# Patient Record
Sex: MALE | Race: BLACK OR AFRICAN AMERICAN | HISPANIC OR LATINO | URBAN - METROPOLITAN AREA
[De-identification: names, ages, dates, MRNs, and addresses within clinical notes are randomized per-mention and may not be internally consistent; named-entity substitution may affect disease eponyms.]

---

## 2017-06-30 ENCOUNTER — EMERGENCY (EMERGENCY)
Facility: HOSPITAL | Age: 37
LOS: 0 days | Discharge: HOME | End: 2017-07-01
Attending: EMERGENCY MEDICINE
Payer: SELF-PAY

## 2017-06-30 VITALS
DIASTOLIC BLOOD PRESSURE: 106 MMHG | WEIGHT: 160.06 LBS | HEART RATE: 66 BPM | HEIGHT: 69 IN | SYSTOLIC BLOOD PRESSURE: 156 MMHG | OXYGEN SATURATION: 96 % | TEMPERATURE: 99 F | RESPIRATION RATE: 20 BRPM

## 2017-06-30 DIAGNOSIS — Z90.49 ACQUIRED ABSENCE OF OTHER SPECIFIED PARTS OF DIGESTIVE TRACT: Chronic | ICD-10-CM

## 2017-06-30 LAB
ALBUMIN SERPL ELPH-MCNC: 4.2 G/DL — SIGNIFICANT CHANGE UP (ref 3.3–5)
ALP SERPL-CCNC: 96 U/L — SIGNIFICANT CHANGE UP (ref 40–120)
ALT FLD-CCNC: 54 U/L — SIGNIFICANT CHANGE UP (ref 12–78)
AMYLASE P1 CFR SERPL: 106 U/L — SIGNIFICANT CHANGE UP (ref 25–115)
ANION GAP SERPL CALC-SCNC: 11 MMOL/L — SIGNIFICANT CHANGE UP (ref 5–17)
APPEARANCE UR: CLEAR — SIGNIFICANT CHANGE UP
APTT BLD: 25.1 SEC — LOW (ref 27.5–37.4)
AST SERPL-CCNC: 45 U/L — HIGH (ref 15–37)
BASE EXCESS BLDA CALC-SCNC: 1.2 MMOL/L — SIGNIFICANT CHANGE UP (ref -2–2)
BASOPHILS # BLD AUTO: 0.1 K/UL — SIGNIFICANT CHANGE UP (ref 0–0.2)
BASOPHILS NFR BLD AUTO: 1 % — SIGNIFICANT CHANGE UP (ref 0–2)
BILIRUB SERPL-MCNC: 0.6 MG/DL — SIGNIFICANT CHANGE UP (ref 0.2–1.2)
BILIRUB UR-MCNC: NEGATIVE — SIGNIFICANT CHANGE UP
BLD GP AB SCN SERPL QL: SIGNIFICANT CHANGE UP
BLOOD GAS COMMENTS: SIGNIFICANT CHANGE UP
BLOOD GAS COMMENTS: SIGNIFICANT CHANGE UP
BLOOD GAS SOURCE: SIGNIFICANT CHANGE UP
BUN SERPL-MCNC: 6 MG/DL — LOW (ref 7–23)
CALCIUM SERPL-MCNC: 8.5 MG/DL — SIGNIFICANT CHANGE UP (ref 8.5–10.1)
CHLORIDE SERPL-SCNC: 99 MMOL/L — SIGNIFICANT CHANGE UP (ref 96–108)
CK MB BLD-MCNC: 0.5 % — SIGNIFICANT CHANGE UP (ref 0–3.5)
CK MB CFR SERPL CALC: 1.3 NG/ML — SIGNIFICANT CHANGE UP (ref 0.5–3.6)
CK SERPL-CCNC: 251 U/L — SIGNIFICANT CHANGE UP (ref 26–308)
CO2 SERPL-SCNC: 28 MMOL/L — SIGNIFICANT CHANGE UP (ref 22–31)
COLOR SPEC: YELLOW — SIGNIFICANT CHANGE UP
CREAT SERPL-MCNC: 1.02 MG/DL — SIGNIFICANT CHANGE UP (ref 0.5–1.3)
DIFF PNL FLD: NEGATIVE — SIGNIFICANT CHANGE UP
EOSINOPHIL # BLD AUTO: 0.1 K/UL — SIGNIFICANT CHANGE UP (ref 0–0.5)
EOSINOPHIL NFR BLD AUTO: 0.8 % — SIGNIFICANT CHANGE UP (ref 0–6)
GLUCOSE SERPL-MCNC: 476 MG/DL — CRITICAL HIGH (ref 70–99)
GLUCOSE UR QL: 1000 MG/DL
HCO3 BLDA-SCNC: 25 MMOL/L — SIGNIFICANT CHANGE UP (ref 21–29)
HCT VFR BLD CALC: 41.8 % — SIGNIFICANT CHANGE UP (ref 39–50)
HGB BLD-MCNC: 14.9 G/DL — SIGNIFICANT CHANGE UP (ref 13–17)
HOROWITZ INDEX BLDA+IHG-RTO: 21 — SIGNIFICANT CHANGE UP
INR BLD: 0.92 RATIO — SIGNIFICANT CHANGE UP (ref 0.88–1.16)
KETONES UR-MCNC: NEGATIVE — SIGNIFICANT CHANGE UP
LACTATE SERPL-SCNC: 2.5 MMOL/L — HIGH (ref 0.7–2)
LEUKOCYTE ESTERASE UR-ACNC: NEGATIVE — SIGNIFICANT CHANGE UP
LIDOCAIN IGE QN: 56 U/L — LOW (ref 73–393)
LYMPHOCYTES # BLD AUTO: 1.2 K/UL — SIGNIFICANT CHANGE UP (ref 1–3.3)
LYMPHOCYTES # BLD AUTO: 11.5 % — LOW (ref 13–44)
MCHC RBC-ENTMCNC: 29.6 PG — SIGNIFICANT CHANGE UP (ref 27–34)
MCHC RBC-ENTMCNC: 35.5 GM/DL — SIGNIFICANT CHANGE UP (ref 32–36)
MCV RBC AUTO: 83.2 FL — SIGNIFICANT CHANGE UP (ref 80–100)
MONOCYTES # BLD AUTO: 0.2 K/UL — SIGNIFICANT CHANGE UP (ref 0–0.9)
MONOCYTES NFR BLD AUTO: 2.2 % — SIGNIFICANT CHANGE UP (ref 2–14)
NEUTROPHILS # BLD AUTO: 8.9 K/UL — HIGH (ref 1.8–7.4)
NEUTROPHILS NFR BLD AUTO: 84.5 % — HIGH (ref 43–77)
NITRITE UR-MCNC: NEGATIVE — SIGNIFICANT CHANGE UP
OB PNL STL: NEGATIVE — SIGNIFICANT CHANGE UP
PCO2 BLDA: 40 MMHG — SIGNIFICANT CHANGE UP (ref 32–46)
PH BLD: 7.42 — SIGNIFICANT CHANGE UP (ref 7.35–7.45)
PH UR: 8 — SIGNIFICANT CHANGE UP (ref 5–8)
PLATELET # BLD AUTO: 320 K/UL — SIGNIFICANT CHANGE UP (ref 150–400)
PO2 BLDA: 104 MMHG — SIGNIFICANT CHANGE UP (ref 74–108)
POTASSIUM SERPL-MCNC: 4.8 MMOL/L — SIGNIFICANT CHANGE UP (ref 3.5–5.3)
POTASSIUM SERPL-SCNC: 4.8 MMOL/L — SIGNIFICANT CHANGE UP (ref 3.5–5.3)
PROT SERPL-MCNC: 8 GM/DL — SIGNIFICANT CHANGE UP (ref 6–8.3)
PROT UR-MCNC: NEGATIVE MG/DL — SIGNIFICANT CHANGE UP
PROTHROM AB SERPL-ACNC: 10 SEC — SIGNIFICANT CHANGE UP (ref 9.8–12.7)
RBC # BLD: 5.03 M/UL — SIGNIFICANT CHANGE UP (ref 4.2–5.8)
RBC # FLD: 12.5 % — SIGNIFICANT CHANGE UP (ref 11–15)
SAO2 % BLDA: 98 % — HIGH (ref 92–96)
SODIUM SERPL-SCNC: 138 MMOL/L — SIGNIFICANT CHANGE UP (ref 135–145)
SP GR SPEC: 1.01 — SIGNIFICANT CHANGE UP (ref 1.01–1.02)
TROPONIN I SERPL-MCNC: <.015 NG/ML — SIGNIFICANT CHANGE UP (ref 0.01–0.04)
UROBILINOGEN FLD QL: NEGATIVE MG/DL — SIGNIFICANT CHANGE UP
WBC # BLD: 10.5 K/UL — SIGNIFICANT CHANGE UP (ref 3.8–10.5)
WBC # FLD AUTO: 10.5 K/UL — SIGNIFICANT CHANGE UP (ref 3.8–10.5)

## 2017-06-30 PROCEDURE — 99285 EMERGENCY DEPT VISIT HI MDM: CPT

## 2017-06-30 PROCEDURE — 74020: CPT | Mod: 26

## 2017-06-30 PROCEDURE — 71010: CPT | Mod: 26

## 2017-06-30 RX ORDER — PIPERACILLIN AND TAZOBACTAM 4; .5 G/20ML; G/20ML
3.38 INJECTION, POWDER, LYOPHILIZED, FOR SOLUTION INTRAVENOUS ONCE
Qty: 0 | Refills: 0 | Status: COMPLETED | OUTPATIENT
Start: 2017-06-30 | End: 2017-06-30

## 2017-06-30 RX ORDER — SODIUM CHLORIDE 9 MG/ML
3 INJECTION INTRAMUSCULAR; INTRAVENOUS; SUBCUTANEOUS ONCE
Qty: 0 | Refills: 0 | Status: COMPLETED | OUTPATIENT
Start: 2017-06-30 | End: 2017-06-30

## 2017-06-30 RX ORDER — SODIUM CHLORIDE 9 MG/ML
1000 INJECTION INTRAMUSCULAR; INTRAVENOUS; SUBCUTANEOUS
Qty: 0 | Refills: 0 | Status: COMPLETED | OUTPATIENT
Start: 2017-06-30 | End: 2017-06-30

## 2017-06-30 RX ORDER — MORPHINE SULFATE 50 MG/1
4 CAPSULE, EXTENDED RELEASE ORAL ONCE
Qty: 0 | Refills: 0 | Status: DISCONTINUED | OUTPATIENT
Start: 2017-06-30 | End: 2017-06-30

## 2017-06-30 RX ORDER — INSULIN HUMAN 100 [IU]/ML
10 INJECTION, SOLUTION SUBCUTANEOUS ONCE
Qty: 0 | Refills: 0 | Status: COMPLETED | OUTPATIENT
Start: 2017-06-30 | End: 2017-06-30

## 2017-06-30 RX ORDER — METOCLOPRAMIDE HCL 10 MG
10 TABLET ORAL ONCE
Qty: 0 | Refills: 0 | Status: COMPLETED | OUTPATIENT
Start: 2017-06-30 | End: 2017-06-30

## 2017-06-30 RX ORDER — ONDANSETRON 8 MG/1
8 TABLET, FILM COATED ORAL ONCE
Qty: 0 | Refills: 0 | Status: COMPLETED | OUTPATIENT
Start: 2017-06-30 | End: 2017-06-30

## 2017-06-30 RX ORDER — DIPHENHYDRAMINE HCL 50 MG
25 CAPSULE ORAL ONCE
Qty: 0 | Refills: 0 | Status: COMPLETED | OUTPATIENT
Start: 2017-06-30 | End: 2017-06-30

## 2017-06-30 RX ORDER — PANTOPRAZOLE SODIUM 20 MG/1
80 TABLET, DELAYED RELEASE ORAL ONCE
Qty: 0 | Refills: 0 | Status: COMPLETED | OUTPATIENT
Start: 2017-06-30 | End: 2017-06-30

## 2017-06-30 RX ORDER — IOHEXOL 300 MG/ML
30 INJECTION, SOLUTION INTRAVENOUS ONCE
Qty: 0 | Refills: 0 | Status: COMPLETED | OUTPATIENT
Start: 2017-06-30 | End: 2017-06-30

## 2017-06-30 RX ADMIN — PIPERACILLIN AND TAZOBACTAM 200 GRAM(S): 4; .5 INJECTION, POWDER, LYOPHILIZED, FOR SOLUTION INTRAVENOUS at 21:49

## 2017-06-30 RX ADMIN — Medication 10 MILLIGRAM(S): at 21:21

## 2017-06-30 RX ADMIN — SODIUM CHLORIDE 2000 MILLILITER(S): 9 INJECTION INTRAMUSCULAR; INTRAVENOUS; SUBCUTANEOUS at 21:34

## 2017-06-30 RX ADMIN — MORPHINE SULFATE 4 MILLIGRAM(S): 50 CAPSULE, EXTENDED RELEASE ORAL at 20:21

## 2017-06-30 RX ADMIN — IOHEXOL 30 MILLILITER(S): 300 INJECTION, SOLUTION INTRAVENOUS at 21:57

## 2017-06-30 RX ADMIN — ONDANSETRON 8 MILLIGRAM(S): 8 TABLET, FILM COATED ORAL at 20:21

## 2017-06-30 RX ADMIN — MORPHINE SULFATE 4 MILLIGRAM(S): 50 CAPSULE, EXTENDED RELEASE ORAL at 21:57

## 2017-06-30 RX ADMIN — SODIUM CHLORIDE 2000 MILLILITER(S): 9 INJECTION INTRAMUSCULAR; INTRAVENOUS; SUBCUTANEOUS at 20:21

## 2017-06-30 RX ADMIN — Medication 25 MILLIGRAM(S): at 21:21

## 2017-06-30 RX ADMIN — SODIUM CHLORIDE 2000 MILLILITER(S): 9 INJECTION INTRAMUSCULAR; INTRAVENOUS; SUBCUTANEOUS at 23:49

## 2017-06-30 RX ADMIN — SODIUM CHLORIDE 2000 MILLILITER(S): 9 INJECTION INTRAMUSCULAR; INTRAVENOUS; SUBCUTANEOUS at 22:59

## 2017-06-30 RX ADMIN — INSULIN HUMAN 10 UNIT(S): 100 INJECTION, SOLUTION SUBCUTANEOUS at 22:59

## 2017-06-30 RX ADMIN — PANTOPRAZOLE SODIUM 80 MILLIGRAM(S): 20 TABLET, DELAYED RELEASE ORAL at 20:21

## 2017-06-30 RX ADMIN — SODIUM CHLORIDE 3 MILLILITER(S): 9 INJECTION INTRAMUSCULAR; INTRAVENOUS; SUBCUTANEOUS at 20:30

## 2017-06-30 NOTE — ED PROVIDER NOTE - OBJECTIVE STATEMENT
36yoM; with pmh signif for DM type I, Gastritis, Anxeity; now p/w abd pain--epigastric, cramping, intermittent, x1 day, associated with n/v (x15-20 episodes)--food and coffee grounds. denies f/c/s. states he ate fast food yesterday. denies sick contacts. denies travel. denies dysuria, hematuria, frequency, urgency.

## 2017-06-30 NOTE — ED PROVIDER NOTE - CARE PLAN
Principal Discharge DX:	Hyperglycemia without ketosis Principal Discharge DX:	Hyperglycemia without ketosis  Secondary Diagnosis:	Gastritis

## 2017-06-30 NOTE — ED PROVIDER NOTE - PHYSICAL EXAMINATION
General:     NAD, well-nourished, well-appearing  Head:     NC/AT, EOMI, oral mucosa moist  Neck:     trachea midline  Lungs:     CTA b/l, no w/r/r  CVS:     S1S2, RRR, no m/g/r  Abd:     +BS, ttp @ epigastrium > RUQ=LUQ, no rebound or guarding, s/nd, no organomegaly  Ext:    2+ radial and pedal pulses, no c/c/e  Neuro: AAOx3, no sensory/motor deficits

## 2017-06-30 NOTE — ED ADULT TRIAGE NOTE - CHIEF COMPLAINT QUOTE
patient c/o of abdominal pain and coffee ground vomits started 2 H ago , patient diaphoretic denied chest pain no difficulty breathing

## 2017-07-01 VITALS
RESPIRATION RATE: 18 BRPM | HEART RATE: 71 BPM | DIASTOLIC BLOOD PRESSURE: 52 MMHG | TEMPERATURE: 98 F | OXYGEN SATURATION: 99 % | SYSTOLIC BLOOD PRESSURE: 108 MMHG

## 2017-07-01 DIAGNOSIS — Z79.4 LONG TERM (CURRENT) USE OF INSULIN: ICD-10-CM

## 2017-07-01 DIAGNOSIS — K92.0 HEMATEMESIS: ICD-10-CM

## 2017-07-01 DIAGNOSIS — E10.65 TYPE 1 DIABETES MELLITUS WITH HYPERGLYCEMIA: ICD-10-CM

## 2017-07-01 DIAGNOSIS — J45.909 UNSPECIFIED ASTHMA, UNCOMPLICATED: ICD-10-CM

## 2017-07-01 DIAGNOSIS — Z79.51 LONG TERM (CURRENT) USE OF INHALED STEROIDS: ICD-10-CM

## 2017-07-01 LAB
ANION GAP SERPL CALC-SCNC: 8 MMOL/L — SIGNIFICANT CHANGE UP (ref 5–17)
BUN SERPL-MCNC: 7 MG/DL — SIGNIFICANT CHANGE UP (ref 7–23)
CALCIUM SERPL-MCNC: 8.5 MG/DL — SIGNIFICANT CHANGE UP (ref 8.5–10.1)
CHLORIDE SERPL-SCNC: 104 MMOL/L — SIGNIFICANT CHANGE UP (ref 96–108)
CO2 SERPL-SCNC: 27 MMOL/L — SIGNIFICANT CHANGE UP (ref 22–31)
CREAT SERPL-MCNC: 0.8 MG/DL — SIGNIFICANT CHANGE UP (ref 0.5–1.3)
GLUCOSE SERPL-MCNC: 269 MG/DL — HIGH (ref 70–99)
LACTATE SERPL-SCNC: 1.7 MMOL/L — SIGNIFICANT CHANGE UP (ref 0.7–2)
POTASSIUM SERPL-MCNC: 3.9 MMOL/L — SIGNIFICANT CHANGE UP (ref 3.5–5.3)
POTASSIUM SERPL-SCNC: 3.9 MMOL/L — SIGNIFICANT CHANGE UP (ref 3.5–5.3)
SODIUM SERPL-SCNC: 139 MMOL/L — SIGNIFICANT CHANGE UP (ref 135–145)

## 2017-07-01 PROCEDURE — 74177 CT ABD & PELVIS W/CONTRAST: CPT | Mod: 26

## 2017-07-01 RX ORDER — ONDANSETRON 8 MG/1
1 TABLET, FILM COATED ORAL
Qty: 10 | Refills: 0 | OUTPATIENT
Start: 2017-07-01

## 2017-07-01 RX ORDER — INSULIN HUMAN 100 [IU]/ML
4 INJECTION, SOLUTION SUBCUTANEOUS ONCE
Qty: 0 | Refills: 0 | Status: COMPLETED | OUTPATIENT
Start: 2017-07-01 | End: 2017-07-01

## 2017-07-01 RX ORDER — PANTOPRAZOLE SODIUM 20 MG/1
1 TABLET, DELAYED RELEASE ORAL
Qty: 14 | Refills: 0 | OUTPATIENT
Start: 2017-07-01 | End: 2017-07-15

## 2017-07-01 RX ORDER — MORPHINE SULFATE 50 MG/1
4 CAPSULE, EXTENDED RELEASE ORAL ONCE
Qty: 0 | Refills: 0 | Status: DISCONTINUED | OUTPATIENT
Start: 2017-07-01 | End: 2017-07-01

## 2017-07-01 RX ADMIN — MORPHINE SULFATE 4 MILLIGRAM(S): 50 CAPSULE, EXTENDED RELEASE ORAL at 01:28

## 2017-07-01 RX ADMIN — INSULIN HUMAN 4 UNIT(S): 100 INJECTION, SOLUTION SUBCUTANEOUS at 04:00

## 2017-07-01 NOTE — ED ADULT NURSE REASSESSMENT NOTE - NS ED NURSE REASSESS COMMENT FT1
Pt A&Ox4. No distress noted. Received resting in stretcher. Will continue to monitor
Pt A&Ox4. No distress noted. No c/o made. Discharged to home as ordered. Discharge instruction given & discussed with pt. Pt verbalized understanding. Left ER ambulatory. Steady gait noted.

## 2017-09-26 ENCOUNTER — INPATIENT (INPATIENT)
Facility: HOSPITAL | Age: 37
LOS: 3 days | Discharge: HOME | End: 2017-09-30
Attending: INTERNAL MEDICINE | Admitting: INTERNAL MEDICINE
Payer: MEDICAID

## 2017-09-26 VITALS
TEMPERATURE: 98 F | HEIGHT: 69 IN | OXYGEN SATURATION: 96 % | RESPIRATION RATE: 16 BRPM | HEART RATE: 94 BPM | WEIGHT: 160.06 LBS | SYSTOLIC BLOOD PRESSURE: 140 MMHG | DIASTOLIC BLOOD PRESSURE: 77 MMHG

## 2017-09-26 DIAGNOSIS — Z90.49 ACQUIRED ABSENCE OF OTHER SPECIFIED PARTS OF DIGESTIVE TRACT: Chronic | ICD-10-CM

## 2017-09-26 LAB
ACETONE SERPL-MCNC: ABNORMAL
ALBUMIN SERPL ELPH-MCNC: 4 G/DL — SIGNIFICANT CHANGE UP (ref 3.3–5)
ALP SERPL-CCNC: 106 U/L — SIGNIFICANT CHANGE UP (ref 40–120)
ALT FLD-CCNC: 40 U/L — SIGNIFICANT CHANGE UP (ref 12–78)
ANION GAP SERPL CALC-SCNC: 16 MMOL/L — SIGNIFICANT CHANGE UP (ref 5–17)
ANION GAP SERPL CALC-SCNC: 21 MMOL/L — HIGH (ref 5–17)
APPEARANCE UR: CLEAR — SIGNIFICANT CHANGE UP
APTT BLD: 25 SEC — LOW (ref 27.5–37.4)
AST SERPL-CCNC: 32 U/L — SIGNIFICANT CHANGE UP (ref 15–37)
BACTERIA # UR AUTO: ABNORMAL
BASE EXCESS BLDA CALC-SCNC: -7.2 MMOL/L — LOW (ref -2–2)
BASE EXCESS BLDV CALC-SCNC: -7.9 MMOL/L — LOW (ref -2–2)
BASOPHILS # BLD AUTO: 0 K/UL — SIGNIFICANT CHANGE UP (ref 0–0.2)
BASOPHILS NFR BLD AUTO: 0.3 % — SIGNIFICANT CHANGE UP (ref 0–2)
BILIRUB SERPL-MCNC: 0.7 MG/DL — SIGNIFICANT CHANGE UP (ref 0.2–1.2)
BILIRUB UR-MCNC: NEGATIVE — SIGNIFICANT CHANGE UP
BLOOD GAS COMMENTS, VENOUS: SIGNIFICANT CHANGE UP
BLOOD GAS COMMENTS: SIGNIFICANT CHANGE UP
BLOOD GAS COMMENTS: SIGNIFICANT CHANGE UP
BLOOD GAS SOURCE: SIGNIFICANT CHANGE UP
BUN SERPL-MCNC: 22 MG/DL — SIGNIFICANT CHANGE UP (ref 7–23)
BUN SERPL-MCNC: 23 MG/DL — SIGNIFICANT CHANGE UP (ref 7–23)
CALCIUM SERPL-MCNC: 8 MG/DL — LOW (ref 8.5–10.1)
CALCIUM SERPL-MCNC: 9.3 MG/DL — SIGNIFICANT CHANGE UP (ref 8.5–10.1)
CHLORIDE SERPL-SCNC: 104 MMOL/L — SIGNIFICANT CHANGE UP (ref 96–108)
CHLORIDE SERPL-SCNC: 97 MMOL/L — SIGNIFICANT CHANGE UP (ref 96–108)
CO2 SERPL-SCNC: 18 MMOL/L — LOW (ref 22–31)
CO2 SERPL-SCNC: 19 MMOL/L — LOW (ref 22–31)
COLOR SPEC: SIGNIFICANT CHANGE UP
CREAT SERPL-MCNC: 1.3 MG/DL — SIGNIFICANT CHANGE UP (ref 0.5–1.3)
CREAT SERPL-MCNC: 1.32 MG/DL — HIGH (ref 0.5–1.3)
DIFF PNL FLD: NEGATIVE — SIGNIFICANT CHANGE UP
EOSINOPHIL # BLD AUTO: 0 K/UL — SIGNIFICANT CHANGE UP (ref 0–0.5)
EOSINOPHIL NFR BLD AUTO: 0.1 % — SIGNIFICANT CHANGE UP (ref 0–6)
GAS PNL BLDV: SIGNIFICANT CHANGE UP
GLUCOSE SERPL-MCNC: 319 MG/DL — HIGH (ref 70–99)
GLUCOSE SERPL-MCNC: 524 MG/DL — CRITICAL HIGH (ref 70–99)
GLUCOSE UR QL: 1000 MG/DL
HCO3 BLDA-SCNC: 18 MMOL/L — LOW (ref 21–29)
HCO3 BLDV-SCNC: 18 MMOL/L — LOW (ref 21–29)
HCT VFR BLD CALC: 46.7 % — SIGNIFICANT CHANGE UP (ref 39–50)
HGB BLD-MCNC: 15.1 G/DL — SIGNIFICANT CHANGE UP (ref 13–17)
HOROWITZ INDEX BLDA+IHG-RTO: 21 — SIGNIFICANT CHANGE UP
HOROWITZ INDEX BLDV+IHG-RTO: 21 — SIGNIFICANT CHANGE UP
INR BLD: 0.97 RATIO — SIGNIFICANT CHANGE UP (ref 0.88–1.16)
KETONES UR-MCNC: ABNORMAL
LACTATE SERPL-SCNC: 2.6 MMOL/L — HIGH (ref 0.7–2)
LEUKOCYTE ESTERASE UR-ACNC: NEGATIVE — SIGNIFICANT CHANGE UP
LYMPHOCYTES # BLD AUTO: 1.4 K/UL — SIGNIFICANT CHANGE UP (ref 1–3.3)
LYMPHOCYTES # BLD AUTO: 9.2 % — LOW (ref 13–44)
MCHC RBC-ENTMCNC: 29.1 PG — SIGNIFICANT CHANGE UP (ref 27–34)
MCHC RBC-ENTMCNC: 32.4 GM/DL — SIGNIFICANT CHANGE UP (ref 32–36)
MCV RBC AUTO: 89.8 FL — SIGNIFICANT CHANGE UP (ref 80–100)
MONOCYTES # BLD AUTO: 0.1 K/UL — SIGNIFICANT CHANGE UP (ref 0–0.9)
MONOCYTES NFR BLD AUTO: 0.7 % — LOW (ref 2–14)
NEUTROPHILS # BLD AUTO: 13.6 K/UL — HIGH (ref 1.8–7.4)
NEUTROPHILS NFR BLD AUTO: 89.6 % — HIGH (ref 43–77)
NITRITE UR-MCNC: NEGATIVE — SIGNIFICANT CHANGE UP
PCO2 BLDA: 35 MMHG — SIGNIFICANT CHANGE UP (ref 32–46)
PCO2 BLDV: 40 MMHG — SIGNIFICANT CHANGE UP (ref 35–50)
PH BLD: 7.32 — LOW (ref 7.35–7.45)
PH BLDV: 7.28 — LOW (ref 7.35–7.45)
PH UR: 5 — SIGNIFICANT CHANGE UP (ref 5–8)
PLATELET # BLD AUTO: 308 K/UL — SIGNIFICANT CHANGE UP (ref 150–400)
PO2 BLDA: 74 MMHG — SIGNIFICANT CHANGE UP (ref 74–108)
PO2 BLDV: 69 MMHG — HIGH (ref 25–45)
POTASSIUM SERPL-MCNC: 4.5 MMOL/L — SIGNIFICANT CHANGE UP (ref 3.5–5.3)
POTASSIUM SERPL-MCNC: 5.7 MMOL/L — HIGH (ref 3.5–5.3)
POTASSIUM SERPL-SCNC: 4.5 MMOL/L — SIGNIFICANT CHANGE UP (ref 3.5–5.3)
POTASSIUM SERPL-SCNC: 5.7 MMOL/L — HIGH (ref 3.5–5.3)
PROT SERPL-MCNC: 8 GM/DL — SIGNIFICANT CHANGE UP (ref 6–8.3)
PROT UR-MCNC: NEGATIVE MG/DL — SIGNIFICANT CHANGE UP
PROTHROM AB SERPL-ACNC: 10.6 SEC — SIGNIFICANT CHANGE UP (ref 9.8–12.7)
RBC # BLD: 5.2 M/UL — SIGNIFICANT CHANGE UP (ref 4.2–5.8)
RBC # FLD: 12.4 % — SIGNIFICANT CHANGE UP (ref 11–15)
SAO2 % BLDA: 94 % — SIGNIFICANT CHANGE UP (ref 92–96)
SAO2 % BLDV: 53 % — LOW (ref 67–88)
SODIUM SERPL-SCNC: 137 MMOL/L — SIGNIFICANT CHANGE UP (ref 135–145)
SODIUM SERPL-SCNC: 138 MMOL/L — SIGNIFICANT CHANGE UP (ref 135–145)
SP GR SPEC: 1.01 — SIGNIFICANT CHANGE UP (ref 1.01–1.02)
UROBILINOGEN FLD QL: NEGATIVE MG/DL — SIGNIFICANT CHANGE UP
WBC # BLD: 15.2 K/UL — HIGH (ref 3.8–10.5)
WBC # FLD AUTO: 15.2 K/UL — HIGH (ref 3.8–10.5)

## 2017-09-26 PROCEDURE — 71010: CPT | Mod: 26

## 2017-09-26 PROCEDURE — 74177 CT ABD & PELVIS W/CONTRAST: CPT | Mod: 26

## 2017-09-26 PROCEDURE — 99285 EMERGENCY DEPT VISIT HI MDM: CPT

## 2017-09-26 PROCEDURE — 74020: CPT | Mod: 26

## 2017-09-26 RX ORDER — HYDROMORPHONE HYDROCHLORIDE 2 MG/ML
1 INJECTION INTRAMUSCULAR; INTRAVENOUS; SUBCUTANEOUS ONCE
Qty: 0 | Refills: 0 | Status: DISCONTINUED | OUTPATIENT
Start: 2017-09-26 | End: 2017-09-26

## 2017-09-26 RX ORDER — PANTOPRAZOLE SODIUM 20 MG/1
40 TABLET, DELAYED RELEASE ORAL ONCE
Qty: 0 | Refills: 0 | Status: COMPLETED | OUTPATIENT
Start: 2017-09-26 | End: 2017-09-26

## 2017-09-26 RX ORDER — SODIUM CHLORIDE 9 MG/ML
2000 INJECTION INTRAMUSCULAR; INTRAVENOUS; SUBCUTANEOUS ONCE
Qty: 0 | Refills: 0 | Status: COMPLETED | OUTPATIENT
Start: 2017-09-26 | End: 2017-09-26

## 2017-09-26 RX ORDER — METRONIDAZOLE 500 MG
500 TABLET ORAL ONCE
Qty: 0 | Refills: 0 | Status: COMPLETED | OUTPATIENT
Start: 2017-09-26 | End: 2017-09-26

## 2017-09-26 RX ORDER — IOHEXOL 300 MG/ML
30 INJECTION, SOLUTION INTRAVENOUS ONCE
Qty: 0 | Refills: 0 | Status: COMPLETED | OUTPATIENT
Start: 2017-09-26 | End: 2017-09-26

## 2017-09-26 RX ORDER — CIPROFLOXACIN LACTATE 400MG/40ML
400 VIAL (ML) INTRAVENOUS ONCE
Qty: 0 | Refills: 0 | Status: COMPLETED | OUTPATIENT
Start: 2017-09-26 | End: 2017-09-26

## 2017-09-26 RX ORDER — METOCLOPRAMIDE HCL 10 MG
10 TABLET ORAL ONCE
Qty: 0 | Refills: 0 | Status: COMPLETED | OUTPATIENT
Start: 2017-09-26 | End: 2017-09-26

## 2017-09-26 RX ORDER — INSULIN HUMAN 100 [IU]/ML
10 INJECTION, SOLUTION SUBCUTANEOUS ONCE
Qty: 0 | Refills: 0 | Status: COMPLETED | OUTPATIENT
Start: 2017-09-26 | End: 2017-09-26

## 2017-09-26 RX ADMIN — INSULIN HUMAN 10 UNIT(S): 100 INJECTION, SOLUTION SUBCUTANEOUS at 19:16

## 2017-09-26 RX ADMIN — HYDROMORPHONE HYDROCHLORIDE 1 MILLIGRAM(S): 2 INJECTION INTRAMUSCULAR; INTRAVENOUS; SUBCUTANEOUS at 18:24

## 2017-09-26 RX ADMIN — PANTOPRAZOLE SODIUM 40 MILLIGRAM(S): 20 TABLET, DELAYED RELEASE ORAL at 18:23

## 2017-09-26 RX ADMIN — SODIUM CHLORIDE 2000 MILLILITER(S): 9 INJECTION INTRAMUSCULAR; INTRAVENOUS; SUBCUTANEOUS at 18:23

## 2017-09-26 RX ADMIN — HYDROMORPHONE HYDROCHLORIDE 1 MILLIGRAM(S): 2 INJECTION INTRAMUSCULAR; INTRAVENOUS; SUBCUTANEOUS at 20:50

## 2017-09-26 RX ADMIN — IOHEXOL 30 MILLILITER(S): 300 INJECTION, SOLUTION INTRAVENOUS at 19:35

## 2017-09-26 RX ADMIN — SODIUM CHLORIDE 2000 MILLILITER(S): 9 INJECTION INTRAMUSCULAR; INTRAVENOUS; SUBCUTANEOUS at 19:40

## 2017-09-26 RX ADMIN — Medication 10 MILLIGRAM(S): at 18:24

## 2017-09-26 NOTE — ED PROVIDER NOTE - OBJECTIVE STATEMENT
36 yeas old male here c/o nausea vomiting intermittent diffused abd pain since this morning. Pt denies headache, dizziness, sob, chest pain, fever, chills, recent hx of trauma, dysuria or irregular bowel movements.

## 2017-09-26 NOTE — ED ADULT NURSE NOTE - OBJECTIVE STATEMENT
pt received alert and oriented x3, pt c/o abdominal pain, vomiting that started this morning, pt states he ran out of his insulin

## 2017-09-26 NOTE — ED PROVIDER NOTE - CONSTITUTIONAL, MLM
normal... Well appearing, well nourished, awake, alert, oriented to person, place, time/situation and in no apparent distress. + yellow bile

## 2017-09-27 DIAGNOSIS — E13.10 OTHER SPECIFIED DIABETES MELLITUS WITH KETOACIDOSIS WITHOUT COMA: ICD-10-CM

## 2017-09-27 LAB
ANION GAP SERPL CALC-SCNC: 13 MMOL/L — SIGNIFICANT CHANGE UP (ref 5–17)
ANION GAP SERPL CALC-SCNC: 21 MMOL/L — HIGH (ref 5–17)
ANION GAP SERPL CALC-SCNC: 21 MMOL/L — HIGH (ref 5–17)
ANION GAP SERPL CALC-SCNC: 8 MMOL/L — SIGNIFICANT CHANGE UP (ref 5–17)
BASOPHILS # BLD AUTO: 0 K/UL — SIGNIFICANT CHANGE UP (ref 0–0.2)
BASOPHILS NFR BLD AUTO: 0.2 % — SIGNIFICANT CHANGE UP (ref 0–2)
BUN SERPL-MCNC: 17 MG/DL — SIGNIFICANT CHANGE UP (ref 7–23)
BUN SERPL-MCNC: 21 MG/DL — SIGNIFICANT CHANGE UP (ref 7–23)
BUN SERPL-MCNC: 22 MG/DL — SIGNIFICANT CHANGE UP (ref 7–23)
BUN SERPL-MCNC: 25 MG/DL — HIGH (ref 7–23)
CALCIUM SERPL-MCNC: 8.1 MG/DL — LOW (ref 8.5–10.1)
CALCIUM SERPL-MCNC: 8.4 MG/DL — LOW (ref 8.5–10.1)
CALCIUM SERPL-MCNC: 8.5 MG/DL — SIGNIFICANT CHANGE UP (ref 8.5–10.1)
CALCIUM SERPL-MCNC: 8.5 MG/DL — SIGNIFICANT CHANGE UP (ref 8.5–10.1)
CHLORIDE SERPL-SCNC: 106 MMOL/L — SIGNIFICANT CHANGE UP (ref 96–108)
CHLORIDE SERPL-SCNC: 106 MMOL/L — SIGNIFICANT CHANGE UP (ref 96–108)
CHLORIDE SERPL-SCNC: 107 MMOL/L — SIGNIFICANT CHANGE UP (ref 96–108)
CHLORIDE SERPL-SCNC: 111 MMOL/L — HIGH (ref 96–108)
CO2 SERPL-SCNC: 12 MMOL/L — LOW (ref 22–31)
CO2 SERPL-SCNC: 13 MMOL/L — LOW (ref 22–31)
CO2 SERPL-SCNC: 18 MMOL/L — LOW (ref 22–31)
CO2 SERPL-SCNC: 24 MMOL/L — SIGNIFICANT CHANGE UP (ref 22–31)
CREAT SERPL-MCNC: 1.13 MG/DL — SIGNIFICANT CHANGE UP (ref 0.5–1.3)
CREAT SERPL-MCNC: 1.16 MG/DL — SIGNIFICANT CHANGE UP (ref 0.5–1.3)
CREAT SERPL-MCNC: 1.21 MG/DL — SIGNIFICANT CHANGE UP (ref 0.5–1.3)
CREAT SERPL-MCNC: 1.34 MG/DL — HIGH (ref 0.5–1.3)
EOSINOPHIL # BLD AUTO: 0 K/UL — SIGNIFICANT CHANGE UP (ref 0–0.5)
EOSINOPHIL NFR BLD AUTO: 0 % — SIGNIFICANT CHANGE UP (ref 0–6)
GLUCOSE SERPL-MCNC: 117 MG/DL — HIGH (ref 70–99)
GLUCOSE SERPL-MCNC: 173 MG/DL — HIGH (ref 70–99)
GLUCOSE SERPL-MCNC: 395 MG/DL — HIGH (ref 70–99)
GLUCOSE SERPL-MCNC: 405 MG/DL — HIGH (ref 70–99)
HBA1C BLD-MCNC: 9.5 % — HIGH (ref 4–5.6)
HCT VFR BLD CALC: 39.4 % — SIGNIFICANT CHANGE UP (ref 39–50)
HGB BLD-MCNC: 13.2 G/DL — SIGNIFICANT CHANGE UP (ref 13–17)
LACTATE SERPL-SCNC: 1.6 MMOL/L — SIGNIFICANT CHANGE UP (ref 0.7–2)
LYMPHOCYTES # BLD AUTO: 1.2 K/UL — SIGNIFICANT CHANGE UP (ref 1–3.3)
LYMPHOCYTES # BLD AUTO: 6.2 % — LOW (ref 13–44)
MAGNESIUM SERPL-MCNC: 2.3 MG/DL — SIGNIFICANT CHANGE UP (ref 1.6–2.6)
MAGNESIUM SERPL-MCNC: 2.3 MG/DL — SIGNIFICANT CHANGE UP (ref 1.6–2.6)
MAGNESIUM SERPL-MCNC: 2.4 MG/DL — SIGNIFICANT CHANGE UP (ref 1.6–2.6)
MCHC RBC-ENTMCNC: 29.7 PG — SIGNIFICANT CHANGE UP (ref 27–34)
MCHC RBC-ENTMCNC: 33.4 GM/DL — SIGNIFICANT CHANGE UP (ref 32–36)
MCV RBC AUTO: 88.8 FL — SIGNIFICANT CHANGE UP (ref 80–100)
MONOCYTES # BLD AUTO: 0.6 K/UL — SIGNIFICANT CHANGE UP (ref 0–0.9)
MONOCYTES NFR BLD AUTO: 2.9 % — SIGNIFICANT CHANGE UP (ref 2–14)
NEUTROPHILS # BLD AUTO: 17.1 K/UL — HIGH (ref 1.8–7.4)
NEUTROPHILS NFR BLD AUTO: 90.6 % — HIGH (ref 43–77)
OSMOLALITY SERPL: 309 MOS/KG — HIGH (ref 275–300)
PHOSPHATE SERPL-MCNC: 1.9 MG/DL — LOW (ref 2.5–4.5)
PHOSPHATE SERPL-MCNC: 3 MG/DL — SIGNIFICANT CHANGE UP (ref 2.5–4.5)
PHOSPHATE SERPL-MCNC: 3.7 MG/DL — SIGNIFICANT CHANGE UP (ref 2.5–4.5)
PLATELET # BLD AUTO: 302 K/UL — SIGNIFICANT CHANGE UP (ref 150–400)
POTASSIUM SERPL-MCNC: 3.5 MMOL/L — SIGNIFICANT CHANGE UP (ref 3.5–5.3)
POTASSIUM SERPL-MCNC: 4.3 MMOL/L — SIGNIFICANT CHANGE UP (ref 3.5–5.3)
POTASSIUM SERPL-MCNC: 4.6 MMOL/L — SIGNIFICANT CHANGE UP (ref 3.5–5.3)
POTASSIUM SERPL-MCNC: 5.6 MMOL/L — HIGH (ref 3.5–5.3)
POTASSIUM SERPL-SCNC: 3.5 MMOL/L — SIGNIFICANT CHANGE UP (ref 3.5–5.3)
POTASSIUM SERPL-SCNC: 4.3 MMOL/L — SIGNIFICANT CHANGE UP (ref 3.5–5.3)
POTASSIUM SERPL-SCNC: 4.6 MMOL/L — SIGNIFICANT CHANGE UP (ref 3.5–5.3)
POTASSIUM SERPL-SCNC: 5.6 MMOL/L — HIGH (ref 3.5–5.3)
RBC # BLD: 4.44 M/UL — SIGNIFICANT CHANGE UP (ref 4.2–5.8)
RBC # FLD: 12.9 % — SIGNIFICANT CHANGE UP (ref 11–15)
SODIUM SERPL-SCNC: 139 MMOL/L — SIGNIFICANT CHANGE UP (ref 135–145)
SODIUM SERPL-SCNC: 139 MMOL/L — SIGNIFICANT CHANGE UP (ref 135–145)
SODIUM SERPL-SCNC: 140 MMOL/L — SIGNIFICANT CHANGE UP (ref 135–145)
SODIUM SERPL-SCNC: 142 MMOL/L — SIGNIFICANT CHANGE UP (ref 135–145)
WBC # BLD: 18.9 K/UL — HIGH (ref 3.8–10.5)
WBC # FLD AUTO: 18.9 K/UL — HIGH (ref 3.8–10.5)

## 2017-09-27 PROCEDURE — 93010 ELECTROCARDIOGRAM REPORT: CPT

## 2017-09-27 RX ORDER — DEXTROSE 50 % IN WATER 50 %
1 SYRINGE (ML) INTRAVENOUS ONCE
Qty: 0 | Refills: 0 | Status: DISCONTINUED | OUTPATIENT
Start: 2017-09-27 | End: 2017-09-30

## 2017-09-27 RX ORDER — MORPHINE SULFATE 50 MG/1
2 CAPSULE, EXTENDED RELEASE ORAL ONCE
Qty: 0 | Refills: 0 | Status: DISCONTINUED | OUTPATIENT
Start: 2017-09-27 | End: 2017-09-27

## 2017-09-27 RX ORDER — SODIUM CHLORIDE 9 MG/ML
1000 INJECTION, SOLUTION INTRAVENOUS
Qty: 0 | Refills: 0 | Status: DISCONTINUED | OUTPATIENT
Start: 2017-09-27 | End: 2017-09-30

## 2017-09-27 RX ORDER — METRONIDAZOLE 500 MG
500 TABLET ORAL EVERY 8 HOURS
Qty: 0 | Refills: 0 | Status: DISCONTINUED | OUTPATIENT
Start: 2017-09-27 | End: 2017-09-30

## 2017-09-27 RX ORDER — SODIUM CHLORIDE 9 MG/ML
1000 INJECTION INTRAMUSCULAR; INTRAVENOUS; SUBCUTANEOUS
Qty: 0 | Refills: 0 | Status: DISCONTINUED | OUTPATIENT
Start: 2017-09-27 | End: 2017-09-27

## 2017-09-27 RX ORDER — INSULIN GLARGINE 100 [IU]/ML
20 INJECTION, SOLUTION SUBCUTANEOUS AT BEDTIME
Qty: 0 | Refills: 0 | Status: DISCONTINUED | OUTPATIENT
Start: 2017-09-28 | End: 2017-09-30

## 2017-09-27 RX ORDER — DEXTROSE 50 % IN WATER 50 %
25 SYRINGE (ML) INTRAVENOUS ONCE
Qty: 0 | Refills: 0 | Status: DISCONTINUED | OUTPATIENT
Start: 2017-09-27 | End: 2017-09-30

## 2017-09-27 RX ORDER — LACTOBACILLUS ACIDOPHILUS 100MM CELL
2 CAPSULE ORAL DAILY
Qty: 0 | Refills: 0 | Status: DISCONTINUED | OUTPATIENT
Start: 2017-09-27 | End: 2017-09-30

## 2017-09-27 RX ORDER — CIPROFLOXACIN LACTATE 400MG/40ML
VIAL (ML) INTRAVENOUS
Qty: 0 | Refills: 0 | Status: DISCONTINUED | OUTPATIENT
Start: 2017-09-27 | End: 2017-09-30

## 2017-09-27 RX ORDER — SODIUM CHLORIDE 9 MG/ML
1000 INJECTION, SOLUTION INTRAVENOUS
Qty: 0 | Refills: 0 | Status: DISCONTINUED | OUTPATIENT
Start: 2017-09-27 | End: 2017-09-28

## 2017-09-27 RX ORDER — CIPROFLOXACIN LACTATE 400MG/40ML
400 VIAL (ML) INTRAVENOUS EVERY 12 HOURS
Qty: 0 | Refills: 0 | Status: DISCONTINUED | OUTPATIENT
Start: 2017-09-27 | End: 2017-09-30

## 2017-09-27 RX ORDER — ONDANSETRON 8 MG/1
8 TABLET, FILM COATED ORAL ONCE
Qty: 0 | Refills: 0 | Status: COMPLETED | OUTPATIENT
Start: 2017-09-27 | End: 2017-09-27

## 2017-09-27 RX ORDER — INSULIN HUMAN 100 [IU]/ML
7 INJECTION, SOLUTION SUBCUTANEOUS
Qty: 100 | Refills: 0 | Status: DISCONTINUED | OUTPATIENT
Start: 2017-09-27 | End: 2017-09-27

## 2017-09-27 RX ORDER — HYDROMORPHONE HYDROCHLORIDE 2 MG/ML
1 INJECTION INTRAMUSCULAR; INTRAVENOUS; SUBCUTANEOUS ONCE
Qty: 0 | Refills: 0 | Status: DISCONTINUED | OUTPATIENT
Start: 2017-09-27 | End: 2017-09-27

## 2017-09-27 RX ORDER — HEPARIN SODIUM 5000 [USP'U]/ML
5000 INJECTION INTRAVENOUS; SUBCUTANEOUS EVERY 12 HOURS
Qty: 0 | Refills: 0 | Status: DISCONTINUED | OUTPATIENT
Start: 2017-09-27 | End: 2017-09-30

## 2017-09-27 RX ORDER — INSULIN GLARGINE 100 [IU]/ML
20 INJECTION, SOLUTION SUBCUTANEOUS ONCE
Qty: 0 | Refills: 0 | Status: COMPLETED | OUTPATIENT
Start: 2017-09-27 | End: 2017-09-27

## 2017-09-27 RX ORDER — INSULIN LISPRO 100/ML
VIAL (ML) SUBCUTANEOUS
Qty: 0 | Refills: 0 | Status: DISCONTINUED | OUTPATIENT
Start: 2017-09-27 | End: 2017-09-30

## 2017-09-27 RX ORDER — MORPHINE SULFATE 50 MG/1
4 CAPSULE, EXTENDED RELEASE ORAL ONCE
Qty: 0 | Refills: 0 | Status: DISCONTINUED | OUTPATIENT
Start: 2017-09-27 | End: 2017-09-27

## 2017-09-27 RX ORDER — GLUCAGON INJECTION, SOLUTION 0.5 MG/.1ML
1 INJECTION, SOLUTION SUBCUTANEOUS ONCE
Qty: 0 | Refills: 0 | Status: DISCONTINUED | OUTPATIENT
Start: 2017-09-27 | End: 2017-09-30

## 2017-09-27 RX ORDER — INSULIN HUMAN 100 [IU]/ML
5 INJECTION, SOLUTION SUBCUTANEOUS ONCE
Qty: 0 | Refills: 0 | Status: DISCONTINUED | OUTPATIENT
Start: 2017-09-27 | End: 2017-09-27

## 2017-09-27 RX ORDER — SODIUM,POTASSIUM PHOSPHATES 278-250MG
1 POWDER IN PACKET (EA) ORAL
Qty: 0 | Refills: 0 | Status: COMPLETED | OUTPATIENT
Start: 2017-09-27 | End: 2017-09-28

## 2017-09-27 RX ORDER — CIPROFLOXACIN LACTATE 400MG/40ML
400 VIAL (ML) INTRAVENOUS ONCE
Qty: 0 | Refills: 0 | Status: COMPLETED | OUTPATIENT
Start: 2017-09-27 | End: 2017-09-27

## 2017-09-27 RX ORDER — METRONIDAZOLE 500 MG
500 TABLET ORAL ONCE
Qty: 0 | Refills: 0 | Status: COMPLETED | OUTPATIENT
Start: 2017-09-27 | End: 2017-09-27

## 2017-09-27 RX ORDER — HYDROMORPHONE HYDROCHLORIDE 2 MG/ML
0.5 INJECTION INTRAMUSCULAR; INTRAVENOUS; SUBCUTANEOUS ONCE
Qty: 0 | Refills: 0 | Status: DISCONTINUED | OUTPATIENT
Start: 2017-09-27 | End: 2017-09-27

## 2017-09-27 RX ORDER — HYDROMORPHONE HYDROCHLORIDE 2 MG/ML
2 INJECTION INTRAMUSCULAR; INTRAVENOUS; SUBCUTANEOUS ONCE
Qty: 0 | Refills: 0 | Status: DISCONTINUED | OUTPATIENT
Start: 2017-09-27 | End: 2017-09-27

## 2017-09-27 RX ORDER — DEXTROSE 50 % IN WATER 50 %
12.5 SYRINGE (ML) INTRAVENOUS ONCE
Qty: 0 | Refills: 0 | Status: DISCONTINUED | OUTPATIENT
Start: 2017-09-27 | End: 2017-09-30

## 2017-09-27 RX ORDER — METRONIDAZOLE 500 MG
TABLET ORAL
Qty: 0 | Refills: 0 | Status: DISCONTINUED | OUTPATIENT
Start: 2017-09-27 | End: 2017-09-30

## 2017-09-27 RX ORDER — INFLUENZA VIRUS VACCINE 15; 15; 15; 15 UG/.5ML; UG/.5ML; UG/.5ML; UG/.5ML
0.5 SUSPENSION INTRAMUSCULAR ONCE
Qty: 0 | Refills: 0 | Status: COMPLETED | OUTPATIENT
Start: 2017-09-27 | End: 2017-09-27

## 2017-09-27 RX ADMIN — Medication 100 MILLIGRAM(S): at 10:29

## 2017-09-27 RX ADMIN — HYDROMORPHONE HYDROCHLORIDE 1 MILLIGRAM(S): 2 INJECTION INTRAMUSCULAR; INTRAVENOUS; SUBCUTANEOUS at 02:10

## 2017-09-27 RX ADMIN — INSULIN GLARGINE 20 UNIT(S): 100 INJECTION, SOLUTION SUBCUTANEOUS at 18:15

## 2017-09-27 RX ADMIN — HYDROMORPHONE HYDROCHLORIDE 0.5 MILLIGRAM(S): 2 INJECTION INTRAMUSCULAR; INTRAVENOUS; SUBCUTANEOUS at 11:42

## 2017-09-27 RX ADMIN — SODIUM CHLORIDE 150 MILLILITER(S): 9 INJECTION INTRAMUSCULAR; INTRAVENOUS; SUBCUTANEOUS at 06:46

## 2017-09-27 RX ADMIN — Medication 30 MILLILITER(S): at 21:15

## 2017-09-27 RX ADMIN — Medication 30 MILLILITER(S): at 15:52

## 2017-09-27 RX ADMIN — SODIUM CHLORIDE 125 MILLILITER(S): 9 INJECTION, SOLUTION INTRAVENOUS at 10:36

## 2017-09-27 RX ADMIN — Medication 100 MILLIGRAM(S): at 21:14

## 2017-09-27 RX ADMIN — Medication 200 MILLIGRAM(S): at 10:30

## 2017-09-27 RX ADMIN — Medication 100 MILLIGRAM(S): at 00:09

## 2017-09-27 RX ADMIN — Medication 100 MILLIGRAM(S): at 15:55

## 2017-09-27 RX ADMIN — MORPHINE SULFATE 2 MILLIGRAM(S): 50 CAPSULE, EXTENDED RELEASE ORAL at 06:41

## 2017-09-27 RX ADMIN — MORPHINE SULFATE 4 MILLIGRAM(S): 50 CAPSULE, EXTENDED RELEASE ORAL at 01:23

## 2017-09-27 RX ADMIN — HYDROMORPHONE HYDROCHLORIDE 0.5 MILLIGRAM(S): 2 INJECTION INTRAMUSCULAR; INTRAVENOUS; SUBCUTANEOUS at 17:30

## 2017-09-27 RX ADMIN — Medication 200 MILLIGRAM(S): at 01:20

## 2017-09-27 RX ADMIN — Medication 2 TABLET(S): at 15:53

## 2017-09-27 RX ADMIN — MORPHINE SULFATE 2 MILLIGRAM(S): 50 CAPSULE, EXTENDED RELEASE ORAL at 07:00

## 2017-09-27 RX ADMIN — INSULIN HUMAN 7 UNIT(S)/HR: 100 INJECTION, SOLUTION SUBCUTANEOUS at 03:19

## 2017-09-27 RX ADMIN — HYDROMORPHONE HYDROCHLORIDE 0.5 MILLIGRAM(S): 2 INJECTION INTRAMUSCULAR; INTRAVENOUS; SUBCUTANEOUS at 17:15

## 2017-09-27 RX ADMIN — SODIUM CHLORIDE 150 MILLILITER(S): 9 INJECTION INTRAMUSCULAR; INTRAVENOUS; SUBCUTANEOUS at 05:28

## 2017-09-27 RX ADMIN — HYDROMORPHONE HYDROCHLORIDE 0.5 MILLIGRAM(S): 2 INJECTION INTRAMUSCULAR; INTRAVENOUS; SUBCUTANEOUS at 11:27

## 2017-09-27 RX ADMIN — HYDROMORPHONE HYDROCHLORIDE 1 MILLIGRAM(S): 2 INJECTION INTRAMUSCULAR; INTRAVENOUS; SUBCUTANEOUS at 03:24

## 2017-09-27 RX ADMIN — Medication 8: at 22:50

## 2017-09-27 RX ADMIN — Medication 1 TABLET(S): at 21:15

## 2017-09-27 RX ADMIN — ONDANSETRON 8 MILLIGRAM(S): 8 TABLET, FILM COATED ORAL at 00:36

## 2017-09-27 RX ADMIN — MORPHINE SULFATE 4 MILLIGRAM(S): 50 CAPSULE, EXTENDED RELEASE ORAL at 00:36

## 2017-09-27 RX ADMIN — SODIUM CHLORIDE 125 MILLILITER(S): 9 INJECTION, SOLUTION INTRAVENOUS at 17:14

## 2017-09-27 RX ADMIN — Medication 200 MILLIGRAM(S): at 18:16

## 2017-09-27 RX ADMIN — HYDROMORPHONE HYDROCHLORIDE 2 MILLIGRAM(S): 2 INJECTION INTRAMUSCULAR; INTRAVENOUS; SUBCUTANEOUS at 22:52

## 2017-09-27 RX ADMIN — HYDROMORPHONE HYDROCHLORIDE 2 MILLIGRAM(S): 2 INJECTION INTRAMUSCULAR; INTRAVENOUS; SUBCUTANEOUS at 21:14

## 2017-09-27 NOTE — H&P ADULT - PROBLEM SELECTOR PLAN 1
1. admit to CCU to Dr. Heriberto canseco/ hospitalist service for medicine  2. insulin drip  3. continue labs q 4 hours, replace electrolytes as needed   4. q1 hour finger stick  5. NPO  6. IV fluids

## 2017-09-27 NOTE — CHART NOTE - NSCHARTNOTEFT_GEN_A_CORE
36 year old male here c/o nausea vomiting intermittent diffused abd pain since this morning. Pt states that he didn't take his insulin x 1 day. Pt found to be in DKA in ED. Pt denies fever, sick contacts, change in diet.    Pt DKA resolved. Pt c/o abdominal pain, says its chronic and specifically requesting dilaudid, says he doesn't want morphine. will give dilaudid prn pain. send stool studies for c-diff and other workup. cont cipro and flagyl for colitis on CT abdomen.      Pt placed for transfer to medicine. Signed out to Dr. Garcia.

## 2017-09-27 NOTE — H&P ADULT - FAMILY HISTORY
Father  Still living? Unknown  Family history of diabetes mellitus, Age at diagnosis: Age Unknown     Mother  Still living? Yes, Estimated age: Age Unknown  Family history of diabetes mellitus, Age at diagnosis: Age Unknown

## 2017-09-27 NOTE — H&P ADULT - ASSESSMENT
35 Y/O male w/ PMHx of DM admitted w/ abdominal pain, found to have a serum glucose of 524 and anion gap of 21. Pt admitted to CCU for DKA.

## 2017-09-27 NOTE — H&P ADULT - HISTORY OF PRESENT ILLNESS
36 yeas old male here c/o nausea vomiting intermittent diffused abd pain since this morning. Pt states that he didn't take his insulin x 1 day. Pt found to be in DKA in ED. Pt denies fever, sick contacts, change in diet.

## 2017-09-27 NOTE — H&P ADULT - NSHPPHYSICALEXAM_GEN_ALL_CORE
PHYSICAL EXAM:    GENERAL: NAD, well-groomed, well-developed  HEAD:  Atraumatic, Normocephalic  EYES: EOMI, PERRLA, conjunctiva and sclera clear  ENMT: No tonsillar erythema, exudates, or enlargement; Moist mucous membranes, Good dentition, No lesions  NECK: Supple, No JVD, Normal thyroid  NERVOUS SYSTEM:  Alert & Oriented X3, Good concentration  CHEST/LUNG: Clear to percussion bilaterally; No rales, rhonchi, wheezing, or rubs  HEART: Regular rate and rhythm; No murmurs, rubs, or gallops  ABDOMEN: Soft, + tenderness, Nondistended; Bowel sounds present  EXTREMITIES:  2+ Peripheral Pulses, No clubbing, cyanosis, or edema

## 2017-09-27 NOTE — PATIENT PROFILE ADULT. - NS PRO CONTRA FLU CONTRAIND
none/refused, verbalized never get it. Patient/surrogate refused vaccine/refused, verbalized never get it.

## 2017-09-27 NOTE — H&P ADULT - ATTENDING COMMENTS
pt seen and examined during the morning rounds. pt admitted with DKA, now resolving. cont on insulin drip, switch IVF to D5 1/2 NSS as BG is 117. fup BMP, Mag and phos. monitor and replete electrolytes. pt c/o abdominal pain, says its chronic and specifically requesting dilaudid, says he doesn't want morphine. will give dilaudid prn pain. send stool studies for c-diff and other workup. cont cipro and flagyl for colitis on CT abdomen.

## 2017-09-27 NOTE — PROGRESS NOTE ADULT - SUBJECTIVE AND OBJECTIVE BOX
36 year old male with Type 1 DM  here c/o nausea vomiting intermittent diffused abd pain. Diagnosed with DKA and pancolitis.  Labs reviewed.  CT report reviewed    Admitted to the ICU for IVF and BMP checks q4.   Case discussed with PA, agree with specified plan. If spikes a fever or wbc does not clear, reassess for abx need.

## 2017-09-27 NOTE — H&P ADULT - NSHPREVIEWOFSYSTEMS_GEN_ALL_CORE
REVIEW OF SYSTEMS:    CONSTITUTIONAL: No fever, weight loss, or fatigue  EYES: No eye pain, visual disturbances, or discharge  ENMT:  No difficulty hearing, tinnitus, vertigo; No sinus or throat pain  NECK: No pain or stiffness  BREASTS: No pain, masses, or nipple discharge  RESPIRATORY: No cough, wheezing, chills or hemoptysis; No shortness of breath  CARDIOVASCULAR: No chest pain, palpitations, dizziness, or leg swelling  GASTROINTESTINAL: + abdominal pain. + nausea, vomiting, no hematemesis; No diarrhea or constipation. No melena or hematochezia.  GENITOURINARY: No dysuria, frequency, hematuria, or incontinence  NEUROLOGICAL: No headaches, memory loss, loss of strength, numbness, or tremors  SKIN: No itching, burning, rashes, or lesions   LYMPH NODES: No enlarged glands  ENDOCRINE: No heat or cold intolerance; No hair loss  MUSCULOSKELETAL: No joint pain or swelling; No muscle, back, or extremity pain

## 2017-09-28 DIAGNOSIS — K51.00 ULCERATIVE (CHRONIC) PANCOLITIS WITHOUT COMPLICATIONS: ICD-10-CM

## 2017-09-28 LAB
ANION GAP SERPL CALC-SCNC: 9 MMOL/L — SIGNIFICANT CHANGE UP (ref 5–17)
BUN SERPL-MCNC: 9 MG/DL — SIGNIFICANT CHANGE UP (ref 7–23)
CALCIUM SERPL-MCNC: 7.9 MG/DL — LOW (ref 8.5–10.1)
CHLORIDE SERPL-SCNC: 108 MMOL/L — SIGNIFICANT CHANGE UP (ref 96–108)
CO2 SERPL-SCNC: 24 MMOL/L — SIGNIFICANT CHANGE UP (ref 22–31)
CREAT SERPL-MCNC: 0.93 MG/DL — SIGNIFICANT CHANGE UP (ref 0.5–1.3)
CULTURE RESULTS: NO GROWTH — SIGNIFICANT CHANGE UP
GLUCOSE SERPL-MCNC: 145 MG/DL — HIGH (ref 70–99)
HCT VFR BLD CALC: 35.1 % — LOW (ref 39–50)
HGB BLD-MCNC: 11.9 G/DL — LOW (ref 13–17)
MAGNESIUM SERPL-MCNC: 2.4 MG/DL — SIGNIFICANT CHANGE UP (ref 1.6–2.6)
MCHC RBC-ENTMCNC: 30.1 PG — SIGNIFICANT CHANGE UP (ref 27–34)
MCHC RBC-ENTMCNC: 34 GM/DL — SIGNIFICANT CHANGE UP (ref 32–36)
MCV RBC AUTO: 88.4 FL — SIGNIFICANT CHANGE UP (ref 80–100)
PHOSPHATE SERPL-MCNC: 1.5 MG/DL — LOW (ref 2.5–4.5)
PLATELET # BLD AUTO: 244 K/UL — SIGNIFICANT CHANGE UP (ref 150–400)
POTASSIUM SERPL-MCNC: 3.1 MMOL/L — LOW (ref 3.5–5.3)
POTASSIUM SERPL-SCNC: 3.1 MMOL/L — LOW (ref 3.5–5.3)
RBC # BLD: 3.97 M/UL — LOW (ref 4.2–5.8)
RBC # FLD: 12.3 % — SIGNIFICANT CHANGE UP (ref 11–15)
SODIUM SERPL-SCNC: 141 MMOL/L — SIGNIFICANT CHANGE UP (ref 135–145)
SPECIMEN SOURCE: SIGNIFICANT CHANGE UP
WBC # BLD: 10.1 K/UL — SIGNIFICANT CHANGE UP (ref 3.8–10.5)
WBC # FLD AUTO: 10.1 K/UL — SIGNIFICANT CHANGE UP (ref 3.8–10.5)

## 2017-09-28 RX ORDER — ACETAMINOPHEN 500 MG
650 TABLET ORAL EVERY 6 HOURS
Qty: 0 | Refills: 0 | Status: DISCONTINUED | OUTPATIENT
Start: 2017-09-28 | End: 2017-09-30

## 2017-09-28 RX ORDER — POTASSIUM PHOSPHATE, MONOBASIC POTASSIUM PHOSPHATE, DIBASIC 236; 224 MG/ML; MG/ML
30 INJECTION, SOLUTION INTRAVENOUS ONCE
Qty: 0 | Refills: 0 | Status: COMPLETED | OUTPATIENT
Start: 2017-09-28 | End: 2017-09-28

## 2017-09-28 RX ORDER — HYDROMORPHONE HYDROCHLORIDE 2 MG/ML
1 INJECTION INTRAMUSCULAR; INTRAVENOUS; SUBCUTANEOUS EVERY 6 HOURS
Qty: 0 | Refills: 0 | Status: DISCONTINUED | OUTPATIENT
Start: 2017-09-28 | End: 2017-09-30

## 2017-09-28 RX ORDER — POTASSIUM CHLORIDE 20 MEQ
40 PACKET (EA) ORAL ONCE
Qty: 0 | Refills: 0 | Status: COMPLETED | OUTPATIENT
Start: 2017-09-28 | End: 2017-09-28

## 2017-09-28 RX ORDER — OXYCODONE AND ACETAMINOPHEN 5; 325 MG/1; MG/1
1 TABLET ORAL ONCE
Qty: 0 | Refills: 0 | Status: DISCONTINUED | OUTPATIENT
Start: 2017-09-28 | End: 2017-09-28

## 2017-09-28 RX ADMIN — Medication 200 MILLIGRAM(S): at 17:56

## 2017-09-28 RX ADMIN — HYDROMORPHONE HYDROCHLORIDE 1 MILLIGRAM(S): 2 INJECTION INTRAMUSCULAR; INTRAVENOUS; SUBCUTANEOUS at 12:30

## 2017-09-28 RX ADMIN — HYDROMORPHONE HYDROCHLORIDE 1 MILLIGRAM(S): 2 INJECTION INTRAMUSCULAR; INTRAVENOUS; SUBCUTANEOUS at 19:44

## 2017-09-28 RX ADMIN — Medication 200 MILLIGRAM(S): at 05:00

## 2017-09-28 RX ADMIN — Medication 100 MILLIGRAM(S): at 05:00

## 2017-09-28 RX ADMIN — Medication 4: at 16:25

## 2017-09-28 RX ADMIN — Medication 650 MILLIGRAM(S): at 06:09

## 2017-09-28 RX ADMIN — Medication 4: at 12:21

## 2017-09-28 RX ADMIN — INSULIN GLARGINE 20 UNIT(S): 100 INJECTION, SOLUTION SUBCUTANEOUS at 22:39

## 2017-09-28 RX ADMIN — HYDROMORPHONE HYDROCHLORIDE 1 MILLIGRAM(S): 2 INJECTION INTRAMUSCULAR; INTRAVENOUS; SUBCUTANEOUS at 18:52

## 2017-09-28 RX ADMIN — HYDROMORPHONE HYDROCHLORIDE 1 MILLIGRAM(S): 2 INJECTION INTRAMUSCULAR; INTRAVENOUS; SUBCUTANEOUS at 12:14

## 2017-09-28 RX ADMIN — Medication 40 MILLIEQUIVALENT(S): at 06:05

## 2017-09-28 RX ADMIN — Medication 100 MILLIGRAM(S): at 16:23

## 2017-09-28 RX ADMIN — SODIUM CHLORIDE 125 MILLILITER(S): 9 INJECTION, SOLUTION INTRAVENOUS at 08:15

## 2017-09-28 RX ADMIN — Medication 650 MILLIGRAM(S): at 04:59

## 2017-09-28 RX ADMIN — SODIUM CHLORIDE 125 MILLILITER(S): 9 INJECTION, SOLUTION INTRAVENOUS at 01:00

## 2017-09-28 RX ADMIN — Medication 1 TABLET(S): at 06:06

## 2017-09-28 RX ADMIN — Medication 2 TABLET(S): at 12:24

## 2017-09-28 RX ADMIN — OXYCODONE AND ACETAMINOPHEN 1 TABLET(S): 5; 325 TABLET ORAL at 09:22

## 2017-09-28 RX ADMIN — POTASSIUM PHOSPHATE, MONOBASIC POTASSIUM PHOSPHATE, DIBASIC 65 MILLIMOLE(S): 236; 224 INJECTION, SOLUTION INTRAVENOUS at 06:53

## 2017-09-28 RX ADMIN — Medication 100 MILLIGRAM(S): at 22:38

## 2017-09-28 RX ADMIN — OXYCODONE AND ACETAMINOPHEN 1 TABLET(S): 5; 325 TABLET ORAL at 09:37

## 2017-09-28 NOTE — DIETITIAN INITIAL EVALUATION ADULT. - PERTINENT LABORATORY DATA
09-28 Na 141 mmol/L Glu 145 mg/dL<H> K+ 3.1 mmol/L<L> Cr  0.93 mg/dL BUN 9 mg/dL Phos 1.5 mg/dL<L> Alb n/a   PAB n/a   Hgb 11.9 g/dL<L> Hct 35.1 %<L>, WreA9H=3.5%(9/27), Alb=4.0(9/20), [id number]=[id number]

## 2017-09-28 NOTE — PROGRESS NOTE ADULT - SUBJECTIVE AND OBJECTIVE BOX
CHIEF COMPLAINT/INTERVAL HISTORY:    Patient is a 36y old  Male who presents with a chief complaint of abdominal pain (27 Sep 2017 06:03)      HPI:  36 yeas old male here c/o nausea vomiting intermittent diffused abd pain since this morning. Pt states that he didn't take his insulin x 1 day. Pt found to be in DKA in ED. Pt denies fever, sick contacts, change in diet. (27 Sep 2017 06:03)    Overnight issues  mild abdominal pain   SUBJECTIVE & OBJECTIVE: Pt seen and examined at bedside.   ROS:  CONSTITUTIONAL: No fever, weight loss, or fatigue  NECK: No pain or stiffness  RESPIRATORY: No cough, wheezing, chills or hemoptysis; No shortness of breath  CARDIOVASCULAR: No chest pain, palpitations, dizziness, or leg swelling  GASTROINTESTINAL: mild epigastric pain. No nausea, vomiting, or hematemesis; No diarrhea or constipation. No melena or hematochezia.  GENITOURINARY: No dysuria, frequency, hematuria, or incontinence  NEUROLOGICAL: No headaches, memory loss, loss of strength, numbness, or tremors  SKIN: No itching, burning, rashes, or lesions   ICU Vital Signs Last 24 Hrs  T(C): 36.8 (28 Sep 2017 11:27), Max: 37.1 (28 Sep 2017 04:17)  T(F): 98.2 (28 Sep 2017 11:27), Max: 98.8 (28 Sep 2017 04:17)  HR: 63 (28 Sep 2017 11:27) (63 - 88)  BP: 130/77 (28 Sep 2017 11:27) (110/69 - 138/73)  BP(mean): 81 (28 Sep 2017 08:00) (66 - 95)  ABP: --  ABP(mean): --  RR: 16 (28 Sep 2017 11:27) (7 - 23)  SpO2: 98% (28 Sep 2017 11:27) (92% - 100%)        MEDICATIONS  (STANDING):  heparin  Injectable 5000 Unit(s) SubCutaneous every 12 hours  influenza   Vaccine 0.5 milliLiter(s) IntraMuscular once  ciprofloxacin   IVPB      metroNIDAZOLE  IVPB      metroNIDAZOLE  IVPB 500 milliGRAM(s) IV Intermittent every 8 hours  ciprofloxacin   IVPB 400 milliGRAM(s) IV Intermittent every 12 hours  lactobacillus acidophilus 2 Tablet(s) Oral daily  insulin glargine Injectable (LANTUS) 20 Unit(s) SubCutaneous at bedtime  insulin lispro (HumaLOG) corrective regimen sliding scale   SubCutaneous three times a day before meals  dextrose 5%. 1000 milliLiter(s) (50 mL/Hr) IV Continuous <Continuous>  dextrose 50% Injectable 12.5 Gram(s) IV Push once  dextrose 50% Injectable 25 Gram(s) IV Push once  dextrose 50% Injectable 25 Gram(s) IV Push once    MEDICATIONS  (PRN):  aluminum hydroxide/magnesium hydroxide/simethicone Suspension 30 milliLiter(s) Oral every 6 hours PRN Dyspepsia  dextrose Gel 1 Dose(s) Oral once PRN Blood Glucose LESS THAN 70 milliGRAM(s)/deciliter  glucagon  Injectable 1 milliGRAM(s) IntraMuscular once PRN Glucose LESS THAN 70 milligrams/deciliter  acetaminophen   Tablet. 650 milliGRAM(s) Oral every 6 hours PRN Severe Pain (7 - 10)  HYDROmorphone  Injectable 1 milliGRAM(s) IV Push every 6 hours PRN Severe Pain (7 - 10)        PHYSICAL EXAM:    GENERAL: NAD, well-groomed, well-developed  HEAD:  Atraumatic, Normocephalic  EYES: EOMI, PERRLA, conjunctiva and sclera clear  ENMT: Moist mucous membranes  NECK: Supple, No JVD  NERVOUS SYSTEM:  Alert & Oriented X3, Motor Strength 5/5 B/L upper and lower extremities; DTRs 2+ intact and symmetric  CHEST/LUNG: Clear to auscultation bilaterally; No rales, rhonchi, wheezing, or rubs  HEART: Regular rate and rhythm; No murmurs, rubs, or gallops  ABDOMEN: mild tendernes, Nondistended; Bowel sounds present  EXTREMITIES:  2+ Peripheral Pulses, No clubbing, cyanosis, or edema    LABS:                        11.9   10.1  )-----------( 244      ( 28 Sep 2017 04:20 )             35.1     -    141  |  108  |  9   ----------------------------<  145<H>  3.1<L>   |  24  |  0.93    Ca    7.9<L>      28 Sep 2017 04:20  Phos  1.5       Mg     2.4         TPro  8.0  /  Alb  4.0  /  TBili  0.7  /  DBili  x   /  AST  32  /  ALT  40  /  AlkPhos  106  09-26    PT/INR - ( 26 Sep 2017 18:26 )   PT: 10.6 sec;   INR: 0.97 ratio         PTT - ( 26 Sep 2017 18:26 )  PTT:25.0 sec  Urinalysis Basic - ( 26 Sep 2017 21:38 )    Color: x / Appearance: Clear / S.015 / pH: x  Gluc: x / Ketone: Large  / Bili: Negative / Urobili: Negative mg/dL   Blood: x / Protein: Negative mg/dL / Nitrite: Negative   Leuk Esterase: Negative / RBC: x / WBC x   Sq Epi: x / Non Sq Epi: x / Bacteria: Few        CAPILLARY BLOOD GLUCOSE  209 (28 Sep 2017 11:49)  146 (28 Sep 2017 08:31)  315 (27 Sep 2017 22:25)  266 (27 Sep 2017 20:20)  178 (27 Sep 2017 18:13)  163 (27 Sep 2017 16:48)          RECENT CULTURES:      RADIOLOGY & ADDITIONAL TESTS:  Imaging Personally Reviewed:  [ ] YES      Consultant(s) Notes Reviewed:  [ ] YES     Care Discussed with [ ] Consultants [X ] Patient [ ] Family  [x ]    [x ]  Other; RN  HEALTH ISSUES - PROBLEM Dx:  DKA (diabetic ketoacidosis): DKA (diabetic ketoacidosis)        DVT/GI ppx  Discussed with pt @ bedside

## 2017-09-28 NOTE — DIETITIAN INITIAL EVALUATION ADULT. - OTHER INFO
Pt seen for DKA & Hgba1C>7%. Pt lives alone & does cooking & food shopping. Pt reports good appetite PTA with 3 meals daily; Breakfast; cornflakes & HB egg, Lunch; s/w & Dinner; Chicken or steak or fish & vegetable & rice or potato. Pt reports omitted Lantus x 1 day due to ran out of insulin. Pt does not have PCP & goes to urgent care to renew Insulin.  Pt SMGB 4 x day with FZ=[id number]/dl & manages DM type 1 with Lantus 20untis HS & Novolog 10units ac meals.  Last BM x 1(9/27).  Pt admitted with DKA & reports N/V & abdominal pain. No further GI distress. Pt seen for RN consult 9/28 for DKA & Hgba1C>7%. Pt lives alone & does cooking & food shopping. Pt reports good appetite PTA with 3 meals daily; Breakfast; cornflakes & HB egg, Lunch; s/w & Dinner; Chicken or steak or fish & vegetable & rice or potato. Pt reports omitted Lantus x 1 day due to ran out of insulin. Pt does not have PCP & goes to urgent care to renew Insulin.  Pt SMGB 4 x day with PV=349-852dy/dl & manages DM type 1 with Lantus 20untis HS & Novolog 10units ac meals.  Last BM x 1(9/27).  Pt admitted with DKA & reports N/V & abdominal pain. No further GI distress.

## 2017-09-29 LAB
ANION GAP SERPL CALC-SCNC: 9 MMOL/L — SIGNIFICANT CHANGE UP (ref 5–17)
BUN SERPL-MCNC: 7 MG/DL — SIGNIFICANT CHANGE UP (ref 7–23)
CALCIUM SERPL-MCNC: 8.2 MG/DL — LOW (ref 8.5–10.1)
CHLORIDE SERPL-SCNC: 103 MMOL/L — SIGNIFICANT CHANGE UP (ref 96–108)
CO2 SERPL-SCNC: 27 MMOL/L — SIGNIFICANT CHANGE UP (ref 22–31)
CREAT SERPL-MCNC: 0.91 MG/DL — SIGNIFICANT CHANGE UP (ref 0.5–1.3)
GLUCOSE SERPL-MCNC: 207 MG/DL — HIGH (ref 70–99)
HIV 1+2 AB+HIV1 P24 AG SERPL QL IA: SIGNIFICANT CHANGE UP
MAGNESIUM SERPL-MCNC: 2.3 MG/DL — SIGNIFICANT CHANGE UP (ref 1.6–2.6)
PHOSPHATE SERPL-MCNC: 2.3 MG/DL — LOW (ref 2.5–4.5)
POTASSIUM SERPL-MCNC: 4 MMOL/L — SIGNIFICANT CHANGE UP (ref 3.5–5.3)
POTASSIUM SERPL-SCNC: 4 MMOL/L — SIGNIFICANT CHANGE UP (ref 3.5–5.3)
SODIUM SERPL-SCNC: 139 MMOL/L — SIGNIFICANT CHANGE UP (ref 135–145)

## 2017-09-29 RX ORDER — SODIUM,POTASSIUM PHOSPHATES 278-250MG
1 POWDER IN PACKET (EA) ORAL
Qty: 0 | Refills: 0 | Status: COMPLETED | OUTPATIENT
Start: 2017-09-29 | End: 2017-09-30

## 2017-09-29 RX ORDER — POTASSIUM PHOSPHATE, MONOBASIC POTASSIUM PHOSPHATE, DIBASIC 236; 224 MG/ML; MG/ML
15 INJECTION, SOLUTION INTRAVENOUS ONCE
Qty: 0 | Refills: 0 | Status: DISCONTINUED | OUTPATIENT
Start: 2017-09-29 | End: 2017-09-29

## 2017-09-29 RX ORDER — POTASSIUM CHLORIDE 20 MEQ
10 PACKET (EA) ORAL
Qty: 0 | Refills: 0 | Status: DISCONTINUED | OUTPATIENT
Start: 2017-09-29 | End: 2017-09-29

## 2017-09-29 RX ADMIN — HYDROMORPHONE HYDROCHLORIDE 1 MILLIGRAM(S): 2 INJECTION INTRAMUSCULAR; INTRAVENOUS; SUBCUTANEOUS at 08:03

## 2017-09-29 RX ADMIN — Medication 8: at 16:02

## 2017-09-29 RX ADMIN — Medication 200 MILLIGRAM(S): at 06:48

## 2017-09-29 RX ADMIN — HYDROMORPHONE HYDROCHLORIDE 1 MILLIGRAM(S): 2 INJECTION INTRAMUSCULAR; INTRAVENOUS; SUBCUTANEOUS at 02:21

## 2017-09-29 RX ADMIN — Medication 1 TABLET(S): at 11:55

## 2017-09-29 RX ADMIN — Medication 4: at 11:55

## 2017-09-29 RX ADMIN — Medication 100 MILLIGRAM(S): at 14:15

## 2017-09-29 RX ADMIN — HYDROMORPHONE HYDROCHLORIDE 1 MILLIGRAM(S): 2 INJECTION INTRAMUSCULAR; INTRAVENOUS; SUBCUTANEOUS at 14:21

## 2017-09-29 RX ADMIN — HYDROMORPHONE HYDROCHLORIDE 1 MILLIGRAM(S): 2 INJECTION INTRAMUSCULAR; INTRAVENOUS; SUBCUTANEOUS at 08:33

## 2017-09-29 RX ADMIN — Medication 100 MILLIGRAM(S): at 06:48

## 2017-09-29 RX ADMIN — HYDROMORPHONE HYDROCHLORIDE 1 MILLIGRAM(S): 2 INJECTION INTRAMUSCULAR; INTRAVENOUS; SUBCUTANEOUS at 21:10

## 2017-09-29 RX ADMIN — HYDROMORPHONE HYDROCHLORIDE 1 MILLIGRAM(S): 2 INJECTION INTRAMUSCULAR; INTRAVENOUS; SUBCUTANEOUS at 15:00

## 2017-09-29 RX ADMIN — HYDROMORPHONE HYDROCHLORIDE 1 MILLIGRAM(S): 2 INJECTION INTRAMUSCULAR; INTRAVENOUS; SUBCUTANEOUS at 03:21

## 2017-09-29 RX ADMIN — Medication 200 MILLIGRAM(S): at 18:21

## 2017-09-29 RX ADMIN — INSULIN GLARGINE 20 UNIT(S): 100 INJECTION, SOLUTION SUBCUTANEOUS at 22:15

## 2017-09-29 RX ADMIN — Medication 100 MILLIGRAM(S): at 22:15

## 2017-09-29 RX ADMIN — Medication 2: at 08:07

## 2017-09-29 RX ADMIN — HYDROMORPHONE HYDROCHLORIDE 1 MILLIGRAM(S): 2 INJECTION INTRAMUSCULAR; INTRAVENOUS; SUBCUTANEOUS at 20:48

## 2017-09-29 RX ADMIN — Medication 1 TABLET(S): at 18:21

## 2017-09-29 RX ADMIN — Medication 2 TABLET(S): at 11:55

## 2017-09-29 RX ADMIN — Medication 1 TABLET(S): at 22:16

## 2017-09-29 NOTE — CONSULT NOTE ADULT - SUBJECTIVE AND OBJECTIVE BOX
Chief Complaint:  Patient is a 36y old  Male who presents with a chief complaint of abdominal pain (27 Sep 2017 06:03)      HPI:36 yeas old male here c/o nausea vomiting intermittent diffused abd pain since this morning. Pt states that he didn't take his insulin x 1 day. Pt found to be in DKA in ED. Pt denies fever, sick contacts, change in diet.     Allergies:  No Known Allergies      Medications:  heparin  Injectable 5000 Unit(s) SubCutaneous every 12 hours  influenza   Vaccine 0.5 milliLiter(s) IntraMuscular once  ciprofloxacin   IVPB      metroNIDAZOLE  IVPB      metroNIDAZOLE  IVPB 500 milliGRAM(s) IV Intermittent every 8 hours  ciprofloxacin   IVPB 400 milliGRAM(s) IV Intermittent every 12 hours  lactobacillus acidophilus 2 Tablet(s) Oral daily  aluminum hydroxide/magnesium hydroxide/simethicone Suspension 30 milliLiter(s) Oral every 6 hours PRN  insulin glargine Injectable (LANTUS) 20 Unit(s) SubCutaneous at bedtime  insulin lispro (HumaLOG) corrective regimen sliding scale   SubCutaneous three times a day before meals  dextrose 5%. 1000 milliLiter(s) IV Continuous <Continuous>  dextrose Gel 1 Dose(s) Oral once PRN  dextrose 50% Injectable 12.5 Gram(s) IV Push once  dextrose 50% Injectable 25 Gram(s) IV Push once  dextrose 50% Injectable 25 Gram(s) IV Push once  glucagon  Injectable 1 milliGRAM(s) IntraMuscular once PRN  acetaminophen   Tablet. 650 milliGRAM(s) Oral every 6 hours PRN  HYDROmorphone  Injectable 1 milliGRAM(s) IV Push every 6 hours PRN  potassium acid phosphate/sodium acid phosphate tablet (K-PHOS No. 2) 1 Tablet(s) Oral four times a day with meals      PMHX/PSHX:  Anxiety  Gastritis  Asthma  DM type 1 (diabetes mellitus, type 1)  S/P cholecystectomy      Family history:  Family history of diabetes mellitus (Father, Mother)  No significant family history      Social History: (-) ETOH (-) TOB     ROS:     General:  No wt loss, fevers, chills, night sweats, fatigue,   Eyes:  Good vision, no reported pain  ENT:  No sore throat, pain, runny nose, dysphagia  CV:  No pain, palpitations, hypo/hypertension  Resp:  No dyspnea, cough, tachypnea, wheezing  GI:  No pain, No nausea, No vomiting, No diarrhea, No constipation, No weight loss, No fever, No pruritis, No rectal bleeding, No tarry stools, No dysphagia,  :  No pain, bleeding, incontinence, nocturia  Muscle:  No pain, weakness  Breast:  No pain, abscess, mass, discharge  Neuro:  No weakness, tingling, memory problems  Psych:  No fatigue, insomnia, mood problems, depression  Endocrine:  No polyuria, polydipsia, cold/heat intolerance  Heme:  No petechiae, ecchymosis, easy bruisability  Skin:  No rash, tattoos, scars, edema      PHYSICAL EXAM:   Vital Signs:  Vital Signs Last 24 Hrs  T(C): 36.7 (29 Sep 2017 12:16), Max: 36.7 (29 Sep 2017 12:16)  T(F): 98 (29 Sep 2017 12:16), Max: 98 (29 Sep 2017 12:16)  HR: 60 (29 Sep 2017 12:16) (52 - 73)  BP: 144/77 (29 Sep 2017 12:16) (128/75 - 159/88)  BP(mean): --  RR: 17 (29 Sep 2017 12:16) (16 - 17)  SpO2: 98% (29 Sep 2017 12:16) (97% - 98%)  Daily     Daily     GENERAL:  Appears stated age, well-groomed, well-nourished, no distress  HEENT:  NC/AT,  conjunctivae clear and pink, no thyromegaly, nodules, adenopathy, no JVD, sclera -anicteric  CHEST:  Full & symmetric excursion, no increased effort, breath sounds clear  HEART:  Regular rhythm, S1, S2, no murmur/rub/S3/S4, no abdominal bruit, no edema  ABDOMEN:  Soft, non-tender, non-distended, normoactive bowel sounds,  no masses , no hepatosplenomegaly, no signs of chronic liver disease  EXTREMITIES:  no cyanosis,clubbing or edema  SKIN:  No rash/erythema/ecchymoses/petechiae/wounds/abscess/warm/dry  NEURO:  Alert, oriented, no asterixis, no tremor, no encephalopathy    LABS:                        11.9   10.1  )-----------( 244      ( 28 Sep 2017 04:20 )             35.1     09-29    139  |  103  |  7   ----------------------------<  207<H>  4.0   |  27  |  0.91    Ca    8.2<L>      29 Sep 2017 09:44  Phos  2.3     09-29  Mg     2.3     09-29                Imaging:

## 2017-09-29 NOTE — PROGRESS NOTE ADULT - SUBJECTIVE AND OBJECTIVE BOX
CHIEF COMPLAINT/INTERVAL HISTORY:    Patient is a 36y old  Male who presents with a chief complaint of abdominal pain (27 Sep 2017 06:03)      HPI:  36 yeas old male here c/o nausea vomiting intermittent diffused abd pain since this morning. Pt states that he didn't take his insulin x 1 day. Pt found to be in DKA in ED. Pt denies fever, sick contacts, change in diet. (27 Sep 2017 06:03)    Overnight issues  still complain of of significant pain   SUBJECTIVE & OBJECTIVE: Pt seen and examined at bedside.   ROS:  CONSTITUTIONAL: No fever, weight loss, or fatigue  NECK: No pain or stiffness  RESPIRATORY: No cough, wheezing, chills or hemoptysis; No shortness of breath  CARDIOVASCULAR: No chest pain, palpitations, dizziness, or leg swelling  GASTROINTESTINAL moderate epigastric pain. No nausea, vomiting, or hematemesis; No diarrhea or constipation. No melena or hematochezia.  GENITOURINARY: No dysuria, frequency, hematuria, or incontinence  NEUROLOGICAL: No headaches, memory loss, loss of strength, numbness, or tremors  SKIN: No itching, burning, rashes, or lesions   ICU Vital Signs Last 24 Hrs  T(C): 36.7 (29 Sep 2017 12:16), Max: 36.7 (29 Sep 2017 12:16)  T(F): 98 (29 Sep 2017 12:16), Max: 98 (29 Sep 2017 12:16)  HR: 60 (29 Sep 2017 12:16) (52 - 73)  BP: 144/77 (29 Sep 2017 12:16) (128/75 - 159/88)  BP(mean): --  ABP: --  ABP(mean): --  RR: 17 (29 Sep 2017 12:16) (16 - 17)  SpO2: 98% (29 Sep 2017 12:16) (97% - 98%)        MEDICATIONS  (STANDING):  heparin  Injectable 5000 Unit(s) SubCutaneous every 12 hours  influenza   Vaccine 0.5 milliLiter(s) IntraMuscular once  ciprofloxacin   IVPB      metroNIDAZOLE  IVPB      metroNIDAZOLE  IVPB 500 milliGRAM(s) IV Intermittent every 8 hours  ciprofloxacin   IVPB 400 milliGRAM(s) IV Intermittent every 12 hours  lactobacillus acidophilus 2 Tablet(s) Oral daily  insulin glargine Injectable (LANTUS) 20 Unit(s) SubCutaneous at bedtime  insulin lispro (HumaLOG) corrective regimen sliding scale   SubCutaneous three times a day before meals  dextrose 5%. 1000 milliLiter(s) (50 mL/Hr) IV Continuous <Continuous>  dextrose 50% Injectable 12.5 Gram(s) IV Push once  dextrose 50% Injectable 25 Gram(s) IV Push once  dextrose 50% Injectable 25 Gram(s) IV Push once  potassium acid phosphate/sodium acid phosphate tablet (K-PHOS No. 2) 1 Tablet(s) Oral four times a day with meals    MEDICATIONS  (PRN):  aluminum hydroxide/magnesium hydroxide/simethicone Suspension 30 milliLiter(s) Oral every 6 hours PRN Dyspepsia  dextrose Gel 1 Dose(s) Oral once PRN Blood Glucose LESS THAN 70 milliGRAM(s)/deciliter  glucagon  Injectable 1 milliGRAM(s) IntraMuscular once PRN Glucose LESS THAN 70 milligrams/deciliter  acetaminophen   Tablet. 650 milliGRAM(s) Oral every 6 hours PRN Severe Pain (7 - 10)  HYDROmorphone  Injectable 1 milliGRAM(s) IV Push every 6 hours PRN Severe Pain (7 - 10)        PHYSICAL EXAM:    GENERAL: NAD, well-groomed, well-developed  HEAD:  Atraumatic, Normocephalic  EYES: EOMI, PERRLA, conjunctiva and sclera clear  ENMT: Moist mucous membranes  NECK: Supple, No JVD  NERVOUS SYSTEM:  Alert & Oriented X3, Motor Strength 5/5 B/L upper and lower extremities; DTRs 2+ intact and symmetric  CHEST/LUNG: Clear to auscultation bilaterally; No rales, rhonchi, wheezing, or rubs  HEART: Regular rate and rhythm; No murmurs, rubs, or gallops  ABDOMEN: moderate tenderness to midepigastric area , Nondistended; Bowel sounds present  EXTREMITIES:  2+ Peripheral Pulses, No clubbing, cyanosis, or edema    LABS:                        11.9   10.1  )-----------( 244      ( 28 Sep 2017 04:20 )             35.1     09-29    139  |  103  |  7   ----------------------------<  207<H>  4.0   |  27  |  0.91    Ca    8.2<L>      29 Sep 2017 09:44  Phos  2.3     09-29  Mg     2.3     09-29            CAPILLARY BLOOD GLUCOSE  230 (29 Sep 2017 11:54)  180 (29 Sep 2017 07:58)  286 (28 Sep 2017 21:47)  241 (28 Sep 2017 16:25)          RECENT CULTURES:      RADIOLOGY & ADDITIONAL TESTS:  Imaging Personally Reviewed:  [ ] YES      Consultant(s) Notes Reviewed:  [ ] YES     Care Discussed with [ ] Consultants [X ] Patient [ ] Family  [x ]    [x ]  Other; RN  HEALTH ISSUES - PROBLEM Dx:  Pancolitis: Pancolitis  DKA (diabetic ketoacidosis): DKA (diabetic ketoacidosis)        DVT/GI ppx  Discussed with pt @ bedside

## 2017-09-30 ENCOUNTER — TRANSCRIPTION ENCOUNTER (OUTPATIENT)
Age: 37
End: 2017-09-30

## 2017-09-30 VITALS
DIASTOLIC BLOOD PRESSURE: 88 MMHG | TEMPERATURE: 98 F | HEART RATE: 64 BPM | SYSTOLIC BLOOD PRESSURE: 122 MMHG | OXYGEN SATURATION: 98 % | RESPIRATION RATE: 16 BRPM

## 2017-09-30 LAB
ANION GAP SERPL CALC-SCNC: 11 MMOL/L — SIGNIFICANT CHANGE UP (ref 5–17)
BUN SERPL-MCNC: 7 MG/DL — SIGNIFICANT CHANGE UP (ref 7–23)
CALCIUM SERPL-MCNC: 8.8 MG/DL — SIGNIFICANT CHANGE UP (ref 8.5–10.1)
CHLORIDE SERPL-SCNC: 101 MMOL/L — SIGNIFICANT CHANGE UP (ref 96–108)
CO2 SERPL-SCNC: 26 MMOL/L — SIGNIFICANT CHANGE UP (ref 22–31)
CREAT SERPL-MCNC: 0.8 MG/DL — SIGNIFICANT CHANGE UP (ref 0.5–1.3)
CRP SERPL-MCNC: <0.2 MG/DL — SIGNIFICANT CHANGE UP (ref 0–0.4)
GLUCOSE SERPL-MCNC: 134 MG/DL — HIGH (ref 70–99)
HCT VFR BLD CALC: 42.5 % — SIGNIFICANT CHANGE UP (ref 39–50)
HGB BLD-MCNC: 14 G/DL — SIGNIFICANT CHANGE UP (ref 13–17)
LIDOCAIN IGE QN: 50 U/L — LOW (ref 73–393)
MCHC RBC-ENTMCNC: 28.7 PG — SIGNIFICANT CHANGE UP (ref 27–34)
MCHC RBC-ENTMCNC: 32.8 GM/DL — SIGNIFICANT CHANGE UP (ref 32–36)
MCV RBC AUTO: 87.4 FL — SIGNIFICANT CHANGE UP (ref 80–100)
PLATELET # BLD AUTO: 262 K/UL — SIGNIFICANT CHANGE UP (ref 150–400)
POTASSIUM SERPL-MCNC: 3.4 MMOL/L — LOW (ref 3.5–5.3)
POTASSIUM SERPL-SCNC: 3.4 MMOL/L — LOW (ref 3.5–5.3)
RBC # BLD: 4.86 M/UL — SIGNIFICANT CHANGE UP (ref 4.2–5.8)
RBC # FLD: 11.5 % — SIGNIFICANT CHANGE UP (ref 11–15)
SODIUM SERPL-SCNC: 138 MMOL/L — SIGNIFICANT CHANGE UP (ref 135–145)
WBC # BLD: 6.7 K/UL — SIGNIFICANT CHANGE UP (ref 3.8–10.5)
WBC # FLD AUTO: 6.7 K/UL — SIGNIFICANT CHANGE UP (ref 3.8–10.5)

## 2017-09-30 PROCEDURE — 99239 HOSP IP/OBS DSCHRG MGMT >30: CPT

## 2017-09-30 RX ORDER — HYDROMORPHONE HYDROCHLORIDE 2 MG/ML
2 INJECTION INTRAMUSCULAR; INTRAVENOUS; SUBCUTANEOUS ONCE
Qty: 0 | Refills: 0 | Status: DISCONTINUED | OUTPATIENT
Start: 2017-09-30 | End: 2017-09-30

## 2017-09-30 RX ORDER — ENOXAPARIN SODIUM 100 MG/ML
20 INJECTION SUBCUTANEOUS
Qty: 1 | Refills: 0 | OUTPATIENT
Start: 2017-09-30 | End: 2017-10-30

## 2017-09-30 RX ORDER — METRONIDAZOLE 500 MG
1 TABLET ORAL
Qty: 21 | Refills: 0 | OUTPATIENT
Start: 2017-09-30 | End: 2017-10-07

## 2017-09-30 RX ORDER — HYDROMORPHONE HYDROCHLORIDE 2 MG/ML
1 INJECTION INTRAMUSCULAR; INTRAVENOUS; SUBCUTANEOUS
Qty: 20 | Refills: 0 | OUTPATIENT
Start: 2017-09-30 | End: 2017-10-05

## 2017-09-30 RX ORDER — INSULIN LISPRO 100/ML
8 VIAL (ML) SUBCUTANEOUS
Qty: 1 | Refills: 0 | OUTPATIENT
Start: 2017-09-30 | End: 2017-10-30

## 2017-09-30 RX ORDER — MESALAMINE 400 MG
3 TABLET, DELAYED RELEASE (ENTERIC COATED) ORAL
Qty: 270 | Refills: 0 | OUTPATIENT
Start: 2017-09-30 | End: 2017-10-30

## 2017-09-30 RX ORDER — INSULIN GLARGINE 100 [IU]/ML
20 INJECTION, SOLUTION SUBCUTANEOUS
Qty: 0 | Refills: 0 | COMMUNITY

## 2017-09-30 RX ORDER — MESALAMINE 400 MG
1200 TABLET, DELAYED RELEASE (ENTERIC COATED) ORAL THREE TIMES A DAY
Qty: 0 | Refills: 0 | Status: DISCONTINUED | OUTPATIENT
Start: 2017-09-30 | End: 2017-09-30

## 2017-09-30 RX ORDER — CIPROFLOXACIN LACTATE 400MG/40ML
1 VIAL (ML) INTRAVENOUS
Qty: 14 | Refills: 0 | OUTPATIENT
Start: 2017-09-30 | End: 2017-10-07

## 2017-09-30 RX ORDER — POTASSIUM CHLORIDE 20 MEQ
20 PACKET (EA) ORAL ONCE
Qty: 0 | Refills: 0 | Status: COMPLETED | OUTPATIENT
Start: 2017-09-30 | End: 2017-09-30

## 2017-09-30 RX ORDER — INSULIN ASPART 100 [IU]/ML
10 INJECTION, SOLUTION SUBCUTANEOUS
Qty: 0 | Refills: 0 | COMMUNITY

## 2017-09-30 RX ADMIN — Medication 1200 MILLIGRAM(S): at 15:07

## 2017-09-30 RX ADMIN — Medication 20 MILLIEQUIVALENT(S): at 11:57

## 2017-09-30 RX ADMIN — Medication 100 MILLIGRAM(S): at 15:06

## 2017-09-30 RX ADMIN — HYDROMORPHONE HYDROCHLORIDE 1 MILLIGRAM(S): 2 INJECTION INTRAMUSCULAR; INTRAVENOUS; SUBCUTANEOUS at 09:16

## 2017-09-30 RX ADMIN — HYDROMORPHONE HYDROCHLORIDE 2 MILLIGRAM(S): 2 INJECTION INTRAMUSCULAR; INTRAVENOUS; SUBCUTANEOUS at 16:03

## 2017-09-30 RX ADMIN — HYDROMORPHONE HYDROCHLORIDE 1 MILLIGRAM(S): 2 INJECTION INTRAMUSCULAR; INTRAVENOUS; SUBCUTANEOUS at 03:15

## 2017-09-30 RX ADMIN — Medication 100 MILLIGRAM(S): at 05:33

## 2017-09-30 RX ADMIN — Medication 2: at 16:01

## 2017-09-30 RX ADMIN — Medication 200 MILLIGRAM(S): at 05:33

## 2017-09-30 RX ADMIN — Medication 2: at 11:53

## 2017-09-30 RX ADMIN — HYDROMORPHONE HYDROCHLORIDE 1 MILLIGRAM(S): 2 INJECTION INTRAMUSCULAR; INTRAVENOUS; SUBCUTANEOUS at 04:15

## 2017-09-30 RX ADMIN — Medication 2 TABLET(S): at 11:54

## 2017-09-30 RX ADMIN — Medication 1 TABLET(S): at 07:50

## 2017-09-30 RX ADMIN — HYDROMORPHONE HYDROCHLORIDE 2 MILLIGRAM(S): 2 INJECTION INTRAMUSCULAR; INTRAVENOUS; SUBCUTANEOUS at 17:03

## 2017-09-30 RX ADMIN — HYDROMORPHONE HYDROCHLORIDE 1 MILLIGRAM(S): 2 INJECTION INTRAMUSCULAR; INTRAVENOUS; SUBCUTANEOUS at 09:31

## 2017-09-30 NOTE — PROGRESS NOTE ADULT - PROBLEM SELECTOR PLAN 2
continue cipro/flagyl   analgesics  gastroenterologist consult
continue cipro/flagyl   analgesics
continue cipro/flagyl   analgesics  gastroenterologist consult

## 2017-09-30 NOTE — DISCHARGE NOTE ADULT - PLAN OF CARE
no further episode/ optimal control of DM please continue to take insulin as directed follow up with GI doctor   Dr Kumar/ DR Kunz

## 2017-09-30 NOTE — DISCHARGE NOTE ADULT - CARE PROVIDER_API CALL
Octavio Kumar), Medicine  [address] [facility]  [address] [place] [address]  Phone: [phone]  Fax: [phone]

## 2017-09-30 NOTE — PROGRESS NOTE ADULT - SUBJECTIVE AND OBJECTIVE BOX
CC/F/U for: pancolitis    INTERVAL HPI/OVERNIGHT EVENTS:  Pt seen and examined at bedside.     Allergies/Intolerance: No Known Allergies      MEDICATIONS  (STANDING):  heparin  Injectable 5000 Unit(s) SubCutaneous every 12 hours  influenza   Vaccine 0.5 milliLiter(s) IntraMuscular once  ciprofloxacin   IVPB      metroNIDAZOLE  IVPB      metroNIDAZOLE  IVPB 500 milliGRAM(s) IV Intermittent every 8 hours  ciprofloxacin   IVPB 400 milliGRAM(s) IV Intermittent every 12 hours  lactobacillus acidophilus 2 Tablet(s) Oral daily  insulin glargine Injectable (LANTUS) 20 Unit(s) SubCutaneous at bedtime  insulin lispro (HumaLOG) corrective regimen sliding scale   SubCutaneous three times a day before meals  dextrose 5%. 1000 milliLiter(s) (50 mL/Hr) IV Continuous <Continuous>  dextrose 50% Injectable 12.5 Gram(s) IV Push once  dextrose 50% Injectable 25 Gram(s) IV Push once  dextrose 50% Injectable 25 Gram(s) IV Push once  mesalamine DR Capsule 1200 milliGRAM(s) Oral three times a day    MEDICATIONS  (PRN):  aluminum hydroxide/magnesium hydroxide/simethicone Suspension 30 milliLiter(s) Oral every 6 hours PRN Dyspepsia  dextrose Gel 1 Dose(s) Oral once PRN Blood Glucose LESS THAN 70 milliGRAM(s)/deciliter  glucagon  Injectable 1 milliGRAM(s) IntraMuscular once PRN Glucose LESS THAN 70 milligrams/deciliter  acetaminophen   Tablet. 650 milliGRAM(s) Oral every 6 hours PRN Severe Pain (7 - 10)  HYDROmorphone  Injectable 1 milliGRAM(s) IV Push every 6 hours PRN Severe Pain (7 - 10)        ROS: all systems reviewed and wnl      PHYSICAL EXAMINATION:  Vital Signs Last 24 Hrs  T(C): 37.1 (30 Sep 2017 12:12), Max: 37.1 (30 Sep 2017 12:12)  T(F): 98.8 (30 Sep 2017 12:12), Max: 98.8 (30 Sep 2017 12:12)  HR: 57 (30 Sep 2017 12:12) (57 - 65)  BP: 138/91 (30 Sep 2017 12:12) (138/91 - 150/99)  BP(mean): --  RR: 17 (30 Sep 2017 12:12) (16 - 17)  SpO2: 98% (30 Sep 2017 12:12) (96% - 98%)  CAPILLARY BLOOD GLUCOSE  175 (30 Sep 2017 11:52)  133 (30 Sep 2017 07:48)  210 (29 Sep 2017 22:17)  301 (29 Sep 2017 16:01)          09-29 @ 07:01  -  09-30 @ 07:00  --------------------------------------------------------  IN: 1350 mL / OUT: 0 mL / NET: 1350 mL        GENERAL:   NECK: supple, No JVD  CHEST/LUNG: clear to auscultation bilaterally; no rales, rhonchi, or wheezing b/l  HEART: normal S1, S2  ABDOMEN: BS+, soft, ND, NT   EXTREMITIES:  pulses palpable; no clubbing, cyanosis, or edema b/l LEs  NEURO: awake, alert, interactive; moves all extremities  SKIN: no rashes or lesions      LABS:                        14.0   6.7   )-----------( 262      ( 30 Sep 2017 06:24 )             42.5     09-30    138  |  101  |  7   ----------------------------<  134<H>  3.4<L>   |  26  |  0.80    Ca    8.8      30 Sep 2017 06:24  Phos  2.3     09-29  Mg     2.3     09-29 CC/F/U for: pancolitis    INTERVAL HPI/OVERNIGHT EVENTS:  Pt seen and examined at bedside.     Allergies/Intolerance: No Known Allergies      MEDICATIONS  (STANDING):  heparin  Injectable 5000 Unit(s) SubCutaneous every 12 hours  influenza   Vaccine 0.5 milliLiter(s) IntraMuscular once  ciprofloxacin   IVPB      metroNIDAZOLE  IVPB      metroNIDAZOLE  IVPB 500 milliGRAM(s) IV Intermittent every 8 hours  ciprofloxacin   IVPB 400 milliGRAM(s) IV Intermittent every 12 hours  lactobacillus acidophilus 2 Tablet(s) Oral daily  insulin glargine Injectable (LANTUS) 20 Unit(s) SubCutaneous at bedtime  insulin lispro (HumaLOG) corrective regimen sliding scale   SubCutaneous three times a day before meals  dextrose 5%. 1000 milliLiter(s) (50 mL/Hr) IV Continuous <Continuous>  dextrose 50% Injectable 12.5 Gram(s) IV Push once  dextrose 50% Injectable 25 Gram(s) IV Push once  dextrose 50% Injectable 25 Gram(s) IV Push once  mesalamine DR Capsule 1200 milliGRAM(s) Oral three times a day    MEDICATIONS  (PRN):  aluminum hydroxide/magnesium hydroxide/simethicone Suspension 30 milliLiter(s) Oral every 6 hours PRN Dyspepsia  dextrose Gel 1 Dose(s) Oral once PRN Blood Glucose LESS THAN 70 milliGRAM(s)/deciliter  glucagon  Injectable 1 milliGRAM(s) IntraMuscular once PRN Glucose LESS THAN 70 milligrams/deciliter  acetaminophen   Tablet. 650 milliGRAM(s) Oral every 6 hours PRN Severe Pain (7 - 10)  HYDROmorphone  Injectable 1 milliGRAM(s) IV Push every 6 hours PRN Severe Pain (7 - 10)        ROS: all systems reviewed and wnl      PHYSICAL EXAMINATION:  Vital Signs Last 24 Hrs  T(C): 37.1 (30 Sep 2017 12:12), Max: 37.1 (30 Sep 2017 12:12)  T(F): 98.8 (30 Sep 2017 12:12), Max: 98.8 (30 Sep 2017 12:12)  HR: 57 (30 Sep 2017 12:12) (57 - 65)  BP: 138/91 (30 Sep 2017 12:12) (138/91 - 150/99)  BP(mean): --  RR: 17 (30 Sep 2017 12:12) (16 - 17)  SpO2: 98% (30 Sep 2017 12:12) (96% - 98%)  CAPILLARY BLOOD GLUCOSE  175 (30 Sep 2017 11:52)  133 (30 Sep 2017 07:48)  210 (29 Sep 2017 22:17)  301 (29 Sep 2017 16:01)          09-29 @ 07:01  -  09-30 @ 07:00  --------------------------------------------------------  IN: 1350 mL / OUT: 0 mL / NET: 1350 mL        GENERAL: stable, NAD, tolerating regular food for 24 hours. Normal BM.  NECK: supple, No JVD  CHEST/LUNG: clear to auscultation bilaterally; no rales, rhonchi, or wheezing b/l  HEART: normal S1, S2  ABDOMEN: BS+, soft, ND, NT   EXTREMITIES:  pulses palpable; no clubbing, cyanosis, or edema b/l LEs  NEURO: awake, alert, interactive; moves all extremities  SKIN: no rashes or lesions      LABS:                        14.0   6.7   )-----------( 262      ( 30 Sep 2017 06:24 )             42.5     09-30    138  |  101  |  7   ----------------------------<  134<H>  3.4<L>   |  26  |  0.80    Ca    8.8      30 Sep 2017 06:24  Phos  2.3     09-29  Mg     2.3     09-29

## 2017-09-30 NOTE — DISCHARGE NOTE ADULT - MEDICATION SUMMARY - MEDICATIONS TO TAKE
I will START or STAY ON the medications listed below when I get home from the hospital:    mesalamine 400 mg oral delayed release capsule  -- 3 cap(s) by mouth 3 times a day  Please call Dr Kunz/ Dr Kumar for questions     -- Indication: For colitis    metroNIDAZOLE 500 mg oral tablet  -- 1 tab(s) by mouth 3 times a day  -- Indication: For colitis    Dilaudid 2 mg oral tablet  -- 1 tab(s) by mouth every 6 hours, As Needed -for severe pain MDD:4 tabs  -- Caution federal law prohibits the transfer of this drug to any person other  than the person for whom it was prescribed.  May cause drowsiness.  Alcohol may intensify this effect.  Use care when operating dangerous machinery.  This prescription cannot be refilled.  Using more of this medication than prescribed may cause serious breathing problems.    -- Indication: For Pain    Lantus Solostar Pen 100 units/mL subcutaneous solution  -- 20 unit(s) subcutaneous once a day (at bedtime)   -- Do not drink alcoholic beverages when taking this medication.  It is very important that you take or use this exactly as directed.  Do not skip doses or discontinue unless directed by your doctor.  Keep in refrigerator.  Do not freeze.    -- Indication: For DIABETes    HumaLOG 100 units/mL subcutaneous solution  -- 8 unit(s) subcutaneous 3 times a day (before meals)   -- Do not drink alcoholic beverages when taking this medication.  It is very important that you take or use this exactly as directed.  Do not skip doses or discontinue unless directed by your doctor.  Keep in refrigerator.  Do not freeze.    -- Indication: For DIABETes    ciprofloxacin 500 mg oral tablet  -- 1 tab(s) by mouth every 12 hours  -- Indication: For colitis

## 2017-09-30 NOTE — PROGRESS NOTE ADULT - SUBJECTIVE AND OBJECTIVE BOX
Gastroenterolgy  Patient seen and examined bedside resting comfortably.  Family at bedside   abdominal pain improved  Denies nausea and vomiting. Tolerating diet.  Normal flatus/BM.     T(F): 98 (09-30-17 @ 05:18), Max: 98 (09-29-17 @ 12:16)  HR: 65 (09-30-17 @ 08:51) (57 - 65)  BP: 150/99 (09-30-17 @ 08:51) (139/91 - 150/99)  RR: 16 (09-30-17 @ 05:18) (16 - 17)  SpO2: 97% (09-30-17 @ 05:18) (96% - 98%)  Wt(kg): --  CAPILLARY BLOOD GLUCOSE  175 (30 Sep 2017 11:52)  133 (30 Sep 2017 07:48)  210 (29 Sep 2017 22:17)  301 (29 Sep 2017 16:01)        PHYSICAL EXAM:  General: NAD, WDWN.   Neuro:  Alert & oriented x 3  HEENT: NCAT, EOMI, conjunctiva clear  CV: +S1+S2 regular rate and rhythm  Lung: clear to ausculation bilaterally, respirations nonlabored, good inspiratory effort  Abdomen: soft, Non Tender, No distention Normal active BS  Extremities: no cyanosis, clubbing or edema    LABS:                        14.0   6.7   )-----------( 262      ( 30 Sep 2017 06:24 )             42.5     09-30    138  |  101  |  7   ----------------------------<  134<H>  3.4<L>   |  26  |  0.80    Ca    8.8      30 Sep 2017 06:24  Phos  2.3     09-29  Mg     2.3     09-29          I&O's Detail    29 Sep 2017 07:01  -  30 Sep 2017 07:00  --------------------------------------------------------  IN:    Oral Fluid: 450 mL    Solution: 600 mL    Solution: 300 mL  Total IN: 1350 mL    OUT:  Total OUT: 0 mL    Total NET: 1350 mL        09-30 @ 06:24    138 | 101 | 7  /8.8 | -- | --  _______________________/  3.4 | 26 | 0.80                           \par   Lipase, Serum: 50 U/L (09-30 @ 06:24)

## 2017-09-30 NOTE — DISCHARGE NOTE ADULT - PATIENT PORTAL LINK FT
“You can access the [employer] Patient Portal, offered by [facility], by registering with the following website: [email or web address]”

## 2017-09-30 NOTE — DISCHARGE NOTE ADULT - CARE PROVIDERS DIRECT ADDRESSES
[email or web address]

## 2017-09-30 NOTE — DISCHARGE NOTE ADULT - HOSPITAL COURSE
36 yeas old male here c/o nausea vomiting intermittent diffused abd pain since this morning. Pt states that he didn't take his insulin x 1 day. Pt found to be in DKA.  Admitted to ICU, DKA resolved, Patient evaluated by gastroenterology for c/o abdominal pain , found to have pancolitis.  Started of  Delzicol 1200 tid by GI.

## 2017-09-30 NOTE — PROGRESS NOTE ADULT - PROBLEM SELECTOR PLAN 1
dka resolved, on lantus 20 units every PM and riss dka resolved, on lantus 20 units every PM and riss. Resume Humolog tid at home.

## 2017-09-30 NOTE — PROGRESS NOTE ADULT - ATTENDING COMMENTS
Discharge to  home. Oral Cipro and Flagyl for 7 more days. Follow-up with GI in 1 week. Discharge to  home today.  Oral Cipro and Flagyl for 7 more days. Follow-up with GI in 1 week. Oral Dilaudid for 5 more days.

## 2017-09-30 NOTE — PROGRESS NOTE ADULT - ASSESSMENT
37 Y/O male w/ PMHx of DM admitted w/ abdominal pain, found to have a serum glucose of 524 and anion gap of 21. Pt admitted to CCU for DKA.
35 Y/O male w/ PMHx of DM admitted w/ abdominal pain, found to have a serum glucose of 524 and anion gap of 21. Pt admitted to CCU for DKA.
37 Y/O male w/ PMHx of DM admitted w/ abdominal pain, found to have a serum glucose of 524 and anion gap of 21. Pt admitted to CCU for DKA.
PANCOLITIS

## 2017-09-30 NOTE — DISCHARGE NOTE ADULT - CARE PLAN
Principal Discharge DX:	DKA (diabetic ketoacidosis)  Goal:	no further episode/ optimal control of DM  Instructions for follow-up, activity and diet:	please continue to take insulin as directed  Secondary Diagnosis:	Pancolitis  Instructions for follow-up, activity and diet:	follow up with GI doctor   Dr Kumar/ DR Kunz

## 2017-10-01 LAB
ACETONE, GCMS SCREEN URN: 47 MG/DL — SIGNIFICANT CHANGE UP
ISOPROPANOL GCMS: SIGNIFICANT CHANGE UP MG/DL

## 2017-10-03 ENCOUNTER — OUTPATIENT (OUTPATIENT)
Dept: OUTPATIENT SERVICES | Facility: HOSPITAL | Age: 37
LOS: 1 days | Discharge: STILL ADMITTED OR ADMINISTRATIVE | End: 2017-10-03

## 2017-10-03 DIAGNOSIS — Z90.49 ACQUIRED ABSENCE OF OTHER SPECIFIED PARTS OF DIGESTIVE TRACT: Chronic | ICD-10-CM

## 2017-10-04 DIAGNOSIS — J45.909 UNSPECIFIED ASTHMA, UNCOMPLICATED: ICD-10-CM

## 2017-10-04 DIAGNOSIS — F33.9 MAJOR DEPRESSIVE DISORDER, RECURRENT, UNSPECIFIED: ICD-10-CM

## 2017-10-04 DIAGNOSIS — Z79.4 LONG TERM (CURRENT) USE OF INSULIN: ICD-10-CM

## 2017-10-04 DIAGNOSIS — F41.9 ANXIETY DISORDER, UNSPECIFIED: ICD-10-CM

## 2017-10-04 DIAGNOSIS — K51.00 ULCERATIVE (CHRONIC) PANCOLITIS WITHOUT COMPLICATIONS: ICD-10-CM

## 2017-10-04 DIAGNOSIS — E10.10 TYPE 1 DIABETES MELLITUS WITH KETOACIDOSIS WITHOUT COMA: ICD-10-CM

## 2017-10-04 DIAGNOSIS — Z83.3 FAMILY HISTORY OF DIABETES MELLITUS: ICD-10-CM

## 2017-10-31 ENCOUNTER — INPATIENT (INPATIENT)
Facility: HOSPITAL | Age: 37
LOS: 1 days | Discharge: HOME | End: 2017-11-02
Attending: HOSPITALIST | Admitting: HOSPITALIST
Payer: MEDICAID

## 2017-10-31 VITALS
OXYGEN SATURATION: 99 % | WEIGHT: 184.97 LBS | SYSTOLIC BLOOD PRESSURE: 123 MMHG | DIASTOLIC BLOOD PRESSURE: 89 MMHG | RESPIRATION RATE: 16 BRPM | HEART RATE: 73 BPM | TEMPERATURE: 98 F | HEIGHT: 69 IN

## 2017-10-31 DIAGNOSIS — K52.9 NONINFECTIVE GASTROENTERITIS AND COLITIS, UNSPECIFIED: ICD-10-CM

## 2017-10-31 DIAGNOSIS — Z90.49 ACQUIRED ABSENCE OF OTHER SPECIFIED PARTS OF DIGESTIVE TRACT: Chronic | ICD-10-CM

## 2017-10-31 DIAGNOSIS — K29.70 GASTRITIS, UNSPECIFIED, WITHOUT BLEEDING: ICD-10-CM

## 2017-10-31 DIAGNOSIS — E10.9 TYPE 1 DIABETES MELLITUS WITHOUT COMPLICATIONS: ICD-10-CM

## 2017-10-31 DIAGNOSIS — J45.909 UNSPECIFIED ASTHMA, UNCOMPLICATED: ICD-10-CM

## 2017-10-31 DIAGNOSIS — K92.0 HEMATEMESIS: ICD-10-CM

## 2017-10-31 LAB
ALBUMIN SERPL ELPH-MCNC: 4.1 G/DL — SIGNIFICANT CHANGE UP (ref 3.3–5)
ALP SERPL-CCNC: 122 U/L — HIGH (ref 40–120)
ALT FLD-CCNC: 47 U/L — SIGNIFICANT CHANGE UP (ref 12–78)
ANION GAP SERPL CALC-SCNC: 10 MMOL/L — SIGNIFICANT CHANGE UP (ref 5–17)
ANION GAP SERPL CALC-SCNC: 12 MMOL/L — SIGNIFICANT CHANGE UP (ref 5–17)
APTT BLD: 27.7 SEC — SIGNIFICANT CHANGE UP (ref 27.5–37.4)
AST SERPL-CCNC: 26 U/L — SIGNIFICANT CHANGE UP (ref 15–37)
BASOPHILS # BLD AUTO: 0.1 K/UL — SIGNIFICANT CHANGE UP (ref 0–0.2)
BASOPHILS # BLD AUTO: 0.1 K/UL — SIGNIFICANT CHANGE UP (ref 0–0.2)
BASOPHILS NFR BLD AUTO: 1 % — SIGNIFICANT CHANGE UP (ref 0–2)
BASOPHILS NFR BLD AUTO: 1 % — SIGNIFICANT CHANGE UP (ref 0–2)
BILIRUB SERPL-MCNC: 0.7 MG/DL — SIGNIFICANT CHANGE UP (ref 0.2–1.2)
BLD GP AB SCN SERPL QL: SIGNIFICANT CHANGE UP
BUN SERPL-MCNC: 18 MG/DL — SIGNIFICANT CHANGE UP (ref 7–23)
BUN SERPL-MCNC: 19 MG/DL — SIGNIFICANT CHANGE UP (ref 7–23)
CALCIUM SERPL-MCNC: 8.1 MG/DL — LOW (ref 8.5–10.1)
CALCIUM SERPL-MCNC: 9.8 MG/DL — SIGNIFICANT CHANGE UP (ref 8.5–10.1)
CHLORIDE SERPL-SCNC: 103 MMOL/L — SIGNIFICANT CHANGE UP (ref 96–108)
CHLORIDE SERPL-SCNC: 104 MMOL/L — SIGNIFICANT CHANGE UP (ref 96–108)
CO2 SERPL-SCNC: 26 MMOL/L — SIGNIFICANT CHANGE UP (ref 22–31)
CO2 SERPL-SCNC: 27 MMOL/L — SIGNIFICANT CHANGE UP (ref 22–31)
CREAT SERPL-MCNC: 0.87 MG/DL — SIGNIFICANT CHANGE UP (ref 0.5–1.3)
CREAT SERPL-MCNC: 1.08 MG/DL — SIGNIFICANT CHANGE UP (ref 0.5–1.3)
CRP SERPL-MCNC: 0.3 MG/DL — SIGNIFICANT CHANGE UP (ref 0–0.4)
EOSINOPHIL # BLD AUTO: 0 K/UL — SIGNIFICANT CHANGE UP (ref 0–0.5)
EOSINOPHIL # BLD AUTO: 0.1 K/UL — SIGNIFICANT CHANGE UP (ref 0–0.5)
EOSINOPHIL NFR BLD AUTO: 0 % — SIGNIFICANT CHANGE UP (ref 0–6)
EOSINOPHIL NFR BLD AUTO: 0.7 % — SIGNIFICANT CHANGE UP (ref 0–6)
GLUCOSE BLDC GLUCOMTR-MCNC: 163 MG/DL — HIGH (ref 70–99)
GLUCOSE BLDC GLUCOMTR-MCNC: 178 MG/DL — HIGH (ref 70–99)
GLUCOSE BLDC GLUCOMTR-MCNC: 203 MG/DL — HIGH (ref 70–99)
GLUCOSE BLDC GLUCOMTR-MCNC: 218 MG/DL — HIGH (ref 70–99)
GLUCOSE BLDC GLUCOMTR-MCNC: 322 MG/DL — HIGH (ref 70–99)
GLUCOSE BLDC GLUCOMTR-MCNC: 54 MG/DL — LOW (ref 70–99)
GLUCOSE BLDC GLUCOMTR-MCNC: 56 MG/DL — LOW (ref 70–99)
GLUCOSE SERPL-MCNC: 206 MG/DL — HIGH (ref 70–99)
GLUCOSE SERPL-MCNC: 240 MG/DL — HIGH (ref 70–99)
HCT VFR BLD CALC: 39.8 % — SIGNIFICANT CHANGE UP (ref 39–50)
HCT VFR BLD CALC: 50.4 % — HIGH (ref 39–50)
HGB BLD-MCNC: 13.2 G/DL — SIGNIFICANT CHANGE UP (ref 13–17)
HGB BLD-MCNC: 17 G/DL — SIGNIFICANT CHANGE UP (ref 13–17)
INR BLD: 0.94 RATIO — SIGNIFICANT CHANGE UP (ref 0.88–1.16)
LACTATE SERPL-SCNC: 1.1 MMOL/L — SIGNIFICANT CHANGE UP (ref 0.7–2)
LACTATE SERPL-SCNC: 2 MMOL/L — SIGNIFICANT CHANGE UP (ref 0.7–2)
LIDOCAIN IGE QN: 46 U/L — LOW (ref 73–393)
LYMPHOCYTES # BLD AUTO: 1.5 K/UL — SIGNIFICANT CHANGE UP (ref 1–3.3)
LYMPHOCYTES # BLD AUTO: 15 % — SIGNIFICANT CHANGE UP (ref 13–44)
LYMPHOCYTES # BLD AUTO: 2.2 K/UL — SIGNIFICANT CHANGE UP (ref 1–3.3)
LYMPHOCYTES # BLD AUTO: 21 % — SIGNIFICANT CHANGE UP (ref 13–44)
MCHC RBC-ENTMCNC: 28.8 PG — SIGNIFICANT CHANGE UP (ref 27–34)
MCHC RBC-ENTMCNC: 29.2 PG — SIGNIFICANT CHANGE UP (ref 27–34)
MCHC RBC-ENTMCNC: 33.2 GM/DL — SIGNIFICANT CHANGE UP (ref 32–36)
MCHC RBC-ENTMCNC: 33.8 GM/DL — SIGNIFICANT CHANGE UP (ref 32–36)
MCV RBC AUTO: 86.5 FL — SIGNIFICANT CHANGE UP (ref 80–100)
MCV RBC AUTO: 86.7 FL — SIGNIFICANT CHANGE UP (ref 80–100)
MONOCYTES # BLD AUTO: 0.2 K/UL — SIGNIFICANT CHANGE UP (ref 0–0.9)
MONOCYTES # BLD AUTO: 0.6 K/UL — SIGNIFICANT CHANGE UP (ref 0–0.9)
MONOCYTES NFR BLD AUTO: 2.4 % — SIGNIFICANT CHANGE UP (ref 2–14)
MONOCYTES NFR BLD AUTO: 6 % — SIGNIFICANT CHANGE UP (ref 2–14)
NEUTROPHILS # BLD AUTO: 7.7 K/UL — HIGH (ref 1.8–7.4)
NEUTROPHILS # BLD AUTO: 8.1 K/UL — HIGH (ref 1.8–7.4)
NEUTROPHILS NFR BLD AUTO: 72 % — SIGNIFICANT CHANGE UP (ref 43–77)
NEUTROPHILS NFR BLD AUTO: 80.9 % — HIGH (ref 43–77)
PLATELET # BLD AUTO: 288 K/UL — SIGNIFICANT CHANGE UP (ref 150–400)
PLATELET # BLD AUTO: 382 K/UL — SIGNIFICANT CHANGE UP (ref 150–400)
POTASSIUM SERPL-MCNC: 3.6 MMOL/L — SIGNIFICANT CHANGE UP (ref 3.5–5.3)
POTASSIUM SERPL-MCNC: 3.7 MMOL/L — SIGNIFICANT CHANGE UP (ref 3.5–5.3)
POTASSIUM SERPL-SCNC: 3.6 MMOL/L — SIGNIFICANT CHANGE UP (ref 3.5–5.3)
POTASSIUM SERPL-SCNC: 3.7 MMOL/L — SIGNIFICANT CHANGE UP (ref 3.5–5.3)
PROT SERPL-MCNC: 8.4 GM/DL — HIGH (ref 6–8.3)
PROTHROM AB SERPL-ACNC: 10.2 SEC — SIGNIFICANT CHANGE UP (ref 9.8–12.7)
RBC # BLD: 4.6 M/UL — SIGNIFICANT CHANGE UP (ref 4.2–5.8)
RBC # BLD: 5.83 M/UL — HIGH (ref 4.2–5.8)
RBC # FLD: 12.2 % — SIGNIFICANT CHANGE UP (ref 11–15)
RBC # FLD: 12.4 % — SIGNIFICANT CHANGE UP (ref 11–15)
SODIUM SERPL-SCNC: 141 MMOL/L — SIGNIFICANT CHANGE UP (ref 135–145)
SODIUM SERPL-SCNC: 141 MMOL/L — SIGNIFICANT CHANGE UP (ref 135–145)
WBC # BLD: 10 K/UL — SIGNIFICANT CHANGE UP (ref 3.8–10.5)
WBC # BLD: 10.6 K/UL — HIGH (ref 3.8–10.5)
WBC # FLD AUTO: 10 K/UL — SIGNIFICANT CHANGE UP (ref 3.8–10.5)
WBC # FLD AUTO: 10.6 K/UL — HIGH (ref 3.8–10.5)

## 2017-10-31 PROCEDURE — 12345: CPT | Mod: NC

## 2017-10-31 PROCEDURE — 74177 CT ABD & PELVIS W/CONTRAST: CPT | Mod: 26

## 2017-10-31 PROCEDURE — 71010: CPT | Mod: 26

## 2017-10-31 PROCEDURE — 99285 EMERGENCY DEPT VISIT HI MDM: CPT | Mod: 25

## 2017-10-31 PROCEDURE — 99223 1ST HOSP IP/OBS HIGH 75: CPT

## 2017-10-31 RX ORDER — DEXTROSE 50 % IN WATER 50 %
1 SYRINGE (ML) INTRAVENOUS ONCE
Qty: 0 | Refills: 0 | Status: DISCONTINUED | OUTPATIENT
Start: 2017-10-31 | End: 2017-11-02

## 2017-10-31 RX ORDER — DEXTROSE 50 % IN WATER 50 %
25 SYRINGE (ML) INTRAVENOUS ONCE
Qty: 0 | Refills: 0 | Status: DISCONTINUED | OUTPATIENT
Start: 2017-10-31 | End: 2017-11-02

## 2017-10-31 RX ORDER — SODIUM CHLORIDE 9 MG/ML
2000 INJECTION INTRAMUSCULAR; INTRAVENOUS; SUBCUTANEOUS ONCE
Qty: 0 | Refills: 0 | Status: COMPLETED | OUTPATIENT
Start: 2017-10-31 | End: 2017-10-31

## 2017-10-31 RX ORDER — SODIUM CHLORIDE 9 MG/ML
1000 INJECTION INTRAMUSCULAR; INTRAVENOUS; SUBCUTANEOUS
Qty: 0 | Refills: 0 | Status: DISCONTINUED | OUTPATIENT
Start: 2017-10-31 | End: 2017-11-01

## 2017-10-31 RX ORDER — DEXTROSE 50 % IN WATER 50 %
12.5 SYRINGE (ML) INTRAVENOUS ONCE
Qty: 0 | Refills: 0 | Status: DISCONTINUED | OUTPATIENT
Start: 2017-10-31 | End: 2017-11-02

## 2017-10-31 RX ORDER — PANTOPRAZOLE SODIUM 20 MG/1
40 TABLET, DELAYED RELEASE ORAL ONCE
Qty: 0 | Refills: 0 | Status: COMPLETED | OUTPATIENT
Start: 2017-10-31 | End: 2017-10-31

## 2017-10-31 RX ORDER — GLUCAGON INJECTION, SOLUTION 0.5 MG/.1ML
1 INJECTION, SOLUTION SUBCUTANEOUS ONCE
Qty: 0 | Refills: 0 | Status: DISCONTINUED | OUTPATIENT
Start: 2017-10-31 | End: 2017-11-02

## 2017-10-31 RX ORDER — PIPERACILLIN AND TAZOBACTAM 4; .5 G/20ML; G/20ML
3.38 INJECTION, POWDER, LYOPHILIZED, FOR SOLUTION INTRAVENOUS ONCE
Qty: 0 | Refills: 0 | Status: COMPLETED | OUTPATIENT
Start: 2017-10-31 | End: 2017-10-31

## 2017-10-31 RX ORDER — HYDROXYZINE HCL 10 MG
50 TABLET ORAL EVERY 12 HOURS
Qty: 0 | Refills: 0 | Status: DISCONTINUED | OUTPATIENT
Start: 2017-10-31 | End: 2017-11-01

## 2017-10-31 RX ORDER — MORPHINE SULFATE 50 MG/1
4 CAPSULE, EXTENDED RELEASE ORAL ONCE
Qty: 0 | Refills: 0 | Status: DISCONTINUED | OUTPATIENT
Start: 2017-10-31 | End: 2017-10-31

## 2017-10-31 RX ORDER — CIPROFLOXACIN LACTATE 400MG/40ML
400 VIAL (ML) INTRAVENOUS ONCE
Qty: 0 | Refills: 0 | Status: COMPLETED | OUTPATIENT
Start: 2017-10-31 | End: 2017-10-31

## 2017-10-31 RX ORDER — ONDANSETRON 8 MG/1
4 TABLET, FILM COATED ORAL ONCE
Qty: 0 | Refills: 0 | Status: COMPLETED | OUTPATIENT
Start: 2017-10-31 | End: 2017-10-31

## 2017-10-31 RX ORDER — ACETAMINOPHEN 500 MG
650 TABLET ORAL EVERY 6 HOURS
Qty: 0 | Refills: 0 | Status: DISCONTINUED | OUTPATIENT
Start: 2017-10-31 | End: 2017-11-02

## 2017-10-31 RX ORDER — ALBUTEROL 90 UG/1
2 AEROSOL, METERED ORAL EVERY 6 HOURS
Qty: 0 | Refills: 0 | Status: DISCONTINUED | OUTPATIENT
Start: 2017-10-31 | End: 2017-11-02

## 2017-10-31 RX ORDER — METRONIDAZOLE 500 MG
TABLET ORAL
Qty: 0 | Refills: 0 | Status: DISCONTINUED | OUTPATIENT
Start: 2017-10-31 | End: 2017-11-02

## 2017-10-31 RX ORDER — INSULIN GLARGINE 100 [IU]/ML
10 INJECTION, SOLUTION SUBCUTANEOUS EVERY MORNING
Qty: 0 | Refills: 0 | Status: DISCONTINUED | OUTPATIENT
Start: 2017-10-31 | End: 2017-11-02

## 2017-10-31 RX ORDER — INSULIN LISPRO 100/ML
VIAL (ML) SUBCUTANEOUS
Qty: 0 | Refills: 0 | Status: DISCONTINUED | OUTPATIENT
Start: 2017-10-31 | End: 2017-11-02

## 2017-10-31 RX ORDER — CIPROFLOXACIN LACTATE 400MG/40ML
400 VIAL (ML) INTRAVENOUS EVERY 12 HOURS
Qty: 0 | Refills: 0 | Status: DISCONTINUED | OUTPATIENT
Start: 2017-10-31 | End: 2017-11-02

## 2017-10-31 RX ORDER — ESCITALOPRAM OXALATE 10 MG/1
5 TABLET, FILM COATED ORAL DAILY
Qty: 0 | Refills: 0 | Status: DISCONTINUED | OUTPATIENT
Start: 2017-10-31 | End: 2017-11-01

## 2017-10-31 RX ORDER — SODIUM CHLORIDE 9 MG/ML
1000 INJECTION, SOLUTION INTRAVENOUS
Qty: 0 | Refills: 0 | Status: DISCONTINUED | OUTPATIENT
Start: 2017-10-31 | End: 2017-11-02

## 2017-10-31 RX ORDER — ONDANSETRON 8 MG/1
4 TABLET, FILM COATED ORAL EVERY 6 HOURS
Qty: 0 | Refills: 0 | Status: DISCONTINUED | OUTPATIENT
Start: 2017-10-31 | End: 2017-11-02

## 2017-10-31 RX ORDER — MORPHINE SULFATE 50 MG/1
2 CAPSULE, EXTENDED RELEASE ORAL EVERY 6 HOURS
Qty: 0 | Refills: 0 | Status: DISCONTINUED | OUTPATIENT
Start: 2017-10-31 | End: 2017-11-01

## 2017-10-31 RX ORDER — INSULIN GLARGINE 100 [IU]/ML
10 INJECTION, SOLUTION SUBCUTANEOUS ONCE
Qty: 0 | Refills: 0 | Status: COMPLETED | OUTPATIENT
Start: 2017-10-31 | End: 2017-10-31

## 2017-10-31 RX ORDER — CIPROFLOXACIN LACTATE 400MG/40ML
VIAL (ML) INTRAVENOUS
Qty: 0 | Refills: 0 | Status: DISCONTINUED | OUTPATIENT
Start: 2017-10-31 | End: 2017-11-02

## 2017-10-31 RX ORDER — PANTOPRAZOLE SODIUM 20 MG/1
8 TABLET, DELAYED RELEASE ORAL
Qty: 80 | Refills: 0 | Status: DISCONTINUED | OUTPATIENT
Start: 2017-10-31 | End: 2017-11-01

## 2017-10-31 RX ORDER — HYDROMORPHONE HYDROCHLORIDE 2 MG/ML
1 INJECTION INTRAMUSCULAR; INTRAVENOUS; SUBCUTANEOUS ONCE
Qty: 0 | Refills: 0 | Status: DISCONTINUED | OUTPATIENT
Start: 2017-10-31 | End: 2017-10-31

## 2017-10-31 RX ORDER — METRONIDAZOLE 500 MG
500 TABLET ORAL ONCE
Qty: 0 | Refills: 0 | Status: COMPLETED | OUTPATIENT
Start: 2017-10-31 | End: 2017-10-31

## 2017-10-31 RX ORDER — METRONIDAZOLE 500 MG
500 TABLET ORAL EVERY 8 HOURS
Qty: 0 | Refills: 0 | Status: DISCONTINUED | OUTPATIENT
Start: 2017-10-31 | End: 2017-11-02

## 2017-10-31 RX ADMIN — Medication 2: at 09:55

## 2017-10-31 RX ADMIN — PANTOPRAZOLE SODIUM 40 MILLIGRAM(S): 20 TABLET, DELAYED RELEASE ORAL at 03:12

## 2017-10-31 RX ADMIN — PANTOPRAZOLE SODIUM 10 MG/HR: 20 TABLET, DELAYED RELEASE ORAL at 03:12

## 2017-10-31 RX ADMIN — MORPHINE SULFATE 2 MILLIGRAM(S): 50 CAPSULE, EXTENDED RELEASE ORAL at 07:31

## 2017-10-31 RX ADMIN — Medication 1: at 13:40

## 2017-10-31 RX ADMIN — MORPHINE SULFATE 2 MILLIGRAM(S): 50 CAPSULE, EXTENDED RELEASE ORAL at 08:01

## 2017-10-31 RX ADMIN — INSULIN GLARGINE 10 UNIT(S): 100 INJECTION, SOLUTION SUBCUTANEOUS at 06:29

## 2017-10-31 RX ADMIN — MORPHINE SULFATE 4 MILLIGRAM(S): 50 CAPSULE, EXTENDED RELEASE ORAL at 01:25

## 2017-10-31 RX ADMIN — MORPHINE SULFATE 2 MILLIGRAM(S): 50 CAPSULE, EXTENDED RELEASE ORAL at 19:55

## 2017-10-31 RX ADMIN — HYDROMORPHONE HYDROCHLORIDE 1 MILLIGRAM(S): 2 INJECTION INTRAMUSCULAR; INTRAVENOUS; SUBCUTANEOUS at 04:38

## 2017-10-31 RX ADMIN — MORPHINE SULFATE 2 MILLIGRAM(S): 50 CAPSULE, EXTENDED RELEASE ORAL at 20:10

## 2017-10-31 RX ADMIN — PANTOPRAZOLE SODIUM 40 MILLIGRAM(S): 20 TABLET, DELAYED RELEASE ORAL at 01:25

## 2017-10-31 RX ADMIN — Medication 200 MILLIGRAM(S): at 10:49

## 2017-10-31 RX ADMIN — Medication 100 MILLIGRAM(S): at 18:13

## 2017-10-31 RX ADMIN — SODIUM CHLORIDE 2000 MILLILITER(S): 9 INJECTION INTRAMUSCULAR; INTRAVENOUS; SUBCUTANEOUS at 01:25

## 2017-10-31 RX ADMIN — MORPHINE SULFATE 2 MILLIGRAM(S): 50 CAPSULE, EXTENDED RELEASE ORAL at 14:07

## 2017-10-31 RX ADMIN — SODIUM CHLORIDE 125 MILLILITER(S): 9 INJECTION INTRAMUSCULAR; INTRAVENOUS; SUBCUTANEOUS at 06:33

## 2017-10-31 RX ADMIN — MORPHINE SULFATE 2 MILLIGRAM(S): 50 CAPSULE, EXTENDED RELEASE ORAL at 13:37

## 2017-10-31 RX ADMIN — Medication 650 MILLIGRAM(S): at 23:18

## 2017-10-31 RX ADMIN — Medication 200 MILLIGRAM(S): at 19:10

## 2017-10-31 RX ADMIN — Medication 650 MILLIGRAM(S): at 22:48

## 2017-10-31 RX ADMIN — HYDROMORPHONE HYDROCHLORIDE 100.2 MILLIGRAM(S): 2 INJECTION INTRAMUSCULAR; INTRAVENOUS; SUBCUTANEOUS at 04:25

## 2017-10-31 RX ADMIN — ONDANSETRON 4 MILLIGRAM(S): 8 TABLET, FILM COATED ORAL at 01:25

## 2017-10-31 RX ADMIN — Medication 100 MILLIGRAM(S): at 12:42

## 2017-10-31 RX ADMIN — PIPERACILLIN AND TAZOBACTAM 200 GRAM(S): 4; .5 INJECTION, POWDER, LYOPHILIZED, FOR SOLUTION INTRAVENOUS at 06:22

## 2017-10-31 NOTE — ED ADULT TRIAGE NOTE - CHIEF COMPLAINT QUOTE
Pt has been vomiting all day and the color has become reddened as the day has gone on. Pt states he isn't sure if it was blood.

## 2017-10-31 NOTE — H&P ADULT - ASSESSMENT
37 y/o man with DM1, gastritis, asthma, recent episode of colitis, presents with abdominal pain and coffee ground emesis. Pt started on PPI drip in ED.  Pt started on IV fluids, type and screen ordered, GI notified by ED physician. Pt states most recent EGD was about 1 year ago and was "clean"

## 2017-10-31 NOTE — ED PROVIDER NOTE - PHYSICAL EXAMINATION
Gen: Alert, c/o pain  Head: NC, AT, EOMI, normal lids/conjunctiva  ENT: normal hearing, patent oropharynx, MMM  Neck: supple, no tenderness/meningismus, FROM  Pulm: Bilateral clear BS, normal resp effort, no wheeze/stridor/retractions  CV: RRR, no M/R/G, +dist pulses  Abd: soft, +diffusely TTP, ND, +BS, +voluntary guarding  Mskel: no edema/erythema/cyanosis  Skin: no rash  Neuro: no sensory/motor deficits

## 2017-10-31 NOTE — H&P ADULT - NSHPREVIEWOFSYSTEMS_GEN_ALL_CORE
REVIEW OF SYSTEMS:  CONSTITUTIONAL: No fever, weight loss, or fatigue  EYES: No eye pain, visual disturbances, or discharge  ENMT:  No difficulty hearing, tinnitus, vertigo; No sinus or throat pain  NECK: No pain or stiffness  RESPIRATORY: No cough, wheezing, chills or hemoptysis; No shortness of breath  CARDIOVASCULAR: No chest pain, palpitations, dizziness, or leg swelling  GASTROINTESTINAL: + abdominal  pain. + nausea, vomiting, +hematemesis; + diarrhea no constipation. No melena or hematochezia.  GENITOURINARY: No dysuria, frequency, hematuria, or incontinence  NEUROLOGICAL: No headaches, memory loss, loss of strength, numbness, or tremors  SKIN: No itching, burning, rashes, or lesions   LYMPH NODES: No enlarged glands  ENDOCRINE: No heat or cold intolerance; No hair loss  MUSCULOSKELETAL: No joint pain or swelling; No muscle, back, or extremity pain  PSYCHIATRIC: No depression, anxiety, mood swings, or difficulty sleeping  HEME/LYMPH: No easy bruising, or bleeding gums  ALLERGY AND IMMUNOLOGIC: No hives or eczema

## 2017-10-31 NOTE — CONSULT NOTE ADULT - SUBJECTIVE AND OBJECTIVE BOX
Chief Complaint:  Patient is a 36y old  Male who presents with a chief complaint of Abdominal pain and coffee ground vomitus today. (31 Oct 2017 06:20)      HPI:  ******PREVIOUS MRN [id number]******  	37 y/o Male PMH of DM1, gastritis, asthma, anxiety, s/p cholecystectomy presents to ED c/o abd pain & vomiting.  Pt states pain started earlier in the afternoon in LLQ, then became diffuse abd pain.  Pt states initially vomit NB/NB, then appeared reddish in color.  Denies fever, chills, ingestion of contaminated foods, he also has loose stools, did not see melena or blood.  Pt states symptoms similar to previous admission here, pt had pancolitis and DKA. No fever/chills, No photophobia/eye pain/changes in vision, No ear pain/sore throat/dysphagia, No chest pain/palpitations, no SOB/cough/wheeze/stridor, +abdominal pain, No neck/back pain, no rash, no changes in neurological status/function. (31 Oct 2017 06:20)      PMH/PSH:PAST MEDICAL & SURGICAL HISTORY:  Uncomplicated asthma, unspecified asthma severity, unspecified whether persistent  Gastritis, presence of bleeding unspecified, unspecified chronicity, unspecified gastritis type  Controlled diabetes mellitus type 1 without complications  History of cholecystectomy      Allergies:  No Known Allergies      Medications:  ALBUTerol    90 MICROgram(s) HFA Inhaler 2 Puff(s) Inhalation every 6 hours PRN  ciprofloxacin   IVPB      ciprofloxacin   IVPB 400 milliGRAM(s) IV Intermittent every 12 hours  dextrose 5%. 1000 milliLiter(s) IV Continuous <Continuous>  dextrose 50% Injectable 12.5 Gram(s) IV Push once  dextrose 50% Injectable 25 Gram(s) IV Push once  dextrose 50% Injectable 25 Gram(s) IV Push once  dextrose Gel 1 Dose(s) Oral once PRN  glucagon  Injectable 1 milliGRAM(s) IntraMuscular once PRN  insulin glargine Injectable (LANTUS) 10 Unit(s) SubCutaneous every morning  insulin lispro (HumaLOG) corrective regimen sliding scale   SubCutaneous three times a day before meals  metroNIDAZOLE  IVPB      metroNIDAZOLE  IVPB 500 milliGRAM(s) IV Intermittent every 8 hours  morphine  - Injectable 2 milliGRAM(s) IV Push every 6 hours PRN  ondansetron Injectable 4 milliGRAM(s) IV Push every 6 hours PRN  pantoprazole Infusion 8 mG/Hr IV Continuous <Continuous>  sodium chloride 0.9%. 1000 milliLiter(s) IV Continuous <Continuous>      Review of Systems:    General:  No weight loss, fevers, chills, night sweats, fatigue,   Eyes:  Good vision, no reported pain  ENT:  No sore throat, pain, runny nose, dysphagia  CV:  No pain, palpitations, hypo/hypertension  Resp:  No dyspnea, cough, tachypnea, wheezing  GI:  No pain, No nausea, + coffee ground emesis, +vomiting, No diarrhea, No constipation, No pruritis, No rectal bleeding, No tarry stools, No dysphagia,  :  No pain, bleeding, incontinence, nocturia  Muscle:  No pain, weakness  Breast:  No pain, abscess, mass, discharge  Neuro:  No weakness, tingling, memory problems  Psych:  No fatigue, insomnia, mood problems, depression  Endocrine:  No polyuria, polydypsia, cold/heat intolerance  Heme:  No petechiae, ecchymosis, easy bruisability  Skin:  No rash, tattoos, scars, edema    Relevant Family History:   FAMILY HISTORY:  Family history of diabetes mellitus (DM) (Father, Mother, Grandparent)      Relevant Social History: Alcohol ( ) , Tobacco ( ) , Illicit drugs ( )     Physical Exam:    Vital Signs:  Vital Signs Last 24 Hrs  T(C): 36.9 (31 Oct 2017 17:45), Max: 37.1 (31 Oct 2017 11:45)  T(F): 98.4 (31 Oct 2017 17:45), Max: 98.8 (31 Oct 2017 11:45)  HR: 63 (31 Oct 2017 17:45) (63 - 96)  BP: 116/69 (31 Oct 2017 17:45) (116/69 - 124/73)  BP(mean): --  RR: 18 (31 Oct 2017 17:45) (16 - 18)  SpO2: 97% (31 Oct 2017 17:45) (96% - 99%)  Daily Height in cm: 175.26 (31 Oct 2017 06:51)    Daily     General:  Appears stated age, well-groomed, well-nourished, no distress  HEENT:  NC/AT,  conjunctivae clear and pink, no thyromegaly, nodules, adenopathy, no JVD  Chest:  Full & symmetric excursion, no increased effort, breath sounds clear  Cardiovascular:  Regular rhythm, S1, S2, no murmur/rub/S3/S4, no abdominal bruit, no edema  Abdomen:  Soft, non-tender, non-distended, normoactive bowel sounds,  no masses , no hepatosplenomegaly, no signs of chronic liver disease  Extremities:  no cyanosis, clubbing or edema  Skin:  No rash/erythema/ecchymoses/petechiae/wounds/abscess/warm/dry  Neuro/Psych:  Alert, oriented, no asterixis, no tremor, no encephalopathy    Laboratory:                          13.2   10.6  )-----------( 288      ( 31 Oct 2017 11:07 )             39.8     10-31    141  |  104  |  19  ----------------------------<  206<H>  3.6   |  27  |  0.87    Ca    8.1<L>      31 Oct 2017 11:07    TPro  8.4<H>  /  Alb  4.1  /  TBili  0.7  /  DBili  x   /  AST  26  /  ALT  47  /  AlkPhos  122<H>  10-31    LIVER FUNCTIONS - ( 31 Oct 2017 01:20 )  Alb: 4.1 g/dL / Pro: 8.4 gm/dL / ALK PHOS: 122 U/L / ALT: 47 U/L / AST: 26 U/L / GGT: x           PT/INR - ( 31 Oct 2017 01:20 )   PT: 10.2 sec;   INR: 0.94 ratio         PTT - ( 31 Oct 2017 01:20 )  PTT:27.7 sec        Lipase serum46 U/L<L>         Intake and Output    10-31-17 @ 07:01  -  10-31-17 @ 17:51  --------------------------------------------------------  IN: 0 mL / OUT: 2 mL / NET: -2 mL        Imaging:  EXAM:  CT ABDOMEN AND PELVIS IC                            PROCEDURE DATE:  10/31/2017      INTERPRETATION:  CLINICAL HISTORY: Abdominal pain and vomiting.    TECHNIQUE:  CT scan of the abdomen and pelvis with IV contrast.  Transaxial images were acquired from the domes of the diaphragm to the   symphysis pubis with intravenous contrast. Oral contrast was withheld as   per the referring clinician's request. Coronal and sagittal images were   also provided from the transaxial source data. 85mLs of Omnipaque 350   was administered intravenously without complication and 15 mLs was   discarded.    COMPARISON: There is no prior study for comparison.    FINDINGS:   The heart is not enlarged. The lung bases are clear.     The large and small bowel are normal in caliber without obstruction.   There is no free intraperitoneal air or abdominal abscess.  The appendix   is normal. The colon is underdistended which limits evaluation of the   wall thickness. There is suggestion of mild pericolonic inflammatory   change however.    The liver, spleen, pancreas and adrenal glands are unremarkable. The   gallbladder surgically removed. The kidneys enhance symmetrically without   hydronephrosis.     The abdominal aorta is normal in caliber. Thereis no retroperitoneal,   pelvic or inguinal adenopathy. There is no ascites.    The urinary bladder is unremarkable. The prostate gland is not enlarged.    The visualized osseous structures demonstrate no acute abnormality.    IMPRESSION:   No obstruction. Normal appendix. Under distended colon which limits   evaluation of the wall thickness. Questionable pericolonic inflammatory   change. Please correlate clinically as colitis cannot be excluded.  GLENNA NAPOLES M.D., RADIOLOGIST  This document has been electronically signed. Oct 31 2017  3:02AM

## 2017-10-31 NOTE — H&P ADULT - NSHPPHYSICALEXAM_GEN_ALL_CORE
T(C): 36.6 (31 Oct 2017 06:03), Max: 36.9 (31 Oct 2017 00:36)  T(F): 97.8 (31 Oct 2017 06:03), Max: 98.5 (31 Oct 2017 00:36)  HR: 96 (31 Oct 2017 06:03) (73 - 96)  BP: 123/77 (31 Oct 2017 06:03) (123/77 - 123/89)  BP(mean): --  RR: 17 (31 Oct 2017 06:03) (16 - 17)  SpO2: 96% (31 Oct 2017 06:03) (96% - 99%)      PHYSICAL EXAM:  GENERAL: NAD, well-groomed, well-developed  HEAD:  Atraumatic, Normocephalic  EYES: EOMI, PERRLA, conjunctiva and sclera clear  ENMT: No tonsillar erythema, exudates, or enlargement; Moist mucous membranes, No lesions  NECK: Supple, No JVD, Normal thyroid  NERVOUS SYSTEM:  Alert & Oriented X3, Good concentration; Motor Strength 5/5 B/L upper and lower extremities; DTRs 2+ intact and symmetric  CHEST/LUNG: Clear to percussion bilaterally; No rales, rhonchi, wheezing, or rubs  HEART: Regular rate and rhythm; No murmurs, rubs, or gallops  ABDOMEN: Soft, tender LLQ, no guarding or rebound, Nondistended; Bowel sounds present  EXTREMITIES:  + Peripheral Pulses, No clubbing, cyanosis, or edema  LYMPH: No lymphadenopathy noted  SKIN: No rashes or lesions

## 2017-10-31 NOTE — H&P ADULT - NSHPLABSRESULTS_GEN_ALL_CORE
LABS:                        17.0   10.0  )-----------( 382      ( 31 Oct 2017 01:20 )             50.4     10-31    141  |  103  |  18  ----------------------------<  240<H>  3.7   |  26  |  1.08    Ca    9.8      31 Oct 2017 01:20    TPro  8.4<H>  /  Alb  4.1  /  TBili  0.7  /  DBili  x   /  AST  26  /  ALT  47  /  AlkPhos  122<H>  10-31    PT/INR - ( 31 Oct 2017 01:20 )   PT: 10.2 sec;   INR: 0.94 ratio         PTT - ( 31 Oct 2017 01:20 )  PTT:27.7 sec    CAPILLARY BLOOD GLUCOSE      POCT Blood Glucose.: 178 mg/dL (31 Oct 2017 05:39)  POCT Blood Glucose.: 218 mg/dL (31 Oct 2017 00:59)      RADIOLOGY & ADDITIONAL TESTS:  < from: CT Abdomen and Pelvis w/ IV Cont (10.31.17 @ 02:47) >    No obstruction. Normal appendix. Under distended colon which limits   evaluation of the wall thickness. Questionable pericolonic inflammatory   change. Please correlate clinically as colitis cannot be excluded.          Imaging Personally Reviewed:  [ x] YES  [ ] NO

## 2017-10-31 NOTE — H&P ADULT - PMH
Controlled diabetes mellitus type 1 without complications    Gastritis, presence of bleeding unspecified, unspecified chronicity, unspecified gastritis type    Uncomplicated asthma, unspecified asthma severity, unspecified whether persistent

## 2017-10-31 NOTE — H&P ADULT - FAMILY HISTORY
Father  Still living? Unknown  Family history of diabetes mellitus (DM), Age at diagnosis: Age Unknown     Mother  Still living? Unknown  Family history of diabetes mellitus (DM), Age at diagnosis: Age Unknown     Grandparent  Still living? Unknown  Family history of diabetes mellitus (DM), Age at diagnosis: Age Unknown

## 2017-10-31 NOTE — H&P ADULT - HISTORY OF PRESENT ILLNESS
******PREVIOUS MRN [id number]******      	37 y/o Male PMH of DM1, gastritis, asthma, anxiety, s/p cholecystectomy presents to ED c/o abd pain & vomiting.  Pt states pain started earlier in the afternoon in LLQ, then became diffuse abd pain.  Pt states initially vomit NB/NB, then appeared reddish in color.  Denies fever, chills, ingestion of contaminated foods, he also has loose stools, did not see melena or blood.  Pt states symptoms similar to previous admission here, pt had pancolitis and DKA. No fever/chills, No photophobia/eye pain/changes in vision, No ear pain/sore throat/dysphagia, No chest pain/palpitations, no SOB/cough/wheeze/stridor, +abdominal pain, No neck/back pain, no rash, no changes in neurological status/function.

## 2017-10-31 NOTE — ED PROVIDER NOTE - OBJECTIVE STATEMENT
******PREVIOUS MRN [id number]******    Pertinent PMH/PSH/FHx/SHx and Review of Systems contained within: ******PREVIOUS MRN [id number]******    Pertinent PMH/PSH/FHx/SHx and Review of Systems contained within:    35yo M w PMH of IDDM, gastritis, asthma, anxiety, s/p cholecystectomy presents to ED c/o abd pain & vomiting.  Pt states pain started earlier in the afternoon in LLQ, then became diffuse abd pain.  Pt states initially vomit NB/NB, then appeared reddish in color.  Denies fever, chills, ingestion of contaminated foods.  +loose stools.  Pt states sx similar to previous admission, pt had pancolitis & anion gap.    No fever/chills, No photophobia/eye pain/changes in vision, No ear pain/sore throat/dysphagia, No chest pain/palpitations, no SOB/cough/wheeze/stridor, +abdominal pain, No neck/back pain, no rash, no changes in neurological status/function.

## 2017-10-31 NOTE — H&P ADULT - PROBLEM SELECTOR PLAN 2
NPO, PPI drip, type and screen, follow VS and cbc, serial abdominal examinations  GI consult, consider EGD

## 2017-11-01 ENCOUNTER — OUTPATIENT (OUTPATIENT)
Dept: OUTPATIENT SERVICES | Facility: HOSPITAL | Age: 37
LOS: 1 days | End: 2017-11-01
Payer: MEDICAID

## 2017-11-01 DIAGNOSIS — F41.9 ANXIETY DISORDER, UNSPECIFIED: ICD-10-CM

## 2017-11-01 DIAGNOSIS — F33.1 MAJOR DEPRESSIVE DISORDER, RECURRENT, MODERATE: ICD-10-CM

## 2017-11-01 DIAGNOSIS — R69 ILLNESS, UNSPECIFIED: ICD-10-CM

## 2017-11-01 DIAGNOSIS — Z90.49 ACQUIRED ABSENCE OF OTHER SPECIFIED PARTS OF DIGESTIVE TRACT: Chronic | ICD-10-CM

## 2017-11-01 LAB
ANION GAP SERPL CALC-SCNC: 8 MMOL/L — SIGNIFICANT CHANGE UP (ref 5–17)
BUN SERPL-MCNC: 14 MG/DL — SIGNIFICANT CHANGE UP (ref 7–23)
CALCIUM SERPL-MCNC: 8.3 MG/DL — LOW (ref 8.5–10.1)
CHLORIDE SERPL-SCNC: 102 MMOL/L — SIGNIFICANT CHANGE UP (ref 96–108)
CO2 SERPL-SCNC: 28 MMOL/L — SIGNIFICANT CHANGE UP (ref 22–31)
CREAT SERPL-MCNC: 0.94 MG/DL — SIGNIFICANT CHANGE UP (ref 0.5–1.3)
GLUCOSE BLDC GLUCOMTR-MCNC: 219 MG/DL — HIGH (ref 70–99)
GLUCOSE BLDC GLUCOMTR-MCNC: 234 MG/DL — HIGH (ref 70–99)
GLUCOSE BLDC GLUCOMTR-MCNC: 310 MG/DL — HIGH (ref 70–99)
GLUCOSE BLDC GLUCOMTR-MCNC: 99 MG/DL — SIGNIFICANT CHANGE UP (ref 70–99)
GLUCOSE SERPL-MCNC: 254 MG/DL — HIGH (ref 70–99)
HBA1C BLD-MCNC: 10 % — HIGH (ref 4–5.6)
HCT VFR BLD CALC: 38.4 % — LOW (ref 39–50)
HGB BLD-MCNC: 12.9 G/DL — LOW (ref 13–17)
MAGNESIUM SERPL-MCNC: 2 MG/DL — SIGNIFICANT CHANGE UP (ref 1.6–2.6)
MCHC RBC-ENTMCNC: 29 PG — SIGNIFICANT CHANGE UP (ref 27–34)
MCHC RBC-ENTMCNC: 33.6 GM/DL — SIGNIFICANT CHANGE UP (ref 32–36)
MCV RBC AUTO: 86.2 FL — SIGNIFICANT CHANGE UP (ref 80–100)
PHOSPHATE SERPL-MCNC: 2.6 MG/DL — SIGNIFICANT CHANGE UP (ref 2.5–4.5)
PLATELET # BLD AUTO: 284 K/UL — SIGNIFICANT CHANGE UP (ref 150–400)
POTASSIUM SERPL-MCNC: 3.7 MMOL/L — SIGNIFICANT CHANGE UP (ref 3.5–5.3)
POTASSIUM SERPL-SCNC: 3.7 MMOL/L — SIGNIFICANT CHANGE UP (ref 3.5–5.3)
RBC # BLD: 4.45 M/UL — SIGNIFICANT CHANGE UP (ref 4.2–5.8)
RBC # FLD: 11.9 % — SIGNIFICANT CHANGE UP (ref 11–15)
SODIUM SERPL-SCNC: 138 MMOL/L — SIGNIFICANT CHANGE UP (ref 135–145)
WBC # BLD: 6.8 K/UL — SIGNIFICANT CHANGE UP (ref 3.8–10.5)
WBC # FLD AUTO: 6.8 K/UL — SIGNIFICANT CHANGE UP (ref 3.8–10.5)

## 2017-11-01 PROCEDURE — 99233 SBSQ HOSP IP/OBS HIGH 50: CPT

## 2017-11-01 PROCEDURE — 90792 PSYCH DIAG EVAL W/MED SRVCS: CPT

## 2017-11-01 PROCEDURE — G9001: CPT

## 2017-11-01 RX ORDER — DIPHENHYDRAMINE HCL 50 MG
25 CAPSULE ORAL ONCE
Qty: 0 | Refills: 0 | Status: COMPLETED | OUTPATIENT
Start: 2017-11-01 | End: 2017-11-01

## 2017-11-01 RX ORDER — DOCUSATE SODIUM 100 MG
100 CAPSULE ORAL
Qty: 0 | Refills: 0 | Status: DISCONTINUED | OUTPATIENT
Start: 2017-11-01 | End: 2017-11-02

## 2017-11-01 RX ORDER — ESCITALOPRAM OXALATE 10 MG/1
10 TABLET, FILM COATED ORAL DAILY
Qty: 0 | Refills: 0 | Status: DISCONTINUED | OUTPATIENT
Start: 2017-11-01 | End: 2017-11-02

## 2017-11-01 RX ORDER — CLONAZEPAM 1 MG
0.5 TABLET ORAL
Qty: 0 | Refills: 0 | Status: DISCONTINUED | OUTPATIENT
Start: 2017-11-01 | End: 2017-11-02

## 2017-11-01 RX ORDER — PANTOPRAZOLE SODIUM 20 MG/1
40 TABLET, DELAYED RELEASE ORAL
Qty: 0 | Refills: 0 | Status: DISCONTINUED | OUTPATIENT
Start: 2017-11-01 | End: 2017-11-02

## 2017-11-01 RX ORDER — HYDROXYZINE HCL 10 MG
50 TABLET ORAL EVERY 8 HOURS
Qty: 0 | Refills: 0 | Status: DISCONTINUED | OUTPATIENT
Start: 2017-11-01 | End: 2017-11-02

## 2017-11-01 RX ORDER — HYDROMORPHONE HYDROCHLORIDE 2 MG/ML
0.5 INJECTION INTRAMUSCULAR; INTRAVENOUS; SUBCUTANEOUS ONCE
Qty: 0 | Refills: 0 | Status: DISCONTINUED | OUTPATIENT
Start: 2017-11-01 | End: 2017-11-01

## 2017-11-01 RX ORDER — HYDROMORPHONE HYDROCHLORIDE 2 MG/ML
2 INJECTION INTRAMUSCULAR; INTRAVENOUS; SUBCUTANEOUS EVERY 6 HOURS
Qty: 0 | Refills: 0 | Status: DISCONTINUED | OUTPATIENT
Start: 2017-11-01 | End: 2017-11-02

## 2017-11-01 RX ADMIN — Medication 200 MILLIGRAM(S): at 06:11

## 2017-11-01 RX ADMIN — Medication 100 MILLIGRAM(S): at 09:50

## 2017-11-01 RX ADMIN — Medication 2: at 17:47

## 2017-11-01 RX ADMIN — HYDROMORPHONE HYDROCHLORIDE 0.5 MILLIGRAM(S): 2 INJECTION INTRAMUSCULAR; INTRAVENOUS; SUBCUTANEOUS at 02:22

## 2017-11-01 RX ADMIN — MORPHINE SULFATE 2 MILLIGRAM(S): 50 CAPSULE, EXTENDED RELEASE ORAL at 06:41

## 2017-11-01 RX ADMIN — Medication 50 MILLIGRAM(S): at 06:12

## 2017-11-01 RX ADMIN — Medication 2: at 09:58

## 2017-11-01 RX ADMIN — INSULIN GLARGINE 10 UNIT(S): 100 INJECTION, SOLUTION SUBCUTANEOUS at 10:10

## 2017-11-01 RX ADMIN — Medication 0.5 MILLIGRAM(S): at 18:53

## 2017-11-01 RX ADMIN — Medication 4: at 12:45

## 2017-11-01 RX ADMIN — HYDROMORPHONE HYDROCHLORIDE 2 MILLIGRAM(S): 2 INJECTION INTRAMUSCULAR; INTRAVENOUS; SUBCUTANEOUS at 13:00

## 2017-11-01 RX ADMIN — Medication 100 MILLIGRAM(S): at 01:55

## 2017-11-01 RX ADMIN — HYDROMORPHONE HYDROCHLORIDE 0.5 MILLIGRAM(S): 2 INJECTION INTRAMUSCULAR; INTRAVENOUS; SUBCUTANEOUS at 01:52

## 2017-11-01 RX ADMIN — Medication 25 MILLIGRAM(S): at 00:39

## 2017-11-01 RX ADMIN — MORPHINE SULFATE 2 MILLIGRAM(S): 50 CAPSULE, EXTENDED RELEASE ORAL at 06:13

## 2017-11-01 RX ADMIN — ESCITALOPRAM OXALATE 5 MILLIGRAM(S): 10 TABLET, FILM COATED ORAL at 12:47

## 2017-11-01 RX ADMIN — HYDROMORPHONE HYDROCHLORIDE 2 MILLIGRAM(S): 2 INJECTION INTRAMUSCULAR; INTRAVENOUS; SUBCUTANEOUS at 12:42

## 2017-11-01 RX ADMIN — Medication 100 MILLIGRAM(S): at 17:44

## 2017-11-01 RX ADMIN — Medication 200 MILLIGRAM(S): at 18:55

## 2017-11-01 RX ADMIN — HYDROMORPHONE HYDROCHLORIDE 2 MILLIGRAM(S): 2 INJECTION INTRAMUSCULAR; INTRAVENOUS; SUBCUTANEOUS at 18:51

## 2017-11-01 NOTE — BEHAVIORAL HEALTH ASSESSMENT NOTE - HPI (INCLUDE ILLNESS QUALITY, SEVERITY, DURATION, TIMING, CONTEXT, MODIFYING FACTORS, ASSOCIATED SIGNS AND SYMPTOMS)
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  He reports a history of anxiety, depression and no previous inpt psychiatric hospitalizations.  He presented to [facility] ER secondary to vomiting blood and was admitted due to coffee ground emesis. He reports that he has been suffering from abdominal issues for a long time and notes that he is frustrated about not having a definitive diagnosis. He reports that he has had his gallbladder taken out, has had endoscopies, colonoscopies and he is not certain what he has - states that most recently he has been told he has gastritis or an ulcer.  Pt states that he was diagnosed with type I DM at age 17 and reports he has also been told that he might have gastroparesis.  Pt reports that he feels he has not accomplished as much as he could have/should have secondary to medical issues and also reports that over 10 years ago he got into legal trouble due to identity theft and he reports that has also caused problems in the job market. Despite ongoing stressors, he denies any history of s/h i/i/p. he states that he remains hopeful about the future and reports that when he is in treatment he feels better. Reports that psychiatric treatment was discontinued >1 year ago due to a lapse in his Medicaid coverage.  States that he sought treatment early this month at [facility] crisis center, however he was unable to secure ongoing f/u at [facility].  Pt admits to buying Xanax off the street for relief of his anxiety symptoms.

## 2017-11-01 NOTE — PROGRESS NOTE ADULT - PROBLEM SELECTOR PROBLEM 2
Gastritis, presence of bleeding unspecified, unspecified chronicity, unspecified gastritis type
Gastritis, presence of bleeding unspecified, unspecified chronicity, unspecified gastritis type

## 2017-11-02 ENCOUNTER — TRANSCRIPTION ENCOUNTER (OUTPATIENT)
Age: 37
End: 2017-11-02

## 2017-11-02 VITALS
TEMPERATURE: 97 F | DIASTOLIC BLOOD PRESSURE: 84 MMHG | OXYGEN SATURATION: 98 % | RESPIRATION RATE: 16 BRPM | SYSTOLIC BLOOD PRESSURE: 141 MMHG | HEART RATE: 62 BPM

## 2017-11-02 LAB
ANION GAP SERPL CALC-SCNC: 9 MMOL/L — SIGNIFICANT CHANGE UP (ref 5–17)
BUN SERPL-MCNC: 7 MG/DL — SIGNIFICANT CHANGE UP (ref 7–23)
CALCIUM SERPL-MCNC: 8.5 MG/DL — SIGNIFICANT CHANGE UP (ref 8.5–10.1)
CHLORIDE SERPL-SCNC: 105 MMOL/L — SIGNIFICANT CHANGE UP (ref 96–108)
CO2 SERPL-SCNC: 26 MMOL/L — SIGNIFICANT CHANGE UP (ref 22–31)
CREAT SERPL-MCNC: 0.78 MG/DL — SIGNIFICANT CHANGE UP (ref 0.5–1.3)
GLUCOSE BLDC GLUCOMTR-MCNC: 114 MG/DL — HIGH (ref 70–99)
GLUCOSE BLDC GLUCOMTR-MCNC: 376 MG/DL — HIGH (ref 70–99)
GLUCOSE SERPL-MCNC: 67 MG/DL — LOW (ref 70–99)
HCT VFR BLD CALC: 39.4 % — SIGNIFICANT CHANGE UP (ref 39–50)
HGB BLD-MCNC: 13.2 G/DL — SIGNIFICANT CHANGE UP (ref 13–17)
MAGNESIUM SERPL-MCNC: 2.2 MG/DL — SIGNIFICANT CHANGE UP (ref 1.6–2.6)
MCHC RBC-ENTMCNC: 29.3 PG — SIGNIFICANT CHANGE UP (ref 27–34)
MCHC RBC-ENTMCNC: 33.6 GM/DL — SIGNIFICANT CHANGE UP (ref 32–36)
MCV RBC AUTO: 87.2 FL — SIGNIFICANT CHANGE UP (ref 80–100)
PHOSPHATE SERPL-MCNC: 3.8 MG/DL — SIGNIFICANT CHANGE UP (ref 2.5–4.5)
PLATELET # BLD AUTO: 277 K/UL — SIGNIFICANT CHANGE UP (ref 150–400)
POTASSIUM SERPL-MCNC: 3.5 MMOL/L — SIGNIFICANT CHANGE UP (ref 3.5–5.3)
POTASSIUM SERPL-SCNC: 3.5 MMOL/L — SIGNIFICANT CHANGE UP (ref 3.5–5.3)
RBC # BLD: 4.51 M/UL — SIGNIFICANT CHANGE UP (ref 4.2–5.8)
RBC # FLD: 11.8 % — SIGNIFICANT CHANGE UP (ref 11–15)
SODIUM SERPL-SCNC: 140 MMOL/L — SIGNIFICANT CHANGE UP (ref 135–145)
WBC # BLD: 6.2 K/UL — SIGNIFICANT CHANGE UP (ref 3.8–10.5)
WBC # FLD AUTO: 6.2 K/UL — SIGNIFICANT CHANGE UP (ref 3.8–10.5)

## 2017-11-02 PROCEDURE — 99239 HOSP IP/OBS DSCHRG MGMT >30: CPT

## 2017-11-02 PROCEDURE — 99232 SBSQ HOSP IP/OBS MODERATE 35: CPT

## 2017-11-02 RX ORDER — PANTOPRAZOLE SODIUM 20 MG/1
1 TABLET, DELAYED RELEASE ORAL
Qty: 30 | Refills: 0 | OUTPATIENT
Start: 2017-11-02 | End: 2017-12-02

## 2017-11-02 RX ORDER — HYDROMORPHONE HYDROCHLORIDE 2 MG/ML
2 INJECTION INTRAMUSCULAR; INTRAVENOUS; SUBCUTANEOUS EVERY 6 HOURS
Qty: 0 | Refills: 0 | Status: DISCONTINUED | OUTPATIENT
Start: 2017-11-02 | End: 2017-11-02

## 2017-11-02 RX ORDER — CIPROFLOXACIN LACTATE 400MG/40ML
500 VIAL (ML) INTRAVENOUS EVERY 12 HOURS
Qty: 0 | Refills: 0 | Status: DISCONTINUED | OUTPATIENT
Start: 2017-11-02 | End: 2017-11-02

## 2017-11-02 RX ORDER — INSULIN GLARGINE 100 [IU]/ML
20 INJECTION, SOLUTION SUBCUTANEOUS
Qty: 0 | Refills: 0 | COMMUNITY
Start: 2017-11-02

## 2017-11-02 RX ORDER — ESCITALOPRAM OXALATE 10 MG/1
1 TABLET, FILM COATED ORAL
Qty: 30 | Refills: 0 | OUTPATIENT
Start: 2017-11-02 | End: 2017-12-02

## 2017-11-02 RX ORDER — INSULIN GLARGINE 100 [IU]/ML
0 INJECTION, SOLUTION SUBCUTANEOUS
Qty: 0 | Refills: 0 | COMMUNITY
Start: 2017-11-02

## 2017-11-02 RX ORDER — CLONAZEPAM 1 MG
1 TABLET ORAL
Qty: 60 | Refills: 0 | OUTPATIENT
Start: 2017-11-02 | End: 2017-12-02

## 2017-11-02 RX ORDER — INSULIN LISPRO 100/ML
0 VIAL (ML) SUBCUTANEOUS
Qty: 0 | Refills: 0 | COMMUNITY
Start: 2017-11-02

## 2017-11-02 RX ORDER — CIPROFLOXACIN LACTATE 400MG/40ML
1 VIAL (ML) INTRAVENOUS
Qty: 8 | Refills: 0 | OUTPATIENT
Start: 2017-11-02 | End: 2017-11-06

## 2017-11-02 RX ORDER — METRONIDAZOLE 500 MG
1 TABLET ORAL
Qty: 12 | Refills: 0 | OUTPATIENT
Start: 2017-11-02 | End: 2017-11-06

## 2017-11-02 RX ORDER — HYDROMORPHONE HYDROCHLORIDE 2 MG/ML
1 INJECTION INTRAMUSCULAR; INTRAVENOUS; SUBCUTANEOUS
Qty: 20 | Refills: 0 | OUTPATIENT
Start: 2017-11-02 | End: 2017-11-07

## 2017-11-02 RX ORDER — METRONIDAZOLE 500 MG
500 TABLET ORAL EVERY 8 HOURS
Qty: 0 | Refills: 0 | Status: DISCONTINUED | OUTPATIENT
Start: 2017-11-02 | End: 2017-11-02

## 2017-11-02 RX ADMIN — HYDROMORPHONE HYDROCHLORIDE 2 MILLIGRAM(S): 2 INJECTION INTRAMUSCULAR; INTRAVENOUS; SUBCUTANEOUS at 14:00

## 2017-11-02 RX ADMIN — INSULIN GLARGINE 10 UNIT(S): 100 INJECTION, SOLUTION SUBCUTANEOUS at 09:12

## 2017-11-02 RX ADMIN — ESCITALOPRAM OXALATE 10 MILLIGRAM(S): 10 TABLET, FILM COATED ORAL at 12:59

## 2017-11-02 RX ADMIN — Medication 5: at 12:59

## 2017-11-02 RX ADMIN — HYDROMORPHONE HYDROCHLORIDE 2 MILLIGRAM(S): 2 INJECTION INTRAMUSCULAR; INTRAVENOUS; SUBCUTANEOUS at 01:25

## 2017-11-02 RX ADMIN — Medication 0.5 MILLIGRAM(S): at 05:23

## 2017-11-02 RX ADMIN — Medication 200 MILLIGRAM(S): at 05:24

## 2017-11-02 RX ADMIN — HYDROMORPHONE HYDROCHLORIDE 2 MILLIGRAM(S): 2 INJECTION INTRAMUSCULAR; INTRAVENOUS; SUBCUTANEOUS at 07:29

## 2017-11-02 RX ADMIN — Medication 500 MILLIGRAM(S): at 15:30

## 2017-11-02 RX ADMIN — HYDROMORPHONE HYDROCHLORIDE 2 MILLIGRAM(S): 2 INJECTION INTRAMUSCULAR; INTRAVENOUS; SUBCUTANEOUS at 08:17

## 2017-11-02 RX ADMIN — PANTOPRAZOLE SODIUM 40 MILLIGRAM(S): 20 TABLET, DELAYED RELEASE ORAL at 05:23

## 2017-11-02 RX ADMIN — Medication 100 MILLIGRAM(S): at 01:11

## 2017-11-02 RX ADMIN — HYDROMORPHONE HYDROCHLORIDE 2 MILLIGRAM(S): 2 INJECTION INTRAMUSCULAR; INTRAVENOUS; SUBCUTANEOUS at 13:31

## 2017-11-02 RX ADMIN — Medication 100 MILLIGRAM(S): at 10:32

## 2017-11-02 RX ADMIN — HYDROMORPHONE HYDROCHLORIDE 2 MILLIGRAM(S): 2 INJECTION INTRAMUSCULAR; INTRAVENOUS; SUBCUTANEOUS at 01:08

## 2017-11-02 NOTE — PROGRESS NOTE BEHAVIORAL HEALTH - NSBHFUPINTERVALHXFT_PSY_A_CORE
Pt seen and examined. Chart reviewed. Pt states that he is doing better today. Notes that anxiety is present, but muted. Reports that he was able to sleep last night and notes that he feels calm today. States that he believes current medication regimen is better for him. He denies medication side effects.  Pts friend is at bedside and is pleased that pt is receiving psychiatric services and knows that upon discharge pt requires continued assistance. Pt agrees and states that he wishes to f/u with outpt treatment.

## 2017-11-02 NOTE — PROGRESS NOTE ADULT - ASSESSMENT
COFFEE GROUND EMESIS , ? GASTRITIS  HB IS STABLE apprecciate GI will advance diet and if tolrates will dc on po abx with outpatient psych follow up
COFFEE GROUND EMESIS , ? GASTRITIS  HB IS STABLE
COFFEE GROUND EMESIS , ? GASTRITIS  HB IS STABLE

## 2017-11-02 NOTE — DISCHARGE NOTE ADULT - CARE PLAN
Principal Discharge DX:	Coffee ground emesis  Goal:	please follow up with GI for endoscopy  Instructions for follow-up, activity and diet:	please follow up with GI  Secondary Diagnosis:	Colitis  Goal:	please follow up with GI  Secondary Diagnosis:	Anxiety disorder, unspecified type  Goal:	patient has follow up with [facility]

## 2017-11-02 NOTE — PROGRESS NOTE BEHAVIORAL HEALTH - SUMMARY
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  He reports a history of anxiety, depression and no previous inpt psychiatric hospitalizations.  He presented to [facility] ER secondary to vomiting blood and was admitted due to coffee ground emesis. He reports that he has been suffering from anxiety/depression and has been lost to psychiatric f/u for > 1  year due to lapse in his insurance.  Pt is motivated to participate in outpt psychiatric treatment and pleased with current medication regimen of Lexapro 10 mg po qdaily and Klonopin 0.5 mg po bid.  Will continued. Outpt f/u being arranged.  At this time, pt does not present an acute danger to self/others and acute inpt psychiatric hospitalization is not warranted.

## 2017-11-02 NOTE — DISCHARGE NOTE ADULT - PATIENT PORTAL LINK FT
“You can access the Follow[facility] Patient Portal, offered by [facility], by registering with the following website: [email or web address]”

## 2017-11-02 NOTE — DISCHARGE NOTE ADULT - PLAN OF CARE
please follow up with GI for endoscopy please follow up with GI patient has follow up with [facility]

## 2017-11-02 NOTE — DISCHARGE NOTE ADULT - CARE PROVIDER_API CALL
Octvaio Kumar), Medicine  [address] [facility]  [address] [place] [address]  Phone: [phone]  Fax: [phone]

## 2017-11-02 NOTE — DISCHARGE NOTE ADULT - CARE PROVIDERS DIRECT ADDRESSES
[email or web address]

## 2017-11-02 NOTE — DISCHARGE NOTE ADULT - MEDICATION SUMMARY - MEDICATIONS TO TAKE
I will START or STAY ON the medications listed below when I get home from the hospital:    metroNIDAZOLE 500 mg oral tablet  -- 1 tab(s) by mouth every 8 hours  -- Indication: For Colitis    HYDROmorphone 2 mg oral tablet  -- 1 tab(s) by mouth every 6 hours, As needed, Moderate Pain (4 - 6) MDD:4 tabs daily  -- Indication: For Colitis    clonazePAM 0.5 mg oral tablet  -- 1 tab(s) by mouth 2 times a day MDD:2 tabs  -- Indication: For Anxiety disorder, unspecified type    Lexapro 5 mg oral tablet  -- 1 tab(s) by mouth once a day (at bedtime)   -- Indication: For Anxiety disorder, unspecified type    insulin glargine  -- Indication: For Controlled diabetes mellitus type 1 without complications    insulin lispro 100 units/mL subcutaneous solution  --  subcutaneous 3 times a day (before meals); 1 Unit(s) if Glucose 151 - 200  2 Unit(s) if Glucose 201 - 250  3 Unit(s) if Glucose 251 - 300  4 Unit(s) if Glucose 301 - 350  5 Unit(s) if Glucose 351 - 400  6 Unit(s) if Glucose Greater Than 400  -- Indication: For Controlled diabetes mellitus type 1 without complications    Atarax 50 mg oral tablet  -- orally 2 times a day  -- Indication: For itcching    pantoprazole 40 mg oral delayed release tablet  -- 1 tab(s) by mouth once a day (before a meal)  -- Indication: For stomach colitis    ciprofloxacin 500 mg oral tablet  -- 1 tab(s) by mouth every 12 hours  -- Indication: For Colitis

## 2017-11-02 NOTE — DISCHARGE NOTE ADULT - HOSPITAL COURSE
Chief Complaint/Reason for Admission: Abdominal pain and coffee ground vomitus today.	  History of Present Illness: 	  37 y/o Male PMH of DM1, gastritis, asthma, anxiety, s/p cholecystectomy presents to ED c/o abd pain & vomiting.  Pt states pain started earlier in the afternoon in LLQ, then became diffuse abd pain.  Pt states initially vomit NB/NB, then appeared reddish in color.  Denies fever, chills, ingestion of contaminated foods, he also has loose stools, did not see melena or blood.  Pt states symptoms similar to previous admission here, pt had pancolitis and DKA. No fever/chills, No photophobia/eye pain/changes in vision, No ear pain/sore throat/dysphagia, No chest pain/palpitations, no SOB/cough/wheeze/stridor, +abdominal pain, No neck/back pain, no rash,    < from: CT Abdomen and Pelvis w/ IV Cont (10.31.17 @ 02:47) >  IMPRESSION:   No obstruction. Normal appendix. Under distended colon which limits   evaluation of the wall thickness. Questionable pericolonic inflammatory   change. Please correlate clinically as colitis cannot be excluded.    Patient was seen by psychiatry for known psych history patient has follow up planned with appointment on Tuesday Novemeber 7 at 1 pm at [facility]    Patient had no further incidences of N/V? emesis   patient was discharged on PO flagyl and Cipro

## 2017-11-02 NOTE — PROGRESS NOTE ADULT - SUBJECTIVE AND OBJECTIVE BOX
Patient is a 36y old  Male who presents with a chief complaint of Abdominal pain and coffee ground vomitus today. (31 Oct 2017 06:20)      INTERVAL HPI/OVERNIGHT EVENTS: no acute events mild abdominal pain     MEDICATIONS  (STANDING):  ciprofloxacin   IVPB      ciprofloxacin   IVPB 400 milliGRAM(s) IV Intermittent every 12 hours  clonazePAM Tablet 0.5 milliGRAM(s) Oral two times a day  dextrose 5%. 1000 milliLiter(s) (50 mL/Hr) IV Continuous <Continuous>  dextrose 50% Injectable 12.5 Gram(s) IV Push once  dextrose 50% Injectable 25 Gram(s) IV Push once  dextrose 50% Injectable 25 Gram(s) IV Push once  docusate sodium 100 milliGRAM(s) Oral two times a day  escitalopram 10 milliGRAM(s) Oral daily  insulin glargine Injectable (LANTUS) 10 Unit(s) SubCutaneous every morning  insulin lispro (HumaLOG) corrective regimen sliding scale   SubCutaneous three times a day before meals  metroNIDAZOLE  IVPB      metroNIDAZOLE  IVPB 500 milliGRAM(s) IV Intermittent every 8 hours  pantoprazole    Tablet 40 milliGRAM(s) Oral before breakfast    MEDICATIONS  (PRN):  acetaminophen   Tablet. 650 milliGRAM(s) Oral every 6 hours PRN Moderate Pain (4 - 6)  ALBUTerol    90 MICROgram(s) HFA Inhaler 2 Puff(s) Inhalation every 6 hours PRN Shortness of Breath and/or Wheezing  bisacodyl 5 milliGRAM(s) Oral every 12 hours PRN Constipation  dextrose Gel 1 Dose(s) Oral once PRN Blood Glucose LESS THAN 70 milliGRAM(s)/deciliter  glucagon  Injectable 1 milliGRAM(s) IntraMuscular once PRN Glucose LESS THAN 70 milligrams/deciliter  HYDROmorphone  Injectable 2 milliGRAM(s) IV Push every 6 hours PRN Severe Pain (7 - 10)  hydrOXYzine hydrochloride 50 milliGRAM(s) Oral every 8 hours PRN Anxiety  ondansetron Injectable 4 milliGRAM(s) IV Push every 6 hours PRN Nausea and/or Vomiting      Allergies    No Known Allergies    Intolerances        REVIEW OF SYSTEMS:  CONSTITUTIONAL: No fever, weight loss, or fatigue  EYES: No eye pain, visual disturbances, or discharge  ENMT:  No difficulty hearing, tinnitus, vertigo; No sinus or throat pain  NECK: No pain or stiffness  BREASTS: No pain, masses, or nipple discharge  RESPIRATORY: No cough, wheezing, chills or hemoptysis; No shortness of breath  CARDIOVASCULAR: No chest pain, palpitations, dizziness, or leg swelling  GASTROINTESTINAL: No abdominal or epigastric pain. No nausea, vomiting, or hematemesis; No diarrhea or constipation. No melena or hematochezia.  GENITOURINARY: No dysuria, frequency, hematuria, or incontinence  NEUROLOGICAL: No headaches, memory loss, loss of strength, numbness, or tremors  SKIN: No itching, burning, rashes, or lesions   LYMPH NODES: No enlarged glands  ENDOCRINE: No heat or cold intolerance; No hair loss  MUSCULOSKELETAL: No joint pain or swelling; No muscle, back, or extremity pain  PSYCHIATRIC: No depression, anxiety, mood swings, or difficulty sleeping  HEME/LYMPH: No easy bruising, or bleeding gums  ALLERGY AND IMMUNOLOGIC: No hives or eczema    Vital Signs Last 24 Hrs  T(C): 36.6 (01 Nov 2017 17:32), Max: 37.6 (31 Oct 2017 22:35)  T(F): 97.8 (01 Nov 2017 17:32), Max: 99.6 (31 Oct 2017 22:35)  HR: 58 (01 Nov 2017 17:32) (58 - 72)  BP: 133/90 (01 Nov 2017 17:32) (116/75 - 133/90)  BP(mean): --  RR: 18 (01 Nov 2017 17:32) (16 - 18)  SpO2: 98% (01 Nov 2017 17:32) (95% - 98%)    PHYSICAL EXAM:  GENERAL: NAD, well-groomed, well-developed  HEAD:  Atraumatic, Normocephalic  EYES: EOMI, PERRLA, conjunctiva and sclera clear  ENMT: No tonsillar erythema, exudates, or enlargement; Moist mucous membranes, Good dentition, No lesions  NECK: Supple, No JVD, Normal thyroid  NERVOUS SYSTEM:  Alert & Oriented X3, Good concentration; Motor Strength 5/5 B/L upper and lower extremities; DTRs 2+ intact and symmetric  CHEST/LUNG: Clear to percussion bilaterally; No rales, rhonchi, wheezing, or rubs  HEART: Regular rate and rhythm; No murmurs, rubs, or gallops  ABDOMEN: Soft, Nontender, Nondistended; Bowel sounds present  EXTREMITIES:  2+ Peripheral Pulses, No clubbing, cyanosis, or edema  LYMPH: No lymphadenopathy noted  SKIN: No rashes or lesions    LABS:                        12.9   6.8   )-----------( 284      ( 01 Nov 2017 07:22 )             38.4     11-01    138  |  102  |  14  ----------------------------<  254<H>  3.7   |  28  |  0.94    Ca    8.3<L>      01 Nov 2017 07:22  Phos  2.6     11-01  Mg     2.0     11-01    TPro  8.4<H>  /  Alb  4.1  /  TBili  0.7  /  DBili  x   /  AST  26  /  ALT  47  /  AlkPhos  122<H>  10-31    PT/INR - ( 31 Oct 2017 01:20 )   PT: 10.2 sec;   INR: 0.94 ratio         PTT - ( 31 Oct 2017 01:20 )  PTT:27.7 sec    CAPILLARY BLOOD GLUCOSE      POCT Blood Glucose.: 219 mg/dL (01 Nov 2017 17:18)  POCT Blood Glucose.: 310 mg/dL (01 Nov 2017 12:36)  POCT Blood Glucose.: 234 mg/dL (01 Nov 2017 09:11)  POCT Blood Glucose.: 322 mg/dL (31 Oct 2017 20:12)      RADIOLOGY & ADDITIONAL TESTS:    Imaging Personally Reviewed:  [ ] YES  [ ] NO    Consultant(s) Notes Reviewed:  [ ] YES  [ ] NO    Care Discussed with Consultants/Other Providers [ ] YES  [ ] NO
Gastroenterology  Patient seen and examined bedside resting comfortably.  No complaints offered. No Abdominal pain  Denies nausea and vomiting. No emesis. Tolerating diet.  Normal flatus/BM.     T(F): 97.2 (11-01-17 @ 12:57), Max: 99.6 (10-31-17 @ 22:35)  HR: 64 (11-01-17 @ 12:57) (60 - 72)  BP: 130/79 (11-01-17 @ 12:57) (116/69 - 130/79)  RR: 16 (11-01-17 @ 12:57) (16 - 18)  SpO2: 98% (11-01-17 @ 12:57) (95% - 98%)  Wt(kg): --  CAPILLARY BLOOD GLUCOSE      POCT Blood Glucose.: 310 mg/dL (01 Nov 2017 12:36)  POCT Blood Glucose.: 234 mg/dL (01 Nov 2017 09:11)  POCT Blood Glucose.: 322 mg/dL (31 Oct 2017 20:12)  POCT Blood Glucose.: 54 mg/dL (31 Oct 2017 18:24)  POCT Blood Glucose.: 56 mg/dL (31 Oct 2017 18:24)      PHYSICAL EXAM:  General: NAD, WDWN.   Neuro:  Alert & responsive  HEENT: NCAT, EOMI, conjunctiva clear  CV: +S1+S2 regular rate and rhythm  Lung: clear to ausculation bilaterally, respirations nonlabored, good inspiratory effort  Abdomen: soft, Non tender, No Distension. Normal active BS  Extremities: no pedal edema or calf tenderness noted     LABS:                        12.9   6.8   )-----------( 284      ( 01 Nov 2017 07:22 )             38.4     11-01    138  |  102  |  14  ----------------------------<  254<H>  3.7   |  28  |  0.94    Ca    8.3<L>      01 Nov 2017 07:22  Phos  2.6     11-01  Mg     2.0     11-01    TPro  8.4<H>  /  Alb  4.1  /  TBili  0.7  /  DBili  x   /  AST  26  /  ALT  47  /  AlkPhos  122<H>  10-31    LIVER FUNCTIONS - ( 31 Oct 2017 01:20 )  Alb: 4.1 g/dL / Pro: 8.4 gm/dL / ALK PHOS: 122 U/L / ALT: 47 U/L / AST: 26 U/L / GGT: x           PT/INR - ( 31 Oct 2017 01:20 )   PT: 10.2 sec;   INR: 0.94 ratio         PTT - ( 31 Oct 2017 01:20 )  PTT:27.7 sec  I&O's Detail    31 Oct 2017 07:01  -  01 Nov 2017 07:00  --------------------------------------------------------  IN:    Oral Fluid: 240 mL  Total IN: 240 mL    OUT:    Voided: 2 mL  Total OUT: 2 mL    Total NET: 238 mL        11-01 @ 07:22    138 | 102 | 14  /8.3 | 2.0 | 2.6  _______________________/  3.7 | 28 | 0.94                           \par
Gastroenterology  Patient seen at 630 am and examined bedside resting comfortably.  No complaints offered.   No abdominal pain no active bleeding  Denies nausea and vomiting. Tolerating diet.  Normal flatus/BM.     T(F): 97 (11-02-17 @ 12:39), Max: 97.8 (11-01-17 @ 17:32)  HR: 62 (11-02-17 @ 12:39) (57 - 63)  BP: 141/84 (11-02-17 @ 12:39) (133/90 - 143/97)  RR: 16 (11-02-17 @ 12:39) (16 - 18)  SpO2: 98% (11-02-17 @ 12:39) (98% - 100%)  Wt(kg): --  CAPILLARY BLOOD GLUCOSE      POCT Blood Glucose.: 376 mg/dL (02 Nov 2017 12:58)  POCT Blood Glucose.: 114 mg/dL (02 Nov 2017 08:21)  POCT Blood Glucose.: 99 mg/dL (01 Nov 2017 21:18)  POCT Blood Glucose.: 219 mg/dL (01 Nov 2017 17:18)      PHYSICAL EXAM:  General: NAD, WDWN.   Neuro:  Alert & oriented x 3  HEENT: NCAT, EOMI, conjunctiva clear  CV: +S1+S2 regular rate and rhythm  Lung: clear to ausculation bilaterally, respirations nonlabored, good inspiratory effort  Abdomen: soft, NonTender, No distention Normal active BS  Extremities: no cyanosis, clubbing or edema    LABS:                        13.2   6.2   )-----------( 277      ( 02 Nov 2017 06:22 )             39.4     11-02    140  |  105  |  7   ----------------------------<  67<L>  3.5   |  26  |  0.78    Ca    8.5      02 Nov 2017 06:22  Phos  3.8     11-02  Mg     2.2     11-02          I&O's Detail    01 Nov 2017 07:01  -  02 Nov 2017 07:00  --------------------------------------------------------  IN:    Oral Fluid: 840 mL    Solution: 100 mL    Solution: 200 mL  Total IN: 1140 mL    OUT:  Total OUT: 0 mL    Total NET: 1140 mL      02 Nov 2017 07:01  -  02 Nov 2017 16:15  --------------------------------------------------------  IN:    Oral Fluid: 480 mL  Total IN: 480 mL    OUT:  Total OUT: 0 mL    Total NET: 480 mL        11-02 @ 06:22    140 | 105 | 7  /8.5 | 2.2 | 3.8  _______________________/  3.5 | 26 | 0.78                           \par

## 2017-11-02 NOTE — PROGRESS NOTE ADULT - PROBLEM SELECTOR PLAN 1
Advance to regular diet  HGB stable
Advance to regular diet  HGB stable
Advance to regular diet  HGB stable.   follow up as outpatient for EGD in future

## 2017-11-13 DIAGNOSIS — F33.1 MAJOR DEPRESSIVE DISORDER, RECURRENT, MODERATE: ICD-10-CM

## 2017-11-13 DIAGNOSIS — K25.9 GASTRIC ULCER, UNSPECIFIED AS ACUTE OR CHRONIC, WITHOUT HEMORRHAGE OR PERFORATION: ICD-10-CM

## 2017-11-13 DIAGNOSIS — K52.9 NONINFECTIVE GASTROENTERITIS AND COLITIS, UNSPECIFIED: ICD-10-CM

## 2017-11-13 DIAGNOSIS — J45.909 UNSPECIFIED ASTHMA, UNCOMPLICATED: ICD-10-CM

## 2017-11-13 DIAGNOSIS — E10.9 TYPE 1 DIABETES MELLITUS WITHOUT COMPLICATIONS: ICD-10-CM

## 2017-11-13 DIAGNOSIS — F41.9 ANXIETY DISORDER, UNSPECIFIED: ICD-10-CM

## 2017-11-13 DIAGNOSIS — Z72.0 TOBACCO USE: ICD-10-CM

## 2017-11-13 DIAGNOSIS — Z83.3 FAMILY HISTORY OF DIABETES MELLITUS: ICD-10-CM

## 2017-11-13 DIAGNOSIS — Z28.21 IMMUNIZATION NOT CARRIED OUT BECAUSE OF PATIENT REFUSAL: ICD-10-CM

## 2017-11-13 NOTE — CHART NOTE - NSCHARTNOTEFT_GEN_A_CORE
Patient with coffee ground emesis on admission previous history of gastritis etiology of emesis patient to follow up with GI for Endoscopy to confirm
Patient seen and examined recent psch history as well as colitis currently improved pain will advice diet  psych consult in am

## 2017-11-17 RX ORDER — ESCITALOPRAM OXALATE 10 MG/1
0 TABLET, FILM COATED ORAL
Qty: 0 | Refills: 0 | COMMUNITY

## 2017-12-12 ENCOUNTER — INPATIENT (INPATIENT)
Facility: HOSPITAL | Age: 37
LOS: 1 days | Discharge: LEFT BEFORE DISCHARGE | End: 2017-12-14
Attending: INTERNAL MEDICINE | Admitting: INTERNAL MEDICINE
Payer: MEDICAID

## 2017-12-12 VITALS
OXYGEN SATURATION: 96 % | TEMPERATURE: 99 F | SYSTOLIC BLOOD PRESSURE: 132 MMHG | HEART RATE: 101 BPM | RESPIRATION RATE: 20 BRPM | DIASTOLIC BLOOD PRESSURE: 80 MMHG | WEIGHT: 160.06 LBS

## 2017-12-12 DIAGNOSIS — Z90.49 ACQUIRED ABSENCE OF OTHER SPECIFIED PARTS OF DIGESTIVE TRACT: Chronic | ICD-10-CM

## 2017-12-12 LAB
ACETONE SERPL-MCNC: ABNORMAL
ALBUMIN SERPL ELPH-MCNC: 4.1 G/DL — SIGNIFICANT CHANGE UP (ref 3.3–5)
ALP SERPL-CCNC: 139 U/L — HIGH (ref 40–120)
ALT FLD-CCNC: 47 U/L — SIGNIFICANT CHANGE UP (ref 12–78)
AMYLASE P1 CFR SERPL: 68 U/L — SIGNIFICANT CHANGE UP (ref 25–115)
ANION GAP SERPL CALC-SCNC: 25 MMOL/L — HIGH (ref 5–17)
ANION GAP SERPL CALC-SCNC: 27 MMOL/L — HIGH (ref 5–17)
APPEARANCE UR: CLEAR — SIGNIFICANT CHANGE UP
AST SERPL-CCNC: 32 U/L — SIGNIFICANT CHANGE UP (ref 15–37)
BACTERIA # UR AUTO: ABNORMAL
BASE EXCESS BLDA CALC-SCNC: -14.7 MMOL/L — LOW (ref -2–2)
BASOPHILS # BLD AUTO: 0.1 K/UL — SIGNIFICANT CHANGE UP (ref 0–0.2)
BASOPHILS NFR BLD AUTO: 0.5 % — SIGNIFICANT CHANGE UP (ref 0–2)
BILIRUB SERPL-MCNC: 0.9 MG/DL — SIGNIFICANT CHANGE UP (ref 0.2–1.2)
BILIRUB UR-MCNC: NEGATIVE — SIGNIFICANT CHANGE UP
BLOOD GAS COMMENTS: SIGNIFICANT CHANGE UP
BLOOD GAS COMMENTS: SIGNIFICANT CHANGE UP
BLOOD GAS SOURCE: SIGNIFICANT CHANGE UP
BUN SERPL-MCNC: 37 MG/DL — HIGH (ref 7–23)
BUN SERPL-MCNC: 37 MG/DL — HIGH (ref 7–23)
CALCIUM SERPL-MCNC: 7.8 MG/DL — LOW (ref 8.5–10.1)
CALCIUM SERPL-MCNC: 9.2 MG/DL — SIGNIFICANT CHANGE UP (ref 8.5–10.1)
CHLORIDE SERPL-SCNC: 102 MMOL/L — SIGNIFICANT CHANGE UP (ref 96–108)
CHLORIDE SERPL-SCNC: 90 MMOL/L — LOW (ref 96–108)
CK MB BLD-MCNC: 0.9 % — SIGNIFICANT CHANGE UP (ref 0–3.5)
CK MB CFR SERPL CALC: 1 NG/ML — SIGNIFICANT CHANGE UP (ref 0.5–3.6)
CK SERPL-CCNC: 112 U/L — SIGNIFICANT CHANGE UP (ref 26–308)
CO2 SERPL-SCNC: 10 MMOL/L — CRITICAL LOW (ref 22–31)
CO2 SERPL-SCNC: 14 MMOL/L — LOW (ref 22–31)
COLOR SPEC: YELLOW — SIGNIFICANT CHANGE UP
CREAT SERPL-MCNC: 1.42 MG/DL — HIGH (ref 0.5–1.3)
CREAT SERPL-MCNC: 1.53 MG/DL — HIGH (ref 0.5–1.3)
DIFF PNL FLD: ABNORMAL
EOSINOPHIL # BLD AUTO: 0 K/UL — SIGNIFICANT CHANGE UP (ref 0–0.5)
EOSINOPHIL NFR BLD AUTO: 0 % — SIGNIFICANT CHANGE UP (ref 0–6)
EPI CELLS # UR: SIGNIFICANT CHANGE UP
GLUCOSE BLDC GLUCOMTR-MCNC: 401 MG/DL — HIGH (ref 70–99)
GLUCOSE BLDC GLUCOMTR-MCNC: 416 MG/DL — HIGH (ref 70–99)
GLUCOSE SERPL-MCNC: 357 MG/DL — HIGH (ref 70–99)
GLUCOSE SERPL-MCNC: 443 MG/DL — HIGH (ref 70–99)
GLUCOSE UR QL: 1000 MG/DL
HCO3 BLDA-SCNC: 11 MMOL/L — LOW (ref 21–29)
HCT VFR BLD CALC: 50.5 % — HIGH (ref 39–50)
HGB BLD-MCNC: 16.3 G/DL — SIGNIFICANT CHANGE UP (ref 13–17)
HOROWITZ INDEX BLDA+IHG-RTO: 21 — SIGNIFICANT CHANGE UP
KETONES UR-MCNC: ABNORMAL
LACTATE SERPL-SCNC: 2.2 MMOL/L — HIGH (ref 0.7–2)
LACTATE SERPL-SCNC: 2.3 MMOL/L — HIGH (ref 0.7–2)
LEUKOCYTE ESTERASE UR-ACNC: NEGATIVE — SIGNIFICANT CHANGE UP
LIDOCAIN IGE QN: 46 U/L — LOW (ref 73–393)
LYMPHOCYTES # BLD AUTO: 1.3 K/UL — SIGNIFICANT CHANGE UP (ref 1–3.3)
LYMPHOCYTES # BLD AUTO: 11.2 % — LOW (ref 13–44)
MCHC RBC-ENTMCNC: 28.4 PG — SIGNIFICANT CHANGE UP (ref 27–34)
MCHC RBC-ENTMCNC: 32.3 GM/DL — SIGNIFICANT CHANGE UP (ref 32–36)
MCV RBC AUTO: 88 FL — SIGNIFICANT CHANGE UP (ref 80–100)
MONOCYTES # BLD AUTO: 0.2 K/UL — SIGNIFICANT CHANGE UP (ref 0–0.9)
MONOCYTES NFR BLD AUTO: 2.1 % — SIGNIFICANT CHANGE UP (ref 2–14)
NEUTROPHILS # BLD AUTO: 10 K/UL — HIGH (ref 1.8–7.4)
NEUTROPHILS NFR BLD AUTO: 86.2 % — HIGH (ref 43–77)
NITRITE UR-MCNC: NEGATIVE — SIGNIFICANT CHANGE UP
PCO2 BLDA: 26 MMHG — LOW (ref 32–46)
PH BLD: 7.25 — LOW (ref 7.35–7.45)
PH UR: 5 — SIGNIFICANT CHANGE UP (ref 5–8)
PLATELET # BLD AUTO: 401 K/UL — HIGH (ref 150–400)
PO2 BLDA: 87 MMHG — SIGNIFICANT CHANGE UP (ref 74–108)
POTASSIUM SERPL-MCNC: 4.4 MMOL/L — SIGNIFICANT CHANGE UP (ref 3.5–5.3)
POTASSIUM SERPL-MCNC: 4.9 MMOL/L — SIGNIFICANT CHANGE UP (ref 3.5–5.3)
POTASSIUM SERPL-SCNC: 4.4 MMOL/L — SIGNIFICANT CHANGE UP (ref 3.5–5.3)
POTASSIUM SERPL-SCNC: 4.9 MMOL/L — SIGNIFICANT CHANGE UP (ref 3.5–5.3)
PROT SERPL-MCNC: 8.5 GM/DL — HIGH (ref 6–8.3)
PROT UR-MCNC: 15 MG/DL
RBC # BLD: 5.74 M/UL — SIGNIFICANT CHANGE UP (ref 4.2–5.8)
RBC # FLD: 12.2 % — SIGNIFICANT CHANGE UP (ref 11–15)
RBC CASTS # UR COMP ASSIST: SIGNIFICANT CHANGE UP /HPF (ref 0–4)
SAO2 % BLDA: 95 % — SIGNIFICANT CHANGE UP (ref 92–96)
SODIUM SERPL-SCNC: 131 MMOL/L — LOW (ref 135–145)
SODIUM SERPL-SCNC: 137 MMOL/L — SIGNIFICANT CHANGE UP (ref 135–145)
SP GR SPEC: 1.02 — SIGNIFICANT CHANGE UP (ref 1.01–1.02)
TROPONIN I SERPL-MCNC: <.015 NG/ML — SIGNIFICANT CHANGE UP (ref 0.01–0.04)
UROBILINOGEN FLD QL: NEGATIVE MG/DL — SIGNIFICANT CHANGE UP
WBC # BLD: 11.6 K/UL — HIGH (ref 3.8–10.5)
WBC # FLD AUTO: 11.6 K/UL — HIGH (ref 3.8–10.5)
WBC UR QL: SIGNIFICANT CHANGE UP

## 2017-12-12 PROCEDURE — 74177 CT ABD & PELVIS W/CONTRAST: CPT | Mod: 26

## 2017-12-12 PROCEDURE — 71010: CPT | Mod: 26

## 2017-12-12 PROCEDURE — 99223 1ST HOSP IP/OBS HIGH 75: CPT

## 2017-12-12 PROCEDURE — 99285 EMERGENCY DEPT VISIT HI MDM: CPT

## 2017-12-12 RX ORDER — SODIUM CHLORIDE 9 MG/ML
1000 INJECTION INTRAMUSCULAR; INTRAVENOUS; SUBCUTANEOUS ONCE
Qty: 0 | Refills: 0 | Status: COMPLETED | OUTPATIENT
Start: 2017-12-12 | End: 2017-12-12

## 2017-12-12 RX ORDER — INSULIN HUMAN 100 [IU]/ML
5 INJECTION, SOLUTION SUBCUTANEOUS
Qty: 100 | Refills: 0 | Status: DISCONTINUED | OUTPATIENT
Start: 2017-12-12 | End: 2017-12-12

## 2017-12-12 RX ORDER — IOHEXOL 300 MG/ML
30 INJECTION, SOLUTION INTRAVENOUS ONCE
Qty: 0 | Refills: 0 | Status: COMPLETED | OUTPATIENT
Start: 2017-12-12 | End: 2017-12-12

## 2017-12-12 RX ORDER — FAMOTIDINE 10 MG/ML
20 INJECTION INTRAVENOUS ONCE
Qty: 0 | Refills: 0 | Status: COMPLETED | OUTPATIENT
Start: 2017-12-12 | End: 2017-12-12

## 2017-12-12 RX ORDER — HEPARIN SODIUM 5000 [USP'U]/ML
5000 INJECTION INTRAVENOUS; SUBCUTANEOUS EVERY 12 HOURS
Qty: 0 | Refills: 0 | Status: DISCONTINUED | OUTPATIENT
Start: 2017-12-12 | End: 2017-12-14

## 2017-12-12 RX ORDER — SODIUM CHLORIDE 9 MG/ML
1000 INJECTION, SOLUTION INTRAVENOUS
Qty: 0 | Refills: 0 | Status: DISCONTINUED | OUTPATIENT
Start: 2017-12-12 | End: 2017-12-13

## 2017-12-12 RX ORDER — ONDANSETRON 8 MG/1
4 TABLET, FILM COATED ORAL ONCE
Qty: 0 | Refills: 0 | Status: COMPLETED | OUTPATIENT
Start: 2017-12-12 | End: 2017-12-12

## 2017-12-12 RX ORDER — INSULIN HUMAN 100 [IU]/ML
5 INJECTION, SOLUTION SUBCUTANEOUS
Qty: 100 | Refills: 0 | Status: DISCONTINUED | OUTPATIENT
Start: 2017-12-12 | End: 2017-12-14

## 2017-12-12 RX ORDER — INSULIN HUMAN 100 [IU]/ML
10 INJECTION, SOLUTION SUBCUTANEOUS ONCE
Qty: 0 | Refills: 0 | Status: COMPLETED | OUTPATIENT
Start: 2017-12-12 | End: 2017-12-12

## 2017-12-12 RX ORDER — DEXTROSE 50 % IN WATER 50 %
50 SYRINGE (ML) INTRAVENOUS
Qty: 0 | Refills: 0 | Status: DISCONTINUED | OUTPATIENT
Start: 2017-12-12 | End: 2017-12-14

## 2017-12-12 RX ORDER — INSULIN HUMAN 100 [IU]/ML
7 INJECTION, SOLUTION SUBCUTANEOUS
Qty: 100 | Refills: 0 | Status: DISCONTINUED | OUTPATIENT
Start: 2017-12-12 | End: 2017-12-12

## 2017-12-12 RX ORDER — HYDROMORPHONE HYDROCHLORIDE 2 MG/ML
1 INJECTION INTRAMUSCULAR; INTRAVENOUS; SUBCUTANEOUS ONCE
Qty: 0 | Refills: 0 | Status: DISCONTINUED | OUTPATIENT
Start: 2017-12-12 | End: 2017-12-12

## 2017-12-12 RX ORDER — CHLORHEXIDINE GLUCONATE 213 G/1000ML
1 SOLUTION TOPICAL DAILY
Qty: 0 | Refills: 0 | Status: DISCONTINUED | OUTPATIENT
Start: 2017-12-12 | End: 2017-12-14

## 2017-12-12 RX ADMIN — HYDROMORPHONE HYDROCHLORIDE 1 MILLIGRAM(S): 2 INJECTION INTRAMUSCULAR; INTRAVENOUS; SUBCUTANEOUS at 20:47

## 2017-12-12 RX ADMIN — SODIUM CHLORIDE 33.33 MILLILITER(S): 9 INJECTION INTRAMUSCULAR; INTRAVENOUS; SUBCUTANEOUS at 19:09

## 2017-12-12 RX ADMIN — SODIUM CHLORIDE 2000 MILLILITER(S): 9 INJECTION INTRAMUSCULAR; INTRAVENOUS; SUBCUTANEOUS at 19:10

## 2017-12-12 RX ADMIN — SODIUM CHLORIDE 100 MILLILITER(S): 9 INJECTION, SOLUTION INTRAVENOUS at 23:29

## 2017-12-12 RX ADMIN — SODIUM CHLORIDE 2000 MILLILITER(S): 9 INJECTION INTRAMUSCULAR; INTRAVENOUS; SUBCUTANEOUS at 20:15

## 2017-12-12 RX ADMIN — HYDROMORPHONE HYDROCHLORIDE 1 MILLIGRAM(S): 2 INJECTION INTRAMUSCULAR; INTRAVENOUS; SUBCUTANEOUS at 19:33

## 2017-12-12 RX ADMIN — FAMOTIDINE 20 MILLIGRAM(S): 10 INJECTION INTRAVENOUS at 19:10

## 2017-12-12 RX ADMIN — INSULIN HUMAN 5 UNIT(S)/HR: 100 INJECTION, SOLUTION SUBCUTANEOUS at 21:55

## 2017-12-12 RX ADMIN — IOHEXOL 30 MILLILITER(S): 300 INJECTION, SOLUTION INTRAVENOUS at 19:21

## 2017-12-12 RX ADMIN — ONDANSETRON 4 MILLIGRAM(S): 8 TABLET, FILM COATED ORAL at 19:10

## 2017-12-12 RX ADMIN — INSULIN HUMAN 10 UNIT(S): 100 INJECTION, SOLUTION SUBCUTANEOUS at 21:30

## 2017-12-12 NOTE — ED PROVIDER NOTE - CARE PLAN
Principal Discharge DX:	Abdominal pain, acute  Secondary Diagnosis:	Hyperglycemia Principal Discharge DX:	DKA (diabetic ketoacidoses)  Secondary Diagnosis:	Hyperglycemia

## 2017-12-12 NOTE — ED PROVIDER NOTE - ABDOMINAL TENDER
right upper quadrant/left upper quadrant/left lower quadrant/right lower quadrant/epigastric/periumbilical

## 2017-12-12 NOTE — ED PROVIDER NOTE - PROGRESS NOTE DETAILS
icu attending aware Patient signed out by Dr. Calderón pending CT, ICU consult, EKG and ABG. Labs and imaging reviewed. ICU consult appreciated. Patient given ivfs and insulin. He is now admitted to ICU for further management.

## 2017-12-12 NOTE — H&P ADULT - NSHPLABSRESULTS_GEN_ALL_CORE
Lab Results:  CBC  CBC Full  -  ( 12 Dec 2017 18:50 )  WBC Count : 11.6 K/uL  Hemoglobin : 16.3 g/dL  Hematocrit : 50.5 %  Platelet Count - Automated : 401 K/uL  Mean Cell Volume : 88.0 fl  Mean Cell Hemoglobin : 28.4 pg  Mean Cell Hemoglobin Concentration : 32.3 gm/dL  Auto Neutrophil # : 10.0 K/uL  Auto Lymphocyte # : 1.3 K/uL  Auto Monocyte # : 0.2 K/uL  Auto Eosinophil # : 0.0 K/uL  Auto Basophil # : 0.1 K/uL  Auto Neutrophil % : 86.2 %  Auto Lymphocyte % : 11.2 %  Auto Monocyte % : 2.1 %  Auto Eosinophil % : 0.0 %  Auto Basophil % : 0.5 %    .		Differential:	[] Automated		[] Manual  Chemistry                        16.3   11.6  )-----------( 401      ( 12 Dec 2017 18:50 )             50.5     12-12    131<L>  |  90<L>  |  37<H>  ----------------------------<  443<H>  4.9   |  14<L>  |  1.53<H>    Ca    9.2      12 Dec 2017 19:13    TPro  8.5<H>  /  Alb  4.1  /  TBili  0.9  /  DBili  x   /  AST  32  /  ALT  47  /  AlkPhos  139<H>  12-12    LIVER FUNCTIONS - ( 12 Dec 2017 19:13 )  Alb: 4.1 g/dL / Pro: 8.5 gm/dL / ALK PHOS: 139 U/L / ALT: 47 U/L / AST: 32 U/L / GGT: x             Urinalysis Basic - ( 12 Dec 2017 18:50 )    Color: Yellow / Appearance: Clear / S.025 / pH: x  Gluc: x / Ketone: Large  / Bili: Negative / Urobili: Negative mg/dL   Blood: x / Protein: 15 mg/dL / Nitrite: Negative   Leuk Esterase: Negative / RBC: 0-2 /HPF / WBC 0-2   Sq Epi: x / Non Sq Epi: Occasional / Bacteria: Occasional        ABG - ( 12 Dec 2017 20:17 )  pH: x     /  pCO2: 26    /  pO2: 87    / HCO3: 11    / Base Excess: -14.7 /  SaO2: 95            MICROBIOLOGY/CULTURES:      RADIOLOGY RESULTS:  < from: CT Abdomen and Pelvis w/ Oral Cont and w/ IV Cont (17 @ 20:35) >    IMPRESSION: No acute abdominal pathology. No evidence ofbowel   obstruction.    < end of copied text >

## 2017-12-12 NOTE — H&P ADULT - FAMILY HISTORY
Father  Still living? Unknown  Family history of diabetes mellitus, Age at diagnosis: Age Unknown  Family history of diabetes mellitus (DM), Age at diagnosis: Age Unknown     Mother  Still living? Yes, Estimated age: Age Unknown  Family history of diabetes mellitus, Age at diagnosis: Age Unknown  Family history of diabetes mellitus (DM), Age at diagnosis: Age Unknown     Grandparent  Still living? Unknown  Family history of diabetes mellitus (DM), Age at diagnosis: Age Unknown

## 2017-12-12 NOTE — ED PROVIDER NOTE - PMH
Anxiety    Asthma    Controlled diabetes mellitus type 1 without complications    DM type 1 (diabetes mellitus, type 1)    Gastritis    Gastritis, presence of bleeding unspecified, unspecified chronicity, unspecified gastritis type    Uncomplicated asthma, unspecified asthma severity, unspecified whether persistent

## 2017-12-12 NOTE — H&P ADULT - ATTENDING COMMENTS
I have seen and examined the patient. I agree with the above history, physical exam, and plan of care except for as detailed below.    36 y/o M w/T1Dm presenting with DKA. Unclear trigger. Patient reportedly compliant with insulin. Abdominal CT without any clear infection. CXR ? LLL infiltrate.    Insulin gtt, trend chem, D5 1/2NS w/K. Lantus once gap is closed. Empiric coverage with ceftriaxone and azithro. I have seen and examined the patient. I agree with the above history, physical exam, and plan of care except for as detailed below.    38 y/o M w/T1Dm presenting with DKA. Unclear trigger. Patient reportedly compliant with insulin. Abdominal CT without any clear infection. CXR ? LLL infiltrate. MAYRA likely prerenal in setting of DKA.    Insulin gtt, trend chem, D5 1/2NS w/K. Lantus once gap is closed. Empiric coverage with ceftriaxone and azithro.

## 2017-12-12 NOTE — H&P ADULT - HISTORY OF PRESENT ILLNESS
38 yo M PMH DMT1 (lantus and novalog), asthma, anxiety, recently dx with colitis p/w abdominal pain, nausea, vomiting, and diarrhea, today. Patient states he has difficulty breathing. Patient denies fever, chills, chest pain, cough, recent travel. Patient states he took insulin yesterday. Pt found to

## 2017-12-12 NOTE — ED ADULT NURSE NOTE - ED STAT RN HANDOFF DETAILS
Pt stable, not in any distress, vss, re-eval by md , discharged home , left ed in a stable condition

## 2017-12-12 NOTE — ED PROVIDER NOTE - OBJECTIVE STATEMENT
38 yo male  with history of insulin dependent diabetes presents with abdominal pain, nausea, vomiting, diarrhea starting today. Patient states he has difficulty breathing. Patient denies fever, chills, chest pain, cough, recent travel. Patient states he took insulin yesterday.

## 2017-12-12 NOTE — H&P ADULT - NSHPPHYSICALEXAM_GEN_ALL_CORE
GENERAL: NAD, well-groomed, well-developed  HEAD:  Atraumatic, Normocephalic  EYES: EOMI, PERRLA, conjunctiva and sclera clear  ENMT: No tonsillar erythema, exudates, or enlargement; Moist mucous membranes, Good dentition, No lesions  NECK: Supple, No JVD, Normal thyroid  NERVOUS SYSTEM:  Alert & Oriented X3, Good concentration; Motor Strength 5/5 B/L upper and lower extremities; DTRs 2+ intact and symmetric  CHEST/LUNG: Clear to percussion bilaterally; No rales, rhonchi, wheezing, or rubs  HEART: Regular rate and rhythm; No murmurs, rubs, or gallops  ABDOMEN: Soft, Nontender, Nondistended; Bowel sounds present  EXTREMITIES:  2+ Peripheral Pulses, No clubbing, cyanosis, or edema  LYMPH: No lymphadenopathy noted  SKIN: No rashes or lesions

## 2017-12-13 DIAGNOSIS — E10.9 TYPE 1 DIABETES MELLITUS WITHOUT COMPLICATIONS: ICD-10-CM

## 2017-12-13 LAB
ANION GAP SERPL CALC-SCNC: 10 MMOL/L — SIGNIFICANT CHANGE UP (ref 5–17)
ANION GAP SERPL CALC-SCNC: 16 MMOL/L — SIGNIFICANT CHANGE UP (ref 5–17)
ANION GAP SERPL CALC-SCNC: 9 MMOL/L — SIGNIFICANT CHANGE UP (ref 5–17)
BUN SERPL-MCNC: 26 MG/DL — HIGH (ref 7–23)
BUN SERPL-MCNC: 29 MG/DL — HIGH (ref 7–23)
BUN SERPL-MCNC: 33 MG/DL — HIGH (ref 7–23)
CALCIUM SERPL-MCNC: 7.9 MG/DL — LOW (ref 8.5–10.1)
CALCIUM SERPL-MCNC: 8 MG/DL — LOW (ref 8.5–10.1)
CALCIUM SERPL-MCNC: 8.1 MG/DL — LOW (ref 8.5–10.1)
CHLORIDE SERPL-SCNC: 104 MMOL/L — SIGNIFICANT CHANGE UP (ref 96–108)
CHLORIDE SERPL-SCNC: 106 MMOL/L — SIGNIFICANT CHANGE UP (ref 96–108)
CHLORIDE SERPL-SCNC: 107 MMOL/L — SIGNIFICANT CHANGE UP (ref 96–108)
CO2 SERPL-SCNC: 18 MMOL/L — LOW (ref 22–31)
CO2 SERPL-SCNC: 23 MMOL/L — SIGNIFICANT CHANGE UP (ref 22–31)
CO2 SERPL-SCNC: 23 MMOL/L — SIGNIFICANT CHANGE UP (ref 22–31)
CREAT SERPL-MCNC: 1.19 MG/DL — SIGNIFICANT CHANGE UP (ref 0.5–1.3)
CREAT SERPL-MCNC: 1.36 MG/DL — HIGH (ref 0.5–1.3)
CREAT SERPL-MCNC: 1.52 MG/DL — HIGH (ref 0.5–1.3)
GLUCOSE BLDC GLUCOMTR-MCNC: 267 MG/DL — HIGH (ref 70–99)
GLUCOSE BLDC GLUCOMTR-MCNC: 268 MG/DL — HIGH (ref 70–99)
GLUCOSE BLDC GLUCOMTR-MCNC: 269 MG/DL — HIGH (ref 70–99)
GLUCOSE SERPL-MCNC: 162 MG/DL — HIGH (ref 70–99)
GLUCOSE SERPL-MCNC: 191 MG/DL — HIGH (ref 70–99)
GLUCOSE SERPL-MCNC: 255 MG/DL — HIGH (ref 70–99)
HBA1C BLD-MCNC: 10.7 % — HIGH (ref 4–5.6)
MAGNESIUM SERPL-MCNC: 2.2 MG/DL — SIGNIFICANT CHANGE UP (ref 1.6–2.6)
MAGNESIUM SERPL-MCNC: 2.4 MG/DL — SIGNIFICANT CHANGE UP (ref 1.6–2.6)
PHOSPHATE SERPL-MCNC: 2.7 MG/DL — SIGNIFICANT CHANGE UP (ref 2.5–4.5)
PHOSPHATE SERPL-MCNC: 3.3 MG/DL — SIGNIFICANT CHANGE UP (ref 2.5–4.5)
POTASSIUM SERPL-MCNC: 4 MMOL/L — SIGNIFICANT CHANGE UP (ref 3.5–5.3)
POTASSIUM SERPL-MCNC: 4.5 MMOL/L — SIGNIFICANT CHANGE UP (ref 3.5–5.3)
POTASSIUM SERPL-MCNC: 4.7 MMOL/L — SIGNIFICANT CHANGE UP (ref 3.5–5.3)
POTASSIUM SERPL-SCNC: 4 MMOL/L — SIGNIFICANT CHANGE UP (ref 3.5–5.3)
POTASSIUM SERPL-SCNC: 4.5 MMOL/L — SIGNIFICANT CHANGE UP (ref 3.5–5.3)
POTASSIUM SERPL-SCNC: 4.7 MMOL/L — SIGNIFICANT CHANGE UP (ref 3.5–5.3)
PROCALCITONIN SERPL-MCNC: 0.05 NG/ML — HIGH (ref 0–0.04)
SODIUM SERPL-SCNC: 138 MMOL/L — SIGNIFICANT CHANGE UP (ref 135–145)
SODIUM SERPL-SCNC: 138 MMOL/L — SIGNIFICANT CHANGE UP (ref 135–145)
SODIUM SERPL-SCNC: 140 MMOL/L — SIGNIFICANT CHANGE UP (ref 135–145)

## 2017-12-13 PROCEDURE — 99233 SBSQ HOSP IP/OBS HIGH 50: CPT | Mod: GC

## 2017-12-13 RX ORDER — CLONAZEPAM 1 MG
0.5 TABLET ORAL DAILY
Qty: 0 | Refills: 0 | Status: DISCONTINUED | OUTPATIENT
Start: 2017-12-13 | End: 2017-12-14

## 2017-12-13 RX ORDER — HYDROMORPHONE HYDROCHLORIDE 2 MG/ML
1 INJECTION INTRAMUSCULAR; INTRAVENOUS; SUBCUTANEOUS ONCE
Qty: 0 | Refills: 0 | Status: DISCONTINUED | OUTPATIENT
Start: 2017-12-13 | End: 2017-12-13

## 2017-12-13 RX ORDER — INSULIN LISPRO 100/ML
VIAL (ML) SUBCUTANEOUS
Qty: 0 | Refills: 0 | Status: DISCONTINUED | OUTPATIENT
Start: 2017-12-13 | End: 2017-12-14

## 2017-12-13 RX ORDER — INSULIN GLARGINE 100 [IU]/ML
20 INJECTION, SOLUTION SUBCUTANEOUS EVERY MORNING
Qty: 0 | Refills: 0 | Status: DISCONTINUED | OUTPATIENT
Start: 2017-12-13 | End: 2017-12-14

## 2017-12-13 RX ORDER — ESCITALOPRAM OXALATE 10 MG/1
5 TABLET, FILM COATED ORAL DAILY
Qty: 0 | Refills: 0 | Status: DISCONTINUED | OUTPATIENT
Start: 2017-12-13 | End: 2017-12-14

## 2017-12-13 RX ORDER — INSULIN LISPRO 100/ML
VIAL (ML) SUBCUTANEOUS AT BEDTIME
Qty: 0 | Refills: 0 | Status: DISCONTINUED | OUTPATIENT
Start: 2017-12-13 | End: 2017-12-14

## 2017-12-13 RX ORDER — HYDROXYZINE HCL 10 MG
0 TABLET ORAL
Qty: 0 | Refills: 0 | COMMUNITY

## 2017-12-13 RX ORDER — ACETAMINOPHEN 500 MG
650 TABLET ORAL EVERY 6 HOURS
Qty: 0 | Refills: 0 | Status: DISCONTINUED | OUTPATIENT
Start: 2017-12-13 | End: 2017-12-14

## 2017-12-13 RX ADMIN — HYDROMORPHONE HYDROCHLORIDE 1 MILLIGRAM(S): 2 INJECTION INTRAMUSCULAR; INTRAVENOUS; SUBCUTANEOUS at 02:29

## 2017-12-13 RX ADMIN — Medication 6: at 17:23

## 2017-12-13 RX ADMIN — HYDROMORPHONE HYDROCHLORIDE 1 MILLIGRAM(S): 2 INJECTION INTRAMUSCULAR; INTRAVENOUS; SUBCUTANEOUS at 21:15

## 2017-12-13 RX ADMIN — HYDROMORPHONE HYDROCHLORIDE 1 MILLIGRAM(S): 2 INJECTION INTRAMUSCULAR; INTRAVENOUS; SUBCUTANEOUS at 10:46

## 2017-12-13 RX ADMIN — CHLORHEXIDINE GLUCONATE 1 APPLICATION(S): 213 SOLUTION TOPICAL at 12:01

## 2017-12-13 RX ADMIN — ESCITALOPRAM OXALATE 5 MILLIGRAM(S): 10 TABLET, FILM COATED ORAL at 12:26

## 2017-12-13 RX ADMIN — HYDROMORPHONE HYDROCHLORIDE 1 MILLIGRAM(S): 2 INJECTION INTRAMUSCULAR; INTRAVENOUS; SUBCUTANEOUS at 15:06

## 2017-12-13 RX ADMIN — HYDROMORPHONE HYDROCHLORIDE 1 MILLIGRAM(S): 2 INJECTION INTRAMUSCULAR; INTRAVENOUS; SUBCUTANEOUS at 20:28

## 2017-12-13 RX ADMIN — HYDROMORPHONE HYDROCHLORIDE 1 MILLIGRAM(S): 2 INJECTION INTRAMUSCULAR; INTRAVENOUS; SUBCUTANEOUS at 03:00

## 2017-12-13 RX ADMIN — Medication 1: at 21:23

## 2017-12-13 RX ADMIN — INSULIN GLARGINE 20 UNIT(S): 100 INJECTION, SOLUTION SUBCUTANEOUS at 09:00

## 2017-12-13 RX ADMIN — HEPARIN SODIUM 5000 UNIT(S): 5000 INJECTION INTRAVENOUS; SUBCUTANEOUS at 06:34

## 2017-12-13 RX ADMIN — Medication 0.5 MILLIGRAM(S): at 12:00

## 2017-12-13 RX ADMIN — Medication 2: at 12:00

## 2017-12-13 RX ADMIN — HYDROMORPHONE HYDROCHLORIDE 1 MILLIGRAM(S): 2 INJECTION INTRAMUSCULAR; INTRAVENOUS; SUBCUTANEOUS at 15:26

## 2017-12-13 RX ADMIN — HYDROMORPHONE HYDROCHLORIDE 1 MILLIGRAM(S): 2 INJECTION INTRAMUSCULAR; INTRAVENOUS; SUBCUTANEOUS at 11:45

## 2017-12-13 NOTE — CONSULT NOTE ADULT - SUBJECTIVE AND OBJECTIVE BOX
Patient is a 37y old  Male who presents with a chief complaint of DKA (12 Dec 2017 22:42)      Reason For Consult:  Diabetic Ketoacidosis     HPI:  36 yo M PMH DMT1 (lantus and novalog), asthma, anxiety, recently dx with colitis p/w abdominal pain, nausea, vomiting, and diarrhea, today. Patient states he has difficulty breathing. Patient denies fever, chills, chest pain, cough, recent travel. Patient states he took insulin yesterday. Pt found to (12 Dec 2017 22:42)  recurrent episodes of Diabetic Ketoacidosis with hospitalization   currently Diabetic Ketoacidosis resolved     PAST MEDICAL & SURGICAL HISTORY:  Uncomplicated asthma, unspecified asthma severity, unspecified whether persistent  Gastritis, presence of bleeding unspecified, unspecified chronicity, unspecified gastritis type  Controlled diabetes mellitus type 1 without complications  Anxiety  Gastritis  Asthma  DM type 1 (diabetes mellitus, type 1)  History of cholecystectomy  S/P cholecystectomy      FAMILY HISTORY:  Family history of diabetes mellitus (DM) (Father, Mother, Grandparent)  Family history of diabetes mellitus (Father, Mother)        Social History:    MEDICATIONS  (STANDING):  chlorhexidine 4% Liquid 1 Application(s) Topical daily  clonazePAM Tablet 0.5 milliGRAM(s) Oral daily  dextrose 50% Injectable 50 milliLiter(s) IV Push every 15 minutes  escitalopram 5 milliGRAM(s) Oral daily  heparin  Injectable 5000 Unit(s) SubCutaneous every 12 hours  insulin glargine Injectable (LANTUS) 20 Unit(s) SubCutaneous every morning  insulin Infusion 5 Unit(s)/Hr (5 mL/Hr) IV Continuous <Continuous>  insulin lispro (HumaLOG) corrective regimen sliding scale   SubCutaneous three times a day before meals  insulin lispro (HumaLOG) corrective regimen sliding scale   SubCutaneous at bedtime    MEDICATIONS  (PRN):  acetaminophen   Tablet. 650 milliGRAM(s) Oral every 6 hours PRN Moderate Pain (4 - 6)      REVIEW OF SYSTEMS:  CONSTITUTIONAL:  as per HPI  HEENT:  Eyes:  No diplopia or blurred vision. ENT:  No earache, sore throat or runny nose.  CARDIOVASCULAR:  No pressure, squeezing, strangling, tightness, heaviness or aching about the chest, neck, axilla or epigastrium.  RESPIRATORY:  No cough, shortness of breath, PND or orthopnea.  GASTROINTESTINAL:  No nausea, vomiting or diarrhea.  GENITOURINARY:  No dysuria, frequency or urgency. No Blood in urine  MUSCULOSKELETAL:  no joint aches, no muscle pain, myalgia  SKIN:  No change in skin, hair or nails.  NEUROLOGIC:  No paresthesias, fasciculations, seizures or weakness.  PSYCHIATRIC:  No disorder of thought or mood.  ENDOCRINE:  No heat or cold intolerance, polyuria or polydipsia. abnormal weight gain or loss, oral thrush  HEMATOLOGICAL:  No easy bruising or bleeding.     T(C): 36.2 (12-13-17 @ 19:18), Max: 37.1 (12-13-17 @ 06:00)  HR: 83 (12-13-17 @ 19:18) (72 - 93)  BP: 128/77 (12-13-17 @ 16:00) (104/58 - 141/83)  RR: 12 (12-13-17 @ 19:18) (8 - 36)  SpO2: 95% (12-13-17 @ 19:18) (93% - 99%)  Wt(kg): --    PHYSICAL EXAM:  GENERAL: NAD, well-groomed, well-developed  HEAD:  Atraumatic, Normocephalic  EYES: EOMI, PERRLA, conjunctiva and sclera clear  ENMT: No tonsillar erythema, exudates, or enlargement; Moist mucous membranes, Good dentition, No lesions  NECK: Supple, No JVD, Normal thyroid  NERVOUS SYSTEM:  Alert & Oriented X3, Good concentration; Motor Strength 5/5 B/L upper and lower extremities; DTRs 2+ intact and symmetric  CHEST/LUNG: Clear to percussion bilaterally; No rales, rhonchi, wheezing, or rubs  HEART: Regular rate and rhythm; No murmurs, rubs, or gallops  ABDOMEN: Soft, Nontender, Nondistended; Bowel sounds present  EXTREMITIES:  2+ Peripheral Pulses, No clubbing, cyanosis, or edema  LYMPH: No lymphadenopathy noted  SKIN: No rashes or lesions    CAPILLARY BLOOD GLUCOSE      POCT Blood Glucose.: 268 mg/dL (13 Dec 2017 17:17)  POCT Blood Glucose.: 184 mg/dL (13 Dec 2017 11:56)  POCT Blood Glucose.: 135 mg/dL (13 Dec 2017 09:34)  POCT Blood Glucose.: 145 mg/dL (13 Dec 2017 08:41)  POCT Blood Glucose.: 138 mg/dL (13 Dec 2017 07:50)  POCT Blood Glucose.: 150 mg/dL (13 Dec 2017 06:32)  POCT Blood Glucose.: 185 mg/dL (13 Dec 2017 05:19)  POCT Blood Glucose.: 193 mg/dL (13 Dec 2017 04:18)  POCT Blood Glucose.: 244 mg/dL (13 Dec 2017 02:59)  POCT Blood Glucose.: 250 mg/dL (13 Dec 2017 02:05)  POCT Blood Glucose.: 295 mg/dL (13 Dec 2017 00:53)  POCT Blood Glucose.: 310 mg/dL (12 Dec 2017 23:58)  POCT Blood Glucose.: 298 mg/dL (12 Dec 2017 22:50)                            16.3   11.6  )-----------( 401      ( 12 Dec 2017 18:50 )             50.5       CMP:  12-13 @ 12:46  SGPT --  Albumin --   Alk Phos --   Anion Gap 9   SGOT --   Total Bili --   BUN 26   Calcium Total 8.0   CO2 23   Chloride 106   Creatinine 1.19   eGFR if AA 90   eGFR if non AA 78   Glucose 162   Potassium 4.0   Protein --   Sodium 138      Thyroid Function Tests:      Diabetes Tests: 12-13 @ 08:04 HbA1c 10.7 C-Peptide --       Parathyroids:     Adrenals:       Radiology:

## 2017-12-13 NOTE — CHART NOTE - NSCHARTNOTEFT_GEN_A_CORE
Pt medically stable for transfer to medical floor.      38 yo M PMH DMT1 (lantus and novalog), asthma, anxiety, recently dx with colitis p/w abdominal pain, nausea, vomiting, and diarrhea, today. Patient states he has difficulty breathing.  Pt found to have elevated glucose with AG 27 In DKA.  Insulin gtt started and now transitioned to lantus and ISS.  Endocrine consult called for evaluation.  FS stabilizing and chemistries WNL. Pt medically stable for transfer to medical floor.  Signed out to Dr. Dejesus    38 yo M PMH DMT1 (lantus and novalog), asthma, anxiety, recently dx with colitis p/w abdominal pain, nausea, vomiting, and diarrhea, today. Patient states he has difficulty breathing.  Pt found to have elevated glucose with AG 27 In DKA.  Insulin gtt started and now transitioned to lantus and ISS.  Endocrine consult called for evaluation.  FS stabilizing and chemistries WNL.

## 2017-12-13 NOTE — CONSULT NOTE ADULT - PROBLEM SELECTOR RECOMMENDATION 9
Diabetic Ketoacidosis resolved   on Lantus and Lispro sliding scale coverage with meals   Continue with the same regimen while inpatient   patient will benefit from insulin pump   will need social work to improve home condition  Thank You for the courtesy of this consultation !!!

## 2017-12-13 NOTE — PROGRESS NOTE ADULT - SUBJECTIVE AND OBJECTIVE BOX
INTERVAL HPI/OVERNIGHT EVENTS:   HPI:  36 yo M PMH DMT1 (lantus and novalog), asthma, anxiety, recently dx with colitis p/w abdominal pain, nausea, vomiting, and diarrhea, today. Patient states he has difficulty breathing. Patient denies fever, chills, chest pain, cough, recent travel. Patient states he took insulin yesterday. (12 Dec 2017 22:42)      24 hour events: closed gap      CENTRAL LINE: [ ] YES [x ] NO  LOCATION:   DATE INSERTED:  REMOVE: [ ] YES [ ] NO  EXPLAIN:    TALLEY: [ ] YES [x ] NO    DATE INSERTED:  REMOVE:  [ ] YES [ ] NO  EXPLAIN:    A-LINE:  [ ] YES [x ] NO  LOCATION:   DATE INSERTED:  REMOVE:  [ ] YES [ ] NO  EXPLAIN:    PAST MEDICAL & SURGICAL HISTORY:  Uncomplicated asthma, unspecified asthma severity, unspecified whether persistent  Gastritis, presence of bleeding unspecified, unspecified chronicity, unspecified gastritis type  Controlled diabetes mellitus type 1 without complications  Anxiety  Gastritis  Asthma  DM type 1 (diabetes mellitus, type 1)  History of cholecystectomy  S/P cholecystectomy      REVIEW OF SYSTEMS:     CONSTITUTIONAL: No fever, weight loss, or fatigue  EYES: No eye pain, visual disturbances, or discharge  ENMT:  No difficulty hearing, tinnitus, vertigo; No sinus or throat pain  NECK: No pain or stiffness  RESPIRATORY: No cough, wheezing, chills or hemoptysis; No shortness of breath  CARDIOVASCULAR: No chest pain, palpitations, dizziness, or leg swelling  GASTROINTESTINAL: No abdominal or epigastric pain. No nausea, vomiting, or hematemesis; No diarrhea or constipation. No melena or hematochezia.  GENITOURINARY: No dysuria, frequency, hematuria, or incontinence  NEUROLOGICAL: No headaches, memory loss, loss of strength, numbness, or tremors  SKIN: No itching, burning, rashes, or lesions     ICU Vital Signs Last 24 Hrs  T(C): 36.2 (13 Dec 2017 12:00), Max: 37.1 (13 Dec 2017 06:00)  T(F): 97.2 (13 Dec 2017 12:00), Max: 98.8 (13 Dec 2017 06:00)  HR: 84 (13 Dec 2017 14:00) (72 - 101)  BP: 122/75 (13 Dec 2017 14:00) (104/58 - 141/83)  BP(mean): 87 (13 Dec 2017 14:00) (66 - 95)  ABP: --  ABP(mean): --  RR: 13 (13 Dec 2017 14:00) (8 - 36)  SpO2: 93% (13 Dec 2017 14:00) (93% - 99%)      ABG - ( 12 Dec 2017 20:17 )  pH: x     /  pCO2: 26    /  pO2: 87    / HCO3: 11    / Base Excess: -14.7 /  SaO2: 95                  I&O's Detail    12 Dec 2017 07:01  -  13 Dec 2017 07:00  --------------------------------------------------------  IN:    dextrose 5% + sodium chloride 0.45%: 800 mL    insulin Infusion: 32 mL  Total IN: 832 mL    OUT:  Total OUT: 0 mL    Total NET: 832 mL      13 Dec 2017 07:01  -  13 Dec 2017 14:41  --------------------------------------------------------  IN:    dextrose 5% + sodium chloride 0.45%: 600 mL    insulin Infusion: 5 mL  Total IN: 605 mL    OUT:    Voided: 1000 mL  Total OUT: 1000 mL    Total NET: -395 mL            CAPILLARY BLOOD GLUCOSE      POCT Blood Glucose.: 184 mg/dL (13 Dec 2017 11:56)  POCT Blood Glucose.: 135 mg/dL (13 Dec 2017 09:34)  POCT Blood Glucose.: 145 mg/dL (13 Dec 2017 08:41)  POCT Blood Glucose.: 138 mg/dL (13 Dec 2017 07:50)  POCT Blood Glucose.: 150 mg/dL (13 Dec 2017 06:32)  POCT Blood Glucose.: 185 mg/dL (13 Dec 2017 05:19)  POCT Blood Glucose.: 193 mg/dL (13 Dec 2017 04:18)  POCT Blood Glucose.: 244 mg/dL (13 Dec 2017 02:59)  POCT Blood Glucose.: 250 mg/dL (13 Dec 2017 02:05)  POCT Blood Glucose.: 295 mg/dL (13 Dec 2017 00:53)  POCT Blood Glucose.: 310 mg/dL (12 Dec 2017 23:58)  POCT Blood Glucose.: 298 mg/dL (12 Dec 2017 22:50)  POCT Blood Glucose.: 401 mg/dL (12 Dec 2017 17:06)  POCT Blood Glucose.: 416 mg/dL (12 Dec 2017 17:05)      acetaminophen   Tablet. 650 milliGRAM(s) Oral every 6 hours PRN  chlorhexidine 4% Liquid 1 Application(s) Topical daily  clonazePAM Tablet 0.5 milliGRAM(s) Oral daily  dextrose 50% Injectable 50 milliLiter(s) IV Push every 15 minutes  escitalopram 5 milliGRAM(s) Oral daily  heparin  Injectable 5000 Unit(s) SubCutaneous every 12 hours  HYDROmorphone  Injectable 1 milliGRAM(s) IV Push once  insulin glargine Injectable (LANTUS) 20 Unit(s) SubCutaneous every morning  insulin Infusion 5 Unit(s)/Hr IV Continuous <Continuous>  insulin lispro (HumaLOG) corrective regimen sliding scale   SubCutaneous three times a day before meals      PHYSICAL EXAM:    GENERAL: NAD, well-groomed, well-developed  HEAD:  Atraumatic, Normocephalic  CHEST/LUNG: Clear to auscultation bilaterally; No rales, rhonchi, wheezing, or rubs  HEART: Regular rate and rhythm; No murmurs, rubs, or gallops  ABDOMEN: Soft, Nontender, Nondistended; Bowel sounds present  EXTREMITIES:  2+ Peripheral Pulses, No clubbing, cyanosis, or edema  LYMPH: No lymphadenopathy noted  SKIN: No rashes or lesions  NERVOUS SYSTEM:  Alert & Oriented X3, Good concentration; Motor Strength 5/5 B/L upper and lower extremities; DTRs 2+ intact and symmetric    LABS:                        16.3   11.6  )-----------( 401      ( 12 Dec 2017 18:50 )             50.5      12-13    138  |  106  |  26<H>  ----------------------------<  162<H>  4.0   |  23  |  1.19    Ca    8.0<L>      13 Dec 2017 12:46  Phos  2.7     12-13  Mg     2.4     12-13    TPro  8.5<H>  /  Alb  4.1  /  TBili  0.9  /  DBili  x   /  AST  32  /  ALT  47  /  AlkPhos  139<H>  12-12      Urinalysis Basic - ( 12 Dec 2017 18:50 )    Color: Yellow / Appearance: Clear / S.025 / pH: x  Gluc: x / Ketone: Large  / Bili: Negative / Urobili: Negative mg/dL   Blood: x / Protein: 15 mg/dL / Nitrite: Negative   Leuk Esterase: Negative / RBC: 0-2 /HPF / WBC 0-2   Sq Epi: x / Non Sq Epi: Occasional / Bacteria: Occasional          RADIOLOGY & ADDITIONAL STUDIES: INTERVAL HPI/OVERNIGHT EVENTS:   HPI:  36 yo M PMH DMT1 (lantus and novalog), asthma, anxiety, recently dx with colitis p/w abdominal pain, nausea, vomiting, and diarrhea, found to be in dka.        24 hour events: closed gap      CENTRAL LINE: [ ] YES [x ] NO  LOCATION:   DATE INSERTED:  REMOVE: [ ] YES [ ] NO  EXPLAIN:    TALLEY: [ ] YES [x ] NO    DATE INSERTED:  REMOVE:  [ ] YES [ ] NO  EXPLAIN:    A-LINE:  [ ] YES [x ] NO  LOCATION:   DATE INSERTED:  REMOVE:  [ ] YES [ ] NO  EXPLAIN:    PAST MEDICAL & SURGICAL HISTORY:  Uncomplicated asthma, unspecified asthma severity, unspecified whether persistent  Gastritis, presence of bleeding unspecified, unspecified chronicity, unspecified gastritis type  Controlled diabetes mellitus type 1 without complications  Anxiety  Gastritis  Asthma  DM type 1 (diabetes mellitus, type 1)  History of cholecystectomy  S/P cholecystectomy      REVIEW OF SYSTEMS:     CONSTITUTIONAL: No fever, weight loss, or fatigue  EYES: No eye pain, visual disturbances, or discharge  ENMT:  No difficulty hearing, tinnitus, vertigo; No sinus or throat pain  NECK: No pain or stiffness  RESPIRATORY: No cough, wheezing, chills or hemoptysis; No shortness of breath  CARDIOVASCULAR: No chest pain, palpitations, dizziness, or leg swelling  GASTROINTESTINAL: No abdominal or epigastric pain. No nausea, vomiting, or hematemesis; No diarrhea or constipation. No melena or hematochezia.  GENITOURINARY: No dysuria, frequency, hematuria, or incontinence  NEUROLOGICAL: No headaches, memory loss, loss of strength, numbness, or tremors  SKIN: No itching, burning, rashes, or lesions     ICU Vital Signs Last 24 Hrs  T(C): 36.2 (13 Dec 2017 12:00), Max: 37.1 (13 Dec 2017 06:00)  T(F): 97.2 (13 Dec 2017 12:00), Max: 98.8 (13 Dec 2017 06:00)  HR: 84 (13 Dec 2017 14:00) (72 - 101)  BP: 122/75 (13 Dec 2017 14:00) (104/58 - 141/83)  BP(mean): 87 (13 Dec 2017 14:00) (66 - 95)  ABP: --  ABP(mean): --  RR: 13 (13 Dec 2017 14:00) (8 - 36)  SpO2: 93% (13 Dec 2017 14:00) (93% - 99%)      ABG - ( 12 Dec 2017 20:17 )  pH: x     /  pCO2: 26    /  pO2: 87    / HCO3: 11    / Base Excess: -14.7 /  SaO2: 95                  I&O's Detail    12 Dec 2017 07:01  -  13 Dec 2017 07:00  --------------------------------------------------------  IN:    dextrose 5% + sodium chloride 0.45%: 800 mL    insulin Infusion: 32 mL  Total IN: 832 mL    OUT:  Total OUT: 0 mL    Total NET: 832 mL      13 Dec 2017 07:01  -  13 Dec 2017 14:41  --------------------------------------------------------  IN:    dextrose 5% + sodium chloride 0.45%: 600 mL    insulin Infusion: 5 mL  Total IN: 605 mL    OUT:    Voided: 1000 mL  Total OUT: 1000 mL    Total NET: -395 mL            CAPILLARY BLOOD GLUCOSE      POCT Blood Glucose.: 184 mg/dL (13 Dec 2017 11:56)  POCT Blood Glucose.: 135 mg/dL (13 Dec 2017 09:34)  POCT Blood Glucose.: 145 mg/dL (13 Dec 2017 08:41)  POCT Blood Glucose.: 138 mg/dL (13 Dec 2017 07:50)  POCT Blood Glucose.: 150 mg/dL (13 Dec 2017 06:32)  POCT Blood Glucose.: 185 mg/dL (13 Dec 2017 05:19)  POCT Blood Glucose.: 193 mg/dL (13 Dec 2017 04:18)  POCT Blood Glucose.: 244 mg/dL (13 Dec 2017 02:59)  POCT Blood Glucose.: 250 mg/dL (13 Dec 2017 02:05)  POCT Blood Glucose.: 295 mg/dL (13 Dec 2017 00:53)  POCT Blood Glucose.: 310 mg/dL (12 Dec 2017 23:58)  POCT Blood Glucose.: 298 mg/dL (12 Dec 2017 22:50)  POCT Blood Glucose.: 401 mg/dL (12 Dec 2017 17:06)  POCT Blood Glucose.: 416 mg/dL (12 Dec 2017 17:05)      acetaminophen   Tablet. 650 milliGRAM(s) Oral every 6 hours PRN  chlorhexidine 4% Liquid 1 Application(s) Topical daily  clonazePAM Tablet 0.5 milliGRAM(s) Oral daily  dextrose 50% Injectable 50 milliLiter(s) IV Push every 15 minutes  escitalopram 5 milliGRAM(s) Oral daily  heparin  Injectable 5000 Unit(s) SubCutaneous every 12 hours  HYDROmorphone  Injectable 1 milliGRAM(s) IV Push once  insulin glargine Injectable (LANTUS) 20 Unit(s) SubCutaneous every morning  insulin Infusion 5 Unit(s)/Hr IV Continuous <Continuous>  insulin lispro (HumaLOG) corrective regimen sliding scale   SubCutaneous three times a day before meals      PHYSICAL EXAM:    GENERAL: NAD, well-groomed, well-developed  HEAD:  Atraumatic, Normocephalic  CHEST/LUNG: Clear to auscultation bilaterally; No rales, rhonchi, wheezing, or rubs  HEART: Regular rate and rhythm; No murmurs, rubs, or gallops  ABDOMEN: Soft, Nontender, Nondistended; Bowel sounds present  EXTREMITIES:  2+ Peripheral Pulses, No clubbing, cyanosis, or edema  LYMPH: No lymphadenopathy noted  SKIN: No rashes or lesions  NERVOUS SYSTEM:  Alert & Oriented X3, Good concentration; Motor Strength 5/5 B/L upper and lower extremities; DTRs 2+ intact and symmetric    LABS:                        16.3   11.6  )-----------( 401      ( 12 Dec 2017 18:50 )             50.5      12-13    138  |  106  |  26<H>  ----------------------------<  162<H>  4.0   |  23  |  1.19    Ca    8.0<L>      13 Dec 2017 12:46  Phos  2.7     12-13  Mg     2.4     12-    TPro  8.5<H>  /  Alb  4.1  /  TBili  0.9  /  DBili  x   /  AST  32  /  ALT  47  /  AlkPhos  139<H>  12-12      Urinalysis Basic - ( 12 Dec 2017 18:50 )    Color: Yellow / Appearance: Clear / S.025 / pH: x  Gluc: x / Ketone: Large  / Bili: Negative / Urobili: Negative mg/dL   Blood: x / Protein: 15 mg/dL / Nitrite: Negative   Leuk Esterase: Negative / RBC: 0-2 /HPF / WBC 0-2   Sq Epi: x / Non Sq Epi: Occasional / Bacteria: Occasional          RADIOLOGY & ADDITIONAL STUDIES:

## 2017-12-13 NOTE — PROGRESS NOTE ADULT - ATTENDING COMMENTS
Agree with above.  37M with DM1 now p/w DKA  AG closed, now eating.  Complaining of body pains and aches.  Started on Lantus 20U + RA-ISS.  Endocrine consult.    Can transfer to general medicine floor.

## 2017-12-14 VITALS
OXYGEN SATURATION: 96 % | DIASTOLIC BLOOD PRESSURE: 85 MMHG | RESPIRATION RATE: 14 BRPM | HEART RATE: 76 BPM | SYSTOLIC BLOOD PRESSURE: 125 MMHG | TEMPERATURE: 97 F

## 2017-12-14 DIAGNOSIS — E13.10 OTHER SPECIFIED DIABETES MELLITUS WITH KETOACIDOSIS WITHOUT COMA: ICD-10-CM

## 2017-12-14 DIAGNOSIS — K29.70 GASTRITIS, UNSPECIFIED, WITHOUT BLEEDING: ICD-10-CM

## 2017-12-14 DIAGNOSIS — F41.9 ANXIETY DISORDER, UNSPECIFIED: ICD-10-CM

## 2017-12-14 DIAGNOSIS — E10.65 TYPE 1 DIABETES MELLITUS WITH HYPERGLYCEMIA: ICD-10-CM

## 2017-12-14 DIAGNOSIS — J45.909 UNSPECIFIED ASTHMA, UNCOMPLICATED: ICD-10-CM

## 2017-12-14 LAB
ANION GAP SERPL CALC-SCNC: 6 MMOL/L — SIGNIFICANT CHANGE UP (ref 5–17)
BUN SERPL-MCNC: 17 MG/DL — SIGNIFICANT CHANGE UP (ref 7–23)
CALCIUM SERPL-MCNC: 8.4 MG/DL — LOW (ref 8.5–10.1)
CHLORIDE SERPL-SCNC: 102 MMOL/L — SIGNIFICANT CHANGE UP (ref 96–108)
CO2 SERPL-SCNC: 28 MMOL/L — SIGNIFICANT CHANGE UP (ref 22–31)
CREAT SERPL-MCNC: 0.96 MG/DL — SIGNIFICANT CHANGE UP (ref 0.5–1.3)
GLUCOSE BLDC GLUCOMTR-MCNC: 290 MG/DL — HIGH (ref 70–99)
GLUCOSE SERPL-MCNC: 236 MG/DL — HIGH (ref 70–99)
HCT VFR BLD CALC: 40.2 % — SIGNIFICANT CHANGE UP (ref 39–50)
HGB BLD-MCNC: 13.8 G/DL — SIGNIFICANT CHANGE UP (ref 13–17)
MAGNESIUM SERPL-MCNC: 2.2 MG/DL — SIGNIFICANT CHANGE UP (ref 1.6–2.6)
MCHC RBC-ENTMCNC: 29.8 PG — SIGNIFICANT CHANGE UP (ref 27–34)
MCHC RBC-ENTMCNC: 34.3 GM/DL — SIGNIFICANT CHANGE UP (ref 32–36)
MCV RBC AUTO: 86.8 FL — SIGNIFICANT CHANGE UP (ref 80–100)
PHOSPHATE SERPL-MCNC: 1.9 MG/DL — LOW (ref 2.5–4.5)
PLATELET # BLD AUTO: 323 K/UL — SIGNIFICANT CHANGE UP (ref 150–400)
POTASSIUM SERPL-MCNC: 3.9 MMOL/L — SIGNIFICANT CHANGE UP (ref 3.5–5.3)
POTASSIUM SERPL-SCNC: 3.9 MMOL/L — SIGNIFICANT CHANGE UP (ref 3.5–5.3)
RBC # BLD: 4.63 M/UL — SIGNIFICANT CHANGE UP (ref 4.2–5.8)
RBC # FLD: 12.1 % — SIGNIFICANT CHANGE UP (ref 11–15)
SODIUM SERPL-SCNC: 136 MMOL/L — SIGNIFICANT CHANGE UP (ref 135–145)
WBC # BLD: 7.2 K/UL — SIGNIFICANT CHANGE UP (ref 3.8–10.5)
WBC # FLD AUTO: 7.2 K/UL — SIGNIFICANT CHANGE UP (ref 3.8–10.5)

## 2017-12-14 PROCEDURE — 99233 SBSQ HOSP IP/OBS HIGH 50: CPT

## 2017-12-14 RX ORDER — PANTOPRAZOLE SODIUM 20 MG/1
40 TABLET, DELAYED RELEASE ORAL
Qty: 0 | Refills: 0 | Status: DISCONTINUED | OUTPATIENT
Start: 2017-12-15 | End: 2017-12-14

## 2017-12-14 RX ORDER — HYDROMORPHONE HYDROCHLORIDE 2 MG/ML
1 INJECTION INTRAMUSCULAR; INTRAVENOUS; SUBCUTANEOUS ONCE
Qty: 0 | Refills: 0 | Status: DISCONTINUED | OUTPATIENT
Start: 2017-12-14 | End: 2017-12-14

## 2017-12-14 RX ORDER — INSULIN LISPRO 100/ML
8 VIAL (ML) SUBCUTANEOUS
Qty: 1 | Refills: 0 | OUTPATIENT
Start: 2017-12-14 | End: 2018-01-12

## 2017-12-14 RX ORDER — CALCIUM CARBONATE 500(1250)
1 TABLET ORAL
Qty: 0 | Refills: 0 | Status: DISCONTINUED | OUTPATIENT
Start: 2017-12-14 | End: 2017-12-14

## 2017-12-14 RX ORDER — PANTOPRAZOLE SODIUM 20 MG/1
40 TABLET, DELAYED RELEASE ORAL ONCE
Qty: 0 | Refills: 0 | Status: COMPLETED | OUTPATIENT
Start: 2017-12-14 | End: 2017-12-14

## 2017-12-14 RX ORDER — IPRATROPIUM/ALBUTEROL SULFATE 18-103MCG
3 AEROSOL WITH ADAPTER (GRAM) INHALATION EVERY 6 HOURS
Qty: 0 | Refills: 0 | Status: DISCONTINUED | OUTPATIENT
Start: 2017-12-14 | End: 2017-12-14

## 2017-12-14 RX ORDER — ENOXAPARIN SODIUM 100 MG/ML
20 INJECTION SUBCUTANEOUS
Qty: 1 | Refills: 0 | OUTPATIENT
Start: 2017-12-14 | End: 2018-01-12

## 2017-12-14 RX ADMIN — Medication 6: at 08:04

## 2017-12-14 RX ADMIN — PANTOPRAZOLE SODIUM 40 MILLIGRAM(S): 20 TABLET, DELAYED RELEASE ORAL at 12:20

## 2017-12-14 RX ADMIN — Medication 650 MILLIGRAM(S): at 20:11

## 2017-12-14 RX ADMIN — HYDROMORPHONE HYDROCHLORIDE 1 MILLIGRAM(S): 2 INJECTION INTRAMUSCULAR; INTRAVENOUS; SUBCUTANEOUS at 12:30

## 2017-12-14 RX ADMIN — INSULIN GLARGINE 20 UNIT(S): 100 INJECTION, SOLUTION SUBCUTANEOUS at 08:03

## 2017-12-14 RX ADMIN — ESCITALOPRAM OXALATE 5 MILLIGRAM(S): 10 TABLET, FILM COATED ORAL at 15:36

## 2017-12-14 RX ADMIN — Medication 2: at 12:19

## 2017-12-14 RX ADMIN — Medication 6: at 15:40

## 2017-12-14 RX ADMIN — Medication 0.5 MILLIGRAM(S): at 12:17

## 2017-12-14 RX ADMIN — HYDROMORPHONE HYDROCHLORIDE 1 MILLIGRAM(S): 2 INJECTION INTRAMUSCULAR; INTRAVENOUS; SUBCUTANEOUS at 12:15

## 2017-12-14 RX ADMIN — Medication 650 MILLIGRAM(S): at 21:11

## 2017-12-14 NOTE — PROGRESS NOTE ADULT - PROBLEM SELECTOR PLAN 3
Start PPI and Tums PRN  As per Staff pT asks for dialudid, one dose was ordered by NP today and he has received it last night also  ?pain/ gastritis vs ?drug seeker   Pt also on klonopin for anxiety , d/w NP and staff, will avoid narcotic analgesics to avoid drug interaction, as CT abd upon admission was negative. If abd pain persist, will consider getting repeat imaging.  Plan for Dc tomorrow if abd pain improves,

## 2017-12-14 NOTE — PROGRESS NOTE ADULT - SUBJECTIVE AND OBJECTIVE BOX
Patient is a 37y old  Male who presents with a chief complaint of DKA (14 Dec 2017 13:07)      Interval History: fws are in 250 + range   on Lantus 28 units and Lispro sliding scale coverage with meals   Diabetic Ketoacidosis resolved     MEDICATIONS  (STANDING):  chlorhexidine 4% Liquid 1 Application(s) Topical daily  clonazePAM Tablet 0.5 milliGRAM(s) Oral daily  dextrose 50% Injectable 50 milliLiter(s) IV Push every 15 minutes  escitalopram 5 milliGRAM(s) Oral daily  heparin  Injectable 5000 Unit(s) SubCutaneous every 12 hours  insulin glargine Injectable (LANTUS) 20 Unit(s) SubCutaneous every morning  insulin lispro (HumaLOG) corrective regimen sliding scale   SubCutaneous three times a day before meals  insulin lispro (HumaLOG) corrective regimen sliding scale   SubCutaneous at bedtime    MEDICATIONS  (PRN):  acetaminophen   Tablet. 650 milliGRAM(s) Oral every 6 hours PRN Moderate Pain (4 - 6)  ALBUTerol/ipratropium for Nebulization 3 milliLiter(s) Nebulizer every 6 hours PRN Shortness of Breath and/or Wheezing  calcium carbonate 500 mG (Tums) Chewable 1 Tablet(s) Chew four times a day PRN Indigestion      Allergies    No Known Allergies    Intolerances        REVIEW OF SYSTEMS:  CONSTITUTIONAL:   EYES: No eye pain, visual disturbances, or discharge  ENMT:  No difficulty hearing, tinnitus, vertigo; No sinus or throat pain  NECK: No pain or stiffness  RESPIRATORY: No cough, wheezing, chills or hemoptysis; No shortness of breath  CARDIOVASCULAR: No chest pain, palpitations, dizziness, or leg swelling  GASTROINTESTINAL: No abdominal or epigastric pain. No nausea, vomiting, or hematemesis; No diarrhea or constipation. No melena or hematochezia.  GENITOURINARY: No dysuria, frequency, hematuria, or incontinence  NEUROLOGICAL: No headaches, memory loss, loss of strength, numbness, or tremors  SKIN: No itching, burning, rashes, or lesions   LYMPH NODES: No enlarged glands  ENDOCRINE: No heat or cold intolerance; No hair loss  MUSCULOSKELETAL: No joint pain or swelling; No muscle, back, or extremity pain  PSYCHIATRIC: No depression, anxiety, mood swings, or difficulty sleeping  HEME/LYMPH: No easy bruising, or bleeding gums  ALLERY AND IMMUNOLOGIC: No hives or eczema    Vital Signs Last 24 Hrs  T(C): 36 (14 Dec 2017 18:00), Max: 36.7 (14 Dec 2017 11:48)  T(F): 96.8 (14 Dec 2017 18:00), Max: 98.1 (14 Dec 2017 11:48)  HR: 76 (14 Dec 2017 18:00) (69 - 78)  BP: 125/85 (14 Dec 2017 18:00) (122/82 - 152/55)  BP(mean): 97 (14 Dec 2017 00:21) (97 - 97)  RR: 14 (14 Dec 2017 18:00) (11 - 16)  SpO2: 96% (14 Dec 2017 18:00) (94% - 98%)    PHYSICAL EXAM:  GENERAL:   HEAD:  Atraumatic, Normocephalic  EYES: EOMI, PERRLA, conjunctiva and sclera clear  ENMT: No tonsillar erythema, exudates, or enlargement; Moist mucous membranes, Good dentition, No lesions  NECK: Supple, No JVD, Normal thyroid  NERVOUS SYSTEM:  Alert & Oriented X3, Good concentration; Motor Strength 5/5 B/L upper and lower extremities; DTRs 2+ intact and symmetric  CHEST/LUNG: Clear to percussion bilaterally; No rales, rhonchi, wheezing, or rubs  HEART: Regular rate and rhythm; No murmurs, rubs, or gallops  ABDOMEN: Soft, Nontender, Nondistended; Bowel sounds present  EXTREMITIES:  2+ Peripheral Pulses, No clubbing, cyanosis, or edema  SKIN: No rashes or lesions    LABS:        CAPILLARY BLOOD GLUCOSE      POCT Blood Glucose.: 218 mg/dL (14 Dec 2017 21:06)  POCT Blood Glucose.: 270 mg/dL (14 Dec 2017 15:39)  POCT Blood Glucose.: 191 mg/dL (14 Dec 2017 12:18)  POCT Blood Glucose.: 290 mg/dL (14 Dec 2017 08:02)  POCT Blood Glucose.: 269 mg/dL (13 Dec 2017 22:07)  POCT Blood Glucose.: 267 mg/dL (13 Dec 2017 21:21)    Lipid panel:           Thyroid:  Diabetes Tests:  Parathyroid Panel:  Adrenals:  RADIOLOGY & ADDITIONAL TESTS:    Imaging Personally Reviewed:  [ ] YES  [ ] NO    Consultant(s) Notes Reviewed:  [ ] YES  [ ] NO    Care Discussed with Consultants/Other Providers [ ] YES  [ ] NO

## 2017-12-14 NOTE — PROGRESS NOTE ADULT - ASSESSMENT
36 yo M with DM Type 1, asthma and anxiety, recent hx colitis, p/w abd pain/n/v found to be in dka. s/p Insulin gtt now AG closed and pt transferred out of ICU. Now on the floor, seen by endo on lantus and pre meal insulin, still w c/o epigastric pain likely gastritis, Pt states prior to presentation, he skipped insulin dose for one day, because he ran out of it.
36 yo M DM I, asthma and anxiety, recent colitis, arrived with abd pain/n/v found to be in dka.      DKA - gap is closed, tolerating diet.  Started on lantus 20 units, with ISS.  AG now 9.  Will stop insulin drip, redraw bmp, if stable and AG will transfer to medicine for ongoing care.  - Endo consult    Asthma - continue with albuterol    Dispo - optimized for medical floor.     Daniela PGY 6
Diabetic Ketoacidosis

## 2017-12-14 NOTE — DIETITIAN INITIAL EVALUATION ADULT. - OTHER INFO
Pt seen for HgbA1c >7 consult. Pt seen for HgbA1c >7 consult. Pt visited during lunch time, presents c fair appetite due to DKA, nausea. Pt does not follow a specific diet at home, states he likes regular soda & juice. Pt manages DM at home c Lispro (3x/day), Humalog (8units 3x/day before meals), Lantus (20 units 1x/day at bedtime) & checks BG before meals. Pt seen for HgbA1c >7 consult. Pt visited during lunch time, presents c fair appetite due to DKA, nausea. Pt does not follow a specific diet at home, states he likes regular soda & juice. Pt manages DM at home c Lispro (3x/day), Humalog (8units 3x/day before meals), Lantus (20 units 1x/day at bedtime) & checks BG before meals; reports he does not see an Endocrinologist Pt seen for HgbA1c >7 consult. Pt visited during lunch time, presents c fair appetite due to DKA, nausea. Pt does not follow a specific diet at home, states he likes regular soda & juice. Pt manages DM at home c Lispro (3x/day), Humalog (8units 3x/day before meals), Lantus (20 units 1x/day at bedtime) & checks BG before meals; reports he does not see an Endocrinologist. Discussed c pt the important of controlling BG & progressive nature of DM & relationship to kidney & heart disease. Recommended pt to outpatient DM consult

## 2017-12-14 NOTE — DIETITIAN INITIAL EVALUATION ADULT. - NUTRITIONGOAL OUTCOME1
Pt will have normalized blood sugars <140mg/dL Pt will have normalized blood sugars <140mg/dL; verbalize readiness for lifestyle changes

## 2017-12-14 NOTE — DIETITIAN INITIAL EVALUATION ADULT. - ETIOLOGY
Food and nutrition compliance limitations (failure to modify carbohydrate timing due to Type 1 DM) Food & nutrition compliance limitations & physiological causes requiring careful timing & consistency of carbohydrates due to Type 1 DM

## 2017-12-14 NOTE — DISCHARGE NOTE ADULT - CARE PROVIDER_API CALL
Barry Branch), Internal Medicine  [address] [facility]  [address]  [place] [address]  Phone: [phone]  Fax: [phone]    Marcos Platt), EndocrinologyMetabDiabetes; Internal Medicine  [address] [facility]  [address]  [place] [address]  Phone: [phone]  Fax: [phone]

## 2017-12-14 NOTE — CHART NOTE - NSCHARTNOTEFT_GEN_A_CORE
Medicine Hospitalist PA    Pt leaving AMA. Pt aware of consequences of leaving against medical advice including harm, injury or death. Pt fully understands, all question have been answered. Please Follow up with following physicians.     Outpatient Provider:  Care Providers for Follow up (PCP/Outpatient Provider) Barry Branch), Internal Medicine  [address] [facility]  [address]  [place] [address]  Phone: [phone]  Fax: [phone]    Marcos Platt), Endocrinology MetabDiabetes; Internal Medicine  [address] [facility]  [address] [place] [id number]  Phone: [phone]  Fax: [phone]. Medicine Hospitalist PA    Pt leaving AMA. Explained to pt the risks of leaving against medical advice. Pt was to be discharged tomorrow if abd pain improved however pt continues to request narcotics. Many attempts have been made to reason with pt to remain in hospital however pt refusing to cooperate and wants to leave AMA. Pt aware of consequences of leaving against medical advice including harm, injury or death. Pt fully understands, all question have been answered. Explained to pt the importance of Following up with the following physicians. Pt agrees to comply. Diabetic medications including Lantus and Humalog have been sent to the pharmacy. Pt signed AMA form, witnessed by RN at 21:17. D/w Dr. Garzon.     Outpatient Provider:  Care Providers for Follow up (PCP/Outpatient Provider) Barry Branch), Internal Medicine  [address] [facility] [address]  [place] [address]  Phone: [phone]  Fax: [phone]    Marcos Platt), Endocrinology MetabDiabetes; Internal Medicine  [address] [facility]  [address] [place] [address]  Phone: [phone]  Fax: [phone]

## 2017-12-14 NOTE — DISCHARGE NOTE ADULT - PATIENT PORTAL LINK FT
“You can access the [facility] Patient Portal, offered by [facility], by registering with the following website: [email or web address]”

## 2017-12-14 NOTE — DIETITIAN INITIAL EVALUATION ADULT. - PERTINENT LABORATORY DATA
12-14 Na136 mmol/L Glu 236 mg/dL<H> K+ 3.9 mmol/L Cr  0.96 mg/dL BUN 17 mg/dL Phos 1.9 mg/dL<L> Alb n/a   PAB n/a; 12-13 HgbA1c=10.7%

## 2017-12-14 NOTE — DIETITIAN INITIAL EVALUATION ADULT. - ENERGY NEEDS
Height (cm): 175.2 (12-14)  Weight (kg): 73.3 (12-14)  BMI (kg/m2): 23.9 (12-14)  IBW: 73kg (160#)  % IBW: 100%

## 2017-12-14 NOTE — PROGRESS NOTE ADULT - PROBLEM SELECTOR PLAN 1
Continue with the same regimen while inpatient   may need prandial lispro added  glucose toxicity +   while inpatient better to have FS< 150 and preferably in 120-140 range   insulin pump will help
sec to non compliance to meds. pt states no PCP and ran out of insulin one day prior to admission.  Resolved  off insulin gtt  Endo following

## 2017-12-14 NOTE — PROGRESS NOTE ADULT - SUBJECTIVE AND OBJECTIVE BOX
CHIEF COMPLAINT/INTERVAL HISTORY:    Patient is a 37y old  Male who presents with a chief complaint of DKA (12 Dec 2017 22:42)      HPI:  36 yo M PMH DMT1 (lantus and novalog), asthma, anxiety, recently dx with colitis p/w abdominal pain, nausea, vomiting, and diarrhea, today. Patient states he has difficulty breathing. Patient denies fever, chills, chest pain, cough, recent travel. Patient states he took insulin yesterday. Pt found to (12 Dec 2017 22:42)      SUBJECTIVE & OBJECTIVE: Pt seen and examined at bedside.   c/o epigastric pain, and nausea, denies vomiting  Denies chest pain/SOB, nausea/vomiting/diarrhea, No cough, dizziness, HA or blurry vision, all other ROS negative.      ICU Vital Signs Last 24 Hrs  T(C): 36.7 (14 Dec 2017 11:48), Max: 36.7 (14 Dec 2017 11:48)  T(F): 98.1 (14 Dec 2017 11:48), Max: 98.1 (14 Dec 2017 11:48)  HR: 78 (14 Dec 2017 12:07) (69 - 87)  BP: 124/89 (14 Dec 2017 12:07) (117/67 - 152/55)  BP(mean): 97 (14 Dec 2017 00:21) (78 - 97)  ABP: --  ABP(mean): --  RR: 16 (14 Dec 2017 11:48) (9 - 16)  SpO2: 95% (14 Dec 2017 11:48) (93% - 98%)        MEDICATIONS  (STANDING):  chlorhexidine 4% Liquid 1 Application(s) Topical daily  clonazePAM Tablet 0.5 milliGRAM(s) Oral daily  dextrose 50% Injectable 50 milliLiter(s) IV Push every 15 minutes  escitalopram 5 milliGRAM(s) Oral daily  heparin  Injectable 5000 Unit(s) SubCutaneous every 12 hours  HYDROmorphone  Injectable 1 milliGRAM(s) IV Push once  insulin glargine Injectable (LANTUS) 20 Unit(s) SubCutaneous every morning  insulin Infusion 5 Unit(s)/Hr (5 mL/Hr) IV Continuous <Continuous>  insulin lispro (HumaLOG) corrective regimen sliding scale   SubCutaneous three times a day before meals  insulin lispro (HumaLOG) corrective regimen sliding scale   SubCutaneous at bedtime    MEDICATIONS  (PRN):  acetaminophen   Tablet. 650 milliGRAM(s) Oral every 6 hours PRN Moderate Pain (4 - 6)        PHYSICAL EXAM:    GENERAL: NAD, well-developed  HEAD:  Atraumatic, Normocephalic  EYES: EOMI, PERRLA, conjunctiva and sclera clear  ENMT: Moist mucous membranes  NECK: Supple, No JVD  NERVOUS SYSTEM:  Alert & Oriented X3, Motor Strength 5/5 B/L upper and lower extremities;  CHEST/LUNG: Clear to auscultation bilaterally; No rales, rhonchi, wheezing, or rubs  HEART: Regular rate and rhythm; No murmurs, rubs, or gallops  ABDOMEN: Soft, Mild tenderness + in epigastrium, Nondistended; Bowel sounds present  EXTREMITIES: no cyanosis, or edema    LABS:                        13.8   7.2   )-----------( 323      ( 14 Dec 2017 10:24 )             40.2     12-14    136  |  102  |  17  ----------------------------<  236<H>  3.9   |  28  |  0.96    Ca    8.4<L>      14 Dec 2017 10:24  Phos  1.9     12-14  Mg     2.2     -14    TPro  8.5<H>  /  Alb  4.1  /  TBili  0.9  /  DBili  x   /  AST  32  /  ALT  47  /  AlkPhos  139<H>  12-12      Urinalysis Basic - ( 12 Dec 2017 18:50 )    Color: Yellow / Appearance: Clear / S.025 / pH: x  Gluc: x / Ketone: Large  / Bili: Negative / Urobili: Negative mg/dL   Blood: x / Protein: 15 mg/dL / Nitrite: Negative   Leuk Esterase: Negative / RBC: 0-2 /HPF / WBC 0-2   Sq Epi: x / Non Sq Epi: Occasional / Bacteria: Occasional        CAPILLARY BLOOD GLUCOSE      POCT Blood Glucose.: 290 mg/dL (14 Dec 2017 08:02)  POCT Blood Glucose.: 269 mg/dL (13 Dec 2017 22:07)  POCT Blood Glucose.: 267 mg/dL (13 Dec 2017 21:21)  POCT Blood Glucose.: 268 mg/dL (13 Dec 2017 17:17)    < from: CT Abdomen and Pelvis w/ Oral Cont and w/ IV Cont (12.12.17 @ 20:35) >  IMPRESSION: No acute abdominal pathology. No evidence ofbowel   obstruction.

## 2017-12-14 NOTE — DISCHARGE NOTE ADULT - HOSPITAL COURSE
36 yo M with DM Type 1, asthma and anxiety, recent hx colitis, p/w abd pain/n/v found to be in dka. s/p Insulin gtt now AG closed and pt transferred out of ICU. Now on the floor, seen by endo on lantus and pre meal insulin, still w c/o epigastric pain likely gastritis, Ct abd upon admission w no acute abd. pathology, Pt states prior to presentation, he skipped insulin dose for one day, because he ran out of it.

## 2017-12-14 NOTE — DIETITIAN INITIAL EVALUATION ADULT. - SIGNS/SYMPTOMS
DKA, HgbA1c=10.7%, POCT BG= 290, 269, 298 DKA, HgbA1c=10.7%, POCT BG= 290, 269, 298, verbalizes inaccurate/incomplete knowledge

## 2017-12-18 DIAGNOSIS — E10.10 TYPE 1 DIABETES MELLITUS WITH KETOACIDOSIS WITHOUT COMA: ICD-10-CM

## 2017-12-18 DIAGNOSIS — K29.70 GASTRITIS, UNSPECIFIED, WITHOUT BLEEDING: ICD-10-CM

## 2017-12-18 DIAGNOSIS — F41.9 ANXIETY DISORDER, UNSPECIFIED: ICD-10-CM

## 2017-12-18 DIAGNOSIS — Z79.4 LONG TERM (CURRENT) USE OF INSULIN: ICD-10-CM

## 2017-12-18 DIAGNOSIS — Z90.49 ACQUIRED ABSENCE OF OTHER SPECIFIED PARTS OF DIGESTIVE TRACT: ICD-10-CM

## 2017-12-18 DIAGNOSIS — Z91.14 PATIENT'S OTHER NONCOMPLIANCE WITH MEDICATION REGIMEN: ICD-10-CM

## 2017-12-18 DIAGNOSIS — N17.9 ACUTE KIDNEY FAILURE, UNSPECIFIED: ICD-10-CM

## 2017-12-18 DIAGNOSIS — K52.9 NONINFECTIVE GASTROENTERITIS AND COLITIS, UNSPECIFIED: ICD-10-CM

## 2017-12-18 DIAGNOSIS — J45.909 UNSPECIFIED ASTHMA, UNCOMPLICATED: ICD-10-CM

## 2017-12-18 LAB
CULTURE RESULTS: SIGNIFICANT CHANGE UP
SPECIMEN SOURCE: SIGNIFICANT CHANGE UP

## 2018-02-25 ENCOUNTER — EMERGENCY (EMERGENCY)
Facility: HOSPITAL | Age: 38
LOS: 0 days | Discharge: HOME | End: 2018-02-26
Attending: EMERGENCY MEDICINE
Payer: MEDICAID

## 2018-02-25 VITALS
SYSTOLIC BLOOD PRESSURE: 146 MMHG | HEIGHT: 69 IN | TEMPERATURE: 99 F | WEIGHT: 169.98 LBS | HEART RATE: 81 BPM | OXYGEN SATURATION: 98 % | RESPIRATION RATE: 21 BRPM | DIASTOLIC BLOOD PRESSURE: 94 MMHG

## 2018-02-25 DIAGNOSIS — Z90.49 ACQUIRED ABSENCE OF OTHER SPECIFIED PARTS OF DIGESTIVE TRACT: Chronic | ICD-10-CM

## 2018-02-25 DIAGNOSIS — R10.9 UNSPECIFIED ABDOMINAL PAIN: ICD-10-CM

## 2018-02-25 DIAGNOSIS — K31.84 GASTROPARESIS: ICD-10-CM

## 2018-02-25 DIAGNOSIS — R11.2 NAUSEA WITH VOMITING, UNSPECIFIED: ICD-10-CM

## 2018-02-25 LAB
ALBUMIN SERPL ELPH-MCNC: 4.5 G/DL — SIGNIFICANT CHANGE UP (ref 3.3–5)
ALP SERPL-CCNC: 119 U/L — SIGNIFICANT CHANGE UP (ref 40–120)
ALT FLD-CCNC: 47 U/L — SIGNIFICANT CHANGE UP (ref 12–78)
ANION GAP SERPL CALC-SCNC: 10 MMOL/L — SIGNIFICANT CHANGE UP (ref 5–17)
APPEARANCE UR: CLEAR — SIGNIFICANT CHANGE UP
AST SERPL-CCNC: 29 U/L — SIGNIFICANT CHANGE UP (ref 15–37)
BACTERIA # UR AUTO: ABNORMAL
BASOPHILS # BLD AUTO: 0.04 K/UL — SIGNIFICANT CHANGE UP (ref 0–0.2)
BASOPHILS NFR BLD AUTO: 0.3 % — SIGNIFICANT CHANGE UP (ref 0–2)
BILIRUB SERPL-MCNC: 0.4 MG/DL — SIGNIFICANT CHANGE UP (ref 0.2–1.2)
BILIRUB UR-MCNC: NEGATIVE — SIGNIFICANT CHANGE UP
BUN SERPL-MCNC: 16 MG/DL — SIGNIFICANT CHANGE UP (ref 7–23)
CALCIUM SERPL-MCNC: 9.9 MG/DL — SIGNIFICANT CHANGE UP (ref 8.5–10.1)
CHLORIDE SERPL-SCNC: 97 MMOL/L — SIGNIFICANT CHANGE UP (ref 96–108)
CO2 SERPL-SCNC: 33 MMOL/L — HIGH (ref 22–31)
COLOR SPEC: YELLOW — SIGNIFICANT CHANGE UP
CREAT SERPL-MCNC: 1.18 MG/DL — SIGNIFICANT CHANGE UP (ref 0.5–1.3)
DIFF PNL FLD: NEGATIVE — SIGNIFICANT CHANGE UP
EOSINOPHIL # BLD AUTO: 0.01 K/UL — SIGNIFICANT CHANGE UP (ref 0–0.5)
EOSINOPHIL NFR BLD AUTO: 0.1 % — SIGNIFICANT CHANGE UP (ref 0–6)
EPI CELLS # UR: SIGNIFICANT CHANGE UP
GLUCOSE SERPL-MCNC: 198 MG/DL — HIGH (ref 70–99)
GLUCOSE UR QL: 100 MG/DL
HCT VFR BLD CALC: 48.2 % — SIGNIFICANT CHANGE UP (ref 39–50)
HGB BLD-MCNC: 16.1 G/DL — SIGNIFICANT CHANGE UP (ref 13–17)
HYALINE CASTS # UR AUTO: ABNORMAL /LPF
IMM GRANULOCYTES NFR BLD AUTO: 0.2 % — SIGNIFICANT CHANGE UP (ref 0–1.5)
KETONES UR-MCNC: NEGATIVE — SIGNIFICANT CHANGE UP
LEUKOCYTE ESTERASE UR-ACNC: NEGATIVE — SIGNIFICANT CHANGE UP
LIDOCAIN IGE QN: 83 U/L — SIGNIFICANT CHANGE UP (ref 73–393)
LYMPHOCYTES # BLD AUTO: 1.43 K/UL — SIGNIFICANT CHANGE UP (ref 1–3.3)
LYMPHOCYTES # BLD AUTO: 10.9 % — LOW (ref 13–44)
MAGNESIUM SERPL-MCNC: 2.4 MG/DL — SIGNIFICANT CHANGE UP (ref 1.6–2.6)
MCHC RBC-ENTMCNC: 27.9 PG — SIGNIFICANT CHANGE UP (ref 27–34)
MCHC RBC-ENTMCNC: 33.4 GM/DL — SIGNIFICANT CHANGE UP (ref 32–36)
MCV RBC AUTO: 83.4 FL — SIGNIFICANT CHANGE UP (ref 80–100)
MONOCYTES # BLD AUTO: 0.28 K/UL — SIGNIFICANT CHANGE UP (ref 0–0.9)
MONOCYTES NFR BLD AUTO: 2.1 % — SIGNIFICANT CHANGE UP (ref 2–14)
NEUTROPHILS # BLD AUTO: 11.32 K/UL — HIGH (ref 1.8–7.4)
NEUTROPHILS NFR BLD AUTO: 86.4 % — HIGH (ref 43–77)
NITRITE UR-MCNC: NEGATIVE — SIGNIFICANT CHANGE UP
NRBC # BLD: 0 /100 WBCS — SIGNIFICANT CHANGE UP (ref 0–0)
PH UR: 9 — HIGH (ref 5–8)
PLATELET # BLD AUTO: 389 K/UL — SIGNIFICANT CHANGE UP (ref 150–400)
POTASSIUM SERPL-MCNC: 3.8 MMOL/L — SIGNIFICANT CHANGE UP (ref 3.5–5.3)
POTASSIUM SERPL-SCNC: 3.8 MMOL/L — SIGNIFICANT CHANGE UP (ref 3.5–5.3)
PROT SERPL-MCNC: 9.1 GM/DL — HIGH (ref 6–8.3)
PROT UR-MCNC: 30 MG/DL
RBC # BLD: 5.78 M/UL — SIGNIFICANT CHANGE UP (ref 4.2–5.8)
RBC # FLD: 13.7 % — SIGNIFICANT CHANGE UP (ref 10.3–14.5)
RBC CASTS # UR COMP ASSIST: SIGNIFICANT CHANGE UP /HPF (ref 0–4)
SODIUM SERPL-SCNC: 140 MMOL/L — SIGNIFICANT CHANGE UP (ref 135–145)
SP GR SPEC: 1.01 — SIGNIFICANT CHANGE UP (ref 1.01–1.02)
UROBILINOGEN FLD QL: NEGATIVE MG/DL — SIGNIFICANT CHANGE UP
WBC # BLD: 13.11 K/UL — HIGH (ref 3.8–10.5)
WBC # FLD AUTO: 13.11 K/UL — HIGH (ref 3.8–10.5)

## 2018-02-25 PROCEDURE — 99285 EMERGENCY DEPT VISIT HI MDM: CPT

## 2018-02-25 PROCEDURE — 74177 CT ABD & PELVIS W/CONTRAST: CPT | Mod: 26

## 2018-02-25 RX ORDER — METOCLOPRAMIDE HCL 10 MG
10 TABLET ORAL ONCE
Qty: 0 | Refills: 0 | Status: COMPLETED | OUTPATIENT
Start: 2018-02-25 | End: 2018-02-25

## 2018-02-25 RX ORDER — KETOROLAC TROMETHAMINE 30 MG/ML
30 SYRINGE (ML) INJECTION ONCE
Qty: 0 | Refills: 0 | Status: DISCONTINUED | OUTPATIENT
Start: 2018-02-25 | End: 2018-02-25

## 2018-02-25 RX ORDER — IOHEXOL 300 MG/ML
30 INJECTION, SOLUTION INTRAVENOUS ONCE
Qty: 0 | Refills: 0 | Status: COMPLETED | OUTPATIENT
Start: 2018-02-25 | End: 2018-02-25

## 2018-02-25 RX ORDER — SODIUM CHLORIDE 9 MG/ML
2000 INJECTION INTRAMUSCULAR; INTRAVENOUS; SUBCUTANEOUS ONCE
Qty: 0 | Refills: 0 | Status: COMPLETED | OUTPATIENT
Start: 2018-02-25 | End: 2018-02-25

## 2018-02-25 RX ORDER — MORPHINE SULFATE 50 MG/1
4 CAPSULE, EXTENDED RELEASE ORAL ONCE
Qty: 0 | Refills: 0 | Status: DISCONTINUED | OUTPATIENT
Start: 2018-02-25 | End: 2018-02-25

## 2018-02-25 RX ADMIN — SODIUM CHLORIDE 1000 MILLILITER(S): 9 INJECTION INTRAMUSCULAR; INTRAVENOUS; SUBCUTANEOUS at 21:52

## 2018-02-25 RX ADMIN — Medication 10 MILLIGRAM(S): at 21:52

## 2018-02-25 RX ADMIN — Medication 30 MILLIGRAM(S): at 21:52

## 2018-02-25 RX ADMIN — Medication 1 MILLIGRAM(S): at 21:52

## 2018-02-25 RX ADMIN — IOHEXOL 30 MILLILITER(S): 300 INJECTION, SOLUTION INTRAVENOUS at 21:57

## 2018-02-25 RX ADMIN — MORPHINE SULFATE 4 MILLIGRAM(S): 50 CAPSULE, EXTENDED RELEASE ORAL at 21:52

## 2018-02-25 NOTE — ED PROVIDER NOTE - MEDICAL DECISION MAKING DETAILS
patient pw abd pain in the context of known gp. suspect repeat of the same. patient has had over 7 ct scans in the past year and they have all largely been unrevealing. given that, will not likely ct scan unless significant lab derangements or worsening of clinical course. patient pw abd pain in the context of known gp. suspect repeat of the same. patient has had over 7 ct scans in the past year and they have all largely been unrevealing. given that, will not likely ct scan unless significant lab derangements or worsening of clinical course.   at 3am, after treatment, patient feeling much better and toelrating po. he requests dc home. will dc home.

## 2018-02-25 NOTE — ED ADULT NURSE REASSESSMENT NOTE - NS ED NURSE REASSESS COMMENT FT1
pt remains in waiting area , no recent observation of vomiting , and no change to initial assessment

## 2018-02-25 NOTE — ED PROVIDER NOTE - OBJECTIVE STATEMENT
Pertinent PMH/PSH/FHx/SHx and Review of Systems contained within:  37m hx of dm on insulin and gastroparesis pw abd pain x5 days, progressing throughout the weak. associated with nausea, vomiting. patient says it feels like his known gastroparesis, he tried to medicate himself at home with baking soda, and that helped in the beginning of the week, but has not helped thereafter. he denies fever. patient has such a flair approx 1x monthly requiring hospital eval most of those times. patient moving bowels  Fh and Sh not otherwise contributory  ROS otherwise negative

## 2018-02-25 NOTE — ED ADULT TRIAGE NOTE - CHIEF COMPLAINT QUOTE
pt states, " I have abdominal and vomiting that started today" pt has hx gastroparesis, and diabetic , states his blood sugar was 145 at 1400

## 2018-02-25 NOTE — ED PROVIDER NOTE - PHYSICAL EXAMINATION
Gen: Alert, NAD  Head: NC, AT   Eyes: PERRL, EOMI, normal lids/conjunctiva  ENT: normal hearing, patent oropharynx without erythema/exudate, uvula midline  Neck: supple, no tenderness, Trachea midline  Pulm: Bilateral BS, normal resp effort, no wheeze/stridor/retractions  CV: RRR, no M/R/G, 2+ radial and dp pulses bl, no edema  Abd: soft, tender throughout abd, non distended. +BS, no hepatosplenomegaly  Mskel: extremities x4 with normal ROM and no joint effusions. no ctl spine ttp.   Skin: no rash, no bruising   Neuro: AAOx3, no sensory/motor deficits, CN 2-12 intact

## 2018-02-26 VITALS
TEMPERATURE: 98 F | SYSTOLIC BLOOD PRESSURE: 112 MMHG | DIASTOLIC BLOOD PRESSURE: 67 MMHG | HEART RATE: 89 BPM | OXYGEN SATURATION: 99 % | RESPIRATION RATE: 19 BRPM

## 2018-02-26 LAB
B-OH-BUTYR SERPL-SCNC: 0.1 MMOL/L — SIGNIFICANT CHANGE UP
CULTURE RESULTS: NO GROWTH — SIGNIFICANT CHANGE UP
SPECIMEN SOURCE: SIGNIFICANT CHANGE UP

## 2018-02-26 RX ADMIN — MORPHINE SULFATE 4 MILLIGRAM(S): 50 CAPSULE, EXTENDED RELEASE ORAL at 00:33

## 2018-02-26 RX ADMIN — Medication 30 MILLIGRAM(S): at 00:33

## 2018-06-09 ENCOUNTER — INPATIENT (INPATIENT)
Facility: HOSPITAL | Age: 38
LOS: 2 days | Discharge: LEFT BEFORE DISCHARGE | End: 2018-06-12
Attending: INTERNAL MEDICINE | Admitting: INTERNAL MEDICINE
Payer: MEDICAID

## 2018-06-09 VITALS
TEMPERATURE: 98 F | DIASTOLIC BLOOD PRESSURE: 83 MMHG | RESPIRATION RATE: 19 BRPM | HEIGHT: 69 IN | WEIGHT: 184.97 LBS | SYSTOLIC BLOOD PRESSURE: 152 MMHG | HEART RATE: 54 BPM | OXYGEN SATURATION: 98 %

## 2018-06-09 DIAGNOSIS — K29.70 GASTRITIS, UNSPECIFIED, WITHOUT BLEEDING: ICD-10-CM

## 2018-06-09 DIAGNOSIS — Z90.49 ACQUIRED ABSENCE OF OTHER SPECIFIED PARTS OF DIGESTIVE TRACT: Chronic | ICD-10-CM

## 2018-06-09 DIAGNOSIS — R11.2 NAUSEA WITH VOMITING, UNSPECIFIED: ICD-10-CM

## 2018-06-09 DIAGNOSIS — E10.9 TYPE 1 DIABETES MELLITUS WITHOUT COMPLICATIONS: ICD-10-CM

## 2018-06-09 LAB
ACETONE SERPL-MCNC: ABNORMAL
ALBUMIN SERPL ELPH-MCNC: 4.5 G/DL — SIGNIFICANT CHANGE UP (ref 3.3–5)
ALP SERPL-CCNC: 103 U/L — SIGNIFICANT CHANGE UP (ref 40–120)
ALT FLD-CCNC: 26 U/L — SIGNIFICANT CHANGE UP (ref 12–78)
ANION GAP SERPL CALC-SCNC: 11 MMOL/L — SIGNIFICANT CHANGE UP (ref 5–17)
APPEARANCE UR: ABNORMAL
APTT BLD: 24.5 SEC — LOW (ref 27.5–37.4)
AST SERPL-CCNC: 22 U/L — SIGNIFICANT CHANGE UP (ref 15–37)
BACTERIA # UR AUTO: ABNORMAL
BASE EXCESS BLDA CALC-SCNC: -0.2 MMOL/L — SIGNIFICANT CHANGE UP (ref -2–2)
BASOPHILS # BLD AUTO: 0.04 K/UL — SIGNIFICANT CHANGE UP (ref 0–0.2)
BASOPHILS NFR BLD AUTO: 0.3 % — SIGNIFICANT CHANGE UP (ref 0–2)
BILIRUB SERPL-MCNC: 0.6 MG/DL — SIGNIFICANT CHANGE UP (ref 0.2–1.2)
BILIRUB UR-MCNC: NEGATIVE — SIGNIFICANT CHANGE UP
BLOOD GAS COMMENTS: SIGNIFICANT CHANGE UP
BLOOD GAS COMMENTS: SIGNIFICANT CHANGE UP
BLOOD GAS SOURCE: SIGNIFICANT CHANGE UP
BUN SERPL-MCNC: 16 MG/DL — SIGNIFICANT CHANGE UP (ref 7–23)
CALCIUM SERPL-MCNC: 9.2 MG/DL — SIGNIFICANT CHANGE UP (ref 8.5–10.1)
CHLORIDE SERPL-SCNC: 104 MMOL/L — SIGNIFICANT CHANGE UP (ref 96–108)
CO2 SERPL-SCNC: 25 MMOL/L — SIGNIFICANT CHANGE UP (ref 22–31)
COLOR SPEC: YELLOW — SIGNIFICANT CHANGE UP
CREAT SERPL-MCNC: 1.03 MG/DL — SIGNIFICANT CHANGE UP (ref 0.5–1.3)
DIFF PNL FLD: NEGATIVE — SIGNIFICANT CHANGE UP
EOSINOPHIL # BLD AUTO: 0.08 K/UL — SIGNIFICANT CHANGE UP (ref 0–0.5)
EOSINOPHIL NFR BLD AUTO: 0.6 % — SIGNIFICANT CHANGE UP (ref 0–6)
GLUCOSE BLDC GLUCOMTR-MCNC: 122 MG/DL — HIGH (ref 70–99)
GLUCOSE SERPL-MCNC: 144 MG/DL — HIGH (ref 70–99)
GLUCOSE UR QL: NEGATIVE MG/DL — SIGNIFICANT CHANGE UP
HCO3 BLDA-SCNC: 25 MMOL/L — SIGNIFICANT CHANGE UP (ref 21–29)
HCT VFR BLD CALC: 45.6 % — SIGNIFICANT CHANGE UP (ref 39–50)
HGB BLD-MCNC: 15.3 G/DL — SIGNIFICANT CHANGE UP (ref 13–17)
HOROWITZ INDEX BLDA+IHG-RTO: 21 — SIGNIFICANT CHANGE UP
IMM GRANULOCYTES NFR BLD AUTO: 0.2 % — SIGNIFICANT CHANGE UP (ref 0–1.5)
INR BLD: 1 RATIO — SIGNIFICANT CHANGE UP (ref 0.88–1.16)
KETONES UR-MCNC: ABNORMAL
LACTATE SERPL-SCNC: 1.4 MMOL/L — SIGNIFICANT CHANGE UP (ref 0.7–2)
LEUKOCYTE ESTERASE UR-ACNC: NEGATIVE — SIGNIFICANT CHANGE UP
LIDOCAIN IGE QN: 34 U/L — LOW (ref 73–393)
LYMPHOCYTES # BLD AUTO: 1.93 K/UL — SIGNIFICANT CHANGE UP (ref 1–3.3)
LYMPHOCYTES # BLD AUTO: 15.3 % — SIGNIFICANT CHANGE UP (ref 13–44)
MAGNESIUM SERPL-MCNC: 2.4 MG/DL — SIGNIFICANT CHANGE UP (ref 1.6–2.6)
MCHC RBC-ENTMCNC: 27.4 PG — SIGNIFICANT CHANGE UP (ref 27–34)
MCHC RBC-ENTMCNC: 33.6 GM/DL — SIGNIFICANT CHANGE UP (ref 32–36)
MCV RBC AUTO: 81.6 FL — SIGNIFICANT CHANGE UP (ref 80–100)
MONOCYTES # BLD AUTO: 0.45 K/UL — SIGNIFICANT CHANGE UP (ref 0–0.9)
MONOCYTES NFR BLD AUTO: 3.6 % — SIGNIFICANT CHANGE UP (ref 2–14)
NEUTROPHILS # BLD AUTO: 10.06 K/UL — HIGH (ref 1.8–7.4)
NEUTROPHILS NFR BLD AUTO: 80 % — HIGH (ref 43–77)
NITRITE UR-MCNC: NEGATIVE — SIGNIFICANT CHANGE UP
NRBC # BLD: 0 /100 WBCS — SIGNIFICANT CHANGE UP (ref 0–0)
PCO2 BLDA: 43 MMHG — SIGNIFICANT CHANGE UP (ref 32–46)
PH BLD: 7.38 — SIGNIFICANT CHANGE UP (ref 7.35–7.45)
PH UR: 6.5 — SIGNIFICANT CHANGE UP (ref 5–8)
PLATELET # BLD AUTO: 415 K/UL — HIGH (ref 150–400)
PO2 BLDA: 80 MMHG — SIGNIFICANT CHANGE UP (ref 74–108)
POTASSIUM SERPL-MCNC: 3.6 MMOL/L — SIGNIFICANT CHANGE UP (ref 3.5–5.3)
POTASSIUM SERPL-SCNC: 3.6 MMOL/L — SIGNIFICANT CHANGE UP (ref 3.5–5.3)
PROT SERPL-MCNC: 8.8 GM/DL — HIGH (ref 6–8.3)
PROT UR-MCNC: 15 MG/DL
PROTHROM AB SERPL-ACNC: 10.9 SEC — SIGNIFICANT CHANGE UP (ref 9.8–12.7)
RBC # BLD: 5.59 M/UL — SIGNIFICANT CHANGE UP (ref 4.2–5.8)
RBC # FLD: 13.5 % — SIGNIFICANT CHANGE UP (ref 10.3–14.5)
RBC CASTS # UR COMP ASSIST: NEGATIVE /HPF — SIGNIFICANT CHANGE UP (ref 0–4)
SAO2 % BLDA: 96 % — SIGNIFICANT CHANGE UP (ref 92–96)
SODIUM SERPL-SCNC: 140 MMOL/L — SIGNIFICANT CHANGE UP (ref 135–145)
SP GR SPEC: 1.01 — SIGNIFICANT CHANGE UP (ref 1.01–1.02)
UROBILINOGEN FLD QL: NEGATIVE MG/DL — SIGNIFICANT CHANGE UP
WBC # BLD: 12.59 K/UL — HIGH (ref 3.8–10.5)
WBC # FLD AUTO: 12.59 K/UL — HIGH (ref 3.8–10.5)
WBC UR QL: SIGNIFICANT CHANGE UP

## 2018-06-09 PROCEDURE — 99285 EMERGENCY DEPT VISIT HI MDM: CPT

## 2018-06-09 PROCEDURE — 74177 CT ABD & PELVIS W/CONTRAST: CPT | Mod: 26

## 2018-06-09 RX ORDER — MORPHINE SULFATE 50 MG/1
2 CAPSULE, EXTENDED RELEASE ORAL EVERY 4 HOURS
Qty: 0 | Refills: 0 | Status: DISCONTINUED | OUTPATIENT
Start: 2018-06-09 | End: 2018-06-12

## 2018-06-09 RX ORDER — ENOXAPARIN SODIUM 100 MG/ML
40 INJECTION SUBCUTANEOUS EVERY 24 HOURS
Qty: 0 | Refills: 0 | Status: DISCONTINUED | OUTPATIENT
Start: 2018-06-09 | End: 2018-06-12

## 2018-06-09 RX ORDER — SODIUM CHLORIDE 9 MG/ML
2000 INJECTION INTRAMUSCULAR; INTRAVENOUS; SUBCUTANEOUS ONCE
Qty: 0 | Refills: 0 | Status: COMPLETED | OUTPATIENT
Start: 2018-06-09 | End: 2018-06-09

## 2018-06-09 RX ORDER — CLONAZEPAM 1 MG
0.5 TABLET ORAL
Qty: 0 | Refills: 0 | Status: DISCONTINUED | OUTPATIENT
Start: 2018-06-09 | End: 2018-06-12

## 2018-06-09 RX ORDER — GLUCAGON INJECTION, SOLUTION 0.5 MG/.1ML
1 INJECTION, SOLUTION SUBCUTANEOUS ONCE
Qty: 0 | Refills: 0 | Status: DISCONTINUED | OUTPATIENT
Start: 2018-06-09 | End: 2018-06-12

## 2018-06-09 RX ORDER — ESCITALOPRAM OXALATE 10 MG/1
5 TABLET, FILM COATED ORAL AT BEDTIME
Qty: 0 | Refills: 0 | Status: DISCONTINUED | OUTPATIENT
Start: 2018-06-09 | End: 2018-06-12

## 2018-06-09 RX ORDER — INSULIN GLARGINE 100 [IU]/ML
30 INJECTION, SOLUTION SUBCUTANEOUS AT BEDTIME
Qty: 0 | Refills: 0 | Status: DISCONTINUED | OUTPATIENT
Start: 2018-06-09 | End: 2018-06-12

## 2018-06-09 RX ORDER — ONDANSETRON 8 MG/1
8 TABLET, FILM COATED ORAL ONCE
Qty: 0 | Refills: 0 | Status: COMPLETED | OUTPATIENT
Start: 2018-06-09 | End: 2018-06-09

## 2018-06-09 RX ORDER — ONDANSETRON 8 MG/1
4 TABLET, FILM COATED ORAL EVERY 4 HOURS
Qty: 0 | Refills: 0 | Status: DISCONTINUED | OUTPATIENT
Start: 2018-06-09 | End: 2018-06-12

## 2018-06-09 RX ORDER — MORPHINE SULFATE 50 MG/1
6 CAPSULE, EXTENDED RELEASE ORAL ONCE
Qty: 0 | Refills: 0 | Status: DISCONTINUED | OUTPATIENT
Start: 2018-06-09 | End: 2018-06-09

## 2018-06-09 RX ORDER — SODIUM CHLORIDE 9 MG/ML
1000 INJECTION INTRAMUSCULAR; INTRAVENOUS; SUBCUTANEOUS
Qty: 0 | Refills: 0 | Status: DISCONTINUED | OUTPATIENT
Start: 2018-06-09 | End: 2018-06-12

## 2018-06-09 RX ORDER — METOCLOPRAMIDE HCL 10 MG
10 TABLET ORAL ONCE
Qty: 0 | Refills: 0 | Status: COMPLETED | OUTPATIENT
Start: 2018-06-09 | End: 2018-06-09

## 2018-06-09 RX ORDER — SODIUM CHLORIDE 9 MG/ML
1000 INJECTION, SOLUTION INTRAVENOUS
Qty: 0 | Refills: 0 | Status: DISCONTINUED | OUTPATIENT
Start: 2018-06-09 | End: 2018-06-12

## 2018-06-09 RX ORDER — MORPHINE SULFATE 50 MG/1
4 CAPSULE, EXTENDED RELEASE ORAL ONCE
Qty: 0 | Refills: 0 | Status: DISCONTINUED | OUTPATIENT
Start: 2018-06-09 | End: 2018-06-09

## 2018-06-09 RX ORDER — PANTOPRAZOLE SODIUM 20 MG/1
40 TABLET, DELAYED RELEASE ORAL DAILY
Qty: 0 | Refills: 0 | Status: DISCONTINUED | OUTPATIENT
Start: 2018-06-09 | End: 2018-06-12

## 2018-06-09 RX ORDER — IOHEXOL 300 MG/ML
30 INJECTION, SOLUTION INTRAVENOUS ONCE
Qty: 0 | Refills: 0 | Status: COMPLETED | OUTPATIENT
Start: 2018-06-09 | End: 2018-06-09

## 2018-06-09 RX ORDER — DEXTROSE 50 % IN WATER 50 %
15 SYRINGE (ML) INTRAVENOUS ONCE
Qty: 0 | Refills: 0 | Status: DISCONTINUED | OUTPATIENT
Start: 2018-06-09 | End: 2018-06-12

## 2018-06-09 RX ORDER — INSULIN LISPRO 100/ML
VIAL (ML) SUBCUTANEOUS
Qty: 0 | Refills: 0 | Status: DISCONTINUED | OUTPATIENT
Start: 2018-06-09 | End: 2018-06-12

## 2018-06-09 RX ORDER — MESALAMINE 400 MG
400 TABLET, DELAYED RELEASE (ENTERIC COATED) ORAL THREE TIMES A DAY
Qty: 0 | Refills: 0 | Status: DISCONTINUED | OUTPATIENT
Start: 2018-06-09 | End: 2018-06-12

## 2018-06-09 RX ORDER — DEXTROSE 50 % IN WATER 50 %
12.5 SYRINGE (ML) INTRAVENOUS ONCE
Qty: 0 | Refills: 0 | Status: DISCONTINUED | OUTPATIENT
Start: 2018-06-09 | End: 2018-06-12

## 2018-06-09 RX ADMIN — ONDANSETRON 8 MILLIGRAM(S): 8 TABLET, FILM COATED ORAL at 15:37

## 2018-06-09 RX ADMIN — MORPHINE SULFATE 6 MILLIGRAM(S): 50 CAPSULE, EXTENDED RELEASE ORAL at 15:41

## 2018-06-09 RX ADMIN — MORPHINE SULFATE 2 MILLIGRAM(S): 50 CAPSULE, EXTENDED RELEASE ORAL at 21:51

## 2018-06-09 RX ADMIN — Medication 10 MILLIGRAM(S): at 17:49

## 2018-06-09 RX ADMIN — PANTOPRAZOLE SODIUM 40 MILLIGRAM(S): 20 TABLET, DELAYED RELEASE ORAL at 20:10

## 2018-06-09 RX ADMIN — IOHEXOL 30 MILLILITER(S): 300 INJECTION, SOLUTION INTRAVENOUS at 17:17

## 2018-06-09 RX ADMIN — SODIUM CHLORIDE 2000 MILLILITER(S): 9 INJECTION INTRAMUSCULAR; INTRAVENOUS; SUBCUTANEOUS at 15:43

## 2018-06-09 RX ADMIN — SODIUM CHLORIDE 75 MILLILITER(S): 9 INJECTION INTRAMUSCULAR; INTRAVENOUS; SUBCUTANEOUS at 19:27

## 2018-06-09 RX ADMIN — MORPHINE SULFATE 4 MILLIGRAM(S): 50 CAPSULE, EXTENDED RELEASE ORAL at 17:50

## 2018-06-09 RX ADMIN — ESCITALOPRAM OXALATE 5 MILLIGRAM(S): 10 TABLET, FILM COATED ORAL at 21:53

## 2018-06-09 RX ADMIN — ONDANSETRON 4 MILLIGRAM(S): 8 TABLET, FILM COATED ORAL at 21:51

## 2018-06-09 RX ADMIN — MORPHINE SULFATE 4 MILLIGRAM(S): 50 CAPSULE, EXTENDED RELEASE ORAL at 18:30

## 2018-06-09 RX ADMIN — Medication 400 MILLIGRAM(S): at 21:53

## 2018-06-09 RX ADMIN — MORPHINE SULFATE 6 MILLIGRAM(S): 50 CAPSULE, EXTENDED RELEASE ORAL at 16:15

## 2018-06-09 NOTE — ED PROVIDER NOTE - PHYSICAL EXAMINATION
Gen: Alert, patient is vomiting   Head: NC, AT   Eyes: PERRL, EOMI, normal lids/conjunctiva  ENT: normal hearing, patent oropharynx without erythema/exudate, uvula midline  Neck: supple, no tenderness, Trachea midline  Pulm: Bilateral BS, normal resp effort, no wheeze/stridor/retractions  CV: RRR, no M/R/G, 2+ radial and dp pulses bl, no edema  Abd: soft, mild diffuse ttp, no peritonitis +BS, no hepatosplenomegaly  Mskel: extremities x4 with normal ROM and no joint effusions. no ctl spine ttp.   Skin: no rash, no bruising   Neuro: AAOx3, no sensory/motor deficits, CN 2-12 intact

## 2018-06-09 NOTE — ED PROVIDER NOTE - PRINCIPAL DIAGNOSIS
Non-intractable vomiting with nausea, unspecified vomiting type Intractable vomiting with nausea, unspecified vomiting type

## 2018-06-09 NOTE — ED ADULT NURSE REASSESSMENT NOTE - NS ED NURSE REASSESS COMMENT FT1
patient vomited after drinking oral contrast as prep for ct scan of abdomen. MD. aware and Reglan ivp given as ordered.

## 2018-06-09 NOTE — H&P ADULT - ASSESSMENT
37m hx type 1 dm pw nausea and vomiting and abd pain since today. abd pain is periumbilical. there is no cp, sob, fever, diarrhea,

## 2018-06-09 NOTE — ED PROVIDER NOTE - OBJECTIVE STATEMENT
Pertinent PMH/PSH/FHx/SHx and Review of Systems contained within:  37m hx type 1 dm pw nausea and vomiting and abd pain since today. abd pain is periumbilical. there is no cp, sob, fever, diarrhea, ha, vision change. patient reports no unusual foods, etoh or travel. patient has not taken anything for symptoms. he reports compliance with his insulin  Fh and Sh not otherwise contributory  ROS otherwise negative

## 2018-06-09 NOTE — ED PROVIDER NOTE - CARE PLAN
Principal Discharge DX:	Non-intractable vomiting with nausea, unspecified vomiting type Principal Discharge DX:	Intractable vomiting with nausea, unspecified vomiting type

## 2018-06-09 NOTE — ED ADULT NURSE NOTE - OBJECTIVE STATEMENT
Received pt sitting on stretcher alert and oriented x4 actively vomiting greenish bile emesis pt c/o pain to the abdomen accompanied with N/V since this morning hx of DM1  and asthma

## 2018-06-09 NOTE — H&P ADULT - NSHPLABSRESULTS_GEN_ALL_CORE
15.3   12.59 )-----------( 415      ( 09 Jun 2018 15:41 )             45.6     06-09    140  |  104  |  16  ----------------------------<  144<H>  3.6   |  25  |  1.03    Ca    9.2      09 Jun 2018 15:41  Mg     2.4     06-09    TPro  8.8<H>  /  Alb  4.5  /  TBili  0.6  /  DBili  x   /  AST  22  /  ALT  26  /  AlkPhos  103  06-09    PT/INR - ( 09 Jun 2018 15:41 )   PT: 10.9 sec;   INR: 1.00 ratio         PTT - ( 09 Jun 2018 15:41 )  PTT:24.5 sec

## 2018-06-09 NOTE — ED ADULT TRIAGE NOTE - CHIEF COMPLAINT QUOTE
patient c/o of abdominal pain started today denied back pain no chest pain , c/o of N/V , denied difficulty breathing

## 2018-06-10 DIAGNOSIS — E11.43 TYPE 2 DIABETES MELLITUS WITH DIABETIC AUTONOMIC (POLY)NEUROPATHY: ICD-10-CM

## 2018-06-10 DIAGNOSIS — E10.9 TYPE 1 DIABETES MELLITUS WITHOUT COMPLICATIONS: ICD-10-CM

## 2018-06-10 DIAGNOSIS — K52.9 NONINFECTIVE GASTROENTERITIS AND COLITIS, UNSPECIFIED: ICD-10-CM

## 2018-06-10 LAB
ANION GAP SERPL CALC-SCNC: 10 MMOL/L — SIGNIFICANT CHANGE UP (ref 5–17)
B-OH-BUTYR SERPL-SCNC: 0.9 MMOL/L — HIGH
BUN SERPL-MCNC: 10 MG/DL — SIGNIFICANT CHANGE UP (ref 7–23)
CALCIUM SERPL-MCNC: 7.9 MG/DL — LOW (ref 8.5–10.1)
CHLORIDE SERPL-SCNC: 105 MMOL/L — SIGNIFICANT CHANGE UP (ref 96–108)
CO2 SERPL-SCNC: 24 MMOL/L — SIGNIFICANT CHANGE UP (ref 22–31)
CREAT SERPL-MCNC: 0.93 MG/DL — SIGNIFICANT CHANGE UP (ref 0.5–1.3)
CULTURE RESULTS: NO GROWTH — SIGNIFICANT CHANGE UP
GLUCOSE BLDC GLUCOMTR-MCNC: 176 MG/DL — HIGH (ref 70–99)
GLUCOSE BLDC GLUCOMTR-MCNC: 198 MG/DL — HIGH (ref 70–99)
GLUCOSE BLDC GLUCOMTR-MCNC: 240 MG/DL — HIGH (ref 70–99)
GLUCOSE BLDC GLUCOMTR-MCNC: 247 MG/DL — HIGH (ref 70–99)
GLUCOSE SERPL-MCNC: 221 MG/DL — HIGH (ref 70–99)
HBA1C BLD-MCNC: 8.7 % — HIGH (ref 4–5.6)
HCT VFR BLD CALC: 35.8 % — LOW (ref 39–50)
HGB BLD-MCNC: 12.2 G/DL — LOW (ref 13–17)
MCHC RBC-ENTMCNC: 28.2 PG — SIGNIFICANT CHANGE UP (ref 27–34)
MCHC RBC-ENTMCNC: 34.1 GM/DL — SIGNIFICANT CHANGE UP (ref 32–36)
MCV RBC AUTO: 82.7 FL — SIGNIFICANT CHANGE UP (ref 80–100)
NRBC # BLD: 0 /100 WBCS — SIGNIFICANT CHANGE UP (ref 0–0)
PLATELET # BLD AUTO: 313 K/UL — SIGNIFICANT CHANGE UP (ref 150–400)
POTASSIUM SERPL-MCNC: 3.5 MMOL/L — SIGNIFICANT CHANGE UP (ref 3.5–5.3)
POTASSIUM SERPL-SCNC: 3.5 MMOL/L — SIGNIFICANT CHANGE UP (ref 3.5–5.3)
RBC # BLD: 4.33 M/UL — SIGNIFICANT CHANGE UP (ref 4.2–5.8)
RBC # FLD: 13.6 % — SIGNIFICANT CHANGE UP (ref 10.3–14.5)
SODIUM SERPL-SCNC: 139 MMOL/L — SIGNIFICANT CHANGE UP (ref 135–145)
SPECIMEN SOURCE: SIGNIFICANT CHANGE UP
WBC # BLD: 10.5 K/UL — SIGNIFICANT CHANGE UP (ref 3.8–10.5)
WBC # FLD AUTO: 10.5 K/UL — SIGNIFICANT CHANGE UP (ref 3.8–10.5)

## 2018-06-10 PROCEDURE — 93010 ELECTROCARDIOGRAM REPORT: CPT

## 2018-06-10 RX ORDER — ACETAMINOPHEN 500 MG
650 TABLET ORAL ONCE
Qty: 0 | Refills: 0 | Status: COMPLETED | OUTPATIENT
Start: 2018-06-10 | End: 2018-06-10

## 2018-06-10 RX ORDER — INSULIN LISPRO 100/ML
VIAL (ML) SUBCUTANEOUS AT BEDTIME
Qty: 0 | Refills: 0 | Status: DISCONTINUED | OUTPATIENT
Start: 2018-06-10 | End: 2018-06-12

## 2018-06-10 RX ORDER — METOCLOPRAMIDE HCL 10 MG
10 TABLET ORAL ONCE
Qty: 0 | Refills: 0 | Status: COMPLETED | OUTPATIENT
Start: 2018-06-10 | End: 2018-06-10

## 2018-06-10 RX ORDER — KETOROLAC TROMETHAMINE 30 MG/ML
15 SYRINGE (ML) INJECTION ONCE
Qty: 0 | Refills: 0 | Status: DISCONTINUED | OUTPATIENT
Start: 2018-06-10 | End: 2018-06-10

## 2018-06-10 RX ADMIN — Medication 10 MILLIGRAM(S): at 13:12

## 2018-06-10 RX ADMIN — PANTOPRAZOLE SODIUM 40 MILLIGRAM(S): 20 TABLET, DELAYED RELEASE ORAL at 11:58

## 2018-06-10 RX ADMIN — Medication 0.5 MILLIGRAM(S): at 23:26

## 2018-06-10 RX ADMIN — Medication 400 MILLIGRAM(S): at 13:12

## 2018-06-10 RX ADMIN — ONDANSETRON 4 MILLIGRAM(S): 8 TABLET, FILM COATED ORAL at 09:57

## 2018-06-10 RX ADMIN — MORPHINE SULFATE 2 MILLIGRAM(S): 50 CAPSULE, EXTENDED RELEASE ORAL at 01:21

## 2018-06-10 RX ADMIN — MORPHINE SULFATE 2 MILLIGRAM(S): 50 CAPSULE, EXTENDED RELEASE ORAL at 10:13

## 2018-06-10 RX ADMIN — MORPHINE SULFATE 2 MILLIGRAM(S): 50 CAPSULE, EXTENDED RELEASE ORAL at 02:00

## 2018-06-10 RX ADMIN — MORPHINE SULFATE 2 MILLIGRAM(S): 50 CAPSULE, EXTENDED RELEASE ORAL at 09:37

## 2018-06-10 RX ADMIN — Medication 4: at 17:18

## 2018-06-10 RX ADMIN — ONDANSETRON 4 MILLIGRAM(S): 8 TABLET, FILM COATED ORAL at 23:34

## 2018-06-10 RX ADMIN — Medication 0.5 MILLIGRAM(S): at 01:26

## 2018-06-10 RX ADMIN — Medication 400 MILLIGRAM(S): at 23:27

## 2018-06-10 RX ADMIN — Medication 400 MILLIGRAM(S): at 06:00

## 2018-06-10 RX ADMIN — MORPHINE SULFATE 2 MILLIGRAM(S): 50 CAPSULE, EXTENDED RELEASE ORAL at 23:26

## 2018-06-10 RX ADMIN — Medication 2: at 11:52

## 2018-06-10 RX ADMIN — Medication 15 MILLIGRAM(S): at 15:55

## 2018-06-10 RX ADMIN — Medication 15 MILLIGRAM(S): at 14:57

## 2018-06-10 RX ADMIN — Medication 2: at 08:34

## 2018-06-10 NOTE — CONSULT NOTE ADULT - SUBJECTIVE AND OBJECTIVE BOX
Chief Complaint:  Patient is a 37y old  Male who presents with a chief complaint of N/V     HPI:  37m hx type 1 dm pw nausea and vomiting and abd pain since today. abd pain is periumbilical. there is no cp, sob, fever, diarrhea, Similar episodes in past with gastroparesis .Positive gastric emptying study in past but no EGD      PMH/PSH:PAST MEDICAL & SURGICAL HISTORY:  Uncomplicated asthma, unspecified asthma severity, unspecified whether persistent  Gastritis, presence of bleeding unspecified, unspecified chronicity, unspecified gastritis type  Controlled diabetes mellitus type 1 without complications  Anxiety  Gastritis  Asthma  DM type 1 (diabetes mellitus, type 1)  History of cholecystectomy  S/P cholecystectomy  Gastroparesis      Allergies:  No Known Allergies      Medications:  clonazePAM Tablet 0.5 milliGRAM(s) Oral two times a day PRN  dextrose 40% Gel 15 Gram(s) Oral once PRN  dextrose 5%. 1000 milliLiter(s) IV Continuous <Continuous>  dextrose 50% Injectable 12.5 Gram(s) IV Push once  enoxaparin Injectable 40 milliGRAM(s) SubCutaneous every 24 hours  escitalopram 5 milliGRAM(s) Oral at bedtime  glucagon  Injectable 1 milliGRAM(s) IntraMuscular once PRN  insulin glargine Injectable (LANTUS) 30 Unit(s) SubCutaneous at bedtime  insulin lispro (HumaLOG) corrective regimen sliding scale   SubCutaneous three times a day before meals  insulin lispro (HumaLOG) corrective regimen sliding scale   SubCutaneous at bedtime  mesalamine DR Capsule 400 milliGRAM(s) Oral three times a day  morphine  - Injectable 2 milliGRAM(s) IV Push every 4 hours PRN  ondansetron Injectable 4 milliGRAM(s) IV Push every 4 hours PRN  pantoprazole  Injectable 40 milliGRAM(s) IV Push daily  sodium chloride 0.9%. 1000 milliLiter(s) IV Continuous <Continuous>      Review of Systems:    General:  No weight loss, fevers, chills, night sweats, fatigue,   Eyes:  Good vision, no reported pain  ENT:  No sore throat, pain, runny nose, dysphagia  CV:  No pain, palpitations, hypo/hypertension  Resp:  No dyspnea, cough, tachypnea, wheezing  GI:  epigastric pain,  nausea,  vomiting, No diarrhea, No constipation, No pruritis, No rectal bleeding, No tarry stools, No dysphagia,  :  No pain, bleeding, incontinence, nocturia  Muscle:  No pain, weakness  Breast:  No pain, abscess, mass, discharge  Neuro:  No weakness, tingling, memory problems  Psych:  No fatigue, insomnia, mood problems, depression  Endocrine:  No polyuria, polydypsia, cold/heat intolerance  Heme:  No petechiae, ecchymosis, easy bruisability  Skin:  No rash, tattoos, scars, edema    FAMILY HISTORY:  Family history of diabetes mellitus (DM) (Father, Mother, Grandparent)  Family history of diabetes mellitus (Father, Mother)      Relevant Social History: Alcohol( ), Tobacco( )    Physical Exam:    Vital Signs:  Vital Signs Last 24 Hrs  T(C): 37.1 (10 Tushar 2018 05:15), Max: 37.1 (2018 22:50)  T(F): 98.8 (10 Tushar 2018 05:15), Max: 98.8 (2018 22:50)  HR: 62 (10 Tushar 2018 05:15) (54 - 72)  BP: 112/68 (10 Tushar 2018 05:15) (112/68 - 152/83)  BP(mean): --  RR: 18 (10 Tushar 2018 05:15) (18 - 19)  SpO2: 100% (10 Tushar 2018 05:15) (96% - 100%)  Daily Height in cm: 175.26 (2018 15:11)    Daily Weight in k.1 (10 Tushar 2018 05:15)    General:  Appears stated age, well-groomed, well-nourished, no distress  HEENT:  NC/AT,  conjunctivae clear and pink, no thyromegaly, nodules, adenopathy, no JVD  Chest:  Full & symmetric excursion, no increased effort, breath sounds clear  Cardiovascular:  Regular rhythm, S1, S2, no murmur/rub/S3/S4, no abdominal bruit, no edema  Abdomen:  Soft, -tender, non-distended, normoactive bowel sounds,  no masses , no hepatosplenomegaly, no signs of chronic liver disease  Extremities:  no cyanosis, clubbing or edema  Skin:  No rash/erythema/ecchymoses/petechiae/wounds/abscess/warm/dry  Neuro/Psych:  Alert, oriented, no asterixis, no tremor, no encephalopathy  Rectal: no masses, no blood, normal tone     Laboratory:                          12.2   10.50 )-----------( 313      ( 10 Tushar 2018 06:26 )             35.8     06-10    139  |  105  |  10  ----------------------------<  221<H>  3.5   |  24  |  0.93    Ca    7.9<L>      10 Tushar 2018 06:26  Mg     2.4         TPro  8.8<H>  /  Alb  4.5  /  TBili  0.6  /  DBili  x   /  AST  22  /  ALT  26  /  AlkPhos  103      LIVER FUNCTIONS - ( 2018 15:41 )  Alb: 4.5 g/dL / Pro: 8.8 gm/dL / ALK PHOS: 103 U/L / ALT: 26 U/L / AST: 22 U/L / GGT: x           PT/INR - ( 2018 15:41 )   PT: 10.9 sec;   INR: 1.00 ratio         PTT - ( 2018 15:41 )  PTT:24.5 sec  Urinalysis Basic - ( 2018 20:28 )    Color: Yellow / Appearance: Slightly Turbid / S.010 / pH: x  Gluc: x / Ketone: Large  / Bili: Negative / Urobili: Negative mg/dL   Blood: x / Protein: 15 mg/dL / Nitrite: Negative   Leuk Esterase: Negative / RBC: Negative /HPF / WBC 3-5   Sq Epi: x / Non Sq Epi: x / Bacteria: Occasional            Lipase serum34 U/L<L>                 18 @ 07:  -  06-10-18 @ 07:00  --------------------------------------------------------  IN: 525 mL / OUT: 0 mL / NET: 525 mL    06-10-18 @ 07:01  -  06-10-18 @ 11:20  --------------------------------------------------------  IN: 300 mL / OUT: 0 mL / NET: 300 mL        Imaging:    < from: CT Abdomen and Pelvis w/ Oral Cont and w/ IV Cont (18 @ 21:26) >  EXAM:  CT ABDOMEN AND PELVIS OC IC                            PROCEDURE DATE:  2018          INTERPRETATION:  CLINICAL INFORMATION: Upper abdominal pain and vomiting   for one day.    COMPARISON: 2018.    PROCEDURE:   CT of the Abdomen and Pelvis was performed with intravenous contrast.   Intravenous contrast: 85 mL Omnipaque 350. 15 mL discarded.  Oral contrast: positive contrast was administered.  Sagittal and coronal reformats were performed.    FINDINGS:    LOWER CHEST: Within normal limits.    LIVER: Within normal limits.  BILE DUCTS: Normal caliber.   GALLBLADDER: Status post cholecystectomy.  SPLEEN: Within normal limits.  PANCREAS: Within normal limits.  ADRENALS: Within normal limits.  KIDNEYS/URETERS: Within normal limits.     BLADDER: Within normal limits.  REPRODUCTIVE ORGANS: The prostate is within normal limits.    BOWEL: Diffuse mural thickening of the ascending colon. No bowel   obstruction. Appendix not visualized, but no secondary signs of   appendicitis.    PERITONEUM: No ascites.  VESSELS:  Within normal limits.  RETROPERITONEUM: No lymphadenopathy.    ABDOMINAL WALL: Within normal limits.  BONES: Within normal limits.    IMPRESSION:      Diffuse mural thickening of the ascending colon, consistent with colitis.    Dr. Abdi discussed the findings, which represent a change from preliminary   report, with Dr. Ford on Ashley 10, 2018 at 10:03 AM.  Readback was   obtained.          < end of copied text >
Patient is a 37y old  Male who presents with a chief complaint of N/V (09 Jun 2018 18:37)      Reason For Consult: hyperglycemia     HPI:  37m hx type 1 dm pw nausea and vomiting and abd pain since today. abd pain is periumbilical. there is no cp, sob, fever, diarrhea, (09 Jun 2018 18:37)  no Diabetic Ketoacidosis   on Lantus 30 units and also Lispro sliding scale coverage with meals   PO decreased   finger sticks are in low 200's    PAST MEDICAL & SURGICAL HISTORY:  Uncomplicated asthma, unspecified asthma severity, unspecified whether persistent  Gastritis, presence of bleeding unspecified, unspecified chronicity, unspecified gastritis type  Controlled diabetes mellitus type 1 without complications  Anxiety  Gastritis  Asthma  DM type 1 (diabetes mellitus, type 1)  History of cholecystectomy  S/P cholecystectomy      FAMILY HISTORY:  Family history of diabetes mellitus (DM) (Father, Mother, Grandparent)  Family history of diabetes mellitus (Father, Mother)        Social History:    MEDICATIONS  (STANDING):  dextrose 5%. 1000 milliLiter(s) (50 mL/Hr) IV Continuous <Continuous>  dextrose 50% Injectable 12.5 Gram(s) IV Push once  enoxaparin Injectable 40 milliGRAM(s) SubCutaneous every 24 hours  escitalopram 5 milliGRAM(s) Oral at bedtime  insulin glargine Injectable (LANTUS) 30 Unit(s) SubCutaneous at bedtime  insulin lispro (HumaLOG) corrective regimen sliding scale   SubCutaneous three times a day before meals  insulin lispro (HumaLOG) corrective regimen sliding scale   SubCutaneous at bedtime  mesalamine DR Capsule 400 milliGRAM(s) Oral three times a day  pantoprazole  Injectable 40 milliGRAM(s) IV Push daily  sodium chloride 0.9%. 1000 milliLiter(s) (75 mL/Hr) IV Continuous <Continuous>    MEDICATIONS  (PRN):  clonazePAM Tablet 0.5 milliGRAM(s) Oral two times a day PRN anxiety  dextrose 40% Gel 15 Gram(s) Oral once PRN Blood Glucose LESS THAN 70 milliGRAM(s)/deciliter  glucagon  Injectable 1 milliGRAM(s) IntraMuscular once PRN Glucose LESS THAN 70 milligrams/deciliter  morphine  - Injectable 2 milliGRAM(s) IV Push every 4 hours PRN Severe Pain (7 - 10)  ondansetron Injectable 4 milliGRAM(s) IV Push every 4 hours PRN Nausea and/or Vomiting      REVIEW OF SYSTEMS:  CONSTITUTIONAL:  as per HPI  HEENT:  Eyes:  No diplopia or blurred vision. ENT:  No earache, sore throat or runny nose.  CARDIOVASCULAR:  No pressure, squeezing, strangling, tightness, heaviness or aching about the chest, neck, axilla or epigastrium.  RESPIRATORY:  No cough, shortness of breath, PND or orthopnea.  GASTROINTESTINAL:  No nausea, vomiting or diarrhea.  GENITOURINARY:  No dysuria, frequency or urgency. No Blood in urine  MUSCULOSKELETAL:  no joint aches, no muscle pain, myalgia  SKIN:  No change in skin, hair or nails.  NEUROLOGIC:  No paresthesias, fasciculations, seizures or weakness.  PSYCHIATRIC:  No disorder of thought or mood.  ENDOCRINE:  No heat or cold intolerance, polyuria or polydipsia. abnormal weight gain or loss, oral thrush  HEMATOLOGICAL:  No easy bruising or bleeding.     T(C): 36.7 (06-10-18 @ 16:28), Max: 37.1 (06-09-18 @ 22:50)  HR: 67 (06-10-18 @ 16:28) (61 - 72)  BP: 118/61 (06-10-18 @ 16:28) (112/68 - 139/83)  RR: 18 (06-10-18 @ 16:28) (17 - 18)  SpO2: 97% (06-10-18 @ 16:28) (95% - 100%)  Wt(kg): --    PHYSICAL EXAM:  GENERAL: NAD, well-groomed, well-developed  HEAD:  Atraumatic, Normocephalic  EYES: EOMI, PERRLA, conjunctiva and sclera clear  ENMT: No tonsillar erythema, exudates, or enlargement; Moist mucous membranes, Good dentition, No lesions  NECK: Supple, No JVD, Normal thyroid  NERVOUS SYSTEM:  Alert & Oriented X3, Good concentration; Motor Strength 5/5 B/L upper and lower extremities; DTRs 2+ intact and symmetric  CHEST/LUNG: Clear to percussion bilaterally; No rales, rhonchi, wheezing, or rubs  HEART: Regular rate and rhythm; No murmurs, rubs, or gallops  ABDOMEN: Soft, Nontender, Nondistended; Bowel sounds present  EXTREMITIES:  2+ Peripheral Pulses, No clubbing, cyanosis, or edema  LYMPH: No lymphadenopathy noted  SKIN: No rashes or lesions    CAPILLARY BLOOD GLUCOSE      POCT Blood Glucose.: 176 mg/dL (10 Tushar 2018 11:48)  POCT Blood Glucose.: 198 mg/dL (10 Tushar 2018 08:07)  POCT Blood Glucose.: 247 mg/dL (10 Tushar 2018 03:10)  POCT Blood Glucose.: 122 mg/dL (09 Jun 2018 21:34)                            12.2   10.50 )-----------( 313      ( 10 Tushar 2018 06:26 )             35.8       CMP:  06-10 @ 06:26  SGPT --  Albumin --   Alk Phos --   Anion Gap 10   SGOT --   Total Bili --   BUN 10   Calcium Total 7.9   CO2 24   Chloride 105   Creatinine 0.93   eGFR if    eGFR if non    Glucose 221   Potassium 3.5   Protein --   Sodium 139      Thyroid Function Tests:      Diabetes Tests: 06-10 @ 10:59 HbA1c 8.7 C-Peptide --       Parathyroids:     Adrenals:       Radiology:

## 2018-06-10 NOTE — CONSULT NOTE ADULT - ASSESSMENT
36yo bm with abdominal pain and gastroparesis. CT suggests colitis but no diarrhea
diabetes uncontrolled

## 2018-06-10 NOTE — PROGRESS NOTE ADULT - SUBJECTIVE AND OBJECTIVE BOX
Patient is a 37y old  Male who presents with a chief complaint of N/V (2018 18:37)      INTERVAL HPI/OVERNIGHT EVENTS:  pt feeling better, no vomiting today  MEDICATIONS  (STANDING):  dextrose 5%. 1000 milliLiter(s) (50 mL/Hr) IV Continuous <Continuous>  dextrose 50% Injectable 12.5 Gram(s) IV Push once  enoxaparin Injectable 40 milliGRAM(s) SubCutaneous every 24 hours  escitalopram 5 milliGRAM(s) Oral at bedtime  insulin glargine Injectable (LANTUS) 30 Unit(s) SubCutaneous at bedtime  insulin lispro (HumaLOG) corrective regimen sliding scale   SubCutaneous three times a day before meals  insulin lispro (HumaLOG) corrective regimen sliding scale   SubCutaneous at bedtime  mesalamine DR Capsule 400 milliGRAM(s) Oral three times a day  pantoprazole  Injectable 40 milliGRAM(s) IV Push daily  sodium chloride 0.9%. 1000 milliLiter(s) (75 mL/Hr) IV Continuous <Continuous>    MEDICATIONS  (PRN):  clonazePAM Tablet 0.5 milliGRAM(s) Oral two times a day PRN anxiety  dextrose 40% Gel 15 Gram(s) Oral once PRN Blood Glucose LESS THAN 70 milliGRAM(s)/deciliter  glucagon  Injectable 1 milliGRAM(s) IntraMuscular once PRN Glucose LESS THAN 70 milligrams/deciliter  morphine  - Injectable 2 milliGRAM(s) IV Push every 4 hours PRN Severe Pain (7 - 10)  ondansetron Injectable 4 milliGRAM(s) IV Push every 4 hours PRN Nausea and/or Vomiting      Allergies    No Known Allergies    Intolerances        REVIEW OF SYSTEMS:  CONSTITUTIONAL: No fever, weight loss, or fatigue  EYES: No eye pain, visual disturbances, or discharge  ENMT:  No difficulty hearing, tinnitus, vertigo; No sinus or throat pain  NECK: No pain or stiffness  BREASTS: No pain, masses, or nipple discharge  RESPIRATORY: No cough, wheezing, chills or hemoptysis; No shortness of breath  CARDIOVASCULAR: No chest pain, palpitations, dizziness, or leg swelling  GASTROINTESTINAL: No abdominal or epigastric pain. No nausea, vomiting, or hematemesis; No diarrhea or constipation. No melena or hematochezia.  GENITOURINARY: No dysuria, frequency, hematuria, or incontinence  NEUROLOGICAL: No headaches, memory loss, loss of strength, numbness, or tremors  SKIN: No itching, burning, rashes, or lesions   LYMPH NODES: No enlarged glands  ENDOCRINE: No heat or cold intolerance; No hair loss  MUSCULOSKELETAL: No joint pain or swelling; No muscle, back, or extremity pain  PSYCHIATRIC: No depression, anxiety, mood swings, or difficulty sleeping  HEME/LYMPH: No easy bruising, or bleeding gums  ALLERGY AND IMMUNOLOGIC: No hives or eczema    Vital Signs Last 24 Hrs  T(C): 37.1 (10 Tushar 2018 05:15), Max: 37.1 (2018 22:50)  T(F): 98.8 (10 Tushar 2018 05:15), Max: 98.8 (2018 22:50)  HR: 62 (10 Tushar 2018 05:15) (54 - 72)  BP: 112/68 (10 Tushar 2018 05:15) (112/68 - 152/83)  BP(mean): --  RR: 18 (10 Tushar 2018 05:15) (18 - 19)  SpO2: 100% (10 Tushar 2018 05:15) (96% - 100%)    PHYSICAL EXAM:  GENERAL: NAD, well-groomed, well-developed  HEAD:  Atraumatic, Normocephalic  EYES: EOMI, PERRLA, conjunctiva and sclera clear  ENMT: No tonsillar erythema, exudates, or enlargement; Moist mucous membranes, Good dentition, No lesions  NECK: Supple, No JVD, Normal thyroid  NERVOUS SYSTEM:  Alert & Oriented X3, Good concentration; Motor Strength 5/5 B/L upper and lower extremities; DTRs 2+ intact and symmetric  CHEST/LUNG: Clear to percussion bilaterally; No rales, rhonchi, wheezing, or rubs  HEART: Regular rate and rhythm; No murmurs, rubs, or gallops  ABDOMEN: Soft, Nontender, Nondistended; Bowel sounds present  EXTREMITIES:  2+ Peripheral Pulses, No clubbing, cyanosis, or edema  LYMPH: No lymphadenopathy noted  SKIN: No rashes or lesions    LABS:                        12.2   10.50 )-----------( 313      ( 10 Tushar 2018 06:26 )             35.8     06-10    139  |  105  |  10  ----------------------------<  221<H>  3.5   |  24  |  0.93    Ca    7.9<L>      10 Tushar 2018 06:26  Mg     2.4     -    TPro  8.8<H>  /  Alb  4.5  /  TBili  0.6  /  DBili  x   /  AST  22  /  ALT  26  /  AlkPhos  103  06-    PT/INR - ( 2018 15:41 )   PT: 10.9 sec;   INR: 1.00 ratio         PTT - ( 2018 15:41 )  PTT:24.5 sec  Urinalysis Basic - ( 2018 20:28 )    Color: Yellow / Appearance: Slightly Turbid / S.010 / pH: x  Gluc: x / Ketone: Large  / Bili: Negative / Urobili: Negative mg/dL   Blood: x / Protein: 15 mg/dL / Nitrite: Negative   Leuk Esterase: Negative / RBC: Negative /HPF / WBC 3-5   Sq Epi: x / Non Sq Epi: x / Bacteria: Occasional      CAPILLARY BLOOD GLUCOSE      POCT Blood Glucose.: 198 mg/dL (10 Tushar 2018 08:07)  POCT Blood Glucose.: 247 mg/dL (10 Tushar 2018 03:10)  POCT Blood Glucose.: 122 mg/dL (2018 21:34)    CULTURES:    HEMOGLOBIN A1C:    CHOLESTEROL:    ABG - ( 2018 15:59 )  pH, Arterial: x     pH, Blood: 7.38  /  pCO2: 43    /  pO2: 80    / HCO3: 25    / Base Excess: -0.2  /  SaO2: 96                  RADIOLOGY & ADDITIONAL TESTS:

## 2018-06-10 NOTE — CONSULT NOTE ADULT - PROBLEM SELECTOR RECOMMENDATION 9
control BS. IV reglan 10mg q6h.EGDwhen stable.
Check HbA1C   Continue with the same regimen while inpatient   no Diabetic Ketoacidosis   decreasing glucose toxicity   Thank You for the courtesy of this consultation !!!

## 2018-06-11 LAB
ANION GAP SERPL CALC-SCNC: 8 MMOL/L — SIGNIFICANT CHANGE UP (ref 5–17)
BUN SERPL-MCNC: 11 MG/DL — SIGNIFICANT CHANGE UP (ref 7–23)
CALCIUM SERPL-MCNC: 8 MG/DL — LOW (ref 8.5–10.1)
CHLORIDE SERPL-SCNC: 105 MMOL/L — SIGNIFICANT CHANGE UP (ref 96–108)
CO2 SERPL-SCNC: 28 MMOL/L — SIGNIFICANT CHANGE UP (ref 22–31)
CREAT SERPL-MCNC: 1.05 MG/DL — SIGNIFICANT CHANGE UP (ref 0.5–1.3)
GLUCOSE BLDC GLUCOMTR-MCNC: 103 MG/DL — HIGH (ref 70–99)
GLUCOSE BLDC GLUCOMTR-MCNC: 158 MG/DL — HIGH (ref 70–99)
GLUCOSE BLDC GLUCOMTR-MCNC: 188 MG/DL — HIGH (ref 70–99)
GLUCOSE BLDC GLUCOMTR-MCNC: 190 MG/DL — HIGH (ref 70–99)
GLUCOSE BLDC GLUCOMTR-MCNC: 55 MG/DL — LOW (ref 70–99)
GLUCOSE BLDC GLUCOMTR-MCNC: 78 MG/DL — SIGNIFICANT CHANGE UP (ref 70–99)
GLUCOSE SERPL-MCNC: 147 MG/DL — HIGH (ref 70–99)
HCT VFR BLD CALC: 34.8 % — LOW (ref 39–50)
HGB BLD-MCNC: 11.9 G/DL — LOW (ref 13–17)
MCHC RBC-ENTMCNC: 27.9 PG — SIGNIFICANT CHANGE UP (ref 27–34)
MCHC RBC-ENTMCNC: 34.2 GM/DL — SIGNIFICANT CHANGE UP (ref 32–36)
MCV RBC AUTO: 81.5 FL — SIGNIFICANT CHANGE UP (ref 80–100)
NRBC # BLD: 0 /100 WBCS — SIGNIFICANT CHANGE UP (ref 0–0)
PLATELET # BLD AUTO: 307 K/UL — SIGNIFICANT CHANGE UP (ref 150–400)
POTASSIUM SERPL-MCNC: 3.3 MMOL/L — LOW (ref 3.5–5.3)
POTASSIUM SERPL-SCNC: 3.3 MMOL/L — LOW (ref 3.5–5.3)
RBC # BLD: 4.27 M/UL — SIGNIFICANT CHANGE UP (ref 4.2–5.8)
RBC # FLD: 13.1 % — SIGNIFICANT CHANGE UP (ref 10.3–14.5)
SODIUM SERPL-SCNC: 141 MMOL/L — SIGNIFICANT CHANGE UP (ref 135–145)
WBC # BLD: 8.01 K/UL — SIGNIFICANT CHANGE UP (ref 3.8–10.5)
WBC # FLD AUTO: 8.01 K/UL — SIGNIFICANT CHANGE UP (ref 3.8–10.5)

## 2018-06-11 RX ORDER — OXYCODONE AND ACETAMINOPHEN 5; 325 MG/1; MG/1
1 TABLET ORAL EVERY 4 HOURS
Qty: 0 | Refills: 0 | Status: DISCONTINUED | OUTPATIENT
Start: 2018-06-11 | End: 2018-06-12

## 2018-06-11 RX ORDER — POTASSIUM CHLORIDE 20 MEQ
20 PACKET (EA) ORAL ONCE
Qty: 0 | Refills: 0 | Status: COMPLETED | OUTPATIENT
Start: 2018-06-11 | End: 2018-06-11

## 2018-06-11 RX ORDER — METOCLOPRAMIDE HCL 10 MG
10 TABLET ORAL EVERY 8 HOURS
Qty: 0 | Refills: 0 | Status: DISCONTINUED | OUTPATIENT
Start: 2018-06-11 | End: 2018-06-12

## 2018-06-11 RX ORDER — ACETAMINOPHEN 500 MG
1000 TABLET ORAL ONCE
Qty: 0 | Refills: 0 | Status: COMPLETED | OUTPATIENT
Start: 2018-06-11 | End: 2018-06-11

## 2018-06-11 RX ADMIN — Medication 10 MILLIGRAM(S): at 21:50

## 2018-06-11 RX ADMIN — ONDANSETRON 4 MILLIGRAM(S): 8 TABLET, FILM COATED ORAL at 03:34

## 2018-06-11 RX ADMIN — Medication 0.5 MILLIGRAM(S): at 21:58

## 2018-06-11 RX ADMIN — INSULIN GLARGINE 30 UNIT(S): 100 INJECTION, SOLUTION SUBCUTANEOUS at 00:37

## 2018-06-11 RX ADMIN — Medication 400 MILLIGRAM(S): at 03:26

## 2018-06-11 RX ADMIN — Medication 10 MILLIGRAM(S): at 14:45

## 2018-06-11 RX ADMIN — ESCITALOPRAM OXALATE 5 MILLIGRAM(S): 10 TABLET, FILM COATED ORAL at 21:49

## 2018-06-11 RX ADMIN — PANTOPRAZOLE SODIUM 40 MILLIGRAM(S): 20 TABLET, DELAYED RELEASE ORAL at 11:21

## 2018-06-11 RX ADMIN — Medication 400 MILLIGRAM(S): at 14:45

## 2018-06-11 RX ADMIN — MORPHINE SULFATE 2 MILLIGRAM(S): 50 CAPSULE, EXTENDED RELEASE ORAL at 17:28

## 2018-06-11 RX ADMIN — OXYCODONE AND ACETAMINOPHEN 1 TABLET(S): 5; 325 TABLET ORAL at 13:50

## 2018-06-11 RX ADMIN — INSULIN GLARGINE 30 UNIT(S): 100 INJECTION, SOLUTION SUBCUTANEOUS at 21:50

## 2018-06-11 RX ADMIN — Medication 1000 MILLIGRAM(S): at 04:44

## 2018-06-11 RX ADMIN — OXYCODONE AND ACETAMINOPHEN 1 TABLET(S): 5; 325 TABLET ORAL at 12:51

## 2018-06-11 RX ADMIN — MORPHINE SULFATE 2 MILLIGRAM(S): 50 CAPSULE, EXTENDED RELEASE ORAL at 00:00

## 2018-06-11 RX ADMIN — MORPHINE SULFATE 2 MILLIGRAM(S): 50 CAPSULE, EXTENDED RELEASE ORAL at 21:50

## 2018-06-11 RX ADMIN — MORPHINE SULFATE 2 MILLIGRAM(S): 50 CAPSULE, EXTENDED RELEASE ORAL at 17:55

## 2018-06-11 RX ADMIN — Medication 400 MILLIGRAM(S): at 21:49

## 2018-06-11 RX ADMIN — Medication 20 MILLIEQUIVALENT(S): at 20:21

## 2018-06-11 RX ADMIN — ESCITALOPRAM OXALATE 5 MILLIGRAM(S): 10 TABLET, FILM COATED ORAL at 00:02

## 2018-06-11 RX ADMIN — MORPHINE SULFATE 2 MILLIGRAM(S): 50 CAPSULE, EXTENDED RELEASE ORAL at 22:30

## 2018-06-11 RX ADMIN — Medication 400 MILLIGRAM(S): at 05:57

## 2018-06-11 NOTE — DIETITIAN INITIAL EVALUATION ADULT. - PERTINENT LABORATORY DATA
06-11 Na 141 mmol/L Glu 147 mg/dL<H> K+ 3.3 mmol/L<L> Cr  1.05 mg/dL BUN 11 mg/dL Phos n/a   Alb n/a   PAB n/a   Hgb 11.9 g/dL<L> Hct 34.8 %<L>, Alb=4.5(6/9), RqgP1Q=5.7%(6/10), POCT=78, 55, 103, 190

## 2018-06-11 NOTE — DIETITIAN INITIAL EVALUATION ADULT. - NS AS NUTRI INTERV MEALS SNACK
Fat - modified diet/Fiber - modified diet/Carbohydrate - modified diet/Recommend Low fat/Low fiber & continue CCHO with snack

## 2018-06-11 NOTE — PROGRESS NOTE ADULT - SUBJECTIVE AND OBJECTIVE BOX
Patient is a 37y old  Male who presents with a chief complaint of N/V (2018 18:37)      INTERVAL HPI/OVERNIGHT EVENTS:  pt is still have N/V  MEDICATIONS  (STANDING):  dextrose 5%. 1000 milliLiter(s) (50 mL/Hr) IV Continuous <Continuous>  dextrose 50% Injectable 12.5 Gram(s) IV Push once  enoxaparin Injectable 40 milliGRAM(s) SubCutaneous every 24 hours  escitalopram 5 milliGRAM(s) Oral at bedtime  insulin glargine Injectable (LANTUS) 30 Unit(s) SubCutaneous at bedtime  insulin lispro (HumaLOG) corrective regimen sliding scale   SubCutaneous three times a day before meals  insulin lispro (HumaLOG) corrective regimen sliding scale   SubCutaneous at bedtime  mesalamine DR Capsule 400 milliGRAM(s) Oral three times a day  metoclopramide Injectable 10 milliGRAM(s) IV Push every 8 hours  pantoprazole  Injectable 40 milliGRAM(s) IV Push daily  sodium chloride 0.9%. 1000 milliLiter(s) (75 mL/Hr) IV Continuous <Continuous>    MEDICATIONS  (PRN):  clonazePAM Tablet 0.5 milliGRAM(s) Oral two times a day PRN anxiety  dextrose 40% Gel 15 Gram(s) Oral once PRN Blood Glucose LESS THAN 70 milliGRAM(s)/deciliter  glucagon  Injectable 1 milliGRAM(s) IntraMuscular once PRN Glucose LESS THAN 70 milligrams/deciliter  morphine  - Injectable 2 milliGRAM(s) IV Push every 4 hours PRN Severe Pain (7 - 10)  ondansetron Injectable 4 milliGRAM(s) IV Push every 4 hours PRN Nausea and/or Vomiting      Allergies    No Known Allergies    Intolerances        REVIEW OF SYSTEMS:  CONSTITUTIONAL: No fever, weight loss, or fatigue  EYES: No eye pain, visual disturbances, or discharge  ENMT:  No difficulty hearing, tinnitus, vertigo; No sinus or throat pain  NECK: No pain or stiffness  BREASTS: No pain, masses, or nipple discharge  RESPIRATORY: No cough, wheezing, chills or hemoptysis; No shortness of breath  CARDIOVASCULAR: No chest pain, palpitations, dizziness, or leg swelling  GASTROINTESTINAL: No abdominal or epigastric pain. No nausea, vomiting, or hematemesis; No diarrhea or constipation. No melena or hematochezia.  GENITOURINARY: No dysuria, frequency, hematuria, or incontinence  NEUROLOGICAL: No headaches, memory loss, loss of strength, numbness, or tremors  SKIN: No itching, burning, rashes, or lesions   LYMPH NODES: No enlarged glands  ENDOCRINE: No heat or cold intolerance; No hair loss  MUSCULOSKELETAL: No joint pain or swelling; No muscle, back, or extremity pain  PSYCHIATRIC: No depression, anxiety, mood swings, or difficulty sleeping  HEME/LYMPH: No easy bruising, or bleeding gums  ALLERGY AND IMMUNOLOGIC: No hives or eczema    Vital Signs Last 24 Hrs  T(C): 36.6 (2018 05:43), Max: 37.2 (2018 00:41)  T(F): 97.9 (2018 05:43), Max: 99 (2018 00:41)  HR: 65 (2018 05:43) (61 - 67)  BP: 126/82 (2018 05:43) (118/61 - 139/87)  BP(mean): --  RR: 17 (2018 05:43) (17 - 18)  SpO2: 97% (2018 05:43) (95% - 97%)    PHYSICAL EXAM:  GENERAL: NAD, well-groomed, well-developed  HEAD:  Atraumatic, Normocephalic  EYES: EOMI, PERRLA, conjunctiva and sclera clear  ENMT: No tonsillar erythema, exudates, or enlargement; Moist mucous membranes, Good dentition, No lesions  NECK: Supple, No JVD, Normal thyroid  NERVOUS SYSTEM:  Alert & Oriented X3, Good concentration; Motor Strength 5/5 B/L upper and lower extremities; DTRs 2+ intact and symmetric  CHEST/LUNG: Clear to percussion bilaterally; No rales, rhonchi, wheezing, or rubs  HEART: Regular rate and rhythm; No murmurs, rubs, or gallops  ABDOMEN: Soft, Nontender, Nondistended; Bowel sounds present  EXTREMITIES:  2+ Peripheral Pulses, No clubbing, cyanosis, or edema  LYMPH: No lymphadenopathy noted  SKIN: No rashes or lesions    LABS:                        11.9   8.01  )-----------( 307      ( 2018 06:05 )             34.8     06-11    141  |  105  |  11  ----------------------------<  147<H>  3.3<L>   |  28  |  1.05    Ca    8.0<L>      2018 06:05  Mg     2.4         TPro  8.8<H>  /  Alb  4.5  /  TBili  0.6  /  DBili  x   /  AST  22  /  ALT  26  /  AlkPhos  103  -    PT/INR - ( 2018 15:41 )   PT: 10.9 sec;   INR: 1.00 ratio         PTT - ( 2018 15:41 )  PTT:24.5 sec  Urinalysis Basic - ( 2018 20:28 )    Color: Yellow / Appearance: Slightly Turbid / S.010 / pH: x  Gluc: x / Ketone: Large  / Bili: Negative / Urobili: Negative mg/dL   Blood: x / Protein: 15 mg/dL / Nitrite: Negative   Leuk Esterase: Negative / RBC: Negative /HPF / WBC 3-5   Sq Epi: x / Non Sq Epi: x / Bacteria: Occasional      CAPILLARY BLOOD GLUCOSE      POCT Blood Glucose.: 55 mg/dL (2018 11:20)  POCT Blood Glucose.: 103 mg/dL (2018 07:53)  POCT Blood Glucose.: 190 mg/dL (2018 00:30)  POCT Blood Glucose.: 240 mg/dL (10 Tushar 2018 17:14)  POCT Blood Glucose.: 176 mg/dL (10 Tushar 2018 11:48)    CULTURES:  Culture Results:   No growth ( @ 23:00)    HEMOGLOBIN A1C:    CHOLESTEROL:    ABG - ( 2018 15:59 )  pH, Arterial: x     pH, Blood: 7.38  /  pCO2: 43    /  pO2: 80    / HCO3: 25    / Base Excess: -0.2  /  SaO2: 96                  RADIOLOGY & ADDITIONAL TESTS:

## 2018-06-11 NOTE — DIETITIAN INITIAL EVALUATION ADULT. - ADHERENCE
Follows diabetic diet at home & eats fast food & convenient foods when @ work; eg. fried chicken/poor

## 2018-06-11 NOTE — DIETITIAN INITIAL EVALUATION ADULT. - OTHER INFO
Pt seen for HgbA1C>8%.  Pt lives with wife; wife does cooking & food shopping. Pt manages DM type 1 with Lantus 20untis HS & Novolog 8units ac meals. Pt SMBG 4 x day(fasting QF=635-729zu/dl & HS BG= 200mg/dl).  Pt with good appetite PTA; pt reports last BMx 1 (6/10). Skin intact. Pt consuming 0-75% meals during hospitalization.

## 2018-06-11 NOTE — DIETITIAN INITIAL EVALUATION ADULT. - PERTINENT MEDS FT
Lovenox, NS@ 75ml/hr, Percocet, Humalog ss, Morphine, Klonopin, Lexapro, Delzicol, Zofran, Lantus 30units HS

## 2018-06-11 NOTE — DIETITIAN INITIAL EVALUATION ADULT. - NS AS NUTRI INTERV ED CONTENT
Recommended modifications/Priority modifications/Provided Diabetic gastroparesis diet handout material & diabetes wellness outpatient handout material/Purpose of the nutrition education/Other (specify)

## 2018-06-11 NOTE — PROGRESS NOTE ADULT - SUBJECTIVE AND OBJECTIVE BOX
Patient is a 37y old  Male who presents with a chief complaint of N/V (2018 18:37)      Interval History: stable finger sticks   poor PO intake and GI symptoms +     MEDICATIONS  (STANDING):  dextrose 5%. 1000 milliLiter(s) (50 mL/Hr) IV Continuous <Continuous>  dextrose 50% Injectable 12.5 Gram(s) IV Push once  enoxaparin Injectable 40 milliGRAM(s) SubCutaneous every 24 hours  escitalopram 5 milliGRAM(s) Oral at bedtime  insulin glargine Injectable (LANTUS) 30 Unit(s) SubCutaneous at bedtime  insulin lispro (HumaLOG) corrective regimen sliding scale   SubCutaneous three times a day before meals  insulin lispro (HumaLOG) corrective regimen sliding scale   SubCutaneous at bedtime  mesalamine DR Capsule 400 milliGRAM(s) Oral three times a day  pantoprazole  Injectable 40 milliGRAM(s) IV Push daily  sodium chloride 0.9%. 1000 milliLiter(s) (75 mL/Hr) IV Continuous <Continuous>    MEDICATIONS  (PRN):  clonazePAM Tablet 0.5 milliGRAM(s) Oral two times a day PRN anxiety  dextrose 40% Gel 15 Gram(s) Oral once PRN Blood Glucose LESS THAN 70 milliGRAM(s)/deciliter  glucagon  Injectable 1 milliGRAM(s) IntraMuscular once PRN Glucose LESS THAN 70 milligrams/deciliter  morphine  - Injectable 2 milliGRAM(s) IV Push every 4 hours PRN Severe Pain (7 - 10)  ondansetron Injectable 4 milliGRAM(s) IV Push every 4 hours PRN Nausea and/or Vomiting      Allergies    No Known Allergies    Intolerances        REVIEW OF SYSTEMS:  CONSTITUTIONAL:   EYES: No eye pain, visual disturbances, or discharge  ENMT:  No difficulty hearing, tinnitus, vertigo; No sinus or throat pain  NECK: No pain or stiffness  RESPIRATORY: No cough, wheezing, chills or hemoptysis; No shortness of breath  CARDIOVASCULAR: No chest pain, palpitations, dizziness, or leg swelling  GASTROINTESTINAL: No abdominal or epigastric pain. No nausea, vomiting, or hematemesis; No diarrhea or constipation. No melena or hematochezia.  GENITOURINARY: No dysuria, frequency, hematuria, or incontinence  NEUROLOGICAL: No headaches, memory loss, loss of strength, numbness, or tremors  SKIN: No itching, burning, rashes, or lesions   LYMPH NODES: No enlarged glands  ENDOCRINE: No heat or cold intolerance; No hair loss  MUSCULOSKELETAL: No joint pain or swelling; No muscle, back, or extremity pain  PSYCHIATRIC: No depression, anxiety, mood swings, or difficulty sleeping  HEME/LYMPH: No easy bruising, or bleeding gums  ALLERY AND IMMUNOLOGIC: No hives or eczema    Vital Signs Last 24 Hrs  T(C): 36.6 (2018 05:43), Max: 37.2 (2018 00:41)  T(F): 97.9 (2018 05:43), Max: 99 (2018 00:41)  HR: 65 (2018 05:43) (61 - 67)  BP: 126/82 (2018 05:43) (118/61 - 139/87)  BP(mean): --  RR: 17 (2018 05:43) (17 - 18)  SpO2: 97% (2018 05:43) (95% - 97%)    PHYSICAL EXAM:  GENERAL:   HEAD:  Atraumatic, Normocephalic  EYES: EOMI, PERRLA, conjunctiva and sclera clear  ENMT: No tonsillar erythema, exudates, or enlargement; Moist mucous membranes, Good dentition, No lesions  NECK: Supple, No JVD, Normal thyroid  NERVOUS SYSTEM:  Alert & Oriented X3, Good concentration; Motor Strength 5/5 B/L upper and lower extremities; DTRs 2+ intact and symmetric  CHEST/LUNG: Clear to percussion bilaterally; No rales, rhonchi, wheezing, or rubs  HEART: Regular rate and rhythm; No murmurs, rubs, or gallops  ABDOMEN: Soft, Nontender, Nondistended; Bowel sounds present  EXTREMITIES:  2+ Peripheral Pulses, No clubbing, cyanosis, or edema  SKIN: No rashes or lesions    LABS:    PT/INR - ( 2018 15:41 )   PT: 10.9 sec;   INR: 1.00 ratio         PTT - ( 2018 15:41 )  PTT:24.5 sec  Urinalysis Basic - ( 2018 20:28 )    Color: Yellow / Appearance: Slightly Turbid / S.010 / pH: x  Gluc: x / Ketone: Large  / Bili: Negative / Urobili: Negative mg/dL   Blood: x / Protein: 15 mg/dL / Nitrite: Negative   Leuk Esterase: Negative / RBC: Negative /HPF / WBC 3-5   Sq Epi: x / Non Sq Epi: x / Bacteria: Occasional      CAPILLARY BLOOD GLUCOSE      POCT Blood Glucose.: 103 mg/dL (2018 07:53)  POCT Blood Glucose.: 190 mg/dL (2018 00:30)  POCT Blood Glucose.: 240 mg/dL (10 Tushar 2018 17:14)  POCT Blood Glucose.: 176 mg/dL (10 Tushar 2018 11:48)    Lipid panel:       ABG - ( 2018 15:59 )  pH, Arterial: x     pH, Blood: 7.38  /  pCO2: 43    /  pO2: 80    / HCO3: 25    / Base Excess: -0.2  /  SaO2: 96                  Thyroid:  Diabetes Tests:06-10 @ 10:59 HbA1c 8.7 C-Peptide --    Parathyroid Panel:  Adrenals:  RADIOLOGY & ADDITIONAL TESTS:    Imaging Personally Reviewed:  [ ] YES  [ ] NO    Consultant(s) Notes Reviewed:  [ ] YES  [ ] NO    Care Discussed with Consultants/Other Providers [ ] YES  [ ] NO

## 2018-06-11 NOTE — PROGRESS NOTE ADULT - SUBJECTIVE AND OBJECTIVE BOX
Gastroenterology  Patient seen and examined bedside resting comfortably.  No complaints offered.   +abdominal pain  +nausea and vomiting. Tolerating diet.  Normal flatus/BM.     T(F): 97.9 (06-11-18 @ 05:43), Max: 99 (06-11-18 @ 00:41)  HR: 65 (06-11-18 @ 05:43) (61 - 67)  BP: 126/82 (06-11-18 @ 05:43) (118/61 - 139/87)  RR: 17 (06-11-18 @ 05:43) (17 - 18)  SpO2: 97% (06-11-18 @ 05:43) (95% - 97%)  Wt(kg): --  CAPILLARY BLOOD GLUCOSE      POCT Blood Glucose.: 103 mg/dL (11 Jun 2018 07:53)  POCT Blood Glucose.: 190 mg/dL (11 Jun 2018 00:30)  POCT Blood Glucose.: 240 mg/dL (10 Tushar 2018 17:14)  POCT Blood Glucose.: 176 mg/dL (10 Tushar 2018 11:48)      PHYSICAL EXAM:  General: NAD, WDWN.   Neuro:  Alert & oriented x 3  HEENT: NCAT, EOMI, conjunctiva clear  CV: +S1+S2 regular rate and rhythm  Lung: clear to ausculation bilaterally, respirations nonlabored, good inspiratory effort  Abdomen: soft, + epi Tender, No distention Normal active BS  Extremities: no cyanosis, clubbing or edema    LABS:                        11.9   8.01  )-----------( 307      ( 11 Jun 2018 06:05 )             34.8     06-11    141  |  105  |  11  ----------------------------<  147<H>  3.3<L>   |  28  |  1.05    Ca    8.0<L>      11 Jun 2018 06:05  Mg     2.4     06-09    TPro  8.8<H>  /  Alb  4.5  /  TBili  0.6  /  DBili  x   /  AST  22  /  ALT  26  /  AlkPhos  103  06-09    LIVER FUNCTIONS - ( 09 Jun 2018 15:41 )  Alb: 4.5 g/dL / Pro: 8.8 gm/dL / ALK PHOS: 103 U/L / ALT: 26 U/L / AST: 22 U/L / GGT: x           PT/INR - ( 09 Jun 2018 15:41 )   PT: 10.9 sec;   INR: 1.00 ratio         PTT - ( 09 Jun 2018 15:41 )  PTT:24.5 sec  I&O's Detail    10 Tushar 2018 07:01  -  11 Jun 2018 07:00  --------------------------------------------------------  IN:    Oral Fluid: 300 mL  Total IN: 300 mL    OUT:  Total OUT: 0 mL    Total NET: 300 mL        06-11 @ 06:05    141 | 105 | 11  /8.0 | -- | --  _______________________/  3.3 | 28 | 1.05                           \par   Lipase, Serum: 34 U/L (06-09 @ 15:41)

## 2018-06-12 VITALS
OXYGEN SATURATION: 99 % | TEMPERATURE: 98 F | DIASTOLIC BLOOD PRESSURE: 82 MMHG | SYSTOLIC BLOOD PRESSURE: 133 MMHG | HEART RATE: 52 BPM | RESPIRATION RATE: 18 BRPM

## 2018-06-12 LAB
GLUCOSE BLDC GLUCOMTR-MCNC: 102 MG/DL — HIGH (ref 70–99)
GLUCOSE BLDC GLUCOMTR-MCNC: 129 MG/DL — HIGH (ref 70–99)
GLUCOSE BLDC GLUCOMTR-MCNC: 62 MG/DL — LOW (ref 70–99)

## 2018-06-12 RX ORDER — LANOLIN ALCOHOL/MO/W.PET/CERES
3 CREAM (GRAM) TOPICAL ONCE
Qty: 0 | Refills: 0 | Status: COMPLETED | OUTPATIENT
Start: 2018-06-12 | End: 2018-06-12

## 2018-06-12 RX ORDER — INSULIN GLARGINE 100 [IU]/ML
25 INJECTION, SOLUTION SUBCUTANEOUS AT BEDTIME
Qty: 0 | Refills: 0 | Status: DISCONTINUED | OUTPATIENT
Start: 2018-06-12 | End: 2018-06-12

## 2018-06-12 RX ADMIN — MORPHINE SULFATE 2 MILLIGRAM(S): 50 CAPSULE, EXTENDED RELEASE ORAL at 07:25

## 2018-06-12 RX ADMIN — Medication 0.5 MILLIGRAM(S): at 15:46

## 2018-06-12 RX ADMIN — SODIUM CHLORIDE 75 MILLILITER(S): 9 INJECTION INTRAMUSCULAR; INTRAVENOUS; SUBCUTANEOUS at 11:49

## 2018-06-12 RX ADMIN — Medication 400 MILLIGRAM(S): at 15:46

## 2018-06-12 RX ADMIN — Medication 10 MILLIGRAM(S): at 17:00

## 2018-06-12 RX ADMIN — Medication 10 MILLIGRAM(S): at 06:49

## 2018-06-12 RX ADMIN — Medication 400 MILLIGRAM(S): at 06:48

## 2018-06-12 RX ADMIN — PANTOPRAZOLE SODIUM 40 MILLIGRAM(S): 20 TABLET, DELAYED RELEASE ORAL at 11:47

## 2018-06-12 RX ADMIN — Medication 3 MILLIGRAM(S): at 00:44

## 2018-06-12 RX ADMIN — MORPHINE SULFATE 2 MILLIGRAM(S): 50 CAPSULE, EXTENDED RELEASE ORAL at 06:48

## 2018-06-12 NOTE — PROGRESS NOTE ADULT - SUBJECTIVE AND OBJECTIVE BOX
Patient is a 37y old  Male who presents with a chief complaint of N/V (09 Jun 2018 18:37)      INTERVAL HPI/OVERNIGHT EVENTS: pt having hypoglycemia, says not eating much because doesnt like food    MEDICATIONS  (STANDING):  dextrose 5%. 1000 milliLiter(s) (50 mL/Hr) IV Continuous <Continuous>  dextrose 50% Injectable 12.5 Gram(s) IV Push once  enoxaparin Injectable 40 milliGRAM(s) SubCutaneous every 24 hours  escitalopram 5 milliGRAM(s) Oral at bedtime  insulin glargine Injectable (LANTUS) 25 Unit(s) SubCutaneous at bedtime  insulin lispro (HumaLOG) corrective regimen sliding scale   SubCutaneous three times a day before meals  insulin lispro (HumaLOG) corrective regimen sliding scale   SubCutaneous at bedtime  mesalamine DR Capsule 400 milliGRAM(s) Oral three times a day  metoclopramide Injectable 10 milliGRAM(s) IV Push every 8 hours  pantoprazole  Injectable 40 milliGRAM(s) IV Push daily  sodium chloride 0.9%. 1000 milliLiter(s) (75 mL/Hr) IV Continuous <Continuous>    MEDICATIONS  (PRN):  clonazePAM Tablet 0.5 milliGRAM(s) Oral two times a day PRN anxiety  dextrose 40% Gel 15 Gram(s) Oral once PRN Blood Glucose LESS THAN 70 milliGRAM(s)/deciliter  glucagon  Injectable 1 milliGRAM(s) IntraMuscular once PRN Glucose LESS THAN 70 milligrams/deciliter  morphine  - Injectable 2 milliGRAM(s) IV Push every 4 hours PRN Severe Pain (7 - 10)  ondansetron Injectable 4 milliGRAM(s) IV Push every 4 hours PRN Nausea and/or Vomiting  oxyCODONE    5 mG/acetaminophen 325 mG 1 Tablet(s) Oral every 4 hours PRN Moderate Pain (4 - 6)      REVIEW OF SYSTEMS:  CONSTITUTIONAL: No fever, weight loss, or fatigue  RESPIRATORY: No cough, wheezing, chills or hemoptysis; No shortness of breath  CARDIOVASCULAR: No chest pain, palpitations, dizziness, or leg swelling  GASTROINTESTINAL: No abdominal or epigastric pain. No nausea, vomiting, or hematemesis; No diarrhea or constipation. No melena or hematochezia.  ENDOCRINE: No heat or cold intolerance; No hair loss      Vital Signs Last 24 Hrs  T(C): 36.6 (12 Jun 2018 11:18), Max: 36.7 (12 Jun 2018 00:33)  T(F): 97.8 (12 Jun 2018 11:18), Max: 98.1 (12 Jun 2018 00:33)  HR: 56 (12 Jun 2018 11:18) (56 - 63)  BP: 133/85 (12 Jun 2018 11:18) (133/85 - 141/85)  BP(mean): --  RR: 18 (12 Jun 2018 11:18) (17 - 18)  SpO2: 97% (12 Jun 2018 11:18) (97% - 98%)    PHYSICAL EXAM:  GENERAL: NAD, well-groomed, well-developed  CHEST/LUNG: Clear to percussion bilaterally; No rales, rhonchi, wheezing, or rubs  HEART: Regular rate and rhythm; No murmurs, rubs, or gallops        LABS:                        11.9   8.01  )-----------( 307      ( 11 Jun 2018 06:05 )             34.8     06-11    141  |  105  |  11  ----------------------------<  147<H>  3.3<L>   |  28  |  1.05    Ca    8.0<L>      11 Jun 2018 06:05          CAPILLARY BLOOD GLUCOSE      POCT Blood Glucose.: 62 mg/dL (12 Jun 2018 11:43)  POCT Blood Glucose.: 129 mg/dL (12 Jun 2018 07:53)  POCT Blood Glucose.: 188 mg/dL (11 Jun 2018 21:44)  POCT Blood Glucose.: 158 mg/dL (11 Jun 2018 17:20)    Lipid panel:               RADIOLOGY & ADDITIONAL TESTS:

## 2018-06-12 NOTE — PROGRESS NOTE ADULT - PROBLEM SELECTOR PLAN 2
GI follow up
cont med
endo consult called
advance diet as tolerated. Colonoscopy.  as outpatient
improved

## 2018-06-12 NOTE — PROGRESS NOTE ADULT - SUBJECTIVE AND OBJECTIVE BOX
Patient is a 37y old  Male who presents with a chief complaint of N/V (09 Jun 2018 18:37)      INTERVAL HPI/OVERNIGHT EVENTS:  pt says he is feeling better today  MEDICATIONS  (STANDING):  dextrose 5%. 1000 milliLiter(s) (50 mL/Hr) IV Continuous <Continuous>  dextrose 50% Injectable 12.5 Gram(s) IV Push once  enoxaparin Injectable 40 milliGRAM(s) SubCutaneous every 24 hours  escitalopram 5 milliGRAM(s) Oral at bedtime  insulin glargine Injectable (LANTUS) 30 Unit(s) SubCutaneous at bedtime  insulin lispro (HumaLOG) corrective regimen sliding scale   SubCutaneous three times a day before meals  insulin lispro (HumaLOG) corrective regimen sliding scale   SubCutaneous at bedtime  mesalamine DR Capsule 400 milliGRAM(s) Oral three times a day  metoclopramide Injectable 10 milliGRAM(s) IV Push every 8 hours  pantoprazole  Injectable 40 milliGRAM(s) IV Push daily  sodium chloride 0.9%. 1000 milliLiter(s) (75 mL/Hr) IV Continuous <Continuous>    MEDICATIONS  (PRN):  clonazePAM Tablet 0.5 milliGRAM(s) Oral two times a day PRN anxiety  dextrose 40% Gel 15 Gram(s) Oral once PRN Blood Glucose LESS THAN 70 milliGRAM(s)/deciliter  glucagon  Injectable 1 milliGRAM(s) IntraMuscular once PRN Glucose LESS THAN 70 milligrams/deciliter  morphine  - Injectable 2 milliGRAM(s) IV Push every 4 hours PRN Severe Pain (7 - 10)  ondansetron Injectable 4 milliGRAM(s) IV Push every 4 hours PRN Nausea and/or Vomiting  oxyCODONE    5 mG/acetaminophen 325 mG 1 Tablet(s) Oral every 4 hours PRN Moderate Pain (4 - 6)      Allergies    No Known Allergies    Intolerances        REVIEW OF SYSTEMS:  CONSTITUTIONAL: No fever, weight loss, or fatigue  EYES: No eye pain, visual disturbances, or discharge  ENMT:  No difficulty hearing, tinnitus, vertigo; No sinus or throat pain  NECK: No pain or stiffness  BREASTS: No pain, masses, or nipple discharge  RESPIRATORY: No cough, wheezing, chills or hemoptysis; No shortness of breath  CARDIOVASCULAR: No chest pain, palpitations, dizziness, or leg swelling  GASTROINTESTINAL: No abdominal or epigastric pain. No nausea, vomiting, or hematemesis; No diarrhea or constipation. No melena or hematochezia.  GENITOURINARY: No dysuria, frequency, hematuria, or incontinence  NEUROLOGICAL: No headaches, memory loss, loss of strength, numbness, or tremors  SKIN: No itching, burning, rashes, or lesions   LYMPH NODES: No enlarged glands  ENDOCRINE: No heat or cold intolerance; No hair loss  MUSCULOSKELETAL: No joint pain or swelling; No muscle, back, or extremity pain  PSYCHIATRIC: No depression, anxiety, mood swings, or difficulty sleeping  HEME/LYMPH: No easy bruising, or bleeding gums  ALLERGY AND IMMUNOLOGIC: No hives or eczema    Vital Signs Last 24 Hrs  T(C): 35.8 (12 Jun 2018 06:44), Max: 36.7 (12 Jun 2018 00:33)  T(F): 96.4 (12 Jun 2018 06:44), Max: 98.1 (12 Jun 2018 00:33)  HR: 63 (12 Jun 2018 06:44) (60 - 63)  BP: 141/85 (12 Jun 2018 06:44) (134/83 - 141/85)  BP(mean): --  RR: 18 (12 Jun 2018 06:44) (16 - 18)  SpO2: 98% (12 Jun 2018 06:44) (97% - 98%)    PHYSICAL EXAM:  GENERAL: NAD, well-groomed, well-developed  HEAD:  Atraumatic, Normocephalic  EYES: EOMI, PERRLA, conjunctiva and sclera clear  ENMT: No tonsillar erythema, exudates, or enlargement; Moist mucous membranes, Good dentition, No lesions  NECK: Supple, No JVD, Normal thyroid  NERVOUS SYSTEM:  Alert & Oriented X3, Good concentration; Motor Strength 5/5 B/L upper and lower extremities; DTRs 2+ intact and symmetric  CHEST/LUNG: Clear to percussion bilaterally; No rales, rhonchi, wheezing, or rubs  HEART: Regular rate and rhythm; No murmurs, rubs, or gallops  ABDOMEN: Soft, Nontender, Nondistended; Bowel sounds present  EXTREMITIES:  2+ Peripheral Pulses, No clubbing, cyanosis, or edema  LYMPH: No lymphadenopathy noted  SKIN: No rashes or lesions    LABS:                        11.9   8.01  )-----------( 307      ( 11 Jun 2018 06:05 )             34.8     06-11    141  |  105  |  11  ----------------------------<  147<H>  3.3<L>   |  28  |  1.05    Ca    8.0<L>      11 Jun 2018 06:05          CAPILLARY BLOOD GLUCOSE      POCT Blood Glucose.: 129 mg/dL (12 Jun 2018 07:53)  POCT Blood Glucose.: 188 mg/dL (11 Jun 2018 21:44)  POCT Blood Glucose.: 158 mg/dL (11 Jun 2018 17:20)  POCT Blood Glucose.: 78 mg/dL (11 Jun 2018 11:39)  POCT Blood Glucose.: 55 mg/dL (11 Jun 2018 11:20)    CULTURES:  Culture Results:   No growth (06-09 @ 23:00)    HEMOGLOBIN A1C:    CHOLESTEROL:        RADIOLOGY & ADDITIONAL TESTS:

## 2018-06-12 NOTE — PROGRESS NOTE ADULT - PROBLEM SELECTOR PROBLEM 1
Gastroparesis diabeticorum
Gastroparesis diabeticorum
Intractable vomiting with nausea, unspecified vomiting type
Gastroparesis diabeticorum
Gastroparesis diabeticorum
Type 1 diabetes mellitus without complication
Type 1 diabetes mellitus without complication

## 2018-06-12 NOTE — PROGRESS NOTE ADULT - SUBJECTIVE AND OBJECTIVE BOX
Gastroenterology  Patient seen and examined bedside resting comfortably.  +abdominal pain improved   + nausea and vomiting. Tolerating diet.  Normal flatus/BM.     T(F): 97.8 (06-12-18 @ 11:18), Max: 98.1 (06-12-18 @ 00:33)  HR: 56 (06-12-18 @ 11:18) (56 - 63)  BP: 133/85 (06-12-18 @ 11:18) (133/85 - 141/85)  RR: 18 (06-12-18 @ 11:18) (17 - 18)  SpO2: 97% (06-12-18 @ 11:18) (97% - 98%)  Wt(kg): --  CAPILLARY BLOOD GLUCOSE      POCT Blood Glucose.: 62 mg/dL (12 Jun 2018 11:43)  POCT Blood Glucose.: 129 mg/dL (12 Jun 2018 07:53)  POCT Blood Glucose.: 188 mg/dL (11 Jun 2018 21:44)  POCT Blood Glucose.: 158 mg/dL (11 Jun 2018 17:20)      PHYSICAL EXAM:  General: NAD, WDWN.   Neuro:  Alert & oriented x 3  HEENT: NCAT, EOMI, conjunctiva clear  CV: +S1+S2 regular rate and rhythm  Lung: clear to ausculation bilaterally, respirations nonlabored, good inspiratory effort  Abdomen: soft, Mild epi Tender, No distention Normal active BS  Extremities: no cyanosis, clubbing or edema    LABS:                        11.9   8.01  )-----------( 307      ( 11 Jun 2018 06:05 )             34.8     06-11    141  |  105  |  11  ----------------------------<  147<H>  3.3<L>   |  28  |  1.05    Ca    8.0<L>      11 Jun 2018 06:05          I&O's Detail    11 Jun 2018 07:01  -  12 Jun 2018 07:00  --------------------------------------------------------  IN:    Oral Fluid: 960 mL    sodium chloride 0.9%.: 900 mL  Total IN: 1860 mL    OUT:  Total OUT: 0 mL    Total NET: 1860 mL      12 Jun 2018 07:01  -  12 Jun 2018 16:13  --------------------------------------------------------  IN:    Oral Fluid: 480 mL  Total IN: 480 mL    OUT:    Voided: 602 mL  Total OUT: 602 mL    Total NET: -122 mL        06-11 @ 06:05    141 | 105 | 11  /8.0 | -- | --  _______________________/  3.3 | 28 | 1.05                           \par

## 2018-06-12 NOTE — CHART NOTE - NSCHARTNOTEFT_GEN_A_CORE
House- Medicine NP:    Patient is a 37y old  Male who presents with a chief complaint of N/V (09 Jun 2018 18:37). Called by Matt EDWARDS for pt  requesting to leave hospital AMA. Pt explained risks of leaving AMA including worsening of symptoms and risk of death. Wife at bedside. Pt. verbalized understanding. Witnessed by Matt EDWARDS. House- Medicine NP:    Patient is a 37y old  Male who presents with a chief complaint of N/V (09 Jun 2018 18:37). Called by Matt EDWARDS for pt  requesting to leave hospital AMA. Pt explained risks of leaving AMA including worsening of symptoms and risk of death. Wife at bedside. Pt. verbalized understanding. Witnessed by Matt EDWARDS. Call placed to Dr. Ford, message left.

## 2018-06-12 NOTE — PROGRESS NOTE ADULT - PROBLEM SELECTOR PLAN 1
GI follow up
cont Zofran
pt is improving,
Continue with the same regimen while inpatient   gastroparesis may need to be ruled out and treated   while inpatient better to have FS< 150 and preferably in 120-140 range   encourage more Nutrition
IV reglan 10mg q8 h. EGDwhen stable.
IV reglan 10mg q8 h. review previously performed  EGD
now with hypoglycemia due to poor po intake  decrease lantus to 25units, continue COLIN   advised eat consistent cho with meals

## 2018-06-15 DIAGNOSIS — E10.65 TYPE 1 DIABETES MELLITUS WITH HYPERGLYCEMIA: ICD-10-CM

## 2018-06-15 DIAGNOSIS — E10.649 TYPE 1 DIABETES MELLITUS WITH HYPOGLYCEMIA WITHOUT COMA: ICD-10-CM

## 2018-06-15 DIAGNOSIS — J45.909 UNSPECIFIED ASTHMA, UNCOMPLICATED: ICD-10-CM

## 2018-06-15 DIAGNOSIS — K52.9 NONINFECTIVE GASTROENTERITIS AND COLITIS, UNSPECIFIED: ICD-10-CM

## 2018-06-15 DIAGNOSIS — K29.70 GASTRITIS, UNSPECIFIED, WITHOUT BLEEDING: ICD-10-CM

## 2018-06-15 DIAGNOSIS — Z90.49 ACQUIRED ABSENCE OF OTHER SPECIFIED PARTS OF DIGESTIVE TRACT: ICD-10-CM

## 2018-06-15 DIAGNOSIS — R11.2 NAUSEA WITH VOMITING, UNSPECIFIED: ICD-10-CM

## 2018-06-15 DIAGNOSIS — E10.43 TYPE 1 DIABETES MELLITUS WITH DIABETIC AUTONOMIC (POLY)NEUROPATHY: ICD-10-CM

## 2018-06-15 DIAGNOSIS — F41.9 ANXIETY DISORDER, UNSPECIFIED: ICD-10-CM

## 2018-06-15 DIAGNOSIS — K31.84 GASTROPARESIS: ICD-10-CM

## 2018-06-15 DIAGNOSIS — Z79.4 LONG TERM (CURRENT) USE OF INSULIN: ICD-10-CM

## 2018-06-24 ENCOUNTER — INPATIENT (INPATIENT)
Facility: HOSPITAL | Age: 38
LOS: 5 days | Discharge: HOME | End: 2018-06-30
Attending: INTERNAL MEDICINE | Admitting: INTERNAL MEDICINE
Payer: MEDICAID

## 2018-06-24 VITALS
DIASTOLIC BLOOD PRESSURE: 87 MMHG | RESPIRATION RATE: 18 BRPM | OXYGEN SATURATION: 99 % | HEIGHT: 69 IN | HEART RATE: 86 BPM | SYSTOLIC BLOOD PRESSURE: 144 MMHG | WEIGHT: 179.9 LBS | TEMPERATURE: 98 F

## 2018-06-24 DIAGNOSIS — Z90.49 ACQUIRED ABSENCE OF OTHER SPECIFIED PARTS OF DIGESTIVE TRACT: Chronic | ICD-10-CM

## 2018-06-24 LAB
ALBUMIN SERPL ELPH-MCNC: 4.2 G/DL — SIGNIFICANT CHANGE UP (ref 3.3–5)
ALP SERPL-CCNC: 105 U/L — SIGNIFICANT CHANGE UP (ref 40–120)
ALT FLD-CCNC: 21 U/L — SIGNIFICANT CHANGE UP (ref 12–78)
ANION GAP SERPL CALC-SCNC: 17 MMOL/L — SIGNIFICANT CHANGE UP (ref 5–17)
ANION GAP SERPL CALC-SCNC: 19 MMOL/L — HIGH (ref 5–17)
APTT BLD: 28.1 SEC — SIGNIFICANT CHANGE UP (ref 27.5–37.4)
AST SERPL-CCNC: 26 U/L — SIGNIFICANT CHANGE UP (ref 15–37)
B-OH-BUTYR SERPL-SCNC: 7.4 MMOL/L — HIGH
BASE EXCESS BLDA CALC-SCNC: -14.2 MMOL/L — LOW (ref -2–2)
BASOPHILS # BLD AUTO: 0.05 K/UL — SIGNIFICANT CHANGE UP (ref 0–0.2)
BASOPHILS NFR BLD AUTO: 0.2 % — SIGNIFICANT CHANGE UP (ref 0–2)
BILIRUB SERPL-MCNC: 0.9 MG/DL — SIGNIFICANT CHANGE UP (ref 0.2–1.2)
BLOOD GAS COMMENTS: SIGNIFICANT CHANGE UP
BLOOD GAS COMMENTS: SIGNIFICANT CHANGE UP
BLOOD GAS SOURCE: SIGNIFICANT CHANGE UP
BUN SERPL-MCNC: 30 MG/DL — HIGH (ref 7–23)
BUN SERPL-MCNC: 34 MG/DL — HIGH (ref 7–23)
CALCIUM SERPL-MCNC: 7.8 MG/DL — LOW (ref 8.5–10.1)
CALCIUM SERPL-MCNC: 9.2 MG/DL — SIGNIFICANT CHANGE UP (ref 8.5–10.1)
CHLORIDE SERPL-SCNC: 106 MMOL/L — SIGNIFICANT CHANGE UP (ref 96–108)
CHLORIDE SERPL-SCNC: 99 MMOL/L — SIGNIFICANT CHANGE UP (ref 96–108)
CK MB BLD-MCNC: 0.7 % — SIGNIFICANT CHANGE UP (ref 0–3.5)
CK MB CFR SERPL CALC: 1 NG/ML — SIGNIFICANT CHANGE UP (ref 0.5–3.6)
CK SERPL-CCNC: 139 U/L — SIGNIFICANT CHANGE UP (ref 26–308)
CO2 SERPL-SCNC: 15 MMOL/L — LOW (ref 22–31)
CO2 SERPL-SCNC: 17 MMOL/L — LOW (ref 22–31)
CREAT SERPL-MCNC: 1.35 MG/DL — HIGH (ref 0.5–1.3)
CREAT SERPL-MCNC: 1.49 MG/DL — HIGH (ref 0.5–1.3)
EOSINOPHIL # BLD AUTO: 0.01 K/UL — SIGNIFICANT CHANGE UP (ref 0–0.5)
EOSINOPHIL NFR BLD AUTO: 0 % — SIGNIFICANT CHANGE UP (ref 0–6)
GLUCOSE BLDC GLUCOMTR-MCNC: 190 MG/DL — HIGH (ref 70–99)
GLUCOSE BLDC GLUCOMTR-MCNC: 192 MG/DL — HIGH (ref 70–99)
GLUCOSE BLDC GLUCOMTR-MCNC: 213 MG/DL — HIGH (ref 70–99)
GLUCOSE BLDC GLUCOMTR-MCNC: 227 MG/DL — HIGH (ref 70–99)
GLUCOSE BLDC GLUCOMTR-MCNC: 272 MG/DL — HIGH (ref 70–99)
GLUCOSE BLDC GLUCOMTR-MCNC: 327 MG/DL — HIGH (ref 70–99)
GLUCOSE BLDC GLUCOMTR-MCNC: 369 MG/DL — HIGH (ref 70–99)
GLUCOSE BLDC GLUCOMTR-MCNC: 397 MG/DL — HIGH (ref 70–99)
GLUCOSE BLDC GLUCOMTR-MCNC: 400 MG/DL — HIGH (ref 70–99)
GLUCOSE BLDC GLUCOMTR-MCNC: 415 MG/DL — HIGH (ref 70–99)
GLUCOSE SERPL-MCNC: 201 MG/DL — HIGH (ref 70–99)
GLUCOSE SERPL-MCNC: 406 MG/DL — HIGH (ref 70–99)
HCO3 BLDA-SCNC: 11 MMOL/L — LOW (ref 21–29)
HCT VFR BLD CALC: 42.5 % — SIGNIFICANT CHANGE UP (ref 39–50)
HGB BLD-MCNC: 14.6 G/DL — SIGNIFICANT CHANGE UP (ref 13–17)
HOROWITZ INDEX BLDA+IHG-RTO: 21 — SIGNIFICANT CHANGE UP
IMM GRANULOCYTES NFR BLD AUTO: 0.2 % — SIGNIFICANT CHANGE UP (ref 0–1.5)
INR BLD: 1.03 RATIO — SIGNIFICANT CHANGE UP (ref 0.88–1.16)
LACTATE SERPL-SCNC: 2.2 MMOL/L — HIGH (ref 0.7–2)
LACTATE SERPL-SCNC: 2.7 MMOL/L — HIGH (ref 0.7–2)
LIDOCAIN IGE QN: 34 U/L — LOW (ref 73–393)
LYMPHOCYTES # BLD AUTO: 1.1 K/UL — SIGNIFICANT CHANGE UP (ref 1–3.3)
LYMPHOCYTES # BLD AUTO: 5.5 % — LOW (ref 13–44)
MAGNESIUM SERPL-MCNC: 2.2 MG/DL — SIGNIFICANT CHANGE UP (ref 1.6–2.6)
MAGNESIUM SERPL-MCNC: 2.2 MG/DL — SIGNIFICANT CHANGE UP (ref 1.6–2.6)
MCHC RBC-ENTMCNC: 28.6 PG — SIGNIFICANT CHANGE UP (ref 27–34)
MCHC RBC-ENTMCNC: 34.4 GM/DL — SIGNIFICANT CHANGE UP (ref 32–36)
MCV RBC AUTO: 83.2 FL — SIGNIFICANT CHANGE UP (ref 80–100)
MONOCYTES # BLD AUTO: 0.16 K/UL — SIGNIFICANT CHANGE UP (ref 0–0.9)
MONOCYTES NFR BLD AUTO: 0.8 % — LOW (ref 2–14)
NEUTROPHILS # BLD AUTO: 18.7 K/UL — HIGH (ref 1.8–7.4)
NEUTROPHILS NFR BLD AUTO: 93.3 % — HIGH (ref 43–77)
NRBC # BLD: 0 /100 WBCS — SIGNIFICANT CHANGE UP (ref 0–0)
PCO2 BLDA: 25 MMHG — LOW (ref 32–46)
PH BLD: 7.27 — LOW (ref 7.35–7.45)
PHOSPHATE SERPL-MCNC: 2.4 MG/DL — LOW (ref 2.5–4.5)
PHOSPHATE SERPL-MCNC: 3.1 MG/DL — SIGNIFICANT CHANGE UP (ref 2.5–4.5)
PLATELET # BLD AUTO: 422 K/UL — HIGH (ref 150–400)
PO2 BLDA: 106 MMHG — SIGNIFICANT CHANGE UP (ref 74–108)
POTASSIUM SERPL-MCNC: 4.3 MMOL/L — SIGNIFICANT CHANGE UP (ref 3.5–5.3)
POTASSIUM SERPL-MCNC: 4.6 MMOL/L — SIGNIFICANT CHANGE UP (ref 3.5–5.3)
POTASSIUM SERPL-SCNC: 4.3 MMOL/L — SIGNIFICANT CHANGE UP (ref 3.5–5.3)
POTASSIUM SERPL-SCNC: 4.6 MMOL/L — SIGNIFICANT CHANGE UP (ref 3.5–5.3)
PROT SERPL-MCNC: 8 GM/DL — SIGNIFICANT CHANGE UP (ref 6–8.3)
PROTHROM AB SERPL-ACNC: 11.2 SEC — SIGNIFICANT CHANGE UP (ref 9.8–12.7)
RBC # BLD: 5.11 M/UL — SIGNIFICANT CHANGE UP (ref 4.2–5.8)
RBC # FLD: 13.9 % — SIGNIFICANT CHANGE UP (ref 10.3–14.5)
SAO2 % BLDA: 97 % — HIGH (ref 92–96)
SODIUM SERPL-SCNC: 135 MMOL/L — SIGNIFICANT CHANGE UP (ref 135–145)
SODIUM SERPL-SCNC: 138 MMOL/L — SIGNIFICANT CHANGE UP (ref 135–145)
TROPONIN I SERPL-MCNC: <.015 NG/ML — SIGNIFICANT CHANGE UP (ref 0.01–0.04)
WBC # BLD: 20.07 K/UL — HIGH (ref 3.8–10.5)
WBC # FLD AUTO: 20.07 K/UL — HIGH (ref 3.8–10.5)

## 2018-06-24 PROCEDURE — 99291 CRITICAL CARE FIRST HOUR: CPT

## 2018-06-24 PROCEDURE — 74177 CT ABD & PELVIS W/CONTRAST: CPT | Mod: 26

## 2018-06-24 RX ORDER — METRONIDAZOLE 500 MG
TABLET ORAL
Qty: 0 | Refills: 0 | Status: DISCONTINUED | OUTPATIENT
Start: 2018-06-25 | End: 2018-06-30

## 2018-06-24 RX ORDER — CLONAZEPAM 1 MG
0.5 TABLET ORAL AT BEDTIME
Qty: 0 | Refills: 0 | Status: DISCONTINUED | OUTPATIENT
Start: 2018-06-24 | End: 2018-06-30

## 2018-06-24 RX ORDER — METRONIDAZOLE 500 MG
500 TABLET ORAL EVERY 8 HOURS
Qty: 0 | Refills: 0 | Status: DISCONTINUED | OUTPATIENT
Start: 2018-06-25 | End: 2018-06-30

## 2018-06-24 RX ORDER — METOCLOPRAMIDE HCL 10 MG
10 TABLET ORAL ONCE
Qty: 0 | Refills: 0 | Status: COMPLETED | OUTPATIENT
Start: 2018-06-24 | End: 2018-06-24

## 2018-06-24 RX ORDER — HYDROMORPHONE HYDROCHLORIDE 2 MG/ML
1 INJECTION INTRAMUSCULAR; INTRAVENOUS; SUBCUTANEOUS ONCE
Qty: 0 | Refills: 0 | Status: DISCONTINUED | OUTPATIENT
Start: 2018-06-24 | End: 2018-06-24

## 2018-06-24 RX ORDER — ONDANSETRON 8 MG/1
8 TABLET, FILM COATED ORAL ONCE
Qty: 0 | Refills: 0 | Status: COMPLETED | OUTPATIENT
Start: 2018-06-24 | End: 2018-06-24

## 2018-06-24 RX ORDER — METRONIDAZOLE 500 MG
500 TABLET ORAL ONCE
Qty: 0 | Refills: 0 | Status: COMPLETED | OUTPATIENT
Start: 2018-06-24 | End: 2018-06-25

## 2018-06-24 RX ORDER — INSULIN HUMAN 100 [IU]/ML
10 INJECTION, SOLUTION SUBCUTANEOUS
Qty: 100 | Refills: 0 | Status: DISCONTINUED | OUTPATIENT
Start: 2018-06-24 | End: 2018-06-25

## 2018-06-24 RX ORDER — PIPERACILLIN AND TAZOBACTAM 4; .5 G/20ML; G/20ML
3.38 INJECTION, POWDER, LYOPHILIZED, FOR SOLUTION INTRAVENOUS ONCE
Qty: 0 | Refills: 0 | Status: COMPLETED | OUTPATIENT
Start: 2018-06-24 | End: 2018-06-24

## 2018-06-24 RX ORDER — SODIUM CHLORIDE 9 MG/ML
1000 INJECTION, SOLUTION INTRAVENOUS
Qty: 0 | Refills: 0 | Status: DISCONTINUED | OUTPATIENT
Start: 2018-06-24 | End: 2018-06-25

## 2018-06-24 RX ORDER — ACETAMINOPHEN 500 MG
650 TABLET ORAL EVERY 6 HOURS
Qty: 0 | Refills: 0 | Status: DISCONTINUED | OUTPATIENT
Start: 2018-06-24 | End: 2018-06-25

## 2018-06-24 RX ORDER — CIPROFLOXACIN LACTATE 400MG/40ML
400 VIAL (ML) INTRAVENOUS EVERY 12 HOURS
Qty: 0 | Refills: 0 | Status: DISCONTINUED | OUTPATIENT
Start: 2018-06-25 | End: 2018-06-30

## 2018-06-24 RX ORDER — FAMOTIDINE 10 MG/ML
40 INJECTION INTRAVENOUS ONCE
Qty: 0 | Refills: 0 | Status: COMPLETED | OUTPATIENT
Start: 2018-06-24 | End: 2018-06-24

## 2018-06-24 RX ORDER — ONDANSETRON 8 MG/1
4 TABLET, FILM COATED ORAL EVERY 6 HOURS
Qty: 0 | Refills: 0 | Status: DISCONTINUED | OUTPATIENT
Start: 2018-06-24 | End: 2018-06-25

## 2018-06-24 RX ORDER — CIPROFLOXACIN LACTATE 400MG/40ML
VIAL (ML) INTRAVENOUS
Qty: 0 | Refills: 0 | Status: DISCONTINUED | OUTPATIENT
Start: 2018-06-25 | End: 2018-06-30

## 2018-06-24 RX ORDER — SODIUM CHLORIDE 9 MG/ML
1000 INJECTION INTRAMUSCULAR; INTRAVENOUS; SUBCUTANEOUS
Qty: 0 | Refills: 0 | Status: DISCONTINUED | OUTPATIENT
Start: 2018-06-24 | End: 2018-06-24

## 2018-06-24 RX ORDER — SODIUM CHLORIDE 9 MG/ML
1000 INJECTION, SOLUTION INTRAVENOUS
Qty: 0 | Refills: 0 | Status: DISCONTINUED | OUTPATIENT
Start: 2018-06-24 | End: 2018-06-24

## 2018-06-24 RX ORDER — SODIUM CHLORIDE 9 MG/ML
3000 INJECTION INTRAMUSCULAR; INTRAVENOUS; SUBCUTANEOUS ONCE
Qty: 0 | Refills: 0 | Status: COMPLETED | OUTPATIENT
Start: 2018-06-24 | End: 2018-06-24

## 2018-06-24 RX ORDER — INSULIN HUMAN 100 [IU]/ML
10 INJECTION, SOLUTION SUBCUTANEOUS
Qty: 100 | Refills: 0 | Status: DISCONTINUED | OUTPATIENT
Start: 2018-06-24 | End: 2018-06-24

## 2018-06-24 RX ORDER — PIPERACILLIN AND TAZOBACTAM 4; .5 G/20ML; G/20ML
3.38 INJECTION, POWDER, LYOPHILIZED, FOR SOLUTION INTRAVENOUS EVERY 8 HOURS
Qty: 0 | Refills: 0 | Status: DISCONTINUED | OUTPATIENT
Start: 2018-06-24 | End: 2018-06-24

## 2018-06-24 RX ORDER — HEPARIN SODIUM 5000 [USP'U]/ML
5000 INJECTION INTRAVENOUS; SUBCUTANEOUS EVERY 12 HOURS
Qty: 0 | Refills: 0 | Status: DISCONTINUED | OUTPATIENT
Start: 2018-06-24 | End: 2018-06-30

## 2018-06-24 RX ORDER — CHLORHEXIDINE GLUCONATE 213 G/1000ML
1 SOLUTION TOPICAL
Qty: 0 | Refills: 0 | Status: DISCONTINUED | OUTPATIENT
Start: 2018-06-24 | End: 2018-06-30

## 2018-06-24 RX ORDER — CIPROFLOXACIN LACTATE 400MG/40ML
400 VIAL (ML) INTRAVENOUS ONCE
Qty: 0 | Refills: 0 | Status: COMPLETED | OUTPATIENT
Start: 2018-06-24 | End: 2018-06-25

## 2018-06-24 RX ADMIN — SODIUM CHLORIDE 75 MILLILITER(S): 9 INJECTION, SOLUTION INTRAVENOUS at 22:06

## 2018-06-24 RX ADMIN — HYDROMORPHONE HYDROCHLORIDE 1 MILLIGRAM(S): 2 INJECTION INTRAMUSCULAR; INTRAVENOUS; SUBCUTANEOUS at 20:20

## 2018-06-24 RX ADMIN — HYDROMORPHONE HYDROCHLORIDE 1 MILLIGRAM(S): 2 INJECTION INTRAMUSCULAR; INTRAVENOUS; SUBCUTANEOUS at 12:43

## 2018-06-24 RX ADMIN — HYDROMORPHONE HYDROCHLORIDE 1 MILLIGRAM(S): 2 INJECTION INTRAMUSCULAR; INTRAVENOUS; SUBCUTANEOUS at 16:29

## 2018-06-24 RX ADMIN — Medication 10 MILLIGRAM(S): at 11:26

## 2018-06-24 RX ADMIN — HYDROMORPHONE HYDROCHLORIDE 1 MILLIGRAM(S): 2 INJECTION INTRAMUSCULAR; INTRAVENOUS; SUBCUTANEOUS at 15:55

## 2018-06-24 RX ADMIN — HEPARIN SODIUM 5000 UNIT(S): 5000 INJECTION INTRAVENOUS; SUBCUTANEOUS at 22:10

## 2018-06-24 RX ADMIN — PIPERACILLIN AND TAZOBACTAM 200 GRAM(S): 4; .5 INJECTION, POWDER, LYOPHILIZED, FOR SOLUTION INTRAVENOUS at 15:55

## 2018-06-24 RX ADMIN — SODIUM CHLORIDE 2000 MILLILITER(S): 9 INJECTION INTRAMUSCULAR; INTRAVENOUS; SUBCUTANEOUS at 11:18

## 2018-06-24 RX ADMIN — HYDROMORPHONE HYDROCHLORIDE 1 MILLIGRAM(S): 2 INJECTION INTRAMUSCULAR; INTRAVENOUS; SUBCUTANEOUS at 11:20

## 2018-06-24 RX ADMIN — HYDROMORPHONE HYDROCHLORIDE 1 MILLIGRAM(S): 2 INJECTION INTRAMUSCULAR; INTRAVENOUS; SUBCUTANEOUS at 20:31

## 2018-06-24 RX ADMIN — INSULIN HUMAN 10 UNIT(S)/HR: 100 INJECTION, SOLUTION SUBCUTANEOUS at 15:19

## 2018-06-24 RX ADMIN — FAMOTIDINE 108 MILLIGRAM(S): 10 INJECTION INTRAVENOUS at 11:31

## 2018-06-24 RX ADMIN — Medication 0.5 MILLIGRAM(S): at 22:19

## 2018-06-24 RX ADMIN — PIPERACILLIN AND TAZOBACTAM 25 GRAM(S): 4; .5 INJECTION, POWDER, LYOPHILIZED, FOR SOLUTION INTRAVENOUS at 22:07

## 2018-06-24 RX ADMIN — ONDANSETRON 8 MILLIGRAM(S): 8 TABLET, FILM COATED ORAL at 11:19

## 2018-06-24 NOTE — H&P ADULT - NSHPREVIEWOFSYSTEMS_GEN_ALL_CORE
CONSTITUTIONAL: No fever  EYES: No eye pain, visual disturbances, or discharge  RESPIRATORY: No cough, wheezing, chills or hemoptysis; No shortness of breath  CARDIOVASCULAR: No chest pain, palpitations, dizziness, or leg swelling  GASTROINTESTINAL: +generalized belly pain abdominal  + nausea, vomiting, or hematemesis; No diarrhea or constipation. No melena or hematochezia.  GENITOURINARY: No dysuria, frequency, hematuria, or incontinence

## 2018-06-24 NOTE — ED PROVIDER NOTE - MEDICAL DECISION MAKING DETAILS
Patient presnt with NV.  labs values c/w DKA.  CT with colitis and UTI.  will treat.  IVF, IV abx, insulin drip and multiple IV anti emetics given.  d/w Dr. Goode.  will admit.  Uneventful ED observation period.

## 2018-06-24 NOTE — H&P ADULT - HISTORY OF PRESENT ILLNESS
38 yo M PMH of IDDM2 with known gastroparesis and episodes of DKA, presented to the ED  for NV and abd pain.  Pt was d/c from [facility] yesterday states "still vomiting" continued sx into today and came for evaluation.  Pt non-toxic in appearance still with pain and NV.  Pt states this is not like prior DKA episodes.  Pt noted to have FS in 400 with mild AG metabolic acidosis HCO3 17, AG 19, pH 7.27. ICU c/s called for DKA evaluation with need for insulin gtt.  Pt admitted to the ICU for DKA placed on insulin gtt with need for q1h FS

## 2018-06-24 NOTE — H&P ADULT - ASSESSMENT
38 yo M PMH of IDDM2 with known gastroparesis and episodes of DKA, presented to the ED  for NV and abd pain. Pt noted to have FS in 400 with mild AG metabolic acidosis HCO3 17, AG 19, pH 7.27. ICU c/s called for DKA evaluation with need for insulin gtt.  Pt admitted to the ICU for DKA placed on insulin gtt with need for q1h FS    Plan     1.  DKA   -admit to MICU  -On insulin drip, titrate per MICU protocol  - Q1H FS  -aggressive fluid hydration, goal UOP >0.5 cc/kg/hr  -Q4-6H BMP, magnesium, phosphorous level, acetone  -when blood sugar <250 add dextrose to IVF, consider change to D5 1/2 NS   -when BMP shows closure of anion gap, add long acting insulin (lantus)  -Once lantus given, continue insulin drip for 1 hour, and then begin using sliding scale insulin w/ meals and at bedtime or Q6H if remains NPO  -if patient able to tolerate PO start on diet, if not continue IVF at low rate  -Diabetic education and counseling  -send HbA1C

## 2018-06-24 NOTE — ED ADULT NURSE NOTE - OBJECTIVE STATEMENT
Reports having nausea/ vomiting, abominal pain starting several days ago was dc/ed from [facility] and pressents today with symptoms unreleived, denies fever, diarrhea.

## 2018-06-24 NOTE — ED ADULT TRIAGE NOTE - CHIEF COMPLAINT QUOTE
Reports having abdominal pain, nausea, vomiting, starting 2 days ago, was dced [facility]. hx gastroporesis and dm.

## 2018-06-24 NOTE — H&P ADULT - NSHPLABSRESULTS_GEN_ALL_CORE
Lab Results:  CBC  CBC Full  -  ( 24 Jun 2018 11:35 )  WBC Count : 20.07 K/uL  Hemoglobin : 14.6 g/dL  Hematocrit : 42.5 %  Platelet Count - Automated : 422 K/uL  Mean Cell Volume : 83.2 fl  Mean Cell Hemoglobin : 28.6 pg  Mean Cell Hemoglobin Concentration : 34.4 gm/dL  Auto Neutrophil # : 18.70 K/uL  Auto Lymphocyte # : 1.10 K/uL  Auto Monocyte # : 0.16 K/uL  Auto Eosinophil # : 0.01 K/uL  Auto Basophil # : 0.05 K/uL  Auto Neutrophil % : 93.3 %  Auto Lymphocyte % : 5.5 %  Auto Monocyte % : 0.8 %  Auto Eosinophil % : 0.0 %  Auto Basophil % : 0.2 %    .		Differential:	[] Automated		[] Manual  Chemistry                        14.6   20.07 )-----------( 422      ( 24 Jun 2018 11:35 )             42.5     06-24    135  |  99  |  34<H>  ----------------------------<  406<H>  4.6   |  17<L>  |  1.35<H>    Ca    9.2      24 Jun 2018 11:35  Phos  2.4     06-24  Mg     2.2     06-24    TPro  8.0  /  Alb  4.2  /  TBili  0.9  /  DBili  x   /  AST  26  /  ALT  21  /  AlkPhos  105  06-24    LIVER FUNCTIONS - ( 24 Jun 2018 11:35 )  Alb: 4.2 g/dL / Pro: 8.0 gm/dL / ALK PHOS: 105 U/L / ALT: 21 U/L / AST: 26 U/L / GGT: x           PT/INR - ( 24 Jun 2018 11:35 )   PT: 11.2 sec;   INR: 1.03 ratio         PTT - ( 24 Jun 2018 11:35 )  PTT:28.1 sec      ABG - ( 24 Jun 2018 12:57 )  pH, Arterial: x     pH, Blood: 7.27  /  pCO2: 25    /  pO2: 106   / HCO3: 11    / Base Excess: -14.2 /  SaO2: 97          MICROBIOLOGY/CULTURES:  pending     RADIOLOGY RESULTS:  < from: CT Abdomen and Pelvis w/ IV Cont (06.24.18 @ 14:33) >    IMPRESSION:     Colitis involving the ascending and transverse colon.    Normal appendix.    Underdistended urinary bladder with diffuse wall thickening. Correlate   with urinalysis.          < end of copied text >

## 2018-06-24 NOTE — ED PROVIDER NOTE - CARE PLAN
Principal Discharge DX:	DKA (diabetic ketoacidoses)  Secondary Diagnosis:	Colitis  Secondary Diagnosis:	UTI (urinary tract infection)

## 2018-06-24 NOTE — ED PROVIDER NOTE - CRITICAL CARE PROVIDED
interpretation of diagnostic studies/direct patient care (not related to procedure)/consult w/ pt's family directly relating to pts condition/documentation/consultation with other physicians/additional history taking

## 2018-06-24 NOTE — ED PROVIDER NOTE - OBJECTIVE STATEMENT
Pertinent PMH/PSH/FHx/SHx and Review of Systems contained within:  Patient presents to the ED for NV and abd pain.  PMH of IDDM2 with known gastroparesis and episodes of DKA.  VSS.  was d/c from [facility] yesterday "still vomtiing."  continued sx into today and came for evluation.  wife bedside. Non toxic.  still with pain and NV.  states not like prior DKA episodes.  Non toxic.  Well appearing. No aggravating or relieving factors. No other pertinent PMH.  No other pertinent PSH.  No other pertinent FHx.  Patient denies EtOH/tobacco/illicit substance use. No fever/chills, No photophobia/eye pain/changes in vision, No ear pain/sore throat/dysphagia, No chest pain/palpitations, no SOB/cough/wheeze/stridor,  no dysuria/frequency/discharge, No neck/back pain, no rash, no changes in neurological status/function.

## 2018-06-24 NOTE — ED PROVIDER NOTE - PHYSICAL EXAMINATION
Gen: Alert, NAD Head: NC, AT, PERRL, EOMI, normal lids/conjunctiva ENT:, normal hearing, patent oropharynx without erythema/exudate, uvula midline Neck: +supple, no tenderness/meningismus/JVD, +Trachea midline  Pulm: Bilateral BS, normal resp effort, no wheeze/stridor/retractions CV: RRR, no M/R/G, +dist pulses Abd: soft, upper abd pain, no lower abd pain, ND, +BS, no hepatosplenomegaly Mskel: no edema/erythema/cyanosis Skin: no rash Neuro: AAOx3, no sensory/motor deficits, CN 2-12 intact

## 2018-06-24 NOTE — H&P ADULT - NSHPPHYSICALEXAM_GEN_ALL_CORE
GENERAL: NAD, well-groomed, well-developed  EYES: EOMI, PERRLA, conjunctiva and sclera clear  NERVOUS SYSTEM:  Alert & Oriented X3, Good concentration; Motor Strength 5/5 B/L upper and lower extremities; DTRs 2+ intact and symmetric  CHEST/LUNG: Clear to percussion bilaterally; No rales, rhonchi, wheezing, or rubs  HEART: Regular rate and rhythm; No murmurs, rubs, or gallops  ABDOMEN: Soft, Nontender, Nondistended; Bowel sounds present  EXTREMITIES:  2+ Peripheral Pulses, No clubbing, cyanosis, or edema GENERAL: NAD, well-groomed, well-developed  EYES: EOMI, PERRLA, conjunctiva and sclera clear  NERVOUS SYSTEM:  Alert & Oriented X3, Good concentration; Motor Strength 5/5 B/L upper and lower extremities; DTRs 2+ intact and symmetric  CHEST/LUNG: CTA; No rales, rhonchi, wheezing, or rubs  HEART: Regular rate and rhythm; No murmurs, rubs, or gallops  ABDOMEN: Soft, tender to palpation in LUQ, Nondistended; Bowel sounds present  EXTREMITIES:  2+ Peripheral Pulses, No clubbing, cyanosis, or edema

## 2018-06-24 NOTE — H&P ADULT - ATTENDING COMMENTS
Pt seen and examined in the ER    a/p: 1) Mild DKA 2) Ascending and transverse colitis 3) MAYRA    ID: Cipro + Flagyl  FEN: Cont hydration/ Follow lytes q4-6 hrs  Endo: Titrate Insulin drip to AG and Glu; when AG resolves and Glu normalizes start pt's Lantus overlapped with the Insulin drip  Renal: Follow BUN/Cr and UO    CCT: 40 min Pt seen and examined in the ER    a/p: 1) Mild DKA 2) Ascending and transverse colitis 3) MAYRA    ID: Cipro + Flagyl  FEN: Cont hydration/ Follow lytes q4-6 hrs  Endo: Titrate Insulin drip to AG and Glu (follow FS q 1hr); when AG resolves and Glu normalizes start pt's Lantus overlapped with the Insulin drip  Renal: Follow BUN/Cr and UO    CCT: 40 min

## 2018-06-25 DIAGNOSIS — K29.00 ACUTE GASTRITIS WITHOUT BLEEDING: ICD-10-CM

## 2018-06-25 DIAGNOSIS — K52.9 NONINFECTIVE GASTROENTERITIS AND COLITIS, UNSPECIFIED: ICD-10-CM

## 2018-06-25 LAB
ANION GAP SERPL CALC-SCNC: 11 MMOL/L — SIGNIFICANT CHANGE UP (ref 5–17)
ANION GAP SERPL CALC-SCNC: 12 MMOL/L — SIGNIFICANT CHANGE UP (ref 5–17)
ANION GAP SERPL CALC-SCNC: 9 MMOL/L — SIGNIFICANT CHANGE UP (ref 5–17)
APPEARANCE UR: CLEAR — SIGNIFICANT CHANGE UP
BASOPHILS # BLD AUTO: 0.05 K/UL — SIGNIFICANT CHANGE UP (ref 0–0.2)
BASOPHILS NFR BLD AUTO: 0.3 % — SIGNIFICANT CHANGE UP (ref 0–2)
BILIRUB UR-MCNC: NEGATIVE — SIGNIFICANT CHANGE UP
BUN SERPL-MCNC: 13 MG/DL — SIGNIFICANT CHANGE UP (ref 7–23)
BUN SERPL-MCNC: 23 MG/DL — SIGNIFICANT CHANGE UP (ref 7–23)
BUN SERPL-MCNC: 23 MG/DL — SIGNIFICANT CHANGE UP (ref 7–23)
CALCIUM SERPL-MCNC: 7.6 MG/DL — LOW (ref 8.5–10.1)
CALCIUM SERPL-MCNC: 7.9 MG/DL — LOW (ref 8.5–10.1)
CALCIUM SERPL-MCNC: 7.9 MG/DL — LOW (ref 8.5–10.1)
CHLORIDE SERPL-SCNC: 103 MMOL/L — SIGNIFICANT CHANGE UP (ref 96–108)
CHLORIDE SERPL-SCNC: 108 MMOL/L — SIGNIFICANT CHANGE UP (ref 96–108)
CHLORIDE SERPL-SCNC: 109 MMOL/L — HIGH (ref 96–108)
CO2 SERPL-SCNC: 22 MMOL/L — SIGNIFICANT CHANGE UP (ref 22–31)
CO2 SERPL-SCNC: 22 MMOL/L — SIGNIFICANT CHANGE UP (ref 22–31)
CO2 SERPL-SCNC: 23 MMOL/L — SIGNIFICANT CHANGE UP (ref 22–31)
COLOR SPEC: YELLOW — SIGNIFICANT CHANGE UP
CREAT SERPL-MCNC: 1.18 MG/DL — SIGNIFICANT CHANGE UP (ref 0.5–1.3)
CREAT SERPL-MCNC: 1.37 MG/DL — HIGH (ref 0.5–1.3)
CREAT SERPL-MCNC: 1.39 MG/DL — HIGH (ref 0.5–1.3)
DIFF PNL FLD: NEGATIVE — SIGNIFICANT CHANGE UP
EOSINOPHIL # BLD AUTO: 0.03 K/UL — SIGNIFICANT CHANGE UP (ref 0–0.5)
EOSINOPHIL NFR BLD AUTO: 0.2 % — SIGNIFICANT CHANGE UP (ref 0–6)
GLUCOSE BLDC GLUCOMTR-MCNC: 143 MG/DL — HIGH (ref 70–99)
GLUCOSE BLDC GLUCOMTR-MCNC: 155 MG/DL — HIGH (ref 70–99)
GLUCOSE BLDC GLUCOMTR-MCNC: 158 MG/DL — HIGH (ref 70–99)
GLUCOSE BLDC GLUCOMTR-MCNC: 160 MG/DL — HIGH (ref 70–99)
GLUCOSE BLDC GLUCOMTR-MCNC: 161 MG/DL — HIGH (ref 70–99)
GLUCOSE BLDC GLUCOMTR-MCNC: 165 MG/DL — HIGH (ref 70–99)
GLUCOSE BLDC GLUCOMTR-MCNC: 167 MG/DL — HIGH (ref 70–99)
GLUCOSE BLDC GLUCOMTR-MCNC: 236 MG/DL — HIGH (ref 70–99)
GLUCOSE BLDC GLUCOMTR-MCNC: 283 MG/DL — HIGH (ref 70–99)
GLUCOSE BLDC GLUCOMTR-MCNC: 300 MG/DL — HIGH (ref 70–99)
GLUCOSE SERPL-MCNC: 145 MG/DL — HIGH (ref 70–99)
GLUCOSE SERPL-MCNC: 153 MG/DL — HIGH (ref 70–99)
GLUCOSE SERPL-MCNC: 441 MG/DL — HIGH (ref 70–99)
GLUCOSE UR QL: 1000 MG/DL
HBA1C BLD-MCNC: 8.5 % — HIGH (ref 4–5.6)
HCT VFR BLD CALC: 35.2 % — LOW (ref 39–50)
HGB BLD-MCNC: 11.8 G/DL — LOW (ref 13–17)
IMM GRANULOCYTES NFR BLD AUTO: 0.3 % — SIGNIFICANT CHANGE UP (ref 0–1.5)
KETONES UR-MCNC: ABNORMAL
LEUKOCYTE ESTERASE UR-ACNC: NEGATIVE — SIGNIFICANT CHANGE UP
LIDOCAIN IGE QN: 41 U/L — LOW (ref 73–393)
LYMPHOCYTES # BLD AUTO: 24.9 % — SIGNIFICANT CHANGE UP (ref 13–44)
LYMPHOCYTES # BLD AUTO: 3.66 K/UL — HIGH (ref 1–3.3)
MAGNESIUM SERPL-MCNC: 2.2 MG/DL — SIGNIFICANT CHANGE UP (ref 1.6–2.6)
MAGNESIUM SERPL-MCNC: 2.2 MG/DL — SIGNIFICANT CHANGE UP (ref 1.6–2.6)
MCHC RBC-ENTMCNC: 28 PG — SIGNIFICANT CHANGE UP (ref 27–34)
MCHC RBC-ENTMCNC: 33.5 GM/DL — SIGNIFICANT CHANGE UP (ref 32–36)
MCV RBC AUTO: 83.6 FL — SIGNIFICANT CHANGE UP (ref 80–100)
MONOCYTES # BLD AUTO: 1.43 K/UL — HIGH (ref 0–0.9)
MONOCYTES NFR BLD AUTO: 9.7 % — SIGNIFICANT CHANGE UP (ref 2–14)
NEUTROPHILS # BLD AUTO: 9.46 K/UL — HIGH (ref 1.8–7.4)
NEUTROPHILS NFR BLD AUTO: 64.6 % — SIGNIFICANT CHANGE UP (ref 43–77)
NITRITE UR-MCNC: NEGATIVE — SIGNIFICANT CHANGE UP
NRBC # BLD: 0 /100 WBCS — SIGNIFICANT CHANGE UP (ref 0–0)
PH UR: 6 — SIGNIFICANT CHANGE UP (ref 5–8)
PHOSPHATE SERPL-MCNC: 2.3 MG/DL — LOW (ref 2.5–4.5)
PHOSPHATE SERPL-MCNC: 2.4 MG/DL — LOW (ref 2.5–4.5)
PLATELET # BLD AUTO: 329 K/UL — SIGNIFICANT CHANGE UP (ref 150–400)
POTASSIUM SERPL-MCNC: 3.6 MMOL/L — SIGNIFICANT CHANGE UP (ref 3.5–5.3)
POTASSIUM SERPL-MCNC: 3.9 MMOL/L — SIGNIFICANT CHANGE UP (ref 3.5–5.3)
POTASSIUM SERPL-MCNC: 4.2 MMOL/L — SIGNIFICANT CHANGE UP (ref 3.5–5.3)
POTASSIUM SERPL-SCNC: 3.6 MMOL/L — SIGNIFICANT CHANGE UP (ref 3.5–5.3)
POTASSIUM SERPL-SCNC: 3.9 MMOL/L — SIGNIFICANT CHANGE UP (ref 3.5–5.3)
POTASSIUM SERPL-SCNC: 4.2 MMOL/L — SIGNIFICANT CHANGE UP (ref 3.5–5.3)
PROT UR-MCNC: NEGATIVE MG/DL — SIGNIFICANT CHANGE UP
RBC # BLD: 4.21 M/UL — SIGNIFICANT CHANGE UP (ref 4.2–5.8)
RBC # FLD: 14.1 % — SIGNIFICANT CHANGE UP (ref 10.3–14.5)
SODIUM SERPL-SCNC: 137 MMOL/L — SIGNIFICANT CHANGE UP (ref 135–145)
SODIUM SERPL-SCNC: 139 MMOL/L — SIGNIFICANT CHANGE UP (ref 135–145)
SODIUM SERPL-SCNC: 143 MMOL/L — SIGNIFICANT CHANGE UP (ref 135–145)
SP GR SPEC: 1.01 — SIGNIFICANT CHANGE UP (ref 1.01–1.02)
UROBILINOGEN FLD QL: NEGATIVE MG/DL — SIGNIFICANT CHANGE UP
WBC # BLD: 14.68 K/UL — HIGH (ref 3.8–10.5)
WBC # FLD AUTO: 14.68 K/UL — HIGH (ref 3.8–10.5)

## 2018-06-25 PROCEDURE — 99233 SBSQ HOSP IP/OBS HIGH 50: CPT

## 2018-06-25 PROCEDURE — 93010 ELECTROCARDIOGRAM REPORT: CPT

## 2018-06-25 RX ORDER — PANTOPRAZOLE SODIUM 20 MG/1
40 TABLET, DELAYED RELEASE ORAL
Qty: 0 | Refills: 0 | Status: DISCONTINUED | OUTPATIENT
Start: 2018-06-25 | End: 2018-06-30

## 2018-06-25 RX ORDER — INSULIN GLARGINE 100 [IU]/ML
10 INJECTION, SOLUTION SUBCUTANEOUS ONCE
Qty: 0 | Refills: 0 | Status: COMPLETED | OUTPATIENT
Start: 2018-06-25 | End: 2018-06-25

## 2018-06-25 RX ORDER — HYDROMORPHONE HYDROCHLORIDE 2 MG/ML
1 INJECTION INTRAMUSCULAR; INTRAVENOUS; SUBCUTANEOUS EVERY 4 HOURS
Qty: 0 | Refills: 0 | Status: DISCONTINUED | OUTPATIENT
Start: 2018-06-25 | End: 2018-06-25

## 2018-06-25 RX ORDER — DEXTROSE 50 % IN WATER 50 %
12.5 SYRINGE (ML) INTRAVENOUS ONCE
Qty: 0 | Refills: 0 | Status: DISCONTINUED | OUTPATIENT
Start: 2018-06-25 | End: 2018-06-30

## 2018-06-25 RX ORDER — ACETAMINOPHEN 500 MG
650 TABLET ORAL EVERY 6 HOURS
Qty: 0 | Refills: 0 | Status: DISCONTINUED | OUTPATIENT
Start: 2018-06-25 | End: 2018-06-30

## 2018-06-25 RX ORDER — INSULIN GLARGINE 100 [IU]/ML
10 INJECTION, SOLUTION SUBCUTANEOUS AT BEDTIME
Qty: 0 | Refills: 0 | Status: DISCONTINUED | OUTPATIENT
Start: 2018-06-25 | End: 2018-06-26

## 2018-06-25 RX ORDER — INSULIN LISPRO 100/ML
VIAL (ML) SUBCUTANEOUS EVERY 6 HOURS
Qty: 0 | Refills: 0 | Status: DISCONTINUED | OUTPATIENT
Start: 2018-06-25 | End: 2018-06-25

## 2018-06-25 RX ORDER — SODIUM CHLORIDE 9 MG/ML
1000 INJECTION, SOLUTION INTRAVENOUS
Qty: 0 | Refills: 0 | Status: DISCONTINUED | OUTPATIENT
Start: 2018-06-25 | End: 2018-06-30

## 2018-06-25 RX ORDER — DEXTROSE 50 % IN WATER 50 %
15 SYRINGE (ML) INTRAVENOUS ONCE
Qty: 0 | Refills: 0 | Status: DISCONTINUED | OUTPATIENT
Start: 2018-06-25 | End: 2018-06-30

## 2018-06-25 RX ORDER — ESCITALOPRAM OXALATE 10 MG/1
5 TABLET, FILM COATED ORAL DAILY
Qty: 0 | Refills: 0 | Status: DISCONTINUED | OUTPATIENT
Start: 2018-06-25 | End: 2018-06-30

## 2018-06-25 RX ORDER — POTASSIUM PHOSPHATE, MONOBASIC POTASSIUM PHOSPHATE, DIBASIC 236; 224 MG/ML; MG/ML
15 INJECTION, SOLUTION INTRAVENOUS ONCE
Qty: 0 | Refills: 0 | Status: COMPLETED | OUTPATIENT
Start: 2018-06-25 | End: 2018-06-25

## 2018-06-25 RX ORDER — DEXTROSE 50 % IN WATER 50 %
25 SYRINGE (ML) INTRAVENOUS ONCE
Qty: 0 | Refills: 0 | Status: DISCONTINUED | OUTPATIENT
Start: 2018-06-25 | End: 2018-06-30

## 2018-06-25 RX ORDER — INSULIN LISPRO 100/ML
VIAL (ML) SUBCUTANEOUS
Qty: 0 | Refills: 0 | Status: DISCONTINUED | OUTPATIENT
Start: 2018-06-25 | End: 2018-06-30

## 2018-06-25 RX ORDER — METOCLOPRAMIDE HCL 10 MG
10 TABLET ORAL EVERY 8 HOURS
Qty: 0 | Refills: 0 | Status: DISCONTINUED | OUTPATIENT
Start: 2018-06-25 | End: 2018-06-25

## 2018-06-25 RX ORDER — SODIUM CHLORIDE 9 MG/ML
1000 INJECTION, SOLUTION INTRAVENOUS
Qty: 0 | Refills: 0 | Status: DISCONTINUED | OUTPATIENT
Start: 2018-06-25 | End: 2018-06-25

## 2018-06-25 RX ORDER — GLUCAGON INJECTION, SOLUTION 0.5 MG/.1ML
1 INJECTION, SOLUTION SUBCUTANEOUS ONCE
Qty: 0 | Refills: 0 | Status: DISCONTINUED | OUTPATIENT
Start: 2018-06-25 | End: 2018-06-30

## 2018-06-25 RX ORDER — INSULIN LISPRO 100/ML
VIAL (ML) SUBCUTANEOUS AT BEDTIME
Qty: 0 | Refills: 0 | Status: DISCONTINUED | OUTPATIENT
Start: 2018-06-25 | End: 2018-06-30

## 2018-06-25 RX ORDER — MORPHINE SULFATE 50 MG/1
2 CAPSULE, EXTENDED RELEASE ORAL EVERY 4 HOURS
Qty: 0 | Refills: 0 | Status: DISCONTINUED | OUTPATIENT
Start: 2018-06-25 | End: 2018-06-26

## 2018-06-25 RX ORDER — INSULIN LISPRO 100/ML
3 VIAL (ML) SUBCUTANEOUS ONCE
Qty: 0 | Refills: 0 | Status: COMPLETED | OUTPATIENT
Start: 2018-06-25 | End: 2018-06-25

## 2018-06-25 RX ORDER — METOCLOPRAMIDE HCL 10 MG
10 TABLET ORAL EVERY 8 HOURS
Qty: 0 | Refills: 0 | Status: DISCONTINUED | OUTPATIENT
Start: 2018-06-25 | End: 2018-06-30

## 2018-06-25 RX ORDER — OXYCODONE AND ACETAMINOPHEN 5; 325 MG/1; MG/1
2 TABLET ORAL EVERY 6 HOURS
Qty: 0 | Refills: 0 | Status: DISCONTINUED | OUTPATIENT
Start: 2018-06-25 | End: 2018-06-30

## 2018-06-25 RX ADMIN — INSULIN GLARGINE 10 UNIT(S): 100 INJECTION, SOLUTION SUBCUTANEOUS at 22:13

## 2018-06-25 RX ADMIN — HYDROMORPHONE HYDROCHLORIDE 1 MILLIGRAM(S): 2 INJECTION INTRAMUSCULAR; INTRAVENOUS; SUBCUTANEOUS at 08:01

## 2018-06-25 RX ADMIN — Medication 10 MILLIGRAM(S): at 22:13

## 2018-06-25 RX ADMIN — Medication 4: at 11:28

## 2018-06-25 RX ADMIN — Medication 0.5 MILLIGRAM(S): at 22:15

## 2018-06-25 RX ADMIN — INSULIN GLARGINE 10 UNIT(S): 100 INJECTION, SOLUTION SUBCUTANEOUS at 03:17

## 2018-06-25 RX ADMIN — Medication 100 MILLIGRAM(S): at 00:40

## 2018-06-25 RX ADMIN — Medication 6: at 16:33

## 2018-06-25 RX ADMIN — MORPHINE SULFATE 2 MILLIGRAM(S): 50 CAPSULE, EXTENDED RELEASE ORAL at 18:02

## 2018-06-25 RX ADMIN — OXYCODONE AND ACETAMINOPHEN 2 TABLET(S): 5; 325 TABLET ORAL at 19:24

## 2018-06-25 RX ADMIN — Medication 200 MILLIGRAM(S): at 18:02

## 2018-06-25 RX ADMIN — ONDANSETRON 4 MILLIGRAM(S): 8 TABLET, FILM COATED ORAL at 08:01

## 2018-06-25 RX ADMIN — Medication 100 MILLIGRAM(S): at 22:13

## 2018-06-25 RX ADMIN — Medication 200 MILLIGRAM(S): at 00:39

## 2018-06-25 RX ADMIN — CHLORHEXIDINE GLUCONATE 1 APPLICATION(S): 213 SOLUTION TOPICAL at 05:44

## 2018-06-25 RX ADMIN — ESCITALOPRAM OXALATE 5 MILLIGRAM(S): 10 TABLET, FILM COATED ORAL at 12:52

## 2018-06-25 RX ADMIN — OXYCODONE AND ACETAMINOPHEN 2 TABLET(S): 5; 325 TABLET ORAL at 13:20

## 2018-06-25 RX ADMIN — POTASSIUM PHOSPHATE, MONOBASIC POTASSIUM PHOSPHATE, DIBASIC 63.75 MILLIMOLE(S): 236; 224 INJECTION, SOLUTION INTRAVENOUS at 07:02

## 2018-06-25 RX ADMIN — Medication 10 MILLIGRAM(S): at 14:13

## 2018-06-25 RX ADMIN — MORPHINE SULFATE 2 MILLIGRAM(S): 50 CAPSULE, EXTENDED RELEASE ORAL at 18:17

## 2018-06-25 RX ADMIN — Medication 100 MILLIGRAM(S): at 14:14

## 2018-06-25 RX ADMIN — Medication 100 MILLIGRAM(S): at 05:45

## 2018-06-25 RX ADMIN — Medication 3 UNIT(S): at 20:20

## 2018-06-25 RX ADMIN — HYDROMORPHONE HYDROCHLORIDE 1 MILLIGRAM(S): 2 INJECTION INTRAMUSCULAR; INTRAVENOUS; SUBCUTANEOUS at 08:16

## 2018-06-25 RX ADMIN — SODIUM CHLORIDE 75 MILLILITER(S): 9 INJECTION, SOLUTION INTRAVENOUS at 05:45

## 2018-06-25 RX ADMIN — OXYCODONE AND ACETAMINOPHEN 2 TABLET(S): 5; 325 TABLET ORAL at 20:20

## 2018-06-25 RX ADMIN — Medication 0: at 04:06

## 2018-06-25 RX ADMIN — SODIUM CHLORIDE 75 MILLILITER(S): 9 INJECTION, SOLUTION INTRAVENOUS at 17:15

## 2018-06-25 RX ADMIN — OXYCODONE AND ACETAMINOPHEN 2 TABLET(S): 5; 325 TABLET ORAL at 12:36

## 2018-06-25 RX ADMIN — SODIUM CHLORIDE 150 MILLILITER(S): 9 INJECTION, SOLUTION INTRAVENOUS at 22:30

## 2018-06-25 RX ADMIN — Medication 200 MILLIGRAM(S): at 05:44

## 2018-06-25 RX ADMIN — Medication 6: at 07:42

## 2018-06-25 RX ADMIN — Medication 650 MILLIGRAM(S): at 03:19

## 2018-06-25 NOTE — DIETITIAN INITIAL EVALUATION ADULT. - NS AS NUTRI INTERV ED CONTENT
diabetes wellness outpatient program handout material & diabetes type 1 nutrition therapy handout material

## 2018-06-25 NOTE — DIETITIAN INITIAL EVALUATION ADULT. - PERTINENT LABORATORY DATA
06-25 Na 143 mmol/L Glu 145 mg/dL<H> K+ 3.9 mmol/L Cr  1.39 mg/dL<H> BUN 23 mg/dL Phos 2.3 mg/dL<L> Alb n/a   PAB n/a   Hgb 11.8 g/dL<L> Hct 35.2 %<L>, CvgU4X=1.5%(6/25), WBC=14.68, OAOV=627, 300, 167, Alb=2.7(6/24), Pho2.3, Moderate ketones

## 2018-06-25 NOTE — DIETITIAN INITIAL EVALUATION ADULT. - NUTRITION INTERVENTION
Nutrition Education/Collaboration and Referral of Nutrition Care Nutrition Education/Collaboration and Referral of Nutrition Care/Meals and Snack

## 2018-06-25 NOTE — DIETITIAN INITIAL EVALUATION ADULT. - PERTINENT MEDS FT
Heparin, Cipro, Flagyl, Hibiclens, Morphine, Percocet, Lexapro, Reglan, Tylenol, Humalog ss, Lantus 10units HS, Klonopin

## 2018-06-25 NOTE — DIETITIAN INITIAL EVALUATION ADULT. - OTHER INFO
Pt seen for HgbA1C>8% & ICU admission. Pt lives with wife; wife does cooking & food shopping. Pt with poor manages DM & noncompliant 4 x day consuming high CHO intake & sugared beverages. Pt with hx gastroparesis & episodes of DKA presents with large ketones & elevated BG upon admission (Glucose=406(6/24). Pt manages DM type 1 with Lantus 20units & Novolog 10units ac meals & SMBG 4 x day; pt reports frequent hypoglycemia ~2am & 2pm & takes sugared beverage. Pt consuming 25% lunch today. pt reports last BM x 196/23).

## 2018-06-25 NOTE — CONSULT NOTE ADULT - ASSESSMENT
36yo BM admitted for DKA .,CT showed colitis .Never had colitis before nor have a colonoscopy. Also has gastritis

## 2018-06-25 NOTE — PROGRESS NOTE ADULT - SUBJECTIVE AND OBJECTIVE BOX
HPI:  38 yo M PMH of IDDM2 with known gastroparesis and episodes of DKA, presented to the ED  for NV and abd pain.  Pt was d/c from [facility] yesterday states "still vomiting" continued sx into today and came for evaluation.  Pt non-toxic in appearance still with pain and NV.  Pt states this is not like prior DKA episodes.  Pt noted to have FS in 400 with mild AG metabolic acidosis HCO3 17, AG 19, pH 7.27. ICU c/s called for DKA evaluation with need for insulin gtt.  Pt admitted to the ICU for DKA placed on insulin gtt with need for q1h FS (24 Jun 2018 18:30)      24 hr events:      ## ROS:  [ ] unable to obtain  CONSTITUTIONAL: No fever, weight loss, or fatigue  EYES: No eye pain, visual disturbances, or discharge  ENMT:  No difficulty hearing, tinnitus, vertigo; No sinus or throat pain  NECK: No pain or stiffness  RESPIRATORY: No cough, wheezing, chills or hemoptysis; No shortness of breath  CARDIOVASCULAR: No chest pain, palpitations, dizziness, or leg swelling  GASTROINTESTINAL: No abdominal or epigastric pain. No nausea, vomiting, or hematemesis; No diarrhea or constipation. No melena or hematochezia.  GENITOURINARY: No dysuria, frequency, hematuria, or incontinence  NEUROLOGICAL: No headaches, memory loss, loss of strength, numbness, or tremors  SKIN: No itching, burning, rashes, or lesions   LYMPH NODES: No enlarged glands  ENDOCRINE: No heat or cold intolerance; No hair loss  MUSCULOSKELETAL: No joint pain or swelling; No muscle, back, or extremity pain  PSYCHIATRIC: No depression, anxiety, mood swings, or difficulty sleeping  HEME/LYMPH: No easy bruising, or bleeding gums  ALLERGY AND IMMUNOLOGIC: No hives or eczema    ## Labs:  CBC:                        11.8   14.68 )-----------( 329      ( 25 Jun 2018 03:26 )             35.2     Chem:  06-25    137  |  103  |  13  ----------------------------<  441<H>  3.6   |  22  |  1.18    Ca    7.6<L>      25 Jun 2018 17:21  Phos  2.3     06-25  Mg     2.2     06-25    TPro  8.0  /  Alb  4.2  /  TBili  0.9  /  DBili  x   /  AST  26  /  ALT  21  /  AlkPhos  105  06-24    Coags:  PT/INR - ( 24 Jun 2018 11:35 )   PT: 11.2 sec;   INR: 1.03 ratio         PTT - ( 24 Jun 2018 11:35 )  PTT:28.1 sec        ## Imaging:    ## Medications:  ciprofloxacin   IVPB      ciprofloxacin   IVPB 400 milliGRAM(s) IV Intermittent every 12 hours  metroNIDAZOLE  IVPB 500 milliGRAM(s) IV Intermittent every 8 hours  metroNIDAZOLE  IVPB            dextrose 40% Gel 15 Gram(s) Oral once PRN  dextrose 50% Injectable 12.5 Gram(s) IV Push once  dextrose 50% Injectable 25 Gram(s) IV Push once  dextrose 50% Injectable 25 Gram(s) IV Push once  glucagon  Injectable 1 milliGRAM(s) IntraMuscular once PRN  insulin glargine Injectable (LANTUS) 10 Unit(s) SubCutaneous at bedtime  insulin lispro (HumaLOG) corrective regimen sliding scale   SubCutaneous three times a day before meals  insulin lispro (HumaLOG) corrective regimen sliding scale   SubCutaneous at bedtime    heparin  Injectable 5000 Unit(s) SubCutaneous every 12 hours    pantoprazole    Tablet 40 milliGRAM(s) Oral before breakfast    acetaminophen   Tablet. 650 milliGRAM(s) Oral every 6 hours PRN  clonazePAM Tablet 0.5 milliGRAM(s) Oral at bedtime PRN  escitalopram 5 milliGRAM(s) Oral daily  metoclopramide Injectable 10 milliGRAM(s) IV Push every 8 hours  morphine  - Injectable 2 milliGRAM(s) IV Push every 4 hours PRN  oxyCODONE    5 mG/acetaminophen 325 mG 2 Tablet(s) Oral every 6 hours PRN      ## Vitals:  T(C): 36.7 (06-25-18 @ 15:04), Max: 36.9 (06-24-18 @ 20:00)  HR: 69 (06-25-18 @ 18:00) (56 - 91)  BP: 137/98 (06-25-18 @ 18:00) (97/56 - 151/89)  BP(mean): 111 (06-25-18 @ 18:00) (69 - 111)  RR: 11 (06-25-18 @ 18:00) (10 - 33)  SpO2: 100% (06-25-18 @ 18:00) (96% - 100%)  Wt(kg): --  Vent:   ABG: ABG - ( 24 Jun 2018 12:57 )  pH, Arterial: x     pH, Blood: 7.27  /  pCO2: 25    /  pO2: 106   / HCO3: 11    / Base Excess: -14.2 /  SaO2: 97                    06-24 @ 07:01  -  06-25 @ 07:00  --------------------------------------------------------  IN: 1675 mL / OUT: 675 mL / NET: 1000 mL    06-25 @ 07:01  - 06-25 @ 18:40  --------------------------------------------------------  IN: 1010 mL / OUT: 2250 mL / NET: -1240 mL          ## P/E:  Gen: lying comfortably in bed in no apparent distress  HEENT: PERRL, EOMI  Resp: CTA B/L no c/r/w  CVS: S1S2 no m/r/g  Abd: soft NT/ND +BS  Ext: no c/c/e  Neuro: A&Ox3    CENTRAL LINE: [ ] YES [ ] NO  LOCATION:   DATE INSERTED:  REMOVE: [ ] YES [ ] NO      TALLEY: [ ] YES [ ] NO    DATE INSERTED:  REMOVE:  [ ] YES [ ] NO      A-LINE:  [ ] YES [ ] NO  LOCATION:   DATE INSERTED:  REMOVE:  [ ] YES [ ] NO  EXPLAIN:    GLOBAL ISSUE/BEST PRACTICE:  Analgesia:  Sedation:  HOB elevation: yes  Stress ulcer prophylaxis:  VTE prophylaxis:  Oral Care:  Glycemic control:  Nutrition:    CODE STATUS: [ ] full code  [ ] DNR  [ ] DNI  [ ] [facility]  Goals of care discussion: [ ] yes HPI:  37M PMH anxiety/depression, DM insulin dependent gastroparesis, DKA, abdominal pain, nausea and emesis. Presents for complaints of epigastric pain, nausea and emesis. Of note pt has had multiple admissions in past for DKA and abdominal pain. Has been hospitalized at [facility], [facility], [facility], and [facility] for similar symptoms of recurrent abdominal pain. Pt signed out AMA on last two admission to [facility]. Has been diagnosed with colitis 9/2017, 10/2017, 6/9/2018 (left AMA). Had EGD and colonoscopy 2014 at [facility] with normal colonoscopy and gastritis on EGD. Pt left  on 6/12/18 and went to [facility] for abdominal pain and was just discharged and returns to  for complaints of persistent abdominal pain. In ED pt found to be in DKA: FS in the 400s with mild AG metabolic acidosis HCO3 17, AG 19, pH 7.27. CT abdomen with ascending and transverse colitis. Pt reports being compliant on medication. Occasional intermittent diarrhea but denies any bloody stool. Denies any recent diarrhea.     Review of prior notes state that pt was to follow up with GI as outpatient for colonoscopy, but pt states initially he was never told to follow up, and then states he didn't follow up due to "insurance not covering GI doctor visits".  Prior notes also mention endocrine follow up with possibility of insulin pump however pt states he was told by endo he doesn't need a pump.       24 hr events:  titrated off insulin drip this morning  still reports abdominal pain - complaining that the "new small Dilaudid vials don't work the same"  reports he has no appetite to eat, but if he has more pain meds he might be able to get something down  no further emesis    ## ROS:   no HA  no fevers, no chills  no SOB, no cough  no CP, no palpitations  + epigastric pain (non radiating), no nausea  no dysuria  no extremity or back pain    ## Labs:  CBC:                        11.8   14.68 )-----------( 329      ( 25 Jun 2018 03:26 )             35.2     Chem:  06-25    137  |  103  |  13  ----------------------------<  441<H>  3.6   |  22  |  1.18    Ca    7.6<L>      25 Jun 2018 17:21  Phos  2.3     06-25  Mg     2.2     06-25    TPro  8.0  /  Alb  4.2  /  TBili  0.9  /  DBili  x   /  AST  26  /  ALT  21  /  AlkPhos  105  06-24    Coags:  PT/INR - ( 24 Jun 2018 11:35 )   PT: 11.2 sec;   INR: 1.03 ratio    PTT - ( 24 Jun 2018 11:35 )  PTT:28.1 sec    Hemoglobin A1C, Whole Blood (06.25.18 @ 08:21)    Hemoglobin A1C, Whole Blood: 8.5 %      ## Imaging:  CT abdomen: < from: CT Abdomen and Pelvis w/ IV Cont (06.24.18 @ 14:33) >  Colitis involving the ascending and transverse colon.    Normal appendix.    Underdistended urinary bladder with diffuse wall thickening.       ## Medications:  ciprofloxacin   IVPB 400 milliGRAM(s) IV Intermittent every 12 hours  metroNIDAZOLE  IVPB 500 milliGRAM(s) IV Intermittent every 8 hours      dextrose 40% Gel 15 Gram(s) Oral once PRN  dextrose 50% Injectable 12.5 Gram(s) IV Push once  dextrose 50% Injectable 25 Gram(s) IV Push once  dextrose 50% Injectable 25 Gram(s) IV Push once  glucagon  Injectable 1 milliGRAM(s) IntraMuscular once PRN  insulin glargine Injectable (LANTUS) 10 Unit(s) SubCutaneous at bedtime  insulin lispro (HumaLOG) corrective regimen sliding scale   SubCutaneous three times a day before meals  insulin lispro (HumaLOG) corrective regimen sliding scale   SubCutaneous at bedtime    heparin  Injectable 5000 Unit(s) SubCutaneous every 12 hours    pantoprazole    Tablet 40 milliGRAM(s) Oral before breakfast    acetaminophen   Tablet. 650 milliGRAM(s) Oral every 6 hours PRN  clonazePAM Tablet 0.5 milliGRAM(s) Oral at bedtime PRN  escitalopram 5 milliGRAM(s) Oral daily  metoclopramide Injectable 10 milliGRAM(s) IV Push every 8 hours  morphine  - Injectable 2 milliGRAM(s) IV Push every 4 hours PRN  oxyCODONE    5 mG/acetaminophen 325 mG 2 Tablet(s) Oral every 6 hours PRN      ## Vitals:  T(C): 36.7 (06-25-18 @ 15:04), Max: 36.9 (06-24-18 @ 20:00)  HR: 69 (06-25-18 @ 18:00) (56 - 91)  BP: 137/98 (06-25-18 @ 18:00) (97/56 - 151/89)  BP(mean): 111 (06-25-18 @ 18:00) (69 - 111)  RR: 11 (06-25-18 @ 18:00) (10 - 33)  SpO2: 100% (06-25-18 @ 18:00) (96% - 100%)  AG: ABG - ( 24 Jun 2018 12:57 )  pH, Arterial: pH, Blood: 7.27  /  pCO2: 25    /  pO2: 106   / HCO3: 11    / Base Excess: -14.2 /  SaO2: 97            06-24 @ 07:01  -  06-25 @ 07:00  --------------------------------------------------------  IN: 1675 mL / OUT: 675 mL / NET: 1000 mL    06-25 @ 07:01  -  06-25 @ 18:40  --------------------------------------------------------  IN: 1010 mL / OUT: 2250 mL / NET: -1240 mL        ## P/E:  Gen: lying comfortably in bed in no apparent distress  HEENT: PERRL, EOMI  Resp: CTA B/L no wheeze  CV: S1S2 RRR  Abd: soft ND +BS  Ext: no c/c/e  Neuro: A&Ox3    CENTRAL LINE: [ ] YES [x] NO  LOCATION:   DATE INSERTED:  REMOVE: [ ] YES [ ] NO      TALLEY: [ ] YES [x] NO    DATE INSERTED:  REMOVE:  [ ] YES [ ] NO      A-LINE:  [ ] YES [x] NO  LOCATION:   DATE INSERTED:  REMOVE:  [ ] YES [ ] NO  EXPLAIN:    GLOBAL ISSUE/BEST PRACTICE:  Analgesia: tylenol, percocet, morphine prn  Sedation: n/a  HOB elevation: yes  Stress ulcer prophylaxis: protonix  VTE prophylaxis: heparin sc  Oral Care: n/a  Glycemic control: lantus, sliding scale coverage  Nutrition: diabetic diet    CODE STATUS: [x] full code  [ ] DNR  [ ] DNI  [ ] [facility]  Goals of care discussion: [ ] yes

## 2018-06-25 NOTE — DIETITIAN INITIAL EVALUATION ADULT. - ADHERENCE
Follows diabetic diet @ home; Breakfast; eggs, Juice sater, Lunch; s/w, Supper; backed chicken, rice, potato, veg. Pt eats out ~4 x week fast food for lunch; [employer], [employer], or [employer]; fried chicken, sugared soda & french fries/poor

## 2018-06-25 NOTE — CONSULT NOTE ADULT - SUBJECTIVE AND OBJECTIVE BOX
Chief Complaint:  Patient is a 37y old  Male who presents with a chief complaint of Diabetic Keto Acidosis . Ct showed colitis    HPI:  38 yo M PMH of IDDM2 with known gastroparesis and episodes of DKA, presented to the ED  for NV and abd pain.  Pt was d/c from [facility] yesterday states "still vomiting" continued sx into today and came for evaluation.  Pt non-toxic in appearance still with pain and NV.  Pt states this is not like prior DKA episodes.  Pt noted to have FS in 400 with mild AG metabolic acidosis HCO3 17, AG 19, pH 7.27. ICU c/s called for DKA evaluation with need for insulin gtt.  Pt admitted to the ICU for DKA placed on insulin gtt with need for q1h FS (2018 18:30)      PMH/PSH:PAST MEDICAL & SURGICAL HISTORY:  Uncomplicated asthma, unspecified asthma severity, unspecified whether persistent  Gastritis, presence of bleeding unspecified, unspecified chronicity, unspecified gastritis type  Controlled diabetes mellitus type 1 without complications  Anxiety  Gastritis  Asthma  DM type 1 (diabetes mellitus, type 1)  History of cholecystectomy  S/P cholecystectomy      Allergies:  No Known Allergies      Medications:  acetaminophen   Tablet. 650 milliGRAM(s) Oral every 6 hours PRN  chlorhexidine 4% Liquid 1 Application(s) Topical <User Schedule>  ciprofloxacin   IVPB      ciprofloxacin   IVPB 400 milliGRAM(s) IV Intermittent every 12 hours  clonazePAM Tablet 0.5 milliGRAM(s) Oral at bedtime PRN  dextrose 40% Gel 15 Gram(s) Oral once PRN  dextrose 5%. 1000 milliLiter(s) IV Continuous <Continuous>  dextrose 50% Injectable 12.5 Gram(s) IV Push once  dextrose 50% Injectable 25 Gram(s) IV Push once  dextrose 50% Injectable 25 Gram(s) IV Push once  escitalopram 5 milliGRAM(s) Oral daily  glucagon  Injectable 1 milliGRAM(s) IntraMuscular once PRN  heparin  Injectable 5000 Unit(s) SubCutaneous every 12 hours  insulin glargine Injectable (LANTUS) 10 Unit(s) SubCutaneous at bedtime  insulin lispro (HumaLOG) corrective regimen sliding scale   SubCutaneous three times a day before meals  insulin lispro (HumaLOG) corrective regimen sliding scale   SubCutaneous at bedtime  metoclopramide Injectable 10 milliGRAM(s) IV Push every 8 hours  metroNIDAZOLE  IVPB 500 milliGRAM(s) IV Intermittent every 8 hours  metroNIDAZOLE  IVPB      morphine  - Injectable 2 milliGRAM(s) IV Push every 4 hours PRN  oxyCODONE    5 mG/acetaminophen 325 mG 2 Tablet(s) Oral every 6 hours PRN      Review of Systems:    General:  No weight loss, fevers, chills, night sweats, fatigue,   Eyes:  Good vision, no reported pain  ENT:  No sore throat, pain, runny nose, dysphagia  CV:  No pain, palpitations, hypo/hypertension  Resp:  No dyspnea, cough, tachypnea, wheezing  GI:  No pain, No nausea, No vomiting, No diarrhea, No constipation, No pruritis, No rectal bleeding, No tarry stools, No dysphagia,  :  No pain, bleeding, incontinence, nocturia  Muscle:  No pain, weakness  Breast:  No pain, abscess, mass, discharge  Neuro:  No weakness, tingling, memory problems  Psych:  No fatigue, insomnia, mood problems, depression  Endocrine:  No polyuria, polydypsia, cold/heat intolerance  Heme:  No petechiae, ecchymosis, easy bruisability  Skin:  No rash, tattoos, scars, edema    FAMILY HISTORY:  Family history of diabetes mellitus (DM) (Father, Mother, Grandparent)  Family history of diabetes mellitus (Father, Mother)      Relevant Social History: Alcohol( ), Tobacco( )    Physical Exam:    Vital Signs:  Vital Signs Last 24 Hrs  T(C): 36.6 (2018 11:23), Max: 36.9 (2018 20:00)  T(F): 97.8 (2018 11:23), Max: 98.4 (2018 20:00)  HR: 79 (2018 15:04) (56 - 91)  BP: 119/66 (2018 15:04) (97/56 - 151/89)  BP(mean): 81 (2018 15:04) (69 - 111)  RR: 13 (2018 15:04) (12 - 33)  SpO2: 99% (2018 15:04) (96% - 100%)  Daily Height in cm: 175.26 (2018 20:00)    Daily Weight in k (2018 15:03)    General:  Appears stated age, well-groomed, well-nourished, no distress  HEENT:  NC/AT,  conjunctivae clear and pink, no thyromegaly, nodules, adenopathy, no JVD  Chest:  Full & symmetric excursion, no increased effort, breath sounds clear  Cardiovascular:  Regular rhythm, S1, S2, no murmur/rub/S3/S4, no abdominal bruit, no edema  Abdomen:  Soft, non-tender, non-distended, normoactive bowel sounds,  no masses , no hepatosplenomegaly, no signs of chronic liver disease  Extremities:  no cyanosis, clubbing or edema  Skin:  No rash/erythema/ecchymoses/petechiae/wounds/abscess/warm/dry  Neuro/Psych:  Alert, oriented, no asterixis, no tremor, no encephalopathy  Rectal: no masses, no blood, normal tone     Laboratory:                          11.8   14.68 )-----------( 329      ( 2018 03:26 )             35.2     06-25    143  |  109<H>  |  23  ----------------------------<  145<H>  3.9   |  23  |  1.39<H>    Ca    7.9<L>      2018 03:26  Phos  2.3     06-25  Mg     2.2     06-25    TPro  8.0  /  Alb  4.2  /  TBili  0.9  /  DBili  x   /  AST  26  /  ALT  21  /  AlkPhos  105  06-24    LIVER FUNCTIONS - ( 2018 11:35 )  Alb: 4.2 g/dL / Pro: 8.0 gm/dL / ALK PHOS: 105 U/L / ALT: 21 U/L / AST: 26 U/L / GGT: x           PT/INR - ( 2018 11:35 )   PT: 11.2 sec;   INR: 1.03 ratio         PTT - ( 2018 11:35 )  PTT:28.1 sec  Urinalysis Basic - ( 2018 13:00 )    Color: Yellow / Appearance: Clear / S.010 / pH: x  Gluc: x / Ketone: Moderate  / Bili: Negative / Urobili: Negative mg/dL   Blood: x / Protein: Negative mg/dL / Nitrite: Negative   Leuk Esterase: Negative / RBC: x / WBC x   Sq Epi: x / Non Sq Epi: x / Bacteria: x      Amylase Serum--      Lipase serum41 U/L<L>       Ammonia--  Amylase Serum--      Lipase serum34 U/L<L>       Ammonia--      Intake and Output    18 @ 07:01  -  18 @ 07:00  --------------------------------------------------------  IN: 1675 mL / OUT: 675 mL / NET: 1000 mL    18 @ 07:01  -  18 @ 16:16  --------------------------------------------------------  IN: 610 mL / OUT: 1650 mL / NET: -1040 mL        Imaging:    < from: CT Abdomen and Pelvis w/ IV Cont (18 @ 14:33) >  EXAM:  CT ABDOMEN AND PELVIS IC                            PROCEDURE DATE:  2018          INTERPRETATION:  CLINICAL INFORMATION: Abdominal pain    COMPARISON: 2018    PROCEDURE:   CT of the Abdomen and Pelvis was performed with intravenous contrast.   Intravenous contrast: 90 ml Omnipaque 350. 10 ml discarded.  Oral contrast: None.  Sagittal and coronal reformats were performed.    FINDINGS:    LOWER CHEST: Within normal limits.    LIVER: Within normal limits.  BILE DUCTS: Normal caliber.  GALLBLADDER: Status post cholecystectomy.  SPLEEN: Within normal limits.  PANCREAS: Atrophic.  ADRENALS: Within normal limits.  KIDNEYS/URETERS: Within normal limits.    BLADDER: Underdistended with wall thickening.  REPRODUCTIVE ORGANS: Within normal limits.    BOWEL: No bowel obstruction. There is wall thickening involving the   ascending and transverse colon. Appendix is normal  PERITONEUM: No ascites.  VESSELS:  Within normal limits.  RETROPERITONEUM: No lymphadenopathy.    ABDOMINAL WALL: Within normal limits.  BONES: Within normal limits.    IMPRESSION:     Colitis involving the ascending and transverse colon.    Normal appendix.    Underdistended urinary bladder with diffuse wall thickening. Correlate   with urinalysis.                    YOLANDA FREEMAN M.D., ATTENDING RADIOLOGIST  This document has been electronically signed. 2018  3:19PM    < end of copied text >

## 2018-06-25 NOTE — CHART NOTE - NSCHARTNOTEFT_GEN_A_CORE
Pt medically stable for transfer to medical floor.     36 yo M PMH of anxiety,  IDDM2 with known gastroparesis and episodes of DKA, presented to the ED  for NV and abd pain.  Pt was d/c from [facility] yesterday but states "still vomiting"  that continued thus prompting pt to seek attention again.  Pt non-toxic in appearance still with pain and NV.  Pt states this is not like prior DKA episodes.  Pt noted to have FS in 400 with mild AG metabolic acidosis  Pt admitted to the ICU for DKA placed on insulin gtt with need for q1h FS.  Pt quickly transitioned over to lantus with close of AG and improvement in bicarb.    CT abd shows colitis and pt started on cipro and flagyl with tylenol/percocet/morphine prn pain scale.  GI called for consult.  Dr. Platt called for endocrine consult for possible need of insulin pump. Pt medically stable for transfer to medical floor.  Signed out to Dr. Dejesus.     36 yo M PMH of anxiety,  IDDM2 with known gastroparesis and episodes of DKA, presented to the ED  for NV and abd pain.  Pt was d/c from [facility] yesterday but states "still vomiting"  that continued thus prompting pt to seek attention again.  Pt non-toxic in appearance still with pain and NV.  Pt states this is not like prior DKA episodes.  Pt noted to have FS in 400 with mild AG metabolic acidosis  Pt admitted to the ICU for DKA placed on insulin gtt with need for q1h FS.  Pt quickly transitioned over to lantus with close of AG and improvement in bicarb.    CT abd shows colitis and pt started on cipro and flagyl with tylenol/percocet/morphine prn pain scale.  GI called for consult.  Dr. Platt called for endocrine consult for possible need of insulin pump.

## 2018-06-26 DIAGNOSIS — E10.10 TYPE 1 DIABETES MELLITUS WITH KETOACIDOSIS WITHOUT COMA: ICD-10-CM

## 2018-06-26 LAB
ANION GAP SERPL CALC-SCNC: 11 MMOL/L — SIGNIFICANT CHANGE UP (ref 5–17)
BUN SERPL-MCNC: 11 MG/DL — SIGNIFICANT CHANGE UP (ref 7–23)
CALCIUM SERPL-MCNC: 8.3 MG/DL — LOW (ref 8.5–10.1)
CHLORIDE SERPL-SCNC: 104 MMOL/L — SIGNIFICANT CHANGE UP (ref 96–108)
CO2 SERPL-SCNC: 26 MMOL/L — SIGNIFICANT CHANGE UP (ref 22–31)
CREAT SERPL-MCNC: 0.96 MG/DL — SIGNIFICANT CHANGE UP (ref 0.5–1.3)
CULTURE RESULTS: NO GROWTH — SIGNIFICANT CHANGE UP
GLUCOSE BLDC GLUCOMTR-MCNC: 174 MG/DL — HIGH (ref 70–99)
GLUCOSE BLDC GLUCOMTR-MCNC: 221 MG/DL — HIGH (ref 70–99)
GLUCOSE BLDC GLUCOMTR-MCNC: 256 MG/DL — HIGH (ref 70–99)
GLUCOSE BLDC GLUCOMTR-MCNC: 296 MG/DL — HIGH (ref 70–99)
GLUCOSE SERPL-MCNC: 136 MG/DL — HIGH (ref 70–99)
HCT VFR BLD CALC: 38.1 % — LOW (ref 39–50)
HGB BLD-MCNC: 13 G/DL — SIGNIFICANT CHANGE UP (ref 13–17)
MAGNESIUM SERPL-MCNC: 2.1 MG/DL — SIGNIFICANT CHANGE UP (ref 1.6–2.6)
MCHC RBC-ENTMCNC: 28.1 PG — SIGNIFICANT CHANGE UP (ref 27–34)
MCHC RBC-ENTMCNC: 34.1 GM/DL — SIGNIFICANT CHANGE UP (ref 32–36)
MCV RBC AUTO: 82.5 FL — SIGNIFICANT CHANGE UP (ref 80–100)
NRBC # BLD: 0 /100 WBCS — SIGNIFICANT CHANGE UP (ref 0–0)
PHOSPHATE SERPL-MCNC: 3 MG/DL — SIGNIFICANT CHANGE UP (ref 2.5–4.5)
PLATELET # BLD AUTO: 371 K/UL — SIGNIFICANT CHANGE UP (ref 150–400)
POTASSIUM SERPL-MCNC: 3.4 MMOL/L — LOW (ref 3.5–5.3)
POTASSIUM SERPL-SCNC: 3.4 MMOL/L — LOW (ref 3.5–5.3)
RBC # BLD: 4.62 M/UL — SIGNIFICANT CHANGE UP (ref 4.2–5.8)
RBC # FLD: 13.7 % — SIGNIFICANT CHANGE UP (ref 10.3–14.5)
SODIUM SERPL-SCNC: 141 MMOL/L — SIGNIFICANT CHANGE UP (ref 135–145)
SPECIMEN SOURCE: SIGNIFICANT CHANGE UP
WBC # BLD: 7.67 K/UL — SIGNIFICANT CHANGE UP (ref 3.8–10.5)
WBC # FLD AUTO: 7.67 K/UL — SIGNIFICANT CHANGE UP (ref 3.8–10.5)

## 2018-06-26 PROCEDURE — 99232 SBSQ HOSP IP/OBS MODERATE 35: CPT

## 2018-06-26 RX ORDER — POTASSIUM CHLORIDE 20 MEQ
40 PACKET (EA) ORAL ONCE
Qty: 0 | Refills: 0 | Status: COMPLETED | OUTPATIENT
Start: 2018-06-26 | End: 2018-06-26

## 2018-06-26 RX ORDER — INSULIN GLARGINE 100 [IU]/ML
15 INJECTION, SOLUTION SUBCUTANEOUS AT BEDTIME
Qty: 0 | Refills: 0 | Status: DISCONTINUED | OUTPATIENT
Start: 2018-06-26 | End: 2018-06-30

## 2018-06-26 RX ORDER — ONDANSETRON 8 MG/1
4 TABLET, FILM COATED ORAL ONCE
Qty: 0 | Refills: 0 | Status: COMPLETED | OUTPATIENT
Start: 2018-06-26 | End: 2018-06-26

## 2018-06-26 RX ORDER — MORPHINE SULFATE 50 MG/1
4 CAPSULE, EXTENDED RELEASE ORAL EVERY 4 HOURS
Qty: 0 | Refills: 0 | Status: DISCONTINUED | OUTPATIENT
Start: 2018-06-26 | End: 2018-06-29

## 2018-06-26 RX ORDER — POTASSIUM CHLORIDE 20 MEQ
10 PACKET (EA) ORAL ONCE
Qty: 0 | Refills: 0 | Status: COMPLETED | OUTPATIENT
Start: 2018-06-26 | End: 2018-06-26

## 2018-06-26 RX ADMIN — MORPHINE SULFATE 2 MILLIGRAM(S): 50 CAPSULE, EXTENDED RELEASE ORAL at 10:01

## 2018-06-26 RX ADMIN — MORPHINE SULFATE 2 MILLIGRAM(S): 50 CAPSULE, EXTENDED RELEASE ORAL at 06:08

## 2018-06-26 RX ADMIN — MORPHINE SULFATE 2 MILLIGRAM(S): 50 CAPSULE, EXTENDED RELEASE ORAL at 07:00

## 2018-06-26 RX ADMIN — SODIUM CHLORIDE 75 MILLILITER(S): 9 INJECTION, SOLUTION INTRAVENOUS at 17:10

## 2018-06-26 RX ADMIN — MORPHINE SULFATE 4 MILLIGRAM(S): 50 CAPSULE, EXTENDED RELEASE ORAL at 17:15

## 2018-06-26 RX ADMIN — Medication 0.5 MILLIGRAM(S): at 21:46

## 2018-06-26 RX ADMIN — MORPHINE SULFATE 4 MILLIGRAM(S): 50 CAPSULE, EXTENDED RELEASE ORAL at 22:10

## 2018-06-26 RX ADMIN — Medication 2: at 11:10

## 2018-06-26 RX ADMIN — MORPHINE SULFATE 4 MILLIGRAM(S): 50 CAPSULE, EXTENDED RELEASE ORAL at 16:58

## 2018-06-26 RX ADMIN — Medication 200 MILLIGRAM(S): at 17:58

## 2018-06-26 RX ADMIN — Medication 40 MILLIEQUIVALENT(S): at 16:58

## 2018-06-26 RX ADMIN — MORPHINE SULFATE 2 MILLIGRAM(S): 50 CAPSULE, EXTENDED RELEASE ORAL at 01:31

## 2018-06-26 RX ADMIN — Medication 100 MILLIGRAM(S): at 06:02

## 2018-06-26 RX ADMIN — Medication 6: at 07:50

## 2018-06-26 RX ADMIN — MORPHINE SULFATE 4 MILLIGRAM(S): 50 CAPSULE, EXTENDED RELEASE ORAL at 21:47

## 2018-06-26 RX ADMIN — ESCITALOPRAM OXALATE 5 MILLIGRAM(S): 10 TABLET, FILM COATED ORAL at 12:24

## 2018-06-26 RX ADMIN — Medication 10 MILLIGRAM(S): at 21:46

## 2018-06-26 RX ADMIN — MORPHINE SULFATE 2 MILLIGRAM(S): 50 CAPSULE, EXTENDED RELEASE ORAL at 02:00

## 2018-06-26 RX ADMIN — Medication 100 MILLIGRAM(S): at 13:38

## 2018-06-26 RX ADMIN — Medication 100 MILLIEQUIVALENT(S): at 06:39

## 2018-06-26 RX ADMIN — Medication 200 MILLIGRAM(S): at 02:30

## 2018-06-26 RX ADMIN — Medication 10 MILLIGRAM(S): at 06:03

## 2018-06-26 RX ADMIN — Medication 0: at 21:51

## 2018-06-26 RX ADMIN — MORPHINE SULFATE 2 MILLIGRAM(S): 50 CAPSULE, EXTENDED RELEASE ORAL at 10:20

## 2018-06-26 RX ADMIN — Medication 200 MILLIGRAM(S): at 06:02

## 2018-06-26 RX ADMIN — INSULIN GLARGINE 15 UNIT(S): 100 INJECTION, SOLUTION SUBCUTANEOUS at 21:55

## 2018-06-26 RX ADMIN — CHLORHEXIDINE GLUCONATE 1 APPLICATION(S): 213 SOLUTION TOPICAL at 18:42

## 2018-06-26 RX ADMIN — Medication 6: at 16:50

## 2018-06-26 RX ADMIN — PANTOPRAZOLE SODIUM 40 MILLIGRAM(S): 20 TABLET, DELAYED RELEASE ORAL at 02:30

## 2018-06-26 RX ADMIN — SODIUM CHLORIDE 75 MILLILITER(S): 9 INJECTION, SOLUTION INTRAVENOUS at 06:02

## 2018-06-26 RX ADMIN — Medication 10 MILLIGRAM(S): at 13:39

## 2018-06-26 RX ADMIN — ONDANSETRON 4 MILLIGRAM(S): 8 TABLET, FILM COATED ORAL at 09:54

## 2018-06-26 RX ADMIN — Medication 100 MILLIGRAM(S): at 21:46

## 2018-06-26 NOTE — CONSULT NOTE ADULT - SUBJECTIVE AND OBJECTIVE BOX
Patient is a 37y old  Male who presents with a chief complaint of Diabetic Keto Acidosis (25 Jun 2018 03:34)      HPI:  38 yo M PMH of IDDM2 with known gastroparesis and episodes of DKA, presented to the ED  for NV and abd pain.  Pt was d/c from [facility] yesterday states "still vomiting" continued sx into today and came for evaluation.  Pt non-toxic in appearance still with pain and NV.  Pt states this is not like prior DKA episodes.  Pt noted to have FS in 400 with mild AG metabolic acidosis HCO3 17, AG 19, pH 7.27. ICU c/s called for DKA evaluation with need for insulin gtt.  Pt admitted to the ICU for DKA placed on insulin gtt with need for q1h FS (24 Jun 2018 18:30)      PAST MEDICAL & SURGICAL HISTORY:  Uncomplicated asthma, unspecified asthma severity, unspecified whether persistent  Gastritis, presence of bleeding unspecified, unspecified chronicity, unspecified gastritis type  Controlled diabetes mellitus type 1 without complications  Anxiety  Gastritis  Asthma  DM type 1 (diabetes mellitus, type 1)  History of cholecystectomy  S/P cholecystectomy      Diabetes History: dm1 dx age 17, on lantus 20units and lispro 10units with meals at home, +hx gastroparesis    FAMILY HISTORY:  Family history of diabetes mellitus (DM) (Father, Mother, Grandparent)  Family history of diabetes mellitus (Father, Mother)        Social History:    Allergies    No Known Allergies    Intolerances        MEDICATIONS  (STANDING):  chlorhexidine 4% Liquid 1 Application(s) Topical <User Schedule>  ciprofloxacin   IVPB      ciprofloxacin   IVPB 400 milliGRAM(s) IV Intermittent every 12 hours  dextrose 5%. 1000 milliLiter(s) (50 mL/Hr) IV Continuous <Continuous>  dextrose 50% Injectable 12.5 Gram(s) IV Push once  dextrose 50% Injectable 25 Gram(s) IV Push once  dextrose 50% Injectable 25 Gram(s) IV Push once  escitalopram 5 milliGRAM(s) Oral daily  heparin  Injectable 5000 Unit(s) SubCutaneous every 12 hours  insulin glargine Injectable (LANTUS) 15 Unit(s) SubCutaneous at bedtime  insulin lispro (HumaLOG) corrective regimen sliding scale   SubCutaneous three times a day before meals  insulin lispro (HumaLOG) corrective regimen sliding scale   SubCutaneous at bedtime  lactated ringers. 1000 milliLiter(s) (75 mL/Hr) IV Continuous <Continuous>  metoclopramide Injectable 10 milliGRAM(s) IV Push every 8 hours  metroNIDAZOLE  IVPB 500 milliGRAM(s) IV Intermittent every 8 hours  metroNIDAZOLE  IVPB      pantoprazole    Tablet 40 milliGRAM(s) Oral before breakfast    MEDICATIONS  (PRN):  acetaminophen   Tablet. 650 milliGRAM(s) Oral every 6 hours PRN Mild Pain (1 - 3)  clonazePAM Tablet 0.5 milliGRAM(s) Oral at bedtime PRN anxiety/restlessness  dextrose 40% Gel 15 Gram(s) Oral once PRN Blood Glucose LESS THAN 70 milliGRAM(s)/deciliter  glucagon  Injectable 1 milliGRAM(s) IntraMuscular once PRN Glucose LESS THAN 70 milligrams/deciliter  morphine  - Injectable 2 milliGRAM(s) IV Push every 4 hours PRN Severe Pain (7 - 10)  oxyCODONE    5 mG/acetaminophen 325 mG 2 Tablet(s) Oral every 6 hours PRN Moderate Pain (4 - 6)      REVIEW OF SYSTEMS:  CONSTITUTIONAL:  as per HPI  HEENT:  Eyes:  No diplopia or blurred vision. ENT:  No earache, sore throat or runny nose.  CARDIOVASCULAR:  No pressure, squeezing, strangling, tightness, heaviness or aching about the chest, neck, axilla or epigastrium.  RESPIRATORY:  No cough, shortness of breath, PND or orthopnea.  GASTROINTESTINAL:  +n/v, no appetite  GENITOURINARY:  No dysuria, frequency or urgency. No Blood in urine  MUSCULOSKELETAL:  no joint aches, no muscle pain, myalgia  SKIN:  No change in skin, hair or nails.       T(C): 36.6 (06-26-18 @ 07:45), Max: 36.9 (06-26-18 @ 04:00)  HR: 57 (06-26-18 @ 08:00) (53 - 87)  BP: 135/85 (06-26-18 @ 08:00) (109/67 - 184/99)  RR: 9 (06-26-18 @ 08:00) (9 - 25)  SpO2: 92% (06-26-18 @ 08:00) (92% - 100%)  Wt(kg): --    PHYSICAL EXAM:  GENERAL: NAD, well-groomed, well-developed  HEAD:  Atraumatic, Normocephalic  CHEST/LUNG: Clear to percussion bilaterally; No rales, rhonchi, wheezing, or rubs  HEART: Regular rate and rhythm; No murmurs, rubs, or gallops  ABDOMEN: Soft, Nontender, Nondistended; Bowel sounds present      CAPILLARY BLOOD GLUCOSE      POCT Blood Glucose.: 256 mg/dL (26 Jun 2018 07:20)  POCT Blood Glucose.: 155 mg/dL (25 Jun 2018 22:11)  POCT Blood Glucose.: 283 mg/dL (25 Jun 2018 16:31)  POCT Blood Glucose.: 236 mg/dL (25 Jun 2018 11:25)                            13.0   7.67  )-----------( 371      ( 26 Jun 2018 04:01 )             38.1       CMP:  06-26 @ 04:01  SGPT --  Albumin --   Alk Phos --   Anion Gap 11   SGOT --   Total Bili --   BUN 11   Calcium Total 8.3   CO2 26   Chloride 104   Creatinine 0.96   eGFR if    eGFR if non    Glucose 136   Potassium 3.4   Protein --   Sodium 141    ha1c 8.5

## 2018-06-26 NOTE — CONSULT NOTE ADULT - PROBLEM SELECTOR RECOMMENDATION 9
dka resolved  remains nauseous, may be related to his gastroparesis, consider GI eval  increase lantus to 15units hs, continue COLIN with meals  once tolerating diet would add lispro 4untis with meals

## 2018-06-26 NOTE — PROGRESS NOTE ADULT - SUBJECTIVE AND OBJECTIVE BOX
Gastroenterology  Patient seen and examined bedside resting comfortably.  No complaints offered.   No abdominal pain  Denies nausea and vomiting. Tolerating diet.  Normal flatus/BM.     T(F): 97.5 (06-26-18 @ 17:04), Max: 98.4 (06-26-18 @ 04:00)  HR: 62 (06-26-18 @ 17:00) (53 - 87)  BP: 151/91 (06-26-18 @ 17:00) (117/76 - 184/99)  RR: 8 (06-26-18 @ 17:00) (8 - 25)  SpO2: 92% (06-26-18 @ 08:00) (92% - 99%)  Wt(kg): --  CAPILLARY BLOOD GLUCOSE      POCT Blood Glucose.: 296 mg/dL (26 Jun 2018 16:49)  POCT Blood Glucose.: 174 mg/dL (26 Jun 2018 11:08)  POCT Blood Glucose.: 256 mg/dL (26 Jun 2018 07:20)  POCT Blood Glucose.: 155 mg/dL (25 Jun 2018 22:11)      PHYSICAL EXAM:  General: NAD, WDWN.   Neuro:  Alert & oriented x 3  HEENT: NCAT, EOMI, conjunctiva clear  CV: +S1+S2 regular rate and rhythm  Lung: clear to ausculation bilaterally, respirations nonlabored, good inspiratory effort  Abdomen: soft, NonTender, No distention Normal active BS  Extremities: no cyanosis, clubbing or edema    LABS:                        13.0   7.67  )-----------( 371      ( 26 Jun 2018 04:01 )             38.1     06-26    141  |  104  |  11  ----------------------------<  136<H>  3.4<L>   |  26  |  0.96    Ca    8.3<L>      26 Jun 2018 04:01  Phos  3.0     06-26  Mg     2.1     06-26          I&O's Detail    25 Jun 2018 07:01  -  26 Jun 2018 07:00  --------------------------------------------------------  IN:    lactated ringers.: 1050 mL    Oral Fluid: 480 mL    Solution: 300 mL    Solution: 100 mL    Solution: 250 mL    Solution: 300 mL  Total IN: 2480 mL    OUT:    Voided: 2680 mL  Total OUT: 2680 mL    Total NET: -200 mL        06-26 @ 04:01    141 | 104 | 11  /8.3 | 2.1 | 3.0  _______________________/  3.4 | 26 | 0.96                           \par   Lipase, Serum: 41 U/L (06-25 @ 03:26)

## 2018-06-26 NOTE — PROGRESS NOTE ADULT - SUBJECTIVE AND OBJECTIVE BOX
Patient is a 37y old  Male who presents with a chief complaint of Diabetic Keto Acidosis (2018 03:34)      OVERNIGHT EVENTS:      REVIEW OF SYSTEMS: denies chest pain/SOB, diaphoresis, no F/C, cough, dizziness, headache, blurry vision, nausea, vomiting, abdominal pain. Rest unremarkable     MEDICATIONS  (STANDING):  chlorhexidine 4% Liquid 1 Application(s) Topical <User Schedule>  ciprofloxacin   IVPB      ciprofloxacin   IVPB 400 milliGRAM(s) IV Intermittent every 12 hours  dextrose 5%. 1000 milliLiter(s) (50 mL/Hr) IV Continuous <Continuous>  dextrose 50% Injectable 12.5 Gram(s) IV Push once  dextrose 50% Injectable 25 Gram(s) IV Push once  dextrose 50% Injectable 25 Gram(s) IV Push once  escitalopram 5 milliGRAM(s) Oral daily  heparin  Injectable 5000 Unit(s) SubCutaneous every 12 hours  insulin glargine Injectable (LANTUS) 15 Unit(s) SubCutaneous at bedtime  insulin lispro (HumaLOG) corrective regimen sliding scale   SubCutaneous three times a day before meals  insulin lispro (HumaLOG) corrective regimen sliding scale   SubCutaneous at bedtime  lactated ringers. 1000 milliLiter(s) (75 mL/Hr) IV Continuous <Continuous>  metoclopramide Injectable 10 milliGRAM(s) IV Push every 8 hours  metroNIDAZOLE  IVPB 500 milliGRAM(s) IV Intermittent every 8 hours  metroNIDAZOLE  IVPB      pantoprazole    Tablet 40 milliGRAM(s) Oral before breakfast  potassium chloride    Tablet ER 40 milliEquivalent(s) Oral once    MEDICATIONS  (PRN):  acetaminophen   Tablet. 650 milliGRAM(s) Oral every 6 hours PRN Mild Pain (1 - 3)  clonazePAM Tablet 0.5 milliGRAM(s) Oral at bedtime PRN anxiety/restlessness  dextrose 40% Gel 15 Gram(s) Oral once PRN Blood Glucose LESS THAN 70 milliGRAM(s)/deciliter  glucagon  Injectable 1 milliGRAM(s) IntraMuscular once PRN Glucose LESS THAN 70 milligrams/deciliter  morphine  - Injectable 4 milliGRAM(s) IV Push every 4 hours PRN Severe Pain (7 - 10)  oxyCODONE    5 mG/acetaminophen 325 mG 2 Tablet(s) Oral every 6 hours PRN Moderate Pain (4 - 6)      Allergies    No Known Allergies    Intolerances        SUBJECTIVE: in bed in NAD, no acute events overnight     T(F): 97.9 (18 @ 11:30), Max: 98.4 (18 @ 04:00)  HR: 58 (18 @ 13:41) (53 - 87)  BP: 137/85 (18 @ 13:41) (117/76 - 184/99)  RR: 17 (18 @ 13:41) (9 - 25)  SpO2: 92% (18 @ 08:00) (92% - 100%)  Wt(kg): --    PHYSICAL EXAM:  GENERAL: NAD, well-groomed, well-developed  HEAD:  Atraumatic, Normocephalic  EYES: EOMI, PERRLA, conjunctiva and sclera clear  ENMT: No tonsillar erythema, exudates, or enlargement; Moist mucous membranes, Good dentition, No lesions  NECK: Supple, No JVD, Normal thyroid  CHEST/LUNG: Clear to  auscultation bilaterally; No rales, rhonchi, wheezing, or rubs  bilaterally  HEART: Regular rate and rhythm; No murmurs, rubs, or gallops  ABDOMEN: Soft, tender epigastric and ruq Bowel sounds present  EXTREMITIES:  2+ Peripheral Pulses, No clubbing, cyanosis, or edema BL LE  SKIN: No rashes or lesions  NERVOUS SYSTEM:  Alert & Oriented X3, Good concentration; Motor Strength 5/5 B/L upper and lower extremities;   DTRs 2+ intact and symmetric, sensation intact BL    LABS:                        13.0   7.67  )-----------( 371      ( 2018 04:01 )             38.1         141  |  104  |  11  ----------------------------<  136<H>  3.4<L>   |  26  |  0.96    Ca    8.3<L>      2018 04:01  Phos  3.0       Mg     2.1             Urinalysis Basic - ( 2018 13:00 )    Color: Yellow / Appearance: Clear / S.010 / pH: x  Gluc: x / Ketone: Moderate  / Bili: Negative / Urobili: Negative mg/dL   Blood: x / Protein: Negative mg/dL / Nitrite: Negative   Leuk Esterase: Negative / RBC: x / WBC x   Sq Epi: x / Non Sq Epi: x / Bacteria: x      Cultures;   CAPILLARY BLOOD GLUCOSE      POCT Blood Glucose.: 174 mg/dL (2018 11:08)  POCT Blood Glucose.: 256 mg/dL (2018 07:20)  POCT Blood Glucose.: 155 mg/dL (2018 22:11)    Lipid panel:           RADIOLOGY & ADDITIONAL TESTS:      Imaging Personally Reviewed:  [ x] YES      Consultant(s) Notes Reviewed:  [x ] YES     Care Discussed with [x ] Consultants [X ] Patient [x ] Family  [x ]    [x ]  Other; RN

## 2018-06-27 DIAGNOSIS — E10.21 TYPE 1 DIABETES MELLITUS WITH DIABETIC NEPHROPATHY: ICD-10-CM

## 2018-06-27 LAB
ALBUMIN SERPL ELPH-MCNC: 3.1 G/DL — LOW (ref 3.3–5)
ALP SERPL-CCNC: 77 U/L — SIGNIFICANT CHANGE UP (ref 40–120)
ALT FLD-CCNC: 34 U/L — SIGNIFICANT CHANGE UP (ref 12–78)
ANION GAP SERPL CALC-SCNC: 8 MMOL/L — SIGNIFICANT CHANGE UP (ref 5–17)
AST SERPL-CCNC: 34 U/L — SIGNIFICANT CHANGE UP (ref 15–37)
BILIRUB SERPL-MCNC: 1 MG/DL — SIGNIFICANT CHANGE UP (ref 0.2–1.2)
BUN SERPL-MCNC: 9 MG/DL — SIGNIFICANT CHANGE UP (ref 7–23)
CALCIUM SERPL-MCNC: 8.2 MG/DL — LOW (ref 8.5–10.1)
CHLORIDE SERPL-SCNC: 103 MMOL/L — SIGNIFICANT CHANGE UP (ref 96–108)
CO2 SERPL-SCNC: 28 MMOL/L — SIGNIFICANT CHANGE UP (ref 22–31)
CREAT SERPL-MCNC: 0.86 MG/DL — SIGNIFICANT CHANGE UP (ref 0.5–1.3)
GLUCOSE BLDC GLUCOMTR-MCNC: 157 MG/DL — HIGH (ref 70–99)
GLUCOSE BLDC GLUCOMTR-MCNC: 172 MG/DL — HIGH (ref 70–99)
GLUCOSE BLDC GLUCOMTR-MCNC: 195 MG/DL — HIGH (ref 70–99)
GLUCOSE BLDC GLUCOMTR-MCNC: 259 MG/DL — HIGH (ref 70–99)
GLUCOSE BLDC GLUCOMTR-MCNC: 263 MG/DL — HIGH (ref 70–99)
GLUCOSE SERPL-MCNC: 209 MG/DL — HIGH (ref 70–99)
HCT VFR BLD CALC: 35.9 % — LOW (ref 39–50)
HGB BLD-MCNC: 12.6 G/DL — LOW (ref 13–17)
MCHC RBC-ENTMCNC: 28.4 PG — SIGNIFICANT CHANGE UP (ref 27–34)
MCHC RBC-ENTMCNC: 35.1 GM/DL — SIGNIFICANT CHANGE UP (ref 32–36)
MCV RBC AUTO: 81 FL — SIGNIFICANT CHANGE UP (ref 80–100)
NRBC # BLD: 0 /100 WBCS — SIGNIFICANT CHANGE UP (ref 0–0)
PLATELET # BLD AUTO: 319 K/UL — SIGNIFICANT CHANGE UP (ref 150–400)
POTASSIUM SERPL-MCNC: 3.7 MMOL/L — SIGNIFICANT CHANGE UP (ref 3.5–5.3)
POTASSIUM SERPL-SCNC: 3.7 MMOL/L — SIGNIFICANT CHANGE UP (ref 3.5–5.3)
PROT SERPL-MCNC: 6.4 GM/DL — SIGNIFICANT CHANGE UP (ref 6–8.3)
RBC # BLD: 4.43 M/UL — SIGNIFICANT CHANGE UP (ref 4.2–5.8)
RBC # FLD: 13.1 % — SIGNIFICANT CHANGE UP (ref 10.3–14.5)
SODIUM SERPL-SCNC: 139 MMOL/L — SIGNIFICANT CHANGE UP (ref 135–145)
WBC # BLD: 7.06 K/UL — SIGNIFICANT CHANGE UP (ref 3.8–10.5)
WBC # FLD AUTO: 7.06 K/UL — SIGNIFICANT CHANGE UP (ref 3.8–10.5)

## 2018-06-27 PROCEDURE — 99232 SBSQ HOSP IP/OBS MODERATE 35: CPT

## 2018-06-27 RX ORDER — MESALAMINE 400 MG
1600 TABLET, DELAYED RELEASE (ENTERIC COATED) ORAL THREE TIMES A DAY
Qty: 0 | Refills: 0 | Status: DISCONTINUED | OUTPATIENT
Start: 2018-06-27 | End: 2018-06-27

## 2018-06-27 RX ORDER — MESALAMINE 400 MG
800 TABLET, DELAYED RELEASE (ENTERIC COATED) ORAL THREE TIMES A DAY
Qty: 0 | Refills: 0 | Status: DISCONTINUED | OUTPATIENT
Start: 2018-06-27 | End: 2018-06-30

## 2018-06-27 RX ADMIN — MORPHINE SULFATE 4 MILLIGRAM(S): 50 CAPSULE, EXTENDED RELEASE ORAL at 05:26

## 2018-06-27 RX ADMIN — CHLORHEXIDINE GLUCONATE 1 APPLICATION(S): 213 SOLUTION TOPICAL at 05:25

## 2018-06-27 RX ADMIN — MORPHINE SULFATE 4 MILLIGRAM(S): 50 CAPSULE, EXTENDED RELEASE ORAL at 14:18

## 2018-06-27 RX ADMIN — MORPHINE SULFATE 4 MILLIGRAM(S): 50 CAPSULE, EXTENDED RELEASE ORAL at 01:15

## 2018-06-27 RX ADMIN — Medication 100 MILLIGRAM(S): at 13:34

## 2018-06-27 RX ADMIN — ESCITALOPRAM OXALATE 5 MILLIGRAM(S): 10 TABLET, FILM COATED ORAL at 12:26

## 2018-06-27 RX ADMIN — MORPHINE SULFATE 4 MILLIGRAM(S): 50 CAPSULE, EXTENDED RELEASE ORAL at 18:18

## 2018-06-27 RX ADMIN — Medication 2: at 16:50

## 2018-06-27 RX ADMIN — MORPHINE SULFATE 4 MILLIGRAM(S): 50 CAPSULE, EXTENDED RELEASE ORAL at 00:58

## 2018-06-27 RX ADMIN — Medication 10 MILLIGRAM(S): at 13:34

## 2018-06-27 RX ADMIN — Medication 6: at 07:54

## 2018-06-27 RX ADMIN — MORPHINE SULFATE 4 MILLIGRAM(S): 50 CAPSULE, EXTENDED RELEASE ORAL at 10:11

## 2018-06-27 RX ADMIN — INSULIN GLARGINE 15 UNIT(S): 100 INJECTION, SOLUTION SUBCUTANEOUS at 22:47

## 2018-06-27 RX ADMIN — SODIUM CHLORIDE 75 MILLILITER(S): 9 INJECTION, SOLUTION INTRAVENOUS at 18:52

## 2018-06-27 RX ADMIN — Medication 0.5 MILLIGRAM(S): at 23:39

## 2018-06-27 RX ADMIN — Medication 200 MILLIGRAM(S): at 17:09

## 2018-06-27 RX ADMIN — Medication 800 MILLIGRAM(S): at 22:45

## 2018-06-27 RX ADMIN — MORPHINE SULFATE 4 MILLIGRAM(S): 50 CAPSULE, EXTENDED RELEASE ORAL at 14:03

## 2018-06-27 RX ADMIN — MORPHINE SULFATE 4 MILLIGRAM(S): 50 CAPSULE, EXTENDED RELEASE ORAL at 09:56

## 2018-06-27 RX ADMIN — MORPHINE SULFATE 4 MILLIGRAM(S): 50 CAPSULE, EXTENDED RELEASE ORAL at 18:33

## 2018-06-27 RX ADMIN — Medication 2: at 22:49

## 2018-06-27 RX ADMIN — MORPHINE SULFATE 4 MILLIGRAM(S): 50 CAPSULE, EXTENDED RELEASE ORAL at 22:42

## 2018-06-27 RX ADMIN — SODIUM CHLORIDE 75 MILLILITER(S): 9 INJECTION, SOLUTION INTRAVENOUS at 05:26

## 2018-06-27 RX ADMIN — Medication 2: at 11:31

## 2018-06-27 RX ADMIN — MORPHINE SULFATE 4 MILLIGRAM(S): 50 CAPSULE, EXTENDED RELEASE ORAL at 23:12

## 2018-06-27 RX ADMIN — PANTOPRAZOLE SODIUM 40 MILLIGRAM(S): 20 TABLET, DELAYED RELEASE ORAL at 06:48

## 2018-06-27 RX ADMIN — Medication 10 MILLIGRAM(S): at 22:46

## 2018-06-27 RX ADMIN — Medication 200 MILLIGRAM(S): at 05:28

## 2018-06-27 RX ADMIN — Medication 10 MILLIGRAM(S): at 05:26

## 2018-06-27 RX ADMIN — Medication 100 MILLIGRAM(S): at 22:45

## 2018-06-27 RX ADMIN — Medication 100 MILLIGRAM(S): at 05:25

## 2018-06-27 RX ADMIN — Medication 800 MILLIGRAM(S): at 13:48

## 2018-06-27 NOTE — PROGRESS NOTE ADULT - SUBJECTIVE AND OBJECTIVE BOX
Gastroenterology  Patient seen and examined bedside in ICU  Complaints of Abdominal pain controlled with Morphine.  intermittent nausea and vomiting.   Normal flatus.     T(F): 97.7 (06-27-18 @ 12:00), Max: 98.3 (06-26-18 @ 21:00)  HR: 54 (06-27-18 @ 12:08) (51 - 62)  BP: 152/82 (06-27-18 @ 12:08) (123/80 - 152/82)  RR: 14 (06-27-18 @ 12:08) (6 - 17)  SpO2: 96% (06-27-18 @ 12:08) (89% - 98%)  Wt(kg): --  CAPILLARY BLOOD GLUCOSE      POCT Blood Glucose.: 157 mg/dL (27 Jun 2018 11:29)  POCT Blood Glucose.: 259 mg/dL (27 Jun 2018 07:48)  POCT Blood Glucose.: 195 mg/dL (27 Jun 2018 05:08)  POCT Blood Glucose.: 221 mg/dL (26 Jun 2018 21:39)  POCT Blood Glucose.: 296 mg/dL (26 Jun 2018 16:49)      PHYSICAL EXAM:  General: NAD, WDWN.   Neuro:  Alert & responsive  HEENT: NCAT, EOMI, conjunctiva clear  CV: +S1+S2 regular rate and rhythm  Lung: clear to ausculation bilaterally, respirations nonlabored, good inspiratory effort  Abdomen: soft, +diffuse tenderess, No Distension. Normal active BS  Extremities: no pedal edema or calf tenderness noted     LABS:                        12.6   7.06  )-----------( 319      ( 27 Jun 2018 05:51 )             35.9     06-27    139  |  103  |  9   ----------------------------<  209<H>  3.7   |  28  |  0.86    Ca    8.2<L>      27 Jun 2018 05:51  Phos  3.0     06-26  Mg     2.1     06-26    TPro  6.4  /  Alb  3.1<L>  /  TBili  1.0  /  DBili  x   /  AST  34  /  ALT  34  /  AlkPhos  77  06-27    LIVER FUNCTIONS - ( 27 Jun 2018 05:51 )  Alb: 3.1 g/dL / Pro: 6.4 gm/dL / ALK PHOS: 77 U/L / ALT: 34 U/L / AST: 34 U/L / GGT: x             I&O's Detail    26 Jun 2018 07:01  -  27 Jun 2018 07:00  --------------------------------------------------------  IN:    lactated ringers.: 825 mL    Solution: 200 mL    Solution: 200 mL  Total IN: 1225 mL    OUT:    Voided: 2100 mL  Total OUT: 2100 mL    Total NET: -875 mL      27 Jun 2018 07:01  -  27 Jun 2018 12:33  --------------------------------------------------------  IN:    lactated ringers.: 375 mL  Total IN: 375 mL    OUT:  Total OUT: 0 mL    Total NET: 375 mL

## 2018-06-27 NOTE — PROGRESS NOTE ADULT - SUBJECTIVE AND OBJECTIVE BOX
Patient is a 37y old  Male who presents with a chief complaint of Diabetic Keto Acidosis (2018 03:34)      OVERNIGHT EVENTS: none      REVIEW OF SYSTEMS: denies chest pain/SOB, diaphoresis, no F/C, cough, dizziness, headache, blurry vision, nausea, vomiting, abdominal pain. Rest unremarkable     MEDICATIONS  (STANDING):  chlorhexidine 4% Liquid 1 Application(s) Topical <User Schedule>  ciprofloxacin   IVPB      ciprofloxacin   IVPB 400 milliGRAM(s) IV Intermittent every 12 hours  dextrose 5%. 1000 milliLiter(s) (50 mL/Hr) IV Continuous <Continuous>  dextrose 50% Injectable 12.5 Gram(s) IV Push once  dextrose 50% Injectable 25 Gram(s) IV Push once  dextrose 50% Injectable 25 Gram(s) IV Push once  escitalopram 5 milliGRAM(s) Oral daily  heparin  Injectable 5000 Unit(s) SubCutaneous every 12 hours  insulin glargine Injectable (LANTUS) 15 Unit(s) SubCutaneous at bedtime  insulin lispro (HumaLOG) corrective regimen sliding scale   SubCutaneous three times a day before meals  insulin lispro (HumaLOG) corrective regimen sliding scale   SubCutaneous at bedtime  lactated ringers. 1000 milliLiter(s) (75 mL/Hr) IV Continuous <Continuous>  mesalamine DR Capsule 1600 milliGRAM(s) Oral three times a day  metoclopramide Injectable 10 milliGRAM(s) IV Push every 8 hours  metroNIDAZOLE  IVPB 500 milliGRAM(s) IV Intermittent every 8 hours  metroNIDAZOLE  IVPB      pantoprazole    Tablet 40 milliGRAM(s) Oral before breakfast    MEDICATIONS  (PRN):  acetaminophen   Tablet. 650 milliGRAM(s) Oral every 6 hours PRN Mild Pain (1 - 3)  clonazePAM Tablet 0.5 milliGRAM(s) Oral at bedtime PRN anxiety/restlessness  dextrose 40% Gel 15 Gram(s) Oral once PRN Blood Glucose LESS THAN 70 milliGRAM(s)/deciliter  glucagon  Injectable 1 milliGRAM(s) IntraMuscular once PRN Glucose LESS THAN 70 milligrams/deciliter  morphine  - Injectable 4 milliGRAM(s) IV Push every 4 hours PRN Severe Pain (7 - 10)  oxyCODONE    5 mG/acetaminophen 325 mG 2 Tablet(s) Oral every 6 hours PRN Moderate Pain (4 - 6)    Allergies    No Known Allergies    Intolerances        SUBJECTIVE: in bed in NAD, no acute events overnight     T(F): 97.9 (18 @ 11:30), Max: 98.4 (18 @ 04:00)  HR: 58 (18 @ 13:41) (53 - 87)  BP: 137/85 (18 @ 13:41) (117/76 - 184/99)  RR: 17 (18 @ 13:41) (9 - 25)  SpO2: 92% (18 @ 08:00) (92% - 100%)  Wt(kg): --    PHYSICAL EXAM:  GENERAL: NAD, well-groomed, well-developed  HEAD:  Atraumatic, Normocephalic  EYES: EOMI, PERRLA, conjunctiva and sclera clear  ENMT: No tonsillar erythema, exudates, or enlargement; Moist mucous membranes, Good dentition, No lesions  NECK: Supple, No JVD, Normal thyroid  CHEST/LUNG: Clear to  auscultation bilaterally; No rales, rhonchi, wheezing, or rubs  bilaterally  HEART: Regular rate and rhythm; No murmurs, rubs, or gallops  ABDOMEN: Soft, tender epigastric and ruq Bowel sounds present  EXTREMITIES:  2+ Peripheral Pulses, No clubbing, cyanosis, or edema BL LE  SKIN: No rashes or lesions  NERVOUS SYSTEM:  Alert & Oriented X3, Good concentration; Motor Strength 5/5 B/L upper and lower extremities;   DTRs 2+ intact and symmetric, sensation intact BL    LABS:                                 12.6   7.06  )-----------( 319      ( 2018 05:51 )             35.9       139  |  103  |  9   ----------------------------<  209<H>  3.7   |  28  |  0.86    Ca    8.2<L>      2018 05:51  Phos  3.0       Mg     2.1         TPro  6.4  /  Alb  3.1<L>  /  TBili  1.0  /  DBili  x   /  AST  34  /  ALT  34  /  AlkPhos  77        Urinalysis Basic - ( 2018 13:00 )    Color: Yellow / Appearance: Clear / S.010 / pH: x  Gluc: x / Ketone: Moderate  / Bili: Negative / Urobili: Negative mg/dL   Blood: x / Protein: Negative mg/dL / Nitrite: Negative   Leuk Esterase: Negative / RBC: x / WBC x   Sq Epi: x / Non Sq Epi: x / Bacteria: x      Cultures;   CAPILLARY BLOOD GLUCOSE      POCT Blood Glucose.: 174 mg/dL (2018 11:08)  POCT Blood Glucose.: 256 mg/dL (2018 07:20)  POCT Blood Glucose.: 155 mg/dL (2018 22:11)    Lipid panel:           RADIOLOGY & ADDITIONAL TESTS:      Imaging Personally Reviewed:  [ x] YES      Consultant(s) Notes Reviewed:  [x ] YES     Care Discussed with [x ] Consultants [X ] Patient [x ] Family  [x ]    [x ]  Other; RN

## 2018-06-27 NOTE — PROGRESS NOTE ADULT - SUBJECTIVE AND OBJECTIVE BOX
Patient is a 37y old  Male who presents with a chief complaint of Diabetic Keto Acidosis (25 Jun 2018 03:34)      Interval History: much better finger sticks and in 100's now   also on Lantus 15 units and Lispro sliding scale coverage with meals   PO advanced     MEDICATIONS  (STANDING):  chlorhexidine 4% Liquid 1 Application(s) Topical <User Schedule>  ciprofloxacin   IVPB      ciprofloxacin   IVPB 400 milliGRAM(s) IV Intermittent every 12 hours  dextrose 5%. 1000 milliLiter(s) (50 mL/Hr) IV Continuous <Continuous>  dextrose 50% Injectable 12.5 Gram(s) IV Push once  dextrose 50% Injectable 25 Gram(s) IV Push once  dextrose 50% Injectable 25 Gram(s) IV Push once  escitalopram 5 milliGRAM(s) Oral daily  heparin  Injectable 5000 Unit(s) SubCutaneous every 12 hours  insulin glargine Injectable (LANTUS) 15 Unit(s) SubCutaneous at bedtime  insulin lispro (HumaLOG) corrective regimen sliding scale   SubCutaneous three times a day before meals  insulin lispro (HumaLOG) corrective regimen sliding scale   SubCutaneous at bedtime  lactated ringers. 1000 milliLiter(s) (75 mL/Hr) IV Continuous <Continuous>  mesalamine DR Capsule 1600 milliGRAM(s) Oral three times a day  metoclopramide Injectable 10 milliGRAM(s) IV Push every 8 hours  metroNIDAZOLE  IVPB 500 milliGRAM(s) IV Intermittent every 8 hours  metroNIDAZOLE  IVPB      pantoprazole    Tablet 40 milliGRAM(s) Oral before breakfast    MEDICATIONS  (PRN):  acetaminophen   Tablet. 650 milliGRAM(s) Oral every 6 hours PRN Mild Pain (1 - 3)  clonazePAM Tablet 0.5 milliGRAM(s) Oral at bedtime PRN anxiety/restlessness  dextrose 40% Gel 15 Gram(s) Oral once PRN Blood Glucose LESS THAN 70 milliGRAM(s)/deciliter  glucagon  Injectable 1 milliGRAM(s) IntraMuscular once PRN Glucose LESS THAN 70 milligrams/deciliter  morphine  - Injectable 4 milliGRAM(s) IV Push every 4 hours PRN Severe Pain (7 - 10)  oxyCODONE    5 mG/acetaminophen 325 mG 2 Tablet(s) Oral every 6 hours PRN Moderate Pain (4 - 6)      Allergies    No Known Allergies    Intolerances        REVIEW OF SYSTEMS:  CONSTITUTIONAL:   EYES: No eye pain, visual disturbances, or discharge  ENMT:  No difficulty hearing, tinnitus, vertigo; No sinus or throat pain  NECK: No pain or stiffness  RESPIRATORY: No cough, wheezing, chills or hemoptysis; No shortness of breath  CARDIOVASCULAR: No chest pain, palpitations, dizziness, or leg swelling  GASTROINTESTINAL: No abdominal or epigastric pain. No nausea, vomiting, or hematemesis; No diarrhea or constipation. No melena or hematochezia.  GENITOURINARY: No dysuria, frequency, hematuria, or incontinence  NEUROLOGICAL: No headaches, memory loss, loss of strength, numbness, or tremors  SKIN: No itching, burning, rashes, or lesions   LYMPH NODES: No enlarged glands  ENDOCRINE: No heat or cold intolerance; No hair loss  MUSCULOSKELETAL: No joint pain or swelling; No muscle, back, or extremity pain  PSYCHIATRIC: No depression, anxiety, mood swings, or difficulty sleeping  HEME/LYMPH: No easy bruising, or bleeding gums  ALLERY AND IMMUNOLOGIC: No hives or eczema    Vital Signs Last 24 Hrs  T(C): 36.5 (27 Jun 2018 12:00), Max: 36.8 (26 Jun 2018 21:00)  T(F): 97.7 (27 Jun 2018 12:00), Max: 98.3 (26 Jun 2018 21:00)  HR: 54 (27 Jun 2018 12:08) (51 - 62)  BP: 152/82 (27 Jun 2018 12:08) (123/80 - 152/82)  BP(mean): 104 (27 Jun 2018 12:08) (93 - 110)  RR: 14 (27 Jun 2018 12:08) (6 - 17)  SpO2: 96% (27 Jun 2018 12:08) (89% - 98%)    PHYSICAL EXAM:  GENERAL:   HEAD:  Atraumatic, Normocephalic  EYES: EOMI, PERRLA, conjunctiva and sclera clear  ENMT: No tonsillar erythema, exudates, or enlargement; Moist mucous membranes, Good dentition, No lesions  NECK: Supple, No JVD, Normal thyroid  NERVOUS SYSTEM:  Alert & Oriented X3, Good concentration; Motor Strength 5/5 B/L upper and lower extremities; DTRs 2+ intact and symmetric  CHEST/LUNG: Clear to percussion bilaterally; No rales, rhonchi, wheezing, or rubs  HEART: Regular rate and rhythm; No murmurs, rubs, or gallops  ABDOMEN: Soft, Nontender, Nondistended; Bowel sounds present  EXTREMITIES:  2+ Peripheral Pulses, No clubbing, cyanosis, or edema  SKIN: No rashes or lesions    LABS:        CAPILLARY BLOOD GLUCOSE      POCT Blood Glucose.: 157 mg/dL (27 Jun 2018 11:29)  POCT Blood Glucose.: 259 mg/dL (27 Jun 2018 07:48)  POCT Blood Glucose.: 195 mg/dL (27 Jun 2018 05:08)  POCT Blood Glucose.: 221 mg/dL (26 Jun 2018 21:39)  POCT Blood Glucose.: 296 mg/dL (26 Jun 2018 16:49)    Lipid panel:           Thyroid:  Diabetes Tests:  Parathyroid Panel:  Adrenals:  RADIOLOGY & ADDITIONAL TESTS:    Imaging Personally Reviewed:  [ ] YES  [ ] NO    Consultant(s) Notes Reviewed:  [ ] YES  [ ] NO    Care Discussed with Consultants/Other Providers [ ] YES  [ ] NO

## 2018-06-28 DIAGNOSIS — E10.9 TYPE 1 DIABETES MELLITUS WITHOUT COMPLICATIONS: ICD-10-CM

## 2018-06-28 LAB
GLUCOSE BLDC GLUCOMTR-MCNC: 220 MG/DL — HIGH (ref 70–99)
GLUCOSE BLDC GLUCOMTR-MCNC: 273 MG/DL — HIGH (ref 70–99)
GLUCOSE BLDC GLUCOMTR-MCNC: 317 MG/DL — HIGH (ref 70–99)
GLUCOSE BLDC GLUCOMTR-MCNC: 376 MG/DL — HIGH (ref 70–99)

## 2018-06-28 PROCEDURE — 99232 SBSQ HOSP IP/OBS MODERATE 35: CPT

## 2018-06-28 RX ADMIN — MORPHINE SULFATE 4 MILLIGRAM(S): 50 CAPSULE, EXTENDED RELEASE ORAL at 14:58

## 2018-06-28 RX ADMIN — ESCITALOPRAM OXALATE 5 MILLIGRAM(S): 10 TABLET, FILM COATED ORAL at 11:04

## 2018-06-28 RX ADMIN — MORPHINE SULFATE 4 MILLIGRAM(S): 50 CAPSULE, EXTENDED RELEASE ORAL at 15:12

## 2018-06-28 RX ADMIN — Medication 10: at 11:05

## 2018-06-28 RX ADMIN — Medication 4: at 17:01

## 2018-06-28 RX ADMIN — Medication 200 MILLIGRAM(S): at 05:29

## 2018-06-28 RX ADMIN — Medication 200 MILLIGRAM(S): at 17:01

## 2018-06-28 RX ADMIN — MORPHINE SULFATE 4 MILLIGRAM(S): 50 CAPSULE, EXTENDED RELEASE ORAL at 00:00

## 2018-06-28 RX ADMIN — Medication 800 MILLIGRAM(S): at 22:21

## 2018-06-28 RX ADMIN — Medication 800 MILLIGRAM(S): at 13:20

## 2018-06-28 RX ADMIN — Medication 8: at 07:50

## 2018-06-28 RX ADMIN — MORPHINE SULFATE 4 MILLIGRAM(S): 50 CAPSULE, EXTENDED RELEASE ORAL at 10:45

## 2018-06-28 RX ADMIN — Medication 100 MILLIGRAM(S): at 22:21

## 2018-06-28 RX ADMIN — OXYCODONE AND ACETAMINOPHEN 2 TABLET(S): 5; 325 TABLET ORAL at 22:19

## 2018-06-28 RX ADMIN — MORPHINE SULFATE 4 MILLIGRAM(S): 50 CAPSULE, EXTENDED RELEASE ORAL at 10:31

## 2018-06-28 RX ADMIN — OXYCODONE AND ACETAMINOPHEN 2 TABLET(S): 5; 325 TABLET ORAL at 22:49

## 2018-06-28 RX ADMIN — MORPHINE SULFATE 4 MILLIGRAM(S): 50 CAPSULE, EXTENDED RELEASE ORAL at 06:46

## 2018-06-28 RX ADMIN — Medication 10 MILLIGRAM(S): at 22:21

## 2018-06-28 RX ADMIN — Medication 2: at 22:34

## 2018-06-28 RX ADMIN — MORPHINE SULFATE 4 MILLIGRAM(S): 50 CAPSULE, EXTENDED RELEASE ORAL at 20:16

## 2018-06-28 RX ADMIN — SODIUM CHLORIDE 75 MILLILITER(S): 9 INJECTION, SOLUTION INTRAVENOUS at 17:06

## 2018-06-28 RX ADMIN — Medication 10 MILLIGRAM(S): at 05:29

## 2018-06-28 RX ADMIN — INSULIN GLARGINE 15 UNIT(S): 100 INJECTION, SOLUTION SUBCUTANEOUS at 22:33

## 2018-06-28 RX ADMIN — PANTOPRAZOLE SODIUM 40 MILLIGRAM(S): 20 TABLET, DELAYED RELEASE ORAL at 07:50

## 2018-06-28 RX ADMIN — Medication 100 MILLIGRAM(S): at 05:29

## 2018-06-28 RX ADMIN — Medication 100 MILLIGRAM(S): at 13:20

## 2018-06-28 RX ADMIN — Medication 800 MILLIGRAM(S): at 05:30

## 2018-06-28 RX ADMIN — MORPHINE SULFATE 4 MILLIGRAM(S): 50 CAPSULE, EXTENDED RELEASE ORAL at 06:16

## 2018-06-28 NOTE — PROGRESS NOTE ADULT - SUBJECTIVE AND OBJECTIVE BOX
Patient is a 37y old  Male who presents with a chief complaint of Diabetic Keto Acidosis (25 Jun 2018 03:34)      Interval History: finger sticks are in mid 200's   Diabetic Ketoacidosis resolved , on Lantus 15 units and Lispro sliding scale coverage with meals   increased PO     MEDICATIONS  (STANDING):  chlorhexidine 4% Liquid 1 Application(s) Topical <User Schedule>  ciprofloxacin   IVPB      ciprofloxacin   IVPB 400 milliGRAM(s) IV Intermittent every 12 hours  dextrose 5%. 1000 milliLiter(s) (50 mL/Hr) IV Continuous <Continuous>  dextrose 50% Injectable 12.5 Gram(s) IV Push once  dextrose 50% Injectable 25 Gram(s) IV Push once  dextrose 50% Injectable 25 Gram(s) IV Push once  escitalopram 5 milliGRAM(s) Oral daily  heparin  Injectable 5000 Unit(s) SubCutaneous every 12 hours  insulin glargine Injectable (LANTUS) 15 Unit(s) SubCutaneous at bedtime  insulin lispro (HumaLOG) corrective regimen sliding scale   SubCutaneous three times a day before meals  insulin lispro (HumaLOG) corrective regimen sliding scale   SubCutaneous at bedtime  lactated ringers. 1000 milliLiter(s) (75 mL/Hr) IV Continuous <Continuous>  mesalamine DR Capsule 800 milliGRAM(s) Oral three times a day  metoclopramide Injectable 10 milliGRAM(s) IV Push every 8 hours  metroNIDAZOLE  IVPB 500 milliGRAM(s) IV Intermittent every 8 hours  metroNIDAZOLE  IVPB      pantoprazole    Tablet 40 milliGRAM(s) Oral before breakfast    MEDICATIONS  (PRN):  acetaminophen   Tablet. 650 milliGRAM(s) Oral every 6 hours PRN Mild Pain (1 - 3)  clonazePAM Tablet 0.5 milliGRAM(s) Oral at bedtime PRN anxiety/restlessness  dextrose 40% Gel 15 Gram(s) Oral once PRN Blood Glucose LESS THAN 70 milliGRAM(s)/deciliter  glucagon  Injectable 1 milliGRAM(s) IntraMuscular once PRN Glucose LESS THAN 70 milligrams/deciliter  morphine  - Injectable 4 milliGRAM(s) IV Push every 4 hours PRN Severe Pain (7 - 10)  oxyCODONE    5 mG/acetaminophen 325 mG 2 Tablet(s) Oral every 6 hours PRN Moderate Pain (4 - 6)      Allergies    No Known Allergies    Intolerances        REVIEW OF SYSTEMS:  CONSTITUTIONAL:   EYES: No eye pain, visual disturbances, or discharge  ENMT:  No difficulty hearing, tinnitus, vertigo; No sinus or throat pain  NECK: No pain or stiffness  RESPIRATORY: No cough, wheezing, chills or hemoptysis; No shortness of breath  CARDIOVASCULAR: No chest pain, palpitations, dizziness, or leg swelling  GASTROINTESTINAL: No abdominal or epigastric pain. No nausea, vomiting, or hematemesis; No diarrhea or constipation. No melena or hematochezia.  GENITOURINARY: No dysuria, frequency, hematuria, or incontinence  NEUROLOGICAL: No headaches, memory loss, loss of strength, numbness, or tremors  SKIN: No itching, burning, rashes, or lesions   LYMPH NODES: No enlarged glands  ENDOCRINE: No heat or cold intolerance; No hair loss  MUSCULOSKELETAL: No joint pain or swelling; No muscle, back, or extremity pain  PSYCHIATRIC: No depression, anxiety, mood swings, or difficulty sleeping  HEME/LYMPH: No easy bruising, or bleeding gums  ALLERY AND IMMUNOLOGIC: No hives or eczema    Vital Signs Last 24 Hrs  T(C): 37.4 (28 Jun 2018 17:35), Max: 37.4 (28 Jun 2018 17:35)  T(F): 99.4 (28 Jun 2018 17:35), Max: 99.4 (28 Jun 2018 17:35)  HR: 61 (28 Jun 2018 17:35) (49 - 61)  BP: 131/80 (28 Jun 2018 17:35) (131/80 - 152/89)  BP(mean): 113 (27 Jun 2018 19:13) (113 - 113)  RR: 17 (28 Jun 2018 17:35) (10 - 17)  SpO2: 95% (28 Jun 2018 17:35) (95% - 99%)    PHYSICAL EXAM:  GENERAL:   HEAD:  Atraumatic, Normocephalic  EYES: EOMI, PERRLA, conjunctiva and sclera clear  ENMT: No tonsillar erythema, exudates, or enlargement; Moist mucous membranes, Good dentition, No lesions  NECK: Supple, No JVD, Normal thyroid  NERVOUS SYSTEM:  Alert & Oriented X3, Good concentration; Motor Strength 5/5 B/L upper and lower extremities; DTRs 2+ intact and symmetric  CHEST/LUNG: Clear to percussion bilaterally; No rales, rhonchi, wheezing, or rubs  HEART: Regular rate and rhythm; No murmurs, rubs, or gallops  ABDOMEN: Soft, Nontender, Nondistended; Bowel sounds present  EXTREMITIES:  2+ Peripheral Pulses, No clubbing, cyanosis, or edema  SKIN: No rashes or lesions    LABS:        CAPILLARY BLOOD GLUCOSE      POCT Blood Glucose.: 220 mg/dL (28 Jun 2018 16:14)  POCT Blood Glucose.: 376 mg/dL (28 Jun 2018 11:05)  POCT Blood Glucose.: 317 mg/dL (28 Jun 2018 07:38)  POCT Blood Glucose.: 263 mg/dL (27 Jun 2018 21:53)    Lipid panel:           Thyroid:  Diabetes Tests:  Parathyroid Panel:  Adrenals:  RADIOLOGY & ADDITIONAL TESTS:    Imaging Personally Reviewed:  [ ] YES  [ ] NO    Consultant(s) Notes Reviewed:  [ ] YES  [ ] NO    Care Discussed with Consultants/Other Providers [ ] YES  [ ] NO

## 2018-06-28 NOTE — PROGRESS NOTE ADULT - SUBJECTIVE AND OBJECTIVE BOX
Patient is a 37y old  Male who presents with a chief complaint of Diabetic Keto Acidosis (25 Jun 2018 03:34)      OVERNIGHT EVENTS: none      REVIEW OF SYSTEMS: denies chest pain/SOB, diaphoresis, no F/C, cough, dizziness, headache, blurry vision, nausea, vomiting, abdominal pain. Rest unremarkable     MEDICATIONS  (STANDING):  chlorhexidine 4% Liquid 1 Application(s) Topical <User Schedule>  ciprofloxacin   IVPB      ciprofloxacin   IVPB 400 milliGRAM(s) IV Intermittent every 12 hours  dextrose 5%. 1000 milliLiter(s) (50 mL/Hr) IV Continuous <Continuous>  dextrose 50% Injectable 12.5 Gram(s) IV Push once  dextrose 50% Injectable 25 Gram(s) IV Push once  dextrose 50% Injectable 25 Gram(s) IV Push once  escitalopram 5 milliGRAM(s) Oral daily  heparin  Injectable 5000 Unit(s) SubCutaneous every 12 hours  insulin glargine Injectable (LANTUS) 15 Unit(s) SubCutaneous at bedtime  insulin lispro (HumaLOG) corrective regimen sliding scale   SubCutaneous three times a day before meals  insulin lispro (HumaLOG) corrective regimen sliding scale   SubCutaneous at bedtime  lactated ringers. 1000 milliLiter(s) (75 mL/Hr) IV Continuous <Continuous>  mesalamine DR Capsule 800 milliGRAM(s) Oral three times a day  metoclopramide Injectable 10 milliGRAM(s) IV Push every 8 hours  metroNIDAZOLE  IVPB 500 milliGRAM(s) IV Intermittent every 8 hours  metroNIDAZOLE  IVPB      pantoprazole    Tablet 40 milliGRAM(s) Oral before breakfast    MEDICATIONS  (PRN):  acetaminophen   Tablet. 650 milliGRAM(s) Oral every 6 hours PRN Mild Pain (1 - 3)  clonazePAM Tablet 0.5 milliGRAM(s) Oral at bedtime PRN anxiety/restlessness  dextrose 40% Gel 15 Gram(s) Oral once PRN Blood Glucose LESS THAN 70 milliGRAM(s)/deciliter  glucagon  Injectable 1 milliGRAM(s) IntraMuscular once PRN Glucose LESS THAN 70 milligrams/deciliter  morphine  - Injectable 4 milliGRAM(s) IV Push every 4 hours PRN Severe Pain (7 - 10)  oxyCODONE    5 mG/acetaminophen 325 mG 2 Tablet(s) Oral every 6 hours PRN Moderate Pain (4 - 6)    Allergies    No Known Allergies    Intolerances        SUBJECTIVE: in bed in NAD, no acute events overnight       Vital Signs Last 24 Hrs  T(C): 36.4 (28 Jun 2018 04:00), Max: 36.7 (27 Jun 2018 20:30)  T(F): 97.6 (28 Jun 2018 04:00), Max: 98 (27 Jun 2018 20:30)  HR: 52 (28 Jun 2018 04:00) (49 - 60)  BP: 152/89 (28 Jun 2018 04:00) (140/70 - 161/88)  BP(mean): 113 (27 Jun 2018 19:13) (104 - 113)  RR: 16 (28 Jun 2018 04:00) (10 - 16)  SpO2: 98% (28 Jun 2018 04:00) (95% - 99%)    PHYSICAL EXAM:  GENERAL: NAD, well-groomed, well-developed  HEAD:  Atraumatic, Normocephalic  EYES: EOMI, PERRLA, conjunctiva and sclera clear  ENMT: No tonsillar erythema, exudates, or enlargement; Moist mucous membranes, Good dentition, No lesions  NECK: Supple, No JVD, Normal thyroid  CHEST/LUNG: Clear to  auscultation bilaterally; No rales, rhonchi, wheezing, or rubs  bilaterally  HEART: Regular rate and rhythm; No murmurs, rubs, or gallops  ABDOMEN: Soft, tender epigastric and ruq Bowel sounds present  EXTREMITIES:  2+ Peripheral Pulses, No clubbing, cyanosis, or edema BL LE  SKIN: No rashes or lesions  NERVOUS SYSTEM:  Alert & Oriented X3, Good concentration; Motor Strength 5/5 B/L upper and lower extremities;   DTRs 2+ intact and symmetric, sensation intact BL    LABS:                      Cultures;   CAPILLARY BLOOD GLUCOSE        POCT Blood Glucose.: 263 mg/dL (27 Jun 2018 21:53)  POCT Blood Glucose.: 172 mg/dL (27 Jun 2018 16:35)  POCT Blood Glucose.: 157 mg/dL (27 Jun 2018 11:29)    Lipid panel:           RADIOLOGY & ADDITIONAL TESTS:      Imaging Personally Reviewed:  [ x] YES      Consultant(s) Notes Reviewed:  [x ] YES     Care Discussed with [x ] Consultants [X ] Patient [x ] Family  [x ]    [x ]  Other; RN

## 2018-06-28 NOTE — PROGRESS NOTE ADULT - SUBJECTIVE AND OBJECTIVE BOX
Gastroenterology  Patient seen and examined bedside resting comfortably.  +abdominal pain  no nausea and vomiting.  Normal flatus/BM.     T(F): 98.1 (06-28-18 @ 14:03), Max: 98.1 (06-28-18 @ 14:03)  HR: 60 (06-28-18 @ 14:03) (49 - 60)  BP: 150/93 (06-28-18 @ 14:03) (140/70 - 152/89)  RR: 16 (06-28-18 @ 14:03) (10 - 16)  SpO2: 97% (06-28-18 @ 14:03) (97% - 99%)  Wt(kg): --  CAPILLARY BLOOD GLUCOSE      POCT Blood Glucose.: 376 mg/dL (28 Jun 2018 11:05)  POCT Blood Glucose.: 317 mg/dL (28 Jun 2018 07:38)  POCT Blood Glucose.: 263 mg/dL (27 Jun 2018 21:53)  POCT Blood Glucose.: 172 mg/dL (27 Jun 2018 16:35)      PHYSICAL EXAM:  General: NAD, WDWN.   Neuro:  Alert & responsive   HEENT: NCAT, EOMI, conjunctiva clear  CV: +S1+S2 regular rate and rhythm  Lung: clear to ausculation bilaterally, respirations nonlabored, good inspiratory effort  Abdomen: soft, +Tender, No distention Normal active BS  Extremities: no cyanosis, clubbing or edema    LABS:                        12.6   7.06  )-----------( 319      ( 27 Jun 2018 05:51 )             35.9     06-27    139  |  103  |  9   ----------------------------<  209<H>  3.7   |  28  |  0.86    Ca    8.2<L>      27 Jun 2018 05:51    TPro  6.4  /  Alb  3.1<L>  /  TBili  1.0  /  DBili  x   /  AST  34  /  ALT  34  /  AlkPhos  77  06-27    LIVER FUNCTIONS - ( 27 Jun 2018 05:51 )  Alb: 3.1 g/dL / Pro: 6.4 gm/dL / ALK PHOS: 77 U/L / ALT: 34 U/L / AST: 34 U/L / GGT: x             I&O's Detail    27 Jun 2018 07:01  -  28 Jun 2018 07:00  --------------------------------------------------------  IN:    lactated ringers.: 1425 mL    Oral Fluid: 560 mL    Solution: 200 mL    Solution: 300 mL  Total IN: 2485 mL    OUT:    Voided: 1700 mL  Total OUT: 1700 mL    Total NET: 785 mL        06-27 @ 05:51    139 | 103 | 9  /8.2 | -- | --  _______________________/  3.7 | 28 | 0.86                           \par

## 2018-06-29 ENCOUNTER — TRANSCRIPTION ENCOUNTER (OUTPATIENT)
Age: 38
End: 2018-06-29

## 2018-06-29 LAB
ANION GAP SERPL CALC-SCNC: 12 MMOL/L — SIGNIFICANT CHANGE UP (ref 5–17)
BUN SERPL-MCNC: 11 MG/DL — SIGNIFICANT CHANGE UP (ref 7–23)
CALCIUM SERPL-MCNC: 8.9 MG/DL — SIGNIFICANT CHANGE UP (ref 8.5–10.1)
CHLORIDE SERPL-SCNC: 96 MMOL/L — SIGNIFICANT CHANGE UP (ref 96–108)
CO2 SERPL-SCNC: 28 MMOL/L — SIGNIFICANT CHANGE UP (ref 22–31)
CREAT SERPL-MCNC: 1.05 MG/DL — SIGNIFICANT CHANGE UP (ref 0.5–1.3)
GLUCOSE BLDC GLUCOMTR-MCNC: 185 MG/DL — HIGH (ref 70–99)
GLUCOSE BLDC GLUCOMTR-MCNC: 230 MG/DL — HIGH (ref 70–99)
GLUCOSE BLDC GLUCOMTR-MCNC: 237 MG/DL — HIGH (ref 70–99)
GLUCOSE BLDC GLUCOMTR-MCNC: 238 MG/DL — HIGH (ref 70–99)
GLUCOSE SERPL-MCNC: 244 MG/DL — HIGH (ref 70–99)
HCT VFR BLD CALC: 39.4 % — SIGNIFICANT CHANGE UP (ref 39–50)
HGB BLD-MCNC: 13.5 G/DL — SIGNIFICANT CHANGE UP (ref 13–17)
MCHC RBC-ENTMCNC: 27.7 PG — SIGNIFICANT CHANGE UP (ref 27–34)
MCHC RBC-ENTMCNC: 34.3 GM/DL — SIGNIFICANT CHANGE UP (ref 32–36)
MCV RBC AUTO: 80.9 FL — SIGNIFICANT CHANGE UP (ref 80–100)
NRBC # BLD: 0 /100 WBCS — SIGNIFICANT CHANGE UP (ref 0–0)
PLATELET # BLD AUTO: 377 K/UL — SIGNIFICANT CHANGE UP (ref 150–400)
POTASSIUM SERPL-MCNC: 4 MMOL/L — SIGNIFICANT CHANGE UP (ref 3.5–5.3)
POTASSIUM SERPL-SCNC: 4 MMOL/L — SIGNIFICANT CHANGE UP (ref 3.5–5.3)
RBC # BLD: 4.87 M/UL — SIGNIFICANT CHANGE UP (ref 4.2–5.8)
RBC # FLD: 12.9 % — SIGNIFICANT CHANGE UP (ref 10.3–14.5)
SODIUM SERPL-SCNC: 136 MMOL/L — SIGNIFICANT CHANGE UP (ref 135–145)
WBC # BLD: 7.59 K/UL — SIGNIFICANT CHANGE UP (ref 3.8–10.5)
WBC # FLD AUTO: 7.59 K/UL — SIGNIFICANT CHANGE UP (ref 3.8–10.5)

## 2018-06-29 PROCEDURE — 99232 SBSQ HOSP IP/OBS MODERATE 35: CPT

## 2018-06-29 RX ORDER — INSULIN LISPRO 100/ML
5 VIAL (ML) SUBCUTANEOUS
Qty: 0 | Refills: 0 | Status: DISCONTINUED | OUTPATIENT
Start: 2018-06-29 | End: 2018-06-30

## 2018-06-29 RX ADMIN — Medication 4: at 11:04

## 2018-06-29 RX ADMIN — Medication 200 MILLIGRAM(S): at 05:41

## 2018-06-29 RX ADMIN — Medication 800 MILLIGRAM(S): at 05:42

## 2018-06-29 RX ADMIN — SODIUM CHLORIDE 75 MILLILITER(S): 9 INJECTION, SOLUTION INTRAVENOUS at 08:38

## 2018-06-29 RX ADMIN — MORPHINE SULFATE 4 MILLIGRAM(S): 50 CAPSULE, EXTENDED RELEASE ORAL at 08:36

## 2018-06-29 RX ADMIN — Medication 0.5 MILLIGRAM(S): at 19:28

## 2018-06-29 RX ADMIN — MORPHINE SULFATE 4 MILLIGRAM(S): 50 CAPSULE, EXTENDED RELEASE ORAL at 03:43

## 2018-06-29 RX ADMIN — Medication 100 MILLIGRAM(S): at 13:12

## 2018-06-29 RX ADMIN — Medication 4: at 08:36

## 2018-06-29 RX ADMIN — OXYCODONE AND ACETAMINOPHEN 2 TABLET(S): 5; 325 TABLET ORAL at 14:10

## 2018-06-29 RX ADMIN — PANTOPRAZOLE SODIUM 40 MILLIGRAM(S): 20 TABLET, DELAYED RELEASE ORAL at 08:38

## 2018-06-29 RX ADMIN — INSULIN GLARGINE 15 UNIT(S): 100 INJECTION, SOLUTION SUBCUTANEOUS at 21:15

## 2018-06-29 RX ADMIN — Medication 800 MILLIGRAM(S): at 21:03

## 2018-06-29 RX ADMIN — ESCITALOPRAM OXALATE 5 MILLIGRAM(S): 10 TABLET, FILM COATED ORAL at 11:04

## 2018-06-29 RX ADMIN — Medication 10 MILLIGRAM(S): at 13:12

## 2018-06-29 RX ADMIN — Medication 200 MILLIGRAM(S): at 17:01

## 2018-06-29 RX ADMIN — Medication 10 MILLIGRAM(S): at 21:03

## 2018-06-29 RX ADMIN — OXYCODONE AND ACETAMINOPHEN 2 TABLET(S): 5; 325 TABLET ORAL at 22:02

## 2018-06-29 RX ADMIN — Medication 10 MILLIGRAM(S): at 05:47

## 2018-06-29 RX ADMIN — MORPHINE SULFATE 4 MILLIGRAM(S): 50 CAPSULE, EXTENDED RELEASE ORAL at 08:50

## 2018-06-29 RX ADMIN — Medication 800 MILLIGRAM(S): at 13:12

## 2018-06-29 RX ADMIN — OXYCODONE AND ACETAMINOPHEN 2 TABLET(S): 5; 325 TABLET ORAL at 21:02

## 2018-06-29 RX ADMIN — Medication 4: at 17:01

## 2018-06-29 RX ADMIN — Medication 100 MILLIGRAM(S): at 21:03

## 2018-06-29 RX ADMIN — MORPHINE SULFATE 4 MILLIGRAM(S): 50 CAPSULE, EXTENDED RELEASE ORAL at 03:13

## 2018-06-29 RX ADMIN — Medication 100 MILLIGRAM(S): at 05:43

## 2018-06-29 RX ADMIN — Medication 5 UNIT(S): at 17:01

## 2018-06-29 RX ADMIN — OXYCODONE AND ACETAMINOPHEN 2 TABLET(S): 5; 325 TABLET ORAL at 13:15

## 2018-06-29 NOTE — PROGRESS NOTE ADULT - PROVIDER SPECIALTY LIST ADULT
Critical Care
Endocrinology
Gastroenterology
Hospitalist
Gastroenterology

## 2018-06-29 NOTE — PROGRESS NOTE ADULT - ASSESSMENT
37M PMH anxiety, DM insulin dependent, gastroparesis, DKA, recurrent abdominal pain associated with nausea and emesis. Here with DKA and colitis.     1. ENDO/DKA   resolved appreciate Endo consult     -FS much better  - cont with lantus  - endo consult noted    2. GI  - colitis: cont cipro and flagyl for now  - GI note reviewed started on delizcol on 6/28/18 continue for at  4 to 6 weeks and follow up with GI for outpt colonoscopy .        3. PSYCH  - cont  clonazePAM PRN and escitalopram 5 milliGRAM(s) Oral daily for underlying hx of anxiety and depression    4. GEN  - supplement hypokalemia
INFECTIOUS COLITIS suspected
37M PMH anxiety, DM insulin dependent, gastroparesis, DKA, recurrent abdominal pain associated with nausea and emesis. Here with DKA and colitis.     1. ENDO/DKA   resolved appreciate Endo consult     - off insulin drip  - cont with lantus  - as PO intake improves may need to add back pre meal insulin coverage if sugars remain high  - endo consult    2. GI  - colitis: cont cipro and flagyl for now  - GI eval completed    c/o pain increase morphine ,     3. PSYCH  - cont  clonazePAM PRN and escitalopram 5 milliGRAM(s) Oral daily for underlying hx of anxiety and depression    4. GEN  - supplement hypokalemia
37M PMH anxiety, DM insulin dependent, gastroparesis, DKA, recurrent abdominal pain associated with nausea and emesis. Here with DKA and colitis.     1. ENDO/DKA   resolved appreciate Endo consult     -FS much better  - cont with lantus  - endo consult noted    2. GI  - colitis: cont cipro and flagyl for now  - GI note reviewed started on delizcol      3. PSYCH  - cont  clonazePAM PRN and escitalopram 5 milliGRAM(s) Oral daily for underlying hx of anxiety and depression    4. GEN  - supplement hypokalemia
37M PMH anxiety, DM insulin dependent, gastroparesis, DKA, recurrent abdominal pain associated with nausea and emesis. Here with DKA and colitis.     1. ENDO/DKA   resolved appreciate Endo consult     -FS much better  - cont with lantus  - endo consult noted    2. GI  - colitis: cont cipro and flagyl for now  - GI note reviewed started on delizcol on 6/28/18      3. PSYCH  - cont  clonazePAM PRN and escitalopram 5 milliGRAM(s) Oral daily for underlying hx of anxiety and depression    4. GEN  - supplement hypokalemia
INFECTIOUS COLITIS
diabetes
diabetes   resolved Diabetic Ketoacidosis
diabetes uncontrolled
37M PMH anxiety, DM insulin dependent, gastroparesis, DKA, recurrent abdominal pain associated with nausea and emesis. Here with DKA and colitis.     1. ENDO  - off insulin drip  - cont with lantus  - as PO intake improves may need to add back pre meal insulin coverage if sugars remain high  - endo consult    2. GI  - colitis: cont cipro and flagyl for now  - GI eval  - pt at home was taking mesalamine but denies hx of IBD  - colitis: infectious vs inflammatory - no reports of diarrhea  - gastroparesis: reglan 10mg IV q8 hours (pt states he only takes reglan twice a day at home)  - tylenol for pain, avoid narcotics if possible with hx of gastroparesis     3. PSYCH  - cont  clonazePAM PRN and escitalopram 5 milliGRAM(s) Oral daily for underlying hx of anxiety and depression    4. GEN  - supplement hypophosphatemia  - stable for transfer to medical floor

## 2018-06-29 NOTE — PROGRESS NOTE ADULT - PROBLEM SELECTOR PROBLEM 1
Colitis
Controlled diabetes mellitus type 1 without complications
Type 1 diabetes mellitus with nephropathy
Type 1 diabetes mellitus with nephropathy

## 2018-06-29 NOTE — DISCHARGE NOTE ADULT - MEDICATION SUMMARY - MEDICATIONS TO TAKE
I will START or STAY ON the medications listed below when I get home from the hospital:    mesalamine 400 mg oral delayed release capsule  -- 2 cap(s) by mouth 3 times a day  -- Indication: For Colitis    metroNIDAZOLE 500 mg oral tablet  -- 1 tab(s) by mouth 3 times a day  -- Indication: For Colitis    oxyCODONE-acetaminophen 5 mg-325 mg oral tablet  -- 1 tab(s) by mouth every 6 hours, As Needed -Moderate Pain (4 - 6) MDD:maximum dose 4 tablets  -- Indication: For Colitis    clonazePAM 0.5 mg oral tablet  -- 1 tab(s) by mouth 2 times a day, As Needed -for agitation MDD:2 tabs   -- Indication: For Anxiety    Lexapro 5 mg oral tablet  -- 1 tab(s) by mouth once a day (at bedtime)   -- Indication: For Anxiety    insulin lispro 100 units/mL injectable solution  -- 5 unit(s) subcutaneous 3 times a day (before meals) DISP ONE MONTH SUPPLY   -- Indication: For DIABETIC KETOACIDOSES     Lantus Solostar Pen 100 units/mL subcutaneous solution  -- 15 unit(s) subcutaneous once a day (at bedtime)   -- Do not drink alcoholic beverages when taking this medication.  It is very important that you take or use this exactly as directed.  Do not skip doses or discontinue unless directed by your doctor.  Keep in refrigerator.  Do not freeze.    -- Indication: For DIABETIC KETOACIDOSES    Acidophilus oral capsule  -- 1 cap(s) by mouth once a day   -- Indication: For Colitis    pantoprazole 40 mg oral delayed release tablet  -- 1 tab(s) by mouth once a day (before a meal)  -- Indication: For Colitis    Cipro 500 mg oral tablet  -- 1 tab(s) by mouth every 12 hours  -- Indication: For Colitis

## 2018-06-29 NOTE — PROGRESS NOTE ADULT - PROBLEM SELECTOR PLAN 1
Outpatient colonoscopy in 8 weeks.   Delzicol added.   advance diet, minimize narcotic meds
Check C-Peptide   also will need prandial lispro added   while inpatient better to have FS< 150 and preferably in 120-140 range which is difficult to achieve as the patient has glucose toxicity   if C-Peptide is sufficient , consider Insulin sensitizers
Outpatient colonoscopy in 8 weeks.   Delzicol added.
continue antibiotics .   outpatient colonoscopy in 8 weeks
continue antibiotics. Outpatient colonoscopy in 8 weeks.   Delzicol added
renal function improved   also finger sticks are better   Continue with the same regimen while inpatient   patient can be discharged on the same regimen
renal status stable   also added prandial lispro 5 units to decrease the blood glucose   if C-Peptide is sufficient , add Insulin sensitizers

## 2018-06-29 NOTE — DISCHARGE NOTE ADULT - HOSPITAL COURSE
Assessment and Plan:   · Assessment	  37M PMH anxiety, DM insulin dependent, gastroparesis, DKA, recurrent abdominal pain associated with nausea and emesis. Here with DKA and colitis.     1. ENDO/DKA   resolved appreciate Endo consult    follow up with dr. galeano as oupt    -FS much better  - cont with lantus  - endo consult noted    2. GI  - colitis: cont cipro and flagyl for now  - GI note reviewed started on delizcol on 6/28/18 continue for at  4 to 6 weeks and follow up with GI for outpt colonoscopy .     follow up with dr. mckinley as outpt      3. PSYCH  - cont  clonazePAM PRN and escitalopram 5 milliGRAM(s) Oral daily for underlying hx of anxiety and depression Assessment and Plan:   · Assessment	  37M PMH anxiety, DM insulin dependent, gastroparesis, DKA, recurrent abdominal pain associated with nausea and emesis. Here with DKA and colitis.     1. ENDO/DKA   resolved appreciate Endo consult    follow up with dr. galeano as oupt    -FS much better  - cont with lantus  - endo consult noted    2. GI  - colitis: cont cipro and flagyl for now  - GI note reviewed started on delizcol on 6/28/18 continue for at  4 to 6 weeks and follow up with GI for outpt colonoscopy .     follow up with dr. mckinley as outpt      3. PSYCH  - cont  clonazePAM PRN and escitalopram 5 milliGRAM(s) Oral daily for underlying hx of anxiety and depression    TIME SPENT COMPLETING DISCHARGE AND COORDINATING CARE WITH NURSING,  AND CASE MANAGEMENT APPROX 40MINUTES

## 2018-06-29 NOTE — DISCHARGE NOTE ADULT - CARE PLAN
Principal Discharge DX:	Diabetic ketoacidosis without coma associated with type 1 diabetes mellitus  Goal:	Resolved  Assessment and plan of treatment:	Continue DM medications as prescribed  Close FS monitoring  Secondary Diagnosis:	Acute gastritis, presence of bleeding unspecified, unspecified gastritis type  Assessment and plan of treatment:	Resolved  Tolerating diet  Secondary Diagnosis:	Type 1 diabetes mellitus with nephropathy  Assessment and plan of treatment:	Continue medications as prescribed  Follow up with PCP  Low-carbohydrate diet  ADA Recipes - [email or web address]

## 2018-06-29 NOTE — DISCHARGE NOTE ADULT - PROVIDER TOKENS
TOKEN:'[id number]',TOKEN:'[id number]',FREE:[LAST:[follow up with your regular doctor],PHONE:[(   )    -],FAX:[(   )    -]]

## 2018-06-29 NOTE — PROGRESS NOTE ADULT - SUBJECTIVE AND OBJECTIVE BOX
Gastroenterology  Patient seen and examined bedside resting comfortably.  +abdominal pain  no nausea and vomiting.  Normal flatus/BM.    T(F): 97 (06-29-18 @ 05:09), Max: 99.4 (06-28-18 @ 17:35)  HR: 55 (06-29-18 @ 05:09) (55 - 61)  BP: 122/83 (06-29-18 @ 05:09) (122/83 - 150/93)  RR: 18 (06-29-18 @ 05:09) (16 - 18)  SpO2: 99% (06-29-18 @ 05:09) (95% - 99%)  Wt(kg): --  CAPILLARY BLOOD GLUCOSE      POCT Blood Glucose.: 238 mg/dL (29 Jun 2018 07:27)  POCT Blood Glucose.: 273 mg/dL (28 Jun 2018 22:25)  POCT Blood Glucose.: 220 mg/dL (28 Jun 2018 16:14)  POCT Blood Glucose.: 376 mg/dL (28 Jun 2018 11:05)      PHYSICAL EXAM:  General: NAD, WDWN.   Neuro:  Alert & oriented x 3  HEENT: NCAT, EOMI, conjunctiva clear  CV: +S1+S2 regular rate and rhythm  Lung: clear to ausculation bilaterally, respirations nonlabored, good inspiratory effort  Abdomen: soft, Non Tender, No distention Normal active BS  Extremities: no cyanosis, clubbing or edema    LABS:                        13.5   7.59  )-----------( 377      ( 29 Jun 2018 08:12 )             39.4               I&O's Detail    28 Jun 2018 07:01  -  29 Jun 2018 07:00  --------------------------------------------------------  IN:    lactated ringers.: 900 mL    Solution: 200 mL    Solution: 200 mL  Total IN: 1300 mL    OUT:    Voided: 725 mL  Total OUT: 725 mL    Total NET: 575 mL

## 2018-06-29 NOTE — PROGRESS NOTE ADULT - SUBJECTIVE AND OBJECTIVE BOX
Patient is a 37y old  Male who presents with a chief complaint of Diabetic Keto Acidosis (29 Jun 2018 11:19)      Interval History: finger sticks are in 200-250 range   on Lantus and Lispro sliding scale coverage with meals   HbA1C increased , C-Peptide Pending     MEDICATIONS  (STANDING):  chlorhexidine 4% Liquid 1 Application(s) Topical <User Schedule>  ciprofloxacin   IVPB      ciprofloxacin   IVPB 400 milliGRAM(s) IV Intermittent every 12 hours  dextrose 5%. 1000 milliLiter(s) (50 mL/Hr) IV Continuous <Continuous>  dextrose 50% Injectable 12.5 Gram(s) IV Push once  dextrose 50% Injectable 25 Gram(s) IV Push once  dextrose 50% Injectable 25 Gram(s) IV Push once  escitalopram 5 milliGRAM(s) Oral daily  heparin  Injectable 5000 Unit(s) SubCutaneous every 12 hours  insulin glargine Injectable (LANTUS) 15 Unit(s) SubCutaneous at bedtime  insulin lispro (HumaLOG) corrective regimen sliding scale   SubCutaneous three times a day before meals  insulin lispro (HumaLOG) corrective regimen sliding scale   SubCutaneous at bedtime  insulin lispro Injectable (HumaLOG) 5 Unit(s) SubCutaneous before breakfast  insulin lispro Injectable (HumaLOG) 5 Unit(s) SubCutaneous before lunch  insulin lispro Injectable (HumaLOG) 5 Unit(s) SubCutaneous before dinner  lactated ringers. 1000 milliLiter(s) (75 mL/Hr) IV Continuous <Continuous>  mesalamine DR Capsule 800 milliGRAM(s) Oral three times a day  metoclopramide Injectable 10 milliGRAM(s) IV Push every 8 hours  metroNIDAZOLE  IVPB 500 milliGRAM(s) IV Intermittent every 8 hours  metroNIDAZOLE  IVPB      pantoprazole    Tablet 40 milliGRAM(s) Oral before breakfast    MEDICATIONS  (PRN):  acetaminophen   Tablet. 650 milliGRAM(s) Oral every 6 hours PRN Mild Pain (1 - 3)  clonazePAM Tablet 0.5 milliGRAM(s) Oral at bedtime PRN anxiety/restlessness  dextrose 40% Gel 15 Gram(s) Oral once PRN Blood Glucose LESS THAN 70 milliGRAM(s)/deciliter  glucagon  Injectable 1 milliGRAM(s) IntraMuscular once PRN Glucose LESS THAN 70 milligrams/deciliter  oxyCODONE    5 mG/acetaminophen 325 mG 2 Tablet(s) Oral every 6 hours PRN Moderate Pain (4 - 6)      Allergies    No Known Allergies    Intolerances        REVIEW OF SYSTEMS:  CONSTITUTIONAL:   EYES: No eye pain, visual disturbances, or discharge  ENMT:  No difficulty hearing, tinnitus, vertigo; No sinus or throat pain  NECK: No pain or stiffness  RESPIRATORY: No cough, wheezing, chills or hemoptysis; No shortness of breath  CARDIOVASCULAR: No chest pain, palpitations, dizziness, or leg swelling  GASTROINTESTINAL: No abdominal or epigastric pain. No nausea, vomiting, or hematemesis; No diarrhea or constipation. No melena or hematochezia.  GENITOURINARY: No dysuria, frequency, hematuria, or incontinence  NEUROLOGICAL: No headaches, memory loss, loss of strength, numbness, or tremors  SKIN: No itching, burning, rashes, or lesions   LYMPH NODES: No enlarged glands  ENDOCRINE: No heat or cold intolerance; No hair loss  MUSCULOSKELETAL: No joint pain or swelling; No muscle, back, or extremity pain  PSYCHIATRIC: No depression, anxiety, mood swings, or difficulty sleeping  HEME/LYMPH: No easy bruising, or bleeding gums  ALLERY AND IMMUNOLOGIC: No hives or eczema    Vital Signs Last 24 Hrs  T(C): 36.1 (29 Jun 2018 05:09), Max: 37.4 (28 Jun 2018 17:35)  T(F): 97 (29 Jun 2018 05:09), Max: 99.4 (28 Jun 2018 17:35)  HR: 55 (29 Jun 2018 05:09) (55 - 61)  BP: 122/83 (29 Jun 2018 05:09) (122/83 - 150/93)  BP(mean): --  RR: 18 (29 Jun 2018 05:09) (16 - 18)  SpO2: 99% (29 Jun 2018 05:09) (95% - 99%)    PHYSICAL EXAM:  GENERAL:   HEAD:  Atraumatic, Normocephalic  EYES: EOMI, PERRLA, conjunctiva and sclera clear  ENMT: No tonsillar erythema, exudates, or enlargement; Moist mucous membranes, Good dentition, No lesions  NECK: Supple, No JVD, Normal thyroid  NERVOUS SYSTEM:  Alert & Oriented X3, Good concentration; Motor Strength 5/5 B/L upper and lower extremities; DTRs 2+ intact and symmetric  CHEST/LUNG: Clear to percussion bilaterally; No rales, rhonchi, wheezing, or rubs  HEART: Regular rate and rhythm; No murmurs, rubs, or gallops  ABDOMEN: Soft, Nontender, Nondistended; Bowel sounds present  EXTREMITIES:  2+ Peripheral Pulses, No clubbing, cyanosis, or edema  SKIN: No rashes or lesions    LABS:        CAPILLARY BLOOD GLUCOSE      POCT Blood Glucose.: 237 mg/dL (29 Jun 2018 10:53)  POCT Blood Glucose.: 238 mg/dL (29 Jun 2018 07:27)  POCT Blood Glucose.: 273 mg/dL (28 Jun 2018 22:25)  POCT Blood Glucose.: 220 mg/dL (28 Jun 2018 16:14)    Lipid panel:           Thyroid:  Diabetes Tests:  Parathyroid Panel:  Adrenals:  RADIOLOGY & ADDITIONAL TESTS:    Imaging Personally Reviewed:  [ ] YES  [ ] NO    Consultant(s) Notes Reviewed:  [ ] YES  [ ] NO    Care Discussed with Consultants/Other Providers [ ] YES  [ ] NO

## 2018-06-29 NOTE — PROGRESS NOTE ADULT - SUBJECTIVE AND OBJECTIVE BOX
Patient is a 37y old  Male who presents with a chief complaint of Diabetic Keto Acidosis (25 Jun 2018 03:34)      OVERNIGHT EVENTS: none      REVIEW OF SYSTEMS: denies chest pain/SOB, diaphoresis, no F/C, cough, dizziness, headache, blurry vision, nausea, vomiting, abdominal pain. Rest unremarkable     MEDICATIONS  (STANDING):  chlorhexidine 4% Liquid 1 Application(s) Topical <User Schedule>  ciprofloxacin   IVPB      ciprofloxacin   IVPB 400 milliGRAM(s) IV Intermittent every 12 hours  dextrose 5%. 1000 milliLiter(s) (50 mL/Hr) IV Continuous <Continuous>  dextrose 50% Injectable 12.5 Gram(s) IV Push once  dextrose 50% Injectable 25 Gram(s) IV Push once  dextrose 50% Injectable 25 Gram(s) IV Push once  escitalopram 5 milliGRAM(s) Oral daily  heparin  Injectable 5000 Unit(s) SubCutaneous every 12 hours  insulin glargine Injectable (LANTUS) 15 Unit(s) SubCutaneous at bedtime  insulin lispro (HumaLOG) corrective regimen sliding scale   SubCutaneous three times a day before meals  insulin lispro (HumaLOG) corrective regimen sliding scale   SubCutaneous at bedtime  insulin lispro Injectable (HumaLOG) 5 Unit(s) SubCutaneous before breakfast  insulin lispro Injectable (HumaLOG) 5 Unit(s) SubCutaneous before lunch  insulin lispro Injectable (HumaLOG) 5 Unit(s) SubCutaneous before dinner  lactated ringers. 1000 milliLiter(s) (75 mL/Hr) IV Continuous <Continuous>  mesalamine DR Capsule 800 milliGRAM(s) Oral three times a day  metoclopramide Injectable 10 milliGRAM(s) IV Push every 8 hours  metroNIDAZOLE  IVPB 500 milliGRAM(s) IV Intermittent every 8 hours  metroNIDAZOLE  IVPB      pantoprazole    Tablet 40 milliGRAM(s) Oral before breakfast    MEDICATIONS  (PRN):  acetaminophen   Tablet. 650 milliGRAM(s) Oral every 6 hours PRN Mild Pain (1 - 3)  clonazePAM Tablet 0.5 milliGRAM(s) Oral at bedtime PRN anxiety/restlessness  dextrose 40% Gel 15 Gram(s) Oral once PRN Blood Glucose LESS THAN 70 milliGRAM(s)/deciliter  glucagon  Injectable 1 milliGRAM(s) IntraMuscular once PRN Glucose LESS THAN 70 milligrams/deciliter  oxyCODONE    5 mG/acetaminophen 325 mG 2 Tablet(s) Oral every 6 hours PRN Moderate Pain (4 - 6)    Allergies    No Known Allergies    Intolerances        SUBJECTIVE: in bed in NAD, no acute events overnight     Vital Signs Last 24 Hrs  T(C): 36.9 (29 Jun 2018 13:39), Max: 37.4 (28 Jun 2018 17:35)  T(F): 98.4 (29 Jun 2018 13:39), Max: 99.4 (28 Jun 2018 17:35)  HR: 80 (29 Jun 2018 13:39) (55 - 80)  BP: 125/63 (29 Jun 2018 13:39) (122/83 - 144/92)  BP(mean): --  RR: 16 (29 Jun 2018 13:39) (16 - 18)  SpO2: 98% (29 Jun 2018 13:39) (95% - 99%)    PHYSICAL EXAM:  GENERAL: NAD, well-groomed, well-developed  HEAD:  Atraumatic, Normocephalic  EYES: EOMI, PERRLA, conjunctiva and sclera clear  ENMT: No tonsillar erythema, exudates, or enlargement; Moist mucous membranes, Good dentition, No lesions  NECK: Supple, No JVD, Normal thyroid  CHEST/LUNG: Clear to  auscultation bilaterally; No rales, rhonchi, wheezing, or rubs  bilaterally  HEART: Regular rate and rhythm; No murmurs, rubs, or gallops  ABDOMEN: Soft, tender epigastric and ruq Bowel sounds present  EXTREMITIES:  2+ Peripheral Pulses, No clubbing, cyanosis, or edema BL LE  SKIN: No rashes or lesions  NERVOUS SYSTEM:  Alert & Oriented X3, Good concentration; Motor Strength 5/5 B/L upper and lower extremities;   DTRs 2+ intact and symmetric, sensation intact BL    LABS:                                        13.5   7.59  )-----------( 377      ( 29 Jun 2018 08:12 )             39.4     06-29    136  |  96  |  11  ----------------------------<  244<H>  4.0   |  28  |  1.05    Ca    8.9      29 Jun 2018 08:12        Cultures;   CAPILLARY BLOOD GLUCOSE      POCT Blood Glucose.: 237 mg/dL (29 Jun 2018 10:53)  POCT Blood Glucose.: 238 mg/dL (29 Jun 2018 07:27)  POCT Blood Glucose.: 273 mg/dL (28 Jun 2018 22:25)  POCT Blood Glucose.: 220 mg/dL (28 Jun 2018 16:14)    Lipid panel:           RADIOLOGY & ADDITIONAL TESTS:      Imaging Personally Reviewed:  [ x] YES      Consultant(s) Notes Reviewed:  [x ] YES     Care Discussed with [x ] Consultants [X ] Patient [x ] Family  [x ]    [x ]  Other; RN

## 2018-06-29 NOTE — DISCHARGE NOTE ADULT - PLAN OF CARE
Resolved Continue DM medications as prescribed  Close FS monitoring Resolved  Tolerating diet Continue medications as prescribed  Follow up with PCP  Low-carbohydrate diet  ADA Recipes - [email or web address]

## 2018-06-29 NOTE — DISCHARGE NOTE ADULT - SECONDARY DIAGNOSIS.
Acute gastritis, presence of bleeding unspecified, unspecified gastritis type Type 1 diabetes mellitus with nephropathy

## 2018-06-29 NOTE — DISCHARGE NOTE ADULT - CARE PROVIDERS DIRECT ADDRESSES
[email or web address],DirectAddress_Unknown,DirectAddress_Unknown

## 2018-06-29 NOTE — DISCHARGE NOTE ADULT - FUNCTIONAL STATUS DATE
General Delivery Information





- General Info


: 4


Para: 2


Delivery Physician/CNM: Lynda Brady


Admission Date: 17


Labs: 





 











Patient ABO/Rh  B POSITIVE   17  06:38    


 


Hct  27.0 % (38.0-47.0)  L D 17  04:58    














Vaginal Birth





- Diagnosis


IUP (Weeks): 39 2/7


Labor: Induced


Rupture of Membranes Type: Artificial


Amniotic Fluid Color: Clear


Complications: Post Partum Hemorrhage, Other (Specify) (retained placenta 

requiring manual extraction and banjo curette, US at end of procedure showed 

clear endometrial stripe)





- Operations/Procedures


Delivery Type: Spontaneous


Procedures: Amniotomy


Anesthesia: Epidural





 Birth





-  Delivery


EBL: 800 ml


Anesthesia: Epidural





Discharge Information





- Discharge Information


Discharge Medications: Ibuprofen, Iron, Prenatal Vitamins, Vicodin


Condition: Good


Instruction/Follow Up: Six Weeks


Discharge Physician/CNM: Aieme Jones


Discharge Date: 17


Dictated: No 29-Jun-2018 30-Jun-2018

## 2018-06-29 NOTE — DISCHARGE NOTE ADULT - CARE PROVIDER_API CALL
Octavio Kumar), Medicine  [address]  [address]  [place] [address]  Phone: [phone]  Fax: [phone]    Marcos Platt), EndocrinologyMetabDiabetes  [address]  [address]  [place] [address]  Phone: [phone]  Fax: [phone]    follow up with your regular doctor,   Phone: (   )    -  Fax: (   )    -

## 2018-06-30 VITALS
SYSTOLIC BLOOD PRESSURE: 111 MMHG | HEART RATE: 63 BPM | TEMPERATURE: 99 F | DIASTOLIC BLOOD PRESSURE: 73 MMHG | OXYGEN SATURATION: 99 % | WEIGHT: 176.15 LBS | RESPIRATION RATE: 17 BRPM

## 2018-06-30 LAB
ANION GAP SERPL CALC-SCNC: 9 MMOL/L — SIGNIFICANT CHANGE UP (ref 5–17)
BUN SERPL-MCNC: 10 MG/DL — SIGNIFICANT CHANGE UP (ref 7–23)
CALCIUM SERPL-MCNC: 8.9 MG/DL — SIGNIFICANT CHANGE UP (ref 8.5–10.1)
CHLORIDE SERPL-SCNC: 102 MMOL/L — SIGNIFICANT CHANGE UP (ref 96–108)
CO2 SERPL-SCNC: 29 MMOL/L — SIGNIFICANT CHANGE UP (ref 22–31)
CREAT SERPL-MCNC: 0.95 MG/DL — SIGNIFICANT CHANGE UP (ref 0.5–1.3)
CULTURE RESULTS: SIGNIFICANT CHANGE UP
CULTURE RESULTS: SIGNIFICANT CHANGE UP
GLUCOSE BLDC GLUCOMTR-MCNC: 131 MG/DL — HIGH (ref 70–99)
GLUCOSE SERPL-MCNC: 106 MG/DL — HIGH (ref 70–99)
HCT VFR BLD CALC: 41.3 % — SIGNIFICANT CHANGE UP (ref 39–50)
HGB BLD-MCNC: 14.2 G/DL — SIGNIFICANT CHANGE UP (ref 13–17)
MCHC RBC-ENTMCNC: 28.2 PG — SIGNIFICANT CHANGE UP (ref 27–34)
MCHC RBC-ENTMCNC: 34.4 GM/DL — SIGNIFICANT CHANGE UP (ref 32–36)
MCV RBC AUTO: 82.1 FL — SIGNIFICANT CHANGE UP (ref 80–100)
NRBC # BLD: 0 /100 WBCS — SIGNIFICANT CHANGE UP (ref 0–0)
PLATELET # BLD AUTO: 386 K/UL — SIGNIFICANT CHANGE UP (ref 150–400)
POTASSIUM SERPL-MCNC: 4 MMOL/L — SIGNIFICANT CHANGE UP (ref 3.5–5.3)
POTASSIUM SERPL-SCNC: 4 MMOL/L — SIGNIFICANT CHANGE UP (ref 3.5–5.3)
RBC # BLD: 5.03 M/UL — SIGNIFICANT CHANGE UP (ref 4.2–5.8)
RBC # FLD: 13.1 % — SIGNIFICANT CHANGE UP (ref 10.3–14.5)
SODIUM SERPL-SCNC: 140 MMOL/L — SIGNIFICANT CHANGE UP (ref 135–145)
SPECIMEN SOURCE: SIGNIFICANT CHANGE UP
SPECIMEN SOURCE: SIGNIFICANT CHANGE UP
WBC # BLD: 9.49 K/UL — SIGNIFICANT CHANGE UP (ref 3.8–10.5)
WBC # FLD AUTO: 9.49 K/UL — SIGNIFICANT CHANGE UP (ref 3.8–10.5)

## 2018-06-30 PROCEDURE — 99239 HOSP IP/OBS DSCHRG MGMT >30: CPT

## 2018-06-30 RX ORDER — ENOXAPARIN SODIUM 100 MG/ML
15 INJECTION SUBCUTANEOUS
Qty: 1 | Refills: 0 | OUTPATIENT
Start: 2018-06-30 | End: 2018-07-29

## 2018-06-30 RX ORDER — INSULIN LISPRO 100/ML
5 VIAL (ML) SUBCUTANEOUS
Qty: 1 | Refills: 0 | OUTPATIENT
Start: 2018-06-30 | End: 2018-07-29

## 2018-06-30 RX ORDER — PANTOPRAZOLE SODIUM 20 MG/1
1 TABLET, DELAYED RELEASE ORAL
Qty: 30 | Refills: 0 | OUTPATIENT
Start: 2018-06-30 | End: 2018-07-29

## 2018-06-30 RX ORDER — MOXIFLOXACIN HYDROCHLORIDE TABLETS, 400 MG 400 MG/1
1 TABLET, FILM COATED ORAL
Qty: 16 | Refills: 0 | OUTPATIENT
Start: 2018-06-30 | End: 2018-07-07

## 2018-06-30 RX ORDER — METRONIDAZOLE 500 MG
1 TABLET ORAL
Qty: 24 | Refills: 0 | OUTPATIENT
Start: 2018-06-30 | End: 2018-07-07

## 2018-06-30 RX ORDER — MESALAMINE 400 MG
2 TABLET, DELAYED RELEASE (ENTERIC COATED) ORAL
Qty: 180 | Refills: 0 | OUTPATIENT
Start: 2018-06-30 | End: 2018-07-29

## 2018-06-30 RX ORDER — CLONAZEPAM 1 MG
1 TABLET ORAL
Qty: 30 | Refills: 0 | OUTPATIENT
Start: 2018-06-30 | End: 2018-07-14

## 2018-06-30 RX ORDER — LACTOBACILLUS ACIDOPHILUS 100MM CELL
1 CAPSULE ORAL
Qty: 30 | Refills: 0 | OUTPATIENT
Start: 2018-06-30 | End: 2018-07-29

## 2018-06-30 RX ADMIN — PANTOPRAZOLE SODIUM 40 MILLIGRAM(S): 20 TABLET, DELAYED RELEASE ORAL at 07:55

## 2018-06-30 RX ADMIN — OXYCODONE AND ACETAMINOPHEN 2 TABLET(S): 5; 325 TABLET ORAL at 10:26

## 2018-06-30 RX ADMIN — Medication 200 MILLIGRAM(S): at 05:55

## 2018-06-30 RX ADMIN — OXYCODONE AND ACETAMINOPHEN 2 TABLET(S): 5; 325 TABLET ORAL at 09:56

## 2018-06-30 RX ADMIN — Medication 100 MILLIGRAM(S): at 05:55

## 2018-06-30 RX ADMIN — Medication 5 UNIT(S): at 07:55

## 2018-06-30 RX ADMIN — Medication 800 MILLIGRAM(S): at 05:55

## 2018-06-30 RX ADMIN — Medication 10 MILLIGRAM(S): at 05:55

## 2018-07-05 DIAGNOSIS — J45.909 UNSPECIFIED ASTHMA, UNCOMPLICATED: ICD-10-CM

## 2018-07-05 DIAGNOSIS — Z79.4 LONG TERM (CURRENT) USE OF INSULIN: ICD-10-CM

## 2018-07-05 DIAGNOSIS — R10.9 UNSPECIFIED ABDOMINAL PAIN: ICD-10-CM

## 2018-07-05 DIAGNOSIS — K29.00 ACUTE GASTRITIS WITHOUT BLEEDING: ICD-10-CM

## 2018-07-05 DIAGNOSIS — F32.9 MAJOR DEPRESSIVE DISORDER, SINGLE EPISODE, UNSPECIFIED: ICD-10-CM

## 2018-07-05 DIAGNOSIS — A04.9 BACTERIAL INTESTINAL INFECTION, UNSPECIFIED: ICD-10-CM

## 2018-07-05 DIAGNOSIS — K31.84 GASTROPARESIS: ICD-10-CM

## 2018-07-05 DIAGNOSIS — E83.39 OTHER DISORDERS OF PHOSPHORUS METABOLISM: ICD-10-CM

## 2018-07-05 DIAGNOSIS — E10.43 TYPE 1 DIABETES MELLITUS WITH DIABETIC AUTONOMIC (POLY)NEUROPATHY: ICD-10-CM

## 2018-07-05 DIAGNOSIS — K52.9 NONINFECTIVE GASTROENTERITIS AND COLITIS, UNSPECIFIED: ICD-10-CM

## 2018-07-05 DIAGNOSIS — F41.9 ANXIETY DISORDER, UNSPECIFIED: ICD-10-CM

## 2018-07-05 DIAGNOSIS — E87.6 HYPOKALEMIA: ICD-10-CM

## 2018-07-05 DIAGNOSIS — E10.10 TYPE 1 DIABETES MELLITUS WITH KETOACIDOSIS WITHOUT COMA: ICD-10-CM

## 2018-07-05 DIAGNOSIS — E10.21 TYPE 1 DIABETES MELLITUS WITH DIABETIC NEPHROPATHY: ICD-10-CM

## 2018-07-15 ENCOUNTER — INPATIENT (INPATIENT)
Facility: HOSPITAL | Age: 38
LOS: 2 days | Discharge: HOME | End: 2018-07-18
Attending: INTERNAL MEDICINE | Admitting: INTERNAL MEDICINE
Payer: MEDICAID

## 2018-07-15 VITALS
RESPIRATION RATE: 18 BRPM | OXYGEN SATURATION: 99 % | HEIGHT: 69 IN | HEART RATE: 88 BPM | SYSTOLIC BLOOD PRESSURE: 164 MMHG | TEMPERATURE: 98 F | WEIGHT: 169.98 LBS | DIASTOLIC BLOOD PRESSURE: 93 MMHG

## 2018-07-15 DIAGNOSIS — Z90.49 ACQUIRED ABSENCE OF OTHER SPECIFIED PARTS OF DIGESTIVE TRACT: Chronic | ICD-10-CM

## 2018-07-15 DIAGNOSIS — R11.14 BILIOUS VOMITING: ICD-10-CM

## 2018-07-15 DIAGNOSIS — F41.9 ANXIETY DISORDER, UNSPECIFIED: ICD-10-CM

## 2018-07-15 DIAGNOSIS — K52.9 NONINFECTIVE GASTROENTERITIS AND COLITIS, UNSPECIFIED: ICD-10-CM

## 2018-07-15 DIAGNOSIS — A41.9 SEPSIS, UNSPECIFIED ORGANISM: ICD-10-CM

## 2018-07-15 DIAGNOSIS — E10.9 TYPE 1 DIABETES MELLITUS WITHOUT COMPLICATIONS: ICD-10-CM

## 2018-07-15 DIAGNOSIS — J45.909 UNSPECIFIED ASTHMA, UNCOMPLICATED: ICD-10-CM

## 2018-07-15 LAB
ALBUMIN SERPL ELPH-MCNC: 3.9 G/DL — SIGNIFICANT CHANGE UP (ref 3.3–5)
ALP SERPL-CCNC: 86 U/L — SIGNIFICANT CHANGE UP (ref 40–120)
ALT FLD-CCNC: 26 U/L — SIGNIFICANT CHANGE UP (ref 12–78)
ANION GAP SERPL CALC-SCNC: 8 MMOL/L — SIGNIFICANT CHANGE UP (ref 5–17)
APPEARANCE UR: CLEAR — SIGNIFICANT CHANGE UP
AST SERPL-CCNC: 18 U/L — SIGNIFICANT CHANGE UP (ref 15–37)
BACTERIA # UR AUTO: ABNORMAL
BASOPHILS # BLD AUTO: 0.06 K/UL — SIGNIFICANT CHANGE UP (ref 0–0.2)
BASOPHILS NFR BLD AUTO: 0.5 % — SIGNIFICANT CHANGE UP (ref 0–2)
BILIRUB SERPL-MCNC: 0.4 MG/DL — SIGNIFICANT CHANGE UP (ref 0.2–1.2)
BILIRUB UR-MCNC: NEGATIVE — SIGNIFICANT CHANGE UP
BUN SERPL-MCNC: 23 MG/DL — SIGNIFICANT CHANGE UP (ref 7–23)
CALCIUM SERPL-MCNC: 9.3 MG/DL — SIGNIFICANT CHANGE UP (ref 8.5–10.1)
CHLORIDE SERPL-SCNC: 106 MMOL/L — SIGNIFICANT CHANGE UP (ref 96–108)
CO2 SERPL-SCNC: 27 MMOL/L — SIGNIFICANT CHANGE UP (ref 22–31)
COLOR SPEC: YELLOW — SIGNIFICANT CHANGE UP
CREAT SERPL-MCNC: 1.06 MG/DL — SIGNIFICANT CHANGE UP (ref 0.5–1.3)
DIFF PNL FLD: NEGATIVE — SIGNIFICANT CHANGE UP
EOSINOPHIL # BLD AUTO: 0.21 K/UL — SIGNIFICANT CHANGE UP (ref 0–0.5)
EOSINOPHIL NFR BLD AUTO: 1.6 % — SIGNIFICANT CHANGE UP (ref 0–6)
GLUCOSE BLDC GLUCOMTR-MCNC: 97 MG/DL — SIGNIFICANT CHANGE UP (ref 70–99)
GLUCOSE SERPL-MCNC: 92 MG/DL — SIGNIFICANT CHANGE UP (ref 70–99)
GLUCOSE UR QL: 250 MG/DL
HCT VFR BLD CALC: 41.9 % — SIGNIFICANT CHANGE UP (ref 39–50)
HGB BLD-MCNC: 14.3 G/DL — SIGNIFICANT CHANGE UP (ref 13–17)
IMM GRANULOCYTES NFR BLD AUTO: 0.2 % — SIGNIFICANT CHANGE UP (ref 0–1.5)
KETONES UR-MCNC: NEGATIVE — SIGNIFICANT CHANGE UP
LACTATE SERPL-SCNC: 1.5 MMOL/L — SIGNIFICANT CHANGE UP (ref 0.7–2)
LACTATE SERPL-SCNC: 2.3 MMOL/L — HIGH (ref 0.7–2)
LEUKOCYTE ESTERASE UR-ACNC: ABNORMAL
LIDOCAIN IGE QN: 49 U/L — LOW (ref 73–393)
LYMPHOCYTES # BLD AUTO: 1.83 K/UL — SIGNIFICANT CHANGE UP (ref 1–3.3)
LYMPHOCYTES # BLD AUTO: 13.7 % — SIGNIFICANT CHANGE UP (ref 13–44)
MCHC RBC-ENTMCNC: 28.1 PG — SIGNIFICANT CHANGE UP (ref 27–34)
MCHC RBC-ENTMCNC: 34.1 GM/DL — SIGNIFICANT CHANGE UP (ref 32–36)
MCV RBC AUTO: 82.3 FL — SIGNIFICANT CHANGE UP (ref 80–100)
MONOCYTES # BLD AUTO: 0.5 K/UL — SIGNIFICANT CHANGE UP (ref 0–0.9)
MONOCYTES NFR BLD AUTO: 3.8 % — SIGNIFICANT CHANGE UP (ref 2–14)
NEUTROPHILS # BLD AUTO: 10.68 K/UL — HIGH (ref 1.8–7.4)
NEUTROPHILS NFR BLD AUTO: 80.2 % — HIGH (ref 43–77)
NITRITE UR-MCNC: NEGATIVE — SIGNIFICANT CHANGE UP
NRBC # BLD: 0 /100 WBCS — SIGNIFICANT CHANGE UP (ref 0–0)
PH UR: 8 — SIGNIFICANT CHANGE UP (ref 5–8)
PLATELET # BLD AUTO: 422 K/UL — HIGH (ref 150–400)
POTASSIUM SERPL-MCNC: 3.4 MMOL/L — LOW (ref 3.5–5.3)
POTASSIUM SERPL-SCNC: 3.4 MMOL/L — LOW (ref 3.5–5.3)
PROT SERPL-MCNC: 8 GM/DL — SIGNIFICANT CHANGE UP (ref 6–8.3)
PROT UR-MCNC: 30 MG/DL
RBC # BLD: 5.09 M/UL — SIGNIFICANT CHANGE UP (ref 4.2–5.8)
RBC # FLD: 13.9 % — SIGNIFICANT CHANGE UP (ref 10.3–14.5)
SODIUM SERPL-SCNC: 141 MMOL/L — SIGNIFICANT CHANGE UP (ref 135–145)
SP GR SPEC: 1.01 — SIGNIFICANT CHANGE UP (ref 1.01–1.02)
UROBILINOGEN FLD QL: NEGATIVE MG/DL — SIGNIFICANT CHANGE UP
WBC # BLD: 13.31 K/UL — HIGH (ref 3.8–10.5)
WBC # FLD AUTO: 13.31 K/UL — HIGH (ref 3.8–10.5)
WBC UR QL: SIGNIFICANT CHANGE UP

## 2018-07-15 PROCEDURE — 99223 1ST HOSP IP/OBS HIGH 75: CPT

## 2018-07-15 PROCEDURE — 74177 CT ABD & PELVIS W/CONTRAST: CPT | Mod: 26

## 2018-07-15 PROCEDURE — 99285 EMERGENCY DEPT VISIT HI MDM: CPT

## 2018-07-15 RX ORDER — METOCLOPRAMIDE HCL 10 MG
10 TABLET ORAL ONCE
Qty: 0 | Refills: 0 | Status: COMPLETED | OUTPATIENT
Start: 2018-07-15 | End: 2018-07-15

## 2018-07-15 RX ORDER — INSULIN LISPRO 100/ML
5 VIAL (ML) SUBCUTANEOUS
Qty: 0 | Refills: 0 | Status: DISCONTINUED | OUTPATIENT
Start: 2018-07-15 | End: 2018-07-17

## 2018-07-15 RX ORDER — INSULIN GLARGINE 100 [IU]/ML
15 INJECTION, SOLUTION SUBCUTANEOUS AT BEDTIME
Qty: 0 | Refills: 0 | Status: DISCONTINUED | OUTPATIENT
Start: 2018-07-15 | End: 2018-07-17

## 2018-07-15 RX ORDER — PANTOPRAZOLE SODIUM 20 MG/1
40 TABLET, DELAYED RELEASE ORAL ONCE
Qty: 0 | Refills: 0 | Status: COMPLETED | OUTPATIENT
Start: 2018-07-15 | End: 2018-07-15

## 2018-07-15 RX ORDER — PIPERACILLIN AND TAZOBACTAM 4; .5 G/20ML; G/20ML
3.38 INJECTION, POWDER, LYOPHILIZED, FOR SOLUTION INTRAVENOUS EVERY 8 HOURS
Qty: 0 | Refills: 0 | Status: DISCONTINUED | OUTPATIENT
Start: 2018-07-15 | End: 2018-07-18

## 2018-07-15 RX ORDER — GLUCAGON INJECTION, SOLUTION 0.5 MG/.1ML
1 INJECTION, SOLUTION SUBCUTANEOUS ONCE
Qty: 0 | Refills: 0 | Status: DISCONTINUED | OUTPATIENT
Start: 2018-07-15 | End: 2018-07-18

## 2018-07-15 RX ORDER — IOHEXOL 300 MG/ML
30 INJECTION, SOLUTION INTRAVENOUS ONCE
Qty: 0 | Refills: 0 | Status: COMPLETED | OUTPATIENT
Start: 2018-07-15 | End: 2018-07-15

## 2018-07-15 RX ORDER — MESALAMINE 400 MG
400 TABLET, DELAYED RELEASE (ENTERIC COATED) ORAL THREE TIMES A DAY
Qty: 0 | Refills: 0 | Status: DISCONTINUED | OUTPATIENT
Start: 2018-07-15 | End: 2018-07-18

## 2018-07-15 RX ORDER — SODIUM CHLORIDE 9 MG/ML
1000 INJECTION INTRAMUSCULAR; INTRAVENOUS; SUBCUTANEOUS ONCE
Qty: 0 | Refills: 0 | Status: COMPLETED | OUTPATIENT
Start: 2018-07-15 | End: 2018-07-15

## 2018-07-15 RX ORDER — HYDROMORPHONE HYDROCHLORIDE 2 MG/ML
0.5 INJECTION INTRAMUSCULAR; INTRAVENOUS; SUBCUTANEOUS ONCE
Qty: 0 | Refills: 0 | Status: DISCONTINUED | OUTPATIENT
Start: 2018-07-15 | End: 2018-07-15

## 2018-07-15 RX ORDER — SODIUM CHLORIDE 9 MG/ML
3000 INJECTION INTRAMUSCULAR; INTRAVENOUS; SUBCUTANEOUS ONCE
Qty: 0 | Refills: 0 | Status: COMPLETED | OUTPATIENT
Start: 2018-07-15 | End: 2018-07-15

## 2018-07-15 RX ORDER — PIPERACILLIN AND TAZOBACTAM 4; .5 G/20ML; G/20ML
3.38 INJECTION, POWDER, LYOPHILIZED, FOR SOLUTION INTRAVENOUS ONCE
Qty: 0 | Refills: 0 | Status: COMPLETED | OUTPATIENT
Start: 2018-07-15 | End: 2018-07-15

## 2018-07-15 RX ORDER — ESCITALOPRAM OXALATE 10 MG/1
5 TABLET, FILM COATED ORAL DAILY
Qty: 0 | Refills: 0 | Status: DISCONTINUED | OUTPATIENT
Start: 2018-07-15 | End: 2018-07-18

## 2018-07-15 RX ORDER — MORPHINE SULFATE 50 MG/1
2 CAPSULE, EXTENDED RELEASE ORAL EVERY 6 HOURS
Qty: 0 | Refills: 0 | Status: DISCONTINUED | OUTPATIENT
Start: 2018-07-15 | End: 2018-07-16

## 2018-07-15 RX ORDER — SODIUM CHLORIDE 9 MG/ML
3 INJECTION INTRAMUSCULAR; INTRAVENOUS; SUBCUTANEOUS ONCE
Qty: 0 | Refills: 0 | Status: COMPLETED | OUTPATIENT
Start: 2018-07-15 | End: 2018-07-15

## 2018-07-15 RX ORDER — HYDROMORPHONE HYDROCHLORIDE 2 MG/ML
1 INJECTION INTRAMUSCULAR; INTRAVENOUS; SUBCUTANEOUS ONCE
Qty: 0 | Refills: 0 | Status: DISCONTINUED | OUTPATIENT
Start: 2018-07-15 | End: 2018-07-15

## 2018-07-15 RX ORDER — INSULIN LISPRO 100/ML
VIAL (ML) SUBCUTANEOUS AT BEDTIME
Qty: 0 | Refills: 0 | Status: DISCONTINUED | OUTPATIENT
Start: 2018-07-15 | End: 2018-07-18

## 2018-07-15 RX ORDER — INSULIN LISPRO 100/ML
VIAL (ML) SUBCUTANEOUS
Qty: 0 | Refills: 0 | Status: DISCONTINUED | OUTPATIENT
Start: 2018-07-15 | End: 2018-07-18

## 2018-07-15 RX ORDER — SODIUM CHLORIDE 9 MG/ML
1000 INJECTION, SOLUTION INTRAVENOUS
Qty: 0 | Refills: 0 | Status: DISCONTINUED | OUTPATIENT
Start: 2018-07-15 | End: 2018-07-18

## 2018-07-15 RX ORDER — ONDANSETRON 8 MG/1
4 TABLET, FILM COATED ORAL EVERY 6 HOURS
Qty: 0 | Refills: 0 | Status: COMPLETED | OUTPATIENT
Start: 2018-07-15 | End: 2018-07-16

## 2018-07-15 RX ORDER — ALBUTEROL 90 UG/1
1 AEROSOL, METERED ORAL EVERY 4 HOURS
Qty: 0 | Refills: 0 | Status: DISCONTINUED | OUTPATIENT
Start: 2018-07-15 | End: 2018-07-18

## 2018-07-15 RX ORDER — DEXTROSE 50 % IN WATER 50 %
15 SYRINGE (ML) INTRAVENOUS ONCE
Qty: 0 | Refills: 0 | Status: DISCONTINUED | OUTPATIENT
Start: 2018-07-15 | End: 2018-07-18

## 2018-07-15 RX ORDER — ALBUTEROL 90 UG/1
1 AEROSOL, METERED ORAL EVERY 4 HOURS
Qty: 0 | Refills: 0 | Status: COMPLETED | OUTPATIENT
Start: 2018-07-15 | End: 2019-06-13

## 2018-07-15 RX ORDER — CLONAZEPAM 1 MG
0.5 TABLET ORAL
Qty: 0 | Refills: 0 | Status: DISCONTINUED | OUTPATIENT
Start: 2018-07-15 | End: 2018-07-18

## 2018-07-15 RX ORDER — DEXTROSE 50 % IN WATER 50 %
12.5 SYRINGE (ML) INTRAVENOUS ONCE
Qty: 0 | Refills: 0 | Status: DISCONTINUED | OUTPATIENT
Start: 2018-07-15 | End: 2018-07-18

## 2018-07-15 RX ORDER — FAMOTIDINE 10 MG/ML
20 INJECTION INTRAVENOUS
Qty: 0 | Refills: 0 | Status: DISCONTINUED | OUTPATIENT
Start: 2018-07-15 | End: 2018-07-18

## 2018-07-15 RX ADMIN — PANTOPRAZOLE SODIUM 40 MILLIGRAM(S): 20 TABLET, DELAYED RELEASE ORAL at 15:47

## 2018-07-15 RX ADMIN — MORPHINE SULFATE 2 MILLIGRAM(S): 50 CAPSULE, EXTENDED RELEASE ORAL at 23:35

## 2018-07-15 RX ADMIN — IOHEXOL 30 MILLILITER(S): 300 INJECTION, SOLUTION INTRAVENOUS at 15:50

## 2018-07-15 RX ADMIN — SODIUM CHLORIDE 1000 MILLILITER(S): 9 INJECTION INTRAMUSCULAR; INTRAVENOUS; SUBCUTANEOUS at 16:46

## 2018-07-15 RX ADMIN — SODIUM CHLORIDE 3 MILLILITER(S): 9 INJECTION INTRAMUSCULAR; INTRAVENOUS; SUBCUTANEOUS at 15:42

## 2018-07-15 RX ADMIN — MORPHINE SULFATE 2 MILLIGRAM(S): 50 CAPSULE, EXTENDED RELEASE ORAL at 23:55

## 2018-07-15 RX ADMIN — SODIUM CHLORIDE 3000 MILLILITER(S): 9 INJECTION INTRAMUSCULAR; INTRAVENOUS; SUBCUTANEOUS at 15:53

## 2018-07-15 RX ADMIN — HYDROMORPHONE HYDROCHLORIDE 0.5 MILLIGRAM(S): 2 INJECTION INTRAMUSCULAR; INTRAVENOUS; SUBCUTANEOUS at 18:06

## 2018-07-15 RX ADMIN — PIPERACILLIN AND TAZOBACTAM 200 GRAM(S): 4; .5 INJECTION, POWDER, LYOPHILIZED, FOR SOLUTION INTRAVENOUS at 18:06

## 2018-07-15 RX ADMIN — HYDROMORPHONE HYDROCHLORIDE 0.5 MILLIGRAM(S): 2 INJECTION INTRAMUSCULAR; INTRAVENOUS; SUBCUTANEOUS at 18:43

## 2018-07-15 RX ADMIN — INSULIN GLARGINE 15 UNIT(S): 100 INJECTION, SOLUTION SUBCUTANEOUS at 22:02

## 2018-07-15 RX ADMIN — HYDROMORPHONE HYDROCHLORIDE 1 MILLIGRAM(S): 2 INJECTION INTRAMUSCULAR; INTRAVENOUS; SUBCUTANEOUS at 15:47

## 2018-07-15 RX ADMIN — HYDROMORPHONE HYDROCHLORIDE 1 MILLIGRAM(S): 2 INJECTION INTRAMUSCULAR; INTRAVENOUS; SUBCUTANEOUS at 16:16

## 2018-07-15 RX ADMIN — ONDANSETRON 4 MILLIGRAM(S): 8 TABLET, FILM COATED ORAL at 23:35

## 2018-07-15 RX ADMIN — Medication 0.5 MILLIGRAM(S): at 21:12

## 2018-07-15 RX ADMIN — Medication 400 MILLIGRAM(S): at 22:01

## 2018-07-15 RX ADMIN — ALBUTEROL 1 PUFF(S): 90 AEROSOL, METERED ORAL at 22:01

## 2018-07-15 RX ADMIN — Medication 10 MILLIGRAM(S): at 15:48

## 2018-07-15 NOTE — H&P ADULT - HISTORY OF PRESENT ILLNESS
36 y/o male with PMHx of IDDM, Anxiety, Gastroparesis, Asthma, and recent hospitalization for Colitis s/p completion of 14 day abx therapy that presents to the ED for a 2 day history of worsening upper abdominal pain, nausea, and vomiting.  Patient states that his symptoms started after eating pork chops a couple of days ago.  He states that he began to have 9/10 squeezing upper abdominal pain that was intermittent, associated with diarrhea and relieved transiently with emesis.  Patient also describes chills.  He denies blood per rectum or hematemesis    ED course  Patient is found to have an elevated WBC and Lactate with elevated HR.  He is bolused with LR and started on Zosyn.  CT abdomen demonstrates colitis.

## 2018-07-15 NOTE — ED PROVIDER NOTE - MEDICAL DECISION MAKING DETAILS
Pt with diarrhea and vomiting and abd pain again. CT with possible colitis and WCC 13. Will admit. zosyn ordered. dr. mills aware to let dr. mckinley/omar know.

## 2018-07-15 NOTE — H&P ADULT - NSHPPHYSICALEXAM_GEN_ALL_CORE
T(C): 36.8 (07-15-18 @ 18:03), Max: 36.8 (07-15-18 @ 15:13)  HR: 100 (07-15-18 @ 18:03) (88 - 100)  BP: 136/84 (07-15-18 @ 18:03) (136/84 - 164/93)  RR: 19 (07-15-18 @ 18:03) (18 - 19)  SpO2: 96% (07-15-18 @ 18:03) (96% - 99%)

## 2018-07-15 NOTE — ED PROVIDER NOTE - PHYSICAL EXAMINATION
Gen: Alert, + vomiting  Head: NC, AT, PERRL, EOMI, normal lids/conjunctiva   ENT: Bilateral TM WNL, normal hearing, patent oropharynx without erythema/exudate, uvula midline  Neck: supple, no tenderness/meningismus/JVD   Pulm: Bilateral clear BS, normal resp effort, no wheeze/stridor/retractions  CV: RRR, no M/R/G, +dist pulses   Abd: soft, + gen tender, ND, +BS, no guarding/rebound tenderness  Mskel: no edema/erythema/cyanosis   Skin: no rash   Neuro: AAOx3, no sensory/motor deficits, CN 2-12 intact

## 2018-07-15 NOTE — H&P ADULT - PROBLEM SELECTOR PLAN 3
- Patient has poor PO intake at this time would decrease dose of both Lantus and humalog by 50%  - lantus 15u QHS  - Humalog 5u Q AC  - follow fsg

## 2018-07-15 NOTE — H&P ADULT - PROBLEM SELECTOR PLAN 1
- cbc demonstrates left shift and leucocytosis  - CT abdomen demonstrates bowel wall thickening  - Patient recently d/c on Cipro/Flagyl, agree with Zosyn for now   - GI consult in am  - Clear liquid Diabetic Diet

## 2018-07-15 NOTE — H&P ADULT - PROBLEM SELECTOR PLAN 2
- lactate initially elevated in ED after fluid bolus has improved to 1.5  - would c/w IV hydration in the setting of persistent diarrhea and vomiting  - monitor vitals

## 2018-07-15 NOTE — ED PROVIDER NOTE - OBJECTIVE STATEMENT
36yo male with pmh DM, diabetic gastroparesis, h/o DKA presents with epigastric and diffuse abd pain, mult vomiting and diarrhea since yesterday. Pt was admitted for DKA and colitis 2 weeks ago. Pt states well since dc. Pt completed abx last week.     ROS: No fever/chills. No photophobia/eye pain/changes in vision, No ear pain/sore throat/dysphagia, No chest pain/palpitations. No SOB/cough/stridor. + abdominal pain, +N/V/D, no black/bloody bm. No dysuria/frequency/discharge, No headache. No Dizziness.  No rash.  No numbness/tingling/weakness.

## 2018-07-16 DIAGNOSIS — Z29.9 ENCOUNTER FOR PROPHYLACTIC MEASURES, UNSPECIFIED: ICD-10-CM

## 2018-07-16 LAB
ANION GAP SERPL CALC-SCNC: 11 MMOL/L — SIGNIFICANT CHANGE UP (ref 5–17)
BASOPHILS # BLD AUTO: 0.05 K/UL — SIGNIFICANT CHANGE UP (ref 0–0.2)
BASOPHILS NFR BLD AUTO: 0.5 % — SIGNIFICANT CHANGE UP (ref 0–2)
BUN SERPL-MCNC: 14 MG/DL — SIGNIFICANT CHANGE UP (ref 7–23)
CALCIUM SERPL-MCNC: 8 MG/DL — LOW (ref 8.5–10.1)
CHLORIDE SERPL-SCNC: 103 MMOL/L — SIGNIFICANT CHANGE UP (ref 96–108)
CO2 SERPL-SCNC: 24 MMOL/L — SIGNIFICANT CHANGE UP (ref 22–31)
CREAT SERPL-MCNC: 0.87 MG/DL — SIGNIFICANT CHANGE UP (ref 0.5–1.3)
CULTURE RESULTS: NO GROWTH — SIGNIFICANT CHANGE UP
EOSINOPHIL # BLD AUTO: 0.21 K/UL — SIGNIFICANT CHANGE UP (ref 0–0.5)
EOSINOPHIL NFR BLD AUTO: 2.3 % — SIGNIFICANT CHANGE UP (ref 0–6)
GLUCOSE BLDC GLUCOMTR-MCNC: 104 MG/DL — HIGH (ref 70–99)
GLUCOSE BLDC GLUCOMTR-MCNC: 181 MG/DL — HIGH (ref 70–99)
GLUCOSE BLDC GLUCOMTR-MCNC: 249 MG/DL — HIGH (ref 70–99)
GLUCOSE SERPL-MCNC: 239 MG/DL — HIGH (ref 70–99)
HCT VFR BLD CALC: 34.8 % — LOW (ref 39–50)
HGB BLD-MCNC: 11.7 G/DL — LOW (ref 13–17)
IMM GRANULOCYTES NFR BLD AUTO: 0.1 % — SIGNIFICANT CHANGE UP (ref 0–1.5)
LIDOCAIN IGE QN: 37 U/L — LOW (ref 73–393)
LYMPHOCYTES # BLD AUTO: 3.95 K/UL — HIGH (ref 1–3.3)
LYMPHOCYTES # BLD AUTO: 42.5 % — SIGNIFICANT CHANGE UP (ref 13–44)
MCHC RBC-ENTMCNC: 27.8 PG — SIGNIFICANT CHANGE UP (ref 27–34)
MCHC RBC-ENTMCNC: 33.6 GM/DL — SIGNIFICANT CHANGE UP (ref 32–36)
MCV RBC AUTO: 82.7 FL — SIGNIFICANT CHANGE UP (ref 80–100)
MONOCYTES # BLD AUTO: 0.65 K/UL — SIGNIFICANT CHANGE UP (ref 0–0.9)
MONOCYTES NFR BLD AUTO: 7 % — SIGNIFICANT CHANGE UP (ref 2–14)
NEUTROPHILS # BLD AUTO: 4.43 K/UL — SIGNIFICANT CHANGE UP (ref 1.8–7.4)
NEUTROPHILS NFR BLD AUTO: 47.6 % — SIGNIFICANT CHANGE UP (ref 43–77)
PLATELET # BLD AUTO: 346 K/UL — SIGNIFICANT CHANGE UP (ref 150–400)
POTASSIUM SERPL-MCNC: 3.5 MMOL/L — SIGNIFICANT CHANGE UP (ref 3.5–5.3)
POTASSIUM SERPL-SCNC: 3.5 MMOL/L — SIGNIFICANT CHANGE UP (ref 3.5–5.3)
RBC # BLD: 4.21 M/UL — SIGNIFICANT CHANGE UP (ref 4.2–5.8)
RBC # FLD: 13.6 % — SIGNIFICANT CHANGE UP (ref 10.3–14.5)
SODIUM SERPL-SCNC: 138 MMOL/L — SIGNIFICANT CHANGE UP (ref 135–145)
SPECIMEN SOURCE: SIGNIFICANT CHANGE UP
WBC # BLD: 9.3 K/UL — SIGNIFICANT CHANGE UP (ref 3.8–10.5)
WBC # FLD AUTO: 9.3 K/UL — SIGNIFICANT CHANGE UP (ref 3.8–10.5)

## 2018-07-16 PROCEDURE — 99233 SBSQ HOSP IP/OBS HIGH 50: CPT

## 2018-07-16 RX ORDER — MORPHINE SULFATE 50 MG/1
2 CAPSULE, EXTENDED RELEASE ORAL EVERY 4 HOURS
Qty: 0 | Refills: 0 | Status: DISCONTINUED | OUTPATIENT
Start: 2018-07-16 | End: 2018-07-18

## 2018-07-16 RX ADMIN — MORPHINE SULFATE 2 MILLIGRAM(S): 50 CAPSULE, EXTENDED RELEASE ORAL at 18:25

## 2018-07-16 RX ADMIN — PIPERACILLIN AND TAZOBACTAM 25 GRAM(S): 4; .5 INJECTION, POWDER, LYOPHILIZED, FOR SOLUTION INTRAVENOUS at 18:06

## 2018-07-16 RX ADMIN — Medication 2: at 08:13

## 2018-07-16 RX ADMIN — PIPERACILLIN AND TAZOBACTAM 25 GRAM(S): 4; .5 INJECTION, POWDER, LYOPHILIZED, FOR SOLUTION INTRAVENOUS at 10:30

## 2018-07-16 RX ADMIN — PIPERACILLIN AND TAZOBACTAM 25 GRAM(S): 4; .5 INJECTION, POWDER, LYOPHILIZED, FOR SOLUTION INTRAVENOUS at 02:06

## 2018-07-16 RX ADMIN — Medication 5 UNIT(S): at 16:29

## 2018-07-16 RX ADMIN — INSULIN GLARGINE 15 UNIT(S): 100 INJECTION, SOLUTION SUBCUTANEOUS at 22:07

## 2018-07-16 RX ADMIN — ONDANSETRON 4 MILLIGRAM(S): 8 TABLET, FILM COATED ORAL at 06:58

## 2018-07-16 RX ADMIN — ALBUTEROL 1 PUFF(S): 90 AEROSOL, METERED ORAL at 08:19

## 2018-07-16 RX ADMIN — Medication 400 MILLIGRAM(S): at 14:05

## 2018-07-16 RX ADMIN — Medication 5 UNIT(S): at 08:13

## 2018-07-16 RX ADMIN — MORPHINE SULFATE 2 MILLIGRAM(S): 50 CAPSULE, EXTENDED RELEASE ORAL at 14:22

## 2018-07-16 RX ADMIN — Medication 0.5 MILLIGRAM(S): at 22:15

## 2018-07-16 RX ADMIN — ESCITALOPRAM OXALATE 5 MILLIGRAM(S): 10 TABLET, FILM COATED ORAL at 11:40

## 2018-07-16 RX ADMIN — MORPHINE SULFATE 2 MILLIGRAM(S): 50 CAPSULE, EXTENDED RELEASE ORAL at 08:15

## 2018-07-16 RX ADMIN — FAMOTIDINE 20 MILLIGRAM(S): 10 INJECTION INTRAVENOUS at 06:58

## 2018-07-16 RX ADMIN — Medication 2: at 11:40

## 2018-07-16 RX ADMIN — Medication 400 MILLIGRAM(S): at 22:03

## 2018-07-16 RX ADMIN — MORPHINE SULFATE 2 MILLIGRAM(S): 50 CAPSULE, EXTENDED RELEASE ORAL at 14:07

## 2018-07-16 RX ADMIN — ONDANSETRON 4 MILLIGRAM(S): 8 TABLET, FILM COATED ORAL at 11:39

## 2018-07-16 RX ADMIN — ALBUTEROL 1 PUFF(S): 90 AEROSOL, METERED ORAL at 18:08

## 2018-07-16 RX ADMIN — MORPHINE SULFATE 2 MILLIGRAM(S): 50 CAPSULE, EXTENDED RELEASE ORAL at 22:30

## 2018-07-16 RX ADMIN — MORPHINE SULFATE 2 MILLIGRAM(S): 50 CAPSULE, EXTENDED RELEASE ORAL at 08:30

## 2018-07-16 RX ADMIN — MORPHINE SULFATE 2 MILLIGRAM(S): 50 CAPSULE, EXTENDED RELEASE ORAL at 22:03

## 2018-07-16 RX ADMIN — FAMOTIDINE 20 MILLIGRAM(S): 10 INJECTION INTRAVENOUS at 18:06

## 2018-07-16 RX ADMIN — Medication 5 UNIT(S): at 11:40

## 2018-07-16 RX ADMIN — Medication 400 MILLIGRAM(S): at 06:58

## 2018-07-16 RX ADMIN — MORPHINE SULFATE 2 MILLIGRAM(S): 50 CAPSULE, EXTENDED RELEASE ORAL at 18:10

## 2018-07-16 NOTE — PROGRESS NOTE ADULT - SUBJECTIVE AND OBJECTIVE BOX
Patient is a 37y old  Male who presents with a chief complaint of abdominal pain and vomiting (15 Jul 2018 18:11)      INTERVAL HPI/ OVERNIGHT EVENTS: Pt was seen and examined at bedside today, No significant overnight events, pt cont to have abdominal pain 7/10, no diarrhea, n/V      MEDICATIONS  (STANDING):  dextrose 5%. 1000 milliLiter(s) (50 mL/Hr) IV Continuous <Continuous>  dextrose 50% Injectable 12.5 Gram(s) IV Push once  escitalopram 5 milliGRAM(s) Oral daily  famotidine    Tablet 20 milliGRAM(s) Oral two times a day  insulin glargine Injectable (LANTUS) 15 Unit(s) SubCutaneous at bedtime  insulin lispro (HumaLOG) corrective regimen sliding scale   SubCutaneous three times a day before meals  insulin lispro (HumaLOG) corrective regimen sliding scale   SubCutaneous at bedtime  insulin lispro Injectable (HumaLOG) 5 Unit(s) SubCutaneous three times a day before meals  mesalamine DR Capsule 400 milliGRAM(s) Oral three times a day  piperacillin/tazobactam IVPB. 3.375 Gram(s) IV Intermittent every 8 hours    MEDICATIONS  (PRN):  ALBUTerol    90 MICROgram(s) HFA Inhaler 1 Puff(s) Inhalation every 4 hours PRN Shortness of Breath and/or Wheezing  clonazePAM Tablet 0.5 milliGRAM(s) Oral two times a day PRN anxiety  dextrose 40% Gel 15 Gram(s) Oral once PRN Blood Glucose LESS THAN 70 milliGRAM(s)/deciliter  glucagon  Injectable 1 milliGRAM(s) IntraMuscular once PRN Glucose LESS THAN 70 milligrams/deciliter  morphine  - Injectable 2 milliGRAM(s) IV Push every 4 hours PRN Severe Pain (7 - 10)      Allergies    No Known Allergies    Intolerances        REVIEW OF SYSTEMS:    Unable to examine due to [ ] Altered Mental Status [ ] Advanced Dementia [ ] Expressive Aphasia [ ] Non-verbal patient    CONSTITUTIONAL: No fever, NO generalized weakness/Fatigue, No weight loss  EYES: No eye pain, visual disturbances, or discharge  ENMT:  No difficulty hearing, tinnitus, vertigo; No sinus or throat pain  NECK: No pain or stiffness  RESPIRATORY: No shortness of breath,  cough, wheezing, sputum or hemoptysis   CARDIOVASCULAR: No chest pain, palpitations, or leg swelling  GASTROINTESTINAL: + abdominal pain. No nausea, vomiting, diarrhea or constipation. No melena or hematochezia.  GENITOURINARY: No dysuria, frequency, hematuria, or incontinence  NEUROLOGICAL: No headaches, Dizziness, memory loss, loss of strength, numbness, or tremors  SKIN: No itching, burning, rashes, or lesions   MUSCULOSKELETAL: No joint pain or swelling; No muscle, back, or extremity pain  PSYCHIATRIC: No depression, anxiety, mood swings, or difficulty sleeping  HEME/LYMPH: No easy bruising, or bleeding gums      Vital Signs Last 24 Hrs  T(C): 36.4 (2018 13:41), Max: 36.9 (15 Jul 2018 23:34)  T(F): 97.5 (2018 13:41), Max: 98.4 (15 Jul 2018 23:34)  HR: 73 (2018 13:41) (69 - 100)  BP: 118/75 (2018 13:41) (118/75 - 142/78)  BP(mean): --  RR: 18 (2018 13:41) (16 - 19)  SpO2: 98% (2018 13:41) (95% - 100%)    PHYSICAL EXAM:  GENERAL: NAD, well-developed, well-groomed  HEAD:  Atraumatic, Normocephalic  EYES: conjunctiva and sclera clear  ENMT: Moist mucous membranes  NECK: Supple, No JVD, Normal thyroid  CHEST/LUNG: Clear to Auscultation bilaterally; No rales, rhonchi, wheezing, or rubs  HEART: Regular rate and rhythm; No murmurs, rubs, or gallops  ABDOMEN: Soft, + tenderness in RUQ, Nondistended; Bowel sounds present  EXTREMITIES:  2+ Peripheral Pulses, No clubbing, cyanosis, or edema  SKIN: No rashes or lesions  NERVOUS SYSTEM:  Alert & Oriented X3, Good concentration; Motor Strength 5/5 B/L upper and lower extremities    LABS:                        11.7   9.30  )-----------( 346      ( 2018 06:23 )             34.8     07-16    138  |  103  |  14  ----------------------------<  239<H>  3.5   |  24  |  0.87    Ca    8.0<L>      2018 06:23    TPro  8.0  /  Alb  3.9  /  TBili  0.4  /  DBili  x   /  AST  18  /  ALT  26  /  AlkPhos  86  07-15      Urinalysis Basic - ( 15 Jul 2018 15:56 )    Color: Yellow / Appearance: Clear / S.015 / pH: x  Gluc: x / Ketone: Negative  / Bili: Negative / Urobili: Negative mg/dL   Blood: x / Protein: 30 mg/dL / Nitrite: Negative   Leuk Esterase: Trace / RBC: x / WBC 3-5   Sq Epi: x / Non Sq Epi: x / Bacteria: Few      CAPILLARY BLOOD GLUCOSE      POCT Blood Glucose.: 104 mg/dL (2018 16:26)  POCT Blood Glucose.: 249 mg/dL (2018 11:37)  POCT Blood Glucose.: 228 mg/dL (2018 07:49)  POCT Blood Glucose.: 170 mg/dL (15 Jul 2018 21:52)  POCT Blood Glucose.: 82 mg/dL (15 Jul 2018 19:13)          RADIOLOGY & ADDITIONAL TESTS:          Imaging Personally Reviewed:  [ ] YES  [ ] NO    Consultant(s) Notes Reviewed:  [ ] YES  [ ] NO    Care Discussed with Consultants/Other Providers [x ] YES  [ ] NO

## 2018-07-17 DIAGNOSIS — E11.43 TYPE 2 DIABETES MELLITUS WITH DIABETIC AUTONOMIC (POLY)NEUROPATHY: ICD-10-CM

## 2018-07-17 LAB
GLUCOSE BLDC GLUCOMTR-MCNC: 149 MG/DL — HIGH (ref 70–99)
GLUCOSE BLDC GLUCOMTR-MCNC: 170 MG/DL — HIGH (ref 70–99)
GLUCOSE BLDC GLUCOMTR-MCNC: 55 MG/DL — LOW (ref 70–99)
GLUCOSE BLDC GLUCOMTR-MCNC: 63 MG/DL — LOW (ref 70–99)

## 2018-07-17 PROCEDURE — 99233 SBSQ HOSP IP/OBS HIGH 50: CPT

## 2018-07-17 PROCEDURE — 76700 US EXAM ABDOM COMPLETE: CPT | Mod: 26

## 2018-07-17 RX ORDER — MORPHINE SULFATE 50 MG/1
2 CAPSULE, EXTENDED RELEASE ORAL ONCE
Qty: 0 | Refills: 0 | Status: DISCONTINUED | OUTPATIENT
Start: 2018-07-17 | End: 2018-07-17

## 2018-07-17 RX ORDER — ONDANSETRON 8 MG/1
4 TABLET, FILM COATED ORAL ONCE
Qty: 0 | Refills: 0 | Status: COMPLETED | OUTPATIENT
Start: 2018-07-17 | End: 2018-07-17

## 2018-07-17 RX ORDER — SODIUM CHLORIDE 9 MG/ML
1000 INJECTION, SOLUTION INTRAVENOUS
Qty: 0 | Refills: 0 | Status: DISCONTINUED | OUTPATIENT
Start: 2018-07-17 | End: 2018-07-18

## 2018-07-17 RX ADMIN — PIPERACILLIN AND TAZOBACTAM 25 GRAM(S): 4; .5 INJECTION, POWDER, LYOPHILIZED, FOR SOLUTION INTRAVENOUS at 02:17

## 2018-07-17 RX ADMIN — SODIUM CHLORIDE 50 MILLILITER(S): 9 INJECTION, SOLUTION INTRAVENOUS at 17:48

## 2018-07-17 RX ADMIN — MORPHINE SULFATE 2 MILLIGRAM(S): 50 CAPSULE, EXTENDED RELEASE ORAL at 09:14

## 2018-07-17 RX ADMIN — PIPERACILLIN AND TAZOBACTAM 25 GRAM(S): 4; .5 INJECTION, POWDER, LYOPHILIZED, FOR SOLUTION INTRAVENOUS at 10:22

## 2018-07-17 RX ADMIN — FAMOTIDINE 20 MILLIGRAM(S): 10 INJECTION INTRAVENOUS at 17:49

## 2018-07-17 RX ADMIN — MORPHINE SULFATE 2 MILLIGRAM(S): 50 CAPSULE, EXTENDED RELEASE ORAL at 18:07

## 2018-07-17 RX ADMIN — MORPHINE SULFATE 2 MILLIGRAM(S): 50 CAPSULE, EXTENDED RELEASE ORAL at 02:17

## 2018-07-17 RX ADMIN — ESCITALOPRAM OXALATE 5 MILLIGRAM(S): 10 TABLET, FILM COATED ORAL at 16:26

## 2018-07-17 RX ADMIN — Medication 0.5 MILLIGRAM(S): at 13:57

## 2018-07-17 RX ADMIN — MORPHINE SULFATE 2 MILLIGRAM(S): 50 CAPSULE, EXTENDED RELEASE ORAL at 14:12

## 2018-07-17 RX ADMIN — Medication 400 MILLIGRAM(S): at 13:57

## 2018-07-17 RX ADMIN — Medication 5 UNIT(S): at 07:52

## 2018-07-17 RX ADMIN — MORPHINE SULFATE 2 MILLIGRAM(S): 50 CAPSULE, EXTENDED RELEASE ORAL at 02:32

## 2018-07-17 RX ADMIN — MORPHINE SULFATE 2 MILLIGRAM(S): 50 CAPSULE, EXTENDED RELEASE ORAL at 13:57

## 2018-07-17 RX ADMIN — MORPHINE SULFATE 2 MILLIGRAM(S): 50 CAPSULE, EXTENDED RELEASE ORAL at 20:56

## 2018-07-17 RX ADMIN — PIPERACILLIN AND TAZOBACTAM 25 GRAM(S): 4; .5 INJECTION, POWDER, LYOPHILIZED, FOR SOLUTION INTRAVENOUS at 17:49

## 2018-07-17 RX ADMIN — Medication 400 MILLIGRAM(S): at 22:31

## 2018-07-17 RX ADMIN — MORPHINE SULFATE 2 MILLIGRAM(S): 50 CAPSULE, EXTENDED RELEASE ORAL at 09:30

## 2018-07-17 RX ADMIN — FAMOTIDINE 20 MILLIGRAM(S): 10 INJECTION INTRAVENOUS at 05:58

## 2018-07-17 RX ADMIN — MORPHINE SULFATE 2 MILLIGRAM(S): 50 CAPSULE, EXTENDED RELEASE ORAL at 21:11

## 2018-07-17 RX ADMIN — ONDANSETRON 4 MILLIGRAM(S): 8 TABLET, FILM COATED ORAL at 02:48

## 2018-07-17 RX ADMIN — MORPHINE SULFATE 2 MILLIGRAM(S): 50 CAPSULE, EXTENDED RELEASE ORAL at 17:52

## 2018-07-17 RX ADMIN — Medication 5 UNIT(S): at 12:00

## 2018-07-17 RX ADMIN — Medication 400 MILLIGRAM(S): at 05:58

## 2018-07-17 NOTE — DIETITIAN INITIAL EVALUATION ADULT. - NS AS NUTRI INTERV MEDICAL AND FOOD SUPPLEMENTS
As medically appropriate, add Glucerna x 1/day (provides 220 kcal, 10 g protein)/Commercial beverage

## 2018-07-17 NOTE — DIETITIAN INITIAL EVALUATION ADULT. - NS AS NUTRI INTERV ED CONTENT
Provided nutritional counseling/educational material/Nutrition relationship to health/disease/Recommended modifications

## 2018-07-17 NOTE — PROGRESS NOTE ADULT - SUBJECTIVE AND OBJECTIVE BOX
Patient is a 37y old  Male who presents with a chief complaint of abdominal pain and vomiting (15 Jul 2018 18:11)      INTERVAL HPI/ OVERNIGHT EVENTS: Pt was seen and examined at bedside today, No significant overnight events     MEDICATIONS  (STANDING):  dextrose 5% + sodium chloride 0.45%. 1000 milliLiter(s) (50 mL/Hr) IV Continuous <Continuous>  dextrose 5%. 1000 milliLiter(s) (50 mL/Hr) IV Continuous <Continuous>  dextrose 50% Injectable 12.5 Gram(s) IV Push once  escitalopram 5 milliGRAM(s) Oral daily  famotidine    Tablet 20 milliGRAM(s) Oral two times a day  insulin lispro (HumaLOG) corrective regimen sliding scale   SubCutaneous three times a day before meals  insulin lispro (HumaLOG) corrective regimen sliding scale   SubCutaneous at bedtime  mesalamine DR Capsule 400 milliGRAM(s) Oral three times a day  piperacillin/tazobactam IVPB. 3.375 Gram(s) IV Intermittent every 8 hours    MEDICATIONS  (PRN):  ALBUTerol    90 MICROgram(s) HFA Inhaler 1 Puff(s) Inhalation every 4 hours PRN Shortness of Breath and/or Wheezing  clonazePAM Tablet 0.5 milliGRAM(s) Oral two times a day PRN anxiety  dextrose 40% Gel 15 Gram(s) Oral once PRN Blood Glucose LESS THAN 70 milliGRAM(s)/deciliter  glucagon  Injectable 1 milliGRAM(s) IntraMuscular once PRN Glucose LESS THAN 70 milligrams/deciliter  morphine  - Injectable 2 milliGRAM(s) IV Push every 4 hours PRN Severe Pain (7 - 10)      Allergies    No Known Allergies    Intolerances        REVIEW OF SYSTEMS:    Unable to examine due to [ ] Altered Mental Status [ ] Advanced Dementia [ ] Expressive Aphasia [ ] Non-verbal patient    CONSTITUTIONAL: No fever, NO generalized weakness/Fatigue, No weight loss  EYES: No eye pain, visual disturbances, or discharge  ENMT:  No difficulty hearing, tinnitus, vertigo; No sinus or throat pain  NECK: No pain or stiffness  RESPIRATORY: No shortness of breath,  cough, wheezing, sputum or hemoptysis   CARDIOVASCULAR: No chest pain, palpitations, or leg swelling  GASTROINTESTINAL: No abdominal pain. No nausea, vomiting, diarrhea or constipation. No melena or hematochezia.  GENITOURINARY: No dysuria, frequency, hematuria, or incontinence  NEUROLOGICAL: No headaches, Dizziness, memory loss, loss of strength, numbness, or tremors  SKIN: No itching, burning, rashes, or lesions   MUSCULOSKELETAL: No joint pain or swelling; No muscle, back, or extremity pain  PSYCHIATRIC: No depression, anxiety, mood swings, or difficulty sleeping  HEME/LYMPH: No easy bruising, or bleeding gums      Vital Signs Last 24 Hrs  T(C): 36.1 (17 Jul 2018 17:33), Max: 36.3 (16 Jul 2018 23:47)  T(F): 97 (17 Jul 2018 17:33), Max: 97.3 (16 Jul 2018 23:47)  HR: 67 (17 Jul 2018 17:33) (56 - 67)  BP: 145/88 (17 Jul 2018 17:33) (124/75 - 145/93)  BP(mean): --  RR: 17 (17 Jul 2018 17:33) (16 - 17)  SpO2: 99% (17 Jul 2018 17:33) (96% - 99%)    PHYSICAL EXAM:  GENERAL: NAD, well-developed, well-groomed  HEAD:  Atraumatic, Normocephalic  EYES: conjunctiva and sclera clear  ENMT: Moist mucous membranes  NECK: Supple, No JVD, Normal thyroid  CHEST/LUNG: Clear to Auscultation bilaterally; No rales, rhonchi, wheezing, or rubs  HEART: Regular rate and rhythm; No murmurs, rubs, or gallops  ABDOMEN: Soft, Nontender, Nondistended; Bowel sounds present  EXTREMITIES:  2+ Peripheral Pulses, No clubbing, cyanosis, or edema  SKIN: No rashes or lesions  NERVOUS SYSTEM:  Alert & Oriented X3, Good concentration; Motor Strength 5/5 B/L upper and lower extremities    LABS:                        11.7   9.30  )-----------( 346      ( 16 Jul 2018 06:23 )             34.8     07-16    138  |  103  |  14  ----------------------------<  239<H>  3.5   |  24  |  0.87    Ca    8.0<L>      16 Jul 2018 06:23          CAPILLARY BLOOD GLUCOSE      POCT Blood Glucose.: 55 mg/dL (17 Jul 2018 16:24)  POCT Blood Glucose.: 149 mg/dL (17 Jul 2018 11:20)  POCT Blood Glucose.: 63 mg/dL (17 Jul 2018 07:33)  POCT Blood Glucose.: 181 mg/dL (16 Jul 2018 22:06)        Culture - Urine (collected 07-15-18)  Source: .Urine Clean Catch (Midstream)  Final Report (07-16-18):    No growth        RADIOLOGY & ADDITIONAL TESTS:          Imaging Personally Reviewed:  [ ] YES  [ ] NO    Consultant(s) Notes Reviewed:  [ ] YES  [ ] NO    Care Discussed with Consultants/Other Providers [x ] YES  [ ] NO Patient is a 37y old  Male who presents with a chief complaint of abdominal pain and vomiting (15 Jul 2018 18:11)      INTERVAL HPI/ OVERNIGHT EVENTS: Pt was seen and examined at bedside today, No significant overnight events, pt continues to have abdominal pain, nausea improving, no diarrhea      MEDICATIONS  (STANDING):  dextrose 5% + sodium chloride 0.45%. 1000 milliLiter(s) (50 mL/Hr) IV Continuous <Continuous>  dextrose 5%. 1000 milliLiter(s) (50 mL/Hr) IV Continuous <Continuous>  dextrose 50% Injectable 12.5 Gram(s) IV Push once  escitalopram 5 milliGRAM(s) Oral daily  famotidine    Tablet 20 milliGRAM(s) Oral two times a day  insulin lispro (HumaLOG) corrective regimen sliding scale   SubCutaneous three times a day before meals  insulin lispro (HumaLOG) corrective regimen sliding scale   SubCutaneous at bedtime  mesalamine DR Capsule 400 milliGRAM(s) Oral three times a day  piperacillin/tazobactam IVPB. 3.375 Gram(s) IV Intermittent every 8 hours    MEDICATIONS  (PRN):  ALBUTerol    90 MICROgram(s) HFA Inhaler 1 Puff(s) Inhalation every 4 hours PRN Shortness of Breath and/or Wheezing  clonazePAM Tablet 0.5 milliGRAM(s) Oral two times a day PRN anxiety  dextrose 40% Gel 15 Gram(s) Oral once PRN Blood Glucose LESS THAN 70 milliGRAM(s)/deciliter  glucagon  Injectable 1 milliGRAM(s) IntraMuscular once PRN Glucose LESS THAN 70 milligrams/deciliter  morphine  - Injectable 2 milliGRAM(s) IV Push every 4 hours PRN Severe Pain (7 - 10)      Allergies    No Known Allergies    Intolerances        REVIEW OF SYSTEMS:    Unable to examine due to [ ] Altered Mental Status [ ] Advanced Dementia [ ] Expressive Aphasia [ ] Non-verbal patient    CONSTITUTIONAL: No fever, NO generalized weakness/Fatigue, No weight loss  EYES: No eye pain, visual disturbances, or discharge  ENMT:  No difficulty hearing, tinnitus, vertigo; No sinus or throat pain  NECK: No pain or stiffness  RESPIRATORY: No shortness of breath,  cough, wheezing, sputum or hemoptysis   CARDIOVASCULAR: No chest pain, palpitations, or leg swelling  GASTROINTESTINAL: + abdominal pain. No nausea, vomiting, diarrhea or constipation. No melena or hematochezia.  GENITOURINARY: No dysuria, frequency, hematuria, or incontinence  NEUROLOGICAL: No headaches, Dizziness, memory loss, loss of strength, numbness, or tremors  SKIN: No itching, burning, rashes, or lesions   MUSCULOSKELETAL: No joint pain or swelling; No muscle, back, or extremity pain  PSYCHIATRIC: No depression, anxiety, mood swings, or difficulty sleeping  HEME/LYMPH: No easy bruising, or bleeding gums      Vital Signs Last 24 Hrs  T(C): 36.1 (17 Jul 2018 17:33), Max: 36.3 (16 Jul 2018 23:47)  T(F): 97 (17 Jul 2018 17:33), Max: 97.3 (16 Jul 2018 23:47)  HR: 67 (17 Jul 2018 17:33) (56 - 67)  BP: 145/88 (17 Jul 2018 17:33) (124/75 - 145/93)  BP(mean): --  RR: 17 (17 Jul 2018 17:33) (16 - 17)  SpO2: 99% (17 Jul 2018 17:33) (96% - 99%)    PHYSICAL EXAM:  GENERAL: NAD, well-developed, well-groomed  HEAD:  Atraumatic, Normocephalic  EYES: conjunctiva and sclera clear  ENMT: Moist mucous membranes  NECK: Supple, No JVD, Normal thyroid  CHEST/LUNG: Clear to Auscultation bilaterally; No rales, rhonchi, wheezing, or rubs  HEART: Regular rate and rhythm; No murmurs, rubs, or gallops  ABDOMEN: Soft, RUQ tenderness on palpation, Nondistended; Bowel sounds present  EXTREMITIES:  2+ Peripheral Pulses, No clubbing, cyanosis, or edema  SKIN: No rashes or lesions  NERVOUS SYSTEM:  Alert & Oriented X3, Good concentration; Motor Strength 5/5 B/L upper and lower extremities    LABS:                        11.7   9.30  )-----------( 346      ( 16 Jul 2018 06:23 )             34.8     07-16    138  |  103  |  14  ----------------------------<  239<H>  3.5   |  24  |  0.87    Ca    8.0<L>      16 Jul 2018 06:23          CAPILLARY BLOOD GLUCOSE      POCT Blood Glucose.: 55 mg/dL (17 Jul 2018 16:24)  POCT Blood Glucose.: 149 mg/dL (17 Jul 2018 11:20)  POCT Blood Glucose.: 63 mg/dL (17 Jul 2018 07:33)  POCT Blood Glucose.: 181 mg/dL (16 Jul 2018 22:06)        Culture - Urine (collected 07-15-18)  Source: .Urine Clean Catch (Midstream)  Final Report (07-16-18):    No growth        RADIOLOGY & ADDITIONAL TESTS:          Imaging Personally Reviewed:  [ ] YES  [ ] NO    Consultant(s) Notes Reviewed:  [x ] YES  [ ] NO    Care Discussed with Consultants/Other Providers [x ] YES  [ ] NO

## 2018-07-17 NOTE — CONSULT NOTE ADULT - ASSESSMENT
38 yo BM admitted for abdominal pain ,and vomiting secondary to gastroparesis. Colitis least likely based on hx and ct finding. DM uncontrolled

## 2018-07-17 NOTE — DIETITIAN INITIAL EVALUATION ADULT. - PERTINENT LABORATORY DATA
07-16 Na138 mmol/L Glu 239 mg/dL<H> K+ 3.5 mmol/L Cr  0.87 mg/dL BUN 14 mg/dL, Lipase 37L; POCT: 228, 249, 104, 181; 07-15 Alb 3.9 g/dL 06-25 [id number] 8.5 %<H>

## 2018-07-17 NOTE — DIETITIAN INITIAL EVALUATION ADULT. - OTHER INFO
Pt seen for consult - Food Insecurity. Pt lives c dad; independent c ADL. Takes Lantus & Novolog to control T1DM at home. Multiple admissions over last year; hx DKA , gastroparesis, recurrent abdominal issues. As reported on medical rounds, pt c inflamed colon; GI work-up pending. Discussed importance of regular meals to stabilize BG & avoid lows; reviewed hypoglycemia protocol & importance of eating after taking insulin. Suggested simple items pt can eat for breakfast (PBJ, grilled cheese, protein bar, Glucerna) since he usually doesn't eat until lunch. Reviewed complex vs simple CHOs, importance of CHO portion control,  Denies any N/V/C/D; resolved at this time; no chew/swallowing difficulty. Also addressed food insecurity situation; program explained, rx given.

## 2018-07-17 NOTE — DIETITIAN INITIAL EVALUATION ADULT. - NS AS NUTRI INTERV MEALS SNACK
Carbohydrate - modified diet/As medically appropriate, advance to solid diet; recommend CCHO c snack + Low fiber diets

## 2018-07-17 NOTE — CONSULT NOTE ADULT - SUBJECTIVE AND OBJECTIVE BOX
Chief Complaint:  Patient is a 37y old  Male who presents with a chief complaint of abdominal pain and vomiting       HPI:  38 y/o male with PMHx of IDDM, Anxiety, Gastroparesis, Asthma, and recent hospitalization for Colitis s/p completion of 14 day abx therapy that presents to the ED for a 2 day history of worsening upper abdominal pain, nausea, and vomiting.  Patient states that his symptoms started after eating pork chops a couple of days ago.  He states that he began to have 9/10 squeezing upper abdominal pain that was intermittent, associated with diarrhea and relieved transiently with emesis.  Patient also describes chills.  He denies blood per rectum or hematemesis.BM normal. Has never been treated for colitis.Colonoscopy and  upper gastrointestinal endoscopy 2 years ago normal.    ED course  Patient is found to have an elevated WBC and Lactate with elevated HR.  He is bolused with LR and started on Zosyn.  CT abdomen demonstrates colitis. (15 Jul 2018 18:11)      PMH/PSH:PAST MEDICAL & SURGICAL HISTORY:  Uncomplicated asthma, unspecified asthma severity, unspecified whether persistent  Gastritis, presence of bleeding unspecified, unspecified chronicity, unspecified gastritis type  Controlled diabetes mellitus type 1 without complications  Anxiety  Gastritis  Asthma  DM type 1 (diabetes mellitus, type 1)  History of cholecystectomy  S/P cholecystectomy      Allergies:  No Known Allergies      Medications:  ALBUTerol    90 MICROgram(s) HFA Inhaler 1 Puff(s) Inhalation every 4 hours PRN  clonazePAM Tablet 0.5 milliGRAM(s) Oral two times a day PRN  dextrose 40% Gel 15 Gram(s) Oral once PRN  dextrose 5%. 1000 milliLiter(s) IV Continuous <Continuous>  dextrose 50% Injectable 12.5 Gram(s) IV Push once  escitalopram 5 milliGRAM(s) Oral daily  famotidine    Tablet 20 milliGRAM(s) Oral two times a day  glucagon  Injectable 1 milliGRAM(s) IntraMuscular once PRN  insulin glargine Injectable (LANTUS) 15 Unit(s) SubCutaneous at bedtime  insulin lispro (HumaLOG) corrective regimen sliding scale   SubCutaneous three times a day before meals  insulin lispro (HumaLOG) corrective regimen sliding scale   SubCutaneous at bedtime  insulin lispro Injectable (HumaLOG) 5 Unit(s) SubCutaneous three times a day before meals  mesalamine DR Capsule 400 milliGRAM(s) Oral three times a day  morphine  - Injectable 2 milliGRAM(s) IV Push every 4 hours PRN  piperacillin/tazobactam IVPB. 3.375 Gram(s) IV Intermittent every 8 hours      Review of Systems:    General:  No weight loss, fevers, chills, night sweats, fatigue,   Eyes:  Good vision, no reported pain  ENT:  No sore throat, pain, runny nose, dysphagia  CV:  No pain, palpitations, hypo/hypertension  Resp:  No dyspnea, cough, tachypnea, wheezing  GI:  No pain, No nausea, No vomiting, No diarrhea, No constipation, No pruritis, No rectal bleeding, No tarry stools, No dysphagia,  :  No pain, bleeding, incontinence, nocturia  Muscle:  No pain, weakness  Breast:  No pain, abscess, mass, discharge  Neuro:  No weakness, tingling, memory problems  Psych:  No fatigue, insomnia, mood problems, depression  Endocrine:  No polyuria, polydypsia, cold/heat intolerance  Heme:  No petechiae, ecchymosis, easy bruisability  Skin:  No rash, tattoos, scars, edema    FAMILY HISTORY:  Family history of diabetes mellitus (DM) (Father, Mother, Grandparent)  Family history of diabetes mellitus (Father, Mother)      Relevant Social History: Alcohol( ), Tobacco( )    Physical Exam:    Vital Signs:  Vital Signs Last 24 Hrs  T(C): 36.1 (2018 06:12), Max: 36.6 (2018 17:35)  T(F): 97 (2018 06:12), Max: 97.8 (2018 17:35)  HR: 56 (2018 06:12) (56 - 73)  BP: 124/75 (2018 06:12) (118/75 - 145/93)  BP(mean): --  RR: 16 (2018 06:12) (16 - 18)  SpO2: 96% (2018 06:12) (96% - 98%)  Daily     Daily Weight in k.7 (2018 10:18)    General:  Appears stated age, well-groomed, well-nourished, no distress  HEENT:  NC/AT,  conjunctivae clear and pink, no thyromegaly, nodules, adenopathy, no JVD  Chest:  Full & symmetric excursion, no increased effort, breath sounds clear  Cardiovascular:  Regular rhythm, S1, S2, no murmur/rub/S3/S4, no abdominal bruit, no edema  Abdomen:  Soft, non-tender, non-distended, normoactive bowel sounds,  no masses , no hepatosplenomegaly, no signs of chronic liver disease  Extremities:  no cyanosis, clubbing or edema  Skin:  No rash/erythema/ecchymoses/petechiae/wounds/abscess/warm/dry  Neuro/Psych:  Alert, oriented, no asterixis, no tremor, no encephalopathy  Rectal: no masses, no blood, normal tone     Laboratory:                          11.7   9.30  )-----------( 346      ( 2018 06:23 )             34.8     07-16    138  |  103  |  14  ----------------------------<  239<H>  3.5   |  24  |  0.87    Ca    8.0<L>      2018 06:23    TPro  8.0  /  Alb  3.9  /  TBili  0.4  /  DBili  x   /  AST  18  /  ALT  26  /  AlkPhos  86  07-15    LIVER FUNCTIONS - ( 15 Jul 2018 15:56 )  Alb: 3.9 g/dL / Pro: 8.0 gm/dL / ALK PHOS: 86 U/L / ALT: 26 U/L / AST: 18 U/L / GGT: x             Urinalysis Basic - ( 15 Jul 2018 15:56 )    Color: Yellow / Appearance: Clear / S.015 / pH: x  Gluc: x / Ketone: Negative  / Bili: Negative / Urobili: Negative mg/dL   Blood: x / Protein: 30 mg/dL / Nitrite: Negative   Leuk Esterase: Trace / RBC: x / WBC 3-5   Sq Epi: x / Non Sq Epi: x / Bacteria: Few            Lipase serum37 U/L<L>             Lipase serum49 U/L<L>             Intake and Output    18 @ 07:01  -  18 @ 07:00  --------------------------------------------------------  IN: 500 mL / OUT: 350 mL / NET: 150 mL    18 @ 07:01  -  18 @ 11:49  --------------------------------------------------------  IN: 100 mL / OUT: 0 mL / NET: 100 mL        Imaging:    < from: CT Abdomen and Pelvis w/ Oral Cont and w/ IV Cont (07.15.18 @ 17:15) >  EXAM:  CT ABDOMEN AND PELVIS OC IC                            PROCEDURE DATE:  07/15/2018          INTERPRETATION:  CLINICAL INFORMATION: Abdominal pain, vomiting and   diarrhea    COMPARISON: CT of the abdomen pelvis dated 2018.    PROCEDURE:  CT of the Abdomen and Pelvis was performed with intravenous contrast.   Intravenous contrast: 96 ml Omnipaque 350. 4 ml discarded.  Oral contrast: positive contrast was administered.  Sagittal and coronal reformats were performed.    FINDINGS:    LOWER CHEST: Within normal limits.    LIVER: Within normal limits.  BILE DUCTS: Normal caliber.  GALLBLADDER: Status post cholecystectomy.  SPLEEN: Within normal limits.  PANCREAS: Within normal limits.  ADRENALS: Within normal limits.  KIDNEYS/URETERS: Within normal limits.    BLADDER: Underdistended with questionable wall thickening.  REPRODUCTIVE ORGANS: Within normal limits.    BOWEL: No bowel obstruction. The there is mild wall thickening of the   colon versus underdistention. Appendix is normal.  PERITONEUM: No ascites.  VESSELS:  Within normal limits.  RETROPERITONEUM: No lymphadenopathy.    ABDOMINAL WALL: Mild periumbilical skin thickening, nonspecific.  BONES: Within normal limits.    IMPRESSION: Underdistention versus mild thickeningof the colon. No bowel   obstruction. Normal appendix.    Under distended urinary bladder with questionable wall thickening.   Correlate with urinalysis.                    YOLANDA FREEMAN M.D., ATTENDING RADIOLOGIST  This document has been electronically signed. Jul 15 2018  5:24PM        < end of copied text >

## 2018-07-17 NOTE — DIETITIAN INITIAL EVALUATION ADULT. - ORAL INTAKE PTA
Pt reports good appetite normally but at times difficulty eating due to recurrent episodes of abdominal pain; N/V/fair

## 2018-07-17 NOTE — DIETITIAN INITIAL EVALUATION ADULT. - ENERGY NEEDS
Height (cm): 175.26 (07-15)  Weight (kg): 80.6 (07-15)  BMI (kg/m2): 26.2 (07-15)  IBW: 72.7 kg +/- 10%    % IBW:  111%      UBW:  stable       %UBW: 100%

## 2018-07-18 ENCOUNTER — TRANSCRIPTION ENCOUNTER (OUTPATIENT)
Age: 38
End: 2018-07-18

## 2018-07-18 VITALS
DIASTOLIC BLOOD PRESSURE: 88 MMHG | HEART RATE: 61 BPM | OXYGEN SATURATION: 96 % | TEMPERATURE: 97 F | RESPIRATION RATE: 18 BRPM | SYSTOLIC BLOOD PRESSURE: 148 MMHG

## 2018-07-18 LAB
GLUCOSE BLDC GLUCOMTR-MCNC: 158 MG/DL — HIGH (ref 70–99)
GLUCOSE BLDC GLUCOMTR-MCNC: 359 MG/DL — HIGH (ref 70–99)

## 2018-07-18 PROCEDURE — 99239 HOSP IP/OBS DSCHRG MGMT >30: CPT

## 2018-07-18 RX ORDER — CIPROFLOXACIN LACTATE 400MG/40ML
1 VIAL (ML) INTRAVENOUS
Qty: 11 | Refills: 0 | OUTPATIENT
Start: 2018-07-18 | End: 2018-07-28

## 2018-07-18 RX ORDER — MESALAMINE 400 MG
2 TABLET, DELAYED RELEASE (ENTERIC COATED) ORAL
Qty: 180 | Refills: 0
Start: 2018-07-18 | End: 2018-08-16

## 2018-07-18 RX ORDER — CLONAZEPAM 1 MG
1 TABLET ORAL
Qty: 10 | Refills: 0 | OUTPATIENT
Start: 2018-07-18 | End: 2018-07-22

## 2018-07-18 RX ORDER — METOCLOPRAMIDE HCL 10 MG
1 TABLET ORAL
Qty: 56 | Refills: 0 | OUTPATIENT
Start: 2018-07-18 | End: 2018-07-31

## 2018-07-18 RX ORDER — ENOXAPARIN SODIUM 100 MG/ML
15 INJECTION SUBCUTANEOUS
Qty: 1 | Refills: 0 | OUTPATIENT
Start: 2018-07-18 | End: 2018-08-16

## 2018-07-18 RX ADMIN — ESCITALOPRAM OXALATE 5 MILLIGRAM(S): 10 TABLET, FILM COATED ORAL at 12:37

## 2018-07-18 RX ADMIN — Medication 400 MILLIGRAM(S): at 06:10

## 2018-07-18 RX ADMIN — MORPHINE SULFATE 2 MILLIGRAM(S): 50 CAPSULE, EXTENDED RELEASE ORAL at 14:39

## 2018-07-18 RX ADMIN — MORPHINE SULFATE 2 MILLIGRAM(S): 50 CAPSULE, EXTENDED RELEASE ORAL at 11:22

## 2018-07-18 RX ADMIN — Medication 400 MILLIGRAM(S): at 16:30

## 2018-07-18 RX ADMIN — MORPHINE SULFATE 2 MILLIGRAM(S): 50 CAPSULE, EXTENDED RELEASE ORAL at 12:22

## 2018-07-18 RX ADMIN — Medication 5: at 07:59

## 2018-07-18 RX ADMIN — PIPERACILLIN AND TAZOBACTAM 25 GRAM(S): 4; .5 INJECTION, POWDER, LYOPHILIZED, FOR SOLUTION INTRAVENOUS at 10:43

## 2018-07-18 RX ADMIN — Medication 0.5 MILLIGRAM(S): at 08:05

## 2018-07-18 RX ADMIN — MORPHINE SULFATE 2 MILLIGRAM(S): 50 CAPSULE, EXTENDED RELEASE ORAL at 15:39

## 2018-07-18 RX ADMIN — FAMOTIDINE 20 MILLIGRAM(S): 10 INJECTION INTRAVENOUS at 06:10

## 2018-07-18 RX ADMIN — MORPHINE SULFATE 2 MILLIGRAM(S): 50 CAPSULE, EXTENDED RELEASE ORAL at 06:22

## 2018-07-18 RX ADMIN — PIPERACILLIN AND TAZOBACTAM 25 GRAM(S): 4; .5 INJECTION, POWDER, LYOPHILIZED, FOR SOLUTION INTRAVENOUS at 02:23

## 2018-07-18 NOTE — DISCHARGE NOTE ADULT - SECONDARY DIAGNOSIS.
Gastroparesis diabeticorum Type 1 diabetes mellitus with complication Gastritis, presence of bleeding unspecified, unspecified chronicity, unspecified gastritis type

## 2018-07-18 NOTE — DISCHARGE NOTE ADULT - CARE PLAN
Principal Discharge DX:	Colitis  Secondary Diagnosis:	Gastroparesis diabeticorum  Secondary Diagnosis:	Type 1 diabetes mellitus with complication  Secondary Diagnosis:	Gastritis, presence of bleeding unspecified, unspecified chronicity, unspecified gastritis type Principal Discharge DX:	Colitis  Goal:	Complete antibiotics.  Assessment and plan of treatment:	continue with Levaquin 500mg for 11 days.   Follow up with GI Dr. Kumar within one week.  Secondary Diagnosis:	Gastroparesis diabeticorum  Assessment and plan of treatment:	Continue with Reglan as needed for nausea  Secondary Diagnosis:	Type 1 diabetes mellitus with complication  Assessment and plan of treatment:	Continue with insulin as prescribed.   Follow up with PCP for management  Secondary Diagnosis:	Gastritis, presence of bleeding unspecified, unspecified chronicity, unspecified gastritis type  Goal:	Continue with protonix.

## 2018-07-18 NOTE — DISCHARGE NOTE ADULT - PATIENT PORTAL LINK FT
You can access the "[employer] Patient Portal, offered by [facility], by registering with the following website: [email or web address]

## 2018-07-18 NOTE — PROGRESS NOTE ADULT - SUBJECTIVE AND OBJECTIVE BOX
Gastroenterolgy  Patient seen and examined bedside resting comfortably.  No complaints offered.   No abdominal pain  Denies nausea and vomiting. Tolerating diet.  Normal flatus/BM.     T(F): 97.2 (07-18-18 @ 11:20), Max: 97.3 (07-18-18 @ 06:14)  HR: 65 (07-18-18 @ 11:20) (56 - 67)  BP: 136/79 (07-18-18 @ 11:20) (131/88 - 145/88)  RR: 16 (07-18-18 @ 11:20) (16 - 17)  SpO2: 96% (07-18-18 @ 11:20) (96% - 99%)  Wt(kg): --  CAPILLARY BLOOD GLUCOSE      POCT Blood Glucose.: 158 mg/dL (18 Jul 2018 11:55)  POCT Blood Glucose.: 359 mg/dL (18 Jul 2018 07:58)  POCT Blood Glucose.: 170 mg/dL (17 Jul 2018 22:32)  POCT Blood Glucose.: 55 mg/dL (17 Jul 2018 16:24)    PHYSICAL EXAM:  General: NAD, WDWN.   Neuro:  Alert and responsive  HEENT: NCAT, EOMI, conjunctiva clear  CV: +S1+S2 regular rate and rhythm  Lung: clear to ausculation bilaterally, respirations nonlabored, good inspiratory effort  Abdomen: soft, NonTender, No distention Normal active BS  Extremities: no cyanosis, clubbing or edema    LABS:              I&O's Detail    17 Jul 2018 07:01  -  18 Jul 2018 07:00  --------------------------------------------------------  IN:    dextrose 5% + sodium chloride 0.45%.: 1600 mL    Oral Fluid: 780 mL    Solution: 300 mL  Total IN: 2680 mL    OUT:    Voided: 1450 mL  Total OUT: 1450 mL    Total NET: 1230 mL      18 Jul 2018 07:01  -  18 Jul 2018 14:53  --------------------------------------------------------  IN:    Oral Fluid: 240 mL  Total IN: 240 mL    OUT:  Total OUT: 0 mL    Total NET: 240 mL

## 2018-07-18 NOTE — DISCHARGE NOTE ADULT - HOSPITAL COURSE
38 y/o male with PMHx of IDDM, Anxiety, Gastroparesis, Asthma, and recent hospitalization for Colitis s/p completion of 14 day abx therapy that presents to the ED for a 2 day history of worsening upper abdominal pain, nausea, and vomiting.  Patient states that his symptoms started after eating pork chops a couple of days ago.  He states that he began to have 9/10 squeezing upper abdominal pain that was intermittent, associated with diarrhea and relieved transiently with emesis.  Patient also describes chills.  He denies blood per rectum or hematemesis    ED course  Patient is found to have an elevated WBC and Lactate with elevated HR.  He is bolused with LR and started on Zosyn.  CT abdomen demonstrates colitis. 36 y/o male with PMHx of IDDM, Anxiety, Gastroparesis, Asthma, and recent hospitalization for Colitis s/p completion of 14 day abx therapy that presents to the ED for a 2 day history of worsening upper abdominal pain, nausea, and vomiting.  Patient states that his symptoms started after eating pork chops a couple of days ago.  He states that he began to have 9/10 squeezing upper abdominal pain that was intermittent, associated with diarrhea and relieved transiently with emesis.  Patient also describes chills.  He denies blood per rectum or hematemesis    ED course: Patient is found to have an elevated WBC and Lactate with elevated HR.  He is bolused with LR and started on Zosyn.  CT abdomen demonstrates colitis.      The patient was admitted to medical bed and IV antibiotics were continued. Gastroenterology was consulted and followed the patient. He was started on Reglan. The Patient's nausea and abdominal pain improved. He was able to tolerate regular consistency food. The patient is recommended to have an EGD as outpatient.

## 2018-07-18 NOTE — PROGRESS NOTE ADULT - PROBLEM SELECTOR PLAN 2
continue antibiotics
hypoglycemic episodes, will hold lantus and nutritional insulin
cont w/ same mgmt

## 2018-07-18 NOTE — PROGRESS NOTE ADULT - SUBJECTIVE AND OBJECTIVE BOX
Gastroenterolgy  Patient seen and examined bedside resting comfortably.  No complaints offered.   No abdominal pain  Denies nausea and vomiting. Tolerating diet.  Normal flatus/BM.     T(F): 97.2 (07-18-18 @ 11:20), Max: 97.3 (07-18-18 @ 06:14)  HR: 65 (07-18-18 @ 11:20) (56 - 67)  BP: 136/79 (07-18-18 @ 11:20) (131/88 - 145/88)  RR: 16 (07-18-18 @ 11:20) (16 - 17)  SpO2: 96% (07-18-18 @ 11:20) (96% - 99%)  Wt(kg): --  CAPILLARY BLOOD GLUCOSE      POCT Blood Glucose.: 158 mg/dL (18 Jul 2018 11:55)  POCT Blood Glucose.: 359 mg/dL (18 Jul 2018 07:58)  POCT Blood Glucose.: 170 mg/dL (17 Jul 2018 22:32)  POCT Blood Glucose.: 55 mg/dL (17 Jul 2018 16:24)    PHYSICAL EXAM:  General: NAD, WDWN.   Neuro:  Alert and responsive  HEENT: NCAT, EOMI, conjunctiva clear  CV: +S1+S2 regular rate and rhythm  Lung: clear to ausculation bilaterally, respirations nonlabored, good inspiratory effort  Abdomen: soft, NonTender, No distention Normal active BS  Extremities: no cyanosis, clubbing or edema    LABS:              I&O's Detail    17 Jul 2018 07:01  -  18 Jul 2018 07:00  --------------------------------------------------------  IN:    dextrose 5% + sodium chloride 0.45%.: 1600 mL    Oral Fluid: 780 mL    Solution: 300 mL  Total IN: 2680 mL    OUT:    Voided: 1450 mL  Total OUT: 1450 mL    Total NET: 1230 mL      18 Jul 2018 07:01  -  18 Jul 2018 14:57  --------------------------------------------------------  IN:    Oral Fluid: 240 mL  Total IN: 240 mL    OUT:  Total OUT: 0 mL    Total NET: 240 mL

## 2018-07-18 NOTE — DISCHARGE NOTE ADULT - MEDICATION SUMMARY - MEDICATIONS TO TAKE
I will START or STAY ON the medications listed below when I get home from the hospital:    mesalamine 400 mg oral delayed release capsule  -- 2 cap(s) by mouth 3 times a day  -- Indication: For Colitis    oxyCODONE-acetaminophen 5 mg-325 mg oral tablet  -- 1 tab(s) by mouth every 6 hours, As Needed -Moderate Pain (4 - 6) MDD:maximum dose 8 tablets  -- Indication: For Severe pain     clonazePAM 0.5 mg oral tablet  -- 1 tab(s) by mouth 2 times a day, As Needed -for agitation MDD:2 tabs   -- Indication: For Anxiety    Lexapro 5 mg oral tablet  -- 1 tab(s) by mouth once a day (at bedtime)   -- Indication: For Anxiety    insulin lispro 100 units/mL injectable solution  -- 5 unit(s) subcutaneous 3 times a day (before meals) DISP ONE MONTH SUPPLY   -- Indication: For Controlled diabetes mellitus type 1 without complications    Lantus Solostar Pen 100 units/mL subcutaneous solution  -- 15 unit(s) subcutaneous once a day (at bedtime)   -- Do not drink alcoholic beverages when taking this medication.  It is very important that you take or use this exactly as directed.  Do not skip doses or discontinue unless directed by your doctor.  Keep in refrigerator.  Do not freeze.    -- Indication: For Controlled diabetes mellitus type 1 without complications    Reglan 10 mg oral tablet  -- 1 tab(s) by mouth every 6 hours, As Needed -for nausea   -- It is very important that you take or use this exactly as directed.  Do not skip doses or discontinue unless directed by your doctor.  May cause drowsiness.  Alcohol may intensify this effect.  Use care when operating dangerous machinery.  Take medication on an empty stomach 1 hour before or 2 to 3 hours after a meal unless otherwise directed by your doctor.    -- Indication: For Gastroparesis diabeticorum    Ventolin HFA 90 mcg/inh inhalation aerosol  -- 2 puff(s) inhaled 4 times a day, As Needed  -- Indication: For Uncomplicated asthma, unspecified asthma severity, unspecified whether persistent    Acidophilus oral capsule  -- 1 cap(s) by mouth once a day   -- Indication: For Diarrhea, probiotic     pantoprazole 40 mg oral delayed release tablet  -- 1 tab(s) by mouth once a day (before a meal)  -- Indication: For Gastritis, presence of bleeding unspecified, unspecified chronicity, unspecified gastritis type    Levaquin 500 mg oral tablet  -- 1 tab(s) by mouth once a day   -- Avoid prolonged or excessive exposure to direct and/or artificial sunlight while taking this medication.  Do not take dairy products, antacids, or iron preparations within one hour of this medication.  Finish all this medication unless otherwise directed by prescriber.  May cause drowsiness or dizziness.  Medication should be taken with plenty of water.    -- Indication: For Colitis

## 2018-07-18 NOTE — PROGRESS NOTE ADULT - PROBLEM SELECTOR PROBLEM 2
Colitis
Controlled diabetes mellitus type 1 without complications
Controlled diabetes mellitus type 1 without complications

## 2018-07-18 NOTE — PROGRESS NOTE ADULT - PROBLEM SELECTOR PLAN 1
Reglan 10 qid
Sepsis POA, CT abdomen demonstrates bowel wall thickening, afebrile, no leukocytosis, GI consult appreciated, Abd US benign, reglan started, cont IV abx, EGD as outpt, discharge planning
Sepsis POA, CT abdomen demonstrates bowel wall thickening, afebrile, no leukocytosis, cont with IV zosyn, f/u GI consult

## 2018-07-18 NOTE — DISCHARGE NOTE ADULT - PLAN OF CARE
Complete antibiotics. continue with Levaquin 500mg for 11 days.   Follow up with GI Dr. Kumar within one week. Continue with Reglan as needed for nausea Continue with insulin as prescribed.   Follow up with PCP for management Continue with protonix.

## 2018-07-18 NOTE — DISCHARGE NOTE ADULT - CARE PROVIDER_API CALL
Dr. Cain,   Phone: (   )    -  Fax: (   )    -    Octavio Kumar (MD), [facility]  [address] [place] [address]  Phone: [phone]  Fax: [phone]

## 2018-07-18 NOTE — DISCHARGE NOTE ADULT - MEDICATION SUMMARY - MEDICATIONS TO STOP TAKING
I will STOP taking the medications listed below when I get home from the hospital:    metroNIDAZOLE 500 mg oral tablet  -- 1 tab(s) by mouth 3 times a day    Cipro 500 mg oral tablet  -- 1 tab(s) by mouth every 12 hours

## 2018-07-18 NOTE — DISCHARGE NOTE ADULT - OTHER SIGNIFICANT FINDINGS
< from: CT Abdomen and Pelvis w/ Oral Cont and w/ IV Cont (07.15.18 @ 17:15) >  FINDINGS:    LOWER CHEST: Within normal limits.    LIVER: Within normal limits.  BILE DUCTS: Normal caliber.  GALLBLADDER: Status post cholecystectomy.  SPLEEN: Within normal limits.  PANCREAS: Within normal limits.  ADRENALS: Within normal limits.  KIDNEYS/URETERS: Within normal limits.    BLADDER: Underdistended with questionable wall thickening.  REPRODUCTIVE ORGANS: Within normal limits.    BOWEL: No bowel obstruction. The there is mild wall thickening of the   colon versus underdistention. Appendix is normal.  PERITONEUM: No ascites.  VESSELS:  Within normal limits.  RETROPERITONEUM: No lymphadenopathy.    ABDOMINAL WALL: Mild periumbilical skin thickening, nonspecific.  BONES: Within normal limits.    IMPRESSION: Underdistention versus mild thickeningof the colon. No bowel   obstruction. Normal appendix.    Under distended urinary bladder with questionable wall thickening.   Correlate with urinalysis.    < from: US Abdomen Complete (07.17.18 @ 08:56) >  FINDINGS:    Liver: The liver is normal in size and texture. No hepatic mass or   dilatation of the intrahepatic biliary tree.    Bile ducts: Normal caliber. Common bile duct measures 2.5mm.     Gallbladder: S/P prior cholecystectomy.        Pancreas: Visualized portions are within normal limits.    Spleen: 8.9cm. Within normal limits.    Right kidney: 10.3cm. No hydronephrosis.     Left kidney: 10.8cm.  No hydronephrosis.     Ascites: None.    Aorta and IVC: Visualized portions are within normal limits.    IMPRESSION:   1. S/P prior cholecystectomy.  2. Otherwise normal abdominal ultrasound.

## 2018-07-25 DIAGNOSIS — F41.9 ANXIETY DISORDER, UNSPECIFIED: ICD-10-CM

## 2018-07-25 DIAGNOSIS — K31.84 GASTROPARESIS: ICD-10-CM

## 2018-07-25 DIAGNOSIS — A41.9 SEPSIS, UNSPECIFIED ORGANISM: ICD-10-CM

## 2018-07-25 DIAGNOSIS — K52.9 NONINFECTIVE GASTROENTERITIS AND COLITIS, UNSPECIFIED: ICD-10-CM

## 2018-07-25 DIAGNOSIS — Z79.4 LONG TERM (CURRENT) USE OF INSULIN: ICD-10-CM

## 2018-07-25 DIAGNOSIS — J45.909 UNSPECIFIED ASTHMA, UNCOMPLICATED: ICD-10-CM

## 2018-07-25 DIAGNOSIS — E10.43 TYPE 1 DIABETES MELLITUS WITH DIABETIC AUTONOMIC (POLY)NEUROPATHY: ICD-10-CM

## 2018-08-20 ENCOUNTER — INPATIENT (INPATIENT)
Facility: HOSPITAL | Age: 38
LOS: 3 days | Discharge: HOME | End: 2018-08-24
Attending: INTERNAL MEDICINE | Admitting: INTERNAL MEDICINE
Payer: MEDICAID

## 2018-08-20 VITALS
OXYGEN SATURATION: 99 % | HEIGHT: 69 IN | SYSTOLIC BLOOD PRESSURE: 143 MMHG | HEART RATE: 63 BPM | RESPIRATION RATE: 20 BRPM | DIASTOLIC BLOOD PRESSURE: 88 MMHG | TEMPERATURE: 98 F

## 2018-08-20 DIAGNOSIS — E11.43 TYPE 2 DIABETES MELLITUS WITH DIABETIC AUTONOMIC (POLY)NEUROPATHY: ICD-10-CM

## 2018-08-20 DIAGNOSIS — K29.70 GASTRITIS, UNSPECIFIED, WITHOUT BLEEDING: ICD-10-CM

## 2018-08-20 DIAGNOSIS — J45.909 UNSPECIFIED ASTHMA, UNCOMPLICATED: ICD-10-CM

## 2018-08-20 DIAGNOSIS — Z90.49 ACQUIRED ABSENCE OF OTHER SPECIFIED PARTS OF DIGESTIVE TRACT: Chronic | ICD-10-CM

## 2018-08-20 DIAGNOSIS — E10.9 TYPE 1 DIABETES MELLITUS WITHOUT COMPLICATIONS: ICD-10-CM

## 2018-08-20 DIAGNOSIS — F41.9 ANXIETY DISORDER, UNSPECIFIED: ICD-10-CM

## 2018-08-20 LAB
ACETONE SERPL-MCNC: SIGNIFICANT CHANGE UP
ALBUMIN SERPL ELPH-MCNC: 4.2 G/DL — SIGNIFICANT CHANGE UP (ref 3.3–5)
ALP SERPL-CCNC: 120 U/L — SIGNIFICANT CHANGE UP (ref 40–120)
ALT FLD-CCNC: 30 U/L — SIGNIFICANT CHANGE UP (ref 12–78)
ANION GAP SERPL CALC-SCNC: 11 MMOL/L — SIGNIFICANT CHANGE UP (ref 5–17)
AST SERPL-CCNC: 23 U/L — SIGNIFICANT CHANGE UP (ref 15–37)
BASE EXCESS BLDA CALC-SCNC: 3.8 MMOL/L — HIGH (ref -2–2)
BILIRUB SERPL-MCNC: 0.5 MG/DL — SIGNIFICANT CHANGE UP (ref 0.2–1.2)
BLOOD GAS COMMENTS: SIGNIFICANT CHANGE UP
BLOOD GAS COMMENTS: SIGNIFICANT CHANGE UP
BLOOD GAS SOURCE: SIGNIFICANT CHANGE UP
BUN SERPL-MCNC: 16 MG/DL — SIGNIFICANT CHANGE UP (ref 7–23)
CALCIUM SERPL-MCNC: 9.3 MG/DL — SIGNIFICANT CHANGE UP (ref 8.5–10.1)
CHLORIDE SERPL-SCNC: 103 MMOL/L — SIGNIFICANT CHANGE UP (ref 96–108)
CK MB CFR SERPL CALC: 1.4 NG/ML — SIGNIFICANT CHANGE UP (ref 0.5–3.6)
CO2 SERPL-SCNC: 24 MMOL/L — SIGNIFICANT CHANGE UP (ref 22–31)
CREAT SERPL-MCNC: 1.01 MG/DL — SIGNIFICANT CHANGE UP (ref 0.5–1.3)
GLUCOSE BLDC GLUCOMTR-MCNC: 184 MG/DL — HIGH (ref 70–99)
GLUCOSE BLDC GLUCOMTR-MCNC: 209 MG/DL — HIGH (ref 70–99)
GLUCOSE BLDC GLUCOMTR-MCNC: 223 MG/DL — HIGH (ref 70–99)
GLUCOSE SERPL-MCNC: 192 MG/DL — HIGH (ref 70–99)
HCO3 BLDA-SCNC: 28 MMOL/L — SIGNIFICANT CHANGE UP (ref 21–29)
HCT VFR BLD CALC: 46 % — SIGNIFICANT CHANGE UP (ref 39–50)
HGB BLD-MCNC: 15.4 G/DL — SIGNIFICANT CHANGE UP (ref 13–17)
HOROWITZ INDEX BLDA+IHG-RTO: 0.21 — SIGNIFICANT CHANGE UP
LACTATE SERPL-SCNC: 1 MMOL/L — SIGNIFICANT CHANGE UP (ref 0.7–2)
LIDOCAIN IGE QN: 63 U/L — LOW (ref 73–393)
MCHC RBC-ENTMCNC: 28 PG — SIGNIFICANT CHANGE UP (ref 27–34)
MCHC RBC-ENTMCNC: 33.5 GM/DL — SIGNIFICANT CHANGE UP (ref 32–36)
MCV RBC AUTO: 83.6 FL — SIGNIFICANT CHANGE UP (ref 80–100)
NRBC # BLD: 0 /100 WBCS — SIGNIFICANT CHANGE UP (ref 0–0)
PCO2 BLDA: 40 MMHG — SIGNIFICANT CHANGE UP (ref 32–46)
PH BLD: 7.45 — SIGNIFICANT CHANGE UP (ref 7.35–7.45)
PLATELET # BLD AUTO: 390 K/UL — SIGNIFICANT CHANGE UP (ref 150–400)
PO2 BLDA: 88 MMHG — SIGNIFICANT CHANGE UP (ref 74–108)
POTASSIUM SERPL-MCNC: 5 MMOL/L — SIGNIFICANT CHANGE UP (ref 3.5–5.3)
POTASSIUM SERPL-SCNC: 5 MMOL/L — SIGNIFICANT CHANGE UP (ref 3.5–5.3)
PROT SERPL-MCNC: 8.8 GM/DL — HIGH (ref 6–8.3)
RBC # BLD: 5.5 M/UL — SIGNIFICANT CHANGE UP (ref 4.2–5.8)
RBC # FLD: 14.3 % — SIGNIFICANT CHANGE UP (ref 10.3–14.5)
SAO2 % BLDA: 97 % — HIGH (ref 92–96)
SODIUM SERPL-SCNC: 138 MMOL/L — SIGNIFICANT CHANGE UP (ref 135–145)
TROPONIN I SERPL-MCNC: <.015 NG/ML — SIGNIFICANT CHANGE UP (ref 0.01–0.04)
WBC # BLD: 11 K/UL — HIGH (ref 3.8–10.5)
WBC # FLD AUTO: 11 K/UL — HIGH (ref 3.8–10.5)

## 2018-08-20 PROCEDURE — 99285 EMERGENCY DEPT VISIT HI MDM: CPT

## 2018-08-20 PROCEDURE — 74177 CT ABD & PELVIS W/CONTRAST: CPT | Mod: 26

## 2018-08-20 PROCEDURE — 99223 1ST HOSP IP/OBS HIGH 75: CPT

## 2018-08-20 RX ORDER — DEXTROSE 50 % IN WATER 50 %
15 SYRINGE (ML) INTRAVENOUS ONCE
Qty: 0 | Refills: 0 | Status: DISCONTINUED | OUTPATIENT
Start: 2018-08-20 | End: 2018-08-24

## 2018-08-20 RX ORDER — METRONIDAZOLE 500 MG
500 TABLET ORAL ONCE
Qty: 0 | Refills: 0 | Status: COMPLETED | OUTPATIENT
Start: 2018-08-20 | End: 2018-08-20

## 2018-08-20 RX ORDER — FAMOTIDINE 10 MG/ML
20 INJECTION INTRAVENOUS ONCE
Qty: 0 | Refills: 0 | Status: COMPLETED | OUTPATIENT
Start: 2018-08-20 | End: 2018-08-20

## 2018-08-20 RX ORDER — METRONIDAZOLE 500 MG
500 TABLET ORAL EVERY 8 HOURS
Qty: 0 | Refills: 0 | Status: DISCONTINUED | OUTPATIENT
Start: 2018-08-20 | End: 2018-08-24

## 2018-08-20 RX ORDER — SODIUM CHLORIDE 9 MG/ML
1000 INJECTION, SOLUTION INTRAVENOUS
Qty: 0 | Refills: 0 | Status: DISCONTINUED | OUTPATIENT
Start: 2018-08-20 | End: 2018-08-24

## 2018-08-20 RX ORDER — INSULIN GLARGINE 100 [IU]/ML
10 INJECTION, SOLUTION SUBCUTANEOUS AT BEDTIME
Qty: 0 | Refills: 0 | Status: DISCONTINUED | OUTPATIENT
Start: 2018-08-20 | End: 2018-08-24

## 2018-08-20 RX ORDER — DEXTROSE 50 % IN WATER 50 %
25 SYRINGE (ML) INTRAVENOUS ONCE
Qty: 0 | Refills: 0 | Status: DISCONTINUED | OUTPATIENT
Start: 2018-08-20 | End: 2018-08-24

## 2018-08-20 RX ORDER — GLUCAGON INJECTION, SOLUTION 0.5 MG/.1ML
1 INJECTION, SOLUTION SUBCUTANEOUS ONCE
Qty: 0 | Refills: 0 | Status: DISCONTINUED | OUTPATIENT
Start: 2018-08-20 | End: 2018-08-24

## 2018-08-20 RX ORDER — LACTOBACILLUS ACIDOPHILUS 100MM CELL
1 CAPSULE ORAL DAILY
Qty: 0 | Refills: 0 | Status: DISCONTINUED | OUTPATIENT
Start: 2018-08-20 | End: 2018-08-24

## 2018-08-20 RX ORDER — FAMOTIDINE 10 MG/ML
20 INJECTION INTRAVENOUS ONCE
Qty: 0 | Refills: 0 | Status: DISCONTINUED | OUTPATIENT
Start: 2018-08-20 | End: 2018-08-20

## 2018-08-20 RX ORDER — ESCITALOPRAM OXALATE 10 MG/1
5 TABLET, FILM COATED ORAL DAILY
Qty: 0 | Refills: 0 | Status: DISCONTINUED | OUTPATIENT
Start: 2018-08-20 | End: 2018-08-24

## 2018-08-20 RX ORDER — INSULIN LISPRO 100/ML
VIAL (ML) SUBCUTANEOUS
Qty: 0 | Refills: 0 | Status: DISCONTINUED | OUTPATIENT
Start: 2018-08-20 | End: 2018-08-24

## 2018-08-20 RX ORDER — CLONAZEPAM 1 MG
0.5 TABLET ORAL
Qty: 0 | Refills: 0 | Status: DISCONTINUED | OUTPATIENT
Start: 2018-08-20 | End: 2018-08-24

## 2018-08-20 RX ORDER — IOHEXOL 300 MG/ML
30 INJECTION, SOLUTION INTRAVENOUS ONCE
Qty: 0 | Refills: 0 | Status: COMPLETED | OUTPATIENT
Start: 2018-08-20 | End: 2018-08-20

## 2018-08-20 RX ORDER — MORPHINE SULFATE 50 MG/1
6 CAPSULE, EXTENDED RELEASE ORAL EVERY 4 HOURS
Qty: 0 | Refills: 0 | Status: DISCONTINUED | OUTPATIENT
Start: 2018-08-20 | End: 2018-08-24

## 2018-08-20 RX ORDER — SODIUM CHLORIDE 9 MG/ML
1000 INJECTION INTRAMUSCULAR; INTRAVENOUS; SUBCUTANEOUS ONCE
Qty: 0 | Refills: 0 | Status: COMPLETED | OUTPATIENT
Start: 2018-08-20 | End: 2018-08-20

## 2018-08-20 RX ORDER — DEXTROSE 50 % IN WATER 50 %
12.5 SYRINGE (ML) INTRAVENOUS ONCE
Qty: 0 | Refills: 0 | Status: DISCONTINUED | OUTPATIENT
Start: 2018-08-20 | End: 2018-08-24

## 2018-08-20 RX ORDER — MORPHINE SULFATE 50 MG/1
6 CAPSULE, EXTENDED RELEASE ORAL ONCE
Qty: 0 | Refills: 0 | Status: DISCONTINUED | OUTPATIENT
Start: 2018-08-20 | End: 2018-08-20

## 2018-08-20 RX ORDER — PANTOPRAZOLE SODIUM 20 MG/1
40 TABLET, DELAYED RELEASE ORAL DAILY
Qty: 0 | Refills: 0 | Status: DISCONTINUED | OUTPATIENT
Start: 2018-08-20 | End: 2018-08-22

## 2018-08-20 RX ORDER — CIPROFLOXACIN LACTATE 400MG/40ML
400 VIAL (ML) INTRAVENOUS ONCE
Qty: 0 | Refills: 0 | Status: COMPLETED | OUTPATIENT
Start: 2018-08-20 | End: 2018-08-20

## 2018-08-20 RX ORDER — METOCLOPRAMIDE HCL 10 MG
10 TABLET ORAL ONCE
Qty: 0 | Refills: 0 | Status: COMPLETED | OUTPATIENT
Start: 2018-08-20 | End: 2018-08-20

## 2018-08-20 RX ORDER — INSULIN LISPRO 100/ML
VIAL (ML) SUBCUTANEOUS AT BEDTIME
Qty: 0 | Refills: 0 | Status: DISCONTINUED | OUTPATIENT
Start: 2018-08-20 | End: 2018-08-24

## 2018-08-20 RX ORDER — ALBUTEROL 90 UG/1
2 AEROSOL, METERED ORAL EVERY 6 HOURS
Qty: 0 | Refills: 0 | Status: DISCONTINUED | OUTPATIENT
Start: 2018-08-20 | End: 2018-08-24

## 2018-08-20 RX ORDER — MESALAMINE 400 MG
400 TABLET, DELAYED RELEASE (ENTERIC COATED) ORAL
Qty: 0 | Refills: 0 | Status: DISCONTINUED | OUTPATIENT
Start: 2018-08-20 | End: 2018-08-24

## 2018-08-20 RX ORDER — CIPROFLOXACIN LACTATE 400MG/40ML
400 VIAL (ML) INTRAVENOUS EVERY 12 HOURS
Qty: 0 | Refills: 0 | Status: DISCONTINUED | OUTPATIENT
Start: 2018-08-20 | End: 2018-08-24

## 2018-08-20 RX ORDER — METOCLOPRAMIDE HCL 10 MG
10 TABLET ORAL EVERY 6 HOURS
Qty: 0 | Refills: 0 | Status: DISCONTINUED | OUTPATIENT
Start: 2018-08-20 | End: 2018-08-24

## 2018-08-20 RX ADMIN — SODIUM CHLORIDE 1000 MILLILITER(S): 9 INJECTION INTRAMUSCULAR; INTRAVENOUS; SUBCUTANEOUS at 11:21

## 2018-08-20 RX ADMIN — Medication 100 MILLIGRAM(S): at 16:02

## 2018-08-20 RX ADMIN — Medication 100 MILLIGRAM(S): at 22:04

## 2018-08-20 RX ADMIN — Medication 0.5 MILLIGRAM(S): at 19:01

## 2018-08-20 RX ADMIN — SODIUM CHLORIDE 100 MILLILITER(S): 9 INJECTION, SOLUTION INTRAVENOUS at 17:13

## 2018-08-20 RX ADMIN — INSULIN GLARGINE 10 UNIT(S): 100 INJECTION, SOLUTION SUBCUTANEOUS at 22:29

## 2018-08-20 RX ADMIN — IOHEXOL 30 MILLILITER(S): 300 INJECTION, SOLUTION INTRAVENOUS at 12:00

## 2018-08-20 RX ADMIN — Medication 10 MILLIGRAM(S): at 18:32

## 2018-08-20 RX ADMIN — FAMOTIDINE 104 MILLIGRAM(S): 10 INJECTION INTRAVENOUS at 11:20

## 2018-08-20 RX ADMIN — MORPHINE SULFATE 6 MILLIGRAM(S): 50 CAPSULE, EXTENDED RELEASE ORAL at 11:24

## 2018-08-20 RX ADMIN — ALBUTEROL 2 PUFF(S): 90 AEROSOL, METERED ORAL at 22:05

## 2018-08-20 RX ADMIN — MORPHINE SULFATE 6 MILLIGRAM(S): 50 CAPSULE, EXTENDED RELEASE ORAL at 16:02

## 2018-08-20 RX ADMIN — Medication 200 MILLIGRAM(S): at 16:58

## 2018-08-20 RX ADMIN — MORPHINE SULFATE 6 MILLIGRAM(S): 50 CAPSULE, EXTENDED RELEASE ORAL at 15:24

## 2018-08-20 RX ADMIN — Medication 2: at 17:17

## 2018-08-20 RX ADMIN — Medication 10 MILLIGRAM(S): at 11:20

## 2018-08-20 NOTE — ED PROVIDER NOTE - OBJECTIVE STATEMENT
ROS: negative for fever, cough, headache, chest pain, shortness of breath, diarrhea, rash, paresthesia, and weakness--all other systems reviewed are negative.   PMH: IDDM, Anxiety, Gastroparesis, Asthma; Meds: See EMR; SH: Denies smoking/drinking/drug use 38 yo M with abd pain, n/v for a few days, nothing makes it better, no specific inciting event.  Feels like his gastroparesis.  No other complaints.   ROS: negative for fever, cough, headache, chest pain, shortness of breath, diarrhea, rash, paresthesia, and weakness--all other systems reviewed are negative.   PMH: IDDM, Anxiety, Gastroparesis, Asthma; Meds: See EMR; SH: Denies smoking/drinking/drug use

## 2018-08-20 NOTE — H&P ADULT - HISTORY OF PRESENT ILLNESS
·38 yo M with abd pain, n/v for a few days, nothing makes it better, no specific inciting event.  Feels like his gastroparesis.  No other complaints.

## 2018-08-20 NOTE — ED PROVIDER NOTE - MEDICAL DECISION MAKING DETAILS
38 yo M with diabetic gastroparesis  -r/o complication, ct abd/pel ++ contrast, basic labs, lipase, trop, ckmb, abg, ketones, ekg, ns hydration, pepcid, reglan, morphine  -f/u results, reeval

## 2018-08-20 NOTE — ED PROVIDER NOTE - PHYSICAL EXAMINATION
Vitals: WNL  Gen: AAOx3, NAD, sitting uncomfortably in stretcher, moaning  Head: ncat, perrla, eomi b/l  Neck: supple, no lymphadenopathy, no midline deviation  Heart: rrr, no m/r/g  Lungs: CTA b/l, no rales/ronchi/wheezes  Abd: soft, nontender, non-distended, no rebound or guarding  Ext: no clubbing/cyanosis/edema  Neuro: sensation and muscle strength intact b/l, steady gait Vitals: WNL  Gen: AAOx3, NAD, sitting uncomfortably in stretcher, moaning  Head: ncat, perrla, eomi b/l  Neck: supple, no lymphadenopathy, no midline deviation  Heart: rrr, no m/r/g  Lungs: CTA b/l, no rales/ronchi/wheezes  Abd: soft, tender in epigastrium, non-distended, no rebound or guarding  Ext: no clubbing/cyanosis/edema  Neuro: sensation and muscle strength intact b/l, steady gait

## 2018-08-20 NOTE — PATIENT PROFILE ADULT. - VISION (WITH CORRECTIVE LENSES IF THE PATIENT USUALLY WEARS THEM):
Normal vision: sees adequately in most situations; can see medication labels, newsprint/distance glasses home

## 2018-08-20 NOTE — ED ADULT TRIAGE NOTE - CHIEF COMPLAINT QUOTE
pt complaining of abdominal pain, nausea and vomiting since last night. history of gastroparesis and diabetes

## 2018-08-20 NOTE — H&P ADULT - ASSESSMENT
37m with history of diabetes with recurrent abd pain 37m with history of diabetes with recurrent abd pain     IMPROVE VTE Individual Risk Assessment          RISK                                                          Points    [  ] Previous VTE                                                3    [  ] Thrombophilia                                             2    [  ] Lower limb paralysis                                    2        (unable to hold up >15 seconds)      [  ] Current Cancer                                             2         (within 6 months)    [  ] Immobilization > 24 hrs                              1    [  ] ICU/CCU stay > 24 hours                            1    [  ] Age > 60                                                    1    IMPROVE VTE Score _________0

## 2018-08-21 LAB
ANION GAP SERPL CALC-SCNC: 9 MMOL/L — SIGNIFICANT CHANGE UP (ref 5–17)
BUN SERPL-MCNC: 15 MG/DL — SIGNIFICANT CHANGE UP (ref 7–23)
CALCIUM SERPL-MCNC: 8.5 MG/DL — SIGNIFICANT CHANGE UP (ref 8.5–10.1)
CHLORIDE SERPL-SCNC: 106 MMOL/L — SIGNIFICANT CHANGE UP (ref 96–108)
CO2 SERPL-SCNC: 28 MMOL/L — SIGNIFICANT CHANGE UP (ref 22–31)
CREAT SERPL-MCNC: 0.95 MG/DL — SIGNIFICANT CHANGE UP (ref 0.5–1.3)
GLUCOSE BLDC GLUCOMTR-MCNC: 157 MG/DL — HIGH (ref 70–99)
GLUCOSE SERPL-MCNC: 166 MG/DL — HIGH (ref 70–99)
HCT VFR BLD CALC: 38.8 % — LOW (ref 39–50)
HGB BLD-MCNC: 12.9 G/DL — LOW (ref 13–17)
MCHC RBC-ENTMCNC: 28.2 PG — SIGNIFICANT CHANGE UP (ref 27–34)
MCHC RBC-ENTMCNC: 33.2 GM/DL — SIGNIFICANT CHANGE UP (ref 32–36)
MCV RBC AUTO: 84.7 FL — SIGNIFICANT CHANGE UP (ref 80–100)
NRBC # BLD: 0 /100 WBCS — SIGNIFICANT CHANGE UP (ref 0–0)
PLATELET # BLD AUTO: 308 K/UL — SIGNIFICANT CHANGE UP (ref 150–400)
POTASSIUM SERPL-MCNC: 3.8 MMOL/L — SIGNIFICANT CHANGE UP (ref 3.5–5.3)
POTASSIUM SERPL-SCNC: 3.8 MMOL/L — SIGNIFICANT CHANGE UP (ref 3.5–5.3)
RBC # BLD: 4.58 M/UL — SIGNIFICANT CHANGE UP (ref 4.2–5.8)
RBC # FLD: 14.1 % — SIGNIFICANT CHANGE UP (ref 10.3–14.5)
SODIUM SERPL-SCNC: 143 MMOL/L — SIGNIFICANT CHANGE UP (ref 135–145)
WBC # BLD: 8.81 K/UL — SIGNIFICANT CHANGE UP (ref 3.8–10.5)
WBC # FLD AUTO: 8.81 K/UL — SIGNIFICANT CHANGE UP (ref 3.8–10.5)

## 2018-08-21 PROCEDURE — 99233 SBSQ HOSP IP/OBS HIGH 50: CPT

## 2018-08-21 RX ADMIN — Medication 10 MILLIGRAM(S): at 23:30

## 2018-08-21 RX ADMIN — ESCITALOPRAM OXALATE 5 MILLIGRAM(S): 10 TABLET, FILM COATED ORAL at 13:16

## 2018-08-21 RX ADMIN — Medication 100 MILLIGRAM(S): at 21:48

## 2018-08-21 RX ADMIN — MORPHINE SULFATE 6 MILLIGRAM(S): 50 CAPSULE, EXTENDED RELEASE ORAL at 16:35

## 2018-08-21 RX ADMIN — MORPHINE SULFATE 6 MILLIGRAM(S): 50 CAPSULE, EXTENDED RELEASE ORAL at 11:45

## 2018-08-21 RX ADMIN — Medication 0.5 MILLIGRAM(S): at 17:13

## 2018-08-21 RX ADMIN — Medication 200 MILLIGRAM(S): at 05:38

## 2018-08-21 RX ADMIN — Medication 1: at 17:13

## 2018-08-21 RX ADMIN — MORPHINE SULFATE 6 MILLIGRAM(S): 50 CAPSULE, EXTENDED RELEASE ORAL at 23:28

## 2018-08-21 RX ADMIN — Medication 10 MILLIGRAM(S): at 02:38

## 2018-08-21 RX ADMIN — SODIUM CHLORIDE 100 MILLILITER(S): 9 INJECTION, SOLUTION INTRAVENOUS at 17:14

## 2018-08-21 RX ADMIN — Medication 10 MILLIGRAM(S): at 13:16

## 2018-08-21 RX ADMIN — Medication 200 MILLIGRAM(S): at 17:14

## 2018-08-21 RX ADMIN — MORPHINE SULFATE 6 MILLIGRAM(S): 50 CAPSULE, EXTENDED RELEASE ORAL at 03:15

## 2018-08-21 RX ADMIN — Medication 10 MILLIGRAM(S): at 06:45

## 2018-08-21 RX ADMIN — Medication 100 MILLIGRAM(S): at 05:38

## 2018-08-21 RX ADMIN — Medication 100 MILLIGRAM(S): at 13:17

## 2018-08-21 RX ADMIN — MORPHINE SULFATE 6 MILLIGRAM(S): 50 CAPSULE, EXTENDED RELEASE ORAL at 16:18

## 2018-08-21 RX ADMIN — INSULIN GLARGINE 10 UNIT(S): 100 INJECTION, SOLUTION SUBCUTANEOUS at 21:48

## 2018-08-21 RX ADMIN — Medication 0.5 MILLIGRAM(S): at 05:38

## 2018-08-21 RX ADMIN — MORPHINE SULFATE 6 MILLIGRAM(S): 50 CAPSULE, EXTENDED RELEASE ORAL at 21:48

## 2018-08-21 RX ADMIN — Medication 1 TABLET(S): at 13:16

## 2018-08-21 RX ADMIN — PANTOPRAZOLE SODIUM 40 MILLIGRAM(S): 20 TABLET, DELAYED RELEASE ORAL at 13:16

## 2018-08-21 RX ADMIN — Medication 10 MILLIGRAM(S): at 17:13

## 2018-08-21 RX ADMIN — MORPHINE SULFATE 6 MILLIGRAM(S): 50 CAPSULE, EXTENDED RELEASE ORAL at 02:45

## 2018-08-21 RX ADMIN — Medication 400 MILLIGRAM(S): at 17:13

## 2018-08-21 RX ADMIN — MORPHINE SULFATE 6 MILLIGRAM(S): 50 CAPSULE, EXTENDED RELEASE ORAL at 11:28

## 2018-08-21 RX ADMIN — SODIUM CHLORIDE 100 MILLILITER(S): 9 INJECTION, SOLUTION INTRAVENOUS at 05:37

## 2018-08-21 RX ADMIN — Medication 1: at 07:53

## 2018-08-22 DIAGNOSIS — R10.13 EPIGASTRIC PAIN: ICD-10-CM

## 2018-08-22 DIAGNOSIS — K52.9 NONINFECTIVE GASTROENTERITIS AND COLITIS, UNSPECIFIED: ICD-10-CM

## 2018-08-22 PROCEDURE — 99233 SBSQ HOSP IP/OBS HIGH 50: CPT

## 2018-08-22 RX ORDER — PANTOPRAZOLE SODIUM 20 MG/1
40 TABLET, DELAYED RELEASE ORAL
Qty: 0 | Refills: 0 | Status: DISCONTINUED | OUTPATIENT
Start: 2018-08-23 | End: 2018-08-24

## 2018-08-22 RX ADMIN — MORPHINE SULFATE 6 MILLIGRAM(S): 50 CAPSULE, EXTENDED RELEASE ORAL at 10:48

## 2018-08-22 RX ADMIN — Medication 100 MILLIGRAM(S): at 21:44

## 2018-08-22 RX ADMIN — MORPHINE SULFATE 6 MILLIGRAM(S): 50 CAPSULE, EXTENDED RELEASE ORAL at 19:12

## 2018-08-22 RX ADMIN — MORPHINE SULFATE 6 MILLIGRAM(S): 50 CAPSULE, EXTENDED RELEASE ORAL at 23:49

## 2018-08-22 RX ADMIN — Medication 100 MILLIGRAM(S): at 05:32

## 2018-08-22 RX ADMIN — Medication 200 MILLIGRAM(S): at 06:11

## 2018-08-22 RX ADMIN — INSULIN GLARGINE 10 UNIT(S): 100 INJECTION, SOLUTION SUBCUTANEOUS at 21:44

## 2018-08-22 RX ADMIN — Medication 100 MILLIGRAM(S): at 13:36

## 2018-08-22 RX ADMIN — MORPHINE SULFATE 6 MILLIGRAM(S): 50 CAPSULE, EXTENDED RELEASE ORAL at 19:20

## 2018-08-22 RX ADMIN — Medication 400 MILLIGRAM(S): at 05:33

## 2018-08-22 RX ADMIN — MORPHINE SULFATE 6 MILLIGRAM(S): 50 CAPSULE, EXTENDED RELEASE ORAL at 15:50

## 2018-08-22 RX ADMIN — MORPHINE SULFATE 6 MILLIGRAM(S): 50 CAPSULE, EXTENDED RELEASE ORAL at 10:32

## 2018-08-22 RX ADMIN — SODIUM CHLORIDE 100 MILLILITER(S): 9 INJECTION, SOLUTION INTRAVENOUS at 23:48

## 2018-08-22 RX ADMIN — Medication 0.5 MILLIGRAM(S): at 05:33

## 2018-08-22 RX ADMIN — Medication 10 MILLIGRAM(S): at 05:32

## 2018-08-22 RX ADMIN — MORPHINE SULFATE 6 MILLIGRAM(S): 50 CAPSULE, EXTENDED RELEASE ORAL at 06:00

## 2018-08-22 RX ADMIN — Medication 1 TABLET(S): at 11:48

## 2018-08-22 RX ADMIN — Medication 4: at 16:52

## 2018-08-22 RX ADMIN — Medication 10 MILLIGRAM(S): at 23:37

## 2018-08-22 RX ADMIN — PANTOPRAZOLE SODIUM 40 MILLIGRAM(S): 20 TABLET, DELAYED RELEASE ORAL at 11:48

## 2018-08-22 RX ADMIN — MORPHINE SULFATE 6 MILLIGRAM(S): 50 CAPSULE, EXTENDED RELEASE ORAL at 04:47

## 2018-08-22 RX ADMIN — Medication 10 MILLIGRAM(S): at 11:48

## 2018-08-22 RX ADMIN — Medication 400 MILLIGRAM(S): at 18:03

## 2018-08-22 RX ADMIN — ESCITALOPRAM OXALATE 5 MILLIGRAM(S): 10 TABLET, FILM COATED ORAL at 11:47

## 2018-08-22 RX ADMIN — Medication 10 MILLIGRAM(S): at 18:03

## 2018-08-22 RX ADMIN — MORPHINE SULFATE 6 MILLIGRAM(S): 50 CAPSULE, EXTENDED RELEASE ORAL at 15:31

## 2018-08-22 RX ADMIN — Medication 0.5 MILLIGRAM(S): at 18:03

## 2018-08-22 RX ADMIN — SODIUM CHLORIDE 100 MILLILITER(S): 9 INJECTION, SOLUTION INTRAVENOUS at 06:10

## 2018-08-22 RX ADMIN — Medication 200 MILLIGRAM(S): at 18:03

## 2018-08-22 NOTE — CONSULT NOTE ADULT - ASSESSMENT
38 yo BM admitted with Epigastric pain , Nausea and vomiting , Hx of DM gastroparesis, Colitis on CT?

## 2018-08-22 NOTE — CONSULT NOTE ADULT - PROBLEM SELECTOR RECOMMENDATION 9
Risks, benefits and alternatives discussed at length with patient  and informed consent obtained for EGD. All questions were answered.   NPO after midnight for EGD

## 2018-08-22 NOTE — CONSULT NOTE ADULT - SUBJECTIVE AND OBJECTIVE BOX
Chief Complaint:  Patient is a 37y old  Male who presents with a chief complaint of abdominal pain (20 Aug 2018 15:53)      HPI:  ·36 yo M with abd pain, n/v for a few days, nothing makes it better, no specific inciting event.  Feels like his gastroparesis.  No other complaints.     Had EGD and colonoscopy 3-4 years ago      PMH/PSH:PAST MEDICAL & SURGICAL HISTORY:  Uncomplicated asthma, unspecified asthma severity, unspecified whether persistent  Gastritis, presence of bleeding unspecified, unspecified chronicity, unspecified gastritis type  Controlled diabetes mellitus type 1 without complications  Anxiety  Gastritis  Asthma  DM type 1 (diabetes mellitus, type 1)  History of cholecystectomy  S/P cholecystectomy      Allergies:  No Known Allergies      Medications:  ALBUTerol    90 MICROgram(s) HFA Inhaler 2 Puff(s) Inhalation every 6 hours PRN  ciprofloxacin   IVPB 400 milliGRAM(s) IV Intermittent every 12 hours  clonazePAM Tablet 0.5 milliGRAM(s) Oral two times a day  dextrose 40% Gel 15 Gram(s) Oral once PRN  dextrose 5% + sodium chloride 0.9%. 1000 milliLiter(s) IV Continuous <Continuous>  dextrose 5%. 1000 milliLiter(s) IV Continuous <Continuous>  dextrose 50% Injectable 12.5 Gram(s) IV Push once  dextrose 50% Injectable 25 Gram(s) IV Push once  dextrose 50% Injectable 25 Gram(s) IV Push once  escitalopram 5 milliGRAM(s) Oral daily  glucagon  Injectable 1 milliGRAM(s) IntraMuscular once PRN  insulin glargine Injectable (LANTUS) 10 Unit(s) SubCutaneous at bedtime  insulin lispro (HumaLOG) corrective regimen sliding scale   SubCutaneous three times a day before meals  insulin lispro (HumaLOG) corrective regimen sliding scale   SubCutaneous at bedtime  lactobacillus acidophilus 1 Tablet(s) Oral daily  mesalamine DR Capsule 400 milliGRAM(s) Oral two times a day  metoclopramide Injectable 10 milliGRAM(s) IV Push every 6 hours  metroNIDAZOLE  IVPB 500 milliGRAM(s) IV Intermittent every 8 hours  morphine  - Injectable 6 milliGRAM(s) IV Push every 4 hours PRN  pantoprazole  Injectable 40 milliGRAM(s) IV Push daily      REVIEW OF SYSTEMS:    CONSTITUTIONAL: No weakness, fevers or chills  EYES/ENT: No visual changes;  No vertigo or throat pain   NECK: No pain or stiffness  RESPIRATORY: No cough, wheezing, hemoptysis; No shortness of breath  CARDIOVASCULAR: No chest pain or palpitations  GASTROINTESTINAL: + epigastric pain. + nausea, vomiting, No diarrhea or constipation. No melena or hematochezia.  GENITOURINARY: No dysuria, frequency or hematuria  NEUROLOGICAL: No numbness or weakness  SKIN: No itching, burning, rashes, or lesions   All other review of systems is negative unless indicated above.    Relevant Family History:   FAMILY HISTORY:  Family history of diabetes mellitus (DM) (Father, Mother, Grandparent)  Family history of diabetes mellitus (Father, Mother)      Relevant Social History:  Denies ETOH or tobacco history    Physical Exam:    Vital Signs:  Vital Signs Last 24 Hrs  T(C): 36 (22 Aug 2018 11:28), Max: 36.6 (21 Aug 2018 23:38)  T(F): 96.8 (22 Aug 2018 11:28), Max: 97.8 (21 Aug 2018 23:38)  HR: 74 (22 Aug 2018 11:28) (66 - 74)  BP: 146/84 (22 Aug 2018 11:28) (124/86 - 146/84)  BP(mean): --  RR: 18 (22 Aug 2018 11:28) (17 - 18)  SpO2: 97% (22 Aug 2018 11:28) (96% - 98%)  Daily     Daily Weight in k.3 (22 Aug 2018 04:37)    Constitutional: WDWN resting comfortably in bed; NAD  HEENT: NC/AT, PERRL, EOMI, anicteric sclera, no nasal discharge; uvula midline, no oropharyngeal erythema or exudates  Neck: supple; no JVD or thyromegaly  Respiratory: CTA B/L; no W/R/R, no retractions  Cardiac: +S1/S2; RRR; no M/R/G; PMI non-displaced  Gastrointestinal: soft, + epigastric pain no rebound or guarding; +BS   Extremities: , no clubbing or cyanosis; no peripheral edema  Musculoskeletal:  no joint swelling, tenderness or erythema  Vascular: 2+ radial, femoral, DP/PT pulses B/L  Skin: warm, dry and intact; no rashes, wounds, or scars  Neurologic: AAOx3; CNS grossly intact; no focal deficits no asterixis, no tremor, no encephalopathy    Laboratory:                          12.9   8.81  )-----------( 308      ( 21 Aug 2018 08:06 )             38.8     08-    143  |  106  |  15  ----------------------------<  166<H>  3.8   |  28  |  0.95    Ca    8.5      21 Aug 2018 08:06    TPro  8.8<H>  /  Alb  4.2  /  TBili  0.5  /  DBili  x   /  AST  23  /  ALT  30  /  AlkPhos  120      LIVER FUNCTIONS - ( 20 Aug 2018 12:40 )  Alb: 4.2 g/dL / Pro: 8.8 gm/dL / ALK PHOS: 120 U/L / ALT: 30 U/L / AST: 23 U/L / GGT: x           Lipase serum63 U/L<L>       Intake and Output    18 @ 07:01  -  18 @ 07:00  --------------------------------------------------------  IN: 3000 mL / OUT: 750 mL / NET: 2250 mL      Imaging:

## 2018-08-23 ENCOUNTER — RESULT REVIEW (OUTPATIENT)
Age: 38
End: 2018-08-23

## 2018-08-23 DIAGNOSIS — K29.90 GASTRODUODENITIS, UNSPECIFIED, WITHOUT BLEEDING: ICD-10-CM

## 2018-08-23 DIAGNOSIS — K44.9 DIAPHRAGMATIC HERNIA WITHOUT OBSTRUCTION OR GANGRENE: ICD-10-CM

## 2018-08-23 PROCEDURE — 88312 SPECIAL STAINS GROUP 1: CPT | Mod: 26

## 2018-08-23 PROCEDURE — 88305 TISSUE EXAM BY PATHOLOGIST: CPT | Mod: 26

## 2018-08-23 PROCEDURE — 99233 SBSQ HOSP IP/OBS HIGH 50: CPT

## 2018-08-23 RX ORDER — SUCRALFATE 1 G
1 TABLET ORAL
Qty: 0 | Refills: 0 | Status: DISCONTINUED | OUTPATIENT
Start: 2018-08-23 | End: 2018-08-24

## 2018-08-23 RX ADMIN — Medication 100 MILLIGRAM(S): at 21:23

## 2018-08-23 RX ADMIN — Medication 200 MILLIGRAM(S): at 17:11

## 2018-08-23 RX ADMIN — Medication 10 MILLIGRAM(S): at 23:32

## 2018-08-23 RX ADMIN — Medication 1 GRAM(S): at 19:25

## 2018-08-23 RX ADMIN — MORPHINE SULFATE 6 MILLIGRAM(S): 50 CAPSULE, EXTENDED RELEASE ORAL at 11:45

## 2018-08-23 RX ADMIN — MORPHINE SULFATE 6 MILLIGRAM(S): 50 CAPSULE, EXTENDED RELEASE ORAL at 11:26

## 2018-08-23 RX ADMIN — Medication 200 MILLIGRAM(S): at 06:34

## 2018-08-23 RX ADMIN — Medication 10 MILLIGRAM(S): at 11:36

## 2018-08-23 RX ADMIN — MORPHINE SULFATE 6 MILLIGRAM(S): 50 CAPSULE, EXTENDED RELEASE ORAL at 00:00

## 2018-08-23 RX ADMIN — Medication 2: at 17:10

## 2018-08-23 RX ADMIN — Medication 400 MILLIGRAM(S): at 17:11

## 2018-08-23 RX ADMIN — Medication 0.5 MILLIGRAM(S): at 05:15

## 2018-08-23 RX ADMIN — Medication 10 MILLIGRAM(S): at 05:15

## 2018-08-23 RX ADMIN — INSULIN GLARGINE 10 UNIT(S): 100 INJECTION, SOLUTION SUBCUTANEOUS at 21:24

## 2018-08-23 RX ADMIN — MORPHINE SULFATE 6 MILLIGRAM(S): 50 CAPSULE, EXTENDED RELEASE ORAL at 20:06

## 2018-08-23 RX ADMIN — MORPHINE SULFATE 6 MILLIGRAM(S): 50 CAPSULE, EXTENDED RELEASE ORAL at 17:04

## 2018-08-23 RX ADMIN — Medication 400 MILLIGRAM(S): at 05:15

## 2018-08-23 RX ADMIN — Medication 1: at 08:28

## 2018-08-23 RX ADMIN — Medication 1 GRAM(S): at 23:32

## 2018-08-23 RX ADMIN — MORPHINE SULFATE 6 MILLIGRAM(S): 50 CAPSULE, EXTENDED RELEASE ORAL at 15:36

## 2018-08-23 RX ADMIN — Medication 0.5 MILLIGRAM(S): at 17:50

## 2018-08-23 RX ADMIN — MORPHINE SULFATE 6 MILLIGRAM(S): 50 CAPSULE, EXTENDED RELEASE ORAL at 20:20

## 2018-08-23 RX ADMIN — SODIUM CHLORIDE 100 MILLILITER(S): 9 INJECTION, SOLUTION INTRAVENOUS at 21:24

## 2018-08-23 RX ADMIN — SODIUM CHLORIDE 100 MILLILITER(S): 9 INJECTION, SOLUTION INTRAVENOUS at 11:33

## 2018-08-23 RX ADMIN — MORPHINE SULFATE 6 MILLIGRAM(S): 50 CAPSULE, EXTENDED RELEASE ORAL at 07:00

## 2018-08-23 RX ADMIN — MORPHINE SULFATE 6 MILLIGRAM(S): 50 CAPSULE, EXTENDED RELEASE ORAL at 06:34

## 2018-08-23 RX ADMIN — Medication 100 MILLIGRAM(S): at 15:42

## 2018-08-23 RX ADMIN — Medication 1: at 21:24

## 2018-08-23 RX ADMIN — Medication 100 MILLIGRAM(S): at 05:15

## 2018-08-23 RX ADMIN — Medication 10 MILLIGRAM(S): at 17:11

## 2018-08-23 NOTE — PROGRESS NOTE ADULT - PROBLEM SELECTOR PLAN 1
Risks, benefits and alternatives discussed at length with patient  and informed consent obtained for EGD today . All questions were answered.

## 2018-08-24 ENCOUNTER — TRANSCRIPTION ENCOUNTER (OUTPATIENT)
Age: 38
End: 2018-08-24

## 2018-08-24 VITALS
HEART RATE: 69 BPM | SYSTOLIC BLOOD PRESSURE: 148 MMHG | TEMPERATURE: 97 F | RESPIRATION RATE: 14 BRPM | OXYGEN SATURATION: 98 % | DIASTOLIC BLOOD PRESSURE: 98 MMHG

## 2018-08-24 PROCEDURE — 99239 HOSP IP/OBS DSCHRG MGMT >30: CPT

## 2018-08-24 RX ORDER — CLONAZEPAM 1 MG
1 TABLET ORAL
Qty: 10 | Refills: 0 | OUTPATIENT
Start: 2018-08-24 | End: 2018-08-28

## 2018-08-24 RX ORDER — METRONIDAZOLE 500 MG
1 TABLET ORAL
Qty: 9 | Refills: 0 | OUTPATIENT
Start: 2018-08-24 | End: 2018-08-26

## 2018-08-24 RX ORDER — METOCLOPRAMIDE HCL 10 MG
1 TABLET ORAL
Qty: 28 | Refills: 0 | OUTPATIENT
Start: 2018-08-24 | End: 2018-08-30

## 2018-08-24 RX ORDER — CIPROFLOXACIN LACTATE 400MG/40ML
1 VIAL (ML) INTRAVENOUS
Qty: 6 | Refills: 0 | OUTPATIENT
Start: 2018-08-24 | End: 2018-08-26

## 2018-08-24 RX ORDER — ESCITALOPRAM OXALATE 10 MG/1
1 TABLET, FILM COATED ORAL
Qty: 5 | Refills: 0 | OUTPATIENT
Start: 2018-08-24 | End: 2018-08-28

## 2018-08-24 RX ORDER — SUCRALFATE 1 G
1 TABLET ORAL
Qty: 120 | Refills: 0 | OUTPATIENT
Start: 2018-08-24 | End: 2018-09-22

## 2018-08-24 RX ORDER — ACETAMINOPHEN 500 MG
1 TABLET ORAL
Qty: 45 | Refills: 0 | OUTPATIENT
Start: 2018-08-24 | End: 2018-09-07

## 2018-08-24 RX ADMIN — MORPHINE SULFATE 6 MILLIGRAM(S): 50 CAPSULE, EXTENDED RELEASE ORAL at 05:06

## 2018-08-24 RX ADMIN — Medication 10 MILLIGRAM(S): at 05:05

## 2018-08-24 RX ADMIN — Medication 1 TABLET(S): at 11:40

## 2018-08-24 RX ADMIN — Medication 2: at 08:16

## 2018-08-24 RX ADMIN — MORPHINE SULFATE 6 MILLIGRAM(S): 50 CAPSULE, EXTENDED RELEASE ORAL at 00:28

## 2018-08-24 RX ADMIN — Medication 1 GRAM(S): at 05:08

## 2018-08-24 RX ADMIN — Medication 1 GRAM(S): at 11:40

## 2018-08-24 RX ADMIN — MORPHINE SULFATE 6 MILLIGRAM(S): 50 CAPSULE, EXTENDED RELEASE ORAL at 05:20

## 2018-08-24 RX ADMIN — PANTOPRAZOLE SODIUM 40 MILLIGRAM(S): 20 TABLET, DELAYED RELEASE ORAL at 06:42

## 2018-08-24 RX ADMIN — Medication 100 MILLIGRAM(S): at 06:42

## 2018-08-24 RX ADMIN — MORPHINE SULFATE 6 MILLIGRAM(S): 50 CAPSULE, EXTENDED RELEASE ORAL at 00:45

## 2018-08-24 RX ADMIN — Medication 3: at 11:38

## 2018-08-24 RX ADMIN — Medication 200 MILLIGRAM(S): at 05:08

## 2018-08-24 RX ADMIN — ESCITALOPRAM OXALATE 5 MILLIGRAM(S): 10 TABLET, FILM COATED ORAL at 11:40

## 2018-08-24 RX ADMIN — MORPHINE SULFATE 6 MILLIGRAM(S): 50 CAPSULE, EXTENDED RELEASE ORAL at 09:39

## 2018-08-24 RX ADMIN — Medication 400 MILLIGRAM(S): at 05:08

## 2018-08-24 RX ADMIN — Medication 0.5 MILLIGRAM(S): at 06:42

## 2018-08-24 RX ADMIN — Medication 10 MILLIGRAM(S): at 11:37

## 2018-08-24 RX ADMIN — MORPHINE SULFATE 6 MILLIGRAM(S): 50 CAPSULE, EXTENDED RELEASE ORAL at 10:05

## 2018-08-24 NOTE — DISCHARGE NOTE ADULT - HOSPITAL COURSE
37m with history of diabetes with recurrent abdominal pain.    Ab pain/ nausea, vomiting:  - CT with mild colitis  - Nausea/ vomiting resolved, pain improved  - Continue Cipro/ Flagyl  - Continue Reglan  - Pt on mesalamine, continue now  - Continue PPI  - S/p EGD: severe gastritis, GERD, small hiatal hernia  - Continue carafate  - Colonoscopy as outpt.  - Follow up GI recc    DM:  - Continue current insulin regimen.    Depression/ anxiety:  - continue current meds    Asthma:  - continue albuterol PRN for now    DVT ppx:  - ambulation, SCDs

## 2018-08-24 NOTE — CHART NOTE - NSCHARTNOTEFT_GEN_A_CORE
Prior discharge patient asked for refill of his Klonopin and Lexapro, I called pharmacy to verify if he has any refill left and when went back to review with patient and ask him to follow up with psychiatry, but patient didn't wait any further and left. Will try to call him and ask to follow up with psychiatry.

## 2018-08-24 NOTE — DISCHARGE NOTE ADULT - MEDICATION SUMMARY - MEDICATIONS TO TAKE
I will START or STAY ON the medications listed below when I get home from the hospital:    mesalamine 400 mg oral delayed release capsule  -- 2 cap(s) by mouth 3 times a day  -- Indication: For Colitis    Flagyl 500 mg oral tablet  -- 1 tab(s) by mouth 3 times a day   -- Do not drink alcoholic beverages when taking this medication.  Finish all this medication unless otherwise directed by prescriber.  May discolor urine or feces.    -- Indication: For Colitis    Tylenol 325 mg oral capsule  -- 1 cap(s) by mouth every 8 hours, As Needed -for mild pain - for moderate pain   -- Indication: For Epigastric pain    oxyCODONE-acetaminophen 5 mg-325 mg oral tablet  -- 1 tab(s) by mouth every 6 hours, As Needed -Moderate Pain (4 - 6) MDD:maximum dose 8 tablets  -- Indication: For Epigastric pain    clonazePAM 0.5 mg oral tablet  -- 1 tab(s) by mouth 2 times a day, As Needed -for agitation MDD:2 tabs   -- Indication: For Anxiety    Lexapro 5 mg oral tablet  -- 1 tab(s) by mouth once a day (at bedtime)   -- Indication: For Anxiety    insulin lispro 100 units/mL injectable solution  -- 5 unit(s) subcutaneous 3 times a day (before meals) DISP ONE MONTH SUPPLY   -- Indication: For Dm    Lantus Solostar Pen 100 units/mL subcutaneous solution  -- 15 unit(s) subcutaneous once a day (at bedtime)   -- Do not drink alcoholic beverages when taking this medication.  It is very important that you take or use this exactly as directed.  Do not skip doses or discontinue unless directed by your doctor.  Keep in refrigerator.  Do not freeze.    -- Indication: For DM    Reglan 10 mg oral tablet  -- 1 tab(s) by mouth every 6 hours, As Needed -for nausea   -- It is very important that you take or use this exactly as directed.  Do not skip doses or discontinue unless directed by your doctor.  May cause drowsiness.  Alcohol may intensify this effect.  Use care when operating dangerous machinery.  Take medication on an empty stomach 1 hour before or 2 to 3 hours after a meal unless otherwise directed by your doctor.    -- Indication: For nausea/vomiting    Ventolin HFA 90 mcg/inh inhalation aerosol  -- 2 puff(s) inhaled 4 times a day, As Needed  -- Indication: For Uncomplicated asthma, unspecified asthma severity, unspecified whether persistent    sucralfate 1 g oral tablet  -- 1 tab(s) by mouth 4 times a day  -- Indication: For Gastritis and duodenitis    Acidophilus oral capsule  -- 1 cap(s) by mouth once a day   -- Indication: For Colitis    pantoprazole 40 mg oral delayed release tablet  -- 1 tab(s) by mouth once a day (before a meal)  -- Indication: For Gastritis and duodenitis    ciprofloxacin 500 mg oral tablet  -- 1 tab(s) by mouth 2 times a day   -- Avoid prolonged or excessive exposure to direct and/or artificial sunlight while taking this medication.  Check with your doctor before becoming pregnant.  Do not take dairy products, antacids, or iron preparations within one hour of this medication.  Finish all this medication unless otherwise directed by prescriber.  Medication should be taken with plenty of water.    -- Indication: For Colitis

## 2018-08-24 NOTE — DISCHARGE NOTE ADULT - CARE PROVIDERS DIRECT ADDRESSES
[email or web address]

## 2018-08-24 NOTE — PROGRESS NOTE ADULT - SUBJECTIVE AND OBJECTIVE BOX
Patient is a 37y old  Male who presents with a chief complaint of abdominal pain (20 Aug 2018 15:53)      INTERVAL HPI/OVERNIGHT EVENTS:  Pt was seen and examined, no acute events.    MEDICATIONS  (STANDING):  ciprofloxacin   IVPB 400 milliGRAM(s) IV Intermittent every 12 hours  clonazePAM Tablet 0.5 milliGRAM(s) Oral two times a day  dextrose 5% + sodium chloride 0.9%. 1000 milliLiter(s) (100 mL/Hr) IV Continuous <Continuous>  dextrose 5%. 1000 milliLiter(s) (50 mL/Hr) IV Continuous <Continuous>  dextrose 50% Injectable 12.5 Gram(s) IV Push once  dextrose 50% Injectable 25 Gram(s) IV Push once  dextrose 50% Injectable 25 Gram(s) IV Push once  escitalopram 5 milliGRAM(s) Oral daily  insulin glargine Injectable (LANTUS) 10 Unit(s) SubCutaneous at bedtime  insulin lispro (HumaLOG) corrective regimen sliding scale   SubCutaneous three times a day before meals  insulin lispro (HumaLOG) corrective regimen sliding scale   SubCutaneous at bedtime  lactobacillus acidophilus 1 Tablet(s) Oral daily  mesalamine DR Capsule 400 milliGRAM(s) Oral two times a day  metoclopramide Injectable 10 milliGRAM(s) IV Push every 6 hours  metroNIDAZOLE  IVPB 500 milliGRAM(s) IV Intermittent every 8 hours  pantoprazole  Injectable 40 milliGRAM(s) IV Push daily    MEDICATIONS  (PRN):  ALBUTerol    90 MICROgram(s) HFA Inhaler 2 Puff(s) Inhalation every 6 hours PRN Shortness of Breath and/or Wheezing  dextrose 40% Gel 15 Gram(s) Oral once PRN Blood Glucose LESS THAN 70 milliGRAM(s)/deciliter  glucagon  Injectable 1 milliGRAM(s) IntraMuscular once PRN Glucose LESS THAN 70 milligrams/deciliter  morphine  - Injectable 6 milliGRAM(s) IV Push every 4 hours PRN Moderate Pain (4 - 6)      Allergies    No Known Allergies    Intolerances          Vital Signs Last 24 Hrs  T(C): 36.3 (21 Aug 2018 16:41), Max: 36.7 (20 Aug 2018 23:30)  T(F): 97.3 (21 Aug 2018 16:41), Max: 98 (20 Aug 2018 23:30)  HR: 68 (21 Aug 2018 16:41) (68 - 75)  BP: 138/93 (21 Aug 2018 16:41) (124/71 - 138/93)  BP(mean): --  RR: 17 (21 Aug 2018 16:41) (17 - 18)  SpO2: 96% (21 Aug 2018 16:41) (94% - 96%)    PHYSICAL EXAM:  GENERAL: NAD, well-groomed, well-developed  HEAD:  Atraumatic, Normocephalic  EYES: EOMI, PERRLA, conjunctiva and sclera clear  ENMT: No tonsillar erythema, exudates, or enlargement; Moist mucous membranes, Good dentition, No lesions  NECK: Supple, No JVD, Normal thyroid  NERVOUS SYSTEM:  Alert & Oriented X3, Good concentration; Motor Strength 5/5 B/L upper and lower extremities; DTRs 2+ intact and symmetric  CHEST/LUNG: Clear to percussion bilaterally; No rales, rhonchi, wheezing, or rubs  HEART: Regular rate and rhythm; No murmurs, rubs, or gallops  ABDOMEN: Soft,  Nondistended; mild epigastric tenderness   EXTREMITIES:  2+ Peripheral Pulses, No clubbing, cyanosis, or edema  LYMPH: No lymphadenopathy noted  SKIN: No rashes or lesions    LABS:                        12.9   8.81  )-----------( 308      ( 21 Aug 2018 08:06 )             38.8     08-21    143  |  106  |  15  ----------------------------<  166<H>  3.8   |  28  |  0.95    Ca    8.5      21 Aug 2018 08:06    TPro  8.8<H>  /  Alb  4.2  /  TBili  0.5  /  DBili  x   /  AST  23  /  ALT  30  /  AlkPhos  120  08-20        CAPILLARY BLOOD GLUCOSE      POCT Blood Glucose.: 162 mg/dL (21 Aug 2018 17:07)  POCT Blood Glucose.: 110 mg/dL (21 Aug 2018 11:48)  POCT Blood Glucose.: 157 mg/dL (21 Aug 2018 07:50)  POCT Blood Glucose.: 223 mg/dL (20 Aug 2018 22:27)      RADIOLOGY & ADDITIONAL TESTS:    Imaging Personally Reviewed:  [ ] YES  [ ] NO    Consultant(s) Notes Reviewed:  [ ] YES  [ ] NO    Care Discussed with Consultants/Other Providers [ ] YES  [ ] NO
Gastroenterology  Patient seen and examined bedside resting comfortably.  No complaints offered.   +abdominal pain  Denies nausea and vomiting. NPO  Normal flatus/BM.     T(F): 97 (08-23-18 @ 12:01), Max: 97.6 (08-22-18 @ 23:55)  HR: 57 (08-23-18 @ 12:01) (57 - 72)  BP: 149/87 (08-23-18 @ 12:01) (128/81 - 163/89)  RR: 16 (08-23-18 @ 12:01) (16 - 18)  SpO2: 98% (08-23-18 @ 12:01) (97% - 98%)  Wt(kg): --  CAPILLARY BLOOD GLUCOSE      POCT Blood Glucose.: 120 mg/dL (23 Aug 2018 11:35)  POCT Blood Glucose.: 175 mg/dL (23 Aug 2018 08:27)  POCT Blood Glucose.: 235 mg/dL (22 Aug 2018 21:13)  POCT Blood Glucose.: 350 mg/dL (22 Aug 2018 16:50)      PHYSICAL EXAM:  General: NAD, WDWN.   Neuro:  Alert & oriented x 3  HEENT: NCAT, EOMI, conjunctiva clear  CV: +S1+S2 regular rate and rhythm  Lung: clear to ausculation bilaterally, respirations nonlabored, good inspiratory effort  Abdomen: soft, + epi Tender, No distention Normal active BS  Extremities: no cyanosis, clubbing or edema    LABS:              I&O's Detail    22 Aug 2018 07:01  -  23 Aug 2018 07:00  --------------------------------------------------------  IN:    dextrose 5% + sodium chloride 0.9%.: 2000 mL    Oral Fluid: 775 mL    Solution: 200 mL    Solution: 200 mL  Total IN: 3175 mL    OUT:    Voided: 2800 mL  Total OUT: 2800 mL    Total NET: 375 mL      23 Aug 2018 07:01  -  23 Aug 2018 16:05  --------------------------------------------------------  IN:  Total IN: 0 mL    OUT:    Voided: 700 mL  Total OUT: 700 mL    Total NET: -700 mL
Gastroenterology  Patient seen and examined bedside resting comfortably.  No complaints offered.   No abdominal pain  Denies nausea and vomiting. Tolerating diet.  Normal flatus/BM.     T(F): 97 (08-24-18 @ 05:11), Max: 98 (08-23-18 @ 16:46)  HR: 65 (08-24-18 @ 05:11) (57 - 76)  BP: 151/83 (08-24-18 @ 05:11) (134/74 - 169/92)  RR: 16 (08-24-18 @ 05:11) (14 - 26)  SpO2: 97% (08-24-18 @ 05:11) (97% - 100%)  Wt(kg): --  CAPILLARY BLOOD GLUCOSE      POCT Blood Glucose.: 231 mg/dL (24 Aug 2018 07:54)  POCT Blood Glucose.: 263 mg/dL (23 Aug 2018 21:22)  POCT Blood Glucose.: 206 mg/dL (23 Aug 2018 17:09)  POCT Blood Glucose.: 176 mg/dL (23 Aug 2018 16:04)  POCT Blood Glucose.: 120 mg/dL (23 Aug 2018 11:35)      PHYSICAL EXAM:  General: NAD, WDWN.   Neuro:  Alert & oriented x 3  HEENT: NCAT, EOMI, conjunctiva clear  CV: +S1+S2 regular rate and rhythm  Lung: clear to ausculation bilaterally, respirations nonlabored, good inspiratory effort  Abdomen: soft, Non Tender, No distention Normal active BS  Extremities: no cyanosis, clubbing or edema    LABS:              I&O's Detail    23 Aug 2018 07:01  -  24 Aug 2018 07:00  --------------------------------------------------------  IN:    dextrose 5% + sodium chloride 0.9%.: 2300 mL    Oral Fluid: 420 mL    Solution: 400 mL    Solution: 300 mL  Total IN: 3420 mL    OUT:    Voided: 1900 mL  Total OUT: 1900 mL    Total NET: 1520 mL
Patient is a 37y old  Male who presents with a chief complaint of abdominal pain (20 Aug 2018 15:53)      INTERVAL HPI/OVERNIGHT EVENTS:  Pt was seen and examined at bedside, no acute events.  S/p EGD today.    MEDICATIONS  (STANDING):  ciprofloxacin   IVPB 400 milliGRAM(s) IV Intermittent every 12 hours  clonazePAM Tablet 0.5 milliGRAM(s) Oral two times a day  dextrose 5% + sodium chloride 0.9%. 1000 milliLiter(s) (100 mL/Hr) IV Continuous <Continuous>  dextrose 5%. 1000 milliLiter(s) (50 mL/Hr) IV Continuous <Continuous>  dextrose 50% Injectable 12.5 Gram(s) IV Push once  dextrose 50% Injectable 25 Gram(s) IV Push once  dextrose 50% Injectable 25 Gram(s) IV Push once  escitalopram 5 milliGRAM(s) Oral daily  insulin glargine Injectable (LANTUS) 10 Unit(s) SubCutaneous at bedtime  insulin lispro (HumaLOG) corrective regimen sliding scale   SubCutaneous three times a day before meals  insulin lispro (HumaLOG) corrective regimen sliding scale   SubCutaneous at bedtime  lactobacillus acidophilus 1 Tablet(s) Oral daily  mesalamine DR Capsule 400 milliGRAM(s) Oral two times a day  metoclopramide Injectable 10 milliGRAM(s) IV Push every 6 hours  metroNIDAZOLE  IVPB 500 milliGRAM(s) IV Intermittent every 8 hours  pantoprazole    Tablet 40 milliGRAM(s) Oral before breakfast  sucralfate 1 Gram(s) Oral four times a day    MEDICATIONS  (PRN):  ALBUTerol    90 MICROgram(s) HFA Inhaler 2 Puff(s) Inhalation every 6 hours PRN Shortness of Breath and/or Wheezing  dextrose 40% Gel 15 Gram(s) Oral once PRN Blood Glucose LESS THAN 70 milliGRAM(s)/deciliter  glucagon  Injectable 1 milliGRAM(s) IntraMuscular once PRN Glucose LESS THAN 70 milligrams/deciliter  morphine  - Injectable 6 milliGRAM(s) IV Push every 4 hours PRN Moderate Pain (4 - 6)      Allergies  No Known Allergies    Vital Signs Last 24 Hrs  T(C): 36.7 (23 Aug 2018 16:46), Max: 36.7 (23 Aug 2018 16:46)  T(F): 98 (23 Aug 2018 16:46), Max: 98 (23 Aug 2018 16:46)  HR: 65 (23 Aug 2018 16:46) (57 - 72)  BP: 161/91 (23 Aug 2018 16:46) (128/81 - 169/92)  BP(mean): --  RR: 15 (23 Aug 2018 16:46) (14 - 26)  SpO2: 100% (23 Aug 2018 16:46) (97% - 100%)    PHYSICAL EXAM:  GENERAL: NAD, well-groomed, well-developed  HEAD:  Atraumatic, Normocephalic  EYES: EOMI, PERRLA, conjunctiva and sclera clear  ENMT: No tonsillar erythema, exudates, or enlargement; Moist mucous membranes, Good dentition, No lesions  NECK: Supple, No JVD, Normal thyroid  NERVOUS SYSTEM:  Alert & Oriented X3, Good concentration; Motor Strength 5/5 B/L upper and lower extremities; DTRs 2+ intact and symmetric  CHEST/LUNG: Clear to percussion bilaterally; No rales, rhonchi, wheezing, or rubs  HEART: Regular rate and rhythm; No murmurs, rubs, or gallops  ABDOMEN: Soft, Nontender, Nondistended; Bowel sounds present  EXTREMITIES:  2+ Peripheral Pulses, No clubbing, cyanosis, or edema  LYMPH: No lymphadenopathy noted  SKIN: No rashes or lesions    LABS:    CAPILLARY BLOOD GLUCOSE      POCT Blood Glucose.: 176 mg/dL (23 Aug 2018 16:04)  POCT Blood Glucose.: 120 mg/dL (23 Aug 2018 11:35)  POCT Blood Glucose.: 175 mg/dL (23 Aug 2018 08:27)  POCT Blood Glucose.: 235 mg/dL (22 Aug 2018 21:13)      RADIOLOGY & ADDITIONAL TESTS:    Imaging Personally Reviewed:  [ ] YES  [ ] NO    Consultant(s) Notes Reviewed:  [ ] YES  [ ] NO    Care Discussed with Consultants/Other Providers [ ] YES  [ ] NO
Patient is a 37y old  Male who presents with a chief complaint of abdominal pain (20 Aug 2018 15:53)    INTERVAL HPI/OVERNIGHT EVENTS:  Pt was seen and examined at bedside, no acute events.    MEDICATIONS  (STANDING):  ciprofloxacin   IVPB 400 milliGRAM(s) IV Intermittent every 12 hours  clonazePAM Tablet 0.5 milliGRAM(s) Oral two times a day  dextrose 5% + sodium chloride 0.9%. 1000 milliLiter(s) (100 mL/Hr) IV Continuous <Continuous>  dextrose 5%. 1000 milliLiter(s) (50 mL/Hr) IV Continuous <Continuous>  dextrose 50% Injectable 12.5 Gram(s) IV Push once  dextrose 50% Injectable 25 Gram(s) IV Push once  dextrose 50% Injectable 25 Gram(s) IV Push once  escitalopram 5 milliGRAM(s) Oral daily  insulin glargine Injectable (LANTUS) 10 Unit(s) SubCutaneous at bedtime  insulin lispro (HumaLOG) corrective regimen sliding scale   SubCutaneous three times a day before meals  insulin lispro (HumaLOG) corrective regimen sliding scale   SubCutaneous at bedtime  lactobacillus acidophilus 1 Tablet(s) Oral daily  mesalamine DR Capsule 400 milliGRAM(s) Oral two times a day  metoclopramide Injectable 10 milliGRAM(s) IV Push every 6 hours  metroNIDAZOLE  IVPB 500 milliGRAM(s) IV Intermittent every 8 hours    MEDICATIONS  (PRN):  ALBUTerol    90 MICROgram(s) HFA Inhaler 2 Puff(s) Inhalation every 6 hours PRN Shortness of Breath and/or Wheezing  dextrose 40% Gel 15 Gram(s) Oral once PRN Blood Glucose LESS THAN 70 milliGRAM(s)/deciliter  glucagon  Injectable 1 milliGRAM(s) IntraMuscular once PRN Glucose LESS THAN 70 milligrams/deciliter  morphine  - Injectable 6 milliGRAM(s) IV Push every 4 hours PRN Moderate Pain (4 - 6)      Allergies  No Known Allergies    Vital Signs Last 24 Hrs  T(C): 36 (22 Aug 2018 11:28), Max: 36.6 (21 Aug 2018 23:38)  T(F): 96.8 (22 Aug 2018 11:28), Max: 97.8 (21 Aug 2018 23:38)  HR: 74 (22 Aug 2018 11:28) (66 - 74)  BP: 146/84 (22 Aug 2018 11:28) (124/86 - 146/84)  BP(mean): --  RR: 18 (22 Aug 2018 11:28) (18 - 18)  SpO2: 97% (22 Aug 2018 11:28) (96% - 98%)    PHYSICAL EXAM:  GENERAL: NAD, well-groomed, well-developed  HEAD:  Atraumatic, Normocephalic  EYES: EOMI, PERRLA, conjunctiva and sclera clear  ENMT: No tonsillar erythema, exudates, or enlargement; Moist mucous membranes, Good dentition, No lesions  NECK: Supple, No JVD, Normal thyroid  NERVOUS SYSTEM:  Alert & Oriented X3, Good concentration; Motor Strength 5/5 B/L upper and lower extremities; DTRs 2+ intact and symmetric  CHEST/LUNG: Clear to percussion bilaterally; No rales, rhonchi, wheezing, or rubs  HEART: Regular rate and rhythm; No murmurs, rubs, or gallops  ABDOMEN: Soft, Nontender, Nondistended; Bowel sounds present  EXTREMITIES:  2+ Peripheral Pulses, No clubbing, cyanosis, or edema  LYMPH: No lymphadenopathy noted  SKIN: No rashes or lesions    LABS:                        12.9   8.81  )-----------( 308      ( 21 Aug 2018 08:06 )             38.8     08-21    143  |  106  |  15  ----------------------------<  166<H>  3.8   |  28  |  0.95    Ca    8.5      21 Aug 2018 08:06          CAPILLARY BLOOD GLUCOSE      POCT Blood Glucose.: 120 mg/dL (22 Aug 2018 11:41)  POCT Blood Glucose.: 115 mg/dL (22 Aug 2018 05:08)  POCT Blood Glucose.: 138 mg/dL (21 Aug 2018 21:12)  POCT Blood Glucose.: 162 mg/dL (21 Aug 2018 17:07)      RADIOLOGY & ADDITIONAL TESTS:    Imaging Personally Reviewed:  [ ] YES  [ ] NO    Consultant(s) Notes Reviewed:  [ ] YES  [ ] NO    Care Discussed with Consultants/Other Providers [ ] YES  [ ] NO
Procedure:           Upper GI endoscopy 08-23-18 @ 16:12    Indications:                 EPIGASTRIC PAIN    Monitored Anesthesia Care Provided by : DR MISHRA    ____________________________________________________________________________________________________  Procedure:           Pre-Anesthesia Assessment:                       - Prior to the procedure, a History and Physical was performed, and patient                        medications and allergies were reviewed. The patient is competent. The risks                        and benefits of the procedure and the sedation options and risks were                        discussed with the patient. All questions were answered and informed consent                        was obtained. Patient identification and proposed procedure were verified by                        the physician, the nurse and the anesthesiologist in the procedure room.                        Mental Status Examination: alert and oriented. Airway Examination: normal                        oropharyngeal airway and neck mobility. Respiratory Examination: clear to                        auscultation. CV Examination: normal. Prophylactic Antibiotics: The patient                        does not require prophylactic antibiotics.                        Grade Assessment: III - A patient with severe systemic disease. After                        reviewing the risks and benefits, the patient was deemed in satisfactory                        condition to undergo the procedure. The anesthesia plan was to use monitored                        anesthesia care (MAC). Immediately prior to administration of medications,                        the patient was re-assessed for adequacy to receive sedatives. The heart        		     rate, respiratory rate, oxygen saturations, blood pressure, adequacy of                        pulmonary ventilation, and response to care were monitored throughout the                        procedure. The physical status of the patient was re-assessed after the                        procedure.                       After obtaining informed consent, the endoscope was passed under direct                        vision. Throughout the procedure, the patient's blood pressure, pulse, and                        oxygen saturations were monitored continuously. The Endoscope was introduced                        through the mouth, and advanced to the second part of duodenum. The upper GI                        endoscopy was accomplished with ease. The patient tolerated the procedure                        well.    ESOPHAGUS: SMALL HIATAL HERNIA MILD REFLUX ESOPHAGITIS     STOMACH: DIFFUSE PATCHY HEMM. GASTRITIS , WITH BILE THROUGHOUT  BX OF ANTRUM AND FUNDUS     DUODENUM: MILD DUODENITIS BX NORMAL TO 3RD PORTION

## 2018-08-24 NOTE — DISCHARGE NOTE ADULT - CARE PLAN
Principal Discharge DX:	Gastritis and duodenitis  Goal:	endoscopy was done, found to have severe gastritis, GERD, small hiatal hernia.  Assessment and plan of treatment:	Continue protonix, Carafate  Follow up gastroenterologist  follow up biopsy report  Secondary Diagnosis:	Colitis  Assessment and plan of treatment:	Complete course of antibiotic  Follow up with gastroenterologist for outpatient colonoscopy.  Secondary Diagnosis:	Hiatal hernia with gastroesophageal reflux disease and esophagitis  Assessment and plan of treatment:	Continue protonix, Carafate  Follow up gastroenterologist  Secondary Diagnosis:	DM type 1 (diabetes mellitus, type 1)  Assessment and plan of treatment:	continue insulin as used to before admission  Follow up with primary doctor  Secondary Diagnosis:	Uncomplicated asthma, unspecified asthma severity, unspecified whether persistent  Assessment and plan of treatment:	continue home medication  Secondary Diagnosis:	Anxiety  Assessment and plan of treatment:	continue home medication

## 2018-08-24 NOTE — DISCHARGE NOTE ADULT - PATIENT PORTAL LINK FT
You can access the [facility], offered by [facility], by registering with the following website: [email or web address]

## 2018-08-24 NOTE — DISCHARGE NOTE ADULT - CARE PROVIDER_API CALL
Octavio Kumar (MD), Medicine  [address] [place] [address]  Phone: [phone]  Fax: [phone]    primary doctor,   Phone: (   )    -  Fax: (   )    -

## 2018-08-24 NOTE — DISCHARGE NOTE ADULT - PLAN OF CARE
endoscopy was done, found to have severe gastritis, GERD, small hiatal hernia. Continue protonix, Carafate  Follow up gastroenterologist  follow up biopsy report Complete course of antibiotic  Follow up with gastroenterologist for outpatient colonoscopy. Continue protonix, Carafate  Follow up gastroenterologist continue insulin as used to before admission  Follow up with primary doctor continue home medication

## 2018-08-24 NOTE — DISCHARGE NOTE ADULT - SECONDARY DIAGNOSIS.
Colitis Hiatal hernia with gastroesophageal reflux disease and esophagitis DM type 1 (diabetes mellitus, type 1) Uncomplicated asthma, unspecified asthma severity, unspecified whether persistent Anxiety

## 2018-08-24 NOTE — PROGRESS NOTE ADULT - ASSESSMENT
37m with history of diabetes with recurrent abdominal pain.    Ab pain/ nausea, vomiting:  - CT with mild colitis  - nausea/ vomiting resolved, persistent pain  - Will keep NPO now, advance diet as tolerated  - Continue Cipro/ Flagyl  - Continue Reglan  - Pt on mesalamine, continue now  - Continue PPI  - As per had EGD and colonoscopy ~ 3 yrs ago and was told he has gastroparesis.  - GI eval called    DM:  - Continue current insulin regimen.    Depression/ anxiety:  - continue current meds    Asthma:  - continue albuterol PRN for now    DVT ppx:  - ambulation, SCDs
36 yo BM admitted with Epigastric pain , Nausea and vomiting , Hx of DM gastroparesis,
37m with history of diabetes with recurrent abdominal pain.    Ab pain/ nausea, vomiting:  - CT with mild colitis  - Nausea/ vomiting resolved, pain improved  - Continue Cipro/ Flagyl  - Continue Reglan  - Pt on mesalamine, continue now  - Continue PPI  - S/p EGD: severe gastritis, GERD, small hiatal hernia  - Colonoscopy as outpt.  - Follow up GI recc    DM:  - Continue current insulin regimen.    Depression/ anxiety:  - continue current meds    Asthma:  - continue albuterol PRN for now    DVT ppx:  - ambulation, SCDs
37m with history of diabetes with recurrent abdominal pain.    Ab pain/ nausea, vomiting:  - CT with mild colitis  - nausea/ vomiting resolved, pain improved  - Diet advanced to clear diet.  - Continue Cipro/ Flagyl  - Continue Reglan  - Pt on mesalamine, continue now  - Continue PPI  - As per had EGD and colonoscopy ~ 3 yrs ago and was told he has gastroparesis.  - EGD tomorrow  - Colonoscopy outpt.  - Follow up GI recc    DM:  - Continue current insulin regimen.    Depression/ anxiety:  - continue current meds    Asthma:  - continue albuterol PRN for now    DVT ppx:  - ambulation, SCDs
38 yo BM admitted with Epigastric pain , Nausea and vomiting , Hx of DM gastroparesis,  S/P EGD , DIFFUSE GASTRITIS WITH SMALL HIATAL HERNIA AND GERD
S/P EGD , DIFFUSE GASTRITIS WITH SMALL HIATAL HERNIA AND GERD

## 2018-08-27 ENCOUNTER — INPATIENT (INPATIENT)
Facility: HOSPITAL | Age: 38
LOS: 2 days | Discharge: HOME | End: 2018-08-30
Attending: HOSPITALIST | Admitting: HOSPITALIST
Payer: MEDICAID

## 2018-08-27 VITALS
HEIGHT: 70 IN | SYSTOLIC BLOOD PRESSURE: 136 MMHG | OXYGEN SATURATION: 95 % | WEIGHT: 175.05 LBS | TEMPERATURE: 99 F | HEART RATE: 69 BPM | RESPIRATION RATE: 16 BRPM | DIASTOLIC BLOOD PRESSURE: 84 MMHG

## 2018-08-27 DIAGNOSIS — Z90.49 ACQUIRED ABSENCE OF OTHER SPECIFIED PARTS OF DIGESTIVE TRACT: Chronic | ICD-10-CM

## 2018-08-27 DIAGNOSIS — K52.9 NONINFECTIVE GASTROENTERITIS AND COLITIS, UNSPECIFIED: ICD-10-CM

## 2018-08-27 LAB
ALBUMIN SERPL ELPH-MCNC: 4.1 G/DL — SIGNIFICANT CHANGE UP (ref 3.3–5)
ALP SERPL-CCNC: 111 U/L — SIGNIFICANT CHANGE UP (ref 40–120)
ALT FLD-CCNC: 31 U/L — SIGNIFICANT CHANGE UP (ref 12–78)
AMYLASE P1 CFR SERPL: 97 U/L — SIGNIFICANT CHANGE UP (ref 25–115)
ANION GAP SERPL CALC-SCNC: 11 MMOL/L — SIGNIFICANT CHANGE UP (ref 5–17)
APPEARANCE UR: CLEAR — SIGNIFICANT CHANGE UP
APTT BLD: 28.3 SEC — SIGNIFICANT CHANGE UP (ref 27.5–37.4)
AST SERPL-CCNC: 18 U/L — SIGNIFICANT CHANGE UP (ref 15–37)
BACTERIA # UR AUTO: ABNORMAL
BASOPHILS # BLD AUTO: 0.03 K/UL — SIGNIFICANT CHANGE UP (ref 0–0.2)
BASOPHILS NFR BLD AUTO: 0.3 % — SIGNIFICANT CHANGE UP (ref 0–2)
BILIRUB SERPL-MCNC: 0.3 MG/DL — SIGNIFICANT CHANGE UP (ref 0.2–1.2)
BILIRUB UR-MCNC: NEGATIVE — SIGNIFICANT CHANGE UP
BUN SERPL-MCNC: 12 MG/DL — SIGNIFICANT CHANGE UP (ref 7–23)
CALCIUM SERPL-MCNC: 9.3 MG/DL — SIGNIFICANT CHANGE UP (ref 8.5–10.1)
CHLORIDE SERPL-SCNC: 98 MMOL/L — SIGNIFICANT CHANGE UP (ref 96–108)
CO2 SERPL-SCNC: 30 MMOL/L — SIGNIFICANT CHANGE UP (ref 22–31)
COLOR SPEC: YELLOW — SIGNIFICANT CHANGE UP
CREAT SERPL-MCNC: 1.23 MG/DL — SIGNIFICANT CHANGE UP (ref 0.5–1.3)
DIFF PNL FLD: NEGATIVE — SIGNIFICANT CHANGE UP
EOSINOPHIL # BLD AUTO: 0.02 K/UL — SIGNIFICANT CHANGE UP (ref 0–0.5)
EOSINOPHIL NFR BLD AUTO: 0.2 % — SIGNIFICANT CHANGE UP (ref 0–6)
GLUCOSE BLDC GLUCOMTR-MCNC: 312 MG/DL — HIGH (ref 70–99)
GLUCOSE BLDC GLUCOMTR-MCNC: 367 MG/DL — HIGH (ref 70–99)
GLUCOSE BLDC GLUCOMTR-MCNC: 368 MG/DL — HIGH (ref 70–99)
GLUCOSE SERPL-MCNC: 354 MG/DL — HIGH (ref 70–99)
GLUCOSE UR QL: 1000 MG/DL
HCT VFR BLD CALC: 43.3 % — SIGNIFICANT CHANGE UP (ref 39–50)
HGB BLD-MCNC: 14.8 G/DL — SIGNIFICANT CHANGE UP (ref 13–17)
IMM GRANULOCYTES NFR BLD AUTO: 0.2 % — SIGNIFICANT CHANGE UP (ref 0–1.5)
INR BLD: 0.93 RATIO — SIGNIFICANT CHANGE UP (ref 0.88–1.16)
KETONES UR-MCNC: ABNORMAL
LACTATE SERPL-SCNC: 2.8 MMOL/L — HIGH (ref 0.7–2)
LEUKOCYTE ESTERASE UR-ACNC: NEGATIVE — SIGNIFICANT CHANGE UP
LIDOCAIN IGE QN: 46 U/L — LOW (ref 73–393)
LYMPHOCYTES # BLD AUTO: 0.96 K/UL — LOW (ref 1–3.3)
LYMPHOCYTES # BLD AUTO: 9.7 % — LOW (ref 13–44)
MCHC RBC-ENTMCNC: 28.1 PG — SIGNIFICANT CHANGE UP (ref 27–34)
MCHC RBC-ENTMCNC: 34.2 GM/DL — SIGNIFICANT CHANGE UP (ref 32–36)
MCV RBC AUTO: 82.3 FL — SIGNIFICANT CHANGE UP (ref 80–100)
MONOCYTES # BLD AUTO: 0.26 K/UL — SIGNIFICANT CHANGE UP (ref 0–0.9)
MONOCYTES NFR BLD AUTO: 2.6 % — SIGNIFICANT CHANGE UP (ref 2–14)
NEUTROPHILS # BLD AUTO: 8.62 K/UL — HIGH (ref 1.8–7.4)
NEUTROPHILS NFR BLD AUTO: 87 % — HIGH (ref 43–77)
NITRITE UR-MCNC: NEGATIVE — SIGNIFICANT CHANGE UP
NRBC # BLD: 0 /100 WBCS — SIGNIFICANT CHANGE UP (ref 0–0)
PH UR: 9 — HIGH (ref 5–8)
PLATELET # BLD AUTO: 355 K/UL — SIGNIFICANT CHANGE UP (ref 150–400)
POTASSIUM SERPL-MCNC: 4.3 MMOL/L — SIGNIFICANT CHANGE UP (ref 3.5–5.3)
POTASSIUM SERPL-SCNC: 4.3 MMOL/L — SIGNIFICANT CHANGE UP (ref 3.5–5.3)
PROT SERPL-MCNC: 8.4 GM/DL — HIGH (ref 6–8.3)
PROT UR-MCNC: NEGATIVE MG/DL — SIGNIFICANT CHANGE UP
PROTHROM AB SERPL-ACNC: 10.1 SEC — SIGNIFICANT CHANGE UP (ref 9.8–12.7)
RBC # BLD: 5.26 M/UL — SIGNIFICANT CHANGE UP (ref 4.2–5.8)
RBC # FLD: 13.6 % — SIGNIFICANT CHANGE UP (ref 10.3–14.5)
SODIUM SERPL-SCNC: 139 MMOL/L — SIGNIFICANT CHANGE UP (ref 135–145)
SP GR SPEC: 1.01 — SIGNIFICANT CHANGE UP (ref 1.01–1.02)
SURGICAL PATHOLOGY STUDY: SIGNIFICANT CHANGE UP
UROBILINOGEN FLD QL: NEGATIVE MG/DL — SIGNIFICANT CHANGE UP
WBC # BLD: 9.91 K/UL — SIGNIFICANT CHANGE UP (ref 3.8–10.5)
WBC # FLD AUTO: 9.91 K/UL — SIGNIFICANT CHANGE UP (ref 3.8–10.5)
WBC UR QL: SIGNIFICANT CHANGE UP

## 2018-08-27 PROCEDURE — 71045 X-RAY EXAM CHEST 1 VIEW: CPT | Mod: 26

## 2018-08-27 PROCEDURE — 99222 1ST HOSP IP/OBS MODERATE 55: CPT

## 2018-08-27 PROCEDURE — 74019 RADEX ABDOMEN 2 VIEWS: CPT | Mod: 26

## 2018-08-27 PROCEDURE — 99285 EMERGENCY DEPT VISIT HI MDM: CPT

## 2018-08-27 PROCEDURE — 93010 ELECTROCARDIOGRAM REPORT: CPT

## 2018-08-27 RX ORDER — INSULIN LISPRO 100/ML
VIAL (ML) SUBCUTANEOUS
Qty: 0 | Refills: 0 | Status: DISCONTINUED | OUTPATIENT
Start: 2018-08-27 | End: 2018-08-30

## 2018-08-27 RX ORDER — PANTOPRAZOLE SODIUM 20 MG/1
40 TABLET, DELAYED RELEASE ORAL EVERY 12 HOURS
Qty: 0 | Refills: 0 | Status: DISCONTINUED | OUTPATIENT
Start: 2018-08-27 | End: 2018-08-30

## 2018-08-27 RX ORDER — MORPHINE SULFATE 50 MG/1
2 CAPSULE, EXTENDED RELEASE ORAL ONCE
Qty: 0 | Refills: 0 | Status: DISCONTINUED | OUTPATIENT
Start: 2018-08-27 | End: 2018-08-27

## 2018-08-27 RX ORDER — CIPROFLOXACIN LACTATE 400MG/40ML
500 VIAL (ML) INTRAVENOUS EVERY 12 HOURS
Qty: 0 | Refills: 0 | Status: DISCONTINUED | OUTPATIENT
Start: 2018-08-27 | End: 2018-08-30

## 2018-08-27 RX ORDER — INSULIN GLARGINE 100 [IU]/ML
15 INJECTION, SOLUTION SUBCUTANEOUS AT BEDTIME
Qty: 0 | Refills: 0 | Status: DISCONTINUED | OUTPATIENT
Start: 2018-08-27 | End: 2018-08-30

## 2018-08-27 RX ORDER — LACTOBACILLUS ACIDOPHILUS 100MM CELL
1 CAPSULE ORAL DAILY
Qty: 0 | Refills: 0 | Status: DISCONTINUED | OUTPATIENT
Start: 2018-08-27 | End: 2018-08-30

## 2018-08-27 RX ORDER — METRONIDAZOLE 500 MG
500 TABLET ORAL EVERY 8 HOURS
Qty: 0 | Refills: 0 | Status: DISCONTINUED | OUTPATIENT
Start: 2018-08-27 | End: 2018-08-30

## 2018-08-27 RX ORDER — INSULIN LISPRO 100/ML
VIAL (ML) SUBCUTANEOUS AT BEDTIME
Qty: 0 | Refills: 0 | Status: DISCONTINUED | OUTPATIENT
Start: 2018-08-27 | End: 2018-08-30

## 2018-08-27 RX ORDER — MESALAMINE 400 MG
800 TABLET, DELAYED RELEASE (ENTERIC COATED) ORAL THREE TIMES A DAY
Qty: 0 | Refills: 0 | Status: DISCONTINUED | OUTPATIENT
Start: 2018-08-27 | End: 2018-08-30

## 2018-08-27 RX ORDER — SODIUM CHLORIDE 9 MG/ML
1000 INJECTION INTRAMUSCULAR; INTRAVENOUS; SUBCUTANEOUS ONCE
Qty: 0 | Refills: 0 | Status: COMPLETED | OUTPATIENT
Start: 2018-08-27 | End: 2018-08-27

## 2018-08-27 RX ORDER — DEXTROSE 50 % IN WATER 50 %
12.5 SYRINGE (ML) INTRAVENOUS ONCE
Qty: 0 | Refills: 0 | Status: DISCONTINUED | OUTPATIENT
Start: 2018-08-27 | End: 2018-08-30

## 2018-08-27 RX ORDER — DEXTROSE 50 % IN WATER 50 %
25 SYRINGE (ML) INTRAVENOUS ONCE
Qty: 0 | Refills: 0 | Status: DISCONTINUED | OUTPATIENT
Start: 2018-08-27 | End: 2018-08-30

## 2018-08-27 RX ORDER — ONDANSETRON 8 MG/1
4 TABLET, FILM COATED ORAL ONCE
Qty: 0 | Refills: 0 | Status: COMPLETED | OUTPATIENT
Start: 2018-08-27 | End: 2018-08-27

## 2018-08-27 RX ORDER — SODIUM CHLORIDE 9 MG/ML
1000 INJECTION, SOLUTION INTRAVENOUS
Qty: 0 | Refills: 0 | Status: DISCONTINUED | OUTPATIENT
Start: 2018-08-27 | End: 2018-08-30

## 2018-08-27 RX ORDER — MORPHINE SULFATE 50 MG/1
4 CAPSULE, EXTENDED RELEASE ORAL EVERY 4 HOURS
Qty: 0 | Refills: 0 | Status: DISCONTINUED | OUTPATIENT
Start: 2018-08-27 | End: 2018-08-30

## 2018-08-27 RX ORDER — GLUCAGON INJECTION, SOLUTION 0.5 MG/.1ML
1 INJECTION, SOLUTION SUBCUTANEOUS ONCE
Qty: 0 | Refills: 0 | Status: DISCONTINUED | OUTPATIENT
Start: 2018-08-27 | End: 2018-08-30

## 2018-08-27 RX ORDER — CLONAZEPAM 1 MG
0.5 TABLET ORAL
Qty: 0 | Refills: 0 | Status: DISCONTINUED | OUTPATIENT
Start: 2018-08-27 | End: 2018-08-30

## 2018-08-27 RX ORDER — ESCITALOPRAM OXALATE 10 MG/1
5 TABLET, FILM COATED ORAL DAILY
Qty: 0 | Refills: 0 | Status: DISCONTINUED | OUTPATIENT
Start: 2018-08-27 | End: 2018-08-30

## 2018-08-27 RX ORDER — ONDANSETRON 8 MG/1
4 TABLET, FILM COATED ORAL EVERY 6 HOURS
Qty: 0 | Refills: 0 | Status: DISCONTINUED | OUTPATIENT
Start: 2018-08-27 | End: 2018-08-30

## 2018-08-27 RX ORDER — METOCLOPRAMIDE HCL 10 MG
10 TABLET ORAL EVERY 8 HOURS
Qty: 0 | Refills: 0 | Status: DISCONTINUED | OUTPATIENT
Start: 2018-08-27 | End: 2018-08-30

## 2018-08-27 RX ORDER — SUCRALFATE 1 G
1 TABLET ORAL
Qty: 0 | Refills: 0 | Status: DISCONTINUED | OUTPATIENT
Start: 2018-08-27 | End: 2018-08-30

## 2018-08-27 RX ORDER — DEXTROSE 50 % IN WATER 50 %
15 SYRINGE (ML) INTRAVENOUS ONCE
Qty: 0 | Refills: 0 | Status: DISCONTINUED | OUTPATIENT
Start: 2018-08-27 | End: 2018-08-30

## 2018-08-27 RX ORDER — FAMOTIDINE 10 MG/ML
20 INJECTION INTRAVENOUS ONCE
Qty: 0 | Refills: 0 | Status: COMPLETED | OUTPATIENT
Start: 2018-08-27 | End: 2018-08-27

## 2018-08-27 RX ORDER — ALBUTEROL 90 UG/1
2 AEROSOL, METERED ORAL EVERY 6 HOURS
Qty: 0 | Refills: 0 | Status: DISCONTINUED | OUTPATIENT
Start: 2018-08-27 | End: 2018-08-30

## 2018-08-27 RX ADMIN — Medication 500 MILLIGRAM(S): at 18:55

## 2018-08-27 RX ADMIN — MORPHINE SULFATE 4 MILLIGRAM(S): 50 CAPSULE, EXTENDED RELEASE ORAL at 20:29

## 2018-08-27 RX ADMIN — Medication 1 GRAM(S): at 23:50

## 2018-08-27 RX ADMIN — ONDANSETRON 4 MILLIGRAM(S): 8 TABLET, FILM COATED ORAL at 19:02

## 2018-08-27 RX ADMIN — SODIUM CHLORIDE 1000 MILLILITER(S): 9 INJECTION INTRAMUSCULAR; INTRAVENOUS; SUBCUTANEOUS at 16:45

## 2018-08-27 RX ADMIN — MORPHINE SULFATE 2 MILLIGRAM(S): 50 CAPSULE, EXTENDED RELEASE ORAL at 16:45

## 2018-08-27 RX ADMIN — ONDANSETRON 4 MILLIGRAM(S): 8 TABLET, FILM COATED ORAL at 20:56

## 2018-08-27 RX ADMIN — INSULIN GLARGINE 15 UNIT(S): 100 INJECTION, SOLUTION SUBCUTANEOUS at 23:49

## 2018-08-27 RX ADMIN — Medication 500 MILLIGRAM(S): at 23:51

## 2018-08-27 RX ADMIN — FAMOTIDINE 104 MILLIGRAM(S): 10 INJECTION INTRAVENOUS at 18:55

## 2018-08-27 RX ADMIN — SODIUM CHLORIDE 2000 MILLILITER(S): 9 INJECTION INTRAMUSCULAR; INTRAVENOUS; SUBCUTANEOUS at 15:56

## 2018-08-27 RX ADMIN — MORPHINE SULFATE 4 MILLIGRAM(S): 50 CAPSULE, EXTENDED RELEASE ORAL at 23:50

## 2018-08-27 RX ADMIN — MORPHINE SULFATE 4 MILLIGRAM(S): 50 CAPSULE, EXTENDED RELEASE ORAL at 19:10

## 2018-08-27 RX ADMIN — SODIUM CHLORIDE 2000 MILLILITER(S): 9 INJECTION INTRAMUSCULAR; INTRAVENOUS; SUBCUTANEOUS at 16:45

## 2018-08-27 RX ADMIN — Medication 1 GRAM(S): at 19:10

## 2018-08-27 RX ADMIN — PANTOPRAZOLE SODIUM 40 MILLIGRAM(S): 20 TABLET, DELAYED RELEASE ORAL at 19:09

## 2018-08-27 RX ADMIN — Medication 10 MILLIGRAM(S): at 21:36

## 2018-08-27 RX ADMIN — MORPHINE SULFATE 2 MILLIGRAM(S): 50 CAPSULE, EXTENDED RELEASE ORAL at 15:56

## 2018-08-27 RX ADMIN — Medication 6: at 23:50

## 2018-08-27 NOTE — H&P ADULT - HISTORY OF PRESENT ILLNESS
·36 yo M with abd pain, n/v for a few days, nothing makes it better, no specific inciting event.  Feels like his gastroparesis.  No other complaints. ·38 yo M with abd pain, n/v for a few days, nothing makes it better, no specific inciting event.  Feels like his gastroparesis.  No other complaints. patient was discharged from the hospital 3 days ago and the pain restarted this am with upper epigastric pain and vomiting - denies hematemesis or melena - patient was being treated with oral antibiotics as seen on the ct last admssion

## 2018-08-27 NOTE — H&P ADULT - ASSESSMENT
37m with history of diabetes with recurrent abd pain     IMPROVE VTE Individual Risk Assessment          RISK                                                          Points    [  ] Previous VTE                                                3    [  ] Thrombophilia                                             2    [  ] Lower limb paralysis                                    2        (unable to hold up >15 seconds)      [  ] Current Cancer                                             2         (within 6 months)    [  ] Immobilization > 24 hrs                              1    [  ] ICU/CCU stay > 24 hours                            1    [  ] Age > 60                                                    1    IMPROVE VTE Score _________0

## 2018-08-27 NOTE — ED ADULT NURSE NOTE - NSIMPLEMENTINTERV_GEN_ALL_ED
Implemented All Universal Safety Interventions:  [place] to call system. Call bell, personal items and telephone within reach. Instruct patient to call for assistance. Room bathroom lighting operational. Non-slip footwear when patient is off stretcher. Physically safe environment: no spills, clutter or unnecessary equipment. Stretcher in lowest position, wheels locked, appropriate side rails in place.

## 2018-08-27 NOTE — ED PROVIDER NOTE - MEDICAL DECISION MAKING DETAILS
Patient with diabetes, h/o gastroparesis, s/p endoscopy, d/c from this hospital 4 days ago presents with vomiting, will admit, Dr. starr santana

## 2018-08-27 NOTE — ED PROVIDER NOTE - OBJECTIVE STATEMENT
36 yo diabetic male, gastroparesis present with vomiting today with decreased appetite. Patient discharged from this facility 4 days ago. Patient denies chest pain, shortness of breath, fever, chills, diarrhea. Patient received endoscopy during discharge.

## 2018-08-27 NOTE — ED PROVIDER NOTE - CARE PLAN
Principal Discharge DX:	Vomiting  Secondary Diagnosis:	Gastroparesis  Secondary Diagnosis:	Hyperglycemia

## 2018-08-27 NOTE — H&P ADULT - NSHPLABSRESULTS_GEN_ALL_CORE
14.8   9.91  )-----------( 355      ( 27 Aug 2018 15:56 )             43.3   08-27    139  |  98  |  12  ----------------------------<  354<H>  4.3   |  30  |  1.23    Ca    9.3      27 Aug 2018 15:56    TPro  8.4<H>  /  Alb  4.1  /  TBili  0.3  /  DBili  x   /  AST  18  /  ALT  31  /  AlkPhos  111  08-27

## 2018-08-27 NOTE — H&P ADULT - NSHPPHYSICALEXAM_GEN_ALL_CORE
GENERAL: NAD well-developed  HEAD:  Atraumatic, Normocephalic  EYES: EOMI, PERRLA, conjunctiva and sclera clear  ENMT: No tonsillar erythema, exudates, or enlargement; Moist mucous membranes, Good dentition, No lesions  NECK: Supple, No JVD, Normal thyroid  NERVOUS SYSTEM:  Alert & Oriented X3, Good concentration; Motor Strength 5/5 B/L upper and lower extremities; DTRs 2+ intact and symmetric  CHEST/LUNG: Clear to percussion bilaterally; No rales, rhonchi, wheezing, or rubs  HEART: Regular rate and rhythm; No murmurs, rubs, or gallops  ABDOMEN: Soft, Nontender, Nondistended; Bowel sounds present  EXTREMITIES:  2+ Peripheral Pulses, No clubbing, cyanosis, or edema  LYMPH: No lymphadenopathy   SKIN: No rashes or lesions GENERAL: NAD well-developed  HEAD:  Atraumatic, Normocephalic  EYES: EOMI, PERRLA, conjunctiva and sclera clear  ENMT: No tonsillar erythema, exudates, or enlargement; Moist mucous membranes, Good dentition, No lesions  NECK: Supple, No JVD, Normal thyroid  NERVOUS SYSTEM:  Alert & Oriented X3, Good concentration; Motor Strength 5/5 B/L upper and lower extremities; DTRs 2+ intact and symmetric  CHEST/LUNG: Clear to percussion bilaterally; No rales, rhonchi, wheezing, or rubs  HEART: Regular rate and rhythm; No murmurs, rubs, or gallops  ABDOMEN: upper epigastric tender, Nondistended; Bowel sounds present  EXTREMITIES:  2+ Peripheral Pulses, No clubbing, cyanosis, or edema  LYMPH: No lymphadenopathy   SKIN: No rashes or lesions

## 2018-08-27 NOTE — ED ADULT NURSE NOTE - OBJECTIVE STATEMENT
38 yo male c/o abdominal pain, generalized x today, c n/v. pt is IDDM.  took 3 units for his hyperglycemia, but unable to eat due to nausea and abd pain. pt reports';

## 2018-08-28 DIAGNOSIS — K31.84 GASTROPARESIS: ICD-10-CM

## 2018-08-28 DIAGNOSIS — K29.90 GASTRODUODENITIS, UNSPECIFIED, WITHOUT BLEEDING: ICD-10-CM

## 2018-08-28 LAB
ANION GAP SERPL CALC-SCNC: 9 MMOL/L — SIGNIFICANT CHANGE UP (ref 5–17)
BUN SERPL-MCNC: 16 MG/DL — SIGNIFICANT CHANGE UP (ref 7–23)
CALCIUM SERPL-MCNC: 8.8 MG/DL — SIGNIFICANT CHANGE UP (ref 8.5–10.1)
CHLORIDE SERPL-SCNC: 102 MMOL/L — SIGNIFICANT CHANGE UP (ref 96–108)
CO2 SERPL-SCNC: 30 MMOL/L — SIGNIFICANT CHANGE UP (ref 22–31)
CREAT SERPL-MCNC: 1.11 MG/DL — SIGNIFICANT CHANGE UP (ref 0.5–1.3)
GLUCOSE BLDC GLUCOMTR-MCNC: 257 MG/DL — HIGH (ref 70–99)
GLUCOSE BLDC GLUCOMTR-MCNC: 277 MG/DL — HIGH (ref 70–99)
GLUCOSE BLDC GLUCOMTR-MCNC: 353 MG/DL — HIGH (ref 70–99)
GLUCOSE BLDC GLUCOMTR-MCNC: 75 MG/DL — SIGNIFICANT CHANGE UP (ref 70–99)
GLUCOSE SERPL-MCNC: 279 MG/DL — HIGH (ref 70–99)
HBA1C BLD-MCNC: 8.6 % — HIGH (ref 4–5.6)
HCT VFR BLD CALC: 40.4 % — SIGNIFICANT CHANGE UP (ref 39–50)
HGB BLD-MCNC: 13.6 G/DL — SIGNIFICANT CHANGE UP (ref 13–17)
MCHC RBC-ENTMCNC: 28 PG — SIGNIFICANT CHANGE UP (ref 27–34)
MCHC RBC-ENTMCNC: 33.7 GM/DL — SIGNIFICANT CHANGE UP (ref 32–36)
MCV RBC AUTO: 83.1 FL — SIGNIFICANT CHANGE UP (ref 80–100)
NRBC # BLD: 0 /100 WBCS — SIGNIFICANT CHANGE UP (ref 0–0)
PLATELET # BLD AUTO: 351 K/UL — SIGNIFICANT CHANGE UP (ref 150–400)
POTASSIUM SERPL-MCNC: 3.8 MMOL/L — SIGNIFICANT CHANGE UP (ref 3.5–5.3)
POTASSIUM SERPL-SCNC: 3.8 MMOL/L — SIGNIFICANT CHANGE UP (ref 3.5–5.3)
RBC # BLD: 4.86 M/UL — SIGNIFICANT CHANGE UP (ref 4.2–5.8)
RBC # FLD: 13.9 % — SIGNIFICANT CHANGE UP (ref 10.3–14.5)
SODIUM SERPL-SCNC: 141 MMOL/L — SIGNIFICANT CHANGE UP (ref 135–145)
WBC # BLD: 11.58 K/UL — HIGH (ref 3.8–10.5)
WBC # FLD AUTO: 11.58 K/UL — HIGH (ref 3.8–10.5)

## 2018-08-28 PROCEDURE — 99233 SBSQ HOSP IP/OBS HIGH 50: CPT

## 2018-08-28 RX ADMIN — Medication 6: at 12:12

## 2018-08-28 RX ADMIN — MORPHINE SULFATE 4 MILLIGRAM(S): 50 CAPSULE, EXTENDED RELEASE ORAL at 12:12

## 2018-08-28 RX ADMIN — Medication 1 GRAM(S): at 23:05

## 2018-08-28 RX ADMIN — Medication 10 MILLIGRAM(S): at 23:05

## 2018-08-28 RX ADMIN — MORPHINE SULFATE 4 MILLIGRAM(S): 50 CAPSULE, EXTENDED RELEASE ORAL at 04:35

## 2018-08-28 RX ADMIN — MORPHINE SULFATE 4 MILLIGRAM(S): 50 CAPSULE, EXTENDED RELEASE ORAL at 21:06

## 2018-08-28 RX ADMIN — MORPHINE SULFATE 4 MILLIGRAM(S): 50 CAPSULE, EXTENDED RELEASE ORAL at 12:32

## 2018-08-28 RX ADMIN — MORPHINE SULFATE 4 MILLIGRAM(S): 50 CAPSULE, EXTENDED RELEASE ORAL at 08:11

## 2018-08-28 RX ADMIN — Medication 500 MILLIGRAM(S): at 14:15

## 2018-08-28 RX ADMIN — MORPHINE SULFATE 4 MILLIGRAM(S): 50 CAPSULE, EXTENDED RELEASE ORAL at 04:17

## 2018-08-28 RX ADMIN — INSULIN GLARGINE 15 UNIT(S): 100 INJECTION, SOLUTION SUBCUTANEOUS at 23:03

## 2018-08-28 RX ADMIN — MORPHINE SULFATE 4 MILLIGRAM(S): 50 CAPSULE, EXTENDED RELEASE ORAL at 08:31

## 2018-08-28 RX ADMIN — Medication 800 MILLIGRAM(S): at 23:04

## 2018-08-28 RX ADMIN — MORPHINE SULFATE 4 MILLIGRAM(S): 50 CAPSULE, EXTENDED RELEASE ORAL at 17:14

## 2018-08-28 RX ADMIN — MORPHINE SULFATE 4 MILLIGRAM(S): 50 CAPSULE, EXTENDED RELEASE ORAL at 21:26

## 2018-08-28 RX ADMIN — Medication 500 MILLIGRAM(S): at 23:05

## 2018-08-28 RX ADMIN — Medication 500 MILLIGRAM(S): at 17:15

## 2018-08-28 RX ADMIN — Medication 0.5 MILLIGRAM(S): at 14:15

## 2018-08-28 RX ADMIN — ESCITALOPRAM OXALATE 5 MILLIGRAM(S): 10 TABLET, FILM COATED ORAL at 12:13

## 2018-08-28 RX ADMIN — Medication 10 MILLIGRAM(S): at 14:15

## 2018-08-28 RX ADMIN — Medication 0.5 MILLIGRAM(S): at 23:11

## 2018-08-28 RX ADMIN — ALBUTEROL 2 PUFF(S): 90 AEROSOL, METERED ORAL at 09:16

## 2018-08-28 RX ADMIN — Medication 1 GRAM(S): at 12:13

## 2018-08-28 RX ADMIN — Medication 1 GRAM(S): at 17:15

## 2018-08-28 RX ADMIN — Medication 500 MILLIGRAM(S): at 05:28

## 2018-08-28 RX ADMIN — Medication 6: at 23:03

## 2018-08-28 RX ADMIN — PANTOPRAZOLE SODIUM 40 MILLIGRAM(S): 20 TABLET, DELAYED RELEASE ORAL at 17:15

## 2018-08-28 RX ADMIN — MORPHINE SULFATE 4 MILLIGRAM(S): 50 CAPSULE, EXTENDED RELEASE ORAL at 17:34

## 2018-08-28 RX ADMIN — MORPHINE SULFATE 4 MILLIGRAM(S): 50 CAPSULE, EXTENDED RELEASE ORAL at 00:10

## 2018-08-28 RX ADMIN — Medication 6: at 08:56

## 2018-08-28 RX ADMIN — PANTOPRAZOLE SODIUM 40 MILLIGRAM(S): 20 TABLET, DELAYED RELEASE ORAL at 05:28

## 2018-08-28 RX ADMIN — Medication 1 TABLET(S): at 12:13

## 2018-08-28 RX ADMIN — Medication 1 GRAM(S): at 05:28

## 2018-08-28 RX ADMIN — ONDANSETRON 4 MILLIGRAM(S): 8 TABLET, FILM COATED ORAL at 08:11

## 2018-08-28 RX ADMIN — Medication 10 MILLIGRAM(S): at 05:28

## 2018-08-28 NOTE — PROGRESS NOTE ADULT - SUBJECTIVE AND OBJECTIVE BOX
Patient is a 37y old  Male who presents with a chief complaint of abdominal pain (27 Aug 2018 17:03)      INTERVAL HPI/OVERNIGHT EVENTS: abd pain and nausea improving. denies diarrhea     MEDICATIONS  (STANDING):  ciprofloxacin     Tablet 500 milliGRAM(s) Oral every 12 hours  dextrose 5%. 1000 milliLiter(s) (50 mL/Hr) IV Continuous <Continuous>  dextrose 50% Injectable 12.5 Gram(s) IV Push once  dextrose 50% Injectable 25 Gram(s) IV Push once  dextrose 50% Injectable 25 Gram(s) IV Push once  escitalopram 5 milliGRAM(s) Oral daily  insulin glargine Injectable (LANTUS) 15 Unit(s) SubCutaneous at bedtime  insulin lispro (HumaLOG) corrective regimen sliding scale   SubCutaneous three times a day before meals  insulin lispro (HumaLOG) corrective regimen sliding scale   SubCutaneous at bedtime  lactobacillus acidophilus 1 Tablet(s) Oral daily  mesalamine DR Capsule 800 milliGRAM(s) Oral three times a day  metoclopramide Injectable 10 milliGRAM(s) IV Push every 8 hours  metroNIDAZOLE    Tablet 500 milliGRAM(s) Oral every 8 hours  pantoprazole  Injectable 40 milliGRAM(s) IV Push every 12 hours  sucralfate 1 Gram(s) Oral four times a day    MEDICATIONS  (PRN):  ALBUTerol    90 MICROgram(s) HFA Inhaler 2 Puff(s) Inhalation every 6 hours PRN Shortness of Breath and/or Wheezing  clonazePAM Tablet 0.5 milliGRAM(s) Oral two times a day PRN anxiety  dextrose 40% Gel 15 Gram(s) Oral once PRN Blood Glucose LESS THAN 70 milliGRAM(s)/deciliter  glucagon  Injectable 1 milliGRAM(s) IntraMuscular once PRN Glucose LESS THAN 70 milligrams/deciliter  morphine  - Injectable 4 milliGRAM(s) IV Push every 4 hours PRN Moderate Pain (4 - 6)  ondansetron Injectable 4 milliGRAM(s) IV Push every 6 hours PRN Nausea and/or Vomiting      Allergies    No Known Allergies    Intolerances        Vital Signs Last 24 Hrs  T(C): 36.1 (28 Aug 2018 11:05), Max: 37.3 (27 Aug 2018 14:35)  T(F): 97 (28 Aug 2018 11:05), Max: 99.2 (27 Aug 2018 14:35)  HR: 72 (28 Aug 2018 11:05) (69 - 95)  BP: 141/90 (28 Aug 2018 11:05) (104/72 - 180/110)  BP(mean): --  RR: 18 (28 Aug 2018 11:05) (16 - 20)  SpO2: 96% (28 Aug 2018 11:05) (92% - 100%)    PHYSICAL EXAM:  GENERAL: NAD, well-groomed, well-developed  HEAD:  Atraumatic, Normocephalic  EYES: EOMI, PERRLA, conjunctiva and sclera clear  ENMT: No tonsillar erythema, exudates, or enlargement; Moist mucous membranes, Good dentition, No lesions  NECK: Supple, No JVD, Normal thyroid  NERVOUS SYSTEM:  Alert & Oriented X3, Good concentration; Motor Strength 5/5 B/L upper and lower extremities; DTRs 2+ intact and symmetric  CHEST/LUNG: Clear to percussion bilaterally; No rales, rhonchi, wheezing, or rubs  HEART: Regular rate and rhythm; No murmurs, rubs, or gallops  ABDOMEN: Soft, epigastric tenderness, Nondistended; Bowel sounds present  EXTREMITIES:  2+ Peripheral Pulses, No clubbing, cyanosis, or edema  LYMPH: No lymphadenopathy noted  SKIN: No rashes or lesions    LABS:                        13.6   11.58 )-----------( 351      ( 28 Aug 2018 07:10 )             40.4         141  |  102  |  16  ----------------------------<  279<H>  3.8   |  30  |  1.11    Ca    8.8      28 Aug 2018 07:10    TPro  8.4<H>  /  Alb  4.1  /  TBili  0.3  /  DBili  x   /  AST  18  /  ALT  31  /  AlkPhos  111  -    PT/INR - ( 27 Aug 2018 15:56 )   PT: 10.1 sec;   INR: 0.93 ratio         PTT - ( 27 Aug 2018 15:56 )  PTT:28.3 sec  Urinalysis Basic - ( 27 Aug 2018 17:24 )    Color: Yellow / Appearance: Clear / S.015 / pH: x  Gluc: x / Ketone: Trace  / Bili: Negative / Urobili: Negative mg/dL   Blood: x / Protein: Negative mg/dL / Nitrite: Negative   Leuk Esterase: Negative / RBC: x / WBC 0-2   Sq Epi: x / Non Sq Epi: x / Bacteria: Occasional      CAPILLARY BLOOD GLUCOSE      POCT Blood Glucose.: 277 mg/dL (28 Aug 2018 12:03)  POCT Blood Glucose.: 257 mg/dL (28 Aug 2018 08:15)  POCT Blood Glucose.: 368 mg/dL (27 Aug 2018 23:06)  POCT Blood Glucose.: 367 mg/dL (27 Aug 2018 18:56)  POCT Blood Glucose.: 312 mg/dL (27 Aug 2018 15:56)      RADIOLOGY & ADDITIONAL TESTS:    Imaging Personally Reviewed:  [x ] YES  [ ] NO    Consultant(s) Notes Reviewed:  [ ] YES  [ ] NO    Care Discussed with Consultants/Other Providers [ ] YES  [ ] NO

## 2018-08-28 NOTE — PROGRESS NOTE ADULT - PROBLEM SELECTOR PLAN 1
pain improving   reglan  analgesics   gastroenterologist consult  had recent endocscopy that showed small hernia but otherwise unremarkable

## 2018-08-28 NOTE — CONSULT NOTE ADULT - SUBJECTIVE AND OBJECTIVE BOX
Chief Complaint:  Patient is a 37y old  Male who presents with a chief complaint of abdominal pain (27 Aug 2018 17:03)      HPI:  ·36 yo M with abd pain, n/v for a few days, nothing makes it better, no specific inciting event.  Feels like his gastroparesis.  No other complaints. patient was discharged from the hospital 3 days ago and the pain restarted this am with upper epigastric pain and vomiting - denies hematemesis or melena - patient was being treated with oral antibiotics as seen on the ct last admission (27 Aug 2018 17:03)  PAtient s/p EGD last week with H Pylori negative gastritis and Hiatal hernia GERD     PMH/PSH:PAST MEDICAL & SURGICAL HISTORY:  Uncomplicated asthma, unspecified asthma severity, unspecified whether persistent  Gastritis, presence of bleeding unspecified, unspecified chronicity, unspecified gastritis type  Controlled diabetes mellitus type 1 without complications  Anxiety  Gastritis  Asthma  DM type 1 (diabetes mellitus, type 1)  History of cholecystectomy  S/P cholecystectomy      Allergies:  No Known Allergies      Medications:  ALBUTerol    90 MICROgram(s) HFA Inhaler 2 Puff(s) Inhalation every 6 hours PRN  ciprofloxacin     Tablet 500 milliGRAM(s) Oral every 12 hours  clonazePAM Tablet 0.5 milliGRAM(s) Oral two times a day PRN  dextrose 40% Gel 15 Gram(s) Oral once PRN  dextrose 5%. 1000 milliLiter(s) IV Continuous <Continuous>  dextrose 50% Injectable 12.5 Gram(s) IV Push once  dextrose 50% Injectable 25 Gram(s) IV Push once  dextrose 50% Injectable 25 Gram(s) IV Push once  escitalopram 5 milliGRAM(s) Oral daily  glucagon  Injectable 1 milliGRAM(s) IntraMuscular once PRN  insulin glargine Injectable (LANTUS) 15 Unit(s) SubCutaneous at bedtime  insulin lispro (HumaLOG) corrective regimen sliding scale   SubCutaneous three times a day before meals  insulin lispro (HumaLOG) corrective regimen sliding scale   SubCutaneous at bedtime  lactobacillus acidophilus 1 Tablet(s) Oral daily  mesalamine DR Capsule 800 milliGRAM(s) Oral three times a day  metoclopramide Injectable 10 milliGRAM(s) IV Push every 8 hours  metroNIDAZOLE    Tablet 500 milliGRAM(s) Oral every 8 hours  morphine  - Injectable 4 milliGRAM(s) IV Push every 4 hours PRN  ondansetron Injectable 4 milliGRAM(s) IV Push every 6 hours PRN  pantoprazole  Injectable 40 milliGRAM(s) IV Push every 12 hours  sucralfate 1 Gram(s) Oral four times a day      Review of Systems:    General:  No weight loss, fevers, chills, night sweats, fatigue,   Eyes:  Good vision, no reported pain  ENT:  No sore throat, pain, runny nose, dysphagia  CV:  No pain, palpitations, hypo/hypertension  Resp:  No dyspnea, cough, tachypnea, wheezing  GI:  +pain, +nausea, +vomiting, No diarrhea, No constipation, No pruritis, No rectal bleeding, No tarry stools, No dysphagia,  :  No pain, bleeding, incontinence, nocturia  Muscle:  No pain, weakness  Breast:  No pain, abscess, mass, discharge  Neuro:  No weakness, tingling, memory problems  Psych:  No fatigue, insomnia, mood problems, depression  Endocrine:  No polyuria, polydipsia, cold/heat intolerance  Heme:  No petechiae, ecchymosis, easy bruisability  Skin:  No rash, tattoos, scars, edema    Relevant Family History:   FAMILY HISTORY:  Family history of diabetes mellitus (DM) (Father, Mother, Grandparent)  Family history of diabetes mellitus (Father, Mother)      Relevant Social History: Alcohol ( -) , Tobacco ( -) , Illicit drugs (- )     Physical Exam:    Vital Signs:  Vital Signs Last 24 Hrs  T(C): 36.1 (28 Aug 2018 11:05), Max: 37.3 (27 Aug 2018 16:07)  T(F): 97 (28 Aug 2018 11:05), Max: 99.2 (27 Aug 2018 16:07)  HR: 72 (28 Aug 2018 11:05) (72 - 95)  BP: 141/90 (28 Aug 2018 11:05) (104/72 - 180/110)  BP(mean): --  RR: 18 (28 Aug 2018 11:05) (18 - 20)  SpO2: 96% (28 Aug 2018 11:05) (92% - 100%)  Daily     Daily     General:  Appears stated age, well-groomed, well-nourished, no distress  HEENT:  NC/AT,  conjunctivae clear and pink, no thyromegaly, nodules, adenopathy, no JVD, anicteric sclera  Chest:  Full & symmetric excursion, no increased effort, breath sounds clear  Cardiovascular:  Regular rhythm, S1, S2, no murmur/rub/S3/S4, no abdominal bruit, no edema  Abdomen:  Soft, +tender, non distended, normoactive bowel sounds,  no masses , no hepatosplenomegaly, no signs of chronic liver disease  Extremities:  no cyanosis, clubbing or edema  Skin:  No rash/erythema/ecchymoses/petechiae/wounds/abscess/warm/dry  Neuro/Psych:  Alert, oriented, no asterixis, no tremor, no encephalopathy    Laboratory:                          13.6   11.58 )-----------( 351      ( 28 Aug 2018 07:10 )             40.4     08-    141  |  102  |  16  ----------------------------<  279<H>  3.8   |  30  |  1.11    Ca    8.8      28 Aug 2018 07:10    TPro  8.4<H>  /  Alb  4.1  /  TBili  0.3  /  DBili  x   /  AST  18  /  ALT  31  /  AlkPhos  111  08-    LIVER FUNCTIONS - ( 27 Aug 2018 15:56 )  Alb: 4.1 g/dL / Pro: 8.4 gm/dL / ALK PHOS: 111 U/L / ALT: 31 U/L / AST: 18 U/L / GGT: x           PT/INR - ( 27 Aug 2018 15:56 )   PT: 10.1 sec;   INR: 0.93 ratio         PTT - ( 27 Aug 2018 15:56 )  PTT:28.3 sec  Urinalysis Basic - ( 27 Aug 2018 17:24 )    Color: Yellow / Appearance: Clear / S.015 / pH: x  Gluc: x / Ketone: Trace  / Bili: Negative / Urobili: Negative mg/dL   Blood: x / Protein: Negative mg/dL / Nitrite: Negative   Leuk Esterase: Negative / RBC: x / WBC 0-2   Sq Epi: x / Non Sq Epi: x / Bacteria: Occasional      Amylase Serum97 U/L      Lipase serum46 U/L<L>

## 2018-08-28 NOTE — PATIENT PROFILE ADULT. - VISION (WITH CORRECTIVE LENSES IF THE PATIENT USUALLY WEARS THEM):
distance glasses home/Normal vision: sees adequately in most situations; can see medication labels, newsprint

## 2018-08-28 NOTE — CONSULT NOTE ADULT - ASSESSMENT
36 yo BM admitted with Epigastric pain , Nausea and vomiting , Hx of DM gastroparesis,  S/P EGD , DIFFUSE GASTRITIS WITH SMALL HIATAL HERNIA AND GERD H pylori (-)

## 2018-08-29 LAB
ANION GAP SERPL CALC-SCNC: 10 MMOL/L — SIGNIFICANT CHANGE UP (ref 5–17)
BUN SERPL-MCNC: 13 MG/DL — SIGNIFICANT CHANGE UP (ref 7–23)
CALCIUM SERPL-MCNC: 8.6 MG/DL — SIGNIFICANT CHANGE UP (ref 8.5–10.1)
CHLORIDE SERPL-SCNC: 102 MMOL/L — SIGNIFICANT CHANGE UP (ref 96–108)
CO2 SERPL-SCNC: 30 MMOL/L — SIGNIFICANT CHANGE UP (ref 22–31)
CREAT SERPL-MCNC: 0.98 MG/DL — SIGNIFICANT CHANGE UP (ref 0.5–1.3)
GLUCOSE BLDC GLUCOMTR-MCNC: 154 MG/DL — HIGH (ref 70–99)
GLUCOSE BLDC GLUCOMTR-MCNC: 233 MG/DL — HIGH (ref 70–99)
GLUCOSE BLDC GLUCOMTR-MCNC: 77 MG/DL — SIGNIFICANT CHANGE UP (ref 70–99)
GLUCOSE BLDC GLUCOMTR-MCNC: 99 MG/DL — SIGNIFICANT CHANGE UP (ref 70–99)
GLUCOSE SERPL-MCNC: 82 MG/DL — SIGNIFICANT CHANGE UP (ref 70–99)
HCT VFR BLD CALC: 39.4 % — SIGNIFICANT CHANGE UP (ref 39–50)
HGB BLD-MCNC: 13.2 G/DL — SIGNIFICANT CHANGE UP (ref 13–17)
MCHC RBC-ENTMCNC: 27.8 PG — SIGNIFICANT CHANGE UP (ref 27–34)
MCHC RBC-ENTMCNC: 33.5 GM/DL — SIGNIFICANT CHANGE UP (ref 32–36)
MCV RBC AUTO: 83.1 FL — SIGNIFICANT CHANGE UP (ref 80–100)
NRBC # BLD: 0 /100 WBCS — SIGNIFICANT CHANGE UP (ref 0–0)
PLATELET # BLD AUTO: 335 K/UL — SIGNIFICANT CHANGE UP (ref 150–400)
POTASSIUM SERPL-MCNC: 3.3 MMOL/L — LOW (ref 3.5–5.3)
POTASSIUM SERPL-SCNC: 3.3 MMOL/L — LOW (ref 3.5–5.3)
RBC # BLD: 4.74 M/UL — SIGNIFICANT CHANGE UP (ref 4.2–5.8)
RBC # FLD: 13.4 % — SIGNIFICANT CHANGE UP (ref 10.3–14.5)
SODIUM SERPL-SCNC: 142 MMOL/L — SIGNIFICANT CHANGE UP (ref 135–145)
WBC # BLD: 7.7 K/UL — SIGNIFICANT CHANGE UP (ref 3.8–10.5)
WBC # FLD AUTO: 7.7 K/UL — SIGNIFICANT CHANGE UP (ref 3.8–10.5)

## 2018-08-29 PROCEDURE — 99233 SBSQ HOSP IP/OBS HIGH 50: CPT

## 2018-08-29 RX ORDER — POTASSIUM CHLORIDE 20 MEQ
40 PACKET (EA) ORAL ONCE
Qty: 0 | Refills: 0 | Status: COMPLETED | OUTPATIENT
Start: 2018-08-29 | End: 2018-08-29

## 2018-08-29 RX ADMIN — Medication 1 GRAM(S): at 11:53

## 2018-08-29 RX ADMIN — Medication 800 MILLIGRAM(S): at 05:36

## 2018-08-29 RX ADMIN — Medication 500 MILLIGRAM(S): at 17:41

## 2018-08-29 RX ADMIN — MORPHINE SULFATE 4 MILLIGRAM(S): 50 CAPSULE, EXTENDED RELEASE ORAL at 19:35

## 2018-08-29 RX ADMIN — Medication 500 MILLIGRAM(S): at 13:58

## 2018-08-29 RX ADMIN — MORPHINE SULFATE 4 MILLIGRAM(S): 50 CAPSULE, EXTENDED RELEASE ORAL at 15:59

## 2018-08-29 RX ADMIN — MORPHINE SULFATE 4 MILLIGRAM(S): 50 CAPSULE, EXTENDED RELEASE ORAL at 23:38

## 2018-08-29 RX ADMIN — MORPHINE SULFATE 4 MILLIGRAM(S): 50 CAPSULE, EXTENDED RELEASE ORAL at 10:29

## 2018-08-29 RX ADMIN — Medication 40 MILLIEQUIVALENT(S): at 16:39

## 2018-08-29 RX ADMIN — Medication 500 MILLIGRAM(S): at 05:36

## 2018-08-29 RX ADMIN — Medication 1 GRAM(S): at 23:42

## 2018-08-29 RX ADMIN — Medication 1 GRAM(S): at 05:36

## 2018-08-29 RX ADMIN — PANTOPRAZOLE SODIUM 40 MILLIGRAM(S): 20 TABLET, DELAYED RELEASE ORAL at 17:42

## 2018-08-29 RX ADMIN — Medication 10 MILLIGRAM(S): at 05:36

## 2018-08-29 RX ADMIN — Medication 2: at 16:38

## 2018-08-29 RX ADMIN — Medication 0.5 MILLIGRAM(S): at 21:12

## 2018-08-29 RX ADMIN — MORPHINE SULFATE 4 MILLIGRAM(S): 50 CAPSULE, EXTENDED RELEASE ORAL at 06:35

## 2018-08-29 RX ADMIN — Medication 800 MILLIGRAM(S): at 21:12

## 2018-08-29 RX ADMIN — INSULIN GLARGINE 15 UNIT(S): 100 INJECTION, SOLUTION SUBCUTANEOUS at 21:14

## 2018-08-29 RX ADMIN — MORPHINE SULFATE 4 MILLIGRAM(S): 50 CAPSULE, EXTENDED RELEASE ORAL at 10:59

## 2018-08-29 RX ADMIN — Medication 4: at 11:52

## 2018-08-29 RX ADMIN — MORPHINE SULFATE 4 MILLIGRAM(S): 50 CAPSULE, EXTENDED RELEASE ORAL at 15:29

## 2018-08-29 RX ADMIN — Medication 500 MILLIGRAM(S): at 21:12

## 2018-08-29 RX ADMIN — MORPHINE SULFATE 4 MILLIGRAM(S): 50 CAPSULE, EXTENDED RELEASE ORAL at 19:50

## 2018-08-29 RX ADMIN — MORPHINE SULFATE 4 MILLIGRAM(S): 50 CAPSULE, EXTENDED RELEASE ORAL at 05:58

## 2018-08-29 RX ADMIN — PANTOPRAZOLE SODIUM 40 MILLIGRAM(S): 20 TABLET, DELAYED RELEASE ORAL at 05:35

## 2018-08-29 RX ADMIN — Medication 800 MILLIGRAM(S): at 13:58

## 2018-08-29 RX ADMIN — Medication 10 MILLIGRAM(S): at 21:13

## 2018-08-29 RX ADMIN — MORPHINE SULFATE 4 MILLIGRAM(S): 50 CAPSULE, EXTENDED RELEASE ORAL at 01:15

## 2018-08-29 RX ADMIN — Medication 1 TABLET(S): at 11:53

## 2018-08-29 RX ADMIN — MORPHINE SULFATE 4 MILLIGRAM(S): 50 CAPSULE, EXTENDED RELEASE ORAL at 01:30

## 2018-08-29 RX ADMIN — Medication 0.5 MILLIGRAM(S): at 11:35

## 2018-08-29 RX ADMIN — ESCITALOPRAM OXALATE 5 MILLIGRAM(S): 10 TABLET, FILM COATED ORAL at 11:53

## 2018-08-29 RX ADMIN — Medication 10 MILLIGRAM(S): at 13:58

## 2018-08-29 RX ADMIN — Medication 1 GRAM(S): at 17:41

## 2018-08-29 NOTE — DIETITIAN INITIAL EVALUATION ADULT. - PHYSICAL APPEARANCE
other (specify)/BMI= 25.0(08-27), Nutrition focused physical exam conducted; Subcutaneous fat Exam;  [WNL  ]  Orbital fat pads region,  [WNL  ]Buccal fat region,  [ WNL ]triceps region, [WNL  ]ribs region.  Muscle Exam; [ WNL ]temples region, [mild  ]clavicle region, [ WNL ]shoulder region, [ WNL ]Scapula region, [  WNL]Interosseous region, [ WNL ]thigh region, [ WNL ]Calf region

## 2018-08-29 NOTE — PROGRESS NOTE ADULT - SUBJECTIVE AND OBJECTIVE BOX
Patient is a 37y old  Male who presents with a chief complaint of abdominal pain (27 Aug 2018 17:03)      INTERVAL HPI/OVERNIGHT EVENTS: abd pain and nausea improving.     MEDICATIONS  (STANDING):  ciprofloxacin     Tablet 500 milliGRAM(s) Oral every 12 hours  dextrose 5%. 1000 milliLiter(s) (50 mL/Hr) IV Continuous <Continuous>  dextrose 50% Injectable 12.5 Gram(s) IV Push once  dextrose 50% Injectable 25 Gram(s) IV Push once  dextrose 50% Injectable 25 Gram(s) IV Push once  escitalopram 5 milliGRAM(s) Oral daily  insulin glargine Injectable (LANTUS) 15 Unit(s) SubCutaneous at bedtime  insulin lispro (HumaLOG) corrective regimen sliding scale   SubCutaneous three times a day before meals  insulin lispro (HumaLOG) corrective regimen sliding scale   SubCutaneous at bedtime  lactobacillus acidophilus 1 Tablet(s) Oral daily  mesalamine DR Capsule 800 milliGRAM(s) Oral three times a day  metoclopramide Injectable 10 milliGRAM(s) IV Push every 8 hours  metroNIDAZOLE    Tablet 500 milliGRAM(s) Oral every 8 hours  pantoprazole  Injectable 40 milliGRAM(s) IV Push every 12 hours  sucralfate 1 Gram(s) Oral four times a day    MEDICATIONS  (PRN):  ALBUTerol    90 MICROgram(s) HFA Inhaler 2 Puff(s) Inhalation every 6 hours PRN Shortness of Breath and/or Wheezing  clonazePAM Tablet 0.5 milliGRAM(s) Oral two times a day PRN anxiety  dextrose 40% Gel 15 Gram(s) Oral once PRN Blood Glucose LESS THAN 70 milliGRAM(s)/deciliter  glucagon  Injectable 1 milliGRAM(s) IntraMuscular once PRN Glucose LESS THAN 70 milligrams/deciliter  morphine  - Injectable 4 milliGRAM(s) IV Push every 4 hours PRN Moderate Pain (4 - 6)  ondansetron Injectable 4 milliGRAM(s) IV Push every 6 hours PRN Nausea and/or Vomiting          Allergies    No Known Allergies    Intolerances        Vital Signs Last 24 Hrs  T(C): 36.1 (29 Aug 2018 12:00), Max: 36.1 (28 Aug 2018 23:19)  T(F): 97 (29 Aug 2018 12:00), Max: 97 (28 Aug 2018 23:19)  HR: 75 (29 Aug 2018 12:00) (59 - 75)  BP: 137/87 (29 Aug 2018 12:00) (120/85 - 148/89)  BP(mean): --  RR: 16 (29 Aug 2018 12:00) (16 - 16)  SpO2: 96% (29 Aug 2018 12:00) (96% - 98%)    PHYSICAL EXAM:  GENERAL: NAD, well-groomed, well-developed  HEAD:  Atraumatic, Normocephalic  EYES: EOMI, PERRLA, conjunctiva and sclera clear  ENMT: No tonsillar erythema, exudates, or enlargement; Moist mucous membranes, Good dentition, No lesions  NECK: Supple, No JVD, Normal thyroid  NERVOUS SYSTEM:  Alert & Oriented X3, Good concentration; Motor Strength 5/5 B/L upper and lower extremities; DTRs 2+ intact and symmetric  CHEST/LUNG: Clear to percussion bilaterally; No rales, rhonchi, wheezing, or rubs  HEART: Regular rate and rhythm; No murmurs, rubs, or gallops  ABDOMEN: Soft, epigastric tenderness, Nondistended; Bowel sounds present  EXTREMITIES:  2+ Peripheral Pulses, No clubbing, cyanosis, or edema  LYMPH: No lymphadenopathy noted  SKIN: No rashes or lesions    LABS:                                   13.2   7.70  )-----------( 335      ( 29 Aug 2018 06:23 )             39.4     08-    142  |  102  |  13  ----------------------------<  82  3.3<L>   |  30  |  0.98    Ca    8.6      29 Aug 2018 06:23        Urinalysis Basic - ( 27 Aug 2018 17:24 )    Color: Yellow / Appearance: Clear / S.015 / pH: x  Gluc: x / Ketone: Trace  / Bili: Negative / Urobili: Negative mg/dL   Blood: x / Protein: Negative mg/dL / Nitrite: Negative   Leuk Esterase: Negative / RBC: x / WBC 0-2   Sq Epi: x / Non Sq Epi: x / Bacteria: Occasional          Color: Yellow / Appearance: Clear / S.015 / pH: x  Gluc: x / Ketone: Trace  / Bili: Negative / Urobili: Negative mg/dL   Blood: x / Protein: Negative mg/dL / Nitrite: Negative   Leuk Esterase: Negative / RBC: x / WBC 0-2   Sq Epi: x / Non Sq Epi: x / Bacteria: Occasional          RADIOLOGY & ADDITIONAL TESTS:    Imaging Personally Reviewed:  [x ] YES  [ ] NO    Consultant(s) Notes Reviewed:  [x ] YES  [ ] NO    Care Discussed with Consultants/Other Providers [ ] YES  [ ] NO

## 2018-08-29 NOTE — DIETITIAN INITIAL EVALUATION ADULT. - NUTRITIONGOAL OUTCOME3
pt verbalizes 3-4 foods containing CHO,  pre-meal glucose 140 mg/dL,2 hour post prandial glucose<180

## 2018-08-29 NOTE — PROGRESS NOTE ADULT - PROBLEM SELECTOR PLAN 1
pain improving. advance to diet as tolerated  reglan TID   analgesics   gastroenterologist consult  had recent endocscopy that showed small hernia but otherwise unremarkable

## 2018-08-29 NOTE — DIETITIAN INITIAL EVALUATION ADULT. - ADHERENCE
poor/wife did the shopping & cooking, wife states that she prepares healthy meals for him at home and that pt eats out and is non compliant.

## 2018-08-29 NOTE — DIETITIAN INITIAL EVALUATION ADULT. - PERTINENT MEDS FT
ABX , insulin glargine 15 units, lispro corrective regimen sliding scale 3 x day before meals & bedtime , mesalamine DR, metoclopramide , pantoprazole sucralfate, lactobacillus acidophilus , clonazepam, ondansetron prn for N/V q 6 hours, morphine prn

## 2018-08-29 NOTE — PROGRESS NOTE ADULT - SUBJECTIVE AND OBJECTIVE BOX
Gastroenterology  Patient seen and examined bedside resting comfortably.  No complaints offered.   No abdominal pain.No vomiting  Denies nausea and vomiting. Tolerating diet.  Normal flatus/BM.     T(F): 97 (08-29-18 @ 12:00), Max: 97 (08-28-18 @ 23:19)  HR: 75 (08-29-18 @ 12:00) (59 - 75)  BP: 137/87 (08-29-18 @ 12:00) (120/85 - 148/89)  RR: 16 (08-29-18 @ 12:00) (16 - 16)  SpO2: 96% (08-29-18 @ 12:00) (96% - 98%)  Wt(kg): --  CAPILLARY BLOOD GLUCOSE      POCT Blood Glucose.: 233 mg/dL (29 Aug 2018 11:50)  POCT Blood Glucose.: 77 mg/dL (29 Aug 2018 07:53)  POCT Blood Glucose.: 353 mg/dL (28 Aug 2018 22:51)  POCT Blood Glucose.: 75 mg/dL (28 Aug 2018 17:21)    PHYSICAL EXAM:  General: NAD, WDWN.   Neuro:  Alert and responsive  HEENT: NCAT, EOMI, conjunctiva clear  CV: +S1+S2 regular rate and rhythm  Lung: clear to ausculation bilaterally, respirations nonlabored, good inspiratory effort  Abdomen: soft, NonTender, No distention Normal active BS  Extremities: no cyanosis, clubbing or edema    LABS:                        13.2   7.70  )-----------( 335      ( 29 Aug 2018 06:23 )             39.4     08-29    142  |  102  |  13  ----------------------------<  82  3.3<L>   |  30  |  0.98    Ca    8.6      29 Aug 2018 06:23    TPro  8.4<H>  /  Alb  4.1  /  TBili  0.3  /  DBili  x   /  AST  18  /  ALT  31  /  AlkPhos  111  08-27    LIVER FUNCTIONS - ( 27 Aug 2018 15:56 )  Alb: 4.1 g/dL / Pro: 8.4 gm/dL / ALK PHOS: 111 U/L / ALT: 31 U/L / AST: 18 U/L / GGT: x           PT/INR - ( 27 Aug 2018 15:56 )   PT: 10.1 sec;   INR: 0.93 ratio         PTT - ( 27 Aug 2018 15:56 )  PTT:28.3 sec  I&O's Detail    28 Aug 2018 07:01  -  29 Aug 2018 07:00  --------------------------------------------------------  IN:  Total IN: 0 mL    OUT:    Voided: 650 mL  Total OUT: 650 mL    Total NET: -650 mL      29 Aug 2018 07:01  -  29 Aug 2018 14:56  --------------------------------------------------------  IN:    Oral Fluid: 240 mL  Total IN: 240 mL    OUT:  Total OUT: 0 mL    Total NET: 240 mL        08-29 @ 06:23    142 | 102 | 13  /8.6 | -- | --  _______________________/  3.3 | 30 | 0.98                           \par   Amylase, Serum Total: 97 U/L (08-27 @ 15:56)

## 2018-08-29 NOTE — DIETITIAN INITIAL EVALUATION ADULT. - ETIOLOGY
inadequate protein-energy intake in setting of gastroparesis cognitive deficit/ previous lack of exposure to accurate nutrition related information

## 2018-08-29 NOTE — DIETITIAN INITIAL EVALUATION ADULT. - NUTRITION INTERVENTION
Meals and Snack/Nutrition Education Collaboration and Referral of Nutrition Care/Nutrition Education

## 2018-08-29 NOTE — DIETITIAN INITIAL EVALUATION ADULT. - SIGNS/SYMPTOMS
1.13% wt. loss in < 1week pt unsure of foods containing CHO, inconsistent CHO intake, lack of compliance, HbA1/GC=8.6%

## 2018-08-29 NOTE — DIETITIAN INITIAL EVALUATION ADULT. - ENERGY NEEDS
Height (cm): 177.8 (08-27)  Weight (kg): 79 (08-27)  BMI (kg/m2): 25 (08-27)  IBW: 75.2 kg          % IBW: 105%          UBW: 79.8 kg          %UBW: 98%

## 2018-08-29 NOTE — DIETITIAN INITIAL EVALUATION ADULT. - OTHER INFO
Pt seen due to HbA1/GC=8.6 %.  Pt c hx of DM x 20 years, was on insulin glargine 30 units and 10 units Novolog 3 x day.  Pt reports not to eat breakfast, eats fast foods for lunch and dinner @home.  Pt drinks juice throughput the day.  Pt was self monitoring blood glucose 4 x day.  Pt was able to verbalize blood glucose goals for HbA1/GC<7.0, but unsure of pre-meal, post prandial glucose goals and all foods containing CHO.  Pt reports to be tolerating diet and would like diet progressed

## 2018-08-29 NOTE — DIETITIAN INITIAL EVALUATION ADULT. - NS AS NUTRI INTERV ED CONTENT3
Purpose of the nutrition education/Educate pt on food containing CHO c meal planning c plate method, consistent CHO intake & blood glucose goals, provide Directory of ambulatory nutrition services/ diabetes wellness program information

## 2018-08-29 NOTE — DIETITIAN INITIAL EVALUATION ADULT. - PERTINENT LABORATORY DATA
08-29 Na142 mmol/L Glu 82 mg/dL K+ 3.3 mmol/L<L> Cr  0.98 mg/dL BUN 13 mg/dL 08-27 Alb 4.1 g/dL 08-28 [id number] 8.6 %<H>08-27 ALT 31 U/L AST 18 U/L Alkaline Phosphatase 111 U/L  Hgb 13.2 g/dL Hct 39.4 %

## 2018-08-29 NOTE — DIETITIAN INITIAL EVALUATION ADULT. - NS AS NUTRI INTERV MEALS SNACK
Fluid - modified diet/Carbohydrate - modified diet/Recommend advance to full liquid consistent CHO c  Glucerna Shake 2 x daily= 400 calories, 20 grams protein daily and advance if tolerated to low fiber, consistent carbohydrate c evening snack (small , frequent meals, 4-6 small meals encouraged/Fiber - modified diet

## 2018-08-30 ENCOUNTER — TRANSCRIPTION ENCOUNTER (OUTPATIENT)
Age: 38
End: 2018-08-30

## 2018-08-30 VITALS
OXYGEN SATURATION: 94 % | TEMPERATURE: 98 F | SYSTOLIC BLOOD PRESSURE: 125 MMHG | RESPIRATION RATE: 16 BRPM | DIASTOLIC BLOOD PRESSURE: 87 MMHG | HEART RATE: 78 BPM

## 2018-08-30 DIAGNOSIS — Z79.4 LONG TERM (CURRENT) USE OF INSULIN: ICD-10-CM

## 2018-08-30 DIAGNOSIS — K52.9 NONINFECTIVE GASTROENTERITIS AND COLITIS, UNSPECIFIED: ICD-10-CM

## 2018-08-30 DIAGNOSIS — K21.0 GASTRO-ESOPHAGEAL REFLUX DISEASE WITH ESOPHAGITIS: ICD-10-CM

## 2018-08-30 DIAGNOSIS — J45.909 UNSPECIFIED ASTHMA, UNCOMPLICATED: ICD-10-CM

## 2018-08-30 DIAGNOSIS — F32.9 MAJOR DEPRESSIVE DISORDER, SINGLE EPISODE, UNSPECIFIED: ICD-10-CM

## 2018-08-30 DIAGNOSIS — Z83.3 FAMILY HISTORY OF DIABETES MELLITUS: ICD-10-CM

## 2018-08-30 DIAGNOSIS — K29.80 DUODENITIS WITHOUT BLEEDING: ICD-10-CM

## 2018-08-30 DIAGNOSIS — F41.9 ANXIETY DISORDER, UNSPECIFIED: ICD-10-CM

## 2018-08-30 DIAGNOSIS — K31.84 GASTROPARESIS: ICD-10-CM

## 2018-08-30 DIAGNOSIS — K29.70 GASTRITIS, UNSPECIFIED, WITHOUT BLEEDING: ICD-10-CM

## 2018-08-30 DIAGNOSIS — E10.43 TYPE 1 DIABETES MELLITUS WITH DIABETIC AUTONOMIC (POLY)NEUROPATHY: ICD-10-CM

## 2018-08-30 DIAGNOSIS — K44.9 DIAPHRAGMATIC HERNIA WITHOUT OBSTRUCTION OR GANGRENE: ICD-10-CM

## 2018-08-30 DIAGNOSIS — Z90.49 ACQUIRED ABSENCE OF OTHER SPECIFIED PARTS OF DIGESTIVE TRACT: ICD-10-CM

## 2018-08-30 LAB
GLUCOSE BLDC GLUCOMTR-MCNC: 142 MG/DL — HIGH (ref 70–99)
GLUCOSE BLDC GLUCOMTR-MCNC: 219 MG/DL — HIGH (ref 70–99)
GLUCOSE BLDC GLUCOMTR-MCNC: 54 MG/DL — LOW (ref 70–99)
GLUCOSE BLDC GLUCOMTR-MCNC: 58 MG/DL — LOW (ref 70–99)

## 2018-08-30 PROCEDURE — 99239 HOSP IP/OBS DSCHRG MGMT >30: CPT

## 2018-08-30 RX ORDER — SUCRALFATE 1 G
1 TABLET ORAL
Qty: 0 | Refills: 0 | COMMUNITY
Start: 2018-08-30

## 2018-08-30 RX ORDER — ALBUTEROL 90 UG/1
2 AEROSOL, METERED ORAL
Qty: 0 | Refills: 0 | COMMUNITY

## 2018-08-30 RX ORDER — CLONAZEPAM 1 MG
1 TABLET ORAL
Qty: 0 | Refills: 0 | COMMUNITY
Start: 2018-08-30

## 2018-08-30 RX ORDER — ESCITALOPRAM OXALATE 10 MG/1
1 TABLET, FILM COATED ORAL
Qty: 0 | Refills: 0 | COMMUNITY
Start: 2018-08-30

## 2018-08-30 RX ORDER — LACTOBACILLUS ACIDOPHILUS 100MM CELL
1 CAPSULE ORAL
Qty: 0 | Refills: 0 | DISCHARGE
Start: 2018-08-30

## 2018-08-30 RX ADMIN — Medication 800 MILLIGRAM(S): at 13:39

## 2018-08-30 RX ADMIN — MORPHINE SULFATE 4 MILLIGRAM(S): 50 CAPSULE, EXTENDED RELEASE ORAL at 13:39

## 2018-08-30 RX ADMIN — Medication 10 MILLIGRAM(S): at 05:10

## 2018-08-30 RX ADMIN — Medication 1 GRAM(S): at 11:38

## 2018-08-30 RX ADMIN — ESCITALOPRAM OXALATE 5 MILLIGRAM(S): 10 TABLET, FILM COATED ORAL at 11:38

## 2018-08-30 RX ADMIN — Medication 500 MILLIGRAM(S): at 05:10

## 2018-08-30 RX ADMIN — Medication 4: at 11:38

## 2018-08-30 RX ADMIN — MORPHINE SULFATE 4 MILLIGRAM(S): 50 CAPSULE, EXTENDED RELEASE ORAL at 10:03

## 2018-08-30 RX ADMIN — MORPHINE SULFATE 4 MILLIGRAM(S): 50 CAPSULE, EXTENDED RELEASE ORAL at 14:09

## 2018-08-30 RX ADMIN — Medication 500 MILLIGRAM(S): at 05:11

## 2018-08-30 RX ADMIN — Medication 10 MILLIGRAM(S): at 13:40

## 2018-08-30 RX ADMIN — Medication 1 TABLET(S): at 11:38

## 2018-08-30 RX ADMIN — Medication 0.5 MILLIGRAM(S): at 11:38

## 2018-08-30 RX ADMIN — PANTOPRAZOLE SODIUM 40 MILLIGRAM(S): 20 TABLET, DELAYED RELEASE ORAL at 05:10

## 2018-08-30 RX ADMIN — Medication 1 GRAM(S): at 05:11

## 2018-08-30 RX ADMIN — Medication 500 MILLIGRAM(S): at 13:40

## 2018-08-30 RX ADMIN — Medication 800 MILLIGRAM(S): at 05:10

## 2018-08-30 RX ADMIN — MORPHINE SULFATE 4 MILLIGRAM(S): 50 CAPSULE, EXTENDED RELEASE ORAL at 05:10

## 2018-08-30 RX ADMIN — MORPHINE SULFATE 4 MILLIGRAM(S): 50 CAPSULE, EXTENDED RELEASE ORAL at 09:33

## 2018-08-30 NOTE — DISCHARGE NOTE ADULT - MEDICATION SUMMARY - MEDICATIONS TO TAKE
I will START or STAY ON the medications listed below when I get home from the hospital:    mesalamine 400 mg oral delayed release capsule  -- 2 cap(s) by mouth 3 times a day  -- Indication: For Gastroparesis    oxycodone-acetaminophen 5 mg-300 mg oral tablet  -- 1 tab(s) by mouth 2 times a day x 5 days, As Needed MDD:2  -- Caution federal law prohibits the transfer of this drug to any person other  than the person for whom it was prescribed.  May cause drowsiness.  Alcohol may intensify this effect.  Use care when operating dangerous machinery.  This drug may impair the ability to drive or operate machinery.  Use care until you become familiar with its effects.  This prescription cannot be refilled.  This product contains acetaminophen.  Do not use  with any other product containing acetaminophen to prevent possible liver damage.  Using more of this medication than prescribed may cause serious breathing problems.    -- Indication: For Gastroparesis    clonazePAM 0.5 mg oral tablet  -- 1 tab(s) by mouth 2 times a day, As needed, anxiety  -- Indication: For Gastroparesis    escitalopram 5 mg oral tablet  -- 1 tab(s) by mouth once a day  -- Indication: For depression    insulin lispro 100 units/mL injectable solution  -- 5 unit(s) subcutaneous 3 times a day (before meals) DISP ONE MONTH SUPPLY   -- Indication: For DM    Lantus Solostar Pen 100 units/mL subcutaneous solution  -- 15 unit(s) subcutaneous once a day (at bedtime)   -- Do not drink alcoholic beverages when taking this medication.  It is very important that you take or use this exactly as directed.  Do not skip doses or discontinue unless directed by your doctor.  Keep in refrigerator.  Do not freeze.    -- Indication: For DM    Reglan 10 mg oral tablet  -- 1 tab(s) by mouth every 6 hours, As Needed -for nausea   -- It is very important that you take or use this exactly as directed.  Do not skip doses or discontinue unless directed by your doctor.  May cause drowsiness.  Alcohol may intensify this effect.  Use care when operating dangerous machinery.  Take medication on an empty stomach 1 hour before or 2 to 3 hours after a meal unless otherwise directed by your doctor.    -- Indication: For nausea    sucralfate 1 g oral tablet  -- 1 tab(s) by mouth 4 times a day  -- Indication: For Gastritis and duodenitis    lactobacillus acidophilus oral capsule  -- 1 cap(s) by mouth once a day  -- Indication: For Gastritis and duodenitis I will START or STAY ON the medications listed below when I get home from the hospital:    mesalamine 400 mg oral delayed release capsule  -- 2 cap(s) by mouth 3 times a day  -- Indication: For Gastroparesis    oxyCODONE-acetaminophen 5 mg-325 mg oral tablet  -- 1 tab(s) by mouth 2 times a day MDD:2  -- Caution federal law prohibits the transfer of this drug to any person other  than the person for whom it was prescribed.  May cause drowsiness.  Alcohol may intensify this effect.  Use care when operating dangerous machinery.  This prescription cannot be refilled.  This product contains acetaminophen.  Do not use  with any other product containing acetaminophen to prevent possible liver damage.  Using more of this medication than prescribed may cause serious breathing problems.    -- Indication: For Gastroparesis    clonazePAM 0.5 mg oral tablet  -- 1 tab(s) by mouth 2 times a day, As needed, anxiety  -- Indication: For Gastroparesis    escitalopram 5 mg oral tablet  -- 1 tab(s) by mouth once a day  -- Indication: For depression    insulin lispro 100 units/mL injectable solution  -- 5 unit(s) subcutaneous 3 times a day (before meals) DISP ONE MONTH SUPPLY   -- Indication: For DM    Lantus Solostar Pen 100 units/mL subcutaneous solution  -- 15 unit(s) subcutaneous once a day (at bedtime)   -- Do not drink alcoholic beverages when taking this medication.  It is very important that you take or use this exactly as directed.  Do not skip doses or discontinue unless directed by your doctor.  Keep in refrigerator.  Do not freeze.    -- Indication: For DM    Reglan 10 mg oral tablet  -- 1 tab(s) by mouth every 6 hours, As Needed -for nausea   -- It is very important that you take or use this exactly as directed.  Do not skip doses or discontinue unless directed by your doctor.  May cause drowsiness.  Alcohol may intensify this effect.  Use care when operating dangerous machinery.  Take medication on an empty stomach 1 hour before or 2 to 3 hours after a meal unless otherwise directed by your doctor.    -- Indication: For nausea    sucralfate 1 g oral tablet  -- 1 tab(s) by mouth 4 times a day  -- Indication: For Gastritis and duodenitis    lactobacillus acidophilus oral capsule  -- 1 cap(s) by mouth once a day  -- Indication: For Gastritis and duodenitis

## 2018-08-30 NOTE — DISCHARGE NOTE ADULT - CARE PROVIDER_API CALL
Isiah Little), Medicine  [address] [place] [address]  Phone: [phone]  Fax: [phone]    Endocrinologist,   Phone: (   )    -  Fax: (   )    -

## 2018-08-30 NOTE — DISCHARGE NOTE ADULT - PATIENT PORTAL LINK FT
You can access the [facility] Portal, offered by [facility], by registering with the following website: [email or web address]

## 2018-08-30 NOTE — DISCHARGE NOTE ADULT - HOSPITAL COURSE
37m with history of diabetes with recurrent abd pain         Problem/Plan - 1:  ·  Problem: Diabetic gastroparesis.  Plan: pain improving. advance to diet as tolerated  reglan TID   analgesics   gastroenterologist consult  had recent endocscopy that showed small hernia but otherwise unremarkable.     Problem/Plan - 2:  ·  Problem: Controlled diabetes mellitus type 1 without complications.  Plan: lantus/riss.     Problem/Plan - 3:  ·  Problem: Gastritis, presence of bleeding unspecified, unspecified chronicity, unspecified gastritis type.  Plan: protonix  carafate  mesalamine.     Problem/Plan - 4:  ·  Problem: Uncomplicated asthma, unspecified asthma severity, unspecified whether persistent.  Plan: prn nebs.     Problem/Plan - 5:  ·  Problem: Anxiety.  Plan: continue home meds.   Problem/Plan - 6:  Problem: Colitis. Plan: c/w cipro flagyl fro now. likely doesn't have colitis.

## 2018-08-30 NOTE — DISCHARGE NOTE ADULT - CARE PLAN
Principal Discharge DX:	Gastroparesis  Goal:	resolved  Assessment and plan of treatment:	Continue home medication  Secondary Diagnosis:	Gastritis and duodenitis  Goal:	stable  Assessment and plan of treatment:	Continue home medication  Secondary Diagnosis:	Hyperglycemia  Goal:	stable  Assessment and plan of treatment:	continue finger sticks and insulin coverage  Secondary Diagnosis:	Vomiting  Goal:	stable  Assessment and plan of treatment:	Continue home medication

## 2018-08-30 NOTE — DISCHARGE NOTE ADULT - MEDICATION SUMMARY - MEDICATIONS TO STOP TAKING
I will STOP taking the medications listed below when I get home from the hospital:    ciprofloxacin 500 mg oral tablet  -- 1 tab(s) by mouth 2 times a day   -- Avoid prolonged or excessive exposure to direct and/or artificial sunlight while taking this medication.  Check with your doctor before becoming pregnant.  Do not take dairy products, antacids, or iron preparations within one hour of this medication.  Finish all this medication unless otherwise directed by prescriber.  Medication should be taken with plenty of water.    Flagyl 500 mg oral tablet  -- 1 tab(s) by mouth 3 times a day   -- Do not drink alcoholic beverages when taking this medication.  Finish all this medication unless otherwise directed by prescriber.  May discolor urine or feces.

## 2018-09-03 ENCOUNTER — EMERGENCY (EMERGENCY)
Facility: HOSPITAL | Age: 38
LOS: 0 days | Discharge: HOME | End: 2018-09-03
Attending: STUDENT IN AN ORGANIZED HEALTH CARE EDUCATION/TRAINING PROGRAM
Payer: MEDICAID

## 2018-09-03 VITALS
TEMPERATURE: 99 F | HEIGHT: 69 IN | WEIGHT: 169.98 LBS | OXYGEN SATURATION: 99 % | HEART RATE: 70 BPM | SYSTOLIC BLOOD PRESSURE: 160 MMHG | RESPIRATION RATE: 20 BRPM | DIASTOLIC BLOOD PRESSURE: 103 MMHG

## 2018-09-03 VITALS
OXYGEN SATURATION: 100 % | HEART RATE: 86 BPM | DIASTOLIC BLOOD PRESSURE: 81 MMHG | TEMPERATURE: 98 F | RESPIRATION RATE: 18 BRPM | SYSTOLIC BLOOD PRESSURE: 154 MMHG

## 2018-09-03 DIAGNOSIS — R11.2 NAUSEA WITH VOMITING, UNSPECIFIED: ICD-10-CM

## 2018-09-03 DIAGNOSIS — Z90.49 ACQUIRED ABSENCE OF OTHER SPECIFIED PARTS OF DIGESTIVE TRACT: Chronic | ICD-10-CM

## 2018-09-03 DIAGNOSIS — Z90.49 ACQUIRED ABSENCE OF OTHER SPECIFIED PARTS OF DIGESTIVE TRACT: ICD-10-CM

## 2018-09-03 DIAGNOSIS — K29.70 GASTRITIS, UNSPECIFIED, WITHOUT BLEEDING: ICD-10-CM

## 2018-09-03 DIAGNOSIS — E10.9 TYPE 1 DIABETES MELLITUS WITHOUT COMPLICATIONS: ICD-10-CM

## 2018-09-03 DIAGNOSIS — R11.10 VOMITING, UNSPECIFIED: ICD-10-CM

## 2018-09-03 DIAGNOSIS — R10.9 UNSPECIFIED ABDOMINAL PAIN: ICD-10-CM

## 2018-09-03 DIAGNOSIS — Z79.4 LONG TERM (CURRENT) USE OF INSULIN: ICD-10-CM

## 2018-09-03 DIAGNOSIS — F41.9 ANXIETY DISORDER, UNSPECIFIED: ICD-10-CM

## 2018-09-03 DIAGNOSIS — J45.909 UNSPECIFIED ASTHMA, UNCOMPLICATED: ICD-10-CM

## 2018-09-03 DIAGNOSIS — K52.9 NONINFECTIVE GASTROENTERITIS AND COLITIS, UNSPECIFIED: ICD-10-CM

## 2018-09-03 DIAGNOSIS — R19.7 DIARRHEA, UNSPECIFIED: ICD-10-CM

## 2018-09-03 LAB
ACETONE SERPL-MCNC: NEGATIVE — SIGNIFICANT CHANGE UP
ALBUMIN SERPL ELPH-MCNC: 4 G/DL — SIGNIFICANT CHANGE UP (ref 3.3–5)
ALP SERPL-CCNC: 90 U/L — SIGNIFICANT CHANGE UP (ref 40–120)
ALT FLD-CCNC: 23 U/L — SIGNIFICANT CHANGE UP (ref 12–78)
AMYLASE P1 CFR SERPL: 110 U/L — SIGNIFICANT CHANGE UP (ref 25–115)
ANION GAP SERPL CALC-SCNC: 11 MMOL/L — SIGNIFICANT CHANGE UP (ref 5–17)
APPEARANCE UR: CLEAR — SIGNIFICANT CHANGE UP
AST SERPL-CCNC: 15 U/L — SIGNIFICANT CHANGE UP (ref 15–37)
BASOPHILS # BLD AUTO: 0.06 K/UL — SIGNIFICANT CHANGE UP (ref 0–0.2)
BASOPHILS NFR BLD AUTO: 0.4 % — SIGNIFICANT CHANGE UP (ref 0–2)
BILIRUB DIRECT SERPL-MCNC: 0.06 MG/DL — SIGNIFICANT CHANGE UP (ref 0.05–0.2)
BILIRUB INDIRECT FLD-MCNC: 0.3 MG/DL — SIGNIFICANT CHANGE UP (ref 0.2–1)
BILIRUB SERPL-MCNC: 0.4 MG/DL — SIGNIFICANT CHANGE UP (ref 0.2–1.2)
BILIRUB UR-MCNC: NEGATIVE — SIGNIFICANT CHANGE UP
BUN SERPL-MCNC: 14 MG/DL — SIGNIFICANT CHANGE UP (ref 7–23)
CALCIUM SERPL-MCNC: 9 MG/DL — SIGNIFICANT CHANGE UP (ref 8.5–10.1)
CHLORIDE SERPL-SCNC: 102 MMOL/L — SIGNIFICANT CHANGE UP (ref 96–108)
CO2 SERPL-SCNC: 25 MMOL/L — SIGNIFICANT CHANGE UP (ref 22–31)
COLOR SPEC: YELLOW — SIGNIFICANT CHANGE UP
CREAT SERPL-MCNC: 1.08 MG/DL — SIGNIFICANT CHANGE UP (ref 0.5–1.3)
DIFF PNL FLD: NEGATIVE — SIGNIFICANT CHANGE UP
EOSINOPHIL # BLD AUTO: 0.06 K/UL — SIGNIFICANT CHANGE UP (ref 0–0.5)
EOSINOPHIL NFR BLD AUTO: 0.4 % — SIGNIFICANT CHANGE UP (ref 0–6)
GLUCOSE BLDC GLUCOMTR-MCNC: 325 MG/DL — HIGH (ref 70–99)
GLUCOSE SERPL-MCNC: 310 MG/DL — HIGH (ref 70–99)
GLUCOSE UR QL: 1000 MG/DL
HCT VFR BLD CALC: 44.2 % — SIGNIFICANT CHANGE UP (ref 39–50)
HGB BLD-MCNC: 15 G/DL — SIGNIFICANT CHANGE UP (ref 13–17)
IMM GRANULOCYTES NFR BLD AUTO: 0.4 % — SIGNIFICANT CHANGE UP (ref 0–1.5)
KETONES UR-MCNC: ABNORMAL
LEUKOCYTE ESTERASE UR-ACNC: NEGATIVE — SIGNIFICANT CHANGE UP
LIDOCAIN IGE QN: 55 U/L — LOW (ref 73–393)
LYMPHOCYTES # BLD AUTO: 1.28 K/UL — SIGNIFICANT CHANGE UP (ref 1–3.3)
LYMPHOCYTES # BLD AUTO: 8 % — LOW (ref 13–44)
MCHC RBC-ENTMCNC: 28 PG — SIGNIFICANT CHANGE UP (ref 27–34)
MCHC RBC-ENTMCNC: 33.9 GM/DL — SIGNIFICANT CHANGE UP (ref 32–36)
MCV RBC AUTO: 82.5 FL — SIGNIFICANT CHANGE UP (ref 80–100)
MONOCYTES # BLD AUTO: 0.52 K/UL — SIGNIFICANT CHANGE UP (ref 0–0.9)
MONOCYTES NFR BLD AUTO: 3.2 % — SIGNIFICANT CHANGE UP (ref 2–14)
NEUTROPHILS # BLD AUTO: 14.07 K/UL — HIGH (ref 1.8–7.4)
NEUTROPHILS NFR BLD AUTO: 87.6 % — HIGH (ref 43–77)
NITRITE UR-MCNC: NEGATIVE — SIGNIFICANT CHANGE UP
NRBC # BLD: 0 /100 WBCS — SIGNIFICANT CHANGE UP (ref 0–0)
PH UR: 7 — SIGNIFICANT CHANGE UP (ref 5–8)
PLATELET # BLD AUTO: 406 K/UL — HIGH (ref 150–400)
POTASSIUM SERPL-MCNC: 4.3 MMOL/L — SIGNIFICANT CHANGE UP (ref 3.5–5.3)
POTASSIUM SERPL-SCNC: 4.3 MMOL/L — SIGNIFICANT CHANGE UP (ref 3.5–5.3)
PROT SERPL-MCNC: 8 GM/DL — SIGNIFICANT CHANGE UP (ref 6–8.3)
PROT UR-MCNC: NEGATIVE MG/DL — SIGNIFICANT CHANGE UP
RBC # BLD: 5.36 M/UL — SIGNIFICANT CHANGE UP (ref 4.2–5.8)
RBC # FLD: 13.5 % — SIGNIFICANT CHANGE UP (ref 10.3–14.5)
SODIUM SERPL-SCNC: 138 MMOL/L — SIGNIFICANT CHANGE UP (ref 135–145)
SP GR SPEC: 1 — LOW (ref 1.01–1.02)
UROBILINOGEN FLD QL: NEGATIVE MG/DL — SIGNIFICANT CHANGE UP
WBC # BLD: 16.05 K/UL — HIGH (ref 3.8–10.5)
WBC # FLD AUTO: 16.05 K/UL — HIGH (ref 3.8–10.5)

## 2018-09-03 PROCEDURE — 74177 CT ABD & PELVIS W/CONTRAST: CPT | Mod: 26

## 2018-09-03 PROCEDURE — 99285 EMERGENCY DEPT VISIT HI MDM: CPT

## 2018-09-03 RX ORDER — PANTOPRAZOLE SODIUM 20 MG/1
40 TABLET, DELAYED RELEASE ORAL ONCE
Qty: 0 | Refills: 0 | Status: COMPLETED | OUTPATIENT
Start: 2018-09-03 | End: 2018-09-03

## 2018-09-03 RX ORDER — CIPROFLOXACIN LACTATE 400MG/40ML
500 VIAL (ML) INTRAVENOUS ONCE
Qty: 0 | Refills: 0 | Status: COMPLETED | OUTPATIENT
Start: 2018-09-03 | End: 2018-09-03

## 2018-09-03 RX ORDER — MORPHINE SULFATE 50 MG/1
4 CAPSULE, EXTENDED RELEASE ORAL ONCE
Qty: 0 | Refills: 0 | Status: DISCONTINUED | OUTPATIENT
Start: 2018-09-03 | End: 2018-09-03

## 2018-09-03 RX ORDER — METOCLOPRAMIDE HCL 10 MG
10 TABLET ORAL ONCE
Qty: 0 | Refills: 0 | Status: COMPLETED | OUTPATIENT
Start: 2018-09-03 | End: 2018-09-03

## 2018-09-03 RX ORDER — MOXIFLOXACIN HYDROCHLORIDE TABLETS, 400 MG 400 MG/1
1 TABLET, FILM COATED ORAL
Qty: 14 | Refills: 0 | OUTPATIENT
Start: 2018-09-03 | End: 2018-09-09

## 2018-09-03 RX ORDER — INSULIN HUMAN 100 [IU]/ML
6 INJECTION, SOLUTION SUBCUTANEOUS ONCE
Qty: 0 | Refills: 0 | Status: COMPLETED | OUTPATIENT
Start: 2018-09-03 | End: 2018-09-03

## 2018-09-03 RX ORDER — METRONIDAZOLE 500 MG
500 TABLET ORAL ONCE
Qty: 0 | Refills: 0 | Status: COMPLETED | OUTPATIENT
Start: 2018-09-03 | End: 2018-09-03

## 2018-09-03 RX ORDER — OXYCODONE AND ACETAMINOPHEN 5; 325 MG/1; MG/1
1 TABLET ORAL ONCE
Qty: 0 | Refills: 0 | Status: DISCONTINUED | OUTPATIENT
Start: 2018-09-03 | End: 2018-09-03

## 2018-09-03 RX ORDER — IPRATROPIUM/ALBUTEROL SULFATE 18-103MCG
3 AEROSOL WITH ADAPTER (GRAM) INHALATION ONCE
Qty: 0 | Refills: 0 | Status: COMPLETED | OUTPATIENT
Start: 2018-09-03 | End: 2018-09-03

## 2018-09-03 RX ORDER — ONDANSETRON 8 MG/1
8 TABLET, FILM COATED ORAL ONCE
Qty: 0 | Refills: 0 | Status: COMPLETED | OUTPATIENT
Start: 2018-09-03 | End: 2018-09-03

## 2018-09-03 RX ORDER — METRONIDAZOLE 500 MG
1 TABLET ORAL
Qty: 28 | Refills: 0 | OUTPATIENT
Start: 2018-09-03 | End: 2018-09-09

## 2018-09-03 RX ORDER — SODIUM CHLORIDE 9 MG/ML
2000 INJECTION INTRAMUSCULAR; INTRAVENOUS; SUBCUTANEOUS ONCE
Qty: 0 | Refills: 0 | Status: COMPLETED | OUTPATIENT
Start: 2018-09-03 | End: 2018-09-03

## 2018-09-03 RX ADMIN — Medication 3 MILLILITER(S): at 17:00

## 2018-09-03 RX ADMIN — MORPHINE SULFATE 4 MILLIGRAM(S): 50 CAPSULE, EXTENDED RELEASE ORAL at 17:28

## 2018-09-03 RX ADMIN — ONDANSETRON 8 MILLIGRAM(S): 8 TABLET, FILM COATED ORAL at 15:47

## 2018-09-03 RX ADMIN — Medication 500 MILLIGRAM(S): at 20:12

## 2018-09-03 RX ADMIN — INSULIN HUMAN 6 UNIT(S): 100 INJECTION, SOLUTION SUBCUTANEOUS at 19:20

## 2018-09-03 RX ADMIN — PANTOPRAZOLE SODIUM 40 MILLIGRAM(S): 20 TABLET, DELAYED RELEASE ORAL at 16:04

## 2018-09-03 RX ADMIN — MORPHINE SULFATE 4 MILLIGRAM(S): 50 CAPSULE, EXTENDED RELEASE ORAL at 17:50

## 2018-09-03 RX ADMIN — Medication 10 MILLIGRAM(S): at 17:28

## 2018-09-03 RX ADMIN — MORPHINE SULFATE 4 MILLIGRAM(S): 50 CAPSULE, EXTENDED RELEASE ORAL at 16:10

## 2018-09-03 RX ADMIN — SODIUM CHLORIDE 1000 MILLILITER(S): 9 INJECTION INTRAMUSCULAR; INTRAVENOUS; SUBCUTANEOUS at 15:35

## 2018-09-03 RX ADMIN — MORPHINE SULFATE 4 MILLIGRAM(S): 50 CAPSULE, EXTENDED RELEASE ORAL at 15:50

## 2018-09-03 RX ADMIN — OXYCODONE AND ACETAMINOPHEN 1 TABLET(S): 5; 325 TABLET ORAL at 20:12

## 2018-09-03 NOTE — ED PROVIDER NOTE - CARE PLAN
Principal Discharge DX:	Abdominal pain  Secondary Diagnosis:	Vomiting and diarrhea Principal Discharge DX:	Colitis  Secondary Diagnosis:	Vomiting and diarrhea

## 2018-09-03 NOTE — ED PROVIDER NOTE - OBJECTIVE STATEMENT
37 year old male presents today c/o severe abdominal pain associated with multiple episodes of nausea and vomiting since this morning, pt's significant other who is at the bedside states that he did eat a hamburger before his symptoms started, he has had similar symptoms in the past, has been seen by a gastroenterologist and diagnosed with gastroparesis (-) fevers or chills +diarrhea +generalized weakness

## 2018-09-03 NOTE — ED ADULT NURSE NOTE - OBJECTIVE STATEMENT
Pt presents to ED c/o abd pain since this morning. hx of gastroparesis and gastritis. also admits to smoking weed daily. no hx of CVS. skin moist, warm to touch. pt writhing in pain. abd tender to palpation all quadrants. iv placed, labs drawn and sent. meds given as per orders. will continue to monitor awaiting results

## 2018-09-03 NOTE — ED PROVIDER NOTE - PROGRESS NOTE DETAILS
pt c/o chest tightness due to his asthma, duoneb treatment ordered pt feels better, ct abd/pel pending water given, pt able to tolerate

## 2018-09-03 NOTE — ED PROVIDER NOTE - CONDUCTED A DETAILED DISCUSSION WITH PATIENT AND/OR GUARDIAN REGARDING, MDM
need for outpatient follow-up/radiology results/lab results lab results/need for transfer/radiology results/need for outpatient follow-up

## 2018-09-03 NOTE — ED ADULT NURSE NOTE - NSIMPLEMENTINTERV_GEN_ALL_ED
Implemented All Universal Safety Interventions:  [place] to call system. Call bell, personal items and telephone within reach. Instruct patient to call for assistance. Room bathroom lighting operational. Non-slip footwear when patient is off stretcher. Physically safe environment: no spills, clutter or unnecessary equipment. Stretcher in lowest position, wheels locked, appropriate side rails in place.

## 2018-09-04 DIAGNOSIS — R10.9 UNSPECIFIED ABDOMINAL PAIN: ICD-10-CM

## 2018-09-04 DIAGNOSIS — E10.65 TYPE 1 DIABETES MELLITUS WITH HYPERGLYCEMIA: ICD-10-CM

## 2018-09-04 DIAGNOSIS — Z90.49 ACQUIRED ABSENCE OF OTHER SPECIFIED PARTS OF DIGESTIVE TRACT: ICD-10-CM

## 2018-09-04 DIAGNOSIS — Z79.2 LONG TERM (CURRENT) USE OF ANTIBIOTICS: ICD-10-CM

## 2018-09-04 DIAGNOSIS — F41.9 ANXIETY DISORDER, UNSPECIFIED: ICD-10-CM

## 2018-09-04 DIAGNOSIS — J45.909 UNSPECIFIED ASTHMA, UNCOMPLICATED: ICD-10-CM

## 2018-09-04 DIAGNOSIS — K29.70 GASTRITIS, UNSPECIFIED, WITHOUT BLEEDING: ICD-10-CM

## 2018-09-04 DIAGNOSIS — K44.9 DIAPHRAGMATIC HERNIA WITHOUT OBSTRUCTION OR GANGRENE: ICD-10-CM

## 2018-09-04 DIAGNOSIS — Z79.4 LONG TERM (CURRENT) USE OF INSULIN: ICD-10-CM

## 2018-09-04 DIAGNOSIS — F17.210 NICOTINE DEPENDENCE, CIGARETTES, UNCOMPLICATED: ICD-10-CM

## 2018-09-04 DIAGNOSIS — K31.84 GASTROPARESIS: ICD-10-CM

## 2018-09-04 DIAGNOSIS — K21.9 GASTRO-ESOPHAGEAL REFLUX DISEASE WITHOUT ESOPHAGITIS: ICD-10-CM

## 2018-09-04 DIAGNOSIS — F32.9 MAJOR DEPRESSIVE DISORDER, SINGLE EPISODE, UNSPECIFIED: ICD-10-CM

## 2018-09-04 DIAGNOSIS — E10.43 TYPE 1 DIABETES MELLITUS WITH DIABETIC AUTONOMIC (POLY)NEUROPATHY: ICD-10-CM

## 2018-09-04 DIAGNOSIS — Z79.891 LONG TERM (CURRENT) USE OF OPIATE ANALGESIC: ICD-10-CM

## 2018-09-04 DIAGNOSIS — Z79.1 LONG TERM (CURRENT) USE OF NON-STEROIDAL ANTI-INFLAMMATORIES (NSAID): ICD-10-CM

## 2018-09-11 ENCOUNTER — EMERGENCY (EMERGENCY)
Facility: HOSPITAL | Age: 38
LOS: 0 days | Discharge: HOME | End: 2018-09-11
Attending: EMERGENCY MEDICINE
Payer: MEDICAID

## 2018-09-11 VITALS
RESPIRATION RATE: 18 BRPM | SYSTOLIC BLOOD PRESSURE: 128 MMHG | DIASTOLIC BLOOD PRESSURE: 70 MMHG | TEMPERATURE: 98 F | OXYGEN SATURATION: 99 %

## 2018-09-11 VITALS
SYSTOLIC BLOOD PRESSURE: 136 MMHG | HEIGHT: 69 IN | DIASTOLIC BLOOD PRESSURE: 94 MMHG | RESPIRATION RATE: 16 BRPM | TEMPERATURE: 98 F | OXYGEN SATURATION: 98 % | HEART RATE: 90 BPM | WEIGHT: 169.98 LBS

## 2018-09-11 DIAGNOSIS — J45.909 UNSPECIFIED ASTHMA, UNCOMPLICATED: ICD-10-CM

## 2018-09-11 DIAGNOSIS — K29.60 OTHER GASTRITIS WITHOUT BLEEDING: ICD-10-CM

## 2018-09-11 DIAGNOSIS — Z79.4 LONG TERM (CURRENT) USE OF INSULIN: ICD-10-CM

## 2018-09-11 DIAGNOSIS — F41.9 ANXIETY DISORDER, UNSPECIFIED: ICD-10-CM

## 2018-09-11 DIAGNOSIS — K31.84 GASTROPARESIS: ICD-10-CM

## 2018-09-11 DIAGNOSIS — Z79.2 LONG TERM (CURRENT) USE OF ANTIBIOTICS: ICD-10-CM

## 2018-09-11 DIAGNOSIS — R10.9 UNSPECIFIED ABDOMINAL PAIN: ICD-10-CM

## 2018-09-11 DIAGNOSIS — R11.10 VOMITING, UNSPECIFIED: ICD-10-CM

## 2018-09-11 DIAGNOSIS — E10.9 TYPE 1 DIABETES MELLITUS WITHOUT COMPLICATIONS: ICD-10-CM

## 2018-09-11 DIAGNOSIS — R19.7 DIARRHEA, UNSPECIFIED: ICD-10-CM

## 2018-09-11 DIAGNOSIS — Z90.49 ACQUIRED ABSENCE OF OTHER SPECIFIED PARTS OF DIGESTIVE TRACT: Chronic | ICD-10-CM

## 2018-09-11 LAB
ACETONE SERPL-MCNC: NEGATIVE — SIGNIFICANT CHANGE UP
ALBUMIN SERPL ELPH-MCNC: 4.5 G/DL — SIGNIFICANT CHANGE UP (ref 3.3–5)
ALP SERPL-CCNC: 92 U/L — SIGNIFICANT CHANGE UP (ref 40–120)
ALT FLD-CCNC: 36 U/L — SIGNIFICANT CHANGE UP (ref 12–78)
ANION GAP SERPL CALC-SCNC: 9 MMOL/L — SIGNIFICANT CHANGE UP (ref 5–17)
APPEARANCE UR: CLEAR — SIGNIFICANT CHANGE UP
AST SERPL-CCNC: 22 U/L — SIGNIFICANT CHANGE UP (ref 15–37)
BASOPHILS # BLD AUTO: 0.07 K/UL — SIGNIFICANT CHANGE UP (ref 0–0.2)
BASOPHILS NFR BLD AUTO: 0.8 % — SIGNIFICANT CHANGE UP (ref 0–2)
BILIRUB SERPL-MCNC: 0.5 MG/DL — SIGNIFICANT CHANGE UP (ref 0.2–1.2)
BILIRUB UR-MCNC: NEGATIVE — SIGNIFICANT CHANGE UP
BUN SERPL-MCNC: 19 MG/DL — SIGNIFICANT CHANGE UP (ref 7–23)
CALCIUM SERPL-MCNC: 9.4 MG/DL — SIGNIFICANT CHANGE UP (ref 8.5–10.1)
CHLORIDE SERPL-SCNC: 102 MMOL/L — SIGNIFICANT CHANGE UP (ref 96–108)
CO2 SERPL-SCNC: 28 MMOL/L — SIGNIFICANT CHANGE UP (ref 22–31)
COLOR SPEC: YELLOW — SIGNIFICANT CHANGE UP
CREAT SERPL-MCNC: 1.2 MG/DL — SIGNIFICANT CHANGE UP (ref 0.5–1.3)
DIFF PNL FLD: NEGATIVE — SIGNIFICANT CHANGE UP
EOSINOPHIL # BLD AUTO: 0.12 K/UL — SIGNIFICANT CHANGE UP (ref 0–0.5)
EOSINOPHIL NFR BLD AUTO: 1.3 % — SIGNIFICANT CHANGE UP (ref 0–6)
GLUCOSE SERPL-MCNC: 263 MG/DL — HIGH (ref 70–99)
GLUCOSE UR QL: 1000 MG/DL
HCT VFR BLD CALC: 46.8 % — SIGNIFICANT CHANGE UP (ref 39–50)
HGB BLD-MCNC: 15.5 G/DL — SIGNIFICANT CHANGE UP (ref 13–17)
IMM GRANULOCYTES NFR BLD AUTO: 0.1 % — SIGNIFICANT CHANGE UP (ref 0–1.5)
KETONES UR-MCNC: NEGATIVE — SIGNIFICANT CHANGE UP
LACTATE SERPL-SCNC: 1.5 MMOL/L — SIGNIFICANT CHANGE UP (ref 0.7–2)
LEUKOCYTE ESTERASE UR-ACNC: NEGATIVE — SIGNIFICANT CHANGE UP
LIDOCAIN IGE QN: 69 U/L — LOW (ref 73–393)
LYMPHOCYTES # BLD AUTO: 1.99 K/UL — SIGNIFICANT CHANGE UP (ref 1–3.3)
LYMPHOCYTES # BLD AUTO: 21.3 % — SIGNIFICANT CHANGE UP (ref 13–44)
MCHC RBC-ENTMCNC: 27.6 PG — SIGNIFICANT CHANGE UP (ref 27–34)
MCHC RBC-ENTMCNC: 33.1 GM/DL — SIGNIFICANT CHANGE UP (ref 32–36)
MCV RBC AUTO: 83.4 FL — SIGNIFICANT CHANGE UP (ref 80–100)
MONOCYTES # BLD AUTO: 0.39 K/UL — SIGNIFICANT CHANGE UP (ref 0–0.9)
MONOCYTES NFR BLD AUTO: 4.2 % — SIGNIFICANT CHANGE UP (ref 2–14)
NEUTROPHILS # BLD AUTO: 6.75 K/UL — SIGNIFICANT CHANGE UP (ref 1.8–7.4)
NEUTROPHILS NFR BLD AUTO: 72.3 % — SIGNIFICANT CHANGE UP (ref 43–77)
NITRITE UR-MCNC: NEGATIVE — SIGNIFICANT CHANGE UP
NRBC # BLD: 0 /100 WBCS — SIGNIFICANT CHANGE UP (ref 0–0)
PH UR: 6 — SIGNIFICANT CHANGE UP (ref 5–8)
PLATELET # BLD AUTO: 422 K/UL — HIGH (ref 150–400)
POTASSIUM SERPL-MCNC: 4.7 MMOL/L — SIGNIFICANT CHANGE UP (ref 3.5–5.3)
POTASSIUM SERPL-SCNC: 4.7 MMOL/L — SIGNIFICANT CHANGE UP (ref 3.5–5.3)
PROT SERPL-MCNC: 9.1 GM/DL — HIGH (ref 6–8.3)
PROT UR-MCNC: NEGATIVE MG/DL — SIGNIFICANT CHANGE UP
RBC # BLD: 5.61 M/UL — SIGNIFICANT CHANGE UP (ref 4.2–5.8)
RBC # FLD: 13.6 % — SIGNIFICANT CHANGE UP (ref 10.3–14.5)
SODIUM SERPL-SCNC: 139 MMOL/L — SIGNIFICANT CHANGE UP (ref 135–145)
SP GR SPEC: 1.01 — SIGNIFICANT CHANGE UP (ref 1.01–1.02)
UROBILINOGEN FLD QL: NEGATIVE MG/DL — SIGNIFICANT CHANGE UP
WBC # BLD: 9.33 K/UL — SIGNIFICANT CHANGE UP (ref 3.8–10.5)
WBC # FLD AUTO: 9.33 K/UL — SIGNIFICANT CHANGE UP (ref 3.8–10.5)

## 2018-09-11 PROCEDURE — 99284 EMERGENCY DEPT VISIT MOD MDM: CPT

## 2018-09-11 RX ORDER — PANTOPRAZOLE SODIUM 20 MG/1
1 TABLET, DELAYED RELEASE ORAL
Qty: 14 | Refills: 0 | OUTPATIENT
Start: 2018-09-11 | End: 2018-09-24

## 2018-09-11 RX ORDER — MORPHINE SULFATE 50 MG/1
2 CAPSULE, EXTENDED RELEASE ORAL ONCE
Qty: 0 | Refills: 0 | Status: DISCONTINUED | OUTPATIENT
Start: 2018-09-11 | End: 2018-09-11

## 2018-09-11 RX ORDER — METOCLOPRAMIDE HCL 10 MG
1 TABLET ORAL
Qty: 12 | Refills: 0 | OUTPATIENT
Start: 2018-09-11 | End: 2018-09-15

## 2018-09-11 RX ORDER — SODIUM CHLORIDE 9 MG/ML
3 INJECTION INTRAMUSCULAR; INTRAVENOUS; SUBCUTANEOUS ONCE
Qty: 0 | Refills: 0 | Status: COMPLETED | OUTPATIENT
Start: 2018-09-11 | End: 2018-09-11

## 2018-09-11 RX ORDER — METOCLOPRAMIDE HCL 10 MG
10 TABLET ORAL ONCE
Qty: 0 | Refills: 0 | Status: COMPLETED | OUTPATIENT
Start: 2018-09-11 | End: 2018-09-11

## 2018-09-11 RX ORDER — OXYCODONE HYDROCHLORIDE 5 MG/1
1 TABLET ORAL
Qty: 8 | Refills: 0 | OUTPATIENT
Start: 2018-09-11 | End: 2018-09-12

## 2018-09-11 RX ORDER — SODIUM CHLORIDE 9 MG/ML
2000 INJECTION INTRAMUSCULAR; INTRAVENOUS; SUBCUTANEOUS ONCE
Qty: 0 | Refills: 0 | Status: COMPLETED | OUTPATIENT
Start: 2018-09-11 | End: 2018-09-11

## 2018-09-11 RX ORDER — PANTOPRAZOLE SODIUM 20 MG/1
40 TABLET, DELAYED RELEASE ORAL ONCE
Qty: 0 | Refills: 0 | Status: COMPLETED | OUTPATIENT
Start: 2018-09-11 | End: 2018-09-11

## 2018-09-11 RX ADMIN — SODIUM CHLORIDE 2000 MILLILITER(S): 9 INJECTION INTRAMUSCULAR; INTRAVENOUS; SUBCUTANEOUS at 13:56

## 2018-09-11 RX ADMIN — PANTOPRAZOLE SODIUM 40 MILLIGRAM(S): 20 TABLET, DELAYED RELEASE ORAL at 13:56

## 2018-09-11 RX ADMIN — MORPHINE SULFATE 2 MILLIGRAM(S): 50 CAPSULE, EXTENDED RELEASE ORAL at 15:56

## 2018-09-11 RX ADMIN — SODIUM CHLORIDE 3 MILLILITER(S): 9 INJECTION INTRAMUSCULAR; INTRAVENOUS; SUBCUTANEOUS at 13:58

## 2018-09-11 RX ADMIN — Medication 10 MILLIGRAM(S): at 13:56

## 2018-09-11 NOTE — ED PROVIDER NOTE - PHYSICAL EXAMINATION
Gen: Alert, Well appearing. NAD    Head: NC, AT, PERRL, EOMI, normal lids/conjunctiva   ENT: Bilateral TM WNL, normal hearing, patent oropharynx without erythema/exudate, uvula midline  Neck: supple, no tenderness/meningismus/JVD   Pulm: Bilateral clear BS, normal resp effort, no wheeze/stridor/retractions  CV: RRR, no M/R/G, +dist pulses   Abd: soft, ++ upper tender, ND, +BS, no guarding/rebound tenderness  Mskel: no edema/erythema/cyanosis   Skin: no rash   Neuro: AAOx3, no sensory/motor deficits, CN 2-12 intact

## 2018-09-11 NOTE — ED PROVIDER NOTE - OBJECTIVE STATEMENT
38yo male with pmh DM, with h/o DKA, gastroparesis, gastritis presents with upper abd pain and vomiting today. PT has been treated and admitted for colitis/gatritis, gastroparesis in past 3 months. Pt last seen 6 days ago and reports has been asymptomatic until today, though pt has had diarrhea now for past 3 months.    ROS: No fever/chills. No photophobia/eye pain/changes in vision, No ear pain/sore throat/dysphagia, No chest pain/palpitations. No SOB/cough/stridor. +abdominal pain, +N/V/D, no black/bloody bm. No dysuria/frequency/discharge, No headache. No Dizziness.  No rash.  No numbness/tingling/weakness.

## 2018-09-11 NOTE — ED ADULT NURSE NOTE - NSIMPLEMENTINTERV_GEN_ALL_ED
Implemented All Universal Safety Interventions:  [place] to call system. Call bell, personal items and telephone within reach. Instruct patient to call for assistance. Room bathroom lighting operational. Non-slip footwear when patient is off stretcher. Physically safe environment: no spills, clutter or unnecessary equipment. Stretcher in lowest position, wheels locked, appropriate side rails in place.

## 2018-09-11 NOTE — ED ADULT TRIAGE NOTE - HEART RATE (BEATS/MIN)
Phone: [place]    Fax: [phone]                       Outpatient Occupational Therapy                 DAILY TREATMENT NOTE    Date: 3/22/2018  Patients Name:  Edward Subramanian  YOB: 2010 (6 y.o.)  Gender:  male  MRN:  [id number]  [facility] #: [id number]  Referring Physician: Aruna ROSA  Diagnosis: Diagnosis: Delayed Milestones/SPD    INSURANCE  OT Insurance Information: [employer] [place]   Total # of Visits Approved: 30   Total # of Visits to Date: 6     PAIN  [x]No     []Yes      Location:  N/A  Pain Rating (0-10 pain scale): 0  Pain Description:  NA    SUBJECTIVE  Patient present to clinic with mother    GOALS/ TREATMENT SESSION:    Current Progress   Long Term Goal:  Long term goal 1: Pt will increase fine motor coordination and strength to complete fine motor tasks with 80% accuracy in 3/4 trials. See Short Term Goal Notes Below for Present Levels []Met  [x]Partially met  []Not met     Long term goal 2: Caregiver/patient will demonstrate understanding of education/HEP. []Met  [x]Partially met  []Not met   Short Term Goals:  Time Frame for Short term goals: 90 days    Short term goal 1:  When given a handwriting sample, pt will proofread/identify errors within the text with 75% accuracy with use of a checklist  in 3/4 trials. Child was able to proofread 4 sentences written by this therapist identifying 25/28 errors with minimal prompting without a checklist.  []Met  [x]Partially met  []Not met   Short term goal 2: Pt will write 3 sentences (5-6 words per sentence), with good baseline placement and spacing with 80% accuracy and no more than 3 verbal prompts in 3/4 trials. Child was able to copy 7 words from a model with good baseline placement.     []Met  [x]Partially met  []Not met   Short term goal 3:  Pt will improve his visual motor/perceptual skills, AEB his ability to complete various activities (near/far point copying, word
90

## 2018-09-11 NOTE — ED PROVIDER NOTE - MEDICAL DECISION MAKING DETAILS
labs unremarkable. vomiting and pain improved. has appt with dr mckinley this week. Discussed results and outcome of testing with the patient.  Patient given copy of available results. Patient advised to please follow up with their PMD within the next 24 hours and return to the Emergency Department for worsening symptoms or any other concerns.

## 2018-09-11 NOTE — ED ADULT NURSE NOTE - OBJECTIVE STATEMENT
received pt to bed 24 alert and oriented times 4. pt complaining of abdominal  pain started today with vomiting and diarrhea

## 2018-09-12 LAB
CULTURE RESULTS: NO GROWTH — SIGNIFICANT CHANGE UP
SPECIMEN SOURCE: SIGNIFICANT CHANGE UP

## 2018-09-15 ENCOUNTER — EMERGENCY (EMERGENCY)
Facility: HOSPITAL | Age: 38
LOS: 0 days | Discharge: HOME | End: 2018-09-16
Attending: EMERGENCY MEDICINE
Payer: MEDICAID

## 2018-09-15 VITALS
TEMPERATURE: 98 F | OXYGEN SATURATION: 98 % | WEIGHT: 169.98 LBS | HEIGHT: 69 IN | DIASTOLIC BLOOD PRESSURE: 87 MMHG | SYSTOLIC BLOOD PRESSURE: 146 MMHG | HEART RATE: 90 BPM | RESPIRATION RATE: 20 BRPM

## 2018-09-15 DIAGNOSIS — Z79.4 LONG TERM (CURRENT) USE OF INSULIN: ICD-10-CM

## 2018-09-15 DIAGNOSIS — Z90.49 ACQUIRED ABSENCE OF OTHER SPECIFIED PARTS OF DIGESTIVE TRACT: ICD-10-CM

## 2018-09-15 DIAGNOSIS — Z90.49 ACQUIRED ABSENCE OF OTHER SPECIFIED PARTS OF DIGESTIVE TRACT: Chronic | ICD-10-CM

## 2018-09-15 DIAGNOSIS — F41.9 ANXIETY DISORDER, UNSPECIFIED: ICD-10-CM

## 2018-09-15 DIAGNOSIS — E10.9 TYPE 1 DIABETES MELLITUS WITHOUT COMPLICATIONS: ICD-10-CM

## 2018-09-15 DIAGNOSIS — K31.84 GASTROPARESIS: ICD-10-CM

## 2018-09-15 DIAGNOSIS — R10.13 EPIGASTRIC PAIN: ICD-10-CM

## 2018-09-15 DIAGNOSIS — J45.909 UNSPECIFIED ASTHMA, UNCOMPLICATED: ICD-10-CM

## 2018-09-15 DIAGNOSIS — K29.70 GASTRITIS, UNSPECIFIED, WITHOUT BLEEDING: ICD-10-CM

## 2018-09-15 LAB
ALBUMIN SERPL ELPH-MCNC: 4.1 G/DL — SIGNIFICANT CHANGE UP (ref 3.3–5)
ALP SERPL-CCNC: 85 U/L — SIGNIFICANT CHANGE UP (ref 40–120)
ALT FLD-CCNC: 32 U/L — SIGNIFICANT CHANGE UP (ref 12–78)
AMPHET UR-MCNC: NEGATIVE — SIGNIFICANT CHANGE UP
ANION GAP SERPL CALC-SCNC: 7 MMOL/L — SIGNIFICANT CHANGE UP (ref 5–17)
APPEARANCE UR: CLEAR — SIGNIFICANT CHANGE UP
AST SERPL-CCNC: 28 U/L — SIGNIFICANT CHANGE UP (ref 15–37)
BACTERIA # UR AUTO: ABNORMAL
BARBITURATES UR SCN-MCNC: NEGATIVE — SIGNIFICANT CHANGE UP
BASOPHILS # BLD AUTO: 0.09 K/UL — SIGNIFICANT CHANGE UP (ref 0–0.2)
BASOPHILS NFR BLD AUTO: 1 % — SIGNIFICANT CHANGE UP (ref 0–2)
BENZODIAZ UR-MCNC: NEGATIVE — SIGNIFICANT CHANGE UP
BILIRUB SERPL-MCNC: 0.5 MG/DL — SIGNIFICANT CHANGE UP (ref 0.2–1.2)
BILIRUB UR-MCNC: NEGATIVE — SIGNIFICANT CHANGE UP
BUN SERPL-MCNC: 12 MG/DL — SIGNIFICANT CHANGE UP (ref 7–23)
CALCIUM SERPL-MCNC: 9.1 MG/DL — SIGNIFICANT CHANGE UP (ref 8.5–10.1)
CHLORIDE SERPL-SCNC: 107 MMOL/L — SIGNIFICANT CHANGE UP (ref 96–108)
CO2 SERPL-SCNC: 29 MMOL/L — SIGNIFICANT CHANGE UP (ref 22–31)
COCAINE METAB.OTHER UR-MCNC: NEGATIVE — SIGNIFICANT CHANGE UP
COLOR SPEC: YELLOW — SIGNIFICANT CHANGE UP
CREAT SERPL-MCNC: 1.06 MG/DL — SIGNIFICANT CHANGE UP (ref 0.5–1.3)
DIFF PNL FLD: NEGATIVE — SIGNIFICANT CHANGE UP
EOSINOPHIL # BLD AUTO: 0.33 K/UL — SIGNIFICANT CHANGE UP (ref 0–0.5)
EOSINOPHIL NFR BLD AUTO: 3.6 % — SIGNIFICANT CHANGE UP (ref 0–6)
GLUCOSE BLDC GLUCOMTR-MCNC: 77 MG/DL — SIGNIFICANT CHANGE UP (ref 70–99)
GLUCOSE SERPL-MCNC: 73 MG/DL — SIGNIFICANT CHANGE UP (ref 70–99)
GLUCOSE UR QL: 1000 MG/DL
HCT VFR BLD CALC: 41.3 % — SIGNIFICANT CHANGE UP (ref 39–50)
HGB BLD-MCNC: 13.9 G/DL — SIGNIFICANT CHANGE UP (ref 13–17)
IMM GRANULOCYTES NFR BLD AUTO: 0.1 % — SIGNIFICANT CHANGE UP (ref 0–1.5)
KETONES UR-MCNC: NEGATIVE — SIGNIFICANT CHANGE UP
LEUKOCYTE ESTERASE UR-ACNC: NEGATIVE — SIGNIFICANT CHANGE UP
LIDOCAIN IGE QN: 70 U/L — LOW (ref 73–393)
LYMPHOCYTES # BLD AUTO: 2.79 K/UL — SIGNIFICANT CHANGE UP (ref 1–3.3)
LYMPHOCYTES # BLD AUTO: 30.7 % — SIGNIFICANT CHANGE UP (ref 13–44)
MAGNESIUM SERPL-MCNC: 2.2 MG/DL — SIGNIFICANT CHANGE UP (ref 1.6–2.6)
MCHC RBC-ENTMCNC: 27.7 PG — SIGNIFICANT CHANGE UP (ref 27–34)
MCHC RBC-ENTMCNC: 33.7 GM/DL — SIGNIFICANT CHANGE UP (ref 32–36)
MCV RBC AUTO: 82.4 FL — SIGNIFICANT CHANGE UP (ref 80–100)
METHADONE UR-MCNC: NEGATIVE — SIGNIFICANT CHANGE UP
MONOCYTES # BLD AUTO: 0.65 K/UL — SIGNIFICANT CHANGE UP (ref 0–0.9)
MONOCYTES NFR BLD AUTO: 7.2 % — SIGNIFICANT CHANGE UP (ref 2–14)
NEUTROPHILS # BLD AUTO: 5.22 K/UL — SIGNIFICANT CHANGE UP (ref 1.8–7.4)
NEUTROPHILS NFR BLD AUTO: 57.4 % — SIGNIFICANT CHANGE UP (ref 43–77)
NITRITE UR-MCNC: NEGATIVE — SIGNIFICANT CHANGE UP
NRBC # BLD: 0 /100 WBCS — SIGNIFICANT CHANGE UP (ref 0–0)
OPIATES UR-MCNC: NEGATIVE — SIGNIFICANT CHANGE UP
PCP SPEC-MCNC: SIGNIFICANT CHANGE UP
PCP UR-MCNC: NEGATIVE — SIGNIFICANT CHANGE UP
PH UR: 8 — SIGNIFICANT CHANGE UP (ref 5–8)
PLATELET # BLD AUTO: 400 K/UL — SIGNIFICANT CHANGE UP (ref 150–400)
POTASSIUM SERPL-MCNC: 3.7 MMOL/L — SIGNIFICANT CHANGE UP (ref 3.5–5.3)
POTASSIUM SERPL-SCNC: 3.7 MMOL/L — SIGNIFICANT CHANGE UP (ref 3.5–5.3)
PROT SERPL-MCNC: 8.1 GM/DL — SIGNIFICANT CHANGE UP (ref 6–8.3)
PROT UR-MCNC: 15 MG/DL
RBC # BLD: 5.01 M/UL — SIGNIFICANT CHANGE UP (ref 4.2–5.8)
RBC # FLD: 13.6 % — SIGNIFICANT CHANGE UP (ref 10.3–14.5)
RBC CASTS # UR COMP ASSIST: SIGNIFICANT CHANGE UP /HPF (ref 0–4)
SODIUM SERPL-SCNC: 143 MMOL/L — SIGNIFICANT CHANGE UP (ref 135–145)
SP GR SPEC: 1.01 — SIGNIFICANT CHANGE UP (ref 1.01–1.02)
THC UR QL: POSITIVE — SIGNIFICANT CHANGE UP
UROBILINOGEN FLD QL: NEGATIVE MG/DL — SIGNIFICANT CHANGE UP
WBC # BLD: 9.09 K/UL — SIGNIFICANT CHANGE UP (ref 3.8–10.5)
WBC # FLD AUTO: 9.09 K/UL — SIGNIFICANT CHANGE UP (ref 3.8–10.5)
WBC UR QL: ABNORMAL

## 2018-09-15 PROCEDURE — 74177 CT ABD & PELVIS W/CONTRAST: CPT | Mod: 26

## 2018-09-15 PROCEDURE — 99285 EMERGENCY DEPT VISIT HI MDM: CPT

## 2018-09-15 RX ORDER — LIDOCAINE 4 G/100G
10 CREAM TOPICAL ONCE
Qty: 0 | Refills: 0 | Status: COMPLETED | OUTPATIENT
Start: 2018-09-15 | End: 2018-09-15

## 2018-09-15 RX ORDER — ONDANSETRON 8 MG/1
4 TABLET, FILM COATED ORAL ONCE
Qty: 0 | Refills: 0 | Status: COMPLETED | OUTPATIENT
Start: 2018-09-15 | End: 2018-09-15

## 2018-09-15 RX ORDER — MORPHINE SULFATE 50 MG/1
4 CAPSULE, EXTENDED RELEASE ORAL ONCE
Qty: 0 | Refills: 0 | Status: DISCONTINUED | OUTPATIENT
Start: 2018-09-15 | End: 2018-09-15

## 2018-09-15 RX ORDER — SODIUM CHLORIDE 9 MG/ML
2000 INJECTION INTRAMUSCULAR; INTRAVENOUS; SUBCUTANEOUS ONCE
Qty: 0 | Refills: 0 | Status: COMPLETED | OUTPATIENT
Start: 2018-09-15 | End: 2018-09-15

## 2018-09-15 RX ADMIN — MORPHINE SULFATE 4 MILLIGRAM(S): 50 CAPSULE, EXTENDED RELEASE ORAL at 21:43

## 2018-09-15 RX ADMIN — LIDOCAINE 10 MILLILITER(S): 4 CREAM TOPICAL at 21:17

## 2018-09-15 RX ADMIN — Medication 30 MILLILITER(S): at 21:16

## 2018-09-15 RX ADMIN — ONDANSETRON 4 MILLIGRAM(S): 8 TABLET, FILM COATED ORAL at 20:31

## 2018-09-15 RX ADMIN — SODIUM CHLORIDE 1000 MILLILITER(S): 9 INJECTION INTRAMUSCULAR; INTRAVENOUS; SUBCUTANEOUS at 21:44

## 2018-09-15 RX ADMIN — MORPHINE SULFATE 4 MILLIGRAM(S): 50 CAPSULE, EXTENDED RELEASE ORAL at 21:17

## 2018-09-15 NOTE — ED PROVIDER NOTE - MEDICAL DECISION MAKING DETAILS
Ddx: Gastroparesis/ ro DKA/ no abdominal tenderness or guarding to suggest surgical abdomen.   plan: Cbc, cmp, ct abd, pain ctonrol, fluids, reassess

## 2018-09-15 NOTE — ED ADULT TRIAGE NOTE - CHIEF COMPLAINT QUOTE
Patient Reports: "Abdominal pain and vomiting." Symptoms started at 7pm tonight. + diarrhea. Denies fever.  Denies ill contacts.

## 2018-09-15 NOTE — ED PROVIDER NOTE - OBJECTIVE STATEMENT
Pt is a 36 yo gentleman with a pmhx of IDDM2, gastroparesis who presents to the ED with abdominal pain. This is similar to usual presentation of abdominal pain. It is epigastric. NO diarrhea. No fevers, no dysuria, no recent travel. Denies marijuana, or other drugs or alcohol. Has had cholecystectomy. No dysuria.

## 2018-09-15 NOTE — ED ADULT NURSE NOTE - NSIMPLEMENTINTERV_GEN_ALL_ED
Implemented All Universal Safety Interventions:  [place] to call system. Call bell, personal items and telephone within reach. Instruct patient to call for assistance. Room bathroom lighting operational. Non-slip footwear when patient is off stretcher. Physically safe environment: no spills, clutter or unnecessary equipment. Stretcher in lowest position, wheels locked, appropriate side rails in place.

## 2018-09-16 VITALS
RESPIRATION RATE: 17 BRPM | TEMPERATURE: 98 F | HEART RATE: 64 BPM | OXYGEN SATURATION: 98 % | DIASTOLIC BLOOD PRESSURE: 91 MMHG | SYSTOLIC BLOOD PRESSURE: 136 MMHG

## 2018-09-16 LAB
CULTURE RESULTS: NO GROWTH — SIGNIFICANT CHANGE UP
SPECIMEN SOURCE: SIGNIFICANT CHANGE UP

## 2018-09-16 RX ORDER — PANTOPRAZOLE SODIUM 20 MG/1
1 TABLET, DELAYED RELEASE ORAL
Qty: 14 | Refills: 0 | OUTPATIENT
Start: 2018-09-16 | End: 2018-09-29

## 2018-09-16 RX ORDER — SIMETHICONE 80 MG/1
1 TABLET, CHEWABLE ORAL
Qty: 20 | Refills: 0 | OUTPATIENT
Start: 2018-09-16 | End: 2018-09-20

## 2018-09-16 RX ORDER — KETOROLAC TROMETHAMINE 30 MG/ML
30 SYRINGE (ML) INJECTION ONCE
Qty: 0 | Refills: 0 | Status: DISCONTINUED | OUTPATIENT
Start: 2018-09-16 | End: 2018-09-16

## 2018-09-16 RX ADMIN — Medication 30 MILLIGRAM(S): at 00:58

## 2018-09-16 RX ADMIN — SODIUM CHLORIDE 2000 MILLILITER(S): 9 INJECTION INTRAMUSCULAR; INTRAVENOUS; SUBCUTANEOUS at 00:58

## 2018-09-24 ENCOUNTER — OUTPATIENT (OUTPATIENT)
Dept: OUTPATIENT SERVICES | Facility: HOSPITAL | Age: 38
LOS: 1 days | Discharge: STILL ADMITTED OR ADMINISTRATIVE | End: 2018-09-24

## 2018-09-24 DIAGNOSIS — Z90.49 ACQUIRED ABSENCE OF OTHER SPECIFIED PARTS OF DIGESTIVE TRACT: Chronic | ICD-10-CM

## 2018-09-25 DIAGNOSIS — F12.20 CANNABIS DEPENDENCE, UNCOMPLICATED: ICD-10-CM

## 2018-09-25 DIAGNOSIS — F39 UNSPECIFIED MOOD [AFFECTIVE] DISORDER: ICD-10-CM

## 2018-10-11 ENCOUNTER — EMERGENCY (EMERGENCY)
Facility: HOSPITAL | Age: 38
LOS: 0 days | Discharge: HOME | End: 2018-10-12
Attending: EMERGENCY MEDICINE
Payer: MEDICAID

## 2018-10-11 VITALS
WEIGHT: 169.98 LBS | OXYGEN SATURATION: 98 % | TEMPERATURE: 98 F | HEART RATE: 89 BPM | SYSTOLIC BLOOD PRESSURE: 140 MMHG | HEIGHT: 69 IN | RESPIRATION RATE: 16 BRPM | DIASTOLIC BLOOD PRESSURE: 89 MMHG

## 2018-10-11 DIAGNOSIS — R07.9 CHEST PAIN, UNSPECIFIED: ICD-10-CM

## 2018-10-11 DIAGNOSIS — J45.909 UNSPECIFIED ASTHMA, UNCOMPLICATED: ICD-10-CM

## 2018-10-11 DIAGNOSIS — E10.9 TYPE 1 DIABETES MELLITUS WITHOUT COMPLICATIONS: ICD-10-CM

## 2018-10-11 DIAGNOSIS — Z79.4 LONG TERM (CURRENT) USE OF INSULIN: ICD-10-CM

## 2018-10-11 DIAGNOSIS — Z90.49 ACQUIRED ABSENCE OF OTHER SPECIFIED PARTS OF DIGESTIVE TRACT: Chronic | ICD-10-CM

## 2018-10-11 DIAGNOSIS — F17.210 NICOTINE DEPENDENCE, CIGARETTES, UNCOMPLICATED: ICD-10-CM

## 2018-10-11 DIAGNOSIS — F41.9 ANXIETY DISORDER, UNSPECIFIED: ICD-10-CM

## 2018-10-11 DIAGNOSIS — F12.29 CANNABIS DEPENDENCE WITH UNSPECIFIED CANNABIS-INDUCED DISORDER: ICD-10-CM

## 2018-10-11 DIAGNOSIS — Z90.49 ACQUIRED ABSENCE OF OTHER SPECIFIED PARTS OF DIGESTIVE TRACT: ICD-10-CM

## 2018-10-11 DIAGNOSIS — K29.70 GASTRITIS, UNSPECIFIED, WITHOUT BLEEDING: ICD-10-CM

## 2018-10-11 LAB
ALBUMIN SERPL ELPH-MCNC: 3.7 G/DL — SIGNIFICANT CHANGE UP (ref 3.3–5)
ALP SERPL-CCNC: 105 U/L — SIGNIFICANT CHANGE UP (ref 40–120)
ALT FLD-CCNC: 30 U/L — SIGNIFICANT CHANGE UP (ref 12–78)
ANION GAP SERPL CALC-SCNC: 14 MMOL/L — SIGNIFICANT CHANGE UP (ref 5–17)
AST SERPL-CCNC: 18 U/L — SIGNIFICANT CHANGE UP (ref 15–37)
BILIRUB SERPL-MCNC: 0.5 MG/DL — SIGNIFICANT CHANGE UP (ref 0.2–1.2)
BUN SERPL-MCNC: 13 MG/DL — SIGNIFICANT CHANGE UP (ref 7–23)
CALCIUM SERPL-MCNC: 9.1 MG/DL — SIGNIFICANT CHANGE UP (ref 8.5–10.1)
CHLORIDE SERPL-SCNC: 100 MMOL/L — SIGNIFICANT CHANGE UP (ref 96–108)
CK MB BLD-MCNC: <1 % — SIGNIFICANT CHANGE UP (ref 0–3.5)
CK MB CFR SERPL CALC: <1 NG/ML — SIGNIFICANT CHANGE UP (ref 0.5–3.6)
CK SERPL-CCNC: 97 U/L — SIGNIFICANT CHANGE UP (ref 26–308)
CO2 SERPL-SCNC: 23 MMOL/L — SIGNIFICANT CHANGE UP (ref 22–31)
CREAT SERPL-MCNC: 1.09 MG/DL — SIGNIFICANT CHANGE UP (ref 0.5–1.3)
GLUCOSE BLDC GLUCOMTR-MCNC: 383 MG/DL — HIGH (ref 70–99)
GLUCOSE SERPL-MCNC: 370 MG/DL — HIGH (ref 70–99)
HCT VFR BLD CALC: 42 % — SIGNIFICANT CHANGE UP (ref 39–50)
HGB BLD-MCNC: 14.4 G/DL — SIGNIFICANT CHANGE UP (ref 13–17)
LIDOCAIN IGE QN: 107 U/L — SIGNIFICANT CHANGE UP (ref 73–393)
MCHC RBC-ENTMCNC: 28.3 PG — SIGNIFICANT CHANGE UP (ref 27–34)
MCHC RBC-ENTMCNC: 34.3 GM/DL — SIGNIFICANT CHANGE UP (ref 32–36)
MCV RBC AUTO: 82.5 FL — SIGNIFICANT CHANGE UP (ref 80–100)
NRBC # BLD: 0 /100 WBCS — SIGNIFICANT CHANGE UP (ref 0–0)
PLATELET # BLD AUTO: 422 K/UL — HIGH (ref 150–400)
POTASSIUM SERPL-MCNC: 4.5 MMOL/L — SIGNIFICANT CHANGE UP (ref 3.5–5.3)
POTASSIUM SERPL-SCNC: 4.5 MMOL/L — SIGNIFICANT CHANGE UP (ref 3.5–5.3)
PROT SERPL-MCNC: 7.9 GM/DL — SIGNIFICANT CHANGE UP (ref 6–8.3)
RBC # BLD: 5.09 M/UL — SIGNIFICANT CHANGE UP (ref 4.2–5.8)
RBC # FLD: 14.3 % — SIGNIFICANT CHANGE UP (ref 10.3–14.5)
SODIUM SERPL-SCNC: 137 MMOL/L — SIGNIFICANT CHANGE UP (ref 135–145)
TROPONIN I SERPL-MCNC: <.015 NG/ML — SIGNIFICANT CHANGE UP (ref 0.01–0.04)
TROPONIN I SERPL-MCNC: <.015 NG/ML — SIGNIFICANT CHANGE UP (ref 0.01–0.04)
WBC # BLD: 12.26 K/UL — HIGH (ref 3.8–10.5)
WBC # FLD AUTO: 12.26 K/UL — HIGH (ref 3.8–10.5)

## 2018-10-11 PROCEDURE — 71045 X-RAY EXAM CHEST 1 VIEW: CPT | Mod: 26

## 2018-10-11 PROCEDURE — 93010 ELECTROCARDIOGRAM REPORT: CPT

## 2018-10-11 PROCEDURE — 99285 EMERGENCY DEPT VISIT HI MDM: CPT

## 2018-10-11 RX ORDER — IPRATROPIUM/ALBUTEROL SULFATE 18-103MCG
3 AEROSOL WITH ADAPTER (GRAM) INHALATION EVERY 6 HOURS
Qty: 0 | Refills: 0 | Status: DISCONTINUED | OUTPATIENT
Start: 2018-10-11 | End: 2018-10-12

## 2018-10-11 RX ORDER — MAGNESIUM SULFATE 500 MG/ML
2 VIAL (ML) INJECTION ONCE
Qty: 0 | Refills: 0 | Status: COMPLETED | OUTPATIENT
Start: 2018-10-11 | End: 2018-10-11

## 2018-10-11 RX ORDER — IPRATROPIUM/ALBUTEROL SULFATE 18-103MCG
3 AEROSOL WITH ADAPTER (GRAM) INHALATION ONCE
Qty: 0 | Refills: 0 | Status: COMPLETED | OUTPATIENT
Start: 2018-10-11 | End: 2018-10-11

## 2018-10-11 RX ORDER — ONDANSETRON 8 MG/1
4 TABLET, FILM COATED ORAL ONCE
Qty: 0 | Refills: 0 | Status: COMPLETED | OUTPATIENT
Start: 2018-10-11 | End: 2018-10-11

## 2018-10-11 RX ORDER — ASPIRIN/CALCIUM CARB/MAGNESIUM 324 MG
325 TABLET ORAL DAILY
Qty: 0 | Refills: 0 | Status: DISCONTINUED | OUTPATIENT
Start: 2018-10-11 | End: 2018-10-11

## 2018-10-11 RX ORDER — ASPIRIN/CALCIUM CARB/MAGNESIUM 324 MG
325 TABLET ORAL ONCE
Qty: 0 | Refills: 0 | Status: COMPLETED | OUTPATIENT
Start: 2018-10-11 | End: 2018-10-11

## 2018-10-11 RX ORDER — SODIUM CHLORIDE 9 MG/ML
1000 INJECTION INTRAMUSCULAR; INTRAVENOUS; SUBCUTANEOUS ONCE
Qty: 0 | Refills: 0 | Status: COMPLETED | OUTPATIENT
Start: 2018-10-11 | End: 2018-10-11

## 2018-10-11 RX ADMIN — Medication 3 MILLILITER(S): at 22:32

## 2018-10-11 RX ADMIN — SODIUM CHLORIDE 1000 MILLILITER(S): 9 INJECTION INTRAMUSCULAR; INTRAVENOUS; SUBCUTANEOUS at 22:00

## 2018-10-11 RX ADMIN — ONDANSETRON 4 MILLIGRAM(S): 8 TABLET, FILM COATED ORAL at 19:46

## 2018-10-11 RX ADMIN — Medication 3 MILLILITER(S): at 18:11

## 2018-10-11 RX ADMIN — Medication 1 MILLIGRAM(S): at 22:34

## 2018-10-11 RX ADMIN — SODIUM CHLORIDE 1000 MILLILITER(S): 9 INJECTION INTRAMUSCULAR; INTRAVENOUS; SUBCUTANEOUS at 19:46

## 2018-10-11 RX ADMIN — Medication 3 MILLILITER(S): at 19:15

## 2018-10-11 RX ADMIN — Medication 325 MILLIGRAM(S): at 19:46

## 2018-10-11 RX ADMIN — Medication 125 MILLIGRAM(S): at 19:47

## 2018-10-11 RX ADMIN — Medication 50 GRAM(S): at 22:36

## 2018-10-11 RX ADMIN — Medication 2 GRAM(S): at 22:59

## 2018-10-11 NOTE — ED PROVIDER NOTE - PROGRESS NOTE DETAILS
pt signed out to me from dr hartmann, pt presented with sob, has h/o asthma and anxiety, initial ce 's negative, second set due at 10pm pt c/o chest pain again, repeat ekg negative, pt states that his pain is due to anxiety, ativan ordered second set negative, pt sent home with follow up with his pmd

## 2018-10-11 NOTE — ED PROVIDER NOTE - ATTENDING CONTRIBUTION TO CARE
37 years old male here c/o sob cough bilateral chest pain since yesterday. Pt sts he has a  hx of asthma but never being et intubated. Pt is speaking in clear full sentences no nasal flaring no shoulders retractions, + expiratory wheezing and rhonchi Agree with BETTY Peralta eval/treatment/dispo.

## 2018-10-11 NOTE — ED ADULT NURSE NOTE - OBJECTIVE STATEMENT
SHORTNESS OF BREATH, TIGHTNESS IN CHEST, FEELS LIKE SOMETHING SITTING ON HIS CHEST SINCE LAST NIGHT. HISTORY OF ASTHMA. , expiratory wheezing and rhonchi noted

## 2018-10-11 NOTE — ED PROVIDER NOTE - OBJECTIVE STATEMENT
This is a 36 yo m w DM, ASTHMA, gastroparesis pmhx c/o of sob, chest tightness, wheezing and something sitting om his chest ,x 1 day. Pt sts it feel like he has bronchitis/ Denies nausea, vomiting, chest pain. Pt never been intubated or hospitalize for asthma in the past

## 2018-10-11 NOTE — ED ADULT NURSE NOTE - NSIMPLEMENTINTERV_GEN_ALL_ED
Implemented All Universal Safety Interventions:  [place] to call system. Call bell, personal items and telephone within reach. Instruct patient to call for assistance. Room bathroom lighting operational. Non-slip footwear when patient is off stretcher. Physically safe environment: no spills, clutter or unnecessary equipment. Stretcher in lowest position, wheels locked, appropriate side rails in place.

## 2018-10-11 NOTE — ED ADULT NURSE NOTE - CHIEF COMPLAINT QUOTE
SHORTNESS OF BREATH, TIGHTNESS IN CHEST, FEELS LIKE SOMETHING SITTING ON HIS CHEST SINCE LAST NIGHT. HISTORY OF ASTHMA. NO WHEEZING NOTED.

## 2018-10-12 VITALS
RESPIRATION RATE: 16 BRPM | OXYGEN SATURATION: 94 % | TEMPERATURE: 99 F | HEART RATE: 93 BPM | DIASTOLIC BLOOD PRESSURE: 82 MMHG | SYSTOLIC BLOOD PRESSURE: 135 MMHG

## 2018-10-12 RX ORDER — ALBUTEROL 90 UG/1
2 AEROSOL, METERED ORAL
Qty: 1 | Refills: 0 | OUTPATIENT
Start: 2018-10-12 | End: 2018-11-10

## 2018-10-12 RX ORDER — INSULIN GLARGINE 100 [IU]/ML
30 INJECTION, SOLUTION SUBCUTANEOUS ONCE
Qty: 0 | Refills: 0 | Status: COMPLETED | OUTPATIENT
Start: 2018-10-12 | End: 2018-10-12

## 2018-10-12 RX ADMIN — INSULIN GLARGINE 30 UNIT(S): 100 INJECTION, SOLUTION SUBCUTANEOUS at 00:32

## 2018-10-13 DIAGNOSIS — K29.70 GASTRITIS, UNSPECIFIED, WITHOUT BLEEDING: ICD-10-CM

## 2018-10-13 DIAGNOSIS — R07.9 CHEST PAIN, UNSPECIFIED: ICD-10-CM

## 2018-10-13 DIAGNOSIS — R06.2 WHEEZING: ICD-10-CM

## 2018-10-13 DIAGNOSIS — E10.9 TYPE 1 DIABETES MELLITUS WITHOUT COMPLICATIONS: ICD-10-CM

## 2018-10-13 DIAGNOSIS — J45.909 UNSPECIFIED ASTHMA, UNCOMPLICATED: ICD-10-CM

## 2018-10-13 DIAGNOSIS — R06.02 SHORTNESS OF BREATH: ICD-10-CM

## 2018-10-13 DIAGNOSIS — F41.9 ANXIETY DISORDER, UNSPECIFIED: ICD-10-CM

## 2018-10-13 DIAGNOSIS — Z90.49 ACQUIRED ABSENCE OF OTHER SPECIFIED PARTS OF DIGESTIVE TRACT: ICD-10-CM

## 2018-10-13 DIAGNOSIS — Z79.4 LONG TERM (CURRENT) USE OF INSULIN: ICD-10-CM

## 2018-10-13 DIAGNOSIS — F17.210 NICOTINE DEPENDENCE, CIGARETTES, UNCOMPLICATED: ICD-10-CM

## 2018-10-13 DIAGNOSIS — F12.29 CANNABIS DEPENDENCE WITH UNSPECIFIED CANNABIS-INDUCED DISORDER: ICD-10-CM

## 2018-10-15 ENCOUNTER — EMERGENCY (EMERGENCY)
Facility: HOSPITAL | Age: 38
LOS: 0 days | Discharge: HOME | End: 2018-10-15
Attending: EMERGENCY MEDICINE
Payer: MEDICAID

## 2018-10-15 VITALS
TEMPERATURE: 98 F | RESPIRATION RATE: 16 BRPM | SYSTOLIC BLOOD PRESSURE: 131 MMHG | HEART RATE: 90 BPM | OXYGEN SATURATION: 97 % | DIASTOLIC BLOOD PRESSURE: 76 MMHG

## 2018-10-15 VITALS
WEIGHT: 169.98 LBS | HEIGHT: 69 IN | HEART RATE: 71 BPM | OXYGEN SATURATION: 98 % | TEMPERATURE: 98 F | SYSTOLIC BLOOD PRESSURE: 176 MMHG | RESPIRATION RATE: 18 BRPM | DIASTOLIC BLOOD PRESSURE: 102 MMHG

## 2018-10-15 DIAGNOSIS — E10.9 TYPE 1 DIABETES MELLITUS WITHOUT COMPLICATIONS: ICD-10-CM

## 2018-10-15 DIAGNOSIS — R11.10 VOMITING, UNSPECIFIED: ICD-10-CM

## 2018-10-15 DIAGNOSIS — Z90.49 ACQUIRED ABSENCE OF OTHER SPECIFIED PARTS OF DIGESTIVE TRACT: ICD-10-CM

## 2018-10-15 DIAGNOSIS — Z79.4 LONG TERM (CURRENT) USE OF INSULIN: ICD-10-CM

## 2018-10-15 DIAGNOSIS — K29.70 GASTRITIS, UNSPECIFIED, WITHOUT BLEEDING: ICD-10-CM

## 2018-10-15 DIAGNOSIS — R10.9 UNSPECIFIED ABDOMINAL PAIN: ICD-10-CM

## 2018-10-15 DIAGNOSIS — F41.9 ANXIETY DISORDER, UNSPECIFIED: ICD-10-CM

## 2018-10-15 DIAGNOSIS — Z90.49 ACQUIRED ABSENCE OF OTHER SPECIFIED PARTS OF DIGESTIVE TRACT: Chronic | ICD-10-CM

## 2018-10-15 DIAGNOSIS — J45.909 UNSPECIFIED ASTHMA, UNCOMPLICATED: ICD-10-CM

## 2018-10-15 DIAGNOSIS — F12.10 CANNABIS ABUSE, UNCOMPLICATED: ICD-10-CM

## 2018-10-15 DIAGNOSIS — Z79.51 LONG TERM (CURRENT) USE OF INHALED STEROIDS: ICD-10-CM

## 2018-10-15 LAB
ALBUMIN SERPL ELPH-MCNC: 3.8 G/DL — SIGNIFICANT CHANGE UP (ref 3.3–5)
ALP SERPL-CCNC: 107 U/L — SIGNIFICANT CHANGE UP (ref 40–120)
ALT FLD-CCNC: 45 U/L — SIGNIFICANT CHANGE UP (ref 12–78)
AMYLASE P1 CFR SERPL: 82 U/L — SIGNIFICANT CHANGE UP (ref 25–115)
ANION GAP SERPL CALC-SCNC: 15 MMOL/L — SIGNIFICANT CHANGE UP (ref 5–17)
APPEARANCE UR: CLEAR — SIGNIFICANT CHANGE UP
APTT BLD: 25 SEC — LOW (ref 27.5–37.4)
AST SERPL-CCNC: 34 U/L — SIGNIFICANT CHANGE UP (ref 15–37)
BACTERIA # UR AUTO: ABNORMAL
BASOPHILS # BLD AUTO: 0.02 K/UL — SIGNIFICANT CHANGE UP (ref 0–0.2)
BASOPHILS NFR BLD AUTO: 0.1 % — SIGNIFICANT CHANGE UP (ref 0–2)
BILIRUB SERPL-MCNC: 0.5 MG/DL — SIGNIFICANT CHANGE UP (ref 0.2–1.2)
BILIRUB UR-MCNC: NEGATIVE — SIGNIFICANT CHANGE UP
BLD GP AB SCN SERPL QL: SIGNIFICANT CHANGE UP
BUN SERPL-MCNC: 20 MG/DL — SIGNIFICANT CHANGE UP (ref 7–23)
CALCIUM SERPL-MCNC: 9 MG/DL — SIGNIFICANT CHANGE UP (ref 8.5–10.1)
CHLORIDE SERPL-SCNC: 98 MMOL/L — SIGNIFICANT CHANGE UP (ref 96–108)
CO2 SERPL-SCNC: 22 MMOL/L — SIGNIFICANT CHANGE UP (ref 22–31)
COLOR SPEC: SIGNIFICANT CHANGE UP
CREAT SERPL-MCNC: 1.21 MG/DL — SIGNIFICANT CHANGE UP (ref 0.5–1.3)
DIFF PNL FLD: NEGATIVE — SIGNIFICANT CHANGE UP
EOSINOPHIL # BLD AUTO: 0.01 K/UL — SIGNIFICANT CHANGE UP (ref 0–0.5)
EOSINOPHIL NFR BLD AUTO: 0.1 % — SIGNIFICANT CHANGE UP (ref 0–6)
GLUCOSE BLDC GLUCOMTR-MCNC: 258 MG/DL — HIGH (ref 70–99)
GLUCOSE SERPL-MCNC: 464 MG/DL — CRITICAL HIGH (ref 70–99)
GLUCOSE UR QL: 1000 MG/DL
HCT VFR BLD CALC: 42.2 % — SIGNIFICANT CHANGE UP (ref 39–50)
HGB BLD-MCNC: 14.4 G/DL — SIGNIFICANT CHANGE UP (ref 13–17)
IMM GRANULOCYTES NFR BLD AUTO: 0.4 % — SIGNIFICANT CHANGE UP (ref 0–1.5)
INR BLD: 0.9 RATIO — SIGNIFICANT CHANGE UP (ref 0.88–1.16)
KETONES UR-MCNC: ABNORMAL
LACTATE SERPL-SCNC: 2.3 MMOL/L — HIGH (ref 0.7–2)
LACTATE SERPL-SCNC: 3.4 MMOL/L — HIGH (ref 0.7–2)
LEUKOCYTE ESTERASE UR-ACNC: NEGATIVE — SIGNIFICANT CHANGE UP
LIDOCAIN IGE QN: 72 U/L — LOW (ref 73–393)
LYMPHOCYTES # BLD AUTO: 0.79 K/UL — LOW (ref 1–3.3)
LYMPHOCYTES # BLD AUTO: 5.6 % — LOW (ref 13–44)
MCHC RBC-ENTMCNC: 28.3 PG — SIGNIFICANT CHANGE UP (ref 27–34)
MCHC RBC-ENTMCNC: 34.1 GM/DL — SIGNIFICANT CHANGE UP (ref 32–36)
MCV RBC AUTO: 82.9 FL — SIGNIFICANT CHANGE UP (ref 80–100)
MONOCYTES # BLD AUTO: 0.11 K/UL — SIGNIFICANT CHANGE UP (ref 0–0.9)
MONOCYTES NFR BLD AUTO: 0.8 % — LOW (ref 2–14)
NEUTROPHILS # BLD AUTO: 13.2 K/UL — HIGH (ref 1.8–7.4)
NEUTROPHILS NFR BLD AUTO: 93 % — HIGH (ref 43–77)
NITRITE UR-MCNC: NEGATIVE — SIGNIFICANT CHANGE UP
NRBC # BLD: 0 /100 WBCS — SIGNIFICANT CHANGE UP (ref 0–0)
PH UR: 6 — SIGNIFICANT CHANGE UP (ref 5–8)
PLATELET # BLD AUTO: 399 K/UL — SIGNIFICANT CHANGE UP (ref 150–400)
POTASSIUM SERPL-MCNC: 4.9 MMOL/L — SIGNIFICANT CHANGE UP (ref 3.5–5.3)
POTASSIUM SERPL-SCNC: 4.9 MMOL/L — SIGNIFICANT CHANGE UP (ref 3.5–5.3)
PROT SERPL-MCNC: 7.7 GM/DL — SIGNIFICANT CHANGE UP (ref 6–8.3)
PROT UR-MCNC: NEGATIVE MG/DL — SIGNIFICANT CHANGE UP
PROTHROM AB SERPL-ACNC: 9.8 SEC — SIGNIFICANT CHANGE UP (ref 9.8–12.7)
RBC # BLD: 5.09 M/UL — SIGNIFICANT CHANGE UP (ref 4.2–5.8)
RBC # FLD: 14 % — SIGNIFICANT CHANGE UP (ref 10.3–14.5)
SODIUM SERPL-SCNC: 135 MMOL/L — SIGNIFICANT CHANGE UP (ref 135–145)
SP GR SPEC: 1 — LOW (ref 1.01–1.02)
UROBILINOGEN FLD QL: NEGATIVE MG/DL — SIGNIFICANT CHANGE UP
WBC # BLD: 14.18 K/UL — HIGH (ref 3.8–10.5)
WBC # FLD AUTO: 14.18 K/UL — HIGH (ref 3.8–10.5)

## 2018-10-15 PROCEDURE — 99285 EMERGENCY DEPT VISIT HI MDM: CPT

## 2018-10-15 PROCEDURE — 74176 CT ABD & PELVIS W/O CONTRAST: CPT | Mod: 26

## 2018-10-15 RX ORDER — ONDANSETRON 8 MG/1
1 TABLET, FILM COATED ORAL
Qty: 6 | Refills: 0 | OUTPATIENT
Start: 2018-10-15 | End: 2018-10-17

## 2018-10-15 RX ORDER — SODIUM CHLORIDE 9 MG/ML
1000 INJECTION INTRAMUSCULAR; INTRAVENOUS; SUBCUTANEOUS ONCE
Qty: 0 | Refills: 0 | Status: COMPLETED | OUTPATIENT
Start: 2018-10-15 | End: 2018-10-15

## 2018-10-15 RX ORDER — HYDROMORPHONE HYDROCHLORIDE 2 MG/ML
0.5 INJECTION INTRAMUSCULAR; INTRAVENOUS; SUBCUTANEOUS ONCE
Qty: 0 | Refills: 0 | Status: DISCONTINUED | OUTPATIENT
Start: 2018-10-15 | End: 2018-10-15

## 2018-10-15 RX ORDER — OXYCODONE HYDROCHLORIDE 5 MG/1
1 TABLET ORAL
Qty: 8 | Refills: 0 | OUTPATIENT
Start: 2018-10-15 | End: 2018-10-16

## 2018-10-15 RX ORDER — INSULIN HUMAN 100 [IU]/ML
10 INJECTION, SOLUTION SUBCUTANEOUS ONCE
Qty: 0 | Refills: 0 | Status: COMPLETED | OUTPATIENT
Start: 2018-10-15 | End: 2018-10-15

## 2018-10-15 RX ORDER — ONDANSETRON 8 MG/1
4 TABLET, FILM COATED ORAL ONCE
Qty: 0 | Refills: 0 | Status: DISCONTINUED | OUTPATIENT
Start: 2018-10-15 | End: 2018-10-15

## 2018-10-15 RX ORDER — METOCLOPRAMIDE HCL 10 MG
10 TABLET ORAL ONCE
Qty: 0 | Refills: 0 | Status: COMPLETED | OUTPATIENT
Start: 2018-10-15 | End: 2018-10-15

## 2018-10-15 RX ORDER — PANTOPRAZOLE SODIUM 20 MG/1
40 TABLET, DELAYED RELEASE ORAL ONCE
Qty: 0 | Refills: 0 | Status: COMPLETED | OUTPATIENT
Start: 2018-10-15 | End: 2018-10-15

## 2018-10-15 RX ORDER — SODIUM CHLORIDE 9 MG/ML
2000 INJECTION INTRAMUSCULAR; INTRAVENOUS; SUBCUTANEOUS ONCE
Qty: 0 | Refills: 0 | Status: COMPLETED | OUTPATIENT
Start: 2018-10-15 | End: 2018-10-15

## 2018-10-15 RX ADMIN — SODIUM CHLORIDE 2000 MILLILITER(S): 9 INJECTION INTRAMUSCULAR; INTRAVENOUS; SUBCUTANEOUS at 16:33

## 2018-10-15 RX ADMIN — SODIUM CHLORIDE 2000 MILLILITER(S): 9 INJECTION INTRAMUSCULAR; INTRAVENOUS; SUBCUTANEOUS at 19:45

## 2018-10-15 RX ADMIN — Medication 10 MILLIGRAM(S): at 18:46

## 2018-10-15 RX ADMIN — PANTOPRAZOLE SODIUM 40 MILLIGRAM(S): 20 TABLET, DELAYED RELEASE ORAL at 16:33

## 2018-10-15 RX ADMIN — HYDROMORPHONE HYDROCHLORIDE 0.5 MILLIGRAM(S): 2 INJECTION INTRAMUSCULAR; INTRAVENOUS; SUBCUTANEOUS at 16:33

## 2018-10-15 RX ADMIN — HYDROMORPHONE HYDROCHLORIDE 0.5 MILLIGRAM(S): 2 INJECTION INTRAMUSCULAR; INTRAVENOUS; SUBCUTANEOUS at 17:00

## 2018-10-15 RX ADMIN — INSULIN HUMAN 10 UNIT(S): 100 INJECTION, SOLUTION SUBCUTANEOUS at 17:45

## 2018-10-15 RX ADMIN — SODIUM CHLORIDE 2000 MILLILITER(S): 9 INJECTION INTRAMUSCULAR; INTRAVENOUS; SUBCUTANEOUS at 22:15

## 2018-10-15 RX ADMIN — Medication 10 MILLIGRAM(S): at 16:33

## 2018-10-15 NOTE — ED PROVIDER NOTE - MEDICAL DECISION MAKING DETAILS
Patient with history of diabetes, gastro paresis, with upper abdominal pain and vomiting Patient with history of diabetes, gastro paresis, with upper abdominal pain and vomiting; patient awaiting hydration and repeat lactate

## 2018-10-15 NOTE — ED PROVIDER NOTE - OBJECTIVE STATEMENT
36 yo male with history of diabetes, gastroparesis presents with abdominal pain and vomiting today. Patient denies chest pain, fever, chills, diarrhea, cough, recent travel. Pain 10/10 at present.

## 2018-10-15 NOTE — ED ADULT NURSE NOTE - NSIMPLEMENTINTERV_GEN_ALL_ED
Implemented All Universal Safety Interventions:  [place] to call system. Call bell, personal items and telephone within reach. Instruct patient to call for assistance. Room bathroom lighting operational. Non-slip footwear when patient is off stretcher. Physically safe environment: no spills, clutter or unnecessary equipment. Stretcher in lowest position, wheels locked, appropriate side rails in place.

## 2018-10-16 LAB
CULTURE RESULTS: NO GROWTH — SIGNIFICANT CHANGE UP
SPECIMEN SOURCE: SIGNIFICANT CHANGE UP

## 2018-11-01 ENCOUNTER — OUTPATIENT (OUTPATIENT)
Dept: OUTPATIENT SERVICES | Facility: HOSPITAL | Age: 38
LOS: 1 days | End: 2018-11-01
Payer: MEDICAID

## 2018-11-01 DIAGNOSIS — Z90.49 ACQUIRED ABSENCE OF OTHER SPECIFIED PARTS OF DIGESTIVE TRACT: Chronic | ICD-10-CM

## 2018-11-01 PROCEDURE — G9001: CPT

## 2018-11-05 ENCOUNTER — INPATIENT (INPATIENT)
Facility: HOSPITAL | Age: 38
LOS: 1 days | Discharge: HOME | End: 2018-11-07
Attending: INTERNAL MEDICINE | Admitting: INTERNAL MEDICINE
Payer: MEDICAID

## 2018-11-05 VITALS
SYSTOLIC BLOOD PRESSURE: 179 MMHG | DIASTOLIC BLOOD PRESSURE: 108 MMHG | OXYGEN SATURATION: 97 % | HEART RATE: 63 BPM | HEIGHT: 69 IN | WEIGHT: 169.98 LBS

## 2018-11-05 DIAGNOSIS — Z90.49 ACQUIRED ABSENCE OF OTHER SPECIFIED PARTS OF DIGESTIVE TRACT: Chronic | ICD-10-CM

## 2018-11-05 LAB
ACETONE SERPL-MCNC: ABNORMAL
ALBUMIN SERPL ELPH-MCNC: 3.8 G/DL — SIGNIFICANT CHANGE UP (ref 3.3–5)
ALP SERPL-CCNC: 117 U/L — SIGNIFICANT CHANGE UP (ref 40–120)
ALT FLD-CCNC: 40 U/L — SIGNIFICANT CHANGE UP (ref 12–78)
ANION GAP SERPL CALC-SCNC: 11 MMOL/L — SIGNIFICANT CHANGE UP (ref 5–17)
AST SERPL-CCNC: 23 U/L — SIGNIFICANT CHANGE UP (ref 15–37)
BASOPHILS # BLD AUTO: 0.07 K/UL — SIGNIFICANT CHANGE UP (ref 0–0.2)
BASOPHILS NFR BLD AUTO: 0.5 % — SIGNIFICANT CHANGE UP (ref 0–2)
BILIRUB SERPL-MCNC: 0.4 MG/DL — SIGNIFICANT CHANGE UP (ref 0.2–1.2)
BUN SERPL-MCNC: 14 MG/DL — SIGNIFICANT CHANGE UP (ref 7–23)
CALCIUM SERPL-MCNC: 8.9 MG/DL — SIGNIFICANT CHANGE UP (ref 8.5–10.1)
CHLORIDE SERPL-SCNC: 101 MMOL/L — SIGNIFICANT CHANGE UP (ref 96–108)
CO2 SERPL-SCNC: 27 MMOL/L — SIGNIFICANT CHANGE UP (ref 22–31)
CREAT SERPL-MCNC: 1.13 MG/DL — SIGNIFICANT CHANGE UP (ref 0.5–1.3)
EOSINOPHIL # BLD AUTO: 0.07 K/UL — SIGNIFICANT CHANGE UP (ref 0–0.5)
EOSINOPHIL NFR BLD AUTO: 0.5 % — SIGNIFICANT CHANGE UP (ref 0–6)
GLUCOSE SERPL-MCNC: 301 MG/DL — HIGH (ref 70–99)
HCT VFR BLD CALC: 42.7 % — SIGNIFICANT CHANGE UP (ref 39–50)
HGB BLD-MCNC: 14.5 G/DL — SIGNIFICANT CHANGE UP (ref 13–17)
IMM GRANULOCYTES NFR BLD AUTO: 0.3 % — SIGNIFICANT CHANGE UP (ref 0–1.5)
LYMPHOCYTES # BLD AUTO: 1.2 K/UL — SIGNIFICANT CHANGE UP (ref 1–3.3)
LYMPHOCYTES # BLD AUTO: 8.7 % — LOW (ref 13–44)
MAGNESIUM SERPL-MCNC: 2.4 MG/DL — SIGNIFICANT CHANGE UP (ref 1.6–2.6)
MCHC RBC-ENTMCNC: 28.4 PG — SIGNIFICANT CHANGE UP (ref 27–34)
MCHC RBC-ENTMCNC: 34 GM/DL — SIGNIFICANT CHANGE UP (ref 32–36)
MCV RBC AUTO: 83.7 FL — SIGNIFICANT CHANGE UP (ref 80–100)
MONOCYTES # BLD AUTO: 0.27 K/UL — SIGNIFICANT CHANGE UP (ref 0–0.9)
MONOCYTES NFR BLD AUTO: 1.9 % — LOW (ref 2–14)
NEUTROPHILS # BLD AUTO: 12.21 K/UL — HIGH (ref 1.8–7.4)
NEUTROPHILS NFR BLD AUTO: 88.1 % — HIGH (ref 43–77)
NRBC # BLD: 0 /100 WBCS — SIGNIFICANT CHANGE UP (ref 0–0)
PLATELET # BLD AUTO: 425 K/UL — HIGH (ref 150–400)
POTASSIUM SERPL-MCNC: 4.6 MMOL/L — SIGNIFICANT CHANGE UP (ref 3.5–5.3)
POTASSIUM SERPL-SCNC: 4.6 MMOL/L — SIGNIFICANT CHANGE UP (ref 3.5–5.3)
PROT SERPL-MCNC: 8.1 GM/DL — SIGNIFICANT CHANGE UP (ref 6–8.3)
RBC # BLD: 5.1 M/UL — SIGNIFICANT CHANGE UP (ref 4.2–5.8)
RBC # FLD: 14.5 % — SIGNIFICANT CHANGE UP (ref 10.3–14.5)
SODIUM SERPL-SCNC: 139 MMOL/L — SIGNIFICANT CHANGE UP (ref 135–145)
WBC # BLD: 13.86 K/UL — HIGH (ref 3.8–10.5)
WBC # FLD AUTO: 13.86 K/UL — HIGH (ref 3.8–10.5)

## 2018-11-05 PROCEDURE — 99285 EMERGENCY DEPT VISIT HI MDM: CPT

## 2018-11-05 RX ORDER — SODIUM CHLORIDE 9 MG/ML
2000 INJECTION, SOLUTION INTRAVENOUS ONCE
Qty: 0 | Refills: 0 | Status: COMPLETED | OUTPATIENT
Start: 2018-11-05 | End: 2018-11-05

## 2018-11-05 RX ORDER — MORPHINE SULFATE 50 MG/1
4 CAPSULE, EXTENDED RELEASE ORAL ONCE
Qty: 0 | Refills: 0 | Status: DISCONTINUED | OUTPATIENT
Start: 2018-11-05 | End: 2018-11-05

## 2018-11-05 RX ORDER — ONDANSETRON 8 MG/1
8 TABLET, FILM COATED ORAL ONCE
Qty: 0 | Refills: 0 | Status: COMPLETED | OUTPATIENT
Start: 2018-11-05 | End: 2018-11-05

## 2018-11-05 RX ORDER — METOCLOPRAMIDE HCL 10 MG
10 TABLET ORAL ONCE
Qty: 0 | Refills: 0 | Status: COMPLETED | OUTPATIENT
Start: 2018-11-05 | End: 2018-11-05

## 2018-11-05 RX ADMIN — Medication 10 MILLIGRAM(S): at 23:59

## 2018-11-05 RX ADMIN — MORPHINE SULFATE 4 MILLIGRAM(S): 50 CAPSULE, EXTENDED RELEASE ORAL at 19:56

## 2018-11-05 RX ADMIN — SODIUM CHLORIDE 2000 MILLILITER(S): 9 INJECTION, SOLUTION INTRAVENOUS at 23:25

## 2018-11-05 RX ADMIN — ONDANSETRON 8 MILLIGRAM(S): 8 TABLET, FILM COATED ORAL at 19:56

## 2018-11-05 RX ADMIN — SODIUM CHLORIDE 2000 MILLILITER(S): 9 INJECTION, SOLUTION INTRAVENOUS at 19:56

## 2018-11-05 RX ADMIN — MORPHINE SULFATE 4 MILLIGRAM(S): 50 CAPSULE, EXTENDED RELEASE ORAL at 21:24

## 2018-11-05 RX ADMIN — MORPHINE SULFATE 4 MILLIGRAM(S): 50 CAPSULE, EXTENDED RELEASE ORAL at 21:30

## 2018-11-06 DIAGNOSIS — J45.909 UNSPECIFIED ASTHMA, UNCOMPLICATED: ICD-10-CM

## 2018-11-06 DIAGNOSIS — E10.9 TYPE 1 DIABETES MELLITUS WITHOUT COMPLICATIONS: ICD-10-CM

## 2018-11-06 DIAGNOSIS — K29.70 GASTRITIS, UNSPECIFIED, WITHOUT BLEEDING: ICD-10-CM

## 2018-11-06 DIAGNOSIS — G43.A1 CYCLICAL VOMITING, IN MIGRAINE, INTRACTABLE: ICD-10-CM

## 2018-11-06 LAB
ANION GAP SERPL CALC-SCNC: 14 MMOL/L — SIGNIFICANT CHANGE UP (ref 5–17)
APPEARANCE UR: CLEAR — SIGNIFICANT CHANGE UP
B-OH-BUTYR SERPL-SCNC: 0.6 MMOL/L — HIGH
BASOPHILS # BLD AUTO: 0.05 K/UL — SIGNIFICANT CHANGE UP (ref 0–0.2)
BASOPHILS NFR BLD AUTO: 0.4 % — SIGNIFICANT CHANGE UP (ref 0–2)
BILIRUB UR-MCNC: NEGATIVE — SIGNIFICANT CHANGE UP
BUN SERPL-MCNC: 17 MG/DL — SIGNIFICANT CHANGE UP (ref 7–23)
CALCIUM SERPL-MCNC: 8.8 MG/DL — SIGNIFICANT CHANGE UP (ref 8.5–10.1)
CHLORIDE SERPL-SCNC: 99 MMOL/L — SIGNIFICANT CHANGE UP (ref 96–108)
CO2 SERPL-SCNC: 24 MMOL/L — SIGNIFICANT CHANGE UP (ref 22–31)
COLOR SPEC: YELLOW — SIGNIFICANT CHANGE UP
CREAT SERPL-MCNC: 1.13 MG/DL — SIGNIFICANT CHANGE UP (ref 0.5–1.3)
DIFF PNL FLD: NEGATIVE — SIGNIFICANT CHANGE UP
EOSINOPHIL # BLD AUTO: 0.02 K/UL — SIGNIFICANT CHANGE UP (ref 0–0.5)
EOSINOPHIL NFR BLD AUTO: 0.2 % — SIGNIFICANT CHANGE UP (ref 0–6)
GLUCOSE BLDC GLUCOMTR-MCNC: 289 MG/DL — HIGH (ref 70–99)
GLUCOSE BLDC GLUCOMTR-MCNC: 321 MG/DL — HIGH (ref 70–99)
GLUCOSE BLDC GLUCOMTR-MCNC: 322 MG/DL — HIGH (ref 70–99)
GLUCOSE BLDC GLUCOMTR-MCNC: 367 MG/DL — HIGH (ref 70–99)
GLUCOSE SERPL-MCNC: 336 MG/DL — HIGH (ref 70–99)
GLUCOSE UR QL: 1000 MG/DL
HCT VFR BLD CALC: 40.1 % — SIGNIFICANT CHANGE UP (ref 39–50)
HGB BLD-MCNC: 13.4 G/DL — SIGNIFICANT CHANGE UP (ref 13–17)
IMM GRANULOCYTES NFR BLD AUTO: 0.3 % — SIGNIFICANT CHANGE UP (ref 0–1.5)
KETONES UR-MCNC: ABNORMAL
LEUKOCYTE ESTERASE UR-ACNC: NEGATIVE — SIGNIFICANT CHANGE UP
LYMPHOCYTES # BLD AUTO: 1.71 K/UL — SIGNIFICANT CHANGE UP (ref 1–3.3)
LYMPHOCYTES # BLD AUTO: 13.6 % — SIGNIFICANT CHANGE UP (ref 13–44)
MCHC RBC-ENTMCNC: 28.2 PG — SIGNIFICANT CHANGE UP (ref 27–34)
MCHC RBC-ENTMCNC: 33.4 GM/DL — SIGNIFICANT CHANGE UP (ref 32–36)
MCV RBC AUTO: 84.4 FL — SIGNIFICANT CHANGE UP (ref 80–100)
MONOCYTES # BLD AUTO: 0.61 K/UL — SIGNIFICANT CHANGE UP (ref 0–0.9)
MONOCYTES NFR BLD AUTO: 4.9 % — SIGNIFICANT CHANGE UP (ref 2–14)
NEUTROPHILS # BLD AUTO: 10.13 K/UL — HIGH (ref 1.8–7.4)
NEUTROPHILS NFR BLD AUTO: 80.6 % — HIGH (ref 43–77)
NITRITE UR-MCNC: NEGATIVE — SIGNIFICANT CHANGE UP
NRBC # BLD: 0 /100 WBCS — SIGNIFICANT CHANGE UP (ref 0–0)
PH UR: 6.5 — SIGNIFICANT CHANGE UP (ref 5–8)
PLATELET # BLD AUTO: 458 K/UL — HIGH (ref 150–400)
POTASSIUM SERPL-MCNC: 4.5 MMOL/L — SIGNIFICANT CHANGE UP (ref 3.5–5.3)
POTASSIUM SERPL-SCNC: 4.5 MMOL/L — SIGNIFICANT CHANGE UP (ref 3.5–5.3)
PROT UR-MCNC: NEGATIVE MG/DL — SIGNIFICANT CHANGE UP
RBC # BLD: 4.75 M/UL — SIGNIFICANT CHANGE UP (ref 4.2–5.8)
RBC # FLD: 14.6 % — HIGH (ref 10.3–14.5)
SODIUM SERPL-SCNC: 137 MMOL/L — SIGNIFICANT CHANGE UP (ref 135–145)
SP GR SPEC: 1.01 — SIGNIFICANT CHANGE UP (ref 1.01–1.02)
UROBILINOGEN FLD QL: NEGATIVE MG/DL — SIGNIFICANT CHANGE UP
WBC # BLD: 12.56 K/UL — HIGH (ref 3.8–10.5)
WBC # FLD AUTO: 12.56 K/UL — HIGH (ref 3.8–10.5)

## 2018-11-06 PROCEDURE — 93010 ELECTROCARDIOGRAM REPORT: CPT

## 2018-11-06 PROCEDURE — 99223 1ST HOSP IP/OBS HIGH 75: CPT

## 2018-11-06 PROCEDURE — 71045 X-RAY EXAM CHEST 1 VIEW: CPT | Mod: 26

## 2018-11-06 PROCEDURE — 12345: CPT | Mod: NC

## 2018-11-06 RX ORDER — ONDANSETRON 8 MG/1
4 TABLET, FILM COATED ORAL EVERY 6 HOURS
Qty: 0 | Refills: 0 | Status: DISCONTINUED | OUTPATIENT
Start: 2018-11-06 | End: 2018-11-07

## 2018-11-06 RX ORDER — MESALAMINE 400 MG
400 TABLET, DELAYED RELEASE (ENTERIC COATED) ORAL THREE TIMES A DAY
Qty: 0 | Refills: 0 | Status: DISCONTINUED | OUTPATIENT
Start: 2018-11-06 | End: 2018-11-07

## 2018-11-06 RX ORDER — DEXTROSE 50 % IN WATER 50 %
12.5 SYRINGE (ML) INTRAVENOUS ONCE
Qty: 0 | Refills: 0 | Status: DISCONTINUED | OUTPATIENT
Start: 2018-11-06 | End: 2018-11-07

## 2018-11-06 RX ORDER — DEXTROSE 50 % IN WATER 50 %
25 SYRINGE (ML) INTRAVENOUS ONCE
Qty: 0 | Refills: 0 | Status: DISCONTINUED | OUTPATIENT
Start: 2018-11-06 | End: 2018-11-07

## 2018-11-06 RX ORDER — ALBUTEROL 90 UG/1
2 AEROSOL, METERED ORAL
Qty: 0 | Refills: 0 | Status: DISCONTINUED | OUTPATIENT
Start: 2018-11-06 | End: 2018-11-07

## 2018-11-06 RX ORDER — GLUCAGON INJECTION, SOLUTION 0.5 MG/.1ML
1 INJECTION, SOLUTION SUBCUTANEOUS ONCE
Qty: 0 | Refills: 0 | Status: DISCONTINUED | OUTPATIENT
Start: 2018-11-06 | End: 2018-11-07

## 2018-11-06 RX ORDER — SODIUM CHLORIDE 9 MG/ML
1000 INJECTION, SOLUTION INTRAVENOUS
Qty: 0 | Refills: 0 | Status: DISCONTINUED | OUTPATIENT
Start: 2018-11-06 | End: 2018-11-07

## 2018-11-06 RX ORDER — SUCRALFATE 1 G
1 TABLET ORAL
Qty: 0 | Refills: 0 | Status: DISCONTINUED | OUTPATIENT
Start: 2018-11-06 | End: 2018-11-07

## 2018-11-06 RX ORDER — METOCLOPRAMIDE HCL 10 MG
10 TABLET ORAL EVERY 8 HOURS
Qty: 0 | Refills: 0 | Status: DISCONTINUED | OUTPATIENT
Start: 2018-11-06 | End: 2018-11-07

## 2018-11-06 RX ORDER — HYDROMORPHONE HYDROCHLORIDE 2 MG/ML
1 INJECTION INTRAMUSCULAR; INTRAVENOUS; SUBCUTANEOUS EVERY 4 HOURS
Qty: 0 | Refills: 0 | Status: DISCONTINUED | OUTPATIENT
Start: 2018-11-06 | End: 2018-11-07

## 2018-11-06 RX ORDER — MORPHINE SULFATE 50 MG/1
4 CAPSULE, EXTENDED RELEASE ORAL ONCE
Qty: 0 | Refills: 0 | Status: DISCONTINUED | OUTPATIENT
Start: 2018-11-06 | End: 2018-11-06

## 2018-11-06 RX ORDER — INSULIN LISPRO 100/ML
VIAL (ML) SUBCUTANEOUS
Qty: 0 | Refills: 0 | Status: DISCONTINUED | OUTPATIENT
Start: 2018-11-06 | End: 2018-11-07

## 2018-11-06 RX ORDER — DEXTROSE 50 % IN WATER 50 %
15 SYRINGE (ML) INTRAVENOUS ONCE
Qty: 0 | Refills: 0 | Status: DISCONTINUED | OUTPATIENT
Start: 2018-11-06 | End: 2018-11-07

## 2018-11-06 RX ORDER — MORPHINE SULFATE 50 MG/1
2 CAPSULE, EXTENDED RELEASE ORAL EVERY 6 HOURS
Qty: 0 | Refills: 0 | Status: DISCONTINUED | OUTPATIENT
Start: 2018-11-06 | End: 2018-11-06

## 2018-11-06 RX ORDER — ALBUTEROL 90 UG/1
2.5 AEROSOL, METERED ORAL EVERY 6 HOURS
Qty: 0 | Refills: 0 | Status: DISCONTINUED | OUTPATIENT
Start: 2018-11-06 | End: 2018-11-06

## 2018-11-06 RX ORDER — CLONAZEPAM 1 MG
1 TABLET ORAL
Qty: 0 | Refills: 0 | Status: DISCONTINUED | OUTPATIENT
Start: 2018-11-06 | End: 2018-11-07

## 2018-11-06 RX ORDER — INSULIN GLARGINE 100 [IU]/ML
15 INJECTION, SOLUTION SUBCUTANEOUS AT BEDTIME
Qty: 0 | Refills: 0 | Status: DISCONTINUED | OUTPATIENT
Start: 2018-11-06 | End: 2018-11-07

## 2018-11-06 RX ORDER — ALBUTEROL 90 UG/1
2.5 AEROSOL, METERED ORAL EVERY 6 HOURS
Qty: 0 | Refills: 0 | Status: DISCONTINUED | OUTPATIENT
Start: 2018-11-06 | End: 2018-11-07

## 2018-11-06 RX ORDER — HYDROMORPHONE HYDROCHLORIDE 2 MG/ML
0.5 INJECTION INTRAMUSCULAR; INTRAVENOUS; SUBCUTANEOUS ONCE
Qty: 0 | Refills: 0 | Status: DISCONTINUED | OUTPATIENT
Start: 2018-11-06 | End: 2018-11-06

## 2018-11-06 RX ADMIN — Medication 4: at 11:51

## 2018-11-06 RX ADMIN — Medication 1 MILLIGRAM(S): at 22:20

## 2018-11-06 RX ADMIN — SODIUM CHLORIDE 100 MILLILITER(S): 9 INJECTION, SOLUTION INTRAVENOUS at 17:03

## 2018-11-06 RX ADMIN — Medication 20 MILLIGRAM(S): at 06:12

## 2018-11-06 RX ADMIN — ALBUTEROL 2 PUFF(S): 90 AEROSOL, METERED ORAL at 06:11

## 2018-11-06 RX ADMIN — HYDROMORPHONE HYDROCHLORIDE 0.5 MILLIGRAM(S): 2 INJECTION INTRAMUSCULAR; INTRAVENOUS; SUBCUTANEOUS at 06:32

## 2018-11-06 RX ADMIN — MORPHINE SULFATE 2 MILLIGRAM(S): 50 CAPSULE, EXTENDED RELEASE ORAL at 03:51

## 2018-11-06 RX ADMIN — MORPHINE SULFATE 2 MILLIGRAM(S): 50 CAPSULE, EXTENDED RELEASE ORAL at 11:10

## 2018-11-06 RX ADMIN — Medication 10 MILLIGRAM(S): at 06:11

## 2018-11-06 RX ADMIN — MORPHINE SULFATE 4 MILLIGRAM(S): 50 CAPSULE, EXTENDED RELEASE ORAL at 02:29

## 2018-11-06 RX ADMIN — HYDROMORPHONE HYDROCHLORIDE 1 MILLIGRAM(S): 2 INJECTION INTRAMUSCULAR; INTRAVENOUS; SUBCUTANEOUS at 11:46

## 2018-11-06 RX ADMIN — MORPHINE SULFATE 2 MILLIGRAM(S): 50 CAPSULE, EXTENDED RELEASE ORAL at 10:32

## 2018-11-06 RX ADMIN — HYDROMORPHONE HYDROCHLORIDE 1 MILLIGRAM(S): 2 INJECTION INTRAMUSCULAR; INTRAVENOUS; SUBCUTANEOUS at 12:01

## 2018-11-06 RX ADMIN — SODIUM CHLORIDE 100 MILLILITER(S): 9 INJECTION, SOLUTION INTRAVENOUS at 06:27

## 2018-11-06 RX ADMIN — ALBUTEROL 2.5 MILLIGRAM(S): 90 AEROSOL, METERED ORAL at 11:24

## 2018-11-06 RX ADMIN — Medication 1 GRAM(S): at 11:53

## 2018-11-06 RX ADMIN — ALBUTEROL 2.5 MILLIGRAM(S): 90 AEROSOL, METERED ORAL at 05:15

## 2018-11-06 RX ADMIN — HYDROMORPHONE HYDROCHLORIDE 1 MILLIGRAM(S): 2 INJECTION INTRAMUSCULAR; INTRAVENOUS; SUBCUTANEOUS at 15:54

## 2018-11-06 RX ADMIN — INSULIN GLARGINE 15 UNIT(S): 100 INJECTION, SOLUTION SUBCUTANEOUS at 22:20

## 2018-11-06 RX ADMIN — HYDROMORPHONE HYDROCHLORIDE 1 MILLIGRAM(S): 2 INJECTION INTRAMUSCULAR; INTRAVENOUS; SUBCUTANEOUS at 21:10

## 2018-11-06 RX ADMIN — ONDANSETRON 4 MILLIGRAM(S): 8 TABLET, FILM COATED ORAL at 02:44

## 2018-11-06 RX ADMIN — HYDROMORPHONE HYDROCHLORIDE 1 MILLIGRAM(S): 2 INJECTION INTRAMUSCULAR; INTRAVENOUS; SUBCUTANEOUS at 16:24

## 2018-11-06 RX ADMIN — Medication 1 GRAM(S): at 06:13

## 2018-11-06 RX ADMIN — ALBUTEROL 2 PUFF(S): 90 AEROSOL, METERED ORAL at 17:02

## 2018-11-06 RX ADMIN — ONDANSETRON 4 MILLIGRAM(S): 8 TABLET, FILM COATED ORAL at 10:32

## 2018-11-06 RX ADMIN — MORPHINE SULFATE 2 MILLIGRAM(S): 50 CAPSULE, EXTENDED RELEASE ORAL at 02:43

## 2018-11-06 RX ADMIN — Medication 20 MILLIGRAM(S): at 17:22

## 2018-11-06 RX ADMIN — Medication 10 MILLIGRAM(S): at 22:20

## 2018-11-06 RX ADMIN — Medication 400 MILLIGRAM(S): at 22:20

## 2018-11-06 RX ADMIN — Medication 1 GRAM(S): at 17:03

## 2018-11-06 RX ADMIN — MORPHINE SULFATE 4 MILLIGRAM(S): 50 CAPSULE, EXTENDED RELEASE ORAL at 00:44

## 2018-11-06 RX ADMIN — Medication 400 MILLIGRAM(S): at 13:33

## 2018-11-06 RX ADMIN — HYDROMORPHONE HYDROCHLORIDE 0.5 MILLIGRAM(S): 2 INJECTION INTRAMUSCULAR; INTRAVENOUS; SUBCUTANEOUS at 06:11

## 2018-11-06 RX ADMIN — Medication 4: at 16:24

## 2018-11-06 RX ADMIN — HYDROMORPHONE HYDROCHLORIDE 1 MILLIGRAM(S): 2 INJECTION INTRAMUSCULAR; INTRAVENOUS; SUBCUTANEOUS at 20:42

## 2018-11-06 RX ADMIN — Medication 1 MILLIGRAM(S): at 13:32

## 2018-11-06 RX ADMIN — Medication 1 GRAM(S): at 23:16

## 2018-11-06 RX ADMIN — Medication 5: at 07:16

## 2018-11-06 RX ADMIN — Medication 10 MILLIGRAM(S): at 13:33

## 2018-11-06 NOTE — ED PROVIDER NOTE - PHYSICAL EXAMINATION
Gen: Alert, retching   Head: NC, AT   Eyes: PERRL, EOMI, normal lids/conjunctiva  ENT: normal hearing, patent oropharynx without erythema/exudate, uvula midline  Neck: supple, no tenderness, Trachea midline  Pulm: Bilateral BS, normal resp effort, no wheeze/stridor/retractions  CV: RRR, no M/R/G, 2+ radial and dp pulses bl, no edema  Abd: soft, NT/ND, +BS, no hepatosplenomegaly  Mskel: extremities x4 with normal ROM and no joint effusions. no ctl spine ttp.   Skin: no rash, no bruising   Neuro: AAOx3, no sensory/motor deficits, CN 2-12 intact

## 2018-11-06 NOTE — H&P ADULT - HISTORY OF PRESENT ILLNESS
38 y/o Male PMH dm1 and gastroparesis, recent EGD showing H pylori neg gastritis, p/w 1 days nausea and vomiting with abd pain. the patient notes these symptoms are recurrent without clear precipitant, he denies coffee ground emesis and hematemesis, he is taking reglan and protonix. no fever, diarrhea, dysuria, bleeding, testicular pain, cp, sob, ha, rhinorrhea, vision change, dizziness. patient did not take anything at home. 36 y/o Male PMH asthma, dm1 and gastroparesis, recent EGD showing H pylori neg gastritis, p/w 1 days nausea and vomiting with abd pain. The patient notes these symptoms are recurrent without clear precipitant, he denies coffee ground emesis and hematemesis, he is taking reglan and protonix. Denies fever, diarrhea, dysuria, bleeding, testicular pain, cp,  ha, rhinorrhea, vision change, dizziness. Pt c/o some SOB from asthma.

## 2018-11-06 NOTE — ED PROVIDER NOTE - MEDICAL DECISION MAKING DETAILS
patient pw abd pain with nausea and vomiting. feels like gastroparesis pain again. will not ct scan patient again given how many negative results he has had previously. admit for intractable vomiting.

## 2018-11-06 NOTE — CHART NOTE - NSCHARTNOTEFT_GEN_A_CORE
38 y/o Male PMH asthma, dm1 and gastroparesis, recent EGD showing H pylori neg gastritis, p/w 1 days nausea and vomiting with abd pain. See H&P for more details, Pt was seen and examined at bedside. Continue with current management.

## 2018-11-06 NOTE — ED PROVIDER NOTE - OBJECTIVE STATEMENT
Pertinent PMH/PSH/FHx/SHx and Review of Systems contained within:  37M hx iddm and gastroparesis pw 1 days nausea and vomiting with abd pain. patient notes these symptoms are recurrent without clear precipitant. no fever, diarrhea, dysuria, bleeding, testicular pain, cp, sob, ha, rhinorrhea, vision change, dizziness. patient did not take anything at home.   Fh and Sh not otherwise contributory  ROS otherwise negative

## 2018-11-06 NOTE — CONSULT NOTE ADULT - SUBJECTIVE AND OBJECTIVE BOX
Chief Complaint:  Patient is a 37y old  Male who presents with a chief complaint of Abdominal pain, nausea and vomiting. (2018 02:41)      HPI:  38 y/o Male PMH asthma, dm1 and gastroparesis, recent EGD showing H pylori neg gastritis, p/w 1 days nausea and vomiting with abd pain. The patient notes these symptoms are recurrent without clear precipitant, he denies coffee ground emesis and hematemesis, he is taking reglan and protonix. Denies fever, diarrhea, dysuria, bleeding, testicular pain, cp,  ha, rhinorrhea, vision change, dizziness. Pt c/o some SOB from asthma. (2018 02:41)      PMH/PSH:PAST MEDICAL & SURGICAL HISTORY:  Uncomplicated asthma, unspecified asthma severity, unspecified whether persistent  Gastritis, presence of bleeding unspecified, unspecified chronicity, unspecified gastritis type  Controlled diabetes mellitus type 1 without complications  Anxiety  Gastritis on EGD   Asthma  DM type 1 (diabetes mellitus, type 1)  History of cholecystectomy  S/P cholecystectomy      Allergies:  No Known Allergies      Medications:  ALBUTerol    0.083% 2.5 milliGRAM(s) Nebulizer every 6 hours PRN  ALBUTerol    90 MICROgram(s) HFA Inhaler 2 Puff(s) Inhalation two times a day  clonazePAM Tablet 1 milliGRAM(s) Oral two times a day PRN  dextrose 40% Gel 15 Gram(s) Oral once PRN  dextrose 5%. 1000 milliLiter(s) IV Continuous <Continuous>  dextrose 50% Injectable 12.5 Gram(s) IV Push once  dextrose 50% Injectable 25 Gram(s) IV Push once  dextrose 50% Injectable 25 Gram(s) IV Push once  glucagon  Injectable 1 milliGRAM(s) IntraMuscular once PRN  HYDROmorphone  Injectable 1 milliGRAM(s) IV Push every 4 hours PRN  insulin glargine Injectable (LANTUS) 15 Unit(s) SubCutaneous at bedtime  insulin lispro (HumaLOG) corrective regimen sliding scale   SubCutaneous three times a day before meals  lactated ringers. 1000 milliLiter(s) IV Continuous <Continuous>  mesalamine DR Capsule 400 milliGRAM(s) Oral three times a day  metoclopramide Injectable 10 milliGRAM(s) IV Push every 8 hours  ondansetron Injectable 4 milliGRAM(s) IV Push every 6 hours PRN  predniSONE   Tablet 20 milliGRAM(s) Oral every 12 hours  sucralfate 1 Gram(s) Oral four times a day      Review of Systems:    General:  No weight loss, fevers, chills, night sweats, fatigue,   Eyes:  Good vision, no reported pain  ENT:  No sore throat, pain, runny nose, dysphagia  CV:  No pain, palpitations, hypo/hypertension  Resp:  No dyspnea, cough, tachypnea, wheezing  GI:  +pain, +nausea, +vomiting, No diarrhea, No constipation, No pruritis, No rectal bleeding, No tarry stools, No dysphagia,  :  No pain, bleeding, incontinence, nocturia  Muscle:  No pain, weakness  Breast:  No pain, abscess, mass, discharge  Neuro:  No weakness, tingling, memory problems  Psych:  No fatigue, insomnia, mood problems, depression  Endocrine:  No polyuria, polydipsia, cold/heat intolerance  Heme:  No petechiae, ecchymosis, easy bruisability  Skin:  No rash, tattoos, scars, edema    Relevant Family History:   FAMILY HISTORY:  Family history of diabetes mellitus (DM) (Father, Mother, Grandparent)  Family history of diabetes mellitus (Father, Mother)      Relevant Social History: Alcohol ( -) , Tobacco ( -) , Illicit drugs (- )     Physical Exam:    Vital Signs:  Vital Signs Last 24 Hrs  T(C): 36.4 (2018 15:53), Max: 36.9 (2018 00:06)  T(F): 97.6 (2018 15:53), Max: 98.5 (2018 00:06)  HR: 85 (2018 15:53) (63 - 92)  BP: 148/83 (2018 15:53) (133/91 - 179/108)  BP(mean): --  RR: 17 (2018 15:53) (17 - 20)  SpO2: 96% (2018 15:53) (96% - 99%)  Daily Height in cm: 175.26 (2018 18:58)    Daily     General:  Appears stated age, well-groomed, well-nourished, no distress  HEENT:  NC/AT,  conjunctivae clear and pink, no thyromegaly, nodules, adenopathy, no JVD, anicteric sclera  Chest:  Full & symmetric excursion, no increased effort, breath sounds clear  Cardiovascular:  Regular rhythm, S1, S2, no murmur/rub/S3/S4, no abdominal bruit, no edema  Abdomen:  Soft, non tender, non distended, normoactive bowel sounds,  no masses , no hepatosplenomegaly, no signs of chronic liver disease  Extremities:  no cyanosis, clubbing or edema  Skin:  No rash/erythema/ecchymoses/petechiae/wounds/abscess/warm/dry  Neuro/Psych:  Alert, oriented, no asterixis, no tremor, no encephalopathy    Laboratory:                          13.4   12.56 )-----------( 458      ( 2018 08:37 )             40.1         137  |  99  |  17  ----------------------------<  336<H>  4.5   |  24  |  1.13    Ca    8.8      2018 08:37  Mg     2.4         TPro  8.1  /  Alb  3.8  /  TBili  0.4  /  DBili  x   /  AST  23  /  ALT  40  /  AlkPhos  117      LIVER FUNCTIONS - ( 2018 19:43 )  Alb: 3.8 g/dL / Pro: 8.1 gm/dL / ALK PHOS: 117 U/L / ALT: 40 U/L / AST: 23 U/L / GGT: x             Urinalysis Basic - ( 2018 05:01 )    Color: Yellow / Appearance: Clear / S.015 / pH: x  Gluc: x / Ketone: Large  / Bili: Negative / Urobili: Negative mg/dL   Blood: x / Protein: Negative mg/dL / Nitrite: Negative   Leuk Esterase: Negative / RBC: x / WBC x   Sq Epi: x / Non Sq Epi: x / Bacteria: x          Intake and Output    18 @ 07:01  -  18 @ 07:00  --------------------------------------------------------  IN: 200 mL / OUT: 0 mL / NET: 200 mL    18 @ 07:01  -  18 @ 16:31  --------------------------------------------------------  IN: 0 mL / OUT: 570 mL / NET: -570 mL        Imaging:  EXAM:  CT ABDOMEN AND PELVIS                        PROCEDURE DATE:  10/15/2018    INTERPRETATION:  HISTORY: Abdominal pain. History of gastroparesis.    TECHNIQUE: CT scan of the abdomen and pelvis was performed from the domes   of the diaphragm to the symphysis pubis without oral or intravenous   contrast. Coronal and sagittal reformatted images are provided.    COMPARISON: CT of the abdomen and pelvis from 9/15/2018 and 9/3/2018.    FINDINGS:  The lung bases are clear. The heart isnot enlarged.    Evaluation of the parenchymal organs and vascular structures is limited   without intravenous contrast.     The unenhanced appearance of the liver, spleen, pancreas and adrenal   glands is unremarkable. The gallbladder is surgically removed. There is   no hydronephrosis or perinephric stranding.     There is no bowel obstruction, free air or ascites. The appendix is   normal. The distal transverse, descending and sigmoid colons are   underdistended which limits evaluation of the wall thickness. No   significant pericolonic inflammatory change as compared to the prior   examination.    The abdominal aorta is normal in caliber. There is no retroperitoneal,   pelvic or inguinal adenopathy. There is no ascites or bowel obstruction.     The urinary bladder is unremarkable. The prostate gland is not enlarged.    The visualized osseous structures are within normal limits. There is   stable slight focal skin thickening with stranding in the underlying   ventral abdominal wall fatof the left mid abdominal wall.    IMPRESSION:  Underdistended left-sided colon as above. Otherwise, no acute   intra-abdominal or pelvic finding.    Stable slightly focal skin thickening with stranding in the adjacent fat   left mid ventral abdominal wall. Please correlate clinically as   cellulitis can have this appearance.      GLENNA NAPOLES M.D., RADIOLOGIST  This document has been electronically signed. Oct 15 2018  6:31PM

## 2018-11-06 NOTE — H&P ADULT - NSHPLABSRESULTS_GEN_ALL_CORE
LABS:                        14.5   13.86 )-----------( 425      ( 05 Nov 2018 19:43 )             42.7     11-05    139  |  101  |  14  ----------------------------<  301<H>  4.6   |  27  |  1.13    Ca    8.9      05 Nov 2018 19:43  Mg     2.4     11-05    TPro  8.1  /  Alb  3.8  /  TBili  0.4  /  DBili  x   /  AST  23  /  ALT  40  /  AlkPhos  117  11-05        CAPILLARY BLOOD GLUCOSE              RADIOLOGY & ADDITIONAL TESTS:  CXR: no infiltrate  Imaging Personally Reviewed:  [ x] YES  [ ] NO

## 2018-11-06 NOTE — H&P ADULT - NSHPPHYSICALEXAM_GEN_ALL_CORE
T(C): 36.9 (06 Nov 2018 00:06), Max: 36.9 (06 Nov 2018 00:06)  T(F): 98.5 (06 Nov 2018 00:06), Max: 98.5 (06 Nov 2018 00:06)  HR: 68 (06 Nov 2018 00:06) (63 - 68)  BP: 176/101 (06 Nov 2018 00:06) (176/101 - 179/108)  BP(mean): --  RR: 18 (06 Nov 2018 00:06) (18 - 18)  SpO2: 99% (06 Nov 2018 00:06) (97% - 99%)    PHYSICAL EXAM:  GENERAL: NAD, well-groomed, well-developed  HEAD:  Atraumatic, Normocephalic  EYES: EOMI, PERRLA, conjunctiva and sclera clear  ENMT: No tonsillar erythema, exudates, or enlargement; Moist mucous membranes, Good dentition, No lesions  NECK: Supple, No JVD, Normal thyroid  NERVOUS SYSTEM:  Alert & Oriented X3, Good concentration; Motor Strength 5/5 B/L upper and lower extremities; DTRs 2+ intact and symmetric  CHEST/LUNG: Clear to percussion bilaterally; No rales, rhonchi, wheezing, or rubs  HEART: Regular rate and rhythm; No murmurs, rubs, or gallops  ABDOMEN: Soft, epigastric tendeness, Nondistended; Bowel sounds present  EXTREMITIES:  + Peripheral Pulses, No clubbing, cyanosis, or edema  LYMPH: No lymphadenopathy noted  SKIN: No rashes or lesions

## 2018-11-06 NOTE — CONSULT NOTE ADULT - CONSULT REQUESTED BY NAME
"Pharmacy Kinetics 48 y.o. female on vancomycin day # 1 6/10/2017    Vancomycin 25 mg/kg (1300 mg) loading dose at 12:43 today.    Indication for Treatment: Unknown source of infection.    Pertinent history per medical record: Admitted on 6/10/2017 for fever and unknown source of infection with history of HIV.  History of chronic kidney disease receiving hemodialysis Tuesday, Thursday, Saturday as an outpatient.    Other antibiotics: Zosyn 3.375 gm every 6 hours.    Allergies: Oxycodone     List concerns for renal function: Chronic renal disease on dialysis; Combination of Vancomycin and Zosyn.    Pertinent cultures to date:   Pending    Recent Labs      06/10/17   0950   WBC  4.1*   NEUTSPOLYS  87.30*     Recent Labs      06/10/17   0950   BUN  15   CREATININE  2.46*   ALBUMIN  1.8*        Blood pressure 178/100, pulse 95, temperature 38.2 °C (100.8 °F), resp. rate 9, height 1.676 m (5' 6\"), weight 51 kg (112 lb 7 oz), last menstrual period 2010, SpO2 98 %, not currently breastfeeding. Temp (24hrs), Av.2 °C (100.8 °F), Min:38.2 °C (100.8 °F), Max:38.2 °C (100.8 °F)      A/P   1. Vancomycin dose change: Plan to pulse dose vancomycin until steady state achieved.  2. Next vancomycin level: 07:00 on 17.  3. Goal trough: 12-16 mcg/ml.  4. Comments: Continue to monitor closely.      Valeriano AllenD BCPS      " DR GAUTHIER

## 2018-11-06 NOTE — H&P ADULT - NSHPREVIEWOFSYSTEMS_GEN_ALL_CORE
REVIEW OF SYSTEMS:  CONSTITUTIONAL: No fever, weight loss, or fatigue  EYES: No eye pain, visual disturbances, or discharge  ENMT:  No difficulty hearing, tinnitus, vertigo; No sinus or throat pain  NECK: No pain or stiffness  RESPIRATORY: No cough, wheezing, chills or hemoptysis; + shortness of breath  CARDIOVASCULAR: No chest pain, palpitations, dizziness, or leg swelling  GASTROINTESTINAL: + abdominal  pain. +nausea, vomiting, no hematemesis; No diarrhea or constipation. No melena or hematochezia.  GENITOURINARY: No dysuria, frequency, hematuria, or incontinence  NEUROLOGICAL: No headaches, memory loss, loss of strength, numbness, or tremors  SKIN: No itching, burning, rashes, or lesions   LYMPH NODES: No enlarged glands  ENDOCRINE: No heat or cold intolerance; No hair loss  MUSCULOSKELETAL: No joint pain or swelling; No muscle, back, or extremity pain  PSYCHIATRIC: No depression, anxiety, mood swings, or difficulty sleeping  HEME/LYMPH: No easy bruising, or bleeding gums  ALLERGY AND IMMUNOLOGIC: No hives or eczema

## 2018-11-06 NOTE — H&P ADULT - ASSESSMENT
38 y/o Male PMH asthma, dm1 and gastroparesis, recent EGD showing H pylori neg gastritis, p/w 1 days nausea and vomiting with abd pain. Has gastroparesis, likely also gastritis.  IMPROVE VTE Individual Risk Assessment          RISK                                                          Points    [  ] Previous VTE                                                3    [  ] Thrombophilia                                             2    [  ] Lower limb paralysis                                    2        (unable to hold up >15 seconds)      [  ] Current Cancer                                             2         (within 6 months)    [  ] Immobilization > 24 hrs                              1    [  ] ICU/CCU stay > 24 hours                            1    [  ] Age > 60                                                    1    IMPROVE VTE Score ____0_____

## 2018-11-07 ENCOUNTER — TRANSCRIPTION ENCOUNTER (OUTPATIENT)
Age: 38
End: 2018-11-07

## 2018-11-07 VITALS
TEMPERATURE: 98 F | RESPIRATION RATE: 17 BRPM | DIASTOLIC BLOOD PRESSURE: 79 MMHG | OXYGEN SATURATION: 97 % | SYSTOLIC BLOOD PRESSURE: 133 MMHG | HEART RATE: 80 BPM

## 2018-11-07 DIAGNOSIS — Z29.9 ENCOUNTER FOR PROPHYLACTIC MEASURES, UNSPECIFIED: ICD-10-CM

## 2018-11-07 LAB
ANION GAP SERPL CALC-SCNC: 11 MMOL/L — SIGNIFICANT CHANGE UP (ref 5–17)
BUN SERPL-MCNC: 20 MG/DL — SIGNIFICANT CHANGE UP (ref 7–23)
CALCIUM SERPL-MCNC: 8.4 MG/DL — LOW (ref 8.5–10.1)
CHLORIDE SERPL-SCNC: 97 MMOL/L — SIGNIFICANT CHANGE UP (ref 96–108)
CO2 SERPL-SCNC: 27 MMOL/L — SIGNIFICANT CHANGE UP (ref 22–31)
CREAT SERPL-MCNC: 1.18 MG/DL — SIGNIFICANT CHANGE UP (ref 0.5–1.3)
GLUCOSE BLDC GLUCOMTR-MCNC: 258 MG/DL — HIGH (ref 70–99)
GLUCOSE BLDC GLUCOMTR-MCNC: 304 MG/DL — HIGH (ref 70–99)
GLUCOSE BLDC GLUCOMTR-MCNC: 313 MG/DL — HIGH (ref 70–99)
GLUCOSE BLDC GLUCOMTR-MCNC: 390 MG/DL — HIGH (ref 70–99)
GLUCOSE SERPL-MCNC: 313 MG/DL — HIGH (ref 70–99)
HBA1C BLD-MCNC: 8.6 % — HIGH (ref 4–5.6)
MAGNESIUM SERPL-MCNC: 2.2 MG/DL — SIGNIFICANT CHANGE UP (ref 1.6–2.6)
PHOSPHATE SERPL-MCNC: 2.9 MG/DL — SIGNIFICANT CHANGE UP (ref 2.5–4.5)
POTASSIUM SERPL-MCNC: 4.2 MMOL/L — SIGNIFICANT CHANGE UP (ref 3.5–5.3)
POTASSIUM SERPL-SCNC: 4.2 MMOL/L — SIGNIFICANT CHANGE UP (ref 3.5–5.3)
SODIUM SERPL-SCNC: 135 MMOL/L — SIGNIFICANT CHANGE UP (ref 135–145)

## 2018-11-07 PROCEDURE — 99233 SBSQ HOSP IP/OBS HIGH 50: CPT

## 2018-11-07 RX ADMIN — HYDROMORPHONE HYDROCHLORIDE 1 MILLIGRAM(S): 2 INJECTION INTRAMUSCULAR; INTRAVENOUS; SUBCUTANEOUS at 05:16

## 2018-11-07 RX ADMIN — Medication 3: at 08:57

## 2018-11-07 RX ADMIN — ALBUTEROL 2 PUFF(S): 90 AEROSOL, METERED ORAL at 05:32

## 2018-11-07 RX ADMIN — Medication 10 MILLIGRAM(S): at 13:02

## 2018-11-07 RX ADMIN — HYDROMORPHONE HYDROCHLORIDE 1 MILLIGRAM(S): 2 INJECTION INTRAMUSCULAR; INTRAVENOUS; SUBCUTANEOUS at 06:10

## 2018-11-07 RX ADMIN — Medication 5: at 12:11

## 2018-11-07 RX ADMIN — Medication 10 MILLIGRAM(S): at 05:16

## 2018-11-07 RX ADMIN — Medication 1 GRAM(S): at 17:15

## 2018-11-07 RX ADMIN — Medication 400 MILLIGRAM(S): at 13:02

## 2018-11-07 RX ADMIN — ALBUTEROL 2.5 MILLIGRAM(S): 90 AEROSOL, METERED ORAL at 12:56

## 2018-11-07 RX ADMIN — SODIUM CHLORIDE 100 MILLILITER(S): 9 INJECTION, SOLUTION INTRAVENOUS at 05:31

## 2018-11-07 RX ADMIN — Medication 1 GRAM(S): at 12:12

## 2018-11-07 RX ADMIN — Medication 1 GRAM(S): at 05:17

## 2018-11-07 RX ADMIN — HYDROMORPHONE HYDROCHLORIDE 1 MILLIGRAM(S): 2 INJECTION INTRAMUSCULAR; INTRAVENOUS; SUBCUTANEOUS at 15:11

## 2018-11-07 RX ADMIN — Medication 1 MILLIGRAM(S): at 13:09

## 2018-11-07 RX ADMIN — ALBUTEROL 2 PUFF(S): 90 AEROSOL, METERED ORAL at 17:18

## 2018-11-07 RX ADMIN — Medication 400 MILLIGRAM(S): at 05:17

## 2018-11-07 RX ADMIN — Medication 20 MILLIGRAM(S): at 05:17

## 2018-11-07 RX ADMIN — HYDROMORPHONE HYDROCHLORIDE 1 MILLIGRAM(S): 2 INJECTION INTRAMUSCULAR; INTRAVENOUS; SUBCUTANEOUS at 15:30

## 2018-11-07 RX ADMIN — Medication 4: at 17:15

## 2018-11-07 RX ADMIN — HYDROMORPHONE HYDROCHLORIDE 1 MILLIGRAM(S): 2 INJECTION INTRAMUSCULAR; INTRAVENOUS; SUBCUTANEOUS at 10:53

## 2018-11-07 RX ADMIN — Medication 20 MILLIGRAM(S): at 17:15

## 2018-11-07 RX ADMIN — HYDROMORPHONE HYDROCHLORIDE 1 MILLIGRAM(S): 2 INJECTION INTRAMUSCULAR; INTRAVENOUS; SUBCUTANEOUS at 10:26

## 2018-11-07 NOTE — DISCHARGE NOTE ADULT - CARE PROVIDER_API CALL
Octavio Kumar), Medicine  [address] [facility]  [address] [place] [address]  Phone: [phone]  Fax: [phone]

## 2018-11-07 NOTE — DISCHARGE NOTE ADULT - OTHER SIGNIFICANT FINDINGS
< from: Xray Chest 1 View- PORTABLE-Urgent (11.06.18 @ 00:27) >  A frontal chest film  demonstrates grossly clear lungs.  No acute   infiltrate or consolidation is  noted. The costophrenicangles are   grossly clear.    The heart size and vascular markings are within normal limits for   technique.      No mediastinal or hilar adenopathy is suggested. .     The osseous structures appear intact intact.     IMPRESSION:  Clear  lungs.  No acute airspace disease suggested.    < end of copied text >

## 2018-11-07 NOTE — DISCHARGE NOTE ADULT - CARE PLAN
Principal Discharge DX:	Gastroparesis due to DM  Secondary Diagnosis:	Chronic gastritis without bleeding, unspecified gastritis type  Secondary Diagnosis:	Type 1 diabetes mellitus without complication  Secondary Diagnosis:	Anxiety  Secondary Diagnosis:	Uncomplicated asthma, unspecified asthma severity, unspecified whether persistent Principal Discharge DX:	Gastroparesis due to DM  Goal:	Cont Reglan, Analgesics, Mesalamine, Protonix  Assessment and plan of treatment:	Symptoms improved.   Follow up with Gastroenterology in 1-2 weeks prn Dr. Kumar.  Secondary Diagnosis:	Chronic gastritis without bleeding, unspecified gastritis type  Goal:	Continue with Sucralfate and Protonix  Secondary Diagnosis:	Type 1 diabetes mellitus without complication  Goal:	HgA1c, 8.1  Assessment and plan of treatment:	Continue with FS monitoring and insulin therapy.   Follow up with PCP for management.  Secondary Diagnosis:	Anxiety  Goal:	Continue with Clonopin and Seroquel prn.  Assessment and plan of treatment:	Follow up with PCP for monitoring.  Secondary Diagnosis:	Uncomplicated asthma, unspecified asthma severity, unspecified whether persistent  Goal:	Continue with albuterol prn shortness of breath/wheezing.

## 2018-11-07 NOTE — PROGRESS NOTE ADULT - SUBJECTIVE AND OBJECTIVE BOX
Patient is a 37y old  Male who presents with a chief complaint of Abdominal pain, nausea and vomiting. (2018 16:30)      INTERVAL HPI/ OVERNIGHT EVENTS: Pt was seen and examined at bedside today, No significant overnight events, Pt admits that abdominal pain has improved but is still present, no vomiting today.      MEDICATIONS  (STANDING):  ALBUTerol    90 MICROgram(s) HFA Inhaler 2 Puff(s) Inhalation two times a day  dextrose 5%. 1000 milliLiter(s) (50 mL/Hr) IV Continuous <Continuous>  dextrose 50% Injectable 12.5 Gram(s) IV Push once  dextrose 50% Injectable 25 Gram(s) IV Push once  dextrose 50% Injectable 25 Gram(s) IV Push once  insulin glargine Injectable (LANTUS) 15 Unit(s) SubCutaneous at bedtime  insulin lispro (HumaLOG) corrective regimen sliding scale   SubCutaneous three times a day before meals  mesalamine DR Capsule 400 milliGRAM(s) Oral three times a day  metoclopramide Injectable 10 milliGRAM(s) IV Push every 8 hours  predniSONE   Tablet 20 milliGRAM(s) Oral every 12 hours  sucralfate 1 Gram(s) Oral four times a day    MEDICATIONS  (PRN):  ALBUTerol    0.083% 2.5 milliGRAM(s) Nebulizer every 6 hours PRN Shortness of Breath and/or Wheezing  clonazePAM Tablet 1 milliGRAM(s) Oral two times a day PRN anxiety  dextrose 40% Gel 15 Gram(s) Oral once PRN Blood Glucose LESS THAN 70 milliGRAM(s)/deciliter  glucagon  Injectable 1 milliGRAM(s) IntraMuscular once PRN Glucose LESS THAN 70 milligrams/deciliter  HYDROmorphone  Injectable 1 milliGRAM(s) IV Push every 4 hours PRN Severe Pain (7 - 10)  ondansetron Injectable 4 milliGRAM(s) IV Push every 6 hours PRN Nausea and/or Vomiting      Allergies    No Known Allergies    Intolerances        REVIEW OF SYSTEMS:    Unable to examine due to [ ] Encephalopathy [ ] Advanced Dementia [ ] Expressive Aphasia [ ] Non-verbal patient    CONSTITUTIONAL: No fever, NO generalized weakness/Fatigue, No weight loss  EYES: No eye pain, visual disturbances, or discharge  ENMT:  No difficulty hearing, tinnitus, vertigo; No sinus or throat pain  NECK: No pain or stiffness  RESPIRATORY: No shortness of breath,  cough, wheezing, sputum or hemoptysis   CARDIOVASCULAR: No chest pain, palpitations, or leg swelling  GASTROINTESTINAL: positive abdominal pain. No nausea, vomiting, diarrhea or constipation. No melena or hematochezia.  GENITOURINARY: No dysuria, frequency, hematuria, or incontinence  NEUROLOGICAL: No headaches, Dizziness, memory loss, loss of strength, numbness, or tremors  SKIN: No itching, burning, rashes, or lesions   MUSCULOSKELETAL: No joint pain or swelling; No muscle, back, or extremity pain  PSYCHIATRIC: No depression, anxiety, mood swings, or difficulty sleeping  HEME/LYMPH: No easy bruising, or bleeding gums      Vital Signs Last 24 Hrs  T(C): 36.1 (2018 11:55), Max: 36.8 (2018 05:07)  T(F): 97 (2018 11:55), Max: 98.2 (2018 05:07)  HR: 87 (2018 11:55) (81 - 87)  BP: 127/82 (2018 11:55) (121/64 - 148/83)  BP(mean): --  RR: 17 (2018 11:55) (16 - 18)  SpO2: 95% (2018 11:55) (95% - 98%)    PHYSICAL EXAM:  GENERAL: NAD, well-developed, well-groomed  HEAD:  Atraumatic, Normocephalic  EYES: conjunctiva and sclera clear  ENMT: Moist mucous membranes  NECK: Supple, No JVD, Normal thyroid  CHEST/LUNG: Clear to Auscultation bilaterally; No rales, rhonchi, wheezing, or rubs  HEART: Regular rate and rhythm; No murmurs, rubs, or gallops  ABDOMEN: Soft, positive tenderness in RUQ/LUQ, Nondistended; Bowel sounds present  EXTREMITIES:  2+ Peripheral Pulses, No clubbing, cyanosis, or edema  SKIN: No rashes or lesions  NERVOUS SYSTEM:  Alert & Oriented X3, Good concentration; Motor Strength 5/5 B/L upper and lower extremities    LABS:                        13.4   12.56 )-----------( 458      ( 2018 08:37 )             40.1     11-07    135  |  97  |  20  ----------------------------<  313<H>  4.2   |  27  |  1.18    Ca    8.4<L>      2018 06:52  Phos  2.9       Mg     2.2         TPro  8.1  /  Alb  3.8  /  TBili  0.4  /  DBili  x   /  AST  23  /  ALT  40  /  AlkPhos  117  11-      Urinalysis Basic - ( 2018 05:01 )    Color: Yellow / Appearance: Clear / S.015 / pH: x  Gluc: x / Ketone: Large  / Bili: Negative / Urobili: Negative mg/dL   Blood: x / Protein: Negative mg/dL / Nitrite: Negative   Leuk Esterase: Negative / RBC: x / WBC x   Sq Epi: x / Non Sq Epi: x / Bacteria: x      CAPILLARY BLOOD GLUCOSE      POCT Blood Glucose.: 390 mg/dL (2018 12:09)  POCT Blood Glucose.: 258 mg/dL (2018 08:56)  POCT Blood Glucose.: 304 mg/dL (2018 06:56)  POCT Blood Glucose.: 289 mg/dL (2018 22:17)  POCT Blood Glucose.: 321 mg/dL (2018 16:17)          RADIOLOGY & ADDITIONAL TESTS:          Imaging Personally Reviewed:  [ ] YES  [ ] NO    Consultant(s) Notes Reviewed:  [x ] YES  [ ] NO    Care Discussed with Consultants/Other Providers [x ] YES  [ ] NO

## 2018-11-07 NOTE — PROGRESS NOTE ADULT - PROBLEM SELECTOR PLAN 1
Reglan prn and PPI
Secondary to Gastroparesis, Improved symptoms  Gastroenterology note was appreciated.   Cont Reglan, Analgesics, Mesalamine, Protonix   pt was started on CLD in am and tolerated, will advance diet, discharge planning.

## 2018-11-07 NOTE — PROGRESS NOTE ADULT - SUBJECTIVE AND OBJECTIVE BOX
Gastroenterology  Patient seen and examined bedside resting comfortably.  Blood sugar elevated  abdominal pain  Denies nausea and vomiting. Tolerating diet.  Normal flatus/BM.     T(F): 97 (11-07-18 @ 11:55), Max: 98.2 (11-07-18 @ 05:07)  HR: 87 (11-07-18 @ 11:55) (81 - 87)  BP: 127/82 (11-07-18 @ 11:55) (121/64 - 148/83)  RR: 17 (11-07-18 @ 11:55) (16 - 18)  SpO2: 95% (11-07-18 @ 11:55) (95% - 98%)  Wt(kg): --  CAPILLARY BLOOD GLUCOSE      POCT Blood Glucose.: 390 mg/dL (07 Nov 2018 12:09)  POCT Blood Glucose.: 258 mg/dL (07 Nov 2018 08:56)  POCT Blood Glucose.: 304 mg/dL (07 Nov 2018 06:56)  POCT Blood Glucose.: 289 mg/dL (06 Nov 2018 22:17)  POCT Blood Glucose.: 321 mg/dL (06 Nov 2018 16:17)    PHYSICAL EXAM:  General: NAD, WDWN.   Neuro:  Alert and responsive  HEENT: NCAT, EOMI, conjunctiva clear  CV: +S1+S2 regular rate and rhythm  Lung: clear to ausculation bilaterally, respirations nonlabored, good inspiratory effort  Abdomen: soft, NonTender, No distention Normal active BS  Extremities: no cyanosis, clubbing or edema    LABS:                        13.4   12.56 )-----------( 458      ( 06 Nov 2018 08:37 )             40.1     11-07    135  |  97  |  20  ----------------------------<  313<H>  4.2   |  27  |  1.18    Ca    8.4<L>      07 Nov 2018 06:52  Phos  2.9     11-07  Mg     2.2     11-07    TPro  8.1  /  Alb  3.8  /  TBili  0.4  /  DBili  x   /  AST  23  /  ALT  40  /  AlkPhos  117  11-05    LIVER FUNCTIONS - ( 05 Nov 2018 19:43 )  Alb: 3.8 g/dL / Pro: 8.1 gm/dL / ALK PHOS: 117 U/L / ALT: 40 U/L / AST: 23 U/L / GGT: x             I&O's Detail    06 Nov 2018 07:01  -  07 Nov 2018 07:00  --------------------------------------------------------  IN:    lactated ringers.: 1200 mL    Oral Fluid: 50 mL  Total IN: 1250 mL    OUT:    Voided: 1170 mL  Total OUT: 1170 mL    Total NET: 80 mL      07 Nov 2018 07:01  -  07 Nov 2018 15:33  --------------------------------------------------------  IN:    Oral Fluid: 240 mL  Total IN: 240 mL    OUT:    Voided: 1100 mL  Total OUT: 1100 mL    Total NET: -860 mL        11-07 @ 06:52    135 | 97 | 20  /8.4 | 2.2 | 2.9  _______________________/  4.2 | 27 | 1.18                           \par

## 2018-11-07 NOTE — DISCHARGE NOTE ADULT - HOSPITAL COURSE
36 y/o Male PMH asthma, dm1 and gastroparesis, recent EGD showing H pylori neg gastritis, p/w 1 days nausea and vomiting with abd pain. The patient notes these symptoms are recurrent without clear precipitant, he denies coffee ground emesis and hematemesis, he is taking Reglan and Protonix Denies fever, diarrhea, dysuria, bleeding, testicular pain, cp,  ha, rhinorrhea, vision change, dizziness. Pt c/o some SOB from asthma.    ER course: CXR no acute changes. UA: large ketones. +Acetone     The patient was admitted to medicine bed and keep NPO. Gastroenterology was consulted and followed the patient. The patient received IV fluids, IV analgesics and IV antiemetics. The patients symptoms improved then resolved. He was started on oral diet and was  able to tolerate well. 38 y/o Male PMH asthma, dm1 and gastroparesis, recent EGD showing H pylori neg gastritis, p/w 1 days nausea and vomiting with abd pain. The patient notes these symptoms are recurrent without clear precipitant, he denies coffee ground emesis and hematemesis, he is taking Reglan and Protonix Denies fever, diarrhea, dysuria, bleeding, testicular pain, cp,  ha, rhinorrhea, vision change, dizziness. Pt c/o some SOB from asthma.    ER course: CXR no acute changes. UA: large ketones. +Acetone     The patient was admitted to medicine bed and keep NPO. Gastroenterology was consulted and followed the patient. The patient received IV fluids, IV analgesics and IV antiemetics. The patients symptoms improved then resolved. He was started on oral diet and was  able to tolerate well.

## 2018-11-07 NOTE — DISCHARGE NOTE ADULT - PATIENT PORTAL LINK FT
You can access the [facility] Patient Portal, offered by [facility], by registering with the following website: [email or web address]

## 2018-11-07 NOTE — DISCHARGE NOTE ADULT - PLAN OF CARE
Cont Reglan, Analgesics, Mesalamine, Protonix Symptoms improved.   Follow up with Gastroenterology in 1-2 weeks prn Dr. Kumar. Continue with Sucralfate and Protonix HgA1c, 8.1 Continue with FS monitoring and insulin therapy.   Follow up with PCP for management. Continue with Clonopin and Seroquel prn. Follow up with PCP for monitoring. Continue with albuterol prn shortness of breath/wheezing.

## 2018-11-07 NOTE — DISCHARGE NOTE ADULT - SECONDARY DIAGNOSIS.
Chronic gastritis without bleeding, unspecified gastritis type Type 1 diabetes mellitus without complication Anxiety Uncomplicated asthma, unspecified asthma severity, unspecified whether persistent

## 2018-11-07 NOTE — DISCHARGE NOTE ADULT - MEDICATION SUMMARY - MEDICATIONS TO TAKE
I will START or STAY ON the medications listed below when I get home from the hospital:    mesalamine 400 mg oral delayed release capsule  -- 2 cap(s) by mouth 3 times a day  -- Indication: For Gastroparesis     predniSONE 20 mg oral tablet  -- 1 tab(s) by mouth 2 times a day   -- It is very important that you take or use this exactly as directed.  Do not skip doses or discontinue unless directed by your doctor.  Obtain medical advice before taking any non-prescription drugs as some may affect the action of this medication.  Take with food or milk.    -- Indication: For Gastroparesis     acetaminophen-oxyCODONE 325 mg-5 mg oral tablet  -- 1 tab(s) by mouth every 6 hours MDD:4  -- Caution federal law prohibits the transfer of this drug to any person other  than the person for whom it was prescribed.  May cause drowsiness.  Alcohol may intensify this effect.  Use care when operating dangerous machinery.  This prescription cannot be refilled.  This product contains acetaminophen.  Do not use  with any other product containing acetaminophen to prevent possible liver damage.  Using more of this medication than prescribed may cause serious breathing problems.    -- Indication: For Pain    acetaminophen-oxyCODONE 325 mg-5 mg oral tablet  -- 1 tab(s) by mouth every 6 hours PRN for pain MDD:4  -- Caution federal law prohibits the transfer of this drug to any person other  than the person for whom it was prescribed.  May cause drowsiness.  Alcohol may intensify this effect.  Use care when operating dangerous machinery.  This prescription cannot be refilled.  This product contains acetaminophen.  Do not use  with any other product containing acetaminophen to prevent possible liver damage.  Using more of this medication than prescribed may cause serious breathing problems.    -- Indication: For Pain     oxyCODONE-acetaminophen 5 mg-325 mg oral tablet  -- 1 tab(s) by mouth 2 times a day MDD:2  -- Caution federal law prohibits the transfer of this drug to any person other  than the person for whom it was prescribed.  May cause drowsiness.  Alcohol may intensify this effect.  Use care when operating dangerous machinery.  This prescription cannot be refilled.  This product contains acetaminophen.  Do not use  with any other product containing acetaminophen to prevent possible liver damage.  Using more of this medication than prescribed may cause serious breathing problems.    -- Indication: For Pain     clonazePAM 0.5 mg oral tablet  -- 1 tab(s) by mouth 2 times a day, As needed, anxiety  -- Indication: For Anxiety     escitalopram 5 mg oral tablet  -- 1 tab(s) by mouth once a day  -- Indication: For Anxiety     insulin lispro 100 units/mL injectable solution  -- 5 unit(s) subcutaneous 3 times a day (before meals) DISP ONE MONTH SUPPLY   -- Indication: For diabetes mellitus type 1 without complications    Lantus Solostar Pen 100 units/mL subcutaneous solution  -- 15 unit(s) subcutaneous once a day (at bedtime)   -- Do not drink alcoholic beverages when taking this medication.  It is very important that you take or use this exactly as directed.  Do not skip doses or discontinue unless directed by your doctor.  Keep in refrigerator.  Do not freeze.    -- Indication: For diabetes mellitus type 1 without complications    metoclopramide 10 mg oral tablet, disintegrating  -- 1 tab(s) by mouth 3 to 4 times a day PRN for nausea  -- Check with your doctor before becoming pregnant.  Do not drink alcoholic beverages when taking this medication.  May cause drowsiness or dizziness.  Obtain medical advice before taking any non-prescription drugs as some may affect the action of this medication.  Take medication on an empty stomach 1 hour before or 2 to 3 hours after a meal unless otherwise directed by your doctor.  This drug may impair the ability to drive or operate machinery.  Use care until you become familiar with its effects.    -- Indication: For Nausea/vomiting     ondansetron 8 mg oral tablet, disintegrating  -- 1 tab(s) by mouth 2 times a day, As Needed - for nausea  -- Indication: For Nausea/vomiting     albuterol 90 mcg/inh inhalation powder  -- 2 puff(s) inhaled 2 times a day   -- For inhalation only.  It is very important that you take or use this exactly as directed.  Do not skip doses or discontinue unless directed by your doctor.  Obtain medical advice before taking any non-prescription drugs as some may affect the action of this medication.    -- Indication: For Asthma     Gas Aide 125 mg oral capsule  -- 1 cap(s) by mouth 4 times a day (after meals and at bedtime)   -- Indication: For Flautulence     sucralfate 1 g oral tablet  -- 1 tab(s) by mouth 4 times a day  -- Indication: For Gastritis, presence of bleeding unspecified, unspecified chronicity, unspecified gastritis type    lactobacillus acidophilus oral capsule  -- 1 cap(s) by mouth once a day  -- Indication: For Probiotic     pantoprazole 40 mg oral delayed release tablet  -- 1 tab(s) by mouth once a day   -- It is very important that you take or use this exactly as directed.  Do not skip doses or discontinue unless directed by your doctor.  Obtain medical advice before taking any non-prescription drugs as some may affect the action of this medication.  Swallow whole.  Do not crush.    -- Indication: For Gastritis, presence of bleeding unspecified, unspecified chronicity, unspecified gastritis type    Protonix 40 mg oral delayed release tablet  -- 1 tab(s) by mouth once a day   -- It is very important that you take or use this exactly as directed.  Do not skip doses or discontinue unless directed by your doctor.  Obtain medical advice before taking any non-prescription drugs as some may affect the action of this medication.  Swallow whole.  Do not crush.    -- Indication: For Gastritis, presence of bleeding unspecified, unspecified chronicity, unspecified gastritis type I will START or STAY ON the medications listed below when I get home from the hospital:    mesalamine 400 mg oral delayed release capsule  -- 2 cap(s) by mouth 3 times a day  -- Indication: For Gastroparesis     predniSONE 20 mg oral tablet  -- 1 tab(s) by mouth 2 times a day   -- It is very important that you take or use this exactly as directed.  Do not skip doses or discontinue unless directed by your doctor.  Obtain medical advice before taking any non-prescription drugs as some may affect the action of this medication.  Take with food or milk.    -- Indication: For Gastroparesis     acetaminophen-oxyCODONE 325 mg-5 mg oral tablet  -- 1 tab(s) by mouth every 6 hours PRN for pain MDD:4  -- Caution federal law prohibits the transfer of this drug to any person other  than the person for whom it was prescribed.  May cause drowsiness.  Alcohol may intensify this effect.  Use care when operating dangerous machinery.  This prescription cannot be refilled.  This product contains acetaminophen.  Do not use  with any other product containing acetaminophen to prevent possible liver damage.  Using more of this medication than prescribed may cause serious breathing problems.    -- Indication: For Pain     acetaminophen-oxyCODONE 325 mg-5 mg oral tablet  -- 1 tab(s) by mouth every 6 hours MDD:4  -- Caution federal law prohibits the transfer of this drug to any person other  than the person for whom it was prescribed.  May cause drowsiness.  Alcohol may intensify this effect.  Use care when operating dangerous machinery.  This prescription cannot be refilled.  This product contains acetaminophen.  Do not use  with any other product containing acetaminophen to prevent possible liver damage.  Using more of this medication than prescribed may cause serious breathing problems.    -- Indication: For Pain    oxyCODONE-acetaminophen 5 mg-325 mg oral tablet  -- 1 tab(s) by mouth 3 times a day, As Needed -for moderate pain MDD:3  -- Caution federal law prohibits the transfer of this drug to any person other  than the person for whom it was prescribed.  May cause drowsiness.  Alcohol may intensify this effect.  Use care when operating dangerous machinery.  This prescription cannot be refilled.  This product contains acetaminophen.  Do not use  with any other product containing acetaminophen to prevent possible liver damage.  Using more of this medication than prescribed may cause serious breathing problems.    -- Indication: For Pain    clonazePAM 0.5 mg oral tablet  -- 1 tab(s) by mouth 2 times a day, As needed, anxiety  -- Indication: For Anxiety     escitalopram 5 mg oral tablet  -- 1 tab(s) by mouth once a day  -- Indication: For Anxiety     insulin lispro 100 units/mL injectable solution  -- 5 unit(s) subcutaneous 3 times a day (before meals) DISP ONE MONTH SUPPLY   -- Indication: For diabetes mellitus type 1 without complications    Lantus Solostar Pen 100 units/mL subcutaneous solution  -- 15 unit(s) subcutaneous once a day (at bedtime)   -- Do not drink alcoholic beverages when taking this medication.  It is very important that you take or use this exactly as directed.  Do not skip doses or discontinue unless directed by your doctor.  Keep in refrigerator.  Do not freeze.    -- Indication: For diabetes mellitus type 1 without complications    metoclopramide 10 mg oral tablet, disintegrating  -- 1 tab(s) by mouth 3 to 4 times a day PRN for nausea  -- Check with your doctor before becoming pregnant.  Do not drink alcoholic beverages when taking this medication.  May cause drowsiness or dizziness.  Obtain medical advice before taking any non-prescription drugs as some may affect the action of this medication.  Take medication on an empty stomach 1 hour before or 2 to 3 hours after a meal unless otherwise directed by your doctor.  This drug may impair the ability to drive or operate machinery.  Use care until you become familiar with its effects.    -- Indication: For Nausea/vomiting     ondansetron 8 mg oral tablet, disintegrating  -- 1 tab(s) by mouth 2 times a day, As Needed - for nausea  -- Indication: For Nausea/vomiting     albuterol 90 mcg/inh inhalation powder  -- 2 puff(s) inhaled 2 times a day   -- For inhalation only.  It is very important that you take or use this exactly as directed.  Do not skip doses or discontinue unless directed by your doctor.  Obtain medical advice before taking any non-prescription drugs as some may affect the action of this medication.    -- Indication: For Asthma     Gas Aide 125 mg oral capsule  -- 1 cap(s) by mouth 4 times a day (after meals and at bedtime)   -- Indication: For Flautulence     sucralfate 1 g oral tablet  -- 1 tab(s) by mouth 4 times a day  -- Indication: For Gastritis, presence of bleeding unspecified, unspecified chronicity, unspecified gastritis type    lactobacillus acidophilus oral capsule  -- 1 cap(s) by mouth once a day  -- Indication: For Probiotic     pantoprazole 40 mg oral delayed release tablet  -- 1 tab(s) by mouth once a day   -- It is very important that you take or use this exactly as directed.  Do not skip doses or discontinue unless directed by your doctor.  Obtain medical advice before taking any non-prescription drugs as some may affect the action of this medication.  Swallow whole.  Do not crush.    -- Indication: For Gastritis, presence of bleeding unspecified, unspecified chronicity, unspecified gastritis type    Protonix 40 mg oral delayed release tablet  -- 1 tab(s) by mouth once a day   -- It is very important that you take or use this exactly as directed.  Do not skip doses or discontinue unless directed by your doctor.  Obtain medical advice before taking any non-prescription drugs as some may affect the action of this medication.  Swallow whole.  Do not crush.    -- Indication: For Gastritis, presence of bleeding unspecified, unspecified chronicity, unspecified gastritis type

## 2018-11-08 DIAGNOSIS — Z71.89 OTHER SPECIFIED COUNSELING: ICD-10-CM

## 2018-11-09 DIAGNOSIS — K44.9 DIAPHRAGMATIC HERNIA WITHOUT OBSTRUCTION OR GANGRENE: ICD-10-CM

## 2018-11-09 DIAGNOSIS — K29.50 UNSPECIFIED CHRONIC GASTRITIS WITHOUT BLEEDING: ICD-10-CM

## 2018-11-09 DIAGNOSIS — Z90.49 ACQUIRED ABSENCE OF OTHER SPECIFIED PARTS OF DIGESTIVE TRACT: ICD-10-CM

## 2018-11-09 DIAGNOSIS — Z79.51 LONG TERM (CURRENT) USE OF INHALED STEROIDS: ICD-10-CM

## 2018-11-09 DIAGNOSIS — K31.84 GASTROPARESIS: ICD-10-CM

## 2018-11-09 DIAGNOSIS — K21.9 GASTRO-ESOPHAGEAL REFLUX DISEASE WITHOUT ESOPHAGITIS: ICD-10-CM

## 2018-11-09 DIAGNOSIS — F41.9 ANXIETY DISORDER, UNSPECIFIED: ICD-10-CM

## 2018-11-09 DIAGNOSIS — R11.2 NAUSEA WITH VOMITING, UNSPECIFIED: ICD-10-CM

## 2018-11-09 DIAGNOSIS — Z83.3 FAMILY HISTORY OF DIABETES MELLITUS: ICD-10-CM

## 2018-11-09 DIAGNOSIS — E10.43 TYPE 1 DIABETES MELLITUS WITH DIABETIC AUTONOMIC (POLY)NEUROPATHY: ICD-10-CM

## 2018-11-09 DIAGNOSIS — J45.909 UNSPECIFIED ASTHMA, UNCOMPLICATED: ICD-10-CM

## 2018-11-09 DIAGNOSIS — Z79.4 LONG TERM (CURRENT) USE OF INSULIN: ICD-10-CM

## 2018-12-10 ENCOUNTER — INPATIENT (INPATIENT)
Facility: HOSPITAL | Age: 38
LOS: 3 days | Discharge: HOME | End: 2018-12-14
Attending: HOSPITALIST | Admitting: HOSPITALIST
Payer: MEDICAID

## 2018-12-10 VITALS
RESPIRATION RATE: 9 BRPM | DIASTOLIC BLOOD PRESSURE: 90 MMHG | OXYGEN SATURATION: 97 % | SYSTOLIC BLOOD PRESSURE: 161 MMHG | HEART RATE: 87 BPM | TEMPERATURE: 99 F

## 2018-12-10 DIAGNOSIS — Z90.49 ACQUIRED ABSENCE OF OTHER SPECIFIED PARTS OF DIGESTIVE TRACT: Chronic | ICD-10-CM

## 2018-12-10 LAB
ALBUMIN SERPL ELPH-MCNC: 3.9 G/DL — SIGNIFICANT CHANGE UP (ref 3.3–5)
ALP SERPL-CCNC: 123 U/L — HIGH (ref 40–120)
ALT FLD-CCNC: 22 U/L — SIGNIFICANT CHANGE UP (ref 12–78)
ANION GAP SERPL CALC-SCNC: 11 MMOL/L — SIGNIFICANT CHANGE UP (ref 5–17)
APPEARANCE UR: CLEAR — SIGNIFICANT CHANGE UP
AST SERPL-CCNC: 17 U/L — SIGNIFICANT CHANGE UP (ref 15–37)
BASOPHILS # BLD AUTO: 0.06 K/UL — SIGNIFICANT CHANGE UP (ref 0–0.2)
BASOPHILS NFR BLD AUTO: 0.3 % — SIGNIFICANT CHANGE UP (ref 0–2)
BILIRUB SERPL-MCNC: 0.5 MG/DL — SIGNIFICANT CHANGE UP (ref 0.2–1.2)
BILIRUB UR-MCNC: NEGATIVE — SIGNIFICANT CHANGE UP
BUN SERPL-MCNC: 13 MG/DL — SIGNIFICANT CHANGE UP (ref 7–23)
CALCIUM SERPL-MCNC: 9.1 MG/DL — SIGNIFICANT CHANGE UP (ref 8.5–10.1)
CHLORIDE SERPL-SCNC: 100 MMOL/L — SIGNIFICANT CHANGE UP (ref 96–108)
CO2 SERPL-SCNC: 27 MMOL/L — SIGNIFICANT CHANGE UP (ref 22–31)
COLOR SPEC: YELLOW — SIGNIFICANT CHANGE UP
CREAT SERPL-MCNC: 1.16 MG/DL — SIGNIFICANT CHANGE UP (ref 0.5–1.3)
DIFF PNL FLD: NEGATIVE — SIGNIFICANT CHANGE UP
EOSINOPHIL # BLD AUTO: 0.29 K/UL — SIGNIFICANT CHANGE UP (ref 0–0.5)
EOSINOPHIL NFR BLD AUTO: 1.6 % — SIGNIFICANT CHANGE UP (ref 0–6)
GLUCOSE SERPL-MCNC: 326 MG/DL — HIGH (ref 70–99)
GLUCOSE UR QL: 1000 MG/DL
HCT VFR BLD CALC: 46.4 % — SIGNIFICANT CHANGE UP (ref 39–50)
HGB BLD-MCNC: 15.4 G/DL — SIGNIFICANT CHANGE UP (ref 13–17)
IMM GRANULOCYTES NFR BLD AUTO: 0.4 % — SIGNIFICANT CHANGE UP (ref 0–1.5)
KETONES UR-MCNC: ABNORMAL
LACTATE SERPL-SCNC: 2 MMOL/L — SIGNIFICANT CHANGE UP (ref 0.7–2)
LEUKOCYTE ESTERASE UR-ACNC: NEGATIVE — SIGNIFICANT CHANGE UP
LIDOCAIN IGE QN: 55 U/L — LOW (ref 73–393)
LYMPHOCYTES # BLD AUTO: 13 % — SIGNIFICANT CHANGE UP (ref 13–44)
LYMPHOCYTES # BLD AUTO: 2.36 K/UL — SIGNIFICANT CHANGE UP (ref 1–3.3)
MCHC RBC-ENTMCNC: 27.7 PG — SIGNIFICANT CHANGE UP (ref 27–34)
MCHC RBC-ENTMCNC: 33.2 GM/DL — SIGNIFICANT CHANGE UP (ref 32–36)
MCV RBC AUTO: 83.6 FL — SIGNIFICANT CHANGE UP (ref 80–100)
MONOCYTES # BLD AUTO: 0.95 K/UL — HIGH (ref 0–0.9)
MONOCYTES NFR BLD AUTO: 5.3 % — SIGNIFICANT CHANGE UP (ref 2–14)
NEUTROPHILS # BLD AUTO: 14.35 K/UL — HIGH (ref 1.8–7.4)
NEUTROPHILS NFR BLD AUTO: 79.4 % — HIGH (ref 43–77)
NITRITE UR-MCNC: NEGATIVE — SIGNIFICANT CHANGE UP
NRBC # BLD: 0 /100 WBCS — SIGNIFICANT CHANGE UP (ref 0–0)
PH UR: 6 — SIGNIFICANT CHANGE UP (ref 5–8)
PLATELET # BLD AUTO: 448 K/UL — HIGH (ref 150–400)
POTASSIUM SERPL-MCNC: 4.1 MMOL/L — SIGNIFICANT CHANGE UP (ref 3.5–5.3)
POTASSIUM SERPL-SCNC: 4.1 MMOL/L — SIGNIFICANT CHANGE UP (ref 3.5–5.3)
PROT SERPL-MCNC: 8.1 GM/DL — SIGNIFICANT CHANGE UP (ref 6–8.3)
PROT UR-MCNC: NEGATIVE MG/DL — SIGNIFICANT CHANGE UP
RBC # BLD: 5.55 M/UL — SIGNIFICANT CHANGE UP (ref 4.2–5.8)
RBC # FLD: 13.7 % — SIGNIFICANT CHANGE UP (ref 10.3–14.5)
SODIUM SERPL-SCNC: 138 MMOL/L — SIGNIFICANT CHANGE UP (ref 135–145)
SP GR SPEC: 1.02 — SIGNIFICANT CHANGE UP (ref 1.01–1.02)
UROBILINOGEN FLD QL: NEGATIVE MG/DL — SIGNIFICANT CHANGE UP
WBC # BLD: 18.09 K/UL — HIGH (ref 3.8–10.5)
WBC # FLD AUTO: 18.09 K/UL — HIGH (ref 3.8–10.5)

## 2018-12-10 PROCEDURE — 74177 CT ABD & PELVIS W/CONTRAST: CPT | Mod: 26

## 2018-12-10 PROCEDURE — 99285 EMERGENCY DEPT VISIT HI MDM: CPT

## 2018-12-10 RX ORDER — PANTOPRAZOLE SODIUM 20 MG/1
40 TABLET, DELAYED RELEASE ORAL ONCE
Qty: 0 | Refills: 0 | Status: COMPLETED | OUTPATIENT
Start: 2018-12-10 | End: 2018-12-10

## 2018-12-10 RX ORDER — SODIUM CHLORIDE 9 MG/ML
2000 INJECTION INTRAMUSCULAR; INTRAVENOUS; SUBCUTANEOUS ONCE
Qty: 0 | Refills: 0 | Status: COMPLETED | OUTPATIENT
Start: 2018-12-10 | End: 2018-12-10

## 2018-12-10 RX ORDER — MORPHINE SULFATE 50 MG/1
4 CAPSULE, EXTENDED RELEASE ORAL ONCE
Qty: 0 | Refills: 0 | Status: DISCONTINUED | OUTPATIENT
Start: 2018-12-10 | End: 2018-12-10

## 2018-12-10 RX ORDER — METOCLOPRAMIDE HCL 10 MG
10 TABLET ORAL ONCE
Qty: 0 | Refills: 0 | Status: DISCONTINUED | OUTPATIENT
Start: 2018-12-10 | End: 2018-12-10

## 2018-12-10 RX ORDER — ONDANSETRON 8 MG/1
4 TABLET, FILM COATED ORAL ONCE
Qty: 0 | Refills: 0 | Status: COMPLETED | OUTPATIENT
Start: 2018-12-10 | End: 2018-12-10

## 2018-12-10 RX ORDER — METOCLOPRAMIDE HCL 10 MG
10 TABLET ORAL ONCE
Qty: 0 | Refills: 0 | Status: COMPLETED | OUTPATIENT
Start: 2018-12-10 | End: 2018-12-10

## 2018-12-10 RX ADMIN — SODIUM CHLORIDE 2000 MILLILITER(S): 9 INJECTION INTRAMUSCULAR; INTRAVENOUS; SUBCUTANEOUS at 21:40

## 2018-12-10 RX ADMIN — MORPHINE SULFATE 4 MILLIGRAM(S): 50 CAPSULE, EXTENDED RELEASE ORAL at 23:36

## 2018-12-10 RX ADMIN — MORPHINE SULFATE 4 MILLIGRAM(S): 50 CAPSULE, EXTENDED RELEASE ORAL at 21:21

## 2018-12-10 RX ADMIN — ONDANSETRON 4 MILLIGRAM(S): 8 TABLET, FILM COATED ORAL at 21:21

## 2018-12-10 RX ADMIN — MORPHINE SULFATE 4 MILLIGRAM(S): 50 CAPSULE, EXTENDED RELEASE ORAL at 23:14

## 2018-12-10 RX ADMIN — SODIUM CHLORIDE 2000 MILLILITER(S): 9 INJECTION INTRAMUSCULAR; INTRAVENOUS; SUBCUTANEOUS at 23:12

## 2018-12-10 RX ADMIN — Medication 10 MILLIGRAM(S): at 23:14

## 2018-12-10 RX ADMIN — MORPHINE SULFATE 4 MILLIGRAM(S): 50 CAPSULE, EXTENDED RELEASE ORAL at 23:12

## 2018-12-10 RX ADMIN — PANTOPRAZOLE SODIUM 40 MILLIGRAM(S): 20 TABLET, DELAYED RELEASE ORAL at 21:21

## 2018-12-10 NOTE — ED ADULT TRIAGE NOTE - CHIEF COMPLAINT QUOTE
[facility] sts pt had NVD  today,  extensive history gastropharesisDM/ meds novalog, lantus and regland

## 2018-12-10 NOTE — ED ADULT NURSE NOTE - NSIMPLEMENTINTERV_GEN_ALL_ED
Implemented All Universal Safety Interventions:  [place] to call system. Call bell, personal items and telephone within reach. Instruct patient to call for assistance. Room bathroom lighting operational. Non-slip footwear when patient is off stretcher. Physically safe environment: no spills, clutter or unnecessary equipment. Stretcher in lowest position, wheels locked, appropriate side rails in place.

## 2018-12-10 NOTE — ED PROVIDER NOTE - PHYSICAL EXAMINATION
Gen: Alert, c/o pain & vomiting  Head: NC, AT, EOMI, normal lids/conjunctiva  ENT: normal hearing, patent oropharynx, MMM  Neck: supple, no tenderness/meningismus, FROM, Trachea midline  Pulm: Bilateral clear BS, normal resp effort, no wheeze/stridor/retractions  CV: RRR, no M/R/G, +dist pulses  Abd: soft, diffusely TTP, ND, +BS, no guarding/rebound tenderness  Mskel: no edema/erythema/cyanosis  Skin: no rash  Neuro: no sensory/motor deficits

## 2018-12-10 NOTE — ED PROVIDER NOTE - OBJECTIVE STATEMENT
Pertinent PMH/PSH/FHx/SHx and Review of Systems contained within:    39yo M w PMH of anxiety, asthma, IDDM, gastritis, presents to ED for eval of abd pain w vomiting & diarrhea. Pertinent PMH/PSH/FHx/SHx and Review of Systems contained within:    37yo M w PMH of anxiety, asthma, IDDM, gastritis, presents to ED for eval of abd pain w vomiting & diarrhea.  Pt admitted to hospital for similar previously, dc home 9d ago.  Denies known sick contacts, travel, ingestion of spoiled foods, CP, SOB.    No fever/chills, No photophobia/eye pain/changes in vision, No ear pain/sore throat/dysphagia, No chest pain/palpitations, no SOB/cough/wheeze/stridor, +abdominal pain, No neck/back pain, no rash, no changes in neurological status/function.

## 2018-12-10 NOTE — ED PROVIDER NOTE - MEDICAL DECISION MAKING DETAILS
Pt w above dx, labs remarkable for elevated WBC, CT neg for acute pathology.  Attempted PO challege, pt failed, vomiting again.  Admitted for further eval/mgmt.

## 2018-12-11 DIAGNOSIS — E11.43 TYPE 2 DIABETES MELLITUS WITH DIABETIC AUTONOMIC (POLY)NEUROPATHY: ICD-10-CM

## 2018-12-11 DIAGNOSIS — G43.A1 CYCLICAL VOMITING, IN MIGRAINE, INTRACTABLE: ICD-10-CM

## 2018-12-11 DIAGNOSIS — J45.909 UNSPECIFIED ASTHMA, UNCOMPLICATED: ICD-10-CM

## 2018-12-11 DIAGNOSIS — E10.69 TYPE 1 DIABETES MELLITUS WITH OTHER SPECIFIED COMPLICATION: ICD-10-CM

## 2018-12-11 DIAGNOSIS — K29.70 GASTRITIS, UNSPECIFIED, WITHOUT BLEEDING: ICD-10-CM

## 2018-12-11 LAB
AMPHET UR-MCNC: NEGATIVE — SIGNIFICANT CHANGE UP
ANION GAP SERPL CALC-SCNC: 21 MMOL/L — HIGH (ref 5–17)
BARBITURATES UR SCN-MCNC: NEGATIVE — SIGNIFICANT CHANGE UP
BASOPHILS # BLD AUTO: 0.02 K/UL — SIGNIFICANT CHANGE UP (ref 0–0.2)
BASOPHILS NFR BLD AUTO: 0.1 % — SIGNIFICANT CHANGE UP (ref 0–2)
BENZODIAZ UR-MCNC: NEGATIVE — SIGNIFICANT CHANGE UP
BUN SERPL-MCNC: 25 MG/DL — HIGH (ref 7–23)
CALCIUM SERPL-MCNC: 8.6 MG/DL — SIGNIFICANT CHANGE UP (ref 8.5–10.1)
CHLORIDE SERPL-SCNC: 104 MMOL/L — SIGNIFICANT CHANGE UP (ref 96–108)
CO2 SERPL-SCNC: 13 MMOL/L — LOW (ref 22–31)
COCAINE METAB.OTHER UR-MCNC: POSITIVE — SIGNIFICANT CHANGE UP
CREAT SERPL-MCNC: 1.39 MG/DL — HIGH (ref 0.5–1.3)
EOSINOPHIL # BLD AUTO: 0 K/UL — SIGNIFICANT CHANGE UP (ref 0–0.5)
EOSINOPHIL NFR BLD AUTO: 0 % — SIGNIFICANT CHANGE UP (ref 0–6)
GLUCOSE BLDC GLUCOMTR-MCNC: 260 MG/DL — HIGH (ref 70–99)
GLUCOSE BLDC GLUCOMTR-MCNC: 312 MG/DL — HIGH (ref 70–99)
GLUCOSE BLDC GLUCOMTR-MCNC: 345 MG/DL — HIGH (ref 70–99)
GLUCOSE BLDC GLUCOMTR-MCNC: 366 MG/DL — HIGH (ref 70–99)
GLUCOSE BLDC GLUCOMTR-MCNC: 377 MG/DL — HIGH (ref 70–99)
GLUCOSE BLDC GLUCOMTR-MCNC: 402 MG/DL — HIGH (ref 70–99)
GLUCOSE SERPL-MCNC: 424 MG/DL — HIGH (ref 70–99)
HCT VFR BLD CALC: 42.3 % — SIGNIFICANT CHANGE UP (ref 39–50)
HGB BLD-MCNC: 13.6 G/DL — SIGNIFICANT CHANGE UP (ref 13–17)
IMM GRANULOCYTES NFR BLD AUTO: 0.5 % — SIGNIFICANT CHANGE UP (ref 0–1.5)
LYMPHOCYTES # BLD AUTO: 1.28 K/UL — SIGNIFICANT CHANGE UP (ref 1–3.3)
LYMPHOCYTES # BLD AUTO: 9.1 % — LOW (ref 13–44)
MCHC RBC-ENTMCNC: 27.8 PG — SIGNIFICANT CHANGE UP (ref 27–34)
MCHC RBC-ENTMCNC: 32.2 GM/DL — SIGNIFICANT CHANGE UP (ref 32–36)
MCV RBC AUTO: 86.3 FL — SIGNIFICANT CHANGE UP (ref 80–100)
METHADONE UR-MCNC: NEGATIVE — SIGNIFICANT CHANGE UP
MONOCYTES # BLD AUTO: 0.45 K/UL — SIGNIFICANT CHANGE UP (ref 0–0.9)
MONOCYTES NFR BLD AUTO: 3.2 % — SIGNIFICANT CHANGE UP (ref 2–14)
NEUTROPHILS # BLD AUTO: 12.25 K/UL — HIGH (ref 1.8–7.4)
NEUTROPHILS NFR BLD AUTO: 87.1 % — HIGH (ref 43–77)
NRBC # BLD: 0 /100 WBCS — SIGNIFICANT CHANGE UP (ref 0–0)
OPIATES UR-MCNC: POSITIVE — SIGNIFICANT CHANGE UP
PCP SPEC-MCNC: SIGNIFICANT CHANGE UP
PCP UR-MCNC: NEGATIVE — SIGNIFICANT CHANGE UP
PLATELET # BLD AUTO: 442 K/UL — HIGH (ref 150–400)
POTASSIUM SERPL-MCNC: 5.4 MMOL/L — HIGH (ref 3.5–5.3)
POTASSIUM SERPL-SCNC: 5.4 MMOL/L — HIGH (ref 3.5–5.3)
RBC # BLD: 4.9 M/UL — SIGNIFICANT CHANGE UP (ref 4.2–5.8)
RBC # FLD: 14.1 % — SIGNIFICANT CHANGE UP (ref 10.3–14.5)
SODIUM SERPL-SCNC: 138 MMOL/L — SIGNIFICANT CHANGE UP (ref 135–145)
THC UR QL: POSITIVE — SIGNIFICANT CHANGE UP
WBC # BLD: 14.07 K/UL — HIGH (ref 3.8–10.5)
WBC # FLD AUTO: 14.07 K/UL — HIGH (ref 3.8–10.5)

## 2018-12-11 PROCEDURE — 99223 1ST HOSP IP/OBS HIGH 75: CPT

## 2018-12-11 RX ORDER — INSULIN LISPRO 100/ML
VIAL (ML) SUBCUTANEOUS
Qty: 0 | Refills: 0 | Status: DISCONTINUED | OUTPATIENT
Start: 2018-12-11 | End: 2018-12-14

## 2018-12-11 RX ORDER — ONDANSETRON 8 MG/1
4 TABLET, FILM COATED ORAL ONCE
Qty: 0 | Refills: 0 | Status: COMPLETED | OUTPATIENT
Start: 2018-12-11 | End: 2018-12-11

## 2018-12-11 RX ORDER — ALBUTEROL 90 UG/1
2 AEROSOL, METERED ORAL EVERY 6 HOURS
Qty: 0 | Refills: 0 | Status: DISCONTINUED | OUTPATIENT
Start: 2018-12-11 | End: 2018-12-14

## 2018-12-11 RX ORDER — METOCLOPRAMIDE HCL 10 MG
10 TABLET ORAL EVERY 12 HOURS
Qty: 0 | Refills: 0 | Status: DISCONTINUED | OUTPATIENT
Start: 2018-12-11 | End: 2018-12-14

## 2018-12-11 RX ORDER — DEXTROSE 50 % IN WATER 50 %
12.5 SYRINGE (ML) INTRAVENOUS ONCE
Qty: 0 | Refills: 0 | Status: DISCONTINUED | OUTPATIENT
Start: 2018-12-11 | End: 2018-12-14

## 2018-12-11 RX ORDER — INSULIN LISPRO 100/ML
VIAL (ML) SUBCUTANEOUS
Qty: 0 | Refills: 0 | Status: DISCONTINUED | OUTPATIENT
Start: 2018-12-11 | End: 2018-12-11

## 2018-12-11 RX ORDER — DEXTROSE 50 % IN WATER 50 %
25 SYRINGE (ML) INTRAVENOUS ONCE
Qty: 0 | Refills: 0 | Status: DISCONTINUED | OUTPATIENT
Start: 2018-12-11 | End: 2018-12-14

## 2018-12-11 RX ORDER — GLUCAGON INJECTION, SOLUTION 0.5 MG/.1ML
1 INJECTION, SOLUTION SUBCUTANEOUS ONCE
Qty: 0 | Refills: 0 | Status: DISCONTINUED | OUTPATIENT
Start: 2018-12-11 | End: 2018-12-14

## 2018-12-11 RX ORDER — ONDANSETRON 8 MG/1
4 TABLET, FILM COATED ORAL EVERY 6 HOURS
Qty: 0 | Refills: 0 | Status: DISCONTINUED | OUTPATIENT
Start: 2018-12-11 | End: 2018-12-14

## 2018-12-11 RX ORDER — DEXTROSE 50 % IN WATER 50 %
15 SYRINGE (ML) INTRAVENOUS ONCE
Qty: 0 | Refills: 0 | Status: DISCONTINUED | OUTPATIENT
Start: 2018-12-11 | End: 2018-12-14

## 2018-12-11 RX ORDER — SODIUM CHLORIDE 9 MG/ML
1000 INJECTION INTRAMUSCULAR; INTRAVENOUS; SUBCUTANEOUS
Qty: 0 | Refills: 0 | Status: DISCONTINUED | OUTPATIENT
Start: 2018-12-11 | End: 2018-12-12

## 2018-12-11 RX ORDER — CLONAZEPAM 1 MG
0.5 TABLET ORAL
Qty: 0 | Refills: 0 | Status: DISCONTINUED | OUTPATIENT
Start: 2018-12-11 | End: 2018-12-14

## 2018-12-11 RX ORDER — MORPHINE SULFATE 50 MG/1
2 CAPSULE, EXTENDED RELEASE ORAL EVERY 6 HOURS
Qty: 0 | Refills: 0 | Status: DISCONTINUED | OUTPATIENT
Start: 2018-12-11 | End: 2018-12-14

## 2018-12-11 RX ORDER — HEPARIN SODIUM 5000 [USP'U]/ML
5000 INJECTION INTRAVENOUS; SUBCUTANEOUS EVERY 12 HOURS
Qty: 0 | Refills: 0 | Status: DISCONTINUED | OUTPATIENT
Start: 2018-12-11 | End: 2018-12-14

## 2018-12-11 RX ORDER — ESCITALOPRAM OXALATE 10 MG/1
5 TABLET, FILM COATED ORAL DAILY
Qty: 0 | Refills: 0 | Status: DISCONTINUED | OUTPATIENT
Start: 2018-12-11 | End: 2018-12-14

## 2018-12-11 RX ORDER — SODIUM CHLORIDE 9 MG/ML
1000 INJECTION, SOLUTION INTRAVENOUS
Qty: 0 | Refills: 0 | Status: DISCONTINUED | OUTPATIENT
Start: 2018-12-11 | End: 2018-12-14

## 2018-12-11 RX ORDER — INSULIN GLARGINE 100 [IU]/ML
15 INJECTION, SOLUTION SUBCUTANEOUS EVERY MORNING
Qty: 0 | Refills: 0 | Status: DISCONTINUED | OUTPATIENT
Start: 2018-12-11 | End: 2018-12-14

## 2018-12-11 RX ORDER — PANTOPRAZOLE SODIUM 20 MG/1
40 TABLET, DELAYED RELEASE ORAL EVERY 12 HOURS
Qty: 0 | Refills: 0 | Status: DISCONTINUED | OUTPATIENT
Start: 2018-12-11 | End: 2018-12-14

## 2018-12-11 RX ORDER — MORPHINE SULFATE 50 MG/1
4 CAPSULE, EXTENDED RELEASE ORAL ONCE
Qty: 0 | Refills: 0 | Status: DISCONTINUED | OUTPATIENT
Start: 2018-12-11 | End: 2018-12-11

## 2018-12-11 RX ORDER — INSULIN LISPRO 100/ML
VIAL (ML) SUBCUTANEOUS AT BEDTIME
Qty: 0 | Refills: 0 | Status: DISCONTINUED | OUTPATIENT
Start: 2018-12-11 | End: 2018-12-14

## 2018-12-11 RX ADMIN — MORPHINE SULFATE 2 MILLIGRAM(S): 50 CAPSULE, EXTENDED RELEASE ORAL at 16:05

## 2018-12-11 RX ADMIN — MORPHINE SULFATE 2 MILLIGRAM(S): 50 CAPSULE, EXTENDED RELEASE ORAL at 22:02

## 2018-12-11 RX ADMIN — Medication 10: at 11:10

## 2018-12-11 RX ADMIN — MORPHINE SULFATE 2 MILLIGRAM(S): 50 CAPSULE, EXTENDED RELEASE ORAL at 22:16

## 2018-12-11 RX ADMIN — ESCITALOPRAM OXALATE 5 MILLIGRAM(S): 10 TABLET, FILM COATED ORAL at 11:10

## 2018-12-11 RX ADMIN — INSULIN GLARGINE 15 UNIT(S): 100 INJECTION, SOLUTION SUBCUTANEOUS at 07:57

## 2018-12-11 RX ADMIN — Medication 10 MILLIGRAM(S): at 17:04

## 2018-12-11 RX ADMIN — Medication 2: at 21:53

## 2018-12-11 RX ADMIN — SODIUM CHLORIDE 125 MILLILITER(S): 9 INJECTION INTRAMUSCULAR; INTRAVENOUS; SUBCUTANEOUS at 21:53

## 2018-12-11 RX ADMIN — MORPHINE SULFATE 2 MILLIGRAM(S): 50 CAPSULE, EXTENDED RELEASE ORAL at 16:20

## 2018-12-11 RX ADMIN — MORPHINE SULFATE 2 MILLIGRAM(S): 50 CAPSULE, EXTENDED RELEASE ORAL at 10:19

## 2018-12-11 RX ADMIN — MORPHINE SULFATE 4 MILLIGRAM(S): 50 CAPSULE, EXTENDED RELEASE ORAL at 03:10

## 2018-12-11 RX ADMIN — Medication 0.5 MILLIGRAM(S): at 11:10

## 2018-12-11 RX ADMIN — PANTOPRAZOLE SODIUM 40 MILLIGRAM(S): 20 TABLET, DELAYED RELEASE ORAL at 17:04

## 2018-12-11 RX ADMIN — ALBUTEROL 2 PUFF(S): 90 AEROSOL, METERED ORAL at 11:40

## 2018-12-11 RX ADMIN — SODIUM CHLORIDE 125 MILLILITER(S): 9 INJECTION INTRAMUSCULAR; INTRAVENOUS; SUBCUTANEOUS at 07:00

## 2018-12-11 RX ADMIN — MORPHINE SULFATE 2 MILLIGRAM(S): 50 CAPSULE, EXTENDED RELEASE ORAL at 10:04

## 2018-12-11 RX ADMIN — SODIUM CHLORIDE 125 MILLILITER(S): 9 INJECTION INTRAMUSCULAR; INTRAVENOUS; SUBCUTANEOUS at 14:48

## 2018-12-11 RX ADMIN — ONDANSETRON 4 MILLIGRAM(S): 8 TABLET, FILM COATED ORAL at 03:10

## 2018-12-11 RX ADMIN — Medication 12: at 16:05

## 2018-12-11 NOTE — CONSULT NOTE ADULT - ASSESSMENT
HPI:  39yo M w PMH of anxiety, asthma, DM1, gastritis, gastroparesis, presents to ED for eval of abd pain w vomiting & diarrhea. Symptoms started today. He has chronic diarrhea, no blood or melena. (11 Dec 2018 06:25)  ------------------------------------------ as Above -------------------------------------------------------------  The patient has had multiple visits for nausea, vomiting and abdominal pain. Patient was recently in [facility] for the same problem. He developed nausea [place] evening and the symptoms worsened. smoked marijuana [place]. Had a colonoscopy ~ 2 years ago. No recent EGDs. He has been told he has had gastritis, gastroparesis and UC but does not remember when he was diagnosed or how.   he denies ETOH, lactose and NSAIDs. Denies any biliary disease.    Was prescribed Reglan on discharge from [facility].   patient now feels absolutely fine now.   On prednisone for asthma  See labs / CT scan    1)Mid epigastric tenderness - PPI, hold NSAIDs, EGD   clear liquid diet and advance slowly  2)diarrhea - resolved  3)N/V see #1

## 2018-12-11 NOTE — H&P ADULT - ASSESSMENT
39yo M w PMH of anxiety, asthma, DM1, gastritis, gastroparesis, presents to ED for eval of abd pain w vomiting & diarrhea.   IMPROVE VTE Individual Risk Assessment          RISK                                                          Points    [  ] Previous VTE                                                3    [  ] Thrombophilia                                             2    [  ] Lower limb paralysis                                    2        (unable to hold up >15 seconds)      [  ] Current Cancer                                             2         (within 6 months)    [  ] Immobilization > 24 hrs                              1    [  ] ICU/CCU stay > 24 hours                            1    [  ] Age > 60                                                    1    IMPROVE VTE Score ____0_____

## 2018-12-11 NOTE — H&P ADULT - NSHPPHYSICALEXAM_GEN_ALL_CORE
T(C): 36.1 (11 Dec 2018 06:18), Max: 37.6 (11 Dec 2018 00:12)  T(F): 97 (11 Dec 2018 06:18), Max: 99.6 (11 Dec 2018 00:12)  HR: 95 (11 Dec 2018 06:18) (78 - 98)  BP: 149/77 (11 Dec 2018 06:18) (139/85 - 161/90)  BP(mean): --  RR: 18 (11 Dec 2018 06:18) (9 - 19)  SpO2: 97% (11 Dec 2018 06:18) (91% - 98%)    PHYSICAL EXAM:  GENERAL: NAD, well-groomed, well-developed  HEAD:  Atraumatic, Normocephalic  EYES: EOMI, PERRLA, conjunctiva and sclera clear  ENMT: No tonsillar erythema, exudates, or enlargement; Moist mucous membranes, Good dentition, No lesions  NECK: Supple, No JVD, Normal thyroid  NERVOUS SYSTEM:  Alert & Oriented X3, Good concentration; Motor Strength 5/5 B/L upper and lower extremities; DTRs 2+ intact and symmetric  CHEST/LUNG: Clear to percussion bilaterally; No rales, rhonchi, wheezing, or rubs  HEART: Regular rate and rhythm; No murmurs, rubs, or gallops  ABDOMEN: soft, diffuse tenderness, no guarding or rebound, non distended, BS+  EXTREMITIES:  2+ Peripheral Pulses, No clubbing, cyanosis, or edema  LYMPH: No lymphadenopathy noted  SKIN: No rashes or lesions

## 2018-12-11 NOTE — H&P ADULT - NSHPREVIEWOFSYSTEMS_GEN_ALL_CORE
REVIEW OF SYSTEMS:  CONSTITUTIONAL: No fever, weight loss, or fatigue  EYES: No eye pain, visual disturbances, or discharge  ENMT:  No difficulty hearing, tinnitus, vertigo; No sinus or throat pain  NECK: No pain or stiffness  RESPIRATORY: No cough, wheezing, chills or hemoptysis; No shortness of breath  CARDIOVASCULAR: No chest pain, palpitations, dizziness, or leg swelling  GASTROINTESTINAL: + abdominal  pain. + nausea, vomiting, no  hematemesis; + diarrhea no constipation. No melena or hematochezia.  GENITOURINARY: No dysuria, frequency, hematuria, or incontinence  NEUROLOGICAL: No headaches, memory loss, loss of strength, numbness, or tremors  SKIN: No itching, burning, rashes, or lesions   LYMPH NODES: No enlarged glands  ENDOCRINE: No heat or cold intolerance; No hair loss  MUSCULOSKELETAL: No joint pain or swelling; No muscle, back, or extremity pain  PSYCHIATRIC: No depression, anxiety, mood swings, or difficulty sleeping  HEME/LYMPH: No easy bruising, or bleeding gums  ALLERGY AND IMMUNOLOGIC: No hives or eczema

## 2018-12-11 NOTE — H&P ADULT - NSHPLABSRESULTS_GEN_ALL_CORE
LABS:                        15.4   18.09 )-----------( 448      ( 10 Dec 2018 20:11 )             46.4     12-10    138  |  100  |  13  ----------------------------<  326<H>  4.1   |  27  |  1.16    Ca    9.1      10 Dec 2018 20:11    TPro  8.1  /  Alb  3.9  /  TBili  0.5  /  DBili  x   /  AST  17  /  ALT  22  /  AlkPhos  123<H>  12-10      Urinalysis Basic - ( 10 Dec 2018 23:31 )    Color: Yellow / Appearance: Clear / S.020 / pH: x  Gluc: x / Ketone: Large  / Bili: Negative / Urobili: Negative mg/dL   Blood: x / Protein: Negative mg/dL / Nitrite: Negative   Leuk Esterase: Negative / RBC: x / WBC x   Sq Epi: x / Non Sq Epi: x / Bacteria: x      CAPILLARY BLOOD GLUCOSE  Lipase, Serum: 55 U/L (12.10.18 @ 20:11)      POCT Blood Glucose.: 345 mg/dL (11 Dec 2018 03:29)        RADIOLOGY & ADDITIONAL TESTS:  < from: CT Abdomen and Pelvis w/ IV Cont (12.10.18 @ 23:39) >    IMPRESSION: No bowel obstruction or evidence of active bowel inflammation.    < end of copied text >      Imaging Personally Reviewed:  [ ] YES  [ ] NO

## 2018-12-11 NOTE — H&P ADULT - HISTORY OF PRESENT ILLNESS
37yo M w PMH of anxiety, asthma, DM1, gastritis, gastroparesis, presents to ED for eval of abd pain w vomiting & diarrhea. Symptoms started today. He has chronic diarrhea, no blood or melena.

## 2018-12-11 NOTE — CONSULT NOTE ADULT - SUBJECTIVE AND OBJECTIVE BOX
HPI:  37yo M w PMH of anxiety, asthma, DM1, gastritis, gastroparesis, presents to ED for eval of abd pain w vomiting & diarrhea. Symptoms started today. He has chronic diarrhea, no blood or melena. (11 Dec 2018 06:25)  ------------------------------------------ as Above -------------------------------------------------------------  The patient has had multiple visits for nausea, vomiting and abdominal pain. Patient was recently in [facility] for the same problem. He developed nausea  evening and the symptoms worsened. smoked marijuana . Had a colonoscopy ~ 2 years ago. No recent EGDs. He has been told he has had gastritis, gastroparesis and UC but does not remember when he was diagnosed or how.   he denies ETOH, lactose and NSAIDs. Denies any biliary disease.    Was prescribed Reglan on discharge from [facility].   patient now feels absolutely fine now.   On prednisone for asthma  See labs / CT scan      PAST MEDICAL & SURGICAL HISTORY:  Uncomplicated asthma, unspecified asthma severity, unspecified whether persistent  Gastritis, presence of bleeding unspecified, unspecified chronicity, unspecified gastritis type  Controlled diabetes mellitus type 1 without complications  Anxiety  Gastritis  Asthma  DM type 1 (diabetes mellitus, type 1)  History of cholecystectomy  S/P cholecystectomy      MEDICATIONS  (STANDING):  dextrose 5%. 1000 milliLiter(s) (50 mL/Hr) IV Continuous <Continuous>  dextrose 50% Injectable 12.5 Gram(s) IV Push once  dextrose 50% Injectable 25 Gram(s) IV Push once  dextrose 50% Injectable 25 Gram(s) IV Push once  escitalopram 5 milliGRAM(s) Oral daily  heparin  Injectable 5000 Unit(s) SubCutaneous every 12 hours  insulin glargine Injectable (LANTUS) 15 Unit(s) SubCutaneous every morning  insulin lispro (HumaLOG) corrective regimen sliding scale   SubCutaneous three times a day before meals  insulin lispro (HumaLOG) corrective regimen sliding scale   SubCutaneous at bedtime  metoclopramide Injectable 10 milliGRAM(s) IV Push every 12 hours  pantoprazole  Injectable 40 milliGRAM(s) IV Push every 12 hours  sodium chloride 0.9%. 1000 milliLiter(s) (125 mL/Hr) IV Continuous <Continuous>    MEDICATIONS  (PRN):  ALBUTerol    90 MICROgram(s) HFA Inhaler 2 Puff(s) Inhalation every 6 hours PRN Shortness of Breath and/or Wheezing  clonazePAM Tablet 0.5 milliGRAM(s) Oral two times a day PRN anxiety  dextrose 40% Gel 15 Gram(s) Oral once PRN Blood Glucose LESS THAN 70 milliGRAM(s)/deciliter  glucagon  Injectable 1 milliGRAM(s) IntraMuscular once PRN Glucose LESS THAN 70 milligrams/deciliter  morphine  - Injectable 2 milliGRAM(s) IV Push every 6 hours PRN Severe Pain (7 - 10)  ondansetron Injectable 4 milliGRAM(s) IV Push every 6 hours PRN Nausea and/or Vomiting      Allergies    No Known Allergies    Intolerances        FAMILY HISTORY:  Family history of diabetes mellitus (DM) (Father, Mother, Grandparent)  Family history of diabetes mellitus (Father, Mother)      REVIEW OF SYSTEMS:    CONSTITUTIONAL: No fever, weight loss, or fatigue  EYES: No eye pain, visual disturbances, or discharge  ENMT:  No difficulty hearing, tinnitus, vertigo; No sinus or throat pain  NECK: No pain or stiffness  BREASTS: No pain, masses, or nipple discharge  RESPIRATORY: No cough, wheezing, chills or hemoptysis; No shortness of breath  CARDIOVASCULAR: No chest pain, palpitations, dizziness, or leg swelling  GASTROINTESTINAL: See above  GENITOURINARY: No dysuria, frequency, hematuria, or incontinence  NEUROLOGICAL: No headaches, memory loss, loss of strength, numbness, or tremors  SKIN: No itching, burning, rashes, or lesions   LYMPH NODES: No enlarged glands  ENDOCRINE: No heat or cold intolerance; No hair loss  MUSCULOSKELETAL: No joint pain or swelling; No muscle, back, or extremity pain  PSYCHIATRIC: No depression, anxiety, mood swings, or difficulty sleeping  HEME/LYMPH: No easy bruising, or bleeding gums  ALLERGY AND IMMUNOLOGIC: No hives or eczema          SOCIAL HISTORY:    FAMILY HISTORY:  Family history of diabetes mellitus (DM) (Father, Mother, Grandparent)  Family history of diabetes mellitus (Father, Mother)      Vital Signs Last 24 Hrs  T(C): 36.2 (11 Dec 2018 12:40), Max: 37.6 (11 Dec 2018 00:12)  T(F): 97.1 (11 Dec 2018 12:40), Max: 99.6 (11 Dec 2018 00:12)  HR: 97 (11 Dec 2018 12:40) (78 - 98)  BP: 145/88 (11 Dec 2018 12:40) (139/85 - 161/90)  BP(mean): --  RR: 16 (11 Dec 2018 12:40) (9 - 19)  SpO2: 95% (11 Dec 2018 12:40) (91% - 98%)    PHYSICAL EXAM:    GENERAL: NAD, well-groomed, well-developed  HEAD:  Atraumatic, Normocephalic  EYES: EOMI, PERRLA, conjunctiva and sclera clear  NECK: Supple, No JVD, Normal thyroid  NERVOUS SYSTEM:  Alert & Oriented X3, Good concentration; Motor Strength 5/5 B/L upper and lower extremities;  CHEST/LUNG: Clear to percussion bilaterally; No rales, rhonchi, wheezing, or rubs  HEART: Regular rate and rhythm; No murmurs, rubs, or gallops  ABDOMEN: Soft, Mid epigastric tenderness, Nondistended; Bowel sounds present  EXTREMITIES:  2+ Peripheral Pulses, No clubbing, cyanosis, or edema  LYMPH: No lymphadenopathy noted   RECTAL: refused   SKIN: No rashes or lesions    LABS:                        13.6   14.07 )-----------( 442      ( 11 Dec 2018 10:05 )             42.3       CBC:   @ 10:05  WBC  14.07  HGB 13.6  HCT 42.3 Plate 442  MCV 86.3  12-10 @ 20:11  WBC  18.09  HGB 15.4  HCT 46.4 Plate 448  MCV 83.6           11 Dec 2018 10:05    138    |  104    |  25     ----------------------------<  424    5.4     |  13     |  1.39   10 Dec 2018 20:11    138    |  100    |  13     ----------------------------<  326    4.1     |  27     |  1.16     Ca    8.6        11 Dec 2018 10:05  Ca    9.1        10 Dec 2018 20:11    TPro  8.1    /  Alb  3.9    /  TBili  0.5    /  DBili  x      /  AST  17     /  ALT  22     /  AlkPhos  123    10 Dec 2018 20:11      Urinalysis Basic - ( 10 Dec 2018 23:31 )    Color: Yellow / Appearance: Clear / S.020 / pH: x  Gluc: x / Ketone: Large  / Bili: Negative / Urobili: Negative mg/dL   Blood: x / Protein: Negative mg/dL / Nitrite: Negative   Leuk Esterase: Negative / RBC: x / WBC x   Sq Epi: x / Non Sq Epi: x / Bacteria: x          RADIOLOGY & ADDITIONAL STUDIES:  < from: CT Abdomen and Pelvis w/ IV Cont (12.10.18 @ 23:39) >    EXAM:  CT ABDOMEN AND PELVIS IC                            PROCEDURE DATE:  12/10/2018          INTERPRETATION:  CLINICAL INFORMATION: Vomiting    COMPARISON: CT abdomen pelvis 10/15/2018    PROCEDURE:   CT of the Abdomen and Pelvis was performed with intravenous contrast.   Intravenous contrast: 90 ml Omnipaque 350. 10 ml discarded.  Oral contrast: None.  Sagittal and coronal reformats were performed.    FINDINGS:    LOWER CHEST: Within normal limits.    LIVER: Within normal limits.  BILE DUCTS: Normal caliber.  GALLBLADDER: Cholecystectomy.  SPLEEN: Within normal limits.  PANCREAS: Within normal limits.  ADRENALS: Within normal limits.  KIDNEYS/URETERS: Within normal limits.    BLADDER: Within normal limits.  REPRODUCTIVE ORGANS: The prostate is within normal limits.     BOWEL: No bowel obstruction. Appendix normal.  PERITONEUM: No ascites.  VESSELS:  Within normal limits.  RETROPERITONEUM: No lymphadenopathy.    ABDOMINAL WALL: Stable skin thickening of the ventral abdominal wall.  BONES: Within normal limits.    IMPRESSION: No bowel obstruction or evidence of active bowel inflammation.                CHUCK BERKOWITZ M.D., ATTENDING RADIOLOGIST  This document has been electronically signed. Dec 11 2018 12:50AM              < end of copied text >

## 2018-12-12 DIAGNOSIS — R10.9 UNSPECIFIED ABDOMINAL PAIN: ICD-10-CM

## 2018-12-12 LAB
ANION GAP SERPL CALC-SCNC: 12 MMOL/L — SIGNIFICANT CHANGE UP (ref 5–17)
BUN SERPL-MCNC: 19 MG/DL — SIGNIFICANT CHANGE UP (ref 7–23)
CALCIUM SERPL-MCNC: 7.4 MG/DL — LOW (ref 8.5–10.1)
CHLORIDE SERPL-SCNC: 106 MMOL/L — SIGNIFICANT CHANGE UP (ref 96–108)
CO2 SERPL-SCNC: 21 MMOL/L — LOW (ref 22–31)
CREAT SERPL-MCNC: 1.22 MG/DL — SIGNIFICANT CHANGE UP (ref 0.5–1.3)
CULTURE RESULTS: NO GROWTH — SIGNIFICANT CHANGE UP
GLUCOSE BLDC GLUCOMTR-MCNC: 119 MG/DL — HIGH (ref 70–99)
GLUCOSE BLDC GLUCOMTR-MCNC: 242 MG/DL — HIGH (ref 70–99)
GLUCOSE BLDC GLUCOMTR-MCNC: 260 MG/DL — HIGH (ref 70–99)
GLUCOSE BLDC GLUCOMTR-MCNC: 345 MG/DL — HIGH (ref 70–99)
GLUCOSE SERPL-MCNC: 321 MG/DL — HIGH (ref 70–99)
HCT VFR BLD CALC: 35.4 % — LOW (ref 39–50)
HGB BLD-MCNC: 11.8 G/DL — LOW (ref 13–17)
MCHC RBC-ENTMCNC: 28.2 PG — SIGNIFICANT CHANGE UP (ref 27–34)
MCHC RBC-ENTMCNC: 33.3 GM/DL — SIGNIFICANT CHANGE UP (ref 32–36)
MCV RBC AUTO: 84.7 FL — SIGNIFICANT CHANGE UP (ref 80–100)
NRBC # BLD: 0 /100 WBCS — SIGNIFICANT CHANGE UP (ref 0–0)
PLATELET # BLD AUTO: 352 K/UL — SIGNIFICANT CHANGE UP (ref 150–400)
POTASSIUM SERPL-MCNC: 4.1 MMOL/L — SIGNIFICANT CHANGE UP (ref 3.5–5.3)
POTASSIUM SERPL-SCNC: 4.1 MMOL/L — SIGNIFICANT CHANGE UP (ref 3.5–5.3)
RBC # BLD: 4.18 M/UL — LOW (ref 4.2–5.8)
RBC # FLD: 14 % — SIGNIFICANT CHANGE UP (ref 10.3–14.5)
SODIUM SERPL-SCNC: 139 MMOL/L — SIGNIFICANT CHANGE UP (ref 135–145)
SPECIMEN SOURCE: SIGNIFICANT CHANGE UP
WBC # BLD: 12.38 K/UL — HIGH (ref 3.8–10.5)
WBC # FLD AUTO: 12.38 K/UL — HIGH (ref 3.8–10.5)

## 2018-12-12 PROCEDURE — 99232 SBSQ HOSP IP/OBS MODERATE 35: CPT

## 2018-12-12 RX ORDER — SODIUM CHLORIDE 9 MG/ML
1000 INJECTION INTRAMUSCULAR; INTRAVENOUS; SUBCUTANEOUS
Qty: 0 | Refills: 0 | Status: DISCONTINUED | OUTPATIENT
Start: 2018-12-12 | End: 2018-12-14

## 2018-12-12 RX ADMIN — Medication 8: at 08:53

## 2018-12-12 RX ADMIN — MORPHINE SULFATE 2 MILLIGRAM(S): 50 CAPSULE, EXTENDED RELEASE ORAL at 11:35

## 2018-12-12 RX ADMIN — MORPHINE SULFATE 2 MILLIGRAM(S): 50 CAPSULE, EXTENDED RELEASE ORAL at 04:50

## 2018-12-12 RX ADMIN — PANTOPRAZOLE SODIUM 40 MILLIGRAM(S): 20 TABLET, DELAYED RELEASE ORAL at 05:37

## 2018-12-12 RX ADMIN — Medication 10 MILLIGRAM(S): at 05:49

## 2018-12-12 RX ADMIN — INSULIN GLARGINE 15 UNIT(S): 100 INJECTION, SOLUTION SUBCUTANEOUS at 08:56

## 2018-12-12 RX ADMIN — MORPHINE SULFATE 2 MILLIGRAM(S): 50 CAPSULE, EXTENDED RELEASE ORAL at 17:09

## 2018-12-12 RX ADMIN — Medication 0.5 MILLIGRAM(S): at 12:53

## 2018-12-12 RX ADMIN — PANTOPRAZOLE SODIUM 40 MILLIGRAM(S): 20 TABLET, DELAYED RELEASE ORAL at 17:01

## 2018-12-12 RX ADMIN — SODIUM CHLORIDE 125 MILLILITER(S): 9 INJECTION INTRAMUSCULAR; INTRAVENOUS; SUBCUTANEOUS at 05:39

## 2018-12-12 RX ADMIN — MORPHINE SULFATE 2 MILLIGRAM(S): 50 CAPSULE, EXTENDED RELEASE ORAL at 04:32

## 2018-12-12 RX ADMIN — MORPHINE SULFATE 2 MILLIGRAM(S): 50 CAPSULE, EXTENDED RELEASE ORAL at 11:18

## 2018-12-12 RX ADMIN — MORPHINE SULFATE 2 MILLIGRAM(S): 50 CAPSULE, EXTENDED RELEASE ORAL at 17:15

## 2018-12-12 RX ADMIN — Medication 6: at 11:25

## 2018-12-12 RX ADMIN — Medication 10 MILLIGRAM(S): at 17:01

## 2018-12-12 RX ADMIN — ALBUTEROL 2 PUFF(S): 90 AEROSOL, METERED ORAL at 04:35

## 2018-12-12 RX ADMIN — ESCITALOPRAM OXALATE 5 MILLIGRAM(S): 10 TABLET, FILM COATED ORAL at 11:25

## 2018-12-12 NOTE — PROGRESS NOTE ADULT - ASSESSMENT
37yo M w PMH of anxiety, asthma, DM1, gastritis, gastroparesis, presents to ED for eval of abd pain w vomiting & diarrhea.       EGD in am

## 2018-12-12 NOTE — PROGRESS NOTE ADULT - SUBJECTIVE AND OBJECTIVE BOX
Patient is a 38y old  Male who presents with a chief complaint of Nausea, vomiting and abdominal pain (12 Dec 2018 17:39)      INTERVAL HPI/OVERNIGHT EVENTS: no acute events some tolerance     MEDICATIONS  (STANDING):  dextrose 5%. 1000 milliLiter(s) (50 mL/Hr) IV Continuous <Continuous>  dextrose 50% Injectable 12.5 Gram(s) IV Push once  dextrose 50% Injectable 25 Gram(s) IV Push once  dextrose 50% Injectable 25 Gram(s) IV Push once  escitalopram 5 milliGRAM(s) Oral daily  heparin  Injectable 5000 Unit(s) SubCutaneous every 12 hours  insulin glargine Injectable (LANTUS) 15 Unit(s) SubCutaneous every morning  insulin lispro (HumaLOG) corrective regimen sliding scale   SubCutaneous three times a day before meals  insulin lispro (HumaLOG) corrective regimen sliding scale   SubCutaneous at bedtime  metoclopramide Injectable 10 milliGRAM(s) IV Push every 12 hours  pantoprazole  Injectable 40 milliGRAM(s) IV Push every 12 hours  sodium chloride 0.9%. 1000 milliLiter(s) (100 mL/Hr) IV Continuous <Continuous>    MEDICATIONS  (PRN):  ALBUTerol    90 MICROgram(s) HFA Inhaler 2 Puff(s) Inhalation every 6 hours PRN Shortness of Breath and/or Wheezing  clonazePAM Tablet 0.5 milliGRAM(s) Oral two times a day PRN anxiety  dextrose 40% Gel 15 Gram(s) Oral once PRN Blood Glucose LESS THAN 70 milliGRAM(s)/deciliter  glucagon  Injectable 1 milliGRAM(s) IntraMuscular once PRN Glucose LESS THAN 70 milligrams/deciliter  morphine  - Injectable 2 milliGRAM(s) IV Push every 6 hours PRN Severe Pain (7 - 10)  ondansetron Injectable 4 milliGRAM(s) IV Push every 6 hours PRN Nausea and/or Vomiting      Allergies    No Known Allergies    Intolerances        REVIEW OF SYSTEMS:  CONSTITUTIONAL: No fever, weight loss, or fatigue  EYES: No eye pain, visual disturbances, or discharge  ENMT:  No difficulty hearing, tinnitus, vertigo; No sinus or throat pain  NECK: No pain or stiffness  BREASTS: No pain, masses, or nipple discharge  RESPIRATORY: No cough, wheezing, chills or hemoptysis; No shortness of breath  CARDIOVASCULAR: No chest pain, palpitations, dizziness, or leg swelling  GASTROINTESTINAL: No abdominal or epigastric pain. No nausea, vomiting, or hematemesis; No diarrhea or constipation. No melena or hematochezia.  GENITOURINARY: No dysuria, frequency, hematuria, or incontinence  NEUROLOGICAL: No headaches, memory loss, loss of strength, numbness, or tremors  SKIN: No itching, burning, rashes, or lesions   LYMPH NODES: No enlarged glands  ENDOCRINE: No heat or cold intolerance; No hair loss  MUSCULOSKELETAL: No joint pain or swelling; No muscle, back, or extremity pain  PSYCHIATRIC: No depression, anxiety, mood swings, or difficulty sleeping  HEME/LYMPH: No easy bruising, or bleeding gums  ALLERGY AND IMMUNOLOGIC: No hives or eczema    Vital Signs Last 24 Hrs  T(C): 36.7 (12 Dec 2018 17:29), Max: 37.2 (11 Dec 2018 23:20)  T(F): 98.1 (12 Dec 2018 17:29), Max: 98.9 (11 Dec 2018 23:20)  HR: 74 (12 Dec 2018 17:29) (74 - 95)  BP: 117/76 (12 Dec 2018 17:29) (105/62 - 126/81)  BP(mean): --  RR: 16 (12 Dec 2018 17:29) (16 - 18)  SpO2: 97% (12 Dec 2018 17:29) (97% - 100%)    PHYSICAL EXAM:  GENERAL: NAD, well-groomed, well-developed  HEAD:  Atraumatic, Normocephalic  EYES: EOMI, PERRLA, conjunctiva and sclera clear  ENMT: No tonsillar erythema, exudates, or enlargement; Moist mucous membranes, Good dentition, No lesions  NECK: Supple, No JVD, Normal thyroid  NERVOUS SYSTEM:  Alert & Oriented X3, Good concentration; Motor Strength 5/5 B/L upper and lower extremities; DTRs 2+ intact and symmetric  CHEST/LUNG: Clear to percussion bilaterally; No rales, rhonchi, wheezing, or rubs  HEART: Regular rate and rhythm; No murmurs, rubs, or gallops  ABDOMEN: Soft, Nontender, Nondistended; Bowel sounds present  EXTREMITIES:  2+ Peripheral Pulses, No clubbing, cyanosis, or edema  LYMPH: No lymphadenopathy noted  SKIN: No rashes or lesions    LABS:                        11.8   12.38 )-----------( 352      ( 12 Dec 2018 07:16 )             35.4         139  |  106  |  19  ----------------------------<  321<H>  4.1   |  21<L>  |  1.22    Ca    7.4<L>      12 Dec 2018 07:16    TPro  8.1  /  Alb  3.9  /  TBili  0.5  /  DBili  x   /  AST  17  /  ALT  22  /  AlkPhos  123<H>  12-10      Urinalysis Basic - ( 10 Dec 2018 23:31 )    Color: Yellow / Appearance: Clear / S.020 / pH: x  Gluc: x / Ketone: Large  / Bili: Negative / Urobili: Negative mg/dL   Blood: x / Protein: Negative mg/dL / Nitrite: Negative   Leuk Esterase: Negative / RBC: x / WBC x   Sq Epi: x / Non Sq Epi: x / Bacteria: x      CAPILLARY BLOOD GLUCOSE      POCT Blood Glucose.: 119 mg/dL (12 Dec 2018 15:52)  POCT Blood Glucose.: 260 mg/dL (12 Dec 2018 11:24)  POCT Blood Glucose.: 345 mg/dL (12 Dec 2018 08:24)  POCT Blood Glucose.: 260 mg/dL (11 Dec 2018 21:52)  POCT Blood Glucose.: 312 mg/dL (11 Dec 2018 19:53)      RADIOLOGY & ADDITIONAL TESTS:    Imaging Personally Reviewed:  [ ] YES  [ ] NO    Consultant(s) Notes Reviewed:  [ ] YES  [ ] NO    Care Discussed with Consultants/Other Providers [ ] YES  [ ] NO

## 2018-12-12 NOTE — PROGRESS NOTE ADULT - ASSESSMENT
HPI:  37yo M w PMH of anxiety, asthma, DM1, gastritis, gastroparesis, presents to ED for eval of abd pain w vomiting & diarrhea. Symptoms started today. He has chronic diarrhea, no blood or melena. (11 Dec 2018 06:25)  ------------------------------------------ as Above -------------------------------------------------------------  The patient has had multiple visits for nausea, vomiting and abdominal pain. Patient was recently in [facility] for the same problem. He developed nausea [place] evening and the symptoms worsened. smoked marijuana [place]. Had a colonoscopy ~ 2 years ago. No recent EGDs. He has been told he has had gastritis, gastroparesis and UC but does not remember when he was diagnosed or how.   he denies ETOH, lactose and NSAIDs. Denies any biliary disease.    Was prescribed Reglan on discharge from [facility].   patient now feels absolutely fine now.   On prednisone for asthma  See labs / CT scan    1)Mid epigastric tenderness - PPI, hold NSAIDs, EGD   clear liquid diet and advance slowly  2)diarrhea - resolved  3)N/V see #1

## 2018-12-12 NOTE — PROGRESS NOTE ADULT - SUBJECTIVE AND OBJECTIVE BOX
Patient is a 38y old  Male who presents with a chief complaint of Nausea, vomiting and abdominal pain (11 Dec 2018 14:55)      HPI:  37yo M w PMH of anxiety, asthma, DM1, gastritis, gastroparesis, presents to ED for eval of abd pain w vomiting & diarrhea. Symptoms started today. He has chronic diarrhea, no blood or melena. (11 Dec 2018 06:25)      INTERVAL HPI/OVERNIGHT EVENTS:  The patient feels somewhat better. Less abdominal pain. Tolerated liquids, is now on a regular diet.    MEDICATIONS  (STANDING):  dextrose 5%. 1000 milliLiter(s) (50 mL/Hr) IV Continuous <Continuous>  dextrose 50% Injectable 12.5 Gram(s) IV Push once  dextrose 50% Injectable 25 Gram(s) IV Push once  dextrose 50% Injectable 25 Gram(s) IV Push once  escitalopram 5 milliGRAM(s) Oral daily  heparin  Injectable 5000 Unit(s) SubCutaneous every 12 hours  insulin glargine Injectable (LANTUS) 15 Unit(s) SubCutaneous every morning  insulin lispro (HumaLOG) corrective regimen sliding scale   SubCutaneous three times a day before meals  insulin lispro (HumaLOG) corrective regimen sliding scale   SubCutaneous at bedtime  metoclopramide Injectable 10 milliGRAM(s) IV Push every 12 hours  pantoprazole  Injectable 40 milliGRAM(s) IV Push every 12 hours    MEDICATIONS  (PRN):  ALBUTerol    90 MICROgram(s) HFA Inhaler 2 Puff(s) Inhalation every 6 hours PRN Shortness of Breath and/or Wheezing  clonazePAM Tablet 0.5 milliGRAM(s) Oral two times a day PRN anxiety  dextrose 40% Gel 15 Gram(s) Oral once PRN Blood Glucose LESS THAN 70 milliGRAM(s)/deciliter  glucagon  Injectable 1 milliGRAM(s) IntraMuscular once PRN Glucose LESS THAN 70 milligrams/deciliter  morphine  - Injectable 2 milliGRAM(s) IV Push every 6 hours PRN Severe Pain (7 - 10)  ondansetron Injectable 4 milliGRAM(s) IV Push every 6 hours PRN Nausea and/or Vomiting      FAMILY HISTORY:  Family history of diabetes mellitus (DM) (Father, Mother, Grandparent)  Family history of diabetes mellitus (Father, Mother)      Allergies    No Known Allergies    Intolerances        PMH/PSH:  Uncomplicated asthma, unspecified asthma severity, unspecified whether persistent  Gastritis, presence of bleeding unspecified, unspecified chronicity, unspecified gastritis type  Controlled diabetes mellitus type 1 without complications  Anxiety  Gastritis  Asthma  DM type 1 (diabetes mellitus, type 1)  History of cholecystectomy  S/P cholecystectomy        REVIEW OF SYSTEMS:  CONSTITUTIONAL: No fever, weight loss, or fatigue  EYES: No eye pain, visual disturbances, or discharge  ENMT:  No difficulty hearing, tinnitus, vertigo; No sinus or throat pain  NECK: No pain or stiffness  BREASTS: No pain, masses, or nipple discharge  RESPIRATORY: No cough, wheezing, chills or hemoptysis; No shortness of breath  CARDIOVASCULAR: No chest pain, palpitations, dizziness, or leg swelling  GASTROINTESTINAL: see above  GENITOURINARY: No dysuria, frequency, hematuria, or incontinence  NEUROLOGICAL: No headaches, memory loss, loss of strength, numbness, or tremors  SKIN: No itching, burning, rashes, or lesions   LYMPH NODES: No enlarged glands  ENDOCRINE: No heat or cold intolerance; No hair loss  MUSCULOSKELETAL: No joint pain or swelling; No muscle, back, or extremity pain  PSYCHIATRIC: No depression, anxiety, mood swings, or difficulty sleeping  HEME/LYMPH: No easy bruising, or bleeding gums  ALLERGY AND IMMUNOLOGIC: No hives or eczema    Vital Signs Last 24 Hrs  T(C): 36.7 (12 Dec 2018 17:29), Max: 37.2 (11 Dec 2018 23:20)  T(F): 98.1 (12 Dec 2018 17:29), Max: 98.9 (11 Dec 2018 23:20)  HR: 74 (12 Dec 2018 17:29) (74 - 95)  BP: 117/76 (12 Dec 2018 17:29) (105/62 - 126/81)  BP(mean): --  RR: 16 (12 Dec 2018 17:29) (16 - 18)  SpO2: 97% (12 Dec 2018 17:29) (97% - 100%)    PHYSICAL EXAM:  GENERAL: NAD, well-groomed, well-developed  HEAD:  Atraumatic, Normocephalic  EYES: EOMI, PERRLA, conjunctiva and sclera clear  NECK: Supple, No JVD, Normal thyroid  NERVOUS SYSTEM:  Alert & Oriented X3, Good concentration; Motor Strength 5/5 B/L upper and lower extremities;   CHEST/LUNG: Clear to percussion bilaterally; No rales, rhonchi, wheezing, or rubs  HEART: Regular rate and rhythm; No murmurs, rubs, or gallops  ABDOMEN: Soft, mid epigastric tenderness, Nondistended; Bowel sounds present  EXTREMITIES:  2+ Peripheral Pulses, No clubbing, cyanosis, or edema  LYMPH: No lymphadenopathy noted  SKIN: No rashes or lesions    LAB  12-10 @ 20:11  amylase --   lipase 55                           11.8   12.38 )-----------( 352      ( 12 Dec 2018 07:16 )             35.4       CBC:   07:16  WBC 12.38   Hgb 11.8   Hct 35.4   Plts 352  MCV 84.7   @ 10:05  WBC 14.07   Hgb 13.6   Hct 42.3   Plts 442  MCV 86.3  12-10 @ 20:11  WBC 18.09   Hgb 15.4   Hct 46.4   Plts 448  MCV 83.6      Chemistry:   @ 07:16  Na+ 139  K+ 4.1  Cl- 106  CO2 21  BUN 19  Cr 1.22      10:05  Na+ 138  K+ 5.4  Cl- 104  CO2 13  BUN 25  Cr 1.39     12-10 @ 20:11  Na+ 138  K+ 4.1  Cl- 100  CO2 27  BUN 13  Cr 1.16         Glucose, Serum: 321 mg/dL ( @ 07:16)  Glucose, Serum: 424 mg/dL ( @ 10:05)  Glucose, Serum: 326 mg/dL (12-10 @ 20:11)      12 Dec 2018 07:16    139    |  106    |  19     ----------------------------<  321    4.1     |  21     |  1.22   11 Dec 2018 10:05    138    |  104    |  25     ----------------------------<  424    5.4     |  13     |  1.39   10 Dec 2018 20:11    138    |  100    |  13     ----------------------------<  326    4.1     |  27     |  1.16     Ca    7.4        12 Dec 2018 07:16  Ca    8.6        11 Dec 2018 10:05  Ca    9.1        10 Dec 2018 20:11    TPro  8.1    /  Alb  3.9    /  TBili  0.5    /  DBili  x      /  AST  17     /  ALT  22     /  AlkPhos  123    10 Dec 2018 20:11          Urinalysis Basic - ( 10 Dec 2018 23:31 )    Color: Yellow / Appearance: Clear / S.020 / pH: x  Gluc: x / Ketone: Large  / Bili: Negative / Urobili: Negative mg/dL   Blood: x / Protein: Negative mg/dL / Nitrite: Negative   Leuk Esterase: Negative / RBC: x / WBC x   Sq Epi: x / Non Sq Epi: x / Bacteria: x        CAPILLARY BLOOD GLUCOSE      POCT Blood Glucose.: 119 mg/dL (12 Dec 2018 15:52)  POCT Blood Glucose.: 260 mg/dL (12 Dec 2018 11:24)  POCT Blood Glucose.: 345 mg/dL (12 Dec 2018 08:24)  POCT Blood Glucose.: 260 mg/dL (11 Dec 2018 21:52)  POCT Blood Glucose.: 312 mg/dL (11 Dec 2018 19:53)          RADIOLOGY & ADDITIONAL TESTS:    Imaging Personally Reviewed:  [ ] YES  [ ] NO    Consultant(s) Notes Reviewed:  [ ] YES  [ ] NO    Care Discussed with Consultants/Other Providers [ ] YES  [ ] NO

## 2018-12-13 ENCOUNTER — RESULT REVIEW (OUTPATIENT)
Age: 38
End: 2018-12-13

## 2018-12-13 LAB
ANION GAP SERPL CALC-SCNC: 13 MMOL/L — SIGNIFICANT CHANGE UP (ref 5–17)
BUN SERPL-MCNC: 9 MG/DL — SIGNIFICANT CHANGE UP (ref 7–23)
CALCIUM SERPL-MCNC: 7.9 MG/DL — LOW (ref 8.5–10.1)
CHLORIDE SERPL-SCNC: 99 MMOL/L — SIGNIFICANT CHANGE UP (ref 96–108)
CO2 SERPL-SCNC: 25 MMOL/L — SIGNIFICANT CHANGE UP (ref 22–31)
CREAT SERPL-MCNC: 1.16 MG/DL — SIGNIFICANT CHANGE UP (ref 0.5–1.3)
GLUCOSE BLDC GLUCOMTR-MCNC: 216 MG/DL — HIGH (ref 70–99)
GLUCOSE BLDC GLUCOMTR-MCNC: 275 MG/DL — HIGH (ref 70–99)
GLUCOSE BLDC GLUCOMTR-MCNC: 305 MG/DL — HIGH (ref 70–99)
GLUCOSE BLDC GLUCOMTR-MCNC: 72 MG/DL — SIGNIFICANT CHANGE UP (ref 70–99)
GLUCOSE SERPL-MCNC: 240 MG/DL — HIGH (ref 70–99)
HCT VFR BLD CALC: 39.6 % — SIGNIFICANT CHANGE UP (ref 39–50)
HGB BLD-MCNC: 13 G/DL — SIGNIFICANT CHANGE UP (ref 13–17)
MAGNESIUM SERPL-MCNC: 2.2 MG/DL — SIGNIFICANT CHANGE UP (ref 1.6–2.6)
MCHC RBC-ENTMCNC: 28 PG — SIGNIFICANT CHANGE UP (ref 27–34)
MCHC RBC-ENTMCNC: 32.8 GM/DL — SIGNIFICANT CHANGE UP (ref 32–36)
MCV RBC AUTO: 85.3 FL — SIGNIFICANT CHANGE UP (ref 80–100)
NRBC # BLD: 0 /100 WBCS — SIGNIFICANT CHANGE UP (ref 0–0)
PHOSPHATE SERPL-MCNC: 2.1 MG/DL — LOW (ref 2.5–4.5)
PLATELET # BLD AUTO: 359 K/UL — SIGNIFICANT CHANGE UP (ref 150–400)
POTASSIUM SERPL-MCNC: 3.2 MMOL/L — LOW (ref 3.5–5.3)
POTASSIUM SERPL-SCNC: 3.2 MMOL/L — LOW (ref 3.5–5.3)
RBC # BLD: 4.64 M/UL — SIGNIFICANT CHANGE UP (ref 4.2–5.8)
RBC # FLD: 13.5 % — SIGNIFICANT CHANGE UP (ref 10.3–14.5)
SODIUM SERPL-SCNC: 137 MMOL/L — SIGNIFICANT CHANGE UP (ref 135–145)
WBC # BLD: 9.5 K/UL — SIGNIFICANT CHANGE UP (ref 3.8–10.5)
WBC # FLD AUTO: 9.5 K/UL — SIGNIFICANT CHANGE UP (ref 3.8–10.5)

## 2018-12-13 PROCEDURE — 99232 SBSQ HOSP IP/OBS MODERATE 35: CPT

## 2018-12-13 PROCEDURE — 88305 TISSUE EXAM BY PATHOLOGIST: CPT | Mod: 26

## 2018-12-13 PROCEDURE — 88312 SPECIAL STAINS GROUP 1: CPT | Mod: 26

## 2018-12-13 RX ORDER — POTASSIUM CHLORIDE 20 MEQ
40 PACKET (EA) ORAL EVERY 4 HOURS
Qty: 0 | Refills: 0 | Status: DISCONTINUED | OUTPATIENT
Start: 2018-12-13 | End: 2018-12-13

## 2018-12-13 RX ORDER — POTASSIUM CHLORIDE 20 MEQ
40 PACKET (EA) ORAL ONCE
Qty: 0 | Refills: 0 | Status: COMPLETED | OUTPATIENT
Start: 2018-12-13 | End: 2018-12-13

## 2018-12-13 RX ORDER — POTASSIUM PHOSPHATE, MONOBASIC POTASSIUM PHOSPHATE, DIBASIC 236; 224 MG/ML; MG/ML
15 INJECTION, SOLUTION INTRAVENOUS ONCE
Qty: 0 | Refills: 0 | Status: COMPLETED | OUTPATIENT
Start: 2018-12-13 | End: 2018-12-13

## 2018-12-13 RX ADMIN — Medication 8: at 11:28

## 2018-12-13 RX ADMIN — Medication 4: at 16:43

## 2018-12-13 RX ADMIN — Medication 0.5 MILLIGRAM(S): at 23:07

## 2018-12-13 RX ADMIN — POTASSIUM PHOSPHATE, MONOBASIC POTASSIUM PHOSPHATE, DIBASIC 63.75 MILLIMOLE(S): 236; 224 INJECTION, SOLUTION INTRAVENOUS at 10:25

## 2018-12-13 RX ADMIN — MORPHINE SULFATE 2 MILLIGRAM(S): 50 CAPSULE, EXTENDED RELEASE ORAL at 11:23

## 2018-12-13 RX ADMIN — PANTOPRAZOLE SODIUM 40 MILLIGRAM(S): 20 TABLET, DELAYED RELEASE ORAL at 06:11

## 2018-12-13 RX ADMIN — ONDANSETRON 4 MILLIGRAM(S): 8 TABLET, FILM COATED ORAL at 02:06

## 2018-12-13 RX ADMIN — Medication 10 MILLIGRAM(S): at 06:11

## 2018-12-13 RX ADMIN — Medication 10 MILLIGRAM(S): at 17:24

## 2018-12-13 RX ADMIN — MORPHINE SULFATE 2 MILLIGRAM(S): 50 CAPSULE, EXTENDED RELEASE ORAL at 17:22

## 2018-12-13 RX ADMIN — MORPHINE SULFATE 2 MILLIGRAM(S): 50 CAPSULE, EXTENDED RELEASE ORAL at 11:53

## 2018-12-13 RX ADMIN — Medication 40 MILLIEQUIVALENT(S): at 17:26

## 2018-12-13 RX ADMIN — ESCITALOPRAM OXALATE 5 MILLIGRAM(S): 10 TABLET, FILM COATED ORAL at 11:26

## 2018-12-13 RX ADMIN — ALBUTEROL 2 PUFF(S): 90 AEROSOL, METERED ORAL at 11:25

## 2018-12-13 RX ADMIN — MORPHINE SULFATE 2 MILLIGRAM(S): 50 CAPSULE, EXTENDED RELEASE ORAL at 00:11

## 2018-12-13 RX ADMIN — PANTOPRAZOLE SODIUM 40 MILLIGRAM(S): 20 TABLET, DELAYED RELEASE ORAL at 17:26

## 2018-12-13 RX ADMIN — MORPHINE SULFATE 2 MILLIGRAM(S): 50 CAPSULE, EXTENDED RELEASE ORAL at 00:41

## 2018-12-13 RX ADMIN — SODIUM CHLORIDE 100 MILLILITER(S): 9 INJECTION INTRAMUSCULAR; INTRAVENOUS; SUBCUTANEOUS at 13:02

## 2018-12-13 RX ADMIN — MORPHINE SULFATE 2 MILLIGRAM(S): 50 CAPSULE, EXTENDED RELEASE ORAL at 17:52

## 2018-12-13 NOTE — DIETITIAN INITIAL EVALUATION ADULT. - PERTINENT MEDS FT
MEDICATIONS  (STANDING):  dextrose 5%. 1000 milliLiter(s) (50 mL/Hr) IV Continuous <Continuous>  dextrose 50% Injectable 12.5 Gram(s) IV Push once  dextrose 50% Injectable 25 Gram(s) IV Push once  dextrose 50% Injectable 25 Gram(s) IV Push once  escitalopram 5 milliGRAM(s) Oral daily  heparin  Injectable 5000 Unit(s) SubCutaneous every 12 hours  insulin glargine Injectable (LANTUS) 15 Unit(s) SubCutaneous every morning  insulin lispro (HumaLOG) corrective regimen sliding scale   SubCutaneous three times a day before meals  insulin lispro (HumaLOG) corrective regimen sliding scale   SubCutaneous at bedtime  metoclopramide Injectable 10 milliGRAM(s) IV Push every 12 hours  pantoprazole  Injectable 40 milliGRAM(s) IV Push every 12 hours  potassium chloride    Tablet ER 40 milliEquivalent(s) Oral once  sodium chloride 0.9%. 1000 milliLiter(s) (100 mL/Hr) IV Continuous <Continuous>    MEDICATIONS  (PRN):  ALBUTerol    90 MICROgram(s) HFA Inhaler 2 Puff(s) Inhalation every 6 hours PRN Shortness of Breath and/or Wheezing  clonazePAM Tablet 0.5 milliGRAM(s) Oral two times a day PRN anxiety  dextrose 40% Gel 15 Gram(s) Oral once PRN Blood Glucose LESS THAN 70 milliGRAM(s)/deciliter  glucagon  Injectable 1 milliGRAM(s) IntraMuscular once PRN Glucose LESS THAN 70 milligrams/deciliter  morphine  - Injectable 2 milliGRAM(s) IV Push every 6 hours PRN Severe Pain (7 - 10)  ondansetron Injectable 4 milliGRAM(s) IV Push every 6 hours PRN Nausea and/or Vomiting

## 2018-12-13 NOTE — PROGRESS NOTE ADULT - SUBJECTIVE AND OBJECTIVE BOX
Patient is a 38y old  Male who presents with a chief complaint of Nausea, vomiting and abdominal pain (12 Dec 2018 17:39)      INTERVAL HPI/OVERNIGHT EVENTS: no acute events some tolerance of po     MEDICATIONS  (STANDING):  dextrose 5%. 1000 milliLiter(s) (50 mL/Hr) IV Continuous <Continuous>  dextrose 50% Injectable 12.5 Gram(s) IV Push once  dextrose 50% Injectable 25 Gram(s) IV Push once  dextrose 50% Injectable 25 Gram(s) IV Push once  escitalopram 5 milliGRAM(s) Oral daily  heparin  Injectable 5000 Unit(s) SubCutaneous every 12 hours  insulin glargine Injectable (LANTUS) 15 Unit(s) SubCutaneous every morning  insulin lispro (HumaLOG) corrective regimen sliding scale   SubCutaneous three times a day before meals  insulin lispro (HumaLOG) corrective regimen sliding scale   SubCutaneous at bedtime  metoclopramide Injectable 10 milliGRAM(s) IV Push every 12 hours  pantoprazole  Injectable 40 milliGRAM(s) IV Push every 12 hours  sodium chloride 0.9%. 1000 milliLiter(s) (100 mL/Hr) IV Continuous <Continuous>    MEDICATIONS  (PRN):  ALBUTerol    90 MICROgram(s) HFA Inhaler 2 Puff(s) Inhalation every 6 hours PRN Shortness of Breath and/or Wheezing  clonazePAM Tablet 0.5 milliGRAM(s) Oral two times a day PRN anxiety  dextrose 40% Gel 15 Gram(s) Oral once PRN Blood Glucose LESS THAN 70 milliGRAM(s)/deciliter  glucagon  Injectable 1 milliGRAM(s) IntraMuscular once PRN Glucose LESS THAN 70 milligrams/deciliter  morphine  - Injectable 2 milliGRAM(s) IV Push every 6 hours PRN Severe Pain (7 - 10)  ondansetron Injectable 4 milliGRAM(s) IV Push every 6 hours PRN Nausea and/or Vomiting      Allergies    No Known Allergies    Intolerances        REVIEW OF SYSTEMS:  CONSTITUTIONAL: No fever, weight loss, or fatigue  EYES: No eye pain, visual disturbances, or discharge  ENMT:  No difficulty hearing, tinnitus, vertigo; No sinus or throat pain  NECK: No pain or stiffness  BREASTS: No pain, masses, or nipple discharge  RESPIRATORY: No cough, wheezing, chills or hemoptysis; No shortness of breath  CARDIOVASCULAR: No chest pain, palpitations, dizziness, or leg swelling  GASTROINTESTINAL: No abdominal or epigastric pain. No nausea, vomiting, or hematemesis; No diarrhea or constipation. No melena or hematochezia.  GENITOURINARY: No dysuria, frequency, hematuria, or incontinence  NEUROLOGICAL: No headaches, memory loss, loss of strength, numbness, or tremors  SKIN: No itching, burning, rashes, or lesions   LYMPH NODES: No enlarged glands  ENDOCRINE: No heat or cold intolerance; No hair loss  MUSCULOSKELETAL: No joint pain or swelling; No muscle, back, or extremity pain  PSYCHIATRIC: No depression, anxiety, mood swings, or difficulty sleeping  HEME/LYMPH: No easy bruising, or bleeding gums  ALLERGY AND IMMUNOLOGIC: No hives or eczema    Vital Signs Last 24 Hrs  T(C): 36.7 (12 Dec 2018 17:29), Max: 37.2 (11 Dec 2018 23:20)  T(F): 98.1 (12 Dec 2018 17:29), Max: 98.9 (11 Dec 2018 23:20)  HR: 74 (12 Dec 2018 17:29) (74 - 95)  BP: 117/76 (12 Dec 2018 17:29) (105/62 - 126/81)  BP(mean): --  RR: 16 (12 Dec 2018 17:29) (16 - 18)  SpO2: 97% (12 Dec 2018 17:29) (97% - 100%)    PHYSICAL EXAM:  GENERAL: NAD, well-groomed, well-developed  HEAD:  Atraumatic, Normocephalic  EYES: EOMI, PERRLA, conjunctiva and sclera clear  ENMT: No tonsillar erythema, exudates, or enlargement; Moist mucous membranes, Good dentition, No lesions  NECK: Supple, No JVD, Normal thyroid  NERVOUS SYSTEM:  Alert & Oriented X3, Good concentration; Motor Strength 5/5 B/L upper and lower extremities; DTRs 2+ intact and symmetric  CHEST/LUNG: Clear to percussion bilaterally; No rales, rhonchi, wheezing, or rubs  HEART: Regular rate and rhythm; No murmurs, rubs, or gallops  ABDOMEN: Soft, Nontender, Nondistended; Bowel sounds present  EXTREMITIES:  2+ Peripheral Pulses, No clubbing, cyanosis, or edema  LYMPH: No lymphadenopathy noted  SKIN: No rashes or lesions    LABS:                        11.8   12.38 )-----------( 352      ( 12 Dec 2018 07:16 )             35.4         139  |  106  |  19  ----------------------------<  321<H>  4.1   |  21<L>  |  1.22    Ca    7.4<L>      12 Dec 2018 07:16    TPro  8.1  /  Alb  3.9  /  TBili  0.5  /  DBili  x   /  AST  17  /  ALT  22  /  AlkPhos  123<H>  12-10      Urinalysis Basic - ( 10 Dec 2018 23:31 )    Color: Yellow / Appearance: Clear / S.020 / pH: x  Gluc: x / Ketone: Large  / Bili: Negative / Urobili: Negative mg/dL   Blood: x / Protein: Negative mg/dL / Nitrite: Negative   Leuk Esterase: Negative / RBC: x / WBC x   Sq Epi: x / Non Sq Epi: x / Bacteria: x      CAPILLARY BLOOD GLUCOSE      POCT Blood Glucose.: 119 mg/dL (12 Dec 2018 15:52)  POCT Blood Glucose.: 260 mg/dL (12 Dec 2018 11:24)  POCT Blood Glucose.: 345 mg/dL (12 Dec 2018 08:24)  POCT Blood Glucose.: 260 mg/dL (11 Dec 2018 21:52)  POCT Blood Glucose.: 312 mg/dL (11 Dec 2018 19:53)      RADIOLOGY & ADDITIONAL TESTS:    Imaging Personally Reviewed:  [ ] YES  [ ] NO    Consultant(s) Notes Reviewed:  [ ] YES  [ ] NO    Care Discussed with Consultants/Other Providers [ ] YES  [ ] NO

## 2018-12-13 NOTE — PRE-OP CHECKLIST - TEMPERATURE IN FAHRENHEIT (DEGREES F)
Pre-Surgery Instructions:   Medication Instructions    Multiple Vitamins-Minerals (ONE DAILY MULTIVITAMIN ADULT) TABS Patient was instructed by Physician and understands   Omega-3 Fatty Acids (FISH OIL) 645 MG CAPS Patient was instructed by Physician and understands  Patient was seen by Dr Valdo Gracia and was instructed to take no medications am of surgery  Patient was instructed to avoid NSAIDs, Aspirin, Vitamins, and supplements 7 days before surgery  [facility] pre-op instructions reviewed  Pre-op bathing reviewed and patient was given chlorhexidine soap  Peck catheter education provided to patient and patient's wife  97.8

## 2018-12-13 NOTE — DIETITIAN INITIAL EVALUATION ADULT. - OTHER INFO
Pt seen today due to RN referral for [employer]. Pt reports he lives home alone. He states" he sometimes runs ojut of money and is not able to purchase groceries". Nausea continues, no vomiting/diarrhea since yesterday and still continues to have abdominal pain. He checks his FS 4x/d and is able to verbalize foods containing carbohydrates and also that his HgbA1c should be checked q 3 months. Clear liquid just began. 12/13 s/p upper endoscopy. Pt seen today due to RN referral for [employer]. Pt reports he lives home alone. He states" he sometimes runs out of money and is not able to purchase groceries". Pt provided w/ information on food as health program and certificate.  Nausea continues, no vomiting/diarrhea since yesterday and still continues to have abdominal pain. He checks his FS 4x/d and is able to verbalize foods containing carbohydrates and also that his HgbA1c should be checked q 3 months. Clear liquid just began. 12/13 s/p upper endoscopy.

## 2018-12-13 NOTE — PRE-OP CHECKLIST - LATEX ALLERGY
no
TTE 11/27/18     Normal appearing mitral valve structure and function.   Mild (1+) mitral regurgitation is present.   Mild aortic sclerosis is present with normal valvular opening.   Mild (1+) aortic regurgitation is present.   Normal appearing tricuspid valve structure and function.   Mild (1+) tricuspid valve regurgitation is present.   Normal appearing pulmonic valve structure and function.   The left atrium is mildly dilated.   The left ventricle is normal in size, wall motion and contractility.   Mild concentric left ventricular hypertrophy is present.   Estimated left ventricular ejection fraction is 60-65 %.   Normal appearing right atrium.   Normal appearing right ventricle structure and function.   No evidence of pericardial effusion.      RENAL SCAN  11/28/18    IMPRESSION: Abnormal diuretic renal scan.    Severely impaired flow and function in the left kidney which precludes   adequate evaluation for obstruction.    Good flow and function in the right kidney with no scan evidence of   obstruction.    Differential renal function: Right kidney 81 %; Left kidney 19 %.    CT CHEST  11/26    IMPRESSION:   1. masslike consolidation in the superior segment of the right   lower lobe. Small nodular opacity within the posterior segment of the   right   upper lobe. Findings are nonspecific and may represent infection,   scarring, or neoplasm.   2. Trace left pleural effusion/atelectasis.   3. 4 mm groundglass nodular opacity within the right upper lobe.   4. Severe left hydronephrosis, unchanged.

## 2018-12-13 NOTE — DIETITIAN INITIAL EVALUATION ADULT. - PERTINENT LABORATORY DATA
12-13 Na137 mmol/L Glu 240 mg/dL<H> K+ 3.2 mmol/L<L> Cr  1.16 mg/dL BUN 9 mg/dL 12-13 Phos 2.1 mg/dL<L> 12-10 Alb 3.9 g/dL12-10 ALT 22 U/L AST 17 U/L Alkaline Phosphatase 123 U/L<H>, 12/7 - HgbA1c = 8.6

## 2018-12-13 NOTE — BRIEF OPERATIVE NOTE - POST-OP DX
Gastritis without bleeding, unspecified chronicity, unspecified gastritis type  12/13/2018    Active  Isiah Little

## 2018-12-13 NOTE — BRIEF OPERATIVE NOTE - PRIMARY SURGEON
Return to SCHOOL    April 30, 2017      Re:   Marcia Franco  [address]  [facility] [address]           This is to certify that Marcia Franco has been under my care and can return to SCHOOL on 5/1/2017.              SIGNATURE: ___________________________________________,   4/30/2017  MD Neetu Murray MD  [facility] EMERGENCY [facility]  [id number]  [facility] [address]  [phone]  [phone]   [email or web address]

## 2018-12-13 NOTE — PROGRESS NOTE ADULT - SUBJECTIVE AND OBJECTIVE BOX
Procedure:           Upper GI endoscopy 12-13-18 @ 09:04    Indications:                     Monitored Anesthesia Care Provided by :     ____________________________________________________________________________________________________  Procedure:           Pre-Anesthesia Assessment:                       - Prior to the procedure, a History and Physical was performed, and patient                        medications and allergies were reviewed. The patient is competent. The risks                        and benefits of the procedure and the sedation options and risks were                        discussed with the patient. All questions were answered and informed consent                        was obtained. Patient identification and proposed procedure were verified by                        the physician, the nurse and the anesthesiologist in the procedure room.                        Mental Status Examination:    alert and oriented. Airway Examination: normal                        oropharyngeal airway and neck mobility. Respiratory Examination: clear to                        auscultation. CV Examination: normal. Prophylactic Antibiotics: The patient                        does not require prophylactic antibiotics.                        Grade Assessment: - After                        reviewing the risks and benefits, the patient was deemed in satisfactory                        condition to undergo the procedure. The anesthesia plan was to use monitored                        anesthesia care (MAC). Immediately prior to administration of medications,                        the patient was re-assessed for adequacy to receive sedatives. The heart        		     rate, respiratory rate, oxygen saturations, blood pressure, adequacy of                        pulmonary ventilation, and response to care were monitored throughout the                        procedure. The physical status of the patient was re-assessed after the                        procedure.                       After obtaining informed consent, the endoscope was passed under direct                        vision. Throughout the procedure, the patient's blood pressure, pulse, and                        oxygen saturations were monitored continuously. The Endoscope was introduced                        through the mouth, and advanced to the second part of duodenum. retroflexion in the stomach was done. The upper GI                        endoscopy was accomplished with ease. The patient tolerated the procedure                        well.    ESOPHAGUS:   WNL    STOMACH:   Linear erythema of antrum and body    DUODENUM: WNL      Assessment : As above    PLAN : Procedure:           Upper GI endoscopy 12-13-18 @ 09:04    Indications:                     Monitored Anesthesia Care Provided by :     ____________________________________________________________________________________________________  Procedure:           Pre-Anesthesia Assessment:                       - Prior to the procedure, a History and Physical was performed, and patient                        medications and allergies were reviewed. The patient is competent. The risks                        and benefits of the procedure and the sedation options and risks were                        discussed with the patient. All questions were answered and informed consent                        was obtained. Patient identification and proposed procedure were verified by                        the physician, the nurse and the anesthesiologist in the procedure room.                        Mental Status Examination:    alert and oriented. Airway Examination: normal                        oropharyngeal airway and neck mobility. Respiratory Examination: clear to                        auscultation. CV Examination: normal. Prophylactic Antibiotics: The patient                        does not require prophylactic antibiotics.                        Grade Assessment: - After                        reviewing the risks and benefits, the patient was deemed in satisfactory                        condition to undergo the procedure. The anesthesia plan was to use monitored                        anesthesia care (MAC). Immediately prior to administration of medications,                        the patient was re-assessed for adequacy to receive sedatives. The heart        		     rate, respiratory rate, oxygen saturations, blood pressure, adequacy of                        pulmonary ventilation, and response to care were monitored throughout the                        procedure. The physical status of the patient was re-assessed after the                        procedure.                       After obtaining informed consent, the endoscope was passed under direct                        vision. Throughout the procedure, the patient's blood pressure, pulse, and                        oxygen saturations were monitored continuously. The Endoscope was introduced                        through the mouth, and advanced to the second part of duodenum. retroflexion in the stomach was done. The upper GI                        endoscopy was accomplished with ease. The patient tolerated the procedure                        well.    ESOPHAGUS:   WNL    STOMACH:   Linear erythema of antrum and body    DUODENUM: WNL      Assessment : As above    PLAN : Advance diet and observe

## 2018-12-13 NOTE — PROGRESS NOTE ADULT - ASSESSMENT
39yo M w PMH of anxiety, asthma, DM1, gastritis, gastroparesis, presents to ED for eval of abd pain w vomiting & diarrhea.       EGD complete Gastritis

## 2018-12-14 ENCOUNTER — TRANSCRIPTION ENCOUNTER (OUTPATIENT)
Age: 38
End: 2018-12-14

## 2018-12-14 VITALS
OXYGEN SATURATION: 95 % | SYSTOLIC BLOOD PRESSURE: 133 MMHG | HEART RATE: 64 BPM | TEMPERATURE: 99 F | DIASTOLIC BLOOD PRESSURE: 98 MMHG | RESPIRATION RATE: 16 BRPM

## 2018-12-14 LAB
ANION GAP SERPL CALC-SCNC: 9 MMOL/L — SIGNIFICANT CHANGE UP (ref 5–17)
BUN SERPL-MCNC: 7 MG/DL — SIGNIFICANT CHANGE UP (ref 7–23)
CALCIUM SERPL-MCNC: 8.2 MG/DL — LOW (ref 8.5–10.1)
CHLORIDE SERPL-SCNC: 101 MMOL/L — SIGNIFICANT CHANGE UP (ref 96–108)
CO2 SERPL-SCNC: 26 MMOL/L — SIGNIFICANT CHANGE UP (ref 22–31)
CREAT SERPL-MCNC: 0.9 MG/DL — SIGNIFICANT CHANGE UP (ref 0.5–1.3)
GLUCOSE BLDC GLUCOMTR-MCNC: 183 MG/DL — HIGH (ref 70–99)
GLUCOSE BLDC GLUCOMTR-MCNC: 387 MG/DL — HIGH (ref 70–99)
GLUCOSE SERPL-MCNC: 352 MG/DL — HIGH (ref 70–99)
HCT VFR BLD CALC: 36.4 % — LOW (ref 39–50)
HGB BLD-MCNC: 12.3 G/DL — LOW (ref 13–17)
MCHC RBC-ENTMCNC: 27.8 PG — SIGNIFICANT CHANGE UP (ref 27–34)
MCHC RBC-ENTMCNC: 33.8 GM/DL — SIGNIFICANT CHANGE UP (ref 32–36)
MCV RBC AUTO: 82.2 FL — SIGNIFICANT CHANGE UP (ref 80–100)
NRBC # BLD: 0 /100 WBCS — SIGNIFICANT CHANGE UP (ref 0–0)
PLATELET # BLD AUTO: 327 K/UL — SIGNIFICANT CHANGE UP (ref 150–400)
POTASSIUM SERPL-MCNC: 4.3 MMOL/L — SIGNIFICANT CHANGE UP (ref 3.5–5.3)
POTASSIUM SERPL-SCNC: 4.3 MMOL/L — SIGNIFICANT CHANGE UP (ref 3.5–5.3)
RBC # BLD: 4.43 M/UL — SIGNIFICANT CHANGE UP (ref 4.2–5.8)
RBC # FLD: 13 % — SIGNIFICANT CHANGE UP (ref 10.3–14.5)
SODIUM SERPL-SCNC: 136 MMOL/L — SIGNIFICANT CHANGE UP (ref 135–145)
SURGICAL PATHOLOGY STUDY: SIGNIFICANT CHANGE UP
WBC # BLD: 9.08 K/UL — SIGNIFICANT CHANGE UP (ref 3.8–10.5)
WBC # FLD AUTO: 9.08 K/UL — SIGNIFICANT CHANGE UP (ref 3.8–10.5)

## 2018-12-14 PROCEDURE — 99239 HOSP IP/OBS DSCHRG MGMT >30: CPT

## 2018-12-14 RX ORDER — OXYCODONE HYDROCHLORIDE 5 MG/1
1 TABLET ORAL
Qty: 12 | Refills: 0 | OUTPATIENT
Start: 2018-12-14 | End: 2018-12-16

## 2018-12-14 RX ORDER — METOCLOPRAMIDE HCL 10 MG
1 TABLET ORAL
Qty: 120 | Refills: 0 | OUTPATIENT
Start: 2018-12-14 | End: 2019-01-12

## 2018-12-14 RX ORDER — ONDANSETRON 8 MG/1
1 TABLET, FILM COATED ORAL
Qty: 90 | Refills: 0 | OUTPATIENT
Start: 2018-12-14 | End: 2019-01-12

## 2018-12-14 RX ORDER — INFLUENZA VIRUS VACCINE 15; 15; 15; 15 UG/.5ML; UG/.5ML; UG/.5ML; UG/.5ML
0.5 SUSPENSION INTRAMUSCULAR ONCE
Qty: 0 | Refills: 0 | Status: DISCONTINUED | OUTPATIENT
Start: 2018-12-14 | End: 2018-12-14

## 2018-12-14 RX ADMIN — MORPHINE SULFATE 2 MILLIGRAM(S): 50 CAPSULE, EXTENDED RELEASE ORAL at 15:29

## 2018-12-14 RX ADMIN — SODIUM CHLORIDE 100 MILLILITER(S): 9 INJECTION INTRAMUSCULAR; INTRAVENOUS; SUBCUTANEOUS at 09:50

## 2018-12-14 RX ADMIN — ESCITALOPRAM OXALATE 5 MILLIGRAM(S): 10 TABLET, FILM COATED ORAL at 11:18

## 2018-12-14 RX ADMIN — MORPHINE SULFATE 2 MILLIGRAM(S): 50 CAPSULE, EXTENDED RELEASE ORAL at 09:41

## 2018-12-14 RX ADMIN — MORPHINE SULFATE 2 MILLIGRAM(S): 50 CAPSULE, EXTENDED RELEASE ORAL at 00:06

## 2018-12-14 RX ADMIN — Medication 10 MILLIGRAM(S): at 05:11

## 2018-12-14 RX ADMIN — PANTOPRAZOLE SODIUM 40 MILLIGRAM(S): 20 TABLET, DELAYED RELEASE ORAL at 05:10

## 2018-12-14 RX ADMIN — Medication 10: at 08:16

## 2018-12-14 RX ADMIN — MORPHINE SULFATE 2 MILLIGRAM(S): 50 CAPSULE, EXTENDED RELEASE ORAL at 08:58

## 2018-12-14 RX ADMIN — MORPHINE SULFATE 2 MILLIGRAM(S): 50 CAPSULE, EXTENDED RELEASE ORAL at 00:36

## 2018-12-14 RX ADMIN — INSULIN GLARGINE 15 UNIT(S): 100 INJECTION, SOLUTION SUBCUTANEOUS at 08:18

## 2018-12-14 RX ADMIN — Medication 2: at 11:17

## 2018-12-14 NOTE — DISCHARGE NOTE ADULT - CARE PLAN
Principal Discharge DX:	Intractable cyclical vomiting with nausea  Goal:	Resolving  Assessment and plan of treatment:	Follow up with GI as outpt  Secondary Diagnosis:	Uncomplicated asthma, unspecified asthma severity, unspecified whether persistent  Assessment and plan of treatment:	Stable  Continue medications as prescribed  Secondary Diagnosis:	Type 1 diabetes mellitus with other specified complication  Assessment and plan of treatment:	Continue medications as prescribed  Follow up with PMD  Low-carbohydrate diet  ADA Recipes - [email or web address]  Secondary Diagnosis:	Gastritis, presence of bleeding unspecified, unspecified chronicity, unspecified gastritis type  Goal:	s/p EGD 12/13  Assessment and plan of treatment:	Continue meds as prescribed  Follow up with GI as outpt  Secondary Diagnosis:	Gastroparesis diabeticorum  Assessment and plan of treatment:	Follow up with GI as outpt

## 2018-12-14 NOTE — DISCHARGE NOTE ADULT - PLAN OF CARE
Resolving Follow up with GI as outpt Stable  Continue medications as prescribed Continue medications as prescribed  Follow up with PMD  Low-carbohydrate diet  ADA Recipes - [email or web address] s/p EGD 12/13 Continue meds as prescribed  Follow up with GI as outpt

## 2018-12-14 NOTE — PROGRESS NOTE ADULT - ASSESSMENT
HPI:  39yo M w PMH of anxiety, asthma, DM1, gastritis, gastroparesis, presents to ED for eval of abd pain w vomiting & diarrhea. Symptoms started today. He has chronic diarrhea, no blood or melena. (11 Dec 2018 06:25)  ------------------------------------------ as Above -------------------------------------------------------------  The patient has had multiple visits for nausea, vomiting and abdominal pain. Patient was recently in [facility] for the same problem. He developed nausea [place] evening and the symptoms worsened. smoked marijuana [place]. Had a colonoscopy ~ 2 years ago. No recent EGDs. He has been told he has had gastritis, gastroparesis and UC but does not remember when he was diagnosed or how.   he denies ETOH, lactose and NSAIDs. Denies any biliary disease.    Was prescribed Reglan on discharge from [facility].   patient now feels absolutely fine now.   On prednisone for asthma  See labs / CT scan    1)Mid epigastric tenderness - PPI, hold NSAIDs, EGD   clear liquid diet and advance slowly  2)diarrhea - resolved  3)N/V see #1

## 2018-12-14 NOTE — DISCHARGE NOTE ADULT - PATIENT PORTAL LINK FT
You can access the [facility] Patient Portal, offered by [facility], by registering with the following website: [email or web address]

## 2018-12-14 NOTE — DISCHARGE NOTE ADULT - MEDICATION SUMMARY - MEDICATIONS TO STOP TAKING
I will STOP taking the medications listed below when I get home from the hospital:    predniSONE 20 mg oral tablet  -- 1 tab(s) by mouth 2 times a day   -- It is very important that you take or use this exactly as directed.  Do not skip doses or discontinue unless directed by your doctor.  Obtain medical advice before taking any non-prescription drugs as some may affect the action of this medication.  Take with food or milk.

## 2018-12-14 NOTE — DISCHARGE NOTE ADULT - CARE PROVIDER_API CALL
Michael Lay), Surgery  [address]  [place] [address]  Phone: [phone]  Fax: [phone]    Isiah Little), Medicine  [address] [place] [address]  Phone: [phone]  Fax: [phone] Michael Lay), Surgery  [address]  [place] [address]  Phone: [phone]  Fax: [phone]    Isiah Little), Medicine  [address] [place] [address]  Phone: [phone]  Fax: [phone]

## 2018-12-14 NOTE — PROGRESS NOTE ADULT - REASON FOR ADMISSION
Nausea, vomiting and abdominal pain

## 2018-12-14 NOTE — DISCHARGE NOTE ADULT - MEDICATION SUMMARY - MEDICATIONS TO TAKE
I will START or STAY ON the medications listed below when I get home from the hospital:    mesalamine 400 mg oral delayed release capsule  -- 2 cap(s) by mouth 3 times a day  -- Indication: For stomach     acetaminophen-oxyCODONE 325 mg-5 mg oral tablet  -- 1 tab(s) by mouth every 6 hours PRN for pain MDD:4  -- Caution federal law prohibits the transfer of this drug to any person other  than the person for whom it was prescribed.  May cause drowsiness.  Alcohol may intensify this effect.  Use care when operating dangerous machinery.  This prescription cannot be refilled.  This product contains acetaminophen.  Do not use  with any other product containing acetaminophen to prevent possible liver damage.  Using more of this medication than prescribed may cause serious breathing problems.    -- Indication: For pain     acetaminophen-oxyCODONE 325 mg-5 mg oral tablet  -- 1 tab(s) by mouth every 6 hours MDD:4  -- Caution federal law prohibits the transfer of this drug to any person other  than the person for whom it was prescribed.  May cause drowsiness.  Alcohol may intensify this effect.  Use care when operating dangerous machinery.  This prescription cannot be refilled.  This product contains acetaminophen.  Do not use  with any other product containing acetaminophen to prevent possible liver damage.  Using more of this medication than prescribed may cause serious breathing problems.    -- Indication: For pain     oxyCODONE-acetaminophen 5 mg-325 mg oral tablet  -- 1 tab(s) by mouth 3 times a day, As Needed -for moderate pain MDD:3  -- Caution federal law prohibits the transfer of this drug to any person other  than the person for whom it was prescribed.  May cause drowsiness.  Alcohol may intensify this effect.  Use care when operating dangerous machinery.  This prescription cannot be refilled.  This product contains acetaminophen.  Do not use  with any other product containing acetaminophen to prevent possible liver damage.  Using more of this medication than prescribed may cause serious breathing problems.    -- Indication: For pain     clonazePAM 0.5 mg oral tablet  -- 1 tab(s) by mouth 2 times a day, As needed, anxiety  -- Indication: For depression/nerves     escitalopram 5 mg oral tablet  -- 1 tab(s) by mouth once a day  -- Indication: For depression nerves     insulin lispro 100 units/mL injectable solution  -- 5 unit(s) subcutaneous 3 times a day (before meals) DISP ONE MONTH SUPPLY   -- Indication: For Type 1 diabetes mellitus with other specified complication    Lanjayjay Alarconostar Pen 100 units/mL subcutaneous solution  -- 15 unit(s) subcutaneous once a day (at bedtime)   -- Do not drink alcoholic beverages when taking this medication.  It is very important that you take or use this exactly as directed.  Do not skip doses or discontinue unless directed by your doctor.  Keep in refrigerator.  Do not freeze.    -- Indication: For Type 1 diabetes mellitus with other specified complication    metoclopramide 10 mg oral tablet, disintegrating  -- 1 tab(s) by mouth 3 to 4 times a day PRN for nausea  -- Check with your doctor before becoming pregnant.  Do not drink alcoholic beverages when taking this medication.  May cause drowsiness or dizziness.  Obtain medical advice before taking any non-prescription drugs as some may affect the action of this medication.  Take medication on an empty stomach 1 hour before or 2 to 3 hours after a meal unless otherwise directed by your doctor.  This drug may impair the ability to drive or operate machinery.  Use care until you become familiar with its effects.    -- Indication: For Gastroparesis diabeticorum    metoclopramide 10 mg oral tablet, disintegrating  -- 1 tab(s) by mouth 4 times a day (before meals and at bedtime)  -- Indication: For Gastroparesis diabeticorum    ondansetron 8 mg oral tablet, disintegrating  -- 1 tab(s) by mouth 3 times a day  -- Indication: For Gastroparesis diabeticorum    albuterol 90 mcg/inh inhalation powder  -- 2 puff(s) inhaled 2 times a day   -- For inhalation only.  It is very important that you take or use this exactly as directed.  Do not skip doses or discontinue unless directed by your doctor.  Obtain medical advice before taking any non-prescription drugs as some may affect the action of this medication.    -- Indication: For Uncomplicated asthma, unspecified asthma severity, unspecified whether persistent    Gas Aide 125 mg oral capsule  -- 1 cap(s) by mouth 4 times a day (after meals and at bedtime)   -- Indication: For Gastritis, presence of bleeding unspecified, unspecified chronicity, unspecified gastritis type    sucralfate 1 g oral tablet  -- 1 tab(s) by mouth 4 times a day  -- Indication: For Gastritis, presence of bleeding unspecified, unspecified chronicity, unspecified gastritis type    lactobacillus acidophilus oral capsule  -- 1 cap(s) by mouth once a day  -- Indication: For Gastritis, presence of bleeding unspecified, unspecified chronicity, unspecified gastritis type    pantoprazole 40 mg oral delayed release tablet  -- 1 tab(s) by mouth once a day   -- It is very important that you take or use this exactly as directed.  Do not skip doses or discontinue unless directed by your doctor.  Obtain medical advice before taking any non-prescription drugs as some may affect the action of this medication.  Swallow whole.  Do not crush.    -- Indication: For Gastritis, presence of bleeding unspecified, unspecified chronicity, unspecified gastritis type    Protonix 40 mg oral delayed release tablet  -- 1 tab(s) by mouth once a day   -- It is very important that you take or use this exactly as directed.  Do not skip doses or discontinue unless directed by your doctor.  Obtain medical advice before taking any non-prescription drugs as some may affect the action of this medication.  Swallow whole.  Do not crush.    -- Indication: For Gastritis, presence of bleeding unspecified, unspecified chronicity, unspecified gastritis type I will START or STAY ON the medications listed below when I get home from the hospital:    mesalamine 400 mg oral delayed release capsule  -- 2 cap(s) by mouth 3 times a day  -- Indication: For stomach     acetaminophen-oxyCODONE 325 mg-5 mg oral tablet  -- 1 tab(s) by mouth every 6 hours PRN for pain MDD:4  -- Caution federal law prohibits the transfer of this drug to any person other  than the person for whom it was prescribed.  May cause drowsiness.  Alcohol may intensify this effect.  Use care when operating dangerous machinery.  This prescription cannot be refilled.  This product contains acetaminophen.  Do not use  with any other product containing acetaminophen to prevent possible liver damage.  Using more of this medication than prescribed may cause serious breathing problems.    -- Indication: For pain    clonazePAM 0.5 mg oral tablet  -- 1 tab(s) by mouth 2 times a day, As needed, anxiety  -- Indication: For depression/nerves     escitalopram 5 mg oral tablet  -- 1 tab(s) by mouth once a day  -- Indication: For depression nerves     insulin lispro 100 units/mL injectable solution  -- 5 unit(s) subcutaneous 3 times a day (before meals) DISP ONE MONTH SUPPLY   -- Indication: For Type 1 diabetes mellitus with other specified complication    Lantus Solostar Pen 100 units/mL subcutaneous solution  -- 15 unit(s) subcutaneous once a day (at bedtime)   -- Do not drink alcoholic beverages when taking this medication.  It is very important that you take or use this exactly as directed.  Do not skip doses or discontinue unless directed by your doctor.  Keep in refrigerator.  Do not freeze.    -- Indication: For Type 1 diabetes mellitus with other specified complication    metoclopramide 10 mg oral tablet, disintegrating  -- 1 tab(s) by mouth 4 times a day (before meals and at bedtime)  -- Indication: For Gastroparesis diabeticorum    ondansetron 8 mg oral tablet, disintegrating  -- 1 tab(s) by mouth 3 times a day  -- Indication: For Gastroparesis diabeticorum    albuterol 90 mcg/inh inhalation powder  -- 2 puff(s) inhaled 2 times a day   -- For inhalation only.  It is very important that you take or use this exactly as directed.  Do not skip doses or discontinue unless directed by your doctor.  Obtain medical advice before taking any non-prescription drugs as some may affect the action of this medication.    -- Indication: For Uncomplicated asthma, unspecified asthma severity, unspecified whether persistent    Gas Aide 125 mg oral capsule  -- 1 cap(s) by mouth 4 times a day (after meals and at bedtime)   -- Indication: For Gastritis, presence of bleeding unspecified, unspecified chronicity, unspecified gastritis type    sucralfate 1 g oral tablet  -- 1 tab(s) by mouth 4 times a day  -- Indication: For Gastritis, presence of bleeding unspecified, unspecified chronicity, unspecified gastritis type    lactobacillus acidophilus oral capsule  -- 1 cap(s) by mouth once a day  -- Indication: For Gastritis, presence of bleeding unspecified, unspecified chronicity, unspecified gastritis type    pantoprazole 40 mg oral delayed release tablet  -- 1 tab(s) by mouth once a day   -- It is very important that you take or use this exactly as directed.  Do not skip doses or discontinue unless directed by your doctor.  Obtain medical advice before taking any non-prescription drugs as some may affect the action of this medication.  Swallow whole.  Do not crush.    -- Indication: For Gastritis, presence of bleeding unspecified, unspecified chronicity, unspecified gastritis type    Protonix 40 mg oral delayed release tablet  -- 1 tab(s) by mouth once a day   -- It is very important that you take or use this exactly as directed.  Do not skip doses or discontinue unless directed by your doctor.  Obtain medical advice before taking any non-prescription drugs as some may affect the action of this medication.  Swallow whole.  Do not crush.    -- Indication: For Gastritis, presence of bleeding unspecified, unspecified chronicity, unspecified gastritis type

## 2018-12-14 NOTE — DISCHARGE NOTE ADULT - SECONDARY DIAGNOSIS.
Uncomplicated asthma, unspecified asthma severity, unspecified whether persistent Type 1 diabetes mellitus with other specified complication Gastritis, presence of bleeding unspecified, unspecified chronicity, unspecified gastritis type Gastroparesis diabeticorum

## 2018-12-14 NOTE — DISCHARGE NOTE ADULT - HOSPITAL COURSE
History and Physical:   Source of Information	Chart(s), Patient	  Outpatient Providers	Dr Foster	     Language:  · Patient/Family of Limited English Proficiency	No	       History of Present Illness:  Reason for Admission: Nausea, vomiting and abdominal pain	  History of Present Illness: 	  37yo M w PMH of anxiety, asthma, DM1, gastritis, gastroparesis, presents to ED for eval of abd pain w vomiting & diarrhea. Symptoms started today. He has chronic diarrhea, no blood or melena.  PHYSICAL EXAM:  GENERAL: NAD, well-groomed, well-developed  HEAD:  Atraumatic, Normocephalic  EYES: EOMI, PERRLA, conjunctiva and sclera clear  NECK: Supple, No JVD, Normal thyroid  NERVOUS SYSTEM:  Alert & Oriented X3, Good concentration; CHEST/LUNG: Clear to percussion bilaterally; No rales, rhonchi, wheezing, or rubs  HEART: Regular rate and rhythm; No murmurs, rubs, or gallops  ABDOMEN: Soft, Nontender, Nondistended; Bowel sounds present  EXTREMITIES:  2+ Peripheral Pulses, No clubbing, cyanosis, or edema  LYMPH: No lymphadenopathy noted  SKIN: No rashes or lesions  HPI:  37yo M w PMH of anxiety, asthma, DM1, gastritis, gastroparesis, presents to ED for eval of abd pain w vomiting & diarrhea. Symptoms started today. He has chronic diarrhea, no blood or melena. (11 Dec 2018 06:25)  ------------------------------------------ as Above -------------------------------------------------------------  The patient has had multiple visits for nausea, vomiting and abdominal pain. Patient was recently in [facility] for the same problem. He developed nausea Sunday evening and the symptoms worsened. smoked marijuana Sunday. Had a colonoscopy ~ 2 years ago. No recent EGDs. He has been told he has had gastritis, gastroparesis and UC but does not remember when he was diagnosed or how.   he denies ETOH, lactose and NSAIDs. Denies any biliary disease.    Was prescribed Reglan on discharge from [facility].   patient now feels absolutely fine now.   On prednisone for asthma  See labs / CT scan    1)Mid epigastric tenderness - PPI, hold NSAIDs, EGD completed  tolerating diet 2)diarrhea - resolved  3)N/V see #1     Problem/Plan - 1:  ·  Problem: Abdominal pain, unspecified abdominal location.  Plan: Without  tenderness clinically much better. stable from GI point of view for D/C.         45 minutes spent on this discharge

## 2018-12-14 NOTE — PROGRESS NOTE ADULT - SUBJECTIVE AND OBJECTIVE BOX
Patient is a 38y old  Male who presents with a chief complaint of Nausea, vomiting and abdominal pain (14 Dec 2018 12:03)      HPI:  39yo M w PMH of anxiety, asthma, DM1, gastritis, gastroparesis, presents to ED for eval of abd pain w vomiting & diarrhea. Symptoms started today. He has chronic diarrhea, no blood or melena. (11 Dec 2018 06:25)      INTERVAL HPI/OVERNIGHT EVENTS:  patient seen earlier today.  feels weak. tolerated diet. The patient denies melena, hematochezia, hematemesis, nausea, vomiting, abdominal pain, constipation, diarrhea, or change in bowel movements     MEDICATIONS  (STANDING):  dextrose 5%. 1000 milliLiter(s) (50 mL/Hr) IV Continuous <Continuous>  dextrose 50% Injectable 12.5 Gram(s) IV Push once  dextrose 50% Injectable 25 Gram(s) IV Push once  dextrose 50% Injectable 25 Gram(s) IV Push once  escitalopram 5 milliGRAM(s) Oral daily  heparin  Injectable 5000 Unit(s) SubCutaneous every 12 hours  insulin glargine Injectable (LANTUS) 15 Unit(s) SubCutaneous every morning  insulin lispro (HumaLOG) corrective regimen sliding scale   SubCutaneous three times a day before meals  insulin lispro (HumaLOG) corrective regimen sliding scale   SubCutaneous at bedtime  metoclopramide Injectable 10 milliGRAM(s) IV Push every 12 hours  pantoprazole  Injectable 40 milliGRAM(s) IV Push every 12 hours  sodium chloride 0.9%. 1000 milliLiter(s) (100 mL/Hr) IV Continuous <Continuous>    MEDICATIONS  (PRN):  ALBUTerol    90 MICROgram(s) HFA Inhaler 2 Puff(s) Inhalation every 6 hours PRN Shortness of Breath and/or Wheezing  clonazePAM Tablet 0.5 milliGRAM(s) Oral two times a day PRN anxiety  dextrose 40% Gel 15 Gram(s) Oral once PRN Blood Glucose LESS THAN 70 milliGRAM(s)/deciliter  glucagon  Injectable 1 milliGRAM(s) IntraMuscular once PRN Glucose LESS THAN 70 milligrams/deciliter  morphine  - Injectable 2 milliGRAM(s) IV Push every 6 hours PRN Severe Pain (7 - 10)  ondansetron Injectable 4 milliGRAM(s) IV Push every 6 hours PRN Nausea and/or Vomiting      FAMILY HISTORY:  Family history of diabetes mellitus (DM) (Father, Mother, Grandparent)  Family history of diabetes mellitus (Father, Mother)      Allergies    No Known Allergies    Intolerances        PMH/PSH:  Uncomplicated asthma, unspecified asthma severity, unspecified whether persistent  Gastritis, presence of bleeding unspecified, unspecified chronicity, unspecified gastritis type  Controlled diabetes mellitus type 1 without complications  Anxiety  Gastritis  Asthma  DM type 1 (diabetes mellitus, type 1)  History of cholecystectomy  S/P cholecystectomy        REVIEW OF SYSTEMS:  CONSTITUTIONAL: No fever, weight loss, or fatigue  EYES: No eye pain, visual disturbances, or discharge  ENMT:  No difficulty hearing, tinnitus, vertigo; No sinus or throat pain  NECK: No pain or stiffness  BREASTS: No pain, masses, or nipple discharge  RESPIRATORY: No cough, wheezing, chills or hemoptysis; No shortness of breath  CARDIOVASCULAR: No chest pain, palpitations, dizziness, or leg swelling  GASTROINTESTINAL: see above  GENITOURINARY: No dysuria, frequency, hematuria, or incontinence  NEUROLOGICAL: No headaches, memory loss, loss of strength, numbness, or tremors  SKIN: No itching, burning, rashes, or lesions   LYMPH NODES: No enlarged glands  ENDOCRINE: No heat or cold intolerance; No hair loss  MUSCULOSKELETAL: No joint pain or swelling; No muscle, back, or extremity pain  PSYCHIATRIC: No depression, anxiety, mood swings, or difficulty sleeping  HEME/LYMPH: No easy bruising, or bleeding gums  ALLERGY AND IMMUNOLOGIC: No hives or eczema    Vital Signs Last 24 Hrs  T(C): 36.7 (14 Dec 2018 11:05), Max: 37.2 (13 Dec 2018 23:35)  T(F): 98 (14 Dec 2018 11:05), Max: 98.9 (13 Dec 2018 23:35)  HR: 67 (14 Dec 2018 11:05) (65 - 68)  BP: 140/84 (14 Dec 2018 11:05) (124/57 - 140/84)  BP(mean): --  RR: 17 (14 Dec 2018 11:05) (17 - 17)  SpO2: 95% (14 Dec 2018 11:05) (95% - 99%)    PHYSICAL EXAM:  GENERAL: NAD, well-groomed, well-developed  HEAD:  Atraumatic, Normocephalic  EYES: EOMI, PERRLA, conjunctiva and sclera clear  NECK: Supple, No JVD, Normal thyroid  NERVOUS SYSTEM:  Alert & Oriented X3, Good concentration; CHEST/LUNG: Clear to percussion bilaterally; No rales, rhonchi, wheezing, or rubs  HEART: Regular rate and rhythm; No murmurs, rubs, or gallops  ABDOMEN: Soft, Nontender, Nondistended; Bowel sounds present  EXTREMITIES:  2+ Peripheral Pulses, No clubbing, cyanosis, or edema  LYMPH: No lymphadenopathy noted  SKIN: No rashes or lesions    LAB  12-10 @ 20:11  amylase --   lipase 55                           12.3   9.08  )-----------( 327      ( 14 Dec 2018 06:19 )             36.4       CBC:  12-14 @ 06:19  WBC 9.08   Hgb 12.3   Hct 36.4   Plts 327  MCV 82.2  12-13 @ 07:36  WBC 9.50   Hgb 13.0   Hct 39.6   Plts 359  MCV 85.3  12-12 @ 07:16  WBC 12.38   Hgb 11.8   Hct 35.4   Plts 352  MCV 84.7  12-11 @ 10:05  WBC 14.07   Hgb 13.6   Hct 42.3   Plts 442  MCV 86.3  12-10 @ 20:11  WBC 18.09   Hgb 15.4   Hct 46.4   Plts 448  MCV 83.6      Chemistry:  12-14 @ 06:19  Na+ 136  K+ 4.3  Cl- 101  CO2 26  BUN 7  Cr 0.90     12-13 @ 07:36  Na+ 137  K+ 3.2  Cl- 99  CO2 25  BUN 9  Cr 1.16     12-12 @ 07:16  Na+ 139  K+ 4.1  Cl- 106  CO2 21  BUN 19  Cr 1.22     12-11 @ 10:05  Na+ 138  K+ 5.4  Cl- 104  CO2 13  BUN 25  Cr 1.39     12-10 @ 20:11  Na+ 138  K+ 4.1  Cl- 100  CO2 27  BUN 13  Cr 1.16         Glucose, Serum: 352 mg/dL (12-14 @ 06:19)  Glucose, Serum: 240 mg/dL (12-13 @ 07:36)  Glucose, Serum: 321 mg/dL (12-12 @ 07:16)  Glucose, Serum: 424 mg/dL (12-11 @ 10:05)  Glucose, Serum: 326 mg/dL (12-10 @ 20:11)      14 Dec 2018 06:19    136    |  101    |  7      ----------------------------<  352    4.3     |  26     |  0.90   13 Dec 2018 07:36    137    |  99     |  9      ----------------------------<  240    3.2     |  25     |  1.16   12 Dec 2018 07:16    139    |  106    |  19     ----------------------------<  321    4.1     |  21     |  1.22   11 Dec 2018 10:05    138    |  104    |  25     ----------------------------<  424    5.4     |  13     |  1.39   10 Dec 2018 20:11    138    |  100    |  13     ----------------------------<  326    4.1     |  27     |  1.16     Ca    8.2        14 Dec 2018 06:19  Ca    7.9        13 Dec 2018 07:36  Ca    7.4        12 Dec 2018 07:16  Ca    8.6        11 Dec 2018 10:05  Ca    9.1        10 Dec 2018 20:11  Phos  2.1       13 Dec 2018 07:36  Mg     2.2       13 Dec 2018 07:36    TPro  8.1    /  Alb  3.9    /  TBili  0.5    /  DBili  x      /  AST  17     /  ALT  22     /  AlkPhos  123    10 Dec 2018 20:11              CAPILLARY BLOOD GLUCOSE      POCT Blood Glucose.: 183 mg/dL (14 Dec 2018 11:14)  POCT Blood Glucose.: 387 mg/dL (14 Dec 2018 08:14)  POCT Blood Glucose.: 72 mg/dL (13 Dec 2018 21:11)  POCT Blood Glucose.: 216 mg/dL (13 Dec 2018 16:06)          RADIOLOGY & ADDITIONAL TESTS:    Imaging Personally Reviewed:  [ ] YES  [ ] NO    Consultant(s) Notes Reviewed:  [ ] YES  [ ] NO    Care Discussed with Consultants/Other Providers [ ] YES  [ ] NO

## 2018-12-15 ENCOUNTER — EMERGENCY (EMERGENCY)
Facility: HOSPITAL | Age: 38
LOS: 0 days | Discharge: HOME | End: 2018-12-16
Attending: EMERGENCY MEDICINE
Payer: MEDICAID

## 2018-12-15 VITALS
DIASTOLIC BLOOD PRESSURE: 113 MMHG | HEART RATE: 91 BPM | TEMPERATURE: 98 F | WEIGHT: 181 LBS | SYSTOLIC BLOOD PRESSURE: 165 MMHG | RESPIRATION RATE: 17 BRPM | OXYGEN SATURATION: 95 %

## 2018-12-15 VITALS
TEMPERATURE: 99 F | HEART RATE: 82 BPM | DIASTOLIC BLOOD PRESSURE: 82 MMHG | RESPIRATION RATE: 18 BRPM | SYSTOLIC BLOOD PRESSURE: 125 MMHG | OXYGEN SATURATION: 97 %

## 2018-12-15 DIAGNOSIS — Z79.4 LONG TERM (CURRENT) USE OF INSULIN: ICD-10-CM

## 2018-12-15 DIAGNOSIS — Z79.51 LONG TERM (CURRENT) USE OF INHALED STEROIDS: ICD-10-CM

## 2018-12-15 DIAGNOSIS — R10.9 UNSPECIFIED ABDOMINAL PAIN: ICD-10-CM

## 2018-12-15 DIAGNOSIS — K29.70 GASTRITIS, UNSPECIFIED, WITHOUT BLEEDING: ICD-10-CM

## 2018-12-15 DIAGNOSIS — Z90.49 ACQUIRED ABSENCE OF OTHER SPECIFIED PARTS OF DIGESTIVE TRACT: Chronic | ICD-10-CM

## 2018-12-15 DIAGNOSIS — F41.9 ANXIETY DISORDER, UNSPECIFIED: ICD-10-CM

## 2018-12-15 DIAGNOSIS — J45.909 UNSPECIFIED ASTHMA, UNCOMPLICATED: ICD-10-CM

## 2018-12-15 DIAGNOSIS — R11.10 VOMITING, UNSPECIFIED: ICD-10-CM

## 2018-12-15 DIAGNOSIS — E10.9 TYPE 1 DIABETES MELLITUS WITHOUT COMPLICATIONS: ICD-10-CM

## 2018-12-15 LAB
ACETONE SERPL-MCNC: ABNORMAL
ALBUMIN SERPL ELPH-MCNC: 4.1 G/DL — SIGNIFICANT CHANGE UP (ref 3.3–5)
ALP SERPL-CCNC: 117 U/L — SIGNIFICANT CHANGE UP (ref 40–120)
ALT FLD-CCNC: 31 U/L — SIGNIFICANT CHANGE UP (ref 12–78)
ANION GAP SERPL CALC-SCNC: 13 MMOL/L — SIGNIFICANT CHANGE UP (ref 5–17)
APPEARANCE UR: CLEAR — SIGNIFICANT CHANGE UP
AST SERPL-CCNC: 21 U/L — SIGNIFICANT CHANGE UP (ref 15–37)
BASOPHILS # BLD AUTO: 0.08 K/UL — SIGNIFICANT CHANGE UP (ref 0–0.2)
BASOPHILS NFR BLD AUTO: 0.5 % — SIGNIFICANT CHANGE UP (ref 0–2)
BILIRUB SERPL-MCNC: 0.6 MG/DL — SIGNIFICANT CHANGE UP (ref 0.2–1.2)
BILIRUB UR-MCNC: NEGATIVE — SIGNIFICANT CHANGE UP
BUN SERPL-MCNC: 13 MG/DL — SIGNIFICANT CHANGE UP (ref 7–23)
CALCIUM SERPL-MCNC: 9.5 MG/DL — SIGNIFICANT CHANGE UP (ref 8.5–10.1)
CHLORIDE SERPL-SCNC: 97 MMOL/L — SIGNIFICANT CHANGE UP (ref 96–108)
CO2 SERPL-SCNC: 27 MMOL/L — SIGNIFICANT CHANGE UP (ref 22–31)
COLOR SPEC: YELLOW — SIGNIFICANT CHANGE UP
CREAT SERPL-MCNC: 1.32 MG/DL — HIGH (ref 0.5–1.3)
DIFF PNL FLD: NEGATIVE — SIGNIFICANT CHANGE UP
EOSINOPHIL # BLD AUTO: 0.24 K/UL — SIGNIFICANT CHANGE UP (ref 0–0.5)
EOSINOPHIL NFR BLD AUTO: 1.5 % — SIGNIFICANT CHANGE UP (ref 0–6)
GLUCOSE BLDC GLUCOMTR-MCNC: 375 MG/DL — HIGH (ref 70–99)
GLUCOSE BLDC GLUCOMTR-MCNC: 417 MG/DL — HIGH (ref 70–99)
GLUCOSE SERPL-MCNC: 466 MG/DL — CRITICAL HIGH (ref 70–99)
GLUCOSE UR QL: 1000 MG/DL
HCT VFR BLD CALC: 46.3 % — SIGNIFICANT CHANGE UP (ref 39–50)
HGB BLD-MCNC: 15.7 G/DL — SIGNIFICANT CHANGE UP (ref 13–17)
IMM GRANULOCYTES NFR BLD AUTO: 0.3 % — SIGNIFICANT CHANGE UP (ref 0–1.5)
KETONES UR-MCNC: ABNORMAL
LEUKOCYTE ESTERASE UR-ACNC: NEGATIVE — SIGNIFICANT CHANGE UP
LIDOCAIN IGE QN: 41 U/L — LOW (ref 73–393)
LYMPHOCYTES # BLD AUTO: 1.49 K/UL — SIGNIFICANT CHANGE UP (ref 1–3.3)
LYMPHOCYTES # BLD AUTO: 9.3 % — LOW (ref 13–44)
MCHC RBC-ENTMCNC: 27.9 PG — SIGNIFICANT CHANGE UP (ref 27–34)
MCHC RBC-ENTMCNC: 33.9 GM/DL — SIGNIFICANT CHANGE UP (ref 32–36)
MCV RBC AUTO: 82.2 FL — SIGNIFICANT CHANGE UP (ref 80–100)
MONOCYTES # BLD AUTO: 0.62 K/UL — SIGNIFICANT CHANGE UP (ref 0–0.9)
MONOCYTES NFR BLD AUTO: 3.9 % — SIGNIFICANT CHANGE UP (ref 2–14)
NEUTROPHILS # BLD AUTO: 13.6 K/UL — HIGH (ref 1.8–7.4)
NEUTROPHILS NFR BLD AUTO: 84.5 % — HIGH (ref 43–77)
NITRITE UR-MCNC: NEGATIVE — SIGNIFICANT CHANGE UP
PH UR: 6.5 — SIGNIFICANT CHANGE UP (ref 5–8)
PLATELET # BLD AUTO: 431 K/UL — HIGH (ref 150–400)
POTASSIUM SERPL-MCNC: 4.3 MMOL/L — SIGNIFICANT CHANGE UP (ref 3.5–5.3)
POTASSIUM SERPL-SCNC: 4.3 MMOL/L — SIGNIFICANT CHANGE UP (ref 3.5–5.3)
PROT SERPL-MCNC: 8.5 GM/DL — HIGH (ref 6–8.3)
PROT UR-MCNC: NEGATIVE MG/DL — SIGNIFICANT CHANGE UP
RBC # BLD: 5.63 M/UL — SIGNIFICANT CHANGE UP (ref 4.2–5.8)
RBC # FLD: 13 % — SIGNIFICANT CHANGE UP (ref 10.3–14.5)
SODIUM SERPL-SCNC: 137 MMOL/L — SIGNIFICANT CHANGE UP (ref 135–145)
SP GR SPEC: 1.01 — SIGNIFICANT CHANGE UP (ref 1.01–1.02)
UROBILINOGEN FLD QL: NEGATIVE MG/DL — SIGNIFICANT CHANGE UP
WBC # BLD: 16.08 K/UL — HIGH (ref 3.8–10.5)
WBC # FLD AUTO: 16.08 K/UL — HIGH (ref 3.8–10.5)

## 2018-12-15 PROCEDURE — 99284 EMERGENCY DEPT VISIT MOD MDM: CPT

## 2018-12-15 RX ORDER — INSULIN HUMAN 100 [IU]/ML
8 INJECTION, SOLUTION SUBCUTANEOUS ONCE
Qty: 0 | Refills: 0 | Status: COMPLETED | OUTPATIENT
Start: 2018-12-15 | End: 2018-12-15

## 2018-12-15 RX ORDER — ONDANSETRON 8 MG/1
4 TABLET, FILM COATED ORAL ONCE
Qty: 0 | Refills: 0 | Status: COMPLETED | OUTPATIENT
Start: 2018-12-15 | End: 2018-12-15

## 2018-12-15 RX ORDER — HALOPERIDOL DECANOATE 100 MG/ML
2.5 INJECTION INTRAMUSCULAR ONCE
Qty: 0 | Refills: 0 | Status: COMPLETED | OUTPATIENT
Start: 2018-12-15 | End: 2018-12-15

## 2018-12-15 RX ORDER — MORPHINE SULFATE 50 MG/1
4 CAPSULE, EXTENDED RELEASE ORAL ONCE
Qty: 0 | Refills: 0 | Status: DISCONTINUED | OUTPATIENT
Start: 2018-12-15 | End: 2018-12-15

## 2018-12-15 RX ORDER — SODIUM CHLORIDE 9 MG/ML
2000 INJECTION INTRAMUSCULAR; INTRAVENOUS; SUBCUTANEOUS ONCE
Qty: 0 | Refills: 0 | Status: COMPLETED | OUTPATIENT
Start: 2018-12-15 | End: 2018-12-15

## 2018-12-15 RX ORDER — FAMOTIDINE 10 MG/ML
20 INJECTION INTRAVENOUS ONCE
Qty: 0 | Refills: 0 | Status: COMPLETED | OUTPATIENT
Start: 2018-12-15 | End: 2018-12-15

## 2018-12-15 RX ORDER — SODIUM CHLORIDE 9 MG/ML
1000 INJECTION INTRAMUSCULAR; INTRAVENOUS; SUBCUTANEOUS ONCE
Qty: 0 | Refills: 0 | Status: COMPLETED | OUTPATIENT
Start: 2018-12-15 | End: 2018-12-15

## 2018-12-15 RX ORDER — INSULIN HUMAN 100 [IU]/ML
10 INJECTION, SOLUTION SUBCUTANEOUS ONCE
Qty: 0 | Refills: 0 | Status: COMPLETED | OUTPATIENT
Start: 2018-12-15 | End: 2018-12-15

## 2018-12-15 RX ADMIN — ONDANSETRON 4 MILLIGRAM(S): 8 TABLET, FILM COATED ORAL at 18:15

## 2018-12-15 RX ADMIN — SODIUM CHLORIDE 2000 MILLILITER(S): 9 INJECTION INTRAMUSCULAR; INTRAVENOUS; SUBCUTANEOUS at 19:45

## 2018-12-15 RX ADMIN — SODIUM CHLORIDE 2000 MILLILITER(S): 9 INJECTION INTRAMUSCULAR; INTRAVENOUS; SUBCUTANEOUS at 18:14

## 2018-12-15 RX ADMIN — SODIUM CHLORIDE 1000 MILLILITER(S): 9 INJECTION INTRAMUSCULAR; INTRAVENOUS; SUBCUTANEOUS at 23:59

## 2018-12-15 RX ADMIN — INSULIN HUMAN 10 UNIT(S): 100 INJECTION, SOLUTION SUBCUTANEOUS at 18:27

## 2018-12-15 RX ADMIN — FAMOTIDINE 20 MILLIGRAM(S): 10 INJECTION INTRAVENOUS at 18:15

## 2018-12-15 RX ADMIN — HALOPERIDOL DECANOATE 2.5 MILLIGRAM(S): 100 INJECTION INTRAMUSCULAR at 18:15

## 2018-12-15 RX ADMIN — MORPHINE SULFATE 4 MILLIGRAM(S): 50 CAPSULE, EXTENDED RELEASE ORAL at 18:15

## 2018-12-15 RX ADMIN — SODIUM CHLORIDE 2000 MILLILITER(S): 9 INJECTION INTRAMUSCULAR; INTRAVENOUS; SUBCUTANEOUS at 22:28

## 2018-12-15 RX ADMIN — ONDANSETRON 4 MILLIGRAM(S): 8 TABLET, FILM COATED ORAL at 19:43

## 2018-12-15 RX ADMIN — INSULIN HUMAN 8 UNIT(S): 100 INJECTION, SOLUTION SUBCUTANEOUS at 23:58

## 2018-12-15 RX ADMIN — HALOPERIDOL DECANOATE 2.5 MILLIGRAM(S): 100 INJECTION INTRAMUSCULAR at 19:43

## 2018-12-15 NOTE — ED PROVIDER NOTE - OBJECTIVE STATEMENT
39 y/o male hx dm, gastroparesis, gastritis, multiple admission for same with d/c today c/o typical generalized abd pain and vomiting, unable to keep anything down. Had chronic gastritis on egd done last week as inpt. Taking percocet and reglan at home but vomiting it up. No f/c/urinary symptoms, cp, sob. Denies other symptoms. Nl bm yesterday, passes nl gas. No abd surgeries.     ROS: No fever/chills. No eye pain/changes in vision, No ear pain/sore throat/dysphagia, No chest pain/palpitations. No SOB/cough/. no black/bloody bm. No dysuria/frequency/discharge, No headache. No Dizziness.    No rashes or breaks in skin. No numbness/tingling/weakness.

## 2018-12-15 NOTE — ED PROVIDER NOTE - PHYSICAL EXAMINATION
Gen: uncomfortable, yellow emesis in  basin.   HEENT: Mucous membranes moist, pink conjunctivae, EOMI  CV: RRR, nl s1/s2.  Resp: CTAB, normal rate and effort  GI: abd soft, no focal tenderness, no rebound/rigidity.   : No CVAT  Neuro: A&O x 3, moving all 4 extremities  MSK: No spine or joint tenderness to palpation  Skin: No rashes. intact and perfused.

## 2018-12-15 NOTE — ED PROVIDER NOTE - PROGRESS NOTE DETAILS
Tammy: pt with some improvement but still with some vomiting. wbc likely from vomiting. signed out to night team pending Tammy: pt with some improvement but still with some vomiting. wbc likely from vomiting. signed out to night team pending reassessment admission vs d/c if improved. pt signed out to me from dr rubin, pt presented with vomiting, ivf, haldol and zofran given for gastritis, pending re assessment pt states his sugar is high and does not feel well because of it, ivf and insulin ordered, will re assess

## 2018-12-15 NOTE — ED ADULT NURSE REASSESSMENT NOTE - NS ED NURSE REASSESS COMMENT FT1
Pt is A&O X3, no acute distress. Needs attended to, IV meds administered, fluids running. Will reassess

## 2018-12-16 LAB
CULTURE RESULTS: NO GROWTH — SIGNIFICANT CHANGE UP
GLUCOSE BLDC GLUCOMTR-MCNC: 260 MG/DL — HIGH (ref 70–99)
SPECIMEN SOURCE: SIGNIFICANT CHANGE UP

## 2018-12-16 RX ORDER — INSULIN ASPART 100 [IU]/ML
10 INJECTION, SOLUTION SUBCUTANEOUS
Qty: 1 | Refills: 0 | OUTPATIENT
Start: 2018-12-16 | End: 2019-01-14

## 2018-12-16 RX ORDER — INSULIN HUMAN 100 [IU]/ML
4 INJECTION, SOLUTION SUBCUTANEOUS ONCE
Qty: 0 | Refills: 0 | Status: COMPLETED | OUTPATIENT
Start: 2018-12-16 | End: 2018-12-16

## 2018-12-16 RX ORDER — INSULIN GLARGINE 100 [IU]/ML
30 INJECTION, SOLUTION SUBCUTANEOUS
Qty: 1 | Refills: 0 | OUTPATIENT
Start: 2018-12-16 | End: 2019-01-14

## 2018-12-16 RX ADMIN — INSULIN HUMAN 4 UNIT(S): 100 INJECTION, SOLUTION SUBCUTANEOUS at 01:44

## 2018-12-18 DIAGNOSIS — K31.84 GASTROPARESIS: ICD-10-CM

## 2018-12-18 DIAGNOSIS — K31.89 OTHER DISEASES OF STOMACH AND DUODENUM: ICD-10-CM

## 2018-12-18 DIAGNOSIS — E10.43 TYPE 1 DIABETES MELLITUS WITH DIABETIC AUTONOMIC (POLY)NEUROPATHY: ICD-10-CM

## 2018-12-18 DIAGNOSIS — R10.9 UNSPECIFIED ABDOMINAL PAIN: ICD-10-CM

## 2018-12-18 DIAGNOSIS — R11.2 NAUSEA WITH VOMITING, UNSPECIFIED: ICD-10-CM

## 2018-12-18 DIAGNOSIS — J45.909 UNSPECIFIED ASTHMA, UNCOMPLICATED: ICD-10-CM

## 2018-12-18 DIAGNOSIS — K27.9 PEPTIC ULCER, SITE UNSPECIFIED, UNSPECIFIED AS ACUTE OR CHRONIC, WITHOUT HEMORRHAGE OR PERFORATION: ICD-10-CM

## 2018-12-18 DIAGNOSIS — F41.8 OTHER SPECIFIED ANXIETY DISORDERS: ICD-10-CM

## 2018-12-18 DIAGNOSIS — K29.70 GASTRITIS, UNSPECIFIED, WITHOUT BLEEDING: ICD-10-CM

## 2018-12-20 ENCOUNTER — EMERGENCY (EMERGENCY)
Facility: HOSPITAL | Age: 38
LOS: 0 days | Discharge: HOME | End: 2018-12-21
Attending: EMERGENCY MEDICINE
Payer: MEDICAID

## 2018-12-20 VITALS
DIASTOLIC BLOOD PRESSURE: 95 MMHG | SYSTOLIC BLOOD PRESSURE: 160 MMHG | HEIGHT: 69 IN | WEIGHT: 179.9 LBS | RESPIRATION RATE: 17 BRPM | TEMPERATURE: 99 F | HEART RATE: 69 BPM | OXYGEN SATURATION: 97 %

## 2018-12-20 DIAGNOSIS — J45.909 UNSPECIFIED ASTHMA, UNCOMPLICATED: ICD-10-CM

## 2018-12-20 DIAGNOSIS — R11.10 VOMITING, UNSPECIFIED: ICD-10-CM

## 2018-12-20 DIAGNOSIS — Z79.4 LONG TERM (CURRENT) USE OF INSULIN: ICD-10-CM

## 2018-12-20 DIAGNOSIS — E10.9 TYPE 1 DIABETES MELLITUS WITHOUT COMPLICATIONS: ICD-10-CM

## 2018-12-20 DIAGNOSIS — F41.9 ANXIETY DISORDER, UNSPECIFIED: ICD-10-CM

## 2018-12-20 DIAGNOSIS — K52.9 NONINFECTIVE GASTROENTERITIS AND COLITIS, UNSPECIFIED: ICD-10-CM

## 2018-12-20 DIAGNOSIS — R10.9 UNSPECIFIED ABDOMINAL PAIN: ICD-10-CM

## 2018-12-20 DIAGNOSIS — Z90.49 ACQUIRED ABSENCE OF OTHER SPECIFIED PARTS OF DIGESTIVE TRACT: ICD-10-CM

## 2018-12-20 DIAGNOSIS — Z90.49 ACQUIRED ABSENCE OF OTHER SPECIFIED PARTS OF DIGESTIVE TRACT: Chronic | ICD-10-CM

## 2018-12-20 DIAGNOSIS — K29.70 GASTRITIS, UNSPECIFIED, WITHOUT BLEEDING: ICD-10-CM

## 2018-12-20 LAB
ALBUMIN SERPL ELPH-MCNC: 3.9 G/DL — SIGNIFICANT CHANGE UP (ref 3.3–5)
ALP SERPL-CCNC: 99 U/L — SIGNIFICANT CHANGE UP (ref 40–120)
ALT FLD-CCNC: 33 U/L — SIGNIFICANT CHANGE UP (ref 12–78)
ANION GAP SERPL CALC-SCNC: 9 MMOL/L — SIGNIFICANT CHANGE UP (ref 5–17)
AST SERPL-CCNC: 23 U/L — SIGNIFICANT CHANGE UP (ref 15–37)
BASOPHILS # BLD AUTO: 0.05 K/UL — SIGNIFICANT CHANGE UP (ref 0–0.2)
BASOPHILS NFR BLD AUTO: 0.4 % — SIGNIFICANT CHANGE UP (ref 0–2)
BILIRUB SERPL-MCNC: 0.5 MG/DL — SIGNIFICANT CHANGE UP (ref 0.2–1.2)
BUN SERPL-MCNC: 14 MG/DL — SIGNIFICANT CHANGE UP (ref 7–23)
CALCIUM SERPL-MCNC: 9.2 MG/DL — SIGNIFICANT CHANGE UP (ref 8.5–10.1)
CHLORIDE SERPL-SCNC: 101 MMOL/L — SIGNIFICANT CHANGE UP (ref 96–108)
CO2 SERPL-SCNC: 29 MMOL/L — SIGNIFICANT CHANGE UP (ref 22–31)
CREAT SERPL-MCNC: 1.19 MG/DL — SIGNIFICANT CHANGE UP (ref 0.5–1.3)
EOSINOPHIL # BLD AUTO: 0.15 K/UL — SIGNIFICANT CHANGE UP (ref 0–0.5)
EOSINOPHIL NFR BLD AUTO: 1.3 % — SIGNIFICANT CHANGE UP (ref 0–6)
GLUCOSE SERPL-MCNC: 299 MG/DL — HIGH (ref 70–99)
HCG SERPL-ACNC: <1 MIU/ML — HIGH
HCT VFR BLD CALC: 41.7 % — SIGNIFICANT CHANGE UP (ref 39–50)
HGB BLD-MCNC: 14.3 G/DL — SIGNIFICANT CHANGE UP (ref 13–17)
IMM GRANULOCYTES NFR BLD AUTO: 0.2 % — SIGNIFICANT CHANGE UP (ref 0–1.5)
LACTATE SERPL-SCNC: 3.2 MMOL/L — HIGH (ref 0.7–2)
LIDOCAIN IGE QN: 52 U/L — LOW (ref 73–393)
LYMPHOCYTES # BLD AUTO: 1.55 K/UL — SIGNIFICANT CHANGE UP (ref 1–3.3)
LYMPHOCYTES # BLD AUTO: 13.7 % — SIGNIFICANT CHANGE UP (ref 13–44)
MCHC RBC-ENTMCNC: 28.5 PG — SIGNIFICANT CHANGE UP (ref 27–34)
MCHC RBC-ENTMCNC: 34.3 GM/DL — SIGNIFICANT CHANGE UP (ref 32–36)
MCV RBC AUTO: 83.2 FL — SIGNIFICANT CHANGE UP (ref 80–100)
MONOCYTES # BLD AUTO: 0.41 K/UL — SIGNIFICANT CHANGE UP (ref 0–0.9)
MONOCYTES NFR BLD AUTO: 3.6 % — SIGNIFICANT CHANGE UP (ref 2–14)
NEUTROPHILS # BLD AUTO: 9.17 K/UL — HIGH (ref 1.8–7.4)
NEUTROPHILS NFR BLD AUTO: 80.8 % — HIGH (ref 43–77)
NRBC # BLD: 0 /100 WBCS — SIGNIFICANT CHANGE UP (ref 0–0)
PLATELET # BLD AUTO: 410 K/UL — HIGH (ref 150–400)
POTASSIUM SERPL-MCNC: 4.5 MMOL/L — SIGNIFICANT CHANGE UP (ref 3.5–5.3)
POTASSIUM SERPL-SCNC: 4.5 MMOL/L — SIGNIFICANT CHANGE UP (ref 3.5–5.3)
PROT SERPL-MCNC: 8.2 GM/DL — SIGNIFICANT CHANGE UP (ref 6–8.3)
RBC # BLD: 5.01 M/UL — SIGNIFICANT CHANGE UP (ref 4.2–5.8)
RBC # FLD: 14.1 % — SIGNIFICANT CHANGE UP (ref 10.3–14.5)
SODIUM SERPL-SCNC: 139 MMOL/L — SIGNIFICANT CHANGE UP (ref 135–145)
WBC # BLD: 11.35 K/UL — HIGH (ref 3.8–10.5)
WBC # FLD AUTO: 11.35 K/UL — HIGH (ref 3.8–10.5)

## 2018-12-20 PROCEDURE — 99285 EMERGENCY DEPT VISIT HI MDM: CPT

## 2018-12-20 RX ORDER — ONDANSETRON 8 MG/1
4 TABLET, FILM COATED ORAL ONCE
Qty: 0 | Refills: 0 | Status: COMPLETED | OUTPATIENT
Start: 2018-12-20 | End: 2018-12-20

## 2018-12-20 RX ORDER — SODIUM CHLORIDE 9 MG/ML
3 INJECTION INTRAMUSCULAR; INTRAVENOUS; SUBCUTANEOUS ONCE
Qty: 0 | Refills: 0 | Status: COMPLETED | OUTPATIENT
Start: 2018-12-20 | End: 2018-12-20

## 2018-12-20 RX ORDER — SODIUM CHLORIDE 9 MG/ML
1000 INJECTION INTRAMUSCULAR; INTRAVENOUS; SUBCUTANEOUS ONCE
Qty: 0 | Refills: 0 | Status: COMPLETED | OUTPATIENT
Start: 2018-12-20 | End: 2018-12-20

## 2018-12-20 RX ORDER — HYDROMORPHONE HYDROCHLORIDE 2 MG/ML
0.5 INJECTION INTRAMUSCULAR; INTRAVENOUS; SUBCUTANEOUS ONCE
Qty: 0 | Refills: 0 | Status: DISCONTINUED | OUTPATIENT
Start: 2018-12-20 | End: 2018-12-20

## 2018-12-20 RX ORDER — PANTOPRAZOLE SODIUM 20 MG/1
40 TABLET, DELAYED RELEASE ORAL ONCE
Qty: 0 | Refills: 0 | Status: COMPLETED | OUTPATIENT
Start: 2018-12-20 | End: 2018-12-20

## 2018-12-20 RX ORDER — LIDOCAINE 4 G/100G
10 CREAM TOPICAL ONCE
Qty: 0 | Refills: 0 | Status: COMPLETED | OUTPATIENT
Start: 2018-12-20 | End: 2018-12-20

## 2018-12-20 RX ADMIN — HYDROMORPHONE HYDROCHLORIDE 100.1 MILLIGRAM(S): 2 INJECTION INTRAMUSCULAR; INTRAVENOUS; SUBCUTANEOUS at 23:59

## 2018-12-20 RX ADMIN — SODIUM CHLORIDE 3 MILLILITER(S): 9 INJECTION INTRAMUSCULAR; INTRAVENOUS; SUBCUTANEOUS at 23:14

## 2018-12-20 RX ADMIN — ONDANSETRON 4 MILLIGRAM(S): 8 TABLET, FILM COATED ORAL at 23:54

## 2018-12-20 RX ADMIN — ONDANSETRON 4 MILLIGRAM(S): 8 TABLET, FILM COATED ORAL at 23:07

## 2018-12-20 RX ADMIN — LIDOCAINE 10 MILLILITER(S): 4 CREAM TOPICAL at 23:08

## 2018-12-20 RX ADMIN — PANTOPRAZOLE SODIUM 40 MILLIGRAM(S): 20 TABLET, DELAYED RELEASE ORAL at 23:07

## 2018-12-20 RX ADMIN — SODIUM CHLORIDE 1000 MILLILITER(S): 9 INJECTION INTRAMUSCULAR; INTRAVENOUS; SUBCUTANEOUS at 23:11

## 2018-12-20 RX ADMIN — Medication 30 MILLILITER(S): at 23:09

## 2018-12-20 NOTE — ED ADULT NURSE NOTE - NSIMPLEMENTINTERV_GEN_ALL_ED
Implemented All Universal Safety Interventions:  [place] to call system. Call bell, personal items and telephone within reach. Instruct patient to call for assistance. Room bathroom lighting operational. Non-slip footwear when patient is off stretcher. Physically safe environment: no spills, clutter or unnecessary equipment. Stretcher in lowest position, wheels locked, appropriate side rails in place.

## 2018-12-20 NOTE — ED ADULT NURSE NOTE - OBJECTIVE STATEMENT
Patient complains of nausea, vomiting, diarrhea and periumbilical abdominal pain. Hx of asthma and diabetes.

## 2018-12-21 VITALS
DIASTOLIC BLOOD PRESSURE: 66 MMHG | HEART RATE: 74 BPM | TEMPERATURE: 98 F | RESPIRATION RATE: 17 BRPM | SYSTOLIC BLOOD PRESSURE: 115 MMHG | OXYGEN SATURATION: 98 %

## 2018-12-21 LAB — LACTATE SERPL-SCNC: 2 MMOL/L — SIGNIFICANT CHANGE UP (ref 0.7–2)

## 2018-12-21 PROCEDURE — 74177 CT ABD & PELVIS W/CONTRAST: CPT | Mod: 26

## 2018-12-21 RX ORDER — ALBUTEROL 90 UG/1
2 AEROSOL, METERED ORAL
Qty: 8.5 | Refills: 0
Start: 2018-12-21 | End: 2019-01-19

## 2018-12-21 RX ORDER — HYDROMORPHONE HYDROCHLORIDE 2 MG/ML
0.5 INJECTION INTRAMUSCULAR; INTRAVENOUS; SUBCUTANEOUS ONCE
Qty: 0 | Refills: 0 | Status: DISCONTINUED | OUTPATIENT
Start: 2018-12-21 | End: 2018-12-21

## 2018-12-21 RX ORDER — IOHEXOL 300 MG/ML
30 INJECTION, SOLUTION INTRAVENOUS ONCE
Qty: 0 | Refills: 0 | Status: COMPLETED | OUTPATIENT
Start: 2018-12-21 | End: 2018-12-21

## 2018-12-21 RX ORDER — IPRATROPIUM/ALBUTEROL SULFATE 18-103MCG
3 AEROSOL WITH ADAPTER (GRAM) INHALATION
Qty: 0 | Refills: 0 | Status: COMPLETED | OUTPATIENT
Start: 2018-12-21 | End: 2018-12-21

## 2018-12-21 RX ORDER — SODIUM CHLORIDE 9 MG/ML
2000 INJECTION INTRAMUSCULAR; INTRAVENOUS; SUBCUTANEOUS ONCE
Qty: 0 | Refills: 0 | Status: COMPLETED | OUTPATIENT
Start: 2018-12-21 | End: 2018-12-21

## 2018-12-21 RX ADMIN — SODIUM CHLORIDE 2000 MILLILITER(S): 9 INJECTION INTRAMUSCULAR; INTRAVENOUS; SUBCUTANEOUS at 03:23

## 2018-12-21 RX ADMIN — HYDROMORPHONE HYDROCHLORIDE 0.5 MILLIGRAM(S): 2 INJECTION INTRAMUSCULAR; INTRAVENOUS; SUBCUTANEOUS at 04:00

## 2018-12-21 RX ADMIN — Medication 3 MILLILITER(S): at 05:15

## 2018-12-21 RX ADMIN — Medication 3 MILLILITER(S): at 00:45

## 2018-12-21 RX ADMIN — SODIUM CHLORIDE 2000 MILLILITER(S): 9 INJECTION INTRAMUSCULAR; INTRAVENOUS; SUBCUTANEOUS at 00:37

## 2018-12-21 RX ADMIN — Medication 3 MILLILITER(S): at 04:58

## 2018-12-21 RX ADMIN — Medication 3 MILLILITER(S): at 05:22

## 2018-12-21 RX ADMIN — Medication 3 MILLILITER(S): at 01:00

## 2018-12-21 RX ADMIN — HYDROMORPHONE HYDROCHLORIDE 0.5 MILLIGRAM(S): 2 INJECTION INTRAMUSCULAR; INTRAVENOUS; SUBCUTANEOUS at 00:15

## 2018-12-21 RX ADMIN — HYDROMORPHONE HYDROCHLORIDE 100.1 MILLIGRAM(S): 2 INJECTION INTRAMUSCULAR; INTRAVENOUS; SUBCUTANEOUS at 03:27

## 2018-12-21 RX ADMIN — SODIUM CHLORIDE 1000 MILLILITER(S): 9 INJECTION INTRAMUSCULAR; INTRAVENOUS; SUBCUTANEOUS at 01:26

## 2018-12-21 RX ADMIN — HYDROMORPHONE HYDROCHLORIDE 0.5 MILLIGRAM(S): 2 INJECTION INTRAMUSCULAR; INTRAVENOUS; SUBCUTANEOUS at 00:30

## 2018-12-21 RX ADMIN — Medication 3 MILLILITER(S): at 01:25

## 2018-12-21 RX ADMIN — HYDROMORPHONE HYDROCHLORIDE 0.5 MILLIGRAM(S): 2 INJECTION INTRAMUSCULAR; INTRAVENOUS; SUBCUTANEOUS at 03:57

## 2018-12-21 RX ADMIN — Medication 125 MILLIGRAM(S): at 05:15

## 2018-12-21 RX ADMIN — IOHEXOL 30 MILLILITER(S): 300 INJECTION, SOLUTION INTRAVENOUS at 01:22

## 2018-12-21 NOTE — ED PROVIDER NOTE - CARE PROVIDER_API CALL
Octavio Kumar), Medicine  [address]  [address]  [place] [address]  Phone: [phone]  Fax: [phone]    [place]), Internal Medicine  [address]  [place] [address]  Phone: [phone]  Fax: [phone]

## 2018-12-21 NOTE — ED PROVIDER NOTE - CARE PROVIDERS DIRECT ADDRESSES
[email or web address]

## 2018-12-21 NOTE — ED PROVIDER NOTE - MEDICAL DECISION MAKING DETAILS
labs and imaging reviewed. patient received pain mgmt, zofran and reglan. patient also received nebs and solumedrol. he is currently in good condition and discharged with care instructions. patient is to follow up with pmd. Discussed results and outcome of testing with the patient.  Patient advised to please follow up with their primary care doctor within the next 24 hours and return to the Emergency Department for worsening symptoms or any other concerns.  Patient advised that their doctor may call  to follow up on the specific results of the tests performed today in the emergency department.

## 2018-12-21 NOTE — ED PROVIDER NOTE - OBJECTIVE STATEMENT
Pertinent PMH/PSH/FHx/SHx and Review of Systems contained within:  39 yo m with pmh of recurrent colitis and asthma presents in ED c/o abdominal associated with nbnb vomitus and watery, nonbloody diarrhea tonight. Patient also reports running out inhaler and he has been wheezing lately. No aggravating or relieving factors, No fever/chills, No photophobia/eye pain/changes in vision, No ear pain/sore throat/dysphagia, No chest pain/palpitations, no SOB/cough/stridor,  no dysuria/frequency/discharge, No neck/back pain, no rash, no changes in neurological status/function.

## 2019-01-02 NOTE — PATIENT PROFILE ADULT. - FUNCTIONAL SCREEN CURRENT LEVEL: BATHING, MLM
Stable from a postoperative perspective.  Awaiting SNF transfer. However, the patient's functional mobility continues to improve and he may be a candidate to be discharged home with home health. I would like for the physical therapist to address this.   (0) independent

## 2019-01-16 NOTE — PRE-OP CHECKLIST - PATIENT SENT TO
[FreeTextEntry1] : no vesicles or facial swelling\par audio au symmetric loss mid high range\par post herptic neuralgia\par trial gabapentin 100 tid\par rec zoster vaccine endoscopy

## 2019-02-05 NOTE — PROGRESS NOTE ADULT - PROVIDER SPECIALTY LIST ADULT
Gastroenterology
Hospitalist
Hospitalist
Bell palsy  11/2017  Coronary artery disease  4 cardiac stents  History of hypertension

## 2019-03-01 NOTE — DISCHARGE NOTE ADULT - FUNCTIONAL SCREEN CURRENT LEVEL: AMBULATION, MLM
Case Management Discharge Instructions for Karine Ovalle   Discharge Date:  Friday 3/1/2019.        · Discharge Location: Home with Home Health     · Agency Name / Address / Phone: [facility] .  They will call you to schedule home visits with the following:        · Home Health: Registered Nurse, Physical Therapist, and Occupational Therapist      · DME Provider / Phone: None ordered; pt has a front wheeeled walker, single point cane, wheelchair, and tub transfer bench plus grab bars in bathtub.      FOLLOW UP APPOINTMENTS:    ·  Micky Rubin M.D. Primary Care.   Flow-up Information:[address] [place] [id number]  [phone]  On 3/4/2019 Monday @ 5:20pm       · Broyn Levine M.D. Ortho.  [address] [place] [address]  [phone]  On 3/19/2019 Tuesday @ 1:15pm.          (0) independent

## 2019-03-11 NOTE — PATIENT PROFILE ADULT. - FUNCTIONAL SCREEN CURRENT LEVEL: COMMUNICATION, MLM
Patient:  Matthew Lawler  :   1969  MRN:     [id number]        Mr.Brian Lawler  [address] [address]        2019    Dear Bucky,    We have attempted to reach you by phone. You are due for a testicular ultrasound as recommended by Dr. Li. Please call [phone] to schedule the ultrasound. If you have any questions or concerns, please ask to talk to the Urology nurse.      Thank you,      Dr. Li's Office         (0) understands/communicates without difficulty

## 2019-03-19 NOTE — ED ADULT NURSE NOTE - PAIN: PRESENCE, MLM
Inge Monge is a 69 y.o. female patient of Roverto Davsi MD who presents to the clinic today for   Chief Complaint   Patient presents with    Cough   .    HPI    Pt, who is not known to me, reports a new problem to me: productive cough. Pt was seen at [facility] last week for the same problem and was diagnosed with pneumonia. She was started on doxycycline and albuterol (every 4 hours). Since then pt has still been coughing up phlegm (cloudy and yellow-green) with a low grade (99s).     Labs collected on 3/12/19 with Potassium of 2.4. Per sister, pt was told she needed to go to the hospital to be admitted, but the pt never went. She was never treated for the hypokalemia.    These symptoms began 2 weeks ago and status is slightly improved.     Symptoms are + acute.    Pt denies the following symptoms: body aches    Aggravating factors include none .    Relieving factors include none.    OTC Medications tried are tylenol.    Prescription medications taken for symptoms are Doxy and albuterol.    Pertinent medical history:  Recent Pneumonia. COPD    Smoking status:  Quit 2007    ROS    Constitutional:   Low-grade  fever, + fatigue, decrease in appetite.    Head:   + headache  Ears:   No pain but stopped up feeling.  Eyes:  No sxs  Nose:   No sinus pain, no congestion, + runny nose, + post nasal drip.  Throat:  No ST pain, no exudate, no difficulty swallowing.    Heart:  No palpitations, chest pain.    Lungs:  + difficulty breathing, + coughing, yellow-green sputum production, no wheezing.              Symptoms are community acquired. Took flu and pneumovac shot.    GI/:  No sxs    MS:  No new bone, joint or muscle problems.    Skin:  No rashes, itching.      PAST MEDICAL HISTORY:  Past Medical History:   Diagnosis Date    CAD (coronary artery disease)     Cataract     Chronic headache     Colon polyps     COPD (chronic obstructive pulmonary disease)     Diabetes mellitus type II     Hyperlipidemia      Hypertension     Hypertrophic cardiomyopathy     ICD (implantable cardiac defibrillator) in place     Osteopenia     Peptic ulcer disease     Valvular heart disease        PAST SURGICAL HISTORY:  Past Surgical History:   Procedure Laterality Date    Angiogram with stents      BREAST CYST EXCISION Right     in her early 30's    CARDIAC DEFIBRILLATOR PLACEMENT  3 yrs    HYSTERECTOMY      left arm surgery         SOCIAL HISTORY:  Social History     Socioeconomic History    Marital status:      Spouse name: Not on file    Number of children: Not on file    Years of education: Not on file    Highest education level: Not on file   Social Needs    Financial resource strain: Not on file    Food insecurity - worry: Not on file    Food insecurity - inability: Not on file    Transportation needs - medical: Not on file    Transportation needs - non-medical: Not on file   Occupational History    Not on file   Tobacco Use    Smoking status: Former Smoker     Types: Cigarettes     Last attempt to quit: 2006     Years since quittin.3    Smokeless tobacco: Never Used   Substance and Sexual Activity    Alcohol use: No    Drug use: No    Sexual activity: Not on file   Other Topics Concern    Not on file   Social History Narrative    Not on file       FAMILY HISTORY:  Family History   Problem Relation Age of Onset    Heart disease Father     Cancer Father     Cataracts Mother     Cancer Sister     Diabetes Sister     Diabetes Paternal Grandmother     Amblyopia Neg Hx     Blindness Neg Hx     Glaucoma Neg Hx     Hypertension Neg Hx     Macular degeneration Neg Hx     Retinal detachment Neg Hx     Strabismus Neg Hx     Stroke Neg Hx     Thyroid disease Neg Hx        ALLERGIES AND MEDICATIONS: updated and reviewed.  Review of patient's allergies indicates:   Allergen Reactions    Codeine Nausea And Vomiting    Sulfa (sulfonamide antibiotics)      Current Outpatient  Medications   Medication Sig Dispense Refill    albuterol (PROVENTIL/VENTOLIN HFA) 90 mcg/actuation inhaler INL 2 PFS PO Q 4 H PRN  0    amoxicillin-clavulanate 875-125mg (AUGMENTIN) 875-125 mg per tablet Take 1 tablet by mouth 2 (two) times daily. 20 tablet 0    butalbital-acetaminophen-caffeine -40 mg (FIORICET, ESGIC) -40 mg per tablet TAKE 1 TABLET BY MOUTH EVERY 6 HOURS AS NEEDED 120 tablet 5    CONTOUR TEST STRIPS Strp TEST BLOOD SUGARS 4 TIMES DAILY 150 strip 11    cyclobenzaprine (FLEXERIL) 10 MG tablet TAKE 1 TABLET(10 MG) BY MOUTH EVERY 8 HOURS 90 tablet 5    doxycycline (VIBRAMYCIN) 100 MG Cap TK ONE C PO BID  0    fenofibric acid (FIBRICOR) 135 mg CpDR TAKE 1 CAPSULE BY MOUTH EVERY DAY 90 capsule 0    metFORMIN (GLUCOPHAGE-XR) 750 MG 24 hr tablet Take 1 tablet (750 mg total) by mouth every evening. 90 tablet 3    metoprolol succinate (TOPROL-XL) 200 MG 24 hr tablet TAKE 1 TABLET(200 MG) BY MOUTH TWICE DAILY 180 tablet 4    pravastatin (PRAVACHOL) 20 MG tablet TAKE 1 TABLET(20 MG) BY MOUTH EVERY EVENING 90 tablet 3    RABEprazole (ACIPHEX) 20 mg tablet TAKE 1 TABLET BY MOUTH DAILY 90 tablet 3    ramipril (ALTACE) 10 MG capsule TAKE 1 CAPSULE(10 MG) BY MOUTH EVERY DAY 90 capsule 3    SITagliptan-metformin (JANUMET)  mg per tablet Take 1 tablet by mouth 2 (two) times daily. 180 tablet 3    verapamil (CALAN-SR) 240 MG CR tablet TAKE 1 TABLET(240 MG) BY MOUTH TWICE DAILY 180 tablet 3    fluticasone (FLONASE) 50 mcg/actuation nasal spray 2 sprays by Each Nare route 2 (two) times daily. 16 g 11     No current facility-administered medications for this visit.              PHYSICAL EXAM    Alert, coop 69 y.o. female patient in no visible distress.    Vitals:    03/19/19 1432   BP: 108/62   Pulse: 77   Temp: 97.6 °F (36.4 °C)     VS reviewed.  VS stable.  CC, nursing note, medications & PMH all reviewed today.    Head:  Normocephalic, atraumatic.    EENT:  EACs clear.  TMs no  erythema, + LR, no effusions, no TM perforation.                              Eye lids normal, no discharge present.       Sinus tenderness to palp--none.               Nares--no edema, + d/c present.    Pharynx mildly injected.                Tonsils not erythematous , not enlarged, no exudate present.    + small NT anterior, no posterior cervical lymph nodes palpable.    + small NT submandibular lymph nodes palp.             Resp:  Respirations even   Lungs coarse and wheezing bilaterally throughout, no crackles.  decreased air to bases                 bilat.              Post albuterol neb still with wheezing to bases bilaterally, but better air movement             throughout.    Heart:  RRR, no MRG.    MS:  Ambulates steady.             NEURO:  Alert and oriented x 4.  Responds appropriately during interaction.    Skin:  Warm, dry, color good.  History of low potassium  -     Basic metabolic panel; Future; Expected date: 03/19/2019    Wheezing  -     albuterol nebulizer solution 2.5 mg    Hypokalemia    Pneumonia of right lower lobe due to infectious organism  -     albuterol (PROVENTIL) 2.5 mg /3 mL (0.083 %) nebulizer solution; Take 3 mLs (2.5 mg total) by nebulization every 6 (six) hours as needed for Wheezing. Rescue  Dispense: 75 mL; Refill: 2    Pt today presents with complaints of a productive cough that is continuing since being diagnosed with pneumonia at [facility] on 3/12/19. Pt has been taking doxycycline and using an albuterol inhaler ([id number]) with little relief. On exam, pt with decreased breath sounds and wheezing bilaterally. Albuterol neb given and improved breath sounds with less wheezing noted post treatment. Records received from 3/12/19 visit. Pt with a potassium of 2.4 that she states she was told to go the ER but never went. Pt sent to lab for a stat BMP which returned a Potassium of 2.7. Pt told to go to ER for immediate treatment. Pt initially reluctant, but pts sister stated she  would bring her. [facility] called and report given.    This is a new problem to me and the following work up is planned.    Lab & Radiological Tests Ordered: BMP.  The results are hypokalemia.      Advised patient to go straight to the emergency room  Explained exam findings, diagnosis and treatment plan to patient and sister.  Questions answered and patient states understanding.       complains of pain/discomfort

## 2019-03-30 ENCOUNTER — EMERGENCY (EMERGENCY)
Facility: HOSPITAL | Age: 39
LOS: 0 days | Discharge: HOME | End: 2019-03-31
Attending: EMERGENCY MEDICINE
Payer: MEDICAID

## 2019-03-30 VITALS
OXYGEN SATURATION: 99 % | HEART RATE: 57 BPM | SYSTOLIC BLOOD PRESSURE: 182 MMHG | WEIGHT: 179.9 LBS | TEMPERATURE: 98 F | RESPIRATION RATE: 18 BRPM | DIASTOLIC BLOOD PRESSURE: 106 MMHG

## 2019-03-30 DIAGNOSIS — E11.9 TYPE 2 DIABETES MELLITUS WITHOUT COMPLICATIONS: ICD-10-CM

## 2019-03-30 DIAGNOSIS — Z90.49 ACQUIRED ABSENCE OF OTHER SPECIFIED PARTS OF DIGESTIVE TRACT: Chronic | ICD-10-CM

## 2019-03-30 DIAGNOSIS — F17.210 NICOTINE DEPENDENCE, CIGARETTES, UNCOMPLICATED: ICD-10-CM

## 2019-03-30 DIAGNOSIS — R10.9 UNSPECIFIED ABDOMINAL PAIN: ICD-10-CM

## 2019-03-30 DIAGNOSIS — F12.10 CANNABIS ABUSE, UNCOMPLICATED: ICD-10-CM

## 2019-03-30 DIAGNOSIS — K52.9 NONINFECTIVE GASTROENTERITIS AND COLITIS, UNSPECIFIED: ICD-10-CM

## 2019-03-30 LAB
ACETONE SERPL-MCNC: ABNORMAL
ALBUMIN SERPL ELPH-MCNC: 3.8 G/DL — SIGNIFICANT CHANGE UP (ref 3.3–5)
ALP SERPL-CCNC: 97 U/L — SIGNIFICANT CHANGE UP (ref 40–120)
ALT FLD-CCNC: 35 U/L — SIGNIFICANT CHANGE UP (ref 12–78)
ANION GAP SERPL CALC-SCNC: 9 MMOL/L — SIGNIFICANT CHANGE UP (ref 5–17)
APPEARANCE UR: CLEAR — SIGNIFICANT CHANGE UP
APTT BLD: 30.9 SEC — SIGNIFICANT CHANGE UP (ref 28.5–37)
AST SERPL-CCNC: 22 U/L — SIGNIFICANT CHANGE UP (ref 15–37)
BASOPHILS # BLD AUTO: 0.03 K/UL — SIGNIFICANT CHANGE UP (ref 0–0.2)
BASOPHILS NFR BLD AUTO: 0.2 % — SIGNIFICANT CHANGE UP (ref 0–2)
BILIRUB SERPL-MCNC: 0.3 MG/DL — SIGNIFICANT CHANGE UP (ref 0.2–1.2)
BILIRUB UR-MCNC: NEGATIVE — SIGNIFICANT CHANGE UP
BUN SERPL-MCNC: 14 MG/DL — SIGNIFICANT CHANGE UP (ref 7–23)
CALCIUM SERPL-MCNC: 9.5 MG/DL — SIGNIFICANT CHANGE UP (ref 8.5–10.1)
CHLORIDE SERPL-SCNC: 102 MMOL/L — SIGNIFICANT CHANGE UP (ref 96–108)
CO2 SERPL-SCNC: 28 MMOL/L — SIGNIFICANT CHANGE UP (ref 22–31)
COLOR SPEC: YELLOW — SIGNIFICANT CHANGE UP
CREAT SERPL-MCNC: 1.09 MG/DL — SIGNIFICANT CHANGE UP (ref 0.5–1.3)
DIFF PNL FLD: NEGATIVE — SIGNIFICANT CHANGE UP
EOSINOPHIL # BLD AUTO: 0.05 K/UL — SIGNIFICANT CHANGE UP (ref 0–0.5)
EOSINOPHIL NFR BLD AUTO: 0.3 % — SIGNIFICANT CHANGE UP (ref 0–6)
GLUCOSE SERPL-MCNC: 317 MG/DL — HIGH (ref 70–99)
GLUCOSE UR QL: 1000 MG/DL
HCT VFR BLD CALC: 42.7 % — SIGNIFICANT CHANGE UP (ref 39–50)
HGB BLD-MCNC: 14.6 G/DL — SIGNIFICANT CHANGE UP (ref 13–17)
IMM GRANULOCYTES NFR BLD AUTO: 0.3 % — SIGNIFICANT CHANGE UP (ref 0–1.5)
INR BLD: 0.97 RATIO — SIGNIFICANT CHANGE UP (ref 0.88–1.16)
KETONES UR-MCNC: ABNORMAL
LACTATE SERPL-SCNC: 1.6 MMOL/L — SIGNIFICANT CHANGE UP (ref 0.7–2)
LEUKOCYTE ESTERASE UR-ACNC: NEGATIVE — SIGNIFICANT CHANGE UP
LIDOCAIN IGE QN: 51 U/L — LOW (ref 73–393)
LYMPHOCYTES # BLD AUTO: 1.08 K/UL — SIGNIFICANT CHANGE UP (ref 1–3.3)
LYMPHOCYTES # BLD AUTO: 7.2 % — LOW (ref 13–44)
MCHC RBC-ENTMCNC: 28.2 PG — SIGNIFICANT CHANGE UP (ref 27–34)
MCHC RBC-ENTMCNC: 34.2 GM/DL — SIGNIFICANT CHANGE UP (ref 32–36)
MCV RBC AUTO: 82.6 FL — SIGNIFICANT CHANGE UP (ref 80–100)
MONOCYTES # BLD AUTO: 0.27 K/UL — SIGNIFICANT CHANGE UP (ref 0–0.9)
MONOCYTES NFR BLD AUTO: 1.8 % — LOW (ref 2–14)
NEUTROPHILS # BLD AUTO: 13.51 K/UL — HIGH (ref 1.8–7.4)
NEUTROPHILS NFR BLD AUTO: 90.2 % — HIGH (ref 43–77)
NITRITE UR-MCNC: NEGATIVE — SIGNIFICANT CHANGE UP
NRBC # BLD: 0 /100 WBCS — SIGNIFICANT CHANGE UP (ref 0–0)
PH UR: 7 — SIGNIFICANT CHANGE UP (ref 5–8)
PLATELET # BLD AUTO: 416 K/UL — HIGH (ref 150–400)
POTASSIUM SERPL-MCNC: 4.6 MMOL/L — SIGNIFICANT CHANGE UP (ref 3.5–5.3)
POTASSIUM SERPL-SCNC: 4.6 MMOL/L — SIGNIFICANT CHANGE UP (ref 3.5–5.3)
PROT SERPL-MCNC: 8 GM/DL — SIGNIFICANT CHANGE UP (ref 6–8.3)
PROT UR-MCNC: 15 MG/DL
PROTHROM AB SERPL-ACNC: 10.9 SEC — SIGNIFICANT CHANGE UP (ref 10–12.9)
RBC # BLD: 5.17 M/UL — SIGNIFICANT CHANGE UP (ref 4.2–5.8)
RBC # FLD: 13.6 % — SIGNIFICANT CHANGE UP (ref 10.3–14.5)
RBC CASTS # UR COMP ASSIST: SIGNIFICANT CHANGE UP /HPF (ref 0–4)
SODIUM SERPL-SCNC: 139 MMOL/L — SIGNIFICANT CHANGE UP (ref 135–145)
SP GR SPEC: 1 — LOW (ref 1.01–1.02)
UROBILINOGEN FLD QL: NEGATIVE MG/DL — SIGNIFICANT CHANGE UP
WBC # BLD: 14.98 K/UL — HIGH (ref 3.8–10.5)
WBC # FLD AUTO: 14.98 K/UL — HIGH (ref 3.8–10.5)
WBC UR QL: SIGNIFICANT CHANGE UP

## 2019-03-30 PROCEDURE — 74177 CT ABD & PELVIS W/CONTRAST: CPT | Mod: 26

## 2019-03-30 PROCEDURE — 99285 EMERGENCY DEPT VISIT HI MDM: CPT

## 2019-03-30 PROCEDURE — 93010 ELECTROCARDIOGRAM REPORT: CPT

## 2019-03-30 RX ORDER — IOHEXOL 300 MG/ML
30 INJECTION, SOLUTION INTRAVENOUS ONCE
Qty: 0 | Refills: 0 | Status: COMPLETED | OUTPATIENT
Start: 2019-03-30 | End: 2019-03-30

## 2019-03-30 RX ORDER — SODIUM CHLORIDE 9 MG/ML
1000 INJECTION INTRAMUSCULAR; INTRAVENOUS; SUBCUTANEOUS ONCE
Qty: 0 | Refills: 0 | Status: COMPLETED | OUTPATIENT
Start: 2019-03-30 | End: 2019-03-30

## 2019-03-30 RX ORDER — ONDANSETRON 8 MG/1
8 TABLET, FILM COATED ORAL ONCE
Qty: 0 | Refills: 0 | Status: COMPLETED | OUTPATIENT
Start: 2019-03-30 | End: 2019-03-30

## 2019-03-30 RX ORDER — PANTOPRAZOLE SODIUM 20 MG/1
40 TABLET, DELAYED RELEASE ORAL ONCE
Qty: 0 | Refills: 0 | Status: COMPLETED | OUTPATIENT
Start: 2019-03-30 | End: 2019-03-30

## 2019-03-30 RX ORDER — MORPHINE SULFATE 50 MG/1
4 CAPSULE, EXTENDED RELEASE ORAL ONCE
Qty: 0 | Refills: 0 | Status: DISCONTINUED | OUTPATIENT
Start: 2019-03-30 | End: 2019-03-30

## 2019-03-30 RX ORDER — HYDROMORPHONE HYDROCHLORIDE 2 MG/ML
1 INJECTION INTRAMUSCULAR; INTRAVENOUS; SUBCUTANEOUS ONCE
Qty: 0 | Refills: 0 | Status: DISCONTINUED | OUTPATIENT
Start: 2019-03-30 | End: 2019-03-30

## 2019-03-30 RX ORDER — DIPHENHYDRAMINE HCL 50 MG
25 CAPSULE ORAL ONCE
Qty: 0 | Refills: 0 | Status: COMPLETED | OUTPATIENT
Start: 2019-03-30 | End: 2019-03-30

## 2019-03-30 RX ORDER — METOCLOPRAMIDE HCL 10 MG
10 TABLET ORAL ONCE
Qty: 0 | Refills: 0 | Status: COMPLETED | OUTPATIENT
Start: 2019-03-30 | End: 2019-03-30

## 2019-03-30 RX ORDER — SODIUM CHLORIDE 9 MG/ML
3 INJECTION INTRAMUSCULAR; INTRAVENOUS; SUBCUTANEOUS ONCE
Qty: 0 | Refills: 0 | Status: COMPLETED | OUTPATIENT
Start: 2019-03-30 | End: 2019-03-30

## 2019-03-30 RX ADMIN — MORPHINE SULFATE 4 MILLIGRAM(S): 50 CAPSULE, EXTENDED RELEASE ORAL at 22:14

## 2019-03-30 RX ADMIN — SODIUM CHLORIDE 2000 MILLILITER(S): 9 INJECTION INTRAMUSCULAR; INTRAVENOUS; SUBCUTANEOUS at 19:38

## 2019-03-30 RX ADMIN — Medication 25 MILLIGRAM(S): at 19:36

## 2019-03-30 RX ADMIN — SODIUM CHLORIDE 1000 MILLILITER(S): 9 INJECTION INTRAMUSCULAR; INTRAVENOUS; SUBCUTANEOUS at 22:02

## 2019-03-30 RX ADMIN — HYDROMORPHONE HYDROCHLORIDE 1 MILLIGRAM(S): 2 INJECTION INTRAMUSCULAR; INTRAVENOUS; SUBCUTANEOUS at 22:11

## 2019-03-30 RX ADMIN — HYDROMORPHONE HYDROCHLORIDE 1 MILLIGRAM(S): 2 INJECTION INTRAMUSCULAR; INTRAVENOUS; SUBCUTANEOUS at 22:41

## 2019-03-30 RX ADMIN — IOHEXOL 30 MILLILITER(S): 300 INJECTION, SOLUTION INTRAVENOUS at 22:38

## 2019-03-30 RX ADMIN — PANTOPRAZOLE SODIUM 40 MILLIGRAM(S): 20 TABLET, DELAYED RELEASE ORAL at 19:46

## 2019-03-30 RX ADMIN — Medication 10 MILLIGRAM(S): at 19:37

## 2019-03-30 RX ADMIN — ONDANSETRON 8 MILLIGRAM(S): 8 TABLET, FILM COATED ORAL at 22:10

## 2019-03-30 RX ADMIN — SODIUM CHLORIDE 3 MILLILITER(S): 9 INJECTION INTRAMUSCULAR; INTRAVENOUS; SUBCUTANEOUS at 19:38

## 2019-03-30 RX ADMIN — MORPHINE SULFATE 4 MILLIGRAM(S): 50 CAPSULE, EXTENDED RELEASE ORAL at 19:47

## 2019-03-30 NOTE — ED PROVIDER NOTE - OBJECTIVE STATEMENT
38yoM; with pmh signif for DM, Gastritis, Anxiety; now p/w abd pain--epigastric, cramping, associated with n/v/d (x1 day). denies f/c/s. denies sick contacts. denies travel. denies trauma. denies headache. denies cp/sob/palp.  denies unusual PO intake.  PMH: DM, Gastritis, Anxiety  SOCIAL: +smoking/+marijuana/ denies EtOH

## 2019-03-30 NOTE — ED PROVIDER NOTE - PHYSICAL EXAMINATION
General:     NAD, well-nourished, well-appearing  Head:     NC/AT, EOMI, oral mucosa moist  Neck:     trachea midline  Lungs:     CTA b/l, no w/r/r  CVS:     S1S2, RRR, no m/g/r  Abd:     +BS, ttp @ epigastrium, no rebound or guarding, s/nd, no organomegaly  Ext:    2+ radial and pedal pulses, no c/c/e  Neuro: grossly intact

## 2019-03-30 NOTE — ED ADULT TRIAGE NOTE - CHIEF COMPLAINT QUOTE
pt BIBA with c/o abdominal pain with nausea and vomiting hx of DKA, IDDM, refused MESHA and FS by medics

## 2019-03-30 NOTE — ED ADULT NURSE NOTE - OBJECTIVE STATEMENT
Pt presents w/ upper abdominal pain associated w/ n/v. Denies any fever, dizziness, flank pain, diarrhea or constipation

## 2019-03-31 VITALS
OXYGEN SATURATION: 97 % | DIASTOLIC BLOOD PRESSURE: 94 MMHG | SYSTOLIC BLOOD PRESSURE: 148 MMHG | TEMPERATURE: 98 F | HEART RATE: 90 BPM | RESPIRATION RATE: 20 BRPM

## 2019-03-31 LAB
B-OH-BUTYR SERPL-SCNC: 1.7 MMOL/L — HIGH
OSMOLALITY SERPL: 295 MOS/KG — SIGNIFICANT CHANGE UP (ref 289–308)

## 2019-03-31 RX ORDER — ONDANSETRON 8 MG/1
4 TABLET, FILM COATED ORAL ONCE
Qty: 0 | Refills: 0 | Status: COMPLETED | OUTPATIENT
Start: 2019-03-31 | End: 2019-03-31

## 2019-03-31 RX ORDER — OXYCODONE HYDROCHLORIDE 5 MG/1
1 TABLET ORAL
Qty: 15 | Refills: 0 | OUTPATIENT
Start: 2019-03-31 | End: 2019-04-01

## 2019-03-31 RX ORDER — ONDANSETRON 8 MG/1
1 TABLET, FILM COATED ORAL
Qty: 10 | Refills: 0 | OUTPATIENT
Start: 2019-03-31

## 2019-03-31 RX ORDER — METRONIDAZOLE 500 MG
500 TABLET ORAL ONCE
Qty: 0 | Refills: 0 | Status: COMPLETED | OUTPATIENT
Start: 2019-03-31 | End: 2019-03-31

## 2019-03-31 RX ORDER — METRONIDAZOLE 500 MG
1 TABLET ORAL
Qty: 21 | Refills: 0 | OUTPATIENT
Start: 2019-03-31 | End: 2019-04-06

## 2019-03-31 RX ADMIN — Medication 500 MILLIGRAM(S): at 01:53

## 2019-03-31 RX ADMIN — ONDANSETRON 4 MILLIGRAM(S): 8 TABLET, FILM COATED ORAL at 01:53

## 2019-04-01 LAB
CULTURE RESULTS: SIGNIFICANT CHANGE UP
SPECIMEN SOURCE: SIGNIFICANT CHANGE UP

## 2019-04-02 ENCOUNTER — INPATIENT (INPATIENT)
Facility: HOSPITAL | Age: 39
LOS: 1 days | Discharge: HOME | End: 2019-04-04
Attending: INTERNAL MEDICINE | Admitting: INTERNAL MEDICINE
Payer: MEDICAID

## 2019-04-02 VITALS
SYSTOLIC BLOOD PRESSURE: 156 MMHG | TEMPERATURE: 99 F | DIASTOLIC BLOOD PRESSURE: 109 MMHG | WEIGHT: 210.1 LBS | HEIGHT: 69 IN | HEART RATE: 90 BPM | RESPIRATION RATE: 17 BRPM | OXYGEN SATURATION: 98 %

## 2019-04-02 DIAGNOSIS — Z90.49 ACQUIRED ABSENCE OF OTHER SPECIFIED PARTS OF DIGESTIVE TRACT: Chronic | ICD-10-CM

## 2019-04-02 LAB
ALBUMIN SERPL ELPH-MCNC: 3.7 G/DL — SIGNIFICANT CHANGE UP (ref 3.3–5)
ALP SERPL-CCNC: 89 U/L — SIGNIFICANT CHANGE UP (ref 40–120)
ALT FLD-CCNC: 28 U/L — SIGNIFICANT CHANGE UP (ref 12–78)
AMYLASE P1 CFR SERPL: 90 U/L — SIGNIFICANT CHANGE UP (ref 25–115)
ANION GAP SERPL CALC-SCNC: 9 MMOL/L — SIGNIFICANT CHANGE UP (ref 5–17)
AST SERPL-CCNC: 40 U/L — HIGH (ref 15–37)
BASOPHILS # BLD AUTO: 0.03 K/UL — SIGNIFICANT CHANGE UP (ref 0–0.2)
BASOPHILS NFR BLD AUTO: 0.4 % — SIGNIFICANT CHANGE UP (ref 0–2)
BILIRUB SERPL-MCNC: 0.6 MG/DL — SIGNIFICANT CHANGE UP (ref 0.2–1.2)
BUN SERPL-MCNC: 10 MG/DL — SIGNIFICANT CHANGE UP (ref 7–23)
CALCIUM SERPL-MCNC: 9.5 MG/DL — SIGNIFICANT CHANGE UP (ref 8.5–10.1)
CHLORIDE SERPL-SCNC: 101 MMOL/L — SIGNIFICANT CHANGE UP (ref 96–108)
CO2 SERPL-SCNC: 28 MMOL/L — SIGNIFICANT CHANGE UP (ref 22–31)
CREAT SERPL-MCNC: 0.97 MG/DL — SIGNIFICANT CHANGE UP (ref 0.5–1.3)
EOSINOPHIL # BLD AUTO: 0.07 K/UL — SIGNIFICANT CHANGE UP (ref 0–0.5)
EOSINOPHIL NFR BLD AUTO: 0.9 % — SIGNIFICANT CHANGE UP (ref 0–6)
GLUCOSE SERPL-MCNC: 194 MG/DL — HIGH (ref 70–99)
HCT VFR BLD CALC: 42.5 % — SIGNIFICANT CHANGE UP (ref 39–50)
HGB BLD-MCNC: 14.2 G/DL — SIGNIFICANT CHANGE UP (ref 13–17)
IMM GRANULOCYTES NFR BLD AUTO: 0.5 % — SIGNIFICANT CHANGE UP (ref 0–1.5)
LACTATE SERPL-SCNC: 2 MMOL/L — SIGNIFICANT CHANGE UP (ref 0.7–2)
LIDOCAIN IGE QN: 29 U/L — LOW (ref 73–393)
LYMPHOCYTES # BLD AUTO: 1.48 K/UL — SIGNIFICANT CHANGE UP (ref 1–3.3)
LYMPHOCYTES # BLD AUTO: 18.7 % — SIGNIFICANT CHANGE UP (ref 13–44)
MCHC RBC-ENTMCNC: 27.7 PG — SIGNIFICANT CHANGE UP (ref 27–34)
MCHC RBC-ENTMCNC: 33.4 GM/DL — SIGNIFICANT CHANGE UP (ref 32–36)
MCV RBC AUTO: 83 FL — SIGNIFICANT CHANGE UP (ref 80–100)
MONOCYTES # BLD AUTO: 0.52 K/UL — SIGNIFICANT CHANGE UP (ref 0–0.9)
MONOCYTES NFR BLD AUTO: 6.6 % — SIGNIFICANT CHANGE UP (ref 2–14)
NEUTROPHILS # BLD AUTO: 5.76 K/UL — SIGNIFICANT CHANGE UP (ref 1.8–7.4)
NEUTROPHILS NFR BLD AUTO: 72.9 % — SIGNIFICANT CHANGE UP (ref 43–77)
NRBC # BLD: 0 /100 WBCS — SIGNIFICANT CHANGE UP (ref 0–0)
PLATELET # BLD AUTO: 393 K/UL — SIGNIFICANT CHANGE UP (ref 150–400)
POTASSIUM SERPL-MCNC: 4.6 MMOL/L — SIGNIFICANT CHANGE UP (ref 3.5–5.3)
POTASSIUM SERPL-SCNC: 4.6 MMOL/L — SIGNIFICANT CHANGE UP (ref 3.5–5.3)
PROT SERPL-MCNC: 7.9 GM/DL — SIGNIFICANT CHANGE UP (ref 6–8.3)
RBC # BLD: 5.12 M/UL — SIGNIFICANT CHANGE UP (ref 4.2–5.8)
RBC # FLD: 13.8 % — SIGNIFICANT CHANGE UP (ref 10.3–14.5)
SODIUM SERPL-SCNC: 138 MMOL/L — SIGNIFICANT CHANGE UP (ref 135–145)
WBC # BLD: 7.9 K/UL — SIGNIFICANT CHANGE UP (ref 3.8–10.5)
WBC # FLD AUTO: 7.9 K/UL — SIGNIFICANT CHANGE UP (ref 3.8–10.5)

## 2019-04-02 PROCEDURE — 99285 EMERGENCY DEPT VISIT HI MDM: CPT

## 2019-04-02 RX ORDER — MORPHINE SULFATE 50 MG/1
4 CAPSULE, EXTENDED RELEASE ORAL ONCE
Qty: 0 | Refills: 0 | Status: DISCONTINUED | OUTPATIENT
Start: 2019-04-02 | End: 2019-04-02

## 2019-04-02 RX ORDER — METOCLOPRAMIDE HCL 10 MG
10 TABLET ORAL ONCE
Qty: 0 | Refills: 0 | Status: COMPLETED | OUTPATIENT
Start: 2019-04-02 | End: 2019-04-02

## 2019-04-02 RX ORDER — ONDANSETRON 8 MG/1
4 TABLET, FILM COATED ORAL ONCE
Qty: 0 | Refills: 0 | Status: COMPLETED | OUTPATIENT
Start: 2019-04-02 | End: 2019-04-02

## 2019-04-02 RX ORDER — MORPHINE SULFATE 50 MG/1
6 CAPSULE, EXTENDED RELEASE ORAL ONCE
Qty: 0 | Refills: 0 | Status: DISCONTINUED | OUTPATIENT
Start: 2019-04-02 | End: 2019-04-02

## 2019-04-02 RX ORDER — PIPERACILLIN AND TAZOBACTAM 4; .5 G/20ML; G/20ML
3.38 INJECTION, POWDER, LYOPHILIZED, FOR SOLUTION INTRAVENOUS ONCE
Qty: 0 | Refills: 0 | Status: COMPLETED | OUTPATIENT
Start: 2019-04-02 | End: 2019-04-02

## 2019-04-02 RX ORDER — SODIUM CHLORIDE 9 MG/ML
1000 INJECTION INTRAMUSCULAR; INTRAVENOUS; SUBCUTANEOUS ONCE
Qty: 0 | Refills: 0 | Status: COMPLETED | OUTPATIENT
Start: 2019-04-02 | End: 2019-04-02

## 2019-04-02 RX ADMIN — SODIUM CHLORIDE 1000 MILLILITER(S): 9 INJECTION INTRAMUSCULAR; INTRAVENOUS; SUBCUTANEOUS at 22:45

## 2019-04-02 RX ADMIN — PIPERACILLIN AND TAZOBACTAM 200 GRAM(S): 4; .5 INJECTION, POWDER, LYOPHILIZED, FOR SOLUTION INTRAVENOUS at 23:46

## 2019-04-02 RX ADMIN — ONDANSETRON 4 MILLIGRAM(S): 8 TABLET, FILM COATED ORAL at 22:44

## 2019-04-02 RX ADMIN — MORPHINE SULFATE 6 MILLIGRAM(S): 50 CAPSULE, EXTENDED RELEASE ORAL at 23:46

## 2019-04-02 RX ADMIN — MORPHINE SULFATE 6 MILLIGRAM(S): 50 CAPSULE, EXTENDED RELEASE ORAL at 22:44

## 2019-04-02 RX ADMIN — SODIUM CHLORIDE 1000 MILLILITER(S): 9 INJECTION INTRAMUSCULAR; INTRAVENOUS; SUBCUTANEOUS at 23:46

## 2019-04-02 NOTE — ED PROVIDER NOTE - OBJECTIVE STATEMENT
Pertinent PMH/PSH/FHx/SHx and Review of Systems contained within:  Patient presents to the ED for abdominal pain, vomiting, and diarrhea.  Patient was seen here on 3/30, found to have colitis on CT imaging, discharged with flagyl, returns for worsening generalized pain, intractable nonbloody vomiting, and diarrhea.     Relevant PMHx/SHx/SOCHx/FAMH:  DM, gastroparesis  +Smoker, denies use of other illict drugs or h/o alchol abuse    ROS: No fever/chills, No headache/photophobia/eye pain/changes in vision, No ear pain/sore throat/dysphagia, No chest pain/palpitations, no SOB/cough/wheeze/stridor, No melena, no dysuria/frequency/discharge, No neck/back pain, no rash, no changes in neurological status/function.

## 2019-04-02 NOTE — ED PROVIDER NOTE - PHYSICAL EXAMINATION
Gen: Alert, distressed, vomiting  Head: NC, AT, PERRL, EOMI, normal lids/conjunctiva  ENT: normal hearing, patent oropharynx without erythema/exudate, uvula midline  Neck: +supple, no tenderness/meningismus/JVD, +Trachea midline  Pulm: Bilateral BS, normal resp effort, no wheeze/stridor/retractions  CV: RRR, no M/R/G, +dist pulses  Abd: soft, +generalized tenderness, ND, +BS, no palpable masses  Mskel: no edema/erythema/cyanosis  Skin: no rash, warm/dry  Neuro: AAOx3, no apparent sensory/motor deficits, coordination intact

## 2019-04-02 NOTE — ED ADULT NURSE NOTE - NSIMPLEMENTINTERV_GEN_ALL_ED
Implemented All Universal Safety Interventions:  [place] to call system. Call bell, personal items and telephone within reach. Instruct patient to call for assistance. Room bathroom lighting operational. Non-slip footwear when patient is off stretcher. Physically safe environment: no spills, clutter or unnecessary equipment. Stretcher in lowest position, wheels locked, appropriate side rails in place.

## 2019-04-02 NOTE — ED PROVIDER NOTE - CLINICAL SUMMARY MEDICAL DECISION MAKING FREE TEXT BOX
Patient with intractable abdominal pain and vomiting since being diagnosed with colitis a couple of days ago.  VSS.  Lab values reviewed, there are no values which require acute intervention.  Patient not reimaged since symptoms are same from a few days ago.  Patient is to be admitted to the hospital and the case was discussed with the admitting physician.  Any changes in plan, additional imaging/labs, and further work up will be at the discretion of the admitting physician.

## 2019-04-02 NOTE — ED ADULT TRIAGE NOTE - CHIEF COMPLAINT QUOTE
Patient reports abd pain N/V today hx colitis, dm fs 168 Patient reports abd pain N/V today hx gastritis , dm fs 168

## 2019-04-03 DIAGNOSIS — E11.43 TYPE 2 DIABETES MELLITUS WITH DIABETIC AUTONOMIC (POLY)NEUROPATHY: ICD-10-CM

## 2019-04-03 DIAGNOSIS — Z29.9 ENCOUNTER FOR PROPHYLACTIC MEASURES, UNSPECIFIED: ICD-10-CM

## 2019-04-03 DIAGNOSIS — K29.50 UNSPECIFIED CHRONIC GASTRITIS WITHOUT BLEEDING: ICD-10-CM

## 2019-04-03 DIAGNOSIS — K52.9 NONINFECTIVE GASTROENTERITIS AND COLITIS, UNSPECIFIED: ICD-10-CM

## 2019-04-03 DIAGNOSIS — E10.9 TYPE 1 DIABETES MELLITUS WITHOUT COMPLICATIONS: ICD-10-CM

## 2019-04-03 DIAGNOSIS — J45.20 MILD INTERMITTENT ASTHMA, UNCOMPLICATED: ICD-10-CM

## 2019-04-03 LAB
ANION GAP SERPL CALC-SCNC: 12 MMOL/L — SIGNIFICANT CHANGE UP (ref 5–17)
BASOPHILS # BLD AUTO: 0.06 K/UL — SIGNIFICANT CHANGE UP (ref 0–0.2)
BASOPHILS NFR BLD AUTO: 0.5 % — SIGNIFICANT CHANGE UP (ref 0–2)
BUN SERPL-MCNC: 15 MG/DL — SIGNIFICANT CHANGE UP (ref 7–23)
CALCIUM SERPL-MCNC: 8.8 MG/DL — SIGNIFICANT CHANGE UP (ref 8.5–10.1)
CHLORIDE SERPL-SCNC: 104 MMOL/L — SIGNIFICANT CHANGE UP (ref 96–108)
CO2 SERPL-SCNC: 25 MMOL/L — SIGNIFICANT CHANGE UP (ref 22–31)
CREAT SERPL-MCNC: 0.92 MG/DL — SIGNIFICANT CHANGE UP (ref 0.5–1.3)
EOSINOPHIL # BLD AUTO: 0.09 K/UL — SIGNIFICANT CHANGE UP (ref 0–0.5)
EOSINOPHIL NFR BLD AUTO: 0.8 % — SIGNIFICANT CHANGE UP (ref 0–6)
GLUCOSE SERPL-MCNC: 266 MG/DL — HIGH (ref 70–99)
HBA1C BLD-MCNC: 7.6 % — HIGH (ref 4–5.6)
HCT VFR BLD CALC: 38.9 % — LOW (ref 39–50)
HGB BLD-MCNC: 13 G/DL — SIGNIFICANT CHANGE UP (ref 13–17)
IMM GRANULOCYTES NFR BLD AUTO: 0.3 % — SIGNIFICANT CHANGE UP (ref 0–1.5)
LYMPHOCYTES # BLD AUTO: 2.8 K/UL — SIGNIFICANT CHANGE UP (ref 1–3.3)
LYMPHOCYTES # BLD AUTO: 23.5 % — SIGNIFICANT CHANGE UP (ref 13–44)
MAGNESIUM SERPL-MCNC: 2 MG/DL — SIGNIFICANT CHANGE UP (ref 1.6–2.6)
MCHC RBC-ENTMCNC: 27.9 PG — SIGNIFICANT CHANGE UP (ref 27–34)
MCHC RBC-ENTMCNC: 33.4 GM/DL — SIGNIFICANT CHANGE UP (ref 32–36)
MCV RBC AUTO: 83.5 FL — SIGNIFICANT CHANGE UP (ref 80–100)
MONOCYTES # BLD AUTO: 0.94 K/UL — HIGH (ref 0–0.9)
MONOCYTES NFR BLD AUTO: 7.9 % — SIGNIFICANT CHANGE UP (ref 2–14)
NEUTROPHILS # BLD AUTO: 7.96 K/UL — HIGH (ref 1.8–7.4)
NEUTROPHILS NFR BLD AUTO: 67 % — SIGNIFICANT CHANGE UP (ref 43–77)
NRBC # BLD: 0 /100 WBCS — SIGNIFICANT CHANGE UP (ref 0–0)
PHOSPHATE SERPL-MCNC: 2.6 MG/DL — SIGNIFICANT CHANGE UP (ref 2.5–4.5)
PLATELET # BLD AUTO: 330 K/UL — SIGNIFICANT CHANGE UP (ref 150–400)
POTASSIUM SERPL-MCNC: 3.8 MMOL/L — SIGNIFICANT CHANGE UP (ref 3.5–5.3)
POTASSIUM SERPL-SCNC: 3.8 MMOL/L — SIGNIFICANT CHANGE UP (ref 3.5–5.3)
RBC # BLD: 4.66 M/UL — SIGNIFICANT CHANGE UP (ref 4.2–5.8)
RBC # FLD: 14 % — SIGNIFICANT CHANGE UP (ref 10.3–14.5)
SODIUM SERPL-SCNC: 141 MMOL/L — SIGNIFICANT CHANGE UP (ref 135–145)
WBC # BLD: 11.89 K/UL — HIGH (ref 3.8–10.5)
WBC # FLD AUTO: 11.89 K/UL — HIGH (ref 3.8–10.5)

## 2019-04-03 PROCEDURE — 12345: CPT | Mod: NC

## 2019-04-03 PROCEDURE — 99223 1ST HOSP IP/OBS HIGH 75: CPT

## 2019-04-03 RX ORDER — GLUCAGON INJECTION, SOLUTION 0.5 MG/.1ML
1 INJECTION, SOLUTION SUBCUTANEOUS ONCE
Qty: 0 | Refills: 0 | Status: DISCONTINUED | OUTPATIENT
Start: 2019-04-03 | End: 2019-04-04

## 2019-04-03 RX ORDER — SODIUM CHLORIDE 9 MG/ML
1000 INJECTION INTRAMUSCULAR; INTRAVENOUS; SUBCUTANEOUS
Qty: 0 | Refills: 0 | Status: DISCONTINUED | OUTPATIENT
Start: 2019-04-03 | End: 2019-04-03

## 2019-04-03 RX ORDER — HYDROMORPHONE HYDROCHLORIDE 2 MG/ML
1 INJECTION INTRAMUSCULAR; INTRAVENOUS; SUBCUTANEOUS EVERY 6 HOURS
Qty: 0 | Refills: 0 | Status: DISCONTINUED | OUTPATIENT
Start: 2019-04-03 | End: 2019-04-04

## 2019-04-03 RX ORDER — PANTOPRAZOLE SODIUM 20 MG/1
40 TABLET, DELAYED RELEASE ORAL EVERY 12 HOURS
Qty: 0 | Refills: 0 | Status: DISCONTINUED | OUTPATIENT
Start: 2019-04-03 | End: 2019-04-03

## 2019-04-03 RX ORDER — SUCRALFATE 1 G
1 TABLET ORAL
Qty: 0 | Refills: 0 | Status: DISCONTINUED | OUTPATIENT
Start: 2019-04-03 | End: 2019-04-04

## 2019-04-03 RX ORDER — INSULIN GLARGINE 100 [IU]/ML
15 INJECTION, SOLUTION SUBCUTANEOUS
Qty: 0 | Refills: 0 | Status: DISCONTINUED | OUTPATIENT
Start: 2019-04-03 | End: 2019-04-04

## 2019-04-03 RX ORDER — DEXTROSE 50 % IN WATER 50 %
12.5 SYRINGE (ML) INTRAVENOUS ONCE
Qty: 0 | Refills: 0 | Status: DISCONTINUED | OUTPATIENT
Start: 2019-04-03 | End: 2019-04-04

## 2019-04-03 RX ORDER — ONDANSETRON 8 MG/1
8 TABLET, FILM COATED ORAL EVERY 8 HOURS
Qty: 0 | Refills: 0 | Status: DISCONTINUED | OUTPATIENT
Start: 2019-04-03 | End: 2019-04-03

## 2019-04-03 RX ORDER — INSULIN LISPRO 100/ML
VIAL (ML) SUBCUTANEOUS AT BEDTIME
Qty: 0 | Refills: 0 | Status: DISCONTINUED | OUTPATIENT
Start: 2019-04-03 | End: 2019-04-04

## 2019-04-03 RX ORDER — ALBUTEROL 90 UG/1
2 AEROSOL, METERED ORAL EVERY 6 HOURS
Qty: 0 | Refills: 0 | Status: DISCONTINUED | OUTPATIENT
Start: 2019-04-03 | End: 2019-04-04

## 2019-04-03 RX ORDER — ACETAMINOPHEN 500 MG
650 TABLET ORAL EVERY 6 HOURS
Qty: 0 | Refills: 0 | Status: DISCONTINUED | OUTPATIENT
Start: 2019-04-03 | End: 2019-04-04

## 2019-04-03 RX ORDER — DEXTROSE 50 % IN WATER 50 %
15 SYRINGE (ML) INTRAVENOUS ONCE
Qty: 0 | Refills: 0 | Status: DISCONTINUED | OUTPATIENT
Start: 2019-04-03 | End: 2019-04-04

## 2019-04-03 RX ORDER — MESALAMINE 400 MG
800 TABLET, DELAYED RELEASE (ENTERIC COATED) ORAL THREE TIMES A DAY
Qty: 0 | Refills: 0 | Status: DISCONTINUED | OUTPATIENT
Start: 2019-04-03 | End: 2019-04-04

## 2019-04-03 RX ORDER — METOCLOPRAMIDE HCL 10 MG
10 TABLET ORAL EVERY 8 HOURS
Qty: 0 | Refills: 0 | Status: DISCONTINUED | OUTPATIENT
Start: 2019-04-03 | End: 2019-04-04

## 2019-04-03 RX ORDER — DEXTROSE 50 % IN WATER 50 %
25 SYRINGE (ML) INTRAVENOUS ONCE
Qty: 0 | Refills: 0 | Status: DISCONTINUED | OUTPATIENT
Start: 2019-04-03 | End: 2019-04-04

## 2019-04-03 RX ORDER — INSULIN LISPRO 100/ML
VIAL (ML) SUBCUTANEOUS
Qty: 0 | Refills: 0 | Status: DISCONTINUED | OUTPATIENT
Start: 2019-04-03 | End: 2019-04-04

## 2019-04-03 RX ORDER — INSULIN GLARGINE 100 [IU]/ML
15 INJECTION, SOLUTION SUBCUTANEOUS ONCE
Qty: 0 | Refills: 0 | Status: COMPLETED | OUTPATIENT
Start: 2019-04-03 | End: 2019-04-03

## 2019-04-03 RX ORDER — CLONAZEPAM 1 MG
0.5 TABLET ORAL ONCE
Qty: 0 | Refills: 0 | Status: DISCONTINUED | OUTPATIENT
Start: 2019-04-03 | End: 2019-04-03

## 2019-04-03 RX ORDER — INSULIN LISPRO 100/ML
10 VIAL (ML) SUBCUTANEOUS
Qty: 0 | Refills: 0 | Status: DISCONTINUED | OUTPATIENT
Start: 2019-04-03 | End: 2019-04-03

## 2019-04-03 RX ORDER — SODIUM CHLORIDE 9 MG/ML
1000 INJECTION, SOLUTION INTRAVENOUS
Qty: 0 | Refills: 0 | Status: DISCONTINUED | OUTPATIENT
Start: 2019-04-03 | End: 2019-04-04

## 2019-04-03 RX ORDER — INSULIN LISPRO 100/ML
11 VIAL (ML) SUBCUTANEOUS
Qty: 0 | Refills: 0 | Status: DISCONTINUED | OUTPATIENT
Start: 2019-04-03 | End: 2019-04-04

## 2019-04-03 RX ORDER — OXYCODONE AND ACETAMINOPHEN 5; 325 MG/1; MG/1
1 TABLET ORAL EVERY 6 HOURS
Qty: 0 | Refills: 0 | Status: DISCONTINUED | OUTPATIENT
Start: 2019-04-03 | End: 2019-04-04

## 2019-04-03 RX ORDER — PANTOPRAZOLE SODIUM 20 MG/1
40 TABLET, DELAYED RELEASE ORAL
Qty: 0 | Refills: 0 | Status: DISCONTINUED | OUTPATIENT
Start: 2019-04-03 | End: 2019-04-04

## 2019-04-03 RX ORDER — LACTOBACILLUS ACIDOPHILUS 100MM CELL
1 CAPSULE ORAL THREE TIMES A DAY
Qty: 0 | Refills: 0 | Status: DISCONTINUED | OUTPATIENT
Start: 2019-04-03 | End: 2019-04-04

## 2019-04-03 RX ORDER — INSULIN GLARGINE 100 [IU]/ML
14 INJECTION, SOLUTION SUBCUTANEOUS
Qty: 0 | Refills: 0 | Status: DISCONTINUED | OUTPATIENT
Start: 2019-04-03 | End: 2019-04-03

## 2019-04-03 RX ORDER — LACTOBACILLUS ACIDOPHILUS 100MM CELL
1 CAPSULE ORAL DAILY
Qty: 0 | Refills: 0 | Status: DISCONTINUED | OUTPATIENT
Start: 2019-04-03 | End: 2019-04-03

## 2019-04-03 RX ADMIN — HYDROMORPHONE HYDROCHLORIDE 1 MILLIGRAM(S): 2 INJECTION INTRAMUSCULAR; INTRAVENOUS; SUBCUTANEOUS at 14:14

## 2019-04-03 RX ADMIN — Medication 0.5 MILLIGRAM(S): at 23:26

## 2019-04-03 RX ADMIN — HYDROMORPHONE HYDROCHLORIDE 1 MILLIGRAM(S): 2 INJECTION INTRAMUSCULAR; INTRAVENOUS; SUBCUTANEOUS at 02:55

## 2019-04-03 RX ADMIN — HYDROMORPHONE HYDROCHLORIDE 1 MILLIGRAM(S): 2 INJECTION INTRAMUSCULAR; INTRAVENOUS; SUBCUTANEOUS at 07:32

## 2019-04-03 RX ADMIN — Medication 4: at 08:19

## 2019-04-03 RX ADMIN — Medication 1 GRAM(S): at 11:16

## 2019-04-03 RX ADMIN — Medication 1 GRAM(S): at 17:10

## 2019-04-03 RX ADMIN — HYDROMORPHONE HYDROCHLORIDE 1 MILLIGRAM(S): 2 INJECTION INTRAMUSCULAR; INTRAVENOUS; SUBCUTANEOUS at 03:15

## 2019-04-03 RX ADMIN — Medication 10 MILLIGRAM(S): at 00:06

## 2019-04-03 RX ADMIN — Medication 1 TABLET(S): at 21:21

## 2019-04-03 RX ADMIN — Medication 11 UNIT(S): at 11:46

## 2019-04-03 RX ADMIN — HYDROMORPHONE HYDROCHLORIDE 1 MILLIGRAM(S): 2 INJECTION INTRAMUSCULAR; INTRAVENOUS; SUBCUTANEOUS at 21:04

## 2019-04-03 RX ADMIN — PIPERACILLIN AND TAZOBACTAM 3.38 GRAM(S): 4; .5 INJECTION, POWDER, LYOPHILIZED, FOR SOLUTION INTRAVENOUS at 00:20

## 2019-04-03 RX ADMIN — HYDROMORPHONE HYDROCHLORIDE 1 MILLIGRAM(S): 2 INJECTION INTRAMUSCULAR; INTRAVENOUS; SUBCUTANEOUS at 08:50

## 2019-04-03 RX ADMIN — Medication 1 GRAM(S): at 23:27

## 2019-04-03 RX ADMIN — Medication 10 MILLIGRAM(S): at 02:27

## 2019-04-03 RX ADMIN — INSULIN GLARGINE 15 UNIT(S): 100 INJECTION, SOLUTION SUBCUTANEOUS at 09:50

## 2019-04-03 RX ADMIN — Medication 1 TABLET(S): at 13:44

## 2019-04-03 RX ADMIN — MORPHINE SULFATE 4 MILLIGRAM(S): 50 CAPSULE, EXTENDED RELEASE ORAL at 00:05

## 2019-04-03 RX ADMIN — MORPHINE SULFATE 4 MILLIGRAM(S): 50 CAPSULE, EXTENDED RELEASE ORAL at 00:48

## 2019-04-03 RX ADMIN — HYDROMORPHONE HYDROCHLORIDE 1 MILLIGRAM(S): 2 INJECTION INTRAMUSCULAR; INTRAVENOUS; SUBCUTANEOUS at 22:44

## 2019-04-03 RX ADMIN — Medication 800 MILLIGRAM(S): at 13:44

## 2019-04-03 RX ADMIN — Medication 10 UNIT(S): at 08:20

## 2019-04-03 RX ADMIN — ALBUTEROL 2 PUFF(S): 90 AEROSOL, METERED ORAL at 08:51

## 2019-04-03 RX ADMIN — HYDROMORPHONE HYDROCHLORIDE 1 MILLIGRAM(S): 2 INJECTION INTRAMUSCULAR; INTRAVENOUS; SUBCUTANEOUS at 14:36

## 2019-04-03 RX ADMIN — PANTOPRAZOLE SODIUM 40 MILLIGRAM(S): 20 TABLET, DELAYED RELEASE ORAL at 06:02

## 2019-04-03 RX ADMIN — SODIUM CHLORIDE 50 MILLILITER(S): 9 INJECTION, SOLUTION INTRAVENOUS at 21:20

## 2019-04-03 RX ADMIN — Medication 800 MILLIGRAM(S): at 21:22

## 2019-04-03 RX ADMIN — OXYCODONE AND ACETAMINOPHEN 1 TABLET(S): 5; 325 TABLET ORAL at 22:46

## 2019-04-03 RX ADMIN — Medication 11 UNIT(S): at 16:39

## 2019-04-03 RX ADMIN — Medication 6: at 11:15

## 2019-04-03 NOTE — PROGRESS NOTE ADULT - SUBJECTIVE AND OBJECTIVE BOX
Patient is a 38y old  Male who presents with a chief complaint of abdominal pain, nausea, vomiting, diarrhea that began about a week ago.  He reports inability to tolerate diet and was seen in the ED earlier in the week but has not felt better.  Denies fever or chills.  Denies recent sick contacts or travel.    INTERVAL HPI/OVERNIGHT EVENTS:  Still complain of nausea and diffuse abdominal pain.    MEDICATIONS  (STANDING):  dextrose 5%. 1000 milliLiter(s) (50 mL/Hr) IV Continuous <Continuous>  dextrose 50% Injectable 12.5 Gram(s) IV Push once  dextrose 50% Injectable 25 Gram(s) IV Push once  dextrose 50% Injectable 25 Gram(s) IV Push once  insulin glargine Injectable (LANTUS) 15 Unit(s) SubCutaneous two times a day  insulin lispro (HumaLOG) corrective regimen sliding scale   SubCutaneous three times a day before meals  insulin lispro (HumaLOG) corrective regimen sliding scale   SubCutaneous at bedtime  insulin lispro Injectable (HumaLOG) 10 Unit(s) SubCutaneous three times a day before meals  lactobacillus acidophilus 1 Tablet(s) Oral three times a day  mesalamine DR Capsule 800 milliGRAM(s) Oral three times a day  pantoprazole    Tablet 40 milliGRAM(s) Oral before breakfast  sodium chloride 0.9%. 1000 milliLiter(s) (50 mL/Hr) IV Continuous <Continuous>  sucralfate 1 Gram(s) Oral four times a day    MEDICATIONS  (PRN):  acetaminophen   Tablet .. 650 milliGRAM(s) Oral every 6 hours PRN Temp greater or equal to 38C (100.4F), Mild Pain (1 - 3)  ALBUTerol    90 MICROgram(s) HFA Inhaler 2 Puff(s) Inhalation every 6 hours PRN Shortness of Breath and/or Wheezing  dextrose 40% Gel 15 Gram(s) Oral once PRN Blood Glucose LESS THAN 70 milliGRAM(s)/deciliter  glucagon  Injectable 1 milliGRAM(s) IntraMuscular once PRN Glucose LESS THAN 70 milligrams/deciliter  HYDROmorphone  Injectable 1 milliGRAM(s) IV Push every 6 hours PRN Severe Pain (7 - 10)  metoclopramide Injectable 10 milliGRAM(s) IV Push every 8 hours PRN nausea/vomiting  oxyCODONE    5 mG/acetaminophen 325 mG 1 Tablet(s) Oral every 6 hours PRN Moderate Pain (4 - 6)    Allergies:   No Known Allergies    REVIEW OF SYSTEMS:  All other systems reviewed and are negative    Vital Signs Last 24 Hrs  T(C): 36.4 (2019 05:46), Max: 37.2 (2019 21:12)  T(F): 97.5 (2019 05:46), Max: 98.9 (2019 21:12)  HR: 69 (2019 05:46) (62 - 90)  BP: 128/85 (2019 05:46) (128/85 - 156/109)  BP(mean): --  RR: 16 (2019 05:46) (16 - 18)  SpO2: 97% (:46) (97% - 98%)  Daily Height in cm: 175.26 (2019 02:23)    Daily Weight in k.1 (2019 05:46)  I&O's Summary    2019 07:01  -  2019 07:00  --------------------------------------------------------  IN: 500 mL / OUT: 0 mL / NET: 500 mL    CAPILLARY BLOOD GLUCOSE    POCT Blood Glucose.: 247 mg/dL (2019 08:08)    PHYSICAL EXAM:  GENERAL: NAD, well-groomed, well-developed  HEAD:  Atraumatic, Normocephalic  EYES: EOMI, PERRLA, conjunctiva and sclera clear  ENMT: No tonsillar erythema, exudates, or enlargement; Moist mucous membranes, Good dentition, No lesions  NECK: Supple, No JVD, Normal thyroid  NERVOUS SYSTEM:  Alert & Oriented X3, Good concentration; Motor Strength 5/5 B/L upper and lower extremities; DTRs 2+ intact and symmetric  CHEST/LUNG: + exp wheezing b/l  HEART: Regular rate and rhythm; No murmurs, rubs, or gallops  ABDOMEN: Soft, + mild diffuse tenderness, Nondistended; Bowel sounds present  EXTREMITIES:  2+ Peripheral Pulses, No clubbing, cyanosis, or edema  LYMPH: No lymphadenopathy noted  SKIN: No rashes or lesions    Labs                      13.0   11.89 )-----------( 330      ( 2019 07:44 )             38.9     04-03    141  |  104  |  15  ----------------------------<  266<H>  3.8   |  25  |  0.92    Ca    8.8      2019 07:44  Phos  2.6     04-03  Mg     2.0     04-03    TPro  7.9  /  Alb  3.7  /  TBili  0.6  /  DBili  x   /  AST  40<H>  /  ALT  28  /  AlkPhos  89  04-02    Culture - Urine (collected 31 Mar 2019 12:31)  Source: .Urine  Final Report (2019 08:35):    <10,000 CFU/mL Normal Urogenital Meena      DVT prophylaxis: ambulatory, low risk

## 2019-04-03 NOTE — H&P ADULT - PROBLEM SELECTOR PLAN 1
Pt afebrile, without leukocytosis, symptoms present ~2 days.  No need for further antibiotics  Symptomatic treatment with Reglan, fluids, advance diet  Pt started on mesalamine June 2018, was to follow as outpatient with Dr. Kumar for Colonoscopy, unclear if he followed.  Continue mesalamine for now.

## 2019-04-03 NOTE — H&P ADULT - HISTORY OF PRESENT ILLNESS
38 year old man with PMH of anxiety, asthma, DM1, gastritis, gastroparesis, presents to ED with complaint of nausea, vomiting, and diarrhea x 2 days.  He was seen in our ED and given Flagyl yesterday then came back as symptoms did not improve.  He denies bloody stool, fever, chills, recent travel, food outside of routine.    In the ED, CT ab (last visit) read as underdistention vs colitis.  Afebrile, no leukocytosis.  Lipase normal.  Treatment in ED provided no relief.

## 2019-04-03 NOTE — PROVIDER CONTACT NOTE (OTHER) - SITUATION
FINGER STICK LOW PATIENT GIVEN A TUBE OF GLUCOSE AND APPLE JUICE REFUSES TO TAKE THE dextrose IV PUSH

## 2019-04-03 NOTE — PROGRESS NOTE ADULT - ASSESSMENT
Abdominal pain, Nausea and Vomiting possible colitis  Possible diabetic gastroparesis  GI consult pending - Dr. Kumar  unclear need for antibiotics at this time   clear liq diet  low dose IV fluids  continue Protonix, Reglan Mesalamine and Carafate  check stool culture and C. diff  pain control    Asthma  Albuterol MDI prn    Type 1 DM - poorly controlled  Lantus dose increased to 15 units BID  Continue Humalog 10 units TID w/ meals  BGM monitoring  check HgbA1C

## 2019-04-03 NOTE — H&P ADULT - NSHPPHYSICALEXAM_GEN_ALL_CORE
GENERAL: Distress due to pain, well-groomed, well-developed  HEAD:  Atraumatic, Normocephalic  EYES: EOMI, PERRLA, conjunctiva and sclera clear  ENMT: No tonsillar erythema, exudates, or enlargement; Moist mucous membranes, No lesions  NECK: Supple, No JVD, Normal thyroid  NERVOUS SYSTEM:  Alert & Oriented X3, Good concentration  CHEST/LUNG: Clear to ascultation bilaterally; No rales, rhonchi, wheezing, or rubs  HEART: Regular rate and rhythm; No murmurs, rubs, or gallops  ABDOMEN: Soft, diffusely tender with guarding, no rebound, Nondistended; Bowel sounds present  EXTREMITIES: no clubbing, cyanosis, or edema  SKIN: several tattoos  PSYCH: flat affect

## 2019-04-03 NOTE — CONSULT NOTE ADULT - SUBJECTIVE AND OBJECTIVE BOX
Chief Complaint:  Patient is a 38y old  Male who presents with a chief complaint of abdominal pain, nausea, vomiting, diarrhea.       HPI:  38 year old man with PMH of anxiety, asthma, DM1, gastritis, gastroparesis, presents to ED with complaint of nausea, vomiting, and diarrhea x 2 days.  He was seen in our ED and given Flagyl yesterday then came back as symptoms did not improve.  He denies bloody stool, fever, chills, recent travel, food outside of routine. Several episodes of cyclic vomiting. Smokes marijuana regularly. BS uncontrolled EGD,Colon and gastric emptying? study all negative within past year. No GI complaints of colitis.    In the ED, CT ab (last visit) read as underdistention vs colitis.  Afebrile, no leukocytosis.  Lipase normal.  Treatment in ED provided no relief. (2019 03:15)      PMH/PSH:PAST MEDICAL & SURGICAL HISTORY:  Uncomplicated asthma, unspecified asthma severity, unspecified whether persistent  Gastritis, presence of bleeding unspecified, unspecified chronicity, unspecified gastritis type  Controlled diabetes mellitus type 1 without complications  Anxiety  Gastritis  Asthma  DM type 1 (diabetes mellitus, type 1)  History of cholecystectomy  S/P cholecystectomy      Allergies:  No Known Allergies      Medications:  acetaminophen   Tablet .. 650 milliGRAM(s) Oral every 6 hours PRN  ALBUTerol    90 MICROgram(s) HFA Inhaler 2 Puff(s) Inhalation every 6 hours PRN  dextrose 40% Gel 15 Gram(s) Oral once PRN  dextrose 5%. 1000 milliLiter(s) IV Continuous <Continuous>  dextrose 50% Injectable 12.5 Gram(s) IV Push once  dextrose 50% Injectable 25 Gram(s) IV Push once  dextrose 50% Injectable 25 Gram(s) IV Push once  glucagon  Injectable 1 milliGRAM(s) IntraMuscular once PRN  HYDROmorphone  Injectable 1 milliGRAM(s) IV Push every 6 hours PRN  insulin glargine Injectable (LANTUS) 15 Unit(s) SubCutaneous two times a day  insulin lispro (HumaLOG) corrective regimen sliding scale   SubCutaneous three times a day before meals  insulin lispro (HumaLOG) corrective regimen sliding scale   SubCutaneous at bedtime  insulin lispro Injectable (HumaLOG) 11 Unit(s) SubCutaneous three times a day before meals  lactobacillus acidophilus 1 Tablet(s) Oral three times a day  mesalamine DR Capsule 800 milliGRAM(s) Oral three times a day  metoclopramide Injectable 10 milliGRAM(s) IV Push every 8 hours PRN  oxyCODONE    5 mG/acetaminophen 325 mG 1 Tablet(s) Oral every 6 hours PRN  pantoprazole    Tablet 40 milliGRAM(s) Oral before breakfast  sodium chloride 0.9%. 1000 milliLiter(s) IV Continuous <Continuous>  sucralfate 1 Gram(s) Oral four times a day      REVIEW OF SYSTEMS:    CONSTITUTIONAL: No weakness, fevers or chills  EYES/ENT: No visual changes;  No vertigo or throat pain   NECK: No pain or stiffness  RESPIRATORY: No cough, wheezing, hemoptysis; No shortness of breath  CARDIOVASCULAR: No chest pain or palpitations  GASTROINTESTINAL: No abdominal or epigastric pain. No nausea, vomiting, or hematemesis; No diarrhea or constipation. No melena or hematochezia.  GENITOURINARY: No dysuria, frequency or hematuria  NEUROLOGICAL: No numbness or weakness  SKIN: No itching, burning, rashes, or lesions   All other review of systems is negative unless indicated above.  Relevant Family History:   FAMILY HISTORY:  Family history of diabetes mellitus (DM) (Father, Mother, Grandparent)  Family history of diabetes mellitus (Father, Mother)      Relevant Social History:  Denies ETOH or tobacco history    Physical Exam:    Vital Signs:  Vital Signs Last 24 Hrs  T(C): 36.7 (2019 11:44), Max: 37.2 (2019 21:12)  T(F): 98 (2019 11:44), Max: 98.9 (2019 21:12)  HR: 65 (2019 11:44) (62 - 90)  BP: 134/89 (2019 11:44) (128/85 - 156/109)  BP(mean): --  RR: 16 (2019 11:44) (16 - 18)  SpO2: 97% (2019 05:46) (97% - 98%)  Daily Height in cm: 175.26 (2019 02:23)    Daily Weight in k.1 (2019 05:46)    Constitutional: WDWN resting comfortably in bed; NAD  HEENT: NC/AT, PERRL, EOMI, anicteric sclera, no nasal discharge; uvula midline, no oropharyngeal erythema or exudates  Neck: supple; no JVD or thyromegaly  Respiratory: CTA B/L; no W/R/R, no retractions  Cardiac: +S1/S2; RRR; no M/R/G; PMI non-displaced  Gastrointestinal: soft, NT/ND; no rebound or guarding; +BS   Extremities: , no clubbing or cyanosis; no peripheral edema  Musculoskeletal:  no joint swelling, tenderness or erythema  Vascular: 2+ radial, femoral, DP/PT pulses B/L  Skin: warm, dry and intact; no rashes, wounds, or scars  Neurologic: AAOx3; CNS grossly intact; no focal deficits no asterixis, no tremor, no encephalopathy    Laboratory:                          13.0   11.89 )-----------( 330      ( 2019 07:44 )             38.9     -    141  |  104  |  15  ----------------------------<  266<H>  3.8   |  25  |  0.92    Ca    8.8      2019 07:44  Phos  2.6     -  Mg     2.0     -    TPro  7.9  /  Alb  3.7  /  TBili  0.6  /  DBili  x   /  AST  40<H>  /  ALT  28  /  AlkPhos  89  02    LIVER FUNCTIONS - ( 2019 22:55 )  Alb: 3.7 g/dL / Pro: 7.9 gm/dL / ALK PHOS: 89 U/L / ALT: 28 U/L / AST: 40 U/L / GGT: x               Amylase Serum90 U/L      Lipase serum29 U/L<L>                 19 @ 07:01  -  19 @ 07:00  --------------------------------------------------------  IN: 500 mL / OUT: 0 mL / NET: 500 mL        Imaging:  < from: CT Abdomen and Pelvis w/ Oral Cont and w/ IV Cont (18 @ 02:47) >  EXAM:  CT ABDOMEN AND PELVIS OC IC                            PROCEDURE DATE:  2018          INTERPRETATION:  CLINICAL INFORMATION: Abdominal pain.    COMPARISON: CT abdomen/pelvis of 12/10/2018.    PROCEDURE:   CT of the Abdomen and Pelvis was performed with intravenous contrast.   Intravenous contrast: 85 ml Omnipaque 350. 15 ml discarded.  Oral contrast: positive contrast was administered.  Sagittal and coronal reformats were performed.    FINDINGS:    LOWER CHEST: Within normal limits.    LIVER: Within normal limits.  BILE DUCTS: Normal caliber.  GALLBLADDER: Status post cholecystectomy.  SPLEEN: Within normal limits.  PANCREAS: Atrophy.  ADRENALS: Within normal limits.  KIDNEYS/URETERS: Within normal limits.    BLADDER: Underdistended, limiting evaluation.  REPRODUCTIVE ORGANS: The prostate is within normal limits.    BOWEL: No bowel obstruction. Mild colonic bowel wall thickening and/or   underdistention. Appendix normal.  PERITONEUM: No ascites.  VESSELS:  Within normal limits.  RETROPERITONEUM: No lymphadenopathy.    ABDOMINAL WALL: Tiny fat-containing umbilical hernia. Redemonstration of   nonspecific skin thickening of the anterior abdominal wall, greater on   the left.  BONES: Within normal limits.    IMPRESSION: Mild colonic bowel wall thickening and/or underdistention.   Correlate for symptoms related to colitis.          < end of copied text >

## 2019-04-03 NOTE — H&P ADULT - ASSESSMENT
38 year old man with PMH of anxiety, asthma, DM1, gastritis, gastroparesis, presents to ED with complaint of nausea, vomiting, and diarrhea x 2 days.  Patient will require admission for at least 2 midnights as detailed below:    IMPROVE VTE Individual Risk Assessment          RISK                                                          Points    [  ] Previous VTE                                                3    [  ] Thrombophilia                                             2    [  ] Lower limb paralysis                                    2        (unable to hold up >15 seconds)      [  ] Current Cancer                                             2         (within 6 months)    [ x ] Immobilization > 24 hrs                              1    [  ] ICU/CCU stay > 24 hours                            1    [  ] Age > 60                                                    1    IMPROVE VTE Score ___1______

## 2019-04-03 NOTE — PATIENT PROFILE ADULT - VISION (WITH CORRECTIVE LENSES IF THE PATIENT USUALLY WEARS THEM):
Partially impaired: cannot see medication labels or newsprint, but can see obstacles in path, and the surrounding layout; can count fingers at arm's length/wears contact for poor vision

## 2019-04-03 NOTE — H&P ADULT - NSHPLABSRESULTS_GEN_ALL_CORE
Vital Signs Last 24 Hrs  T(C): 36.9 (03 Apr 2019 02:23), Max: 37.2 (02 Apr 2019 21:12)  T(F): 98.4 (03 Apr 2019 02:23), Max: 98.9 (02 Apr 2019 21:12)  HR: 62 (03 Apr 2019 02:23) (62 - 90)  BP: 152/91 (03 Apr 2019 02:23) (136/62 - 156/109)  BP(mean): --  RR: 18 (03 Apr 2019 02:23) (17 - 18)  SpO2: 98% (03 Apr 2019 01:27) (97% - 98%)          LABS:                        14.2   7.90  )-----------( 393      ( 02 Apr 2019 22:55 )             42.5     04-02    138  |  101  |  10  ----------------------------<  194<H>  4.6   |  28  |  0.97    Ca    9.5      02 Apr 2019 22:55    TPro  7.9  /  Alb  3.7  /  TBili  0.6  /  DBili  x   /  AST  40<H>  /  ALT  28  /  AlkPhos  89  04-02

## 2019-04-04 ENCOUNTER — TRANSCRIPTION ENCOUNTER (OUTPATIENT)
Age: 39
End: 2019-04-04

## 2019-04-04 VITALS
SYSTOLIC BLOOD PRESSURE: 158 MMHG | TEMPERATURE: 98 F | RESPIRATION RATE: 16 BRPM | DIASTOLIC BLOOD PRESSURE: 70 MMHG | HEART RATE: 59 BPM | OXYGEN SATURATION: 98 %

## 2019-04-04 LAB
HCT VFR BLD CALC: 38.9 % — LOW (ref 39–50)
HGB BLD-MCNC: 12.8 G/DL — LOW (ref 13–17)
MCHC RBC-ENTMCNC: 27.8 PG — SIGNIFICANT CHANGE UP (ref 27–34)
MCHC RBC-ENTMCNC: 32.9 GM/DL — SIGNIFICANT CHANGE UP (ref 32–36)
MCV RBC AUTO: 84.4 FL — SIGNIFICANT CHANGE UP (ref 80–100)
NRBC # BLD: 0 /100 WBCS — SIGNIFICANT CHANGE UP (ref 0–0)
PLATELET # BLD AUTO: 327 K/UL — SIGNIFICANT CHANGE UP (ref 150–400)
RBC # BLD: 4.61 M/UL — SIGNIFICANT CHANGE UP (ref 4.2–5.8)
RBC # FLD: 13.9 % — SIGNIFICANT CHANGE UP (ref 10.3–14.5)
WBC # BLD: 7.07 K/UL — SIGNIFICANT CHANGE UP (ref 3.8–10.5)
WBC # FLD AUTO: 7.07 K/UL — SIGNIFICANT CHANGE UP (ref 3.8–10.5)

## 2019-04-04 PROCEDURE — 99239 HOSP IP/OBS DSCHRG MGMT >30: CPT

## 2019-04-04 RX ORDER — ERYTHROMYCIN ETHYLSUCCINATE 400 MG
1 TABLET ORAL
Qty: 120 | Refills: 0 | OUTPATIENT
Start: 2019-04-04 | End: 2019-05-03

## 2019-04-04 RX ORDER — PANTOPRAZOLE SODIUM 20 MG/1
1 TABLET, DELAYED RELEASE ORAL
Qty: 14 | Refills: 0 | OUTPATIENT
Start: 2019-04-04 | End: 2019-04-17

## 2019-04-04 RX ORDER — INSULIN GLARGINE 100 [IU]/ML
15 INJECTION, SOLUTION SUBCUTANEOUS
Qty: 0 | Refills: 0 | DISCHARGE
Start: 2019-04-04

## 2019-04-04 RX ORDER — INSULIN GLARGINE 100 [IU]/ML
13 INJECTION, SOLUTION SUBCUTANEOUS
Qty: 30 | Refills: 0
Start: 2019-04-04

## 2019-04-04 RX ORDER — ERYTHROMYCIN ETHYLSUCCINATE 400 MG
250 TABLET ORAL EVERY 6 HOURS
Qty: 0 | Refills: 0 | Status: DISCONTINUED | OUTPATIENT
Start: 2019-04-04 | End: 2019-04-04

## 2019-04-04 RX ORDER — INSULIN ASPART 100 [IU]/ML
11 INJECTION, SOLUTION SUBCUTANEOUS
Qty: 0 | Refills: 0 | DISCHARGE
Start: 2019-04-04

## 2019-04-04 RX ADMIN — HYDROMORPHONE HYDROCHLORIDE 1 MILLIGRAM(S): 2 INJECTION INTRAMUSCULAR; INTRAVENOUS; SUBCUTANEOUS at 05:51

## 2019-04-04 RX ADMIN — Medication 1 GRAM(S): at 05:52

## 2019-04-04 RX ADMIN — OXYCODONE AND ACETAMINOPHEN 1 TABLET(S): 5; 325 TABLET ORAL at 06:59

## 2019-04-04 RX ADMIN — Medication 11 UNIT(S): at 07:47

## 2019-04-04 RX ADMIN — Medication 1 TABLET(S): at 05:52

## 2019-04-04 RX ADMIN — HYDROMORPHONE HYDROCHLORIDE 1 MILLIGRAM(S): 2 INJECTION INTRAMUSCULAR; INTRAVENOUS; SUBCUTANEOUS at 10:51

## 2019-04-04 RX ADMIN — INSULIN GLARGINE 15 UNIT(S): 100 INJECTION, SOLUTION SUBCUTANEOUS at 07:47

## 2019-04-04 RX ADMIN — PANTOPRAZOLE SODIUM 40 MILLIGRAM(S): 20 TABLET, DELAYED RELEASE ORAL at 07:47

## 2019-04-04 RX ADMIN — SODIUM CHLORIDE 50 MILLILITER(S): 9 INJECTION, SOLUTION INTRAVENOUS at 05:54

## 2019-04-04 RX ADMIN — OXYCODONE AND ACETAMINOPHEN 1 TABLET(S): 5; 325 TABLET ORAL at 05:51

## 2019-04-04 RX ADMIN — Medication 800 MILLIGRAM(S): at 05:52

## 2019-04-04 RX ADMIN — Medication 250 MILLIGRAM(S): at 11:22

## 2019-04-04 RX ADMIN — Medication 6: at 07:47

## 2019-04-04 RX ADMIN — Medication 1 GRAM(S): at 11:22

## 2019-04-04 RX ADMIN — HYDROMORPHONE HYDROCHLORIDE 1 MILLIGRAM(S): 2 INJECTION INTRAMUSCULAR; INTRAVENOUS; SUBCUTANEOUS at 04:19

## 2019-04-04 NOTE — PROGRESS NOTE ADULT - REASON FOR ADMISSION
abdominal pain, nausea, vomiting, diarrhea.

## 2019-04-04 NOTE — PROGRESS NOTE ADULT - SUBJECTIVE AND OBJECTIVE BOX
Gastroenterology  Patient seen and examined bedside resting comfortably.  No complaints offered.   No abdominal pain  Denies nausea and vomiting. Tolerating diet.  Normal BM .     T(F): 97.7 (04-04-19 @ 05:06), Max: 98.2 (04-03-19 @ 17:01)  HR: 59 (04-04-19 @ 07:02) (59 - 77)  BP: 152/92 (04-04-19 @ 07:02) (134/89 - 162/69)  RR: 18 (04-04-19 @ 07:02) (16 - 18)  SpO2: 96% (04-04-19 @ 05:06) (96% - 98%)  Wt(kg): --  CAPILLARY BLOOD GLUCOSE      POCT Blood Glucose.: 261 mg/dL (04 Apr 2019 07:30)  POCT Blood Glucose.: 130 mg/dL (04 Apr 2019 04:11)  POCT Blood Glucose.: 79 mg/dL (03 Apr 2019 20:46)  POCT Blood Glucose.: 46 mg/dL (03 Apr 2019 20:12)  POCT Blood Glucose.: 35 mg/dL (03 Apr 2019 20:07)  POCT Blood Glucose.: 81 mg/dL (03 Apr 2019 16:30)  POCT Blood Glucose.: 282 mg/dL (03 Apr 2019 11:10)      PHYSICAL EXAM:  General: NAD, WDWN.   Neuro:  Alert & responsive  HEENT: NCAT, EOMI, conjunctiva clear  CV: +S1+S2 regular rate and rhythm  Lung: clear to ausculation bilaterally, respirations nonlabored, good inspiratory effort  Abdomen: soft, Non Tender, No distention Normal active BS  Extremities: no cyanosis, clubbing or edema    LABS:                        12.8   7.07  )-----------( 327      ( 04 Apr 2019 07:34 )             38.9     04-03    141  |  104  |  15  ----------------------------<  266<H>  3.8   |  25  |  0.92    Ca    8.8      03 Apr 2019 07:44  Phos  2.6     04-03  Mg     2.0     04-03    TPro  7.9  /  Alb  3.7  /  TBili  0.6  /  DBili  x   /  AST  40<H>  /  ALT  28  /  AlkPhos  89  04-02    LIVER FUNCTIONS - ( 02 Apr 2019 22:55 )  Alb: 3.7 g/dL / Pro: 7.9 gm/dL / ALK PHOS: 89 U/L / ALT: 28 U/L / AST: 40 U/L / GGT: x             I&O's Detail    04-03 @ 07:44    141 | 104 | 15  /8.8 | 2.0 | 2.6  _______________________/  3.8 | 25 | 0.92                           \par   Amylase, Serum Total: 90 U/L (04-02 @ 22:55)

## 2019-04-04 NOTE — DISCHARGE NOTE PROVIDER - CARE PROVIDER_API CALL
Octavio Kumar)  Medicine  [address], [address]  [place] [address]  Phone: [phone]  Fax: [phone]  Follow Up Time: 1 week    Horacio Morales)  Internal Medicine  [address] [facility] [facility]  [place] [address]  Phone: [phone]  Fax: [phone]  Follow Up Time: 1 week

## 2019-04-04 NOTE — DISCHARGE NOTE PROVIDER - PROVIDER TOKENS
PROVIDER:[TOKEN:[[id number]],FOLLOWUP:[1 week]],PROVIDER:[TOKEN:[[id number]],FOLLOWUP:[1 week]]

## 2019-04-04 NOTE — PROGRESS NOTE ADULT - ASSESSMENT
Abdominal pain, Nausea and Vomiting possible diabetic gastroparesis  Acute colitis ruled out  GI following - cleared for discharge  advance diet as tolerated  DC IV fluids  continue Protonix, Reglan Mesalamine and Carafate  added Erythromycin 250 mg q6h  pain control  should see PCP and GI as outpatient    Asthma  Albuterol MDI prn    Type 1 DM - poorly controlled  Lantus 15 units q AM, 13 units qhs  Continue Humalog 11 units TID w/ meals  BGM monitoring  Endocrinology Follow up as outpatient

## 2019-04-04 NOTE — DISCHARGE NOTE PROVIDER - CARE PROVIDERS DIRECT ADDRESSES
[email or web address]

## 2019-04-04 NOTE — PROGRESS NOTE ADULT - ASSESSMENT
37 yo bm with recurrent vomiting due to gastroparesis from uncontrolled diabetes. Possible Cyclic vomiting syndrome but in view of the poorly controlled bs most likely gastroparesis. No evidence of colitis.

## 2019-04-04 NOTE — DISCHARGE NOTE NURSING/CASE MANAGEMENT/SOCIAL WORK - NSDCDPATPORTLINK_GEN_ALL_CORE
You can access the [facility] Patient Portal, offered by [facility], by registering with the following website: [email or web address]

## 2019-04-04 NOTE — DISCHARGE NOTE PROVIDER - HOSPITAL COURSE
Patient is a 38y old  Male who presents with a chief complaint of abdominal pain, nausea, vomiting, diarrhea that began about a week ago.  He reports inability to tolerate diet and was seen in the ED earlier in the week but has not felt better.  Denies fever or chills.  Denies recent sick contacts or travel.      Tolerating diet so far with no further vomiting or diarrhea - cleared by GI for discharge    Erythromycin added    Advise GI Follow up as outpatient Patient is a 38y old  Male who presents with a chief complaint of abdominal pain, nausea, vomiting, diarrhea that began about a week ago.  He reports inability to tolerate diet and was seen in the ED earlier in the week but has not felt better.  Denies fever or chills.  Denies recent sick contacts or travel.      Tolerating diet so far with no further vomiting or diarrhea - cleared by GI for discharge    Erythromycin added    Advise GI Follow up as outpatient        Dx:    Abdominal pain, Nausea and Vomiting possible diabetic gastroparesis    Acute colitis ruled out    Asthma    Type 1 DM - poorly controlled

## 2019-04-04 NOTE — PROGRESS NOTE ADULT - SUBJECTIVE AND OBJECTIVE BOX
Patient is a 38y old  Male who presents with a chief complaint of abdominal pain, nausea, vomiting, diarrhea that began about a week ago.  He reports inability to tolerate diet and was seen in the ED earlier in the week but has not felt better.  Denies fever or chills.  Denies recent sick contacts or travel.    Tolerating diet so far - cleared by GI for discharge    INTERVAL HPI/OVERNIGHT EVENTS:  Still complain of nausea - no further diarrhea since admission    MEDICATIONS  (STANDING):  dextrose 5% + sodium chloride 0.9%. 1000 milliLiter(s) (50 mL/Hr) IV Continuous <Continuous>  dextrose 5%. 1000 milliLiter(s) (50 mL/Hr) IV Continuous <Continuous>  dextrose 50% Injectable 12.5 Gram(s) IV Push once  dextrose 50% Injectable 12.5 Gram(s) IV Push once  dextrose 50% Injectable 25 Gram(s) IV Push once  dextrose 50% Injectable 25 Gram(s) IV Push once  erythromycin     base Tablet 250 milliGRAM(s) Oral every 6 hours  insulin glargine Injectable (LANTUS) 15 Unit(s) SubCutaneous two times a day  insulin lispro (HumaLOG) corrective regimen sliding scale   SubCutaneous three times a day before meals  insulin lispro (HumaLOG) corrective regimen sliding scale   SubCutaneous at bedtime  insulin lispro Injectable (HumaLOG) 11 Unit(s) SubCutaneous three times a day before meals  lactobacillus acidophilus 1 Tablet(s) Oral three times a day  mesalamine DR Capsule 800 milliGRAM(s) Oral three times a day  pantoprazole    Tablet 40 milliGRAM(s) Oral before breakfast  sucralfate 1 Gram(s) Oral four times a day    MEDICATIONS  (PRN):  acetaminophen   Tablet .. 650 milliGRAM(s) Oral every 6 hours PRN Temp greater or equal to 38C (100.4F), Mild Pain (1 - 3)  ALBUTerol    90 MICROgram(s) HFA Inhaler 2 Puff(s) Inhalation every 6 hours PRN Shortness of Breath and/or Wheezing  dextrose 40% Gel 15 Gram(s) Oral once PRN Blood Glucose LESS THAN 70 milliGRAM(s)/deciliter  dextrose 40% Gel 15 Gram(s) Oral once PRN Blood Glucose LESS THAN 70 milliGRAM(s)/deciliter  glucagon  Injectable 1 milliGRAM(s) IntraMuscular once PRN Glucose LESS THAN 70 milligrams/deciliter  HYDROmorphone  Injectable 1 milliGRAM(s) IV Push every 6 hours PRN Severe Pain (7 - 10)  metoclopramide Injectable 10 milliGRAM(s) IV Push every 8 hours PRN nausea/vomiting  oxyCODONE    5 mG/acetaminophen 325 mG 1 Tablet(s) Oral every 6 hours PRN Moderate Pain (4 - 6)    Allergies:   No Known Allergies    REVIEW OF SYSTEMS:  All other systems reviewed and are negative    Vital Signs Last 24 Hrs  T(C): 36.5 (04 Apr 2019 05:06), Max: 36.8 (03 Apr 2019 17:01)  T(F): 97.7 (04 Apr 2019 05:06), Max: 98.2 (03 Apr 2019 17:01)  HR: 59 (04 Apr 2019 07:02) (59 - 77)  BP: 152/92 (04 Apr 2019 07:02) (134/89 - 162/69)  BP(mean): --  RR: 18 (04 Apr 2019 07:02) (16 - 18)  SpO2: 96% (04 Apr 2019 05:06) (96% - 98%)    CAPILLARY BLOOD GLUCOSE    POCT Blood Glucose.: 261 mg/dL (04 Apr 2019 07:30)  POCT Blood Glucose.: 130 mg/dL (04 Apr 2019 04:11)  POCT Blood Glucose.: 79 mg/dL (03 Apr 2019 20:46)  POCT Blood Glucose.: 46 mg/dL (03 Apr 2019 20:12)  POCT Blood Glucose.: 35 mg/dL (03 Apr 2019 20:07)  POCT Blood Glucose.: 81 mg/dL (03 Apr 2019 16:30)  POCT Blood Glucose.: 282 mg/dL (03 Apr 2019 11:10)    PHYSICAL EXAM:  GENERAL: NAD, well-groomed, well-developed  HEAD:  Atraumatic, Normocephalic  EYES: EOMI, PERRLA, conjunctiva and sclera clear  ENMT: No tonsillar erythema, exudates, or enlargement; Moist mucous membranes, Good dentition, No lesions  NECK: Supple, No JVD, Normal thyroid  NERVOUS SYSTEM:  Alert & Oriented X3, Good concentration; Motor Strength 5/5 B/L upper and lower extremities; DTRs 2+ intact and symmetric  CHEST/LUNG: + exp wheezing b/l  HEART: Regular rate and rhythm; No murmurs, rubs, or gallops  ABDOMEN: Soft, + mild diffuse tenderness, Nondistended; Bowel sounds present  EXTREMITIES:  2+ Peripheral Pulses, No clubbing, cyanosis, or edema  LYMPH: No lymphadenopathy noted  SKIN: No rashes or lesions    Labs                      12.8   7.07  )-----------( 327      ( 04 Apr 2019 07:34 )             38.9     04-03    141  |  104  |  15  ----------------------------<  266<H>  3.8   |  25  |  0.92    Ca    8.8      03 Apr 2019 07:44  Phos  2.6     04-03  Mg     2.0     04-03    TPro  7.9  /  Alb  3.7  /  TBili  0.6  /  DBili  x   /  AST  40<H>  /  ALT  28  /  AlkPhos  89  04-02    HgbA1C - 7.6      DVT prophylaxis: ambulatory, low risk

## 2019-04-09 DIAGNOSIS — Z79.4 LONG TERM (CURRENT) USE OF INSULIN: ICD-10-CM

## 2019-04-09 DIAGNOSIS — R10.9 UNSPECIFIED ABDOMINAL PAIN: ICD-10-CM

## 2019-04-09 DIAGNOSIS — F41.9 ANXIETY DISORDER, UNSPECIFIED: ICD-10-CM

## 2019-04-09 DIAGNOSIS — K31.84 GASTROPARESIS: ICD-10-CM

## 2019-04-09 DIAGNOSIS — E10.43 TYPE 1 DIABETES MELLITUS WITH DIABETIC AUTONOMIC (POLY)NEUROPATHY: ICD-10-CM

## 2019-04-09 DIAGNOSIS — R11.2 NAUSEA WITH VOMITING, UNSPECIFIED: ICD-10-CM

## 2019-04-09 DIAGNOSIS — K29.50 UNSPECIFIED CHRONIC GASTRITIS WITHOUT BLEEDING: ICD-10-CM

## 2019-04-09 DIAGNOSIS — J45.20 MILD INTERMITTENT ASTHMA, UNCOMPLICATED: ICD-10-CM

## 2019-04-23 ENCOUNTER — INPATIENT (INPATIENT)
Facility: HOSPITAL | Age: 39
LOS: 2 days | Discharge: HOME | End: 2019-04-26
Attending: INTERNAL MEDICINE | Admitting: INTERNAL MEDICINE
Payer: MEDICAID

## 2019-04-23 VITALS
HEART RATE: 107 BPM | TEMPERATURE: 98 F | OXYGEN SATURATION: 99 % | RESPIRATION RATE: 24 BRPM | SYSTOLIC BLOOD PRESSURE: 115 MMHG | WEIGHT: 175.05 LBS | DIASTOLIC BLOOD PRESSURE: 78 MMHG | HEIGHT: 69 IN

## 2019-04-23 DIAGNOSIS — Z90.49 ACQUIRED ABSENCE OF OTHER SPECIFIED PARTS OF DIGESTIVE TRACT: Chronic | ICD-10-CM

## 2019-04-23 LAB
ALBUMIN SERPL ELPH-MCNC: 4.3 G/DL — SIGNIFICANT CHANGE UP (ref 3.3–5)
ALP SERPL-CCNC: 100 U/L — SIGNIFICANT CHANGE UP (ref 40–120)
ALT FLD-CCNC: 18 U/L — SIGNIFICANT CHANGE UP (ref 12–78)
ANION GAP SERPL CALC-SCNC: 13 MMOL/L — SIGNIFICANT CHANGE UP (ref 5–17)
APPEARANCE UR: CLEAR — SIGNIFICANT CHANGE UP
AST SERPL-CCNC: 15 U/L — SIGNIFICANT CHANGE UP (ref 15–37)
BASOPHILS # BLD AUTO: 0.06 K/UL — SIGNIFICANT CHANGE UP (ref 0–0.2)
BASOPHILS NFR BLD AUTO: 0.6 % — SIGNIFICANT CHANGE UP (ref 0–2)
BILIRUB SERPL-MCNC: 0.7 MG/DL — SIGNIFICANT CHANGE UP (ref 0.2–1.2)
BILIRUB UR-MCNC: NEGATIVE — SIGNIFICANT CHANGE UP
BUN SERPL-MCNC: 16 MG/DL — SIGNIFICANT CHANGE UP (ref 7–23)
CALCIUM SERPL-MCNC: 9.8 MG/DL — SIGNIFICANT CHANGE UP (ref 8.5–10.1)
CHLORIDE SERPL-SCNC: 99 MMOL/L — SIGNIFICANT CHANGE UP (ref 96–108)
CO2 SERPL-SCNC: 27 MMOL/L — SIGNIFICANT CHANGE UP (ref 22–31)
COLOR SPEC: YELLOW — SIGNIFICANT CHANGE UP
CREAT SERPL-MCNC: 1.21 MG/DL — SIGNIFICANT CHANGE UP (ref 0.5–1.3)
DIFF PNL FLD: ABNORMAL
EOSINOPHIL # BLD AUTO: 0.15 K/UL — SIGNIFICANT CHANGE UP (ref 0–0.5)
EOSINOPHIL NFR BLD AUTO: 1.5 % — SIGNIFICANT CHANGE UP (ref 0–6)
GLUCOSE SERPL-MCNC: 273 MG/DL — HIGH (ref 70–99)
GLUCOSE UR QL: 1000 MG/DL
HCT VFR BLD CALC: 46.1 % — SIGNIFICANT CHANGE UP (ref 39–50)
HGB BLD-MCNC: 15.6 G/DL — SIGNIFICANT CHANGE UP (ref 13–17)
IMM GRANULOCYTES NFR BLD AUTO: 0.3 % — SIGNIFICANT CHANGE UP (ref 0–1.5)
KETONES UR-MCNC: ABNORMAL
LACTATE SERPL-SCNC: 1.8 MMOL/L — SIGNIFICANT CHANGE UP (ref 0.7–2)
LEUKOCYTE ESTERASE UR-ACNC: NEGATIVE — SIGNIFICANT CHANGE UP
LIDOCAIN IGE QN: 35 U/L — LOW (ref 73–393)
LYMPHOCYTES # BLD AUTO: 2.25 K/UL — SIGNIFICANT CHANGE UP (ref 1–3.3)
LYMPHOCYTES # BLD AUTO: 22.6 % — SIGNIFICANT CHANGE UP (ref 13–44)
MCHC RBC-ENTMCNC: 28 PG — SIGNIFICANT CHANGE UP (ref 27–34)
MCHC RBC-ENTMCNC: 33.8 GM/DL — SIGNIFICANT CHANGE UP (ref 32–36)
MCV RBC AUTO: 82.8 FL — SIGNIFICANT CHANGE UP (ref 80–100)
MONOCYTES # BLD AUTO: 0.65 K/UL — SIGNIFICANT CHANGE UP (ref 0–0.9)
MONOCYTES NFR BLD AUTO: 6.5 % — SIGNIFICANT CHANGE UP (ref 2–14)
NEUTROPHILS # BLD AUTO: 6.81 K/UL — SIGNIFICANT CHANGE UP (ref 1.8–7.4)
NEUTROPHILS NFR BLD AUTO: 68.5 % — SIGNIFICANT CHANGE UP (ref 43–77)
NITRITE UR-MCNC: NEGATIVE — SIGNIFICANT CHANGE UP
NRBC # BLD: 0 /100 WBCS — SIGNIFICANT CHANGE UP (ref 0–0)
PH UR: 6 — SIGNIFICANT CHANGE UP (ref 5–8)
PLATELET # BLD AUTO: 412 K/UL — HIGH (ref 150–400)
POTASSIUM SERPL-MCNC: 3.9 MMOL/L — SIGNIFICANT CHANGE UP (ref 3.5–5.3)
POTASSIUM SERPL-SCNC: 3.9 MMOL/L — SIGNIFICANT CHANGE UP (ref 3.5–5.3)
PROT SERPL-MCNC: 8 GM/DL — SIGNIFICANT CHANGE UP (ref 6–8.3)
PROT UR-MCNC: 30 MG/DL
RBC # BLD: 5.57 M/UL — SIGNIFICANT CHANGE UP (ref 4.2–5.8)
RBC # FLD: 13.8 % — SIGNIFICANT CHANGE UP (ref 10.3–14.5)
SODIUM SERPL-SCNC: 139 MMOL/L — SIGNIFICANT CHANGE UP (ref 135–145)
SP GR SPEC: 1.03 — HIGH (ref 1.01–1.02)
UROBILINOGEN FLD QL: 1 MG/DL
WBC # BLD: 9.95 K/UL — SIGNIFICANT CHANGE UP (ref 3.8–10.5)
WBC # FLD AUTO: 9.95 K/UL — SIGNIFICANT CHANGE UP (ref 3.8–10.5)

## 2019-04-23 PROCEDURE — 99285 EMERGENCY DEPT VISIT HI MDM: CPT

## 2019-04-23 RX ORDER — FUROSEMIDE 40 MG
1 TABLET ORAL
Qty: 30 | Refills: 0 | OUTPATIENT
Start: 2019-04-23 | End: 2019-05-22

## 2019-04-23 RX ORDER — ONDANSETRON 8 MG/1
4 TABLET, FILM COATED ORAL ONCE
Qty: 0 | Refills: 0 | Status: COMPLETED | OUTPATIENT
Start: 2019-04-23 | End: 2019-04-23

## 2019-04-23 RX ADMIN — ONDANSETRON 4 MILLIGRAM(S): 8 TABLET, FILM COATED ORAL at 22:40

## 2019-04-23 RX ADMIN — SODIUM CHLORIDE 1000 MILLILITER(S): 9 INJECTION INTRAMUSCULAR; INTRAVENOUS; SUBCUTANEOUS at 22:50

## 2019-04-23 NOTE — ED ADULT NURSE NOTE - OBJECTIVE STATEMENT
pt received to bed 27 c/o 10/10 LUQ pain, n/v/d starting today. denies: fever, chills. witnessed pt ambulate to bathroom. pt states he smokes marijuana daily.

## 2019-04-23 NOTE — ED ADULT TRIAGE NOTE - CHIEF COMPLAINT QUOTE
Patient BIBA: patient reports Abdominal pain, nausea and vomiting." Patient actively vomiting in triage. Vickie.

## 2019-04-24 DIAGNOSIS — E11.43 TYPE 2 DIABETES MELLITUS WITH DIABETIC AUTONOMIC (POLY)NEUROPATHY: ICD-10-CM

## 2019-04-24 DIAGNOSIS — E10.65 TYPE 1 DIABETES MELLITUS WITH HYPERGLYCEMIA: ICD-10-CM

## 2019-04-24 DIAGNOSIS — Z29.9 ENCOUNTER FOR PROPHYLACTIC MEASURES, UNSPECIFIED: ICD-10-CM

## 2019-04-24 DIAGNOSIS — K29.50 UNSPECIFIED CHRONIC GASTRITIS WITHOUT BLEEDING: ICD-10-CM

## 2019-04-24 DIAGNOSIS — J45.20 MILD INTERMITTENT ASTHMA, UNCOMPLICATED: ICD-10-CM

## 2019-04-24 DIAGNOSIS — K52.9 NONINFECTIVE GASTROENTERITIS AND COLITIS, UNSPECIFIED: ICD-10-CM

## 2019-04-24 LAB
ALBUMIN SERPL ELPH-MCNC: 3.7 G/DL — SIGNIFICANT CHANGE UP (ref 3.3–5)
ALP SERPL-CCNC: 84 U/L — SIGNIFICANT CHANGE UP (ref 40–120)
ALT FLD-CCNC: 15 U/L — SIGNIFICANT CHANGE UP (ref 12–78)
ANION GAP SERPL CALC-SCNC: 12 MMOL/L — SIGNIFICANT CHANGE UP (ref 5–17)
AST SERPL-CCNC: 15 U/L — SIGNIFICANT CHANGE UP (ref 15–37)
B-OH-BUTYR SERPL-SCNC: 2.2 MMOL/L — HIGH
BACTERIA # UR AUTO: ABNORMAL
BASOPHILS # BLD AUTO: 0.05 K/UL — SIGNIFICANT CHANGE UP (ref 0–0.2)
BASOPHILS NFR BLD AUTO: 0.4 % — SIGNIFICANT CHANGE UP (ref 0–2)
BILIRUB SERPL-MCNC: 0.6 MG/DL — SIGNIFICANT CHANGE UP (ref 0.2–1.2)
BUN SERPL-MCNC: 17 MG/DL — SIGNIFICANT CHANGE UP (ref 7–23)
CALCIUM SERPL-MCNC: 8.9 MG/DL — SIGNIFICANT CHANGE UP (ref 8.5–10.1)
CHLORIDE SERPL-SCNC: 104 MMOL/L — SIGNIFICANT CHANGE UP (ref 96–108)
CO2 SERPL-SCNC: 22 MMOL/L — SIGNIFICANT CHANGE UP (ref 22–31)
CREAT SERPL-MCNC: 1.05 MG/DL — SIGNIFICANT CHANGE UP (ref 0.5–1.3)
EOSINOPHIL # BLD AUTO: 0 K/UL — SIGNIFICANT CHANGE UP (ref 0–0.5)
EOSINOPHIL NFR BLD AUTO: 0 % — SIGNIFICANT CHANGE UP (ref 0–6)
GLUCOSE BLDC GLUCOMTR-MCNC: 105 MG/DL — HIGH (ref 70–99)
GLUCOSE BLDC GLUCOMTR-MCNC: 213 MG/DL — HIGH (ref 70–99)
GLUCOSE SERPL-MCNC: 278 MG/DL — HIGH (ref 70–99)
HCT VFR BLD CALC: 39.9 % — SIGNIFICANT CHANGE UP (ref 39–50)
HGB BLD-MCNC: 13.3 G/DL — SIGNIFICANT CHANGE UP (ref 13–17)
IMM GRANULOCYTES NFR BLD AUTO: 0.4 % — SIGNIFICANT CHANGE UP (ref 0–1.5)
LYMPHOCYTES # BLD AUTO: 17.1 % — SIGNIFICANT CHANGE UP (ref 13–44)
LYMPHOCYTES # BLD AUTO: 2.17 K/UL — SIGNIFICANT CHANGE UP (ref 1–3.3)
MAGNESIUM SERPL-MCNC: 2.1 MG/DL — SIGNIFICANT CHANGE UP (ref 1.6–2.6)
MCHC RBC-ENTMCNC: 28 PG — SIGNIFICANT CHANGE UP (ref 27–34)
MCHC RBC-ENTMCNC: 33.3 GM/DL — SIGNIFICANT CHANGE UP (ref 32–36)
MCV RBC AUTO: 84 FL — SIGNIFICANT CHANGE UP (ref 80–100)
MONOCYTES # BLD AUTO: 0.8 K/UL — SIGNIFICANT CHANGE UP (ref 0–0.9)
MONOCYTES NFR BLD AUTO: 6.3 % — SIGNIFICANT CHANGE UP (ref 2–14)
NEUTROPHILS # BLD AUTO: 9.64 K/UL — HIGH (ref 1.8–7.4)
NEUTROPHILS NFR BLD AUTO: 75.8 % — SIGNIFICANT CHANGE UP (ref 43–77)
NRBC # BLD: 0 /100 WBCS — SIGNIFICANT CHANGE UP (ref 0–0)
PHOSPHATE SERPL-MCNC: 3.5 MG/DL — SIGNIFICANT CHANGE UP (ref 2.5–4.5)
PLATELET # BLD AUTO: 350 K/UL — SIGNIFICANT CHANGE UP (ref 150–400)
POTASSIUM SERPL-MCNC: 4 MMOL/L — SIGNIFICANT CHANGE UP (ref 3.5–5.3)
POTASSIUM SERPL-SCNC: 4 MMOL/L — SIGNIFICANT CHANGE UP (ref 3.5–5.3)
PROT SERPL-MCNC: 7 GM/DL — SIGNIFICANT CHANGE UP (ref 6–8.3)
RBC # BLD: 4.75 M/UL — SIGNIFICANT CHANGE UP (ref 4.2–5.8)
RBC # FLD: 13.8 % — SIGNIFICANT CHANGE UP (ref 10.3–14.5)
RBC CASTS # UR COMP ASSIST: SIGNIFICANT CHANGE UP /HPF (ref 0–4)
SODIUM SERPL-SCNC: 138 MMOL/L — SIGNIFICANT CHANGE UP (ref 135–145)
WBC # BLD: 12.71 K/UL — HIGH (ref 3.8–10.5)
WBC # FLD AUTO: 12.71 K/UL — HIGH (ref 3.8–10.5)
WBC UR QL: SIGNIFICANT CHANGE UP

## 2019-04-24 PROCEDURE — 74177 CT ABD & PELVIS W/CONTRAST: CPT | Mod: 26

## 2019-04-24 PROCEDURE — 99223 1ST HOSP IP/OBS HIGH 75: CPT

## 2019-04-24 PROCEDURE — 12345: CPT | Mod: NC

## 2019-04-24 RX ORDER — MORPHINE SULFATE 50 MG/1
4 CAPSULE, EXTENDED RELEASE ORAL ONCE
Qty: 0 | Refills: 0 | Status: DISCONTINUED | OUTPATIENT
Start: 2019-04-24 | End: 2019-04-24

## 2019-04-24 RX ORDER — PANTOPRAZOLE SODIUM 20 MG/1
40 TABLET, DELAYED RELEASE ORAL ONCE
Qty: 0 | Refills: 0 | Status: COMPLETED | OUTPATIENT
Start: 2019-04-24 | End: 2019-04-24

## 2019-04-24 RX ORDER — GLUCAGON INJECTION, SOLUTION 0.5 MG/.1ML
1 INJECTION, SOLUTION SUBCUTANEOUS ONCE
Qty: 0 | Refills: 0 | Status: DISCONTINUED | OUTPATIENT
Start: 2019-04-24 | End: 2019-04-26

## 2019-04-24 RX ORDER — ONDANSETRON 8 MG/1
4 TABLET, FILM COATED ORAL ONCE
Qty: 0 | Refills: 0 | Status: COMPLETED | OUTPATIENT
Start: 2019-04-24 | End: 2019-04-24

## 2019-04-24 RX ORDER — ERYTHROMYCIN ETHYLSUCCINATE 400 MG
250 TABLET ORAL EVERY 6 HOURS
Qty: 0 | Refills: 0 | Status: DISCONTINUED | OUTPATIENT
Start: 2019-04-24 | End: 2019-04-26

## 2019-04-24 RX ORDER — SODIUM CHLORIDE 9 MG/ML
2000 INJECTION INTRAMUSCULAR; INTRAVENOUS; SUBCUTANEOUS ONCE
Qty: 0 | Refills: 0 | Status: COMPLETED | OUTPATIENT
Start: 2019-04-24 | End: 2019-04-23

## 2019-04-24 RX ORDER — INSULIN GLARGINE 100 [IU]/ML
15 INJECTION, SOLUTION SUBCUTANEOUS EVERY MORNING
Qty: 0 | Refills: 0 | Status: DISCONTINUED | OUTPATIENT
Start: 2019-04-24 | End: 2019-04-26

## 2019-04-24 RX ORDER — DEXTROSE 50 % IN WATER 50 %
25 SYRINGE (ML) INTRAVENOUS ONCE
Qty: 0 | Refills: 0 | Status: DISCONTINUED | OUTPATIENT
Start: 2019-04-24 | End: 2019-04-26

## 2019-04-24 RX ORDER — INSULIN LISPRO 100/ML
11 VIAL (ML) SUBCUTANEOUS
Qty: 0 | Refills: 0 | Status: DISCONTINUED | OUTPATIENT
Start: 2019-04-24 | End: 2019-04-26

## 2019-04-24 RX ORDER — MESALAMINE 400 MG
400 TABLET, DELAYED RELEASE (ENTERIC COATED) ORAL THREE TIMES A DAY
Qty: 0 | Refills: 0 | Status: DISCONTINUED | OUTPATIENT
Start: 2019-04-24 | End: 2019-04-26

## 2019-04-24 RX ORDER — ONDANSETRON 8 MG/1
4 TABLET, FILM COATED ORAL EVERY 6 HOURS
Qty: 0 | Refills: 0 | Status: DISCONTINUED | OUTPATIENT
Start: 2019-04-24 | End: 2019-04-26

## 2019-04-24 RX ORDER — SODIUM CHLORIDE 9 MG/ML
1000 INJECTION INTRAMUSCULAR; INTRAVENOUS; SUBCUTANEOUS
Qty: 0 | Refills: 0 | Status: DISCONTINUED | OUTPATIENT
Start: 2019-04-24 | End: 2019-04-25

## 2019-04-24 RX ORDER — INSULIN GLARGINE 100 [IU]/ML
13 INJECTION, SOLUTION SUBCUTANEOUS AT BEDTIME
Qty: 0 | Refills: 0 | Status: DISCONTINUED | OUTPATIENT
Start: 2019-04-24 | End: 2019-04-26

## 2019-04-24 RX ORDER — FAMOTIDINE 10 MG/ML
20 INJECTION INTRAVENOUS ONCE
Qty: 0 | Refills: 0 | Status: COMPLETED | OUTPATIENT
Start: 2019-04-24 | End: 2019-04-24

## 2019-04-24 RX ORDER — LACTOBACILLUS ACIDOPHILUS 100MM CELL
1 CAPSULE ORAL DAILY
Qty: 0 | Refills: 0 | Status: DISCONTINUED | OUTPATIENT
Start: 2019-04-24 | End: 2019-04-26

## 2019-04-24 RX ORDER — OXYCODONE AND ACETAMINOPHEN 5; 325 MG/1; MG/1
1 TABLET ORAL EVERY 6 HOURS
Qty: 0 | Refills: 0 | Status: DISCONTINUED | OUTPATIENT
Start: 2019-04-24 | End: 2019-04-26

## 2019-04-24 RX ORDER — SODIUM CHLORIDE 9 MG/ML
1000 INJECTION, SOLUTION INTRAVENOUS
Qty: 0 | Refills: 0 | Status: DISCONTINUED | OUTPATIENT
Start: 2019-04-24 | End: 2019-04-26

## 2019-04-24 RX ORDER — INSULIN LISPRO 100/ML
VIAL (ML) SUBCUTANEOUS AT BEDTIME
Qty: 0 | Refills: 0 | Status: DISCONTINUED | OUTPATIENT
Start: 2019-04-24 | End: 2019-04-26

## 2019-04-24 RX ORDER — INSULIN HUMAN 100 [IU]/ML
6 INJECTION, SOLUTION SUBCUTANEOUS ONCE
Qty: 0 | Refills: 0 | Status: COMPLETED | OUTPATIENT
Start: 2019-04-24 | End: 2019-04-24

## 2019-04-24 RX ORDER — HYDROMORPHONE HYDROCHLORIDE 2 MG/ML
2 INJECTION INTRAMUSCULAR; INTRAVENOUS; SUBCUTANEOUS EVERY 6 HOURS
Qty: 0 | Refills: 0 | Status: DISCONTINUED | OUTPATIENT
Start: 2019-04-24 | End: 2019-04-26

## 2019-04-24 RX ORDER — ALBUTEROL 90 UG/1
2 AEROSOL, METERED ORAL EVERY 6 HOURS
Qty: 0 | Refills: 0 | Status: DISCONTINUED | OUTPATIENT
Start: 2019-04-24 | End: 2019-04-26

## 2019-04-24 RX ORDER — METOCLOPRAMIDE HCL 10 MG
10 TABLET ORAL ONCE
Qty: 0 | Refills: 0 | Status: COMPLETED | OUTPATIENT
Start: 2019-04-24 | End: 2019-04-24

## 2019-04-24 RX ORDER — MORPHINE SULFATE 50 MG/1
2 CAPSULE, EXTENDED RELEASE ORAL ONCE
Qty: 0 | Refills: 0 | Status: DISCONTINUED | OUTPATIENT
Start: 2019-04-24 | End: 2019-04-24

## 2019-04-24 RX ORDER — DEXTROSE 50 % IN WATER 50 %
15 SYRINGE (ML) INTRAVENOUS ONCE
Qty: 0 | Refills: 0 | Status: DISCONTINUED | OUTPATIENT
Start: 2019-04-24 | End: 2019-04-26

## 2019-04-24 RX ORDER — DEXTROSE 50 % IN WATER 50 %
12.5 SYRINGE (ML) INTRAVENOUS ONCE
Qty: 0 | Refills: 0 | Status: DISCONTINUED | OUTPATIENT
Start: 2019-04-24 | End: 2019-04-26

## 2019-04-24 RX ORDER — CLONAZEPAM 1 MG
0.5 TABLET ORAL
Qty: 0 | Refills: 0 | Status: DISCONTINUED | OUTPATIENT
Start: 2019-04-24 | End: 2019-04-26

## 2019-04-24 RX ORDER — INSULIN LISPRO 100/ML
VIAL (ML) SUBCUTANEOUS
Qty: 0 | Refills: 0 | Status: DISCONTINUED | OUTPATIENT
Start: 2019-04-24 | End: 2019-04-26

## 2019-04-24 RX ORDER — METOCLOPRAMIDE HCL 10 MG
10 TABLET ORAL
Qty: 0 | Refills: 0 | Status: DISCONTINUED | OUTPATIENT
Start: 2019-04-24 | End: 2019-04-26

## 2019-04-24 RX ORDER — PANTOPRAZOLE SODIUM 20 MG/1
40 TABLET, DELAYED RELEASE ORAL EVERY 12 HOURS
Qty: 0 | Refills: 0 | Status: DISCONTINUED | OUTPATIENT
Start: 2019-04-24 | End: 2019-04-25

## 2019-04-24 RX ORDER — SUCRALFATE 1 G
1 TABLET ORAL
Qty: 0 | Refills: 0 | Status: DISCONTINUED | OUTPATIENT
Start: 2019-04-24 | End: 2019-04-26

## 2019-04-24 RX ADMIN — MORPHINE SULFATE 4 MILLIGRAM(S): 50 CAPSULE, EXTENDED RELEASE ORAL at 04:45

## 2019-04-24 RX ADMIN — ALBUTEROL 2 PUFF(S): 90 AEROSOL, METERED ORAL at 15:15

## 2019-04-24 RX ADMIN — Medication 10 MILLIGRAM(S): at 00:59

## 2019-04-24 RX ADMIN — FAMOTIDINE 20 MILLIGRAM(S): 10 INJECTION INTRAVENOUS at 04:45

## 2019-04-24 RX ADMIN — HYDROMORPHONE HYDROCHLORIDE 2 MILLIGRAM(S): 2 INJECTION INTRAMUSCULAR; INTRAVENOUS; SUBCUTANEOUS at 19:00

## 2019-04-24 RX ADMIN — INSULIN HUMAN 6 UNIT(S): 100 INJECTION, SOLUTION SUBCUTANEOUS at 04:43

## 2019-04-24 RX ADMIN — HYDROMORPHONE HYDROCHLORIDE 2 MILLIGRAM(S): 2 INJECTION INTRAMUSCULAR; INTRAVENOUS; SUBCUTANEOUS at 18:07

## 2019-04-24 RX ADMIN — MORPHINE SULFATE 2 MILLIGRAM(S): 50 CAPSULE, EXTENDED RELEASE ORAL at 07:20

## 2019-04-24 RX ADMIN — SODIUM CHLORIDE 100 MILLILITER(S): 9 INJECTION INTRAMUSCULAR; INTRAVENOUS; SUBCUTANEOUS at 16:14

## 2019-04-24 RX ADMIN — ONDANSETRON 4 MILLIGRAM(S): 8 TABLET, FILM COATED ORAL at 17:08

## 2019-04-24 RX ADMIN — SODIUM CHLORIDE 100 MILLILITER(S): 9 INJECTION INTRAMUSCULAR; INTRAVENOUS; SUBCUTANEOUS at 08:21

## 2019-04-24 RX ADMIN — Medication 400 MILLIGRAM(S): at 22:24

## 2019-04-24 RX ADMIN — PANTOPRAZOLE SODIUM 40 MILLIGRAM(S): 20 TABLET, DELAYED RELEASE ORAL at 17:09

## 2019-04-24 RX ADMIN — Medication 6: at 08:19

## 2019-04-24 RX ADMIN — HYDROMORPHONE HYDROCHLORIDE 2 MILLIGRAM(S): 2 INJECTION INTRAMUSCULAR; INTRAVENOUS; SUBCUTANEOUS at 12:30

## 2019-04-24 RX ADMIN — Medication 11 UNIT(S): at 08:19

## 2019-04-24 RX ADMIN — Medication 250 MILLIGRAM(S): at 22:24

## 2019-04-24 RX ADMIN — ONDANSETRON 4 MILLIGRAM(S): 8 TABLET, FILM COATED ORAL at 08:14

## 2019-04-24 RX ADMIN — Medication 4: at 16:11

## 2019-04-24 RX ADMIN — MORPHINE SULFATE 4 MILLIGRAM(S): 50 CAPSULE, EXTENDED RELEASE ORAL at 00:59

## 2019-04-24 RX ADMIN — Medication 1 GRAM(S): at 17:10

## 2019-04-24 RX ADMIN — SODIUM CHLORIDE 2000 MILLILITER(S): 9 INJECTION INTRAMUSCULAR; INTRAVENOUS; SUBCUTANEOUS at 01:40

## 2019-04-24 RX ADMIN — Medication 0.5 MILLIGRAM(S): at 22:24

## 2019-04-24 RX ADMIN — MORPHINE SULFATE 4 MILLIGRAM(S): 50 CAPSULE, EXTENDED RELEASE ORAL at 01:40

## 2019-04-24 RX ADMIN — Medication 10 MILLIGRAM(S): at 13:14

## 2019-04-24 RX ADMIN — Medication 10 MILLIGRAM(S): at 22:24

## 2019-04-24 RX ADMIN — Medication 250 MILLIGRAM(S): at 12:08

## 2019-04-24 RX ADMIN — Medication 2: at 12:10

## 2019-04-24 RX ADMIN — ONDANSETRON 4 MILLIGRAM(S): 8 TABLET, FILM COATED ORAL at 12:07

## 2019-04-24 RX ADMIN — Medication 11 UNIT(S): at 16:12

## 2019-04-24 RX ADMIN — MORPHINE SULFATE 4 MILLIGRAM(S): 50 CAPSULE, EXTENDED RELEASE ORAL at 05:00

## 2019-04-24 RX ADMIN — Medication 1 TABLET(S): at 13:14

## 2019-04-24 RX ADMIN — PANTOPRAZOLE SODIUM 40 MILLIGRAM(S): 20 TABLET, DELAYED RELEASE ORAL at 00:58

## 2019-04-24 RX ADMIN — HYDROMORPHONE HYDROCHLORIDE 2 MILLIGRAM(S): 2 INJECTION INTRAMUSCULAR; INTRAVENOUS; SUBCUTANEOUS at 12:03

## 2019-04-24 RX ADMIN — OXYCODONE AND ACETAMINOPHEN 1 TABLET(S): 5; 325 TABLET ORAL at 12:00

## 2019-04-24 RX ADMIN — OXYCODONE AND ACETAMINOPHEN 1 TABLET(S): 5; 325 TABLET ORAL at 11:09

## 2019-04-24 RX ADMIN — Medication 250 MILLIGRAM(S): at 17:09

## 2019-04-24 RX ADMIN — INSULIN GLARGINE 15 UNIT(S): 100 INJECTION, SOLUTION SUBCUTANEOUS at 08:18

## 2019-04-24 RX ADMIN — Medication 10 MILLIGRAM(S): at 18:07

## 2019-04-24 RX ADMIN — Medication 400 MILLIGRAM(S): at 13:14

## 2019-04-24 RX ADMIN — Medication 1 GRAM(S): at 12:08

## 2019-04-24 NOTE — ED PROVIDER NOTE - PHYSICAL EXAMINATION
Gen: Alert, distressed, ill appearing, vomiting  Head: NC, AT, PERRL, EOMI, normal lids/conjunctiva  ENT: normal hearing, patent oropharynx without erythema/exudate, uvula midline  Neck: +supple, no tenderness/meningismus/JVD, +Trachea midline  Pulm: Bilateral BS, normal resp effort, no wheeze/stridor/retractions  CV: RRR, no M/R/G, +dist pulses  Abd: soft, diffuse tenderness with guarding, ND, +BS, no palpable masses  Mskel: no edema/erythema/cyanosis  Skin: no rash, warm/dry  Neuro: AAOx3, no apparent sensory/motor deficits, coordination intact

## 2019-04-24 NOTE — CHART NOTE - NSCHARTNOTEFT_GEN_A_CORE
38 year old man with PMH of anxiety, asthma, DM1, gastritis, gastroparesis, presents to ED with complaint of nausea, vomiting, and diarrhea x 1 day. Please see H&P for more details. Pt was seen and examined at bedside, cont w/ same mgmt.

## 2019-04-24 NOTE — H&P ADULT - ASSESSMENT
38 year old man with PMH of anxiety, asthma, DM1, gastritis, gastroparesis, presents to ED with complaint of nausea, vomiting, and diarrhea x 1 day.  Patient will require admission for at least 2 midnights as detailed below:    IMPROVE VTE Individual Risk Assessment          RISK                                                          Points    [  ] Previous VTE                                                3    [  ] Thrombophilia                                             2    [  ] Lower limb paralysis                                    2        (unable to hold up >15 seconds)      [  ] Current Cancer                                             2         (within 6 months)    [ x ] Immobilization > 24 hrs                              1    [  ] ICU/CCU stay > 24 hours                            1    [  ] Age > 60                                                    1    IMPROVE VTE Score __1_______

## 2019-04-24 NOTE — H&P ADULT - PROBLEM SELECTOR PLAN 3
Lantus 15 units AM, 13 units PM SC  Lispro 11 units TID before meals  Sliding scale with coverage  CHO diet

## 2019-04-24 NOTE — CONSULT NOTE ADULT - SUBJECTIVE AND OBJECTIVE BOX
Chief Complaint:  Patient is a 38y old  Male who presents with a chief complaint of abdominal pain, nausea, vomiting     HPI:  38 year old man with PMH of anxiety, asthma, DM1, gastritis, gastroparesis, presents to ED with complaint of nausea, vomiting, and diarrhea x 1 day.  Patient states this pain is typical of his chronic symptoms.  He denies fever, chills, bloody stool, recent travel, sick contacts.    In the ED, labs mostly unremarkable (mild hyperglycemia), CT ab read as colitis vs underdistention; small fat-containing hernia.  Patient afebrile. (2019 05:27)      PMH/PSH:PAST MEDICAL & SURGICAL HISTORY:  Uncomplicated asthma, unspecified asthma severity, unspecified whether persistent  Gastritis, presence of bleeding unspecified, unspecified chronicity, unspecified gastritis type  Controlled diabetes mellitus type 1 without complications  Anxiety  Gastritis  Asthma  DM type 1 (diabetes mellitus, type 1)  History of cholecystectomy  S/P cholecystectomy      Allergies:  No Known Allergies      Medications:  ALBUTerol    90 MICROgram(s) HFA Inhaler 2 Puff(s) Inhalation every 6 hours PRN  clonazePAM Tablet 0.5 milliGRAM(s) Oral two times a day PRN  dextrose 40% Gel 15 Gram(s) Oral once PRN  dextrose 5%. 1000 milliLiter(s) IV Continuous <Continuous>  dextrose 50% Injectable 12.5 Gram(s) IV Push once  dextrose 50% Injectable 25 Gram(s) IV Push once  dextrose 50% Injectable 25 Gram(s) IV Push once  erythromycin     base Tablet 250 milliGRAM(s) Oral every 6 hours  glucagon  Injectable 1 milliGRAM(s) IntraMuscular once PRN  HYDROmorphone  Injectable 2 milliGRAM(s) IV Push every 6 hours PRN  insulin glargine Injectable (LANTUS) 15 Unit(s) SubCutaneous every morning  insulin glargine Injectable (LANTUS) 13 Unit(s) SubCutaneous at bedtime  insulin lispro (HumaLOG) corrective regimen sliding scale   SubCutaneous three times a day before meals  insulin lispro (HumaLOG) corrective regimen sliding scale   SubCutaneous at bedtime  insulin lispro Injectable (HumaLOG) 11 Unit(s) SubCutaneous three times a day before meals  lactobacillus acidophilus 1 Tablet(s) Oral daily  mesalamine DR Capsule 400 milliGRAM(s) Oral three times a day  metoclopramide 10 milliGRAM(s) Oral Before meals and at bedtime  ondansetron Injectable 4 milliGRAM(s) IV Push every 6 hours  oxyCODONE    5 mG/acetaminophen 325 mG 1 Tablet(s) Oral every 6 hours PRN  pantoprazole  Injectable 40 milliGRAM(s) IV Push every 12 hours  sodium chloride 0.9%. 1000 milliLiter(s) IV Continuous <Continuous>  sucralfate 1 Gram(s) Oral four times a day      REVIEW OF SYSTEMS:    CONSTITUTIONAL: No weakness, fevers or chills  EYES/ENT: No visual changes;  No vertigo or throat pain   NECK: No pain or stiffness  RESPIRATORY: No cough, wheezing, hemoptysis; No shortness of breath  CARDIOVASCULAR: No chest pain or palpitations  GASTROINTESTINAL: No abdominal or epigastric pain. No nausea, vomiting, or hematemesis; No diarrhea or constipation. No melena or hematochezia.  GENITOURINARY: No dysuria, frequency or hematuria  NEUROLOGICAL: No numbness or weakness  SKIN: No itching, burning, rashes, or lesions   All other review of systems is negative unless indicated above.  Relevant Family History:   FAMILY HISTORY:  Family history of diabetes mellitus (DM) (Father, Mother, Grandparent)  Family history of diabetes mellitus (Father, Mother)      Relevant Social History:  Denies ETOH or tobacco history    Physical Exam:    Vital Signs:  Vital Signs Last 24 Hrs  T(C): 36.5 (2019 08:48), Max: 36.9 (2019 04:41)  T(F): 97.7 (2019 08:48), Max: 98.4 (2019 04:41)  HR: 92 (2019 08:48) (91 - 107)  BP: 141/77 (2019 08:48) (115/78 - 141/77)  BP(mean): --  RR: 18 (2019 08:48) (18 - 24)  SpO2: 98% (2019 08:48) (97% - 99%)  Daily Height in cm: 175.26 (2019 21:55)    Daily     Constitutional: WDWN resting comfortably in bed; NAD  HEENT: NC/AT, PERRL, EOMI, anicteric sclera, no nasal discharge; uvula midline, no oropharyngeal erythema or exudates  Neck: supple; no JVD or thyromegaly  Respiratory: CTA B/L; no W/R/R, no retractions  Cardiac: +S1/S2; RRR; no M/R/G; PMI non-displaced  Gastrointestinal: soft, NT/ND; no rebound or guarding; +BS   Extremities: , no clubbing or cyanosis; no peripheral edema  Musculoskeletal:  no joint swelling, tenderness or erythema  Vascular: 2+ radial, femoral, DP/PT pulses B/L  Skin: warm, dry and intact; no rashes, wounds, or scars  Neurologic: AAOx3; CNS grossly intact; no focal deficits no asterixis, no tremor, no encephalopathy    Laboratory:                          13.3   12.71 )-----------( 350      ( 2019 07:57 )             39.9         138  |  104  |  17  ----------------------------<  278<H>  4.0   |  22  |  1.05    Ca    8.9      2019 07:57  Phos  3.5       Mg     2.1         TPro  7.0  /  Alb  3.7  /  TBili  0.6  /  DBili  x   /  AST  15  /  ALT  15  /  AlkPhos  84      LIVER FUNCTIONS - ( 2019 07:57 )  Alb: 3.7 g/dL / Pro: 7.0 gm/dL / ALK PHOS: 84 U/L / ALT: 15 U/L / AST: 15 U/L / GGT: x             Urinalysis Basic - ( 2019 23:12 )    Color: Yellow / Appearance: Clear / S.030 / pH: x  Gluc: x / Ketone: Large  / Bili: Negative / Urobili: 1 mg/dL   Blood: x / Protein: 30 mg/dL / Nitrite: Negative   Leuk Esterase: Negative / RBC: 0-2 /HPF / WBC 3-5   Sq Epi: x / Non Sq Epi: x / Bacteria: Moderate            Lipase serum35 U/L<L>                   Imaging:  < from: CT Abdomen and Pelvis w/ IV Cont (19 @ 03:02) >  EXAM:  CT ABDOMEN AND PELVIS IC                            PROCEDURE DATE:  2019          INTERPRETATION:  CLINICAL INFORMATION: Generalized abdominal pain,   history of gastroparesis.    PROCEDURE:   Helical axial images were obtained from the domes of the diaphragm   through the pubic symphysis following the administration of intravenous   contrast. 85 mls of Omnipaque-350 administered without complication. 15   ml(s) discarded. Coronal and sagittal reformats were also obtained.    COMPARISON: 3/30/2019.    FINDINGS:    LOWER CHEST: Unremarkable.    LIVER: Unremarkable.  GALLBLADDER/BILE DUCTS: No intrahepatic or extrahepatic biliary   dilatation. No radiopaque gallstone.  PANCREAS: Unremarkable.  SPLEEN: Unremarkable.    ADRENALS: Unremarkable.  KIDNEYS/URETERS: No hydronephrosis, hydroureter or significant   perinephric stranding.    BLADDER: Partially distended.  REPRODUCTIVE ORGANS: Unremarkable.    BOWEL: No bowel obstruction. Unremarkable appendix. Under distended   transverse, descending, sigmoid colon and rectum.  PERITONEUM: No drainable fluid collection or free air.  VESSELS: Unremarkable.   RETROPERITONEUM: No lymphadenopathy.    ABDOMINAL WALL/SOFT TISSUES: Tiny fat-containing umbilical hernia. Again   noted, nonspecific stranding/skin thickening in bilateral abdominal wall.  BONES: Degenerative changes of the spine.     IMPRESSION:     No bowel obstruction. Under distention versus colitis of the left colon.   Recommend clinical correlation.    Additional findings as described.      < end of copied text >

## 2019-04-24 NOTE — PATIENT PROFILE ADULT - VISION (WITH CORRECTIVE LENSES IF THE PATIENT USUALLY WEARS THEM):
wears contact for poor vision/Partially impaired: cannot see medication labels or newsprint, but can see obstacles in path, and the surrounding layout; can count fingers at arm's length

## 2019-04-24 NOTE — H&P ADULT - PROBLEM SELECTOR PLAN 1
Pt afebrile, without leukocytosis, symptoms present ~1 day.  No need for antibiotics  Symptomatic treatment with Reglan, fluids, advance diet as tolerated

## 2019-04-24 NOTE — ED PROVIDER NOTE - OBJECTIVE STATEMENT
Pertinent PMH/PSH/FHx/SHx and Review of Systems contained within:  Patient presents to the ED for abdominal pain.  Patient has nausea, vomiting, diarrhea.  Symptoms started today, patient seen and admitted to hospital frequently for intractable pain and vomiting, has h/o gastroparesis.  Pain described as continuous and diffuse.    ROS: No fever/chills, No headache/photophobia/eye pain/changes in vision, No ear pain/sore throat/dysphagia, No chest pain/palpitations, no SOB/cough/wheeze/stridor, No melena, no dysuria/frequency/discharge, No neck/back pain, no rash, no changes in neurological status/function.

## 2019-04-24 NOTE — H&P ADULT - NSHPLABSRESULTS_GEN_ALL_CORE
Vital Signs Last 24 Hrs  T(C): 36.9 (2019 04:41), Max: 36.9 (2019 04:41)  T(F): 98.4 (2019 04:41), Max: 98.4 (2019 04:41)  HR: 91 (2019 04:41) (91 - 107)  BP: 137/67 (2019 04:41) (115/78 - 137/67)  BP(mean): --  RR: 20 (2019 04:41) (20 - 24)  SpO2: 99% (2019 21:55) (99% - 99%)          LABS:                        15.6   9.95  )-----------( 412      ( 2019 23:12 )             46.1         139  |  99  |  16  ----------------------------<  273<H>  3.9   |  27  |  1.21    Ca    9.8      2019 23:12    TPro  8.0  /  Alb  4.3  /  TBili  0.7  /  DBili  x   /  AST  15  /  ALT  18  /  AlkPhos  100  -      Urinalysis Basic - ( 2019 23:12 )    Color: Yellow / Appearance: Clear / S.030 / pH: x  Gluc: x / Ketone: Large  / Bili: Negative / Urobili: 1 mg/dL   Blood: x / Protein: 30 mg/dL / Nitrite: Negative   Leuk Esterase: Negative / RBC: 0-2 /HPF / WBC 3-5   Sq Epi: x / Non Sq Epi: x / Bacteria: Moderate        RADIOLOGY & ADDITIONAL STUDIES:    CT ab:  IMPRESSION:   No bowel obstruction. Under distention versus colitis of the left colon.   Recommend clinical correlation.

## 2019-04-24 NOTE — H&P ADULT - HISTORY OF PRESENT ILLNESS
38 year old man with PMH of anxiety, asthma, DM1, gastritis, gastroparesis, presents to ED with complaint of nausea, vomiting, and diarrhea x 1 day.  Patient states this pain is typical of his chronic symptoms.  He denies fever, chills, bloody stool, recent travel, sick contacts.    In the ED, labs mostly unremarkable (mild hyperglycemia), CT ab read as colitis vs underdistention; small fat-containing hernia.  Patient afebrile.

## 2019-04-24 NOTE — ED PROVIDER NOTE - CARE PLAN
Principal Discharge DX:	Intractable vomiting with nausea, unspecified vomiting type  Secondary Diagnosis:	Intractable abdominal pain

## 2019-04-24 NOTE — ED PROVIDER NOTE - CLINICAL SUMMARY MEDICAL DECISION MAKING FREE TEXT BOX
Patient presents with intractable nausea, vomiting, abdominal pain.  VSS.  Labs with elevated glucose, no signs of DKA.  Although CT suggests colitis, patient has had the same symptoms multiple times, will defer abx to admitting team, do not feel he has colitis.  Patient is to be admitted to the hospital and the case was discussed with the admitting physician.  Any changes in plan, additional imaging/labs, and further work up will be at the discretion of the admitting physician.

## 2019-04-24 NOTE — H&P ADULT - NSHPPHYSICALEXAM_GEN_ALL_CORE
GENERAL: Distress due to pain, well-groomed, well-developed  HEAD:  Atraumatic, Normocephalic  EYES: EOMI, PERRLA, conjunctiva and sclera clear  ENMT: No tonsillar erythema, exudates, or enlargement; Moist mucous membranes, No lesions  NECK: Supple, No JVD, Normal thyroid  NERVOUS SYSTEM:  Alert & Oriented X3, Good concentration  CHEST/LUNG: Clear to ascultation bilaterally; No rales, rhonchi, wheezing, or rubs  HEART: Regular rate and rhythm; No murmurs, rubs, or gallops  ABDOMEN: Soft, diffusely tender with guarding, no rebound, Nondistended; Bowel sounds present  EXTREMITIES: no clubbing, cyanosis, or edema  SKIN: several tattoos  PSYCH: normal affect and behavior

## 2019-04-25 DIAGNOSIS — F41.9 ANXIETY DISORDER, UNSPECIFIED: ICD-10-CM

## 2019-04-25 LAB
GLUCOSE BLDC GLUCOMTR-MCNC: 101 MG/DL — HIGH (ref 70–99)
GLUCOSE BLDC GLUCOMTR-MCNC: 133 MG/DL — HIGH (ref 70–99)
GLUCOSE BLDC GLUCOMTR-MCNC: 233 MG/DL — HIGH (ref 70–99)
GLUCOSE BLDC GLUCOMTR-MCNC: 88 MG/DL — SIGNIFICANT CHANGE UP (ref 70–99)

## 2019-04-25 PROCEDURE — 99233 SBSQ HOSP IP/OBS HIGH 50: CPT

## 2019-04-25 RX ORDER — PANTOPRAZOLE SODIUM 20 MG/1
40 TABLET, DELAYED RELEASE ORAL
Qty: 0 | Refills: 0 | Status: DISCONTINUED | OUTPATIENT
Start: 2019-04-25 | End: 2019-04-26

## 2019-04-25 RX ORDER — SODIUM CHLORIDE 9 MG/ML
1000 INJECTION INTRAMUSCULAR; INTRAVENOUS; SUBCUTANEOUS
Qty: 0 | Refills: 0 | Status: DISCONTINUED | OUTPATIENT
Start: 2019-04-25 | End: 2019-04-26

## 2019-04-25 RX ADMIN — Medication 400 MILLIGRAM(S): at 05:55

## 2019-04-25 RX ADMIN — ONDANSETRON 4 MILLIGRAM(S): 8 TABLET, FILM COATED ORAL at 23:11

## 2019-04-25 RX ADMIN — HYDROMORPHONE HYDROCHLORIDE 2 MILLIGRAM(S): 2 INJECTION INTRAMUSCULAR; INTRAVENOUS; SUBCUTANEOUS at 08:51

## 2019-04-25 RX ADMIN — PANTOPRAZOLE SODIUM 40 MILLIGRAM(S): 20 TABLET, DELAYED RELEASE ORAL at 05:55

## 2019-04-25 RX ADMIN — Medication 10 MILLIGRAM(S): at 21:10

## 2019-04-25 RX ADMIN — Medication 10 MILLIGRAM(S): at 16:05

## 2019-04-25 RX ADMIN — HYDROMORPHONE HYDROCHLORIDE 2 MILLIGRAM(S): 2 INJECTION INTRAMUSCULAR; INTRAVENOUS; SUBCUTANEOUS at 14:59

## 2019-04-25 RX ADMIN — HYDROMORPHONE HYDROCHLORIDE 2 MILLIGRAM(S): 2 INJECTION INTRAMUSCULAR; INTRAVENOUS; SUBCUTANEOUS at 21:34

## 2019-04-25 RX ADMIN — Medication 250 MILLIGRAM(S): at 11:15

## 2019-04-25 RX ADMIN — Medication 250 MILLIGRAM(S): at 17:02

## 2019-04-25 RX ADMIN — Medication 1 GRAM(S): at 23:11

## 2019-04-25 RX ADMIN — Medication 11 UNIT(S): at 08:03

## 2019-04-25 RX ADMIN — HYDROMORPHONE HYDROCHLORIDE 2 MILLIGRAM(S): 2 INJECTION INTRAMUSCULAR; INTRAVENOUS; SUBCUTANEOUS at 00:45

## 2019-04-25 RX ADMIN — Medication 4: at 16:05

## 2019-04-25 RX ADMIN — INSULIN GLARGINE 13 UNIT(S): 100 INJECTION, SOLUTION SUBCUTANEOUS at 21:10

## 2019-04-25 RX ADMIN — Medication 1 TABLET(S): at 11:15

## 2019-04-25 RX ADMIN — HYDROMORPHONE HYDROCHLORIDE 2 MILLIGRAM(S): 2 INJECTION INTRAMUSCULAR; INTRAVENOUS; SUBCUTANEOUS at 00:26

## 2019-04-25 RX ADMIN — Medication 250 MILLIGRAM(S): at 05:55

## 2019-04-25 RX ADMIN — Medication 1 GRAM(S): at 00:26

## 2019-04-25 RX ADMIN — Medication 1 GRAM(S): at 05:55

## 2019-04-25 RX ADMIN — ONDANSETRON 4 MILLIGRAM(S): 8 TABLET, FILM COATED ORAL at 17:02

## 2019-04-25 RX ADMIN — SODIUM CHLORIDE 60 MILLILITER(S): 9 INJECTION INTRAMUSCULAR; INTRAVENOUS; SUBCUTANEOUS at 17:04

## 2019-04-25 RX ADMIN — Medication 0.5 MILLIGRAM(S): at 10:05

## 2019-04-25 RX ADMIN — HYDROMORPHONE HYDROCHLORIDE 2 MILLIGRAM(S): 2 INJECTION INTRAMUSCULAR; INTRAVENOUS; SUBCUTANEOUS at 15:06

## 2019-04-25 RX ADMIN — Medication 400 MILLIGRAM(S): at 21:10

## 2019-04-25 RX ADMIN — Medication 11 UNIT(S): at 16:05

## 2019-04-25 RX ADMIN — Medication 400 MILLIGRAM(S): at 13:00

## 2019-04-25 RX ADMIN — Medication 1 GRAM(S): at 17:02

## 2019-04-25 RX ADMIN — ONDANSETRON 4 MILLIGRAM(S): 8 TABLET, FILM COATED ORAL at 11:15

## 2019-04-25 RX ADMIN — Medication 10 MILLIGRAM(S): at 11:15

## 2019-04-25 RX ADMIN — INSULIN GLARGINE 15 UNIT(S): 100 INJECTION, SOLUTION SUBCUTANEOUS at 08:03

## 2019-04-25 RX ADMIN — SODIUM CHLORIDE 100 MILLILITER(S): 9 INJECTION INTRAMUSCULAR; INTRAVENOUS; SUBCUTANEOUS at 05:56

## 2019-04-25 RX ADMIN — Medication 10 MILLIGRAM(S): at 05:55

## 2019-04-25 RX ADMIN — HYDROMORPHONE HYDROCHLORIDE 2 MILLIGRAM(S): 2 INJECTION INTRAMUSCULAR; INTRAVENOUS; SUBCUTANEOUS at 09:52

## 2019-04-25 RX ADMIN — Medication 1 GRAM(S): at 11:15

## 2019-04-25 RX ADMIN — Medication 250 MILLIGRAM(S): at 23:11

## 2019-04-25 RX ADMIN — HYDROMORPHONE HYDROCHLORIDE 2 MILLIGRAM(S): 2 INJECTION INTRAMUSCULAR; INTRAVENOUS; SUBCUTANEOUS at 21:11

## 2019-04-25 RX ADMIN — Medication 0.5 MILLIGRAM(S): at 23:11

## 2019-04-25 NOTE — PROGRESS NOTE ADULT - SUBJECTIVE AND OBJECTIVE BOX
Gastroenterology  Patient seen and examined bedside resting comfortably.  +complaints offered of mild abd pain.   No abdominal pain  +nausea no vomiting.      T(F): 97.2 (04-25-19 @ 04:45), Max: 98.4 (04-25-19 @ 00:00)  HR: 60 (04-25-19 @ 04:45) (60 - 102)  BP: 119/69 (04-25-19 @ 04:45) (119/69 - 146/96)  RR: 17 (04-25-19 @ 04:45) (16 - 19)  SpO2: 96% (04-25-19 @ 04:45) (96% - 98%)  Wt(kg): --  CAPILLARY BLOOD GLUCOSE      POCT Blood Glucose.: 133 mg/dL (25 Apr 2019 07:24)  POCT Blood Glucose.: 105 mg/dL (24 Apr 2019 21:30)  POCT Blood Glucose.: 213 mg/dL (24 Apr 2019 16:00)  POCT Blood Glucose.: 189 mg/dL (24 Apr 2019 11:05)  POCT Blood Glucose.: 256 mg/dL (24 Apr 2019 07:59)      PHYSICAL EXAM:  General: NAD, WDWN.   Neuro:  Alert & responsive  HEENT: NCAT, EOMI, conjunctiva clear  CV: +S1+S2 regular rate and rhythm  Lung: clear to ausculation bilaterally, respirations nonlabored, good inspiratory effort  Abdomen: soft, + epi Tender, No distention Normal active BS  Extremities: no cyanosis, clubbing or edema    LABS:                        13.3   12.71 )-----------( 350      ( 24 Apr 2019 07:57 )             39.9     04-24    138  |  104  |  17  ----------------------------<  278<H>  4.0   |  22  |  1.05    Ca    8.9      24 Apr 2019 07:57  Phos  3.5     04-24  Mg     2.1     04-24    TPro  7.0  /  Alb  3.7  /  TBili  0.6  /  DBili  x   /  AST  15  /  ALT  15  /  AlkPhos  84  04-24    LIVER FUNCTIONS - ( 24 Apr 2019 07:57 )  Alb: 3.7 g/dL / Pro: 7.0 gm/dL / ALK PHOS: 84 U/L / ALT: 15 U/L / AST: 15 U/L / GGT: x             I&O's Detail    24 Apr 2019 07:01  -  25 Apr 2019 07:00  --------------------------------------------------------  IN:    Oral Fluid: 240 mL    sodium chloride 0.9%.: 1000 mL  Total IN: 1240 mL    OUT:    Voided: 600 mL  Total OUT: 600 mL    Total NET: 640 mL        04-24 @ 07:57    138 | 104 | 17  /8.9 | 2.1 | 3.5  _______________________/  4.0 | 22 | 1.05                           \par   Lipase, Serum: 35 U/L (04-23 @ 23:12)

## 2019-04-25 NOTE — PROGRESS NOTE ADULT - PROBLEM SELECTOR PLAN 1
Gastroenterology consult appreciated.   Continue Erythromycin, and Reglan with meals   advance diet as tolerated; discharge planning.

## 2019-04-25 NOTE — PROGRESS NOTE ADULT - SUBJECTIVE AND OBJECTIVE BOX
Patient is a 38y old  Male who presents with a chief complaint of abdominal pain, nausea, vomiting (2019 07:47)      INTERVAL HPI/ OVERNIGHT EVENTS: Pt was seen and examined at bedside today, No significant overnight events, pt admits to improvement of his symptoms including nausea and abdominal pain, was able to tolerate full liquid diet thus far.      MEDICATIONS  (STANDING):  dextrose 5%. 1000 milliLiter(s) (50 mL/Hr) IV Continuous <Continuous>  dextrose 50% Injectable 12.5 Gram(s) IV Push once  dextrose 50% Injectable 25 Gram(s) IV Push once  dextrose 50% Injectable 25 Gram(s) IV Push once  erythromycin     base Tablet 250 milliGRAM(s) Oral every 6 hours  insulin glargine Injectable (LANTUS) 15 Unit(s) SubCutaneous every morning  insulin glargine Injectable (LANTUS) 13 Unit(s) SubCutaneous at bedtime  insulin lispro (HumaLOG) corrective regimen sliding scale   SubCutaneous three times a day before meals  insulin lispro (HumaLOG) corrective regimen sliding scale   SubCutaneous at bedtime  insulin lispro Injectable (HumaLOG) 11 Unit(s) SubCutaneous three times a day before meals  lactobacillus acidophilus 1 Tablet(s) Oral daily  mesalamine DR Capsule 400 milliGRAM(s) Oral three times a day  metoclopramide 10 milliGRAM(s) Oral Before meals and at bedtime  ondansetron Injectable 4 milliGRAM(s) IV Push every 6 hours  pantoprazole  Injectable 40 milliGRAM(s) IV Push every 12 hours  sucralfate 1 Gram(s) Oral four times a day    MEDICATIONS  (PRN):  ALBUTerol    90 MICROgram(s) HFA Inhaler 2 Puff(s) Inhalation every 6 hours PRN Shortness of Breath and/or Wheezing  clonazePAM Tablet 0.5 milliGRAM(s) Oral two times a day PRN Anxiety  dextrose 40% Gel 15 Gram(s) Oral once PRN Blood Glucose LESS THAN 70 milliGRAM(s)/deciliter  glucagon  Injectable 1 milliGRAM(s) IntraMuscular once PRN Glucose LESS THAN 70 milligrams/deciliter  HYDROmorphone  Injectable 2 milliGRAM(s) IV Push every 6 hours PRN Severe Pain (7 - 10)  oxyCODONE    5 mG/acetaminophen 325 mG 1 Tablet(s) Oral every 6 hours PRN Severe Pain (7 - 10)      Allergies    No Known Allergies    Intolerances        REVIEW OF SYSTEMS:    Unable to examine due to [ ] Encephalopathy [ ] Advanced Dementia [ ] Expressive Aphasia [ ] Non-verbal patient    CONSTITUTIONAL: No fever, NO generalized weakness/Fatigue, No weight loss  EYES: No eye pain, visual disturbances, or discharge  ENMT:  No difficulty hearing, tinnitus, vertigo; No sinus or throat pain  NECK: No pain or stiffness  RESPIRATORY: No shortness of breath,  cough, wheezing, sputum or hemoptysis   CARDIOVASCULAR: No chest pain, palpitations, or leg swelling  GASTROINTESTINAL: intermittent abdominal pain and nausea, no vomiting, diarrhea or constipation. No melena or hematochezia.  GENITOURINARY: No dysuria, frequency, hematuria, or incontinence  NEUROLOGICAL: No headaches, Dizziness, memory loss, loss of strength, numbness, or tremors  SKIN: No itching, burning, rashes, or lesions   MUSCULOSKELETAL: No joint pain or swelling; No muscle, back, or extremity pain  PSYCHIATRIC: No depression, anxiety, mood swings, or difficulty sleeping  HEME/LYMPH: No easy bruising, or bleeding gums      Vital Signs Last 24 Hrs  T(C): 36.2 (2019 11:38), Max: 36.9 (2019 00:00)  T(F): 97.1 (2019 11:38), Max: 98.4 (2019 00:00)  HR: 68 (2019 11:38) (60 - 73)  BP: 131/76 (2019 11:38) (119/69 - 146/96)  BP(mean): --  RR: 16 (2019 11:38) (16 - 17)  SpO2: 96% (2019 11:38) (96% - 97%)    PHYSICAL EXAM:  GENERAL: NAD, well-developed, well-groomed  HEAD:  Atraumatic, Normocephalic  EYES: conjunctiva and sclera clear  ENMT: Moist mucous membranes  NECK: Supple, No JVD, Normal thyroid  CHEST/LUNG: Clear to Auscultation bilaterally; No rales, rhonchi, wheezing, or rubs  HEART: Regular rate and rhythm; No murmurs, rubs, or gallops  ABDOMEN: Soft, mild diffuse tenderness, Nondistended; Bowel sounds present  EXTREMITIES:  2+ Peripheral Pulses, No clubbing, cyanosis, or edema  SKIN: No rashes or lesions  NERVOUS SYSTEM:  Alert & Oriented X3, Good concentration; Motor Strength 5/5 B/L upper and lower extremities    LABS:                        13.3   12.71 )-----------( 350      ( 2019 07:57 )             39.9         138  |  104  |  17  ----------------------------<  278<H>  4.0   |  22  |  1.05    Ca    8.9      2019 07:57  Phos  3.5       Mg     2.1         TPro  7.0  /  Alb  3.7  /  TBili  0.6  /  DBili  x   /  AST  15  /  ALT  15  /  AlkPhos  84        Urinalysis Basic - ( 2019 23:12 )    Color: Yellow / Appearance: Clear / S.030 / pH: x  Gluc: x / Ketone: Large  / Bili: Negative / Urobili: 1 mg/dL   Blood: x / Protein: 30 mg/dL / Nitrite: Negative   Leuk Esterase: Negative / RBC: 0-2 /HPF / WBC 3-5   Sq Epi: x / Non Sq Epi: x / Bacteria: Moderate      CAPILLARY BLOOD GLUCOSE      POCT Blood Glucose.: 101 mg/dL (2019 10:46)  POCT Blood Glucose.: 133 mg/dL (2019 07:24)  POCT Blood Glucose.: 105 mg/dL (2019 21:30)  POCT Blood Glucose.: 213 mg/dL (2019 16:00)          RADIOLOGY & ADDITIONAL TESTS:          Imaging Personally Reviewed:  [ ] YES  [ ] NO    Consultant(s) Notes Reviewed:  [x ] YES  [ ] NO    Care Discussed with Consultants/Other Providers [x ] YES  [ ] NO

## 2019-04-25 NOTE — PROGRESS NOTE ADULT - PROBLEM SELECTOR PLAN 1
IV fluids and reglan. Advance diet as tolerated.  may need an evaluation for possible Gastric pacemaker In future at a tertiary care facility as outpatient

## 2019-04-26 ENCOUNTER — TRANSCRIPTION ENCOUNTER (OUTPATIENT)
Age: 39
End: 2019-04-26

## 2019-04-26 VITALS
RESPIRATION RATE: 17 BRPM | SYSTOLIC BLOOD PRESSURE: 154 MMHG | HEART RATE: 58 BPM | DIASTOLIC BLOOD PRESSURE: 97 MMHG | TEMPERATURE: 98 F | OXYGEN SATURATION: 98 %

## 2019-04-26 LAB
GLUCOSE BLDC GLUCOMTR-MCNC: 157 MG/DL — HIGH (ref 70–99)
GLUCOSE BLDC GLUCOMTR-MCNC: 226 MG/DL — HIGH (ref 70–99)
GLUCOSE BLDC GLUCOMTR-MCNC: 68 MG/DL — LOW (ref 70–99)
GLUCOSE BLDC GLUCOMTR-MCNC: 68 MG/DL — LOW (ref 70–99)

## 2019-04-26 PROCEDURE — 99239 HOSP IP/OBS DSCHRG MGMT >30: CPT

## 2019-04-26 RX ORDER — METOCLOPRAMIDE HCL 10 MG
1 TABLET ORAL
Qty: 120 | Refills: 0
Start: 2019-04-26 | End: 2019-05-25

## 2019-04-26 RX ORDER — SUCRALFATE 1 G
1 TABLET ORAL
Qty: 120 | Refills: 0
Start: 2019-04-26 | End: 2019-05-25

## 2019-04-26 RX ORDER — PANTOPRAZOLE SODIUM 20 MG/1
1 TABLET, DELAYED RELEASE ORAL
Qty: 30 | Refills: 0
Start: 2019-04-26 | End: 2019-05-25

## 2019-04-26 RX ORDER — DEXTROSE 50 % IN WATER 50 %
15 SYRINGE (ML) INTRAVENOUS ONCE
Qty: 0 | Refills: 0 | Status: DISCONTINUED | OUTPATIENT
Start: 2019-04-26 | End: 2019-04-26

## 2019-04-26 RX ORDER — INSULIN GLARGINE 100 [IU]/ML
10 INJECTION, SOLUTION SUBCUTANEOUS EVERY MORNING
Qty: 0 | Refills: 0 | Status: DISCONTINUED | OUTPATIENT
Start: 2019-04-26 | End: 2019-04-26

## 2019-04-26 RX ADMIN — Medication 250 MILLIGRAM(S): at 11:05

## 2019-04-26 RX ADMIN — Medication 250 MILLIGRAM(S): at 05:37

## 2019-04-26 RX ADMIN — ONDANSETRON 4 MILLIGRAM(S): 8 TABLET, FILM COATED ORAL at 11:06

## 2019-04-26 RX ADMIN — HYDROMORPHONE HYDROCHLORIDE 2 MILLIGRAM(S): 2 INJECTION INTRAMUSCULAR; INTRAVENOUS; SUBCUTANEOUS at 06:30

## 2019-04-26 RX ADMIN — HYDROMORPHONE HYDROCHLORIDE 2 MILLIGRAM(S): 2 INJECTION INTRAMUSCULAR; INTRAVENOUS; SUBCUTANEOUS at 06:15

## 2019-04-26 RX ADMIN — Medication 0.5 MILLIGRAM(S): at 09:56

## 2019-04-26 RX ADMIN — PANTOPRAZOLE SODIUM 40 MILLIGRAM(S): 20 TABLET, DELAYED RELEASE ORAL at 07:26

## 2019-04-26 RX ADMIN — ONDANSETRON 4 MILLIGRAM(S): 8 TABLET, FILM COATED ORAL at 05:37

## 2019-04-26 RX ADMIN — Medication 400 MILLIGRAM(S): at 05:37

## 2019-04-26 RX ADMIN — Medication 10 MILLIGRAM(S): at 11:06

## 2019-04-26 RX ADMIN — Medication 1 GRAM(S): at 05:37

## 2019-04-26 RX ADMIN — Medication 1 GRAM(S): at 11:05

## 2019-04-26 RX ADMIN — Medication 10 MILLIGRAM(S): at 07:26

## 2019-04-26 RX ADMIN — Medication 1 TABLET(S): at 11:06

## 2019-04-26 RX ADMIN — Medication 4: at 11:06

## 2019-04-26 NOTE — CONSULT NOTE ADULT - SUBJECTIVE AND OBJECTIVE BOX
Patient is a 38y old  Male who presents with a chief complaint of abdominal pain, nausea, vomiting (26 Apr 2019 09:21)      HPI:  38 year old man with PMH of anxiety, asthma, DM1, gastritis, gastroparesis, presents to ED with complaint of nausea, vomiting, and diarrhea x 1 day.  Patient states this pain is typical of his chronic symptoms.  He denies fever, chills, bloody stool, recent travel, sick contacts.    In the ED, labs mostly unremarkable (mild hyperglycemia), CT ab read as colitis vs underdistention; small fat-containing hernia.  Patient afebrile. (24 Apr 2019 05:27)      PAST MEDICAL & SURGICAL HISTORY:  Uncomplicated asthma, unspecified asthma severity, unspecified whether persistent  Gastritis, presence of bleeding unspecified, unspecified chronicity, unspecified gastritis type  Controlled diabetes mellitus type 1 without complications  Anxiety  Gastritis  Asthma  DM type 1 (diabetes mellitus, type 1)  History of cholecystectomy  S/P cholecystectomy      Diabetes History: dm1 since age 17, complicated by ?gastorparesis, does not follow with endocrine, he takes basaglar 14untis bid and admelog 11units with meals at home (says prior ha1c (9%+)    FAMILY HISTORY:  Family history of diabetes mellitus (DM) (Father, Mother, Grandparent)  Family history of diabetes mellitus (Father, Mother)        Social History:    Allergies    No Known Allergies    Intolerances        MEDICATIONS  (STANDING):  dextrose 5%. 1000 milliLiter(s) (50 mL/Hr) IV Continuous <Continuous>  dextrose 50% Injectable 12.5 Gram(s) IV Push once  dextrose 50% Injectable 25 Gram(s) IV Push once  dextrose 50% Injectable 25 Gram(s) IV Push once  erythromycin     base Tablet 250 milliGRAM(s) Oral every 6 hours  insulin glargine Injectable (LANTUS) 13 Unit(s) SubCutaneous at bedtime  insulin lispro (HumaLOG) corrective regimen sliding scale   SubCutaneous three times a day before meals  insulin lispro (HumaLOG) corrective regimen sliding scale   SubCutaneous at bedtime  lactobacillus acidophilus 1 Tablet(s) Oral daily  mesalamine DR Capsule 400 milliGRAM(s) Oral three times a day  metoclopramide 10 milliGRAM(s) Oral Before meals and at bedtime  ondansetron Injectable 4 milliGRAM(s) IV Push every 6 hours  pantoprazole    Tablet 40 milliGRAM(s) Oral before breakfast  sucralfate 1 Gram(s) Oral four times a day    MEDICATIONS  (PRN):  ALBUTerol    90 MICROgram(s) HFA Inhaler 2 Puff(s) Inhalation every 6 hours PRN Shortness of Breath and/or Wheezing  clonazePAM Tablet 0.5 milliGRAM(s) Oral two times a day PRN Anxiety  dextrose 40% Gel 15 Gram(s) Oral once PRN Blood Glucose LESS THAN 70 milliGRAM(s)/deciliter  dextrose 40% Gel 15 Gram(s) Oral once PRN Blood Glucose LESS THAN 70 milliGRAM(s)/deciliter  glucagon  Injectable 1 milliGRAM(s) IntraMuscular once PRN Glucose LESS THAN 70 milligrams/deciliter  HYDROmorphone  Injectable 2 milliGRAM(s) IV Push every 6 hours PRN Severe Pain (7 - 10)  oxyCODONE    5 mG/acetaminophen 325 mG 1 Tablet(s) Oral every 6 hours PRN Severe Pain (7 - 10)      REVIEW OF SYSTEMS:  CONSTITUTIONAL:  as per HPI  HEENT:  Eyes:  No diplopia or blurred vision. ENT:  No earache, sore throat or runny nose.  CARDIOVASCULAR:  No pressure, squeezing, strangling, tightness, heaviness or aching about the chest, neck, axilla or epigastrium.  RESPIRATORY:  No cough, shortness of breath, PND or orthopnea.  GASTROINTESTINAL:  No nausea, vomiting or diarrhea.  ENDOCRINE:  No heat or cold intolerance, polyuria or polydipsia. abnormal weight gain or loss, oral thrush      T(C): 36.2 (04-26-19 @ 05:19), Max: 37.1 (04-25-19 @ 17:11)  HR: 69 (04-26-19 @ 05:19) (67 - 69)  BP: 136/85 (04-26-19 @ 05:19) (131/76 - 146/87)  RR: 17 (04-26-19 @ 05:19) (16 - 17)  SpO2: 98% (04-26-19 @ 05:19) (96% - 98%)  Wt(kg): --    PHYSICAL EXAM:  GENERAL: NAD, well-groomed, well-developed  HEAD:  Atraumatic, Normocephalic  EYES:  PERRLA, conjunctiva and sclera clear  CHEST/LUNG: Clear to percussion bilaterally; No rales, rhonchi, wheezing, or rubs  HEART: Regular rate and rhythm; No murmurs, rubs, or gallops  ABDOMEN: Soft, Nontender, Nondistended; Bowel sounds present      CAPILLARY BLOOD GLUCOSE      POCT Blood Glucose.: 226 mg/dL (26 Apr 2019 10:39)  POCT Blood Glucose.: 157 mg/dL (26 Apr 2019 08:39)  POCT Blood Glucose.: 68 mg/dL (26 Apr 2019 07:21)  POCT Blood Glucose.: 68 mg/dL (26 Apr 2019 07:20)  POCT Blood Glucose.: 88 mg/dL (25 Apr 2019 20:52)  POCT Blood Glucose.: 233 mg/dL (25 Apr 2019 15:47)          CMP:      Thyroid Function Tests:      Diabetes Tests: Ha1c 7.6%    Parathyroids:     Adrenals:       Radiology:

## 2019-04-26 NOTE — DISCHARGE NOTE PROVIDER - CARE PROVIDERS DIRECT ADDRESSES
[email or web address]

## 2019-04-26 NOTE — PROGRESS NOTE ADULT - SUBJECTIVE AND OBJECTIVE BOX
Gastroenterology  Patient seen and examined bedside resting comfortably.  No complaints offered. wants to go home  +abdominal pain well controlled with pain meds   Denies nausea and vomiting. Tolerating diet.  Normal flatus/BM.     T(F): 97.2 (04-26-19 @ 05:19), Max: 98.7 (04-25-19 @ 17:11)  HR: 69 (04-26-19 @ 05:19) (67 - 69)  BP: 136/85 (04-26-19 @ 05:19) (131/76 - 146/87)  RR: 17 (04-26-19 @ 05:19) (16 - 17)  SpO2: 98% (04-26-19 @ 05:19) (96% - 98%)  Wt(kg): --  CAPILLARY BLOOD GLUCOSE      POCT Blood Glucose.: 68 mg/dL (26 Apr 2019 07:21)  POCT Blood Glucose.: 68 mg/dL (26 Apr 2019 07:20)  POCT Blood Glucose.: 88 mg/dL (25 Apr 2019 20:52)  POCT Blood Glucose.: 233 mg/dL (25 Apr 2019 15:47)  POCT Blood Glucose.: 101 mg/dL (25 Apr 2019 10:46)      PHYSICAL EXAM:  General: NAD, WDWN.   Neuro:  Alert & responsive  HEENT: NCAT, EOMI, conjunctiva clear  CV: +S1+S2 regular rate and rhythm  Lung: clear to ausculation bilaterally, respirations nonlabored, good inspiratory effort  Abdomen: soft, Non Tender, No distention Normal active BS  Extremities: no cyanosis, clubbing or edema    LABS:              I&O's Detail    25 Apr 2019 07:01  -  26 Apr 2019 07:00  --------------------------------------------------------  IN:    Oral Fluid: 740 mL    sodium chloride 0.9%.: 720 mL  Total IN: 1460 mL    OUT:    Voided: 500 mL  Total OUT: 500 mL    Total NET: 960 mL

## 2019-04-26 NOTE — DISCHARGE NOTE PROVIDER - HOSPITAL COURSE
38 year old man with PMH of anxiety, asthma, DM1, gastritis, gastroparesis, presents to ED with complaint of nausea, vomiting, and diarrhea x 1 day.  Patient states this pain is typical of his chronic symptoms.  He denies fever, chills, bloody stool, recent travel, sick contacts.        ER course: labs mostly unremarkable (mild hyperglycemia), CT ab read as colitis vs underdistention; small fat-containing hernia.  Patient afebrile. 38 year old man with PMH of anxiety, asthma, DM1, gastritis, gastroparesis, presents to ED with complaint of nausea, vomiting, and diarrhea x 1 day.  Patient states this pain is typical of his chronic symptoms.  He denies fever, chills, bloody stool, recent travel, sick contacts.        ER course: labs mostly unremarkable (mild hyperglycemia), CT ab read as colitis vs underdistention; small fat-containing hernia.  Patient afebrile.        The patient was admitted to medical bed. The patient was treated with Reglan and analgesics as needed. Gastroenterology was consulted and followed the patient. The patient's symptoms improved and diet was advanced to regular. The patient is tolerating oral diet and will be discharged home. The patient will follow up with Gastroenterology as outpatient; Gastric pacemaker was recommended.

## 2019-04-26 NOTE — CONSULT NOTE ADULT - PROBLEM SELECTOR RECOMMENDATION 9
while inpt decrease lantus to 10units in am and 15units in pm  continue COLIN (if remains in hospital would add lispro 4units with meals)  upon d/c can resume home regimen with outpt f/u with endocrine within 1-2wks  plan for insulin pump therapy as outpt

## 2019-04-26 NOTE — DISCHARGE NOTE PROVIDER - CARE PROVIDER_API CALL
Octavio Kumar)  Medicine  [address], [address]  [place] [address]  Phone: [phone]  Fax: [phone]  Follow Up Time: 1 week

## 2019-04-26 NOTE — PROGRESS NOTE ADULT - PROBLEM SELECTOR PLAN 1
Advance diet as tolerated.  may need an evaluation for possible Gastric pacemaker In future at a tertiary care facility as outpatient.

## 2019-05-03 DIAGNOSIS — K42.9 UMBILICAL HERNIA WITHOUT OBSTRUCTION OR GANGRENE: ICD-10-CM

## 2019-05-03 DIAGNOSIS — E10.43 TYPE 1 DIABETES MELLITUS WITH DIABETIC AUTONOMIC (POLY)NEUROPATHY: ICD-10-CM

## 2019-05-03 DIAGNOSIS — J45.20 MILD INTERMITTENT ASTHMA, UNCOMPLICATED: ICD-10-CM

## 2019-05-03 DIAGNOSIS — F12.11 CANNABIS ABUSE, IN REMISSION: ICD-10-CM

## 2019-05-03 DIAGNOSIS — Z79.51 LONG TERM (CURRENT) USE OF INHALED STEROIDS: ICD-10-CM

## 2019-05-03 DIAGNOSIS — E10.65 TYPE 1 DIABETES MELLITUS WITH HYPERGLYCEMIA: ICD-10-CM

## 2019-05-03 DIAGNOSIS — K31.84 GASTROPARESIS: ICD-10-CM

## 2019-05-03 DIAGNOSIS — Z79.4 LONG TERM (CURRENT) USE OF INSULIN: ICD-10-CM

## 2019-05-03 DIAGNOSIS — K52.9 NONINFECTIVE GASTROENTERITIS AND COLITIS, UNSPECIFIED: ICD-10-CM

## 2019-05-03 DIAGNOSIS — K29.50 UNSPECIFIED CHRONIC GASTRITIS WITHOUT BLEEDING: ICD-10-CM

## 2019-05-03 DIAGNOSIS — F41.9 ANXIETY DISORDER, UNSPECIFIED: ICD-10-CM

## 2019-05-03 DIAGNOSIS — Z90.49 ACQUIRED ABSENCE OF OTHER SPECIFIED PARTS OF DIGESTIVE TRACT: ICD-10-CM

## 2019-05-06 ENCOUNTER — EMERGENCY (EMERGENCY)
Facility: HOSPITAL | Age: 39
LOS: 0 days | Discharge: HOME | End: 2019-05-06
Attending: EMERGENCY MEDICINE
Payer: MEDICAID

## 2019-05-06 VITALS
RESPIRATION RATE: 17 BRPM | OXYGEN SATURATION: 99 % | DIASTOLIC BLOOD PRESSURE: 86 MMHG | WEIGHT: 175.05 LBS | HEIGHT: 69 IN | HEART RATE: 68 BPM | SYSTOLIC BLOOD PRESSURE: 127 MMHG | TEMPERATURE: 98 F

## 2019-05-06 DIAGNOSIS — E11.69 TYPE 2 DIABETES MELLITUS WITH OTHER SPECIFIED COMPLICATION: ICD-10-CM

## 2019-05-06 DIAGNOSIS — F41.9 ANXIETY DISORDER, UNSPECIFIED: ICD-10-CM

## 2019-05-06 DIAGNOSIS — E11.649 TYPE 2 DIABETES MELLITUS WITH HYPOGLYCEMIA WITHOUT COMA: ICD-10-CM

## 2019-05-06 DIAGNOSIS — Z90.49 ACQUIRED ABSENCE OF OTHER SPECIFIED PARTS OF DIGESTIVE TRACT: Chronic | ICD-10-CM

## 2019-05-06 DIAGNOSIS — J45.909 UNSPECIFIED ASTHMA, UNCOMPLICATED: ICD-10-CM

## 2019-05-06 DIAGNOSIS — Z79.4 LONG TERM (CURRENT) USE OF INSULIN: ICD-10-CM

## 2019-05-06 LAB
ALBUMIN SERPL ELPH-MCNC: 3.8 G/DL — SIGNIFICANT CHANGE UP (ref 3.3–5)
ALP SERPL-CCNC: 80 U/L — SIGNIFICANT CHANGE UP (ref 40–120)
ALT FLD-CCNC: 16 U/L — SIGNIFICANT CHANGE UP (ref 12–78)
ANION GAP SERPL CALC-SCNC: 6 MMOL/L — SIGNIFICANT CHANGE UP (ref 5–17)
AST SERPL-CCNC: 15 U/L — SIGNIFICANT CHANGE UP (ref 15–37)
BASOPHILS # BLD AUTO: 0.05 K/UL — SIGNIFICANT CHANGE UP (ref 0–0.2)
BASOPHILS NFR BLD AUTO: 0.3 % — SIGNIFICANT CHANGE UP (ref 0–2)
BILIRUB SERPL-MCNC: 0.3 MG/DL — SIGNIFICANT CHANGE UP (ref 0.2–1.2)
BUN SERPL-MCNC: 11 MG/DL — SIGNIFICANT CHANGE UP (ref 7–23)
CALCIUM SERPL-MCNC: 9.4 MG/DL — SIGNIFICANT CHANGE UP (ref 8.5–10.1)
CHLORIDE SERPL-SCNC: 103 MMOL/L — SIGNIFICANT CHANGE UP (ref 96–108)
CO2 SERPL-SCNC: 31 MMOL/L — SIGNIFICANT CHANGE UP (ref 22–31)
CREAT SERPL-MCNC: 1.07 MG/DL — SIGNIFICANT CHANGE UP (ref 0.5–1.3)
EOSINOPHIL # BLD AUTO: 0.14 K/UL — SIGNIFICANT CHANGE UP (ref 0–0.5)
EOSINOPHIL NFR BLD AUTO: 0.8 % — SIGNIFICANT CHANGE UP (ref 0–6)
GLUCOSE BLDC GLUCOMTR-MCNC: 100 MG/DL — HIGH (ref 70–99)
GLUCOSE BLDC GLUCOMTR-MCNC: 105 MG/DL — HIGH (ref 70–99)
GLUCOSE BLDC GLUCOMTR-MCNC: 147 MG/DL — HIGH (ref 70–99)
GLUCOSE BLDC GLUCOMTR-MCNC: 196 MG/DL — HIGH (ref 70–99)
GLUCOSE SERPL-MCNC: 67 MG/DL — LOW (ref 70–99)
HCT VFR BLD CALC: 41.9 % — SIGNIFICANT CHANGE UP (ref 39–50)
HGB BLD-MCNC: 14.1 G/DL — SIGNIFICANT CHANGE UP (ref 13–17)
IMM GRANULOCYTES NFR BLD AUTO: 0.3 % — SIGNIFICANT CHANGE UP (ref 0–1.5)
LYMPHOCYTES # BLD AUTO: 1.82 K/UL — SIGNIFICANT CHANGE UP (ref 1–3.3)
LYMPHOCYTES # BLD AUTO: 10.2 % — LOW (ref 13–44)
MCHC RBC-ENTMCNC: 28.5 PG — SIGNIFICANT CHANGE UP (ref 27–34)
MCHC RBC-ENTMCNC: 33.7 GM/DL — SIGNIFICANT CHANGE UP (ref 32–36)
MCV RBC AUTO: 84.6 FL — SIGNIFICANT CHANGE UP (ref 80–100)
MONOCYTES # BLD AUTO: 0.96 K/UL — HIGH (ref 0–0.9)
MONOCYTES NFR BLD AUTO: 5.4 % — SIGNIFICANT CHANGE UP (ref 2–14)
NEUTROPHILS # BLD AUTO: 14.76 K/UL — HIGH (ref 1.8–7.4)
NEUTROPHILS NFR BLD AUTO: 83 % — HIGH (ref 43–77)
NRBC # BLD: 0 /100 WBCS — SIGNIFICANT CHANGE UP (ref 0–0)
PLATELET # BLD AUTO: 378 K/UL — SIGNIFICANT CHANGE UP (ref 150–400)
POTASSIUM SERPL-MCNC: 3.4 MMOL/L — LOW (ref 3.5–5.3)
POTASSIUM SERPL-SCNC: 3.4 MMOL/L — LOW (ref 3.5–5.3)
PROT SERPL-MCNC: 7.6 GM/DL — SIGNIFICANT CHANGE UP (ref 6–8.3)
RBC # BLD: 4.95 M/UL — SIGNIFICANT CHANGE UP (ref 4.2–5.8)
RBC # FLD: 14.2 % — SIGNIFICANT CHANGE UP (ref 10.3–14.5)
SODIUM SERPL-SCNC: 140 MMOL/L — SIGNIFICANT CHANGE UP (ref 135–145)
WBC # BLD: 17.79 K/UL — HIGH (ref 3.8–10.5)
WBC # FLD AUTO: 17.79 K/UL — HIGH (ref 3.8–10.5)

## 2019-05-06 PROCEDURE — 99284 EMERGENCY DEPT VISIT MOD MDM: CPT

## 2019-05-06 NOTE — ED PROVIDER NOTE - CLINICAL SUMMARY MEDICAL DECISION MAKING FREE TEXT BOX
Pt s/p episode of hypoglycemia, on insulin, no oral meds.  Observed in ED, pt ate food, no vomiting, FS stable.  Discussed results and outcome of testing with the patient, given copy as well.  Patient advised to please follow up with their primary care doctor within the next 24 hours and return to the Emergency Department for worsening symptoms or any other concerns.  Patient advised that their doctor may call  to follow up on the specific results of the tests performed today in the emergency department.

## 2019-05-06 NOTE — ED PROVIDER NOTE - OBJECTIVE STATEMENT
Pertinent PMH/PSH/FHx/SHx and Review of Systems contained within:    37yo M w PMH of anxiety, asthma, DM1, gastritis, gastroparesis Pertinent PMH/PSH/FHx/SHx and Review of Systems contained within:    37yo M w PMH of anxiety, asthma, IDDM, gastritis, gastroparesis presents to ED for eval s/p episode of hypoglycemia.  Pt states he took 10u insulin earlier and then ate a sandwich.  Pt then vomited sandwich.  Pt then noted to be diaphoretic and confused.  EMS report FS in 20s, improved after dose of D50.  Pt currently states he feels well.  Denies abd pain, fever, chills, CP, SOB.    No fever/chills, No photophobia/eye pain/changes in vision, No ear pain/sore throat/dysphagia, No chest pain/palpitations, no SOB/cough/wheeze/stridor, No abdominal pain, No neck/back pain, no rash, no changes in neurological status/function.

## 2019-05-06 NOTE — ED ADULT TRIAGE NOTE - CHIEF COMPLAINT QUOTE
As per ems, patient intial blood glucose 26 on scene, diaphoretic and alert. 1 amp D50 given in the field. fs 147 in triage. Patient AAOX4 20 R- AC As per ems, patient intial blood glucose 26 on scene, diaphoretic and alert. 1 amp D50 given in the field. fs 147 in triage. Patient AAOX4 20 R- AC, patient reports eating today and took 10 units of insulin

## 2019-05-06 NOTE — ED ADULT NURSE NOTE - OBJECTIVE STATEMENT
pt received to bed 24 after a hypoglycemic episode. wife reports LOC and incoprherent speech. EMS report blood glucose of 27

## 2019-05-06 NOTE — ED ADULT NURSE NOTE - NSIMPLEMENTINTERV_GEN_ALL_ED
Implemented All Universal Safety Interventions:  [place] to call system. Call bell, personal items and telephone within reach. Instruct patient to call for assistance. Room bathroom lighting operational. Non-slip footwear when patient is off stretcher. Physically safe environment: no spills, clutter or unnecessary equipment. Stretcher in lowest position, wheels locked, appropriate side rails in place.

## 2019-05-06 NOTE — ED ADULT NURSE NOTE - CHIEF COMPLAINT QUOTE
As per ems, patient intial blood glucose 26 on scene, diaphoretic and alert. 1 amp D50 given in the field. fs 147 in triage. Patient AAOX4 20 R- AC, patient reports eating today and took 10 units of insulin

## 2019-05-17 ENCOUNTER — EMERGENCY (EMERGENCY)
Facility: HOSPITAL | Age: 39
LOS: 0 days | Discharge: HOME | End: 2019-05-18
Attending: EMERGENCY MEDICINE
Payer: MEDICAID

## 2019-05-17 VITALS
OXYGEN SATURATION: 98 % | RESPIRATION RATE: 19 BRPM | WEIGHT: 175.05 LBS | HEIGHT: 69 IN | DIASTOLIC BLOOD PRESSURE: 99 MMHG | HEART RATE: 80 BPM | TEMPERATURE: 98 F | SYSTOLIC BLOOD PRESSURE: 141 MMHG

## 2019-05-17 VITALS
SYSTOLIC BLOOD PRESSURE: 134 MMHG | DIASTOLIC BLOOD PRESSURE: 90 MMHG | TEMPERATURE: 98 F | OXYGEN SATURATION: 98 % | HEART RATE: 68 BPM | RESPIRATION RATE: 17 BRPM

## 2019-05-17 DIAGNOSIS — R11.10 VOMITING, UNSPECIFIED: ICD-10-CM

## 2019-05-17 DIAGNOSIS — Z90.49 ACQUIRED ABSENCE OF OTHER SPECIFIED PARTS OF DIGESTIVE TRACT: Chronic | ICD-10-CM

## 2019-05-17 DIAGNOSIS — K29.70 GASTRITIS, UNSPECIFIED, WITHOUT BLEEDING: ICD-10-CM

## 2019-05-17 DIAGNOSIS — E10.9 TYPE 1 DIABETES MELLITUS WITHOUT COMPLICATIONS: ICD-10-CM

## 2019-05-17 DIAGNOSIS — Z79.4 LONG TERM (CURRENT) USE OF INSULIN: ICD-10-CM

## 2019-05-17 DIAGNOSIS — R11.2 NAUSEA WITH VOMITING, UNSPECIFIED: ICD-10-CM

## 2019-05-17 DIAGNOSIS — Z90.49 ACQUIRED ABSENCE OF OTHER SPECIFIED PARTS OF DIGESTIVE TRACT: ICD-10-CM

## 2019-05-17 DIAGNOSIS — J45.909 UNSPECIFIED ASTHMA, UNCOMPLICATED: ICD-10-CM

## 2019-05-17 DIAGNOSIS — F41.9 ANXIETY DISORDER, UNSPECIFIED: ICD-10-CM

## 2019-05-17 DIAGNOSIS — R10.9 UNSPECIFIED ABDOMINAL PAIN: ICD-10-CM

## 2019-05-17 LAB
ACETONE SERPL-MCNC: SIGNIFICANT CHANGE UP
ALBUMIN SERPL ELPH-MCNC: 4 G/DL — SIGNIFICANT CHANGE UP (ref 3.3–5)
ALP SERPL-CCNC: 88 U/L — SIGNIFICANT CHANGE UP (ref 40–120)
ALT FLD-CCNC: 16 U/L — SIGNIFICANT CHANGE UP (ref 12–78)
ANION GAP SERPL CALC-SCNC: 10 MMOL/L — SIGNIFICANT CHANGE UP (ref 5–17)
AST SERPL-CCNC: 15 U/L — SIGNIFICANT CHANGE UP (ref 15–37)
BASOPHILS # BLD AUTO: 0.05 K/UL — SIGNIFICANT CHANGE UP (ref 0–0.2)
BASOPHILS NFR BLD AUTO: 0.5 % — SIGNIFICANT CHANGE UP (ref 0–2)
BILIRUB DIRECT SERPL-MCNC: 0.18 MG/DL — SIGNIFICANT CHANGE UP (ref 0.05–0.2)
BILIRUB INDIRECT FLD-MCNC: 0.5 MG/DL — SIGNIFICANT CHANGE UP (ref 0.2–1)
BILIRUB SERPL-MCNC: 0.7 MG/DL — SIGNIFICANT CHANGE UP (ref 0.2–1.2)
BUN SERPL-MCNC: 16 MG/DL — SIGNIFICANT CHANGE UP (ref 7–23)
CALCIUM SERPL-MCNC: 9.7 MG/DL — SIGNIFICANT CHANGE UP (ref 8.5–10.1)
CHLORIDE SERPL-SCNC: 103 MMOL/L — SIGNIFICANT CHANGE UP (ref 96–108)
CO2 SERPL-SCNC: 24 MMOL/L — SIGNIFICANT CHANGE UP (ref 22–31)
CREAT SERPL-MCNC: 1.1 MG/DL — SIGNIFICANT CHANGE UP (ref 0.5–1.3)
EOSINOPHIL # BLD AUTO: 0.07 K/UL — SIGNIFICANT CHANGE UP (ref 0–0.5)
EOSINOPHIL NFR BLD AUTO: 0.7 % — SIGNIFICANT CHANGE UP (ref 0–6)
GLUCOSE SERPL-MCNC: 201 MG/DL — HIGH (ref 70–99)
HCT VFR BLD CALC: 43.7 % — SIGNIFICANT CHANGE UP (ref 39–50)
HGB BLD-MCNC: 14.6 G/DL — SIGNIFICANT CHANGE UP (ref 13–17)
IMM GRANULOCYTES NFR BLD AUTO: 0.2 % — SIGNIFICANT CHANGE UP (ref 0–1.5)
LACTATE SERPL-SCNC: 2.7 MMOL/L — HIGH (ref 0.7–2)
LIDOCAIN IGE QN: 31 U/L — LOW (ref 73–393)
LYMPHOCYTES # BLD AUTO: 2.4 K/UL — SIGNIFICANT CHANGE UP (ref 1–3.3)
LYMPHOCYTES # BLD AUTO: 23.9 % — SIGNIFICANT CHANGE UP (ref 13–44)
MCHC RBC-ENTMCNC: 27.7 PG — SIGNIFICANT CHANGE UP (ref 27–34)
MCHC RBC-ENTMCNC: 33.4 GM/DL — SIGNIFICANT CHANGE UP (ref 32–36)
MCV RBC AUTO: 82.9 FL — SIGNIFICANT CHANGE UP (ref 80–100)
MONOCYTES # BLD AUTO: 0.67 K/UL — SIGNIFICANT CHANGE UP (ref 0–0.9)
MONOCYTES NFR BLD AUTO: 6.7 % — SIGNIFICANT CHANGE UP (ref 2–14)
NEUTROPHILS # BLD AUTO: 6.83 K/UL — SIGNIFICANT CHANGE UP (ref 1.8–7.4)
NEUTROPHILS NFR BLD AUTO: 68 % — SIGNIFICANT CHANGE UP (ref 43–77)
NRBC # BLD: 0 /100 WBCS — SIGNIFICANT CHANGE UP (ref 0–0)
PLATELET # BLD AUTO: 441 K/UL — HIGH (ref 150–400)
POTASSIUM SERPL-MCNC: 3.7 MMOL/L — SIGNIFICANT CHANGE UP (ref 3.5–5.3)
POTASSIUM SERPL-SCNC: 3.7 MMOL/L — SIGNIFICANT CHANGE UP (ref 3.5–5.3)
PROT SERPL-MCNC: 8.1 GM/DL — SIGNIFICANT CHANGE UP (ref 6–8.3)
RBC # BLD: 5.27 M/UL — SIGNIFICANT CHANGE UP (ref 4.2–5.8)
RBC # FLD: 13.5 % — SIGNIFICANT CHANGE UP (ref 10.3–14.5)
SODIUM SERPL-SCNC: 137 MMOL/L — SIGNIFICANT CHANGE UP (ref 135–145)
WBC # BLD: 10.04 K/UL — SIGNIFICANT CHANGE UP (ref 3.8–10.5)
WBC # FLD AUTO: 10.04 K/UL — SIGNIFICANT CHANGE UP (ref 3.8–10.5)

## 2019-05-17 PROCEDURE — 74177 CT ABD & PELVIS W/CONTRAST: CPT | Mod: 26

## 2019-05-17 PROCEDURE — 99285 EMERGENCY DEPT VISIT HI MDM: CPT

## 2019-05-17 RX ORDER — HALOPERIDOL DECANOATE 100 MG/ML
5 INJECTION INTRAMUSCULAR ONCE
Refills: 0 | Status: COMPLETED | OUTPATIENT
Start: 2019-05-17 | End: 2019-05-17

## 2019-05-17 RX ORDER — SODIUM CHLORIDE 9 MG/ML
2500 INJECTION, SOLUTION INTRAVENOUS ONCE
Refills: 0 | Status: COMPLETED | OUTPATIENT
Start: 2019-05-17 | End: 2019-05-17

## 2019-05-17 RX ORDER — MORPHINE SULFATE 50 MG/1
4 CAPSULE, EXTENDED RELEASE ORAL ONCE
Refills: 0 | Status: DISCONTINUED | OUTPATIENT
Start: 2019-05-17 | End: 2019-05-17

## 2019-05-17 RX ORDER — IOHEXOL 300 MG/ML
30 INJECTION, SOLUTION INTRAVENOUS ONCE
Refills: 0 | Status: COMPLETED | OUTPATIENT
Start: 2019-05-17 | End: 2019-05-17

## 2019-05-17 RX ORDER — FAMOTIDINE 10 MG/ML
20 INJECTION INTRAVENOUS ONCE
Refills: 0 | Status: COMPLETED | OUTPATIENT
Start: 2019-05-17 | End: 2019-05-17

## 2019-05-17 RX ORDER — ONDANSETRON 8 MG/1
4 TABLET, FILM COATED ORAL ONCE
Refills: 0 | Status: COMPLETED | OUTPATIENT
Start: 2019-05-17 | End: 2019-05-17

## 2019-05-17 RX ORDER — SODIUM CHLORIDE 9 MG/ML
1000 INJECTION INTRAMUSCULAR; INTRAVENOUS; SUBCUTANEOUS ONCE
Refills: 0 | Status: COMPLETED | OUTPATIENT
Start: 2019-05-17 | End: 2019-05-17

## 2019-05-17 RX ORDER — HYDROMORPHONE HYDROCHLORIDE 2 MG/ML
0.5 INJECTION INTRAMUSCULAR; INTRAVENOUS; SUBCUTANEOUS ONCE
Refills: 0 | Status: DISCONTINUED | OUTPATIENT
Start: 2019-05-17 | End: 2019-05-17

## 2019-05-17 RX ADMIN — MORPHINE SULFATE 4 MILLIGRAM(S): 50 CAPSULE, EXTENDED RELEASE ORAL at 19:00

## 2019-05-17 RX ADMIN — MORPHINE SULFATE 4 MILLIGRAM(S): 50 CAPSULE, EXTENDED RELEASE ORAL at 18:38

## 2019-05-17 RX ADMIN — SODIUM CHLORIDE 2000 MILLILITER(S): 9 INJECTION INTRAMUSCULAR; INTRAVENOUS; SUBCUTANEOUS at 18:39

## 2019-05-17 RX ADMIN — SODIUM CHLORIDE 2500 MILLILITER(S): 9 INJECTION, SOLUTION INTRAVENOUS at 22:00

## 2019-05-17 RX ADMIN — IOHEXOL 30 MILLILITER(S): 300 INJECTION, SOLUTION INTRAVENOUS at 18:42

## 2019-05-17 RX ADMIN — HYDROMORPHONE HYDROCHLORIDE 0.5 MILLIGRAM(S): 2 INJECTION INTRAMUSCULAR; INTRAVENOUS; SUBCUTANEOUS at 21:00

## 2019-05-17 RX ADMIN — HYDROMORPHONE HYDROCHLORIDE 0.5 MILLIGRAM(S): 2 INJECTION INTRAMUSCULAR; INTRAVENOUS; SUBCUTANEOUS at 21:15

## 2019-05-17 RX ADMIN — SODIUM CHLORIDE 2500 MILLILITER(S): 9 INJECTION, SOLUTION INTRAVENOUS at 18:42

## 2019-05-17 RX ADMIN — ONDANSETRON 4 MILLIGRAM(S): 8 TABLET, FILM COATED ORAL at 18:38

## 2019-05-17 RX ADMIN — SODIUM CHLORIDE 1000 MILLILITER(S): 9 INJECTION INTRAMUSCULAR; INTRAVENOUS; SUBCUTANEOUS at 21:00

## 2019-05-17 RX ADMIN — FAMOTIDINE 20 MILLIGRAM(S): 10 INJECTION INTRAVENOUS at 18:38

## 2019-05-17 NOTE — ED ADULT TRIAGE NOTE - CHIEF COMPLAINT QUOTE
Pt c/o severe abd pain w n/v all day, hx of gastroparesis. Last BM yesterday. Also c/o asthma, lungs clear to auscultation

## 2019-05-17 NOTE — ED ADULT NURSE NOTE - ED STAT RN HANDOFF DETAILS
Patient with complaint of abdominal pain, given report to JERRY potter, pt stable, c/o pain, MD made aware, pending CT

## 2019-05-17 NOTE — ED PROVIDER NOTE - ABDOMINAL EXAM
CM met with Pt with an introduction and explanation of role  Pt reported residing with her daughter Liset Starr and five children in a 2 story home with no use of DME but has access to a cane if needed and 2 steps to enter  Pt reported being independent with ADLs, and VNA in the past, can't remember the name of provided agency, no hx of SNF, mental health or drug/alcohol placements  Pt reported being treated for bipolar and schizoaffective d/o by a psychiatrist by the name of [address] [place] [address] Jose Juan on [address]  Pt denied having a living will and reported the use of [place] on [facility] [address] in [facility]  CM reviewed and provided Pt with a d/c checklist  Pt reported having Dr Albertina Torres as a PCP  Pt reported upon d/c she will need to find different living arrangements due to the lack of space within her current living environment  CM reviewed d/c planning process including the following: identifying help at home, patient preference for d/c planning needs, Discharge Lounge, [facility] Meds to Bed program, availability of treatment team to discuss questions or concerns patient and/or family may have regarding understanding medications and recognizing signs and symptoms once discharged  CM also encouraged patient to follow up with all recommended appointments after discharge  Patient advised of importance for patient and family to participate in managing patients medical well being  tender...

## 2019-05-17 NOTE — ED PROVIDER NOTE - CONSTITUTIONAL, MLM
normal... Well appearing, well nourished, awake, alert, oriented to person, place, time/situation, in apparent distress. Well appearing, well nourished, awake, alert, oriented to person, place, time/situation, vomiting

## 2019-05-17 NOTE — ED PROVIDER NOTE - PROGRESS NOTE DETAILS
Pt signed out by Dr. Alcaraz pending CT. Results negative. Pt wishes to be discharged. Wants to try haldol for cannabinoid hyperemesis, but wants to go home. Wife at bedside, says they live close by, will drive him home and put him to sleep. Return precautions given, pt ok for d/c.

## 2019-05-17 NOTE — ED ADULT NURSE NOTE - OBJECTIVE STATEMENT
Patient with complaint of abdominal pain and nausea and vomiting, pt AA)x3, no s/s of acute distress, l;abs done, IVF given Medication given

## 2019-05-17 NOTE — ED ADULT NURSE NOTE - ED STAT RN HAND OFF
[Person] : oriented to person [Place] : oriented to place [Time] : oriented to time [Concentration Intact] : normal concentrating ability [Visual Intact] : visual attention was ~T not ~L decreased [Naming Objects] : no difficulty naming common objects [Repeating Phrases] : no difficulty repeating a phrase [Writing A Sentence] : no difficulty writing a sentence [Fluency] : fluency intact [Comprehension] : comprehension intact [Reading] : reading intact [Past History] : adequate knowledge of personal past history [Cranial Nerves Optic (II)] : visual acuity intact bilaterally,  visual fields full to confrontation, pupils equal round and reactive to light [Cranial Nerves Oculomotor (III)] : extraocular motion intact [Cranial Nerves Trigeminal (V)] : facial sensation intact symmetrically [Cranial Nerves Facial (VII)] : face symmetrical [Cranial Nerves Vestibulocochlear (VIII)] : hearing was intact bilaterally [Cranial Nerves Glossopharyngeal (IX)] : tongue and palate midline [Cranial Nerves Accessory (XI - Cranial And Spinal)] : head turning and shoulder shrug symmetric [Cranial Nerves Hypoglossal (XII)] : there was no tongue deviation with protrusion [Motor Tone] : muscle tone was normal in all four extremities [Motor Strength] : muscle strength was normal in all four extremities [No Muscle Atrophy] : normal bulk in all four extremities [Sensation Tactile Decrease] : light touch was intact [Abnormal Walk] : normal gait [Balance] : balance was intact [2+] : Ankle jerk left 2+ [Past-pointing] : there was no past-pointing [Tremor] : no tremor present [Plantar Reflex Right Only] : normal on the right [Plantar Reflex Left Only] : normal on the left Handoff

## 2019-05-17 NOTE — ED PROVIDER NOTE - OBJECTIVE STATEMENT
37 yo male with history of diabetes 39 yo male with history of diabetes presents with abdominal pain and vomiting.

## 2019-05-18 RX ADMIN — HALOPERIDOL DECANOATE 5 MILLIGRAM(S): 100 INJECTION INTRAMUSCULAR at 00:22

## 2019-06-04 ENCOUNTER — INPATIENT (INPATIENT)
Facility: HOSPITAL | Age: 39
LOS: 1 days | Discharge: HOME | End: 2019-06-06
Attending: FAMILY MEDICINE | Admitting: FAMILY MEDICINE
Payer: MEDICAID

## 2019-06-04 VITALS
RESPIRATION RATE: 17 BRPM | TEMPERATURE: 98 F | HEIGHT: 69 IN | WEIGHT: 177.91 LBS | SYSTOLIC BLOOD PRESSURE: 147 MMHG | OXYGEN SATURATION: 98 % | HEART RATE: 68 BPM | DIASTOLIC BLOOD PRESSURE: 99 MMHG

## 2019-06-04 DIAGNOSIS — Z90.49 ACQUIRED ABSENCE OF OTHER SPECIFIED PARTS OF DIGESTIVE TRACT: Chronic | ICD-10-CM

## 2019-06-04 DIAGNOSIS — J45.909 UNSPECIFIED ASTHMA, UNCOMPLICATED: ICD-10-CM

## 2019-06-04 DIAGNOSIS — R10.12 LEFT UPPER QUADRANT PAIN: ICD-10-CM

## 2019-06-04 DIAGNOSIS — E10.69 TYPE 1 DIABETES MELLITUS WITH OTHER SPECIFIED COMPLICATION: ICD-10-CM

## 2019-06-04 DIAGNOSIS — R11.2 NAUSEA WITH VOMITING, UNSPECIFIED: ICD-10-CM

## 2019-06-04 DIAGNOSIS — E11.43 TYPE 2 DIABETES MELLITUS WITH DIABETIC AUTONOMIC (POLY)NEUROPATHY: ICD-10-CM

## 2019-06-04 LAB
ALBUMIN SERPL ELPH-MCNC: 3.8 G/DL — SIGNIFICANT CHANGE UP (ref 3.3–5)
ALP SERPL-CCNC: 104 U/L — SIGNIFICANT CHANGE UP (ref 40–120)
ALT FLD-CCNC: 18 U/L — SIGNIFICANT CHANGE UP (ref 12–78)
ANION GAP SERPL CALC-SCNC: 10 MMOL/L — SIGNIFICANT CHANGE UP (ref 5–17)
APPEARANCE UR: CLEAR — SIGNIFICANT CHANGE UP
APTT BLD: 29.4 SEC — SIGNIFICANT CHANGE UP (ref 28.5–37)
AST SERPL-CCNC: 16 U/L — SIGNIFICANT CHANGE UP (ref 15–37)
BASOPHILS # BLD AUTO: 0.04 K/UL — SIGNIFICANT CHANGE UP (ref 0–0.2)
BASOPHILS NFR BLD AUTO: 0.4 % — SIGNIFICANT CHANGE UP (ref 0–2)
BILIRUB DIRECT SERPL-MCNC: 0.09 MG/DL — SIGNIFICANT CHANGE UP (ref 0.05–0.2)
BILIRUB INDIRECT FLD-MCNC: 0.2 MG/DL — SIGNIFICANT CHANGE UP (ref 0.2–1)
BILIRUB SERPL-MCNC: 0.3 MG/DL — SIGNIFICANT CHANGE UP (ref 0.2–1.2)
BILIRUB UR-MCNC: NEGATIVE — SIGNIFICANT CHANGE UP
BUN SERPL-MCNC: 11 MG/DL — SIGNIFICANT CHANGE UP (ref 7–23)
CALCIUM SERPL-MCNC: 9.4 MG/DL — SIGNIFICANT CHANGE UP (ref 8.5–10.1)
CHLORIDE SERPL-SCNC: 103 MMOL/L — SIGNIFICANT CHANGE UP (ref 96–108)
CO2 SERPL-SCNC: 29 MMOL/L — SIGNIFICANT CHANGE UP (ref 22–31)
COLOR SPEC: YELLOW — SIGNIFICANT CHANGE UP
CREAT SERPL-MCNC: 0.98 MG/DL — SIGNIFICANT CHANGE UP (ref 0.5–1.3)
DIFF PNL FLD: NEGATIVE — SIGNIFICANT CHANGE UP
EOSINOPHIL # BLD AUTO: 0.07 K/UL — SIGNIFICANT CHANGE UP (ref 0–0.5)
EOSINOPHIL NFR BLD AUTO: 0.6 % — SIGNIFICANT CHANGE UP (ref 0–6)
GLUCOSE BLDC GLUCOMTR-MCNC: 233 MG/DL — HIGH (ref 70–99)
GLUCOSE SERPL-MCNC: 156 MG/DL — HIGH (ref 70–99)
GLUCOSE UR QL: 100 MG/DL
HCT VFR BLD CALC: 40.3 % — SIGNIFICANT CHANGE UP (ref 39–50)
HGB BLD-MCNC: 13.9 G/DL — SIGNIFICANT CHANGE UP (ref 13–17)
IMM GRANULOCYTES NFR BLD AUTO: 0.3 % — SIGNIFICANT CHANGE UP (ref 0–1.5)
INR BLD: 0.92 RATIO — SIGNIFICANT CHANGE UP (ref 0.88–1.16)
KETONES UR-MCNC: ABNORMAL
LACTATE SERPL-SCNC: 1.7 MMOL/L — SIGNIFICANT CHANGE UP (ref 0.7–2)
LEUKOCYTE ESTERASE UR-ACNC: NEGATIVE — SIGNIFICANT CHANGE UP
LIDOCAIN IGE QN: 62 U/L — LOW (ref 73–393)
LYMPHOCYTES # BLD AUTO: 1.46 K/UL — SIGNIFICANT CHANGE UP (ref 1–3.3)
LYMPHOCYTES # BLD AUTO: 12.9 % — LOW (ref 13–44)
MCHC RBC-ENTMCNC: 28.3 PG — SIGNIFICANT CHANGE UP (ref 27–34)
MCHC RBC-ENTMCNC: 34.5 GM/DL — SIGNIFICANT CHANGE UP (ref 32–36)
MCV RBC AUTO: 81.9 FL — SIGNIFICANT CHANGE UP (ref 80–100)
MONOCYTES # BLD AUTO: 0.3 K/UL — SIGNIFICANT CHANGE UP (ref 0–0.9)
MONOCYTES NFR BLD AUTO: 2.7 % — SIGNIFICANT CHANGE UP (ref 2–14)
NEUTROPHILS # BLD AUTO: 9.4 K/UL — HIGH (ref 1.8–7.4)
NEUTROPHILS NFR BLD AUTO: 83.1 % — HIGH (ref 43–77)
NITRITE UR-MCNC: NEGATIVE — SIGNIFICANT CHANGE UP
NRBC # BLD: 0 /100 WBCS — SIGNIFICANT CHANGE UP (ref 0–0)
PH UR: 9 — HIGH (ref 5–8)
PLATELET # BLD AUTO: 448 K/UL — HIGH (ref 150–400)
POTASSIUM SERPL-MCNC: 3.6 MMOL/L — SIGNIFICANT CHANGE UP (ref 3.5–5.3)
POTASSIUM SERPL-SCNC: 3.6 MMOL/L — SIGNIFICANT CHANGE UP (ref 3.5–5.3)
PROT SERPL-MCNC: 8.1 GM/DL — SIGNIFICANT CHANGE UP (ref 6–8.3)
PROT UR-MCNC: NEGATIVE MG/DL — SIGNIFICANT CHANGE UP
PROTHROM AB SERPL-ACNC: 10.3 SEC — SIGNIFICANT CHANGE UP (ref 10–12.9)
RBC # BLD: 4.92 M/UL — SIGNIFICANT CHANGE UP (ref 4.2–5.8)
RBC # FLD: 13.3 % — SIGNIFICANT CHANGE UP (ref 10.3–14.5)
SODIUM SERPL-SCNC: 142 MMOL/L — SIGNIFICANT CHANGE UP (ref 135–145)
SP GR SPEC: 1.01 — SIGNIFICANT CHANGE UP (ref 1.01–1.02)
UROBILINOGEN FLD QL: NEGATIVE MG/DL — SIGNIFICANT CHANGE UP
WBC # BLD: 11.3 K/UL — HIGH (ref 3.8–10.5)
WBC # FLD AUTO: 11.3 K/UL — HIGH (ref 3.8–10.5)

## 2019-06-04 PROCEDURE — 71045 X-RAY EXAM CHEST 1 VIEW: CPT | Mod: 26

## 2019-06-04 PROCEDURE — 99285 EMERGENCY DEPT VISIT HI MDM: CPT

## 2019-06-04 PROCEDURE — 99223 1ST HOSP IP/OBS HIGH 75: CPT

## 2019-06-04 PROCEDURE — 76700 US EXAM ABDOM COMPLETE: CPT | Mod: 26

## 2019-06-04 RX ORDER — METOCLOPRAMIDE HCL 10 MG
10 TABLET ORAL EVERY 6 HOURS
Refills: 0 | Status: DISCONTINUED | OUTPATIENT
Start: 2019-06-04 | End: 2019-06-04

## 2019-06-04 RX ORDER — METOCLOPRAMIDE HCL 10 MG
10 TABLET ORAL EVERY 6 HOURS
Refills: 0 | Status: DISCONTINUED | OUTPATIENT
Start: 2019-06-04 | End: 2019-06-06

## 2019-06-04 RX ORDER — FAMOTIDINE 10 MG/ML
20 INJECTION INTRAVENOUS ONCE
Refills: 0 | Status: COMPLETED | OUTPATIENT
Start: 2019-06-04 | End: 2019-06-04

## 2019-06-04 RX ORDER — MESALAMINE 400 MG
800 TABLET, DELAYED RELEASE (ENTERIC COATED) ORAL THREE TIMES A DAY
Refills: 0 | Status: DISCONTINUED | OUTPATIENT
Start: 2019-06-04 | End: 2019-06-06

## 2019-06-04 RX ORDER — GLUCAGON INJECTION, SOLUTION 0.5 MG/.1ML
1 INJECTION, SOLUTION SUBCUTANEOUS ONCE
Refills: 0 | Status: DISCONTINUED | OUTPATIENT
Start: 2019-06-04 | End: 2019-06-06

## 2019-06-04 RX ORDER — INSULIN LISPRO 100/ML
VIAL (ML) SUBCUTANEOUS
Refills: 0 | Status: DISCONTINUED | OUTPATIENT
Start: 2019-06-04 | End: 2019-06-06

## 2019-06-04 RX ORDER — HYDROMORPHONE HYDROCHLORIDE 2 MG/ML
0.5 INJECTION INTRAMUSCULAR; INTRAVENOUS; SUBCUTANEOUS ONCE
Refills: 0 | Status: DISCONTINUED | OUTPATIENT
Start: 2019-06-04 | End: 2019-06-04

## 2019-06-04 RX ORDER — SODIUM CHLORIDE 9 MG/ML
1000 INJECTION, SOLUTION INTRAVENOUS
Refills: 0 | Status: DISCONTINUED | OUTPATIENT
Start: 2019-06-04 | End: 2019-06-06

## 2019-06-04 RX ORDER — MORPHINE SULFATE 50 MG/1
4 CAPSULE, EXTENDED RELEASE ORAL ONCE
Refills: 0 | Status: DISCONTINUED | OUTPATIENT
Start: 2019-06-04 | End: 2019-06-04

## 2019-06-04 RX ORDER — DEXTROSE 50 % IN WATER 50 %
25 SYRINGE (ML) INTRAVENOUS ONCE
Refills: 0 | Status: DISCONTINUED | OUTPATIENT
Start: 2019-06-04 | End: 2019-06-06

## 2019-06-04 RX ORDER — CLONAZEPAM 1 MG
0.5 TABLET ORAL
Refills: 0 | Status: DISCONTINUED | OUTPATIENT
Start: 2019-06-04 | End: 2019-06-06

## 2019-06-04 RX ORDER — DEXTROSE 50 % IN WATER 50 %
12.5 SYRINGE (ML) INTRAVENOUS ONCE
Refills: 0 | Status: DISCONTINUED | OUTPATIENT
Start: 2019-06-04 | End: 2019-06-06

## 2019-06-04 RX ORDER — DEXTROSE 50 % IN WATER 50 %
15 SYRINGE (ML) INTRAVENOUS ONCE
Refills: 0 | Status: DISCONTINUED | OUTPATIENT
Start: 2019-06-04 | End: 2019-06-06

## 2019-06-04 RX ORDER — OXYCODONE AND ACETAMINOPHEN 5; 325 MG/1; MG/1
1 TABLET ORAL EVERY 6 HOURS
Refills: 0 | Status: DISCONTINUED | OUTPATIENT
Start: 2019-06-04 | End: 2019-06-06

## 2019-06-04 RX ORDER — ONDANSETRON 8 MG/1
4 TABLET, FILM COATED ORAL ONCE
Refills: 0 | Status: COMPLETED | OUTPATIENT
Start: 2019-06-04 | End: 2019-06-04

## 2019-06-04 RX ORDER — SODIUM CHLORIDE 9 MG/ML
1000 INJECTION INTRAMUSCULAR; INTRAVENOUS; SUBCUTANEOUS ONCE
Refills: 0 | Status: COMPLETED | OUTPATIENT
Start: 2019-06-04 | End: 2019-06-04

## 2019-06-04 RX ORDER — ONDANSETRON 8 MG/1
4 TABLET, FILM COATED ORAL EVERY 6 HOURS
Refills: 0 | Status: DISCONTINUED | OUTPATIENT
Start: 2019-06-04 | End: 2019-06-06

## 2019-06-04 RX ORDER — PANTOPRAZOLE SODIUM 20 MG/1
40 TABLET, DELAYED RELEASE ORAL
Refills: 0 | Status: DISCONTINUED | OUTPATIENT
Start: 2019-06-04 | End: 2019-06-06

## 2019-06-04 RX ORDER — ALBUTEROL 90 UG/1
2 AEROSOL, METERED ORAL EVERY 6 HOURS
Refills: 0 | Status: DISCONTINUED | OUTPATIENT
Start: 2019-06-04 | End: 2019-06-06

## 2019-06-04 RX ORDER — INSULIN LISPRO 100/ML
VIAL (ML) SUBCUTANEOUS AT BEDTIME
Refills: 0 | Status: DISCONTINUED | OUTPATIENT
Start: 2019-06-04 | End: 2019-06-06

## 2019-06-04 RX ORDER — HYDROMORPHONE HYDROCHLORIDE 2 MG/ML
0.5 INJECTION INTRAMUSCULAR; INTRAVENOUS; SUBCUTANEOUS EVERY 4 HOURS
Refills: 0 | Status: DISCONTINUED | OUTPATIENT
Start: 2019-06-04 | End: 2019-06-06

## 2019-06-04 RX ORDER — SUCRALFATE 1 G
1 TABLET ORAL
Refills: 0 | Status: DISCONTINUED | OUTPATIENT
Start: 2019-06-04 | End: 2019-06-04

## 2019-06-04 RX ORDER — LACTOBACILLUS ACIDOPHILUS 100MM CELL
1 CAPSULE ORAL DAILY
Refills: 0 | Status: DISCONTINUED | OUTPATIENT
Start: 2019-06-04 | End: 2019-06-06

## 2019-06-04 RX ADMIN — HYDROMORPHONE HYDROCHLORIDE 0.5 MILLIGRAM(S): 2 INJECTION INTRAMUSCULAR; INTRAVENOUS; SUBCUTANEOUS at 16:47

## 2019-06-04 RX ADMIN — SODIUM CHLORIDE 1000 MILLILITER(S): 9 INJECTION INTRAMUSCULAR; INTRAVENOUS; SUBCUTANEOUS at 13:24

## 2019-06-04 RX ADMIN — OXYCODONE AND ACETAMINOPHEN 1 TABLET(S): 5; 325 TABLET ORAL at 21:43

## 2019-06-04 RX ADMIN — ONDANSETRON 4 MILLIGRAM(S): 8 TABLET, FILM COATED ORAL at 13:19

## 2019-06-04 RX ADMIN — MORPHINE SULFATE 4 MILLIGRAM(S): 50 CAPSULE, EXTENDED RELEASE ORAL at 13:57

## 2019-06-04 RX ADMIN — HYDROMORPHONE HYDROCHLORIDE 0.5 MILLIGRAM(S): 2 INJECTION INTRAMUSCULAR; INTRAVENOUS; SUBCUTANEOUS at 20:40

## 2019-06-04 RX ADMIN — ALBUTEROL 2 PUFF(S): 90 AEROSOL, METERED ORAL at 21:45

## 2019-06-04 RX ADMIN — MORPHINE SULFATE 4 MILLIGRAM(S): 50 CAPSULE, EXTENDED RELEASE ORAL at 16:48

## 2019-06-04 RX ADMIN — HYDROMORPHONE HYDROCHLORIDE 0.5 MILLIGRAM(S): 2 INJECTION INTRAMUSCULAR; INTRAVENOUS; SUBCUTANEOUS at 23:55

## 2019-06-04 RX ADMIN — HYDROMORPHONE HYDROCHLORIDE 0.5 MILLIGRAM(S): 2 INJECTION INTRAMUSCULAR; INTRAVENOUS; SUBCUTANEOUS at 20:16

## 2019-06-04 RX ADMIN — FAMOTIDINE 20 MILLIGRAM(S): 10 INJECTION INTRAVENOUS at 13:19

## 2019-06-04 RX ADMIN — OXYCODONE AND ACETAMINOPHEN 1 TABLET(S): 5; 325 TABLET ORAL at 22:42

## 2019-06-04 RX ADMIN — Medication 800 MILLIGRAM(S): at 21:41

## 2019-06-04 RX ADMIN — ONDANSETRON 4 MILLIGRAM(S): 8 TABLET, FILM COATED ORAL at 19:43

## 2019-06-04 RX ADMIN — HYDROMORPHONE HYDROCHLORIDE 0.5 MILLIGRAM(S): 2 INJECTION INTRAMUSCULAR; INTRAVENOUS; SUBCUTANEOUS at 17:19

## 2019-06-04 RX ADMIN — MORPHINE SULFATE 4 MILLIGRAM(S): 50 CAPSULE, EXTENDED RELEASE ORAL at 13:59

## 2019-06-04 RX ADMIN — Medication 10 MILLIGRAM(S): at 23:55

## 2019-06-04 RX ADMIN — MORPHINE SULFATE 4 MILLIGRAM(S): 50 CAPSULE, EXTENDED RELEASE ORAL at 13:19

## 2019-06-04 NOTE — H&P ADULT - NSHPPHYSICALEXAM_GEN_ALL_CORE
ICU Vital Signs Last 24 Hrs  T(C): 36.8 (04 Jun 2019 16:34), Max: 36.8 (04 Jun 2019 12:46)  T(F): 98.2 (04 Jun 2019 16:34), Max: 98.3 (04 Jun 2019 12:46)  HR: 61 (04 Jun 2019 16:34) (61 - 68)  BP: 123/80 (04 Jun 2019 16:34) (123/80 - 147/99)  BP(mean): --  ABP: --  ABP(mean): --  RR: 18 (04 Jun 2019 16:34) (17 - 18)  SpO2: 97% (04 Jun 2019 16:34) (97% - 98%)  GENERAL: NAD well-developed  HEAD:  Atraumatic, Normocephalic  EYES: EOMI, PERRLA, conjunctiva and sclera clear  ENMT: No tonsillar erythema, exudates, or enlargement; Moist mucous membranes, Good dentition, No lesions  NECK: Supple, No JVD, Normal thyroid  NERVOUS SYSTEM:  Alert & Oriented X3, Good concentration; Motor Strength 5/5 B/L upper and lower extremities; DTRs 2+ intact and symmetric  CHEST/LUNG: Clear to percussion bilaterally; No rales, rhonchi, wheezing, or rubs  HEART: Regular rate and rhythm; No murmurs, rubs, or gallops  ABDOMEN: Soft, Nontender, Nondistended; Bowel sounds present  EXTREMITIES:  2+ Peripheral Pulses, No clubbing, cyanosis, or edema  LYMPH: No lymphadenopathy   SKIN: No rashes or lesions

## 2019-06-04 NOTE — H&P ADULT - ASSESSMENT
38m with history of diabetes gastroparesis recurrently admitted for similar attack - being follwed by dr mckinley-called - also with poorly controlled daysm and will call endo     IMPROVE VTE Individual Risk Assessment          RISK                                                          Points  [  ] Previous VTE                                                3  [  ] Thrombophilia                                             2  [  ] Lower limb paralysis                                   2        (unable to hold up >15 seconds)    [  ] Current Cancer                                             2         (within 6 months)  [  ] Immobilization > 24 hrs                              1  [  ] ICU/CCU stay > 24 hours                             1  [  ] Age > 60                                                         1    IMPROVE VTE Score: 0

## 2019-06-04 NOTE — ED PROVIDER NOTE - CARE PLAN
Principal Discharge DX:	Vomiting  Secondary Diagnosis:	Abdominal pain  Secondary Diagnosis:	DM type 1 (diabetes mellitus, type 1)

## 2019-06-04 NOTE — H&P ADULT - NSHPREVIEWOFSYSTEMS_GEN_ALL_CORE
CONSTITUTIONAL: No fever, weight loss, or fatigue  EYES: No eye pain, visual disturbances, or discharge  ENMT:  No difficulty hearing, tinnitus, vertigo; No sinus or throat pain  NECK: No pain or stiffness  RESPIRATORY: No cough, wheezing, chills or hemoptysis; No shortness of breath  CARDIOVASCULAR: No chest pain, palpitations, dizziness, or leg swelling  GASTROINTESTINAL: see history of present illness   GENITOURINARY: No dysuria, frequency, hematuria, or incontinence  NEUROLOGICAL: No headaches, memory loss, loss of strength, numbness, or tremors  SKIN: No itching, burning, rashes, or lesions   LYMPH NODES: No enlarged glands  ENDOCRINE: No heat or cold intolerance; No hair loss  MUSCULOSKELETAL: No joint pain or swelling; No muscle, back, or extremity pain  PSYCHIATRIC: No depression, anxiety, mood swings, or difficulty sleeping  HEME/LYMPH: No easy bruising, or bleeding gums  ALLERGY AND IMMUNOLOGIC: No hives or eczema

## 2019-06-04 NOTE — H&P ADULT - NSICDXPASTMEDICALHX_GEN_ALL_CORE_FT
PAST MEDICAL HISTORY:  Anxiety     Asthma     Controlled diabetes mellitus type 1 without complications     DM type 1 (diabetes mellitus, type 1)     Gastritis     Gastritis, presence of bleeding unspecified, unspecified chronicity, unspecified gastritis type     Uncomplicated asthma, unspecified asthma severity, unspecified whether persistent

## 2019-06-04 NOTE — ED PROVIDER NOTE - CHPI ED SYMPTOMS NEG
no diarrhea/no burning urination/no fever/no blood in stool/no hematuria/no chills/no abdominal distension/no dysuria

## 2019-06-04 NOTE — ED ADULT NURSE NOTE - CARDIO WDL
Anesthesia Evaluation     .             ROS/MED HX    ENT/Pulmonary:      (-) tobacco use   Neurologic:  - neg neurologic ROS     Cardiovascular:  - neg cardiovascular ROS       METS/Exercise Tolerance:     Hematologic:  - neg hematologic  ROS       Musculoskeletal:  - neg musculoskeletal ROS       GI/Hepatic:     (+) cholecystitis/cholelithiasis,       Renal/Genitourinary:  - ROS Renal section negative       Endo:  - neg endo ROS       Psychiatric:  - neg psychiatric ROS       Infectious Disease:  - neg infectious disease ROS       Malignancy:      - no malignancy   Other:    (+) No chance of pregnancy C-spine cleared: N/A, no H/O Chronic Pain,no other significant disability                    Physical Exam  Normal systems: cardiovascular, pulmonary and dental    Airway   Mallampati: II  TM distance: >3 FB  Neck ROM: full    Dental     Cardiovascular       Pulmonary                     Anesthesia Plan      History & Physical Review  History and physical reviewed and following examination; no interval change.    ASA Status:  2 .    NPO Status:  > 8 hours    Plan for General and ETT with Intravenous induction. Maintenance will be Balanced.    PONV prophylaxis:  Ondansetron (or other 5HT-3) and Dexamethasone or Solumedrol       Postoperative Care  Postoperative pain management:  IV analgesics.      Consents  Anesthetic plan, risks, benefits and alternatives discussed with:  Patient.  Use of blood products discussed: Yes.   Use of blood products discussed with Patient.  Consented to blood products.  .                          .   Normal rate, regular rhythm, normal S1, S2 heart sounds heard.

## 2019-06-04 NOTE — H&P ADULT - NSICDXFAMILYHX_GEN_ALL_CORE_FT
FAMILY HISTORY:  Father  Still living? Unknown  Family history of diabetes mellitus, Age at diagnosis: Age Unknown  Family history of diabetes mellitus (DM), Age at diagnosis: Age Unknown    Mother  Still living? Yes, Estimated age: Age Unknown  Family history of diabetes mellitus, Age at diagnosis: Age Unknown  Family history of diabetes mellitus (DM), Age at diagnosis: Age Unknown    Grandparent  Still living? Unknown  Family history of diabetes mellitus (DM), Age at diagnosis: Age Unknown

## 2019-06-04 NOTE — H&P ADULT - HISTORY OF PRESENT ILLNESS
The patient is a 38y Male complaining of abdominal pain. 38 year old man with PMH of anxiety, asthma, DM1, gastritis, gastroparesis, presents to ED with complaint of nausea, vomiting, and no diarrhea x 1 day.  Patient states this pain is typical of his chronic symptoms.  He denies fever, chills, bloody stool, recent travel, sick contacts.- he usually gets them every two weeks but usually tries to deal with it at home - lately his diabetes is not well controlled because of personal issues     I

## 2019-06-05 LAB
ANION GAP SERPL CALC-SCNC: 9 MMOL/L — SIGNIFICANT CHANGE UP (ref 5–17)
BUN SERPL-MCNC: 11 MG/DL — SIGNIFICANT CHANGE UP (ref 7–23)
CALCIUM SERPL-MCNC: 9.2 MG/DL — SIGNIFICANT CHANGE UP (ref 8.5–10.1)
CHLORIDE SERPL-SCNC: 101 MMOL/L — SIGNIFICANT CHANGE UP (ref 96–108)
CO2 SERPL-SCNC: 29 MMOL/L — SIGNIFICANT CHANGE UP (ref 22–31)
CREAT SERPL-MCNC: 0.88 MG/DL — SIGNIFICANT CHANGE UP (ref 0.5–1.3)
GLUCOSE BLDC GLUCOMTR-MCNC: 101 MG/DL — HIGH (ref 70–99)
GLUCOSE BLDC GLUCOMTR-MCNC: 195 MG/DL — HIGH (ref 70–99)
GLUCOSE BLDC GLUCOMTR-MCNC: 270 MG/DL — HIGH (ref 70–99)
GLUCOSE BLDC GLUCOMTR-MCNC: 323 MG/DL — HIGH (ref 70–99)
GLUCOSE SERPL-MCNC: 147 MG/DL — HIGH (ref 70–99)
HBA1C BLD-MCNC: 7.5 % — HIGH (ref 4–5.6)
HCT VFR BLD CALC: 36.5 % — LOW (ref 39–50)
HGB BLD-MCNC: 12.3 G/DL — LOW (ref 13–17)
MCHC RBC-ENTMCNC: 28 PG — SIGNIFICANT CHANGE UP (ref 27–34)
MCHC RBC-ENTMCNC: 33.7 GM/DL — SIGNIFICANT CHANGE UP (ref 32–36)
MCV RBC AUTO: 83 FL — SIGNIFICANT CHANGE UP (ref 80–100)
NRBC # BLD: 0 /100 WBCS — SIGNIFICANT CHANGE UP (ref 0–0)
PLATELET # BLD AUTO: 411 K/UL — HIGH (ref 150–400)
POTASSIUM SERPL-MCNC: 3.5 MMOL/L — SIGNIFICANT CHANGE UP (ref 3.5–5.3)
POTASSIUM SERPL-SCNC: 3.5 MMOL/L — SIGNIFICANT CHANGE UP (ref 3.5–5.3)
RBC # BLD: 4.4 M/UL — SIGNIFICANT CHANGE UP (ref 4.2–5.8)
RBC # FLD: 13.4 % — SIGNIFICANT CHANGE UP (ref 10.3–14.5)
SODIUM SERPL-SCNC: 139 MMOL/L — SIGNIFICANT CHANGE UP (ref 135–145)
WBC # BLD: 9.07 K/UL — SIGNIFICANT CHANGE UP (ref 3.8–10.5)
WBC # FLD AUTO: 9.07 K/UL — SIGNIFICANT CHANGE UP (ref 3.8–10.5)

## 2019-06-05 PROCEDURE — 99233 SBSQ HOSP IP/OBS HIGH 50: CPT

## 2019-06-05 RX ADMIN — Medication 2: at 08:02

## 2019-06-05 RX ADMIN — HYDROMORPHONE HYDROCHLORIDE 0.5 MILLIGRAM(S): 2 INJECTION INTRAMUSCULAR; INTRAVENOUS; SUBCUTANEOUS at 09:30

## 2019-06-05 RX ADMIN — HYDROMORPHONE HYDROCHLORIDE 0.5 MILLIGRAM(S): 2 INJECTION INTRAMUSCULAR; INTRAVENOUS; SUBCUTANEOUS at 17:26

## 2019-06-05 RX ADMIN — HYDROMORPHONE HYDROCHLORIDE 0.5 MILLIGRAM(S): 2 INJECTION INTRAMUSCULAR; INTRAVENOUS; SUBCUTANEOUS at 13:10

## 2019-06-05 RX ADMIN — Medication 800 MILLIGRAM(S): at 13:05

## 2019-06-05 RX ADMIN — PANTOPRAZOLE SODIUM 40 MILLIGRAM(S): 20 TABLET, DELAYED RELEASE ORAL at 08:02

## 2019-06-05 RX ADMIN — Medication 2: at 21:53

## 2019-06-05 RX ADMIN — ALBUTEROL 2 PUFF(S): 90 AEROSOL, METERED ORAL at 21:50

## 2019-06-05 RX ADMIN — Medication 800 MILLIGRAM(S): at 21:51

## 2019-06-05 RX ADMIN — HYDROMORPHONE HYDROCHLORIDE 0.5 MILLIGRAM(S): 2 INJECTION INTRAMUSCULAR; INTRAVENOUS; SUBCUTANEOUS at 21:50

## 2019-06-05 RX ADMIN — HYDROMORPHONE HYDROCHLORIDE 0.5 MILLIGRAM(S): 2 INJECTION INTRAMUSCULAR; INTRAVENOUS; SUBCUTANEOUS at 04:50

## 2019-06-05 RX ADMIN — Medication 10 MILLIGRAM(S): at 05:43

## 2019-06-05 RX ADMIN — HYDROMORPHONE HYDROCHLORIDE 0.5 MILLIGRAM(S): 2 INJECTION INTRAMUSCULAR; INTRAVENOUS; SUBCUTANEOUS at 00:30

## 2019-06-05 RX ADMIN — Medication 10 MILLIGRAM(S): at 23:55

## 2019-06-05 RX ADMIN — Medication 10 MILLIGRAM(S): at 17:29

## 2019-06-05 RX ADMIN — HYDROMORPHONE HYDROCHLORIDE 0.5 MILLIGRAM(S): 2 INJECTION INTRAMUSCULAR; INTRAVENOUS; SUBCUTANEOUS at 04:32

## 2019-06-05 RX ADMIN — HYDROMORPHONE HYDROCHLORIDE 0.5 MILLIGRAM(S): 2 INJECTION INTRAMUSCULAR; INTRAVENOUS; SUBCUTANEOUS at 13:25

## 2019-06-05 RX ADMIN — HYDROMORPHONE HYDROCHLORIDE 0.5 MILLIGRAM(S): 2 INJECTION INTRAMUSCULAR; INTRAVENOUS; SUBCUTANEOUS at 22:20

## 2019-06-05 RX ADMIN — Medication 1 TABLET(S): at 12:14

## 2019-06-05 RX ADMIN — Medication 800 MILLIGRAM(S): at 05:43

## 2019-06-05 RX ADMIN — Medication 8: at 12:13

## 2019-06-05 RX ADMIN — Medication 10 MILLIGRAM(S): at 12:23

## 2019-06-05 RX ADMIN — HYDROMORPHONE HYDROCHLORIDE 0.5 MILLIGRAM(S): 2 INJECTION INTRAMUSCULAR; INTRAVENOUS; SUBCUTANEOUS at 09:15

## 2019-06-05 NOTE — CONSULT NOTE ADULT - PROBLEM SELECTOR RECOMMENDATION 9
IV Hydration  REGLAN IVP  Pain management  Advance diet as tolerated  Recommend PSYCH evaluation IV Hydration  REGLAN IVP, PPI  Pain management  Advance diet as tolerated  Recommend PSYCH evaluation

## 2019-06-05 NOTE — CONSULT NOTE ADULT - SUBJECTIVE AND OBJECTIVE BOX
Chief Complaint:  Patient is a 38y old  Male who presents with a chief complaint of abdominal pain (2019 12:41)      HPI:  38 year old man with PMH of anxiety, asthma, DM1, gastritis, gastroparesis, presents to ED with complaint of nausea, vomiting, and no diarrhea x 1 day.  Patient states this pain is typical of his chronic symptoms.  He denies fever, chills, bloody stool, recent travel, sick contacts.- he usually gets them every two weeks but usually tries to deal with it at home - lately his diabetes is not well controlled because of personal issues     I (2019 17:14)      PMH/PSH:PAST MEDICAL & SURGICAL HISTORY:  Uncomplicated asthma, unspecified asthma severity, unspecified whether persistent  Gastritis, presence of bleeding unspecified, unspecified chronicity, unspecified gastritis type  Controlled diabetes mellitus type 1 without complications  Anxiety  Gastritis  Asthma  DM type 1 (diabetes mellitus, type 1)  History of cholecystectomy  S/P cholecystectomy      Allergies:  No Known Allergies      Medications:  ALBUTerol    90 MICROgram(s) HFA Inhaler 2 Puff(s) Inhalation every 6 hours PRN  clonazePAM  Tablet 0.5 milliGRAM(s) Oral two times a day PRN  dextrose 40% Gel 15 Gram(s) Oral once PRN  dextrose 5%. 1000 milliLiter(s) IV Continuous <Continuous>  dextrose 50% Injectable 12.5 Gram(s) IV Push once  dextrose 50% Injectable 25 Gram(s) IV Push once  dextrose 50% Injectable 25 Gram(s) IV Push once  glucagon  Injectable 1 milliGRAM(s) IntraMuscular once PRN  HYDROmorphone  Injectable 0.5 milliGRAM(s) IV Push every 4 hours PRN  insulin lispro (HumaLOG) corrective regimen sliding scale   SubCutaneous three times a day before meals  insulin lispro (HumaLOG) corrective regimen sliding scale   SubCutaneous at bedtime  lactobacillus acidophilus 1 Tablet(s) Oral daily  mesalamine DR Capsule 800 milliGRAM(s) Oral three times a day  metoclopramide Injectable 10 milliGRAM(s) IV Push every 6 hours  ondansetron Injectable 4 milliGRAM(s) IV Push every 6 hours PRN  oxyCODONE    5 mG/acetaminophen 325 mG 1 Tablet(s) Oral every 6 hours PRN  pantoprazole    Tablet 40 milliGRAM(s) Oral before breakfast      REVIEW OF SYSTEMS:    All other review of systems is negative unless indicated above.    Relevant Family History:   FAMILY HISTORY:    Family history of diabetes mellitus (DM) (Father, Mother, Grandparent)  Family history of diabetes mellitus (Father, Mother)      Relevant Social History:  Denies ETOH or tobacco history    Physical Exam:    Vital Signs:  Vital Signs Last 24 Hrs  T(C): 36.3 (2019 12:47), Max: 36.8 (2019 16:34)  T(F): 97.3 (2019 12:47), Max: 98.2 (2019 16:34)  HR: 61 (2019 12:47) (60 - 63)  BP: 128/83 (2019 12:47) (123/80 - 138/87)  BP(mean): --  RR: 17 (2019 12:47) (17 - 18)  SpO2: 97% (2019 12:47) (96% - 98%)  Daily     Daily Weight in k.4 (2019 05:01)    Constitutional: WDWN resting comfortably in bed; NAD  HEENT: NC/AT, PERRL, EOMI, anicteric sclera, no nasal discharge; uvula midline, no oropharyngeal erythema or exudates  Neck: supple; no JVD or thyromegaly  Respiratory: CTA B/L; no W/R/R, no retractions  Cardiac: +S1/S2; RRR; no M/R/G; PMI non-displaced  Gastrointestinal: soft, NT/ND; no rebound or guarding; +BS   Extremities: , no clubbing or cyanosis; no peripheral edema  Musculoskeletal:  no joint swelling, tenderness or erythema  Vascular: 2+ radial, femoral, DP/PT pulses B/L  Skin: warm, dry and intact; no rashes, wounds, or scars  Neurologic: AAOx3; CNS grossly intact; no focal deficits no asterixis, no tremor, no encephalopathy    Laboratory:                          12.3   9.07  )-----------( 411      ( 2019 06:35 )             36.5     06-    139  |  101  |  11  ----------------------------<  147<H>  3.5   |  29  |  0.88    Ca    9.2      2019 06:35    TPro  8.1  /  Alb  3.8  /  TBili  0.3  /  DBili  .09  /  AST  16  /  ALT  18  /  AlkPhos  104  06-04    LIVER FUNCTIONS - ( 2019 13:26 )  Alb: 3.8 g/dL / Pro: 8.1 gm/dL / ALK PHOS: 104 U/L / ALT: 18 U/L / AST: 16 U/L / GGT: x           PT/INR - ( 2019 13:26 )   PT: 10.3 sec;   INR: 0.92 ratio         PTT - ( 2019 13:26 )  PTT:29.4 sec  Urinalysis Basic - ( 2019 17:25 )    Color: Yellow / Appearance: Clear / S.015 / pH: x  Gluc: x / Ketone: Moderate  / Bili: Negative / Urobili: Negative mg/dL   Blood: x / Protein: Negative mg/dL / Nitrite: Negative   Leuk Esterase: Negative / RBC: x / WBC x   Sq Epi: x / Non Sq Epi: x / Bacteria: x    Lipase serum62 U/L<L>       19 @ 07:01  -  19 @ 07:00  --------------------------------------------------------  IN: 300 mL / OUT: 0 mL / NET: 300 mL        Imaging:    < from: US Abdomen Complete (19 @ 14:38) >    EXAM:  US ABDOMINAL COMPLETE                            PROCEDURE DATE:  2019          INTERPRETATION:  CLINICAL INFORMATION: Abdominal pain    COMPARISON: CT dated 2019    TECHNIQUE: Sonography of the abdomen.     FINDINGS:    Liver: Increased echogenicity. Borderline enlarged.    Bile ducts: Normal caliber. Common bile duct measures 4.5 mm.     Gallbladder: Status post cholecystectomy.        Pancreas: Visualized portions are within normal limits.    Spleen: 8.7 cm. Within normal limits.    Right kidney: 10.6 cm. No hydronephrosis.    Left kidney: 10.9 cm.  No hydronephrosis.    Ascites: None.    Aorta and IVC: Visualized portions are within normal limits.    IMPRESSION:     Fatty liver.  Status post cholecystectomy.  No biliary ductal dilatation.      JOSE MANUEL FISHER M.D., ATTENDING RADIOLOGIST  This document has been electronically signed. 2019  2:49PM

## 2019-06-05 NOTE — CONSULT NOTE ADULT - ASSESSMENT
37y/o male with Diabetic Gastroparesis, multiple admissions within the past year  PMH of anxiety, asthma, DM1, gastritis,

## 2019-06-05 NOTE — PROGRESS NOTE ADULT - SUBJECTIVE AND OBJECTIVE BOX
INTERVAL HPI/OVERNIGHT EVENTS:   Patient seen and examined. diet advanced    MEDICATIONS  (STANDING):  dextrose 5%. 1000 milliLiter(s) (50 mL/Hr) IV Continuous <Continuous>  dextrose 50% Injectable 12.5 Gram(s) IV Push once  dextrose 50% Injectable 25 Gram(s) IV Push once  dextrose 50% Injectable 25 Gram(s) IV Push once  insulin lispro (HumaLOG) corrective regimen sliding scale   SubCutaneous three times a day before meals  insulin lispro (HumaLOG) corrective regimen sliding scale   SubCutaneous at bedtime  lactobacillus acidophilus 1 Tablet(s) Oral daily  mesalamine DR Capsule 800 milliGRAM(s) Oral three times a day  metoclopramide Injectable 10 milliGRAM(s) IV Push every 6 hours  pantoprazole    Tablet 40 milliGRAM(s) Oral before breakfast    MEDICATIONS  (PRN):  ALBUTerol    90 MICROgram(s) HFA Inhaler 2 Puff(s) Inhalation every 6 hours PRN Shortness of Breath and/or Wheezing  clonazePAM  Tablet 0.5 milliGRAM(s) Oral two times a day PRN anxiety  dextrose 40% Gel 15 Gram(s) Oral once PRN Blood Glucose LESS THAN 70 milliGRAM(s)/deciliter  glucagon  Injectable 1 milliGRAM(s) IntraMuscular once PRN Glucose LESS THAN 70 milligrams/deciliter  HYDROmorphone  Injectable 0.5 milliGRAM(s) IV Push every 4 hours PRN Severe Pain (7 - 10)  ondansetron Injectable 4 milliGRAM(s) IV Push every 6 hours PRN Nausea and/or Vomiting  oxyCODONE    5 mG/acetaminophen 325 mG 1 Tablet(s) Oral every 6 hours PRN Moderate Pain (4 - 6)      REVIEW OF SYSTEMS:  See HPI,  all others negative    PHYSICAL EXAM:  Vital Signs Last 24 Hrs  T(C): 36.3 (2019 05:01), Max: 36.8 (2019 16:34)  T(F): 97.4 (2019 05:01), Max: 98.2 (2019 16:34)  HR: 60 (2019 05:01) (60 - 63)  BP: 132/84 (2019 05:01) (123/80 - 138/87)  BP(mean): --  RR: 18 (2019 05:01) (18 - 18)  SpO2: 96% (2019 05:01) (96% - 98%)    GENERAL: NAD, well-groomed, well-developed, awake, alert, oriented x 3, fluent and coherent speech  HEAD:  Atraumatic, Normocephalic  EYES: EOMI, PERRLA, conjunctiva and sclera clear  ENMT: No tonsillar erythema, exudates, or enlargement; Moist mucous membranes, Good dentition, No lesions  NECK: Supple, No JVD, No Cervical LAD, No thyromegaly, No thyroid nodules felt  NERVOUS SYSTEM:  Good concentration; Moving all 4 extremities; No gross sensory deficits, No facial droop  CHEST WALL: No masses  CHEST/LUNG: Clear to auscultation bilaterally; No rales, rhonchi, wheezing, or rubs  HEART: Regular rate and rhythm; No murmurs, rubs, or gallops  ABDOMEN: Soft, Nontender, Nondistended, Bowel sounds present, No palpable masses or organomegaly, No bruits  EXTREMITIES:  2+ Peripheral Pulses, No clubbing, cyanosis, or edema  LYMPH: No lymphadenopathy  SKIN: No rashes or lesions    LABS:                        12.3   9.07  )-----------( 411      ( 2019 06:35 )             36.5     2019 06:35    139    |  101    |  11     ----------------------------<  147    3.5     |  29     |  0.88     Ca    9.2        2019 06:35    TPro  8.1    /  Alb  3.8    /  TBili  0.3    /  DBili  .09    /  AST  16     /  ALT  18     /  AlkPhos  104    2019 13:26    PT/INR - ( 2019 13:26 )   PT: 10.3 sec;   INR: 0.92 ratio         PTT - ( 2019 13:26 )  PTT:29.4 sec  Urinalysis Basic - ( 2019 17:25 )    Color: Yellow / Appearance: Clear / S.015 / pH: x  Gluc: x / Ketone: Moderate  / Bili: Negative / Urobili: Negative mg/dL   Blood: x / Protein: Negative mg/dL / Nitrite: Negative   Leuk Esterase: Negative / RBC: x / WBC x   Sq Epi: x / Non Sq Epi: x / Bacteria: x         RADIOLOGY & ADDITIONAL TESTS:

## 2019-06-05 NOTE — PROGRESS NOTE ADULT - ASSESSMENT
38 year old man with PMH of anxiety, asthma, DM1, gastritis, gastroparesis, presents to ED with complaint of nausea, vomiting, and diarrhea x 1 day.

## 2019-06-06 ENCOUNTER — TRANSCRIPTION ENCOUNTER (OUTPATIENT)
Age: 39
End: 2019-06-06

## 2019-06-06 VITALS
TEMPERATURE: 98 F | HEART RATE: 72 BPM | SYSTOLIC BLOOD PRESSURE: 143 MMHG | DIASTOLIC BLOOD PRESSURE: 88 MMHG | RESPIRATION RATE: 18 BRPM | OXYGEN SATURATION: 98 %

## 2019-06-06 LAB
ANION GAP SERPL CALC-SCNC: 10 MMOL/L — SIGNIFICANT CHANGE UP (ref 5–17)
BUN SERPL-MCNC: 11 MG/DL — SIGNIFICANT CHANGE UP (ref 7–23)
CALCIUM SERPL-MCNC: 9 MG/DL — SIGNIFICANT CHANGE UP (ref 8.5–10.1)
CHLORIDE SERPL-SCNC: 99 MMOL/L — SIGNIFICANT CHANGE UP (ref 96–108)
CO2 SERPL-SCNC: 25 MMOL/L — SIGNIFICANT CHANGE UP (ref 22–31)
CREAT SERPL-MCNC: 0.91 MG/DL — SIGNIFICANT CHANGE UP (ref 0.5–1.3)
GLUCOSE BLDC GLUCOMTR-MCNC: 210 MG/DL — HIGH (ref 70–99)
GLUCOSE BLDC GLUCOMTR-MCNC: 361 MG/DL — HIGH (ref 70–99)
GLUCOSE SERPL-MCNC: 347 MG/DL — HIGH (ref 70–99)
HCT VFR BLD CALC: 37.2 % — LOW (ref 39–50)
HGB BLD-MCNC: 12.7 G/DL — LOW (ref 13–17)
MCHC RBC-ENTMCNC: 28.2 PG — SIGNIFICANT CHANGE UP (ref 27–34)
MCHC RBC-ENTMCNC: 34.1 GM/DL — SIGNIFICANT CHANGE UP (ref 32–36)
MCV RBC AUTO: 82.7 FL — SIGNIFICANT CHANGE UP (ref 80–100)
NRBC # BLD: 0 /100 WBCS — SIGNIFICANT CHANGE UP (ref 0–0)
PLATELET # BLD AUTO: 426 K/UL — HIGH (ref 150–400)
POTASSIUM SERPL-MCNC: 4.3 MMOL/L — SIGNIFICANT CHANGE UP (ref 3.5–5.3)
POTASSIUM SERPL-SCNC: 4.3 MMOL/L — SIGNIFICANT CHANGE UP (ref 3.5–5.3)
RBC # BLD: 4.5 M/UL — SIGNIFICANT CHANGE UP (ref 4.2–5.8)
RBC # FLD: 13.4 % — SIGNIFICANT CHANGE UP (ref 10.3–14.5)
SODIUM SERPL-SCNC: 134 MMOL/L — LOW (ref 135–145)
WBC # BLD: 8.18 K/UL — SIGNIFICANT CHANGE UP (ref 3.8–10.5)
WBC # FLD AUTO: 8.18 K/UL — SIGNIFICANT CHANGE UP (ref 3.8–10.5)

## 2019-06-06 PROCEDURE — 99239 HOSP IP/OBS DSCHRG MGMT >30: CPT

## 2019-06-06 RX ADMIN — Medication 10 MILLIGRAM(S): at 11:48

## 2019-06-06 RX ADMIN — Medication 10: at 08:09

## 2019-06-06 RX ADMIN — HYDROMORPHONE HYDROCHLORIDE 0.5 MILLIGRAM(S): 2 INJECTION INTRAMUSCULAR; INTRAVENOUS; SUBCUTANEOUS at 02:25

## 2019-06-06 RX ADMIN — Medication 4: at 11:49

## 2019-06-06 RX ADMIN — HYDROMORPHONE HYDROCHLORIDE 0.5 MILLIGRAM(S): 2 INJECTION INTRAMUSCULAR; INTRAVENOUS; SUBCUTANEOUS at 01:55

## 2019-06-06 RX ADMIN — Medication 1 TABLET(S): at 11:48

## 2019-06-06 RX ADMIN — HYDROMORPHONE HYDROCHLORIDE 0.5 MILLIGRAM(S): 2 INJECTION INTRAMUSCULAR; INTRAVENOUS; SUBCUTANEOUS at 12:11

## 2019-06-06 RX ADMIN — PANTOPRAZOLE SODIUM 40 MILLIGRAM(S): 20 TABLET, DELAYED RELEASE ORAL at 08:09

## 2019-06-06 RX ADMIN — Medication 800 MILLIGRAM(S): at 06:28

## 2019-06-06 RX ADMIN — Medication 10 MILLIGRAM(S): at 06:26

## 2019-06-06 RX ADMIN — HYDROMORPHONE HYDROCHLORIDE 0.5 MILLIGRAM(S): 2 INJECTION INTRAMUSCULAR; INTRAVENOUS; SUBCUTANEOUS at 08:06

## 2019-06-06 NOTE — DISCHARGE NOTE PROVIDER - HOSPITAL COURSE
8 year old man with PMH of anxiety, asthma, DM1, gastritis, gastroparesis, presents to ED with complaint of nausea, vomiting, and diarrhea x 1 day.         Gastroparesis: resolving. out pt work up with GI    Type 1 dm uncontrolled non compliant    Mild intermittent asthma    Chronic gastritis without bleeding    Anxiety        pt diet was advanced. pt has opiod dependance

## 2019-06-06 NOTE — DISCHARGE NOTE PROVIDER - NSDCCPCAREPLAN_GEN_ALL_CORE_FT
PRINCIPAL DISCHARGE DIAGNOSIS  Diagnosis: Gastroparesis due to DM  Assessment and Plan of Treatment: Tolerating diet  Continue DM medications as prescribed  Follow up with endocrinologist      SECONDARY DISCHARGE DIAGNOSES  Diagnosis: Uncomplicated asthma, unspecified asthma severity, unspecified whether persistent  Assessment and Plan of Treatment: Stable  Continue medications as prescribed      Diagnosis: Intractable vomiting with nausea, unspecified vomiting type  Assessment and Plan of Treatment: Resolved    Diagnosis: DM type 1 (diabetes mellitus, type 1)  Assessment and Plan of Treatment: Continue medications as prescribed  Follow up with PMD and endocrinologist  Low-carbohydrate diet  ADA Recipes - [email or web address]

## 2019-06-06 NOTE — DISCHARGE NOTE NURSING/CASE MANAGEMENT/SOCIAL WORK - NSDCDPATPORTLINK_GEN_ALL_CORE
You can access the [facility], offered by [facility], by registering with the following website: [email or web address]

## 2019-06-06 NOTE — DISCHARGE NOTE PROVIDER - CARE PROVIDER_API CALL
Horacio Morales)  Internal Medicine  [address], [address]  [place] [address]  Phone: [phone]  Fax: [phone]  Follow Up Time: 1 week

## 2019-06-07 ENCOUNTER — EMERGENCY (EMERGENCY)
Facility: HOSPITAL | Age: 39
LOS: 0 days | Discharge: HOME | End: 2019-06-07
Attending: EMERGENCY MEDICINE
Payer: MEDICAID

## 2019-06-07 VITALS
DIASTOLIC BLOOD PRESSURE: 51 MMHG | TEMPERATURE: 98 F | RESPIRATION RATE: 12 BRPM | HEART RATE: 66 BPM | OXYGEN SATURATION: 95 % | SYSTOLIC BLOOD PRESSURE: 110 MMHG

## 2019-06-07 VITALS
TEMPERATURE: 98 F | HEART RATE: 55 BPM | OXYGEN SATURATION: 100 % | HEIGHT: 69 IN | RESPIRATION RATE: 18 BRPM | SYSTOLIC BLOOD PRESSURE: 123 MMHG | WEIGHT: 169.98 LBS | DIASTOLIC BLOOD PRESSURE: 74 MMHG

## 2019-06-07 DIAGNOSIS — Z79.4 LONG TERM (CURRENT) USE OF INSULIN: ICD-10-CM

## 2019-06-07 DIAGNOSIS — E10.9 TYPE 1 DIABETES MELLITUS WITHOUT COMPLICATIONS: ICD-10-CM

## 2019-06-07 DIAGNOSIS — R10.13 EPIGASTRIC PAIN: ICD-10-CM

## 2019-06-07 DIAGNOSIS — J45.909 UNSPECIFIED ASTHMA, UNCOMPLICATED: ICD-10-CM

## 2019-06-07 DIAGNOSIS — R25.2 CRAMP AND SPASM: ICD-10-CM

## 2019-06-07 DIAGNOSIS — M79.669 PAIN IN UNSPECIFIED LOWER LEG: ICD-10-CM

## 2019-06-07 DIAGNOSIS — Z90.49 ACQUIRED ABSENCE OF OTHER SPECIFIED PARTS OF DIGESTIVE TRACT: ICD-10-CM

## 2019-06-07 DIAGNOSIS — Z90.49 ACQUIRED ABSENCE OF OTHER SPECIFIED PARTS OF DIGESTIVE TRACT: Chronic | ICD-10-CM

## 2019-06-07 DIAGNOSIS — E86.0 DEHYDRATION: ICD-10-CM

## 2019-06-07 DIAGNOSIS — K29.71 GASTRITIS, UNSPECIFIED, WITH BLEEDING: ICD-10-CM

## 2019-06-07 DIAGNOSIS — F41.9 ANXIETY DISORDER, UNSPECIFIED: ICD-10-CM

## 2019-06-07 DIAGNOSIS — M79.603 PAIN IN ARM, UNSPECIFIED: ICD-10-CM

## 2019-06-07 LAB
ALBUMIN SERPL ELPH-MCNC: 3.2 G/DL — LOW (ref 3.3–5)
ALBUMIN SERPL ELPH-MCNC: 4 G/DL — SIGNIFICANT CHANGE UP (ref 3.3–5)
ALP SERPL-CCNC: 102 U/L — SIGNIFICANT CHANGE UP (ref 40–120)
ALP SERPL-CCNC: 79 U/L — SIGNIFICANT CHANGE UP (ref 40–120)
ALT FLD-CCNC: 20 U/L — SIGNIFICANT CHANGE UP (ref 12–78)
ALT FLD-CCNC: 23 U/L — SIGNIFICANT CHANGE UP (ref 12–78)
ANION GAP SERPL CALC-SCNC: 10 MMOL/L — SIGNIFICANT CHANGE UP (ref 5–17)
ANION GAP SERPL CALC-SCNC: 12 MMOL/L — SIGNIFICANT CHANGE UP (ref 5–17)
APPEARANCE UR: CLEAR — SIGNIFICANT CHANGE UP
APTT BLD: 27.3 SEC — LOW (ref 28.5–37)
AST SERPL-CCNC: 14 U/L — LOW (ref 15–37)
AST SERPL-CCNC: 26 U/L — SIGNIFICANT CHANGE UP (ref 15–37)
B-OH-BUTYR SERPL-SCNC: 0.1 MMOL/L — SIGNIFICANT CHANGE UP
BASE EXCESS BLDA CALC-SCNC: -1.2 MMOL/L — SIGNIFICANT CHANGE UP (ref -2–2)
BASOPHILS # BLD AUTO: 0.03 K/UL — SIGNIFICANT CHANGE UP (ref 0–0.2)
BASOPHILS # BLD AUTO: 0.05 K/UL — SIGNIFICANT CHANGE UP (ref 0–0.2)
BASOPHILS NFR BLD AUTO: 0.3 % — SIGNIFICANT CHANGE UP (ref 0–2)
BASOPHILS NFR BLD AUTO: 0.4 % — SIGNIFICANT CHANGE UP (ref 0–2)
BILIRUB SERPL-MCNC: 0.3 MG/DL — SIGNIFICANT CHANGE UP (ref 0.2–1.2)
BILIRUB SERPL-MCNC: 0.3 MG/DL — SIGNIFICANT CHANGE UP (ref 0.2–1.2)
BILIRUB UR-MCNC: NEGATIVE — SIGNIFICANT CHANGE UP
BLOOD GAS COMMENTS: SIGNIFICANT CHANGE UP
BLOOD GAS SOURCE: SIGNIFICANT CHANGE UP
BUN SERPL-MCNC: 14 MG/DL — SIGNIFICANT CHANGE UP (ref 7–23)
BUN SERPL-MCNC: 16 MG/DL — SIGNIFICANT CHANGE UP (ref 7–23)
CA-I BLD-SCNC: 1.25 MMOL/L — SIGNIFICANT CHANGE UP (ref 1.12–1.3)
CALCIUM SERPL-MCNC: 8.3 MG/DL — LOW (ref 8.5–10.1)
CALCIUM SERPL-MCNC: 9.7 MG/DL — SIGNIFICANT CHANGE UP (ref 8.5–10.1)
CHLORIDE SERPL-SCNC: 106 MMOL/L — SIGNIFICANT CHANGE UP (ref 96–108)
CHLORIDE SERPL-SCNC: 97 MMOL/L — SIGNIFICANT CHANGE UP (ref 96–108)
CK SERPL-CCNC: 142 U/L — SIGNIFICANT CHANGE UP (ref 26–308)
CO2 SERPL-SCNC: 25 MMOL/L — SIGNIFICANT CHANGE UP (ref 22–31)
CO2 SERPL-SCNC: 27 MMOL/L — SIGNIFICANT CHANGE UP (ref 22–31)
COLOR SPEC: YELLOW — SIGNIFICANT CHANGE UP
CREAT SERPL-MCNC: 1.08 MG/DL — SIGNIFICANT CHANGE UP (ref 0.5–1.3)
CREAT SERPL-MCNC: 1.59 MG/DL — HIGH (ref 0.5–1.3)
DIFF PNL FLD: NEGATIVE — SIGNIFICANT CHANGE UP
EOSINOPHIL # BLD AUTO: 0.05 K/UL — SIGNIFICANT CHANGE UP (ref 0–0.5)
EOSINOPHIL # BLD AUTO: 0.13 K/UL — SIGNIFICANT CHANGE UP (ref 0–0.5)
EOSINOPHIL NFR BLD AUTO: 0.4 % — SIGNIFICANT CHANGE UP (ref 0–6)
EOSINOPHIL NFR BLD AUTO: 1.1 % — SIGNIFICANT CHANGE UP (ref 0–6)
GLUCOSE BLDC GLUCOMTR-MCNC: 144 MG/DL — HIGH (ref 70–99)
GLUCOSE BLDC GLUCOMTR-MCNC: 63 MG/DL — LOW (ref 70–99)
GLUCOSE BLDC GLUCOMTR-MCNC: 66 MG/DL — LOW (ref 70–99)
GLUCOSE BLDC GLUCOMTR-MCNC: 68 MG/DL — LOW (ref 70–99)
GLUCOSE SERPL-MCNC: 210 MG/DL — HIGH (ref 70–99)
GLUCOSE SERPL-MCNC: 36 MG/DL — CRITICAL LOW (ref 70–99)
GLUCOSE UR QL: 1000 MG/DL
HCO3 BLDA-SCNC: 22 MMOL/L — SIGNIFICANT CHANGE UP (ref 21–29)
HCT VFR BLD CALC: 32.1 % — LOW (ref 39–50)
HCT VFR BLD CALC: 39.8 % — SIGNIFICANT CHANGE UP (ref 39–50)
HGB BLD-MCNC: 11.2 G/DL — LOW (ref 13–17)
HGB BLD-MCNC: 13.5 G/DL — SIGNIFICANT CHANGE UP (ref 13–17)
HOROWITZ INDEX BLDA+IHG-RTO: 21 — SIGNIFICANT CHANGE UP
IMM GRANULOCYTES NFR BLD AUTO: 0.3 % — SIGNIFICANT CHANGE UP (ref 0–1.5)
IMM GRANULOCYTES NFR BLD AUTO: 0.4 % — SIGNIFICANT CHANGE UP (ref 0–1.5)
INR BLD: 0.97 RATIO — SIGNIFICANT CHANGE UP (ref 0.88–1.16)
KETONES UR-MCNC: NEGATIVE — SIGNIFICANT CHANGE UP
LACTATE SERPL-SCNC: 1.2 MMOL/L — SIGNIFICANT CHANGE UP (ref 0.7–2)
LACTATE SERPL-SCNC: 5 MMOL/L — CRITICAL HIGH (ref 0.7–2)
LEUKOCYTE ESTERASE UR-ACNC: NEGATIVE — SIGNIFICANT CHANGE UP
LYMPHOCYTES # BLD AUTO: 1.57 K/UL — SIGNIFICANT CHANGE UP (ref 1–3.3)
LYMPHOCYTES # BLD AUTO: 13.2 % — SIGNIFICANT CHANGE UP (ref 13–44)
LYMPHOCYTES # BLD AUTO: 25.8 % — SIGNIFICANT CHANGE UP (ref 13–44)
LYMPHOCYTES # BLD AUTO: 3.19 K/UL — SIGNIFICANT CHANGE UP (ref 1–3.3)
MCHC RBC-ENTMCNC: 28.2 PG — SIGNIFICANT CHANGE UP (ref 27–34)
MCHC RBC-ENTMCNC: 28.8 PG — SIGNIFICANT CHANGE UP (ref 27–34)
MCHC RBC-ENTMCNC: 33.9 GM/DL — SIGNIFICANT CHANGE UP (ref 32–36)
MCHC RBC-ENTMCNC: 34.9 GM/DL — SIGNIFICANT CHANGE UP (ref 32–36)
MCV RBC AUTO: 82.5 FL — SIGNIFICANT CHANGE UP (ref 80–100)
MCV RBC AUTO: 83.1 FL — SIGNIFICANT CHANGE UP (ref 80–100)
MONOCYTES # BLD AUTO: 0.69 K/UL — SIGNIFICANT CHANGE UP (ref 0–0.9)
MONOCYTES # BLD AUTO: 0.95 K/UL — HIGH (ref 0–0.9)
MONOCYTES NFR BLD AUTO: 5.8 % — SIGNIFICANT CHANGE UP (ref 2–14)
MONOCYTES NFR BLD AUTO: 7.7 % — SIGNIFICANT CHANGE UP (ref 2–14)
NEUTROPHILS # BLD AUTO: 8.01 K/UL — HIGH (ref 1.8–7.4)
NEUTROPHILS # BLD AUTO: 9.51 K/UL — HIGH (ref 1.8–7.4)
NEUTROPHILS NFR BLD AUTO: 64.6 % — SIGNIFICANT CHANGE UP (ref 43–77)
NEUTROPHILS NFR BLD AUTO: 80 % — HIGH (ref 43–77)
NITRITE UR-MCNC: NEGATIVE — SIGNIFICANT CHANGE UP
NRBC # BLD: 0 /100 WBCS — SIGNIFICANT CHANGE UP (ref 0–0)
NRBC # BLD: 0 /100 WBCS — SIGNIFICANT CHANGE UP (ref 0–0)
PCO2 BLDA: 36 MMHG — SIGNIFICANT CHANGE UP (ref 32–46)
PH BLD: 7.41 — SIGNIFICANT CHANGE UP (ref 7.35–7.45)
PH UR: 5 — SIGNIFICANT CHANGE UP (ref 5–8)
PLATELET # BLD AUTO: 411 K/UL — HIGH (ref 150–400)
PLATELET # BLD AUTO: 512 K/UL — HIGH (ref 150–400)
PO2 BLDA: 115 MMHG — HIGH (ref 74–108)
POTASSIUM SERPL-MCNC: 3.5 MMOL/L — SIGNIFICANT CHANGE UP (ref 3.5–5.3)
POTASSIUM SERPL-MCNC: 3.6 MMOL/L — SIGNIFICANT CHANGE UP (ref 3.5–5.3)
POTASSIUM SERPL-SCNC: 3.5 MMOL/L — SIGNIFICANT CHANGE UP (ref 3.5–5.3)
POTASSIUM SERPL-SCNC: 3.6 MMOL/L — SIGNIFICANT CHANGE UP (ref 3.5–5.3)
PROT SERPL-MCNC: 6.3 GM/DL — SIGNIFICANT CHANGE UP (ref 6–8.3)
PROT SERPL-MCNC: 8 GM/DL — SIGNIFICANT CHANGE UP (ref 6–8.3)
PROT UR-MCNC: NEGATIVE MG/DL — SIGNIFICANT CHANGE UP
PROTHROM AB SERPL-ACNC: 10.9 SEC — SIGNIFICANT CHANGE UP (ref 10–12.9)
RBC # BLD: 3.89 M/UL — LOW (ref 4.2–5.8)
RBC # BLD: 4.79 M/UL — SIGNIFICANT CHANGE UP (ref 4.2–5.8)
RBC # FLD: 13.5 % — SIGNIFICANT CHANGE UP (ref 10.3–14.5)
RBC # FLD: 13.5 % — SIGNIFICANT CHANGE UP (ref 10.3–14.5)
SAO2 % BLDA: 98 % — HIGH (ref 92–96)
SODIUM SERPL-SCNC: 136 MMOL/L — SIGNIFICANT CHANGE UP (ref 135–145)
SODIUM SERPL-SCNC: 141 MMOL/L — SIGNIFICANT CHANGE UP (ref 135–145)
SP GR SPEC: 1.02 — SIGNIFICANT CHANGE UP (ref 1.01–1.02)
UROBILINOGEN FLD QL: NEGATIVE MG/DL — SIGNIFICANT CHANGE UP
WBC # BLD: 11.89 K/UL — HIGH (ref 3.8–10.5)
WBC # BLD: 12.38 K/UL — HIGH (ref 3.8–10.5)
WBC # FLD AUTO: 11.89 K/UL — HIGH (ref 3.8–10.5)
WBC # FLD AUTO: 12.38 K/UL — HIGH (ref 3.8–10.5)

## 2019-06-07 PROCEDURE — 99284 EMERGENCY DEPT VISIT MOD MDM: CPT | Mod: 25

## 2019-06-07 PROCEDURE — 71045 X-RAY EXAM CHEST 1 VIEW: CPT | Mod: 26

## 2019-06-07 RX ORDER — SODIUM CHLORIDE 9 MG/ML
2000 INJECTION INTRAMUSCULAR; INTRAVENOUS; SUBCUTANEOUS ONCE
Refills: 0 | Status: COMPLETED | OUTPATIENT
Start: 2019-06-07 | End: 2019-06-07

## 2019-06-07 RX ORDER — KETOROLAC TROMETHAMINE 30 MG/ML
15 SYRINGE (ML) INJECTION ONCE
Refills: 0 | Status: DISCONTINUED | OUTPATIENT
Start: 2019-06-07 | End: 2019-06-07

## 2019-06-07 RX ORDER — DIPHENHYDRAMINE HCL 50 MG
50 CAPSULE ORAL ONCE
Refills: 0 | Status: COMPLETED | OUTPATIENT
Start: 2019-06-07 | End: 2019-06-07

## 2019-06-07 RX ORDER — MORPHINE SULFATE 50 MG/1
4 CAPSULE, EXTENDED RELEASE ORAL ONCE
Refills: 0 | Status: DISCONTINUED | OUTPATIENT
Start: 2019-06-07 | End: 2019-06-07

## 2019-06-07 RX ORDER — METHOCARBAMOL 500 MG/1
750 TABLET, FILM COATED ORAL ONCE
Refills: 0 | Status: COMPLETED | OUTPATIENT
Start: 2019-06-07 | End: 2019-06-07

## 2019-06-07 RX ADMIN — MORPHINE SULFATE 4 MILLIGRAM(S): 50 CAPSULE, EXTENDED RELEASE ORAL at 02:17

## 2019-06-07 RX ADMIN — METHOCARBAMOL 750 MILLIGRAM(S): 500 TABLET, FILM COATED ORAL at 02:20

## 2019-06-07 RX ADMIN — Medication 15 MILLIGRAM(S): at 03:00

## 2019-06-07 RX ADMIN — SODIUM CHLORIDE 2000 MILLILITER(S): 9 INJECTION INTRAMUSCULAR; INTRAVENOUS; SUBCUTANEOUS at 02:21

## 2019-06-07 RX ADMIN — MORPHINE SULFATE 4 MILLIGRAM(S): 50 CAPSULE, EXTENDED RELEASE ORAL at 02:47

## 2019-06-07 RX ADMIN — Medication 15 MILLIGRAM(S): at 02:45

## 2019-06-07 RX ADMIN — Medication 50 MILLIGRAM(S): at 02:30

## 2019-06-07 NOTE — ED PROVIDER NOTE - PHYSICAL EXAMINATION
Gen: Alert, distressed  Head: NC, AT, PERRL, EOMI, normal lids/conjunctiva  ENT: normal hearing, patent oropharynx without erythema/exudate, uvula midline  Neck: +supple, no tenderness/meningismus/JVD, +Trachea midline  Pulm: Bilateral BS, normal resp effort, no wheeze/stridor/retractions  CV: RRR, no M/R/G, +dist pulses  Abd: soft, epigastric tenderness, ND, +BS, no palpable masses  Mskel: no edema/erythema/cyanosis  Skin: no rash, warm/dry  Neuro: AAOx3, no apparent sensory/motor deficits, coordination intact

## 2019-06-07 NOTE — ED ADULT NURSE NOTE - NSIMPLEMENTINTERV_GEN_ALL_ED
Implemented All Universal Safety Interventions:  House to call system. Call bell, personal items and telephone within reach. Instruct patient to call for assistance. Room bathroom lighting operational. Non-slip footwear when patient is off stretcher. Physically safe environment: no spills, clutter or unnecessary equipment. Stretcher in lowest position, wheels locked, appropriate side rails in place.

## 2019-06-07 NOTE — ED PROVIDER NOTE - CLINICAL SUMMARY MEDICAL DECISION MAKING FREE TEXT BOX
Patient presents for leg and arm cramps.  VSS.  Cramps resolved after hydration.  Labs also normalized with exception of asymptomatic hypoglycemia.  Patient given PO intake, does not take sulfonylureas, sugars improved.  Patient advised to stay hydrated.  Discussed results and outcome of today's visit with the patient.  Patient advised to please follow up with another healthcare provider within the next 24 hours and return to the Emergency Department for worsening symptoms or any other concerns.  Patient advised that their doctor may call  to follow up on the specific results of the tests performed today in the emergency department.   Patient appears well on discharge.

## 2019-06-07 NOTE — ED PROVIDER NOTE - OBJECTIVE STATEMENT
Pertinent PMH/PSH/FHx/SHx and Review of Systems contained within:  Patient presents to the ED for muscle cramps and hyperglycemia.  Patient reports BL leg and left arm cramps occurring over the last few hours.  Patient is hyperglycemic in field but says that his elevated glucose does not usually result in cramps.  He also c/o epigastric pain, no vomiting, diarrhea, chest pain, or dyspnea.      ROS: No fever/chills, No headache/photophobia/eye pain/changes in vision, No ear pain/sore throat/dysphagia, No chest pain/palpitations, no SOB/cough/wheeze/stridor, No N/V/D/melena, no dysuria/frequency/discharge, No neck/back pain, no rash, no changes in neurological status/function.

## 2019-06-07 NOTE — ED ADULT NURSE NOTE - OBJECTIVE STATEMENT
pt discharged from [facility] VS yesterday morning. + diabetic.  low blood sugar tonight, took some juice and sugar tablet and now patient co cramping to legs and hands.

## 2019-06-07 NOTE — ED ADULT NURSE REASSESSMENT NOTE - NS ED NURSE REASSESS COMMENT FT1
received pt to bed 16 alert and oriented times 4. denies pain. iv site intact. pt awaiting disposition. plan of care explained to pt who verbalized understanding.

## 2019-06-07 NOTE — ED ADULT NURSE REASSESSMENT NOTE - NS ED NURSE REASSESS COMMENT FT1
pt discharged home as ordered. blood gucose 114. pt ate breakfast. ambulating with steady gait awake alert. denies pain.

## 2019-06-07 NOTE — ED ADULT NURSE NOTE - CHIEF COMPLAINT QUOTE
pt discharged from [facility] VS yesterday morning. + diabetic.  low blood sugar tonight, took some juice and sugar tablet and now patient co cramping to legs and hands.

## 2019-06-07 NOTE — ED ADULT TRIAGE NOTE - CHIEF COMPLAINT QUOTE
pt discharged from [facility] VS yesterday morning. + diabetic.  low blood sugar tonight, took some juice and sugar tablet and now patient co cramping to legs and hands.

## 2019-06-08 LAB
CULTURE RESULTS: SIGNIFICANT CHANGE UP
SPECIMEN SOURCE: SIGNIFICANT CHANGE UP

## 2019-06-12 ENCOUNTER — EMERGENCY (EMERGENCY)
Facility: HOSPITAL | Age: 39
LOS: 0 days | Discharge: HOME | End: 2019-06-13
Attending: EMERGENCY MEDICINE
Payer: MEDICAID

## 2019-06-12 VITALS
DIASTOLIC BLOOD PRESSURE: 89 MMHG | WEIGHT: 169.98 LBS | RESPIRATION RATE: 19 BRPM | HEART RATE: 75 BPM | SYSTOLIC BLOOD PRESSURE: 145 MMHG | TEMPERATURE: 98 F | HEIGHT: 69 IN | OXYGEN SATURATION: 98 %

## 2019-06-12 DIAGNOSIS — J45.909 UNSPECIFIED ASTHMA, UNCOMPLICATED: ICD-10-CM

## 2019-06-12 DIAGNOSIS — R10.84 GENERALIZED ABDOMINAL PAIN: ICD-10-CM

## 2019-06-12 DIAGNOSIS — Z90.49 ACQUIRED ABSENCE OF OTHER SPECIFIED PARTS OF DIGESTIVE TRACT: Chronic | ICD-10-CM

## 2019-06-12 DIAGNOSIS — F41.9 ANXIETY DISORDER, UNSPECIFIED: ICD-10-CM

## 2019-06-12 DIAGNOSIS — E11.9 TYPE 2 DIABETES MELLITUS WITHOUT COMPLICATIONS: ICD-10-CM

## 2019-06-12 DIAGNOSIS — Z79.4 LONG TERM (CURRENT) USE OF INSULIN: ICD-10-CM

## 2019-06-12 DIAGNOSIS — R10.9 UNSPECIFIED ABDOMINAL PAIN: ICD-10-CM

## 2019-06-12 LAB
ALBUMIN SERPL ELPH-MCNC: 3.8 G/DL — SIGNIFICANT CHANGE UP (ref 3.3–5)
ALP SERPL-CCNC: 112 U/L — SIGNIFICANT CHANGE UP (ref 40–120)
ALT FLD-CCNC: 22 U/L — SIGNIFICANT CHANGE UP (ref 12–78)
ANION GAP SERPL CALC-SCNC: 9 MMOL/L — SIGNIFICANT CHANGE UP (ref 5–17)
AST SERPL-CCNC: 18 U/L — SIGNIFICANT CHANGE UP (ref 15–37)
BASOPHILS # BLD AUTO: 0.04 K/UL — SIGNIFICANT CHANGE UP (ref 0–0.2)
BASOPHILS NFR BLD AUTO: 0.4 % — SIGNIFICANT CHANGE UP (ref 0–2)
BILIRUB SERPL-MCNC: 0.5 MG/DL — SIGNIFICANT CHANGE UP (ref 0.2–1.2)
BUN SERPL-MCNC: 11 MG/DL — SIGNIFICANT CHANGE UP (ref 7–23)
CALCIUM SERPL-MCNC: 9.8 MG/DL — SIGNIFICANT CHANGE UP (ref 8.5–10.1)
CHLORIDE SERPL-SCNC: 100 MMOL/L — SIGNIFICANT CHANGE UP (ref 96–108)
CO2 SERPL-SCNC: 31 MMOL/L — SIGNIFICANT CHANGE UP (ref 22–31)
CREAT SERPL-MCNC: 1.01 MG/DL — SIGNIFICANT CHANGE UP (ref 0.5–1.3)
EOSINOPHIL # BLD AUTO: 0.08 K/UL — SIGNIFICANT CHANGE UP (ref 0–0.5)
EOSINOPHIL NFR BLD AUTO: 0.9 % — SIGNIFICANT CHANGE UP (ref 0–6)
GLUCOSE SERPL-MCNC: 296 MG/DL — HIGH (ref 70–99)
HCT VFR BLD CALC: 40.9 % — SIGNIFICANT CHANGE UP (ref 39–50)
HGB BLD-MCNC: 13.7 G/DL — SIGNIFICANT CHANGE UP (ref 13–17)
IMM GRANULOCYTES NFR BLD AUTO: 0.2 % — SIGNIFICANT CHANGE UP (ref 0–1.5)
LACTATE SERPL-SCNC: 1.6 MMOL/L — SIGNIFICANT CHANGE UP (ref 0.7–2)
LIDOCAIN IGE QN: 37 U/L — LOW (ref 73–393)
LYMPHOCYTES # BLD AUTO: 1.18 K/UL — SIGNIFICANT CHANGE UP (ref 1–3.3)
LYMPHOCYTES # BLD AUTO: 12.6 % — LOW (ref 13–44)
MCHC RBC-ENTMCNC: 27.8 PG — SIGNIFICANT CHANGE UP (ref 27–34)
MCHC RBC-ENTMCNC: 33.5 GM/DL — SIGNIFICANT CHANGE UP (ref 32–36)
MCV RBC AUTO: 83.1 FL — SIGNIFICANT CHANGE UP (ref 80–100)
MONOCYTES # BLD AUTO: 0.36 K/UL — SIGNIFICANT CHANGE UP (ref 0–0.9)
MONOCYTES NFR BLD AUTO: 3.8 % — SIGNIFICANT CHANGE UP (ref 2–14)
NEUTROPHILS # BLD AUTO: 7.72 K/UL — HIGH (ref 1.8–7.4)
NEUTROPHILS NFR BLD AUTO: 82.1 % — HIGH (ref 43–77)
NRBC # BLD: 0 /100 WBCS — SIGNIFICANT CHANGE UP (ref 0–0)
PLATELET # BLD AUTO: 504 K/UL — HIGH (ref 150–400)
POTASSIUM SERPL-MCNC: 4.3 MMOL/L — SIGNIFICANT CHANGE UP (ref 3.5–5.3)
POTASSIUM SERPL-SCNC: 4.3 MMOL/L — SIGNIFICANT CHANGE UP (ref 3.5–5.3)
PROT SERPL-MCNC: 8.1 GM/DL — SIGNIFICANT CHANGE UP (ref 6–8.3)
RBC # BLD: 4.92 M/UL — SIGNIFICANT CHANGE UP (ref 4.2–5.8)
RBC # FLD: 13.8 % — SIGNIFICANT CHANGE UP (ref 10.3–14.5)
SODIUM SERPL-SCNC: 140 MMOL/L — SIGNIFICANT CHANGE UP (ref 135–145)
TROPONIN I SERPL-MCNC: <.015 NG/ML — SIGNIFICANT CHANGE UP (ref 0.01–0.04)
WBC # BLD: 9.4 K/UL — SIGNIFICANT CHANGE UP (ref 3.8–10.5)
WBC # FLD AUTO: 9.4 K/UL — SIGNIFICANT CHANGE UP (ref 3.8–10.5)

## 2019-06-12 PROCEDURE — 71045 X-RAY EXAM CHEST 1 VIEW: CPT | Mod: 26

## 2019-06-12 PROCEDURE — 99285 EMERGENCY DEPT VISIT HI MDM: CPT

## 2019-06-12 RX ORDER — IOHEXOL 300 MG/ML
30 INJECTION, SOLUTION INTRAVENOUS ONCE
Refills: 0 | Status: COMPLETED | OUTPATIENT
Start: 2019-06-12 | End: 2019-06-12

## 2019-06-12 RX ORDER — FAMOTIDINE 10 MG/ML
20 INJECTION INTRAVENOUS ONCE
Refills: 0 | Status: COMPLETED | OUTPATIENT
Start: 2019-06-12 | End: 2019-06-12

## 2019-06-12 RX ORDER — SODIUM CHLORIDE 9 MG/ML
1000 INJECTION INTRAMUSCULAR; INTRAVENOUS; SUBCUTANEOUS ONCE
Refills: 0 | Status: COMPLETED | OUTPATIENT
Start: 2019-06-12 | End: 2019-06-12

## 2019-06-12 RX ORDER — HYDROMORPHONE HYDROCHLORIDE 2 MG/ML
1 INJECTION INTRAMUSCULAR; INTRAVENOUS; SUBCUTANEOUS ONCE
Refills: 0 | Status: DISCONTINUED | OUTPATIENT
Start: 2019-06-12 | End: 2019-06-12

## 2019-06-12 RX ORDER — ONDANSETRON 8 MG/1
4 TABLET, FILM COATED ORAL ONCE
Refills: 0 | Status: COMPLETED | OUTPATIENT
Start: 2019-06-12 | End: 2019-06-12

## 2019-06-12 RX ADMIN — ONDANSETRON 4 MILLIGRAM(S): 8 TABLET, FILM COATED ORAL at 20:42

## 2019-06-12 RX ADMIN — HYDROMORPHONE HYDROCHLORIDE 100.2 MILLIGRAM(S): 2 INJECTION INTRAMUSCULAR; INTRAVENOUS; SUBCUTANEOUS at 20:35

## 2019-06-12 RX ADMIN — HYDROMORPHONE HYDROCHLORIDE 1 MILLIGRAM(S): 2 INJECTION INTRAMUSCULAR; INTRAVENOUS; SUBCUTANEOUS at 21:05

## 2019-06-12 RX ADMIN — FAMOTIDINE 20 MILLIGRAM(S): 10 INJECTION INTRAVENOUS at 20:42

## 2019-06-12 RX ADMIN — SODIUM CHLORIDE 1000 MILLILITER(S): 9 INJECTION INTRAMUSCULAR; INTRAVENOUS; SUBCUTANEOUS at 20:42

## 2019-06-12 RX ADMIN — IOHEXOL 30 MILLILITER(S): 300 INJECTION, SOLUTION INTRAVENOUS at 21:41

## 2019-06-12 NOTE — ED PROVIDER NOTE - CLINICAL SUMMARY MEDICAL DECISION MAKING FREE TEXT BOX
patient currently in good condition. d/c with care instructions. f/up with pmd and gi. Discussed results and outcome of testing with the patient.  Patient advised to please follow up with their primary care doctor within the next 24 hours and return to the Emergency Department for worsening symptoms or any other concerns.  Patient advised that their doctor may call  to follow up on the specific results of the tests performed today in the emergency department.

## 2019-06-12 NOTE — ED PROVIDER NOTE - OBJECTIVE STATEMENT
Pertinent PMH/PSH/FHx/SHx and Review of Systems contained within:  39 yo m with pmh dm, gastritis and asthma presents in ED c/o epigastric and luq pain today associated with nbnb vomitus. No aggravating or relieving factors, No fever/chills, No photophobia/eye pain/changes in vision, No ear pain/sore throat/dysphagia, No chest pain/palpitations, no SOB/cough/wheeze/stridor, No D, no dysuria/frequency/discharge, No neck/back pain, no rash, no changes in neurological status/function.

## 2019-06-12 NOTE — ED ADULT NURSE NOTE - NSIMPLEMENTINTERV_GEN_ALL_ED
Implemented All Universal Safety Interventions:  [place] to call system. Call bell, personal items and telephone within reach. Instruct patient to call for assistance. Room bathroom lighting operational. Non-slip footwear when patient is off stretcher. Physically safe environment: no spills, clutter or unnecessary equipment. Stretcher in lowest position, wheels locked, appropriate side rails in place.

## 2019-06-12 NOTE — ED ADULT NURSE NOTE - OBJECTIVE STATEMENT
patient states I had about 2.5 bottles, denies falling, denies hitting head. AAAOX3 in triage pt presents to the ED c/o right lower abdominal pain with n/v started this morning. pt denies dizziness, denies HA, denies chest pain, denies back pain, denies SOB.  HX of gastritis

## 2019-06-12 NOTE — ED ADULT TRIAGE NOTE - CHIEF COMPLAINT QUOTE
patient BIBA c/o of abdominal pain , with N/V started today , denied chest pain deneid back pain , history of gastritis

## 2019-06-13 VITALS
TEMPERATURE: 98 F | DIASTOLIC BLOOD PRESSURE: 73 MMHG | HEART RATE: 76 BPM | OXYGEN SATURATION: 99 % | SYSTOLIC BLOOD PRESSURE: 113 MMHG | RESPIRATION RATE: 16 BRPM

## 2019-06-13 DIAGNOSIS — Z79.4 LONG TERM (CURRENT) USE OF INSULIN: ICD-10-CM

## 2019-06-13 DIAGNOSIS — K29.50 UNSPECIFIED CHRONIC GASTRITIS WITHOUT BLEEDING: ICD-10-CM

## 2019-06-13 DIAGNOSIS — Z90.49 ACQUIRED ABSENCE OF OTHER SPECIFIED PARTS OF DIGESTIVE TRACT: ICD-10-CM

## 2019-06-13 DIAGNOSIS — E10.43 TYPE 1 DIABETES MELLITUS WITH DIABETIC AUTONOMIC (POLY)NEUROPATHY: ICD-10-CM

## 2019-06-13 DIAGNOSIS — K31.84 GASTROPARESIS: ICD-10-CM

## 2019-06-13 DIAGNOSIS — F11.29 OPIOID DEPENDENCE WITH UNSPECIFIED OPIOID-INDUCED DISORDER: ICD-10-CM

## 2019-06-13 DIAGNOSIS — F41.9 ANXIETY DISORDER, UNSPECIFIED: ICD-10-CM

## 2019-06-13 DIAGNOSIS — Z79.899 OTHER LONG TERM (CURRENT) DRUG THERAPY: ICD-10-CM

## 2019-06-13 DIAGNOSIS — J45.20 MILD INTERMITTENT ASTHMA, UNCOMPLICATED: ICD-10-CM

## 2019-06-13 LAB
AMPHET UR-MCNC: NEGATIVE — SIGNIFICANT CHANGE UP
APPEARANCE UR: CLEAR — SIGNIFICANT CHANGE UP
BARBITURATES UR SCN-MCNC: NEGATIVE — SIGNIFICANT CHANGE UP
BENZODIAZ UR-MCNC: NEGATIVE — SIGNIFICANT CHANGE UP
BILIRUB UR-MCNC: NEGATIVE — SIGNIFICANT CHANGE UP
COCAINE METAB.OTHER UR-MCNC: NEGATIVE — SIGNIFICANT CHANGE UP
COLOR SPEC: YELLOW — SIGNIFICANT CHANGE UP
DIFF PNL FLD: NEGATIVE — SIGNIFICANT CHANGE UP
GLUCOSE BLDC GLUCOMTR-MCNC: 248 MG/DL — HIGH (ref 70–99)
GLUCOSE UR QL: 1000 MG/DL
KETONES UR-MCNC: ABNORMAL
LEUKOCYTE ESTERASE UR-ACNC: NEGATIVE — SIGNIFICANT CHANGE UP
METHADONE UR-MCNC: NEGATIVE — SIGNIFICANT CHANGE UP
NITRITE UR-MCNC: NEGATIVE — SIGNIFICANT CHANGE UP
OPIATES UR-MCNC: POSITIVE — SIGNIFICANT CHANGE UP
PCP SPEC-MCNC: SIGNIFICANT CHANGE UP
PCP UR-MCNC: NEGATIVE — SIGNIFICANT CHANGE UP
PH UR: 9 — HIGH (ref 5–8)
PROT UR-MCNC: NEGATIVE MG/DL — SIGNIFICANT CHANGE UP
SP GR SPEC: 1.01 — SIGNIFICANT CHANGE UP (ref 1.01–1.02)
THC UR QL: POSITIVE — SIGNIFICANT CHANGE UP
UROBILINOGEN FLD QL: NEGATIVE MG/DL — SIGNIFICANT CHANGE UP

## 2019-06-13 PROCEDURE — 93010 ELECTROCARDIOGRAM REPORT: CPT

## 2019-06-13 PROCEDURE — 74177 CT ABD & PELVIS W/CONTRAST: CPT | Mod: 26

## 2019-06-13 RX ORDER — HALOPERIDOL DECANOATE 100 MG/ML
5 INJECTION INTRAMUSCULAR ONCE
Refills: 0 | Status: COMPLETED | OUTPATIENT
Start: 2019-06-13 | End: 2019-06-13

## 2019-06-13 RX ADMIN — HALOPERIDOL DECANOATE 5 MILLIGRAM(S): 100 INJECTION INTRAMUSCULAR at 02:16

## 2019-06-13 NOTE — ED ADULT NURSE REASSESSMENT NOTE - NS ED NURSE REASSESS COMMENT FT1
as per MD montes, pt is okay to D/C. pt advise to take insulin when get home and pt.  verbalize understanding to take insulin. pt. alert and oriented x4. no acute distress noted.

## 2019-06-13 NOTE — ED ADULT NURSE REASSESSMENT NOTE - NS ED NURSE REASSESS COMMENT FT1
Received in stretcher, goabhinav on. A&Ox4 VSS. Drinking PO contrast as ordered. Awaiting abd CT.  No distress noted. Quietly resting. Will continue to monitor (3) slightly limited

## 2019-06-14 LAB
CULTURE RESULTS: SIGNIFICANT CHANGE UP
SPECIMEN SOURCE: SIGNIFICANT CHANGE UP

## 2019-06-17 ENCOUNTER — EMERGENCY (EMERGENCY)
Facility: HOSPITAL | Age: 39
LOS: 0 days | Discharge: HOME | End: 2019-06-17
Attending: EMERGENCY MEDICINE
Payer: MEDICAID

## 2019-06-17 VITALS
WEIGHT: 164.91 LBS | RESPIRATION RATE: 18 BRPM | TEMPERATURE: 98 F | DIASTOLIC BLOOD PRESSURE: 74 MMHG | OXYGEN SATURATION: 98 % | HEIGHT: 69 IN | SYSTOLIC BLOOD PRESSURE: 121 MMHG | HEART RATE: 67 BPM

## 2019-06-17 VITALS
DIASTOLIC BLOOD PRESSURE: 80 MMHG | OXYGEN SATURATION: 99 % | HEART RATE: 59 BPM | TEMPERATURE: 99 F | SYSTOLIC BLOOD PRESSURE: 125 MMHG | RESPIRATION RATE: 16 BRPM

## 2019-06-17 DIAGNOSIS — F41.9 ANXIETY DISORDER, UNSPECIFIED: ICD-10-CM

## 2019-06-17 DIAGNOSIS — R10.13 EPIGASTRIC PAIN: ICD-10-CM

## 2019-06-17 DIAGNOSIS — Z79.4 LONG TERM (CURRENT) USE OF INSULIN: ICD-10-CM

## 2019-06-17 DIAGNOSIS — R19.7 DIARRHEA, UNSPECIFIED: ICD-10-CM

## 2019-06-17 DIAGNOSIS — K31.84 GASTROPARESIS: ICD-10-CM

## 2019-06-17 DIAGNOSIS — E11.43 TYPE 2 DIABETES MELLITUS WITH DIABETIC AUTONOMIC (POLY)NEUROPATHY: ICD-10-CM

## 2019-06-17 DIAGNOSIS — Z90.49 ACQUIRED ABSENCE OF OTHER SPECIFIED PARTS OF DIGESTIVE TRACT: Chronic | ICD-10-CM

## 2019-06-17 DIAGNOSIS — J45.909 UNSPECIFIED ASTHMA, UNCOMPLICATED: ICD-10-CM

## 2019-06-17 DIAGNOSIS — R11.10 VOMITING, UNSPECIFIED: ICD-10-CM

## 2019-06-17 DIAGNOSIS — Z90.49 ACQUIRED ABSENCE OF OTHER SPECIFIED PARTS OF DIGESTIVE TRACT: ICD-10-CM

## 2019-06-17 DIAGNOSIS — K29.70 GASTRITIS, UNSPECIFIED, WITHOUT BLEEDING: ICD-10-CM

## 2019-06-17 LAB
ACETONE SERPL-MCNC: NEGATIVE — SIGNIFICANT CHANGE UP
ALBUMIN SERPL ELPH-MCNC: 3.8 G/DL — SIGNIFICANT CHANGE UP (ref 3.3–5)
ALP SERPL-CCNC: 99 U/L — SIGNIFICANT CHANGE UP (ref 40–120)
ALT FLD-CCNC: 21 U/L — SIGNIFICANT CHANGE UP (ref 12–78)
ANION GAP SERPL CALC-SCNC: 7 MMOL/L — SIGNIFICANT CHANGE UP (ref 5–17)
APPEARANCE UR: CLEAR — SIGNIFICANT CHANGE UP
AST SERPL-CCNC: 18 U/L — SIGNIFICANT CHANGE UP (ref 15–37)
B-OH-BUTYR SERPL-SCNC: 0.5 MMOL/L — HIGH
BASOPHILS # BLD AUTO: 0.06 K/UL — SIGNIFICANT CHANGE UP (ref 0–0.2)
BASOPHILS NFR BLD AUTO: 0.6 % — SIGNIFICANT CHANGE UP (ref 0–2)
BILIRUB SERPL-MCNC: 0.5 MG/DL — SIGNIFICANT CHANGE UP (ref 0.2–1.2)
BILIRUB UR-MCNC: NEGATIVE — SIGNIFICANT CHANGE UP
BUN SERPL-MCNC: 13 MG/DL — SIGNIFICANT CHANGE UP (ref 7–23)
CALCIUM SERPL-MCNC: 9 MG/DL — SIGNIFICANT CHANGE UP (ref 8.5–10.1)
CHLORIDE SERPL-SCNC: 103 MMOL/L — SIGNIFICANT CHANGE UP (ref 96–108)
CO2 SERPL-SCNC: 30 MMOL/L — SIGNIFICANT CHANGE UP (ref 22–31)
COLOR SPEC: YELLOW — SIGNIFICANT CHANGE UP
CREAT SERPL-MCNC: 0.95 MG/DL — SIGNIFICANT CHANGE UP (ref 0.5–1.3)
DIFF PNL FLD: NEGATIVE — SIGNIFICANT CHANGE UP
EOSINOPHIL # BLD AUTO: 0.11 K/UL — SIGNIFICANT CHANGE UP (ref 0–0.5)
EOSINOPHIL NFR BLD AUTO: 1.1 % — SIGNIFICANT CHANGE UP (ref 0–6)
GLUCOSE BLDC GLUCOMTR-MCNC: 209 MG/DL — HIGH (ref 70–99)
GLUCOSE SERPL-MCNC: 223 MG/DL — HIGH (ref 70–99)
GLUCOSE UR QL: 100 MG/DL
HCG SERPL-ACNC: <1 MIU/ML — HIGH
HCT VFR BLD CALC: 39.4 % — SIGNIFICANT CHANGE UP (ref 39–50)
HGB BLD-MCNC: 13.3 G/DL — SIGNIFICANT CHANGE UP (ref 13–17)
IMM GRANULOCYTES NFR BLD AUTO: 0.7 % — SIGNIFICANT CHANGE UP (ref 0–1.5)
KETONES UR-MCNC: ABNORMAL
LACTATE SERPL-SCNC: 1.2 MMOL/L — SIGNIFICANT CHANGE UP (ref 0.7–2)
LEUKOCYTE ESTERASE UR-ACNC: NEGATIVE — SIGNIFICANT CHANGE UP
LIDOCAIN IGE QN: 51 U/L — LOW (ref 73–393)
LYMPHOCYTES # BLD AUTO: 1.63 K/UL — SIGNIFICANT CHANGE UP (ref 1–3.3)
LYMPHOCYTES # BLD AUTO: 16.3 % — SIGNIFICANT CHANGE UP (ref 13–44)
MCHC RBC-ENTMCNC: 28.2 PG — SIGNIFICANT CHANGE UP (ref 27–34)
MCHC RBC-ENTMCNC: 33.8 GM/DL — SIGNIFICANT CHANGE UP (ref 32–36)
MCV RBC AUTO: 83.5 FL — SIGNIFICANT CHANGE UP (ref 80–100)
MONOCYTES # BLD AUTO: 0.58 K/UL — SIGNIFICANT CHANGE UP (ref 0–0.9)
MONOCYTES NFR BLD AUTO: 5.8 % — SIGNIFICANT CHANGE UP (ref 2–14)
NEUTROPHILS # BLD AUTO: 7.58 K/UL — HIGH (ref 1.8–7.4)
NEUTROPHILS NFR BLD AUTO: 75.5 % — SIGNIFICANT CHANGE UP (ref 43–77)
NITRITE UR-MCNC: NEGATIVE — SIGNIFICANT CHANGE UP
NRBC # BLD: 0 /100 WBCS — SIGNIFICANT CHANGE UP (ref 0–0)
OSMOLALITY SERPL: 292 MOSM/KG — SIGNIFICANT CHANGE UP (ref 289–308)
PH UR: 9 — HIGH (ref 5–8)
PLATELET # BLD AUTO: 455 K/UL — HIGH (ref 150–400)
POTASSIUM SERPL-MCNC: 4.4 MMOL/L — SIGNIFICANT CHANGE UP (ref 3.5–5.3)
POTASSIUM SERPL-SCNC: 4.4 MMOL/L — SIGNIFICANT CHANGE UP (ref 3.5–5.3)
PROT SERPL-MCNC: 7.7 GM/DL — SIGNIFICANT CHANGE UP (ref 6–8.3)
PROT UR-MCNC: NEGATIVE MG/DL — SIGNIFICANT CHANGE UP
RBC # BLD: 4.72 M/UL — SIGNIFICANT CHANGE UP (ref 4.2–5.8)
RBC # FLD: 13.6 % — SIGNIFICANT CHANGE UP (ref 10.3–14.5)
SODIUM SERPL-SCNC: 140 MMOL/L — SIGNIFICANT CHANGE UP (ref 135–145)
SP GR SPEC: 1.01 — SIGNIFICANT CHANGE UP (ref 1.01–1.02)
UROBILINOGEN FLD QL: NEGATIVE MG/DL — SIGNIFICANT CHANGE UP
WBC # BLD: 10.03 K/UL — SIGNIFICANT CHANGE UP (ref 3.8–10.5)
WBC # FLD AUTO: 10.03 K/UL — SIGNIFICANT CHANGE UP (ref 3.8–10.5)

## 2019-06-17 PROCEDURE — 99284 EMERGENCY DEPT VISIT MOD MDM: CPT

## 2019-06-17 RX ORDER — SODIUM CHLORIDE 9 MG/ML
1000 INJECTION INTRAMUSCULAR; INTRAVENOUS; SUBCUTANEOUS ONCE
Refills: 0 | Status: COMPLETED | OUTPATIENT
Start: 2019-06-17 | End: 2019-06-17

## 2019-06-17 RX ORDER — KETOROLAC TROMETHAMINE 30 MG/ML
30 SYRINGE (ML) INJECTION ONCE
Refills: 0 | Status: DISCONTINUED | OUTPATIENT
Start: 2019-06-17 | End: 2019-06-17

## 2019-06-17 RX ORDER — MORPHINE SULFATE 50 MG/1
4 CAPSULE, EXTENDED RELEASE ORAL ONCE
Refills: 0 | Status: DISCONTINUED | OUTPATIENT
Start: 2019-06-17 | End: 2019-06-17

## 2019-06-17 RX ORDER — PANTOPRAZOLE SODIUM 20 MG/1
40 TABLET, DELAYED RELEASE ORAL ONCE
Refills: 0 | Status: COMPLETED | OUTPATIENT
Start: 2019-06-17 | End: 2019-06-17

## 2019-06-17 RX ORDER — ONDANSETRON 8 MG/1
1 TABLET, FILM COATED ORAL
Qty: 5 | Refills: 0
Start: 2019-06-17

## 2019-06-17 RX ORDER — METOCLOPRAMIDE HCL 10 MG
10 TABLET ORAL ONCE
Refills: 0 | Status: COMPLETED | OUTPATIENT
Start: 2019-06-17 | End: 2019-06-17

## 2019-06-17 RX ORDER — LIDOCAINE 4 G/100G
10 CREAM TOPICAL ONCE
Refills: 0 | Status: COMPLETED | OUTPATIENT
Start: 2019-06-17 | End: 2019-06-17

## 2019-06-17 RX ADMIN — PANTOPRAZOLE SODIUM 40 MILLIGRAM(S): 20 TABLET, DELAYED RELEASE ORAL at 14:52

## 2019-06-17 RX ADMIN — MORPHINE SULFATE 4 MILLIGRAM(S): 50 CAPSULE, EXTENDED RELEASE ORAL at 14:52

## 2019-06-17 RX ADMIN — MORPHINE SULFATE 4 MILLIGRAM(S): 50 CAPSULE, EXTENDED RELEASE ORAL at 15:22

## 2019-06-17 RX ADMIN — SODIUM CHLORIDE 1000 MILLILITER(S): 9 INJECTION INTRAMUSCULAR; INTRAVENOUS; SUBCUTANEOUS at 14:52

## 2019-06-17 RX ADMIN — Medication 10 MILLIGRAM(S): at 14:52

## 2019-06-17 RX ADMIN — LIDOCAINE 10 MILLILITER(S): 4 CREAM TOPICAL at 17:29

## 2019-06-17 RX ADMIN — Medication 30 MILLIGRAM(S): at 17:29

## 2019-06-17 RX ADMIN — Medication 30 MILLILITER(S): at 17:29

## 2019-06-17 NOTE — ED PROVIDER NOTE - CARE PROVIDER_API CALL
Octavio Kumar)  Medicine  [address], [address]  [place] [address]  Phone: [phone]  Fax: [phone]  Follow Up Time:

## 2019-06-17 NOTE — ED PROVIDER NOTE - PHYSICAL EXAMINATION
General:     NAD, well-nourished, well-appearing  Head:     NC/AT, EOMI, oral mucosa moist  Neck:     trachea midline  Lungs:     CTA b/l, no w/r/r  CVS:     S1S2, RRR, no m/g/r  Abd:     +BS, TTP @ epigastric > LUQ, no rebound or guarding, s/nd, no organomegaly  Ext:    2+ radial and pedal pulses, no c/c/e  Neuro: AAOx3, no sensory/motor deficits

## 2019-06-17 NOTE — ED PROVIDER NOTE - NS ED ROS FT
Constitutional: (-) fever  (-)chills  (-)sweats  Eyes/ENT: (-) blurry vision, (-) epistaxis  (-)rhinorrhea   (-) sore throat    Cardiovascular: (-) chest pain, (-) palpitations (-) edema   Respiratory: (-) cough, (-) shortness of breath   Gastrointestinal: (+)nausea  (+vomiting, (+) diarrhea  (+ abdominal pain   :  (-)dysuria, (-)frequency, (-)urgency, (-)hematuria  Musculoskeletal: (-) neck pain, (-) back pain, (-) joint pain  Integumentary: (-) rash, (-) edema  Neurological: (-) headache, (-) altered mental status  (-)LOC

## 2019-06-17 NOTE — ED ADULT NURSE NOTE - OBJECTIVE STATEMENT
pt A&Ox3 with c/o of abdominal pain and vomiting since this morning. pt with PMH Gastritis, Diabetes, asthma. pt states he had a  syncopal episode 2week ago

## 2019-06-17 NOTE — ED PROVIDER NOTE - OBJECTIVE STATEMENT
38yoM;  with pmh dm, gastritis and asthma; presents in ED c/o epigastric abd pain associated with n/v/d. n/v x8-9 episodes--nbnb.  diarrhea x2-3 episodes--brown stool. denies f/c/s. denies sick contacts. denies unusual PO intake. denies travel.  PMH: DM, Gastritis, Asthma  SOCIAL: No tobacco/illicit substance use/EtOH

## 2019-06-17 NOTE — ED PROVIDER NOTE - CLINICAL SUMMARY MEDICAL DECISION MAKING FREE TEXT BOX
patient arrived with abd pain, n/v/d.  labs normal, symptomatic improvement, desires discharge. f/up with pmd and GI>

## 2019-06-18 ENCOUNTER — INBOUND DOCUMENT (OUTPATIENT)
Age: 39
End: 2019-06-18

## 2019-06-18 LAB
CULTURE RESULTS: SIGNIFICANT CHANGE UP
SPECIMEN SOURCE: SIGNIFICANT CHANGE UP

## 2019-06-19 ENCOUNTER — EMERGENCY (EMERGENCY)
Facility: HOSPITAL | Age: 39
LOS: 0 days | Discharge: HOME | End: 2019-06-19
Attending: EMERGENCY MEDICINE
Payer: MEDICAID

## 2019-06-19 VITALS
HEIGHT: 69 IN | OXYGEN SATURATION: 100 % | SYSTOLIC BLOOD PRESSURE: 169 MMHG | RESPIRATION RATE: 20 BRPM | WEIGHT: 164.91 LBS | TEMPERATURE: 98 F | HEART RATE: 65 BPM | DIASTOLIC BLOOD PRESSURE: 83 MMHG

## 2019-06-19 VITALS
OXYGEN SATURATION: 100 % | RESPIRATION RATE: 16 BRPM | DIASTOLIC BLOOD PRESSURE: 102 MMHG | SYSTOLIC BLOOD PRESSURE: 181 MMHG | HEART RATE: 60 BPM | TEMPERATURE: 98 F

## 2019-06-19 DIAGNOSIS — J45.909 UNSPECIFIED ASTHMA, UNCOMPLICATED: ICD-10-CM

## 2019-06-19 DIAGNOSIS — K29.70 GASTRITIS, UNSPECIFIED, WITHOUT BLEEDING: ICD-10-CM

## 2019-06-19 DIAGNOSIS — E10.9 TYPE 1 DIABETES MELLITUS WITHOUT COMPLICATIONS: ICD-10-CM

## 2019-06-19 DIAGNOSIS — F41.9 ANXIETY DISORDER, UNSPECIFIED: ICD-10-CM

## 2019-06-19 DIAGNOSIS — Z90.49 ACQUIRED ABSENCE OF OTHER SPECIFIED PARTS OF DIGESTIVE TRACT: ICD-10-CM

## 2019-06-19 DIAGNOSIS — Z90.49 ACQUIRED ABSENCE OF OTHER SPECIFIED PARTS OF DIGESTIVE TRACT: Chronic | ICD-10-CM

## 2019-06-19 DIAGNOSIS — Z79.4 LONG TERM (CURRENT) USE OF INSULIN: ICD-10-CM

## 2019-06-19 DIAGNOSIS — R10.9 UNSPECIFIED ABDOMINAL PAIN: ICD-10-CM

## 2019-06-19 DIAGNOSIS — F12.10 CANNABIS ABUSE, UNCOMPLICATED: ICD-10-CM

## 2019-06-19 DIAGNOSIS — R11.2 NAUSEA WITH VOMITING, UNSPECIFIED: ICD-10-CM

## 2019-06-19 DIAGNOSIS — R11.10 VOMITING, UNSPECIFIED: ICD-10-CM

## 2019-06-19 LAB
ALBUMIN SERPL ELPH-MCNC: 3.7 G/DL — SIGNIFICANT CHANGE UP (ref 3.3–5)
ALP SERPL-CCNC: 93 U/L — SIGNIFICANT CHANGE UP (ref 40–120)
ALT FLD-CCNC: 21 U/L — SIGNIFICANT CHANGE UP (ref 12–78)
AMPHET UR-MCNC: NEGATIVE — SIGNIFICANT CHANGE UP
ANION GAP SERPL CALC-SCNC: 9 MMOL/L — SIGNIFICANT CHANGE UP (ref 5–17)
APPEARANCE UR: CLEAR — SIGNIFICANT CHANGE UP
AST SERPL-CCNC: 18 U/L — SIGNIFICANT CHANGE UP (ref 15–37)
BARBITURATES UR SCN-MCNC: NEGATIVE — SIGNIFICANT CHANGE UP
BASOPHILS # BLD AUTO: 0.05 K/UL — SIGNIFICANT CHANGE UP (ref 0–0.2)
BASOPHILS NFR BLD AUTO: 0.4 % — SIGNIFICANT CHANGE UP (ref 0–2)
BENZODIAZ UR-MCNC: NEGATIVE — SIGNIFICANT CHANGE UP
BILIRUB SERPL-MCNC: 0.3 MG/DL — SIGNIFICANT CHANGE UP (ref 0.2–1.2)
BILIRUB UR-MCNC: NEGATIVE — SIGNIFICANT CHANGE UP
BUN SERPL-MCNC: 12 MG/DL — SIGNIFICANT CHANGE UP (ref 7–23)
CALCIUM SERPL-MCNC: 8.8 MG/DL — SIGNIFICANT CHANGE UP (ref 8.5–10.1)
CHLORIDE SERPL-SCNC: 102 MMOL/L — SIGNIFICANT CHANGE UP (ref 96–108)
CO2 SERPL-SCNC: 30 MMOL/L — SIGNIFICANT CHANGE UP (ref 22–31)
COCAINE METAB.OTHER UR-MCNC: NEGATIVE — SIGNIFICANT CHANGE UP
COLOR SPEC: YELLOW — SIGNIFICANT CHANGE UP
CREAT SERPL-MCNC: 1.13 MG/DL — SIGNIFICANT CHANGE UP (ref 0.5–1.3)
DIFF PNL FLD: NEGATIVE — SIGNIFICANT CHANGE UP
EOSINOPHIL # BLD AUTO: 0.11 K/UL — SIGNIFICANT CHANGE UP (ref 0–0.5)
EOSINOPHIL NFR BLD AUTO: 0.9 % — SIGNIFICANT CHANGE UP (ref 0–6)
GLUCOSE SERPL-MCNC: 273 MG/DL — HIGH (ref 70–99)
GLUCOSE UR QL: 1000 MG/DL
HCT VFR BLD CALC: 39.3 % — SIGNIFICANT CHANGE UP (ref 39–50)
HGB BLD-MCNC: 13.1 G/DL — SIGNIFICANT CHANGE UP (ref 13–17)
IMM GRANULOCYTES NFR BLD AUTO: 0.4 % — SIGNIFICANT CHANGE UP (ref 0–1.5)
KETONES UR-MCNC: ABNORMAL
LEUKOCYTE ESTERASE UR-ACNC: NEGATIVE — SIGNIFICANT CHANGE UP
LIDOCAIN IGE QN: 119 U/L — SIGNIFICANT CHANGE UP (ref 73–393)
LYMPHOCYTES # BLD AUTO: 1.38 K/UL — SIGNIFICANT CHANGE UP (ref 1–3.3)
LYMPHOCYTES # BLD AUTO: 11.1 % — LOW (ref 13–44)
MCHC RBC-ENTMCNC: 27.9 PG — SIGNIFICANT CHANGE UP (ref 27–34)
MCHC RBC-ENTMCNC: 33.3 GM/DL — SIGNIFICANT CHANGE UP (ref 32–36)
MCV RBC AUTO: 83.8 FL — SIGNIFICANT CHANGE UP (ref 80–100)
METHADONE UR-MCNC: NEGATIVE — SIGNIFICANT CHANGE UP
MONOCYTES # BLD AUTO: 0.44 K/UL — SIGNIFICANT CHANGE UP (ref 0–0.9)
MONOCYTES NFR BLD AUTO: 3.5 % — SIGNIFICANT CHANGE UP (ref 2–14)
NEUTROPHILS # BLD AUTO: 10.43 K/UL — HIGH (ref 1.8–7.4)
NEUTROPHILS NFR BLD AUTO: 83.7 % — HIGH (ref 43–77)
NITRITE UR-MCNC: NEGATIVE — SIGNIFICANT CHANGE UP
NRBC # BLD: 0 /100 WBCS — SIGNIFICANT CHANGE UP (ref 0–0)
OPIATES UR-MCNC: NEGATIVE — SIGNIFICANT CHANGE UP
PCP SPEC-MCNC: SIGNIFICANT CHANGE UP
PCP UR-MCNC: NEGATIVE — SIGNIFICANT CHANGE UP
PH UR: 8 — SIGNIFICANT CHANGE UP (ref 5–8)
PLATELET # BLD AUTO: 421 K/UL — HIGH (ref 150–400)
POTASSIUM SERPL-MCNC: 4.2 MMOL/L — SIGNIFICANT CHANGE UP (ref 3.5–5.3)
POTASSIUM SERPL-SCNC: 4.2 MMOL/L — SIGNIFICANT CHANGE UP (ref 3.5–5.3)
PROT SERPL-MCNC: 7.5 GM/DL — SIGNIFICANT CHANGE UP (ref 6–8.3)
PROT UR-MCNC: NEGATIVE MG/DL — SIGNIFICANT CHANGE UP
RBC # BLD: 4.69 M/UL — SIGNIFICANT CHANGE UP (ref 4.2–5.8)
RBC # FLD: 13.9 % — SIGNIFICANT CHANGE UP (ref 10.3–14.5)
SODIUM SERPL-SCNC: 141 MMOL/L — SIGNIFICANT CHANGE UP (ref 135–145)
SP GR SPEC: 1.01 — SIGNIFICANT CHANGE UP (ref 1.01–1.02)
THC UR QL: POSITIVE — SIGNIFICANT CHANGE UP
UROBILINOGEN FLD QL: NEGATIVE MG/DL — SIGNIFICANT CHANGE UP
WBC # BLD: 12.46 K/UL — HIGH (ref 3.8–10.5)
WBC # FLD AUTO: 12.46 K/UL — HIGH (ref 3.8–10.5)

## 2019-06-19 PROCEDURE — 99284 EMERGENCY DEPT VISIT MOD MDM: CPT

## 2019-06-19 RX ORDER — ONDANSETRON 8 MG/1
4 TABLET, FILM COATED ORAL ONCE
Refills: 0 | Status: COMPLETED | OUTPATIENT
Start: 2019-06-19 | End: 2019-06-19

## 2019-06-19 RX ORDER — HALOPERIDOL DECANOATE 100 MG/ML
5 INJECTION INTRAMUSCULAR ONCE
Refills: 0 | Status: COMPLETED | OUTPATIENT
Start: 2019-06-19 | End: 2019-06-19

## 2019-06-19 RX ORDER — SODIUM CHLORIDE 9 MG/ML
1000 INJECTION INTRAMUSCULAR; INTRAVENOUS; SUBCUTANEOUS ONCE
Refills: 0 | Status: COMPLETED | OUTPATIENT
Start: 2019-06-19 | End: 2019-06-19

## 2019-06-19 RX ORDER — TRAMADOL HYDROCHLORIDE 50 MG/1
1 TABLET ORAL
Qty: 6 | Refills: 0
Start: 2019-06-19 | End: 2019-06-20

## 2019-06-19 RX ORDER — LIDOCAINE 4 G/100G
5 CREAM TOPICAL
Qty: 30 | Refills: 0
Start: 2019-06-19 | End: 2019-06-20

## 2019-06-19 RX ORDER — HALOPERIDOL DECANOATE 100 MG/ML
5 INJECTION INTRAMUSCULAR ONCE
Refills: 0 | Status: DISCONTINUED | OUTPATIENT
Start: 2019-06-19 | End: 2019-06-19

## 2019-06-19 RX ADMIN — SODIUM CHLORIDE 1000 MILLILITER(S): 9 INJECTION INTRAMUSCULAR; INTRAVENOUS; SUBCUTANEOUS at 12:07

## 2019-06-19 RX ADMIN — HALOPERIDOL DECANOATE 5 MILLIGRAM(S): 100 INJECTION INTRAMUSCULAR at 15:45

## 2019-06-19 RX ADMIN — ONDANSETRON 4 MILLIGRAM(S): 8 TABLET, FILM COATED ORAL at 12:03

## 2019-06-19 RX ADMIN — HALOPERIDOL DECANOATE 5 MILLIGRAM(S): 100 INJECTION INTRAMUSCULAR at 12:03

## 2019-06-19 RX ADMIN — SODIUM CHLORIDE 1000 MILLILITER(S): 9 INJECTION INTRAMUSCULAR; INTRAVENOUS; SUBCUTANEOUS at 16:05

## 2019-06-19 RX ADMIN — Medication 30 MILLILITER(S): at 18:23

## 2019-06-19 NOTE — ED PROVIDER NOTE - CLINICAL SUMMARY MEDICAL DECISION MAKING FREE TEXT BOX
Ddx: Cannabinoid hyperemesis/ Gastritis/ no abdominal tenderness or guarding to suggest surgical abdomen. ro dka  Plan: cbc, cmp, lipase, ua, fluids, gi cocktail, haldol, reassess

## 2019-06-19 NOTE — ED PROVIDER NOTE - OBJECTIVE STATEMENT
Pt is a 39 yo gentleman with a pmhx of DM, gastritis, marijuana use who presents to the ED with nausea, and vomiting. Was seen in ED recently several days ago, CT negative. Same pain and vomiting. No fevers, no dysuria, no chest pian, no sob.

## 2019-06-19 NOTE — ED ADULT NURSE NOTE - OBJECTIVE STATEMENT
as per pt " at 10 this morning I began having abdominal pain 10/10 wit vomiting, nausea." hx type 1 diabetes, asthma, anxiety. Pt last meal baked chicken last night.  Pt state N/V pain, denies diarrhea.

## 2019-06-19 NOTE — ED ADULT TRIAGE NOTE - CHIEF COMPLAINT QUOTE
as per pt " at 10 this morning I began having abdominal pain 10/10 wit vomiting, nausea." hx type 1 diabetes, asthma, anxiety.

## 2019-06-19 NOTE — ED ADULT NURSE NOTE - NSIMPLEMENTINTERV_GEN_ALL_ED
Implemented All Universal Safety Interventions:  [place] to call system. Call bell, personal items and telephone within reach. Instruct patient to call for assistance. Room bathroom lighting operational. Non-slip footwear when patient is off stretcher. Physically safe environment: no spills, clutter or unnecessary equipment. Stretcher in lowest position, wheels locked, appropriate side rails in place.

## 2019-06-19 NOTE — ED ADULT NURSE REASSESSMENT NOTE - NS ED NURSE REASSESS COMMENT FT1
Pt request to go home, pt denies chest pain, dizziness headache blurred vision. Md ok with discharge BP. PT provided with follow ups to home pcp and joyddy for BP eval.

## 2019-06-19 NOTE — ED PROVIDER NOTE - PROGRESS NOTE DETAILS
Pt labs wnl. pt feeling better haldol and gi cocktail, pt has GI md for f/u. Pt counselled on marijuana cessation. Tolerating po, ok for d/c.

## 2019-06-20 LAB
CULTURE RESULTS: SIGNIFICANT CHANGE UP
SPECIMEN SOURCE: SIGNIFICANT CHANGE UP

## 2019-06-23 ENCOUNTER — INPATIENT (INPATIENT)
Facility: HOSPITAL | Age: 39
LOS: 2 days | Discharge: HOME | End: 2019-06-26
Attending: INTERNAL MEDICINE | Admitting: INTERNAL MEDICINE
Payer: MEDICAID

## 2019-06-23 VITALS
SYSTOLIC BLOOD PRESSURE: 161 MMHG | HEART RATE: 63 BPM | TEMPERATURE: 98 F | OXYGEN SATURATION: 98 % | DIASTOLIC BLOOD PRESSURE: 100 MMHG | HEIGHT: 69 IN | WEIGHT: 164.91 LBS | RESPIRATION RATE: 19 BRPM

## 2019-06-23 DIAGNOSIS — Z90.49 ACQUIRED ABSENCE OF OTHER SPECIFIED PARTS OF DIGESTIVE TRACT: Chronic | ICD-10-CM

## 2019-06-23 LAB
ALBUMIN SERPL ELPH-MCNC: 4.2 G/DL — SIGNIFICANT CHANGE UP (ref 3.3–5)
ALP SERPL-CCNC: 104 U/L — SIGNIFICANT CHANGE UP (ref 40–120)
ALT FLD-CCNC: 22 U/L — SIGNIFICANT CHANGE UP (ref 12–78)
AMYLASE P1 CFR SERPL: 98 U/L — SIGNIFICANT CHANGE UP (ref 25–115)
ANION GAP SERPL CALC-SCNC: 12 MMOL/L — SIGNIFICANT CHANGE UP (ref 5–17)
APTT BLD: 27.7 SEC — SIGNIFICANT CHANGE UP (ref 27.5–36.3)
AST SERPL-CCNC: 21 U/L — SIGNIFICANT CHANGE UP (ref 15–37)
BASOPHILS # BLD AUTO: 0.04 K/UL — SIGNIFICANT CHANGE UP (ref 0–0.2)
BASOPHILS NFR BLD AUTO: 0.4 % — SIGNIFICANT CHANGE UP (ref 0–2)
BILIRUB SERPL-MCNC: 0.7 MG/DL — SIGNIFICANT CHANGE UP (ref 0.2–1.2)
BUN SERPL-MCNC: 11 MG/DL — SIGNIFICANT CHANGE UP (ref 7–23)
CALCIUM SERPL-MCNC: 9.5 MG/DL — SIGNIFICANT CHANGE UP (ref 8.5–10.1)
CHLORIDE SERPL-SCNC: 96 MMOL/L — SIGNIFICANT CHANGE UP (ref 96–108)
CO2 SERPL-SCNC: 29 MMOL/L — SIGNIFICANT CHANGE UP (ref 22–31)
CREAT SERPL-MCNC: 1.11 MG/DL — SIGNIFICANT CHANGE UP (ref 0.5–1.3)
EOSINOPHIL # BLD AUTO: 0.02 K/UL — SIGNIFICANT CHANGE UP (ref 0–0.5)
EOSINOPHIL NFR BLD AUTO: 0.2 % — SIGNIFICANT CHANGE UP (ref 0–6)
GLUCOSE BLDC GLUCOMTR-MCNC: 345 MG/DL — HIGH (ref 70–99)
GLUCOSE BLDC GLUCOMTR-MCNC: 381 MG/DL — HIGH (ref 70–99)
GLUCOSE BLDC GLUCOMTR-MCNC: 387 MG/DL — HIGH (ref 70–99)
GLUCOSE SERPL-MCNC: 438 MG/DL — HIGH (ref 70–99)
HCT VFR BLD CALC: 42.9 % — SIGNIFICANT CHANGE UP (ref 39–50)
HGB BLD-MCNC: 14.4 G/DL — SIGNIFICANT CHANGE UP (ref 13–17)
IMM GRANULOCYTES NFR BLD AUTO: 0.1 % — SIGNIFICANT CHANGE UP (ref 0–1.5)
INR BLD: 0.97 RATIO — SIGNIFICANT CHANGE UP (ref 0.88–1.16)
LIDOCAIN IGE QN: 43 U/L — LOW (ref 73–393)
LYMPHOCYTES # BLD AUTO: 1.07 K/UL — SIGNIFICANT CHANGE UP (ref 1–3.3)
LYMPHOCYTES # BLD AUTO: 10.4 % — LOW (ref 13–44)
MCHC RBC-ENTMCNC: 27.8 PG — SIGNIFICANT CHANGE UP (ref 27–34)
MCHC RBC-ENTMCNC: 33.6 GM/DL — SIGNIFICANT CHANGE UP (ref 32–36)
MCV RBC AUTO: 82.8 FL — SIGNIFICANT CHANGE UP (ref 80–100)
MONOCYTES # BLD AUTO: 0.26 K/UL — SIGNIFICANT CHANGE UP (ref 0–0.9)
MONOCYTES NFR BLD AUTO: 2.5 % — SIGNIFICANT CHANGE UP (ref 2–14)
NEUTROPHILS # BLD AUTO: 8.91 K/UL — HIGH (ref 1.8–7.4)
NEUTROPHILS NFR BLD AUTO: 86.4 % — HIGH (ref 43–77)
NRBC # BLD: 0 /100 WBCS — SIGNIFICANT CHANGE UP (ref 0–0)
PLATELET # BLD AUTO: 417 K/UL — HIGH (ref 150–400)
POTASSIUM SERPL-MCNC: 4.5 MMOL/L — SIGNIFICANT CHANGE UP (ref 3.5–5.3)
POTASSIUM SERPL-SCNC: 4.5 MMOL/L — SIGNIFICANT CHANGE UP (ref 3.5–5.3)
PROT SERPL-MCNC: 8 GM/DL — SIGNIFICANT CHANGE UP (ref 6–8.3)
PROTHROM AB SERPL-ACNC: 10.9 SEC — SIGNIFICANT CHANGE UP (ref 10–12.9)
RBC # BLD: 5.18 M/UL — SIGNIFICANT CHANGE UP (ref 4.2–5.8)
RBC # FLD: 13.7 % — SIGNIFICANT CHANGE UP (ref 10.3–14.5)
SODIUM SERPL-SCNC: 137 MMOL/L — SIGNIFICANT CHANGE UP (ref 135–145)
WBC # BLD: 10.31 K/UL — SIGNIFICANT CHANGE UP (ref 3.8–10.5)
WBC # FLD AUTO: 10.31 K/UL — SIGNIFICANT CHANGE UP (ref 3.8–10.5)

## 2019-06-23 PROCEDURE — 99223 1ST HOSP IP/OBS HIGH 75: CPT

## 2019-06-23 PROCEDURE — 99285 EMERGENCY DEPT VISIT HI MDM: CPT

## 2019-06-23 PROCEDURE — 74177 CT ABD & PELVIS W/CONTRAST: CPT | Mod: 26

## 2019-06-23 RX ORDER — DEXTROSE 50 % IN WATER 50 %
25 SYRINGE (ML) INTRAVENOUS ONCE
Refills: 0 | Status: DISCONTINUED | OUTPATIENT
Start: 2019-06-23 | End: 2019-06-26

## 2019-06-23 RX ORDER — DEXTROSE 50 % IN WATER 50 %
15 SYRINGE (ML) INTRAVENOUS ONCE
Refills: 0 | Status: DISCONTINUED | OUTPATIENT
Start: 2019-06-23 | End: 2019-06-26

## 2019-06-23 RX ORDER — HYDROMORPHONE HYDROCHLORIDE 2 MG/ML
1 INJECTION INTRAMUSCULAR; INTRAVENOUS; SUBCUTANEOUS ONCE
Refills: 0 | Status: DISCONTINUED | OUTPATIENT
Start: 2019-06-23 | End: 2019-06-23

## 2019-06-23 RX ORDER — INSULIN LISPRO 100/ML
VIAL (ML) SUBCUTANEOUS
Refills: 0 | Status: DISCONTINUED | OUTPATIENT
Start: 2019-06-23 | End: 2019-06-26

## 2019-06-23 RX ORDER — INSULIN LISPRO 100/ML
VIAL (ML) SUBCUTANEOUS AT BEDTIME
Refills: 0 | Status: DISCONTINUED | OUTPATIENT
Start: 2019-06-23 | End: 2019-06-26

## 2019-06-23 RX ORDER — HYDROMORPHONE HYDROCHLORIDE 2 MG/ML
0.5 INJECTION INTRAMUSCULAR; INTRAVENOUS; SUBCUTANEOUS ONCE
Refills: 0 | Status: DISCONTINUED | OUTPATIENT
Start: 2019-06-23 | End: 2019-06-23

## 2019-06-23 RX ORDER — SODIUM CHLORIDE 9 MG/ML
1000 INJECTION INTRAMUSCULAR; INTRAVENOUS; SUBCUTANEOUS ONCE
Refills: 0 | Status: COMPLETED | OUTPATIENT
Start: 2019-06-23 | End: 2019-06-23

## 2019-06-23 RX ORDER — ONDANSETRON 8 MG/1
8 TABLET, FILM COATED ORAL ONCE
Refills: 0 | Status: COMPLETED | OUTPATIENT
Start: 2019-06-23 | End: 2019-06-23

## 2019-06-23 RX ORDER — IOHEXOL 300 MG/ML
30 INJECTION, SOLUTION INTRAVENOUS ONCE
Refills: 0 | Status: COMPLETED | OUTPATIENT
Start: 2019-06-23 | End: 2019-06-23

## 2019-06-23 RX ORDER — GLUCAGON INJECTION, SOLUTION 0.5 MG/.1ML
1 INJECTION, SOLUTION SUBCUTANEOUS ONCE
Refills: 0 | Status: DISCONTINUED | OUTPATIENT
Start: 2019-06-23 | End: 2019-06-26

## 2019-06-23 RX ORDER — DEXTROSE 50 % IN WATER 50 %
12.5 SYRINGE (ML) INTRAVENOUS ONCE
Refills: 0 | Status: DISCONTINUED | OUTPATIENT
Start: 2019-06-23 | End: 2019-06-26

## 2019-06-23 RX ORDER — INSULIN HUMAN 100 [IU]/ML
4 INJECTION, SOLUTION SUBCUTANEOUS ONCE
Refills: 0 | Status: COMPLETED | OUTPATIENT
Start: 2019-06-23 | End: 2019-06-23

## 2019-06-23 RX ORDER — PANTOPRAZOLE SODIUM 20 MG/1
40 TABLET, DELAYED RELEASE ORAL ONCE
Refills: 0 | Status: COMPLETED | OUTPATIENT
Start: 2019-06-23 | End: 2019-06-23

## 2019-06-23 RX ORDER — ONDANSETRON 8 MG/1
8 TABLET, FILM COATED ORAL EVERY 8 HOURS
Refills: 0 | Status: DISCONTINUED | OUTPATIENT
Start: 2019-06-23 | End: 2019-06-24

## 2019-06-23 RX ORDER — METOCLOPRAMIDE HCL 10 MG
10 TABLET ORAL ONCE
Refills: 0 | Status: COMPLETED | OUTPATIENT
Start: 2019-06-23 | End: 2019-06-23

## 2019-06-23 RX ORDER — SODIUM CHLORIDE 9 MG/ML
2000 INJECTION INTRAMUSCULAR; INTRAVENOUS; SUBCUTANEOUS ONCE
Refills: 0 | Status: COMPLETED | OUTPATIENT
Start: 2019-06-23 | End: 2019-06-23

## 2019-06-23 RX ORDER — SODIUM CHLORIDE 9 MG/ML
1000 INJECTION, SOLUTION INTRAVENOUS
Refills: 0 | Status: DISCONTINUED | OUTPATIENT
Start: 2019-06-23 | End: 2019-06-26

## 2019-06-23 RX ADMIN — IOHEXOL 30 MILLILITER(S): 300 INJECTION, SOLUTION INTRAVENOUS at 18:18

## 2019-06-23 RX ADMIN — INSULIN HUMAN 4 UNIT(S): 100 INJECTION, SOLUTION SUBCUTANEOUS at 22:42

## 2019-06-23 RX ADMIN — PANTOPRAZOLE SODIUM 40 MILLIGRAM(S): 20 TABLET, DELAYED RELEASE ORAL at 17:04

## 2019-06-23 RX ADMIN — SODIUM CHLORIDE 2000 MILLILITER(S): 9 INJECTION INTRAMUSCULAR; INTRAVENOUS; SUBCUTANEOUS at 17:58

## 2019-06-23 RX ADMIN — HYDROMORPHONE HYDROCHLORIDE 1 MILLIGRAM(S): 2 INJECTION INTRAMUSCULAR; INTRAVENOUS; SUBCUTANEOUS at 22:52

## 2019-06-23 RX ADMIN — SODIUM CHLORIDE 1000 MILLILITER(S): 9 INJECTION INTRAMUSCULAR; INTRAVENOUS; SUBCUTANEOUS at 19:54

## 2019-06-23 RX ADMIN — HYDROMORPHONE HYDROCHLORIDE 1 MILLIGRAM(S): 2 INJECTION INTRAMUSCULAR; INTRAVENOUS; SUBCUTANEOUS at 17:04

## 2019-06-23 RX ADMIN — Medication 10 MILLIGRAM(S): at 19:54

## 2019-06-23 RX ADMIN — HYDROMORPHONE HYDROCHLORIDE 1 MILLIGRAM(S): 2 INJECTION INTRAMUSCULAR; INTRAVENOUS; SUBCUTANEOUS at 19:53

## 2019-06-23 RX ADMIN — HYDROMORPHONE HYDROCHLORIDE 1 MILLIGRAM(S): 2 INJECTION INTRAMUSCULAR; INTRAVENOUS; SUBCUTANEOUS at 16:53

## 2019-06-23 RX ADMIN — ONDANSETRON 8 MILLIGRAM(S): 8 TABLET, FILM COATED ORAL at 16:54

## 2019-06-23 RX ADMIN — SODIUM CHLORIDE 2000 MILLILITER(S): 9 INJECTION INTRAMUSCULAR; INTRAVENOUS; SUBCUTANEOUS at 16:55

## 2019-06-23 RX ADMIN — INSULIN HUMAN 4 UNIT(S): 100 INJECTION, SOLUTION SUBCUTANEOUS at 20:26

## 2019-06-23 RX ADMIN — ONDANSETRON 8 MILLIGRAM(S): 8 TABLET, FILM COATED ORAL at 23:58

## 2019-06-23 NOTE — ED PROVIDER NOTE - NONTENDER LOCATION
periumbilical/left costovertebral angle/suprapubic/left upper quadrant/right upper quadrant/right lower quadrant/umbilical/right costovertebral angle/left lower quadrant

## 2019-06-23 NOTE — H&P ADULT - PROBLEM SELECTOR PLAN 3
Lantus 15 units AM, 13 units PM SC  Lispro 11 units TID before meals  Sliding scale with coverage  CHO diet.

## 2019-06-23 NOTE — ED PROVIDER NOTE - PROGRESS NOTE DETAILS
pt endorsed by dr hartmann pending CT. CT with possible gastritis. Lab values do not require emergent intervention. pt just given po challenge but vomited in ER, abd pain improved. dw dr lipscomb for admission.

## 2019-06-23 NOTE — ED ADULT NURSE NOTE - NSIMPLEMENTINTERV_GEN_ALL_ED
Implemented All Universal Safety Interventions:  [place] to call system. Call bell, personal items and telephone within reach. Instruct patient to call for assistance. Room bathroom lighting operational. Non-slip footwear when patient is off stretcher. Physically safe environment: no spills, clutter or unnecessary equipment. Stretcher in lowest position, wheels locked, appropriate side rails in place.

## 2019-06-23 NOTE — H&P ADULT - PROBLEM SELECTOR PLAN 1
Symptomatic treatment with Reglan, fluids, advance diet as tolerated  PPI IV q12h.  Analgesia PRN  Suspect some drug-seeking behavior on the part of the patient as he specifically asks for Dilaudid and nothing else.

## 2019-06-23 NOTE — H&P ADULT - ASSESSMENT
38 year old man with PMH of anxiety, asthma, DM1, gastritis, gastroparesis, multiple admissions for gastritis presents to ED with complaint of nausea, vomiting x 1 day.  Patient will require admission for at least 2 midnights as detailed below:    IMPROVE VTE Individual Risk Assessment          RISK                                                          Points    [  ] Previous VTE                                                3    [  ] Thrombophilia                                             2    [  ] Lower limb paralysis                                    2        (unable to hold up >15 seconds)      [  ] Current Cancer                                             2         (within 6 months)    [ x ] Immobilization > 24 hrs                              1    [  ] ICU/CCU stay > 24 hours                            1    [  ] Age > 60                                                    1    IMPROVE VTE Score ____1_____

## 2019-06-23 NOTE — H&P ADULT - NSHPPHYSICALEXAM_GEN_ALL_CORE
GENERAL: Distress due to N/V, well-groomed, well-developed  HEAD:  Atraumatic, Normocephalic  EYES: EOMI, PERRLA, conjunctiva and sclera clear  ENMT: No tonsillar erythema, exudates, or enlargement; Moist mucous membranes, No lesions  NECK: Supple, No JVD, Normal thyroid  NERVOUS SYSTEM:  Alert & Oriented X3, Good concentration  CHEST/LUNG: Clear to percussion bilaterally; No rales, rhonchi, wheezing, or rubs  HEART: Regular rate and rhythm; No murmurs, rubs, or gallops  ABDOMEN: Soft, +tender, Nondistended; Bowel sounds present  EXTREMITIES: no clubbing, cyanosis, or edema  SKIN: tattoos  PSYCH: agitated

## 2019-06-23 NOTE — H&P ADULT - HISTORY OF PRESENT ILLNESS
Pt to be admitted for N/V, in progress. 38 year old man with PMH of anxiety, asthma, DM1, gastritis, gastroparesis, multiple admissions for gastritis presents to ED with complaint of nausea, vomiting x 1 day.  Patient states this pain is typical of his chronic symptoms.  He denies fever, chills, bloody stool, recent travel, sick contacts.    In the ED, labs show hyperglycemia,  CT ab read as gastritis.

## 2019-06-23 NOTE — ED PROVIDER NOTE - CONSTITUTIONAL, MLM
normal... Well appearing, well nourished, awake, alert, oriented to person, place, time/situation and in no apparent distress. + vomiting yellow nonbloody vomitus

## 2019-06-23 NOTE — H&P ADULT - NSHPLABSRESULTS_GEN_ALL_CORE
Vital Signs Last 24 Hrs  T(C): 37.1 (24 Jun 2019 00:35), Max: 37.2 (23 Jun 2019 21:07)  T(F): 98.7 (24 Jun 2019 00:35), Max: 98.9 (23 Jun 2019 21:07)  HR: 83 (24 Jun 2019 00:35) (63 - 83)  BP: 142/73 (24 Jun 2019 00:35) (113/53 - 161/100)  BP(mean): --  RR: 20 (24 Jun 2019 00:35) (18 - 20)  SpO2: 96% (24 Jun 2019 00:35) (96% - 98%)          LABS:                        14.4   10.31 )-----------( 417      ( 23 Jun 2019 16:48 )             42.9     06-23    137  |  96  |  11  ----------------------------<  438<H>  4.5   |  29  |  1.11    Ca    9.5      23 Jun 2019 16:48    TPro  8.0  /  Alb  4.2  /  TBili  0.7  /  DBili  x   /  AST  21  /  ALT  22  /  AlkPhos  104  06-23    PT/INR - ( 23 Jun 2019 16:48 )   PT: 10.9 sec;   INR: 0.97 ratio         PTT - ( 23 Jun 2019 16:48 )  PTT:27.7 sec      RADIOLOGY & ADDITIONAL STUDIES:    CT ab/plv:  IMPRESSION:   Diffuse wall thickening of the stomach suggestive of a nonspecific   gastritis,   versus artifact related to underdistention. Correlate clinically.

## 2019-06-23 NOTE — ED PROVIDER NOTE - CADM POA PRESS ULCER
"NOLANETS:  [facility]  A collaboration between [facility] and [facility]    PATIENT: Reema Alvarez  MRN: [id number]  DATE: 12/5/2017    Vitals:    12/05/17 1216   BP: 132/77   Pulse: 69   Temp: 98 °F (36.7 °C)   TempSrc: Oral   Weight: 45.1 kg (99 lb 8.6 oz)   Height: 5' 1" (1.549 m)      Last 2 Weight Measurements:   Wt Readings from Last 2 Encounters:   12/05/17 45.1 kg (99 lb 8.6 oz)   05/23/17 46.7 kg (103 lb)     BMI: Body mass index is 18.81 kg/m².    Karnofsky Score    Karnofsky Score:  90% - Able to Carry on Normal Activity: Minor Symptoms of Disease       Diagnosis:   1. Malignant carcinoid tumor of ileum    2. Secondary neuroendocrine tumor of distant lymph nodes    3. Secondary neuroendocrine tumors    4. Secondary neuroendocrine tumor of liver    5. Secondary malignant carcinoid tumor of pancreas      Interval History: Ms. 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    Past Medical History:   Diagnosis Date    Anemia     Arthritis     panic attacks    Back pain     Bloating     gas    Chemotherapy follow-up examination 4/28/2015    Cap/Tem    COPD (chronic obstructive pulmonary disease)     Diarrhea     Difficulty hearing     Flushing     Hemorrhoid     Hypertension     Malignant carcinoid tumor of the ileum 10/2007    metastasis to liver, ovary, fallopian tubes, lymph nodes,appendix    Poor vision     Secondary neuroendocrine tumor of liver(209.72) 04/16/2013    Secondary neuroendocrine tumor of other sites 05/07/2014    Shortness of breath     Ureteral obstruction        Past Surgical History:   Procedure Laterality Date    BREAST SURGERY      cyst excision    CHOLECYSTECTOMY  6/2012    Colon r/s, SBR, Bilat salpingo-ooph, liver bx  10//07    [facility]    Diag lap, ly adhes  08/09/2016    Dr. ALISTAIR Webber    Ex " lap, hernina repair,ex med lidia, bi uret, dis mes, ex rect, liver bx, ex r iliac  07/14/2016    Dr. ALISTAIR Zartae-OMCK    hernia with mesh  2008    HYSTERECTOMY  1970    TONSILLECTOMY      y-90 microspheres  1/2012    Dr. Mckoy       Review of patient's allergies indicates:   Allergen Reactions    Epinephrine Other (See Comments)     Carcinoid patient  Instructed per oncologist  Carcinoid patient    Sulfa (sulfonamide antibiotics) Rash       Current Outpatient Prescriptions   Medication Sig Dispense Refill    albuterol 90 mcg/actuation inhaler Inhale 2 puffs into the lungs.      alprazolam (XANAX) 0.5 MG tablet 0.5 mg as needed.       ascorbic acid (VITAMIN C) 500 MG tablet Take 500 mg by mouth once daily.      budesonide-formoterol 160-4.5 mcg (SYMBICORT) 160-4.5 mcg/actuation HFAA Inhale 2 puffs into the lungs 2 (two) times daily. Pt takes 2 puffs in AM/ 2 puffs in the PM      calcium carbonate (OS-DINO) 600 mg (1,500 mg) Tab Take 600 mg by mouth 2 times daily 2 hours after meal.      cranberry 400 mg Cap Take by mouth once daily.      ferrous sulfate 325 (65 FE) MG EC tablet Take 325 mg by mouth once daily.      LACTOBACILLUS COMBO NO.6 (PROBIOTIC COMPLEX ORAL) Take by mouth once daily.      LANREOTIDE ACETATE (LANREOTIDE SUBQ) Inject into the skin every 30 days.      mv with min-lycopene-lutein (CENTRUM SILVER) 0.4-300-250 mg-mcg-mcg Tab Take 1 tablet by mouth once daily.      PROAIR HFA 90 mcg/actuation inhaler every 4 (four) hours as needed.       TETRAHYDROZOLINE HCL/ZINC SULF (EYE DROPS OPHT) Apply to eye. Pt is using the brand oasis eye drops       No current facility-administered medications for this visit.        Review of Systems     Number of Days per Week Number per Day   DIARRHEA occasional    BRISTOL STOOL SCALE RATING     FLUSHING occasional    WHEEZING 0      WEIGHT GAIN/LOSS Stable at about 100 lbs   ENERGY RATING (0-10) 10      Physical Exam deferred.     Pathology Data:   no new  tissue    Laboratory Data:  Neuroendocrine Labs Latest Ref Rng & Units 10/31/2017 8/17/2017   EXT 5 HIAA BLOOD 0 - 22 ng/ml 365 (A) 146 (A)   EXT GASTRIN 0 - 100 pg/ml     EXT SEROTONIN 56 - 244 ng/ml 1,451 (A) 536 (A)   EXT CHROMOGRANIN A 0 - 15 ng/ml     EXT PANCREASTATIN JAROD 10 - 135 pg/ml 456 (A) 371 (A)   EXT NEUROKININ A JAROD 0 - 40 pg/ml <10 10   EXT OCTREOTIDE LEVEL pg/ml     EXT LANREOTIDE LEVEL pg/ml  8,372     Neuroendocrine Labs Latest Ref Rng & Units 5/23/2017 5/23/2017   EXT 5 HIAA BLOOD 0 - 22 ng/ml     EXT GASTRIN 0 - 100 pg/ml     EXT SEROTONIN 56 - 244 ng/ml     EXT CHROMOGRANIN A 0 - 15 ng/ml     EXT PANCREASTATIN JAROD 10 - 135 pg/ml     EXT NEUROKININ A JAROD 0 - 40 pg/ml     EXT OCTREOTIDE LEVEL pg/ml     EXT LANREOTIDE LEVEL pg/ml       Neuroendocrine Labs Latest Ref Rng & Units 5/1/2017 2/21/2017   EXT 5 HIAA BLOOD 0 - 22 ng/ml 108 (A) 95 (A)   EXT GASTRIN 0 - 100 pg/ml     EXT SEROTONIN 56 - 244 ng/ml 1,321 (A) 1,407 (A)   EXT CHROMOGRANIN A 0 - 15 ng/ml     EXT PANCREASTATIN JAROD 10 - 135 pg/ml 335 (A) 424 (A)   EXT NEUROKININ A JAROD 0 - 40 pg/ml 16 <10   EXT OCTREOTIDE LEVEL pg/ml     EXT LANREOTIDE LEVEL pg/ml 3,319          Radiology Data: MRI and CT results from scan this am         Impression:  1.  stable weight and symptoms       Plan: get ct and mri scan results   need report from echocardiogram in  at [facility]       Labs: Markers: 3 month intervals            Other: 12 month intervals    Scans: 6 month intervals    Return to Clinic:6 month intervals    Orders placed this visit:   Orders Placed This Encounter   Procedures    CT Abdomen Pelvis With Contrast    MRI Abdomen W WO Contrast    5-HIAA Plasma (Neuoendocrine)    Serotonin serum    Pancreastatin    Neurokinin A    Substance P    Lanreotide Drug Levels    CBC auto differential    Comprehensive metabolic panel        25 Minutes Face-to-Face; Counseling/Coordination of Care > 50 percent of Visit     Vidal MA  MD Obdulio, FACS  [employer] Professor of Surgery, [facility]  [address]  [place]  [address]  Cell [phone]  ph. [phone]; [phone]  fax. [phone]  [email or web address]   No

## 2019-06-24 DIAGNOSIS — F41.9 ANXIETY DISORDER, UNSPECIFIED: ICD-10-CM

## 2019-06-24 DIAGNOSIS — Z29.9 ENCOUNTER FOR PROPHYLACTIC MEASURES, UNSPECIFIED: ICD-10-CM

## 2019-06-24 DIAGNOSIS — K29.50 UNSPECIFIED CHRONIC GASTRITIS WITHOUT BLEEDING: ICD-10-CM

## 2019-06-24 DIAGNOSIS — E10.8 TYPE 1 DIABETES MELLITUS WITH UNSPECIFIED COMPLICATIONS: ICD-10-CM

## 2019-06-24 DIAGNOSIS — E11.43 TYPE 2 DIABETES MELLITUS WITH DIABETIC AUTONOMIC (POLY)NEUROPATHY: ICD-10-CM

## 2019-06-24 DIAGNOSIS — J45.20 MILD INTERMITTENT ASTHMA, UNCOMPLICATED: ICD-10-CM

## 2019-06-24 LAB
ALBUMIN SERPL ELPH-MCNC: 3.1 G/DL — LOW (ref 3.3–5)
ALP SERPL-CCNC: 82 U/L — SIGNIFICANT CHANGE UP (ref 40–120)
ALT FLD-CCNC: 18 U/L — SIGNIFICANT CHANGE UP (ref 12–78)
ANION GAP SERPL CALC-SCNC: 10 MMOL/L — SIGNIFICANT CHANGE UP (ref 5–17)
AST SERPL-CCNC: 11 U/L — LOW (ref 15–37)
BASOPHILS # BLD AUTO: 0.05 K/UL — SIGNIFICANT CHANGE UP (ref 0–0.2)
BASOPHILS NFR BLD AUTO: 0.5 % — SIGNIFICANT CHANGE UP (ref 0–2)
BILIRUB SERPL-MCNC: 0.6 MG/DL — SIGNIFICANT CHANGE UP (ref 0.2–1.2)
BUN SERPL-MCNC: 13 MG/DL — SIGNIFICANT CHANGE UP (ref 7–23)
CALCIUM SERPL-MCNC: 8.6 MG/DL — SIGNIFICANT CHANGE UP (ref 8.5–10.1)
CHLORIDE SERPL-SCNC: 102 MMOL/L — SIGNIFICANT CHANGE UP (ref 96–108)
CO2 SERPL-SCNC: 25 MMOL/L — SIGNIFICANT CHANGE UP (ref 22–31)
CREAT SERPL-MCNC: 1.02 MG/DL — SIGNIFICANT CHANGE UP (ref 0.5–1.3)
EOSINOPHIL # BLD AUTO: 0.03 K/UL — SIGNIFICANT CHANGE UP (ref 0–0.5)
EOSINOPHIL NFR BLD AUTO: 0.3 % — SIGNIFICANT CHANGE UP (ref 0–6)
GLUCOSE BLDC GLUCOMTR-MCNC: 154 MG/DL — HIGH (ref 70–99)
GLUCOSE BLDC GLUCOMTR-MCNC: 175 MG/DL — HIGH (ref 70–99)
GLUCOSE BLDC GLUCOMTR-MCNC: 175 MG/DL — HIGH (ref 70–99)
GLUCOSE BLDC GLUCOMTR-MCNC: 311 MG/DL — HIGH (ref 70–99)
GLUCOSE BLDC GLUCOMTR-MCNC: 318 MG/DL — HIGH (ref 70–99)
GLUCOSE SERPL-MCNC: 312 MG/DL — HIGH (ref 70–99)
HCT VFR BLD CALC: 34.5 % — LOW (ref 39–50)
HGB BLD-MCNC: 11.6 G/DL — LOW (ref 13–17)
IMM GRANULOCYTES NFR BLD AUTO: 0.4 % — SIGNIFICANT CHANGE UP (ref 0–1.5)
LYMPHOCYTES # BLD AUTO: 2.89 K/UL — SIGNIFICANT CHANGE UP (ref 1–3.3)
LYMPHOCYTES # BLD AUTO: 28.4 % — SIGNIFICANT CHANGE UP (ref 13–44)
MAGNESIUM SERPL-MCNC: 2.4 MG/DL — SIGNIFICANT CHANGE UP (ref 1.6–2.6)
MCHC RBC-ENTMCNC: 28.2 PG — SIGNIFICANT CHANGE UP (ref 27–34)
MCHC RBC-ENTMCNC: 33.6 GM/DL — SIGNIFICANT CHANGE UP (ref 32–36)
MCV RBC AUTO: 83.7 FL — SIGNIFICANT CHANGE UP (ref 80–100)
MONOCYTES # BLD AUTO: 0.77 K/UL — SIGNIFICANT CHANGE UP (ref 0–0.9)
MONOCYTES NFR BLD AUTO: 7.6 % — SIGNIFICANT CHANGE UP (ref 2–14)
NEUTROPHILS # BLD AUTO: 6.39 K/UL — SIGNIFICANT CHANGE UP (ref 1.8–7.4)
NEUTROPHILS NFR BLD AUTO: 62.8 % — SIGNIFICANT CHANGE UP (ref 43–77)
NRBC # BLD: 0 /100 WBCS — SIGNIFICANT CHANGE UP (ref 0–0)
PHOSPHATE SERPL-MCNC: 2.9 MG/DL — SIGNIFICANT CHANGE UP (ref 2.5–4.5)
PLATELET # BLD AUTO: 315 K/UL — SIGNIFICANT CHANGE UP (ref 150–400)
POTASSIUM SERPL-MCNC: 4.1 MMOL/L — SIGNIFICANT CHANGE UP (ref 3.5–5.3)
POTASSIUM SERPL-SCNC: 4.1 MMOL/L — SIGNIFICANT CHANGE UP (ref 3.5–5.3)
PROT SERPL-MCNC: 6.1 GM/DL — SIGNIFICANT CHANGE UP (ref 6–8.3)
RBC # BLD: 4.12 M/UL — LOW (ref 4.2–5.8)
RBC # FLD: 14 % — SIGNIFICANT CHANGE UP (ref 10.3–14.5)
SODIUM SERPL-SCNC: 137 MMOL/L — SIGNIFICANT CHANGE UP (ref 135–145)
WBC # BLD: 10.17 K/UL — SIGNIFICANT CHANGE UP (ref 3.8–10.5)
WBC # FLD AUTO: 10.17 K/UL — SIGNIFICANT CHANGE UP (ref 3.8–10.5)

## 2019-06-24 PROCEDURE — 99233 SBSQ HOSP IP/OBS HIGH 50: CPT

## 2019-06-24 RX ORDER — ONDANSETRON 8 MG/1
4 TABLET, FILM COATED ORAL EVERY 6 HOURS
Refills: 0 | Status: DISCONTINUED | OUTPATIENT
Start: 2019-06-24 | End: 2019-06-26

## 2019-06-24 RX ORDER — SUCRALFATE 1 G
1 TABLET ORAL
Refills: 0 | Status: DISCONTINUED | OUTPATIENT
Start: 2019-06-24 | End: 2019-06-26

## 2019-06-24 RX ORDER — INSULIN GLARGINE 100 [IU]/ML
10 INJECTION, SOLUTION SUBCUTANEOUS EVERY MORNING
Refills: 0 | Status: DISCONTINUED | OUTPATIENT
Start: 2019-06-24 | End: 2019-06-26

## 2019-06-24 RX ORDER — INSULIN GLARGINE 100 [IU]/ML
10 INJECTION, SOLUTION SUBCUTANEOUS AT BEDTIME
Refills: 0 | Status: DISCONTINUED | OUTPATIENT
Start: 2019-06-24 | End: 2019-06-26

## 2019-06-24 RX ORDER — MORPHINE SULFATE 50 MG/1
1 CAPSULE, EXTENDED RELEASE ORAL EVERY 6 HOURS
Refills: 0 | Status: DISCONTINUED | OUTPATIENT
Start: 2019-06-24 | End: 2019-06-24

## 2019-06-24 RX ORDER — INSULIN LISPRO 100/ML
11 VIAL (ML) SUBCUTANEOUS
Refills: 0 | Status: DISCONTINUED | OUTPATIENT
Start: 2019-06-24 | End: 2019-06-24

## 2019-06-24 RX ORDER — PANTOPRAZOLE SODIUM 20 MG/1
40 TABLET, DELAYED RELEASE ORAL EVERY 12 HOURS
Refills: 0 | Status: DISCONTINUED | OUTPATIENT
Start: 2019-06-24 | End: 2019-06-24

## 2019-06-24 RX ORDER — ALBUTEROL 90 UG/1
2 AEROSOL, METERED ORAL EVERY 6 HOURS
Refills: 0 | Status: DISCONTINUED | OUTPATIENT
Start: 2019-06-24 | End: 2019-06-26

## 2019-06-24 RX ORDER — OXYCODONE AND ACETAMINOPHEN 5; 325 MG/1; MG/1
1 TABLET ORAL EVERY 6 HOURS
Refills: 0 | Status: DISCONTINUED | OUTPATIENT
Start: 2019-06-24 | End: 2019-06-24

## 2019-06-24 RX ORDER — INSULIN GLARGINE 100 [IU]/ML
13 INJECTION, SOLUTION SUBCUTANEOUS AT BEDTIME
Refills: 0 | Status: DISCONTINUED | OUTPATIENT
Start: 2019-06-24 | End: 2019-06-24

## 2019-06-24 RX ORDER — INSULIN GLARGINE 100 [IU]/ML
15 INJECTION, SOLUTION SUBCUTANEOUS EVERY MORNING
Refills: 0 | Status: DISCONTINUED | OUTPATIENT
Start: 2019-06-24 | End: 2019-06-24

## 2019-06-24 RX ORDER — CLONAZEPAM 1 MG
0.5 TABLET ORAL
Refills: 0 | Status: DISCONTINUED | OUTPATIENT
Start: 2019-06-24 | End: 2019-06-26

## 2019-06-24 RX ORDER — MORPHINE SULFATE 50 MG/1
1 CAPSULE, EXTENDED RELEASE ORAL ONCE
Refills: 0 | Status: DISCONTINUED | OUTPATIENT
Start: 2019-06-24 | End: 2019-06-24

## 2019-06-24 RX ORDER — METOCLOPRAMIDE HCL 10 MG
10 TABLET ORAL EVERY 8 HOURS
Refills: 0 | Status: DISCONTINUED | OUTPATIENT
Start: 2019-06-24 | End: 2019-06-25

## 2019-06-24 RX ORDER — INSULIN LISPRO 100/ML
8 VIAL (ML) SUBCUTANEOUS
Refills: 0 | Status: DISCONTINUED | OUTPATIENT
Start: 2019-06-24 | End: 2019-06-26

## 2019-06-24 RX ORDER — SODIUM CHLORIDE 9 MG/ML
1000 INJECTION INTRAMUSCULAR; INTRAVENOUS; SUBCUTANEOUS
Refills: 0 | Status: DISCONTINUED | OUTPATIENT
Start: 2019-06-24 | End: 2019-06-25

## 2019-06-24 RX ORDER — MORPHINE SULFATE 50 MG/1
2 CAPSULE, EXTENDED RELEASE ORAL EVERY 4 HOURS
Refills: 0 | Status: DISCONTINUED | OUTPATIENT
Start: 2019-06-24 | End: 2019-06-26

## 2019-06-24 RX ORDER — PANTOPRAZOLE SODIUM 20 MG/1
40 TABLET, DELAYED RELEASE ORAL
Refills: 0 | Status: DISCONTINUED | OUTPATIENT
Start: 2019-06-24 | End: 2019-06-26

## 2019-06-24 RX ORDER — ACETAMINOPHEN 500 MG
650 TABLET ORAL EVERY 6 HOURS
Refills: 0 | Status: DISCONTINUED | OUTPATIENT
Start: 2019-06-24 | End: 2019-06-26

## 2019-06-24 RX ADMIN — INSULIN GLARGINE 15 UNIT(S): 100 INJECTION, SOLUTION SUBCUTANEOUS at 07:41

## 2019-06-24 RX ADMIN — INSULIN GLARGINE 10 UNIT(S): 100 INJECTION, SOLUTION SUBCUTANEOUS at 21:39

## 2019-06-24 RX ADMIN — MORPHINE SULFATE 1 MILLIGRAM(S): 50 CAPSULE, EXTENDED RELEASE ORAL at 07:56

## 2019-06-24 RX ADMIN — Medication 1 GRAM(S): at 11:27

## 2019-06-24 RX ADMIN — OXYCODONE AND ACETAMINOPHEN 1 TABLET(S): 5; 325 TABLET ORAL at 02:12

## 2019-06-24 RX ADMIN — MORPHINE SULFATE 1 MILLIGRAM(S): 50 CAPSULE, EXTENDED RELEASE ORAL at 04:30

## 2019-06-24 RX ADMIN — MORPHINE SULFATE 1 MILLIGRAM(S): 50 CAPSULE, EXTENDED RELEASE ORAL at 14:24

## 2019-06-24 RX ADMIN — MORPHINE SULFATE 2 MILLIGRAM(S): 50 CAPSULE, EXTENDED RELEASE ORAL at 17:48

## 2019-06-24 RX ADMIN — Medication 1 GRAM(S): at 17:05

## 2019-06-24 RX ADMIN — MORPHINE SULFATE 2 MILLIGRAM(S): 50 CAPSULE, EXTENDED RELEASE ORAL at 21:55

## 2019-06-24 RX ADMIN — MORPHINE SULFATE 1 MILLIGRAM(S): 50 CAPSULE, EXTENDED RELEASE ORAL at 04:08

## 2019-06-24 RX ADMIN — Medication 11 UNIT(S): at 11:27

## 2019-06-24 RX ADMIN — PANTOPRAZOLE SODIUM 40 MILLIGRAM(S): 20 TABLET, DELAYED RELEASE ORAL at 06:08

## 2019-06-24 RX ADMIN — Medication 2: at 00:24

## 2019-06-24 RX ADMIN — Medication 0.5 MILLIGRAM(S): at 22:45

## 2019-06-24 RX ADMIN — Medication 2: at 16:19

## 2019-06-24 RX ADMIN — SODIUM CHLORIDE 100 MILLILITER(S): 9 INJECTION INTRAMUSCULAR; INTRAVENOUS; SUBCUTANEOUS at 17:49

## 2019-06-24 RX ADMIN — MORPHINE SULFATE 1 MILLIGRAM(S): 50 CAPSULE, EXTENDED RELEASE ORAL at 01:15

## 2019-06-24 RX ADMIN — Medication 1 GRAM(S): at 06:08

## 2019-06-24 RX ADMIN — OXYCODONE AND ACETAMINOPHEN 1 TABLET(S): 5; 325 TABLET ORAL at 01:12

## 2019-06-24 RX ADMIN — SODIUM CHLORIDE 100 MILLILITER(S): 9 INJECTION INTRAMUSCULAR; INTRAVENOUS; SUBCUTANEOUS at 07:41

## 2019-06-24 RX ADMIN — Medication 11 UNIT(S): at 07:40

## 2019-06-24 RX ADMIN — MORPHINE SULFATE 1 MILLIGRAM(S): 50 CAPSULE, EXTENDED RELEASE ORAL at 14:09

## 2019-06-24 RX ADMIN — Medication 8: at 07:40

## 2019-06-24 RX ADMIN — Medication 10 MILLIGRAM(S): at 04:08

## 2019-06-24 RX ADMIN — SODIUM CHLORIDE 100 MILLILITER(S): 9 INJECTION INTRAMUSCULAR; INTRAVENOUS; SUBCUTANEOUS at 01:13

## 2019-06-24 RX ADMIN — Medication 1 GRAM(S): at 23:47

## 2019-06-24 RX ADMIN — Medication 8 UNIT(S): at 16:20

## 2019-06-24 RX ADMIN — PANTOPRAZOLE SODIUM 40 MILLIGRAM(S): 20 TABLET, DELAYED RELEASE ORAL at 17:07

## 2019-06-24 RX ADMIN — MORPHINE SULFATE 2 MILLIGRAM(S): 50 CAPSULE, EXTENDED RELEASE ORAL at 18:03

## 2019-06-24 RX ADMIN — MORPHINE SULFATE 1 MILLIGRAM(S): 50 CAPSULE, EXTENDED RELEASE ORAL at 07:41

## 2019-06-24 RX ADMIN — Medication 2: at 11:27

## 2019-06-24 RX ADMIN — MORPHINE SULFATE 1 MILLIGRAM(S): 50 CAPSULE, EXTENDED RELEASE ORAL at 00:52

## 2019-06-24 RX ADMIN — MORPHINE SULFATE 2 MILLIGRAM(S): 50 CAPSULE, EXTENDED RELEASE ORAL at 21:40

## 2019-06-24 NOTE — PROGRESS NOTE ADULT - PROBLEM SELECTOR PLAN 3
Lantus 15 units AM, 13 units PM SC  Lispro 11 units TID before meals  Sliding scale with coverage  CHO diet. Lantus 10 units AM, 10 units PM SC  Lispro 8 units TID before meals  Sliding scale with coverage  CHO diet.

## 2019-06-24 NOTE — PROGRESS NOTE ADULT - PROBLEM SELECTOR PLAN 1
Symptomatic treatment with Reglan, fluids, advance diet as tolerated  PPI IV q12h. Analgesia PRN.  Suspect some drug-seeking behavior on the part of the patient as he specifically asks for Dilaudid and nothing else.

## 2019-06-24 NOTE — PROGRESS NOTE ADULT - SUBJECTIVE AND OBJECTIVE BOX
INTERVAL HPI/OVERNIGHT EVENTS:  Pt seen and examined at bedside.     Allergies/Intolerance: No Known Allergies      MEDICATIONS  (STANDING):  dextrose 5%. 1000 milliLiter(s) (50 mL/Hr) IV Continuous <Continuous>  dextrose 50% Injectable 12.5 Gram(s) IV Push once  dextrose 50% Injectable 25 Gram(s) IV Push once  dextrose 50% Injectable 25 Gram(s) IV Push once  insulin glargine Injectable (LANTUS) 15 Unit(s) SubCutaneous every morning  insulin glargine Injectable (LANTUS) 13 Unit(s) SubCutaneous at bedtime  insulin lispro (HumaLOG) corrective regimen sliding scale   SubCutaneous three times a day before meals  insulin lispro (HumaLOG) corrective regimen sliding scale   SubCutaneous at bedtime  insulin lispro Injectable (HumaLOG) 11 Unit(s) SubCutaneous three times a day before meals  pantoprazole  Injectable 40 milliGRAM(s) IV Push every 12 hours  sodium chloride 0.9%. 1000 milliLiter(s) (100 mL/Hr) IV Continuous <Continuous>  sucralfate 1 Gram(s) Oral four times a day    MEDICATIONS  (PRN):  acetaminophen   Tablet .. 650 milliGRAM(s) Oral every 6 hours PRN Temp greater or equal to 38C (100.4F), Mild Pain (1 - 3)  ALBUTerol    90 MICROgram(s) HFA Inhaler 2 Puff(s) Inhalation every 6 hours PRN Shortness of Breath and/or Wheezing  clonazePAM  Tablet 0.5 milliGRAM(s) Oral two times a day PRN anxiety  dextrose 40% Gel 15 Gram(s) Oral once PRN Blood Glucose LESS THAN 70 milliGRAM(s)/deciliter  glucagon  Injectable 1 milliGRAM(s) IntraMuscular once PRN Glucose LESS THAN 70 milligrams/deciliter  metoclopramide Injectable 10 milliGRAM(s) IV Push every 8 hours PRN Nausea/vomiting Breakthrough  morphine  - Injectable 1 milliGRAM(s) IV Push every 6 hours PRN Severe Pain (7 - 10)  ondansetron Injectable 8 milliGRAM(s) IV Push every 8 hours PRN Nausea and/or Vomiting  oxyCODONE    5 mG/acetaminophen 325 mG 1 Tablet(s) Oral every 6 hours PRN Moderate Pain (4 - 6)        ROS: all systems reviewed and wnl      PHYSICAL EXAMINATION:  Vital Signs Last 24 Hrs  T(C): 36.8 (24 Jun 2019 05:27), Max: 37.2 (23 Jun 2019 21:07)  T(F): 98.3 (24 Jun 2019 05:27), Max: 98.9 (23 Jun 2019 21:07)  HR: 68 (24 Jun 2019 05:27) (63 - 83)  BP: 101/61 (24 Jun 2019 05:27) (101/61 - 161/100)  BP(mean): --  RR: 18 (24 Jun 2019 05:27) (18 - 20)  SpO2: 96% (24 Jun 2019 05:27) (96% - 98%)  CAPILLARY BLOOD GLUCOSE      POCT Blood Glucose.: 175 mg/dL (24 Jun 2019 10:43)  POCT Blood Glucose.: 318 mg/dL (24 Jun 2019 07:27)  POCT Blood Glucose.: 311 mg/dL (24 Jun 2019 00:18)  POCT Blood Glucose.: 345 mg/dL (23 Jun 2019 22:37)  POCT Blood Glucose.: 381 mg/dL (23 Jun 2019 21:27)  POCT Blood Glucose.: 387 mg/dL (23 Jun 2019 19:48)        GENERAL:   NECK: supple, No JVD  CHEST/LUNG: clear to auscultation bilaterally; no rales, rhonchi, or wheezing b/l  HEART: normal S1, S2  ABDOMEN: BS+, soft, ND, NT   EXTREMITIES:  pulses palpable; no clubbing, cyanosis, or edema b/l LEs  SKIN: no rashes or lesions      LABS:                        11.6   10.17 )-----------( 315      ( 24 Jun 2019 07:49 )             34.5     06-24    137  |  102  |  13  ----------------------------<  312<H>  4.1   |  25  |  1.02    Ca    8.6      24 Jun 2019 07:49  Phos  2.9     06-24  Mg     2.4     06-24    TPro  6.1  /  Alb  3.1<L>  /  TBili  0.6  /  DBili  x   /  AST  11<L>  /  ALT  18  /  AlkPhos  82  06-24    PT/INR - ( 23 Jun 2019 16:48 )   PT: 10.9 sec;   INR: 0.97 ratio         PTT - ( 23 Jun 2019 16:48 )  PTT:27.7 sec INTERVAL HPI/OVERNIGHT EVENTS:  Pt seen and examined at bedside.     Allergies/Intolerance: No Known Allergies      MEDICATIONS  (STANDING):  dextrose 5%. 1000 milliLiter(s) (50 mL/Hr) IV Continuous <Continuous>  dextrose 50% Injectable 12.5 Gram(s) IV Push once  dextrose 50% Injectable 25 Gram(s) IV Push once  dextrose 50% Injectable 25 Gram(s) IV Push once  insulin glargine Injectable (LANTUS) 15 Unit(s) SubCutaneous every morning  insulin glargine Injectable (LANTUS) 13 Unit(s) SubCutaneous at bedtime  insulin lispro (HumaLOG) corrective regimen sliding scale   SubCutaneous three times a day before meals  insulin lispro (HumaLOG) corrective regimen sliding scale   SubCutaneous at bedtime  insulin lispro Injectable (HumaLOG) 11 Unit(s) SubCutaneous three times a day before meals  pantoprazole  Injectable 40 milliGRAM(s) IV Push every 12 hours  sodium chloride 0.9%. 1000 milliLiter(s) (100 mL/Hr) IV Continuous <Continuous>  sucralfate 1 Gram(s) Oral four times a day    MEDICATIONS  (PRN):  acetaminophen   Tablet .. 650 milliGRAM(s) Oral every 6 hours PRN Temp greater or equal to 38C (100.4F), Mild Pain (1 - 3)  ALBUTerol    90 MICROgram(s) HFA Inhaler 2 Puff(s) Inhalation every 6 hours PRN Shortness of Breath and/or Wheezing  clonazePAM  Tablet 0.5 milliGRAM(s) Oral two times a day PRN anxiety  dextrose 40% Gel 15 Gram(s) Oral once PRN Blood Glucose LESS THAN 70 milliGRAM(s)/deciliter  glucagon  Injectable 1 milliGRAM(s) IntraMuscular once PRN Glucose LESS THAN 70 milligrams/deciliter  metoclopramide Injectable 10 milliGRAM(s) IV Push every 8 hours PRN Nausea/vomiting Breakthrough  morphine  - Injectable 1 milliGRAM(s) IV Push every 6 hours PRN Severe Pain (7 - 10)  ondansetron Injectable 8 milliGRAM(s) IV Push every 8 hours PRN Nausea and/or Vomiting  oxyCODONE    5 mG/acetaminophen 325 mG 1 Tablet(s) Oral every 6 hours PRN Moderate Pain (4 - 6)        ROS: all systems reviewed and wnl      PHYSICAL EXAMINATION:  Vital Signs Last 24 Hrs  T(C): 36.8 (24 Jun 2019 05:27), Max: 37.2 (23 Jun 2019 21:07)  T(F): 98.3 (24 Jun 2019 05:27), Max: 98.9 (23 Jun 2019 21:07)  HR: 68 (24 Jun 2019 05:27) (63 - 83)  BP: 101/61 (24 Jun 2019 05:27) (101/61 - 161/100)  BP(mean): --  RR: 18 (24 Jun 2019 05:27) (18 - 20)  SpO2: 96% (24 Jun 2019 05:27) (96% - 98%)  CAPILLARY BLOOD GLUCOSE      POCT Blood Glucose.: 175 mg/dL (24 Jun 2019 10:43)  POCT Blood Glucose.: 318 mg/dL (24 Jun 2019 07:27)  POCT Blood Glucose.: 311 mg/dL (24 Jun 2019 00:18)  POCT Blood Glucose.: 345 mg/dL (23 Jun 2019 22:37)  POCT Blood Glucose.: 381 mg/dL (23 Jun 2019 21:27)  POCT Blood Glucose.: 387 mg/dL (23 Jun 2019 19:48)        GENERAL: stable, on regular diabetic diet as of today. Less abdominal pain  NECK: supple, No JVD  CHEST/LUNG: clear to auscultation bilaterally; no rales, rhonchi, or wheezing b/l  HEART: normal S1, S2  ABDOMEN: BS+, soft, ND, NT   EXTREMITIES:  pulses palpable; no clubbing, cyanosis, or edema b/l LEs  SKIN: no rashes or lesions      LABS:                        11.6   10.17 )-----------( 315      ( 24 Jun 2019 07:49 )             34.5     06-24    137  |  102  |  13  ----------------------------<  312<H>  4.1   |  25  |  1.02    Ca    8.6      24 Jun 2019 07:49  Phos  2.9     06-24  Mg     2.4     06-24    TPro  6.1  /  Alb  3.1<L>  /  TBili  0.6  /  DBili  x   /  AST  11<L>  /  ALT  18  /  AlkPhos  82  06-24    PT/INR - ( 23 Jun 2019 16:48 )   PT: 10.9 sec;   INR: 0.97 ratio         PTT - ( 23 Jun 2019 16:48 )  PTT:27.7 sec

## 2019-06-25 LAB
GLUCOSE BLDC GLUCOMTR-MCNC: 124 MG/DL — HIGH (ref 70–99)
GLUCOSE BLDC GLUCOMTR-MCNC: 142 MG/DL — HIGH (ref 70–99)
GLUCOSE BLDC GLUCOMTR-MCNC: 264 MG/DL — HIGH (ref 70–99)
GLUCOSE BLDC GLUCOMTR-MCNC: 47 MG/DL — LOW (ref 70–99)
GLUCOSE BLDC GLUCOMTR-MCNC: 54 MG/DL — LOW (ref 70–99)
GLUCOSE BLDC GLUCOMTR-MCNC: 56 MG/DL — LOW (ref 70–99)
GLUCOSE BLDC GLUCOMTR-MCNC: 61 MG/DL — LOW (ref 70–99)
GLUCOSE BLDC GLUCOMTR-MCNC: 66 MG/DL — LOW (ref 70–99)

## 2019-06-25 PROCEDURE — 99232 SBSQ HOSP IP/OBS MODERATE 35: CPT

## 2019-06-25 RX ORDER — MORPHINE SULFATE 50 MG/1
2 CAPSULE, EXTENDED RELEASE ORAL ONCE
Refills: 0 | Status: DISCONTINUED | OUTPATIENT
Start: 2019-06-25 | End: 2019-06-25

## 2019-06-25 RX ORDER — DEXTROSE 50 % IN WATER 50 %
15 SYRINGE (ML) INTRAVENOUS ONCE
Refills: 0 | Status: COMPLETED | OUTPATIENT
Start: 2019-06-25 | End: 2019-06-25

## 2019-06-25 RX ORDER — METOCLOPRAMIDE HCL 10 MG
10 TABLET ORAL THREE TIMES A DAY
Refills: 0 | Status: DISCONTINUED | OUTPATIENT
Start: 2019-06-25 | End: 2019-06-26

## 2019-06-25 RX ADMIN — MORPHINE SULFATE 2 MILLIGRAM(S): 50 CAPSULE, EXTENDED RELEASE ORAL at 16:17

## 2019-06-25 RX ADMIN — Medication 0.5 MILLIGRAM(S): at 13:18

## 2019-06-25 RX ADMIN — MORPHINE SULFATE 2 MILLIGRAM(S): 50 CAPSULE, EXTENDED RELEASE ORAL at 20:39

## 2019-06-25 RX ADMIN — Medication 1 GRAM(S): at 12:05

## 2019-06-25 RX ADMIN — MORPHINE SULFATE 2 MILLIGRAM(S): 50 CAPSULE, EXTENDED RELEASE ORAL at 12:09

## 2019-06-25 RX ADMIN — Medication 15 GRAM(S): at 22:32

## 2019-06-25 RX ADMIN — SODIUM CHLORIDE 100 MILLILITER(S): 9 INJECTION INTRAMUSCULAR; INTRAVENOUS; SUBCUTANEOUS at 02:50

## 2019-06-25 RX ADMIN — INSULIN GLARGINE 10 UNIT(S): 100 INJECTION, SOLUTION SUBCUTANEOUS at 12:04

## 2019-06-25 RX ADMIN — MORPHINE SULFATE 2 MILLIGRAM(S): 50 CAPSULE, EXTENDED RELEASE ORAL at 09:30

## 2019-06-25 RX ADMIN — PANTOPRAZOLE SODIUM 40 MILLIGRAM(S): 20 TABLET, DELAYED RELEASE ORAL at 17:18

## 2019-06-25 RX ADMIN — PANTOPRAZOLE SODIUM 40 MILLIGRAM(S): 20 TABLET, DELAYED RELEASE ORAL at 05:27

## 2019-06-25 RX ADMIN — ONDANSETRON 4 MILLIGRAM(S): 8 TABLET, FILM COATED ORAL at 16:09

## 2019-06-25 RX ADMIN — Medication 0.5 MILLIGRAM(S): at 22:11

## 2019-06-25 RX ADMIN — MORPHINE SULFATE 2 MILLIGRAM(S): 50 CAPSULE, EXTENDED RELEASE ORAL at 09:19

## 2019-06-25 RX ADMIN — Medication 15 GRAM(S): at 08:24

## 2019-06-25 RX ADMIN — Medication 1 GRAM(S): at 17:15

## 2019-06-25 RX ADMIN — MORPHINE SULFATE 2 MILLIGRAM(S): 50 CAPSULE, EXTENDED RELEASE ORAL at 06:02

## 2019-06-25 RX ADMIN — Medication 6: at 12:05

## 2019-06-25 RX ADMIN — Medication 8 UNIT(S): at 17:14

## 2019-06-25 RX ADMIN — MORPHINE SULFATE 2 MILLIGRAM(S): 50 CAPSULE, EXTENDED RELEASE ORAL at 20:24

## 2019-06-25 RX ADMIN — Medication 1 GRAM(S): at 05:27

## 2019-06-25 RX ADMIN — MORPHINE SULFATE 2 MILLIGRAM(S): 50 CAPSULE, EXTENDED RELEASE ORAL at 02:14

## 2019-06-25 RX ADMIN — SODIUM CHLORIDE 100 MILLILITER(S): 9 INJECTION INTRAMUSCULAR; INTRAVENOUS; SUBCUTANEOUS at 12:08

## 2019-06-25 RX ADMIN — MORPHINE SULFATE 2 MILLIGRAM(S): 50 CAPSULE, EXTENDED RELEASE ORAL at 01:59

## 2019-06-25 RX ADMIN — MORPHINE SULFATE 2 MILLIGRAM(S): 50 CAPSULE, EXTENDED RELEASE ORAL at 16:05

## 2019-06-25 RX ADMIN — MORPHINE SULFATE 2 MILLIGRAM(S): 50 CAPSULE, EXTENDED RELEASE ORAL at 12:20

## 2019-06-25 RX ADMIN — Medication 8 UNIT(S): at 12:05

## 2019-06-25 NOTE — PROGRESS NOTE ADULT - PROBLEM SELECTOR PLAN 5
Continue Clonazepam PRN Continue Clonazepam .5 mg bid PRN.  Wants to speak to psychiatry in AM regarding  anxiety issues.

## 2019-06-25 NOTE — PROGRESS NOTE ADULT - SUBJECTIVE AND OBJECTIVE BOX
INTERVAL HPI/OVERNIGHT EVENTS:  Pt seen and examined at bedside.     Allergies/Intolerance: No Known Allergies      MEDICATIONS  (STANDING):  dextrose 5%. 1000 milliLiter(s) (50 mL/Hr) IV Continuous <Continuous>  dextrose 50% Injectable 12.5 Gram(s) IV Push once  dextrose 50% Injectable 25 Gram(s) IV Push once  dextrose 50% Injectable 25 Gram(s) IV Push once  insulin glargine Injectable (LANTUS) 10 Unit(s) SubCutaneous at bedtime  insulin glargine Injectable (LANTUS) 10 Unit(s) SubCutaneous every morning  insulin lispro (HumaLOG) corrective regimen sliding scale   SubCutaneous three times a day before meals  insulin lispro (HumaLOG) corrective regimen sliding scale   SubCutaneous at bedtime  insulin lispro Injectable (HumaLOG) 8 Unit(s) SubCutaneous three times a day before meals  pantoprazole    Tablet 40 milliGRAM(s) Oral two times a day  sodium chloride 0.9%. 1000 milliLiter(s) (100 mL/Hr) IV Continuous <Continuous>  sucralfate 1 Gram(s) Oral four times a day    MEDICATIONS  (PRN):  acetaminophen   Tablet .. 650 milliGRAM(s) Oral every 6 hours PRN Temp greater or equal to 38C (100.4F), Mild Pain (1 - 3)  ALBUTerol    90 MICROgram(s) HFA Inhaler 2 Puff(s) Inhalation every 6 hours PRN Shortness of Breath and/or Wheezing  clonazePAM  Tablet 0.5 milliGRAM(s) Oral two times a day PRN anxiety  dextrose 40% Gel 15 Gram(s) Oral once PRN Blood Glucose LESS THAN 70 milliGRAM(s)/deciliter  glucagon  Injectable 1 milliGRAM(s) IntraMuscular once PRN Glucose LESS THAN 70 milligrams/deciliter  metoclopramide Injectable 10 milliGRAM(s) IV Push every 8 hours PRN Nausea/vomiting Breakthrough  morphine  - Injectable 2 milliGRAM(s) IV Push every 4 hours PRN Mild Pain (1 - 3)  ondansetron   Disintegrating Tablet 4 milliGRAM(s) Oral every 6 hours PRN Nausea and/or Vomiting        ROS: all systems reviewed and wnl      PHYSICAL EXAMINATION:  Vital Signs Last 24 Hrs  T(C): 36.6 (25 Jun 2019 17:00), Max: 37.1 (24 Jun 2019 23:31)  T(F): 97.9 (25 Jun 2019 17:00), Max: 98.7 (24 Jun 2019 23:31)  HR: 49 (25 Jun 2019 17:00) (49 - 54)  BP: 157/92 (25 Jun 2019 17:00) (138/80 - 157/92)  BP(mean): --  RR: 16 (25 Jun 2019 17:00) (16 - 16)  SpO2: 98% (25 Jun 2019 17:00) (96% - 98%)  CAPILLARY BLOOD GLUCOSE      POCT Blood Glucose.: 124 mg/dL (25 Jun 2019 16:03)  POCT Blood Glucose.: 264 mg/dL (25 Jun 2019 11:22)  POCT Blood Glucose.: 142 mg/dL (25 Jun 2019 09:58)  POCT Blood Glucose.: 61 mg/dL (25 Jun 2019 08:45)  POCT Blood Glucose.: 56 mg/dL (25 Jun 2019 08:17)  POCT Blood Glucose.: 66 mg/dL (25 Jun 2019 07:53)  POCT Blood Glucose.: 175 mg/dL (24 Jun 2019 21:37)      06-24 @ 07:01  -  06-25 @ 07:00  --------------------------------------------------------  IN: 2520 mL / OUT: 2600 mL / NET: -80 mL        GENERAL:   NECK: supple, No JVD  CHEST/LUNG: clear to auscultation bilaterally; no rales, rhonchi, or wheezing b/l  HEART: normal S1, S2  ABDOMEN: BS+, soft, ND, NT   EXTREMITIES:  pulses palpable; no clubbing, cyanosis, or edema b/l LEs  SKIN: no rashes or lesions      LABS:                        11.6   10.17 )-----------( 315      ( 24 Jun 2019 07:49 )             34.5     06-24    137  |  102  |  13  ----------------------------<  312<H>  4.1   |  25  |  1.02    Ca    8.6      24 Jun 2019 07:49  Phos  2.9     06-24  Mg     2.4     06-24    TPro  6.1  /  Alb  3.1<L>  /  TBili  0.6  /  DBili  x   /  AST  11<L>  /  ALT  18  /  AlkPhos  82  06-24 INTERVAL HPI/OVERNIGHT EVENTS:  Pt seen and examined at bedside.     Allergies/Intolerance: No Known Allergies      MEDICATIONS  (STANDING):  dextrose 5%. 1000 milliLiter(s) (50 mL/Hr) IV Continuous <Continuous>  dextrose 50% Injectable 12.5 Gram(s) IV Push once  dextrose 50% Injectable 25 Gram(s) IV Push once  dextrose 50% Injectable 25 Gram(s) IV Push once  insulin glargine Injectable (LANTUS) 10 Unit(s) SubCutaneous at bedtime  insulin glargine Injectable (LANTUS) 10 Unit(s) SubCutaneous every morning  insulin lispro (HumaLOG) corrective regimen sliding scale   SubCutaneous three times a day before meals  insulin lispro (HumaLOG) corrective regimen sliding scale   SubCutaneous at bedtime  insulin lispro Injectable (HumaLOG) 8 Unit(s) SubCutaneous three times a day before meals  pantoprazole    Tablet 40 milliGRAM(s) Oral two times a day  sodium chloride 0.9%. 1000 milliLiter(s) (100 mL/Hr) IV Continuous <Continuous>  sucralfate 1 Gram(s) Oral four times a day    MEDICATIONS  (PRN):  acetaminophen   Tablet .. 650 milliGRAM(s) Oral every 6 hours PRN Temp greater or equal to 38C (100.4F), Mild Pain (1 - 3)  ALBUTerol    90 MICROgram(s) HFA Inhaler 2 Puff(s) Inhalation every 6 hours PRN Shortness of Breath and/or Wheezing  clonazePAM  Tablet 0.5 milliGRAM(s) Oral two times a day PRN anxiety  dextrose 40% Gel 15 Gram(s) Oral once PRN Blood Glucose LESS THAN 70 milliGRAM(s)/deciliter  glucagon  Injectable 1 milliGRAM(s) IntraMuscular once PRN Glucose LESS THAN 70 milligrams/deciliter  metoclopramide Injectable 10 milliGRAM(s) IV Push every 8 hours PRN Nausea/vomiting Breakthrough  morphine  - Injectable 2 milliGRAM(s) IV Push every 4 hours PRN Mild Pain (1 - 3)  ondansetron   Disintegrating Tablet 4 milliGRAM(s) Oral every 6 hours PRN Nausea and/or Vomiting        ROS: all systems reviewed and wnl      PHYSICAL EXAMINATION:  Vital Signs Last 24 Hrs  T(C): 36.6 (25 Jun 2019 17:00), Max: 37.1 (24 Jun 2019 23:31)  T(F): 97.9 (25 Jun 2019 17:00), Max: 98.7 (24 Jun 2019 23:31)  HR: 49 (25 Jun 2019 17:00) (49 - 54)  BP: 157/92 (25 Jun 2019 17:00) (138/80 - 157/92)  BP(mean): --  RR: 16 (25 Jun 2019 17:00) (16 - 16)  SpO2: 98% (25 Jun 2019 17:00) (96% - 98%)  CAPILLARY BLOOD GLUCOSE      POCT Blood Glucose.: 124 mg/dL (25 Jun 2019 16:03)  POCT Blood Glucose.: 264 mg/dL (25 Jun 2019 11:22)  POCT Blood Glucose.: 142 mg/dL (25 Jun 2019 09:58)  POCT Blood Glucose.: 61 mg/dL (25 Jun 2019 08:45)  POCT Blood Glucose.: 56 mg/dL (25 Jun 2019 08:17)  POCT Blood Glucose.: 66 mg/dL (25 Jun 2019 07:53)  POCT Blood Glucose.: 175 mg/dL (24 Jun 2019 21:37)      06-24 @ 07:01  -  06-25 @ 07:00  --------------------------------------------------------  IN: 2520 mL / OUT: 2600 mL / NET: -80 mL        GENERAL: stable, tolerating solid meals better, no vomiting  NECK: supple, No JVD  CHEST/LUNG: clear to auscultation bilaterally; no rales, rhonchi, or wheezing b/l  HEART: normal S1, S2  ABDOMEN: BS+, soft, ND, NT   EXTREMITIES:  pulses palpable; no clubbing, cyanosis, or edema b/l LEs  SKIN: no rashes or lesions      LABS:                        11.6   10.17 )-----------( 315      ( 24 Jun 2019 07:49 )             34.5     06-24    137  |  102  |  13  ----------------------------<  312<H>  4.1   |  25  |  1.02    Ca    8.6      24 Jun 2019 07:49  Phos  2.9     06-24  Mg     2.4     06-24    TPro  6.1  /  Alb  3.1<L>  /  TBili  0.6  /  DBili  x   /  AST  11<L>  /  ALT  18  /  AlkPhos  82  06-24

## 2019-06-25 NOTE — PROGRESS NOTE ADULT - PROBLEM SELECTOR PLAN 1
Symptomatic treatment with Reglan, fluids, advance diet as tolerated  PPI IV q12h. Analgesia PRN.  Suspect some drug-seeking behavior on the part of the patient as he specifically asks for Dilaudid and nothing else. Symptomatic treatment with Reglan, advance diet as tolerated  PPI IV q12h. Analgesia PRN.  Suspect some drug-seeking behavior on the part of the patient as he specifically asks for Dilaudid and nothing else.

## 2019-06-26 ENCOUNTER — TRANSCRIPTION ENCOUNTER (OUTPATIENT)
Age: 39
End: 2019-06-26

## 2019-06-26 VITALS
HEART RATE: 61 BPM | DIASTOLIC BLOOD PRESSURE: 77 MMHG | OXYGEN SATURATION: 99 % | RESPIRATION RATE: 16 BRPM | SYSTOLIC BLOOD PRESSURE: 132 MMHG | TEMPERATURE: 98 F

## 2019-06-26 DIAGNOSIS — F13.10 SEDATIVE, HYPNOTIC OR ANXIOLYTIC ABUSE, UNCOMPLICATED: ICD-10-CM

## 2019-06-26 DIAGNOSIS — F12.10 CANNABIS ABUSE, UNCOMPLICATED: ICD-10-CM

## 2019-06-26 LAB
GLUCOSE BLDC GLUCOMTR-MCNC: 177 MG/DL — HIGH (ref 70–99)
GLUCOSE BLDC GLUCOMTR-MCNC: 211 MG/DL — HIGH (ref 70–99)
GLUCOSE BLDC GLUCOMTR-MCNC: 233 MG/DL — HIGH (ref 70–99)
GLUCOSE BLDC GLUCOMTR-MCNC: 235 MG/DL — HIGH (ref 70–99)
GLUCOSE BLDC GLUCOMTR-MCNC: 53 MG/DL — LOW (ref 70–99)

## 2019-06-26 PROCEDURE — 90792 PSYCH DIAG EVAL W/MED SRVCS: CPT

## 2019-06-26 PROCEDURE — 99239 HOSP IP/OBS DSCHRG MGMT >30: CPT

## 2019-06-26 RX ORDER — CLONAZEPAM 1 MG
1 TABLET ORAL
Qty: 60 | Refills: 0
Start: 2019-06-26 | End: 2019-07-25

## 2019-06-26 RX ORDER — HYDROMORPHONE HYDROCHLORIDE 2 MG/ML
1 INJECTION INTRAMUSCULAR; INTRAVENOUS; SUBCUTANEOUS
Qty: 20 | Refills: 0
Start: 2019-06-26 | End: 2019-06-30

## 2019-06-26 RX ORDER — INSULIN GLARGINE 100 [IU]/ML
12 INJECTION, SOLUTION SUBCUTANEOUS
Qty: 90 | Refills: 0
Start: 2019-06-26 | End: 2019-07-25

## 2019-06-26 RX ORDER — INSULIN LISPRO 100/ML
0 VIAL (ML) SUBCUTANEOUS
Qty: 0 | Refills: 0 | DISCHARGE

## 2019-06-26 RX ORDER — SODIUM CHLORIDE 9 MG/ML
1000 INJECTION INTRAMUSCULAR; INTRAVENOUS; SUBCUTANEOUS
Refills: 0 | Status: DISCONTINUED | OUTPATIENT
Start: 2019-06-26 | End: 2019-06-26

## 2019-06-26 RX ORDER — INSULIN GLARGINE 100 [IU]/ML
13 INJECTION, SOLUTION SUBCUTANEOUS
Qty: 60 | Refills: 0
Start: 2019-06-26 | End: 2019-07-25

## 2019-06-26 RX ORDER — INSULIN LISPRO 100/ML
10 VIAL (ML) SUBCUTANEOUS
Qty: 90 | Refills: 0
Start: 2019-06-26 | End: 2019-07-25

## 2019-06-26 RX ORDER — INSULIN LISPRO 100/ML
10 VIAL (ML) SUBCUTANEOUS
Qty: 0 | Refills: 0 | DISCHARGE

## 2019-06-26 RX ORDER — HYDROMORPHONE HYDROCHLORIDE 2 MG/ML
2 INJECTION INTRAMUSCULAR; INTRAVENOUS; SUBCUTANEOUS ONCE
Refills: 0 | Status: DISCONTINUED | OUTPATIENT
Start: 2019-06-26 | End: 2019-06-26

## 2019-06-26 RX ORDER — DEXTROSE 50 % IN WATER 50 %
25 SYRINGE (ML) INTRAVENOUS ONCE
Refills: 0 | Status: COMPLETED | OUTPATIENT
Start: 2019-06-26 | End: 2019-06-26

## 2019-06-26 RX ORDER — ONDANSETRON 8 MG/1
1 TABLET, FILM COATED ORAL
Qty: 5 | Refills: 0
Start: 2019-06-26

## 2019-06-26 RX ORDER — CLONAZEPAM 1 MG
0.5 TABLET ORAL
Qty: 0 | Refills: 0 | DISCHARGE

## 2019-06-26 RX ORDER — CLONAZEPAM 1 MG
1 TABLET ORAL
Qty: 30 | Refills: 0
Start: 2019-06-26 | End: 2019-07-10

## 2019-06-26 RX ADMIN — SODIUM CHLORIDE 250 MILLILITER(S): 9 INJECTION INTRAMUSCULAR; INTRAVENOUS; SUBCUTANEOUS at 16:39

## 2019-06-26 RX ADMIN — PANTOPRAZOLE SODIUM 40 MILLIGRAM(S): 20 TABLET, DELAYED RELEASE ORAL at 17:28

## 2019-06-26 RX ADMIN — MORPHINE SULFATE 2 MILLIGRAM(S): 50 CAPSULE, EXTENDED RELEASE ORAL at 13:26

## 2019-06-26 RX ADMIN — MORPHINE SULFATE 2 MILLIGRAM(S): 50 CAPSULE, EXTENDED RELEASE ORAL at 00:27

## 2019-06-26 RX ADMIN — Medication 4: at 12:00

## 2019-06-26 RX ADMIN — MORPHINE SULFATE 2 MILLIGRAM(S): 50 CAPSULE, EXTENDED RELEASE ORAL at 00:42

## 2019-06-26 RX ADMIN — MORPHINE SULFATE 2 MILLIGRAM(S): 50 CAPSULE, EXTENDED RELEASE ORAL at 13:11

## 2019-06-26 RX ADMIN — MORPHINE SULFATE 2 MILLIGRAM(S): 50 CAPSULE, EXTENDED RELEASE ORAL at 08:30

## 2019-06-26 RX ADMIN — Medication 4: at 08:26

## 2019-06-26 RX ADMIN — HYDROMORPHONE HYDROCHLORIDE 2 MILLIGRAM(S): 2 INJECTION INTRAMUSCULAR; INTRAVENOUS; SUBCUTANEOUS at 16:37

## 2019-06-26 RX ADMIN — INSULIN GLARGINE 10 UNIT(S): 100 INJECTION, SOLUTION SUBCUTANEOUS at 08:26

## 2019-06-26 RX ADMIN — ONDANSETRON 4 MILLIGRAM(S): 8 TABLET, FILM COATED ORAL at 17:28

## 2019-06-26 RX ADMIN — Medication 1 GRAM(S): at 05:12

## 2019-06-26 RX ADMIN — Medication 1 GRAM(S): at 12:00

## 2019-06-26 RX ADMIN — Medication 8 UNIT(S): at 08:26

## 2019-06-26 RX ADMIN — MORPHINE SULFATE 2 MILLIGRAM(S): 50 CAPSULE, EXTENDED RELEASE ORAL at 09:27

## 2019-06-26 RX ADMIN — Medication 25 GRAM(S): at 17:25

## 2019-06-26 RX ADMIN — HYDROMORPHONE HYDROCHLORIDE 2 MILLIGRAM(S): 2 INJECTION INTRAMUSCULAR; INTRAVENOUS; SUBCUTANEOUS at 17:30

## 2019-06-26 RX ADMIN — Medication 1 GRAM(S): at 17:28

## 2019-06-26 RX ADMIN — Medication 8 UNIT(S): at 12:00

## 2019-06-26 RX ADMIN — PANTOPRAZOLE SODIUM 40 MILLIGRAM(S): 20 TABLET, DELAYED RELEASE ORAL at 05:12

## 2019-06-26 RX ADMIN — Medication 1 GRAM(S): at 00:28

## 2019-06-26 NOTE — DISCHARGE NOTE PROVIDER - HOSPITAL COURSE
38 year old man with PMH of anxiety, asthma, DM1, gastritis, gastroparesis, multiple admissions for gastritis presents to ED with complaint of nausea, vomiting x 1 day.  Patient states this pain is typical of his chronic symptoms.  He denies fever, chills, bloody stool, recent travel, sick contacts.        In the ED, labs show hyperglycemia,  CT ab read as gastritis.    Problem/Plan - 1:    ·  Problem: Other chronic gastritis without hemorrhage.  Plan: Symptomatic treatment with Reglan, fluids, advance diet as tolerated    PPI IV q12h.    Analgesia PRN         Problem/Plan - 2:    ·  Problem: Gastroparesis diabeticorum.  Plan:Placed on IVF    Reglan, Zofran.         Problem/Plan - 3:    ·  Problem: Type 1 diabetes mellitus with complication.  Plan: Lantus 15 units AM, 13 units PM SC    Lispro 11 units TID before meals    Sliding scale with coverage    CHO diet.         Problem/Plan - 4:    ·  Problem: Mild intermittent asthma without complication.  Plan: Albuterol PRN.         Problem/Plan - 5:    ·  Problem: Anxiety.  Plan: Continue Clonazepam PRN 38 year old man with PMH of anxiety, asthma, DM1, gastritis, gastroparesis, multiple admissions for gastritis presents to ED with complaint of nausea, vomiting x 1 day.  Patient states this pain is typical of his chronic symptoms.  He denies fever, chills, bloody stool, recent travel, sick contacts.        In the ED, labs show hyperglycemia,  CT ab read as gastritis.     Problem/Plan - 1:    ·  Problem: Other chronic gastritis without hemorrhage.  Plan: Symptomatic treatment with Reglan, fluids, advance diet as tolerated    PPI IV q12h.    Analgesia PRN         Problem/Plan - 2:    ·  Problem: Gastroparesis diabeticorum.  Plan:Placed on IVF    Reglan, Zofran.         Problem/Plan - 3:    ·  Problem: Type 1 diabetes mellitus with complication.  Plan: Lantus 15 units AM, 13 units PM SC    Lispro 11 units TID before meals    Sliding scale with coverage    CHO diet.         Problem/Plan - 4:    ·  Problem: Mild intermittent asthma without complication.  Plan: Albuterol PRN.         Problem/Plan - 5:    ·  Problem: Anxiety.  Plan: Continue Clonazepam PRN

## 2019-06-26 NOTE — PHARMACY COMMUNICATION NOTE - COMMENTS
Spoke to BETTY Montes De Oca regarding Hydromorphone q 4 h dosing - confirmed to stop after one dose

## 2019-06-26 NOTE — BEHAVIORAL HEALTH ASSESSMENT NOTE - OTHER PAST PSYCHIATRIC HISTORY (INCLUDE DETAILS REGARDING ONSET, COURSE OF ILLNESS, INPATIENT/OUTPATIENT TREATMENT)
- no previous psychiatric admissions/suicide attempts/aggression  CVM: seen in [facility] walk-in [facility] 10/3/17-11/4/17 for depression; reported he last took medication 3 months prior. He was started lexapro 5mg PO daily, hydroxyzine 50mg PO BID prn anxiety / insomnia and referred to [facility] which he did not follow up with. Instead went to his PMD and asked for Klonopin.   - 9/24/18 returned to the [facility] walk-in [facility] saying he wanted a medication to help with insomnia as Klonopin is not effective anymore. Pt denied consent to speak with family, partner, and friends. I-STOP was checked which showed visits to five different doctors for Klonopin medication since Nov. 2017. Patient was not given any benzodiazepines and was given  Seroquel 50mg to 75mg po hs, 25mg po qam for mood, anxiety and insomnia. He did not come for scheduled follow up appointment. He returned on 10/12/18 unexpectedly and said he wants something else as "nothing worked." He was given Lexapro 5mg PO qd again. patient did not return to Clinic. No clear evidence Patient was taking prescribed medications daily / had an effective trial of antidepressants with Pt's focus on benzos and opiates.

## 2019-06-26 NOTE — BEHAVIORAL HEALTH ASSESSMENT NOTE - RISK ASSESSMENT
Chronic risk factors: single, male gender, chronic ongoing substance use including DUI; + legal history; documented hx of drug-seeking behavior;; hx of not following up with recommendations / referrals. Protective factors: young; healthy; no history of psych hospitalizations, no known hx of suicide attempts; no known hx of self-injurious behavior; no hx of physical aggression/violence; stable housing; denies access to guns.

## 2019-06-26 NOTE — BEHAVIORAL HEALTH ASSESSMENT NOTE - HPI (INCLUDE ILLNESS QUALITY, SEVERITY, DURATION, TIMING, CONTEXT, MODIFYING FACTORS, ASSOCIATED SIGNS AND SYMPTOMS)
38 yo AAM, single, has a girlfriend, noncaregiver, lives alone in his apt unemployed, BIB EMS for abdominal pain, with chronic GI complaints including multiple ED visits, + chronic pain (Percocet, Tramadol), with hx of anxiety, + Marijuana user, UTOX "+" for opiate and THC on admission,     ISTOP Reference #: [id number]   06/21/2019 06/21/2019 tramadol hcl 50 mg tablet  7 7 Chrisid, Horacio     06/19/2019 06/19/2019 tramadol hcl 50 mg tablet  6 2 Jim Batres     06/06/2019 06/06/2019 oxycodone-acetaminophen 5-325 mg tablet  4 1 Chepe Lawrence     04/26/2019 04/26/2019 oxycodone-acetaminophen 5-325 mg tablet  20 5 Leigh Ku     04/04/2019 04/04/2019 oxycodone-acetaminophen 5-325 mg tablet  20 5 Elier Gaviria DO     03/31/2019 03/31/2019 oxycodone-acetaminophen 5-325 mg tablet  15 2 Tori Hameed (MD)     12/14/2018 12/14/2018 oxycodone-acetaminophen 5-325 mg tablet  12 3 Hannah Mejia (RPA-C)     11/08/2018 11/09/2018 oxycodone-acetaminophen 5-325 mg tablet  20 5 Hiwot Cain MD     11/07/2018 11/07/2018 oxycodone-acetaminophen 5-325 mg tablet  9 3 Estefania Brandt (RPA-C)     10/15/2018 10/15/2018 oxycodone-acetaminophen 5-325 mg tablet  8 2 Devika Hanley     09/11/2018 09/11/2018 oxycodone-acetaminophen 5-325 mg tablet  8 2 Maryann Lima B     09/03/2018 09/03/2018 oxycodone-acetaminophen 5-325 mg tab  12 3 Tiffanie Richards     08/30/2018 08/30/2018 oxycodone-acetaminophen 5-325 mg tablet  10 5 Rossy Kaye NP     08/24/2018 08/24/2018 clonazepam 0.5 mg tablet  10 5 Wilbert De La Fuente 40 yo AAM, single, has a girlfriend, father of a 14 yo daughter ([place]), noncaregiver, lives alone in his apt unemployed, BIB EMS for abdominal pain, with chronic GI complaints including multiple ED visits, + chronic pain (Percocet, Tramadol), with hx of anxiety, + > 20 year hx of daily Marijuana use, hx of street-bought Xanax misuse; + hx of multiple prescribers for Klonopin & Percocet, was seen 2x at [facility] with indication of drug-seeking behavior and failure to follow recommendations. UTOX "+" for opiate and THC on admission. Patient has no previous psychiatric admissions/suicide attempts; + legal history (arrests 10 years ago for possession and distribution of crack/cocaine, identity EMIR santana). Patient requested to speak to a psychiatrist about his anxiety during this stay.     ISTOP Reference #: [id number]   06/21/2019 06/21/2019 tramadol hcl 50 mg tablet  7 7 Horacio Morales     06/19/2019 06/19/2019 tramadol hcl 50 mg tablet  6 2 Jim Batres     06/06/2019 06/06/2019 oxycodone-acetaminophen 5-325 mg tablet  4 1 Chepe Lawrence     04/26/2019 04/26/2019 oxycodone-acetaminophen 5-325 mg tablet  20 5 Leigh Ku     04/04/2019 04/04/2019 oxycodone-acetaminophen 5-325 mg tablet  20 5 Elier Gaviria DO     03/31/2019 03/31/2019 oxycodone-acetaminophen 5-325 mg tablet  15 2 Tori Hameed (MD)     12/14/2018 12/14/2018 oxycodone-acetaminophen 5-325 mg tablet  12 3 Hannah Mejia (RPA-C)     11/08/2018 11/09/2018 oxycodone-acetaminophen 5-325 mg tablet  20 5 Hiwot Cain MD     11/07/2018 11/07/2018 oxycodone-acetaminophen 5-325 mg tablet  9 3 Estefania Brandt (RPA-C)     10/15/2018 10/15/2018 oxycodone-acetaminophen 5-325 mg tablet  8 2 Devika Hanley     09/11/2018 09/11/2018 oxycodone-acetaminophen 5-325 mg tablet  8 2 Maryann Lima B     09/03/2018 09/03/2018 oxycodone-acetaminophen 5-325 mg tab  12 3 Tiffanie Richards     08/30/2018 08/30/2018 oxycodone-acetaminophen 5-325 mg tablet  10 5 Rossy Kaye NP     08/24/2018 08/24/2018 clonazepam 0.5 mg tablet  10 5 Wilbert De La Fuente 40 yo AAM, single, has a girlfriend, father of a 14 yo daughter ([place]), noncaregiver, lives alone in his apt unemployed, BIB EMS for abdominal pain, with chronic GI complaints including multiple ED visits, + chronic pain (Percocet, Tramadol), with hx of anxiety, + > 20 year hx of daily Marijuana use, hx of street-bought Xanax misuse; + hx of multiple prescribers for Klonopin & Percocet, was seen 2x at [facility] with indication of drug-seeking behavior and failure to follow recommendations. UTOX "+" for opiate and THC on admission. Patient has no previous psychiatric admissions/suicide attempts; + legal history (arrests 10 years ago for possession and distribution of crack/cocaine, identity EM[employer] santana). Patient requested to speak to a psychiatrist about his anxiety during this stay.     Patient in bed, calm cooperative with no clinical signs of anxiety/anxiousness. Patient asks from the get-go for benzos stating 'My klonopin is not working anymore." He requests Xanax. When asked about his anxiety, he described ambiguous symptoms of "room closing in on me" etc. He was asked about SSRIs as they are used for GDA and he states that "they don't work" and mentions that he was at [facility] and stated that he had a full 6-week antidepressant trial at theraputic dose which is not supported by records. Patient otherwise endorses stable mood, and baseline sleep / appetite / energy level / concentration. Denies any symptoms of hypomania/lia/psychosis/major depression. Denies any active or passive suicidal or homicidal ideation. Names protective factors (chloe; hope for future). Patient had no other complaints / requests.     ISTOP Reference #: [id number]   06/21/2019 06/21/2019 tramadol hcl 50 mg tablet  7 7 Horacio Morales     06/19/2019 06/19/2019 tramadol hcl 50 mg tablet  6 2 Jim Batres     06/06/2019 06/06/2019 oxycodone-acetaminophen 5-325 mg tablet  4 1 Chepe Lawrence     04/26/2019 04/26/2019 oxycodone-acetaminophen 5-325 mg tablet  20 5 Leigh Ku     04/04/2019 04/04/2019 oxycodone-acetaminophen 5-325 mg tablet  20 5 ChaitanyaskVinicio fortuneElierjone LUONG     03/31/2019 03/31/2019 oxycodone-acetaminophen 5-325 mg tablet  15 2 Tori Hameed (MD)     12/14/2018 12/14/2018 oxycodone-acetaminophen 5-325 mg tablet  12 3 Hannah Mejia (RPA-C)     11/08/2018 11/09/2018 oxycodone-acetaminophen 5-325 mg tablet  20 5 Hiwot Cain MD     11/07/2018 11/07/2018 oxycodone-acetaminophen 5-325 mg tablet  9 3 Estefania Brandt (RPA-C)     10/15/2018 10/15/2018 oxycodone-acetaminophen 5-325 mg tablet  8 2 Devika Hanley     09/11/2018 09/11/2018 oxycodone-acetaminophen 5-325 mg tablet  8 2 Maryann Lima B     09/03/2018 09/03/2018 oxycodone-acetaminophen 5-325 mg tab  12 3 Tiffanie Richards     08/30/2018 08/30/2018 oxycodone-acetaminophen 5-325 mg tablet  10 5 Rossy Kaye NP     08/24/2018 08/24/2018 clonazepam 0.5 mg tablet  10 5 Wilbert De La Fuente

## 2019-06-26 NOTE — BEHAVIORAL HEALTH ASSESSMENT NOTE - DESCRIPTION (FIRST USE, LAST USE, QUANTITY, FREQUENCY, DURATION)
daily/ nearly daily Cannabis use since early teens suspected to have used; hx of arrest for selling crack cocaine hx of street bought Benzo use; hx of going to multiple prescribers for benzos as per ISTOP; hx of buying 5 tabs of Xanax 2mg from friend before with on/of use; last used in 2017

## 2019-06-26 NOTE — BEHAVIORAL HEALTH ASSESSMENT NOTE - SUMMARY
- suspected Malingering / benzo seeking behavior which patient has a known history of as per ED records, and [facility] records  - Patient states he uses Klonopin but his urine tox was negative for benzos  - Patient given psychoeducation about the importance of having established outpatient care where he can get such prescriptions and be monitored for symptoms   - cleared for discharge

## 2019-06-26 NOTE — PROGRESS NOTE ADULT - PROBLEM SELECTOR PLAN 3
Lantus 10 units AM, 10 units PM SC  Lispro 8 units TID before meals  Sliding scale with coverage  CHO diet. Lantus 10 units AM, 10 units PM SC, Lispro 8 units TID before meals  Sliding scale with coverage, CHO diet.  Resume regular insulin home regimen   upon discharge.

## 2019-06-26 NOTE — BEHAVIORAL HEALTH ASSESSMENT NOTE - AXIS III
anxiety, asthma, DM1, gastritis, gastroparesis, frequent ED visits for nausea /vomiting, hx of cholecystectomy colonoscopy  '16; EGD with biopsy  12/13/2018 w/ diagnosis of Gastritis without bleeding, unspecified chronicity

## 2019-06-26 NOTE — PROGRESS NOTE ADULT - PROBLEM SELECTOR PROBLEM 3
Type 1 diabetes mellitus with complication

## 2019-06-26 NOTE — PROGRESS NOTE ADULT - SUBJECTIVE AND OBJECTIVE BOX
INTERVAL HPI/OVERNIGHT EVENTS:  Pt seen and examined at bedside.     Allergies/Intolerance: No Known Allergies      MEDICATIONS  (STANDING):  dextrose 5%. 1000 milliLiter(s) (50 mL/Hr) IV Continuous <Continuous>  dextrose 50% Injectable 12.5 Gram(s) IV Push once  dextrose 50% Injectable 25 Gram(s) IV Push once  dextrose 50% Injectable 25 Gram(s) IV Push once  insulin glargine Injectable (LANTUS) 10 Unit(s) SubCutaneous at bedtime  insulin glargine Injectable (LANTUS) 10 Unit(s) SubCutaneous every morning  insulin lispro (HumaLOG) corrective regimen sliding scale   SubCutaneous three times a day before meals  insulin lispro (HumaLOG) corrective regimen sliding scale   SubCutaneous at bedtime  insulin lispro Injectable (HumaLOG) 8 Unit(s) SubCutaneous three times a day before meals  pantoprazole    Tablet 40 milliGRAM(s) Oral two times a day  sucralfate 1 Gram(s) Oral four times a day    MEDICATIONS  (PRN):  acetaminophen   Tablet .. 650 milliGRAM(s) Oral every 6 hours PRN Temp greater or equal to 38C (100.4F), Mild Pain (1 - 3)  ALBUTerol    90 MICROgram(s) HFA Inhaler 2 Puff(s) Inhalation every 6 hours PRN Shortness of Breath and/or Wheezing  clonazePAM  Tablet 0.5 milliGRAM(s) Oral two times a day PRN anxiety  dextrose 40% Gel 15 Gram(s) Oral once PRN Blood Glucose LESS THAN 70 milliGRAM(s)/deciliter  glucagon  Injectable 1 milliGRAM(s) IntraMuscular once PRN Glucose LESS THAN 70 milligrams/deciliter  metoclopramide 10 milliGRAM(s) Oral three times a day PRN nausea or vomiting  morphine  - Injectable 2 milliGRAM(s) IV Push every 4 hours PRN Mild Pain (1 - 3)  ondansetron   Disintegrating Tablet 4 milliGRAM(s) Oral every 6 hours PRN Nausea and/or Vomiting        ROS: all systems reviewed and wnl      PHYSICAL EXAMINATION:  Vital Signs Last 24 Hrs  T(C): 36.8 (26 Jun 2019 11:53), Max: 36.8 (25 Jun 2019 23:54)  T(F): 98.3 (26 Jun 2019 11:53), Max: 98.3 (26 Jun 2019 11:53)  HR: 65 (26 Jun 2019 11:53) (48 - 65)  BP: 148/93 (26 Jun 2019 11:53) (134/86 - 157/94)  BP(mean): --  RR: 16 (26 Jun 2019 11:53) (16 - 16)  SpO2: 97% (26 Jun 2019 11:53) (97% - 100%)  CAPILLARY BLOOD GLUCOSE      POCT Blood Glucose.: 233 mg/dL (26 Jun 2019 11:14)  POCT Blood Glucose.: 211 mg/dL (26 Jun 2019 07:43)  POCT Blood Glucose.: 235 mg/dL (26 Jun 2019 00:31)  POCT Blood Glucose.: 54 mg/dL (25 Jun 2019 22:09)  POCT Blood Glucose.: 47 mg/dL (25 Jun 2019 22:07)  POCT Blood Glucose.: 124 mg/dL (25 Jun 2019 16:03)      06-25 @ 07:01  -  06-26 @ 07:00  --------------------------------------------------------  IN: 0 mL / OUT: 801 mL / NET: -801 mL        GENERAL:   NECK: supple, No JVD  CHEST/LUNG: clear to auscultation bilaterally; no rales, rhonchi, or wheezing b/l  HEART: normal S1, S2  ABDOMEN: BS+, soft, ND, NT   EXTREMITIES:  pulses palpable; no clubbing, cyanosis, or edema b/l LEs  SKIN: no rashes or lesions      LABS: INTERVAL HPI/OVERNIGHT EVENTS:  Pt seen and examined at bedside.     Allergies/Intolerance: No Known Allergies      MEDICATIONS  (STANDING):  dextrose 5%. 1000 milliLiter(s) (50 mL/Hr) IV Continuous <Continuous>  dextrose 50% Injectable 12.5 Gram(s) IV Push once  dextrose 50% Injectable 25 Gram(s) IV Push once  dextrose 50% Injectable 25 Gram(s) IV Push once  insulin glargine Injectable (LANTUS) 10 Unit(s) SubCutaneous at bedtime  insulin glargine Injectable (LANTUS) 10 Unit(s) SubCutaneous every morning  insulin lispro (HumaLOG) corrective regimen sliding scale   SubCutaneous three times a day before meals  insulin lispro (HumaLOG) corrective regimen sliding scale   SubCutaneous at bedtime  insulin lispro Injectable (HumaLOG) 8 Unit(s) SubCutaneous three times a day before meals  pantoprazole    Tablet 40 milliGRAM(s) Oral two times a day  sucralfate 1 Gram(s) Oral four times a day    MEDICATIONS  (PRN):  acetaminophen   Tablet .. 650 milliGRAM(s) Oral every 6 hours PRN Temp greater or equal to 38C (100.4F), Mild Pain (1 - 3)  ALBUTerol    90 MICROgram(s) HFA Inhaler 2 Puff(s) Inhalation every 6 hours PRN Shortness of Breath and/or Wheezing  clonazePAM  Tablet 0.5 milliGRAM(s) Oral two times a day PRN anxiety  dextrose 40% Gel 15 Gram(s) Oral once PRN Blood Glucose LESS THAN 70 milliGRAM(s)/deciliter  glucagon  Injectable 1 milliGRAM(s) IntraMuscular once PRN Glucose LESS THAN 70 milligrams/deciliter  metoclopramide 10 milliGRAM(s) Oral three times a day PRN nausea or vomiting  morphine  - Injectable 2 milliGRAM(s) IV Push every 4 hours PRN Mild Pain (1 - 3)  ondansetron   Disintegrating Tablet 4 milliGRAM(s) Oral every 6 hours PRN Nausea and/or Vomiting        ROS: all systems reviewed and wnl      PHYSICAL EXAMINATION:  Vital Signs Last 24 Hrs  T(C): 36.8 (26 Jun 2019 11:53), Max: 36.8 (25 Jun 2019 23:54)  T(F): 98.3 (26 Jun 2019 11:53), Max: 98.3 (26 Jun 2019 11:53)  HR: 65 (26 Jun 2019 11:53) (48 - 65)  BP: 148/93 (26 Jun 2019 11:53) (134/86 - 157/94)  BP(mean): --  RR: 16 (26 Jun 2019 11:53) (16 - 16)  SpO2: 97% (26 Jun 2019 11:53) (97% - 100%)  CAPILLARY BLOOD GLUCOSE      POCT Blood Glucose.: 233 mg/dL (26 Jun 2019 11:14)  POCT Blood Glucose.: 211 mg/dL (26 Jun 2019 07:43)  POCT Blood Glucose.: 235 mg/dL (26 Jun 2019 00:31)  POCT Blood Glucose.: 54 mg/dL (25 Jun 2019 22:09)  POCT Blood Glucose.: 47 mg/dL (25 Jun 2019 22:07)  POCT Blood Glucose.: 124 mg/dL (25 Jun 2019 16:03)      06-25 @ 07:01  -  06-26 @ 07:00  --------------------------------------------------------  IN: 0 mL / OUT: 801 mL / NET: -801 mL        GENERAL: stable, tolerated po diet, occasional episodes of nausea  NECK: supple, No JVD  CHEST/LUNG: clear to auscultation bilaterally; no rales, rhonchi, or wheezing b/l  HEART: normal S1, S2  ABDOMEN: BS+, soft, ND, NT   EXTREMITIES:  pulses palpable; no clubbing, cyanosis, or edema b/l LEs  SKIN: no rashes or lesions      LABS:

## 2019-06-26 NOTE — DISCHARGE NOTE NURSING/CASE MANAGEMENT/SOCIAL WORK - NSDCDPATPORTLINK_GEN_ALL_CORE
You can access the [facility] Portal, offered by [facility], by registering with the following website: [email or web address]

## 2019-06-26 NOTE — PROGRESS NOTE ADULT - PROBLEM SELECTOR PLAN 1
Symptomatic treatment with Reglan, advance diet as tolerated  PPI IV q12h. Analgesia PRN.  Suspect some drug-seeking behavior on the part of the patient as he specifically asks for Dilaudid and nothing else.

## 2019-06-26 NOTE — PROGRESS NOTE ADULT - PROBLEM SELECTOR PROBLEM 4
Mild intermittent asthma without complication

## 2019-06-26 NOTE — PROGRESS NOTE ADULT - PROBLEM SELECTOR PLAN 5
Continue Clonazepam .5 mg bid PRN.  Wants to speak to psychiatry in AM regarding  anxiety issues. Continue Clonazepam .5 mg bid PRN.  Spoke to psychiatry, no change in Klonipen  regimen.

## 2019-06-26 NOTE — PROGRESS NOTE ADULT - PROBLEM SELECTOR PLAN 2
Stop IVF in AM, advance diet. Fluid bolus today, then discharge to home. Advance diet. Gave 5 days of dilaudid  and agrees to go home.

## 2019-06-26 NOTE — PROGRESS NOTE ADULT - PROBLEM SELECTOR PROBLEM 1
Other chronic gastritis without hemorrhage

## 2019-06-26 NOTE — BEHAVIORAL HEALTH ASSESSMENT NOTE - NSBHCHARTREVIEWIMAGING_PSY_A_CORE FT
6/13/19 CT ABD: 1. No evidence of an acute infectious or inflammatory process within the abdomen and pelvis. 2. Small nonspecific hazy groundglass opacities in the right lower lobe.

## 2019-07-05 DIAGNOSIS — E10.65 TYPE 1 DIABETES MELLITUS WITH HYPERGLYCEMIA: ICD-10-CM

## 2019-07-05 DIAGNOSIS — K29.50 UNSPECIFIED CHRONIC GASTRITIS WITHOUT BLEEDING: ICD-10-CM

## 2019-07-05 DIAGNOSIS — F41.9 ANXIETY DISORDER, UNSPECIFIED: ICD-10-CM

## 2019-07-05 DIAGNOSIS — J45.20 MILD INTERMITTENT ASTHMA, UNCOMPLICATED: ICD-10-CM

## 2019-07-05 DIAGNOSIS — E10.69 TYPE 1 DIABETES MELLITUS WITH OTHER SPECIFIED COMPLICATION: ICD-10-CM

## 2019-07-05 DIAGNOSIS — E10.43 TYPE 1 DIABETES MELLITUS WITH DIABETIC AUTONOMIC (POLY)NEUROPATHY: ICD-10-CM

## 2019-07-05 DIAGNOSIS — K31.84 GASTROPARESIS: ICD-10-CM

## 2019-07-05 DIAGNOSIS — Z76.5 MALINGERER [CONSCIOUS SIMULATION]: ICD-10-CM

## 2019-07-11 ENCOUNTER — EMERGENCY (EMERGENCY)
Facility: HOSPITAL | Age: 39
LOS: 0 days | Discharge: HOME | End: 2019-07-11
Attending: EMERGENCY MEDICINE
Payer: MEDICAID

## 2019-07-11 VITALS
SYSTOLIC BLOOD PRESSURE: 91 MMHG | OXYGEN SATURATION: 98 % | HEART RATE: 82 BPM | RESPIRATION RATE: 17 BRPM | TEMPERATURE: 99 F | DIASTOLIC BLOOD PRESSURE: 70 MMHG | WEIGHT: 164.91 LBS | HEIGHT: 69 IN

## 2019-07-11 VITALS
TEMPERATURE: 99 F | HEART RATE: 65 BPM | OXYGEN SATURATION: 97 % | DIASTOLIC BLOOD PRESSURE: 72 MMHG | SYSTOLIC BLOOD PRESSURE: 128 MMHG | RESPIRATION RATE: 17 BRPM

## 2019-07-11 DIAGNOSIS — E11.9 TYPE 2 DIABETES MELLITUS WITHOUT COMPLICATIONS: ICD-10-CM

## 2019-07-11 DIAGNOSIS — R10.9 UNSPECIFIED ABDOMINAL PAIN: ICD-10-CM

## 2019-07-11 DIAGNOSIS — J45.909 UNSPECIFIED ASTHMA, UNCOMPLICATED: ICD-10-CM

## 2019-07-11 DIAGNOSIS — Z79.4 LONG TERM (CURRENT) USE OF INSULIN: ICD-10-CM

## 2019-07-11 DIAGNOSIS — R11.10 VOMITING, UNSPECIFIED: ICD-10-CM

## 2019-07-11 DIAGNOSIS — Z90.49 ACQUIRED ABSENCE OF OTHER SPECIFIED PARTS OF DIGESTIVE TRACT: Chronic | ICD-10-CM

## 2019-07-11 DIAGNOSIS — F41.9 ANXIETY DISORDER, UNSPECIFIED: ICD-10-CM

## 2019-07-11 LAB
ALBUMIN SERPL ELPH-MCNC: 4.1 G/DL — SIGNIFICANT CHANGE UP (ref 3.3–5)
ALP SERPL-CCNC: 111 U/L — SIGNIFICANT CHANGE UP (ref 40–120)
ALT FLD-CCNC: 31 U/L — SIGNIFICANT CHANGE UP (ref 12–78)
ANION GAP SERPL CALC-SCNC: 8 MMOL/L — SIGNIFICANT CHANGE UP (ref 5–17)
AST SERPL-CCNC: 29 U/L — SIGNIFICANT CHANGE UP (ref 15–37)
BASOPHILS # BLD AUTO: 0.06 K/UL — SIGNIFICANT CHANGE UP (ref 0–0.2)
BASOPHILS NFR BLD AUTO: 0.4 % — SIGNIFICANT CHANGE UP (ref 0–2)
BILIRUB SERPL-MCNC: 0.4 MG/DL — SIGNIFICANT CHANGE UP (ref 0.2–1.2)
BUN SERPL-MCNC: 17 MG/DL — SIGNIFICANT CHANGE UP (ref 7–23)
CALCIUM SERPL-MCNC: 9.5 MG/DL — SIGNIFICANT CHANGE UP (ref 8.5–10.1)
CHLORIDE SERPL-SCNC: 104 MMOL/L — SIGNIFICANT CHANGE UP (ref 96–108)
CO2 SERPL-SCNC: 28 MMOL/L — SIGNIFICANT CHANGE UP (ref 22–31)
CREAT SERPL-MCNC: 1.19 MG/DL — SIGNIFICANT CHANGE UP (ref 0.5–1.3)
EOSINOPHIL # BLD AUTO: 0.14 K/UL — SIGNIFICANT CHANGE UP (ref 0–0.5)
EOSINOPHIL NFR BLD AUTO: 1 % — SIGNIFICANT CHANGE UP (ref 0–6)
GLUCOSE SERPL-MCNC: 212 MG/DL — HIGH (ref 70–99)
HCT VFR BLD CALC: 45 % — SIGNIFICANT CHANGE UP (ref 39–50)
HGB BLD-MCNC: 15 G/DL — SIGNIFICANT CHANGE UP (ref 13–17)
IMM GRANULOCYTES NFR BLD AUTO: 0.3 % — SIGNIFICANT CHANGE UP (ref 0–1.5)
LIDOCAIN IGE QN: 60 U/L — LOW (ref 73–393)
LYMPHOCYTES # BLD AUTO: 1.8 K/UL — SIGNIFICANT CHANGE UP (ref 1–3.3)
LYMPHOCYTES # BLD AUTO: 13.2 % — SIGNIFICANT CHANGE UP (ref 13–44)
MCHC RBC-ENTMCNC: 27.8 PG — SIGNIFICANT CHANGE UP (ref 27–34)
MCHC RBC-ENTMCNC: 33.3 GM/DL — SIGNIFICANT CHANGE UP (ref 32–36)
MCV RBC AUTO: 83.5 FL — SIGNIFICANT CHANGE UP (ref 80–100)
MONOCYTES # BLD AUTO: 0.37 K/UL — SIGNIFICANT CHANGE UP (ref 0–0.9)
MONOCYTES NFR BLD AUTO: 2.7 % — SIGNIFICANT CHANGE UP (ref 2–14)
NEUTROPHILS # BLD AUTO: 11.24 K/UL — HIGH (ref 1.8–7.4)
NEUTROPHILS NFR BLD AUTO: 82.4 % — HIGH (ref 43–77)
NRBC # BLD: 0 /100 WBCS — SIGNIFICANT CHANGE UP (ref 0–0)
PLATELET # BLD AUTO: 414 K/UL — HIGH (ref 150–400)
POTASSIUM SERPL-MCNC: 4.1 MMOL/L — SIGNIFICANT CHANGE UP (ref 3.5–5.3)
POTASSIUM SERPL-SCNC: 4.1 MMOL/L — SIGNIFICANT CHANGE UP (ref 3.5–5.3)
PROT SERPL-MCNC: 8.4 GM/DL — HIGH (ref 6–8.3)
RBC # BLD: 5.39 M/UL — SIGNIFICANT CHANGE UP (ref 4.2–5.8)
RBC # FLD: 14.2 % — SIGNIFICANT CHANGE UP (ref 10.3–14.5)
SODIUM SERPL-SCNC: 140 MMOL/L — SIGNIFICANT CHANGE UP (ref 135–145)
WBC # BLD: 13.65 K/UL — HIGH (ref 3.8–10.5)
WBC # FLD AUTO: 13.65 K/UL — HIGH (ref 3.8–10.5)

## 2019-07-11 PROCEDURE — 74177 CT ABD & PELVIS W/CONTRAST: CPT | Mod: 26

## 2019-07-11 PROCEDURE — 99285 EMERGENCY DEPT VISIT HI MDM: CPT

## 2019-07-11 RX ORDER — SODIUM CHLORIDE 9 MG/ML
2000 INJECTION INTRAMUSCULAR; INTRAVENOUS; SUBCUTANEOUS ONCE
Refills: 0 | Status: COMPLETED | OUTPATIENT
Start: 2019-07-11 | End: 2019-07-11

## 2019-07-11 RX ORDER — HALOPERIDOL DECANOATE 100 MG/ML
2.5 INJECTION INTRAMUSCULAR ONCE
Refills: 0 | Status: COMPLETED | OUTPATIENT
Start: 2019-07-11 | End: 2019-07-11

## 2019-07-11 RX ORDER — ONDANSETRON 8 MG/1
4 TABLET, FILM COATED ORAL ONCE
Refills: 0 | Status: COMPLETED | OUTPATIENT
Start: 2019-07-11 | End: 2019-07-11

## 2019-07-11 RX ORDER — DIPHENHYDRAMINE HCL 50 MG
50 CAPSULE ORAL ONCE
Refills: 0 | Status: COMPLETED | OUTPATIENT
Start: 2019-07-11 | End: 2019-07-11

## 2019-07-11 RX ORDER — KETOROLAC TROMETHAMINE 30 MG/ML
15 SYRINGE (ML) INJECTION ONCE
Refills: 0 | Status: DISCONTINUED | OUTPATIENT
Start: 2019-07-11 | End: 2019-07-11

## 2019-07-11 RX ORDER — SODIUM CHLORIDE 9 MG/ML
1000 INJECTION INTRAMUSCULAR; INTRAVENOUS; SUBCUTANEOUS ONCE
Refills: 0 | Status: COMPLETED | OUTPATIENT
Start: 2019-07-11 | End: 2019-07-11

## 2019-07-11 RX ORDER — KETOROLAC TROMETHAMINE 30 MG/ML
30 SYRINGE (ML) INJECTION ONCE
Refills: 0 | Status: DISCONTINUED | OUTPATIENT
Start: 2019-07-11 | End: 2019-07-11

## 2019-07-11 RX ORDER — FAMOTIDINE 10 MG/ML
20 INJECTION INTRAVENOUS ONCE
Refills: 0 | Status: COMPLETED | OUTPATIENT
Start: 2019-07-11 | End: 2019-07-11

## 2019-07-11 RX ORDER — TRAMADOL HYDROCHLORIDE 50 MG/1
50 TABLET ORAL ONCE
Refills: 0 | Status: DISCONTINUED | OUTPATIENT
Start: 2019-07-11 | End: 2019-07-11

## 2019-07-11 RX ORDER — METOCLOPRAMIDE HCL 10 MG
10 TABLET ORAL ONCE
Refills: 0 | Status: COMPLETED | OUTPATIENT
Start: 2019-07-11 | End: 2019-07-11

## 2019-07-11 RX ADMIN — ONDANSETRON 4 MILLIGRAM(S): 8 TABLET, FILM COATED ORAL at 12:18

## 2019-07-11 RX ADMIN — SODIUM CHLORIDE 2000 MILLILITER(S): 9 INJECTION INTRAMUSCULAR; INTRAVENOUS; SUBCUTANEOUS at 12:18

## 2019-07-11 RX ADMIN — SODIUM CHLORIDE 1000 MILLILITER(S): 9 INJECTION INTRAMUSCULAR; INTRAVENOUS; SUBCUTANEOUS at 18:08

## 2019-07-11 RX ADMIN — Medication 10 MILLIGRAM(S): at 18:07

## 2019-07-11 RX ADMIN — FAMOTIDINE 20 MILLIGRAM(S): 10 INJECTION INTRAVENOUS at 12:20

## 2019-07-11 RX ADMIN — Medication 15 MILLIGRAM(S): at 21:32

## 2019-07-11 RX ADMIN — TRAMADOL HYDROCHLORIDE 50 MILLIGRAM(S): 50 TABLET ORAL at 18:08

## 2019-07-11 RX ADMIN — HALOPERIDOL DECANOATE 2.5 MILLIGRAM(S): 100 INJECTION INTRAMUSCULAR at 12:22

## 2019-07-11 RX ADMIN — Medication 50 MILLIGRAM(S): at 12:19

## 2019-07-11 NOTE — ED ADULT NURSE NOTE - NSIMPLEMENTINTERV_GEN_ALL_ED
Implemented All Universal Safety Interventions:  [place] to call system. Call bell, personal items and telephone within reach. Instruct patient to call for assistance. Room bathroom lighting operational. Non-slip footwear when patient is off stretcher. Physically safe environment: no spills, clutter or unnecessary equipment. Stretcher in lowest position, wheels locked, appropriate side rails in place.

## 2019-07-11 NOTE — ED PROVIDER NOTE - OBJECTIVE STATEMENT
38 year old male that has a history of anxiety, asthma, diabetes and gastritis came to the ED because of abdominal pain and vomiting that is identical to several previous episodes. he recently ran out of his tramadol. No fever, no chills, no urinary complaints, no back pain, no headache, no weakness, no urinary complaints. he has had several ct scan in the past. he has recently quit smoking marijuana.

## 2019-07-11 NOTE — ED PROVIDER NOTE - CLINICAL SUMMARY MEDICAL DECISION MAKING FREE TEXT BOX
Pt with abd pain, vomiting, diabetes, has had many times in the past. Labs noted, will medicate and reassess.

## 2019-07-11 NOTE — ED PROVIDER NOTE - PROGRESS NOTE DETAILS
Pt continues to have pain, the vomiting, stopped, will image abdoment. Flaco: patient signed out pending Ct and re-eval. Ct reviewed. patient currently feels well and is ready to go home. d/c with care instructions. f/up with pmd and gi. Discussed results and outcome of testing with the patient.  Patient advised to please follow up with their primary care doctor within the next 24 hours and return to the Emergency Department for worsening symptoms or any other concerns.  Patient advised that their doctor may call  to follow up on the specific results of the tests performed today in the emergency department.

## 2019-07-20 ENCOUNTER — EMERGENCY (EMERGENCY)
Facility: HOSPITAL | Age: 39
LOS: 0 days | Discharge: HOME | End: 2019-07-20
Attending: EMERGENCY MEDICINE
Payer: MEDICAID

## 2019-07-20 VITALS
HEART RATE: 65 BPM | SYSTOLIC BLOOD PRESSURE: 135 MMHG | RESPIRATION RATE: 18 BRPM | DIASTOLIC BLOOD PRESSURE: 77 MMHG | OXYGEN SATURATION: 99 % | TEMPERATURE: 98 F

## 2019-07-20 VITALS
SYSTOLIC BLOOD PRESSURE: 174 MMHG | HEART RATE: 70 BPM | HEIGHT: 69 IN | WEIGHT: 164.91 LBS | OXYGEN SATURATION: 99 % | DIASTOLIC BLOOD PRESSURE: 108 MMHG | TEMPERATURE: 98 F | RESPIRATION RATE: 16 BRPM

## 2019-07-20 DIAGNOSIS — K31.89 OTHER DISEASES OF STOMACH AND DUODENUM: ICD-10-CM

## 2019-07-20 DIAGNOSIS — R10.9 UNSPECIFIED ABDOMINAL PAIN: ICD-10-CM

## 2019-07-20 DIAGNOSIS — E11.9 TYPE 2 DIABETES MELLITUS WITHOUT COMPLICATIONS: ICD-10-CM

## 2019-07-20 DIAGNOSIS — Z90.49 ACQUIRED ABSENCE OF OTHER SPECIFIED PARTS OF DIGESTIVE TRACT: Chronic | ICD-10-CM

## 2019-07-20 DIAGNOSIS — R11.10 VOMITING, UNSPECIFIED: ICD-10-CM

## 2019-07-20 DIAGNOSIS — F41.9 ANXIETY DISORDER, UNSPECIFIED: ICD-10-CM

## 2019-07-20 DIAGNOSIS — Z79.4 LONG TERM (CURRENT) USE OF INSULIN: ICD-10-CM

## 2019-07-20 LAB
ALBUMIN SERPL ELPH-MCNC: 4.2 G/DL — SIGNIFICANT CHANGE UP (ref 3.3–5)
ALP SERPL-CCNC: 117 U/L — SIGNIFICANT CHANGE UP (ref 40–120)
ALT FLD-CCNC: 29 U/L — SIGNIFICANT CHANGE UP (ref 12–78)
ANION GAP SERPL CALC-SCNC: 10 MMOL/L — SIGNIFICANT CHANGE UP (ref 5–17)
AST SERPL-CCNC: 21 U/L — SIGNIFICANT CHANGE UP (ref 15–37)
BASOPHILS # BLD AUTO: 0.04 K/UL — SIGNIFICANT CHANGE UP (ref 0–0.2)
BASOPHILS NFR BLD AUTO: 0.3 % — SIGNIFICANT CHANGE UP (ref 0–2)
BILIRUB SERPL-MCNC: 0.3 MG/DL — SIGNIFICANT CHANGE UP (ref 0.2–1.2)
BUN SERPL-MCNC: 15 MG/DL — SIGNIFICANT CHANGE UP (ref 7–23)
CALCIUM SERPL-MCNC: 9.5 MG/DL — SIGNIFICANT CHANGE UP (ref 8.5–10.1)
CHLORIDE SERPL-SCNC: 104 MMOL/L — SIGNIFICANT CHANGE UP (ref 96–108)
CO2 SERPL-SCNC: 29 MMOL/L — SIGNIFICANT CHANGE UP (ref 22–31)
CREAT SERPL-MCNC: 0.98 MG/DL — SIGNIFICANT CHANGE UP (ref 0.5–1.3)
EOSINOPHIL # BLD AUTO: 0.08 K/UL — SIGNIFICANT CHANGE UP (ref 0–0.5)
EOSINOPHIL NFR BLD AUTO: 0.5 % — SIGNIFICANT CHANGE UP (ref 0–6)
GLUCOSE SERPL-MCNC: 104 MG/DL — HIGH (ref 70–99)
HCT VFR BLD CALC: 41.9 % — SIGNIFICANT CHANGE UP (ref 39–50)
HGB BLD-MCNC: 14.5 G/DL — SIGNIFICANT CHANGE UP (ref 13–17)
IMM GRANULOCYTES NFR BLD AUTO: 0.3 % — SIGNIFICANT CHANGE UP (ref 0–1.5)
LIDOCAIN IGE QN: 129 U/L — SIGNIFICANT CHANGE UP (ref 73–393)
LYMPHOCYTES # BLD AUTO: 1.24 K/UL — SIGNIFICANT CHANGE UP (ref 1–3.3)
LYMPHOCYTES # BLD AUTO: 8.5 % — LOW (ref 13–44)
MCHC RBC-ENTMCNC: 28.8 PG — SIGNIFICANT CHANGE UP (ref 27–34)
MCHC RBC-ENTMCNC: 34.6 GM/DL — SIGNIFICANT CHANGE UP (ref 32–36)
MCV RBC AUTO: 83.3 FL — SIGNIFICANT CHANGE UP (ref 80–100)
MONOCYTES # BLD AUTO: 0.75 K/UL — SIGNIFICANT CHANGE UP (ref 0–0.9)
MONOCYTES NFR BLD AUTO: 5.1 % — SIGNIFICANT CHANGE UP (ref 2–14)
NEUTROPHILS # BLD AUTO: 12.43 K/UL — HIGH (ref 1.8–7.4)
NEUTROPHILS NFR BLD AUTO: 85.3 % — HIGH (ref 43–77)
NRBC # BLD: 0 /100 WBCS — SIGNIFICANT CHANGE UP (ref 0–0)
PLATELET # BLD AUTO: 428 K/UL — HIGH (ref 150–400)
POTASSIUM SERPL-MCNC: 3.8 MMOL/L — SIGNIFICANT CHANGE UP (ref 3.5–5.3)
POTASSIUM SERPL-SCNC: 3.8 MMOL/L — SIGNIFICANT CHANGE UP (ref 3.5–5.3)
PROT SERPL-MCNC: 8.4 GM/DL — HIGH (ref 6–8.3)
RBC # BLD: 5.03 M/UL — SIGNIFICANT CHANGE UP (ref 4.2–5.8)
RBC # FLD: 14.2 % — SIGNIFICANT CHANGE UP (ref 10.3–14.5)
SODIUM SERPL-SCNC: 143 MMOL/L — SIGNIFICANT CHANGE UP (ref 135–145)
WBC # BLD: 14.58 K/UL — HIGH (ref 3.8–10.5)
WBC # FLD AUTO: 14.58 K/UL — HIGH (ref 3.8–10.5)

## 2019-07-20 PROCEDURE — 99284 EMERGENCY DEPT VISIT MOD MDM: CPT

## 2019-07-20 RX ORDER — HALOPERIDOL DECANOATE 100 MG/ML
5 INJECTION INTRAMUSCULAR ONCE
Refills: 0 | Status: COMPLETED | OUTPATIENT
Start: 2019-07-20 | End: 2019-07-20

## 2019-07-20 RX ORDER — SODIUM CHLORIDE 9 MG/ML
2000 INJECTION INTRAMUSCULAR; INTRAVENOUS; SUBCUTANEOUS ONCE
Refills: 0 | Status: COMPLETED | OUTPATIENT
Start: 2019-07-20 | End: 2019-07-20

## 2019-07-20 RX ORDER — FAMOTIDINE 10 MG/ML
20 INJECTION INTRAVENOUS ONCE
Refills: 0 | Status: COMPLETED | OUTPATIENT
Start: 2019-07-20 | End: 2019-07-20

## 2019-07-20 RX ORDER — KETOROLAC TROMETHAMINE 30 MG/ML
15 SYRINGE (ML) INJECTION ONCE
Refills: 0 | Status: DISCONTINUED | OUTPATIENT
Start: 2019-07-20 | End: 2019-07-20

## 2019-07-20 RX ADMIN — FAMOTIDINE 20 MILLIGRAM(S): 10 INJECTION INTRAVENOUS at 14:00

## 2019-07-20 RX ADMIN — Medication 15 MILLIGRAM(S): at 18:37

## 2019-07-20 RX ADMIN — SODIUM CHLORIDE 2000 MILLILITER(S): 9 INJECTION INTRAMUSCULAR; INTRAVENOUS; SUBCUTANEOUS at 14:00

## 2019-07-20 RX ADMIN — HALOPERIDOL DECANOATE 5 MILLIGRAM(S): 100 INJECTION INTRAMUSCULAR at 14:00

## 2019-07-20 NOTE — ED PROVIDER NOTE - OBJECTIVE STATEMENT
38 year old male that has a history of gastritis and cyclic vomiting with diabetes returned to the ED again because of vomiting and abdominal pain. The symptoms started today and an ambulance was called and gave morphine and zofran. No fever, no chills, no diarrhea, no rash, no headache, no urinary complaints, no back pain.

## 2019-07-20 NOTE — ED ADULT TRIAGE NOTE - CHIEF COMPLAINT QUOTE
abdominal pains with nausea and vomiting since this morning, h/o gastroparesis. Received iv morphine 5mgs and Zofran 4mgs by ems.

## 2019-07-20 NOTE — ED PROVIDER NOTE - CLINICAL SUMMARY MEDICAL DECISION MAKING FREE TEXT BOX
Pt with cyclic vomiting and abd pain, labs noted, medication give, vomiting stopped, will hydrate and observe, reassess.

## 2019-07-20 NOTE — ED PROVIDER NOTE - CARE PLAN
Principal Discharge DX:	Abdominal pain  Secondary Diagnosis:	Cyclic vomiting syndrome, intractability of vomiting not specified, presence of nausea not specified

## 2019-08-20 NOTE — PRE-OP CHECKLIST - BP NONINVASIVE DIASTOLIC (MM HG)
Assessment/Plan:         Diagnoses and all orders for this visit:    Acute diverticulitis; Improving Nicely, Finish up Meds    RTC in 1mo w ;  -     Comprehensive metabolic panel; Future  -     CBC and differential; Future  -     Sedimentation rate, automated; Future  -     C-reactive protein; Future  Start :  -     Probiotic Product (VSL#3) CAPS; Take 1 capsule by mouth 2 (two) times a day    Other insomnia; Improving, Renew :  -     diazepam (VALIUM) 2 mg tablet; Take one tab at 5 or 6 AM in the Morning and take one Tab at 1 or 2 PM, Daily    Anxiety; improving, renew :  -     diazepam (VALIUM) 2 mg tablet; Take one tab at 5 or 6 AM in the Morning and take one Tab at 1 or 2 PM, Daily    Weight loss; FU w GI, Continue same  RTC in 1mo w ;  -     Comprehensive metabolic panel; Future  -     CBC and differential; Future  -     Sedimentation rate, automated; Future  -     C-reactive protein; Future    Other orders  -     SUPREP BOWEL PREP KIT 17 5-3 13-1 6 GM/177ML SOLN; Take as directed by prescriber        Subjective:      Patient ID: Guerita Atkinson is a 78 y o  female  78 Y O lady is here for Re check from last visit, she feels better, less abd pain and less diarrhea, anxiety also is Improving,  She was seen by GI,    No  New symptoms,    The following portions of the patient's history were reviewed and updated as appropriate: allergies, current medications, past family history, past medical history, past social history, past surgical history and problem list     Review of Systems   Constitutional: Negative for chills, fatigue and fever  HENT: Negative for congestion, facial swelling, sore throat, trouble swallowing and voice change  Eyes: Negative for pain, discharge and visual disturbance  Respiratory: Negative for cough, shortness of breath and wheezing  Cardiovascular: Negative for chest pain, palpitations and leg swelling  Gastrointestinal: Positive for abdominal pain and diarrhea  Negative for blood in stool, constipation and nausea  Endocrine: Negative for polydipsia, polyphagia and polyuria  Genitourinary: Negative for difficulty urinating, hematuria and urgency  Musculoskeletal: Negative for arthralgias and myalgias  Skin: Negative for rash  Neurological: Negative for dizziness, tremors, weakness and headaches  Hematological: Negative for adenopathy  Does not bruise/bleed easily  Psychiatric/Behavioral: Negative for dysphoric mood, sleep disturbance and suicidal ideas  Objective:      /76 (BP Location: Left arm, Patient Position: Sitting, Cuff Size: Standard)   Pulse 78   Temp 98 2 °F (36 8 °C) (Oral)   Resp 14   Ht 5' 5" (1 651 m)   Wt 59 4 kg (130 lb 14 4 oz)   SpO2 98%   BMI 21 78 kg/m²          Physical Exam   Constitutional: She is oriented to person, place, and time  She appears well-nourished  No distress  HENT:   Head: Normocephalic  Mouth/Throat: Oropharynx is clear and moist  No oropharyngeal exudate  Eyes: Pupils are equal, round, and reactive to light  Conjunctivae are normal  No scleral icterus  Neck: Neck supple  No thyromegaly present  Cardiovascular: Normal rate, regular rhythm and normal heart sounds  No murmur heard  Pulmonary/Chest: Effort normal and breath sounds normal  No respiratory distress  She has no wheezes  She has no rales  Abdominal: Soft  Bowel sounds are normal  She exhibits no distension  There is tenderness  There is no rebound and no guarding  Musculoskeletal: She exhibits no edema or tenderness  Lymphadenopathy:     She has no cervical adenopathy  Neurological: She is alert and oriented to person, place, and time  No cranial nerve deficit  Coordination normal    Skin: No erythema  Psychiatric: She has a normal mood and affect  94

## 2019-08-22 ENCOUNTER — EMERGENCY (EMERGENCY)
Facility: HOSPITAL | Age: 39
LOS: 0 days | Discharge: HOME | End: 2019-08-22
Attending: EMERGENCY MEDICINE
Payer: MEDICAID

## 2019-08-22 VITALS
TEMPERATURE: 98 F | OXYGEN SATURATION: 100 % | DIASTOLIC BLOOD PRESSURE: 93 MMHG | RESPIRATION RATE: 18 BRPM | HEART RATE: 69 BPM | SYSTOLIC BLOOD PRESSURE: 124 MMHG

## 2019-08-22 VITALS
RESPIRATION RATE: 18 BRPM | SYSTOLIC BLOOD PRESSURE: 160 MMHG | OXYGEN SATURATION: 100 % | DIASTOLIC BLOOD PRESSURE: 112 MMHG | HEART RATE: 69 BPM | TEMPERATURE: 97 F

## 2019-08-22 DIAGNOSIS — J45.909 UNSPECIFIED ASTHMA, UNCOMPLICATED: ICD-10-CM

## 2019-08-22 DIAGNOSIS — Z90.49 ACQUIRED ABSENCE OF OTHER SPECIFIED PARTS OF DIGESTIVE TRACT: Chronic | ICD-10-CM

## 2019-08-22 DIAGNOSIS — K29.70 GASTRITIS, UNSPECIFIED, WITHOUT BLEEDING: ICD-10-CM

## 2019-08-22 DIAGNOSIS — Z90.49 ACQUIRED ABSENCE OF OTHER SPECIFIED PARTS OF DIGESTIVE TRACT: ICD-10-CM

## 2019-08-22 DIAGNOSIS — F41.9 ANXIETY DISORDER, UNSPECIFIED: ICD-10-CM

## 2019-08-22 DIAGNOSIS — Z79.4 LONG TERM (CURRENT) USE OF INSULIN: ICD-10-CM

## 2019-08-22 DIAGNOSIS — R10.9 UNSPECIFIED ABDOMINAL PAIN: ICD-10-CM

## 2019-08-22 DIAGNOSIS — E10.9 TYPE 1 DIABETES MELLITUS WITHOUT COMPLICATIONS: ICD-10-CM

## 2019-08-22 DIAGNOSIS — R11.10 VOMITING, UNSPECIFIED: ICD-10-CM

## 2019-08-22 LAB
ALBUMIN SERPL ELPH-MCNC: 3.8 G/DL — SIGNIFICANT CHANGE UP (ref 3.3–5)
ALP SERPL-CCNC: 88 U/L — SIGNIFICANT CHANGE UP (ref 40–120)
ALT FLD-CCNC: 32 U/L — SIGNIFICANT CHANGE UP (ref 12–78)
ANION GAP SERPL CALC-SCNC: 5 MMOL/L — SIGNIFICANT CHANGE UP (ref 5–17)
AST SERPL-CCNC: 33 U/L — SIGNIFICANT CHANGE UP (ref 15–37)
BASOPHILS # BLD AUTO: 0.07 K/UL — SIGNIFICANT CHANGE UP (ref 0–0.2)
BASOPHILS NFR BLD AUTO: 0.6 % — SIGNIFICANT CHANGE UP (ref 0–2)
BILIRUB SERPL-MCNC: 0.4 MG/DL — SIGNIFICANT CHANGE UP (ref 0.2–1.2)
BUN SERPL-MCNC: 8 MG/DL — SIGNIFICANT CHANGE UP (ref 7–23)
CALCIUM SERPL-MCNC: 9.1 MG/DL — SIGNIFICANT CHANGE UP (ref 8.5–10.1)
CHLORIDE SERPL-SCNC: 106 MMOL/L — SIGNIFICANT CHANGE UP (ref 96–108)
CO2 SERPL-SCNC: 32 MMOL/L — HIGH (ref 22–31)
CREAT SERPL-MCNC: 1.03 MG/DL — SIGNIFICANT CHANGE UP (ref 0.5–1.3)
EOSINOPHIL # BLD AUTO: 0.14 K/UL — SIGNIFICANT CHANGE UP (ref 0–0.5)
EOSINOPHIL NFR BLD AUTO: 1.3 % — SIGNIFICANT CHANGE UP (ref 0–6)
GLUCOSE BLDC GLUCOMTR-MCNC: 113 MG/DL — HIGH (ref 70–99)
GLUCOSE SERPL-MCNC: 120 MG/DL — HIGH (ref 70–99)
HCT VFR BLD CALC: 43.3 % — SIGNIFICANT CHANGE UP (ref 39–50)
HGB BLD-MCNC: 14.5 G/DL — SIGNIFICANT CHANGE UP (ref 13–17)
IMM GRANULOCYTES NFR BLD AUTO: 0.4 % — SIGNIFICANT CHANGE UP (ref 0–1.5)
LIDOCAIN IGE QN: 130 U/L — SIGNIFICANT CHANGE UP (ref 73–393)
LYMPHOCYTES # BLD AUTO: 1.6 K/UL — SIGNIFICANT CHANGE UP (ref 1–3.3)
LYMPHOCYTES # BLD AUTO: 14.5 % — SIGNIFICANT CHANGE UP (ref 13–44)
MCHC RBC-ENTMCNC: 28 PG — SIGNIFICANT CHANGE UP (ref 27–34)
MCHC RBC-ENTMCNC: 33.5 GM/DL — SIGNIFICANT CHANGE UP (ref 32–36)
MCV RBC AUTO: 83.6 FL — SIGNIFICANT CHANGE UP (ref 80–100)
MONOCYTES # BLD AUTO: 0.5 K/UL — SIGNIFICANT CHANGE UP (ref 0–0.9)
MONOCYTES NFR BLD AUTO: 4.5 % — SIGNIFICANT CHANGE UP (ref 2–14)
NEUTROPHILS # BLD AUTO: 8.7 K/UL — HIGH (ref 1.8–7.4)
NEUTROPHILS NFR BLD AUTO: 78.7 % — HIGH (ref 43–77)
NRBC # BLD: 0 /100 WBCS — SIGNIFICANT CHANGE UP (ref 0–0)
PLATELET # BLD AUTO: 361 K/UL — SIGNIFICANT CHANGE UP (ref 150–400)
POTASSIUM SERPL-MCNC: 4.2 MMOL/L — SIGNIFICANT CHANGE UP (ref 3.5–5.3)
POTASSIUM SERPL-SCNC: 4.2 MMOL/L — SIGNIFICANT CHANGE UP (ref 3.5–5.3)
PROT SERPL-MCNC: 7.9 GM/DL — SIGNIFICANT CHANGE UP (ref 6–8.3)
RBC # BLD: 5.18 M/UL — SIGNIFICANT CHANGE UP (ref 4.2–5.8)
RBC # FLD: 13.3 % — SIGNIFICANT CHANGE UP (ref 10.3–14.5)
SODIUM SERPL-SCNC: 143 MMOL/L — SIGNIFICANT CHANGE UP (ref 135–145)
WBC # BLD: 11.05 K/UL — HIGH (ref 3.8–10.5)
WBC # FLD AUTO: 11.05 K/UL — HIGH (ref 3.8–10.5)

## 2019-08-22 PROCEDURE — 99284 EMERGENCY DEPT VISIT MOD MDM: CPT

## 2019-08-22 RX ORDER — HALOPERIDOL DECANOATE 100 MG/ML
5 INJECTION INTRAMUSCULAR ONCE
Refills: 0 | Status: COMPLETED | OUTPATIENT
Start: 2019-08-22 | End: 2019-08-22

## 2019-08-22 RX ORDER — SODIUM CHLORIDE 9 MG/ML
1000 INJECTION INTRAMUSCULAR; INTRAVENOUS; SUBCUTANEOUS ONCE
Refills: 0 | Status: COMPLETED | OUTPATIENT
Start: 2019-08-22 | End: 2019-08-22

## 2019-08-22 RX ORDER — SODIUM CHLORIDE 9 MG/ML
2000 INJECTION INTRAMUSCULAR; INTRAVENOUS; SUBCUTANEOUS ONCE
Refills: 0 | Status: COMPLETED | OUTPATIENT
Start: 2019-08-22 | End: 2019-08-22

## 2019-08-22 RX ORDER — METOCLOPRAMIDE HCL 10 MG
10 TABLET ORAL ONCE
Refills: 0 | Status: DISCONTINUED | OUTPATIENT
Start: 2019-08-22 | End: 2019-08-22

## 2019-08-22 RX ORDER — ONDANSETRON 8 MG/1
8 TABLET, FILM COATED ORAL ONCE
Refills: 0 | Status: COMPLETED | OUTPATIENT
Start: 2019-08-22 | End: 2019-08-22

## 2019-08-22 RX ORDER — PANTOPRAZOLE SODIUM 20 MG/1
40 TABLET, DELAYED RELEASE ORAL ONCE
Refills: 0 | Status: COMPLETED | OUTPATIENT
Start: 2019-08-22 | End: 2019-08-22

## 2019-08-22 RX ADMIN — HALOPERIDOL DECANOATE 5 MILLIGRAM(S): 100 INJECTION INTRAMUSCULAR at 12:09

## 2019-08-22 RX ADMIN — SODIUM CHLORIDE 2000 MILLILITER(S): 9 INJECTION INTRAMUSCULAR; INTRAVENOUS; SUBCUTANEOUS at 12:05

## 2019-08-22 RX ADMIN — PANTOPRAZOLE SODIUM 40 MILLIGRAM(S): 20 TABLET, DELAYED RELEASE ORAL at 12:06

## 2019-08-22 RX ADMIN — ONDANSETRON 8 MILLIGRAM(S): 8 TABLET, FILM COATED ORAL at 12:09

## 2019-08-22 RX ADMIN — SODIUM CHLORIDE 1000 MILLILITER(S): 9 INJECTION INTRAMUSCULAR; INTRAVENOUS; SUBCUTANEOUS at 15:03

## 2019-08-22 NOTE — ED ADULT NURSE NOTE - NSIMPLEMENTINTERV_GEN_ALL_ED
Implemented All Universal Safety Interventions:  [place] to call system. Call bell, personal items and telephone within reach. Instruct patient to call for assistance. Room bathroom lighting operational. Non-slip footwear when patient is off stretcher. Physically safe environment: no spills, clutter or unnecessary equipment. Stretcher in lowest position, wheels locked, appropriate side rails in place.

## 2019-08-22 NOTE — ED PROVIDER NOTE - OBJECTIVE STATEMENT
39yo male with pmh gastritis, cyclic vomiting, DM, presents with abd pain and vomiting since this morning typical of his episodic pain. no fever, diarrhea, constipation.     ROS: No fever/chills. No photophobia/eye pain/changes in vision, No ear pain/sore throat/dysphagia, No chest pain/palpitations. No SOB/cough. + abdominal pain, No N/V/D, no black/bloody bm. No dysuria/frequency/discharge, No headache. No Dizziness.  No rash.  No numbness/tingling/weakness.

## 2019-08-22 NOTE — ED PROVIDER NOTE - PHYSICAL EXAMINATION
Gen: Alert, + dry retching, appears uncomfortable, anxious  Head: NC, AT, PERRL, normal lids/conjunctiva   ENT: Bilateral TM WNL, patent oropharynx without erythema/exudate, uvula midline  Neck: supple, no tenderness/meningismus  Pulm: Bilateral clear BS, normal resp effort  CV: RRR, no M/R/G, +dist pulses   Abd: soft, NT/ND, +BS, no guarding/rebound tenderness  Mskel:  no edema/erythema/cyanosis   Skin: no rash, no bruising  Neuro: AAOx3, no sensory/motor deficits, CN 2-12 intact

## 2019-08-26 NOTE — ED PROVIDER NOTE - CONDUCTED A DETAILED DISCUSSION WITH PATIENT AND/OR GUARDIAN REGARDING, MDM
(L03.114) Cellulitis of left arm  (primary encounter diagnosis)  Comment: We will hold off any additional antibiotics as infection appears to have healed  Plan:         (E78.5) HYPERLIPIDEMIA LDL GOAL <130  Comment: Check labs when you are able  Plan: Lipid panel reflex to direct LDL Fasting            (R80.9) Microalbuminuria  Comment: as above   Plan: CBC with platelets, Comprehensive metabolic         panel            (N18.3) CKD (chronic kidney disease) stage 3, GFR 30-59 ml/min (H)  Comment: Continue to stay hydrated and monitor blood pressure closely  Plan: CBC with platelets, Comprehensive metabolic         panel             
lab results/need to admit/radiology results

## 2019-12-11 ENCOUNTER — EMERGENCY (EMERGENCY)
Facility: HOSPITAL | Age: 39
LOS: 0 days | Discharge: HOME | End: 2019-12-12
Attending: EMERGENCY MEDICINE
Payer: MEDICAID

## 2019-12-11 VITALS
TEMPERATURE: 99 F | WEIGHT: 160.06 LBS | HEART RATE: 97 BPM | DIASTOLIC BLOOD PRESSURE: 89 MMHG | HEIGHT: 69 IN | RESPIRATION RATE: 19 BRPM | SYSTOLIC BLOOD PRESSURE: 129 MMHG | OXYGEN SATURATION: 100 %

## 2019-12-11 VITALS
RESPIRATION RATE: 18 BRPM | OXYGEN SATURATION: 97 % | DIASTOLIC BLOOD PRESSURE: 67 MMHG | TEMPERATURE: 99 F | HEART RATE: 73 BPM | SYSTOLIC BLOOD PRESSURE: 112 MMHG

## 2019-12-11 DIAGNOSIS — Z90.49 ACQUIRED ABSENCE OF OTHER SPECIFIED PARTS OF DIGESTIVE TRACT: Chronic | ICD-10-CM

## 2019-12-11 DIAGNOSIS — Z79.4 LONG TERM (CURRENT) USE OF INSULIN: ICD-10-CM

## 2019-12-11 DIAGNOSIS — R10.9 UNSPECIFIED ABDOMINAL PAIN: ICD-10-CM

## 2019-12-11 LAB
ALBUMIN SERPL ELPH-MCNC: 4.4 G/DL — SIGNIFICANT CHANGE UP (ref 3.3–5)
ALP SERPL-CCNC: 116 U/L — SIGNIFICANT CHANGE UP (ref 40–120)
ALT FLD-CCNC: 36 U/L — SIGNIFICANT CHANGE UP (ref 12–78)
ANION GAP SERPL CALC-SCNC: 8 MMOL/L — SIGNIFICANT CHANGE UP (ref 5–17)
AST SERPL-CCNC: 21 U/L — SIGNIFICANT CHANGE UP (ref 15–37)
BASOPHILS # BLD AUTO: 0.02 K/UL — SIGNIFICANT CHANGE UP (ref 0–0.2)
BASOPHILS NFR BLD AUTO: 0.2 % — SIGNIFICANT CHANGE UP (ref 0–2)
BILIRUB SERPL-MCNC: 0.8 MG/DL — SIGNIFICANT CHANGE UP (ref 0.2–1.2)
BUN SERPL-MCNC: 19 MG/DL — SIGNIFICANT CHANGE UP (ref 7–23)
CALCIUM SERPL-MCNC: 10.1 MG/DL — SIGNIFICANT CHANGE UP (ref 8.5–10.1)
CHLORIDE SERPL-SCNC: 99 MMOL/L — SIGNIFICANT CHANGE UP (ref 96–108)
CO2 SERPL-SCNC: 30 MMOL/L — SIGNIFICANT CHANGE UP (ref 22–31)
CREAT SERPL-MCNC: 1.15 MG/DL — SIGNIFICANT CHANGE UP (ref 0.5–1.3)
EOSINOPHIL # BLD AUTO: 0.01 K/UL — SIGNIFICANT CHANGE UP (ref 0–0.5)
EOSINOPHIL NFR BLD AUTO: 0.1 % — SIGNIFICANT CHANGE UP (ref 0–6)
GLUCOSE SERPL-MCNC: 237 MG/DL — HIGH (ref 70–99)
HCT VFR BLD CALC: 47.5 % — SIGNIFICANT CHANGE UP (ref 39–50)
HGB BLD-MCNC: 16 G/DL — SIGNIFICANT CHANGE UP (ref 13–17)
IMM GRANULOCYTES NFR BLD AUTO: 0.3 % — SIGNIFICANT CHANGE UP (ref 0–1.5)
LIDOCAIN IGE QN: 21 U/L — LOW (ref 73–393)
LYMPHOCYTES # BLD AUTO: 1.57 K/UL — SIGNIFICANT CHANGE UP (ref 1–3.3)
LYMPHOCYTES # BLD AUTO: 16.6 % — SIGNIFICANT CHANGE UP (ref 13–44)
MCHC RBC-ENTMCNC: 27.7 PG — SIGNIFICANT CHANGE UP (ref 27–34)
MCHC RBC-ENTMCNC: 33.7 GM/DL — SIGNIFICANT CHANGE UP (ref 32–36)
MCV RBC AUTO: 82.2 FL — SIGNIFICANT CHANGE UP (ref 80–100)
MONOCYTES # BLD AUTO: 0.36 K/UL — SIGNIFICANT CHANGE UP (ref 0–0.9)
MONOCYTES NFR BLD AUTO: 3.8 % — SIGNIFICANT CHANGE UP (ref 2–14)
NEUTROPHILS # BLD AUTO: 7.47 K/UL — HIGH (ref 1.8–7.4)
NEUTROPHILS NFR BLD AUTO: 79 % — HIGH (ref 43–77)
NRBC # BLD: 0 /100 WBCS — SIGNIFICANT CHANGE UP (ref 0–0)
PLATELET # BLD AUTO: 357 K/UL — SIGNIFICANT CHANGE UP (ref 150–400)
POTASSIUM SERPL-MCNC: 3.7 MMOL/L — SIGNIFICANT CHANGE UP (ref 3.5–5.3)
POTASSIUM SERPL-SCNC: 3.7 MMOL/L — SIGNIFICANT CHANGE UP (ref 3.5–5.3)
PROT SERPL-MCNC: 8.6 GM/DL — HIGH (ref 6–8.3)
RBC # BLD: 5.78 M/UL — SIGNIFICANT CHANGE UP (ref 4.2–5.8)
RBC # FLD: 13.5 % — SIGNIFICANT CHANGE UP (ref 10.3–14.5)
SODIUM SERPL-SCNC: 137 MMOL/L — SIGNIFICANT CHANGE UP (ref 135–145)
WBC # BLD: 9.46 K/UL — SIGNIFICANT CHANGE UP (ref 3.8–10.5)
WBC # FLD AUTO: 9.46 K/UL — SIGNIFICANT CHANGE UP (ref 3.8–10.5)

## 2019-12-11 PROCEDURE — 99284 EMERGENCY DEPT VISIT MOD MDM: CPT

## 2019-12-11 RX ORDER — SODIUM CHLORIDE 9 MG/ML
1000 INJECTION INTRAMUSCULAR; INTRAVENOUS; SUBCUTANEOUS ONCE
Refills: 0 | Status: COMPLETED | OUTPATIENT
Start: 2019-12-11 | End: 2019-12-11

## 2019-12-11 RX ORDER — ONDANSETRON 8 MG/1
4 TABLET, FILM COATED ORAL ONCE
Refills: 0 | Status: COMPLETED | OUTPATIENT
Start: 2019-12-11 | End: 2019-12-11

## 2019-12-11 RX ORDER — ONDANSETRON 8 MG/1
1 TABLET, FILM COATED ORAL
Qty: 6 | Refills: 0
Start: 2019-12-11 | End: 2019-12-13

## 2019-12-11 RX ORDER — KETOROLAC TROMETHAMINE 30 MG/ML
30 SYRINGE (ML) INJECTION ONCE
Refills: 0 | Status: DISCONTINUED | OUTPATIENT
Start: 2019-12-11 | End: 2019-12-11

## 2019-12-11 RX ORDER — HALOPERIDOL DECANOATE 100 MG/ML
5 INJECTION INTRAMUSCULAR ONCE
Refills: 0 | Status: COMPLETED | OUTPATIENT
Start: 2019-12-11 | End: 2019-12-11

## 2019-12-11 RX ORDER — METOCLOPRAMIDE HCL 10 MG
10 TABLET ORAL ONCE
Refills: 0 | Status: COMPLETED | OUTPATIENT
Start: 2019-12-11 | End: 2019-12-11

## 2019-12-11 RX ORDER — DIAZEPAM 5 MG
5 TABLET ORAL ONCE
Refills: 0 | Status: DISCONTINUED | OUTPATIENT
Start: 2019-12-11 | End: 2019-12-11

## 2019-12-11 RX ORDER — MORPHINE SULFATE 50 MG/1
4 CAPSULE, EXTENDED RELEASE ORAL ONCE
Refills: 0 | Status: DISCONTINUED | OUTPATIENT
Start: 2019-12-11 | End: 2019-12-11

## 2019-12-11 RX ORDER — METOCLOPRAMIDE HCL 10 MG
1 TABLET ORAL
Qty: 10 | Refills: 0
Start: 2019-12-11 | End: 2019-12-15

## 2019-12-11 RX ORDER — FAMOTIDINE 10 MG/ML
1 INJECTION INTRAVENOUS
Qty: 20 | Refills: 0
Start: 2019-12-11 | End: 2019-12-20

## 2019-12-11 RX ADMIN — MORPHINE SULFATE 4 MILLIGRAM(S): 50 CAPSULE, EXTENDED RELEASE ORAL at 20:00

## 2019-12-11 RX ADMIN — Medication 10 MILLIGRAM(S): at 22:59

## 2019-12-11 RX ADMIN — Medication 30 MILLIGRAM(S): at 18:15

## 2019-12-11 RX ADMIN — SODIUM CHLORIDE 1000 MILLILITER(S): 9 INJECTION INTRAMUSCULAR; INTRAVENOUS; SUBCUTANEOUS at 22:33

## 2019-12-11 RX ADMIN — MORPHINE SULFATE 4 MILLIGRAM(S): 50 CAPSULE, EXTENDED RELEASE ORAL at 22:59

## 2019-12-11 RX ADMIN — SODIUM CHLORIDE 1000 MILLILITER(S): 9 INJECTION INTRAMUSCULAR; INTRAVENOUS; SUBCUTANEOUS at 17:14

## 2019-12-11 RX ADMIN — SODIUM CHLORIDE 1000 MILLILITER(S): 9 INJECTION INTRAMUSCULAR; INTRAVENOUS; SUBCUTANEOUS at 20:16

## 2019-12-11 RX ADMIN — SODIUM CHLORIDE 1000 MILLILITER(S): 9 INJECTION INTRAMUSCULAR; INTRAVENOUS; SUBCUTANEOUS at 22:59

## 2019-12-11 RX ADMIN — MORPHINE SULFATE 4 MILLIGRAM(S): 50 CAPSULE, EXTENDED RELEASE ORAL at 21:29

## 2019-12-11 RX ADMIN — MORPHINE SULFATE 4 MILLIGRAM(S): 50 CAPSULE, EXTENDED RELEASE ORAL at 23:32

## 2019-12-11 RX ADMIN — Medication 30 MILLIGRAM(S): at 17:45

## 2019-12-11 RX ADMIN — SODIUM CHLORIDE 1000 MILLILITER(S): 9 INJECTION INTRAMUSCULAR; INTRAVENOUS; SUBCUTANEOUS at 18:15

## 2019-12-11 RX ADMIN — Medication 5 MILLIGRAM(S): at 20:00

## 2019-12-11 RX ADMIN — ONDANSETRON 4 MILLIGRAM(S): 8 TABLET, FILM COATED ORAL at 17:25

## 2019-12-11 NOTE — ED PROVIDER NOTE - CARE PLAN
Principal Discharge DX:	Non-intractable vomiting with nausea Principal Discharge DX:	Cyclic vomiting syndrome

## 2019-12-11 NOTE — ED PROVIDER NOTE - PATIENT PORTAL LINK FT
You can access the FollowMyHealth Patient Portal offered by [facility] by registering at the following website: [email or web address]. By joining [employer]’s FollowMyHealth portal, you will also be able to view your health information using other applications (apps) compatible with our system.

## 2019-12-11 NOTE — ED PROVIDER NOTE - CLINICAL SUMMARY MEDICAL DECISION MAKING FREE TEXT BOX
Ddx: cannabinoid hyperemesis/ cyclic vomiting syndrome/ dm gastroparesis  plan: Cbc, cmp, lipase, fluids, zofran, haldol, utox, reassess Ddx: cannabinoid hyperemesis/ cyclic vomiting syndrome/ dm gastroparesis  plan: Cbc, cmp, lipase, fluids, zofran, haldol, utox, reassess    Pt symptoms improved - will dc with Dr. Kumar follow up.

## 2019-12-11 NOTE — ED PROVIDER NOTE - OBJECTIVE STATEMENT
Pt is a 40 yo gentleman with a pmhx of cannabinoid hyperemesis, DM, cyclic vomiting syndrome who presents to the ED with n/v and abdominal pain. It started again yesterday. Has had multiple ED visits with similar symptoms. Denies marijuana use, but THC tests positive. Has f/u w Dr. Kumar of GI. No chest pain, no sob.

## 2019-12-11 NOTE — ED PROVIDER NOTE - PROGRESS NOTE DETAILS
pt states he is having typical abd pain epigastric from gastritis, girlfriend also aware and pt states would prefer no CT if possible. As pt is known to us Dr. Batres also agreeable if pt feeling better no CT abd needed for now -

## 2019-12-20 ENCOUNTER — EMERGENCY (EMERGENCY)
Facility: HOSPITAL | Age: 39
LOS: 0 days | Discharge: HOME | End: 2019-12-20
Attending: EMERGENCY MEDICINE
Payer: MEDICAID

## 2019-12-20 VITALS
RESPIRATION RATE: 20 BRPM | HEIGHT: 69 IN | WEIGHT: 169.98 LBS | TEMPERATURE: 98 F | DIASTOLIC BLOOD PRESSURE: 83 MMHG | OXYGEN SATURATION: 98 % | HEART RATE: 102 BPM | SYSTOLIC BLOOD PRESSURE: 138 MMHG

## 2019-12-20 VITALS
OXYGEN SATURATION: 99 % | RESPIRATION RATE: 18 BRPM | SYSTOLIC BLOOD PRESSURE: 116 MMHG | HEART RATE: 84 BPM | TEMPERATURE: 98 F | DIASTOLIC BLOOD PRESSURE: 78 MMHG

## 2019-12-20 DIAGNOSIS — J45.909 UNSPECIFIED ASTHMA, UNCOMPLICATED: ICD-10-CM

## 2019-12-20 DIAGNOSIS — K29.70 GASTRITIS, UNSPECIFIED, WITHOUT BLEEDING: ICD-10-CM

## 2019-12-20 DIAGNOSIS — E10.9 TYPE 1 DIABETES MELLITUS WITHOUT COMPLICATIONS: ICD-10-CM

## 2019-12-20 DIAGNOSIS — Z90.49 ACQUIRED ABSENCE OF OTHER SPECIFIED PARTS OF DIGESTIVE TRACT: ICD-10-CM

## 2019-12-20 DIAGNOSIS — R11.2 NAUSEA WITH VOMITING, UNSPECIFIED: ICD-10-CM

## 2019-12-20 DIAGNOSIS — R10.13 EPIGASTRIC PAIN: ICD-10-CM

## 2019-12-20 DIAGNOSIS — Z90.49 ACQUIRED ABSENCE OF OTHER SPECIFIED PARTS OF DIGESTIVE TRACT: Chronic | ICD-10-CM

## 2019-12-20 DIAGNOSIS — F41.9 ANXIETY DISORDER, UNSPECIFIED: ICD-10-CM

## 2019-12-20 LAB
ALBUMIN SERPL ELPH-MCNC: 4.5 G/DL — SIGNIFICANT CHANGE UP (ref 3.3–5)
ALP SERPL-CCNC: 122 U/L — HIGH (ref 40–120)
ALT FLD-CCNC: 37 U/L — SIGNIFICANT CHANGE UP (ref 12–78)
ANION GAP SERPL CALC-SCNC: 9 MMOL/L — SIGNIFICANT CHANGE UP (ref 5–17)
APTT BLD: 31.2 SEC — SIGNIFICANT CHANGE UP (ref 28.5–37)
AST SERPL-CCNC: 22 U/L — SIGNIFICANT CHANGE UP (ref 15–37)
BASOPHILS # BLD AUTO: 0.02 K/UL — SIGNIFICANT CHANGE UP (ref 0–0.2)
BASOPHILS NFR BLD AUTO: 0.2 % — SIGNIFICANT CHANGE UP (ref 0–2)
BILIRUB SERPL-MCNC: 0.5 MG/DL — SIGNIFICANT CHANGE UP (ref 0.2–1.2)
BUN SERPL-MCNC: 24 MG/DL — HIGH (ref 7–23)
CALCIUM SERPL-MCNC: 9.8 MG/DL — SIGNIFICANT CHANGE UP (ref 8.5–10.1)
CHLORIDE SERPL-SCNC: 98 MMOL/L — SIGNIFICANT CHANGE UP (ref 96–108)
CO2 SERPL-SCNC: 29 MMOL/L — SIGNIFICANT CHANGE UP (ref 22–31)
CREAT SERPL-MCNC: 1.19 MG/DL — SIGNIFICANT CHANGE UP (ref 0.5–1.3)
EOSINOPHIL # BLD AUTO: 0 K/UL — SIGNIFICANT CHANGE UP (ref 0–0.5)
EOSINOPHIL NFR BLD AUTO: 0 % — SIGNIFICANT CHANGE UP (ref 0–6)
GLUCOSE SERPL-MCNC: 268 MG/DL — HIGH (ref 70–99)
HCT VFR BLD CALC: 48.7 % — SIGNIFICANT CHANGE UP (ref 39–50)
HGB BLD-MCNC: 16.5 G/DL — SIGNIFICANT CHANGE UP (ref 13–17)
IMM GRANULOCYTES NFR BLD AUTO: 0.2 % — SIGNIFICANT CHANGE UP (ref 0–1.5)
INR BLD: 1.01 RATIO — SIGNIFICANT CHANGE UP (ref 0.88–1.16)
LIDOCAIN IGE QN: 31 U/L — LOW (ref 73–393)
LYMPHOCYTES # BLD AUTO: 1.36 K/UL — SIGNIFICANT CHANGE UP (ref 1–3.3)
LYMPHOCYTES # BLD AUTO: 10.9 % — LOW (ref 13–44)
MCHC RBC-ENTMCNC: 28.1 PG — SIGNIFICANT CHANGE UP (ref 27–34)
MCHC RBC-ENTMCNC: 33.9 GM/DL — SIGNIFICANT CHANGE UP (ref 32–36)
MCV RBC AUTO: 82.8 FL — SIGNIFICANT CHANGE UP (ref 80–100)
MONOCYTES # BLD AUTO: 0.21 K/UL — SIGNIFICANT CHANGE UP (ref 0–0.9)
MONOCYTES NFR BLD AUTO: 1.7 % — LOW (ref 2–14)
NEUTROPHILS # BLD AUTO: 10.83 K/UL — HIGH (ref 1.8–7.4)
NEUTROPHILS NFR BLD AUTO: 87 % — HIGH (ref 43–77)
NRBC # BLD: 0 /100 WBCS — SIGNIFICANT CHANGE UP (ref 0–0)
PLATELET # BLD AUTO: 418 K/UL — HIGH (ref 150–400)
POTASSIUM SERPL-MCNC: 4.1 MMOL/L — SIGNIFICANT CHANGE UP (ref 3.5–5.3)
POTASSIUM SERPL-SCNC: 4.1 MMOL/L — SIGNIFICANT CHANGE UP (ref 3.5–5.3)
PROT SERPL-MCNC: 8.8 GM/DL — HIGH (ref 6–8.3)
PROTHROM AB SERPL-ACNC: 11.3 SEC — SIGNIFICANT CHANGE UP (ref 10–12.9)
RBC # BLD: 5.88 M/UL — HIGH (ref 4.2–5.8)
RBC # FLD: 13.4 % — SIGNIFICANT CHANGE UP (ref 10.3–14.5)
SODIUM SERPL-SCNC: 136 MMOL/L — SIGNIFICANT CHANGE UP (ref 135–145)
TROPONIN I SERPL-MCNC: <.015 NG/ML — SIGNIFICANT CHANGE UP (ref 0.01–0.04)
WBC # BLD: 12.44 K/UL — HIGH (ref 3.8–10.5)
WBC # FLD AUTO: 12.44 K/UL — HIGH (ref 3.8–10.5)

## 2019-12-20 PROCEDURE — 93010 ELECTROCARDIOGRAM REPORT: CPT

## 2019-12-20 PROCEDURE — 71045 X-RAY EXAM CHEST 1 VIEW: CPT | Mod: 26

## 2019-12-20 PROCEDURE — 99284 EMERGENCY DEPT VISIT MOD MDM: CPT

## 2019-12-20 RX ORDER — HYDROMORPHONE HYDROCHLORIDE 2 MG/ML
1 INJECTION INTRAMUSCULAR; INTRAVENOUS; SUBCUTANEOUS ONCE
Refills: 0 | Status: DISCONTINUED | OUTPATIENT
Start: 2019-12-20 | End: 2019-12-20

## 2019-12-20 RX ORDER — METOCLOPRAMIDE HCL 10 MG
10 TABLET ORAL ONCE
Refills: 0 | Status: COMPLETED | OUTPATIENT
Start: 2019-12-20 | End: 2019-12-20

## 2019-12-20 RX ORDER — SODIUM CHLORIDE 9 MG/ML
1000 INJECTION INTRAMUSCULAR; INTRAVENOUS; SUBCUTANEOUS ONCE
Refills: 0 | Status: COMPLETED | OUTPATIENT
Start: 2019-12-20 | End: 2019-12-20

## 2019-12-20 RX ORDER — PANTOPRAZOLE SODIUM 20 MG/1
40 TABLET, DELAYED RELEASE ORAL ONCE
Refills: 0 | Status: COMPLETED | OUTPATIENT
Start: 2019-12-20 | End: 2019-12-20

## 2019-12-20 RX ADMIN — Medication 10 MILLIGRAM(S): at 15:34

## 2019-12-20 RX ADMIN — HYDROMORPHONE HYDROCHLORIDE 1 MILLIGRAM(S): 2 INJECTION INTRAMUSCULAR; INTRAVENOUS; SUBCUTANEOUS at 16:03

## 2019-12-20 RX ADMIN — SODIUM CHLORIDE 1000 MILLILITER(S): 9 INJECTION INTRAMUSCULAR; INTRAVENOUS; SUBCUTANEOUS at 15:39

## 2019-12-20 RX ADMIN — Medication 30 MILLILITER(S): at 15:34

## 2019-12-20 RX ADMIN — HYDROMORPHONE HYDROCHLORIDE 1 MILLIGRAM(S): 2 INJECTION INTRAMUSCULAR; INTRAVENOUS; SUBCUTANEOUS at 15:33

## 2019-12-20 RX ADMIN — PANTOPRAZOLE SODIUM 40 MILLIGRAM(S): 20 TABLET, DELAYED RELEASE ORAL at 15:34

## 2019-12-20 NOTE — ED PROVIDER NOTE - PATIENT PORTAL LINK FT
You can access the FollowMyHealth Patient Portal offered by [facility] by registering at the following website: [email or web address]. By joining [employer]’s FollowMyHealth portal, you will also be able to view your health information using other applications (apps) compatible with our system.

## 2019-12-20 NOTE — ED PROVIDER NOTE - PROGRESS NOTE DETAILS
Pt has been very comfortable pt's abd is soft nontender to palp tolerated water orally pt is given and explained all test reports and advised to follow up with Dr. Kumar gastroenterologist and return if symptoms persist or worsen.

## 2019-12-20 NOTE — ED PROVIDER NOTE - CONSTITUTIONAL, MLM
normal... Well appearing, awake, alert, oriented to person, place, time/situation and in no apparent distress. Speaking in clear full sentences no nasal flaring no shoulders retractions no diaphoresis, + yellow bilious vomitus non bloody

## 2019-12-20 NOTE — ED PROVIDER NOTE - OBJECTIVE STATEMENT
39 years old male by ems c/o severe epigastric abd pain nausea vomiting since this morning and unable to tolerate orally.  pt sts he is suffering gastritis for a while. Pt denies recent hx of trauma, headache, dizziness, blurred visions, light sensitivities, focal/distal weakness or numbness, cough, sob, chest pain, fever, chills, dysuria or irregular bowel movements.

## 2019-12-20 NOTE — ED ADULT NURSE NOTE - OBJECTIVE STATEMENT
pt A&Ox4 with c/o epigastric pain , with nausea and vomiting today. Denies Chest pain. PMH DM, Gastritis

## 2019-12-20 NOTE — ED PROVIDER NOTE - NONTENDER LOCATION
right lower quadrant/left upper quadrant/right upper quadrant/umbilical/left lower quadrant/suprapubic/right costovertebral angle/periumbilical/left costovertebral angle

## 2019-12-22 ENCOUNTER — EMERGENCY (EMERGENCY)
Facility: HOSPITAL | Age: 39
LOS: 0 days | Discharge: HOME | End: 2019-12-22
Attending: EMERGENCY MEDICINE
Payer: MEDICAID

## 2019-12-22 VITALS
SYSTOLIC BLOOD PRESSURE: 110 MMHG | HEART RATE: 62 BPM | TEMPERATURE: 99 F | RESPIRATION RATE: 18 BRPM | DIASTOLIC BLOOD PRESSURE: 65 MMHG | OXYGEN SATURATION: 96 %

## 2019-12-22 VITALS
SYSTOLIC BLOOD PRESSURE: 126 MMHG | TEMPERATURE: 97 F | HEIGHT: 69 IN | WEIGHT: 160.06 LBS | HEART RATE: 77 BPM | DIASTOLIC BLOOD PRESSURE: 94 MMHG | RESPIRATION RATE: 17 BRPM | OXYGEN SATURATION: 100 %

## 2019-12-22 DIAGNOSIS — K29.70 GASTRITIS, UNSPECIFIED, WITHOUT BLEEDING: ICD-10-CM

## 2019-12-22 DIAGNOSIS — E10.9 TYPE 1 DIABETES MELLITUS WITHOUT COMPLICATIONS: ICD-10-CM

## 2019-12-22 DIAGNOSIS — Z90.49 ACQUIRED ABSENCE OF OTHER SPECIFIED PARTS OF DIGESTIVE TRACT: Chronic | ICD-10-CM

## 2019-12-22 DIAGNOSIS — R10.9 UNSPECIFIED ABDOMINAL PAIN: ICD-10-CM

## 2019-12-22 DIAGNOSIS — Z79.4 LONG TERM (CURRENT) USE OF INSULIN: ICD-10-CM

## 2019-12-22 DIAGNOSIS — Z90.49 ACQUIRED ABSENCE OF OTHER SPECIFIED PARTS OF DIGESTIVE TRACT: ICD-10-CM

## 2019-12-22 DIAGNOSIS — R10.13 EPIGASTRIC PAIN: ICD-10-CM

## 2019-12-22 DIAGNOSIS — J45.909 UNSPECIFIED ASTHMA, UNCOMPLICATED: ICD-10-CM

## 2019-12-22 DIAGNOSIS — F41.9 ANXIETY DISORDER, UNSPECIFIED: ICD-10-CM

## 2019-12-22 LAB
ALBUMIN SERPL ELPH-MCNC: 4.1 G/DL — SIGNIFICANT CHANGE UP (ref 3.3–5)
ALP SERPL-CCNC: 97 U/L — SIGNIFICANT CHANGE UP (ref 40–120)
ALT FLD-CCNC: 32 U/L — SIGNIFICANT CHANGE UP (ref 12–78)
ANION GAP SERPL CALC-SCNC: 9 MMOL/L — SIGNIFICANT CHANGE UP (ref 5–17)
AST SERPL-CCNC: 20 U/L — SIGNIFICANT CHANGE UP (ref 15–37)
BASOPHILS # BLD AUTO: 0.05 K/UL — SIGNIFICANT CHANGE UP (ref 0–0.2)
BASOPHILS NFR BLD AUTO: 0.5 % — SIGNIFICANT CHANGE UP (ref 0–2)
BILIRUB SERPL-MCNC: 0.4 MG/DL — SIGNIFICANT CHANGE UP (ref 0.2–1.2)
BUN SERPL-MCNC: 23 MG/DL — SIGNIFICANT CHANGE UP (ref 7–23)
CALCIUM SERPL-MCNC: 9.3 MG/DL — SIGNIFICANT CHANGE UP (ref 8.5–10.1)
CHLORIDE SERPL-SCNC: 104 MMOL/L — SIGNIFICANT CHANGE UP (ref 96–108)
CO2 SERPL-SCNC: 27 MMOL/L — SIGNIFICANT CHANGE UP (ref 22–31)
CREAT SERPL-MCNC: 1.09 MG/DL — SIGNIFICANT CHANGE UP (ref 0.5–1.3)
EOSINOPHIL # BLD AUTO: 0.1 K/UL — SIGNIFICANT CHANGE UP (ref 0–0.5)
EOSINOPHIL NFR BLD AUTO: 1 % — SIGNIFICANT CHANGE UP (ref 0–6)
GLUCOSE SERPL-MCNC: 77 MG/DL — SIGNIFICANT CHANGE UP (ref 70–99)
HCT VFR BLD CALC: 45.8 % — SIGNIFICANT CHANGE UP (ref 39–50)
HGB BLD-MCNC: 15.2 G/DL — SIGNIFICANT CHANGE UP (ref 13–17)
IMM GRANULOCYTES NFR BLD AUTO: 0.1 % — SIGNIFICANT CHANGE UP (ref 0–1.5)
LIDOCAIN IGE QN: 43 U/L — LOW (ref 73–393)
LYMPHOCYTES # BLD AUTO: 3.41 K/UL — HIGH (ref 1–3.3)
LYMPHOCYTES # BLD AUTO: 33.2 % — SIGNIFICANT CHANGE UP (ref 13–44)
MAGNESIUM SERPL-MCNC: 2.1 MG/DL — SIGNIFICANT CHANGE UP (ref 1.6–2.6)
MCHC RBC-ENTMCNC: 27.8 PG — SIGNIFICANT CHANGE UP (ref 27–34)
MCHC RBC-ENTMCNC: 33.2 GM/DL — SIGNIFICANT CHANGE UP (ref 32–36)
MCV RBC AUTO: 83.7 FL — SIGNIFICANT CHANGE UP (ref 80–100)
MONOCYTES # BLD AUTO: 0.87 K/UL — SIGNIFICANT CHANGE UP (ref 0–0.9)
MONOCYTES NFR BLD AUTO: 8.5 % — SIGNIFICANT CHANGE UP (ref 2–14)
NEUTROPHILS # BLD AUTO: 5.84 K/UL — SIGNIFICANT CHANGE UP (ref 1.8–7.4)
NEUTROPHILS NFR BLD AUTO: 56.7 % — SIGNIFICANT CHANGE UP (ref 43–77)
NRBC # BLD: 0 /100 WBCS — SIGNIFICANT CHANGE UP (ref 0–0)
PLATELET # BLD AUTO: 398 K/UL — SIGNIFICANT CHANGE UP (ref 150–400)
POTASSIUM SERPL-MCNC: 3.7 MMOL/L — SIGNIFICANT CHANGE UP (ref 3.5–5.3)
POTASSIUM SERPL-SCNC: 3.7 MMOL/L — SIGNIFICANT CHANGE UP (ref 3.5–5.3)
PROT SERPL-MCNC: 8.3 GM/DL — SIGNIFICANT CHANGE UP (ref 6–8.3)
RBC # BLD: 5.47 M/UL — SIGNIFICANT CHANGE UP (ref 4.2–5.8)
RBC # FLD: 13.7 % — SIGNIFICANT CHANGE UP (ref 10.3–14.5)
SODIUM SERPL-SCNC: 140 MMOL/L — SIGNIFICANT CHANGE UP (ref 135–145)
WBC # BLD: 10.28 K/UL — SIGNIFICANT CHANGE UP (ref 3.8–10.5)
WBC # FLD AUTO: 10.28 K/UL — SIGNIFICANT CHANGE UP (ref 3.8–10.5)

## 2019-12-22 PROCEDURE — 99284 EMERGENCY DEPT VISIT MOD MDM: CPT

## 2019-12-22 RX ORDER — ONDANSETRON 8 MG/1
8 TABLET, FILM COATED ORAL ONCE
Refills: 0 | Status: COMPLETED | OUTPATIENT
Start: 2019-12-22 | End: 2019-12-22

## 2019-12-22 RX ORDER — FAMOTIDINE 10 MG/ML
20 INJECTION INTRAVENOUS ONCE
Refills: 0 | Status: COMPLETED | OUTPATIENT
Start: 2019-12-22 | End: 2019-12-22

## 2019-12-22 RX ORDER — SODIUM CHLORIDE 9 MG/ML
1000 INJECTION INTRAMUSCULAR; INTRAVENOUS; SUBCUTANEOUS ONCE
Refills: 0 | Status: COMPLETED | OUTPATIENT
Start: 2019-12-22 | End: 2019-12-22

## 2019-12-22 RX ORDER — HYDROMORPHONE HYDROCHLORIDE 2 MG/ML
1 INJECTION INTRAMUSCULAR; INTRAVENOUS; SUBCUTANEOUS ONCE
Refills: 0 | Status: DISCONTINUED | OUTPATIENT
Start: 2019-12-22 | End: 2019-12-22

## 2019-12-22 RX ORDER — HALOPERIDOL DECANOATE 100 MG/ML
5 INJECTION INTRAMUSCULAR ONCE
Refills: 0 | Status: COMPLETED | OUTPATIENT
Start: 2019-12-22 | End: 2019-12-22

## 2019-12-22 RX ADMIN — HYDROMORPHONE HYDROCHLORIDE 1 MILLIGRAM(S): 2 INJECTION INTRAMUSCULAR; INTRAVENOUS; SUBCUTANEOUS at 01:26

## 2019-12-22 RX ADMIN — HYDROMORPHONE HYDROCHLORIDE 1 MILLIGRAM(S): 2 INJECTION INTRAMUSCULAR; INTRAVENOUS; SUBCUTANEOUS at 01:56

## 2019-12-22 RX ADMIN — ONDANSETRON 8 MILLIGRAM(S): 8 TABLET, FILM COATED ORAL at 01:25

## 2019-12-22 RX ADMIN — SODIUM CHLORIDE 1000 MILLILITER(S): 9 INJECTION INTRAMUSCULAR; INTRAVENOUS; SUBCUTANEOUS at 03:00

## 2019-12-22 RX ADMIN — FAMOTIDINE 20 MILLIGRAM(S): 10 INJECTION INTRAVENOUS at 01:28

## 2019-12-22 RX ADMIN — SODIUM CHLORIDE 1000 MILLILITER(S): 9 INJECTION INTRAMUSCULAR; INTRAVENOUS; SUBCUTANEOUS at 01:25

## 2019-12-22 RX ADMIN — SODIUM CHLORIDE 1000 MILLILITER(S): 9 INJECTION INTRAMUSCULAR; INTRAVENOUS; SUBCUTANEOUS at 01:26

## 2019-12-22 RX ADMIN — HALOPERIDOL DECANOATE 5 MILLIGRAM(S): 100 INJECTION INTRAMUSCULAR at 01:24

## 2019-12-22 NOTE — ED PROVIDER NOTE - CLINICAL SUMMARY MEDICAL DECISION MAKING FREE TEXT BOX
pt pw abd pain. chronic in nature. after his typical regimen of meds, he is feeling better and tolerating water. no indication for ct scan at this time.

## 2019-12-22 NOTE — ED PROVIDER NOTE - PHYSICAL EXAMINATION
Gen: Alert, moaning in pain  Head: NC, AT   Eyes: PERRL, EOMI, normal lids/conjunctiva  ENT: normal hearing, patent oropharynx without erythema/exudate, uvula midline  Neck: supple, no tenderness, Trachea midline  Pulm: Bilateral BS, normal resp effort, no wheeze/stridor/retractions  CV: RRR, no M/R/G, 2+ radial and dp pulses bl, no edema  Abd: soft, NT/ND, +BS, no hepatosplenomegaly  Mskel: extremities x4 with normal ROM and no joint effusions. no ctl spine ttp.   Skin: no rash, no bruising   Neuro: AAOx3, no sensory/motor deficits, CN 2-12 intact

## 2019-12-22 NOTE — ED PROVIDER NOTE - PATIENT PORTAL LINK FT
You can access the FollowMyHealth Patient Portal offered by [facility] by registering at the following website: [email or web address]. By joining [employer]’s FollowMyHealth portal, you will also be able to view your health information using other applications (apps) compatible with our system.

## 2019-12-22 NOTE — ED PROVIDER NOTE - OBJECTIVE STATEMENT
Pertinent PMH/PSH/FHx/SHx and Review of Systems contained within:  39M hx of dm and frequent bouts of abd pain pw abd pain. notes pain in the epigastrium and luq. symptoms the same as 2 days ago. pt notes he needs dilaudid. no fever, chills, diarrhea, dysuria, testicular pain. didn't take anything for symptoms. ROS neg for cp, ha, vision loss, rhinorrhea, rash, bleeding, numbness  Fh and Sh not otherwise contributory  ROS otherwise negative

## 2020-01-20 ENCOUNTER — INPATIENT (INPATIENT)
Facility: HOSPITAL | Age: 40
LOS: 2 days | Discharge: HOME HEALTH | End: 2020-01-23
Attending: INTERNAL MEDICINE | Admitting: INTERNAL MEDICINE
Payer: MEDICAID

## 2020-01-20 VITALS
HEART RATE: 86 BPM | SYSTOLIC BLOOD PRESSURE: 129 MMHG | WEIGHT: 175.05 LBS | RESPIRATION RATE: 19 BRPM | DIASTOLIC BLOOD PRESSURE: 89 MMHG | TEMPERATURE: 99 F | OXYGEN SATURATION: 98 % | HEIGHT: 69 IN

## 2020-01-20 DIAGNOSIS — Z90.49 ACQUIRED ABSENCE OF OTHER SPECIFIED PARTS OF DIGESTIVE TRACT: Chronic | ICD-10-CM

## 2020-01-20 LAB
ALBUMIN SERPL ELPH-MCNC: 4.1 G/DL — SIGNIFICANT CHANGE UP (ref 3.3–5)
ALP SERPL-CCNC: 124 U/L — HIGH (ref 40–120)
ALT FLD-CCNC: 46 U/L — SIGNIFICANT CHANGE UP (ref 12–78)
ANION GAP SERPL CALC-SCNC: 9 MMOL/L — SIGNIFICANT CHANGE UP (ref 5–17)
APTT BLD: 30.3 SEC — SIGNIFICANT CHANGE UP (ref 28.5–37)
AST SERPL-CCNC: 21 U/L — SIGNIFICANT CHANGE UP (ref 15–37)
BASOPHILS # BLD AUTO: 0.04 K/UL — SIGNIFICANT CHANGE UP (ref 0–0.2)
BASOPHILS NFR BLD AUTO: 0.4 % — SIGNIFICANT CHANGE UP (ref 0–2)
BILIRUB SERPL-MCNC: 0.4 MG/DL — SIGNIFICANT CHANGE UP (ref 0.2–1.2)
BUN SERPL-MCNC: 18 MG/DL — SIGNIFICANT CHANGE UP (ref 7–23)
CALCIUM SERPL-MCNC: 9.4 MG/DL — SIGNIFICANT CHANGE UP (ref 8.5–10.1)
CHLORIDE SERPL-SCNC: 102 MMOL/L — SIGNIFICANT CHANGE UP (ref 96–108)
CO2 SERPL-SCNC: 25 MMOL/L — SIGNIFICANT CHANGE UP (ref 22–31)
CREAT SERPL-MCNC: 1 MG/DL — SIGNIFICANT CHANGE UP (ref 0.5–1.3)
EOSINOPHIL # BLD AUTO: 0.04 K/UL — SIGNIFICANT CHANGE UP (ref 0–0.5)
EOSINOPHIL NFR BLD AUTO: 0.4 % — SIGNIFICANT CHANGE UP (ref 0–6)
GLUCOSE SERPL-MCNC: 187 MG/DL — HIGH (ref 70–99)
HCT VFR BLD CALC: 45.5 % — SIGNIFICANT CHANGE UP (ref 39–50)
HGB BLD-MCNC: 15.5 G/DL — SIGNIFICANT CHANGE UP (ref 13–17)
IMM GRANULOCYTES NFR BLD AUTO: 0.2 % — SIGNIFICANT CHANGE UP (ref 0–1.5)
INR BLD: 0.97 RATIO — SIGNIFICANT CHANGE UP (ref 0.88–1.16)
LACTATE SERPL-SCNC: 1.3 MMOL/L — SIGNIFICANT CHANGE UP (ref 0.7–2)
LIDOCAIN IGE QN: 21 U/L — LOW (ref 73–393)
LYMPHOCYTES # BLD AUTO: 1.83 K/UL — SIGNIFICANT CHANGE UP (ref 1–3.3)
LYMPHOCYTES # BLD AUTO: 16.5 % — SIGNIFICANT CHANGE UP (ref 13–44)
MCHC RBC-ENTMCNC: 28 PG — SIGNIFICANT CHANGE UP (ref 27–34)
MCHC RBC-ENTMCNC: 34.1 GM/DL — SIGNIFICANT CHANGE UP (ref 32–36)
MCV RBC AUTO: 82.3 FL — SIGNIFICANT CHANGE UP (ref 80–100)
MONOCYTES # BLD AUTO: 0.43 K/UL — SIGNIFICANT CHANGE UP (ref 0–0.9)
MONOCYTES NFR BLD AUTO: 3.9 % — SIGNIFICANT CHANGE UP (ref 2–14)
NEUTROPHILS # BLD AUTO: 8.71 K/UL — HIGH (ref 1.8–7.4)
NEUTROPHILS NFR BLD AUTO: 78.6 % — HIGH (ref 43–77)
NRBC # BLD: 0 /100 WBCS — SIGNIFICANT CHANGE UP (ref 0–0)
PLATELET # BLD AUTO: 412 K/UL — HIGH (ref 150–400)
POTASSIUM SERPL-MCNC: 4.2 MMOL/L — SIGNIFICANT CHANGE UP (ref 3.5–5.3)
POTASSIUM SERPL-SCNC: 4.2 MMOL/L — SIGNIFICANT CHANGE UP (ref 3.5–5.3)
PROT SERPL-MCNC: 8.3 GM/DL — SIGNIFICANT CHANGE UP (ref 6–8.3)
PROTHROM AB SERPL-ACNC: 10.9 SEC — SIGNIFICANT CHANGE UP (ref 10–12.9)
RBC # BLD: 5.53 M/UL — SIGNIFICANT CHANGE UP (ref 4.2–5.8)
RBC # FLD: 13.4 % — SIGNIFICANT CHANGE UP (ref 10.3–14.5)
SODIUM SERPL-SCNC: 136 MMOL/L — SIGNIFICANT CHANGE UP (ref 135–145)
WBC # BLD: 11.07 K/UL — HIGH (ref 3.8–10.5)
WBC # FLD AUTO: 11.07 K/UL — HIGH (ref 3.8–10.5)

## 2020-01-20 PROCEDURE — 99285 EMERGENCY DEPT VISIT HI MDM: CPT

## 2020-01-20 PROCEDURE — 93010 ELECTROCARDIOGRAM REPORT: CPT

## 2020-01-20 RX ORDER — ACETAMINOPHEN 500 MG
975 TABLET ORAL ONCE
Refills: 0 | Status: COMPLETED | OUTPATIENT
Start: 2020-01-20 | End: 2020-01-20

## 2020-01-20 RX ORDER — METOCLOPRAMIDE HCL 10 MG
10 TABLET ORAL ONCE
Refills: 0 | Status: COMPLETED | OUTPATIENT
Start: 2020-01-20 | End: 2020-01-20

## 2020-01-20 RX ORDER — SODIUM CHLORIDE 9 MG/ML
1000 INJECTION, SOLUTION INTRAVENOUS ONCE
Refills: 0 | Status: COMPLETED | OUTPATIENT
Start: 2020-01-20 | End: 2020-01-20

## 2020-01-20 RX ORDER — FAMOTIDINE 10 MG/ML
20 INJECTION INTRAVENOUS ONCE
Refills: 0 | Status: DISCONTINUED | OUTPATIENT
Start: 2020-01-20 | End: 2020-01-20

## 2020-01-20 RX ORDER — IOHEXOL 300 MG/ML
30 INJECTION, SOLUTION INTRAVENOUS ONCE
Refills: 0 | Status: COMPLETED | OUTPATIENT
Start: 2020-01-20 | End: 2020-01-21

## 2020-01-20 RX ORDER — FAMOTIDINE 10 MG/ML
20 INJECTION INTRAVENOUS ONCE
Refills: 0 | Status: COMPLETED | OUTPATIENT
Start: 2020-01-20 | End: 2020-01-20

## 2020-01-20 RX ADMIN — FAMOTIDINE 20 MILLIGRAM(S): 10 INJECTION INTRAVENOUS at 22:30

## 2020-01-20 RX ADMIN — Medication 10 MILLIGRAM(S): at 22:30

## 2020-01-20 RX ADMIN — SODIUM CHLORIDE 1000 MILLILITER(S): 9 INJECTION, SOLUTION INTRAVENOUS at 22:30

## 2020-01-20 RX ADMIN — SODIUM CHLORIDE 1000 MILLILITER(S): 9 INJECTION, SOLUTION INTRAVENOUS at 23:36

## 2020-01-21 DIAGNOSIS — F13.90 SEDATIVE, HYPNOTIC, OR ANXIOLYTIC USE, UNSPECIFIED, UNCOMPLICATED: ICD-10-CM

## 2020-01-21 DIAGNOSIS — Z63.4 DISAPPEARANCE AND DEATH OF FAMILY MEMBER: ICD-10-CM

## 2020-01-21 DIAGNOSIS — F12.90 CANNABIS USE, UNSPECIFIED, UNCOMPLICATED: ICD-10-CM

## 2020-01-21 DIAGNOSIS — K31.84 GASTROPARESIS: ICD-10-CM

## 2020-01-21 LAB
APPEARANCE UR: CLEAR — SIGNIFICANT CHANGE UP
BILIRUB UR-MCNC: NEGATIVE — SIGNIFICANT CHANGE UP
COLOR SPEC: YELLOW — SIGNIFICANT CHANGE UP
DIFF PNL FLD: ABNORMAL
EPI CELLS # UR: SIGNIFICANT CHANGE UP
GLUCOSE BLDC GLUCOMTR-MCNC: 148 MG/DL — HIGH (ref 70–99)
GLUCOSE BLDC GLUCOMTR-MCNC: 198 MG/DL — HIGH (ref 70–99)
GLUCOSE BLDC GLUCOMTR-MCNC: 208 MG/DL — HIGH (ref 70–99)
GLUCOSE BLDC GLUCOMTR-MCNC: 264 MG/DL — HIGH (ref 70–99)
GLUCOSE BLDC GLUCOMTR-MCNC: 312 MG/DL — HIGH (ref 70–99)
GLUCOSE UR QL: 1000 MG/DL
KETONES UR-MCNC: ABNORMAL
LEUKOCYTE ESTERASE UR-ACNC: NEGATIVE — SIGNIFICANT CHANGE UP
NITRITE UR-MCNC: NEGATIVE — SIGNIFICANT CHANGE UP
PH UR: 5 — SIGNIFICANT CHANGE UP (ref 5–8)
PROT UR-MCNC: 30 MG/DL
RBC CASTS # UR COMP ASSIST: NEGATIVE /HPF — SIGNIFICANT CHANGE UP (ref 0–4)
SP GR SPEC: 1.01 — SIGNIFICANT CHANGE UP (ref 1.01–1.02)
UROBILINOGEN FLD QL: NEGATIVE MG/DL — SIGNIFICANT CHANGE UP
WBC UR QL: SIGNIFICANT CHANGE UP

## 2020-01-21 PROCEDURE — 90792 PSYCH DIAG EVAL W/MED SRVCS: CPT

## 2020-01-21 PROCEDURE — 99223 1ST HOSP IP/OBS HIGH 75: CPT | Mod: AI

## 2020-01-21 PROCEDURE — 71045 X-RAY EXAM CHEST 1 VIEW: CPT | Mod: 26

## 2020-01-21 PROCEDURE — 74177 CT ABD & PELVIS W/CONTRAST: CPT | Mod: 26

## 2020-01-21 RX ORDER — INSULIN LISPRO 100/ML
5 VIAL (ML) SUBCUTANEOUS
Refills: 0 | Status: DISCONTINUED | OUTPATIENT
Start: 2020-01-21 | End: 2020-01-23

## 2020-01-21 RX ORDER — METOCLOPRAMIDE HCL 10 MG
10 TABLET ORAL
Refills: 0 | Status: DISCONTINUED | OUTPATIENT
Start: 2020-01-21 | End: 2020-01-23

## 2020-01-21 RX ORDER — CLONAZEPAM 1 MG
0.5 TABLET ORAL
Refills: 0 | Status: DISCONTINUED | OUTPATIENT
Start: 2020-01-21 | End: 2020-01-21

## 2020-01-21 RX ORDER — OXYCODONE AND ACETAMINOPHEN 5; 325 MG/1; MG/1
2 TABLET ORAL EVERY 6 HOURS
Refills: 0 | Status: DISCONTINUED | OUTPATIENT
Start: 2020-01-21 | End: 2020-01-23

## 2020-01-21 RX ORDER — ALPRAZOLAM 0.25 MG
1 TABLET ORAL DAILY
Refills: 0 | Status: DISCONTINUED | OUTPATIENT
Start: 2020-01-21 | End: 2020-01-21

## 2020-01-21 RX ORDER — MORPHINE SULFATE 50 MG/1
2 CAPSULE, EXTENDED RELEASE ORAL ONCE
Refills: 0 | Status: DISCONTINUED | OUTPATIENT
Start: 2020-01-21 | End: 2020-01-21

## 2020-01-21 RX ORDER — INSULIN LISPRO 100/ML
VIAL (ML) SUBCUTANEOUS AT BEDTIME
Refills: 0 | Status: DISCONTINUED | OUTPATIENT
Start: 2020-01-21 | End: 2020-01-23

## 2020-01-21 RX ORDER — ONDANSETRON 8 MG/1
8 TABLET, FILM COATED ORAL
Refills: 0 | Status: DISCONTINUED | OUTPATIENT
Start: 2020-01-21 | End: 2020-01-23

## 2020-01-21 RX ORDER — SODIUM CHLORIDE 9 MG/ML
1000 INJECTION, SOLUTION INTRAVENOUS
Refills: 0 | Status: DISCONTINUED | OUTPATIENT
Start: 2020-01-21 | End: 2020-01-23

## 2020-01-21 RX ORDER — ALPRAZOLAM 0.25 MG
1 TABLET ORAL
Qty: 30 | Refills: 0
Start: 2020-01-21 | End: 2020-02-19

## 2020-01-21 RX ORDER — INSULIN GLARGINE 100 [IU]/ML
10 INJECTION, SOLUTION SUBCUTANEOUS AT BEDTIME
Refills: 0 | Status: DISCONTINUED | OUTPATIENT
Start: 2020-01-21 | End: 2020-01-23

## 2020-01-21 RX ORDER — INSULIN LISPRO 100/ML
VIAL (ML) SUBCUTANEOUS
Refills: 0 | Status: DISCONTINUED | OUTPATIENT
Start: 2020-01-21 | End: 2020-01-23

## 2020-01-21 RX ORDER — GLUCAGON INJECTION, SOLUTION 0.5 MG/.1ML
1 INJECTION, SOLUTION SUBCUTANEOUS ONCE
Refills: 0 | Status: DISCONTINUED | OUTPATIENT
Start: 2020-01-21 | End: 2020-01-23

## 2020-01-21 RX ORDER — ALPRAZOLAM 0.25 MG
2 TABLET ORAL DAILY
Refills: 0 | Status: DISCONTINUED | OUTPATIENT
Start: 2020-01-21 | End: 2020-01-23

## 2020-01-21 RX ORDER — KETOROLAC TROMETHAMINE 30 MG/ML
30 SYRINGE (ML) INJECTION ONCE
Refills: 0 | Status: DISCONTINUED | OUTPATIENT
Start: 2020-01-21 | End: 2020-01-21

## 2020-01-21 RX ORDER — DEXTROSE 50 % IN WATER 50 %
12.5 SYRINGE (ML) INTRAVENOUS ONCE
Refills: 0 | Status: DISCONTINUED | OUTPATIENT
Start: 2020-01-21 | End: 2020-01-23

## 2020-01-21 RX ORDER — DEXTROSE 50 % IN WATER 50 %
15 SYRINGE (ML) INTRAVENOUS ONCE
Refills: 0 | Status: DISCONTINUED | OUTPATIENT
Start: 2020-01-21 | End: 2020-01-23

## 2020-01-21 RX ORDER — PANTOPRAZOLE SODIUM 20 MG/1
40 TABLET, DELAYED RELEASE ORAL
Refills: 0 | Status: DISCONTINUED | OUTPATIENT
Start: 2020-01-21 | End: 2020-01-23

## 2020-01-21 RX ORDER — DEXTROSE 50 % IN WATER 50 %
25 SYRINGE (ML) INTRAVENOUS ONCE
Refills: 0 | Status: DISCONTINUED | OUTPATIENT
Start: 2020-01-21 | End: 2020-01-23

## 2020-01-21 RX ORDER — SUCRALFATE 1 G
1 TABLET ORAL
Refills: 0 | Status: DISCONTINUED | OUTPATIENT
Start: 2020-01-21 | End: 2020-01-23

## 2020-01-21 RX ADMIN — IOHEXOL 30 MILLILITER(S): 300 INJECTION, SOLUTION INTRAVENOUS at 00:40

## 2020-01-21 RX ADMIN — Medication 1 GRAM(S): at 11:57

## 2020-01-21 RX ADMIN — Medication 30 MILLIGRAM(S): at 01:10

## 2020-01-21 RX ADMIN — Medication 5 UNIT(S): at 11:57

## 2020-01-21 RX ADMIN — Medication 1 GRAM(S): at 05:42

## 2020-01-21 RX ADMIN — MORPHINE SULFATE 2 MILLIGRAM(S): 50 CAPSULE, EXTENDED RELEASE ORAL at 07:00

## 2020-01-21 RX ADMIN — Medication 1 GRAM(S): at 17:33

## 2020-01-21 RX ADMIN — MORPHINE SULFATE 2 MILLIGRAM(S): 50 CAPSULE, EXTENDED RELEASE ORAL at 07:11

## 2020-01-21 RX ADMIN — OXYCODONE AND ACETAMINOPHEN 2 TABLET(S): 5; 325 TABLET ORAL at 19:45

## 2020-01-21 RX ADMIN — OXYCODONE AND ACETAMINOPHEN 2 TABLET(S): 5; 325 TABLET ORAL at 18:45

## 2020-01-21 RX ADMIN — SODIUM CHLORIDE 150 MILLILITER(S): 9 INJECTION, SOLUTION INTRAVENOUS at 06:32

## 2020-01-21 RX ADMIN — PANTOPRAZOLE SODIUM 40 MILLIGRAM(S): 20 TABLET, DELAYED RELEASE ORAL at 05:41

## 2020-01-21 RX ADMIN — Medication 2 MILLIGRAM(S): at 16:30

## 2020-01-21 RX ADMIN — Medication 30 MILLIGRAM(S): at 00:40

## 2020-01-21 RX ADMIN — Medication 5 UNIT(S): at 08:15

## 2020-01-21 RX ADMIN — Medication 4: at 17:33

## 2020-01-21 RX ADMIN — PANTOPRAZOLE SODIUM 40 MILLIGRAM(S): 20 TABLET, DELAYED RELEASE ORAL at 17:33

## 2020-01-21 RX ADMIN — OXYCODONE AND ACETAMINOPHEN 2 TABLET(S): 5; 325 TABLET ORAL at 12:58

## 2020-01-21 RX ADMIN — Medication 5 UNIT(S): at 17:33

## 2020-01-21 RX ADMIN — Medication 2 MILLIGRAM(S): at 05:41

## 2020-01-21 RX ADMIN — OXYCODONE AND ACETAMINOPHEN 2 TABLET(S): 5; 325 TABLET ORAL at 11:58

## 2020-01-21 RX ADMIN — Medication 8: at 08:15

## 2020-01-21 RX ADMIN — INSULIN GLARGINE 10 UNIT(S): 100 INJECTION, SOLUTION SUBCUTANEOUS at 22:16

## 2020-01-21 RX ADMIN — Medication 2: at 11:57

## 2020-01-21 SDOH — SOCIAL STABILITY - SOCIAL INSECURITY: DISSAPEARANCE AND DEATH OF FAMILY MEMBER: Z63.4

## 2020-01-21 NOTE — PROGRESS NOTE ADULT - PROBLEM SELECTOR PLAN 1
iv hydration  iv pepcid/protonix  reglan/zofran prn  mylanta  eating better   GI on board   cont benzo  pt insisting on dilaudid

## 2020-01-21 NOTE — BEHAVIORAL HEALTH ASSESSMENT NOTE - NSBHCONSULTFOLLOWAFTERCARE_PSY_A_CORE FT
awaiting intake at [facility]. Patient more anxious since loss of Grandfather and is able to use only Xanax 2mg PO qd which helps; RX sent to [facility] for 30 day supply for Xanax 2mg PO qd x 30 days no refill until he gets intake appt. Given recent loss and collateral from wife, Patient has decreased his benzo use overall. This is to be used to help with bereavement sxs.

## 2020-01-21 NOTE — H&P ADULT - NSHPLABSRESULTS_GEN_ALL_CORE
LABS:                        15.5   11.07 )-----------( 412      ( 2020 22:23 )             45.5     01-    136  |  102  |  18  ----------------------------<  187<H>  4.2   |  25  |  1.00    Ca    9.4      2020 22:23    TPro  8.3  /  Alb  4.1  /  TBili  0.4  /  DBili  x   /  AST  21  /  ALT  46  /  AlkPhos  124<H>  01-20    PT/INR - ( 2020 22:23 )   PT: 10.9 sec;   INR: 0.97 ratio         PTT - ( 2020 22:23 )  PTT:30.3 sec  Urinalysis Basic - ( 2020 05:02 )    Color: Yellow / Appearance: Clear / S.015 / pH: x  Gluc: x / Ketone: Large  / Bili: Negative / Urobili: Negative mg/dL   Blood: x / Protein: 30 mg/dL / Nitrite: Negative   Leuk Esterase: Negative / RBC: x / WBC x   Sq Epi: x / Non Sq Epi: x / Bacteria: x      CAPILLARY BLOOD GLUCOSE      POCT Blood Glucose.: 264 mg/dL (2020 03:19)        RADIOLOGY & ADDITIONAL TESTS:    Imaging Personally Reviewed:  [ X] YES  [ ] NO

## 2020-01-21 NOTE — BEHAVIORAL HEALTH ASSESSMENT NOTE - NSBHCHARTREVIEWLAB_PSY_A_CORE FT
01-20    136  |  102  |  18  ----------------------------<  187<H>  4.2   |  25  |  1.00    Ca    9.4      20 Jan 2020 22:23    TPro  8.3  /  Alb  4.1  /  TBili  0.4  /  DBili  x   /  AST  21  /  ALT  46  /  AlkPhos  124<H>  01-20

## 2020-01-21 NOTE — H&P ADULT - HISTORY OF PRESENT ILLNESS
Pt is a 39 year old male with PMH of anxiety, asthma, DM1, gastritis, gastroparesis, multiple admissions for gastritis comes w/complain of n/v/d that started this morning.  Pt states similar last happened 2 weeks ago while visiting [place] and was at hospital there and been trying to be on bland diet/clears mostly over this time, and this morning started again.   Pt denies any unusual foods, no sick contacts, no sob, cp, palpitations.  pain is mostly mid abdomen,

## 2020-01-21 NOTE — BEHAVIORAL HEALTH ASSESSMENT NOTE - OTHER PAST PSYCHIATRIC HISTORY (INCLUDE DETAILS REGARDING ONSET, COURSE OF ILLNESS, INPATIENT/OUTPATIENT TREATMENT)
- no previous psychiatric admissions/suicide attempts/aggression  CVM: seen in [facility] walk[facility]in [facility] 10/3/17-11/4/17 for depression; reported he last took medication 3 months prior. He was started lexapro 5mg PO daily, hydroxyzine 50mg PO BID prn anxiety / insomnia and referred to [facility] which he did not follow up with. Instead went to his PMD and asked for Klonopin.   - 9/24/18 returned to the [facility] walk[facility] saying he wanted a medication to help with insomnia as Klonopin is not effective anymore. Pt denied consent to speak with family, partner, and friends. I-STOP was checked which showed visits to five different doctors for Klonopin medication since Nov. 2017. Patient was not given any benzodiazepines and was given  Seroquel 50mg to 75mg po hs, 25mg po qam for mood, anxiety and insomnia. He did not come for scheduled follow up appointment. He returned on 10/12/18 unexpectedly and said he wants something else as "nothing worked." He was given Lexapro 5mg PO qd again. patient did not return to Clinic. No clear evidence Patient was taking prescribed medications daily / had an effective trial of antidepressants with Pt's focus on benzos and opiates.

## 2020-01-21 NOTE — PROGRESS NOTE ADULT - ASSESSMENT
pt w/anxiety, asthma, DM1, gastritis, gastroparesis comes w/n/v/d. sxs improving with pain meds.   pt also was seen by psych as he has some suicdal ideation? about the loss of his grandfather. Pt cleared to be off observation

## 2020-01-21 NOTE — ED PROVIDER NOTE - CLINICAL SUMMARY MEDICAL DECISION MAKING FREE TEXT BOX
40 yo M with likely gastroparesis, doubt pancreatitis, ischemic bowel, uti, renal stone  -basic labs, ua, cx, lactate, lipase, CT abd/pel, cxr, ekg, iv, LR bolus hydration, toradol/tylenol prn, pepcid, reglan  -f/u results, reeval

## 2020-01-21 NOTE — BEHAVIORAL HEALTH ASSESSMENT NOTE - NSBHCHARTREVIEWVS_PSY_A_CORE FT
T(C): 36.8 (01-21-20 @ 06:41), Max: 37.1 (01-20-20 @ 20:53)  HR: 85 (01-21-20 @ 06:41) (76 - 86)  BP: 111/72 (01-21-20 @ 06:41) (111/72 - 129/89)  RR: 17 (01-21-20 @ 06:41) (17 - 19)  SpO2: 99% (01-21-20 @ 06:41) (96% - 99%)

## 2020-01-21 NOTE — ED PROVIDER NOTE - OBJECTIVE STATEMENT
40 yo M with epgiastric abd pain, n/v/d for 1 day.  Started at 2 pm, vomiting is continuous.  Feels like his typical gastroparesis.  States pain is severe.  No other complaints.   ROS: negative for fever, cough, headache, chest pain, shortness of breath, rash, paresthesia, and weakness--all other systems reviewed are negative.   PMH: anxiety, asthma, DM, gastroparesis; Meds: percocet, reglan, zofran prn; SH: Denies smoking/drinking/drug use

## 2020-01-21 NOTE — H&P ADULT - PROBLEM SELECTOR PLAN 1
admit to medicine  iv hydration  iv pepcid/protonix  reglan/zofran prn  mylanta  cont benzo  pt insisting on dilaudid

## 2020-01-21 NOTE — BEHAVIORAL HEALTH ASSESSMENT NOTE - SUMMARY
- less drug seeking behavior than before; seems to be on the right track since getting , having kids ot take care of etc  - bereavement +

## 2020-01-21 NOTE — CONSULT NOTE ADULT - ASSESSMENT
38 year old man with PMH of anxiety, asthma, DM1, gastritis, gastroparesis with numerous recurrent hospitalizations, presents to the hospital with nausea/viomiting and abdominal pain x 1 day.    Symptoms are similar to previous episodes of gastroparesis. Denies any recent travel, sick contacts, infections.     He reports symptoms immediately improved after receiving percocet.     Would recommend symptomatic treatment. Concern for component of drug seeking. Discussed need for close outpatient follow ensure dietary compliance, strict glycemic control, marijuana avoidance, limitation of narcotics.

## 2020-01-21 NOTE — BEHAVIORAL HEALTH ASSESSMENT NOTE - DESCRIPTION
asthma, DM1, gastritis, gastroparesis, frequent ED visits for nausea /vomiting, hx of cholecystectomy colonoscopy  '16; EGD with biopsy  12/13/2018 w/ diagnosis of Gastritis without bleeding, unspecified chronicity

## 2020-01-21 NOTE — PROGRESS NOTE ADULT - SUBJECTIVE AND OBJECTIVE BOX
Patient is a 39y old  Male who presents with a chief complaint of N/V (2020 15:19)      INTERVAL HPI/OVERNIGHT EVENTS: no events     MEDICATIONS  (STANDING):  dextrose 5%. 1000 milliLiter(s) (50 mL/Hr) IV Continuous <Continuous>  dextrose 50% Injectable 12.5 Gram(s) IV Push once  dextrose 50% Injectable 25 Gram(s) IV Push once  dextrose 50% Injectable 25 Gram(s) IV Push once  insulin glargine Injectable (LANTUS) 10 Unit(s) SubCutaneous at bedtime  insulin lispro (HumaLOG) corrective regimen sliding scale   SubCutaneous three times a day before meals  insulin lispro (HumaLOG) corrective regimen sliding scale   SubCutaneous at bedtime  insulin lispro Injectable (HumaLOG) 5 Unit(s) SubCutaneous before breakfast  insulin lispro Injectable (HumaLOG) 5 Unit(s) SubCutaneous before lunch  insulin lispro Injectable (HumaLOG) 5 Unit(s) SubCutaneous before dinner  lactated ringers. 1000 milliLiter(s) (150 mL/Hr) IV Continuous <Continuous>  pantoprazole  Injectable 40 milliGRAM(s) IV Push two times a day  sucralfate 1 Gram(s) Oral four times a day    MEDICATIONS  (PRN):  ALPRAZolam 2 milliGRAM(s) Oral daily PRN anxiety  aluminum hydroxide/magnesium hydroxide/simethicone Suspension 30 milliLiter(s) Oral every 6 hours PRN Dyspepsia  dextrose 40% Gel 15 Gram(s) Oral once PRN Blood Glucose LESS THAN 70 milliGRAM(s)/deciliter  glucagon  Injectable 1 milliGRAM(s) IntraMuscular once PRN Glucose LESS THAN 70 milligrams/deciliter  metoclopramide Injectable 10 milliGRAM(s) IV Push four times a day PRN nausea  ondansetron Injectable 8 milliGRAM(s) IV Push four times a day PRN Nausea and/or Vomiting  oxycodone    5 mG/acetaminophen 325 mG 2 Tablet(s) Oral every 6 hours PRN Severe Pain (7 - 10)        Allergies    No Known Allergies    Intolerances          Vital Signs Last 24 Hrs  T(C): 36.7 (2020 12:32), Max: 37.1 (2020 20:53)  T(F): 98.1 (2020 12:32), Max: 98.8 (2020 20:53)  HR: 92 (2020 12:32) (76 - 92)  BP: 119/65 (2020 12:32) (111/72 - 129/89)  BP(mean): --  RR: 12 (2020 12:32) (12 - 19)  SpO2: 94% (2020 12:32) (94% - 99%)    PHYSICAL EXAM:  GENERAL: NAD, well-groomed, well-developed  HEAD:  Atraumatic, Normocephalic  EYES: EOMI, PERRLA, conjunctiva and sclera clear  ENMT: No tonsillar erythema, exudates, or enlargement; Moist mucous membranes, Good dentition, No lesions  NECK: Supple, No JVD, Normal thyroid  NERVOUS SYSTEM:  Alert & Oriented X3, Good concentration; Motor Strength 5/5 B/L upper and lower extremities; DTRs 2+ intact and symmetric  CHEST/LUNG: Clear to percussion bilaterally; No rales, rhonchi, wheezing, or rubs  HEART: Regular rate and rhythm; No murmurs, rubs, or gallops  ABDOMEN: Soft, epigastric tenderness , Nondistended; Bowel sounds present  EXTREMITIES:  2+ Peripheral Pulses, No clubbing, cyanosis, or edema  LYMPH: No lymphadenopathy noted  SKIN: No rashes or lesions    LABS:                        15.5   11.07 )-----------( 412      ( 2020 22:23 )             45.5     01-20    136  |  102  |  18  ----------------------------<  187<H>  4.2   |  25  |  1.00    Ca    9.4      2020 22:23    TPro  8.3  /  Alb  4.1  /  TBili  0.4  /  DBili  x   /  AST  21  /  ALT  46  /  AlkPhos  124<H>  01-20    PT/INR - ( 2020 22:23 )   PT: 10.9 sec;   INR: 0.97 ratio         PTT - ( 2020 22:23 )  PTT:30.3 sec  Urinalysis Basic - ( 2020 05:02 )    Color: Yellow / Appearance: Clear / S.015 / pH: x  Gluc: x / Ketone: Large  / Bili: Negative / Urobili: Negative mg/dL   Blood: x / Protein: 30 mg/dL / Nitrite: Negative   Leuk Esterase: Negative / RBC: Negative /HPF / WBC 3-5   Sq Epi: x / Non Sq Epi: Few / Bacteria: x          CAPILLARY BLOOD GLUCOSE      POCT Blood Glucose.: 198 mg/dL (2020 11:14)  POCT Blood Glucose.: 312 mg/dL (2020 08:01)  POCT Blood Glucose.: 264 mg/dL (2020 03:19)      RADIOLOGY & ADDITIONAL TESTS:    Imaging Personally Reviewed:  [ ] YES  [ ] NO    Consultant(s) Notes Reviewed:  [ ] YES  [ ] NO    Care Discussed with Consultants/Other Providers [ ] YES  [ ] NO

## 2020-01-21 NOTE — BEHAVIORAL HEALTH ASSESSMENT NOTE - SUICIDE PROTECTIVE FACTORS
Supportive social network of family or friends/Identifies reasons for living/Has future plans/Cultural, spiritual and/or moral attitudes against suicide/Engaged in work or school/Responsibility to family and others

## 2020-01-21 NOTE — BEHAVIORAL HEALTH ASSESSMENT NOTE - HPI (INCLUDE ILLNESS QUALITY, SEVERITY, DURATION, TIMING, CONTEXT, MODIFYING FACTORS, ASSOCIATED SIGNS AND SYMPTOMS)
PATIENT LAST SEEN BY WRITER ON 6/26/19 DURING WHICH TIME BENZODIAZEPINE SEEKING BEHAVIOR WAS NOTED; 38 yo AAM, single, has a girlfriend, father of a 12 yo daughter (lives in [place]), noncaregiver, lives alone in his apt unemployed, BIB EMS for abdominal pain, with chronic GI complaints including multiple ED visits, + chronic pain (Percocet, Tramadol), with hx of anxiety, + > 20 year hx of daily Marijuana use, hx of street-bought Xanax misuse; + hx of multiple prescribers for Klonopin & Percocet, + legal hx (arrest for cocaine dealing); was seen 2x at [facility] with indication of drug-seeking behavior and failure to follow recommendations. Patient came in last night from home via EMS for abdominal pain. Chart notable for Patient "insisting on Dilaudid."     UTOX "+" for opiate and THC on admission in June 2019; UTOX not done on this admission. Patient has no previous psychiatric admissions/suicide attempts; + legal history (arrests 10 years ago for possession and distribution of crack/cocaine, identity max, EMIR). Patient requested to speak to a psychiatrist about his anxiety during this stay.     NOTE FROM 6/26/29: "Patient in bed, calm cooperative with no clinical signs of anxiety/anxiousness. Patient asks from the get-go for benzos stating 'My klonopin is not working anymore." He requests Xanax. When asked about his anxiety, he described ambiguous symptoms of "room closing in on me" etc. He was asked about SSRIs as they are used for GDA and he states that "they don't work" and mentions that he was at [facility] and stated that he had a full 6-week antidepressant trial at theraputic dose which is not supported by records. Patient otherwise endorses stable mood, and baseline sleep / appetite / energy level / concentration. Denies any symptoms of hypomania/lia/psychosis/major depression. Denies any active or passive suicidal or homicidal ideation. Names protective factors (chloe; hope for future). Patient had no other complaints / requests."     ISTOP Reference #: [id number]   Xanax 1mg PO qd # 30 (dose increased from 0.5mg PO bid since October '19) oxy-acetom 5/325mg PO 4x/day x 7 days; prescribed by Horacio Morales - filled 1/10/20    Klonopin 1mg PO qd ; # 30 prescribed by Horacio Morales, filled 12/20/19 PATIENT LAST SEEN BY WRITER ON 19 DURING WHICH TIME BENZODIAZEPINE SEEKING BEHAVIOR WAS NOTED: 38 yo AAM, single, has a girlfriend, father of a 12 yo daughter ([place]), noncaregiver, lives alone in his apt unemployed, BIB EMS for abdominal pain, with chronic GI complaints including multiple ED visits, + chronic pain (Percocet, Tramadol), with hx of anxiety, + > 20 year hx of daily Marijuana use, hx of street-bought Xanax misuse; + hx of multiple prescribers for Klonopin & Percocet, + legal hx (arrest for cocaine dealing); was seen 2x at [facility] with indication of drug-seeking behavior and failure to follow recommendations. Patient came in last night from home via EMS for abdominal pain. Chart notable for Patient "insisting on Dilaudid."     UTOX "+" for opiate and THC on admission in 2019; UTOX not done on this admission. Patient has no previous psychiatric admissions/suicide attempts; + legal history (arrests 10 years ago for possession and distribution of crack/cocaine, identity max, EMIR). Patient requested to speak to a psychiatrist about his anxiety during this stay.     NOTE FROM 29: "Patient in bed, calm cooperative with no clinical signs of anxiety/anxiousness. Patient asks from the get-go for benzos stating 'My klonopin is not working anymore." He requests Xanax. When asked about his anxiety, he described ambiguous symptoms of "room closing in on me" etc. He was asked about SSRIs as they are used for GDA and he states that "they don't work" and mentions that he was at [facility] and stated that he had a full 6-week antidepressant trial at theraputic dose which is not supported by records. Patient otherwise endorses stable mood, and baseline sleep / appetite / energy level / concentration. Denies any symptoms of hypomania/lia/psychosis/major depression. Denies any active or passive suicidal or homicidal ideation. Names protective factors (chloe; hope for future). Patient had no other complaints / requests."     ISTOP Reference #: [id number]   Xanax 1mg PO qd # 30 (dose increased from 0.5mg PO bid since ) oxy-acetom 5/325mg PO 4x/day x 7 days; prescribed by Horacio Morales - filled 1/10/20; Klonopin 1mg PO qd ; # 30 prescribed by Horacio Morales, filled 19    EXAM TODAY: Patient seems to be doing better in life since last seen and is on a better track - he got , he has a very supportive wife, has manifested help seeking behavior and is awaiting intake at [facility] [place]. he weaned himself off from Xanax 5mg PO qd down to 1mg. He has been needing 2mg in the last 2 months s/p loss of his grandfather whom he took care of as he was ill, was very close and was his friend/father figure/grandfather and employer. He misses him daily, when he  Pt's grief was so great he felt he could not live with such pain , [place], wears his jacket to feel close to him. He now is the head of the family and is trying his best in that new role. Patient's Xanax prescriber is his PMD who recommended he see an outpt psychiatrist as well to see if he needs anything else besides the Xanax. Patient says that he has taken 2 tabs of the Xanax 1mg once a day and it helps. He denies taking any more. Patient any symptoms of hypomania/lia/psychosis/major depression/ anxiety/panic. + reports sxs of bereavement. Denies any active or passive suicidal or homicidal ideation. Names protective factors (chloe; family; hope for future). Endorses medication compliance. Denies adverse medication side effects. Denies substance abuse.     COLLATERAL FROM WIFE JACQUES: confirmed everything Patient said including Patient taking care of his grandfather until the very end and holding him when he dies. Wife confirms that Patient has been taking Xanax just once a day and has been trying not to overuse. He has been functioning as a  and father. She denies any safety concerns.

## 2020-01-21 NOTE — CONSULT NOTE ADULT - SUBJECTIVE AND OBJECTIVE BOX
Chief Complaint:  Patient is a 39y old  Male who presents with a chief complaint of N/V (2020 05:10)      HPI:  Pt is a 39 year old male with PMH of anxiety, asthma, DM1, gastritis, gastroparesis, multiple admissions for gastritis comes w/complain of n/v/d that started this morning.  Pt states similar last happened 2 weeks ago while visiting [place] and was at hospital there and been trying to be on bland diet/clears mostly over this time, and this morning started again.   Pt denies any unusual foods, no sick contacts, no sob, cp, palpitations.  pain is mostly mid abdomen, (2020 05:10). GI consulted for the above. He has a long-standing history of numerous admissions (almost once/month) for abdominal pain, nausea/vomiting. Reports that he takes opiods daily for abdominal pain. Also smokes marijuana daily however recently quit (not sure what date). Not entirely compliant with gastroparesis diet. Reports he was diagnosed ~10 years ago with gastric emptying study.       PMH/PSH:PAST MEDICAL & SURGICAL HISTORY:  Uncomplicated asthma, unspecified asthma severity, unspecified whether persistent  Gastritis, presence of bleeding unspecified, unspecified chronicity, unspecified gastritis type  Controlled diabetes mellitus type 1 without complications  Anxiety  Gastritis  Asthma  DM type 1 (diabetes mellitus, type 1)  History of cholecystectomy  S/P cholecystectomy      Allergies:  No Known Allergies      Medications:  ALPRAZolam 2 milliGRAM(s) Oral daily PRN  aluminum hydroxide/magnesium hydroxide/simethicone Suspension 30 milliLiter(s) Oral every 6 hours PRN  dextrose 40% Gel 15 Gram(s) Oral once PRN  dextrose 5%. 1000 milliLiter(s) IV Continuous <Continuous>  dextrose 50% Injectable 12.5 Gram(s) IV Push once  dextrose 50% Injectable 25 Gram(s) IV Push once  dextrose 50% Injectable 25 Gram(s) IV Push once  glucagon  Injectable 1 milliGRAM(s) IntraMuscular once PRN  insulin glargine Injectable (LANTUS) 10 Unit(s) SubCutaneous at bedtime  insulin lispro (HumaLOG) corrective regimen sliding scale   SubCutaneous three times a day before meals  insulin lispro (HumaLOG) corrective regimen sliding scale   SubCutaneous at bedtime  insulin lispro Injectable (HumaLOG) 5 Unit(s) SubCutaneous before breakfast  insulin lispro Injectable (HumaLOG) 5 Unit(s) SubCutaneous before lunch  insulin lispro Injectable (HumaLOG) 5 Unit(s) SubCutaneous before dinner  lactated ringers. 1000 milliLiter(s) IV Continuous <Continuous>  metoclopramide Injectable 10 milliGRAM(s) IV Push four times a day PRN  ondansetron Injectable 8 milliGRAM(s) IV Push four times a day PRN  oxycodone    5 mG/acetaminophen 325 mG 2 Tablet(s) Oral every 6 hours PRN  pantoprazole  Injectable 40 milliGRAM(s) IV Push two times a day  sucralfate 1 Gram(s) Oral four times a day      Review of Systems:    General:  No weight loss, fevers, chills, night sweats, fatigue,   Eyes:  Good vision, no reported pain  ENT:  No sore throat, pain, runny nose, dysphagia  CV:  No pain, palpitations, hypo/hypertension  Resp:  No dyspnea, cough, tachypnea, wheezing  GI:  as per HPI   :  No pain, bleeding, incontinence, nocturia  Muscle:  No pain, weakness  Breast:  No pain, abscess, mass, discharge  Neuro:  No weakness, tingling, memory problems  Psych:  No fatigue, insomnia, mood problems, depression  Endocrine:  No polyuria, polydipsia, cold/heat intolerance  Heme:  No petechiae, ecchymosis, easy bruisability  Skin:  No rash, tattoos, scars, edema    Relevant Family History:   FAMILY HISTORY:  Family history of diabetes mellitus (DM) (Father, Mother, Grandparent)  Family history of diabetes mellitus (Father, Mother)      Relevant Social History: Alcohol ( -) , Tobacco ( -) , Illicit drugs (- )     Physical Exam:    Vital Signs:  Vital Signs Last 24 Hrs  T(C): 36.7 (2020 12:32), Max: 37.1 (2020 20:53)  T(F): 98.1 (2020 12:32), Max: 98.8 (2020 20:53)  HR: 92 (2020 12:32) (76 - 92)  BP: 119/65 (2020 12:32) (111/72 - 129/89)  BP(mean): --  RR: 12 (2020 12:32) (12 - 19)  SpO2: 94% (2020 12:32) (94% - 99%)  Daily Height in cm: 165.1 (2020 06:41)    Daily Weight in k.2 (2020 06:41)    General:  NAD  HEENT:  NC/AT,  conjunctivae clear and pink, no thyromegaly, nodules, adenopathy, no JVD, anicteric sclera  Chest:  Full & symmetric excursion, no increased effort, breath sounds clear  Cardiovascular:  Regular rhythm, S1, S2, no murmur/rub/S3/S4, no abdominal bruit, no edema  Abdomen:  Soft, mild ttp diffusely, non distended, normoactive bowel sounds, no rebound/guarding    Extremities:  no cyanosis, clubbing or edema  Skin:  No rash/erythema/ecchymoses/petechiae/wounds/abscess/warm/dry  Neuro/Psych:  Alert, oriented, no asterixis, no tremor, no encephalopathy    Laboratory:                          15.5   11.07 )-----------( 412      ( 2020 22:23 )             45.5         136  |  102  |  18  ----------------------------<  187<H>  4.2   |  25  |  1.00    Ca    9.4      2020 22:23    TPro  8.3  /  Alb  4.1  /  TBili  0.4  /  DBili  x   /  AST  21  /  ALT  46  /  AlkPhos  124<H>  20    LIVER FUNCTIONS - ( 2020 22:23 )  Alb: 4.1 g/dL / Pro: 8.3 gm/dL / ALK PHOS: 124 U/L / ALT: 46 U/L / AST: 21 U/L / GGT: x           PT/INR - ( 2020 22:23 )   PT: 10.9 sec;   INR: 0.97 ratio         PTT - ( 2020 22:23 )  PTT:30.3 sec  Urinalysis Basic - ( 2020 05:02 )    Color: Yellow / Appearance: Clear / S.015 / pH: x  Gluc: x / Ketone: Large  / Bili: Negative / Urobili: Negative mg/dL   Blood: x / Protein: 30 mg/dL / Nitrite: Negative   Leuk Esterase: Negative / RBC: Negative /HPF / WBC 3-5   Sq Epi: x / Non Sq Epi: Few / Bacteria: x      Amylase Serum--      Lipase serum21 U/L<L>       Ammonia--      Intake and Output    20 @ 07:01  -  20 @ 07:00  --------------------------------------------------------  IN: 250 mL / OUT: 0 mL / NET: 250 mL    20 @ 07:01  -  20 @ 15:19  --------------------------------------------------------  IN: 0 mL / OUT: 0 mL / NET: 0 mL        Imaging:    < from: CT Abdomen and Pelvis w/ Oral Cont and w/ IV Cont (20 @ 01:40) >    EXAM:  CT ABDOMEN AND PELVIS OC IC                            PROCEDURE DATE:  2020          INTERPRETATION:  CLINICAL INFORMATION: Left upper quadrant abdominal pain, nausea, vomiting.    COMPARISON: 2019    PROCEDURE:   CT of the Abdomen and Pelvis was performed with intravenous contrast.   Intravenous contrast: 90 ml Omnipaque 350. 10 ml discarded.  Oral contrast: positive contrast was administered.  Sagittal and coronal reformats were performed.    FINDINGS:    LOWER CHEST: Within normal limits.    LIVER: Within normal limits.  BILE DUCTS: Normal caliber.  GALLBLADDER: Postcholecystectomy.  SPLEEN: Within normal limits.  PANCREAS: Pancreatic atrophy as seen on the prior exam.  ADRENALS: Mild nodular thickening of the left adrenal gland, unchanged.  KIDNEYS/URETERS: Kidneys enhance symmetrically without hydronephrosis or focal renal parenchymal lesion.    BLADDER: Mild thickening of the urinary bladder walls likely due to underdistention.  REPRODUCTIVE ORGANS: Prostate gland and seminal vesicles are unremarkable.    BOWEL: No bowel obstruction or overt bowel wall thickening. Appendix is normal.  PERITONEUM: No ascites or pneumoperitoneum. No loculated collection to suggest abscess. No mesenteric lymphadenopathy.  VESSELS: Within normal limits.  RETROPERITONEUM/LYMPH NODES: No lymphadenopathy.    ABDOMINAL WALL: Symmetric skin thickening of the ventral abdominal wall, unchanged.  BONES: Within normal limits.    IMPRESSION:     No acute findings on CT of the abdomen and pelvis to explain patient's clinical symptoms.        SEBAS GOLDMAN D.O. ATTENDING RADIOLOGIST  This document has been electronically signed. 2020  1:43AM

## 2020-01-21 NOTE — BEHAVIORAL HEALTH ASSESSMENT NOTE - RISK ASSESSMENT
Low Acute Suicide Risk Chronic risk factors: single, male gender, hx of substance use including DUI; + legal history; documented hx of drug-seeking behavior;; hx of not following up with recommendations / referrals. Protective factors: young; healthy; no history of psych hospitalizations, no known hx of suicide attempts; no known hx of self-injurious behavior; no hx of physical aggression/violence; stable housing; recently  and engaged with family; displays help seeking behavior; denies access to guns.

## 2020-01-21 NOTE — H&P ADULT - NSHPPHYSICALEXAM_GEN_ALL_CORE
Vital Signs Last 24 Hrs  T(C): 36.8 (21 Jan 2020 04:28), Max: 37.1 (20 Jan 2020 20:53)  T(F): 98.2 (21 Jan 2020 04:28), Max: 98.8 (20 Jan 2020 20:53)  HR: 78 (21 Jan 2020 04:28) (76 - 86)  BP: 123/84 (21 Jan 2020 04:28) (121/80 - 129/89)  BP(mean): --  RR: 18 (21 Jan 2020 04:28) (17 - 19)  SpO2: 96% (21 Jan 2020 04:28) (96% - 98%)    PHYSICAL EXAM:    GENERAL: NAD, well-groomed, well-developed  HEAD:  Atraumatic, Normocephalic  EYES: EOMI, PERRLA, conjunctiva and sclera clear  ENMT: No tonsillar erythema, exudates, or enlargement; Moist mucous membranes, No lesions  NECK: Supple, No JVD, Normal thyroid  NERVOUS SYSTEM:  Alert & Oriented X3, Good concentration; Motor Strength 5/5 B/L upper and lower extremities; DTRs 2+ intact and symmetric  CHEST/LUNG: Clear to percussion bilaterally; No rales, rhonchi, wheezing, or rubs  HEART: Regular rate and rhythm; No rubs, or gallops, +S1,S2  ABDOMEN: Soft, +mid abdmen tenderness no rebound or guarding, Nondistended; Bowel sounds present  EXTREMITIES:  2+ Peripheral Pulses, No clubbing, cyanosis, or edema  LYMPH: No cervical adenopathy  RECTAL: deferred  BREAST: No palpatble masses, skin no lesions   : deferred  SKIN: No rashes or lesions    IMPROVE VTE Individual Risk Assessment        RISK                                                          Points  [  ] Previous VTE                                                3  [  ] Thrombophilia                                             2  [  ] Lower limb paralysis                                    2        (unable to hold up >15 seconds)    [  ] Current Cancer                                             2         (within 6 months)  [  ] Immobilization > 24 hrs                              1  [  ] ICU/CCU stay > 24 hours                            1  [  ] Age > 60                                                    1  IMPROVE VTE Score _____0____

## 2020-01-21 NOTE — BEHAVIORAL HEALTH ASSESSMENT NOTE - NSBHCHARTREVIEWIMAGING_PSY_A_CORE FT
6/13/19 CT ABD: 1. No evidence of an acute infectious or inflammatory process within the abdomen and pelvis. 2. Small nonspecific hazy groundglass opacities in the right lower lobe. repeat CT ABD 1/21/20 - no acute findings

## 2020-01-21 NOTE — ED PROVIDER NOTE - PROGRESS NOTE DETAILS
labs and imaging unremarkable thus far  pt. still reports abd pain and nausea  will admit for further care, endorsed to Dr. Gomes

## 2020-01-22 LAB
ANION GAP SERPL CALC-SCNC: 5 MMOL/L — SIGNIFICANT CHANGE UP (ref 5–17)
BUN SERPL-MCNC: 19 MG/DL — SIGNIFICANT CHANGE UP (ref 7–23)
CALCIUM SERPL-MCNC: 8.6 MG/DL — SIGNIFICANT CHANGE UP (ref 8.5–10.1)
CHLORIDE SERPL-SCNC: 106 MMOL/L — SIGNIFICANT CHANGE UP (ref 96–108)
CO2 SERPL-SCNC: 28 MMOL/L — SIGNIFICANT CHANGE UP (ref 22–31)
CREAT SERPL-MCNC: 0.97 MG/DL — SIGNIFICANT CHANGE UP (ref 0.5–1.3)
CULTURE RESULTS: SIGNIFICANT CHANGE UP
GLUCOSE BLDC GLUCOMTR-MCNC: 105 MG/DL — HIGH (ref 70–99)
GLUCOSE BLDC GLUCOMTR-MCNC: 131 MG/DL — HIGH (ref 70–99)
GLUCOSE BLDC GLUCOMTR-MCNC: 136 MG/DL — HIGH (ref 70–99)
GLUCOSE BLDC GLUCOMTR-MCNC: 177 MG/DL — HIGH (ref 70–99)
GLUCOSE BLDC GLUCOMTR-MCNC: 80 MG/DL — SIGNIFICANT CHANGE UP (ref 70–99)
GLUCOSE SERPL-MCNC: 79 MG/DL — SIGNIFICANT CHANGE UP (ref 70–99)
HBA1C BLD-MCNC: 8.3 % — HIGH (ref 4–5.6)
HCT VFR BLD CALC: 36.8 % — LOW (ref 39–50)
HGB BLD-MCNC: 12.4 G/DL — LOW (ref 13–17)
MCHC RBC-ENTMCNC: 27.7 PG — SIGNIFICANT CHANGE UP (ref 27–34)
MCHC RBC-ENTMCNC: 33.7 GM/DL — SIGNIFICANT CHANGE UP (ref 32–36)
MCV RBC AUTO: 82.1 FL — SIGNIFICANT CHANGE UP (ref 80–100)
NRBC # BLD: 0 /100 WBCS — SIGNIFICANT CHANGE UP (ref 0–0)
PLATELET # BLD AUTO: 329 K/UL — SIGNIFICANT CHANGE UP (ref 150–400)
POTASSIUM SERPL-MCNC: 3.6 MMOL/L — SIGNIFICANT CHANGE UP (ref 3.5–5.3)
POTASSIUM SERPL-SCNC: 3.6 MMOL/L — SIGNIFICANT CHANGE UP (ref 3.5–5.3)
RBC # BLD: 4.48 M/UL — SIGNIFICANT CHANGE UP (ref 4.2–5.8)
RBC # FLD: 13.6 % — SIGNIFICANT CHANGE UP (ref 10.3–14.5)
SODIUM SERPL-SCNC: 139 MMOL/L — SIGNIFICANT CHANGE UP (ref 135–145)
SPECIMEN SOURCE: SIGNIFICANT CHANGE UP
WBC # BLD: 6.77 K/UL — SIGNIFICANT CHANGE UP (ref 3.8–10.5)
WBC # FLD AUTO: 6.77 K/UL — SIGNIFICANT CHANGE UP (ref 3.8–10.5)

## 2020-01-22 PROCEDURE — 99231 SBSQ HOSP IP/OBS SF/LOW 25: CPT

## 2020-01-22 PROCEDURE — 99233 SBSQ HOSP IP/OBS HIGH 50: CPT

## 2020-01-22 RX ADMIN — OXYCODONE AND ACETAMINOPHEN 2 TABLET(S): 5; 325 TABLET ORAL at 22:20

## 2020-01-22 RX ADMIN — OXYCODONE AND ACETAMINOPHEN 2 TABLET(S): 5; 325 TABLET ORAL at 15:55

## 2020-01-22 RX ADMIN — OXYCODONE AND ACETAMINOPHEN 2 TABLET(S): 5; 325 TABLET ORAL at 00:37

## 2020-01-22 RX ADMIN — Medication 1 GRAM(S): at 05:15

## 2020-01-22 RX ADMIN — Medication 1 GRAM(S): at 11:37

## 2020-01-22 RX ADMIN — OXYCODONE AND ACETAMINOPHEN 2 TABLET(S): 5; 325 TABLET ORAL at 10:11

## 2020-01-22 RX ADMIN — Medication 2: at 11:33

## 2020-01-22 RX ADMIN — Medication 1 GRAM(S): at 17:29

## 2020-01-22 RX ADMIN — OXYCODONE AND ACETAMINOPHEN 2 TABLET(S): 5; 325 TABLET ORAL at 23:00

## 2020-01-22 RX ADMIN — OXYCODONE AND ACETAMINOPHEN 2 TABLET(S): 5; 325 TABLET ORAL at 00:57

## 2020-01-22 RX ADMIN — Medication 5 UNIT(S): at 11:34

## 2020-01-22 RX ADMIN — Medication 2 MILLIGRAM(S): at 14:01

## 2020-01-22 RX ADMIN — PANTOPRAZOLE SODIUM 40 MILLIGRAM(S): 20 TABLET, DELAYED RELEASE ORAL at 05:15

## 2020-01-22 RX ADMIN — Medication 5 UNIT(S): at 17:29

## 2020-01-22 RX ADMIN — Medication 1 GRAM(S): at 00:28

## 2020-01-22 RX ADMIN — OXYCODONE AND ACETAMINOPHEN 2 TABLET(S): 5; 325 TABLET ORAL at 09:11

## 2020-01-22 RX ADMIN — INSULIN GLARGINE 10 UNIT(S): 100 INJECTION, SOLUTION SUBCUTANEOUS at 22:33

## 2020-01-22 RX ADMIN — PANTOPRAZOLE SODIUM 40 MILLIGRAM(S): 20 TABLET, DELAYED RELEASE ORAL at 17:29

## 2020-01-22 NOTE — PROGRESS NOTE ADULT - SUBJECTIVE AND OBJECTIVE BOX
HPI:  Pt is a 39 year old male with PMH of anxiety, asthma, DM1, gastritis, gastroparesis, multiple admissions for gastritis comes w/complain of n/v/d.  Admitted for Gastroparesis.    Today:  Pt states pain has improved.       PAST MEDICAL & SURGICAL HISTORY:  Uncomplicated asthma, unspecified asthma severity, unspecified whether persistent  Gastritis, presence of bleeding unspecified, unspecified chronicity, unspecified gastritis type  Controlled diabetes mellitus type 1 without complications  Anxiety  Gastritis  Asthma  DM type 1 (diabetes mellitus, type 1)  History of cholecystectomy  S/P cholecystectomy      REVIEW OF SYSTEMS:  Mild abdominal pain.      MEDICATIONS  (STANDING):  dextrose 5%. 1000 milliLiter(s) (50 mL/Hr) IV Continuous <Continuous>  dextrose 50% Injectable 12.5 Gram(s) IV Push once  dextrose 50% Injectable 25 Gram(s) IV Push once  dextrose 50% Injectable 25 Gram(s) IV Push once  insulin glargine Injectable (LANTUS) 10 Unit(s) SubCutaneous at bedtime  insulin lispro (HumaLOG) corrective regimen sliding scale   SubCutaneous three times a day before meals  insulin lispro (HumaLOG) corrective regimen sliding scale   SubCutaneous at bedtime  insulin lispro Injectable (HumaLOG) 5 Unit(s) SubCutaneous before breakfast  insulin lispro Injectable (HumaLOG) 5 Unit(s) SubCutaneous before lunch  insulin lispro Injectable (HumaLOG) 5 Unit(s) SubCutaneous before dinner  lactated ringers. 1000 milliLiter(s) (150 mL/Hr) IV Continuous <Continuous>  pantoprazole  Injectable 40 milliGRAM(s) IV Push two times a day  sucralfate 1 Gram(s) Oral four times a day    MEDICATIONS  (PRN):  ALPRAZolam 2 milliGRAM(s) Oral daily PRN anxiety  aluminum hydroxide/magnesium hydroxide/simethicone Suspension 30 milliLiter(s) Oral every 6 hours PRN Dyspepsia  dextrose 40% Gel 15 Gram(s) Oral once PRN Blood Glucose LESS THAN 70 milliGRAM(s)/deciliter  glucagon  Injectable 1 milliGRAM(s) IntraMuscular once PRN Glucose LESS THAN 70 milligrams/deciliter  metoclopramide Injectable 10 milliGRAM(s) IV Push four times a day PRN nausea  ondansetron Injectable 8 milliGRAM(s) IV Push four times a day PRN Nausea and/or Vomiting  oxycodone    5 mG/acetaminophen 325 mG 2 Tablet(s) Oral every 6 hours PRN Severe Pain (7 - 10)      Allergies  No Known Allergies        FAMILY HISTORY:  Family history of diabetes mellitus (DM) (Father, Mother, Grandparent)  Family history of diabetes mellitus (Father, Mother)      Vital Signs Last 24 Hrs  T(C): 36.5 (2020 16:39), Max: 37 (2020 05:39)  T(F): 97.7 (2020 16:39), Max: 98.6 (2020 05:39)  HR: 59 (2020 16:39) (59 - 71)  BP: 116/77 (2020 16:39) (104/65 - 132/83)  RR: 18 (2020 16:39) (16 - 18)  SpO2: 97% (2020 16:39) (95% - 97%)    PHYSICAL EXAM:    GENERAL: NAD, well-groomed, well-developed  HEAD:  Atraumatic, Normocephalic  EYES: EOMI, PERRLA, conjunctiva and sclera clear  ENMT: No tonsillar erythema, exudates, or enlargement; Moist mucous membranes, Good dentition, No lesions  NECK: Supple, No JVD, Normal thyroid  NERVOUS SYSTEM:  Alert & Oriented X3, Good concentration  CHEST/LUNG: Clear to percussion bilaterally; No rales, rhonchi, wheezing, or rubs  HEART: Regular rate and rhythm; No murmurs, rubs, or gallops  ABDOMEN: Soft, Nontender, Nondistended; Bowel sounds present  EXTREMITIES:  2+ Peripheral Pulses, No clubbing, cyanosis, or edema      LABS:                        12.4   6.77  )-----------( 329      ( 2020 07:47 )             36.8     -    139  |  106  |  19  ----------------------------<  79  3.6   |  28  |  0.97    Ca    8.6      2020 07:47    TPro  8.3  /  Alb  4.1  /  TBili  0.4  /  DBili  x   /  AST  21  /  ALT  46  /  AlkPhos  124<H>  01-20    PT/INR - ( 2020 22:23 )   PT: 10.9 sec;   INR: 0.97 ratio         PTT - ( 2020 22:23 )  PTT:30.3 sec  Urinalysis Basic - ( 2020 05:02 )    Color: Yellow / Appearance: Clear / S.015 / pH: x  Gluc: x / Ketone: Large  / Bili: Negative / Urobili: Negative mg/dL   Blood: x / Protein: 30 mg/dL / Nitrite: Negative   Leuk Esterase: Negative / RBC: Negative /HPF / WBC 3-5   Sq Epi: x / Non Sq Epi: Few / Bacteria: x

## 2020-01-22 NOTE — PROGRESS NOTE BEHAVIORAL HEALTH - RISK ASSESSMENT
Chronic risk factors: single, male gender, hx of substance use including DUI; + legal history; documented hx of drug-seeking behavior;; hx of not following up with recommendations / referrals. Protective factors: young; healthy; no history of psych hospitalizations, no known hx of suicide attempts; no known hx of self-injurious behavior; no hx of physical aggression/violence; stable housing; recently  and engaged with family; displays help seeking behavior; denies access to guns.

## 2020-01-22 NOTE — PROGRESS NOTE ADULT - SUBJECTIVE AND OBJECTIVE BOX
Gastroenterology  Patient seen and examined bedside resting comfortably.  Abdominal pain, nausea improved.     T(F): 97.4 (01-22-20 @ 11:10), Max: 98.6 (01-22-20 @ 05:39)  HR: 68 (01-22-20 @ 11:10) (67 - 71)  BP: 105/70 (01-22-20 @ 11:10) (104/65 - 132/83)  RR: 16 (01-22-20 @ 11:10) (16 - 16)  SpO2: 97% (01-22-20 @ 11:10) (95% - 97%)  Wt(kg): --  CAPILLARY BLOOD GLUCOSE      POCT Blood Glucose.: 177 mg/dL (22 Jan 2020 11:20)  POCT Blood Glucose.: 80 mg/dL (22 Jan 2020 08:10)  POCT Blood Glucose.: 148 mg/dL (21 Jan 2020 22:14)  POCT Blood Glucose.: 208 mg/dL (21 Jan 2020 17:07)      PHYSICAL EXAM:  General: NAD, WDWN.   Neuro:  Alert & responsive  HEENT: NCAT, EOMI, conjunctiva clear  CV: +S1+S2 regular rate and rhythm  Lung: clear to ausculation bilaterally, respirations nonlabored, good inspiratory effort  Abdomen: soft, Non Tender, No distention Normal active BS  Extremities: no cyanosis, clubbing or edema    LABS:                        12.4   6.77  )-----------( 329      ( 22 Jan 2020 07:47 )             36.8     01-22    139  |  106  |  19  ----------------------------<  79  3.6   |  28  |  0.97    Ca    8.6      22 Jan 2020 07:47    TPro  8.3  /  Alb  4.1  /  TBili  0.4  /  DBili  x   /  AST  21  /  ALT  46  /  AlkPhos  124<H>  01-20    LIVER FUNCTIONS - ( 20 Jan 2020 22:23 )  Alb: 4.1 g/dL / Pro: 8.3 gm/dL / ALK PHOS: 124 U/L / ALT: 46 U/L / AST: 21 U/L / GGT: x           PT/INR - ( 20 Jan 2020 22:23 )   PT: 10.9 sec;   INR: 0.97 ratio         PTT - ( 20 Jan 2020 22:23 )  PTT:30.3 sec  I&O's Detail    21 Jan 2020 07:01  -  22 Jan 2020 07:00  --------------------------------------------------------  IN:    Oral Fluid: 250 mL  Total IN: 250 mL    OUT:  Total OUT: 0 mL    Total NET: 250 mL      22 Jan 2020 07:01  -  22 Jan 2020 13:48  --------------------------------------------------------  IN:    Oral Fluid: 375 mL  Total IN: 375 mL    OUT:  Total OUT: 0 mL    Total NET: 375 mL        01-22 @ 07:47    139 | 106 | 19  /8.6 | -- | --  _______________________/  3.6 | 28 | 0.97                           \par   Lipase, Serum: 21 U/L (01-20 @ 22:23)

## 2020-01-22 NOTE — PROGRESS NOTE ADULT - PROBLEM SELECTOR PLAN 1
-IV reglan; discussed side effects including tardive dyskinesia which can be permanent   -Protonix 40 IV daily   -Limit narcotic pain medication; discussed that this can worsen gastric emptying   -Advance diet as tolerated; small bites, low fat  -Marijuana avoidance discussed   -Limit CT scan exposure discussed   **Will need close outpatient f/u.

## 2020-01-22 NOTE — PROGRESS NOTE BEHAVIORAL HEALTH - NSBHCONSULTFOLLOWAFTERCARE_PSY_A_CORE FT
awaiting intake at [facility]. Continue Xanax 2mg PO qd which helps; RX sent to [facility] for 30 day supply for Xanax 2mg PO qd x 30 days no refill until he gets intake appt. Given recent loss and collateral from wife, Patient has decreased his benzo use overall. This is to be used to help with bereavement sxs.

## 2020-01-22 NOTE — PROGRESS NOTE BEHAVIORAL HEALTH - NSBHFUPINTERVALHXFT_PSY_A_CORE
Patient looks and feel better, slept well, anxiety less with the xanax and he is trying to delay using it until he feels he really has to. Denies adverse medication side effects and looks forward to going home. Denies any symptoms of hypomania/lia/psychosis/major depression. Denies any active or passive suicidal or homicidal ideation. Names protective factors (chloe; hope for future). Patient had no other complaints / requests.

## 2020-01-22 NOTE — PROGRESS NOTE BEHAVIORAL HEALTH - NSBHCHARTREVIEWLAB_PSY_A_CORE FT
01-22    139  |  106  |  19  ----------------------------<  79  3.6   |  28  |  0.97    Ca    8.6      22 Jan 2020 07:47    TPro  8.3  /  Alb  4.1  /  TBili  0.4  /  DBili  x   /  AST  21  /  ALT  46  /  AlkPhos  124<H>  01-20

## 2020-01-22 NOTE — PROGRESS NOTE ADULT - PROBLEM SELECTOR PLAN 1
iv hydration  iv pepcid/protonix  reglan/zofran prn  mylanta  eating better   GI on board   cont benzo

## 2020-01-22 NOTE — PROGRESS NOTE BEHAVIORAL HEALTH - NSBHCHARTREVIEWVS_PSY_A_CORE FT
T(C): 36.3 (01-22-20 @ 11:10), Max: 37 (01-22-20 @ 05:39)  HR: 68 (01-22-20 @ 11:10) (67 - 71)  BP: 105/70 (01-22-20 @ 11:10) (104/65 - 132/83)  RR: 16 (01-22-20 @ 11:10) (16 - 16)  SpO2: 97% (01-22-20 @ 11:10) (95% - 97%)

## 2020-01-23 ENCOUNTER — TRANSCRIPTION ENCOUNTER (OUTPATIENT)
Age: 40
End: 2020-01-23

## 2020-01-23 VITALS
TEMPERATURE: 98 F | SYSTOLIC BLOOD PRESSURE: 112 MMHG | DIASTOLIC BLOOD PRESSURE: 73 MMHG | OXYGEN SATURATION: 98 % | HEART RATE: 59 BPM | RESPIRATION RATE: 17 BRPM

## 2020-01-23 LAB
GLUCOSE BLDC GLUCOMTR-MCNC: 170 MG/DL — HIGH (ref 70–99)
GLUCOSE BLDC GLUCOMTR-MCNC: 197 MG/DL — HIGH (ref 70–99)
GLUCOSE BLDC GLUCOMTR-MCNC: 226 MG/DL — HIGH (ref 70–99)
GLUCOSE BLDC GLUCOMTR-MCNC: 251 MG/DL — HIGH (ref 70–99)
GLUCOSE BLDC GLUCOMTR-MCNC: 341 MG/DL — HIGH (ref 70–99)
GLUCOSE BLDC GLUCOMTR-MCNC: 39 MG/DL — CRITICAL LOW (ref 70–99)
GLUCOSE BLDC GLUCOMTR-MCNC: 44 MG/DL — CRITICAL LOW (ref 70–99)
GLUCOSE BLDC GLUCOMTR-MCNC: 48 MG/DL — LOW (ref 70–99)
GLUCOSE BLDC GLUCOMTR-MCNC: 55 MG/DL — LOW (ref 70–99)

## 2020-01-23 PROCEDURE — 99239 HOSP IP/OBS DSCHRG MGMT >30: CPT

## 2020-01-23 RX ORDER — SODIUM CHLORIDE 9 MG/ML
1000 INJECTION, SOLUTION INTRAVENOUS
Refills: 0 | Status: DISCONTINUED | OUTPATIENT
Start: 2020-01-23 | End: 2020-01-23

## 2020-01-23 RX ORDER — DEXTROSE 50 % IN WATER 50 %
25 SYRINGE (ML) INTRAVENOUS ONCE
Refills: 0 | Status: COMPLETED | OUTPATIENT
Start: 2020-01-23 | End: 2020-01-23

## 2020-01-23 RX ORDER — DEXTROSE 50 % IN WATER 50 %
12.5 SYRINGE (ML) INTRAVENOUS ONCE
Refills: 0 | Status: COMPLETED | OUTPATIENT
Start: 2020-01-23 | End: 2020-01-23

## 2020-01-23 RX ORDER — DEXTROSE 50 % IN WATER 50 %
15 SYRINGE (ML) INTRAVENOUS ONCE
Refills: 0 | Status: DISCONTINUED | OUTPATIENT
Start: 2020-01-23 | End: 2020-01-23

## 2020-01-23 RX ADMIN — PANTOPRAZOLE SODIUM 40 MILLIGRAM(S): 20 TABLET, DELAYED RELEASE ORAL at 06:07

## 2020-01-23 RX ADMIN — OXYCODONE AND ACETAMINOPHEN 2 TABLET(S): 5; 325 TABLET ORAL at 12:11

## 2020-01-23 RX ADMIN — OXYCODONE AND ACETAMINOPHEN 2 TABLET(S): 5; 325 TABLET ORAL at 05:10

## 2020-01-23 RX ADMIN — Medication 4: at 08:32

## 2020-01-23 RX ADMIN — Medication 12.5 GRAM(S): at 16:41

## 2020-01-23 RX ADMIN — Medication 8: at 12:13

## 2020-01-23 RX ADMIN — Medication 1 GRAM(S): at 00:16

## 2020-01-23 RX ADMIN — Medication 5 UNIT(S): at 08:31

## 2020-01-23 RX ADMIN — OXYCODONE AND ACETAMINOPHEN 2 TABLET(S): 5; 325 TABLET ORAL at 04:35

## 2020-01-23 RX ADMIN — Medication 25 GRAM(S): at 14:15

## 2020-01-23 RX ADMIN — Medication 5 UNIT(S): at 12:13

## 2020-01-23 RX ADMIN — Medication 2 MILLIGRAM(S): at 08:54

## 2020-01-23 NOTE — DISCHARGE NOTE NURSING/CASE MANAGEMENT/SOCIAL WORK - PATIENT PORTAL LINK FT
You can access the FollowMyHealth Patient Portal offered by [facility] by registering at the following website: [email or web address]. By joining [employer]’s FollowMyHealth portal, you will also be able to view your health information using other applications (apps) compatible with our system.

## 2020-01-23 NOTE — DISCHARGE NOTE PROVIDER - NSDCCPCAREPLAN_GEN_ALL_CORE_FT
PRINCIPAL DISCHARGE DIAGNOSIS  Diagnosis: Gastroparesis  Assessment and Plan of Treatment: Follow with your PMD, eat small meals, control your Diabetes , avoid marijuana use.

## 2020-01-23 NOTE — PROGRESS NOTE ADULT - SUBJECTIVE AND OBJECTIVE BOX
Gastroenterology  Patient seen and examined bedside resting comfortably.  Improved tolerating soft diet     T(F): 97.8 (01-23-20 @ 12:02), Max: 98.2 (01-22-20 @ 23:47)  HR: 58 (01-23-20 @ 14:30) (55 - 65)  BP: 124/72 (01-23-20 @ 14:30) (116/77 - 134/86)  RR: 18 (01-23-20 @ 14:30) (16 - 18)  SpO2: 97% (01-23-20 @ 12:02) (97% - 97%)  Wt(kg): --  CAPILLARY BLOOD GLUCOSE      POCT Blood Glucose.: 197 mg/dL (23 Jan 2020 14:24)  POCT Blood Glucose.: 48 mg/dL (23 Jan 2020 14:00)  POCT Blood Glucose.: 39 mg/dL (23 Jan 2020 13:57)  POCT Blood Glucose.: 341 mg/dL (23 Jan 2020 11:23)  POCT Blood Glucose.: 226 mg/dL (23 Jan 2020 08:07)  POCT Blood Glucose.: 131 mg/dL (22 Jan 2020 22:18)  POCT Blood Glucose.: 105 mg/dL (22 Jan 2020 16:40)      PHYSICAL EXAM:  General: NAD, WDWN.   Neuro:  Alert & responsive  HEENT: NCAT, EOMI, conjunctiva clear  CV: +S1+S2 regular rate and rhythm  Lung: clear to ausculation bilaterally, respirations nonlabored, good inspiratory effort  Abdomen: soft, Non Tender, No distention Normal active BS  Extremities: no cyanosis, clubbing or edema    LABS:                        12.4   6.77  )-----------( 329      ( 22 Jan 2020 07:47 )             36.8     01-22    139  |  106  |  19  ----------------------------<  79  3.6   |  28  |  0.97    Ca    8.6      22 Jan 2020 07:47          I&O's Detail    22 Jan 2020 07:01  -  23 Jan 2020 07:00  --------------------------------------------------------  IN:    Oral Fluid: 725 mL  Total IN: 725 mL    OUT:  Total OUT: 0 mL    Total NET: 725 mL        01-22 @ 07:47    139 | 106 | 19  /8.6 | -- | --  _______________________/  3.6 | 28 | 0.97                           \par

## 2020-01-23 NOTE — DISCHARGE NOTE PROVIDER - HOSPITAL COURSE
pt w/anxiety, asthma, DM1, gastritis, Gastroparesis comes w/n/v/d. sxs improving with pain meds.     pt also was seen by psych as he has some suicdal ideation? about the loss of his grandfather. Pt cleared to be off observation.          Problem/Plan - 1:    ·  Problem: Other chronic gastritis without hemorrhage.  Plan: iv hydration    iv pepcid/protonix    reglan/zofran prn    mylanta    GI on board     Resolved, patient tolerating solids.         Problem/Plan - 2:    ·  Problem: Gastroparesis diabeticorum.  Plan: as above.          Problem/Plan - 3:    ·  Problem: Type 1 diabetes mellitus with complication.  Plan: continue insulin           Problem/Plan - 4:    ·  Problem: Mild intermittent asthma without complication.  Plan: Albuterol PRN.          Problem/Plan - 5:    ·  Problem: Anxiety.  Plan: Continue Xanax as per psychiatry consult          Problem/Plan - 6:    Problem: Preventive measure. Plan: ambulatory.

## 2020-01-28 DIAGNOSIS — Z63.4 DISAPPEARANCE AND DEATH OF FAMILY MEMBER: ICD-10-CM

## 2020-01-28 DIAGNOSIS — Z79.4 LONG TERM (CURRENT) USE OF INSULIN: ICD-10-CM

## 2020-01-28 DIAGNOSIS — K31.84 GASTROPARESIS: ICD-10-CM

## 2020-01-28 DIAGNOSIS — F12.90 CANNABIS USE, UNSPECIFIED, UNCOMPLICATED: ICD-10-CM

## 2020-01-28 DIAGNOSIS — E10.43 TYPE 1 DIABETES MELLITUS WITH DIABETIC AUTONOMIC (POLY)NEUROPATHY: ICD-10-CM

## 2020-01-28 DIAGNOSIS — J45.20 MILD INTERMITTENT ASTHMA, UNCOMPLICATED: ICD-10-CM

## 2020-01-28 DIAGNOSIS — K29.50 UNSPECIFIED CHRONIC GASTRITIS WITHOUT BLEEDING: ICD-10-CM

## 2020-01-28 DIAGNOSIS — R45.851 SUICIDAL IDEATIONS: ICD-10-CM

## 2020-01-28 DIAGNOSIS — F41.9 ANXIETY DISORDER, UNSPECIFIED: ICD-10-CM

## 2020-01-28 DIAGNOSIS — F13.90 SEDATIVE, HYPNOTIC, OR ANXIOLYTIC USE, UNSPECIFIED, UNCOMPLICATED: ICD-10-CM

## 2020-01-28 SDOH — SOCIAL STABILITY - SOCIAL INSECURITY: DISSAPEARANCE AND DEATH OF FAMILY MEMBER: Z63.4

## 2020-01-29 LAB
AMPHET UR-MCNC: NEGATIVE — SIGNIFICANT CHANGE UP
BARBITURATES, URINE.: NEGATIVE — SIGNIFICANT CHANGE UP
BENZODIAZ UR-MCNC: SIGNIFICANT CHANGE UP
COCAINE METAB.OTHER UR-MCNC: NEGATIVE — SIGNIFICANT CHANGE UP
CREATININE, URINE THERAPEUTIC: 224.1 MG/DL — SIGNIFICANT CHANGE UP
METHADONE UR-MCNC: NEGATIVE — SIGNIFICANT CHANGE UP
METHAQUALONE UR QL: NEGATIVE — SIGNIFICANT CHANGE UP
METHAQUALONE UR-MCNC: NEGATIVE — SIGNIFICANT CHANGE UP
OPIATES UR-MCNC: SIGNIFICANT CHANGE UP
PCP UR-MCNC: NEGATIVE — SIGNIFICANT CHANGE UP
PROPOXYPH UR QL: NEGATIVE — SIGNIFICANT CHANGE UP
THC UR QL: SIGNIFICANT CHANGE UP

## 2020-01-30 ENCOUNTER — EMERGENCY (EMERGENCY)
Facility: HOSPITAL | Age: 40
LOS: 0 days | Discharge: HOME | End: 2020-01-30
Attending: EMERGENCY MEDICINE
Payer: MEDICAID

## 2020-01-30 VITALS
HEART RATE: 71 BPM | OXYGEN SATURATION: 97 % | RESPIRATION RATE: 18 BRPM | TEMPERATURE: 100 F | SYSTOLIC BLOOD PRESSURE: 115 MMHG | DIASTOLIC BLOOD PRESSURE: 61 MMHG

## 2020-01-30 VITALS
DIASTOLIC BLOOD PRESSURE: 95 MMHG | TEMPERATURE: 98 F | SYSTOLIC BLOOD PRESSURE: 162 MMHG | OXYGEN SATURATION: 99 % | HEART RATE: 78 BPM | HEIGHT: 65 IN | RESPIRATION RATE: 18 BRPM | WEIGHT: 160.06 LBS

## 2020-01-30 DIAGNOSIS — Z79.4 LONG TERM (CURRENT) USE OF INSULIN: ICD-10-CM

## 2020-01-30 DIAGNOSIS — R19.7 DIARRHEA, UNSPECIFIED: ICD-10-CM

## 2020-01-30 DIAGNOSIS — R10.12 LEFT UPPER QUADRANT PAIN: ICD-10-CM

## 2020-01-30 DIAGNOSIS — R11.2 NAUSEA WITH VOMITING, UNSPECIFIED: ICD-10-CM

## 2020-01-30 DIAGNOSIS — K29.70 GASTRITIS, UNSPECIFIED, WITHOUT BLEEDING: ICD-10-CM

## 2020-01-30 DIAGNOSIS — Z90.49 ACQUIRED ABSENCE OF OTHER SPECIFIED PARTS OF DIGESTIVE TRACT: Chronic | ICD-10-CM

## 2020-01-30 LAB
ALBUMIN SERPL ELPH-MCNC: 4 G/DL — SIGNIFICANT CHANGE UP (ref 3.3–5)
ALP SERPL-CCNC: 120 U/L — SIGNIFICANT CHANGE UP (ref 40–120)
ALT FLD-CCNC: 38 U/L — SIGNIFICANT CHANGE UP (ref 12–78)
ANION GAP SERPL CALC-SCNC: 8 MMOL/L — SIGNIFICANT CHANGE UP (ref 5–17)
AST SERPL-CCNC: 23 U/L — SIGNIFICANT CHANGE UP (ref 15–37)
BASOPHILS # BLD AUTO: 0.05 K/UL — SIGNIFICANT CHANGE UP (ref 0–0.2)
BASOPHILS NFR BLD AUTO: 0.4 % — SIGNIFICANT CHANGE UP (ref 0–2)
BILIRUB SERPL-MCNC: 0.5 MG/DL — SIGNIFICANT CHANGE UP (ref 0.2–1.2)
BUN SERPL-MCNC: 18 MG/DL — SIGNIFICANT CHANGE UP (ref 7–23)
CALCIUM SERPL-MCNC: 9.8 MG/DL — SIGNIFICANT CHANGE UP (ref 8.5–10.1)
CHLORIDE SERPL-SCNC: 98 MMOL/L — SIGNIFICANT CHANGE UP (ref 96–108)
CO2 SERPL-SCNC: 30 MMOL/L — SIGNIFICANT CHANGE UP (ref 22–31)
CREAT SERPL-MCNC: 1.17 MG/DL — SIGNIFICANT CHANGE UP (ref 0.5–1.3)
EOSINOPHIL # BLD AUTO: 0.04 K/UL — SIGNIFICANT CHANGE UP (ref 0–0.5)
EOSINOPHIL NFR BLD AUTO: 0.3 % — SIGNIFICANT CHANGE UP (ref 0–6)
GLUCOSE BLDC GLUCOMTR-MCNC: 289 MG/DL — HIGH (ref 70–99)
GLUCOSE SERPL-MCNC: 301 MG/DL — HIGH (ref 70–99)
HCT VFR BLD CALC: 47.4 % — SIGNIFICANT CHANGE UP (ref 39–50)
HGB BLD-MCNC: 16.1 G/DL — SIGNIFICANT CHANGE UP (ref 13–17)
IMM GRANULOCYTES NFR BLD AUTO: 0.2 % — SIGNIFICANT CHANGE UP (ref 0–1.5)
LACTATE SERPL-SCNC: 1.2 MMOL/L — SIGNIFICANT CHANGE UP (ref 0.7–2)
LACTATE SERPL-SCNC: 2.1 MMOL/L — HIGH (ref 0.7–2)
LIDOCAIN IGE QN: 24 U/L — LOW (ref 73–393)
LYMPHOCYTES # BLD AUTO: 1.57 K/UL — SIGNIFICANT CHANGE UP (ref 1–3.3)
LYMPHOCYTES # BLD AUTO: 13.5 % — SIGNIFICANT CHANGE UP (ref 13–44)
MCHC RBC-ENTMCNC: 28 PG — SIGNIFICANT CHANGE UP (ref 27–34)
MCHC RBC-ENTMCNC: 34 GM/DL — SIGNIFICANT CHANGE UP (ref 32–36)
MCV RBC AUTO: 82.4 FL — SIGNIFICANT CHANGE UP (ref 80–100)
MONOCYTES # BLD AUTO: 0.33 K/UL — SIGNIFICANT CHANGE UP (ref 0–0.9)
MONOCYTES NFR BLD AUTO: 2.8 % — SIGNIFICANT CHANGE UP (ref 2–14)
NEUTROPHILS # BLD AUTO: 9.58 K/UL — HIGH (ref 1.8–7.4)
NEUTROPHILS NFR BLD AUTO: 82.8 % — HIGH (ref 43–77)
NRBC # BLD: 0 /100 WBCS — SIGNIFICANT CHANGE UP (ref 0–0)
PLATELET # BLD AUTO: 455 K/UL — HIGH (ref 150–400)
POTASSIUM SERPL-MCNC: 4.3 MMOL/L — SIGNIFICANT CHANGE UP (ref 3.5–5.3)
POTASSIUM SERPL-SCNC: 4.3 MMOL/L — SIGNIFICANT CHANGE UP (ref 3.5–5.3)
PROT SERPL-MCNC: 8.5 GM/DL — HIGH (ref 6–8.3)
RBC # BLD: 5.75 M/UL — SIGNIFICANT CHANGE UP (ref 4.2–5.8)
RBC # FLD: 13.7 % — SIGNIFICANT CHANGE UP (ref 10.3–14.5)
SODIUM SERPL-SCNC: 136 MMOL/L — SIGNIFICANT CHANGE UP (ref 135–145)
WBC # BLD: 11.59 K/UL — HIGH (ref 3.8–10.5)
WBC # FLD AUTO: 11.59 K/UL — HIGH (ref 3.8–10.5)

## 2020-01-30 PROCEDURE — 99284 EMERGENCY DEPT VISIT MOD MDM: CPT

## 2020-01-30 RX ORDER — LIDOCAINE 4 G/100G
10 CREAM TOPICAL ONCE
Refills: 0 | Status: COMPLETED | OUTPATIENT
Start: 2020-01-30 | End: 2020-01-30

## 2020-01-30 RX ORDER — OXYCODONE AND ACETAMINOPHEN 5; 325 MG/1; MG/1
1 TABLET ORAL ONCE
Refills: 0 | Status: DISCONTINUED | OUTPATIENT
Start: 2020-01-30 | End: 2020-01-30

## 2020-01-30 RX ORDER — METOCLOPRAMIDE HCL 10 MG
10 TABLET ORAL ONCE
Refills: 0 | Status: COMPLETED | OUTPATIENT
Start: 2020-01-30 | End: 2020-01-30

## 2020-01-30 RX ORDER — OXYCODONE HYDROCHLORIDE 5 MG/1
1 TABLET ORAL
Qty: 8 | Refills: 0
Start: 2020-01-30 | End: 2020-01-31

## 2020-01-30 RX ORDER — SUCRALFATE 1 G
1 TABLET ORAL ONCE
Refills: 0 | Status: COMPLETED | OUTPATIENT
Start: 2020-01-30 | End: 2020-01-30

## 2020-01-30 RX ORDER — SUCRALFATE 1 G
1 TABLET ORAL
Qty: 40 | Refills: 0
Start: 2020-01-30 | End: 2020-02-08

## 2020-01-30 RX ORDER — SODIUM CHLORIDE 9 MG/ML
2000 INJECTION INTRAMUSCULAR; INTRAVENOUS; SUBCUTANEOUS ONCE
Refills: 0 | Status: COMPLETED | OUTPATIENT
Start: 2020-01-30 | End: 2020-01-30

## 2020-01-30 RX ORDER — FAMOTIDINE 10 MG/ML
20 INJECTION INTRAVENOUS ONCE
Refills: 0 | Status: COMPLETED | OUTPATIENT
Start: 2020-01-30 | End: 2020-01-30

## 2020-01-30 RX ADMIN — FAMOTIDINE 20 MILLIGRAM(S): 10 INJECTION INTRAVENOUS at 11:00

## 2020-01-30 RX ADMIN — LIDOCAINE 10 MILLILITER(S): 4 CREAM TOPICAL at 15:15

## 2020-01-30 RX ADMIN — Medication 10 MILLIGRAM(S): at 11:01

## 2020-01-30 RX ADMIN — SODIUM CHLORIDE 2000 MILLILITER(S): 9 INJECTION INTRAMUSCULAR; INTRAVENOUS; SUBCUTANEOUS at 10:50

## 2020-01-30 RX ADMIN — LIDOCAINE 10 MILLILITER(S): 4 CREAM TOPICAL at 11:02

## 2020-01-30 RX ADMIN — Medication 30 MILLILITER(S): at 11:02

## 2020-01-30 RX ADMIN — SODIUM CHLORIDE 2000 MILLILITER(S): 9 INJECTION INTRAMUSCULAR; INTRAVENOUS; SUBCUTANEOUS at 12:00

## 2020-01-30 RX ADMIN — OXYCODONE AND ACETAMINOPHEN 1 TABLET(S): 5; 325 TABLET ORAL at 17:41

## 2020-01-30 NOTE — ED PROVIDER NOTE - CARE PROVIDERS DIRECT ADDRESSES
[email or web address]

## 2020-01-30 NOTE — ED PROVIDER NOTE - OBJECTIVE STATEMENT
40yo male with pmh DM, gastroaparesis, gastritis presents with NV and LUQ pain. Pt recently admitted for same and dc last week. Pt has been taking meds and feeling okay but unable to keep meds down today. [facility] +_ brian diarrhea. no fever, black/bloody stool.    ROS: No fever/chills. No photophobia/eye pain/changes in vision, No ear pain/sore throat/dysphagia, No chest pain/palpitations. No SOB/cough. + abdominal pain, + N/V/D, no black/bloody bm. No dysuria/frequency/discharge, No headache. No Dizziness.  No rash.  No numbness/tingling/weakness. 38yo male with pmh DM, gastroaparesis, gastritis presents with NV and LUQ pain. Pt recently admitted for same and dc last week. Pt has been taking meds and feeling okay but unable to keep meds down today. [facility]. +_ brian diarrhea. no fever, black/bloody stool. pt also ran out of zofran and percocet.    ROS: No fever/chills. No photophobia/eye pain/changes in vision, No ear pain/sore throat/dysphagia, No chest pain/palpitations. No SOB/cough. + abdominal pain, + N/V/D, no black/bloody bm. No dysuria/frequency/discharge, No headache. No Dizziness.  No rash.  No numbness/tingling/weakness.

## 2020-01-30 NOTE — ED PROVIDER NOTE - CARE PROVIDER_API CALL
Octavio Kumar)  Medicine  [address], [address]  [place] [address]  Phone: [phone]  Fax: [phone]  Follow Up Time:

## 2020-01-30 NOTE — ED ADULT TRIAGE NOTE - CHIEF COMPLAINT QUOTE
abdominal pains with nausea and vomiting. He was treated in this ed yesterday. The symptoms started this am

## 2020-01-30 NOTE — ED PROVIDER NOTE - PATIENT PORTAL LINK FT
You can access the FollowMyHealth Patient Portal offered by [facility] by registering at the following website: [email or web address]. By joining [employer]’s FollowMyHealth portal, you will also be able to view your health information using other applications (apps) compatible with our system.

## 2020-01-30 NOTE — ED PROVIDER NOTE - PHYSICAL EXAMINATION
Gen: Alert, + appears in pain  Head: NC, AT, PERRL, normal lids/conjunctiva   ENT: Bilateral TM WNL, patent oropharynx without erythema/exudate, uvula midline  Neck: supple, no tenderness/meningismus  Pulm: Bilateral clear BS, normal resp effort  CV: RRR, no M/R/G, +dist pulses   Abd: soft, + luq tender, ND, +BS, no guarding/rebound tenderness  Mskel: burton edema/erythema/cyanosis   Skin: no rash, no bruising  Neuro: AAOx3, no sensory/motor deficits, CN 2-12 intact

## 2020-01-30 NOTE — ED ADULT NURSE NOTE - NSIMPLEMENTINTERV_GEN_ALL_ED
Implemented All Universal Safety Interventions:  [place] to call system. Call bell, personal items and telephone within reach. Instruct patient to call for assistance. Room bathroom lighting operational. Non-slip footwear when patient is off stretcher. Physically safe environment: no spills, clutter or unnecessary equipment. Stretcher in lowest position, wheels locked, appropriate side rails in place.

## 2020-01-30 NOTE — ED PROVIDER NOTE - NSFOLLOWUPINSTRUCTIONS_ED_ALL_ED_FT
Follow up with your primary care doctor within the next 24-48 hours and bring copy of your results.  Return to the Emergency Department for worsening or persistent symptoms or any other concerns incl. chest pain, shortness of breath, dizziness, inability to tolerate oral intake.  Rest, drink plenty of fluids.  Advance activity as tolerated.  Continue all previously prescribed medications as directed. Avoid NSAIDS (advil, motrin). Avoid caffeine, spicy food, acidic fruit/food.

## 2020-02-20 ENCOUNTER — APPOINTMENT (OUTPATIENT)
Dept: GASTROENTEROLOGY | Facility: CLINIC | Age: 40
End: 2020-02-20

## 2020-02-25 NOTE — DIETITIAN INITIAL EVALUATION ADULT. - NS FNS WEIGHT USED FOR CALC
was visiting mom in rehab and made statements to harm herself and she has a plan to do it.  Was brought in by chet hinds voluntarily  
73kg/ideal

## 2020-02-27 NOTE — ED PROVIDER NOTE - PROGRESS NOTE ADDITIONAL1
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 \par 2/27/20\par - pt feeling much improved- having improved bowel movements even sometimes loose\par - less brain fog, better sleeping\par - able to expand diet\par - did not see psych or sleep docs\par \par \par 1/23/20\par - feeling more anxious\par - brain fog for about 10 days last week\par - constipation, every day going to the bathroom a little bit then every 3 days will have a bigger BM \par - tried MiraLAX for the past 5 days (after lunch) - subtle difference \par - tried morning smoothie and caused brain fog for 5 hours - wonders if it is because he put yogurt in it \par - never have taken a stronger laxative \par - been skipping breakfast past week and feels somewhat better \par \par 11/27/19\par - pt here to f/u on smart pill test showing prolonged CTT at 78:33\par - feels some improvement as he is doing daily yoga in AM, sleeping better\par - went to see Dr. WEEBR for workup- did not have official follow up yet\par - NEGative for SIBO\par \par 10/11/19\par - brain fog, not sleeping well, body fatigue \par - little bits of stool daily for 5 years - sometimes takes magnesium citrate at night \par - never had EGD/colonoscopy\par - had b12 injection at last visit- did not notice a difference \par - low WBC/PLTs, HLA DQ2 positive \par - currently taking digest cold, triphala, oregano oil, vitamin D, Atrantil \par - in the past took alimed, berberine, motilpro\par \par \par Previous history:\par Has had constipation for at least 5 years and was experiencing brain fog \par Diagnosed SIBO in March 2019- TREATED rifaximin, did not want neomycin because of tinnitus - did not help symptoms \par tries to do low fodmap - has slight bloating \par denies gas, stomach pain, blood in stool \par can't pinpoint foods that bother him, stopping caffeine helped a little bit, he has done a food diary\par lost 10 pounds in last year- changed diet (gluten free, dairy-free), chicken, fish, turkey, lamb, grass- fed red meat \par \par Now- brain fog with sleep disruption (waking up after 4-6 hours), constipation- everyday little bits  (does not get much of an urge) , palpitations \par \par : between 6 mo to a year \par delivery: vaginal\par PPI: none\par Infections: appendicitis in 2016 then appendectomy \par antibiotics: strep throat last year took antibiotic course\par motility: constipation \par 2 weeks after appendectomy went to [place] and got horrible food poisoning \par \par Fhx: father had bladder cancer (dx 45), brother has thyroid cancer (dx 37) \par Etoh: rarely \par Smoking: none\par Drug use: none\par was taking digestive enzymes \par \par blood work at previous GI showed platelets to be 133, Free T3 1.39, Vitamin B12 404, SIBO methane positive, otherwise normal labs Additional Progress Note...

## 2020-03-02 NOTE — PROGRESS NOTE ADULT - PROBLEM/PLAN-2
SUBJECTIVE:   Clau GAMEZ is a 60 year old female    for annual well woman exam.   No LMP recorded.     Menstrual history: postmen  GYN History:Pap History:normal  Date:   Date of last mammogram: 2017  Result of mammogram: Normal    Date of DEXA: 2 years ago  Result of DEXA Scan: normal    Date of last Colonoscopy:2 years ago   Result of Colonoscopy: normal      Does patient exercise? no  How many times per week? NA    Was counseling given: yes     Does patient desire TDAP vaccine today: No    Depression Screening:  Over the past 2 weeks, has patient felt down, depressed or hopeless? no  Over the past 2 weeks, has patient felt little interest or pleasure in doing things? no    On the basis of the above screen, the following is initiated:  Cont monitoring    Social History:   Social History     Socioeconomic History   • Marital status: Unknown     Spouse name: Not on file   • Number of children: Not on file   • Years of education: Not on file   • Highest education level: Not on file   Occupational History   • Not on file   Social Needs   • Financial resource strain: Not on file   • Food insecurity:     Worry: Not on file     Inability: Not on file   • Transportation needs:     Medical: Not on file     Non-medical: Not on file   Tobacco Use   • Smoking status: Never Smoker   • Smokeless tobacco: Never Used   Substance and Sexual Activity   • Alcohol use: Yes     Comment: social    • Drug use: Yes     Types: Marijuana     Comment: 4 times a week    • Sexual activity: Not on file   Lifestyle   • Physical activity:     Days per week: Not on file     Minutes per session: Not on file   • Stress: Not on file   Relationships   • Social connections:     Talks on phone: Not on file     Gets together: Not on file     Attends [employer]ism service: Not on file     Active member of club or organization: Not on file     Attends meetings of clubs or organizations: Not on file     Relationship status: Not on file   • 
Intimate partner violence:     Fear of current or ex partner: Not on file     Emotionally abused: Not on file     Physically abused: Not on file     Forced sexual activity: Not on file   Other Topics Concern   • Not on file   Social History Narrative   • Not on file     No past medical history on file.  Past Surgical History:   Procedure Laterality Date   • Colonoscopy  2018    normal    • Gallbladder surgery     • Polypectomy      polyp removal from cervix    • Tubal ligation       Family History:   Family History   Problem Relation Age of Onset   • Cancer Father         bone, prostate    • Cancer Brother         leukemia    • Cancer Brother         pancreatic        Allergies: ALLERGIES:  No Known Allergies    No current outpatient medications on file.     No current facility-administered medications for this visit.          ROS   No headaches, no unexplained chest pain, dyspnea, SOB, abdominal pain, bowel or bladder complaints.  All other systems reviewed are negative.    GYNE ROS:   Genitourinary: denies urgency, frequency, dysuria, incontinenceVaginal symptoms: none  Discharge described as: normal and physiologic.  Other associated symptoms: no occ vaginal dryness    All other systems reviewed are negative    PREVENTATIVE MEDICINE:    Does patient desire Immunizations: no  Last Lipids: No results found for: LDLHDL    OBJECTIVE  Physical Exam  Vitals:    03/02/20 1546   BP: (!) 122/92   Pulse: 84   Temp: 98.8 °F (37.1 °C)       Clau's BMI is Body mass index is 41.6 kg/m².,        CONSTITUTIONAL:    Appearance: well-nourished, well developed, alert, in no acute distress    HENT   Head: normocephalic, atraumatic   Face: face within normal limits, no sinus tenderness on palpation, no hirsutism present, parotid glands within normal limits   Ears: external ears within normal limits   Nose: external nose normal in appearance, nares patent, nasal discharge absent   Mouth: appearance normal      CHEST   Respiratory 
effort: breathing unlabored   Auscultation: normal breath sounds, no rales, no rhonchi    CARDIOVASCULAR   Heart: regular rate, normal rhythm, no murmurs present    BREASTS   Inspection of Breasts: breasts symmetrical, no skin changes, no discharge present   Palpation of Breasts and Axillae: no masses present on palpation, no breast tenderness   Axillary Lymph Nodes: no lymphadenopathy present    GASTROINTESTINAL   Abdominal Examination: abdomen nontender to palpation, normal bowel sounds, tone normal without rigidity or guarding, no masses present, umbilicus without lesions   Liver and Spleen: no hepatomegaly present, liver nontender to palpation   Hernias: no hernias present    GENITOURINARY   External Genitalia: normal appearance for age, no discharge present, no tenderness present, no inflammatory lesions present   Vagina: normal vaginal vault without central or paravaginal defects, no discharge present, no inflammatory lesions present, no masses present   Bladder: nontender to palpation   Urethral body: urethra palpation normal, urethra structural support normal   Cervix: appearance healthy, no lesions present, nontender to palpation, no bleeding present   Uterus: nontender to palpation, no masses present, position midline/midplane, mobility: normal   Adnexa: no adnexal tenderness present, no adnexal masses present   Perineum: perineum within normal limits, no evidence of trauma, no rashes or skin lesions present   Anus: anus within normal limits, no hemorrhoids present   Inguinal Lymph Nodes: no lymphadenopathy present        LYMPHATIC   Lymph Nodes: no other lymphadenopathy present    SKIN   General Inspection: no rashes present, no lesions present, no areas of discoloration    NEUROLOGIC/PSYCHIATRIC   Orientation: grossly oriented to person, place and time   Judgment and Insight: judgment and insight intact   Mood and Affect: mood normal, affect appropriate    ASSESSMENT  Annual exam with pap 
obtained    PLAN  Recommended calcium fortified diet of at least 3 servings a day, Cardiovascular health being followed by PCP, Colonoscopy up to date, Dexa scan not indicated, Pap results in 7-10 days, patient will be notified, patient instructed to call in 2 wks if no notification., Recommended mammograms yearly and Return for annual GYN exam in one year or earlier with any additional concerns. Discussed options for vaginal dryness pt desires to try OTC first.  Will send to Derm for skin check all questions answered        Patient repeated all of the instructions and states she understands the plan of care.  Keerthi Nuñez MD          
DISPLAY PLAN FREE TEXT

## 2020-03-10 NOTE — PATIENT PROFILE ADULT - NSPROMEDSPUMP_GEN_A_NUR
medication pump(s) used Epidermal Autograft Text: The defect edges were debeveled with a #15 scalpel blade.  Given the location of the defect, shape of the defect and the proximity to free margins an epidermal autograft was deemed most appropriate.  Using a sterile surgical marker, the primary defect shape was transferred to the donor site. The epidermal graft was then harvested.  The skin graft was then placed in the primary defect and oriented appropriately.

## 2020-04-01 ENCOUNTER — OUTPATIENT (OUTPATIENT)
Dept: OUTPATIENT SERVICES | Facility: HOSPITAL | Age: 40
LOS: 1 days | End: 2020-04-01
Payer: MEDICAID

## 2020-04-01 DIAGNOSIS — Z90.49 ACQUIRED ABSENCE OF OTHER SPECIFIED PARTS OF DIGESTIVE TRACT: Chronic | ICD-10-CM

## 2020-04-28 DIAGNOSIS — Z71.89 OTHER SPECIFIED COUNSELING: ICD-10-CM

## 2020-05-15 NOTE — PROGRESS NOTE ADULT - ASSESSMENT
Detail Level: Simple
Quality 137: Melanoma: Continuity Of Care - Recall System: Patient information entered into a recall system that includes: target date for the next exam specified AND a process to follow up with patients regarding missed or unscheduled appointments
Intractable vomiting  with elevated glucose. Less vomiting now on reglan but still c/o abdominal pain
38 y/o Male PMH asthma, dm1 and gastroparesis, recent EGD showing H pylori neg gastritis, p/w 1 days nausea and vomiting with abd pain. Has gastroparesis, likely also gastritis.  IMPROVE VTE Individual Risk Assessment          RISK                                                          Points    [  ] Previous VTE                                                3    [  ] Thrombophilia                                             2    [  ] Lower limb paralysis                                    2        (unable to hold up >15 seconds)      [  ] Current Cancer                                             2         (within 6 months)    [  ] Immobilization > 24 hrs                              1    [  ] ICU/CCU stay > 24 hours                            1    [  ] Age > 60                                                    1    IMPROVE VTE Score ____0_____
Detail Level: Zone
Detail Level: Generalized

## 2020-07-01 NOTE — PATIENT PROFILE ADULT. - AS SC BRADEN FRICTION
HR=84 bpm, OYUP=854/76 mmhg, CaP5=425.0 %, Resp=12 B/min, EtCO2=35 mmHg, Apnea=4 Seconds, Pain=0, Gaye=9, Brown=2 (3) no apparent problem

## 2021-01-14 NOTE — PATIENT PROFILE ADULT - NSSTREETDRUGUSE_GEN_A_NUR
Refill request from patient for the medication listed below.  Patient was last seen 8/26/2020  Next appt not scheduled.  Patient due for an appt. Please assist in scheduling an appt and refill medication if appropiate.    Last written as  lisinopril (ZESTRIL) 20 MG tablet 90 tablet 0 9/21/2020     Sig - Route: Take 1 tablet by mouth daily. - Oral              Pharmacy:  [facility]  Call when complete?  n/a         never used

## 2021-02-07 NOTE — PATIENT PROFILE ADULT. - AS SC BRADEN NUTRITION
(4) excellent
I will START or STAY ON the medications listed below when I get home from the hospital:    acetaminophen 325 mg oral tablet  -- 3 tab(s) by mouth every 6 hours, As Needed  -- Indication: For Pain    ibuprofen 600 mg oral tablet  -- 1 tab(s) by mouth every 6 hours, As Needed  -- Indication: For Pain    PNV Prenatal oral tablet  -- 1 tab(s) by mouth once a day  -- Indication: For Postpartum state

## 2021-02-09 NOTE — PATIENT PROFILE ADULT. - ALCOHOL USE HISTORY SINGLE SELECT
never Dorsal Nasal Flap Text: The defect edges were debeveled with a #15 scalpel blade.  Given the location of the defect and the proximity to free margins a dorsal nasal flap was deemed most appropriate.  Using a sterile surgical marker, an appropriate dorsal nasal flap was drawn around the defect.    The area thus outlined was incised deep to adipose tissue with a #15 scalpel blade.  The skin margins were undermined to an appropriate distance in all directions utilizing iris scissors.

## 2021-02-11 NOTE — PROGRESS NOTE ADULT - PROBLEM SELECTOR PLAN 6
Early ambulation for DVT prophylaxis  General precautions
Normal

## 2021-03-11 NOTE — PROGRESS NOTE ADULT - PROBLEM SELECTOR PROBLEM 2
Controlled diabetes mellitus type 1 without complications
Intractable vomiting with nausea, unspecified vomiting type
Intractable vomiting with nausea, unspecified vomiting type
Colitis
Intractable vomiting with nausea, unspecified vomiting type
- pt with known hx HTN, on valsartan and norvasc outpt unknown dosing  - med rec in am  - on arrival, /79  - goal SBP <180
Patient with known hx of HTN. Home medications include valsartan and norvasc.  - Holding home anti-HTN at this time  - Goal sBP <180  - Restart home medications PRN

## 2021-03-15 NOTE — H&P ADULT - NSHPPOAPULMEMBOLUS_GEN_A_CORE
no
Vital Signs: I have reviewed the initial vital signs.  Constitutional: well-nourished, appears stated age, no acute distress  HEENT: NCAT, moist mucous membranes, normal TMs  Cardiovascular: regular rate, regular rhythm, well-perfused extremities  Respiratory: unlabored respiratory effort, clear to auscultation bilaterally  Gastrointestinal: soft, non-tender abdomen, no palpable organomegaly, G-J stoma site clean with no drainage   Musculoskeletal: supple neck, no gross deformities  Integumentary: warm, dry, no rash  Neurologic: awake, at baseline, spastic quadriplegia

## 2021-03-24 NOTE — ED ADULT NURSE NOTE - NSFALLRSKASSESSTYPE_ED_ALL_ED
Mother called stating that script needed to be sent to [facility].    Araceli Gonzalez RN 3/24/2021 12:56 CDT     Initial (On Arrival)

## 2021-03-29 NOTE — BEHAVIORAL HEALTH ASSESSMENT NOTE - NSBHCONSORIP_PSY_A_CORE
Patient did not keep the telemedicine appointment for a DSMT follow-up  Unable to leave a message on her voicemail as her mailbox is not set-up yet  Unable to contact patient after several attempts  A letter will be sent to patient now  Consult...

## 2021-03-31 NOTE — ED PROVIDER NOTE - LATERALITY
generalized Sig: Apply to affected areas on face twice daily Sig: Apply to affected areas twice daily Sig: Apply pea sized amount per area at night Sig: Apply nightly to warts nightly under occlusion Sig: Apply twice daily for 5 days Sig: Apply a thin layer to affected areas twice daily for 6 weeks Detail Level: Simple Sig: Wash affected areas daily. Product Type (1): Anti-Aging Intro Statement: I recommended the following products: Amlactin no

## 2021-05-12 ENCOUNTER — APPOINTMENT (OUTPATIENT)
Dept: ENDOCRINOLOGY | Facility: CLINIC | Age: 41
End: 2021-05-12

## 2021-05-29 ENCOUNTER — EMERGENCY (EMERGENCY)
Facility: HOSPITAL | Age: 41
LOS: 0 days | Discharge: HOME | End: 2021-05-30
Attending: EMERGENCY MEDICINE
Payer: MEDICAID

## 2021-05-29 ENCOUNTER — EMERGENCY (EMERGENCY)
Facility: HOSPITAL | Age: 41
LOS: 0 days | Discharge: HOME | End: 2021-05-29
Attending: STUDENT IN AN ORGANIZED HEALTH CARE EDUCATION/TRAINING PROGRAM
Payer: MEDICAID

## 2021-05-29 VITALS
HEART RATE: 65 BPM | OXYGEN SATURATION: 95 % | SYSTOLIC BLOOD PRESSURE: 185 MMHG | WEIGHT: 179.9 LBS | HEIGHT: 65 IN | TEMPERATURE: 98 F | DIASTOLIC BLOOD PRESSURE: 100 MMHG | RESPIRATION RATE: 20 BRPM

## 2021-05-29 VITALS
HEART RATE: 69 BPM | TEMPERATURE: 98 F | HEIGHT: 65 IN | OXYGEN SATURATION: 97 % | SYSTOLIC BLOOD PRESSURE: 172 MMHG | RESPIRATION RATE: 18 BRPM | DIASTOLIC BLOOD PRESSURE: 101 MMHG | WEIGHT: 179.9 LBS

## 2021-05-29 VITALS
SYSTOLIC BLOOD PRESSURE: 128 MMHG | RESPIRATION RATE: 16 BRPM | HEART RATE: 71 BPM | DIASTOLIC BLOOD PRESSURE: 81 MMHG | OXYGEN SATURATION: 96 %

## 2021-05-29 DIAGNOSIS — Z90.49 ACQUIRED ABSENCE OF OTHER SPECIFIED PARTS OF DIGESTIVE TRACT: Chronic | ICD-10-CM

## 2021-05-29 DIAGNOSIS — E11.9 TYPE 2 DIABETES MELLITUS WITHOUT COMPLICATIONS: ICD-10-CM

## 2021-05-29 DIAGNOSIS — Z79.4 LONG TERM (CURRENT) USE OF INSULIN: ICD-10-CM

## 2021-05-29 DIAGNOSIS — K29.70 GASTRITIS, UNSPECIFIED, WITHOUT BLEEDING: ICD-10-CM

## 2021-05-29 DIAGNOSIS — F41.9 ANXIETY DISORDER, UNSPECIFIED: ICD-10-CM

## 2021-05-29 DIAGNOSIS — K31.84 GASTROPARESIS: ICD-10-CM

## 2021-05-29 DIAGNOSIS — R10.9 UNSPECIFIED ABDOMINAL PAIN: ICD-10-CM

## 2021-05-29 DIAGNOSIS — J45.909 UNSPECIFIED ASTHMA, UNCOMPLICATED: ICD-10-CM

## 2021-05-29 DIAGNOSIS — Z20.822 CONTACT WITH AND (SUSPECTED) EXPOSURE TO COVID-19: ICD-10-CM

## 2021-05-29 DIAGNOSIS — I10 ESSENTIAL (PRIMARY) HYPERTENSION: ICD-10-CM

## 2021-05-29 LAB
ALBUMIN SERPL ELPH-MCNC: 4.1 G/DL — SIGNIFICANT CHANGE UP (ref 3.3–5)
ALBUMIN SERPL ELPH-MCNC: 4.4 G/DL — SIGNIFICANT CHANGE UP (ref 3.3–5)
ALP SERPL-CCNC: 107 U/L — SIGNIFICANT CHANGE UP (ref 40–120)
ALP SERPL-CCNC: 131 U/L — HIGH (ref 40–120)
ALT FLD-CCNC: 65 U/L — SIGNIFICANT CHANGE UP (ref 12–78)
ALT FLD-CCNC: 70 U/L — SIGNIFICANT CHANGE UP (ref 12–78)
ANION GAP SERPL CALC-SCNC: 5 MMOL/L — SIGNIFICANT CHANGE UP (ref 5–17)
ANION GAP SERPL CALC-SCNC: 8 MMOL/L — SIGNIFICANT CHANGE UP (ref 5–17)
AST SERPL-CCNC: 77 U/L — HIGH (ref 15–37)
AST SERPL-CCNC: 79 U/L — HIGH (ref 15–37)
BASOPHILS # BLD AUTO: 0.04 K/UL — SIGNIFICANT CHANGE UP (ref 0–0.2)
BASOPHILS NFR BLD AUTO: 0.3 % — SIGNIFICANT CHANGE UP (ref 0–2)
BILIRUB SERPL-MCNC: 0.4 MG/DL — SIGNIFICANT CHANGE UP (ref 0.2–1.2)
BILIRUB SERPL-MCNC: 0.6 MG/DL — SIGNIFICANT CHANGE UP (ref 0.2–1.2)
BUN SERPL-MCNC: 13 MG/DL — SIGNIFICANT CHANGE UP (ref 7–23)
BUN SERPL-MCNC: 14 MG/DL — SIGNIFICANT CHANGE UP (ref 7–23)
CALCIUM SERPL-MCNC: 9.2 MG/DL — SIGNIFICANT CHANGE UP (ref 8.5–10.1)
CALCIUM SERPL-MCNC: 9.2 MG/DL — SIGNIFICANT CHANGE UP (ref 8.5–10.1)
CHLORIDE SERPL-SCNC: 102 MMOL/L — SIGNIFICANT CHANGE UP (ref 96–108)
CHLORIDE SERPL-SCNC: 104 MMOL/L — SIGNIFICANT CHANGE UP (ref 96–108)
CO2 SERPL-SCNC: 26 MMOL/L — SIGNIFICANT CHANGE UP (ref 22–31)
CO2 SERPL-SCNC: 27 MMOL/L — SIGNIFICANT CHANGE UP (ref 22–31)
CREAT SERPL-MCNC: 1.19 MG/DL — SIGNIFICANT CHANGE UP (ref 0.5–1.3)
CREAT SERPL-MCNC: 1.2 MG/DL — SIGNIFICANT CHANGE UP (ref 0.5–1.3)
EOSINOPHIL # BLD AUTO: 0 K/UL — SIGNIFICANT CHANGE UP (ref 0–0.5)
EOSINOPHIL NFR BLD AUTO: 0 % — SIGNIFICANT CHANGE UP (ref 0–6)
ETHANOL SERPL-MCNC: <10 MG/DL — SIGNIFICANT CHANGE UP (ref 0–10)
FLUAV AG NPH QL: SIGNIFICANT CHANGE UP
FLUBV AG NPH QL: SIGNIFICANT CHANGE UP
GLUCOSE SERPL-MCNC: 191 MG/DL — HIGH (ref 70–99)
GLUCOSE SERPL-MCNC: 232 MG/DL — HIGH (ref 70–99)
HCT VFR BLD CALC: 44.1 % — SIGNIFICANT CHANGE UP (ref 39–50)
HCT VFR BLD CALC: 46.4 % — SIGNIFICANT CHANGE UP (ref 39–50)
HGB BLD-MCNC: 15.1 G/DL — SIGNIFICANT CHANGE UP (ref 13–17)
HGB BLD-MCNC: 15.8 G/DL — SIGNIFICANT CHANGE UP (ref 13–17)
IMM GRANULOCYTES NFR BLD AUTO: 0.3 % — SIGNIFICANT CHANGE UP (ref 0–1.5)
LACTATE SERPL-SCNC: 1.2 MMOL/L — SIGNIFICANT CHANGE UP (ref 0.7–2)
LACTATE SERPL-SCNC: 2.1 MMOL/L — HIGH (ref 0.7–2)
LACTATE SERPL-SCNC: 2.2 MMOL/L — HIGH (ref 0.7–2)
LIDOCAIN IGE QN: 11 U/L — LOW (ref 73–393)
LIDOCAIN IGE QN: 62 U/L — LOW (ref 73–393)
LYMPHOCYTES # BLD AUTO: 1.47 K/UL — SIGNIFICANT CHANGE UP (ref 1–3.3)
LYMPHOCYTES # BLD AUTO: 10.6 % — LOW (ref 13–44)
MCHC RBC-ENTMCNC: 27.5 PG — SIGNIFICANT CHANGE UP (ref 27–34)
MCHC RBC-ENTMCNC: 27.9 PG — SIGNIFICANT CHANGE UP (ref 27–34)
MCHC RBC-ENTMCNC: 34.1 GM/DL — SIGNIFICANT CHANGE UP (ref 32–36)
MCHC RBC-ENTMCNC: 34.2 GM/DL — SIGNIFICANT CHANGE UP (ref 32–36)
MCV RBC AUTO: 80.8 FL — SIGNIFICANT CHANGE UP (ref 80–100)
MCV RBC AUTO: 81.5 FL — SIGNIFICANT CHANGE UP (ref 80–100)
MONOCYTES # BLD AUTO: 0.24 K/UL — SIGNIFICANT CHANGE UP (ref 0–0.9)
MONOCYTES NFR BLD AUTO: 1.7 % — LOW (ref 2–14)
NEUTROPHILS # BLD AUTO: 12.09 K/UL — HIGH (ref 1.8–7.4)
NEUTROPHILS NFR BLD AUTO: 87.1 % — HIGH (ref 43–77)
NRBC # BLD: 0 /100 WBCS — SIGNIFICANT CHANGE UP (ref 0–0)
NRBC # BLD: 0 /100 WBCS — SIGNIFICANT CHANGE UP (ref 0–0)
PLATELET # BLD AUTO: 361 K/UL — SIGNIFICANT CHANGE UP (ref 150–400)
PLATELET # BLD AUTO: 376 K/UL — SIGNIFICANT CHANGE UP (ref 150–400)
POTASSIUM SERPL-MCNC: 3.7 MMOL/L — SIGNIFICANT CHANGE UP (ref 3.5–5.3)
POTASSIUM SERPL-MCNC: 4.3 MMOL/L — SIGNIFICANT CHANGE UP (ref 3.5–5.3)
POTASSIUM SERPL-SCNC: 3.7 MMOL/L — SIGNIFICANT CHANGE UP (ref 3.5–5.3)
POTASSIUM SERPL-SCNC: 4.3 MMOL/L — SIGNIFICANT CHANGE UP (ref 3.5–5.3)
PROT SERPL-MCNC: 7.8 GM/DL — SIGNIFICANT CHANGE UP (ref 6–8.3)
PROT SERPL-MCNC: 8.3 GM/DL — SIGNIFICANT CHANGE UP (ref 6–8.3)
RBC # BLD: 5.41 M/UL — SIGNIFICANT CHANGE UP (ref 4.2–5.8)
RBC # BLD: 5.74 M/UL — SIGNIFICANT CHANGE UP (ref 4.2–5.8)
RBC # FLD: 13.6 % — SIGNIFICANT CHANGE UP (ref 10.3–14.5)
RBC # FLD: 13.7 % — SIGNIFICANT CHANGE UP (ref 10.3–14.5)
SARS-COV-2 RNA SPEC QL NAA+PROBE: SIGNIFICANT CHANGE UP
SODIUM SERPL-SCNC: 135 MMOL/L — SIGNIFICANT CHANGE UP (ref 135–145)
SODIUM SERPL-SCNC: 137 MMOL/L — SIGNIFICANT CHANGE UP (ref 135–145)
WBC # BLD: 13.58 K/UL — HIGH (ref 3.8–10.5)
WBC # BLD: 13.88 K/UL — HIGH (ref 3.8–10.5)
WBC # FLD AUTO: 13.58 K/UL — HIGH (ref 3.8–10.5)
WBC # FLD AUTO: 13.88 K/UL — HIGH (ref 3.8–10.5)

## 2021-05-29 PROCEDURE — 99285 EMERGENCY DEPT VISIT HI MDM: CPT

## 2021-05-29 PROCEDURE — 93010 ELECTROCARDIOGRAM REPORT: CPT

## 2021-05-29 PROCEDURE — 71045 X-RAY EXAM CHEST 1 VIEW: CPT | Mod: 26

## 2021-05-29 PROCEDURE — 74177 CT ABD & PELVIS W/CONTRAST: CPT | Mod: 26,MA

## 2021-05-29 RX ORDER — HYDROMORPHONE HYDROCHLORIDE 2 MG/ML
1 INJECTION INTRAMUSCULAR; INTRAVENOUS; SUBCUTANEOUS ONCE
Refills: 0 | Status: DISCONTINUED | OUTPATIENT
Start: 2021-05-29 | End: 2021-05-29

## 2021-05-29 RX ORDER — SODIUM CHLORIDE 9 MG/ML
1000 INJECTION INTRAMUSCULAR; INTRAVENOUS; SUBCUTANEOUS ONCE
Refills: 0 | Status: COMPLETED | OUTPATIENT
Start: 2021-05-29 | End: 2021-05-29

## 2021-05-29 RX ORDER — ACETAMINOPHEN 500 MG
650 TABLET ORAL ONCE
Refills: 0 | Status: COMPLETED | OUTPATIENT
Start: 2021-05-29 | End: 2021-05-29

## 2021-05-29 RX ORDER — FAMOTIDINE 10 MG/ML
20 INJECTION INTRAVENOUS ONCE
Refills: 0 | Status: COMPLETED | OUTPATIENT
Start: 2021-05-29 | End: 2021-05-29

## 2021-05-29 RX ORDER — MORPHINE SULFATE 50 MG/1
4 CAPSULE, EXTENDED RELEASE ORAL ONCE
Refills: 0 | Status: DISCONTINUED | OUTPATIENT
Start: 2021-05-29 | End: 2021-05-29

## 2021-05-29 RX ORDER — METOCLOPRAMIDE HCL 10 MG
10 TABLET ORAL ONCE
Refills: 0 | Status: COMPLETED | OUTPATIENT
Start: 2021-05-29 | End: 2021-05-29

## 2021-05-29 RX ORDER — ONDANSETRON 8 MG/1
4 TABLET, FILM COATED ORAL ONCE
Refills: 0 | Status: COMPLETED | OUTPATIENT
Start: 2021-05-29 | End: 2021-05-29

## 2021-05-29 RX ORDER — HYDRALAZINE HCL 50 MG
5 TABLET ORAL ONCE
Refills: 0 | Status: COMPLETED | OUTPATIENT
Start: 2021-05-29 | End: 2021-05-29

## 2021-05-29 RX ORDER — ONDANSETRON 8 MG/1
1 TABLET, FILM COATED ORAL
Qty: 30 | Refills: 0
Start: 2021-05-29

## 2021-05-29 RX ADMIN — Medication 10 MILLIGRAM(S): at 10:42

## 2021-05-29 RX ADMIN — SODIUM CHLORIDE 1000 MILLILITER(S): 9 INJECTION INTRAMUSCULAR; INTRAVENOUS; SUBCUTANEOUS at 22:05

## 2021-05-29 RX ADMIN — ONDANSETRON 4 MILLIGRAM(S): 8 TABLET, FILM COATED ORAL at 09:20

## 2021-05-29 RX ADMIN — HYDROMORPHONE HYDROCHLORIDE 1 MILLIGRAM(S): 2 INJECTION INTRAMUSCULAR; INTRAVENOUS; SUBCUTANEOUS at 13:35

## 2021-05-29 RX ADMIN — MORPHINE SULFATE 4 MILLIGRAM(S): 50 CAPSULE, EXTENDED RELEASE ORAL at 09:20

## 2021-05-29 RX ADMIN — Medication 10 MILLIGRAM(S): at 20:38

## 2021-05-29 RX ADMIN — Medication 5 MILLIGRAM(S): at 20:38

## 2021-05-29 RX ADMIN — SODIUM CHLORIDE 1000 MILLILITER(S): 9 INJECTION INTRAMUSCULAR; INTRAVENOUS; SUBCUTANEOUS at 20:38

## 2021-05-29 RX ADMIN — SODIUM CHLORIDE 1000 MILLILITER(S): 9 INJECTION INTRAMUSCULAR; INTRAVENOUS; SUBCUTANEOUS at 09:23

## 2021-05-29 RX ADMIN — MORPHINE SULFATE 4 MILLIGRAM(S): 50 CAPSULE, EXTENDED RELEASE ORAL at 13:35

## 2021-05-29 RX ADMIN — HYDROMORPHONE HYDROCHLORIDE 1 MILLIGRAM(S): 2 INJECTION INTRAMUSCULAR; INTRAVENOUS; SUBCUTANEOUS at 10:45

## 2021-05-29 RX ADMIN — ONDANSETRON 4 MILLIGRAM(S): 8 TABLET, FILM COATED ORAL at 13:35

## 2021-05-29 RX ADMIN — FAMOTIDINE 20 MILLIGRAM(S): 10 INJECTION INTRAVENOUS at 09:20

## 2021-05-29 RX ADMIN — SODIUM CHLORIDE 1000 MILLILITER(S): 9 INJECTION INTRAMUSCULAR; INTRAVENOUS; SUBCUTANEOUS at 22:04

## 2021-05-29 RX ADMIN — HYDROMORPHONE HYDROCHLORIDE 1 MILLIGRAM(S): 2 INJECTION INTRAMUSCULAR; INTRAVENOUS; SUBCUTANEOUS at 13:34

## 2021-05-29 RX ADMIN — FAMOTIDINE 20 MILLIGRAM(S): 10 INJECTION INTRAVENOUS at 20:38

## 2021-05-29 NOTE — ED PROVIDER NOTE - PATIENT PORTAL LINK FT
You can access the FollowMyHealth Patient Portal offered by [facility] by registering at the following website: [email or web address]. By joining [employer]’s FollowMyHealth portal, you will also be able to view your health information using other applications (apps) compatible with our system.

## 2021-05-29 NOTE — ED PROVIDER NOTE - CONSTITUTIONAL, MLM
normal... uncomfortable looking, in obvious pain, awake, alert, oriented to person, place, time/situation and in no apparent distress.

## 2021-05-29 NOTE — ED ADULT NURSE NOTE - OBJECTIVE STATEMENT
AAOx4, resps even and unlabored, skin is clammy. Pt c/o epigastric/periumbilical pain since 0400 this am, along with N/V. Hx of gastritis, diabetes, and anxiety. Rates pain 10/10. Abdomen is soft and non-tender.

## 2021-05-29 NOTE — ED PROVIDER NOTE - PROGRESS NOTE DETAILS
Results reported to patient--grossly benign, CT performed earlier today shows no acute pathology, labs are grossly unremarkable (lactate normalized after hydration), urine clean  Pt. reports feeling better after meds, initially sleeping comfortably in stretcher until awoken for reeval, then pt. starts to yell and moan in pain again, demanding dialudid as it's "the only medicine that works"  I offered tylenol instead, which patient refuses, then pt. demands to speak with another doctor for a second opinion--narcotics are contraindicated in gastroparesis as it delays gastric emptying (per GI note)  pt. agrees to f/u with primary care outpt., referred to GI for f/u as well   pt. understands to return to ED if symptoms worsen; will d/c with protonix and reglan

## 2021-05-29 NOTE — ED PROVIDER NOTE - OBJECTIVE STATEMENT
39 yo M with epigastric and mid abd pain since early this morning (About 4 am), vomiting (>10 times, nbnb), loose stool.  Pt. explains that it feels like his gastroparesis, but he hasn't had an episode in months.  Pt. denies inciting event.  Pt. was seen earlier today, had labs and CT that were normal, sent home after feeling better--pt. got Dilaudid for pain, as "morphine doesn't work."  No other complaints/associated symptoms/inciting event/trauma.  Pt. feels otherwise well.  Pt. notes pain came back a few hours after last Dilaudid dose, which prompted this ER visit.   ROS: negative for fever, cough, headache, chest pain, shortness of breath, rash, paresthesia, and weakness--all other systems reviewed are negative.   PMH: anxiety, asthma, DM, gastritis, gastroparesis, s/p cholecystectomy; Meds: See EMR for list; SH: Denies smoking/drinking/drug use

## 2021-05-29 NOTE — ED PROVIDER NOTE - PATIENT PORTAL LINK FT
You can access the FollowMyHealth Patient Portal offered by [facility] by registering at the following website: [email or web address]. By joining [employer]’s FollowMyHealth portal, you will also be able to view your health information using other applications (apps) compatible with our system.

## 2021-05-29 NOTE — ED PROVIDER NOTE - CLINICAL SUMMARY MEDICAL DECISION MAKING FREE TEXT BOX
Pt with no urinary sx, evaluate for intraabdominal infection or obstruction. CT negative for intraabdominal pathology, CT read with periumbilical skin thickening, no skin or warmth on exam. Symptoms poorly controlled despite multiple attempts. Will require admission. for intractable abdominal pain. Pt with no urinary sx, evaluate for intraabdominal infection or obstruction. CT negative for intraabdominal pathology, CT read with periumbilical skin thickening, no skin or warmth on exam. symptoms controlled in ED. will d/c with zofran and maalox. patient has ppi at home and gi fdollow up.

## 2021-05-29 NOTE — ED PROVIDER NOTE - CLINICAL SUMMARY MEDICAL DECISION MAKING FREE TEXT BOX
39 yo M with likely gastroparesis, given normal labs and imaging today, presents with n/v/d/abd pain, doubt acute ischemia, pancreatitis, obstruction  -chart review shows prior GI note that recommends against rescanning (given radiation exposure), and concern for drug-seeking behavior   -Pt. seen by Dr. Carr on Jan 21, 2020, recommended:   -Protonix 40 IV daily    -Limit narcotic pain medication; discussed that this can worsen gastric emptying    -Advance diet as tolerated; small bites, low fat   -Marijuana avoidance discussed    -Limit CT scan exposure discussed   -will obtain labs, IV, hydration, famotidine, reglan, ekg, no narcotics at this time  -if pain persists will consider CTA abd/pel to r/o ischemia depending on pain level and lactate  -hydralazine for HTN  -reeval

## 2021-05-29 NOTE — ED PROVIDER NOTE - CARE PROVIDER_API CALL
Amadou Carr S  GASTROENTEROLOGY  [address], [address]  [place] [address]  Phone: [phone]  Fax: [phone]  Follow Up Time: 4-6 Days

## 2021-05-29 NOTE — ED PROVIDER NOTE - PHYSICAL EXAMINATION
Vitals: HTN at 185/100  Gen: AAOx3, laying uncomfortably in stretcher, appears to be in distress, holding stomach, moaning in ER  Head: ncat, perrla, eomi b/l  Neck: supple, no lymphadenopathy, no midline deviation  Heart: rrr, no m/r/g  Lungs: CTA b/l, no rales/ronchi/wheezes  Abd: soft, tender in epigastrium, non-distended, no rebound or guarding  Ext: no clubbing/cyanosis/edema  Neuro: sensation and muscle strength intact b/l

## 2021-05-29 NOTE — ED PROVIDER NOTE - OBJECTIVE STATEMENT
41 y/o M with PMHx of Gastritis, Asthma, Anxiety and IDDM presents to the ED c/o abdominal pain with N/V for x1 day, as well as mild diarrhea. Denies fevers/chills, CP, sore throat, body aches or smoking marijuana. 39 y/o M with PMHx of Gastritis, Asthma, Anxiety and IDDM, cholecystectomy presents to the ED c/o abdominal pain with N/V for x1 day, as well as mild diarrhea. Denies fevers/chills, CP, sore throat, body aches or smoking marijuana.

## 2021-05-29 NOTE — ED ADULT TRIAGE NOTE - CHIEF COMPLAINT QUOTE
Pt biba with c/o ABD pain and vomiting x 1 hour. pt stated MD, told him to return to the ED if pain continues. h/o Gastritis

## 2021-05-30 ENCOUNTER — EMERGENCY (EMERGENCY)
Facility: HOSPITAL | Age: 41
LOS: 0 days | Discharge: HOME | End: 2021-05-30
Attending: STUDENT IN AN ORGANIZED HEALTH CARE EDUCATION/TRAINING PROGRAM
Payer: MEDICAID

## 2021-05-30 VITALS
HEART RATE: 68 BPM | TEMPERATURE: 99 F | WEIGHT: 180.56 LBS | RESPIRATION RATE: 20 BRPM | SYSTOLIC BLOOD PRESSURE: 132 MMHG | OXYGEN SATURATION: 99 % | HEIGHT: 65 IN | DIASTOLIC BLOOD PRESSURE: 79 MMHG

## 2021-05-30 VITALS
SYSTOLIC BLOOD PRESSURE: 132 MMHG | RESPIRATION RATE: 20 BRPM | DIASTOLIC BLOOD PRESSURE: 79 MMHG | TEMPERATURE: 99 F | OXYGEN SATURATION: 99 % | HEART RATE: 68 BPM

## 2021-05-30 VITALS
HEART RATE: 70 BPM | RESPIRATION RATE: 18 BRPM | SYSTOLIC BLOOD PRESSURE: 132 MMHG | DIASTOLIC BLOOD PRESSURE: 71 MMHG | OXYGEN SATURATION: 97 %

## 2021-05-30 DIAGNOSIS — Z90.49 ACQUIRED ABSENCE OF OTHER SPECIFIED PARTS OF DIGESTIVE TRACT: Chronic | ICD-10-CM

## 2021-05-30 DIAGNOSIS — R11.2 NAUSEA WITH VOMITING, UNSPECIFIED: ICD-10-CM

## 2021-05-30 DIAGNOSIS — K29.70 GASTRITIS, UNSPECIFIED, WITHOUT BLEEDING: ICD-10-CM

## 2021-05-30 DIAGNOSIS — R10.9 UNSPECIFIED ABDOMINAL PAIN: ICD-10-CM

## 2021-05-30 DIAGNOSIS — E10.9 TYPE 1 DIABETES MELLITUS WITHOUT COMPLICATIONS: ICD-10-CM

## 2021-05-30 DIAGNOSIS — F41.9 ANXIETY DISORDER, UNSPECIFIED: ICD-10-CM

## 2021-05-30 DIAGNOSIS — Z79.4 LONG TERM (CURRENT) USE OF INSULIN: ICD-10-CM

## 2021-05-30 DIAGNOSIS — J45.909 UNSPECIFIED ASTHMA, UNCOMPLICATED: ICD-10-CM

## 2021-05-30 DIAGNOSIS — R19.7 DIARRHEA, UNSPECIFIED: ICD-10-CM

## 2021-05-30 LAB
AMPHET UR-MCNC: NEGATIVE — SIGNIFICANT CHANGE UP
APPEARANCE UR: CLEAR — SIGNIFICANT CHANGE UP
BACTERIA # UR AUTO: ABNORMAL
BARBITURATES UR SCN-MCNC: POSITIVE — SIGNIFICANT CHANGE UP
BENZODIAZ UR-MCNC: POSITIVE — SIGNIFICANT CHANGE UP
BILIRUB UR-MCNC: NEGATIVE — SIGNIFICANT CHANGE UP
COCAINE METAB.OTHER UR-MCNC: NEGATIVE — SIGNIFICANT CHANGE UP
COLOR SPEC: YELLOW — SIGNIFICANT CHANGE UP
DIFF PNL FLD: NEGATIVE — SIGNIFICANT CHANGE UP
GLUCOSE UR QL: 1000 MG/DL
KETONES UR-MCNC: ABNORMAL
LEUKOCYTE ESTERASE UR-ACNC: NEGATIVE — SIGNIFICANT CHANGE UP
METHADONE UR-MCNC: NEGATIVE — SIGNIFICANT CHANGE UP
NITRITE UR-MCNC: NEGATIVE — SIGNIFICANT CHANGE UP
OPIATES UR-MCNC: POSITIVE — SIGNIFICANT CHANGE UP
PCP SPEC-MCNC: SIGNIFICANT CHANGE UP
PCP UR-MCNC: NEGATIVE — SIGNIFICANT CHANGE UP
PH UR: 6 — SIGNIFICANT CHANGE UP (ref 5–8)
PROT UR-MCNC: 30 MG/DL
SP GR SPEC: 1.01 — SIGNIFICANT CHANGE UP (ref 1.01–1.02)
THC UR QL: POSITIVE — SIGNIFICANT CHANGE UP
UROBILINOGEN FLD QL: NEGATIVE MG/DL — SIGNIFICANT CHANGE UP
WBC UR QL: SIGNIFICANT CHANGE UP

## 2021-05-30 PROCEDURE — 99284 EMERGENCY DEPT VISIT MOD MDM: CPT

## 2021-05-30 RX ORDER — DIPHENHYDRAMINE HCL 50 MG
25 CAPSULE ORAL ONCE
Refills: 0 | Status: COMPLETED | OUTPATIENT
Start: 2021-05-30 | End: 2021-05-30

## 2021-05-30 RX ORDER — METOCLOPRAMIDE HCL 10 MG
1 TABLET ORAL
Qty: 20 | Refills: 0
Start: 2021-05-30 | End: 2021-06-03

## 2021-05-30 RX ORDER — PANTOPRAZOLE SODIUM 20 MG/1
1 TABLET, DELAYED RELEASE ORAL
Qty: 30 | Refills: 0
Start: 2021-05-30 | End: 2021-06-28

## 2021-05-30 RX ORDER — METOCLOPRAMIDE HCL 10 MG
10 TABLET ORAL ONCE
Refills: 0 | Status: COMPLETED | OUTPATIENT
Start: 2021-05-30 | End: 2021-05-30

## 2021-05-30 RX ORDER — KETOROLAC TROMETHAMINE 30 MG/ML
15 SYRINGE (ML) INJECTION ONCE
Refills: 0 | Status: DISCONTINUED | OUTPATIENT
Start: 2021-05-30 | End: 2021-05-30

## 2021-05-30 RX ADMIN — Medication 10 MILLIGRAM(S): at 01:40

## 2021-05-30 RX ADMIN — Medication 25 MILLIGRAM(S): at 01:40

## 2021-05-30 RX ADMIN — Medication 15 MILLIGRAM(S): at 01:40

## 2021-05-30 NOTE — ED ADULT TRIAGE NOTE - BMI (KG/M2)
30 Isha called regarding question about a Tetanus shot 6 years ago and she wants to know if she needs to have one. Top of her ankle on rust getting into a truck. Swollen. Happened on Monday.  And wants to discuss antidepressants.      Patient will now come in for appnt. At 9:10am today with Meeta.    Verbal communication is authorized for caller: Yes      It is okay to leave a detailed message on their voicemail.

## 2021-05-30 NOTE — ED PROVIDER NOTE - OBJECTIVE STATEMENT
39yo M w/ PMH gastritis, DM, gastroparesis, asthma, anxiety pw abd pain, N/V onset this AM. Pt denies recent travel, sick contacts, new / spoiled foods, change in routine. Pt reports pain c/w prior flares of gastritis / gastroparesis. Pt sees GI at [facility], but states has not been for f/u in sometime. + diarrhea. Denies F/C, CP, SOB, cough, back pain, constipation, dysuria.     This is pt's 3rd ED visit w/in 24hrs for similar symptoms. Pt had CBC, CMP, lipase, lacate, UA, CT AP w/ contrast, tox. Labs and imaging w/o significant abnormalities nor acute pathology. Pt returns to ED x 2 b/c he was told he 'would get the same medication (Dilaudid) as he did for pain during 1st ED visit if pain was not controlled / returned.' Pt wife (Veronica) on speaker during HPI eval.

## 2021-05-30 NOTE — ED PROVIDER NOTE - PROGRESS NOTE DETAILS
On re-eval, pt sleeping comfortably in ED bed. On re-eval, sleeping comfortably. Upon awakening pt for re-eval, pt states he is in and out of sleep, pain is still present but less.

## 2021-05-30 NOTE — ED ADULT NURSE NOTE - NSIMPLEMENTINTERV_GEN_ALL_ED
Implemented All Universal Safety Interventions:  [place] to call system. Call bell, personal items and telephone within reach. Instruct patient to call for assistance. Room bathroom lighting operational. Non-slip footwear when patient is off stretcher. Physically safe environment: no spills, clutter or unnecessary equipment. Stretcher in lowest position, wheels locked, appropriate side rails in place.

## 2021-05-30 NOTE — ED PROVIDER NOTE - PHYSICAL EXAMINATION
GEN: Awake, alert, interactive, tearful / pt appears uncomfortable.   HEAD AND NECK: NC/AT. Airway patent. Neck supple.   EYES:  Clear b/l. EOMI. PERRL.   ENT: Moist mucus membranes.   CARDIAC: Regular rate, regular rhythm. No evident pedal edema.    RESP/CHEST: Normal respiratory effort with no use of accessory muscles or retractions. Clear throughout on auscultation.  ABD: Soft, non-distended, non-tender. No rebound, no guarding.   BACK: No midline spinal TTP. No CVAT.   EXTREMITIES: Moving all extremities with no apparent deformities.   SKIN: Warm, dry, intact normal color. No rash.   NEURO: AOx3, CN II-XII grossly intact, no focal deficits.   PSYCH: Appropriate mood and affect.

## 2021-05-30 NOTE — ED PROVIDER NOTE - CARE PROVIDER_API CALL
Octavio Kumar  [facility]  [address], [address]  [place] [address]  Phone: [phone]  Fax: [phone]  Follow Up Time: 7-10 Days

## 2021-05-30 NOTE — ED PROVIDER NOTE - CLINICAL SUMMARY MEDICAL DECISION MAKING FREE TEXT BOX
3rd ED visit w/in 12hrs. Pt exhibiting drug seeking behavior. Reviews labs / imaging from past 12hrs, no significant abnormalities / acute pathology. D/w pt no indication for narcotic medications for current symptoms. Offered Toradol / Reglan / Benadryl. Pt accepts. Will give meds, re-eval. 39yo M w/ PMH anxiety, asthma, DM, gastroparesis, gastritis pw abd pain, N/V onset this AM. AFVSS. Pt appears uncomfortable, but non toxic appearing. Abd soft, nontender. This is pt's 3rd ED visit w/in 24hrs. Pt exhibiting drug seeking behavior. Review labs / imaging from past 24hrs, no significant abnormalities / acute pathology. D/w pt no indication for narcotic medications for current symptoms. Offered Toradol / Reglan / Benadryl. Pt accepts. Will give meds, re-eval. On re-eval, pt w/ improvement in symptoms. Stable for d/c. Given and recommend close outpatient f/u w/ GI, PCP. Return signs / symptoms d/w pt. He understands and agrees w/ this plan.

## 2021-05-30 NOTE — ED PROVIDER NOTE - PMH
Anxiety    Asthma    Controlled diabetes mellitus type 1 without complications    DM type 1 (diabetes mellitus, type 1)    Gastritis    Gastritis, presence of bleeding unspecified, unspecified chronicity, unspecified gastritis type    Uncomplicated asthma, unspecified asthma severity, unspecified whether persistent    
Patient

## 2021-05-30 NOTE — ED PROVIDER NOTE - PATIENT PORTAL LINK FT
You can access the FollowMyHealth Patient Portal offered by [facility] by registering at the following website: [email or web address]. By joining [employer]’s FollowMyHealth portal, you will also be able to view your health information using other applications (apps) compatible with our system.

## 2021-05-31 LAB
CULTURE RESULTS: NO GROWTH — SIGNIFICANT CHANGE UP
SPECIMEN SOURCE: SIGNIFICANT CHANGE UP

## 2021-06-21 NOTE — PATIENT PROFILE ADULT. - TEACHING/LEARNING FACTORS IMPACT ABILITY TO LEARN
anxiety Rituxan Counseling:  I discussed with the patient the risks of Rituxan infusions. Side effects can include infusion reactions, severe drug rashes including mucocutaneous reactions, reactivation of latent hepatitis and other infections and rarely progressive multifocal leukoencephalopathy.  All of the patient's questions and concerns were addressed.

## 2021-06-30 NOTE — H&P ADULT - NSHPLABSRESULTS_GEN_ALL_CORE
15.4   11.00 )-----------( 390      ( 20 Aug 2018 11:23 )             46.0   08-20    138  |  103  |  16  ----------------------------<  192<H>  5.0   |  24  |  1.01    Ca    9.3      20 Aug 2018 12:40    TPro  8.8<H>  /  Alb  4.2  /  TBili  0.5  /  DBili  x   /  AST  23  /  ALT  30  /  AlkPhos  120  08-20  < from: CT Abdomen and Pelvis w/ Oral Cont and w/ IV Cont (08.20.18 @ 14:25) >      Colonic wall thickening, which may represent underdistention or mild   colitis. Suturegard Retention Suture: 2-0 Nylon

## 2021-07-12 NOTE — ED ADULT NURSE NOTE - NS ED NOTE ABUSE RESPONSE YN
[No Acute Distress] : no acute distress [Well Nourished] : well nourished [Well Developed] : well developed [Well-Appearing] : well-appearing [Normal Sclera/Conjunctiva] : normal sclera/conjunctiva [PERRL] : pupils equal round and reactive to light [EOMI] : extraocular movements intact [Normal Outer Ear/Nose] : the outer ears and nose were normal in appearance [Normal Oropharynx] : the oropharynx was normal [No JVD] : no jugular venous distention [No Lymphadenopathy] : no lymphadenopathy [Supple] : supple [Thyroid Normal, No Nodules] : the thyroid was normal and there were no nodules present [No Respiratory Distress] : no respiratory distress  [No Accessory Muscle Use] : no accessory muscle use [Clear to Auscultation] : lungs were clear to auscultation bilaterally [Normal Rate] : normal rate  [Regular Rhythm] : with a regular rhythm [Normal S1, S2] : normal S1 and S2 [No Murmur] : no murmur heard [No Carotid Bruits] : no carotid bruits [No Abdominal Bruit] : a ~M bruit was not heard ~T in the abdomen [No Varicosities] : no varicosities [Pedal Pulses Present] : the pedal pulses are present [No Edema] : there was no peripheral edema [No Palpable Aorta] : no palpable aorta [No Extremity Clubbing/Cyanosis] : no extremity clubbing/cyanosis [Soft] : abdomen soft [Non Tender] : non-tender [Non-distended] : non-distended Yes [No Masses] : no abdominal mass palpated [No HSM] : no HSM [Normal Bowel Sounds] : normal bowel sounds [Normal Posterior Cervical Nodes] : no posterior cervical lymphadenopathy [Normal Anterior Cervical Nodes] : no anterior cervical lymphadenopathy [No CVA Tenderness] : no CVA  tenderness [No Spinal Tenderness] : no spinal tenderness [No Joint Swelling] : no joint swelling [Grossly Normal Strength/Tone] : grossly normal strength/tone [No Rash] : no rash [Coordination Grossly Intact] : coordination grossly intact [No Focal Deficits] : no focal deficits [Normal Gait] : normal gait [Deep Tendon Reflexes (DTR)] : deep tendon reflexes were 2+ and symmetric [Normal Affect] : the affect was normal [Normal Insight/Judgement] : insight and judgment were intact

## 2021-11-01 NOTE — ED PROVIDER NOTE - CPE EDP GASTRO NORM
Detail Level: Detailed
Instructions (Optional): A-left upper back pv 0.5 DN \\nB-left lose t-spine pv 0,5 DN \\nC-right medial chest 0.6 DN \\nD-right medial mid back 0.5 Reshave DN \\nE-right mid back pv 0.4 Reshave DN
Procedure To Be Performed At Next Visit: Shave Biopsy
Introduction Text (Please End With A Colon): The following procedure was deferred:
Procedure To Be Performed At Next Visit: Cryotherapy
normal...

## 2021-11-10 NOTE — PROGRESS NOTE BEHAVIORAL HEALTH - JUDGMENT (REGARDING EVERYDAY EVENTS)
Physical Therapy    Facility/Department: [facility] CAR 3  Initial Assessment    NAME: Re Doyle  : 1963  MRN: [id number]    Date of Service: 11/10/2021    Discharge Recommendations: Further therapy recommended at discharge. Chief Complaint   Patient presents with    Positive For Covid-19     went to infusion center for covid +, stumbling in with wife, RN checked his oxygen and it was 62s, brought over to ed via wheelchair on 4L via NC, increased to 6L with no change and oxygen in 70s, now on NRB. The patient is a 62 y.o. male history of obesity, diabetes, hypertension, hyperlipidemia was recently diagnosed to have Abdelrahman Valentín Mckeon 89 went to the infusion center for antibody cocktail. Lori Common he was found to be hypoxemic with oxygen saturation in 62s.  He was sent to the ER and hospitalized for COVID-19 pneumonia.  He was initially on nasal cannula.  Subsequently needed initiation of BiPAP. PT Equipment Recommendations  Equipment Needed: No    Assessment   Body structures, Functions, Activity limitations: Decreased functional mobility ; Decreased balance; Decreased ADL status; Decreased high-level IADLs; Decreased endurance  Assessment: Pt ambulates 8 ft with no AD and CGA. Pt limited by endurance with SpO2 dropping from 90's prior to ambulation to lowest noted 75% post ambulation, requiring 5 minute rest break to recover to 90% SpO2. Pt currently unable to ambulation household distance safely and is therefore unsafe to return to prior living situation. Pt would benefit from continued therapy to promote endurance, balance, and strenghtening. Prognosis: Good  Decision Making: Medium Complexity  PT Education: Goals; PT Role; Plan of Care  Barriers to Learning: none  REQUIRES PT FOLLOW UP: Yes  Activity Tolerance  Activity Tolerance: Patient limited by endurance       Patient Diagnosis(es): The primary encounter diagnosis was COVID-19.  Diagnoses of Hypoxia and Hyponatremia were also pertinent to this visit.     has a past medical history of Diabetes mellitus ([facility] [address] Hyperlipidemia, Hypertension, Kidney stone, and Thyroid disease. has a past surgical history that includes Total shoulder arthroplasty and hernia repair. Restrictions  Restrictions/Precautions  Restrictions/Precautions: Isolation, Fall Risk (COVID +)  Required Braces or Orthoses?: No  Position Activity Restriction  Other position/activity restrictions: up w A, hi flow O2 this date  Vision/Hearing  Vision: Impaired  Vision Exceptions: Wears glasses at all times  Hearing: Within functional limits     Subjective  General  Patient assessed for rehabilitation services?: Yes  Response To Previous Treatment: Not applicable  Family / Caregiver Present: No  Follows Commands: Within Functional Limits  Subjective  Subjective: RN and pt agreeable to PT. pt agreeable and pleasant. Pt seated in recliner at start of session.   Pain Screening  Patient Currently in Pain: Denies  Pain Assessment  Pain Assessment: 0-10  Pain Level: 0  Vital Signs  Patient Currently in Pain: Denies       Orientation  Orientation  Overall Orientation Status: Within Functional Limits  Social/Functional History  Social/Functional History  Lives With: Spouse  Type of Home: House  Home Layout: One level  Home Access: Stairs to enter without rails  Entrance Stairs - Number of Steps: 1  Entrance Stairs - Rails: None  Bathroom Shower/Tub: Walk-in shower  Bathroom Toilet: Handicap height  Bathroom Equipment: Shower chair  Home Equipment:  (no DME at baseline)  Receives Help From: Family  ADL Assistance: Independent  Homemaking Assistance: Independent  Homemaking Responsibilities: Yes  Ambulation Assistance: Independent  Transfer Assistance: Independent  Active : Yes  Mode of Transportation: Truck  Occupation: Full time employment  Type of occupation: construction  Leisure & Hobbies: TV  Additional Comments: Wife is also in the hospital. Pt reports he has other friends and family nearby who would be able to assist PRN. Cognition   Cognition  Overall Cognitive Status: WFL    Objective          Joint Mobility  Spine: WFL  ROM RLE: WFL  ROM LLE: WFL  ROM RUE: WFL  ROM LUE: WFL  Strength RLE  Strength RLE: WFL  Strength LLE  Strength LLE: WFL  Strength RUE  Strength RUE: WFL  Strength LUE  Strength LUE: WFL  Tone RLE  RLE Tone: Normotonic  Tone LLE  LLE Tone: Normotonic  Motor Control  Gross Motor?: WFL  Sensation  Overall Sensation Status: WFL (pt denies n/t)  Bed mobility  Supine to Sit: Unable to assess  Sit to Supine: Unable to assess  Scooting: Stand by assistance  Comment: Pt begins and ends in recliner  Transfers  Sit to Stand: Contact guard assistance  Stand to sit: Contact guard assistance  Comment: no AD used. Ambulation  Ambulation?: Yes  More Ambulation?: No  Ambulation 1  Surface: level tile  Device: No Device  Other Apparatus: O2 (hi flow)  Assistance: Contact guard assistance  Gait Deviations: Slow Yoko; Decreased step length; Decreased step height  Distance: 8 ft  Comments: Pt limited by endurance, has two minor LOB, able to self correct. During amb SpO2 in 90's. Post amb, lowest SpO2 noted to be 75%, pt requires 5 minute sitting rest break to recover to 90% SpO2. RN present and aware. Stairs/Curb  Stairs?: No     Balance  Posture: Good  Sitting - Static: Good  Sitting - Dynamic: Good; -  Standing - Static: Fair  Standing - Dynamic: Fair  Comments: Assessed without AD        Plan   Plan  Times per week: 3-5x  Current Treatment Recommendations: Strengthening, Transfer Training, Endurance Training, Neuromuscular Re-education, Patient/Caregiver Education & Training, ADL/Self-care Training, Equipment Evaluation, Education, & procurement, Balance Training, IADL Training, Gait Training, Home Exercise Program, Functional Mobility Training, Stair training, Safety Education & Training  Safety Devices  Type of devices:  All fall risk precautions in place, Gait belt, Call light within reach, Left in chair, Nurse notified                        AM-PAC Score  AM-PAC Inpatient Mobility Raw Score : 17 (11/10/21 1357)  AM-PAC Inpatient T-Scale Score : 42.13 (11/10/21 1357)  Mobility Inpatient CMS 0-100% Score: 50.57 (11/10/21 1357)  Mobility Inpatient CMS G-Code Modifier : CK (11/10/21 1357)          Goals  Short term goals  Time Frame for Short term goals: 14 visits  Short term goal 1: Complete transfers independently  Short term goal 2: Complete 300 ft of gait independently  Short term goal 3: Complete 1 step no HR independently  Short term goal 4: Participate in 30 minutes of therapy to promote endurance       Therapy Time   Individual Concurrent Group Co-treatment   Time In 0918         Time Out 0938         Minutes 20         Timed Code Treatment Minutes: Trevor Antunez PT Good

## 2021-11-27 ENCOUNTER — EMERGENCY (EMERGENCY)
Facility: HOSPITAL | Age: 41
LOS: 0 days | Discharge: HOME | End: 2021-11-27
Attending: STUDENT IN AN ORGANIZED HEALTH CARE EDUCATION/TRAINING PROGRAM
Payer: MEDICAID

## 2021-11-27 VITALS
WEIGHT: 179.9 LBS | HEART RATE: 61 BPM | OXYGEN SATURATION: 99 % | DIASTOLIC BLOOD PRESSURE: 87 MMHG | TEMPERATURE: 98 F | HEIGHT: 65 IN | RESPIRATION RATE: 19 BRPM | SYSTOLIC BLOOD PRESSURE: 164 MMHG

## 2021-11-27 DIAGNOSIS — J45.909 UNSPECIFIED ASTHMA, UNCOMPLICATED: ICD-10-CM

## 2021-11-27 DIAGNOSIS — Z90.49 ACQUIRED ABSENCE OF OTHER SPECIFIED PARTS OF DIGESTIVE TRACT: Chronic | ICD-10-CM

## 2021-11-27 DIAGNOSIS — Z90.49 ACQUIRED ABSENCE OF OTHER SPECIFIED PARTS OF DIGESTIVE TRACT: ICD-10-CM

## 2021-11-27 DIAGNOSIS — Z79.4 LONG TERM (CURRENT) USE OF INSULIN: ICD-10-CM

## 2021-11-27 DIAGNOSIS — F41.9 ANXIETY DISORDER, UNSPECIFIED: ICD-10-CM

## 2021-11-27 DIAGNOSIS — K31.84 GASTROPARESIS: ICD-10-CM

## 2021-11-27 DIAGNOSIS — R19.7 DIARRHEA, UNSPECIFIED: ICD-10-CM

## 2021-11-27 DIAGNOSIS — R11.2 NAUSEA WITH VOMITING, UNSPECIFIED: ICD-10-CM

## 2021-11-27 DIAGNOSIS — E11.9 TYPE 2 DIABETES MELLITUS WITHOUT COMPLICATIONS: ICD-10-CM

## 2021-11-27 DIAGNOSIS — R10.12 LEFT UPPER QUADRANT PAIN: ICD-10-CM

## 2021-11-27 LAB
ALBUMIN SERPL ELPH-MCNC: 4.1 G/DL — SIGNIFICANT CHANGE UP (ref 3.3–5)
ALP SERPL-CCNC: 121 U/L — HIGH (ref 40–120)
ALT FLD-CCNC: 38 U/L — SIGNIFICANT CHANGE UP (ref 12–78)
ANION GAP SERPL CALC-SCNC: 9 MMOL/L — SIGNIFICANT CHANGE UP (ref 5–17)
AST SERPL-CCNC: 34 U/L — SIGNIFICANT CHANGE UP (ref 15–37)
BASOPHILS # BLD AUTO: 0.04 K/UL — SIGNIFICANT CHANGE UP (ref 0–0.2)
BASOPHILS NFR BLD AUTO: 0.3 % — SIGNIFICANT CHANGE UP (ref 0–2)
BILIRUB SERPL-MCNC: 0.5 MG/DL — SIGNIFICANT CHANGE UP (ref 0.2–1.2)
BUN SERPL-MCNC: 15 MG/DL — SIGNIFICANT CHANGE UP (ref 7–23)
CALCIUM SERPL-MCNC: 9.4 MG/DL — SIGNIFICANT CHANGE UP (ref 8.5–10.1)
CHLORIDE SERPL-SCNC: 99 MMOL/L — SIGNIFICANT CHANGE UP (ref 96–108)
CO2 SERPL-SCNC: 29 MMOL/L — SIGNIFICANT CHANGE UP (ref 22–31)
CREAT SERPL-MCNC: 1.01 MG/DL — SIGNIFICANT CHANGE UP (ref 0.5–1.3)
EOSINOPHIL # BLD AUTO: 0.05 K/UL — SIGNIFICANT CHANGE UP (ref 0–0.5)
EOSINOPHIL NFR BLD AUTO: 0.4 % — SIGNIFICANT CHANGE UP (ref 0–6)
GLUCOSE SERPL-MCNC: 219 MG/DL — HIGH (ref 70–99)
HCT VFR BLD CALC: 44.9 % — SIGNIFICANT CHANGE UP (ref 39–50)
HGB BLD-MCNC: 15.5 G/DL — SIGNIFICANT CHANGE UP (ref 13–17)
IMM GRANULOCYTES NFR BLD AUTO: 0.3 % — SIGNIFICANT CHANGE UP (ref 0–1.5)
LACTATE SERPL-SCNC: 1.5 MMOL/L — SIGNIFICANT CHANGE UP (ref 0.7–2)
LYMPHOCYTES # BLD AUTO: 1.62 K/UL — SIGNIFICANT CHANGE UP (ref 1–3.3)
LYMPHOCYTES # BLD AUTO: 12.7 % — LOW (ref 13–44)
MCHC RBC-ENTMCNC: 28.8 PG — SIGNIFICANT CHANGE UP (ref 27–34)
MCHC RBC-ENTMCNC: 34.5 GM/DL — SIGNIFICANT CHANGE UP (ref 32–36)
MCV RBC AUTO: 83.5 FL — SIGNIFICANT CHANGE UP (ref 80–100)
MONOCYTES # BLD AUTO: 0.18 K/UL — SIGNIFICANT CHANGE UP (ref 0–0.9)
MONOCYTES NFR BLD AUTO: 1.4 % — LOW (ref 2–14)
NEUTROPHILS # BLD AUTO: 10.8 K/UL — HIGH (ref 1.8–7.4)
NEUTROPHILS NFR BLD AUTO: 84.9 % — HIGH (ref 43–77)
NRBC # BLD: 0 /100 WBCS — SIGNIFICANT CHANGE UP (ref 0–0)
PLATELET # BLD AUTO: 326 K/UL — SIGNIFICANT CHANGE UP (ref 150–400)
POTASSIUM SERPL-MCNC: 4.2 MMOL/L — SIGNIFICANT CHANGE UP (ref 3.5–5.3)
POTASSIUM SERPL-SCNC: 4.2 MMOL/L — SIGNIFICANT CHANGE UP (ref 3.5–5.3)
PROT SERPL-MCNC: 8.1 GM/DL — SIGNIFICANT CHANGE UP (ref 6–8.3)
RBC # BLD: 5.38 M/UL — SIGNIFICANT CHANGE UP (ref 4.2–5.8)
RBC # FLD: 13.3 % — SIGNIFICANT CHANGE UP (ref 10.3–14.5)
SODIUM SERPL-SCNC: 137 MMOL/L — SIGNIFICANT CHANGE UP (ref 135–145)
WBC # BLD: 12.73 K/UL — HIGH (ref 3.8–10.5)
WBC # FLD AUTO: 12.73 K/UL — HIGH (ref 3.8–10.5)

## 2021-11-27 PROCEDURE — 99285 EMERGENCY DEPT VISIT HI MDM: CPT

## 2021-11-27 PROCEDURE — 74177 CT ABD & PELVIS W/CONTRAST: CPT | Mod: 26,MC

## 2021-11-27 RX ORDER — METOCLOPRAMIDE HCL 10 MG
10 TABLET ORAL ONCE
Refills: 0 | Status: COMPLETED | OUTPATIENT
Start: 2021-11-27 | End: 2021-11-27

## 2021-11-27 RX ORDER — DIPHENHYDRAMINE HCL 50 MG
25 CAPSULE ORAL ONCE
Refills: 0 | Status: COMPLETED | OUTPATIENT
Start: 2021-11-27 | End: 2021-11-27

## 2021-11-27 RX ORDER — KETOROLAC TROMETHAMINE 30 MG/ML
15 SYRINGE (ML) INJECTION ONCE
Refills: 0 | Status: DISCONTINUED | OUTPATIENT
Start: 2021-11-27 | End: 2021-11-27

## 2021-11-27 RX ORDER — SODIUM CHLORIDE 9 MG/ML
1000 INJECTION INTRAMUSCULAR; INTRAVENOUS; SUBCUTANEOUS ONCE
Refills: 0 | Status: COMPLETED | OUTPATIENT
Start: 2021-11-27 | End: 2021-11-27

## 2021-11-27 RX ORDER — ONDANSETRON 8 MG/1
4 TABLET, FILM COATED ORAL ONCE
Refills: 0 | Status: COMPLETED | OUTPATIENT
Start: 2021-11-27 | End: 2021-11-27

## 2021-11-27 RX ORDER — MORPHINE SULFATE 50 MG/1
4 CAPSULE, EXTENDED RELEASE ORAL ONCE
Refills: 0 | Status: DISCONTINUED | OUTPATIENT
Start: 2021-11-27 | End: 2021-11-27

## 2021-11-27 RX ORDER — MORPHINE SULFATE 50 MG/1
2 CAPSULE, EXTENDED RELEASE ORAL ONCE
Refills: 0 | Status: DISCONTINUED | OUTPATIENT
Start: 2021-11-27 | End: 2021-11-27

## 2021-11-27 RX ORDER — METOCLOPRAMIDE HCL 10 MG
1 TABLET ORAL
Qty: 8 | Refills: 0
Start: 2021-11-27 | End: 2021-11-30

## 2021-11-27 RX ADMIN — ONDANSETRON 4 MILLIGRAM(S): 8 TABLET, FILM COATED ORAL at 09:51

## 2021-11-27 RX ADMIN — ONDANSETRON 4 MILLIGRAM(S): 8 TABLET, FILM COATED ORAL at 13:07

## 2021-11-27 RX ADMIN — Medication 10 MILLIGRAM(S): at 11:43

## 2021-11-27 RX ADMIN — MORPHINE SULFATE 4 MILLIGRAM(S): 50 CAPSULE, EXTENDED RELEASE ORAL at 13:09

## 2021-11-27 RX ADMIN — Medication 15 MILLIGRAM(S): at 11:42

## 2021-11-27 RX ADMIN — SODIUM CHLORIDE 1000 MILLILITER(S): 9 INJECTION INTRAMUSCULAR; INTRAVENOUS; SUBCUTANEOUS at 09:51

## 2021-11-27 RX ADMIN — Medication 15 MILLIGRAM(S): at 13:01

## 2021-11-27 RX ADMIN — SODIUM CHLORIDE 1000 MILLILITER(S): 9 INJECTION INTRAMUSCULAR; INTRAVENOUS; SUBCUTANEOUS at 10:51

## 2021-11-27 RX ADMIN — MORPHINE SULFATE 4 MILLIGRAM(S): 50 CAPSULE, EXTENDED RELEASE ORAL at 09:51

## 2021-11-27 RX ADMIN — MORPHINE SULFATE 2 MILLIGRAM(S): 50 CAPSULE, EXTENDED RELEASE ORAL at 13:07

## 2021-11-27 RX ADMIN — Medication 25 MILLIGRAM(S): at 11:42

## 2021-11-27 NOTE — ED PROVIDER NOTE - PROGRESS NOTE DETAILS
States feels better, 2/2 beef tacos, doesn't eat beef Pt reports improvement in symptoms. Pt thinks trigger was tacos from 2 nights ago as pt typically does not eat beef. Pt states he feels better s/p ED meds. W/u w/o significant abnormalities. Stable for d/c home. Given and instructed for outpatient GI f/u. Given script Reglan. Return signs / symptoms d/w pt. She understands and agrees w/ this plan.

## 2021-11-27 NOTE — ED ADULT NURSE NOTE - OBJECTIVE STATEMENT
pt A&Ox4 PMH Gastritis ,DM , present today with c/o abdominal pain , nausea and vomiting since 3 am. pt reports eating greasy food yesterday which triggered. reports vomiting x8 times since 3. Denies CP and SOB

## 2021-11-27 NOTE — ED PROVIDER NOTE - PHYSICAL EXAMINATION
GEN: Awake, alert, interactive, NAD.  HEAD AND NECK: NC/AT. Airway patent. Neck supple.   EYES:  Clear b/l. EOMI. PERRL.   ENT: Moist mucus membranes.   CARDIAC: Regular rate, regular rhythm. No evident pedal edema.    RESP/CHEST: Normal respiratory effort with no use of accessory muscles or retractions. Clear throughout on auscultation.  ABD: Soft, non-distended, + LUQ TTP. No rebound, no guarding.   BACK: No midline spinal TTP. No CVAT.   EXTREMITIES: Moving all extremities with no apparent deformities.   SKIN: Warm, dry, intact normal color. No rash.   NEURO: AOx3, CN II-XII grossly intact, no focal deficits.   PSYCH: Appropriate mood and affect. GEN: Awake, alert, interactive, appears uncomfortable / active emesis in ED.   HEAD AND NECK: NC/AT. Airway patent. Neck supple.   EYES:  Clear b/l. EOMI. PERRL.   ENT: Moist mucus membranes.   CARDIAC: Regular rate, regular rhythm. No evident pedal edema.    RESP/CHEST: Normal respiratory effort with no use of accessory muscles or retractions. Clear throughout on auscultation.  ABD: Soft, non-distended, + LUQ TTP. No rebound, no guarding.   BACK: No midline spinal TTP. No CVAT.   EXTREMITIES: Moving all extremities with no apparent deformities.   SKIN: Warm, dry, intact normal color. No rash.   NEURO: AOx3, CN II-XII grossly intact, no focal deficits.   PSYCH: Appropriate mood and affect.

## 2021-11-27 NOTE — ED PROVIDER NOTE - PATIENT PORTAL LINK FT
You can access the FollowMyHealth Patient Portal offered by [facility] by registering at the following website: [email or web address]. By joining [employer]’s FollowMyHealth portal, you will also be able to view your health information using other applications (apps) compatible with our system.

## 2021-11-27 NOTE — ED PROVIDER NOTE - OBJECTIVE STATEMENT
42yo M w/ PMH DM, anixety, asthma, gastroparesis pw N/V/D, diffuse abd pain since 0300 this AM, admits to eating greasy food last night (taco). Denies F/C, dysuria.     PMH as above, PSH cholecystectomy, NKDA, meds as listed, denies smoking / EtOH use, + marijuana use, not vaccinated against COVID. 40yo M w/ PMH DM, anxiety, asthma, gastroparesis pw N/V/D, diffuse abd pain since 0300 this AM, admits to eating greasy food last night (taco). Denies F/C, dysuria.     PMH as above, PSH cholecystectomy, NKDA, meds as listed, denies smoking / EtOH use, + marijuana use, not vaccinated against COVID.

## 2021-11-27 NOTE — ED PROVIDER NOTE - CLINICAL SUMMARY MEDICAL DECISION MAKING FREE TEXT BOX
42yo M w/ PMH DM, gastroparesis, anxiety, marijuana use, asthma pw N/V/D, diffuse abd pain onset 0300 this AM. AFVSS. Well appearing, in NAD. Plan: CBC, CMP, lipase, lactate, CT AP, UA/C, give IVF, Zofran, Morphine. Re-eval. 40yo M w/ PMH DM, gastroparesis, anxiety, marijuana use, asthma pw N/V/D, diffuse abd pain onset 0300 this AM. AFVSS. Well appearing, in NAD. Plan: CBC, CMP, lipase, lactate, CT AP, UA/C, give IVF, Zofran, Morphine, Reglan, Toradol, Benadryl. Re-eval.

## 2021-11-27 NOTE — ED PROVIDER NOTE - CARE PROVIDER_API CALL
Troy Castellanos)  Internal Medicine  [address] [facility] [place] [address] [place] [address]  Phone: [phone]  Fax: [phone]  Follow Up Time: 7-10 Days

## 2021-12-15 ENCOUNTER — EMERGENCY (EMERGENCY)
Facility: HOSPITAL | Age: 41
LOS: 0 days | Discharge: HOME | End: 2021-12-16
Attending: STUDENT IN AN ORGANIZED HEALTH CARE EDUCATION/TRAINING PROGRAM
Payer: MEDICAID

## 2021-12-15 VITALS
TEMPERATURE: 98 F | WEIGHT: 169.98 LBS | DIASTOLIC BLOOD PRESSURE: 88 MMHG | RESPIRATION RATE: 17 BRPM | OXYGEN SATURATION: 100 % | HEART RATE: 66 BPM | HEIGHT: 65 IN | SYSTOLIC BLOOD PRESSURE: 123 MMHG

## 2021-12-15 DIAGNOSIS — Z79.4 LONG TERM (CURRENT) USE OF INSULIN: ICD-10-CM

## 2021-12-15 DIAGNOSIS — K52.9 NONINFECTIVE GASTROENTERITIS AND COLITIS, UNSPECIFIED: ICD-10-CM

## 2021-12-15 DIAGNOSIS — Z90.49 ACQUIRED ABSENCE OF OTHER SPECIFIED PARTS OF DIGESTIVE TRACT: Chronic | ICD-10-CM

## 2021-12-15 DIAGNOSIS — E10.9 TYPE 1 DIABETES MELLITUS WITHOUT COMPLICATIONS: ICD-10-CM

## 2021-12-15 DIAGNOSIS — Z90.49 ACQUIRED ABSENCE OF OTHER SPECIFIED PARTS OF DIGESTIVE TRACT: ICD-10-CM

## 2021-12-15 DIAGNOSIS — R10.13 EPIGASTRIC PAIN: ICD-10-CM

## 2021-12-15 DIAGNOSIS — I10 ESSENTIAL (PRIMARY) HYPERTENSION: ICD-10-CM

## 2021-12-15 LAB
GLUCOSE BLDC GLUCOMTR-MCNC: 350 MG/DL — HIGH (ref 70–99)
GLUCOSE BLDC GLUCOMTR-MCNC: 385 MG/DL — HIGH (ref 70–99)

## 2021-12-15 PROCEDURE — 99285 EMERGENCY DEPT VISIT HI MDM: CPT

## 2021-12-15 RX ORDER — SODIUM CHLORIDE 9 MG/ML
1000 INJECTION INTRAMUSCULAR; INTRAVENOUS; SUBCUTANEOUS ONCE
Refills: 0 | Status: COMPLETED | OUTPATIENT
Start: 2021-12-15 | End: 2021-12-15

## 2021-12-15 RX ORDER — MORPHINE SULFATE 50 MG/1
4 CAPSULE, EXTENDED RELEASE ORAL ONCE
Refills: 0 | Status: DISCONTINUED | OUTPATIENT
Start: 2021-12-15 | End: 2021-12-15

## 2021-12-15 RX ADMIN — SODIUM CHLORIDE 1000 MILLILITER(S): 9 INJECTION INTRAMUSCULAR; INTRAVENOUS; SUBCUTANEOUS at 23:47

## 2021-12-15 RX ADMIN — MORPHINE SULFATE 4 MILLIGRAM(S): 50 CAPSULE, EXTENDED RELEASE ORAL at 23:46

## 2021-12-16 LAB
ALBUMIN SERPL ELPH-MCNC: 3.9 G/DL — SIGNIFICANT CHANGE UP (ref 3.3–5)
ALP SERPL-CCNC: 119 U/L — SIGNIFICANT CHANGE UP (ref 40–120)
ALT FLD-CCNC: 78 U/L — SIGNIFICANT CHANGE UP (ref 12–78)
ANION GAP SERPL CALC-SCNC: 7 MMOL/L — SIGNIFICANT CHANGE UP (ref 5–17)
APPEARANCE UR: CLEAR — SIGNIFICANT CHANGE UP
AST SERPL-CCNC: 54 U/L — HIGH (ref 15–37)
BACTERIA # UR AUTO: NEGATIVE — SIGNIFICANT CHANGE UP
BASOPHILS # BLD AUTO: 0.04 K/UL — SIGNIFICANT CHANGE UP (ref 0–0.2)
BASOPHILS NFR BLD AUTO: 0.3 % — SIGNIFICANT CHANGE UP (ref 0–2)
BILIRUB SERPL-MCNC: 0.3 MG/DL — SIGNIFICANT CHANGE UP (ref 0.2–1.2)
BILIRUB UR-MCNC: NEGATIVE — SIGNIFICANT CHANGE UP
BUN SERPL-MCNC: 20 MG/DL — SIGNIFICANT CHANGE UP (ref 7–23)
CALCIUM SERPL-MCNC: 9.6 MG/DL — SIGNIFICANT CHANGE UP (ref 8.5–10.1)
CHLORIDE SERPL-SCNC: 101 MMOL/L — SIGNIFICANT CHANGE UP (ref 96–108)
CO2 SERPL-SCNC: 29 MMOL/L — SIGNIFICANT CHANGE UP (ref 22–31)
COLOR SPEC: YELLOW — SIGNIFICANT CHANGE UP
CREAT SERPL-MCNC: 1.24 MG/DL — SIGNIFICANT CHANGE UP (ref 0.5–1.3)
DIFF PNL FLD: NEGATIVE — SIGNIFICANT CHANGE UP
EOSINOPHIL # BLD AUTO: 0.02 K/UL — SIGNIFICANT CHANGE UP (ref 0–0.5)
EOSINOPHIL NFR BLD AUTO: 0.2 % — SIGNIFICANT CHANGE UP (ref 0–6)
EPI CELLS # UR: SIGNIFICANT CHANGE UP
GLUCOSE SERPL-MCNC: 384 MG/DL — HIGH (ref 70–99)
GLUCOSE UR QL: 1000 MG/DL
HCT VFR BLD CALC: 42.8 % — SIGNIFICANT CHANGE UP (ref 39–50)
HGB BLD-MCNC: 14.9 G/DL — SIGNIFICANT CHANGE UP (ref 13–17)
IMM GRANULOCYTES NFR BLD AUTO: 0.3 % — SIGNIFICANT CHANGE UP (ref 0–1.5)
KETONES UR-MCNC: ABNORMAL
LEUKOCYTE ESTERASE UR-ACNC: NEGATIVE — SIGNIFICANT CHANGE UP
LIDOCAIN IGE QN: 66 U/L — LOW (ref 73–393)
LYMPHOCYTES # BLD AUTO: 1.45 K/UL — SIGNIFICANT CHANGE UP (ref 1–3.3)
LYMPHOCYTES # BLD AUTO: 12.6 % — LOW (ref 13–44)
MCHC RBC-ENTMCNC: 28.5 PG — SIGNIFICANT CHANGE UP (ref 27–34)
MCHC RBC-ENTMCNC: 34.8 GM/DL — SIGNIFICANT CHANGE UP (ref 32–36)
MCV RBC AUTO: 82 FL — SIGNIFICANT CHANGE UP (ref 80–100)
MONOCYTES # BLD AUTO: 0.5 K/UL — SIGNIFICANT CHANGE UP (ref 0–0.9)
MONOCYTES NFR BLD AUTO: 4.3 % — SIGNIFICANT CHANGE UP (ref 2–14)
NEUTROPHILS # BLD AUTO: 9.47 K/UL — HIGH (ref 1.8–7.4)
NEUTROPHILS NFR BLD AUTO: 82.3 % — HIGH (ref 43–77)
NITRITE UR-MCNC: NEGATIVE — SIGNIFICANT CHANGE UP
NRBC # BLD: 0 /100 WBCS — SIGNIFICANT CHANGE UP (ref 0–0)
PH UR: 8 — SIGNIFICANT CHANGE UP (ref 5–8)
PLATELET # BLD AUTO: 386 K/UL — SIGNIFICANT CHANGE UP (ref 150–400)
POTASSIUM SERPL-MCNC: 4.5 MMOL/L — SIGNIFICANT CHANGE UP (ref 3.5–5.3)
POTASSIUM SERPL-SCNC: 4.5 MMOL/L — SIGNIFICANT CHANGE UP (ref 3.5–5.3)
PROT SERPL-MCNC: 7.8 GM/DL — SIGNIFICANT CHANGE UP (ref 6–8.3)
PROT UR-MCNC: NEGATIVE MG/DL — SIGNIFICANT CHANGE UP
RBC # BLD: 5.22 M/UL — SIGNIFICANT CHANGE UP (ref 4.2–5.8)
RBC # FLD: 13 % — SIGNIFICANT CHANGE UP (ref 10.3–14.5)
RBC CASTS # UR COMP ASSIST: NEGATIVE /HPF — SIGNIFICANT CHANGE UP (ref 0–4)
SODIUM SERPL-SCNC: 137 MMOL/L — SIGNIFICANT CHANGE UP (ref 135–145)
SP GR SPEC: 1.01 — SIGNIFICANT CHANGE UP (ref 1.01–1.02)
UROBILINOGEN FLD QL: NEGATIVE MG/DL — SIGNIFICANT CHANGE UP
WBC # BLD: 11.51 K/UL — HIGH (ref 3.8–10.5)
WBC # FLD AUTO: 11.51 K/UL — HIGH (ref 3.8–10.5)
WBC UR QL: SIGNIFICANT CHANGE UP

## 2021-12-16 PROCEDURE — 74177 CT ABD & PELVIS W/CONTRAST: CPT | Mod: 26,MA

## 2021-12-16 RX ORDER — SUCRALFATE 1 G
1 TABLET ORAL ONCE
Refills: 0 | Status: COMPLETED | OUTPATIENT
Start: 2021-12-16 | End: 2021-12-16

## 2021-12-16 RX ORDER — SUCRALFATE 1 G
1 TABLET ORAL
Qty: 21 | Refills: 0
Start: 2021-12-16 | End: 2021-12-22

## 2021-12-16 RX ORDER — MORPHINE SULFATE 50 MG/1
4 CAPSULE, EXTENDED RELEASE ORAL ONCE
Refills: 0 | Status: DISCONTINUED | OUTPATIENT
Start: 2021-12-16 | End: 2021-12-16

## 2021-12-16 RX ORDER — FAMOTIDINE 10 MG/ML
20 INJECTION INTRAVENOUS ONCE
Refills: 0 | Status: COMPLETED | OUTPATIENT
Start: 2021-12-16 | End: 2021-12-16

## 2021-12-16 RX ORDER — ONDANSETRON 8 MG/1
1 TABLET, FILM COATED ORAL
Qty: 21 | Refills: 0
Start: 2021-12-16 | End: 2021-12-22

## 2021-12-16 RX ORDER — FAMOTIDINE 10 MG/ML
1 INJECTION INTRAVENOUS
Qty: 10 | Refills: 0
Start: 2021-12-16 | End: 2021-12-25

## 2021-12-16 RX ORDER — METOCLOPRAMIDE HCL 10 MG
10 TABLET ORAL ONCE
Refills: 0 | Status: COMPLETED | OUTPATIENT
Start: 2021-12-16 | End: 2021-12-16

## 2021-12-16 RX ORDER — PIPERACILLIN AND TAZOBACTAM 4; .5 G/20ML; G/20ML
3.38 INJECTION, POWDER, LYOPHILIZED, FOR SOLUTION INTRAVENOUS ONCE
Refills: 0 | Status: COMPLETED | OUTPATIENT
Start: 2021-12-16 | End: 2021-12-16

## 2021-12-16 RX ADMIN — Medication 1 GRAM(S): at 06:40

## 2021-12-16 RX ADMIN — Medication 30 MILLILITER(S): at 05:29

## 2021-12-16 RX ADMIN — MORPHINE SULFATE 4 MILLIGRAM(S): 50 CAPSULE, EXTENDED RELEASE ORAL at 01:53

## 2021-12-16 RX ADMIN — MORPHINE SULFATE 4 MILLIGRAM(S): 50 CAPSULE, EXTENDED RELEASE ORAL at 02:23

## 2021-12-16 RX ADMIN — Medication 10 MILLIGRAM(S): at 05:29

## 2021-12-16 RX ADMIN — MORPHINE SULFATE 4 MILLIGRAM(S): 50 CAPSULE, EXTENDED RELEASE ORAL at 00:16

## 2021-12-16 RX ADMIN — SODIUM CHLORIDE 1000 MILLILITER(S): 9 INJECTION INTRAMUSCULAR; INTRAVENOUS; SUBCUTANEOUS at 01:43

## 2021-12-16 RX ADMIN — FAMOTIDINE 20 MILLIGRAM(S): 10 INJECTION INTRAVENOUS at 06:39

## 2021-12-16 RX ADMIN — PIPERACILLIN AND TAZOBACTAM 200 GRAM(S): 4; .5 INJECTION, POWDER, LYOPHILIZED, FOR SOLUTION INTRAVENOUS at 05:29

## 2021-12-16 NOTE — ED PROVIDER NOTE - CLINICAL SUMMARY MEDICAL DECISION MAKING FREE TEXT BOX
Pt p/w luq and epigastric abdominal pain w/ hx of gastroparesis. Exam concerning for gastritis vs sbo vs gastroparesis. Considered DDx:  SBO,  AAA rupture,  dissection,  viscous perforation,  gastroenteritis,  gastritis/PUD,     -IV fluids, analgesia & anti-emetics PRN  -  CBC, CMP, lipase, UA,   -CT abd/pelvis    -Observation and reassessment, surgical consultation if necessary.

## 2021-12-16 NOTE — ED PROVIDER NOTE - PHYSICAL EXAMINATION
VITAL SIGNS: I have reviewed nursing notes and confirm.   GEN: Well-developed; well-nourished; in no acute distress. Speaking full sentences.  SKIN: Warm, pink, no rash, no diaphoresis, no cyanosis, well perfused.   HEAD: Normocephalic; atraumatic. No scalp lacerations, no abrasions.  NECK: Supple; non tender.   EYES: Pupils 3mm equal, round, reactive to light and accomodation, conjunctiva and sclera clear. Extra-ocular movements intact bilaterally.  ENT: No nasal discharge; airway clear. Trachea is midline. ORAL: No oropharyngeal exudates or erythema. Normal dentition.  CV: Regular rate and rhythm. S1, S2 normal; no murmurs, gallops, or rubs. No lower extremity pitting edema bilaterally. Capillary refill < 2 seconds throughout. Distal pulses intact 2+ throughout.  RESP: CTA bilaterally. No wheezes, rales, or rhonchi.   ABD: Normal bowel sounds, soft, non-distended, (+) EPIGASTRIC and LUQ abdominal ttp, mild, no guarding, no rebound, no rigidity., no hepatosplenomegaly. No CVA tenderness bilaterally.  MSK: Normal range of motion and movement of all 4 extremities. No joint or muscular pain throughout. No clubbing.   BACK: No thoracolumbar midline or paravertebral tenderness. No step-offs or obvious deformities.  NEURO: Alert & oriented x 3, Grossly unremarkable. Sensory and motor intact throughout. No focal deficits. Gait: Fluid. Normal speech and coordination.   PSYCH: Cooperative, appropriate.

## 2021-12-16 NOTE — ED PROVIDER NOTE - PROGRESS NOTE DETAILS
CT showing colitis, tx w/ zosyn and rx augmentin. I have discussed with the patient about the ED workup, lab results, diagnostics results, plan for discharge home, need for follow-up with primary care physician/specialists, and return precautions. At this time, the patient does not require further workup in the ED. The patient is subjectively feeling better and would like to be discharged home. The patient had the opportunity to ask questions and I have answered all inquiries. The patient verbalizes understanding and agreement with the plan. The patient is hemodynamically stable, clinically well-appearing, ambulatory, mentating well and ready for discharge home.

## 2021-12-16 NOTE — ED PROVIDER NOTE - OBJECTIVE STATEMENT
41M PMHx of chronic gastroparesis, gastritis, DM, HTN, s/p cholecystectomy presenting with epigastric abdominal pain x 1 day. Described as sharp, nonradiating, constant mild abdominal pain, luq. Denies any chest pain, shortness of breath, vomiting, nausea, fevers, chills, vomiting, nausea, dysuria/hematuria, diarrhea, constipation, trauma, falls, syncope.

## 2021-12-16 NOTE — ED ADULT NURSE NOTE - OBJECTIVE STATEMENT
Pt received on bed 15 c/o nausea and vomiting. Pt stated that he vomited about 10x at home. Pt is A&Ox3, no labored breathing. Never

## 2021-12-17 LAB
CULTURE RESULTS: SIGNIFICANT CHANGE UP
SPECIMEN SOURCE: SIGNIFICANT CHANGE UP

## 2021-12-20 ENCOUNTER — INPATIENT (INPATIENT)
Facility: HOSPITAL | Age: 41
LOS: 1 days | Discharge: HOME | End: 2021-12-22
Attending: INTERNAL MEDICINE | Admitting: INTERNAL MEDICINE
Payer: MEDICAID

## 2021-12-20 VITALS
DIASTOLIC BLOOD PRESSURE: 88 MMHG | HEIGHT: 65 IN | OXYGEN SATURATION: 100 % | RESPIRATION RATE: 19 BRPM | WEIGHT: 175.05 LBS | SYSTOLIC BLOOD PRESSURE: 130 MMHG | HEART RATE: 80 BPM | TEMPERATURE: 99 F

## 2021-12-20 DIAGNOSIS — Z90.49 ACQUIRED ABSENCE OF OTHER SPECIFIED PARTS OF DIGESTIVE TRACT: Chronic | ICD-10-CM

## 2021-12-20 LAB
ALBUMIN SERPL ELPH-MCNC: 3.7 G/DL — SIGNIFICANT CHANGE UP (ref 3.3–5)
ALP SERPL-CCNC: 113 U/L — SIGNIFICANT CHANGE UP (ref 40–120)
ALT FLD-CCNC: 63 U/L — SIGNIFICANT CHANGE UP (ref 12–78)
ANION GAP SERPL CALC-SCNC: 10 MMOL/L — SIGNIFICANT CHANGE UP (ref 5–17)
AST SERPL-CCNC: 40 U/L — HIGH (ref 15–37)
BASOPHILS # BLD AUTO: 0.03 K/UL — SIGNIFICANT CHANGE UP (ref 0–0.2)
BASOPHILS NFR BLD AUTO: 0.3 % — SIGNIFICANT CHANGE UP (ref 0–2)
BILIRUB SERPL-MCNC: 0.2 MG/DL — SIGNIFICANT CHANGE UP (ref 0.2–1.2)
BLD GP AB SCN SERPL QL: SIGNIFICANT CHANGE UP
BUN SERPL-MCNC: 13 MG/DL — SIGNIFICANT CHANGE UP (ref 7–23)
CALCIUM SERPL-MCNC: 9.9 MG/DL — SIGNIFICANT CHANGE UP (ref 8.5–10.1)
CHLORIDE SERPL-SCNC: 99 MMOL/L — SIGNIFICANT CHANGE UP (ref 96–108)
CO2 SERPL-SCNC: 30 MMOL/L — SIGNIFICANT CHANGE UP (ref 22–31)
CREAT SERPL-MCNC: 0.96 MG/DL — SIGNIFICANT CHANGE UP (ref 0.5–1.3)
EOSINOPHIL # BLD AUTO: 0.02 K/UL — SIGNIFICANT CHANGE UP (ref 0–0.5)
EOSINOPHIL NFR BLD AUTO: 0.2 % — SIGNIFICANT CHANGE UP (ref 0–6)
GLUCOSE BLDC GLUCOMTR-MCNC: 111 MG/DL — HIGH (ref 70–99)
GLUCOSE SERPL-MCNC: 102 MG/DL — HIGH (ref 70–99)
HCT VFR BLD CALC: 46.4 % — SIGNIFICANT CHANGE UP (ref 39–50)
HGB BLD-MCNC: 16 G/DL — SIGNIFICANT CHANGE UP (ref 13–17)
IMM GRANULOCYTES NFR BLD AUTO: 0.3 % — SIGNIFICANT CHANGE UP (ref 0–1.5)
LACTATE SERPL-SCNC: 2.2 MMOL/L — HIGH (ref 0.7–2)
LIDOCAIN IGE QN: 98 U/L — SIGNIFICANT CHANGE UP (ref 73–393)
LYMPHOCYTES # BLD AUTO: 1.29 K/UL — SIGNIFICANT CHANGE UP (ref 1–3.3)
LYMPHOCYTES # BLD AUTO: 13.4 % — SIGNIFICANT CHANGE UP (ref 13–44)
MCHC RBC-ENTMCNC: 28.5 PG — SIGNIFICANT CHANGE UP (ref 27–34)
MCHC RBC-ENTMCNC: 34.5 GM/DL — SIGNIFICANT CHANGE UP (ref 32–36)
MCV RBC AUTO: 82.7 FL — SIGNIFICANT CHANGE UP (ref 80–100)
MONOCYTES # BLD AUTO: 0.87 K/UL — SIGNIFICANT CHANGE UP (ref 0–0.9)
MONOCYTES NFR BLD AUTO: 9 % — SIGNIFICANT CHANGE UP (ref 2–14)
NEUTROPHILS # BLD AUTO: 7.39 K/UL — SIGNIFICANT CHANGE UP (ref 1.8–7.4)
NEUTROPHILS NFR BLD AUTO: 76.8 % — SIGNIFICANT CHANGE UP (ref 43–77)
NRBC # BLD: 0 /100 WBCS — SIGNIFICANT CHANGE UP (ref 0–0)
PLATELET # BLD AUTO: 384 K/UL — SIGNIFICANT CHANGE UP (ref 150–400)
POTASSIUM SERPL-MCNC: 4.1 MMOL/L — SIGNIFICANT CHANGE UP (ref 3.5–5.3)
POTASSIUM SERPL-SCNC: 4.1 MMOL/L — SIGNIFICANT CHANGE UP (ref 3.5–5.3)
PROT SERPL-MCNC: 8.2 GM/DL — SIGNIFICANT CHANGE UP (ref 6–8.3)
RBC # BLD: 5.61 M/UL — SIGNIFICANT CHANGE UP (ref 4.2–5.8)
RBC # FLD: 13.2 % — SIGNIFICANT CHANGE UP (ref 10.3–14.5)
SODIUM SERPL-SCNC: 139 MMOL/L — SIGNIFICANT CHANGE UP (ref 135–145)
TROPONIN I, HIGH SENSITIVITY RESULT: 191.1 NG/L — HIGH
WBC # BLD: 9.63 K/UL — SIGNIFICANT CHANGE UP (ref 3.8–10.5)
WBC # FLD AUTO: 9.63 K/UL — SIGNIFICANT CHANGE UP (ref 3.8–10.5)

## 2021-12-20 PROCEDURE — 71045 X-RAY EXAM CHEST 1 VIEW: CPT | Mod: 26

## 2021-12-20 PROCEDURE — 93010 ELECTROCARDIOGRAM REPORT: CPT

## 2021-12-20 PROCEDURE — 99285 EMERGENCY DEPT VISIT HI MDM: CPT

## 2021-12-20 PROCEDURE — 74177 CT ABD & PELVIS W/CONTRAST: CPT | Mod: 26,MC

## 2021-12-20 RX ORDER — MORPHINE SULFATE 50 MG/1
4 CAPSULE, EXTENDED RELEASE ORAL ONCE
Refills: 0 | Status: DISCONTINUED | OUTPATIENT
Start: 2021-12-20 | End: 2021-12-20

## 2021-12-20 RX ORDER — SODIUM CHLORIDE 9 MG/ML
1000 INJECTION INTRAMUSCULAR; INTRAVENOUS; SUBCUTANEOUS ONCE
Refills: 0 | Status: COMPLETED | OUTPATIENT
Start: 2021-12-20 | End: 2021-12-20

## 2021-12-20 RX ORDER — ONDANSETRON 8 MG/1
4 TABLET, FILM COATED ORAL ONCE
Refills: 0 | Status: COMPLETED | OUTPATIENT
Start: 2021-12-20 | End: 2021-12-20

## 2021-12-20 RX ORDER — FAMOTIDINE 10 MG/ML
20 INJECTION INTRAVENOUS ONCE
Refills: 0 | Status: COMPLETED | OUTPATIENT
Start: 2021-12-20 | End: 2021-12-20

## 2021-12-20 RX ADMIN — MORPHINE SULFATE 4 MILLIGRAM(S): 50 CAPSULE, EXTENDED RELEASE ORAL at 20:51

## 2021-12-20 RX ADMIN — SODIUM CHLORIDE 1000 MILLILITER(S): 9 INJECTION INTRAMUSCULAR; INTRAVENOUS; SUBCUTANEOUS at 20:50

## 2021-12-20 RX ADMIN — MORPHINE SULFATE 4 MILLIGRAM(S): 50 CAPSULE, EXTENDED RELEASE ORAL at 21:20

## 2021-12-20 RX ADMIN — ONDANSETRON 4 MILLIGRAM(S): 8 TABLET, FILM COATED ORAL at 20:51

## 2021-12-20 RX ADMIN — FAMOTIDINE 20 MILLIGRAM(S): 10 INJECTION INTRAVENOUS at 21:43

## 2021-12-20 RX ADMIN — SODIUM CHLORIDE 1000 MILLILITER(S): 9 INJECTION INTRAMUSCULAR; INTRAVENOUS; SUBCUTANEOUS at 20:51

## 2021-12-20 RX ADMIN — MORPHINE SULFATE 4 MILLIGRAM(S): 50 CAPSULE, EXTENDED RELEASE ORAL at 21:58

## 2021-12-20 NOTE — ED ADULT NURSE NOTE - ED STAT RN HANDOFF DETAILS 2
Report endorsed to stef Shields RN. Safety checks completed this shift. Safety rounds completed hourly.  IV sites checked Q2+remains WDL. Medications administered as ordered with no signs/symptoms of adverse reactions. Fall & skin precautions in place. Any issues endorsed to oncoming RN for follow up. Pt pending admission to 2C. Awaiting bed.

## 2021-12-20 NOTE — ED PROVIDER NOTE - CLINICAL SUMMARY MEDICAL DECISION MAKING FREE TEXT BOX
41 year old male dm gastroparesis recently diagnosed colitis presents to the ED today for worsening pain unable to tolerate PO. Vital signs stable   Plan: CBC, CMP, lipase, lactate, urine culture, urine analysis, CT abdomen pelvis, EKG, troponin, chest X-ray, flu swab, COVID swab, pain control,  anticipate admission 42yo M w/ PMH DM, gastroparesis, recently diagnosed colitis, presents to the ED today for worsening pain, unable to tolerate PO. AFVSS. Plan: CBC, CMP, lipase, lactate, UA/C, CT AP, EKG, troponin, CXR, Flu/COVID swab, pain control. Re-eval. Anticipate admission.

## 2021-12-20 NOTE — ED PROVIDER NOTE - PROGRESS NOTE DETAILS
Pt w/ improvement in symptoms s/p ED meds. Resting comfortably, in NAD. W/u significant for no acute pathology on CT AP, colitis resolving. + Trop 191, delta trop 173, COVID (+). Will admit to medicine (d/w Dr Pollack) d/t COVID (+) w/ troponin leak (ECG NSR), gastroparesis flare / unable to tolerate PO. Pt understands / agrees w/ this plan.

## 2021-12-20 NOTE — ED PROVIDER NOTE - OBJECTIVE STATEMENT
Pt is a 41 year old male w/PMH of DM, gastroparesis, reports being seen in ED 2 days ago diagnosed with Colitis discharged on Augmentin who presents to the ED today for abdominal pain. Pt reports he has been unable to tolerate PO today including meds and antibiotics. States pain has been worsening. Pt is a 41 year old male w/PMH of DM, gastroparesis, reports being seen in ED 2 days ago diagnosed with Colitis discharged on Augmentin who presents to the ED today for abdominal pain. Pt reports he has been unable to tolerate PO today including meds and antibiotics. States pain has been worsening. Pt is on Insulin, Reglan, Zofran, and antibiotics for Colitis. Pt is a 41 year old male w/ PMH of DM, gastroparesis, reports being seen in ED 2 days ago, diagnosed with Colitis and discharged on Augmentin, who presents to the ED today for abdominal pain. Pt reports he has been unable to tolerate PO today including his daily meds and antibiotics. States pain has been worsening. Pt is on Insulin, Reglan, Zofran, and antibiotics for Colitis.

## 2021-12-20 NOTE — ED ADULT NURSE NOTE - ED STAT RN HANDOFF DETAILS
Report endorsed to Luis Antonio EDWARDS. Safety checks completed this shift. Safety rounds completed hourly.  IV sites checked Q2+remains WDL. Medications administered as ordered with no signs/symptoms of adverse reactions. Fall & skin precautions in place. Any issues endorsed to oncoming RN for follow up. BG Jacey endorsed.

## 2021-12-20 NOTE — ED ADULT NURSE NOTE - OBJECTIVE STATEMENT
Pt presents to the ED co abdominal pain, colitis. Pt states that he was dx w colitis x2 days. sent home w antibx, came to ED today due to pain. Pt endorses x10 vomiting episodes. Denies fevers, chills, cp.

## 2021-12-20 NOTE — ED PROVIDER NOTE - CARE PLAN
1 Principal Discharge DX:	Elevated troponin  Secondary Diagnosis:	2019 novel coronavirus disease (COVID-19)  Secondary Diagnosis:	Gastroparesis

## 2021-12-20 NOTE — ED PROVIDER NOTE - PHYSICAL EXAMINATION
PE:  CONSTITUTIONAL: Uncomfortable appearing; non-toxic; in no apparent distress  HEAD: Normocephalic; atraumatic  EYES: PERRL; EOM intact   ENMT: External appears normal; normal oropharynx  NECK: Supple; non-tender; no cervical lymphadenopathy  CARD: Normal S1, S2; no murmurs, rubs, or gallops  RESP: Normal chest excursion with respiration; breath sounds clear and equal bilaterally; no wheezes, rhonchi, or rales  ABD: Soft, non-distended; ttp in LLQ and LUQ, no palpable organomegaly, no palpable hernias  EXT: Normal ROM in all four extremities; non-tender to palpation; distal pulses intact  SKIN: Warm, dry, no rash  NEURO:   No focal neurological deficiencies, CN 2-12 intact, motor 5/5 in all extremities b/l, sensory intact in all extremities b/l, normal finger to nose, no pronator drift, normal gait GEN: Awake, alert, interactive, uncomfortable appearing / in pain, non toxic appearing.   HEAD AND NECK: NC/AT. Airway patent. Neck supple.   EYES:  Clear b/l. EOMI. PERRL.   ENT: Moist mucus membranes.   CARDIAC: Regular rate, regular rhythm. No evident pedal edema.    RESP/CHEST: Normal respiratory effort with no use of accessory muscles or retractions. Clear throughout on auscultation.  ABD: Soft, non-distended, + TTP LUQ/LLQ. No rebound, no guarding.   BACK: No midline spinal TTP. No CVAT.   EXTREMITIES: Moving all extremities with no apparent deformities.   SKIN: Warm, dry, intact normal color. No rash.   NEURO: AOx3, CN II-XII grossly intact, no focal deficits.   PSYCH: Appropriate mood and affect.

## 2021-12-21 DIAGNOSIS — U07.1 COVID-19: ICD-10-CM

## 2021-12-21 DIAGNOSIS — K52.9 NONINFECTIVE GASTROENTERITIS AND COLITIS, UNSPECIFIED: ICD-10-CM

## 2021-12-21 DIAGNOSIS — R77.8 OTHER SPECIFIED ABNORMALITIES OF PLASMA PROTEINS: ICD-10-CM

## 2021-12-21 DIAGNOSIS — Z29.9 ENCOUNTER FOR PROPHYLACTIC MEASURES, UNSPECIFIED: ICD-10-CM

## 2021-12-21 DIAGNOSIS — E11.9 TYPE 2 DIABETES MELLITUS WITHOUT COMPLICATIONS: ICD-10-CM

## 2021-12-21 DIAGNOSIS — F41.9 ANXIETY DISORDER, UNSPECIFIED: ICD-10-CM

## 2021-12-21 DIAGNOSIS — J45.909 UNSPECIFIED ASTHMA, UNCOMPLICATED: ICD-10-CM

## 2021-12-21 LAB
APPEARANCE UR: CLEAR — SIGNIFICANT CHANGE UP
BACTERIA # UR AUTO: NEGATIVE — SIGNIFICANT CHANGE UP
BILIRUB UR-MCNC: NEGATIVE — SIGNIFICANT CHANGE UP
COLOR SPEC: YELLOW — SIGNIFICANT CHANGE UP
DIFF PNL FLD: NEGATIVE — SIGNIFICANT CHANGE UP
EPI CELLS # UR: NEGATIVE — SIGNIFICANT CHANGE UP
FLUAV AG NPH QL: SIGNIFICANT CHANGE UP
FLUBV AG NPH QL: SIGNIFICANT CHANGE UP
GLUCOSE BLDC GLUCOMTR-MCNC: 138 MG/DL — HIGH (ref 70–99)
GLUCOSE BLDC GLUCOMTR-MCNC: 178 MG/DL — HIGH (ref 70–99)
GLUCOSE UR QL: NEGATIVE MG/DL — SIGNIFICANT CHANGE UP
GRAN CASTS # UR COMP ASSIST: NEGATIVE /LPF — SIGNIFICANT CHANGE UP
HYALINE CASTS # UR AUTO: NEGATIVE /LPF — SIGNIFICANT CHANGE UP
KETONES UR-MCNC: NEGATIVE — SIGNIFICANT CHANGE UP
LEUKOCYTE ESTERASE UR-ACNC: NEGATIVE — SIGNIFICANT CHANGE UP
NITRITE UR-MCNC: NEGATIVE — SIGNIFICANT CHANGE UP
PH UR: 8 — SIGNIFICANT CHANGE UP (ref 5–8)
PROT UR-MCNC: 30 MG/DL
RBC CASTS # UR COMP ASSIST: NEGATIVE /HPF — SIGNIFICANT CHANGE UP (ref 0–4)
SARS-COV-2 RNA SPEC QL NAA+PROBE: DETECTED
SP GR SPEC: 1.01 — SIGNIFICANT CHANGE UP (ref 1.01–1.02)
TROPONIN I, HIGH SENSITIVITY RESULT: 156.7 NG/L — HIGH
TROPONIN I, HIGH SENSITIVITY RESULT: 173.5 NG/L — HIGH
UROBILINOGEN FLD QL: NEGATIVE MG/DL — SIGNIFICANT CHANGE UP
WBC UR QL: NEGATIVE — SIGNIFICANT CHANGE UP

## 2021-12-21 PROCEDURE — 99222 1ST HOSP IP/OBS MODERATE 55: CPT

## 2021-12-21 PROCEDURE — 99221 1ST HOSP IP/OBS SF/LOW 40: CPT

## 2021-12-21 PROCEDURE — 99223 1ST HOSP IP/OBS HIGH 75: CPT

## 2021-12-21 RX ORDER — MORPHINE SULFATE 50 MG/1
2 CAPSULE, EXTENDED RELEASE ORAL EVERY 6 HOURS
Refills: 0 | Status: DISCONTINUED | OUTPATIENT
Start: 2021-12-21 | End: 2021-12-22

## 2021-12-21 RX ORDER — ONDANSETRON 8 MG/1
4 TABLET, FILM COATED ORAL EVERY 6 HOURS
Refills: 0 | Status: DISCONTINUED | OUTPATIENT
Start: 2021-12-21 | End: 2021-12-22

## 2021-12-21 RX ORDER — ALBUTEROL 90 UG/1
2 AEROSOL, METERED ORAL ONCE
Refills: 0 | Status: COMPLETED | OUTPATIENT
Start: 2021-12-21 | End: 2022-11-19

## 2021-12-21 RX ORDER — ALPRAZOLAM 0.25 MG
3 TABLET ORAL ONCE
Refills: 0 | Status: DISCONTINUED | OUTPATIENT
Start: 2021-12-21 | End: 2021-12-21

## 2021-12-21 RX ORDER — ALBUTEROL 90 UG/1
2 AEROSOL, METERED ORAL ONCE
Refills: 0 | Status: COMPLETED | OUTPATIENT
Start: 2021-12-21 | End: 2021-12-21

## 2021-12-21 RX ORDER — PANTOPRAZOLE SODIUM 20 MG/1
40 TABLET, DELAYED RELEASE ORAL
Refills: 0 | Status: DISCONTINUED | OUTPATIENT
Start: 2021-12-21 | End: 2021-12-22

## 2021-12-21 RX ORDER — ENOXAPARIN SODIUM 100 MG/ML
40 INJECTION SUBCUTANEOUS DAILY
Refills: 0 | Status: DISCONTINUED | OUTPATIENT
Start: 2021-12-21 | End: 2021-12-22

## 2021-12-21 RX ORDER — ALPRAZOLAM 0.25 MG
1 TABLET ORAL ONCE
Refills: 0 | Status: DISCONTINUED | OUTPATIENT
Start: 2021-12-21 | End: 2021-12-21

## 2021-12-21 RX ORDER — SODIUM CHLORIDE 9 MG/ML
1000 INJECTION, SOLUTION INTRAVENOUS
Refills: 0 | Status: COMPLETED | OUTPATIENT
Start: 2021-12-21 | End: 2021-12-21

## 2021-12-21 RX ORDER — ALBUTEROL 90 UG/1
2 AEROSOL, METERED ORAL EVERY 6 HOURS
Refills: 0 | Status: DISCONTINUED | OUTPATIENT
Start: 2021-12-21 | End: 2021-12-22

## 2021-12-21 RX ADMIN — MORPHINE SULFATE 2 MILLIGRAM(S): 50 CAPSULE, EXTENDED RELEASE ORAL at 13:13

## 2021-12-21 RX ADMIN — ALBUTEROL 2 PUFF(S): 90 AEROSOL, METERED ORAL at 03:38

## 2021-12-21 RX ADMIN — SODIUM CHLORIDE 100 MILLILITER(S): 9 INJECTION, SOLUTION INTRAVENOUS at 04:30

## 2021-12-21 RX ADMIN — PANTOPRAZOLE SODIUM 40 MILLIGRAM(S): 20 TABLET, DELAYED RELEASE ORAL at 05:22

## 2021-12-21 RX ADMIN — MORPHINE SULFATE 2 MILLIGRAM(S): 50 CAPSULE, EXTENDED RELEASE ORAL at 05:15

## 2021-12-21 RX ADMIN — SODIUM CHLORIDE 100 MILLILITER(S): 9 INJECTION, SOLUTION INTRAVENOUS at 12:22

## 2021-12-21 RX ADMIN — Medication 1 MILLIGRAM(S): at 22:08

## 2021-12-21 RX ADMIN — ONDANSETRON 4 MILLIGRAM(S): 8 TABLET, FILM COATED ORAL at 05:26

## 2021-12-21 RX ADMIN — Medication 1 MILLIGRAM(S): at 03:30

## 2021-12-21 RX ADMIN — MORPHINE SULFATE 2 MILLIGRAM(S): 50 CAPSULE, EXTENDED RELEASE ORAL at 13:48

## 2021-12-21 RX ADMIN — MORPHINE SULFATE 2 MILLIGRAM(S): 50 CAPSULE, EXTENDED RELEASE ORAL at 23:35

## 2021-12-21 RX ADMIN — MORPHINE SULFATE 2 MILLIGRAM(S): 50 CAPSULE, EXTENDED RELEASE ORAL at 04:05

## 2021-12-21 NOTE — H&P ADULT - ASSESSMENT
Patient is a 41M with a PMH of DM (on diabetic pump), gastroparesis, chronic anxiety, asthma who presents to the ED for abdominal pain.  Patient states that for the last three days he has had severe abdominal pain associated with vomiting.  Presented to [facility] two days ago - was found to have colitis and was discharged on PO augmentin, zofran, and reglan.  Patient reports worsening of his symptoms today, severe pain, non-radiational.  Reports numerous episodes of NBNB vomiting today, stating that he has not been able to tolerate PO intake due to vomiting.  Patient also complains of dyspnea and anxiety - takes xanax daily but has been unable to take it due to his symptoms.  Vitals stable, labs show elevated troponin levels.  Will admit to tele.     IMPROVE VTE Individual Risk Assessment          RISK                                                          Points  [  ] Previous VTE                                                3  [  ] Thrombophilia                                             2  [  ] Lower limb paralysis                                    2        (unable to hold up >15 seconds)    [  ] Current Cancer                                             2         (within 6 months)  [  ] Immobilization > 24 hrs                              1  [  ] ICU/CCU stay > 24 hours                            1  [  ] Age > 60                                                    1    IMPROVE VTE Score - 1

## 2021-12-21 NOTE — CHART NOTE - NSCHARTNOTEFT_GEN_A_CORE
Confidential Drug Utilization Report  Search Terms: quinten cesar, 1980Search Date: 12/21/2021 18:21:38 PM  The Drug Utilization Report below displays all of the controlled substance prescriptions, if any, that your patient has filled in the last twelve months. The information displayed on this report is compiled from pharmacy submissions to the Department, and accurately reflects the information as submitted by the pharmacies.    This report was requested by: Carline Kent | Reference #: [id number]    Others' Prescriptions  Patient Name: Quinten CesarBirth Date: 1980  Address[facility] [address] [place] [id number]: Male  Rx Written	Rx Dispensed	Drug	Quantity	Days Supply	Prescriber Name	Prescriber Ruth #	Payment Method	Dispenser  12/07/2021	12/07/2021	alprazolam 1 mg tablet	90	30	Hamid, Horacio	[id number]	Medicaid	[id number]  11/09/2021	11/11/2021	alprazolam 2 mg tablet	30	30	Hamid, Horacio	[id number]	Medicaid	[id number]  09/29/2021	09/29/2021	alprazolam 2 mg tablet	30	30	Hamid, Horacio	[id number]	Medicaid	[id number]  08/09/2021	08/09/2021	promethazine-codeine solution	140ml	7	Hamid, Horacio	[id number]	[id number]  07/24/2021	07/24/2021	alprazolam 2 mg tablet	30	30	Hamid, Horacio	[id number]	Medicaid	[id number]  06/15/2021	06/15/2021	alprazolam 2 mg tablet	30	30	Hamid, Horacio	[id number]	Medicaid	[id number]  04/10/2021	04/10/2021	alprazolam 2 mg tablet	30	30	Hamid, Horacio	[id number]	Medicaid	[id number]  03/05/2021	03/05/2021	promethazine-codeine solution	140ml	7	Hamid, Horacio	[id number]	[facility] #[id number]  03/05/2021	03/05/2021	alprazolam 2 mg tablet	30	30	Hamid, Horacio	[id number]	Medicaid	[id number]  01/29/2021	01/29/2021	alprazolam 2 mg tablet	30	30	Hamid, [id number]	Medicaid	[id number]  12/30/2020	12/30/2020	alprazolam 2 mg tablet	30	30	Horacio oMrales	[id number]	Medicaid	[id number]

## 2021-12-21 NOTE — CONSULT NOTE ADULT - ASSESSMENT
41M with a PMH of DM (on diabetic pump), gastroparesis, chronic anxiety, asthma who presents to the ED for abdominal pain.  Patient states that for the last three days he has had severe abdominal pain associated with vomiting.  Presented to [facility] two days ago - was found to have colitis and was discharged on PO augmentin, zofran, and reglan.  Patient reports worsening of his symptoms today, severe pain, non-radiational.  Reports numerous episodes of NBNB vomiting today, stating that he has not been able to tolerate PO intake due to vomiting.  Patient also complains of dyspnea and anxiety - takes xanax daily but has been unable to take it due to his symptoms.    Found to be COVID +  Trop peaked at 191 and now trending down.  ECG: sinus 72bpm    Mildly positive trop likely 2/2 combination of GI sx and COVID.  Remains asymptomatic from a cardiac standpoint, with a normal EKG.  Can follow as outpt for stress test when d/c'd.  Will sign off for now; please call back with any further questions.

## 2021-12-21 NOTE — H&P ADULT - NSHPPHYSICALEXAM_GEN_ALL_CORE
Physical exam:  General: patient in no acute distress, resting comfortably  Head:  Atraumatic, Normocephalic  Eyes: EOMI, PERRLA, clear sclera  Neck: Supple, thyroid nontender, non enlarged  Cardio: S1/S2 +ve, regular rate and rhythm, no M/G/R  Resp: clear to ausculation bilaterally, no rales or wheezes  GI: abdomen soft, slight tenderness to palpation, non distended, no guarding, BS +ve x 4  Ext: no significant pedal edema  Neuro: CN 2-12 intact, no significant motor or sensory deficits.  Skin: No rashes or lesions

## 2021-12-21 NOTE — CONSULT NOTE ADULT - SUBJECTIVE AND OBJECTIVE BOX
CARDIOLOGY CONSULT NOTE    Patient is a 41y Male with a known history of :  Colitis [K52.9]    Troponin level elevated [R77.8]    2019 novel coronavirus disease (COVID-19) [U07.1]    Diabetes mellitus [E11.9]    Chronic anxiety [F41.9]    Asthma, mild [J45.909]    Preventive measure [Z29.9]      HPI:  41M with a PMH of DM (on diabetic pump), gastroparesis, chronic anxiety, asthma who presents to the ED for abdominal pain.  Patient states that for the last three days he has had severe abdominal pain associated with vomiting.  Presented to [facility] two days ago - was found to have colitis and was discharged on PO augmentin, zofran, and reglan.  Patient reports worsening of his symptoms today, severe pain, non-radiational.  Reports numerous episodes of NBNB vomiting today, stating that he has not been able to tolerate PO intake due to vomiting.  Patient also complains of dyspnea and anxiety - takes xanax daily but has been unable to take it due to his symptoms.    Found to be COVID +  Trop peaked at 191 and now trending down.  ECG: sinus 72bpm    REVIEW OF SYSTEMS:    CONSTITUTIONAL: No fever, weight loss, or fatigue  EYES: No eye pain, visual disturbances, or discharge  ENMT:  No difficulty hearing, tinnitus, vertigo; No sinus or throat pain  NECK: No pain or stiffness  BREASTS: No pain, masses, or nipple discharge  RESPIRATORY: No cough, wheezing, chills or hemoptysis; No shortness of breath  CARDIOVASCULAR: No chest pain, palpitations, dizziness, or leg swelling  GASTROINTESTINAL: No abdominal or epigastric pain. No nausea, vomiting, or hematemesis; No diarrhea or constipation. No melena or hematochezia.  GENITOURINARY: No dysuria, frequency, hematuria, or incontinence  NEUROLOGICAL: No headaches, memory loss, loss of strength, numbness, or tremors  SKIN: No itching, burning, rashes, or lesions   LYMPH NODES: No enlarged glands  ENDOCRINE: No heat or cold intolerance; No hair loss  MUSCULOSKELETAL: No joint pain or swelling; No muscle, back, or extremity pain  PSYCHIATRIC: No depression, anxiety, mood swings, or difficulty sleeping  HEME/LYMPH: No easy bruising, or bleeding gums  ALLERGY AND IMMUNOLOGIC: No hives or eczema    MEDICATIONS  (STANDING):  enoxaparin Injectable 40 milliGRAM(s) SubCutaneous daily  pantoprazole  Injectable 40 milliGRAM(s) IV Push two times a day    MEDICATIONS  (PRN):  ALBUTerol    90 MICROgram(s) HFA Inhaler 2 Puff(s) Inhalation every 6 hours PRN Shortness of Breath and/or Wheezing  morphine  - Injectable 2 milliGRAM(s) IV Push every 6 hours PRN Severe Pain (7 - 10)  ondansetron Injectable 4 milliGRAM(s) IV Push every 6 hours PRN Nausea and/or Vomiting      ALLERGIES: No Known Allergies      FAMILY HISTORY:  Family history of diabetes mellitus (Father, Mother)    Family history of diabetes mellitus (DM) (Father, Mother, Grandparent)        PHYSICAL EXAMINATION:  -----------------------------  T(C): 36.7 (12-21-21 @ 12:19), Max: 37.2 (12-20-21 @ 19:32)  HR: 81 (12-21-21 @ 12:19) (80 - 88)  BP: 142/76 (12-21-21 @ 12:19) (130/88 - 142/76)  RR: 18 (12-21-21 @ 12:19) (17 - 19)  SpO2: 99% (12-21-21 @ 12:19) (99% - 100%)    Constitutional: well developed, normal appearance, well groomed, well nourished, no deformities and no acute distress.   Eyes: the conjunctiva exhibited no abnormalities and the eyelids demonstrated no xanthelasmas.   HEENT: normal oral mucosa, no oral pallor and no oral cyanosis.   Neck: normal jugular venous A waves present, normal jugular venous V waves present and no jugular venous sharma A waves.   Pulmonary: no respiratory distress, normal respiratory rhythm and effort, no accessory muscle use and lungs were clear to auscultation bilaterally.   Cardiovascular: heart rate and rhythm were normal, normal S1 and S2 and no murmur, gallop, rub, heave or thrill are present.   Abdomen: soft, non-tender, no hepato-splenomegaly and no abdominal mass palpated.   Musculoskeletal: the gait could not be assessed..   Extremities: no clubbing of the fingernails, no localized cyanosis, no petechial hemorrhages and no ischemic changes.   Skin: normal skin color and pigmentation, no rash, no venous stasis, no skin lesions, no skin ulcer and no xanthoma was observed.   Psychiatric: oriented to person, place, and time, the affect was normal, the mood was normal and not feeling anxious.     LABS:   --------  12-20    139  |  99  |  13  ----------------------------<  102<H>  4.1   |  30  |  0.96    Ca    9.9      20 Dec 2021 21:20    TPro  8.2  /  Alb  3.7  /  TBili  0.2  /  DBili  x   /  AST  40<H>  /  ALT  63  /  AlkPhos  113  12-20                         16.0   9.63  )-----------( 384      ( 20 Dec 2021 21:20 )             46.4             RADIOLOGY:  -----------------    ECG: sinus 72bpm

## 2021-12-21 NOTE — H&P ADULT - PROBLEM SELECTOR PLAN 3
COVID 19 swabbed in ED - +ve results.  Isolation precautions  Patient currently with fever or dyspnea.

## 2021-12-21 NOTE — H&P ADULT - HISTORY OF PRESENT ILLNESS
Patient is a 41M with a PMH of DM (on diabetic pump), gastroparesis, chronic anxiety, asthma who presents to the ED for abdominal pain.  Patient states that for the last three days he has had severe abdominal pain associated with vomiting.  Presented to [facility] two days ago - was found to have colitis and was discharged on PO augmentin, zofran, and reglan.  Patient reports worsening of his symptoms today, severe pain, non-radiational.  Reports numerous episodes of NBNB vomiting today, stating that he has not been able to tolerate PO intake due to vomiting.  Patient also complains of dyspnea and anxiety - takes xanax daily but has been unable to take it due to his symptoms.  Vitals stable, labs show elevated troponin levels.  Will admit to tele.

## 2021-12-21 NOTE — H&P ADULT - NSHPLABSRESULTS_GEN_ALL_CORE
Recent Vitals  T(C): 37.2 (21 @ 19:32), Max: 37.2 (21 @ 19:32)  HR: 80 (21 @ 19:32) (80 - 80)  BP: 130/88 (21 @ 19:32) (130/88 - 130/88)  RR: 19 (21 @ 19:32) (19 - 19)  SpO2: 100% (21 @ 19:32) (100% - 100%)                        16.0   9.63  )-----------( 384      ( 20 Dec 2021 21:20 )             46.4         139  |  99  |  13  ----------------------------<  102<H>  4.1   |  30  |  0.96    Ca    9.9      20 Dec 2021 21:20    TPro  8.2  /  Alb  3.7  /  TBili  0.2  /  DBili  x   /  AST  40<H>  /  ALT  63  /  AlkPhos  113        LIVER FUNCTIONS - ( 20 Dec 2021 21:20 )  Alb: 3.7 g/dL / Pro: 8.2 gm/dL / ALK PHOS: 113 U/L / ALT: 63 U/L / AST: 40 U/L / GGT: x           Urinalysis Basic - ( 20 Dec 2021 23:17 )    Color: Yellow / Appearance: Clear / S.010 / pH: x  Gluc: x / Ketone: Negative  / Bili: Negative / Urobili: Negative mg/dL   Blood: x / Protein: 30 mg/dL / Nitrite: Negative   Leuk Esterase: Negative / RBC: Negative /HPF / WBC Negative   Sq Epi: x / Non Sq Epi: Negative / Bacteria: Negative        Home Medications:

## 2021-12-22 ENCOUNTER — TRANSCRIPTION ENCOUNTER (OUTPATIENT)
Age: 41
End: 2021-12-22

## 2021-12-22 VITALS
DIASTOLIC BLOOD PRESSURE: 86 MMHG | HEART RATE: 75 BPM | SYSTOLIC BLOOD PRESSURE: 130 MMHG | TEMPERATURE: 98 F | OXYGEN SATURATION: 96 % | RESPIRATION RATE: 18 BRPM

## 2021-12-22 LAB
CULTURE RESULTS: NO GROWTH — SIGNIFICANT CHANGE UP
GLUCOSE BLDC GLUCOMTR-MCNC: 100 MG/DL — HIGH (ref 70–99)
GLUCOSE BLDC GLUCOMTR-MCNC: 127 MG/DL — HIGH (ref 70–99)
SPECIMEN SOURCE: SIGNIFICANT CHANGE UP

## 2021-12-22 PROCEDURE — 99239 HOSP IP/OBS DSCHRG MGMT >30: CPT

## 2021-12-22 RX ORDER — PANTOPRAZOLE SODIUM 20 MG/1
40 TABLET, DELAYED RELEASE ORAL
Refills: 0 | Status: DISCONTINUED | OUTPATIENT
Start: 2021-12-23 | End: 2021-12-22

## 2021-12-22 RX ADMIN — MORPHINE SULFATE 2 MILLIGRAM(S): 50 CAPSULE, EXTENDED RELEASE ORAL at 06:20

## 2021-12-22 RX ADMIN — MORPHINE SULFATE 2 MILLIGRAM(S): 50 CAPSULE, EXTENDED RELEASE ORAL at 07:26

## 2021-12-22 RX ADMIN — PANTOPRAZOLE SODIUM 40 MILLIGRAM(S): 20 TABLET, DELAYED RELEASE ORAL at 06:20

## 2021-12-22 NOTE — PATIENT PROFILE ADULT - FALL HARM RISK - UNIVERSAL INTERVENTIONS
Bed in lowest position, wheels locked, appropriate side rails in place/Call bell, personal items and telephone in reach/Instruct patient to call for assistance before getting out of bed or chair/Non-slip footwear when patient is out of bed/[place] to call system/Physically safe environment - no spills, clutter or unnecessary equipment/Purposeful Proactive Rounding/Room/bathroom lighting operational, light cord in reach

## 2021-12-22 NOTE — DISCHARGE NOTE NURSING/CASE MANAGEMENT/SOCIAL WORK - PATIENT PORTAL LINK FT
You can access the FollowMyHealth Patient Portal offered by [facility] by registering at the following website: [email or web address]. By joining [employer]’s FollowMyHealth portal, you will also be able to view your health information using other applications (apps) compatible with our system.

## 2021-12-22 NOTE — DISCHARGE NOTE PROVIDER - HOSPITAL COURSE
Patient is a 41M with a PMH of DM (on diabetic pump), gastroparesis, chronic anxiety, asthma who presents to the ED for abdominal pain.  Patient states that for the last three days he has had severe abdominal pain associated with vomiting.  Presented to [facility] two days ago - was found to have colitis and was discharged on PO augmentin, zofran, and reglan.  Patient reports worsening of his symptoms today, severe pain, non-radiational.  Reports numerous episodes of NBNB vomiting today, stating that he has not been able to tolerate PO intake due to vomiting.  Patient also complains of dyspnea and anxiety - takes xanax daily but has been unable to take it due to his symptoms.  Vitals stable, labs show mildly elevated troponin levels.       Problem/Plan - 1:  ·  Problem: Colitis. improved  ·  Plan: Zofran q6PRN, PPI iv BID,  IV fluids, PO diet as tolerated.     Problem/Plan - 2:  ·  Problem: Troponin level elevated. nonspecific, no ACS  ·  Plan: Troponin elevated.  trended down.   Cardio eval >> no need for ischemic eval inpatient.      Problem/Plan - 3:  ·  Problem: 2019 novel coronavirus disease (COVID-19).   ·  Plan: COVID 19 swabbed in ED - +ve results.  Isolation precautions  Patient currently with fever or dyspnea.     Problem/Plan - 4:  ·  Problem: Diabetes mellitus.   ·  Plan: on insulin pump.     Problem/Plan - 5:  ·  Problem: Chronic anxiety.   ·  Plan: cannot tolerate po xanax  IV ativan in small doses.     Problem/Plan - 6:  ·  Problem: Asthma, mild.   ·  Plan: albuterol prn.    Stable for DC home.

## 2021-12-22 NOTE — PATIENT PROFILE ADULT - VISION (WITH CORRECTIVE LENSES IF THE PATIENT USUALLY WEARS THEM):
Patient wears contacts/Partially impaired: cannot see medication labels or newsprint, but can see obstacles in path, and the surrounding layout; can count fingers at arm's length

## 2021-12-22 NOTE — DISCHARGE NOTE NURSING/CASE MANAGEMENT/SOCIAL WORK - NSDCPEFALRISK_GEN_ALL_CORE
For information on Fall & Injury Prevention, visit: [email or web address] OR  [email or web address] OR  https://www.cdc.gov/steadi/patient.html

## 2021-12-22 NOTE — DISCHARGE NOTE PROVIDER - NSDCMRMEDTOKEN_GEN_ALL_CORE_FT
acetaminophen-oxyCODONE 325 mg-5 mg oral tablet: 1 tab(s) orally every 6 hours PRN for pain MDD:4  Admelog 100 units/mL injectable solution: 10 unit(s) injectable 3 times a day  aluminum hydroxide/magnesium hydroxide/simethicone 400 mg-400 mg-40 mg/5 mL oral suspension: 5 milliliter(s) orally 4 times a day (before meals and at bedtime)   Carafate 1 g oral tablet: 1 tab(s) orally 3 times a day take before meals   Carafate 1 g oral tablet: 1 tab(s) orally 4 times a day (before meals and at bedtime)   famotidine 20 mg oral tablet: 1 tab(s) orally 2 times a day  famotidine 20 mg oral tablet: 1 tab(s) orally once a day   insulin glargine 100 units/mL subcutaneous solution: 13 unit(s) subcutaneous 2 times a day   magnesium hydroxide/aluminum hydroxide/simethicone 200 mg-200 mg-20 mg/5 mL oral suspension: 15 milliliter(s) orally 4 times a day (before meals and at bedtime)   metoclopramide 10 mg oral tablet, disintegratin tab(s) orally 4 times a day (before meals and at bedtime)  ondansetron 4 mg oral tablet, disintegratin or 2  tab(s) orally every 8 hours, As Needed -for nausea   ondansetron 4 mg oral tablet, disintegratin tab(s) orally 3 times a day prn nausea  ondansetron 8 mg oral tablet, disintegratin tab(s) orally 2 times a day, As Needed - for nausea  ProAir HFA 90 mcg/inh inhalation aerosol: 2 puff(s) inhaled 4 times a day, As Needed -for shortness of breath and/or wheezing   Protonix 40 mg oral delayed release tablet: 1 tab(s) orally once a day   Protonix 40 mg oral delayed release tablet: 1 tab(s) orally once a day   Reglan 10 mg oral tablet: 1 tab(s) orally 4 times a day (before meals and at bedtime) prn nausea   Reglan 10 mg oral tablet: 1 tab(s) orally every 12 hours, As Needed -for nausea   Xanax 1 mg oral tablet: 1 tab(s) orally 3 times a day, As Needed

## 2021-12-22 NOTE — DISCHARGE NOTE PROVIDER - NSDCCPCAREPLAN_GEN_ALL_CORE_FT
PRINCIPAL DISCHARGE DIAGNOSIS  Diagnosis: Elevated troponin  Assessment and Plan of Treatment:       SECONDARY DISCHARGE DIAGNOSES  Diagnosis: 2019 novel coronavirus disease (COVID-19)  Assessment and Plan of Treatment:     Diagnosis: Gastroparesis  Assessment and Plan of Treatment:

## 2021-12-22 NOTE — CHART NOTE - NSCHARTNOTEFT_GEN_A_CORE
[employer] representative interrogated pacemaker this morning. No recent significant events on interrogation. Results in chart.

## 2022-01-06 DIAGNOSIS — Z87.891 PERSONAL HISTORY OF NICOTINE DEPENDENCE: ICD-10-CM

## 2022-01-06 DIAGNOSIS — K31.84 GASTROPARESIS: ICD-10-CM

## 2022-01-06 DIAGNOSIS — Z96.41 PRESENCE OF INSULIN PUMP (EXTERNAL) (INTERNAL): ICD-10-CM

## 2022-01-06 DIAGNOSIS — K52.9 NONINFECTIVE GASTROENTERITIS AND COLITIS, UNSPECIFIED: ICD-10-CM

## 2022-01-06 DIAGNOSIS — J45.909 UNSPECIFIED ASTHMA, UNCOMPLICATED: ICD-10-CM

## 2022-01-06 DIAGNOSIS — U07.1 COVID-19: ICD-10-CM

## 2022-01-06 DIAGNOSIS — R10.9 UNSPECIFIED ABDOMINAL PAIN: ICD-10-CM

## 2022-01-06 DIAGNOSIS — E10.43 TYPE 1 DIABETES MELLITUS WITH DIABETIC AUTONOMIC (POLY)NEUROPATHY: ICD-10-CM

## 2022-01-06 DIAGNOSIS — K29.70 GASTRITIS, UNSPECIFIED, WITHOUT BLEEDING: ICD-10-CM

## 2022-01-06 DIAGNOSIS — Z79.4 LONG TERM (CURRENT) USE OF INSULIN: ICD-10-CM

## 2022-01-06 DIAGNOSIS — F41.9 ANXIETY DISORDER, UNSPECIFIED: ICD-10-CM
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
## 2022-03-06 ENCOUNTER — INPATIENT (INPATIENT)
Facility: HOSPITAL | Age: 42
LOS: 1 days | Discharge: HOME | End: 2022-03-08
Attending: INTERNAL MEDICINE | Admitting: INTERNAL MEDICINE
Payer: MEDICAID

## 2022-03-06 VITALS
SYSTOLIC BLOOD PRESSURE: 170 MMHG | OXYGEN SATURATION: 99 % | DIASTOLIC BLOOD PRESSURE: 109 MMHG | RESPIRATION RATE: 19 BRPM | HEIGHT: 65 IN | TEMPERATURE: 98 F | HEART RATE: 71 BPM | WEIGHT: 175.93 LBS

## 2022-03-06 DIAGNOSIS — Z90.49 ACQUIRED ABSENCE OF OTHER SPECIFIED PARTS OF DIGESTIVE TRACT: Chronic | ICD-10-CM

## 2022-03-06 LAB
ALBUMIN SERPL ELPH-MCNC: 4 G/DL — SIGNIFICANT CHANGE UP (ref 3.3–5)
ALP SERPL-CCNC: 119 U/L — SIGNIFICANT CHANGE UP (ref 40–120)
ALT FLD-CCNC: 29 U/L — SIGNIFICANT CHANGE UP (ref 12–78)
ANION GAP SERPL CALC-SCNC: 7 MMOL/L — SIGNIFICANT CHANGE UP (ref 5–17)
AST SERPL-CCNC: 27 U/L — SIGNIFICANT CHANGE UP (ref 15–37)
BASOPHILS # BLD AUTO: 0.04 K/UL — SIGNIFICANT CHANGE UP (ref 0–0.2)
BASOPHILS NFR BLD AUTO: 0.4 % — SIGNIFICANT CHANGE UP (ref 0–2)
BILIRUB SERPL-MCNC: 0.5 MG/DL — SIGNIFICANT CHANGE UP (ref 0.2–1.2)
BUN SERPL-MCNC: 19 MG/DL — SIGNIFICANT CHANGE UP (ref 7–23)
CALCIUM SERPL-MCNC: 9.1 MG/DL — SIGNIFICANT CHANGE UP (ref 8.5–10.1)
CHLORIDE SERPL-SCNC: 105 MMOL/L — SIGNIFICANT CHANGE UP (ref 96–108)
CO2 SERPL-SCNC: 27 MMOL/L — SIGNIFICANT CHANGE UP (ref 22–31)
CREAT SERPL-MCNC: 1.14 MG/DL — SIGNIFICANT CHANGE UP (ref 0.5–1.3)
EGFR: 83 ML/MIN/1.73M2 — SIGNIFICANT CHANGE UP
EOSINOPHIL # BLD AUTO: 0.03 K/UL — SIGNIFICANT CHANGE UP (ref 0–0.5)
EOSINOPHIL NFR BLD AUTO: 0.3 % — SIGNIFICANT CHANGE UP (ref 0–6)
GLUCOSE BLDC GLUCOMTR-MCNC: 207 MG/DL — HIGH (ref 70–99)
GLUCOSE SERPL-MCNC: 210 MG/DL — HIGH (ref 70–99)
HCT VFR BLD CALC: 41.4 % — SIGNIFICANT CHANGE UP (ref 39–50)
HGB BLD-MCNC: 14.2 G/DL — SIGNIFICANT CHANGE UP (ref 13–17)
IMM GRANULOCYTES NFR BLD AUTO: 0.2 % — SIGNIFICANT CHANGE UP (ref 0–1.5)
LACTATE SERPL-SCNC: 1.8 MMOL/L — SIGNIFICANT CHANGE UP (ref 0.7–2)
LIDOCAIN IGE QN: 315 U/L — SIGNIFICANT CHANGE UP (ref 73–393)
LYMPHOCYTES # BLD AUTO: 1.56 K/UL — SIGNIFICANT CHANGE UP (ref 1–3.3)
LYMPHOCYTES # BLD AUTO: 16.8 % — SIGNIFICANT CHANGE UP (ref 13–44)
MCHC RBC-ENTMCNC: 28.5 PG — SIGNIFICANT CHANGE UP (ref 27–34)
MCHC RBC-ENTMCNC: 34.3 G/DL — SIGNIFICANT CHANGE UP (ref 32–36)
MCV RBC AUTO: 83 FL — SIGNIFICANT CHANGE UP (ref 80–100)
MONOCYTES # BLD AUTO: 0.18 K/UL — SIGNIFICANT CHANGE UP (ref 0–0.9)
MONOCYTES NFR BLD AUTO: 1.9 % — LOW (ref 2–14)
NEUTROPHILS # BLD AUTO: 7.48 K/UL — HIGH (ref 1.8–7.4)
NEUTROPHILS NFR BLD AUTO: 80.4 % — HIGH (ref 43–77)
NRBC # BLD: 0 /100 WBCS — SIGNIFICANT CHANGE UP (ref 0–0)
PLATELET # BLD AUTO: 303 K/UL — SIGNIFICANT CHANGE UP (ref 150–400)
POTASSIUM SERPL-MCNC: 3.9 MMOL/L — SIGNIFICANT CHANGE UP (ref 3.5–5.3)
POTASSIUM SERPL-SCNC: 3.9 MMOL/L — SIGNIFICANT CHANGE UP (ref 3.5–5.3)
PROT SERPL-MCNC: 8.1 GM/DL — SIGNIFICANT CHANGE UP (ref 6–8.3)
RAPID RVP RESULT: SIGNIFICANT CHANGE UP
RBC # BLD: 4.99 M/UL — SIGNIFICANT CHANGE UP (ref 4.2–5.8)
RBC # FLD: 13.2 % — SIGNIFICANT CHANGE UP (ref 10.3–14.5)
SARS-COV-2 RNA SPEC QL NAA+PROBE: SIGNIFICANT CHANGE UP
SODIUM SERPL-SCNC: 139 MMOL/L — SIGNIFICANT CHANGE UP (ref 135–145)
TROPONIN I, HIGH SENSITIVITY RESULT: 36.7 NG/L — SIGNIFICANT CHANGE UP
WBC # BLD: 9.31 K/UL — SIGNIFICANT CHANGE UP (ref 3.8–10.5)
WBC # FLD AUTO: 9.31 K/UL — SIGNIFICANT CHANGE UP (ref 3.8–10.5)

## 2022-03-06 PROCEDURE — 99285 EMERGENCY DEPT VISIT HI MDM: CPT

## 2022-03-06 PROCEDURE — 71045 X-RAY EXAM CHEST 1 VIEW: CPT | Mod: 26

## 2022-03-06 RX ORDER — SODIUM CHLORIDE 9 MG/ML
1000 INJECTION INTRAMUSCULAR; INTRAVENOUS; SUBCUTANEOUS ONCE
Refills: 0 | Status: COMPLETED | OUTPATIENT
Start: 2022-03-06 | End: 2022-03-06

## 2022-03-06 RX ORDER — HYDRALAZINE HCL 50 MG
5 TABLET ORAL ONCE
Refills: 0 | Status: COMPLETED | OUTPATIENT
Start: 2022-03-06 | End: 2022-03-06

## 2022-03-06 RX ORDER — FAMOTIDINE 10 MG/ML
20 INJECTION INTRAVENOUS ONCE
Refills: 0 | Status: COMPLETED | OUTPATIENT
Start: 2022-03-06 | End: 2022-03-06

## 2022-03-06 RX ORDER — MORPHINE SULFATE 50 MG/1
4 CAPSULE, EXTENDED RELEASE ORAL ONCE
Refills: 0 | Status: DISCONTINUED | OUTPATIENT
Start: 2022-03-06 | End: 2022-03-06

## 2022-03-06 RX ORDER — METOCLOPRAMIDE HCL 10 MG
10 TABLET ORAL ONCE
Refills: 0 | Status: COMPLETED | OUTPATIENT
Start: 2022-03-06 | End: 2022-03-06

## 2022-03-06 RX ORDER — ACETAMINOPHEN 500 MG
650 TABLET ORAL ONCE
Refills: 0 | Status: COMPLETED | OUTPATIENT
Start: 2022-03-06 | End: 2022-03-06

## 2022-03-06 RX ORDER — IOHEXOL 300 MG/ML
30 INJECTION, SOLUTION INTRAVENOUS ONCE
Refills: 0 | Status: COMPLETED | OUTPATIENT
Start: 2022-03-06 | End: 2022-03-06

## 2022-03-06 RX ORDER — ONDANSETRON 8 MG/1
4 TABLET, FILM COATED ORAL ONCE
Refills: 0 | Status: COMPLETED | OUTPATIENT
Start: 2022-03-06 | End: 2022-03-06

## 2022-03-06 RX ADMIN — Medication 5 MILLIGRAM(S): at 22:59

## 2022-03-06 RX ADMIN — FAMOTIDINE 20 MILLIGRAM(S): 10 INJECTION INTRAVENOUS at 22:01

## 2022-03-06 RX ADMIN — Medication 650 MILLIGRAM(S): at 22:02

## 2022-03-06 RX ADMIN — MORPHINE SULFATE 4 MILLIGRAM(S): 50 CAPSULE, EXTENDED RELEASE ORAL at 22:16

## 2022-03-06 RX ADMIN — IOHEXOL 30 MILLILITER(S): 300 INJECTION, SOLUTION INTRAVENOUS at 22:02

## 2022-03-06 RX ADMIN — Medication 10 MILLIGRAM(S): at 22:39

## 2022-03-06 RX ADMIN — SODIUM CHLORIDE 1000 MILLILITER(S): 9 INJECTION INTRAMUSCULAR; INTRAVENOUS; SUBCUTANEOUS at 22:02

## 2022-03-06 RX ADMIN — SODIUM CHLORIDE 1000 MILLILITER(S): 9 INJECTION INTRAMUSCULAR; INTRAVENOUS; SUBCUTANEOUS at 23:02

## 2022-03-06 RX ADMIN — Medication 5 MILLIGRAM(S): at 22:01

## 2022-03-06 RX ADMIN — ONDANSETRON 4 MILLIGRAM(S): 8 TABLET, FILM COATED ORAL at 22:02

## 2022-03-06 RX ADMIN — Medication 650 MILLIGRAM(S): at 23:02

## 2022-03-06 RX ADMIN — MORPHINE SULFATE 4 MILLIGRAM(S): 50 CAPSULE, EXTENDED RELEASE ORAL at 22:01

## 2022-03-06 NOTE — ED ADULT NURSE NOTE - NSIMPLEMENTINTERV_GEN_ALL_ED
Implemented All Fall Risk Interventions:  [place] to call system. Call bell, personal items and telephone within reach. Instruct patient to call for assistance. Room bathroom lighting operational. Non-slip footwear when patient is off stretcher. Physically safe environment: no spills, clutter or unnecessary equipment. Stretcher in lowest position, wheels locked, appropriate side rails in place. Provide visual cue, wrist band, yellow gown, etc. Monitor gait and stability. Monitor for mental status changes and reorient to person, place, and time. Review medications for side effects contributing to fall risk. Reinforce activity limits and safety measures with patient and family.

## 2022-03-06 NOTE — ED PROVIDER NOTE - OBJECTIVE STATEMENT
40 yo M with abd pain.  ROS: negative for fever, cough, headache, chest pain, shortness of breath, abd pain, nausea, vomiting, diarrhea, rash, paresthesia, and weakness--all other systems reviewed are negative.   PMH: DM (on diabetic pump), gastroparesis, chronic anxiety, asthma; Meds: See EMR for list; SH: Denies smoking/drinking/drug use 42 yo M with LUQ abd pain, n/v/d, started 4:30 pm today.  No inciting event.  No other additional symptoms.  Feels similar to prior episodes of gastritis.   ROS: negative for fever, cough, headache, chest pain, shortness of breath, rash, paresthesia, and weakness--all other systems reviewed are negative.   PMH: DM (on diabetic pump), gastroparesis, chronic anxiety, asthma; Meds: See EMR for list; SH: Denies smoking/drinking/drug use

## 2022-03-06 NOTE — ED PROVIDER NOTE - CLINICAL SUMMARY MEDICAL DECISION MAKING FREE TEXT BOX
40 yo M with n/v, abd pain, concerning for gastroparesis, gastritis, doubt obstruction/ischemia/pancreatitis/ACS  -hydralazine for pressure, tylenol for pain, pepcid/zofran, hydration bolus, morphine for pain, npo, monitor, basic labs, ua, cx, lactate, lipase, finger stick, trop, rvp/covid, CT ab/pel, CXR, ekg  -f/u results, reeval

## 2022-03-06 NOTE — ED PROVIDER NOTE - PHYSICAL EXAMINATION
Vitals: HTN at 170/109  Gen: AAOx3, actively vomiting into basin, sitting uncomfortably in stretcher  Head: ncat, perrla, eomi b/l  Neck: supple, no lymphadenopathy, no midline deviation  Heart: rrr, no m/r/g  Lungs: CTA b/l, no rales/ronchi/wheezes  Abd: soft, diffusely tender, non-distended, no rebound or guarding  Ext: no clubbing/cyanosis/edema  Neuro: sensation and muscle strength intact b/l Vitals: HTN at 170/109  Gen: AAOx3, actively vomiting into basin, sitting uncomfortably in stretcher, in distress due to vomiting and pain  Head: ncat, perrla, eomi b/l  Neck: supple, no lymphadenopathy, no midline deviation  Heart: rrr, no m/r/g  Lungs: CTA b/l, no rales/ronchi/wheezes  Abd: soft, diffusely tender, non-distended, no rebound or guarding  Ext: no clubbing/cyanosis/edema  Neuro: sensation and muscle strength intact b/l

## 2022-03-06 NOTE — ED ADULT NURSE NOTE - OBJECTIVE STATEMENT
Pt receive din bed 10.5, c/o LUQ abdominal pain, 10/10, pt crying out in pain, multiple episodes of n, v. Hx of Gastritis. denies fever and chills, chest pain, sob.

## 2022-03-06 NOTE — ED PROVIDER NOTE - CADM POA URETHRAL CATHETER
VBG reported to Dr Vasquez.   Results for CASSIE LEUNG (MRN [id number]) as of 4/1/2017 01:43   Ref. Range 4/1/2017 00:36   POC Sodium Latest Ref Range: 136 - 145 mmol/L 135 (L)   POC Potassium Latest Ref Range: 3.5 - 5.1 mmol/L 3.7   POC Ionized Calcium Latest Ref Range: 1.06 - 1.42 mmol/L 1.21   POC Hematocrit Latest Ref Range: 36 - 54 %PCV 42   POC PH Latest Ref Range: 7.35 - 7.45  7.372   POC PCO2 Latest Ref Range: 35 - 45 mmHg 44.1   POC PO2 Latest Ref Range: 40 - 60 mmHg 40   POC BE Latest Ref Range: -2 to 2 mmol/L 0   POC HCO3 Latest Ref Range: 24 - 28 mmol/L 25.6   POC SATURATED O2 Latest Ref Range: 95 - 100 % 73 (L)   POC TCO2 Latest Ref Range: 24 - 29 mmol/L 27   FiO2 Unknown 21   Sample Unknown VENOUS   DelSys Unknown Room Air   Allens Test Unknown N/A   Site Unknown Other   Mode Unknown SPONT     
No

## 2022-03-07 DIAGNOSIS — E11.9 TYPE 2 DIABETES MELLITUS WITHOUT COMPLICATIONS: ICD-10-CM

## 2022-03-07 DIAGNOSIS — K29.70 GASTRITIS, UNSPECIFIED, WITHOUT BLEEDING: ICD-10-CM

## 2022-03-07 DIAGNOSIS — F41.9 ANXIETY DISORDER, UNSPECIFIED: ICD-10-CM

## 2022-03-07 LAB
A1C WITH ESTIMATED AVERAGE GLUCOSE RESULT: 8.2 % — HIGH (ref 4–5.6)
A1C WITH ESTIMATED AVERAGE GLUCOSE RESULT: 8.2 % — HIGH (ref 4–5.6)
APPEARANCE UR: CLEAR — SIGNIFICANT CHANGE UP
BILIRUB UR-MCNC: NEGATIVE — SIGNIFICANT CHANGE UP
COLOR SPEC: YELLOW — SIGNIFICANT CHANGE UP
DIFF PNL FLD: NEGATIVE — SIGNIFICANT CHANGE UP
ESTIMATED AVERAGE GLUCOSE: 189 MG/DL — HIGH (ref 68–114)
ESTIMATED AVERAGE GLUCOSE: 189 MG/DL — HIGH (ref 68–114)
GLUCOSE BLDC GLUCOMTR-MCNC: 143 MG/DL — HIGH (ref 70–99)
GLUCOSE BLDC GLUCOMTR-MCNC: 210 MG/DL — HIGH (ref 70–99)
GLUCOSE BLDC GLUCOMTR-MCNC: 211 MG/DL — HIGH (ref 70–99)
GLUCOSE BLDC GLUCOMTR-MCNC: 267 MG/DL — HIGH (ref 70–99)
GLUCOSE UR QL: 1000 MG/DL
KETONES UR-MCNC: ABNORMAL
LEUKOCYTE ESTERASE UR-ACNC: NEGATIVE — SIGNIFICANT CHANGE UP
NITRITE UR-MCNC: NEGATIVE — SIGNIFICANT CHANGE UP
PH UR: 7 — SIGNIFICANT CHANGE UP (ref 5–8)
PROT UR-MCNC: NEGATIVE MG/DL — SIGNIFICANT CHANGE UP
SP GR SPEC: 1 — LOW (ref 1.01–1.02)
UROBILINOGEN FLD QL: NEGATIVE MG/DL — SIGNIFICANT CHANGE UP

## 2022-03-07 PROCEDURE — 93010 ELECTROCARDIOGRAM REPORT: CPT

## 2022-03-07 PROCEDURE — 99222 1ST HOSP IP/OBS MODERATE 55: CPT

## 2022-03-07 PROCEDURE — 74177 CT ABD & PELVIS W/CONTRAST: CPT | Mod: 26,MA

## 2022-03-07 PROCEDURE — 12345: CPT | Mod: NC

## 2022-03-07 RX ORDER — INSULIN LISPRO 100/ML
VIAL (ML) SUBCUTANEOUS
Refills: 0 | Status: DISCONTINUED | OUTPATIENT
Start: 2022-03-07 | End: 2022-03-07

## 2022-03-07 RX ORDER — INSULIN LISPRO 100/ML
1 VIAL (ML) SUBCUTANEOUS
Refills: 0 | Status: DISCONTINUED | OUTPATIENT
Start: 2022-03-07 | End: 2022-03-08

## 2022-03-07 RX ORDER — QUETIAPINE FUMARATE 200 MG/1
50 TABLET, FILM COATED ORAL DAILY
Refills: 0 | Status: DISCONTINUED | OUTPATIENT
Start: 2022-03-07 | End: 2022-03-07

## 2022-03-07 RX ORDER — ONDANSETRON 8 MG/1
4 TABLET, FILM COATED ORAL ONCE
Refills: 0 | Status: DISCONTINUED | OUTPATIENT
Start: 2022-03-07 | End: 2022-03-08

## 2022-03-07 RX ORDER — SODIUM CHLORIDE 9 MG/ML
1000 INJECTION, SOLUTION INTRAVENOUS
Refills: 0 | Status: DISCONTINUED | OUTPATIENT
Start: 2022-03-07 | End: 2022-03-08

## 2022-03-07 RX ORDER — MORPHINE SULFATE 50 MG/1
4 CAPSULE, EXTENDED RELEASE ORAL EVERY 6 HOURS
Refills: 0 | Status: DISCONTINUED | OUTPATIENT
Start: 2022-03-07 | End: 2022-03-08

## 2022-03-07 RX ORDER — DEXTROSE 50 % IN WATER 50 %
16 SYRINGE (ML) INTRAVENOUS ONCE
Refills: 0 | Status: DISCONTINUED | OUTPATIENT
Start: 2022-03-07 | End: 2022-03-08

## 2022-03-07 RX ORDER — ASPIRIN/CALCIUM CARB/MAGNESIUM 324 MG
324 TABLET ORAL ONCE
Refills: 0 | Status: COMPLETED | OUTPATIENT
Start: 2022-03-07 | End: 2022-03-07

## 2022-03-07 RX ORDER — ALPRAZOLAM 0.25 MG
1 TABLET ORAL EVERY 8 HOURS
Refills: 0 | Status: DISCONTINUED | OUTPATIENT
Start: 2022-03-07 | End: 2022-03-08

## 2022-03-07 RX ORDER — PANTOPRAZOLE SODIUM 20 MG/1
40 TABLET, DELAYED RELEASE ORAL
Refills: 0 | Status: DISCONTINUED | OUTPATIENT
Start: 2022-03-07 | End: 2022-03-08

## 2022-03-07 RX ORDER — METOCLOPRAMIDE HCL 10 MG
10 TABLET ORAL EVERY 6 HOURS
Refills: 0 | Status: DISCONTINUED | OUTPATIENT
Start: 2022-03-07 | End: 2022-03-08

## 2022-03-07 RX ORDER — SUCRALFATE 1 G
1 TABLET ORAL
Refills: 0 | Status: DISCONTINUED | OUTPATIENT
Start: 2022-03-07 | End: 2022-03-08

## 2022-03-07 RX ORDER — DEXTROSE 50 % IN WATER 50 %
25 SYRINGE (ML) INTRAVENOUS ONCE
Refills: 0 | Status: DISCONTINUED | OUTPATIENT
Start: 2022-03-07 | End: 2022-03-08

## 2022-03-07 RX ORDER — ONDANSETRON 8 MG/1
4 TABLET, FILM COATED ORAL EVERY 6 HOURS
Refills: 0 | Status: DISCONTINUED | OUTPATIENT
Start: 2022-03-07 | End: 2022-03-07

## 2022-03-07 RX ORDER — ONDANSETRON 8 MG/1
4 TABLET, FILM COATED ORAL
Refills: 0 | Status: COMPLETED | OUTPATIENT
Start: 2022-03-07 | End: 2022-03-08

## 2022-03-07 RX ORDER — DEXTROSE 50 % IN WATER 50 %
12.5 SYRINGE (ML) INTRAVENOUS ONCE
Refills: 0 | Status: DISCONTINUED | OUTPATIENT
Start: 2022-03-07 | End: 2022-03-08

## 2022-03-07 RX ORDER — SODIUM CHLORIDE 9 MG/ML
2000 INJECTION, SOLUTION INTRAVENOUS ONCE
Refills: 0 | Status: COMPLETED | OUTPATIENT
Start: 2022-03-07 | End: 2022-03-07

## 2022-03-07 RX ORDER — DEXTROSE 50 % IN WATER 50 %
15 SYRINGE (ML) INTRAVENOUS ONCE
Refills: 0 | Status: DISCONTINUED | OUTPATIENT
Start: 2022-03-07 | End: 2022-03-08

## 2022-03-07 RX ORDER — GLUCAGON INJECTION, SOLUTION 0.5 MG/.1ML
1 INJECTION, SOLUTION SUBCUTANEOUS ONCE
Refills: 0 | Status: DISCONTINUED | OUTPATIENT
Start: 2022-03-07 | End: 2022-03-08

## 2022-03-07 RX ORDER — MORPHINE SULFATE 50 MG/1
4 CAPSULE, EXTENDED RELEASE ORAL ONCE
Refills: 0 | Status: DISCONTINUED | OUTPATIENT
Start: 2022-03-07 | End: 2022-03-08

## 2022-03-07 RX ADMIN — MORPHINE SULFATE 4 MILLIGRAM(S): 50 CAPSULE, EXTENDED RELEASE ORAL at 23:38

## 2022-03-07 RX ADMIN — MORPHINE SULFATE 4 MILLIGRAM(S): 50 CAPSULE, EXTENDED RELEASE ORAL at 17:38

## 2022-03-07 RX ADMIN — MORPHINE SULFATE 4 MILLIGRAM(S): 50 CAPSULE, EXTENDED RELEASE ORAL at 02:47

## 2022-03-07 RX ADMIN — ONDANSETRON 4 MILLIGRAM(S): 8 TABLET, FILM COATED ORAL at 17:37

## 2022-03-07 RX ADMIN — ONDANSETRON 4 MILLIGRAM(S): 8 TABLET, FILM COATED ORAL at 23:38

## 2022-03-07 RX ADMIN — Medication 324 MILLIGRAM(S): at 01:48

## 2022-03-07 RX ADMIN — SODIUM CHLORIDE 1000 MILLILITER(S): 9 INJECTION, SOLUTION INTRAVENOUS at 02:47

## 2022-03-07 RX ADMIN — MORPHINE SULFATE 4 MILLIGRAM(S): 50 CAPSULE, EXTENDED RELEASE ORAL at 18:00

## 2022-03-07 RX ADMIN — ONDANSETRON 4 MILLIGRAM(S): 8 TABLET, FILM COATED ORAL at 11:03

## 2022-03-07 RX ADMIN — PANTOPRAZOLE SODIUM 40 MILLIGRAM(S): 20 TABLET, DELAYED RELEASE ORAL at 02:47

## 2022-03-07 RX ADMIN — ONDANSETRON 4 MILLIGRAM(S): 8 TABLET, FILM COATED ORAL at 02:47

## 2022-03-07 RX ADMIN — Medication 1 GRAM(S): at 17:37

## 2022-03-07 RX ADMIN — PANTOPRAZOLE SODIUM 40 MILLIGRAM(S): 20 TABLET, DELAYED RELEASE ORAL at 17:37

## 2022-03-07 RX ADMIN — Medication 1 GRAM(S): at 23:38

## 2022-03-07 RX ADMIN — MORPHINE SULFATE 4 MILLIGRAM(S): 50 CAPSULE, EXTENDED RELEASE ORAL at 23:53

## 2022-03-07 RX ADMIN — SODIUM CHLORIDE 75 MILLILITER(S): 9 INJECTION, SOLUTION INTRAVENOUS at 11:05

## 2022-03-07 RX ADMIN — MORPHINE SULFATE 4 MILLIGRAM(S): 50 CAPSULE, EXTENDED RELEASE ORAL at 11:02

## 2022-03-07 RX ADMIN — MORPHINE SULFATE 4 MILLIGRAM(S): 50 CAPSULE, EXTENDED RELEASE ORAL at 11:20

## 2022-03-07 NOTE — PATIENT PROFILE ADULT - FALL HARM RISK - UNIVERSAL INTERVENTIONS
Bed in lowest position, wheels locked, appropriate side rails in place/Call bell, personal items and telephone in reach/Instruct patient to call for assistance before getting out of bed or chair/Non-slip footwear when patient is out of bed/[place] to call system/Physically safe environment - no spills, clutter or unnecessary equipment/Purposeful Proactive Rounding/Room/bathroom lighting operational, light cord in reach

## 2022-03-07 NOTE — CONSULT NOTE ADULT - SUBJECTIVE AND OBJECTIVE BOX
Patient is a 41y old  Male who presents with a chief complaint of     Reason For Consult: gastroparesis , fluctuating blood glucose     HPI: 41-year-old male, type 1 diabetes, HbA1c 8.2 in the redness and insulin pump.  Has element of gastroparesis.  Currently n.p.o.      PAST MEDICAL & SURGICAL HISTORY:  DM type 1 (diabetes mellitus, type 1)    Asthma    Gastritis    Anxiety    Controlled diabetes mellitus type 1 without complications    Gastritis, presence of bleeding unspecified, unspecified chronicity, unspecified gastritis type    Uncomplicated asthma, unspecified asthma severity, unspecified whether persistent    S/P cholecystectomy    History of cholecystectomy        FAMILY HISTORY:  Family history of diabetes mellitus (Father, Mother)    Family history of diabetes mellitus (DM) (Father, Mother, Grandparent)          Social History:    MEDICATIONS  (STANDING):  dextrose (GLUCOSE) Chewable 16 Gram(s) Chew once  dextrose 40% Gel 15 Gram(s) Oral once  dextrose 5% + sodium chloride 0.45%. 1000 milliLiter(s) (75 mL/Hr) IV Continuous <Continuous>  dextrose 5%. 1000 milliLiter(s) (100 mL/Hr) IV Continuous <Continuous>  dextrose 5%. 1000 milliLiter(s) (50 mL/Hr) IV Continuous <Continuous>  dextrose 50% Injectable 25 Gram(s) IV Push once  dextrose 50% Injectable 12.5 Gram(s) IV Push once  dextrose 50% Injectable 25 Gram(s) IV Push once  glucagon  Injectable 1 milliGRAM(s) IntraMuscular once  insulin lispro (ADMELOG) Pump 1 Each SubCutaneous Continuous Pump  morphine  - Injectable 4 milliGRAM(s) IV Push once  ondansetron Injectable 4 milliGRAM(s) IV Push once  ondansetron Injectable 4 milliGRAM(s) IV Push four times a day  pantoprazole  Injectable 40 milliGRAM(s) IV Push two times a day  sucralfate 1 Gram(s) Oral four times a day    MEDICATIONS  (PRN):  ALPRAZolam 1 milliGRAM(s) Oral every 8 hours PRN anxiety  metoclopramide Injectable 10 milliGRAM(s) IV Push every 6 hours PRN nausea/vomiting  morphine  - Injectable 4 milliGRAM(s) IV Push every 6 hours PRN Moderate Pain (4 - 6)      REVIEW OF SYSTEMS:  CONSTITUTIONAL:  as per HPI  HEENT:  Eyes:  No diplopia or blurred vision.   ENT:  No earache, sore throat or runny nose.  CARDIOVASCULAR:  No chest pain .  RESPIRATORY:  No cough, shortness of breath, PND or orthopnea.  GASTROINTESTINAL:  No nausea, vomiting or diarrhea.  GENITOURINARY:  No dysuria, frequency or urgency. No Blood in urine  MUSCULOSKELETAL:  no joint aches, no muscle pain, myalgia  SKIN:  No change in skin, hair or nails.  NEUROLOGIC:  No paresthesias, fasciculations, seizures or weakness.  PSYCHIATRIC:  No disorder of thought or mood.  ENDOCRINE:  No heat or cold intolerance, polyuria or polydipsia. abnormal weight gain or loss, oral thrush  HEMATOLOGICAL:  No easy bruising or bleeding.     T(C): 36.8 (03-07-22 @ 17:41), Max: 37 (03-07-22 @ 05:11)  HR: 64 (03-07-22 @ 17:41) (61 - 95)  BP: 112/78 (03-07-22 @ 17:41) (104/53 - 177/89)  RR: 18 (03-07-22 @ 17:41) (13 - 18)  SpO2: 98% (03-07-22 @ 17:41) (96% - 99%)  Wt(kg): --    PHYSICAL EXAM:  GENERAL: NAD, well-groomed, well-developed  HEAD:  Atraumatic, Normocephalic  EYES: PERRLA, conjunctiva and sclera clear  ENMT: No  exudates,, Moist mucous membranes,, No lesions  NECK: Supple, No JVD,   NERVOUS SYSTEM:  Alert & Oriented   CHEST/LUNG: Clear to percussion bilaterally; No rales, rhonchi, wheezing, or rubs  HEART: Regular rate and rhythm; No murmurs, rubs, or gallops  ABDOMEN: Soft, Nontender, Nondistended; Bowel sounds present  EXTREMITIES:  2+ Peripheral Pulses, No clubbing, cyanosis, or edema  LYMPH: No lymphadenopathy noted  SKIN: No rashes or lesions    CAPILLARY BLOOD GLUCOSE      POCT Blood Glucose.: 267 mg/dL (07 Mar 2022 21:37)  POCT Blood Glucose.: 143 mg/dL (07 Mar 2022 16:36)  POCT Blood Glucose.: 210 mg/dL (07 Mar 2022 11:26)  POCT Blood Glucose.: 211 mg/dL (07 Mar 2022 07:35)                            14.2   9.31  )-----------( 303      ( 06 Mar 2022 21:57 )             41.4       CMP:  03-06 @ 21:57  SGPT 29  Albumin 4.0   Alk Phos 119   Anion Gap 7   SGOT 27   Total Bili 0.5   BUN 19   Calcium Total 9.1   CO2 27   Chloride 105   Creatinine 1.14   eGFR if AA --   eGFR if non AA --   Glucose 210   Potassium 3.9   Protein 8.1   Sodium 139      Thyroid Function Tests:      Diabetes Tests:     Parathyroids:     Adrenals:       Radiology:

## 2022-03-07 NOTE — H&P ADULT - ASSESSMENT
Patient is a 41M with a PMH of DM, gastritis, anxiety who presents to the ED for abdominal pain.  Patient states that he developed severe abdominal pain around 4pm yesterday.  Reports nausea with numerous episodes of NBNB vomiting.  States that he has these flareups somewhat regularly.  No other complaints.  Vitals stable, labs benign.  Will admit to med surg.

## 2022-03-07 NOTE — CONSULT NOTE ADULT - PROBLEM SELECTOR RECOMMENDATION 9
Patient has type 1 diabetes.  HbA1c 8.2.  Element of gastroparesis and gastritis is present.  Management of gastroparesis should be aggressive.  Pump was started according to the pump settings.  Diet should be advanced slowly.  Short frequent boluses of meals are a good way of managing gastroparesis side effects.  Goal is to keep fingersticks 100-180 but above all uniformity and no fluctuations in blood glucose should be the target.  Tolerance to medications like Reglan can develop  Patient should have strict diet control and do exercise as tolerated upon discharge   Patient can resume  home regimen upon discharge   Thank You for the courtesy of this consultation !!!

## 2022-03-07 NOTE — H&P ADULT - NSHPLABSRESULTS_GEN_ALL_CORE
Recent Vitals  T(C): 36.9 (22 @ 01:53), Max: 36.9 (22 @ 01:53)  HR: 64 (22 @ 01:53) (64 - 95)  BP: 146/78 (22 @ 01:53) (104/53 - 177/89)  RR: 15 (22 @ 01:53) (13 - 19)  SpO2: 99% (22 @ 01:53) (97% - 99%)                        14.2   9.31  )-----------( 303      ( 06 Mar 2022 21:57 )             41.4     03-06    139  |  105  |  19  ----------------------------<  210<H>  3.9   |  27  |  1.14    Ca    9.1      06 Mar 2022 21:57    TPro  8.1  /  Alb  4.0  /  TBili  0.5  /  DBili  x   /  AST  27  /  ALT  29  /  AlkPhos  119  03-06      LIVER FUNCTIONS - ( 06 Mar 2022 21:57 )  Alb: 4.0 g/dL / Pro: 8.1 gm/dL / ALK PHOS: 119 U/L / ALT: 29 U/L / AST: 27 U/L / GGT: x           Urinalysis Basic - ( 07 Mar 2022 02:07 )    Color: Yellow / Appearance: Clear / S.005 / pH: x  Gluc: x / Ketone: Large  / Bili: Negative / Urobili: Negative mg/dL   Blood: x / Protein: Negative mg/dL / Nitrite: Negative   Leuk Esterase: Negative / RBC: x / WBC x   Sq Epi: x / Non Sq Epi: x / Bacteria: x        Home Medications:  Xanax 1 mg oral tablet: 1 tab(s) orally 3 times a day, As Needed (21 Dec 2021 18:28)

## 2022-03-07 NOTE — CONSULT NOTE ADULT - SUBJECTIVE AND OBJECTIVE BOX
HPI:      PAST MEDICAL & SURGICAL HISTORY:  DM type 1 (diabetes mellitus, type 1)    Asthma    Gastritis    Anxiety    Controlled diabetes mellitus type 1 without complications    Gastritis, presence of bleeding unspecified, unspecified chronicity, unspecified gastritis type    Uncomplicated asthma, unspecified asthma severity, unspecified whether persistent    S/P cholecystectomy    History of cholecystectomy        MEDICATIONS  (STANDING):  dextrose (GLUCOSE) Chewable 16 Gram(s) Chew once  dextrose 40% Gel 15 Gram(s) Oral once  dextrose 5% + sodium chloride 0.45%. 1000 milliLiter(s) (75 mL/Hr) IV Continuous <Continuous>  dextrose 5%. 1000 milliLiter(s) (50 mL/Hr) IV Continuous <Continuous>  dextrose 5%. 1000 milliLiter(s) (100 mL/Hr) IV Continuous <Continuous>  dextrose 50% Injectable 25 Gram(s) IV Push once  dextrose 50% Injectable 12.5 Gram(s) IV Push once  dextrose 50% Injectable 25 Gram(s) IV Push once  glucagon  Injectable 1 milliGRAM(s) IntraMuscular once  insulin lispro (ADMELOG) Pump 1 Each SubCutaneous Continuous Pump  morphine  - Injectable 4 milliGRAM(s) IV Push once  ondansetron Injectable 4 milliGRAM(s) IV Push once  pantoprazole  Injectable 40 milliGRAM(s) IV Push two times a day  sucralfate 1 Gram(s) Oral four times a day    MEDICATIONS  (PRN):  ALPRAZolam 1 milliGRAM(s) Oral every 8 hours PRN anxiety  metoclopramide Injectable 10 milliGRAM(s) IV Push every 6 hours PRN nausea/vomiting  morphine  - Injectable 4 milliGRAM(s) IV Push every 6 hours PRN Moderate Pain (4 - 6)  ondansetron Injectable 4 milliGRAM(s) IV Push every 6 hours PRN Nausea and/or Vomiting      Allergies    No Known Allergies    Intolerances        FAMILY HISTORY:  Family history of diabetes mellitus (Father, Mother)    Family history of diabetes mellitus (DM) (Father, Mother, Grandparent)        REVIEW OF SYSTEMS:    CONSTITUTIONAL: No fever, weight loss, or fatigue  EYES: No eye pain, visual disturbances, or discharge  ENMT:  No difficulty hearing, tinnitus, vertigo; No sinus or throat pain  NECK: No pain or stiffness  BREASTS: No pain, masses, or nipple discharge  RESPIRATORY: No cough, wheezing, chills or hemoptysis; No shortness of breath  CARDIOVASCULAR: No chest pain, palpitations, dizziness, or leg swelling  GASTROINTESTINAL: No abdominal or epigastric pain. No nausea, vomiting, or hematemesis; No diarrhea or constipation. No melena or hematochezia.  GENITOURINARY: No dysuria, frequency, hematuria, or incontinence  NEUROLOGICAL: No headaches, memory loss, loss of strength, numbness, or tremors  SKIN: No itching, burning, rashes, or lesions   LYMPH NODES: No enlarged glands  ENDOCRINE: No heat or cold intolerance; No hair loss  MUSCULOSKELETAL: No joint pain or swelling; No muscle, back, or extremity pain  PSYCHIATRIC: No depression, anxiety, mood swings, or difficulty sleeping  HEME/LYMPH: No easy bruising, or bleeding gums  ALLERGY AND IMMUNOLOGIC: No hives or eczema          SOCIAL HISTORY:    FAMILY HISTORY:  Family history of diabetes mellitus (Father, Mother)    Family history of diabetes mellitus (DM) (Father, Mother, Grandparent)        Vital Signs Last 24 Hrs  T(C): 37 (07 Mar 2022 11:04), Max: 37 (07 Mar 2022 05:11)  T(F): 98.6 (07 Mar 2022 11:04), Max: 98.6 (07 Mar 2022 05:11)  HR: 61 (07 Mar 2022 11:04) (61 - 95)  BP: 147/92 (07 Mar 2022 11:04) (104/53 - 177/89)  BP(mean): --  RR: 17 (07 Mar 2022 11:04) (13 - 19)  SpO2: 96% (07 Mar 2022 11:04) (96% - 99%)    PHYSICAL EXAM:    GENERAL: NAD, well-groomed, well-developed  HEAD:  Atraumatic, Normocephalic  EYES: EOMI, PERRLA, conjunctiva and sclera clear  ENMT: No tonsillar erythema, exudates, or enlargement; Moist mucous membranes, Good dentition, No lesions  NECK: Supple, No JVD, Normal thyroid  NERVOUS SYSTEM:  Alert & Oriented X3, Good concentration; Motor Strength 5/5 B/L upper and lower extremities; DTRs 2+ intact and symmetric  CHEST/LUNG: Clear to percussion bilaterally; No rales, rhonchi, wheezing, or rubs  HEART: Regular rate and rhythm; No murmurs, rubs, or gallops  ABDOMEN: Soft, Nontender, Nondistended; Bowel sounds present  EXTREMITIES:  2+ Peripheral Pulses, No clubbing, cyanosis, or edema  LYMPH: No lymphadenopathy noted   RECTAL: No masses, prostate normal size and smooth, Guaiaci negative   BREAST: No palpable masses, skin no lesions no retractions, no discharges. adnexal no palpable masses noted   GYN: uterus normal size, adnexal, no palpable masses, no CMT, no uterine discharge   SKIN: No rashes or lesions    LABS:                        14.2   9.31  )-----------( 303      ( 06 Mar 2022 21:57 )             41.4       CBC:  03-06 @ 21:57  WBC  9.31  HGB 14.2  HCT 41.4 Plate 303  MCV 83.0           06 Mar 2022 21:57    139    |  105    |  19     ----------------------------<  210    3.9     |  27     |  1.14     Ca    9.1        06 Mar 2022 21:57    TPro  8.1    /  Alb  4.0    /  TBili  0.5    /  DBili  x      /  AST  27     /  ALT  29     /  AlkPhos  119    06 Mar 2022 21:57      Urinalysis Basic - ( 07 Mar 2022 02:07 )    Color: Yellow / Appearance: Clear / S.005 / pH: x  Gluc: x / Ketone: Large  / Bili: Negative / Urobili: Negative mg/dL   Blood: x / Protein: Negative mg/dL / Nitrite: Negative   Leuk Esterase: Negative / RBC: x / WBC x   Sq Epi: x / Non Sq Epi: x / Bacteria: x          RADIOLOGY & ADDITIONAL STUDIES: HPI:    · Chief Complaint: The patient is a 41y Male complaining of abdominal pain.  · HPI Objective Statement: 42 yo M with LUQ abd pain, n/v/d, started 4:30 pm today.  No inciting event.  No other additional symptoms.  Feels similar to prior episodes of gastritis.   	ROS: negative for fever, cough, headache, chest pain, shortness of breath, rash, paresthesia, and weakness--all other systems reviewed are negative.   PMH: DM (on diabetic pump), gastroparesis, chronic anxiety, asthma; Meds: See EMR for list; SH: Denies smoking/drinking/drug use  ---- As Above -------  Consult called fro N/V and abdominal pain. Patient has a history of gastritis / gastroparesis for MANY years. He now presents with symptoms c/w it. This attack was " caused by physical stress. "   The attacks are usually caused by emotional/ physical stress or certain foods. He takes Reglan on a PRN basis. Denies NSAIDs. Patient is on a high fiber diet " which prevents attacks. " He has not seen a Gastroenterologist for a long time because he " is doing so well. His last attack was 4 months ago. " Last EGD was 2 years ago. Does not feel marijuana gave him this attack. had a colonoscopy 2 years ago for ??????  Patient feeling better this afternoon. Wants to try clear liquid diet    PAST MEDICAL & SURGICAL HISTORY:  DM type 1 (diabetes mellitus, type 1)    Asthma    Gastritis    Anxiety    Controlled diabetes mellitus type 1 without complications    Gastritis, presence of bleeding unspecified, unspecified chronicity, unspecified gastritis type    Uncomplicated asthma, unspecified asthma severity, unspecified whether persistent    S/P cholecystectomy    History of cholecystectomy        MEDICATIONS  (STANDING):  dextrose (GLUCOSE) Chewable 16 Gram(s) Chew once  dextrose 40% Gel 15 Gram(s) Oral once  dextrose 5% + sodium chloride 0.45%. 1000 milliLiter(s) (75 mL/Hr) IV Continuous <Continuous>  dextrose 5%. 1000 milliLiter(s) (50 mL/Hr) IV Continuous <Continuous>  dextrose 5%. 1000 milliLiter(s) (100 mL/Hr) IV Continuous <Continuous>  dextrose 50% Injectable 25 Gram(s) IV Push once  dextrose 50% Injectable 12.5 Gram(s) IV Push once  dextrose 50% Injectable 25 Gram(s) IV Push once  glucagon  Injectable 1 milliGRAM(s) IntraMuscular once  insulin lispro (ADMELOG) Pump 1 Each SubCutaneous Continuous Pump  morphine  - Injectable 4 milliGRAM(s) IV Push once  ondansetron Injectable 4 milliGRAM(s) IV Push once  pantoprazole  Injectable 40 milliGRAM(s) IV Push two times a day  sucralfate 1 Gram(s) Oral four times a day    MEDICATIONS  (PRN):  ALPRAZolam 1 milliGRAM(s) Oral every 8 hours PRN anxiety  metoclopramide Injectable 10 milliGRAM(s) IV Push every 6 hours PRN nausea/vomiting  morphine  - Injectable 4 milliGRAM(s) IV Push every 6 hours PRN Moderate Pain (4 - 6)  ondansetron Injectable 4 milliGRAM(s) IV Push every 6 hours PRN Nausea and/or Vomiting      Allergies    No Known Allergies    Intolerances        FAMILY HISTORY:  Family history of diabetes mellitus (Father, Mother)    Family history of diabetes mellitus (DM) (Father, Mother, Grandparent)        REVIEW OF SYSTEMS:    CONSTITUTIONAL: No fever, weight loss  EYES: No eye pain, visual disturbances, or discharge  ENMT:  No difficulty hearing, tinnitus, vertigo; No sinus or throat pain  NECK: No pain or stiffness  BREASTS: No pain, masses, or nipple discharge  RESPIRATORY: No cough, wheezing, chills or hemoptysis; No shortness of breath  CARDIOVASCULAR: No chest pain, palpitations, dizziness, or leg swelling  GASTROINTESTINAL: See above   GENITOURINARY: No dysuria, frequency, hematuria, or incontinence  NEUROLOGICAL: No headaches, memory loss, loss of strength, numbness, or tremors  SKIN: No itching, burning, rashes, or lesions   LYMPH NODES: No enlarged glands  ENDOCRINE: No heat or cold intolerance; No hair loss  MUSCULOSKELETAL: No joint pain or swelling; No muscle, back, or extremity pain  PSYCHIATRIC: No depression, anxiety, mood swings, or difficulty sleeping  HEME/LYMPH: No easy bruising, or bleeding gums  ALLERGY AND IMMUNOLOGIC: No hives or eczema          SOCIAL HISTORY:    FAMILY HISTORY:  Family history of diabetes mellitus (Father, Mother)    Family history of diabetes mellitus (DM) (Father, Mother, Grandparent)        Vital Signs Last 24 Hrs  T(C): 37 (07 Mar 2022 11:04), Max: 37 (07 Mar 2022 05:11)  T(F): 98.6 (07 Mar 2022 11:04), Max: 98.6 (07 Mar 2022 05:11)  HR: 61 (07 Mar 2022 11:04) (61 - 95)  BP: 147/92 (07 Mar 2022 11:04) (104/53 - 177/89)  BP(mean): --  RR: 17 (07 Mar 2022 11:04) (13 - 19)  SpO2: 96% (07 Mar 2022 11:04) (96% - 99%)    PHYSICAL EXAM:    GENERAL: NAD, well-groomed, well-developed  HEAD:  Atraumatic, Normocephalic  EYES: EOMI, PERRLA, conjunctiva and sclera clear  NECK: Supple, No JVD, Normal thyroid  NERVOUS SYSTEM:  Alert & Oriented X3, Good concentration; Motor Strength 5/5 B/L upper and lower extremities;   CHEST/LUNG: Clear to percussion bilaterally; No rales, rhonchi, wheezing, or rubs  HEART: Regular rate and rhythm; No murmurs, rubs, or gallops  ABDOMEN: Soft, Nontender, Nondistended; Bowel sounds present  EXTREMITIES:  2+ Peripheral Pulses, No clubbing, cyanosis, or edema  LYMPH: No lymphadenopathy noted   RECTAL: Deferred   SKIN: No rashes or lesions    LABS:                        14.2   9.31  )-----------( 303      ( 06 Mar 2022 21:57 )             41.4       CBC:  03-06 @ 21:57  WBC  9.31  HGB 14.2  HCT 41.4 Plate 303  MCV 83.0           06 Mar 2022 21:57    139    |  105    |  19     ----------------------------<  210    3.9     |  27     |  1.14     Ca    9.1        06 Mar 2022 21:57    TPro  8.1    /  Alb  4.0    /  TBili  0.5    /  DBili  x      /  AST  27     /  ALT  29     /  AlkPhos  119    06 Mar 2022 21:57      Urinalysis Basic - ( 07 Mar 2022 02:07 )    Color: Yellow / Appearance: Clear / S.005 / pH: x  Gluc: x / Ketone: Large  / Bili: Negative / Urobili: Negative mg/dL   Blood: x / Protein: Negative mg/dL / Nitrite: Negative   Leuk Esterase: Negative / RBC: x / WBC x   Sq Epi: x / Non Sq Epi: x / Bacteria: x          RADIOLOGY & ADDITIONAL STUDIES:  < from: CT Abdomen and Pelvis w/ Oral Cont and w/ IV Cont (22 @ 00:51) >    ACC: [id number] EXAM:  CT ABDOMEN AND PELVIS OC IC                          PROCEDURE DATE:  2022          INTERPRETATION:  CLINICAL INFORMATION: Abdominal pain    COMPARISON: CT abdomen pelvis 2021.    CONTRAST/COMPLICATIONS:  IV Contrast: Omnipaque 350  95 cc administered   5 cc discarded  Oral Contrast: NONE  Complications: None reported at time of study completion    PROCEDURE:  CT of the Abdomen and Pelvis was performed.  Sagittal and coronal reformats were performed.    FINDINGS:  LOWER CHEST: Within normal limits.    LIVER: Within normal limits.  BILE DUCTS: Normal caliber.  GALLBLADDER: Cholecystectomy.  SPLEEN: Within normal limits.  PANCREAS: Within normal limits.  ADRENALS: Within normal limits.  KIDNEYS/URETERS: Withinnormal limits.    BLADDER: Within normal limits.  REPRODUCTIVE ORGANS: Prostate within normal limits.    BOWEL: No bowel obstruction. Appendix is normal.  PERITONEUM: No ascites.  VESSELS: Within normal limits.  RETROPERITONEUM/LYMPH NODES: No lymphadenopathy.  ABDOMINAL WALL: Within normal limits.  BONES: Within normal limits.    IMPRESSION:  No acute abdominal pathology.    --- End of Report ---            CHUCK BERKOWITZ MD; Attending Radiologist  This document has been electronically signed. Mar  7 2022  1:16AM    < end of copied text >

## 2022-03-07 NOTE — CONSULT NOTE ADULT - ASSESSMENT
diabetes type 1   gastroparesis
HPI:    · Chief Complaint: The patient is a 41y Male complaining of abdominal pain.  · HPI Objective Statement: 42 yo M with LUQ abd pain, n/v/d, started 4:30 pm today.  No inciting event.  No other additional symptoms.  Feels similar to prior episodes of gastritis.   	ROS: negative for fever, cough, headache, chest pain, shortness of breath, rash, paresthesia, and weakness--all other systems reviewed are negative.   PMH: DM (on diabetic pump), gastroparesis, chronic anxiety, asthma; Meds: See EMR for list; SH: Denies smoking/drinking/drug use  ---- As Above -------  Consult called fro N/V and abdominal pain. Patient has a history of gastritis / gastroparesis for MANY years. He now presents with symptoms c/w it. This attack was " caused by physical stress. "   The attacks are usually caused by emotional/ physical stress or certain foods. He takes Reglan on a PRN basis. Denies NSAIDs. Patient is on a high fiber diet " which prevents attacks. " He has not seen a Gastroenterologist for a long time because he " is doing so well. His last attack was 4 months ago. " Last EGD was 2 years ago. Does not feel marijuana gave him this attack. had a colonoscopy 2 years ago for ??????  Patient feeling better this afternoon. Wants to try clear liquid diet    N/V, abdominal pain - ? Gastritis / Gastroparesis - Improving 1) Clear liquid diet and advance as tolerated. 2) patient agreeable to stop narcotics by 6 AM tomorrow 3) Zofran around the clock x 24 hours, then PRN 4) PPI BID 5) Otherwise, supportive care  ==== Discussed with patient and wife

## 2022-03-08 ENCOUNTER — TRANSCRIPTION ENCOUNTER (OUTPATIENT)
Age: 42
End: 2022-03-08

## 2022-03-08 VITALS
SYSTOLIC BLOOD PRESSURE: 123 MMHG | DIASTOLIC BLOOD PRESSURE: 84 MMHG | HEART RATE: 73 BPM | OXYGEN SATURATION: 95 % | RESPIRATION RATE: 18 BRPM | TEMPERATURE: 98 F

## 2022-03-08 LAB
ANION GAP SERPL CALC-SCNC: 5 MMOL/L — SIGNIFICANT CHANGE UP (ref 5–17)
BUN SERPL-MCNC: 13 MG/DL — SIGNIFICANT CHANGE UP (ref 7–23)
CALCIUM SERPL-MCNC: 8.3 MG/DL — LOW (ref 8.5–10.1)
CHLORIDE SERPL-SCNC: 108 MMOL/L — SIGNIFICANT CHANGE UP (ref 96–108)
CO2 SERPL-SCNC: 28 MMOL/L — SIGNIFICANT CHANGE UP (ref 22–31)
CREAT SERPL-MCNC: 1.06 MG/DL — SIGNIFICANT CHANGE UP (ref 0.5–1.3)
CULTURE RESULTS: SIGNIFICANT CHANGE UP
EGFR: 90 ML/MIN/1.73M2 — SIGNIFICANT CHANGE UP
GLUCOSE BLDC GLUCOMTR-MCNC: 147 MG/DL — HIGH (ref 70–99)
GLUCOSE BLDC GLUCOMTR-MCNC: 414 MG/DL — HIGH (ref 70–99)
GLUCOSE BLDC GLUCOMTR-MCNC: 419 MG/DL — HIGH (ref 70–99)
GLUCOSE BLDC GLUCOMTR-MCNC: 95 MG/DL — SIGNIFICANT CHANGE UP (ref 70–99)
GLUCOSE SERPL-MCNC: 148 MG/DL — HIGH (ref 70–99)
HCT VFR BLD CALC: 38.6 % — LOW (ref 39–50)
HGB BLD-MCNC: 13.1 G/DL — SIGNIFICANT CHANGE UP (ref 13–17)
MAGNESIUM SERPL-MCNC: 1.9 MG/DL — SIGNIFICANT CHANGE UP (ref 1.6–2.6)
MCHC RBC-ENTMCNC: 28.5 PG — SIGNIFICANT CHANGE UP (ref 27–34)
MCHC RBC-ENTMCNC: 33.9 G/DL — SIGNIFICANT CHANGE UP (ref 32–36)
MCV RBC AUTO: 84.1 FL — SIGNIFICANT CHANGE UP (ref 80–100)
NRBC # BLD: 0 /100 WBCS — SIGNIFICANT CHANGE UP (ref 0–0)
PHOSPHATE SERPL-MCNC: 2.5 MG/DL — SIGNIFICANT CHANGE UP (ref 2.5–4.5)
PLATELET # BLD AUTO: 285 K/UL — SIGNIFICANT CHANGE UP (ref 150–400)
POTASSIUM SERPL-MCNC: 3.8 MMOL/L — SIGNIFICANT CHANGE UP (ref 3.5–5.3)
POTASSIUM SERPL-SCNC: 3.8 MMOL/L — SIGNIFICANT CHANGE UP (ref 3.5–5.3)
RBC # BLD: 4.59 M/UL — SIGNIFICANT CHANGE UP (ref 4.2–5.8)
RBC # FLD: 13.2 % — SIGNIFICANT CHANGE UP (ref 10.3–14.5)
SODIUM SERPL-SCNC: 141 MMOL/L — SIGNIFICANT CHANGE UP (ref 135–145)
SPECIMEN SOURCE: SIGNIFICANT CHANGE UP
WBC # BLD: 8.59 K/UL — SIGNIFICANT CHANGE UP (ref 3.8–10.5)
WBC # FLD AUTO: 8.59 K/UL — SIGNIFICANT CHANGE UP (ref 3.8–10.5)

## 2022-03-08 PROCEDURE — 99239 HOSP IP/OBS DSCHRG MGMT >30: CPT

## 2022-03-08 RX ADMIN — MORPHINE SULFATE 4 MILLIGRAM(S): 50 CAPSULE, EXTENDED RELEASE ORAL at 05:38

## 2022-03-08 RX ADMIN — MORPHINE SULFATE 4 MILLIGRAM(S): 50 CAPSULE, EXTENDED RELEASE ORAL at 05:53

## 2022-03-08 RX ADMIN — SODIUM CHLORIDE 75 MILLILITER(S): 9 INJECTION, SOLUTION INTRAVENOUS at 11:38

## 2022-03-08 RX ADMIN — SODIUM CHLORIDE 75 MILLILITER(S): 9 INJECTION, SOLUTION INTRAVENOUS at 05:37

## 2022-03-08 RX ADMIN — PANTOPRAZOLE SODIUM 40 MILLIGRAM(S): 20 TABLET, DELAYED RELEASE ORAL at 05:37

## 2022-03-08 RX ADMIN — ONDANSETRON 4 MILLIGRAM(S): 8 TABLET, FILM COATED ORAL at 05:38

## 2022-03-08 NOTE — DISCHARGE NOTE NURSING/CASE MANAGEMENT/SOCIAL WORK - NSDCPEFALRISK_GEN_ALL_CORE
For information on Fall & Injury Prevention, visit: [email or web address] OR  [email or web address] OR  https://www.cdc.gov/steadi/patient.html

## 2022-03-08 NOTE — DISCHARGE NOTE PROVIDER - NSDCMRMEDTOKEN_GEN_ALL_CORE_FT
acetaminophen-oxyCODONE 325 mg-5 mg oral tablet: 1 tab(s) orally every 6 hours PRN for pain MDD:4  Admelog 100 units/mL injectable solution: 10 unit(s) injectable 3 times a day  aluminum hydroxide/magnesium hydroxide/simethicone 400 mg-400 mg-40 mg/5 mL oral suspension: 5 milliliter(s) orally 4 times a day (before meals and at bedtime)   Carafate 1 g oral tablet: 1 tab(s) orally 3 times a day take before meals   Carafate 1 g oral tablet: 1 tab(s) orally 4 times a day (before meals and at bedtime)   famotidine 20 mg oral tablet: 1 tab(s) orally 2 times a day  famotidine 20 mg oral tablet: 1 tab(s) orally once a day   insulin glargine 100 units/mL subcutaneous solution: 13 unit(s) subcutaneous 2 times a day   magnesium hydroxide/aluminum hydroxide/simethicone 200 mg-200 mg-20 mg/5 mL oral suspension: 15 milliliter(s) orally 4 times a day (before meals and at bedtime)   metoclopramide 10 mg oral tablet, disintegratin tab(s) orally 4 times a day (before meals and at bedtime)  ondansetron 4 mg oral tablet, disintegratin or 2  tab(s) orally every 8 hours, As Needed -for nausea   ondansetron 4 mg oral tablet, disintegratin tab(s) orally 3 times a day prn nausea  ondansetron 8 mg oral tablet, disintegratin tab(s) orally 2 times a day, As Needed - for nausea  ProAir HFA 90 mcg/inh inhalation aerosol: 2 puff(s) inhaled 4 times a day, As Needed -for shortness of breath and/or wheezing   Protonix 40 mg oral delayed release tablet: 1 tab(s) orally once a day   Protonix 40 mg oral delayed release tablet: 1 tab(s) orally once a day   Reglan 10 mg oral tablet: 1 tab(s) orally 4 times a day (before meals and at bedtime) prn nausea   Reglan 10 mg oral tablet: 1 tab(s) orally every 12 hours, As Needed -for nausea   Xanax 1 mg oral tablet: 1 tab(s) orally 3 times a day, As Needed   Admelog 100 units/mL injectable solution: 10 unit(s) injectable 3 times a day  Carafate 1 g oral tablet: 1 tab(s) orally 3 times a day take before meals   insulin glargine 100 units/mL subcutaneous solution: 13 unit(s) subcutaneous 2 times a day   magnesium hydroxide/aluminum hydroxide/simethicone 200 mg-200 mg-20 mg/5 mL oral suspension: 15 milliliter(s) orally 4 times a day (before meals and at bedtime)   ondansetron 8 mg oral tablet, disintegratin tab(s) orally 2 times a day, As Needed - for nausea  ProAir HFA 90 mcg/inh inhalation aerosol: 2 puff(s) inhaled 4 times a day, As Needed -for shortness of breath and/or wheezing   Protonix 40 mg oral delayed release tablet: 1 tab(s) orally once a day   Reglan 10 mg oral tablet: 1 tab(s) orally 4 times a day (before meals and at bedtime) prn nausea   Xanax 1 mg oral tablet: 1 tab(s) orally 3 times a day, As Needed

## 2022-03-08 NOTE — DISCHARGE NOTE PROVIDER - CARE PROVIDER_API CALL
Your PCP,   Phone: (   )    -  Fax: (   )    -  Follow Up Time: 1-3 days    Sayda Roberts  ENDOCRINOLOGY/METAB/DIABETES  [address] [place] [address]  Phone: [phone]  Fax: [phone]  Follow Up Time: 1 week

## 2022-03-08 NOTE — PROGRESS NOTE ADULT - ASSESSMENT
diabetes I uncontrolled 
HPI:    · Chief Complaint: The patient is a 41y Male complaining of abdominal pain.  · HPI Objective Statement: 42 yo M with LUQ abd pain, n/v/d, started 4:30 pm today.  No inciting event.  No other additional symptoms.  Feels similar to prior episodes of gastritis.   	ROS: negative for fever, cough, headache, chest pain, shortness of breath, rash, paresthesia, and weakness--all other systems reviewed are negative.   PMH: DM (on diabetic pump), gastroparesis, chronic anxiety, asthma; Meds: See EMR for list; SH: Denies smoking/drinking/drug use  ---- As Above -------  Consult called fro N/V and abdominal pain. Patient has a history of gastritis / gastroparesis for MANY years. He now presents with symptoms c/w it. This attack was " caused by physical stress. "   The attacks are usually caused by emotional/ physical stress or certain foods. He takes Reglan on a PRN basis. Denies NSAIDs. Patient is on a high fiber diet " which prevents attacks. " He has not seen a Gastroenterologist for a long time because he " is doing so well. His last attack was 4 months ago. " Last EGD was 2 years ago. Does not feel marijuana gave him this attack. had a colonoscopy 2 years ago for ??????  Patient feeling better this afternoon. Wants to try clear liquid diet    N/V, abdominal pain - ? Gastritis / Gastroparesis - Improving. States that he is back to baseline.  Tolerating solids without any pain medications. No further investigative work necessary. Refer to community gastroenterologist .   ==== Discussed with patient and wife

## 2022-03-08 NOTE — PROGRESS NOTE ADULT - PROBLEM SELECTOR PLAN 1
Continue with the current  regimen while inpatient   Patient can resume  home regimen upon discharge

## 2022-03-08 NOTE — DISCHARGE NOTE PROVIDER - HOSPITAL COURSE
41M with a history of Asthma, DM I, gastritis & anxiety presented w/ abdominal pain w/ NBNB vomiting and was admitted for Gastritis and/or gastroparesis. He is on Zofran/ [place]. Pt started on clear liquid diet that he tolerated. His pain and nausea resolved. I advised him to wait and see if he would tolerate more solid food but the patient stated that he did not like hospital food and did not want to stay and try solids and instead wanted to be discharged immediately. 40 yo M with a history of Asthma, DM I on an insulin pump, gastritis & anxiety presented w/ abdominal pain w/ NBNB vomiting and was admitted for Gastritis and/or gastroparesis. He is on Zofran/ [place]. Pt was started on clear liquid diet that he tolerated. His pain and nausea resolved. I advised him to wait and see if he would tolerate more solid food but the patient stated that he did not like hospital food and did not want to stay and try solids and instead wanted to be discharged immediately.     Discharge time: 43 minutes

## 2022-03-08 NOTE — DISCHARGE NOTE PROVIDER - PROVIDER TOKENS
FREE:[LAST:[Your PCP],PHONE:[(   )    -],FAX:[(   )    -],FOLLOWUP:[1-3 days]],PROVIDER:[TOKEN:[[id number]],FOLLOWUP:[1 week]]

## 2022-03-08 NOTE — PROGRESS NOTE ADULT - SUBJECTIVE AND OBJECTIVE BOX
41M with a history of Asthma, DM I, gastritis & anxiety presented w/ abdominal pain w/ NBNB vomiting and was admitted for Gastritis and/or gastroparesis. He is on Zofran/ [place]. Pt started on clear liquid diet that will be advanced as tolerated. As per GI, will stop narcotics by 6 AM tomorrow. Pt is on an insulin pump w/ endo consult pending. He is lying in bed in NAD. Physical exam unremarkable.                     
Patient is a 41y old  Male who presents with a chief complaint of     Interval History: finger sticks are with wide fluctuations   continued on his insulin pump with its own setting   discharge planning is on     MEDICATIONS  (STANDING):  dextrose (GLUCOSE) Chewable 16 Gram(s) Chew once  dextrose 40% Gel 15 Gram(s) Oral once  dextrose 5% + sodium chloride 0.45%. 1000 milliLiter(s) (75 mL/Hr) IV Continuous <Continuous>  dextrose 5%. 1000 milliLiter(s) (100 mL/Hr) IV Continuous <Continuous>  dextrose 5%. 1000 milliLiter(s) (50 mL/Hr) IV Continuous <Continuous>  dextrose 50% Injectable 25 Gram(s) IV Push once  dextrose 50% Injectable 12.5 Gram(s) IV Push once  dextrose 50% Injectable 25 Gram(s) IV Push once  glucagon  Injectable 1 milliGRAM(s) IntraMuscular once  insulin lispro (ADMELOG) Pump 1 Each SubCutaneous Continuous Pump  morphine  - Injectable 4 milliGRAM(s) IV Push once  ondansetron Injectable 4 milliGRAM(s) IV Push once  pantoprazole  Injectable 40 milliGRAM(s) IV Push two times a day  sucralfate 1 Gram(s) Oral four times a day    MEDICATIONS  (PRN):  ALPRAZolam 1 milliGRAM(s) Oral every 8 hours PRN anxiety  metoclopramide Injectable 10 milliGRAM(s) IV Push every 6 hours PRN nausea/vomiting  morphine  - Injectable 4 milliGRAM(s) IV Push every 6 hours PRN Moderate Pain (4 - 6)      Allergies    No Known Allergies    Intolerances        REVIEW OF SYSTEMS:  CONSTITUTIONAL: no changes  EYES: No eye pain, visual disturbances, or discharge  ENMT:  No difficulty hearing, No sinus or throat pain  NECK: No pain or stiffness  RESPIRATORY: No cough, wheezing, chills or hemoptysis; No shortness of breath  CARDIOVASCULAR: No chest pain, palpitations or leg swelling  GASTROINTESTINAL: No abdominal or epigastric pain. No nausea, vomiting, or hematemesis; No diarrhea or constipation. No melena or hematochezia.  GENITOURINARY: No dysuria, frequency, hematuria, or incontinence  NEUROLOGICAL: No headaches, memory loss, loss of strength, numbness, or tremors  SKIN: No itching, burning, rashes, or lesions   ENDOCRINE: No heat or cold intolerance; No hair loss  MUSCULOSKELETAL: No joint pain or swelling; No muscle, back, or extremity pain  PSYCHIATRIC: No depression, anxiety, mood swings, or difficulty sleeping  HEME/LYMPH: No easy bruising, or bleeding gums  ALLERY AND IMMUNOLOGIC: No hives or eczema    Vital Signs Last 24 Hrs  T(C): 36.8 (08 Mar 2022 10:57), Max: 37.2 (07 Mar 2022 23:46)  T(F): 98.2 (08 Mar 2022 10:57), Max: 99 (07 Mar 2022 23:46)  HR: 73 (08 Mar 2022 10:57) (62 - 73)  BP: 123/84 (08 Mar 2022 10:57) (104/65 - 123/84)  BP(mean): --  RR: 18 (08 Mar 2022 10:57) (16 - 18)  SpO2: 95% (08 Mar 2022 10:57) (95% - 97%)    PHYSICAL EXAM:  GENERAL:   HEAD: Atraumatic, Normocephalic  EYES: PERRLA, conjunctiva and sclera clear  ENMT: No  exudates,; Moist mucous membranes,, No lesions  NECK: Supple, No JVD, Normal thyroid  NERVOUS SYSTEM:  Alert & Oriented,   CHEST/LUNG: Clear to auscultation bilaterally; No rales, rhonchi, wheezing, or rubs  HEART: Regular rate and rhythm; No murmurs, rubs, or gallops  ABDOMEN: Soft, Nontender, Nondistended; Bowel sounds present  EXTREMITIES:  2+ Peripheral Pulses, no edema  SKIN: No rashes or lesions    LABS:      Urinalysis Basic - ( 07 Mar 2022 02:07 )    Color: Yellow / Appearance: Clear / S.005 / pH: x  Gluc: x / Ketone: Large  / Bili: Negative / Urobili: Negative mg/dL   Blood: x / Protein: Negative mg/dL / Nitrite: Negative   Leuk Esterase: Negative / RBC: x / WBC x   Sq Epi: x / Non Sq Epi: x / Bacteria: x      CAPILLARY BLOOD GLUCOSE      POCT Blood Glucose.: 95 mg/dL (08 Mar 2022 15:27)  POCT Blood Glucose.: 419 mg/dL (08 Mar 2022 11:18)  POCT Blood Glucose.: 414 mg/dL (08 Mar 2022 11:17)  POCT Blood Glucose.: 147 mg/dL (08 Mar 2022 07:35)  POCT Blood Glucose.: 267 mg/dL (07 Mar 2022 21:37)    Lipid panel:           Thyroid:  Diabetes Tests:  Parathyroid Panel:  Adrenals:  RADIOLOGY & ADDITIONAL TESTS:    Imaging Personally Reviewed:  [ ] YES  [ ] NO    Consultant(s) Notes Reviewed:  [ ] YES  [ ] NO    Care Discussed with Consultants/Other Providers [ ] YES  [ ] NO
Patient is a 41y old  Male who presents with a chief complaint of     HPI:      INTERVAL HPI/OVERNIGHT EVENTS:  The patient denies melena, hematochezia, hematemesis, nausea, vomiting, abdominal pain, constipation, diarrhea, or change in bowel movements Has not taken any pain medications today ( as per patient ) Tolerating clear liquids : crackers. Refuses to advance diet any further. States that he is fine and wants to go home. This is how it usually is after each admission ( as per patient ) . He refuses to eat any solid food from [facility] except crackers. .    MEDICATIONS  (STANDING):  dextrose (GLUCOSE) Chewable 16 Gram(s) Chew once  dextrose 40% Gel 15 Gram(s) Oral once  dextrose 5% + sodium chloride 0.45%. 1000 milliLiter(s) (75 mL/Hr) IV Continuous <Continuous>  dextrose 5%. 1000 milliLiter(s) (100 mL/Hr) IV Continuous <Continuous>  dextrose 5%. 1000 milliLiter(s) (50 mL/Hr) IV Continuous <Continuous>  dextrose 50% Injectable 25 Gram(s) IV Push once  dextrose 50% Injectable 12.5 Gram(s) IV Push once  dextrose 50% Injectable 25 Gram(s) IV Push once  glucagon  Injectable 1 milliGRAM(s) IntraMuscular once  insulin lispro (ADMELOG) Pump 1 Each SubCutaneous Continuous Pump  morphine  - Injectable 4 milliGRAM(s) IV Push once  ondansetron Injectable 4 milliGRAM(s) IV Push once  pantoprazole  Injectable 40 milliGRAM(s) IV Push two times a day  sucralfate 1 Gram(s) Oral four times a day    MEDICATIONS  (PRN):  ALPRAZolam 1 milliGRAM(s) Oral every 8 hours PRN anxiety  metoclopramide Injectable 10 milliGRAM(s) IV Push every 6 hours PRN nausea/vomiting  morphine  - Injectable 4 milliGRAM(s) IV Push every 6 hours PRN Moderate Pain (4 - 6)      FAMILY HISTORY:  Family history of diabetes mellitus (Father, Mother)    Family history of diabetes mellitus (DM) (Father, Mother, Grandparent)        Allergies    No Known Allergies    Intolerances        PMH/PSH:  DM type 1 (diabetes mellitus, type 1)    Asthma    Gastritis    Anxiety    Controlled diabetes mellitus type 1 without complications    Gastritis, presence of bleeding unspecified, unspecified chronicity, unspecified gastritis type    Uncomplicated asthma, unspecified asthma severity, unspecified whether persistent    S/P cholecystectomy    History of cholecystectomy          REVIEW OF SYSTEMS:  CONSTITUTIONAL: No fever, weight loss,  EYES: No eye pain, visual disturbances, or discharge  ENMT:  No difficulty hearing, tinnitus, vertigo; No sinus or throat pain  NECK: No pain or stiffness  BREASTS: No pain, masses, or nipple discharge  RESPIRATORY: No cough, wheezing, chills or hemoptysis; No shortness of breath  CARDIOVASCULAR: No chest pain, palpitations, dizziness, or leg swelling  GASTROINTESTINAL: See above   GENITOURINARY: No dysuria, frequency, hematuria, or incontinence  NEUROLOGICAL: No headaches, memory loss, loss of strength, numbness, or tremors  SKIN: No itching, burning, rashes, or lesions   LYMPH NODES: No enlarged glands  ENDOCRINE: No heat or cold intolerance; No hair loss  MUSCULOSKELETAL: No joint pain or swelling; No muscle, back, or extremity pain  PSYCHIATRIC: No depression, anxiety, mood swings, or difficulty sleeping  HEME/LYMPH: No easy bruising, or bleeding gums  ALLERGY AND IMMUNOLOGIC: No hives or eczema    Vital Signs Last 24 Hrs  T(C): 36.8 (08 Mar 2022 10:57), Max: 37.2 (07 Mar 2022 23:46)  T(F): 98.2 (08 Mar 2022 10:57), Max: 99 (07 Mar 2022 23:46)  HR: 73 (08 Mar 2022 10:57) (62 - 73)  BP: 123/84 (08 Mar 2022 10:57) (104/65 - 123/84)  BP(mean): --  RR: 18 (08 Mar 2022 10:57) (16 - 18)  SpO2: 95% (08 Mar 2022 10:57) (95% - 98%)    PHYSICAL EXAM:  GENERAL: NAD, well-groomed, well-developed  HEAD:  Atraumatic, Normocephalic  EYES: EOMI, PERRLA, conjunctiva and sclera clear  NECK: Supple, No JVD, Normal thyroid  NERVOUS SYSTEM:  Alert & Oriented X3, Good concentration; Motor Strength 5/5 B/L upper and lower extremities;   CHEST/LUNG: Clear to percussion bilaterally; No rales, rhonchi, wheezing, or rubs  HEART: Regular rate and rhythm; No murmurs, rubs, or gallops  ABDOMEN: Soft, Nontender, Nondistended; Bowel sounds present  EXTREMITIES:  2+ Peripheral Pulses, No clubbing, cyanosis, or edema  LYMPH: No lymphadenopathy noted  SKIN: No rashes or lesions    LAB   @ 21:57  amylase --   lipase 315                           13.1   8.59  )-----------( 285      ( 08 Mar 2022 08:00 )             38.6       CBC:   @ 08:00  WBC 8.59   Hgb 13.1   Hct 38.6   Plts 285  MCV 84.1   @ 21:57  WBC 9.31   Hgb 14.2   Hct 41.4   Plts 303  MCV 83.0      Chemistry:   @ 08:00  Na+ 141  K+ 3.8  Cl- 108  CO2 28  BUN 13  Cr 1.06      @ :57  Na+ 139  K+ 3.9  Cl- 105  CO2 27  BUN 19  Cr 1.14         Glucose, Serum: 148 mg/dL ( @ 08:00)  Glucose, Serum: 210 mg/dL ( @ 21:57)      08 Mar 2022 08:00    141    |  108    |  13     ----------------------------<  148    3.8     |  28     |  1.06   06 Mar 2022 21:57    139    |  105    |  19     ----------------------------<  210    3.9     |  27     |  1.14     Ca    8.3        08 Mar 2022 08:00  Ca    9.1        06 Mar 2022 21:57  Phos  2.5       08 Mar 2022 08:00  Mg     1.9       08 Mar 2022 08:00    TPro  8.1    /  Alb  4.0    /  TBili  0.5    /  DBili  x      /  AST  27     /  ALT  29     /  AlkPhos  119    06 Mar 2022 21:57          Urinalysis Basic - ( 07 Mar 2022 02:07 )    Color: Yellow / Appearance: Clear / S.005 / pH: x  Gluc: x / Ketone: Large  / Bili: Negative / Urobili: Negative mg/dL   Blood: x / Protein: Negative mg/dL / Nitrite: Negative   Leuk Esterase: Negative / RBC: x / WBC x   Sq Epi: x / Non Sq Epi: x / Bacteria: x        CAPILLARY BLOOD GLUCOSE      POCT Blood Glucose.: 95 mg/dL (08 Mar 2022 15:27)  POCT Blood Glucose.: 419 mg/dL (08 Mar 2022 11:18)  POCT Blood Glucose.: 414 mg/dL (08 Mar 2022 11:17)  POCT Blood Glucose.: 147 mg/dL (08 Mar 2022 07:35)  POCT Blood Glucose.: 267 mg/dL (07 Mar 2022 21:37)          RADIOLOGY & ADDITIONAL TESTS:    Imaging Personally Reviewed:  [ ] YES  [ ] NO    Consultant(s) Notes Reviewed:  [ ] YES  [ ] NO    Care Discussed with Consultants/Other Providers [ ] YES  [ ] NO

## 2022-03-08 NOTE — DISCHARGE NOTE NURSING/CASE MANAGEMENT/SOCIAL WORK - PATIENT PORTAL LINK FT
You can access the FollowMyHealth Patient Portal offered by [facility] by registering at the following website: [email or web address]. By joining [employer]’s FollowMyHealth portal, you will also be able to view your health information using other applications (apps) compatible with our system.

## 2022-03-09 LAB — C PEPTIDE SERPL-MCNC: <0.1 NG/ML — LOW (ref 1.1–4.4)

## 2022-03-11 DIAGNOSIS — Z83.3 FAMILY HISTORY OF DIABETES MELLITUS: ICD-10-CM

## 2022-03-11 DIAGNOSIS — K31.84 GASTROPARESIS: ICD-10-CM

## 2022-03-11 DIAGNOSIS — R10.9 UNSPECIFIED ABDOMINAL PAIN: ICD-10-CM

## 2022-03-11 DIAGNOSIS — E10.43 TYPE 1 DIABETES MELLITUS WITH DIABETIC AUTONOMIC (POLY)NEUROPATHY: ICD-10-CM

## 2022-03-11 DIAGNOSIS — Z96.41 PRESENCE OF INSULIN PUMP (EXTERNAL) (INTERNAL): ICD-10-CM

## 2022-03-11 DIAGNOSIS — Z90.49 ACQUIRED ABSENCE OF OTHER SPECIFIED PARTS OF DIGESTIVE TRACT: ICD-10-CM

## 2022-03-11 DIAGNOSIS — F41.9 ANXIETY DISORDER, UNSPECIFIED: ICD-10-CM

## 2022-03-11 DIAGNOSIS — J45.909 UNSPECIFIED ASTHMA, UNCOMPLICATED: ICD-10-CM

## 2022-04-10 ENCOUNTER — INPATIENT (INPATIENT)
Facility: HOSPITAL | Age: 42
LOS: 0 days | Discharge: LEFT BEFORE DISCHARGE | End: 2022-04-11
Attending: STUDENT IN AN ORGANIZED HEALTH CARE EDUCATION/TRAINING PROGRAM | Admitting: STUDENT IN AN ORGANIZED HEALTH CARE EDUCATION/TRAINING PROGRAM
Payer: MEDICAID

## 2022-04-10 VITALS
WEIGHT: 171.96 LBS | HEIGHT: 65 IN | TEMPERATURE: 98 F | RESPIRATION RATE: 18 BRPM | OXYGEN SATURATION: 100 % | DIASTOLIC BLOOD PRESSURE: 92 MMHG | HEART RATE: 55 BPM | SYSTOLIC BLOOD PRESSURE: 164 MMHG

## 2022-04-10 DIAGNOSIS — J45.909 UNSPECIFIED ASTHMA, UNCOMPLICATED: ICD-10-CM

## 2022-04-10 DIAGNOSIS — R11.2 NAUSEA WITH VOMITING, UNSPECIFIED: ICD-10-CM

## 2022-04-10 DIAGNOSIS — E11.9 TYPE 2 DIABETES MELLITUS WITHOUT COMPLICATIONS: ICD-10-CM

## 2022-04-10 DIAGNOSIS — Z90.49 ACQUIRED ABSENCE OF OTHER SPECIFIED PARTS OF DIGESTIVE TRACT: Chronic | ICD-10-CM

## 2022-04-10 DIAGNOSIS — F41.9 ANXIETY DISORDER, UNSPECIFIED: ICD-10-CM

## 2022-04-10 LAB
ALBUMIN SERPL ELPH-MCNC: 3.9 G/DL — SIGNIFICANT CHANGE UP (ref 3.3–5)
ALP SERPL-CCNC: 92 U/L — SIGNIFICANT CHANGE UP (ref 40–120)
ALT FLD-CCNC: 44 U/L — SIGNIFICANT CHANGE UP (ref 12–78)
ANION GAP SERPL CALC-SCNC: 5 MMOL/L — SIGNIFICANT CHANGE UP (ref 5–17)
AST SERPL-CCNC: 47 U/L — HIGH (ref 15–37)
BASOPHILS # BLD AUTO: 0.04 K/UL — SIGNIFICANT CHANGE UP (ref 0–0.2)
BASOPHILS NFR BLD AUTO: 0.4 % — SIGNIFICANT CHANGE UP (ref 0–2)
BILIRUB SERPL-MCNC: 0.4 MG/DL — SIGNIFICANT CHANGE UP (ref 0.2–1.2)
BUN SERPL-MCNC: 17 MG/DL — SIGNIFICANT CHANGE UP (ref 7–23)
CALCIUM SERPL-MCNC: 9.1 MG/DL — SIGNIFICANT CHANGE UP (ref 8.5–10.1)
CHLORIDE SERPL-SCNC: 106 MMOL/L — SIGNIFICANT CHANGE UP (ref 96–108)
CO2 SERPL-SCNC: 29 MMOL/L — SIGNIFICANT CHANGE UP (ref 22–31)
CREAT SERPL-MCNC: 0.96 MG/DL — SIGNIFICANT CHANGE UP (ref 0.5–1.3)
EGFR: 102 ML/MIN/1.73M2 — SIGNIFICANT CHANGE UP
EOSINOPHIL # BLD AUTO: 0.08 K/UL — SIGNIFICANT CHANGE UP (ref 0–0.5)
EOSINOPHIL NFR BLD AUTO: 0.8 % — SIGNIFICANT CHANGE UP (ref 0–6)
GLUCOSE BLDC GLUCOMTR-MCNC: 106 MG/DL — HIGH (ref 70–99)
GLUCOSE SERPL-MCNC: 116 MG/DL — HIGH (ref 70–99)
HCT VFR BLD CALC: 40.7 % — SIGNIFICANT CHANGE UP (ref 39–50)
HGB BLD-MCNC: 13.9 G/DL — SIGNIFICANT CHANGE UP (ref 13–17)
IMM GRANULOCYTES NFR BLD AUTO: 0.3 % — SIGNIFICANT CHANGE UP (ref 0–1.5)
LIDOCAIN IGE QN: 276 U/L — SIGNIFICANT CHANGE UP (ref 73–393)
LYMPHOCYTES # BLD AUTO: 1.48 K/UL — SIGNIFICANT CHANGE UP (ref 1–3.3)
LYMPHOCYTES # BLD AUTO: 15.5 % — SIGNIFICANT CHANGE UP (ref 13–44)
MCHC RBC-ENTMCNC: 28.5 PG — SIGNIFICANT CHANGE UP (ref 27–34)
MCHC RBC-ENTMCNC: 34.2 G/DL — SIGNIFICANT CHANGE UP (ref 32–36)
MCV RBC AUTO: 83.6 FL — SIGNIFICANT CHANGE UP (ref 80–100)
MONOCYTES # BLD AUTO: 0.24 K/UL — SIGNIFICANT CHANGE UP (ref 0–0.9)
MONOCYTES NFR BLD AUTO: 2.5 % — SIGNIFICANT CHANGE UP (ref 2–14)
NEUTROPHILS # BLD AUTO: 7.69 K/UL — HIGH (ref 1.8–7.4)
NEUTROPHILS NFR BLD AUTO: 80.5 % — HIGH (ref 43–77)
NRBC # BLD: 0 /100 WBCS — SIGNIFICANT CHANGE UP (ref 0–0)
PLATELET # BLD AUTO: 272 K/UL — SIGNIFICANT CHANGE UP (ref 150–400)
POTASSIUM SERPL-MCNC: 3.5 MMOL/L — SIGNIFICANT CHANGE UP (ref 3.5–5.3)
POTASSIUM SERPL-SCNC: 3.5 MMOL/L — SIGNIFICANT CHANGE UP (ref 3.5–5.3)
PROT SERPL-MCNC: 7.4 GM/DL — SIGNIFICANT CHANGE UP (ref 6–8.3)
RBC # BLD: 4.87 M/UL — SIGNIFICANT CHANGE UP (ref 4.2–5.8)
RBC # FLD: 13.1 % — SIGNIFICANT CHANGE UP (ref 10.3–14.5)
SODIUM SERPL-SCNC: 140 MMOL/L — SIGNIFICANT CHANGE UP (ref 135–145)
WBC # BLD: 9.56 K/UL — SIGNIFICANT CHANGE UP (ref 3.8–10.5)
WBC # FLD AUTO: 9.56 K/UL — SIGNIFICANT CHANGE UP (ref 3.8–10.5)

## 2022-04-10 PROCEDURE — 99285 EMERGENCY DEPT VISIT HI MDM: CPT

## 2022-04-10 PROCEDURE — 99222 1ST HOSP IP/OBS MODERATE 55: CPT

## 2022-04-10 PROCEDURE — 74177 CT ABD & PELVIS W/CONTRAST: CPT | Mod: 26,MA

## 2022-04-10 RX ORDER — DEXTROSE 50 % IN WATER 50 %
15 SYRINGE (ML) INTRAVENOUS ONCE
Refills: 0 | Status: DISCONTINUED | OUTPATIENT
Start: 2022-04-10 | End: 2022-04-11

## 2022-04-10 RX ORDER — INSULIN LISPRO 100/ML
VIAL (ML) SUBCUTANEOUS
Refills: 0 | Status: DISCONTINUED | OUTPATIENT
Start: 2022-04-10 | End: 2022-04-11

## 2022-04-10 RX ORDER — MORPHINE SULFATE 50 MG/1
2 CAPSULE, EXTENDED RELEASE ORAL EVERY 6 HOURS
Refills: 0 | Status: DISCONTINUED | OUTPATIENT
Start: 2022-04-10 | End: 2022-04-11

## 2022-04-10 RX ORDER — SODIUM CHLORIDE 9 MG/ML
1000 INJECTION, SOLUTION INTRAVENOUS
Refills: 0 | Status: DISCONTINUED | OUTPATIENT
Start: 2022-04-10 | End: 2022-04-11

## 2022-04-10 RX ORDER — ONDANSETRON 8 MG/1
4 TABLET, FILM COATED ORAL ONCE
Refills: 0 | Status: COMPLETED | OUTPATIENT
Start: 2022-04-10 | End: 2022-04-10

## 2022-04-10 RX ORDER — DEXTROSE 50 % IN WATER 50 %
25 SYRINGE (ML) INTRAVENOUS ONCE
Refills: 0 | Status: DISCONTINUED | OUTPATIENT
Start: 2022-04-10 | End: 2022-04-11

## 2022-04-10 RX ORDER — PANTOPRAZOLE SODIUM 20 MG/1
40 TABLET, DELAYED RELEASE ORAL
Refills: 0 | Status: DISCONTINUED | OUTPATIENT
Start: 2022-04-10 | End: 2022-04-11

## 2022-04-10 RX ORDER — DEXTROSE 50 % IN WATER 50 %
12.5 SYRINGE (ML) INTRAVENOUS ONCE
Refills: 0 | Status: DISCONTINUED | OUTPATIENT
Start: 2022-04-10 | End: 2022-04-11

## 2022-04-10 RX ORDER — SUCRALFATE 1 G
1 TABLET ORAL
Refills: 0 | Status: DISCONTINUED | OUTPATIENT
Start: 2022-04-10 | End: 2022-04-11

## 2022-04-10 RX ORDER — ONDANSETRON 8 MG/1
4 TABLET, FILM COATED ORAL EVERY 6 HOURS
Refills: 0 | Status: DISCONTINUED | OUTPATIENT
Start: 2022-04-10 | End: 2022-04-11

## 2022-04-10 RX ORDER — SODIUM CHLORIDE 9 MG/ML
1000 INJECTION INTRAMUSCULAR; INTRAVENOUS; SUBCUTANEOUS ONCE
Refills: 0 | Status: COMPLETED | OUTPATIENT
Start: 2022-04-10 | End: 2022-04-10

## 2022-04-10 RX ORDER — HALOPERIDOL DECANOATE 100 MG/ML
5 INJECTION INTRAMUSCULAR ONCE
Refills: 0 | Status: COMPLETED | OUTPATIENT
Start: 2022-04-10 | End: 2022-04-10

## 2022-04-10 RX ORDER — SODIUM CHLORIDE 9 MG/ML
1000 INJECTION, SOLUTION INTRAVENOUS
Refills: 0 | Status: COMPLETED | OUTPATIENT
Start: 2022-04-10 | End: 2022-04-11

## 2022-04-10 RX ORDER — GLUCAGON INJECTION, SOLUTION 0.5 MG/.1ML
1 INJECTION, SOLUTION SUBCUTANEOUS ONCE
Refills: 0 | Status: DISCONTINUED | OUTPATIENT
Start: 2022-04-10 | End: 2022-04-11

## 2022-04-10 RX ORDER — ALPRAZOLAM 0.25 MG
1 TABLET ORAL THREE TIMES A DAY
Refills: 0 | Status: DISCONTINUED | OUTPATIENT
Start: 2022-04-10 | End: 2022-04-11

## 2022-04-10 RX ADMIN — HALOPERIDOL DECANOATE 5 MILLIGRAM(S): 100 INJECTION INTRAMUSCULAR at 12:29

## 2022-04-10 RX ADMIN — ONDANSETRON 4 MILLIGRAM(S): 8 TABLET, FILM COATED ORAL at 12:29

## 2022-04-10 RX ADMIN — SODIUM CHLORIDE 1000 MILLILITER(S): 9 INJECTION INTRAMUSCULAR; INTRAVENOUS; SUBCUTANEOUS at 12:30

## 2022-04-10 NOTE — H&P ADULT - HISTORY OF PRESENT ILLNESS
Patient is a 41M with a PMH of DM (on diabetic pump), gastroparesis, chronic anxiety, asthma who presents to the ED for abdominal pain.  Patient states that he developed severe abdominal pain and vomiting this morning.  Has since had poor PO intake due to N/V.  Has had multiple presentations to the hospital for similar complaints.  Still using insulin pump.  Vitals stable, labs show elevated troponin levels.  Will admit to tele Patient is a 41M with a PMH of DM (on diabetic pump), gastroparesis, chronic anxiety, asthma who presents to the ED for abdominal pain.  Patient states that he developed severe abdominal pain and vomiting this morning.  Has since had poor PO intake due to N/V.  Has had multiple presentations to the hospital for similar complaints.  Still using insulin pump.  Vitals stable, labs benign.  Will admit to med surg.

## 2022-04-10 NOTE — ED ADULT NURSE NOTE - OBJECTIVE STATEMENT
PT PRESENTED TO ER, AOX4 AND AMBULATORY, WITH COMPLAINTS OF ABDOMINAL PAIN. AS PER PT, HE GOT OFF OF WORK THIS MORNING AROUND 7AM AND BEGAN HAVING EPISODES OF N/V SINCE. PT STATES HE HAS A HX OF GASTRITIS. PT REPORTS CRAMPING/SHARP PAIN. ON ASSESSMENT, PT HAS HAD 1X EPISODE OF VOMITING. PT REPORTS HX OF DM, ASTHMA.

## 2022-04-10 NOTE — H&P ADULT - NSHPPHYSICALEXAM_GEN_ALL_CORE
Physical exam:  General: patient in no acute distress, resting comfortably  Head:  Atraumatic, Normocephalic  Eyes: EOMI, PERRLA, clear sclera  Neck: Supple, thyroid nontender, non enlarged  Cardio: S1/S2 +ve, regular rate and rhythm, no M/G/R  Resp: clear to ausculation bilaterally, no rales or wheezes  GI: abdomen soft, tenderness to mild palpation, non distended, BS +ve x 4  Ext: no significant pedal edema  Neuro: CN 2-12 intact, no significant motor or sensory deficits.  Skin: No rashes or lesions

## 2022-04-10 NOTE — ED PROVIDER NOTE - CLINICAL SUMMARY MEDICAL DECISION MAKING FREE TEXT BOX
Ddx: Cannabinoid hyperemesis/ no abdominal tenderness or guarding to suggest surgical abdomen.   Plan: Cbc, cmp, lipase, gi cocktail, haldol, reassess

## 2022-04-10 NOTE — ED PROVIDER NOTE - OBJECTIVE STATEMENT
Pt is a 40 yo gentleman with a pmhx of IDDM who presents to the ED with abdominal pain and vomiting. Started this morning. Has had this in the past, and has been frequent marijuana smoker. Says he stopped about 2 weeks ago, and it is not due to the marijuana. No chest pain, no sob. No fever. Hx of cholecystectomy in the past. Pain is diffuse.

## 2022-04-10 NOTE — ED ADULT TRIAGE NOTE - CHIEF COMPLAINT QUOTE
pt presents to the ED with c/o nausea and vomiting BGL 96 in the field, on an insulin pump, LUQ abdominal pain, 15 torodol 4mg of zofran

## 2022-04-10 NOTE — H&P ADULT - NSHPLABSRESULTS_GEN_ALL_CORE
Recent Vitals  T(C): 36.9 (04-10-22 @ 16:08), Max: 36.9 (04-10-22 @ 16:08)  HR: 55 (04-10-22 @ 16:08) (55 - 55)  BP: 149/93 (04-10-22 @ 16:08) (149/93 - 164/92)  RR: 19 (04-10-22 @ 16:08) (18 - 19)  SpO2: 98% (04-10-22 @ 16:08) (98% - 100%)                        13.9   9.56  )-----------( 272      ( 10 Apr 2022 13:00 )             40.7     04-10    140  |  106  |  17  ----------------------------<  116<H>  3.5   |  29  |  0.96    Ca    9.1      10 Apr 2022 13:00    TPro  7.4  /  Alb  3.9  /  TBili  0.4  /  DBili  x   /  AST  47<H>  /  ALT  44  /  AlkPhos  92  04-10      LIVER FUNCTIONS - ( 10 Apr 2022 13:00 )  Alb: 3.9 g/dL / Pro: 7.4 gm/dL / ALK PHOS: 92 U/L / ALT: 44 U/L / AST: 47 U/L / GGT: x               Home Medications:  Xanax 1 mg oral tablet: 1 tab(s) orally 3 times a day, As Needed (21 Dec 2021 18:28)

## 2022-04-11 VITALS
RESPIRATION RATE: 18 BRPM | SYSTOLIC BLOOD PRESSURE: 107 MMHG | OXYGEN SATURATION: 97 % | HEART RATE: 62 BPM | DIASTOLIC BLOOD PRESSURE: 60 MMHG | TEMPERATURE: 98 F

## 2022-04-11 LAB
FLUAV AG NPH QL: SIGNIFICANT CHANGE UP
FLUBV AG NPH QL: SIGNIFICANT CHANGE UP
GLUCOSE BLDC GLUCOMTR-MCNC: 100 MG/DL — HIGH (ref 70–99)
GLUCOSE BLDC GLUCOMTR-MCNC: 107 MG/DL — HIGH (ref 70–99)
GLUCOSE BLDC GLUCOMTR-MCNC: 146 MG/DL — HIGH (ref 70–99)
GLUCOSE BLDC GLUCOMTR-MCNC: 160 MG/DL — HIGH (ref 70–99)
SARS-COV-2 RNA SPEC QL NAA+PROBE: SIGNIFICANT CHANGE UP

## 2022-04-11 RX ORDER — ALBUTEROL 90 UG/1
2 AEROSOL, METERED ORAL EVERY 6 HOURS
Refills: 0 | Status: DISCONTINUED | OUTPATIENT
Start: 2022-04-11 | End: 2022-04-11

## 2022-04-11 RX ADMIN — MORPHINE SULFATE 2 MILLIGRAM(S): 50 CAPSULE, EXTENDED RELEASE ORAL at 16:31

## 2022-04-11 RX ADMIN — ONDANSETRON 4 MILLIGRAM(S): 8 TABLET, FILM COATED ORAL at 01:01

## 2022-04-11 RX ADMIN — ALBUTEROL 2 PUFF(S): 90 AEROSOL, METERED ORAL at 06:23

## 2022-04-11 RX ADMIN — Medication 1 MILLIGRAM(S): at 03:50

## 2022-04-11 RX ADMIN — PANTOPRAZOLE SODIUM 40 MILLIGRAM(S): 20 TABLET, DELAYED RELEASE ORAL at 01:01

## 2022-04-11 RX ADMIN — SODIUM CHLORIDE 125 MILLILITER(S): 9 INJECTION, SOLUTION INTRAVENOUS at 03:50

## 2022-04-11 RX ADMIN — SODIUM CHLORIDE 125 MILLILITER(S): 9 INJECTION, SOLUTION INTRAVENOUS at 01:00

## 2022-04-11 RX ADMIN — MORPHINE SULFATE 2 MILLIGRAM(S): 50 CAPSULE, EXTENDED RELEASE ORAL at 01:02

## 2022-04-11 RX ADMIN — PANTOPRAZOLE SODIUM 40 MILLIGRAM(S): 20 TABLET, DELAYED RELEASE ORAL at 06:21

## 2022-04-11 RX ADMIN — MORPHINE SULFATE 2 MILLIGRAM(S): 50 CAPSULE, EXTENDED RELEASE ORAL at 16:55

## 2022-04-11 NOTE — CONSULT NOTE ADULT - ASSESSMENT
HPI:  Patient is a 41M with a PMH of DM (on diabetic pump), gastroparesis, chronic anxiety, asthma who presents to the ED for abdominal pain.  Patient states that he developed severe abdominal pain and vomiting this morning.  Has since had poor PO intake due to N/V.  Has had multiple presentations to the hospital for similar complaints.  Still using insulin pump.  Vitals stable, labs benign.  Will admit to med surg.   (10 Apr 2022 23:49)      PAST MEDICAL & SURGICAL HISTORY:  DM type 1 (diabetes mellitus, type 1)    [id number]  ------ As Above -------- Patient is a poor historian ( not communicative )  Patient states that he was well until Sunday morning when he developed N/V and abdominal pain c/w his gastroparesis. He thinks that missing meals and the stress of doing doubles at work caused him the attack. States that his HGB A1C is good.   He has been having gastroparesis for a while. Last time he took marijuana was two weeks ago.   Feel much better at the present time       Patient seen  03/07/22======  · Subjective and Objective:   HPI:    · Chief Complaint: The patient is a 41y Male complaining of abdominal pain.  · HPI Objective Statement: 42 yo M with LUQ abd pain, n/v/d, started 4:30 pm today.  No inciting event.  No other additional symptoms.  Feels similar to prior episodes of gastritis.   	ROS: negative for fever, cough, headache, chest pain, shortness of breath, rash, paresthesia, and weakness--all other systems reviewed are negative.   PMH: DM (on diabetic pump), gastroparesis, chronic anxiety, asthma; Meds: See EMR for list; SH: Denies smoking/drinking/drug use  ---- As Above -------  Consult called fro N/V and abdominal pain. Patient has a history of gastritis / gastroparesis for MANY years. He now presents with symptoms c/w it. This attack was " caused by physical stress. "   The attacks are usually caused by emotional/ physical stress or certain foods. He takes Reglan on a PRN basis. Denies NSAIDs. Patient is on a high fiber diet " which prevents attacks. " He has not seen a Gastroenterologist for a long time because he " is doing so well. His last attack was 4 months ago. " Last EGD was 2 years ago. Does not feel marijuana gave him this attack. had a colonoscopy 2 years ago for ??????  Patient feeling better this afternoon. Wants to try clear liquid diet    N/V, abdominal pain - c/w his Gastroparesis - Patient does not want any GI investigative work - only wants supportive care. 1) PPI  2) Slowly advance diet as tolerated 3) Reglan IV if he does not continue to improve   ---- Discussed with patient

## 2022-04-11 NOTE — CONSULT NOTE ADULT - SUBJECTIVE AND OBJECTIVE BOX
HPI:  Patient is a 41M with a PMH of DM (on diabetic pump), gastroparesis, chronic anxiety, asthma who presents to the ED for abdominal pain.  Patient states that he developed severe abdominal pain and vomiting this morning.  Has since had poor PO intake due to N/V.  Has had multiple presentations to the hospital for similar complaints.  Still using insulin pump.  Vitals stable, labs benign.  Will admit to med surg.   (10 Apr 2022 23:49)      PAST MEDICAL & SURGICAL HISTORY:  DM type 1 (diabetes mellitus, type 1)  ------ As Above --------      Asthma    Gastritis    Anxiety    Controlled diabetes mellitus type 1 without complications    Gastritis, presence of bleeding unspecified, unspecified chronicity, unspecified gastritis type    Uncomplicated asthma, unspecified asthma severity, unspecified whether persistent    S/P cholecystectomy    History of cholecystectomy        MEDICATIONS  (STANDING):  dextrose 5%. 1000 milliLiter(s) (50 mL/Hr) IV Continuous <Continuous>  dextrose 5%. 1000 milliLiter(s) (100 mL/Hr) IV Continuous <Continuous>  dextrose 50% Injectable 25 Gram(s) IV Push once  dextrose 50% Injectable 12.5 Gram(s) IV Push once  dextrose 50% Injectable 25 Gram(s) IV Push once  glucagon  Injectable 1 milliGRAM(s) IntraMuscular once  insulin lispro (ADMELOG) corrective regimen sliding scale   SubCutaneous three times a day before meals  pantoprazole  Injectable 40 milliGRAM(s) IV Push two times a day  sucralfate 1 Gram(s) Oral four times a day    MEDICATIONS  (PRN):  ALBUTerol    90 MICROgram(s) HFA Inhaler 2 Puff(s) Inhalation every 6 hours PRN Shortness of Breath and/or Wheezing  ALPRAZolam 1 milliGRAM(s) Oral three times a day PRN anxiety  dextrose Oral Gel 15 Gram(s) Oral once PRN Blood Glucose LESS THAN 70 milliGRAM(s)/deciliter  morphine  - Injectable 2 milliGRAM(s) IV Push every 6 hours PRN Severe Pain (7 - 10)  ondansetron Injectable 4 milliGRAM(s) IV Push every 6 hours PRN Nausea and/or Vomiting      Allergies    No Known Allergies    Intolerances        FAMILY HISTORY:  Family history of diabetes mellitus (Father, Mother)    Family history of diabetes mellitus (DM) (Father, Mother, Grandparent)        REVIEW OF SYSTEMS:    CONSTITUTIONAL: No fever, weight loss, or fatigue  EYES: No eye pain, visual disturbances, or discharge  ENMT:  No difficulty hearing, tinnitus, vertigo; No sinus or throat pain  NECK: No pain or stiffness  BREASTS: No pain, masses, or nipple discharge  RESPIRATORY: No cough, wheezing, chills or hemoptysis; No shortness of breath  CARDIOVASCULAR: No chest pain, palpitations, dizziness, or leg swelling  GASTROINTESTINAL: No abdominal or epigastric pain. No nausea, vomiting, or hematemesis; No diarrhea or constipation. No melena or hematochezia.  GENITOURINARY: No dysuria, frequency, hematuria, or incontinence  NEUROLOGICAL: No headaches, memory loss, loss of strength, numbness, or tremors  SKIN: No itching, burning, rashes, or lesions   LYMPH NODES: No enlarged glands  ENDOCRINE: No heat or cold intolerance; No hair loss  MUSCULOSKELETAL: No joint pain or swelling; No muscle, back, or extremity pain  PSYCHIATRIC: No depression, anxiety, mood swings, or difficulty sleeping  HEME/LYMPH: No easy bruising, or bleeding gums  ALLERGY AND IMMUNOLOGIC: No hives or eczema          SOCIAL HISTORY:    FAMILY HISTORY:  Family history of diabetes mellitus (Father, Mother)    Family history of diabetes mellitus (DM) (Father, Mother, Grandparent)        Vital Signs Last 24 Hrs  T(C): 37 (11 Apr 2022 12:10), Max: 37.2 (11 Apr 2022 01:10)  T(F): 98.6 (11 Apr 2022 12:10), Max: 99 (11 Apr 2022 01:10)  HR: 59 (11 Apr 2022 12:10) (55 - 63)  BP: 115/74 (11 Apr 2022 12:10) (115/74 - 149/93)  BP(mean): --  RR: 18 (11 Apr 2022 12:10) (18 - 19)  SpO2: 96% (11 Apr 2022 12:10) (96% - 98%)    PHYSICAL EXAM:    GENERAL: NAD, well-groomed, well-developed  HEAD:  Atraumatic, Normocephalic  EYES: EOMI, PERRLA, conjunctiva and sclera clear  ENMT: No tonsillar erythema, exudates, or enlargement; Moist mucous membranes, Good dentition, No lesions  NECK: Supple, No JVD, Normal thyroid  NERVOUS SYSTEM:  Alert & Oriented X3, Good concentration; Motor Strength 5/5 B/L upper and lower extremities; DTRs 2+ intact and symmetric  CHEST/LUNG: Clear to percussion bilaterally; No rales, rhonchi, wheezing, or rubs  HEART: Regular rate and rhythm; No murmurs, rubs, or gallops  ABDOMEN: Soft, Nontender, Nondistended; Bowel sounds present  EXTREMITIES:  2+ Peripheral Pulses, No clubbing, cyanosis, or edema  LYMPH: No lymphadenopathy noted   RECTAL: No masses, prostate normal size and smooth, Guaiaci negative   BREAST: No palpable masses, skin no lesions no retractions, no discharges. adnexal no palpable masses noted   GYN: uterus normal size, adnexal, no palpable masses, no CMT, no uterine discharge   SKIN: No rashes or lesions    LABS:                        13.9   9.56  )-----------( 272      ( 10 Apr 2022 13:00 )             40.7       CBC:  04-10 @ 13:00  WBC  9.56  HGB 13.9  HCT 40.7 Plate 272  MCV 83.6           10 Apr 2022 13:00    140    |  106    |  17     ----------------------------<  116    3.5     |  29     |  0.96     Ca    9.1        10 Apr 2022 13:00    TPro  7.4    /  Alb  3.9    /  TBili  0.4    /  DBili  x      /  AST  47     /  ALT  44     /  AlkPhos  92     10 Apr 2022 13:00            RADIOLOGY & ADDITIONAL STUDIES: HPI:  Patient is a 41M with a PMH of DM (on diabetic pump), gastroparesis, chronic anxiety, asthma who presents to the ED for abdominal pain.  Patient states that he developed severe abdominal pain and vomiting this morning.  Has since had poor PO intake due to N/V.  Has had multiple presentations to the hospital for similar complaints.  Still using insulin pump.  Vitals stable, labs benign.  Will admit to med surg.   (10 Apr 2022 23:49)      PAST MEDICAL & SURGICAL HISTORY:  DM type 1 (diabetes mellitus, type 1)    [id number]  ------ As Above -------- Patient is a poor historian ( not communicative )  Patient states that he was well until Sunday morning when he developed N/V and abdominal pain c/w his gastroparesis. He thinks that missing meals and the stress of doing doubles at work caused him the attack. States that his HGB A1C is good.   He has been having gastroparesis for a while. Last time he took marijuana was two weeks ago.   Feel much better at the present time       Patient seen   · Subjective and Objective:   HPI:    · Chief Complaint: The patient is a 41y Male complaining of abdominal pain.  · HPI Objective Statement: 40 yo M with LUQ abd pain, n/v/d, started 4:30 pm today.  No inciting event.  No other additional symptoms.  Feels similar to prior episodes of gastritis.   	ROS: negative for fever, cough, headache, chest pain, shortness of breath, rash, paresthesia, and weakness--all other systems reviewed are negative.   PMH: DM (on diabetic pump), gastroparesis, chronic anxiety, asthma; Meds: See EMR for list; SH: Denies smoking/drinking/drug use  ---- As Above -------  Consult called fro N/V and abdominal pain. Patient has a history of gastritis / gastroparesis for MANY years. He now presents with symptoms c/w it. This attack was " caused by physical stress. "   The attacks are usually caused by emotional/ physical stress or certain foods. He takes Reglan on a PRN basis. Denies NSAIDs. Patient is on a high fiber diet " which prevents attacks. " He has not seen a Gastroenterologist for a long time because he " is doing so well. His last attack was 4 months ago. " Last EGD was 2 years ago. Does not feel marijuana gave him this attack. had a colonoscopy 2 years ago for ??????  Patient feeling better this afternoon. Wants to try clear liquid diet        Asthma    Gastritis    Anxiety    Controlled diabetes mellitus type 1 without complications    Gastritis, presence of bleeding unspecified, unspecified chronicity, unspecified gastritis type    Uncomplicated asthma, unspecified asthma severity, unspecified whether persistent    S/P cholecystectomy    History of cholecystectomy        MEDICATIONS  (STANDING):  dextrose 5%. 1000 milliLiter(s) (50 mL/Hr) IV Continuous <Continuous>  dextrose 5%. 1000 milliLiter(s) (100 mL/Hr) IV Continuous <Continuous>  dextrose 50% Injectable 25 Gram(s) IV Push once  dextrose 50% Injectable 12.5 Gram(s) IV Push once  dextrose 50% Injectable 25 Gram(s) IV Push once  glucagon  Injectable 1 milliGRAM(s) IntraMuscular once  insulin lispro (ADMELOG) corrective regimen sliding scale   SubCutaneous three times a day before meals  pantoprazole  Injectable 40 milliGRAM(s) IV Push two times a day  sucralfate 1 Gram(s) Oral four times a day    MEDICATIONS  (PRN):  ALBUTerol    90 MICROgram(s) HFA Inhaler 2 Puff(s) Inhalation every 6 hours PRN Shortness of Breath and/or Wheezing  ALPRAZolam 1 milliGRAM(s) Oral three times a day PRN anxiety  dextrose Oral Gel 15 Gram(s) Oral once PRN Blood Glucose LESS THAN 70 milliGRAM(s)/deciliter  morphine  - Injectable 2 milliGRAM(s) IV Push every 6 hours PRN Severe Pain (7 - 10)  ondansetron Injectable 4 milliGRAM(s) IV Push every 6 hours PRN Nausea and/or Vomiting      Allergies    No Known Allergies    Intolerances        FAMILY HISTORY:  Family history of diabetes mellitus (Father, Mother)    Family history of diabetes mellitus (DM) (Father, Mother, Grandparent)        REVIEW OF SYSTEMS:    CONSTITUTIONAL: No fever, weight loss, or fatigue  EYES: No eye pain, visual disturbances, or discharge  ENMT:  No difficulty hearing, tinnitus, vertigo; No sinus or throat pain  NECK: No pain or stiffness  BREASTS: No pain, masses, or nipple discharge  RESPIRATORY: No cough, wheezing, chills or hemoptysis; No shortness of breath  CARDIOVASCULAR: No chest pain, palpitations, dizziness, or leg swelling  GASTROINTESTINAL: No abdominal or epigastric pain. No nausea, vomiting, or hematemesis; No diarrhea or constipation. No melena or hematochezia.  GENITOURINARY: No dysuria, frequency, hematuria, or incontinence  NEUROLOGICAL: No headaches, memory loss, loss of strength, numbness, or tremors  SKIN: No itching, burning, rashes, or lesions   LYMPH NODES: No enlarged glands  ENDOCRINE: No heat or cold intolerance; No hair loss  MUSCULOSKELETAL: No joint pain or swelling; No muscle, back, or extremity pain  PSYCHIATRIC: No depression, anxiety, mood swings, or difficulty sleeping  HEME/LYMPH: No easy bruising, or bleeding gums  ALLERGY AND IMMUNOLOGIC: No hives or eczema          SOCIAL HISTORY:    FAMILY HISTORY:  Family history of diabetes mellitus (Father, Mother)    Family history of diabetes mellitus (DM) (Father, Mother, Grandparent)        Vital Signs Last 24 Hrs  T(C): 37 (11 Apr 2022 12:10), Max: 37.2 (11 Apr 2022 01:10)  T(F): 98.6 (11 Apr 2022 12:10), Max: 99 (11 Apr 2022 01:10)  HR: 59 (11 Apr 2022 12:10) (55 - 63)  BP: 115/74 (11 Apr 2022 12:10) (115/74 - 149/93)  BP(mean): --  RR: 18 (11 Apr 2022 12:10) (18 - 19)  SpO2: 96% (11 Apr 2022 12:10) (96% - 98%)    PHYSICAL EXAM:    GENERAL: NAD, well-groomed, well-developed  HEAD:  Atraumatic, Normocephalic  EYES: EOMI, PERRLA, conjunctiva and sclera clear  ENMT: No tonsillar erythema, exudates, or enlargement; Moist mucous membranes, Good dentition, No lesions  NECK: Supple, No JVD, Normal thyroid  NERVOUS SYSTEM:  Alert & Oriented X3, Good concentration; Motor Strength 5/5 B/L upper and lower extremities; DTRs 2+ intact and symmetric  CHEST/LUNG: Clear to percussion bilaterally; No rales, rhonchi, wheezing, or rubs  HEART: Regular rate and rhythm; No murmurs, rubs, or gallops  ABDOMEN: Soft, Nontender, Nondistended; Bowel sounds present  EXTREMITIES:  2+ Peripheral Pulses, No clubbing, cyanosis, or edema  LYMPH: No lymphadenopathy noted   RECTAL: No masses, prostate normal size and smooth, Guaiaci negative   BREAST: No palpable masses, skin no lesions no retractions, no discharges. adnexal no palpable masses noted   GYN: uterus normal size, adnexal, no palpable masses, no CMT, no uterine discharge   SKIN: No rashes or lesions    LABS:                        13.9   9.56  )-----------( 272      ( 10 Apr 2022 13:00 )             40.7       CBC:  04-10 @ 13:00  WBC  9.56  HGB 13.9  HCT 40.7 Plate 272  MCV 83.6           10 Apr 2022 13:00    140    |  106    |  17     ----------------------------<  116    3.5     |  29     |  0.96     Ca    9.1        10 Apr 2022 13:00    TPro  7.4    /  Alb  3.9    /  TBili  0.4    /  DBili  x      /  AST  47     /  ALT  44     /  AlkPhos  92     10 Apr 2022 13:00            RADIOLOGY & ADDITIONAL STUDIES: HPI:  Patient is a 41M with a PMH of DM (on diabetic pump), gastroparesis, chronic anxiety, asthma who presents to the ED for abdominal pain.  Patient states that he developed severe abdominal pain and vomiting this morning.  Has since had poor PO intake due to N/V.  Has had multiple presentations to the hospital for similar complaints.  Still using insulin pump.  Vitals stable, labs benign.  Will admit to med surg.   (10 Apr 2022 23:49)      PAST MEDICAL & SURGICAL HISTORY:  DM type 1 (diabetes mellitus, type 1)    [id number]  ------ As Above -------- Patient is a poor historian ( not communicative )  Patient states that he was well until Sunday morning when he developed N/V and abdominal pain c/w his gastroparesis. He thinks that missing meals and the stress of doing doubles at work caused him the attack. States that his HGB A1C is good.   He has been having gastroparesis for a while. Last time he took marijuana was two weeks ago.   Feel much better at the present time       Patient seen  03/07/22======  · Subjective and Objective:   HPI:    · Chief Complaint: The patient is a 41y Male complaining of abdominal pain.  · HPI Objective Statement: 42 yo M with LUQ abd pain, n/v/d, started 4:30 pm today.  No inciting event.  No other additional symptoms.  Feels similar to prior episodes of gastritis.   	ROS: negative for fever, cough, headache, chest pain, shortness of breath, rash, paresthesia, and weakness--all other systems reviewed are negative.   PMH: DM (on diabetic pump), gastroparesis, chronic anxiety, asthma; Meds: See EMR for list; SH: Denies smoking/drinking/drug use  ---- As Above -------  Consult called fro N/V and abdominal pain. Patient has a history of gastritis / gastroparesis for MANY years. He now presents with symptoms c/w it. This attack was " caused by physical stress. "   The attacks are usually caused by emotional/ physical stress or certain foods. He takes Reglan on a PRN basis. Denies NSAIDs. Patient is on a high fiber diet " which prevents attacks. " He has not seen a Gastroenterologist for a long time because he " is doing so well. His last attack was 4 months ago. " Last EGD was 2 years ago. Does not feel marijuana gave him this attack. had a colonoscopy 2 years ago for ??????  Patient feeling better this afternoon. Wants to try clear liquid diet        Asthma    Gastritis    Anxiety    Controlled diabetes mellitus type 1 without complications    Gastritis, presence of bleeding unspecified, unspecified chronicity, unspecified gastritis type    Uncomplicated asthma, unspecified asthma severity, unspecified whether persistent    S/P cholecystectomy    History of cholecystectomy        MEDICATIONS  (STANDING):  dextrose 5%. 1000 milliLiter(s) (50 mL/Hr) IV Continuous <Continuous>  dextrose 5%. 1000 milliLiter(s) (100 mL/Hr) IV Continuous <Continuous>  dextrose 50% Injectable 25 Gram(s) IV Push once  dextrose 50% Injectable 12.5 Gram(s) IV Push once  dextrose 50% Injectable 25 Gram(s) IV Push once  glucagon  Injectable 1 milliGRAM(s) IntraMuscular once  insulin lispro (ADMELOG) corrective regimen sliding scale   SubCutaneous three times a day before meals  pantoprazole  Injectable 40 milliGRAM(s) IV Push two times a day  sucralfate 1 Gram(s) Oral four times a day    MEDICATIONS  (PRN):  ALBUTerol    90 MICROgram(s) HFA Inhaler 2 Puff(s) Inhalation every 6 hours PRN Shortness of Breath and/or Wheezing  ALPRAZolam 1 milliGRAM(s) Oral three times a day PRN anxiety  dextrose Oral Gel 15 Gram(s) Oral once PRN Blood Glucose LESS THAN 70 milliGRAM(s)/deciliter  morphine  - Injectable 2 milliGRAM(s) IV Push every 6 hours PRN Severe Pain (7 - 10)  ondansetron Injectable 4 milliGRAM(s) IV Push every 6 hours PRN Nausea and/or Vomiting      Allergies    No Known Allergies    Intolerances        FAMILY HISTORY:  Family history of diabetes mellitus (Father, Mother)    Family history of diabetes mellitus (DM) (Father, Mother, Grandparent)        REVIEW OF SYSTEMS:    CONSTITUTIONAL: No fever, weight loss, or fatigue  EYES: No eye pain, visual disturbances, or discharge  ENMT:  No difficulty hearing, tinnitus, vertigo; No sinus or throat pain  NECK: No pain or stiffness  BREASTS: No pain, masses, or nipple discharge  RESPIRATORY: No cough, wheezing, chills or hemoptysis; No shortness of breath  CARDIOVASCULAR: No chest pain, palpitations, dizziness, or leg swelling  GASTROINTESTINAL: See above   GENITOURINARY: No dysuria, frequency, hematuria, or incontinence  NEUROLOGICAL: No headaches, memory loss, loss of strength, numbness, or tremors  SKIN: No itching, burning, rashes, or lesions   LYMPH NODES: No enlarged glands  ENDOCRINE: No heat or cold intolerance; No hair loss  MUSCULOSKELETAL: No joint pain or swelling; No muscle, back, or extremity pain  PSYCHIATRIC: No depression, anxiety, mood swings, or difficulty sleeping  HEME/LYMPH: No easy bruising, or bleeding gums  ALLERGY AND IMMUNOLOGIC: No hives or eczema          SOCIAL HISTORY:    FAMILY HISTORY:  Family history of diabetes mellitus (Father, Mother)    Family history of diabetes mellitus (DM) (Father, Mother, Grandparent)        Vital Signs Last 24 Hrs  T(C): 37 (11 Apr 2022 12:10), Max: 37.2 (11 Apr 2022 01:10)  T(F): 98.6 (11 Apr 2022 12:10), Max: 99 (11 Apr 2022 01:10)  HR: 59 (11 Apr 2022 12:10) (55 - 63)  BP: 115/74 (11 Apr 2022 12:10) (115/74 - 149/93)  BP(mean): --  RR: 18 (11 Apr 2022 12:10) (18 - 19)  SpO2: 96% (11 Apr 2022 12:10) (96% - 98%)    PHYSICAL EXAM:    GENERAL: NAD, well-groomed, well-developed  HEAD:  Atraumatic, Normocephalic  EYES: EOMI, PERRLA, conjunctiva and sclera clear  NECK: Supple, No JVD, Normal thyroid  NERVOUS SYSTEM:  Alert & Oriented X3, Good concentration; Motor Strength 5/5 B/L upper and lower extremities;   CHEST/LUNG: Clear to percussion bilaterally; No rales, rhonchi, wheezing, or rubs  HEART: Regular rate and rhythm; No murmurs, rubs, or gallops  ABDOMEN: Soft, Nontender, Nondistended; Bowel sounds present  EXTREMITIES:  2+ Peripheral Pulses, No clubbing, cyanosis, or edema  LYMPH: No lymphadenopathy noted   RECTAL:  Deferred   SKIN: No rashes or lesions    LABS:                        13.9   9.56  )-----------( 272      ( 10 Apr 2022 13:00 )             40.7       CBC:  04-10 @ 13:00  WBC  9.56  HGB 13.9  HCT 40.7 Plate 272  MCV 83.6           10 Apr 2022 13:00    140    |  106    |  17     ----------------------------<  116    3.5     |  29     |  0.96     Ca    9.1        10 Apr 2022 13:00    TPro  7.4    /  Alb  3.9    /  TBili  0.4    /  DBili  x      /  AST  47     /  ALT  44     /  AlkPhos  92     10 Apr 2022 13:00            RADIOLOGY & ADDITIONAL STUDIES:  < from: CT Abdomen and Pelvis w/ IV Cont (04.10.22 @ 22:10) >    ACC: [id number] EXAM:  CT ABDOMEN AND PELVIS IC                          PROCEDURE DATE:  04/10/2022          INTERPRETATION:  CLINICAL INFORMATION: Abdominal pain.    COMPARISON: 12/20/2021 and 3/6/2022.    CONTRAST/COMPLICATIONS:  IV Contrast: Omnipaque 350  95 cc administered   05 cc discarded  Oral Contrast: NONE  Complications: None reported at time of study completion    PROCEDURE:  CT of the Abdomen and Pelvis was performed.  Sagittal and coronal reformats were performed.    FINDINGS:  LOWER CHEST: Within normal limits.    LIVER: Within normal limits.  BILE DUCTS: Normal caliber.  GALLBLADDER: Cholecystectomy.  SPLEEN: Within normal limits.  PANCREAS: Within normal limits.  ADRENALS: Within normal limits.  KIDNEYS/URETERS: Within normal limits.    BLADDER: Distended without wall thickening or surrounding inflammatory   changes.  REPRODUCTIVE ORGANS: Appears within normal limits.    BOWEL: No bowel obstruction.  Normal appendix.  Underdistention versus   mild wall thickening in the cecum and ascending colon.  PERITONEUM: No ascites.  VESSELS: Within normal limits.  RETROPERITONEUM/LYMPH NODES: No lymphadenopathy.  ABDOMINAL WALL: Within normal limits.  BONES: Within normal limits.    IMPRESSION:  Underdistention versus mild wallthickening in the cecum and ascending   colon.  A mild colitis should be excluded clinically.  Otherwise there is   no CT evidence of bowel obstruction, active inflammatory process or   intra-abdominal source for infection.        --- End of Report ---            STEVE AQUINO MD; Attending Radiologist  This document has been electronically signed. Apr 10 2022 11:02PM    < end of copied text >

## 2022-04-11 NOTE — PATIENT PROFILE ADULT - FALL HARM RISK - UNIVERSAL INTERVENTIONS
Bed in lowest position, wheels locked, appropriate side rails in place/Call bell, personal items and telephone in reach/Instruct patient to call for assistance before getting out of bed or chair/Non-slip footwear when patient is out of bed/[place] to call system/Physically safe environment - no spills, clutter or unnecessary equipment/Purposeful Proactive Rounding/Room/bathroom lighting operational, light cord in reach

## 2022-04-11 NOTE — PROGRESS NOTE ADULT - ASSESSMENT
Patient is a 41M with a PMH of DM (on diabetic pump), gastroparesis, chronic anxiety, asthma who presents to the ED for abdominal pain.  Patient stated that he developed severe abdominal pain and vomiting yesterday.  Has had multiple presentations to the hospital for similar complaints.  Still using insulin pump.  Vitals stable, labs benign.

## 2022-04-11 NOTE — PROGRESS NOTE ADULT - PROBLEM SELECTOR PLAN 1
Zofran q6PRN, PPI iv BID, Morphine PRN, carafate  IV fluids, advance diet as tolerated  GI consulted, will follow up with recs

## 2022-04-11 NOTE — CHART NOTE - NSCHARTNOTEFT_GEN_A_CORE
Medicine Hospitalist PA    Pt leaving AMA. Explained to pt the risks of leaving against medical advice. Pt states he needs to be home with his son, his wife is pregnant and having pain and she needs to go to the hospital therefore he wants to leave. Many attempts have been made to reason with pt to remain in hospital however pt refusing to cooperate and wants to leave AMA. Pt aware of consequences of leaving against medical advice including harm, injury or death. Pt fully understands, all question have been answered. Explained to pt the importance of Following up with PMD and Endo. Pt agrees to comply. Pt signed AMA form, witnessed by RN at 17:15. D/w Dr. Saab.

## 2022-04-11 NOTE — PROGRESS NOTE ADULT - SUBJECTIVE AND OBJECTIVE BOX
Clarke Starr MD  [employer] Hospitalist  Pager [phone]  Email: [email or web address]          PROGRESS NOTE:     Patient is a 41y old  Male who presents with a chief complaint of vomiting, abdominal pain (11 Apr 2022 15:44)      SUBJECTIVE / OVERNIGHT EVENTS:  Patient seen and examined this morning. Patient still uncomfortable, has a hard time with any PO intake.   ADDITIONAL REVIEW OF SYSTEMS:  Denies f/c/n/v    MEDICATIONS  (STANDING):  dextrose 5%. 1000 milliLiter(s) (50 mL/Hr) IV Continuous <Continuous>  dextrose 5%. 1000 milliLiter(s) (100 mL/Hr) IV Continuous <Continuous>  dextrose 50% Injectable 25 Gram(s) IV Push once  dextrose 50% Injectable 12.5 Gram(s) IV Push once  dextrose 50% Injectable 25 Gram(s) IV Push once  glucagon  Injectable 1 milliGRAM(s) IntraMuscular once  insulin lispro (ADMELOG) corrective regimen sliding scale   SubCutaneous three times a day before meals  pantoprazole  Injectable 40 milliGRAM(s) IV Push two times a day  sucralfate 1 Gram(s) Oral four times a day    MEDICATIONS  (PRN):  ALBUTerol    90 MICROgram(s) HFA Inhaler 2 Puff(s) Inhalation every 6 hours PRN Shortness of Breath and/or Wheezing  ALPRAZolam 1 milliGRAM(s) Oral three times a day PRN anxiety  dextrose Oral Gel 15 Gram(s) Oral once PRN Blood Glucose LESS THAN 70 milliGRAM(s)/deciliter  morphine  - Injectable 2 milliGRAM(s) IV Push every 6 hours PRN Severe Pain (7 - 10)  ondansetron Injectable 4 milliGRAM(s) IV Push every 6 hours PRN Nausea and/or Vomiting      CAPILLARY BLOOD GLUCOSE      POCT Blood Glucose.: 146 mg/dL (11 Apr 2022 11:13)  POCT Blood Glucose.: 100 mg/dL (11 Apr 2022 07:44)  POCT Blood Glucose.: 160 mg/dL (11 Apr 2022 00:41)    I&O's Summary      PHYSICAL EXAM:  Vital Signs Last 24 Hrs  T(C): 37 (11 Apr 2022 12:10), Max: 37.2 (11 Apr 2022 01:10)  T(F): 98.6 (11 Apr 2022 12:10), Max: 99 (11 Apr 2022 01:10)  HR: 59 (11 Apr 2022 12:10) (55 - 63)  BP: 115/74 (11 Apr 2022 12:10) (115/74 - 149/93)  BP(mean): --  RR: 18 (11 Apr 2022 12:10) (18 - 19)  SpO2: 96% (11 Apr 2022 12:10) (96% - 98%)    CONSTITUTIONAL: NAD, well-developed  RESPIRATORY: Normal respiratory effort; lungs are clear to auscultation bilaterally  CARDIOVASCULAR: Regular rate and rhythm, normal S1 and S2, no murmur/rub/gallop; No lower extremity edema; Peripheral pulses are 2+ bilaterally  ABDOMEN: Nontender to palpation, normoactive bowel sounds, no rebound/guarding; No hepatosplenomegaly  MUSCLOSKELETAL: no clubbing or cyanosis of digits; no joint swelling or tenderness to palpation  PSYCH: A+O to person, place, and time; affect appropriate    LABS:                        13.9   9.56  )-----------( 272      ( 10 Apr 2022 13:00 )             40.7     04-10    140  |  106  |  17  ----------------------------<  116<H>  3.5   |  29  |  0.96    Ca    9.1      10 Apr 2022 13:00    TPro  7.4  /  Alb  3.9  /  TBili  0.4  /  DBili  x   /  AST  47<H>  /  ALT  44  /  AlkPhos  92  04-10                RADIOLOGY & ADDITIONAL TESTS:  Results Reviewed:   Imaging Personally Reviewed:  Electrocardiogram Personally Reviewed:    COORDINATION OF CARE:  Care Discussed with Consultants/Other Providers [Y/N]:  Prior or Outpatient Records Reviewed [Y/N]:

## 2022-04-15 DIAGNOSIS — K31.84 GASTROPARESIS: ICD-10-CM

## 2022-04-15 DIAGNOSIS — Z79.899 OTHER LONG TERM (CURRENT) DRUG THERAPY: ICD-10-CM

## 2022-04-15 DIAGNOSIS — R10.9 UNSPECIFIED ABDOMINAL PAIN: ICD-10-CM

## 2022-04-15 DIAGNOSIS — Z53.29 PROCEDURE AND TREATMENT NOT CARRIED OUT BECAUSE OF PATIENT'S DECISION FOR OTHER REASONS: ICD-10-CM

## 2022-04-15 DIAGNOSIS — E10.43 TYPE 1 DIABETES MELLITUS WITH DIABETIC AUTONOMIC (POLY)NEUROPATHY: ICD-10-CM

## 2022-04-15 DIAGNOSIS — F41.9 ANXIETY DISORDER, UNSPECIFIED: ICD-10-CM

## 2022-04-15 DIAGNOSIS — K29.70 GASTRITIS, UNSPECIFIED, WITHOUT BLEEDING: ICD-10-CM

## 2022-04-15 DIAGNOSIS — Z96.41 PRESENCE OF INSULIN PUMP (EXTERNAL) (INTERNAL): ICD-10-CM

## 2022-04-15 DIAGNOSIS — J45.909 UNSPECIFIED ASTHMA, UNCOMPLICATED: ICD-10-CM

## 2022-05-08 ENCOUNTER — INPATIENT (INPATIENT)
Facility: HOSPITAL | Age: 42
LOS: 2 days | Discharge: HOME | End: 2022-05-11
Attending: INTERNAL MEDICINE | Admitting: INTERNAL MEDICINE
Payer: MEDICAID

## 2022-05-08 VITALS
OXYGEN SATURATION: 100 % | RESPIRATION RATE: 18 BRPM | WEIGHT: 169.98 LBS | TEMPERATURE: 98 F | HEIGHT: 65 IN | HEART RATE: 90 BPM | SYSTOLIC BLOOD PRESSURE: 113 MMHG | DIASTOLIC BLOOD PRESSURE: 88 MMHG

## 2022-05-08 DIAGNOSIS — Z90.49 ACQUIRED ABSENCE OF OTHER SPECIFIED PARTS OF DIGESTIVE TRACT: Chronic | ICD-10-CM

## 2022-05-08 DIAGNOSIS — E11.43 TYPE 2 DIABETES MELLITUS WITH DIABETIC AUTONOMIC (POLY)NEUROPATHY: ICD-10-CM

## 2022-05-08 LAB
ALBUMIN SERPL ELPH-MCNC: 3.9 G/DL — SIGNIFICANT CHANGE UP (ref 3.3–5)
ALP SERPL-CCNC: 103 U/L — SIGNIFICANT CHANGE UP (ref 40–120)
ALT FLD-CCNC: 39 U/L — SIGNIFICANT CHANGE UP (ref 12–78)
ANION GAP SERPL CALC-SCNC: 10 MMOL/L — SIGNIFICANT CHANGE UP (ref 5–17)
AST SERPL-CCNC: 26 U/L — SIGNIFICANT CHANGE UP (ref 15–37)
BASOPHILS # BLD AUTO: 0.05 K/UL — SIGNIFICANT CHANGE UP (ref 0–0.2)
BASOPHILS NFR BLD AUTO: 0.6 % — SIGNIFICANT CHANGE UP (ref 0–2)
BILIRUB SERPL-MCNC: 0.3 MG/DL — SIGNIFICANT CHANGE UP (ref 0.2–1.2)
BUN SERPL-MCNC: 21 MG/DL — SIGNIFICANT CHANGE UP (ref 7–23)
CALCIUM SERPL-MCNC: 8.9 MG/DL — SIGNIFICANT CHANGE UP (ref 8.5–10.1)
CHLORIDE SERPL-SCNC: 105 MMOL/L — SIGNIFICANT CHANGE UP (ref 96–108)
CO2 SERPL-SCNC: 26 MMOL/L — SIGNIFICANT CHANGE UP (ref 22–31)
CREAT SERPL-MCNC: 1.05 MG/DL — SIGNIFICANT CHANGE UP (ref 0.5–1.3)
EGFR: 91 ML/MIN/1.73M2 — SIGNIFICANT CHANGE UP
EOSINOPHIL # BLD AUTO: 0.08 K/UL — SIGNIFICANT CHANGE UP (ref 0–0.5)
EOSINOPHIL NFR BLD AUTO: 1 % — SIGNIFICANT CHANGE UP (ref 0–6)
FLUAV AG NPH QL: SIGNIFICANT CHANGE UP
FLUBV AG NPH QL: SIGNIFICANT CHANGE UP
GLUCOSE BLDC GLUCOMTR-MCNC: 128 MG/DL — HIGH (ref 70–99)
GLUCOSE BLDC GLUCOMTR-MCNC: 82 MG/DL — SIGNIFICANT CHANGE UP (ref 70–99)
GLUCOSE BLDC GLUCOMTR-MCNC: 82 MG/DL — SIGNIFICANT CHANGE UP (ref 70–99)
GLUCOSE SERPL-MCNC: 130 MG/DL — HIGH (ref 70–99)
HCT VFR BLD CALC: 42 % — SIGNIFICANT CHANGE UP (ref 39–50)
HGB BLD-MCNC: 13.6 G/DL — SIGNIFICANT CHANGE UP (ref 13–17)
IMM GRANULOCYTES NFR BLD AUTO: 0.1 % — SIGNIFICANT CHANGE UP (ref 0–1.5)
LACTATE SERPL-SCNC: 1.4 MMOL/L — SIGNIFICANT CHANGE UP (ref 0.7–2)
LIDOCAIN IGE QN: 121 U/L — SIGNIFICANT CHANGE UP (ref 73–393)
LYMPHOCYTES # BLD AUTO: 1.66 K/UL — SIGNIFICANT CHANGE UP (ref 1–3.3)
LYMPHOCYTES # BLD AUTO: 20 % — SIGNIFICANT CHANGE UP (ref 13–44)
MAGNESIUM SERPL-MCNC: 2.3 MG/DL — SIGNIFICANT CHANGE UP (ref 1.6–2.6)
MCHC RBC-ENTMCNC: 28 PG — SIGNIFICANT CHANGE UP (ref 27–34)
MCHC RBC-ENTMCNC: 32.4 G/DL — SIGNIFICANT CHANGE UP (ref 32–36)
MCV RBC AUTO: 86.4 FL — SIGNIFICANT CHANGE UP (ref 80–100)
MONOCYTES # BLD AUTO: 0.28 K/UL — SIGNIFICANT CHANGE UP (ref 0–0.9)
MONOCYTES NFR BLD AUTO: 3.4 % — SIGNIFICANT CHANGE UP (ref 2–14)
NEUTROPHILS # BLD AUTO: 6.22 K/UL — SIGNIFICANT CHANGE UP (ref 1.8–7.4)
NEUTROPHILS NFR BLD AUTO: 74.9 % — SIGNIFICANT CHANGE UP (ref 43–77)
NRBC # BLD: 0 /100 WBCS — SIGNIFICANT CHANGE UP (ref 0–0)
PHOSPHATE SERPL-MCNC: 3.5 MG/DL — SIGNIFICANT CHANGE UP (ref 2.5–4.5)
PLATELET # BLD AUTO: 381 K/UL — SIGNIFICANT CHANGE UP (ref 150–400)
POTASSIUM SERPL-MCNC: 3.8 MMOL/L — SIGNIFICANT CHANGE UP (ref 3.5–5.3)
POTASSIUM SERPL-SCNC: 3.8 MMOL/L — SIGNIFICANT CHANGE UP (ref 3.5–5.3)
PROT SERPL-MCNC: 7.7 GM/DL — SIGNIFICANT CHANGE UP (ref 6–8.3)
RBC # BLD: 4.86 M/UL — SIGNIFICANT CHANGE UP (ref 4.2–5.8)
RBC # FLD: 13.4 % — SIGNIFICANT CHANGE UP (ref 10.3–14.5)
SARS-COV-2 RNA SPEC QL NAA+PROBE: SIGNIFICANT CHANGE UP
SODIUM SERPL-SCNC: 141 MMOL/L — SIGNIFICANT CHANGE UP (ref 135–145)
WBC # BLD: 8.3 K/UL — SIGNIFICANT CHANGE UP (ref 3.8–10.5)
WBC # FLD AUTO: 8.3 K/UL — SIGNIFICANT CHANGE UP (ref 3.8–10.5)

## 2022-05-08 PROCEDURE — 99285 EMERGENCY DEPT VISIT HI MDM: CPT

## 2022-05-08 RX ORDER — ONDANSETRON 8 MG/1
4 TABLET, FILM COATED ORAL EVERY 8 HOURS
Refills: 0 | Status: DISCONTINUED | OUTPATIENT
Start: 2022-05-08 | End: 2022-05-11

## 2022-05-08 RX ORDER — DEXTROSE 50 % IN WATER 50 %
12.5 SYRINGE (ML) INTRAVENOUS ONCE
Refills: 0 | Status: DISCONTINUED | OUTPATIENT
Start: 2022-05-08 | End: 2022-05-11

## 2022-05-08 RX ORDER — MORPHINE SULFATE 50 MG/1
4 CAPSULE, EXTENDED RELEASE ORAL ONCE
Refills: 0 | Status: DISCONTINUED | OUTPATIENT
Start: 2022-05-08 | End: 2022-05-08

## 2022-05-08 RX ORDER — DEXTROSE 50 % IN WATER 50 %
25 SYRINGE (ML) INTRAVENOUS ONCE
Refills: 0 | Status: DISCONTINUED | OUTPATIENT
Start: 2022-05-08 | End: 2022-05-11

## 2022-05-08 RX ORDER — MORPHINE SULFATE 50 MG/1
2 CAPSULE, EXTENDED RELEASE ORAL EVERY 4 HOURS
Refills: 0 | Status: DISCONTINUED | OUTPATIENT
Start: 2022-05-08 | End: 2022-05-11

## 2022-05-08 RX ORDER — DEXTROSE 50 % IN WATER 50 %
15 SYRINGE (ML) INTRAVENOUS ONCE
Refills: 0 | Status: DISCONTINUED | OUTPATIENT
Start: 2022-05-08 | End: 2022-05-11

## 2022-05-08 RX ORDER — SODIUM CHLORIDE 9 MG/ML
1000 INJECTION INTRAMUSCULAR; INTRAVENOUS; SUBCUTANEOUS
Refills: 0 | Status: DISCONTINUED | OUTPATIENT
Start: 2022-05-08 | End: 2022-05-10

## 2022-05-08 RX ORDER — SODIUM CHLORIDE 9 MG/ML
1000 INJECTION INTRAMUSCULAR; INTRAVENOUS; SUBCUTANEOUS ONCE
Refills: 0 | Status: COMPLETED | OUTPATIENT
Start: 2022-05-08 | End: 2022-05-08

## 2022-05-08 RX ORDER — PANTOPRAZOLE SODIUM 20 MG/1
40 TABLET, DELAYED RELEASE ORAL DAILY
Refills: 0 | Status: DISCONTINUED | OUTPATIENT
Start: 2022-05-08 | End: 2022-05-11

## 2022-05-08 RX ORDER — LANOLIN ALCOHOL/MO/W.PET/CERES
3 CREAM (GRAM) TOPICAL AT BEDTIME
Refills: 0 | Status: DISCONTINUED | OUTPATIENT
Start: 2022-05-08 | End: 2022-05-11

## 2022-05-08 RX ORDER — ENOXAPARIN SODIUM 100 MG/ML
40 INJECTION SUBCUTANEOUS EVERY 24 HOURS
Refills: 0 | Status: DISCONTINUED | OUTPATIENT
Start: 2022-05-08 | End: 2022-05-11

## 2022-05-08 RX ORDER — SODIUM CHLORIDE 9 MG/ML
1000 INJECTION, SOLUTION INTRAVENOUS
Refills: 0 | Status: DISCONTINUED | OUTPATIENT
Start: 2022-05-08 | End: 2022-05-11

## 2022-05-08 RX ORDER — GLUCAGON INJECTION, SOLUTION 0.5 MG/.1ML
1 INJECTION, SOLUTION SUBCUTANEOUS ONCE
Refills: 0 | Status: DISCONTINUED | OUTPATIENT
Start: 2022-05-08 | End: 2022-05-11

## 2022-05-08 RX ORDER — SUCRALFATE 1 G
1 TABLET ORAL EVERY 6 HOURS
Refills: 0 | Status: DISCONTINUED | OUTPATIENT
Start: 2022-05-08 | End: 2022-05-11

## 2022-05-08 RX ORDER — ONDANSETRON 8 MG/1
4 TABLET, FILM COATED ORAL ONCE
Refills: 0 | Status: COMPLETED | OUTPATIENT
Start: 2022-05-08 | End: 2022-05-08

## 2022-05-08 RX ORDER — ACETAMINOPHEN 500 MG
650 TABLET ORAL EVERY 6 HOURS
Refills: 0 | Status: DISCONTINUED | OUTPATIENT
Start: 2022-05-08 | End: 2022-05-11

## 2022-05-08 RX ADMIN — MORPHINE SULFATE 2 MILLIGRAM(S): 50 CAPSULE, EXTENDED RELEASE ORAL at 21:21

## 2022-05-08 RX ADMIN — SODIUM CHLORIDE 1000 MILLILITER(S): 9 INJECTION INTRAMUSCULAR; INTRAVENOUS; SUBCUTANEOUS at 10:31

## 2022-05-08 RX ADMIN — ONDANSETRON 4 MILLIGRAM(S): 8 TABLET, FILM COATED ORAL at 13:22

## 2022-05-08 RX ADMIN — SODIUM CHLORIDE 80 MILLILITER(S): 9 INJECTION INTRAMUSCULAR; INTRAVENOUS; SUBCUTANEOUS at 14:05

## 2022-05-08 RX ADMIN — Medication 1 GRAM(S): at 17:09

## 2022-05-08 RX ADMIN — PANTOPRAZOLE SODIUM 40 MILLIGRAM(S): 20 TABLET, DELAYED RELEASE ORAL at 13:22

## 2022-05-08 RX ADMIN — MORPHINE SULFATE 4 MILLIGRAM(S): 50 CAPSULE, EXTENDED RELEASE ORAL at 10:31

## 2022-05-08 RX ADMIN — MORPHINE SULFATE 2 MILLIGRAM(S): 50 CAPSULE, EXTENDED RELEASE ORAL at 17:03

## 2022-05-08 RX ADMIN — MORPHINE SULFATE 4 MILLIGRAM(S): 50 CAPSULE, EXTENDED RELEASE ORAL at 13:22

## 2022-05-08 RX ADMIN — MORPHINE SULFATE 4 MILLIGRAM(S): 50 CAPSULE, EXTENDED RELEASE ORAL at 08:48

## 2022-05-08 RX ADMIN — ONDANSETRON 4 MILLIGRAM(S): 8 TABLET, FILM COATED ORAL at 21:06

## 2022-05-08 RX ADMIN — MORPHINE SULFATE 4 MILLIGRAM(S): 50 CAPSULE, EXTENDED RELEASE ORAL at 10:45

## 2022-05-08 RX ADMIN — MORPHINE SULFATE 2 MILLIGRAM(S): 50 CAPSULE, EXTENDED RELEASE ORAL at 21:06

## 2022-05-08 RX ADMIN — SODIUM CHLORIDE 2000 MILLILITER(S): 9 INJECTION INTRAMUSCULAR; INTRAVENOUS; SUBCUTANEOUS at 08:47

## 2022-05-08 RX ADMIN — ONDANSETRON 4 MILLIGRAM(S): 8 TABLET, FILM COATED ORAL at 10:31

## 2022-05-08 RX ADMIN — MORPHINE SULFATE 4 MILLIGRAM(S): 50 CAPSULE, EXTENDED RELEASE ORAL at 09:10

## 2022-05-08 NOTE — ED PROVIDER NOTE - CLINICAL SUMMARY MEDICAL DECISION MAKING FREE TEXT BOX
42 yo male with pmhx dm, gastroparesis, presenting for abd pain and vomiting for one day. suggestive of abd pain 2/2 gastroparesis, nonsurgical abdomen. will obtain labs, will give ivf, pain control, and will reassess. if multiple pain control meds needed and cannot tolerate PO, possible admission.

## 2022-05-08 NOTE — ED ADULT NURSE NOTE - NSIMPLEMENTINTERV_GEN_ALL_ED
Implemented All Fall Risk Interventions:  [place] to call system. Call bell, personal items and telephone within reach. Instruct patient to call for assistance. Room bathroom lighting operational. Non-slip footwear when patient is off stretcher. Physically safe environment: no spills, clutter or unnecessary equipment. Stretcher in lowest position, wheels locked, appropriate side rails in place. Provide visual cue, wrist band, yellow gown, etc. Monitor gait and stability. Monitor for mental status changes and reorient to person, place, and time. Review medications for side effects contributing to fall risk. Reinforce activity limits and safety measures with patient and family.

## 2022-05-08 NOTE — H&P ADULT - HISTORY OF PRESENT ILLNESS
42yo, BM with h/o DM1,with Insulin pump,   Gastroparesis, presenting for abd pain and vomiting for one day. Patient states that for past few hours while working overnight, he developed progressive worsening LUQ abd pain, with nausea and  vomiting. BIBEMS, s/p 15mg toradol and 4mg zofran. Has had recent previous admissions for abdominal pain.  Denies CP, SOB, PND, cough, palpitation,  fever, chills, weakness, ,dysuria, HA ,dizziness.

## 2022-05-08 NOTE — PATIENT PROFILE ADULT - FALL HARM RISK - HARM RISK INTERVENTIONS

## 2022-05-08 NOTE — ED ADULT TRIAGE NOTE - CHIEF COMPLAINT QUOTE
LUQ pain with vomiting, given Toradol 15mg IV, Zofran 4mg IVP given by EMS [address] left AC, loud moaning noises upon entry to ED

## 2022-05-08 NOTE — ED PROVIDER NOTE - OBJECTIVE STATEMENT
40 yo male with pmhx dm, gastroparesis, presenting for abd pain and vomiting for one day. Patient states that for past few hours while working overnight, he developed progressive worsening LUQ abd pain, with nbnb vomiting. BIBEMS, s/p 15mg toradol and 4mg zofran. Has had recent previous admissions for abdominal pain.    Denies diarrhea, LOC, CP, SOB, fevers, sick contacts.

## 2022-05-08 NOTE — H&P ADULT - NSHPLABSRESULTS_GEN_ALL_CORE
LABS:                        13.6   8.30  )-----------( 381      ( 08 May 2022 08:57 )             42.0     05-08    141  |  105  |  21  ----------------------------<  130<H>  3.8   |  26  |  1.05    Ca    8.9      08 May 2022 08:57  Phos  3.5     05-08  Mg     2.3     05-08    TPro  7.7  /  Alb  3.9  /  TBili  0.3  /  DBili  x   /  AST  26  /  ALT  39  /  AlkPhos  103  05-08    Lactate, Blood: 1.4 mmol/L (05.08.22 @ 10:09)     CAPILLARY BLOOD GLUCOSE  Lipase, Serum: 121 U/L (05.08.22 @ 08:57)     POCT Blood Glucose.: 128 mg/dL (08 May 2022 09:07)    < from: CT Abdomen and Pelvis w/ IV Cont (04.10.22 @ 22:10) >      FINDINGS:  LOWER CHEST: Within normal limits.    LIVER: Within normal limits.  BILE DUCTS: Normal caliber.  GALLBLADDER: Cholecystectomy.  SPLEEN: Within normal limits.  PANCREAS: Within normal limits.  ADRENALS: Within normal limits.  KIDNEYS/URETERS: Within normal limits.    BLADDER: Distended without wall thickening or surrounding inflammatory   changes.  REPRODUCTIVE ORGANS: Appears within normal limits.    BOWEL: No bowel obstruction.  Normal appendix.  Underdistention versus   mild wall thickening in the cecum and ascending colon.  PERITONEUM: No ascites.  VESSELS: Within normal limits.  RETROPERITONEUM/LYMPH NODES: No lymphadenopathy.  ABDOMINAL WALL: Within normal limits.  BONES: Within normal limits.    IMPRESSION:  Underdistention versus mild wall thickening in the cecum and ascending   colon.  A mild colitis should be excluded clinically.  Otherwise there is   no CT evidence of bowel obstruction, active inflammatory process or   intra-abdominal source for infection.      < end of copied text >

## 2022-05-08 NOTE — ED ADULT TRIAGE NOTE - INTERNATIONAL TRAVEL
Subjective:       Patient ID: Kash Salomon is a 62 y.o. male.    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  Has been more compliant with meds except for januvia which was too expensive.     Hypertension   This is a chronic problem. The problem is controlled (120s-130s/70s-80s at home). Pertinent negatives include no anxiety, blurred vision, chest pain, headaches, malaise/fatigue, neck pain, orthopnea, palpitations, peripheral edema, PND or shortness of breath. Past treatments include beta blockers, angiotensin blockers, diuretics and calcium channel blockers. The current treatment provides moderate improvement. There are no compliance problems.  Hypertensive end-organ damage includes CAD/MI (s/p CABG 05/2015). There is no history of angina or CVA.   Diabetes   He presents for his follow-up diabetic visit. He has type 2 diabetes mellitus. His disease course has been stable. Pertinent negatives for hypoglycemia include no confusion, dizziness, headaches, mood changes, nervousness/anxiousness, seizures, sleepiness, speech difficulty or tremors. Pertinent negatives for diabetes include no blurred vision, no chest pain, no fatigue, no foot paresthesias, no foot ulcerations, no polydipsia, no polyphagia, no polyuria, no visual change, no weakness and no weight loss. There are no hypoglycemic complications. Symptoms are stable. There are no diabetic complications. Pertinent negatives for diabetic complications include no CVA. Current diabetic treatment includes oral agent (monotherapy). He is compliant with treatment most of the time. Weight trend: up 8 pounds. Diabetic current diet: works as a  so diet difficult to maintain. He rarely participates in exercise. He monitors blood glucose at home 3-4 x per week. There is no change in his home blood glucose trend. His overall blood  glucose range is 140-180 mg/dl. An ACE inhibitor/angiotensin II receptor blocker is being taken. He does not see a podiatrist.Eye exam is current.     Review of Systems   Constitutional: Negative for chills, fatigue, fever, malaise/fatigue, unexpected weight change and weight loss.   HENT: Negative for congestion, hearing loss, postnasal drip, rhinorrhea, trouble swallowing and voice change.    Eyes: Negative for blurred vision, photophobia and visual disturbance.   Respiratory: Negative for apnea, cough, choking, chest tightness, shortness of breath and wheezing.    Cardiovascular: Negative for chest pain, palpitations, orthopnea, leg swelling and PND.   Gastrointestinal: Negative for abdominal pain, blood in stool, constipation, diarrhea, nausea, rectal pain and vomiting.   Endocrine: Negative for cold intolerance, heat intolerance, polydipsia, polyphagia and polyuria.   Genitourinary: Negative for decreased urine volume, difficulty urinating, discharge, dysuria, flank pain, frequency, genital sores, hematuria, testicular pain and urgency.   Musculoskeletal: Negative for arthralgias, back pain, gait problem, joint swelling, myalgias and neck pain.   Skin: Negative for color change, rash and wound.   Allergic/Immunologic: Negative for environmental allergies and food allergies.   Neurological: Negative for dizziness, tremors, seizures, syncope, facial asymmetry, speech difficulty, weakness, light-headedness, numbness and headaches.   Hematological: Negative for adenopathy. Does not bruise/bleed easily.   Psychiatric/Behavioral: Negative for confusion, hallucinations, sleep disturbance and suicidal ideas. The patient is not nervous/anxious.        Past Medical History:   Diagnosis Date    Chronic ischemic heart disease     Cyst, pilonidal, with abscess     Diabetes mellitus, type 2     Hyperlipidemia     Hypertension     Raised prostate specific antigen       Past Surgical History:   Procedure Laterality Date     CORONARY ARTERY BYPASS GRAFT  05/2015    HERNIA REPAIR      right elbow         Family History   Problem Relation Age of Onset    Osteoporosis Mother     Arthritis Mother     Cancer Father     Scoliosis Daughter        Social History     Socioeconomic History    Marital status:      Spouse name: Not on file    Number of children: Not on file    Years of education: Not on file    Highest education level: Not on file   Occupational History    Occupation:    Social Needs    Financial resource strain: Not on file    Food insecurity:     Worry: Not on file     Inability: Not on file    Transportation needs:     Medical: Not on file     Non-medical: Not on file   Tobacco Use    Smoking status: Current Every Day Smoker    Smokeless tobacco: Never Used    Tobacco comment: 1 pack per week   Substance and Sexual Activity    Alcohol use: No    Drug use: No    Sexual activity: Yes     Partners: Female     Birth control/protection: None   Lifestyle    Physical activity:     Days per week: Not on file     Minutes per session: Not on file    Stress: Not at all   Relationships    Social connections:     Talks on phone: Not on file     Gets together: Not on file     Attends Jehovah's witness service: Not on file     Active member of club or organization: Not on file     Attends meetings of clubs or organizations: Not on file     Relationship status: Not on file   Other Topics Concern    Not on file   Social History Narrative    LIVE WITH WIFE       Current Outpatient Medications   Medication Sig Dispense Refill    amLODIPine (NORVASC) 10 MG tablet Take 1 tablet (10 mg total) by mouth every evening. 90 tablet 1    antiox.mv no.10-omeg3s-lut-isha (I-CAPS) 280-10-2 mg Cap Take 2 capsules by mouth once daily.      aspirin 325 MG tablet Take 325 mg by mouth once daily.      atorvastatin (LIPITOR) 40 MG tablet Take 1 tablet (40 mg total) by mouth once daily. 90 tablet 1    fish oil-omega-3 fatty  "acids 300-1,000 mg capsule Take 2 capsules by mouth once daily.      metFORMIN (GLUCOPHAGE-XR) 500 MG 24 hr tablet Take 2 tablets (1,000 mg total) by mouth daily with breakfast. 180 tablet 1    metoprolol succinate (TOPROL-XL) 100 MG 24 hr tablet Take 1 tablet (100 mg total) by mouth once daily. 90 tablet 1    olmesartan-hydrochlorothiazide (BENICAR HCT) 40-25 mg per tablet Take 1 tablet by mouth once daily. 90 tablet 1    spironolactone (ALDACTONE) 50 MG tablet Take 1 tablet (50 mg total) by mouth once daily. 90 tablet 1     No current facility-administered medications for this visit.        Review of patient's allergies indicates:  No Known Allergies  Objective:    HPI     Annual Exam      Additional comments: dot physical   vision both eyes 20/20 l/r 20/20          Last edited by Radha Zuniga MA on 11/4/2019  1:20 PM. (History)      Blood pressure 114/70, pulse 78, temperature 97.7 °F (36.5 °C), temperature source Temporal, height 5' 8" (1.727 m), weight 106.1 kg (234 lb), SpO2 96 %. Body mass index is 35.58 kg/m².   Physical Exam      Office Visit on 08/05/2019   Component Date Value Ref Range Status    Hemoglobin A1C 08/05/2019 8.6%  % Final    Glucose 08/19/2019 119* 65 - 99 mg/dL Final    BUN, Bld 08/19/2019 23  8 - 27 mg/dL Final    Creatinine 08/19/2019 1.79* 0.76 - 1.27 mg/dL Final    eGFR if non African American 08/19/2019 40* >59 mL/min/1.73 Final    eGFR if  08/19/2019 46* >59 mL/min/1.73 Final    BUN/Creatinine Ratio 08/19/2019 13  10 - 24 Final    Sodium 08/19/2019 139  134 - 144 mmol/L Final    Potassium 08/19/2019 4.5  3.5 - 5.2 mmol/L Final    Chloride 08/19/2019 103  96 - 106 mmol/L Final    CO2 08/19/2019 20  20 - 29 mmol/L Final    Calcium 08/19/2019 9.5  8.6 - 10.2 mg/dL Final    Total Protein 08/19/2019 7.6  6.0 - 8.5 g/dL Final    Albumin 08/19/2019 4.5  3.6 - 4.8 g/dL Final    Globulin, Total 08/19/2019 3.1  1.5 - 4.5 g/dL Final    Albumin/Globulin " Ratio 08/19/2019 1.5  1.2 - 2.2 Final    Total Bilirubin 08/19/2019 0.4  0.0 - 1.2 mg/dL Final    Alkaline Phosphatase 08/19/2019 97  39 - 117 IU/L Final    AST 08/19/2019 22  0 - 40 IU/L Final    ALT 08/19/2019 34  0 - 44 IU/L Final    Cholesterol 08/19/2019 109  100 - 199 mg/dL Final    Triglycerides 08/19/2019 88  0 - 149 mg/dL Final    HDL 08/19/2019 24* >39 mg/dL Final    VLDL Cholesterol Raf 08/19/2019 18  5 - 40 mg/dL Final    LDL Calculated 08/19/2019 67  0 - 99 mg/dL Final    Creatinine, Random Ur 08/19/2019 211.8  Not Estab. mg/dL Final    Microalb, Ur 08/19/2019 72.7  Not Estab. ug/mL Final    Microalb/Crt. Ratio 08/19/2019 34.3* 0.0 - 30.0 mg/g creat Final    Comment:                      Normal:                0.0 -  30.0                       Albuminuria:          31.0 - 300.0                       Clinical albuminuria:       >300.0      Specific Gravity, UA 08/19/2019 1.024  1.005 - 1.030 Final    pH, UA 08/19/2019 5.5  5.0 - 7.5 Final    Color, UA 08/19/2019 Yellow  Yellow Final    Clarity, UA 08/19/2019 Clear  Clear Final    Leukocytes, UA 08/19/2019 Negative  Negative Final    Protein, UA 08/19/2019 1+* Negative/Trace Final    Glucose, UA 08/19/2019 Negative  Negative Final    Ketones, UA 08/19/2019 Negative  Negative Final    Occult Blood UA 08/19/2019 Negative  Negative Final    Bilirubin, UA 08/19/2019 Negative  Negative Final    Urobilinogen, UA 08/19/2019 0.2  0.2 - 1.0 mg/dL Final    Nitrite, UA 08/19/2019 Negative  Negative Final    Microscopic Examination 08/19/2019 See below:   Final    Microscopic was indicated and was performed.    PSA - [facility] 08/19/2019 9.8* 0.0 - 4.0 ng/mL Final    Comment: Roche ECLIA methodology.  According to the American Urological Association, Serum PSA should  decrease and remain at undetectable levels after radical  prostatectomy. The AUA defines biochemical recurrence as an initial  PSA value 0.2 ng/mL or greater followed by a  subsequent confirmatory  PSA value 0.2 ng/mL or greater.  Values obtained with different assay methods or kits cannot be used  interchangeably. Results cannot be interpreted as absolute evidence  of the presence or absence of malignant disease.      PSA, Free 08/19/2019 1.09  N/A ng/mL Final    Roche ECLIA methodology.    PSA Total 08/19/2019 11.1  % Final    Comment: The table below lists the probability of prostate cancer for  men with non-suspicious ALENA results and total PSA between  4 and 10 ng/mL, by patient age (Oralia et al, CHRISTINE 1998,  279:1542).                    % Free PSA       50-64 yr        65-75 yr                    0.00-10.00%        56%             55%                   10.01-15.00%        24%             35%                   15.01-20.00%        17%             23%                   20.01-25.00%        10%             20%                        >25.00%         5%              9%  Please note:  Oralia et al did not make specific                recommendations regarding the use of                percent free PSA for any other population                of men.      WBC, UA 08/19/2019 0-5  0 - 5 /hpf Final    RBC, UA 08/19/2019 None seen  0 - 2 /hpf Final    Epithelial Cells (non renal) 08/19/2019 None seen  0 - 10 /hpf Final    Casts 08/19/2019 Present* None seen /lpf Final    Cast Type 08/19/2019 Hyaline casts  N/A Final    Mucus, UA 08/19/2019 Present  Not Estab. Final    Bacteria, UA 08/19/2019 None seen  None seen/Few Final   ]  Assessment:       1. Type 2 diabetes mellitus without complication, without long-term current use of insulin    2. Essential hypertension    3. Raised prostate specific antigen    4. Coronary artery disease, angina presence unspecified, unspecified vessel or lesion type, unspecified whether native or transplanted heart    5. Need for prophylactic vaccination and inoculation against influenza        Plan:       Kash was seen today for hypertension,  hyperlipidemia, diabetes and annual exam.    Diagnoses and all orders for this visit:    Type 2 diabetes mellitus without complication, without long-term current use of insulin  Comments:  Due labs  Orders:  -     Hemoglobin A1c; Future  -     Comprehensive metabolic panel; Future  -     Hemoglobin A1c  -     Comprehensive metabolic panel    Essential hypertension  Comments:  BP better    Raised prostate specific antigen  Comments:  Biopsy negative in past.  PSA is stable; needs to follow up with Urology.  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

## 2022-05-08 NOTE — ED PROVIDER NOTE - PROGRESS NOTE DETAILS
JB Lafleur MD  pt reassessed, still pain despite morphine and still nausea and vomiting despite zofran,w ill admit for gastroparesis

## 2022-05-08 NOTE — ED ADULT NURSE NOTE - OBJECTIVE STATEMENT
pt is 40 y/o BIBA c/c of LUQ pain onset this morning with vomiting and nausea. L 18G placed by EMS, Toradol 15mg IV, Zofran 4mg IVP given by EMS. R lower abdominal insulin pump in place. pt AAOX4. PMHx of diabetes I, gastritis. FS checked.

## 2022-05-08 NOTE — H&P ADULT - NSHPPHYSICALEXAM_GEN_ALL_CORE
PHYSICAL EXAM:  GENERAL: NAD, well-groomed, well-developed  HEAD:  Atraumatic, Normocephalic  EYES: EOMI, PERRLA, conjunctiva and sclera clear  ENMT: No tonsillar erythema, exudates, or enlargement; Moist mucous membranes, No lesions  NECK: Supple,   NERVOUS SYSTEM:  Alert & Oriented X3; Motor Strength 5/5 B/L upper and lower extremities;   CHEST/LUNG: Clear bilaterally; No rales, rhonchi, wheezing, or rubs  HEART: Regular rate and rhythm; No murmurs, rubs, or gallops  ABDOMEN: Soft, Epigastric tenderness, Nondistended; Bowel sounds present  EXTREMITIES:   No clubbing, cyanosis, or edema  SKIN: No rashes or lesions

## 2022-05-09 DIAGNOSIS — E10.65 TYPE 1 DIABETES MELLITUS WITH HYPERGLYCEMIA: ICD-10-CM

## 2022-05-09 LAB
A1C WITH ESTIMATED AVERAGE GLUCOSE RESULT: 9.1 % — HIGH (ref 4–5.6)
ALBUMIN SERPL ELPH-MCNC: 3.2 G/DL — LOW (ref 3.3–5)
ALP SERPL-CCNC: 94 U/L — SIGNIFICANT CHANGE UP (ref 40–120)
ALT FLD-CCNC: 30 U/L — SIGNIFICANT CHANGE UP (ref 12–78)
ANION GAP SERPL CALC-SCNC: 11 MMOL/L — SIGNIFICANT CHANGE UP (ref 5–17)
AST SERPL-CCNC: 21 U/L — SIGNIFICANT CHANGE UP (ref 15–37)
BILIRUB SERPL-MCNC: 0.5 MG/DL — SIGNIFICANT CHANGE UP (ref 0.2–1.2)
BUN SERPL-MCNC: 21 MG/DL — SIGNIFICANT CHANGE UP (ref 7–23)
CALCIUM SERPL-MCNC: 8.6 MG/DL — SIGNIFICANT CHANGE UP (ref 8.5–10.1)
CHLORIDE SERPL-SCNC: 106 MMOL/L — SIGNIFICANT CHANGE UP (ref 96–108)
CO2 SERPL-SCNC: 20 MMOL/L — LOW (ref 22–31)
CREAT SERPL-MCNC: 0.95 MG/DL — SIGNIFICANT CHANGE UP (ref 0.5–1.3)
EGFR: 103 ML/MIN/1.73M2 — SIGNIFICANT CHANGE UP
ESTIMATED AVERAGE GLUCOSE: 214 MG/DL — HIGH (ref 68–114)
GLUCOSE BLDC GLUCOMTR-MCNC: 146 MG/DL — HIGH (ref 70–99)
GLUCOSE BLDC GLUCOMTR-MCNC: 198 MG/DL — HIGH (ref 70–99)
GLUCOSE BLDC GLUCOMTR-MCNC: 221 MG/DL — HIGH (ref 70–99)
GLUCOSE BLDC GLUCOMTR-MCNC: 243 MG/DL — HIGH (ref 70–99)
GLUCOSE SERPL-MCNC: 157 MG/DL — HIGH (ref 70–99)
HCT VFR BLD CALC: 39.1 % — SIGNIFICANT CHANGE UP (ref 39–50)
HGB BLD-MCNC: 13 G/DL — SIGNIFICANT CHANGE UP (ref 13–17)
MCHC RBC-ENTMCNC: 28.2 PG — SIGNIFICANT CHANGE UP (ref 27–34)
MCHC RBC-ENTMCNC: 33.2 G/DL — SIGNIFICANT CHANGE UP (ref 32–36)
MCV RBC AUTO: 84.8 FL — SIGNIFICANT CHANGE UP (ref 80–100)
NRBC # BLD: 0 /100 WBCS — SIGNIFICANT CHANGE UP (ref 0–0)
PLATELET # BLD AUTO: 370 K/UL — SIGNIFICANT CHANGE UP (ref 150–400)
POTASSIUM SERPL-MCNC: 3.9 MMOL/L — SIGNIFICANT CHANGE UP (ref 3.5–5.3)
POTASSIUM SERPL-SCNC: 3.9 MMOL/L — SIGNIFICANT CHANGE UP (ref 3.5–5.3)
PROT SERPL-MCNC: 6.6 GM/DL — SIGNIFICANT CHANGE UP (ref 6–8.3)
RBC # BLD: 4.61 M/UL — SIGNIFICANT CHANGE UP (ref 4.2–5.8)
RBC # FLD: 13.1 % — SIGNIFICANT CHANGE UP (ref 10.3–14.5)
SODIUM SERPL-SCNC: 137 MMOL/L — SIGNIFICANT CHANGE UP (ref 135–145)
WBC # BLD: 8.72 K/UL — SIGNIFICANT CHANGE UP (ref 3.8–10.5)
WBC # FLD AUTO: 8.72 K/UL — SIGNIFICANT CHANGE UP (ref 3.8–10.5)

## 2022-05-09 RX ORDER — INSULIN LISPRO 100/ML
1 VIAL (ML) SUBCUTANEOUS
Refills: 0 | Status: DISCONTINUED | OUTPATIENT
Start: 2022-05-09 | End: 2022-05-11

## 2022-05-09 RX ORDER — ALBUTEROL 90 UG/1
2 AEROSOL, METERED ORAL EVERY 6 HOURS
Refills: 0 | Status: DISCONTINUED | OUTPATIENT
Start: 2022-05-09 | End: 2022-05-11

## 2022-05-09 RX ORDER — INSULIN LISPRO 100/ML
VIAL (ML) SUBCUTANEOUS
Refills: 0 | Status: DISCONTINUED | OUTPATIENT
Start: 2022-05-09 | End: 2022-05-11

## 2022-05-09 RX ORDER — INSULIN LISPRO 100/ML
1 VIAL (ML) SUBCUTANEOUS
Refills: 0 | Status: DISCONTINUED | OUTPATIENT
Start: 2022-05-09 | End: 2022-05-09

## 2022-05-09 RX ORDER — ALPRAZOLAM 0.25 MG
1 TABLET ORAL THREE TIMES A DAY
Refills: 0 | Status: DISCONTINUED | OUTPATIENT
Start: 2022-05-09 | End: 2022-05-11

## 2022-05-09 RX ADMIN — ALBUTEROL 2 PUFF(S): 90 AEROSOL, METERED ORAL at 12:34

## 2022-05-09 RX ADMIN — Medication 1 GRAM(S): at 17:35

## 2022-05-09 RX ADMIN — MORPHINE SULFATE 2 MILLIGRAM(S): 50 CAPSULE, EXTENDED RELEASE ORAL at 19:44

## 2022-05-09 RX ADMIN — Medication 1 MILLIGRAM(S): at 21:29

## 2022-05-09 RX ADMIN — ONDANSETRON 4 MILLIGRAM(S): 8 TABLET, FILM COATED ORAL at 14:36

## 2022-05-09 RX ADMIN — MORPHINE SULFATE 2 MILLIGRAM(S): 50 CAPSULE, EXTENDED RELEASE ORAL at 03:34

## 2022-05-09 RX ADMIN — SODIUM CHLORIDE 80 MILLILITER(S): 9 INJECTION INTRAMUSCULAR; INTRAVENOUS; SUBCUTANEOUS at 03:03

## 2022-05-09 RX ADMIN — ALBUTEROL 2 PUFF(S): 90 AEROSOL, METERED ORAL at 17:35

## 2022-05-09 RX ADMIN — MORPHINE SULFATE 2 MILLIGRAM(S): 50 CAPSULE, EXTENDED RELEASE ORAL at 19:59

## 2022-05-09 RX ADMIN — MORPHINE SULFATE 2 MILLIGRAM(S): 50 CAPSULE, EXTENDED RELEASE ORAL at 07:51

## 2022-05-09 RX ADMIN — MORPHINE SULFATE 2 MILLIGRAM(S): 50 CAPSULE, EXTENDED RELEASE ORAL at 15:10

## 2022-05-09 RX ADMIN — ONDANSETRON 4 MILLIGRAM(S): 8 TABLET, FILM COATED ORAL at 21:29

## 2022-05-09 RX ADMIN — ONDANSETRON 4 MILLIGRAM(S): 8 TABLET, FILM COATED ORAL at 05:23

## 2022-05-09 RX ADMIN — MORPHINE SULFATE 2 MILLIGRAM(S): 50 CAPSULE, EXTENDED RELEASE ORAL at 03:04

## 2022-05-09 RX ADMIN — ALBUTEROL 2 PUFF(S): 90 AEROSOL, METERED ORAL at 06:35

## 2022-05-09 RX ADMIN — MORPHINE SULFATE 2 MILLIGRAM(S): 50 CAPSULE, EXTENDED RELEASE ORAL at 08:04

## 2022-05-09 RX ADMIN — PANTOPRAZOLE SODIUM 40 MILLIGRAM(S): 20 TABLET, DELAYED RELEASE ORAL at 12:31

## 2022-05-09 RX ADMIN — MORPHINE SULFATE 2 MILLIGRAM(S): 50 CAPSULE, EXTENDED RELEASE ORAL at 14:43

## 2022-05-09 RX ADMIN — Medication 1 GRAM(S): at 12:33

## 2022-05-09 NOTE — CHART NOTE - NSCHARTNOTEFT_GEN_A_CORE
Patient states that he takes albuterol inhaler and Xanax at home. Outpatient meds verified and patient is on Xanax 1mg PO 3x a day as needed for anxiety and albuterol 90mcg HFA inhaler 2 puffs every 6hrs. Xanax and albuterol inhaler ordered. Patient states that he takes albuterol inhaler and Xanax at home. Outpatient meds verified and patient is on Xanax 1mg PO 3x a day as needed for anxiety and albuterol 90mcg HFA inhaler 2 puffs every 6hrs for asthma. Xanax and albuterol inhaler ordered.

## 2022-05-09 NOTE — CONSULT NOTE ADULT - SUBJECTIVE AND OBJECTIVE BOX
Patient is a 41y old  Male who presents with a chief complaint of Abdominal pain (09 May 2022 13:40)      Reason For Consult: DM I     HPI:  40yo, BM with h/o DM1,with Insulin pump,   Gastroparesis, presenting for abd pain and vomiting for one day. Patient states that for past few hours while working overnight, he developed progressive worsening LUQ abd pain, with nausea and  vomiting. BIBEMS, s/p 15mg toradol and 4mg zofran. Has had recent previous admissions for abdominal pain.  Denies CP, SOB, PND, cough, palpitation,  fever, chills, weakness, ,dysuria, HA ,dizziness.  (08 May 2022 17:03)  dHbA1c 9.1.  Severe gastroparesis causing fluctuations in blood glucose.  Continued on insulin pump with the same basal and bolus rates    PAST MEDICAL & SURGICAL HISTORY:  DM type 1 (diabetes mellitus, type 1)    Asthma    Gastritis    Anxiety    Controlled diabetes mellitus type 1 without complications    Gastritis, presence of bleeding unspecified, unspecified chronicity, unspecified gastritis type    Uncomplicated asthma, unspecified asthma severity, unspecified whether persistent    S/P cholecystectomy    History of cholecystectomy        FAMILY HISTORY:  Family history of diabetes mellitus (Father, Mother)    Family history of diabetes mellitus (DM) (Father, Mother, Grandparent)          Social History:    MEDICATIONS  (STANDING):  ALBUTerol    90 MICROgram(s) HFA Inhaler 2 Puff(s) Inhalation every 6 hours  dextrose 5%. 1000 milliLiter(s) (50 mL/Hr) IV Continuous <Continuous>  dextrose 50% Injectable 25 Gram(s) IV Push once  dextrose 50% Injectable 12.5 Gram(s) IV Push once  dextrose 50% Injectable 25 Gram(s) IV Push once  enoxaparin Injectable 40 milliGRAM(s) SubCutaneous every 24 hours  glucagon  Injectable 1 milliGRAM(s) IntraMuscular once  insulin lispro (ADMELOG) corrective regimen sliding scale   SubCutaneous three times a day before meals  insulin lispro (ADMELOG) Pump 1 Each SubCutaneous Continuous Pump  ondansetron Injectable 4 milliGRAM(s) IV Push every 8 hours  pantoprazole  Injectable 40 milliGRAM(s) IV Push daily  sodium chloride 0.9%. 1000 milliLiter(s) (80 mL/Hr) IV Continuous <Continuous>  sucralfate 1 Gram(s) Oral every 6 hours    MEDICATIONS  (PRN):  acetaminophen     Tablet .. 650 milliGRAM(s) Oral every 6 hours PRN Temp greater or equal to 38C (100.4F), Mild Pain (1 - 3)  ALPRAZolam 1 milliGRAM(s) Oral three times a day PRN Anxiety  aluminum hydroxide/magnesium hydroxide/simethicone Suspension 30 milliLiter(s) Oral every 4 hours PRN Dyspepsia  dextrose Oral Gel 15 Gram(s) Oral once PRN Blood Glucose LESS THAN 70 milliGRAM(s)/deciliter  melatonin 3 milliGRAM(s) Oral at bedtime PRN Insomnia  morphine  - Injectable 2 milliGRAM(s) IV Push every 4 hours PRN Moderate Pain (4 - 6)      REVIEW OF SYSTEMS:  CONSTITUTIONAL:  as per HPI  HEENT:  Eyes:  No diplopia or blurred vision.   ENT:  No earache, sore throat or runny nose.  CARDIOVASCULAR:  No chest pain .  RESPIRATORY:  No cough, shortness of breath, PND or orthopnea.  GASTROINTESTINAL:  No nausea, vomiting or diarrhea.  GENITOURINARY:  No dysuria, frequency or urgency. No Blood in urine  MUSCULOSKELETAL:  no joint aches, no muscle pain, myalgia  SKIN:  No change in skin, hair or nails.  NEUROLOGIC:  No paresthesias, fasciculations, seizures or weakness.  PSYCHIATRIC:  No disorder of thought or mood.  ENDOCRINE:  No heat or cold intolerance, polyuria or polydipsia. abnormal weight gain or loss, oral thrush  HEMATOLOGICAL:  No easy bruising or bleeding.     T(C): 36.7 (05-09-22 @ 17:16), Max: 37.1 (05-09-22 @ 11:16)  HR: 72 (05-09-22 @ 17:16) (72 - 76)  BP: 113/54 (05-09-22 @ 17:16) (113/54 - 135/84)  RR: 18 (05-09-22 @ 17:16) (17 - 18)  SpO2: 96% (05-09-22 @ 17:16) (95% - 98%)  Wt(kg): --    PHYSICAL EXAM:  GENERAL: NAD, well-groomed, well-developed  HEAD:  Atraumatic, Normocephalic  EYES: PERRLA, conjunctiva and sclera clear  ENMT: No  exudates,, Moist mucous membranes,, No lesions  NECK: Supple, No JVD,   NERVOUS SYSTEM:  Alert & Oriented   CHEST/LUNG: Clear to percussion bilaterally; No rales, rhonchi, wheezing, or rubs  HEART: Regular rate and rhythm; No murmurs, rubs, or gallops  ABDOMEN: Soft, Nontender, Nondistended; Bowel sounds present  EXTREMITIES:  2+ Peripheral Pulses, No clubbing, cyanosis, or edema  LYMPH: No lymphadenopathy noted  SKIN: No rashes or lesions    CAPILLARY BLOOD GLUCOSE      POCT Blood Glucose.: 221 mg/dL (09 May 2022 21:26)  POCT Blood Glucose.: 243 mg/dL (09 May 2022 16:29)  POCT Blood Glucose.: 198 mg/dL (09 May 2022 11:04)  POCT Blood Glucose.: 146 mg/dL (09 May 2022 07:42)                            13.0   8.72  )-----------( 370      ( 09 May 2022 06:25 )             39.1       CMP:  05-09 @ 06:25  SGPT 30  Albumin 3.2   Alk Phos 94   Anion Gap 11   SGOT 21   Total Bili 0.5   BUN 21   Calcium Total 8.6   CO2 20   Chloride 106   Creatinine 0.95   eGFR if AA --   eGFR if non AA --   Glucose 157   Potassium 3.9   Protein 6.6   Sodium 137      Thyroid Function Tests:      Diabetes Tests:     Parathyroids:     Adrenals:       Radiology:

## 2022-05-09 NOTE — CONSULT NOTE ADULT - PROBLEM SELECTOR RECOMMENDATION 9
Continue with the current  regimen while inpatient   short frequent meals and low glycemic index carbohydrates will help stabilize blood glucose and eliminate fluctuations   while inpatient, finger sticks should be 100-180     Thank You for the courtesy of this consultation !!!

## 2022-05-10 LAB
ANION GAP SERPL CALC-SCNC: 8 MMOL/L — SIGNIFICANT CHANGE UP (ref 5–17)
BUN SERPL-MCNC: 9 MG/DL — SIGNIFICANT CHANGE UP (ref 7–23)
CALCIUM SERPL-MCNC: 8.6 MG/DL — SIGNIFICANT CHANGE UP (ref 8.5–10.1)
CHLORIDE SERPL-SCNC: 108 MMOL/L — SIGNIFICANT CHANGE UP (ref 96–108)
CO2 SERPL-SCNC: 25 MMOL/L — SIGNIFICANT CHANGE UP (ref 22–31)
CREAT SERPL-MCNC: 0.88 MG/DL — SIGNIFICANT CHANGE UP (ref 0.5–1.3)
EGFR: 111 ML/MIN/1.73M2 — SIGNIFICANT CHANGE UP
GLUCOSE BLDC GLUCOMTR-MCNC: 125 MG/DL — HIGH (ref 70–99)
GLUCOSE BLDC GLUCOMTR-MCNC: 131 MG/DL — HIGH (ref 70–99)
GLUCOSE BLDC GLUCOMTR-MCNC: 135 MG/DL — HIGH (ref 70–99)
GLUCOSE BLDC GLUCOMTR-MCNC: 140 MG/DL — HIGH (ref 70–99)
GLUCOSE SERPL-MCNC: 133 MG/DL — HIGH (ref 70–99)
HCT VFR BLD CALC: 35.5 % — LOW (ref 39–50)
HGB BLD-MCNC: 12.2 G/DL — LOW (ref 13–17)
MCHC RBC-ENTMCNC: 28.5 PG — SIGNIFICANT CHANGE UP (ref 27–34)
MCHC RBC-ENTMCNC: 34.4 G/DL — SIGNIFICANT CHANGE UP (ref 32–36)
MCV RBC AUTO: 82.9 FL — SIGNIFICANT CHANGE UP (ref 80–100)
NRBC # BLD: 0 /100 WBCS — SIGNIFICANT CHANGE UP (ref 0–0)
PLATELET # BLD AUTO: 314 K/UL — SIGNIFICANT CHANGE UP (ref 150–400)
POTASSIUM SERPL-MCNC: 3.8 MMOL/L — SIGNIFICANT CHANGE UP (ref 3.5–5.3)
POTASSIUM SERPL-SCNC: 3.8 MMOL/L — SIGNIFICANT CHANGE UP (ref 3.5–5.3)
RBC # BLD: 4.28 M/UL — SIGNIFICANT CHANGE UP (ref 4.2–5.8)
RBC # FLD: 13 % — SIGNIFICANT CHANGE UP (ref 10.3–14.5)
SODIUM SERPL-SCNC: 141 MMOL/L — SIGNIFICANT CHANGE UP (ref 135–145)
WBC # BLD: 6.22 K/UL — SIGNIFICANT CHANGE UP (ref 3.8–10.5)
WBC # FLD AUTO: 6.22 K/UL — SIGNIFICANT CHANGE UP (ref 3.8–10.5)

## 2022-05-10 RX ADMIN — ONDANSETRON 4 MILLIGRAM(S): 8 TABLET, FILM COATED ORAL at 05:16

## 2022-05-10 RX ADMIN — MORPHINE SULFATE 2 MILLIGRAM(S): 50 CAPSULE, EXTENDED RELEASE ORAL at 05:23

## 2022-05-10 RX ADMIN — MORPHINE SULFATE 2 MILLIGRAM(S): 50 CAPSULE, EXTENDED RELEASE ORAL at 01:18

## 2022-05-10 RX ADMIN — Medication 1 GRAM(S): at 18:09

## 2022-05-10 RX ADMIN — MORPHINE SULFATE 2 MILLIGRAM(S): 50 CAPSULE, EXTENDED RELEASE ORAL at 20:34

## 2022-05-10 RX ADMIN — Medication 1 GRAM(S): at 05:21

## 2022-05-10 RX ADMIN — Medication 1 GRAM(S): at 00:13

## 2022-05-10 RX ADMIN — SODIUM CHLORIDE 80 MILLILITER(S): 9 INJECTION INTRAMUSCULAR; INTRAVENOUS; SUBCUTANEOUS at 09:10

## 2022-05-10 RX ADMIN — MORPHINE SULFATE 2 MILLIGRAM(S): 50 CAPSULE, EXTENDED RELEASE ORAL at 20:04

## 2022-05-10 RX ADMIN — MORPHINE SULFATE 2 MILLIGRAM(S): 50 CAPSULE, EXTENDED RELEASE ORAL at 14:47

## 2022-05-10 RX ADMIN — ALBUTEROL 2 PUFF(S): 90 AEROSOL, METERED ORAL at 05:15

## 2022-05-10 RX ADMIN — MORPHINE SULFATE 2 MILLIGRAM(S): 50 CAPSULE, EXTENDED RELEASE ORAL at 01:03

## 2022-05-10 RX ADMIN — Medication 1 GRAM(S): at 11:05

## 2022-05-10 RX ADMIN — MORPHINE SULFATE 2 MILLIGRAM(S): 50 CAPSULE, EXTENDED RELEASE ORAL at 09:25

## 2022-05-10 RX ADMIN — ONDANSETRON 4 MILLIGRAM(S): 8 TABLET, FILM COATED ORAL at 14:48

## 2022-05-10 RX ADMIN — ALBUTEROL 2 PUFF(S): 90 AEROSOL, METERED ORAL at 18:09

## 2022-05-10 RX ADMIN — ONDANSETRON 4 MILLIGRAM(S): 8 TABLET, FILM COATED ORAL at 22:32

## 2022-05-10 RX ADMIN — Medication 1 EACH: at 19:41

## 2022-05-10 RX ADMIN — MORPHINE SULFATE 2 MILLIGRAM(S): 50 CAPSULE, EXTENDED RELEASE ORAL at 05:38

## 2022-05-10 RX ADMIN — MORPHINE SULFATE 2 MILLIGRAM(S): 50 CAPSULE, EXTENDED RELEASE ORAL at 09:31

## 2022-05-10 RX ADMIN — PANTOPRAZOLE SODIUM 40 MILLIGRAM(S): 20 TABLET, DELAYED RELEASE ORAL at 11:04

## 2022-05-10 RX ADMIN — ALBUTEROL 2 PUFF(S): 90 AEROSOL, METERED ORAL at 11:05

## 2022-05-10 RX ADMIN — ALBUTEROL 2 PUFF(S): 90 AEROSOL, METERED ORAL at 00:13

## 2022-05-10 RX ADMIN — MORPHINE SULFATE 2 MILLIGRAM(S): 50 CAPSULE, EXTENDED RELEASE ORAL at 16:13

## 2022-05-10 RX ADMIN — SODIUM CHLORIDE 80 MILLILITER(S): 9 INJECTION INTRAMUSCULAR; INTRAVENOUS; SUBCUTANEOUS at 05:14

## 2022-05-10 NOTE — DIETITIAN INITIAL EVALUATION ADULT - PHYSCIAL ASSESSMENT
Pt with no physical signs of muscle wasting or fat depletion, except for mild temporal and orbital depletion

## 2022-05-10 NOTE — DIETITIAN INITIAL EVALUATION ADULT - BODY MASS INDEX
Procedure:  Skin tag removal  With the patient is a supine position, the skin was scrubbed with alcohol.   Anesthesia was obtained by injecting 2 mL of 1% Xylocaine with Epinephrine around the neck in multiple areas bilaterally on neck and back of neck also
24.7

## 2022-05-10 NOTE — DIETITIAN INITIAL EVALUATION ADULT - PERTINENT LABORATORY DATA
05-10    141  |  108  |  9   ----------------------------<  133<H>  3.8   |  25  |  0.88    Ca    8.6      10 May 2022 07:33    TPro  6.6  /  Alb  3.2<L>  /  TBili  0.5  /  DBili  x   /  AST  21  /  ALT  30  /  AlkPhos  94  05-09  POCT Blood Glucose.: 135 mg/dL (05-10-22 @ 11:08)  A1C with Estimated Average Glucose Result: 9.1 % (05-09-22 @ 12:03)  A1C with Estimated Average Glucose Result: 8.2 % (03-07-22 @ 19:43)  A1C with Estimated Average Glucose Result: 8.2 % (03-07-22 @ 16:28)

## 2022-05-10 NOTE — DIETITIAN INITIAL EVALUATION ADULT - NUTRITION DIAGNOSITC TERMINOLOGY #1
The patient is a 89 year old female with a history of HL, DVT, breast cancer, colon cancer s/p colostomy, hypothyroidism, dementia who is admitted with urosepsis.    Plan:  - Off of cardiac meds (i.e. labetalol)  - Plan for hospice evaluation [employer] Intake

## 2022-05-10 NOTE — PROGRESS NOTE ADULT - TIME BILLING
Diet tolerated and advanced   Discharge planning  Continue current care and treatment.
Endocrine consult  Continue current care and treatment.

## 2022-05-10 NOTE — DIETITIAN INITIAL EVALUATION ADULT - PERTINENT MEDS FT
MEDICATIONS  (STANDING):  ALBUTerol    90 MICROgram(s) HFA Inhaler 2 Puff(s) Inhalation every 6 hours  dextrose 5%. 1000 milliLiter(s) (50 mL/Hr) IV Continuous <Continuous>  dextrose 50% Injectable 25 Gram(s) IV Push once  dextrose 50% Injectable 12.5 Gram(s) IV Push once  dextrose 50% Injectable 25 Gram(s) IV Push once  enoxaparin Injectable 40 milliGRAM(s) SubCutaneous every 24 hours  glucagon  Injectable 1 milliGRAM(s) IntraMuscular once  insulin lispro (ADMELOG) corrective regimen sliding scale   SubCutaneous three times a day before meals  insulin lispro (ADMELOG) Pump 1 Each SubCutaneous Continuous Pump  ondansetron Injectable 4 milliGRAM(s) IV Push every 8 hours  pantoprazole  Injectable 40 milliGRAM(s) IV Push daily  sodium chloride 0.9%. 1000 milliLiter(s) (80 mL/Hr) IV Continuous <Continuous>  sucralfate 1 Gram(s) Oral every 6 hours    MEDICATIONS  (PRN):  acetaminophen     Tablet .. 650 milliGRAM(s) Oral every 6 hours PRN Temp greater or equal to 38C (100.4F), Mild Pain (1 - 3)  ALPRAZolam 1 milliGRAM(s) Oral three times a day PRN Anxiety  aluminum hydroxide/magnesium hydroxide/simethicone Suspension 30 milliLiter(s) Oral every 4 hours PRN Dyspepsia  dextrose Oral Gel 15 Gram(s) Oral once PRN Blood Glucose LESS THAN 70 milliGRAM(s)/deciliter  melatonin 3 milliGRAM(s) Oral at bedtime PRN Insomnia  morphine  - Injectable 2 milliGRAM(s) IV Push every 4 hours PRN Moderate Pain (4 - 6)

## 2022-05-10 NOTE — DIETITIAN INITIAL EVALUATION ADULT - DIET TYPE
fiber/residue restricted/low fat/consistent carbohydrate (evening snack) As medically feasible, advance diet as tolerated to Consistent Carbohydrate (with evening snack), Low fiber, Low fat diet

## 2022-05-11 ENCOUNTER — TRANSCRIPTION ENCOUNTER (OUTPATIENT)
Age: 42
End: 2022-05-11

## 2022-05-11 VITALS
RESPIRATION RATE: 17 BRPM | SYSTOLIC BLOOD PRESSURE: 128 MMHG | HEART RATE: 62 BPM | OXYGEN SATURATION: 97 % | TEMPERATURE: 98 F | DIASTOLIC BLOOD PRESSURE: 82 MMHG

## 2022-05-11 LAB
GLUCOSE BLDC GLUCOMTR-MCNC: 145 MG/DL — HIGH (ref 70–99)
GLUCOSE BLDC GLUCOMTR-MCNC: 174 MG/DL — HIGH (ref 70–99)

## 2022-05-11 RX ORDER — PANTOPRAZOLE SODIUM 20 MG/1
1 TABLET, DELAYED RELEASE ORAL
Qty: 30 | Refills: 0
Start: 2022-05-11 | End: 2022-06-09

## 2022-05-11 RX ORDER — SUCRALFATE 1 G
1 TABLET ORAL
Qty: 120 | Refills: 0
Start: 2022-05-11 | End: 2022-06-09

## 2022-05-11 RX ORDER — ONDANSETRON 8 MG/1
1 TABLET, FILM COATED ORAL
Qty: 90 | Refills: 0
Start: 2022-05-11 | End: 2022-06-09

## 2022-05-11 RX ADMIN — MORPHINE SULFATE 2 MILLIGRAM(S): 50 CAPSULE, EXTENDED RELEASE ORAL at 13:09

## 2022-05-11 RX ADMIN — ALBUTEROL 2 PUFF(S): 90 AEROSOL, METERED ORAL at 00:25

## 2022-05-11 RX ADMIN — Medication 1 MILLIGRAM(S): at 01:23

## 2022-05-11 RX ADMIN — ALBUTEROL 2 PUFF(S): 90 AEROSOL, METERED ORAL at 06:23

## 2022-05-11 RX ADMIN — ONDANSETRON 4 MILLIGRAM(S): 8 TABLET, FILM COATED ORAL at 06:23

## 2022-05-11 RX ADMIN — Medication 1 GRAM(S): at 00:02

## 2022-05-11 RX ADMIN — Medication 1 GRAM(S): at 12:07

## 2022-05-11 RX ADMIN — MORPHINE SULFATE 2 MILLIGRAM(S): 50 CAPSULE, EXTENDED RELEASE ORAL at 12:09

## 2022-05-11 RX ADMIN — ENOXAPARIN SODIUM 40 MILLIGRAM(S): 100 INJECTION SUBCUTANEOUS at 12:08

## 2022-05-11 RX ADMIN — Medication 1 GRAM(S): at 06:23

## 2022-05-11 RX ADMIN — PANTOPRAZOLE SODIUM 40 MILLIGRAM(S): 20 TABLET, DELAYED RELEASE ORAL at 12:07

## 2022-05-11 RX ADMIN — MORPHINE SULFATE 2 MILLIGRAM(S): 50 CAPSULE, EXTENDED RELEASE ORAL at 00:02

## 2022-05-11 RX ADMIN — MORPHINE SULFATE 2 MILLIGRAM(S): 50 CAPSULE, EXTENDED RELEASE ORAL at 00:32

## 2022-05-11 RX ADMIN — ALBUTEROL 2 PUFF(S): 90 AEROSOL, METERED ORAL at 12:18

## 2022-05-11 NOTE — DISCHARGE NOTE NURSING/CASE MANAGEMENT/SOCIAL WORK - PATIENT PORTAL LINK FT
You can access the FollowMyHealth Patient Portal offered by [facility] by registering at the following website: [email or web address]. By joining [employer]’s FollowMyHealth portal, you will also be able to view your health information using other applications (apps) compatible with our system.

## 2022-05-11 NOTE — DISCHARGE NOTE PROVIDER - PROVIDER TOKENS
PROVIDER:[TOKEN:[[id number]],FOLLOWUP:[2 weeks]],PROVIDER:[TOKEN:[[id number]],FOLLOWUP:[1 week]]

## 2022-05-11 NOTE — DISCHARGE NOTE PROVIDER - NSDCMRMEDTOKEN_GEN_ALL_CORE_FT
Admelog 100 units/mL injectable solution: 10 unit(s) injectable 3 times a day  insulin glargine 100 units/mL subcutaneous solution: 13 unit(s) subcutaneous 2 times a day   ProAir HFA 90 mcg/inh inhalation aerosol: 2 puff(s) inhaled 4 times a day, As Needed -for shortness of breath and/or wheezing   Protonix 40 mg oral delayed release tablet: 1 tab(s) orally once a day   sucralfate 1 g oral tablet: 1 tab(s) orally every 6 hours  Xanax 1 mg oral tablet: 1 tab(s) orally 3 times a day, As Needed  Zofran 4 mg oral tablet: 1 tab(s) orally every 8 hours

## 2022-05-11 NOTE — DISCHARGE NOTE PROVIDER - HOSPITAL COURSE
History of Present Illness:   40yo, BM with h/o DM1,with Insulin pump,   Gastroparesis, presenting for abd pain and vomiting for one day. Patient states that for past few hours while working overnight, he developed progressive worsening LUQ abd pain, with nausea and  vomiting. BIBEMS, s/p 15mg toradol and 4mg zofran. Has had recent previous admissions for abdominal pain.  Denies CP, SOB, PND, cough, palpitation,  fever, chills, weakness, ,dysuria, HA ,dizziness.       Admitted for Gastroparesis with DM1    Problem/Plan - 1:  ·  Problem: Gastroparesis due to DM.   ·  Plan: Clear liquid diet  IV Zofran  PPI, Carafate  Pain Management  FS   Monitoring.  Diet tolerated and advanced   Discharge planning    Endocrine following·  Problem: Poor control type I diabetes mellitus.   ·  Plan: Continue with the current  regimen while inpatient   continue with insulin pump with same setting   short frequent meals and low glycemic index carbohydrates will help stabilize blood glucose and eliminate fluctuations   aggressive treatment of gastroparesis.

## 2022-05-11 NOTE — DISCHARGE NOTE PROVIDER - NSDCCPCAREPLAN_GEN_ALL_CORE_FT
PRINCIPAL DISCHARGE DIAGNOSIS  Diagnosis: Gastroparesis due to DM  Assessment and Plan of Treatment: Medication; Carfate, Zofran, Protonix  Endocrine follow up in 1 week.

## 2022-05-11 NOTE — PROGRESS NOTE ADULT - PROBLEM SELECTOR PROBLEM 1
Poor control type I diabetes mellitus
Poor control type I diabetes mellitus
Gastroparesis due to DM
Gastroparesis due to DM

## 2022-05-11 NOTE — PROGRESS NOTE ADULT - ASSESSMENT
diabetes 1 
diabetes   type 1  Gastroparesis
Admitted for Gastroparesis with DM1
Admitted for Gastroparesis with DM1

## 2022-05-11 NOTE — DISCHARGE NOTE PROVIDER - NSDCHOSPICE_GEN_A_CORE
[facility], [place] MICHAELLE  [address]  [address]  [place] [address]  [phone]  Dept: [phone]    PRE-OP EVALUATION:  Today's date: 2019    Genie Persaud (: 1937) presents for pre-operative evaluation assessment as requested by Dr. Mccartney.  She requires evaluation and anesthesia risk assessment prior to undergoing surgery/procedure for treatment of plastic surgery on her nose  due to basal cell .    Proposed Surgery/ Procedure: 19  Date of Surgery/ Procedure: 19  Time of Surgery/ Procedure: 11:30  Hospital/Surgical Facility: [facility]  Fax number for surgical facility: [phone]  Primary Physician: Layo Sevilla  Type of Anesthesia Anticipated: Local    Patient has a Health Care Directive or Living Will:  YES     1. NO - Do you have a history of heart attack, stroke, stent, bypass or surgery on an artery in the head, neck, heart or legs?  2. NO - Do you ever have any pain or discomfort in your chest?  3. NO - Do you have a history of  Heart Failure?  4. yes - Are you troubled by shortness of breath when: walking on the level, up a slight hill or at night?, from asthma  5. NO - Do you currently have a cold, bronchitis or other respiratory infection?  6. Yes - Do you have a cough, shortness of breath or wheezing? From asthma; stable.   7. NO - Do you sometimes get pains in the calves of your legs when you walk?  8. NO - Do you or anyone in your family have previous history of blood clots?  9. Yes - Do you or does anyone in your family have a serious bleeding problem such as prolonged bleeding following surgeries or cuts?   10. Yes - Have you ever had problems with anemia or been told to take iron pills?  Last hemoglobin 11.4  11. NO - Have you had any abnormal blood loss such as black, tarry or bloody stools, or abnormal vaginal bleeding?  12. Yes - Have you ever had a blood transfusion? After 3rd child.   13. NO - Have you or any of your relatives ever had  problems with anesthesia?  14. NO - Do you have sleep apnea, excessive snoring or daytime drowsiness?  15. NO - Do you have any prosthetic heart valves?  16. NO - Do you have prosthetic joints?  17. NO - Is there any chance that you may be pregnant?      HPI:     HPI related to upcoming procedure: closure of BCC lesion.       See problem list for active medical problems.  Problems all longstanding and stable, except as noted/documented.  See ROS for pertinent symptoms related to these conditions.      Last echocardiogram 9/18    Interpretation Summary     The visual ejection fraction is estimated at 55%.  Diastolic Doppler findings (E/E' ratio and/or other parameters) suggest left  ventricular filling pressures are increased.  There is borderline inferolateral wall hypokinesis.  The left atrium is moderately dilated.  The right ventricular systolic pressure is approximated at 40-50mmHg plus the  right atrial pressure.  Right ventricular systolic pressure is elevated, consistent with moderate  pulmonary hypertension.  Normal IVC (1.5-2.5cm) with >50% respiratory collapse; right atrial pressure  is estimated at 5-10mmHg.  There is mild to moderate (1-2+) aortic regurgitation.  No hemodynamically significant valvular aortic stenosis.  The ascending aorta is Moderately dilated.  Sinus Valsalva 32mm, Asc aorta 43-44 mm    Last Ct Chest 7/2018:  CTA CHEST WITH CONTRAST   7/30/2018 3:57 PM      HISTORY:  Thoracic aortic aneurysm without rupture (H).     TECHNIQUE:  CT angiogram of the chest was performed following the  administration of 80 mL Isovue-370. Radiation dose for this scan was  reduced using automated exposure control, adjustment of the mA and/or  kV according to patient size, or iterative reconstruction technique.  3-D reconstructions were also performed.     COMPARISON:  CT chest dated 9/22/2015     FINDINGS:  The thoracic aorta at the level of the sinuses of Valsalva  has a maximum diameter of approximately  3.0 cm. The ascending thoracic  aorta has a maximum diameter of approximately 4.4 cm. The diameter at  the level of the mid arch is approximately 3.2 cm. The diameter of the  proximal descending thoracic aorta is approximately 2.8 cm and of the  distal descending thoracic aorta is approximately 2.1 cm.     The thoracic aortic takeoff vessels are patent. Incidental note is  made of a direct origin of the left vertebral artery from the aortic  arch. This is a normal variant.     Soft tissues: A 3 mm nodule in the left lower lobe is stable.  Atelectatic changes in the right middle lobe and lingula are  identified. There are diffuse COPD changes within the lungs.                                                                      IMPRESSION:  Ascending thoracic aortic aneurysm with a maximum  diameter of 4.4 cm.    MEDICAL HISTORY:     Patient Active Problem List    Diagnosis Date Noted     Stress-induced cardiomyopathy 11/16/2017     Priority: High     Class: Acute     Dizziness 06/10/2019     Priority: Medium     Nausea 06/10/2019     Priority: Medium     Skin infection 08/01/2018     Priority: Medium     Osteopenia of multiple sites 07/20/2018     Priority: Medium     Hip pain, right 04/04/2018     Priority: Medium     Lumbago 04/04/2018     Priority: Medium     Peripheral edema 01/18/2018     Priority: Medium     Paroxysmal atrial fibrillation (H) 12/26/2017     Priority: Medium     Resolved; off anticoagulation; still on beta blocker.       Nonrheumatic aortic valve insufficiency 06/29/2017     Priority: Medium     SVT -noted during Cath procedure 03/28/2017     Priority: Medium     Coronary artery disease involving native coronary artery of native heart without angina pectoris 03/09/2017     Priority: Medium     3/2/2017 - Two vessel coronary artery disease with mLAD 50% stenosis, D3 50% stenosis, pLCx 50% stenosis and mLCx 50% stenosis       Essential hypertension with goal blood pressure less than 140/90  09/01/2016     Priority: Medium     White coat hypertension;  24 hour ambulatory monitoring normal. Do not titrate up further.        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  Problem list name updated by automated process. Provider to review       Candidal esophagitis (H) 02/07/2011     Priority: Medium     Developed on advair disk; changed to advair HFA.       Impaired fasting glucose 08/26/2010     Priority: Medium     HYPERLIPIDEMIA LDL GOAL <130 02/10/2010     Priority: Medium     Vasculitis (H) 04/20/2009     Priority: Medium     Possible diagnosis only; Slight increased activity of thoracic aorta, (on PET scan w/u for pulmonary nodule.)       Anemia 03/10/2009     Priority: Medium     Chronic disease, by Fe studies; awaiting full GI w/u and repeat colonoscopy, ERCP.       Pulmonary nodule 03/03/2009     Priority: Medium     Repeat CT scan in 3/18.  In past, PET scan indeterminate; biopsy not possible.       Swelling, mass, or lump in head and neck 11/20/2004     Priority: Medium     ENT follow up--mass possible thyroid mass versus lymph node.          Past Medical History:   Diagnosis Date     Anemia 3/10/2009    Chronic disease, by Fe studies; awaiting full GI w/u and repeat colonoscopy, ERCP.     Basal Cell Carcinoma--back      Coronary artery disease involving native coronary artery of native heart without angina pectoris 3/9/2017    3/2/2017 - Two vessel coronary artery disease with mLAD 50% stenosis, D3 50% stenosis, pLCx 50% stenosis and mLCx 50% stenosis     Essential hypertension with goal blood pressure less than 140/90 9/1/2016    White coat hypertension;  24 hour ambulatory monitoring normal. Do not titrate up further.        Hyperlipidemia LDL goal <130 2/10/2010     Impaired fasting glucose 8/26/2010     Moderate persistent asthma      Nonrheumatic aortic valve insufficiency 6/29/2017     osteopenia      Paroxysmal atrial fibrillation (H) 12/26/2017     Pulmonary nodule 3/3/2009    PET scan reportedly normal; biopsy not advised.     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  Problem list name updated by automated process. Provider to review     Unspecified arthropathy, hand      Vasculitis (H) 4/20/2009    Possible increased activity of thoracic aorta, on PET scan w/u for pulmonary nodule.      Past Surgical History:   Procedure Laterality Date     C APPENDECTOMY  1957     C LIGATE FALLOPIAN TUBE  1971     C STEREOTACTIC BREAST BIOPSY  2001     CHOLECYSTECTOMY, LAPOROSCOPIC  2008    Cholecystectomy, Laparoscopic     ESOPHAGOSCOPY, GASTROSCOPY, DUODENOSCOPY (EGD), COMBINED  5/17/2013    Procedure: COMBINED ESOPHAGOSCOPY, GASTROSCOPY, DUODENOSCOPY (EGD), BIOPSY SINGLE OR MULTIPLE;  ESOPHAGOSCOPY, GASTROSCOPY, DUODENOSCOPY (EGD)  with bx;  Surgeon: Jeffery Yanez MD;  Location:  GI     HC DILATION/CURETTAGE DIAG/THER NON OB  1967,1971     HC REMOVE TONSILS/ADENOIDS,<11 Y/O       Current Outpatient Medications   Medication Sig Dispense Refill     acetaminophen (TYLENOL) 500 MG tablet Take 500-1,000 mg by mouth every 8 hours as needed for mild pain       albuterol (PROAIR HFA, PROVENTIL HFA, VENTOLIN HFA) 108 (90 BASE) MCG/ACT inhaler Inhale 2 puffs into the lungs every 6 hours as needed        aspirin 81 MG EC tablet Take 1 tablet (81 mg) by mouth daily       azelastine (ASTELIN) 0.1 % nasal spray SPRAY 1 TO 2 SPRAYS INTO  BOTH NOSTRILS 2 TIMES DAILY 30 mL 4     calcium citrate-vitamin D (CALCIUM CITRATE +) 315-200 MG-UNIT TABS Take 2 tablets by mouth every evening        fluticasone-salmeterol (ADVAIR-HFA) 230-21 MCG/ACT inhaler Inhale 2 puffs into the lungs 2 times daily 12 g 1     hydrochlorothiazide (MICROZIDE) 12.5 MG capsule Take 1 capsule (12.5 mg) by mouth daily 90 capsule 3     ipratropium (ATROVENT) 0.03 % spray Spray 2 sprays in nostril 2 times daily       latanoprost (XALATAN) 0.005 % ophthalmic solution Place 1 drop into both eyes At Bedtime       levocetirizine (XYZAL) 5 MG tablet Take 5 mg by mouth daily       losartan (COZAAR) 100 MG tablet TAKE ONE TABLET BY MOUTH ONE TIME DAILY  90 tablet 3     metoprolol succinate (TOPROL XL) 100 MG 24 hr tablet Take 1 tablet (100 mg) by mouth daily 90 tablet 3     omeprazole (PRILOSEC) 20 MG DR capsule Take 1 capsule (20 mg) by mouth daily *due for office visit before next refill. 90 capsule 3     simvastatin (ZOCOR) 20 MG tablet Take 1 tablet (20 mg) by mouth At Bedtime 90 tablet 1     PREDNISONE PO As needed for asthma flares       OTC products: None, except as noted above    Allergies   Allergen Reactions     Fosamax [Alendronic Acid] GI Disturbance     Augmentin [Amoxicillin-Pot Clavulanate] Diarrhea     Diarrhea and rash     Evista [Raloxifene]      abd symptoms.      Flu Virus Vaccine      swollen and red at inj site      Latex Allergy: NO    Social History     Tobacco Use     Smoking status: Never Smoker     Smokeless tobacco: Never Used   Substance Use Topics     Alcohol use: Yes     Alcohol/week: 0.6 - 1.2 oz     Types: 1 - 2 Standard drinks or equivalent per week     Comment: 1 glass of wine 1-2 days per week     History   Drug Use No       REVIEW OF SYSTEMS:   CONSTITUTIONAL: NEGATIVE for fever, chills, change in weight  INTEGUMENTARY/SKIN: NEGATIVE for worrisome rashes, moles or lesions  EYES: NEGATIVE for vision changes or irritation  ENT/MOUTH: NEGATIVE for ear, mouth  and throat problems  RESP: NEGATIVE for significant cough or SOB  BREAST: NEGATIVE for masses, tenderness or discharge  CV: NEGATIVE for chest pain, palpitations or peripheral edema  GI: NEGATIVE for nausea, abdominal pain, heartburn, or change in bowel habits  : NEGATIVE for frequency, dysuria, or hematuria  MUSCULOSKELETAL: NEGATIVE for significant arthralgias or myalgia  NEURO: NEGATIVE for weakness, dizziness or paresthesias  ENDOCRINE: NEGATIVE for temperature intolerance, skin/hair changes  HEME: NEGATIVE for bleeding problems  PSYCHIATRIC: NEGATIVE for changes in mood or affect    EXAM:   BP (!) 198/62 (BP Location: Right arm, Patient Position: Chair, Cuff Size: Adult Regular)   Pulse 71   Temp 97.8  F (36.6  C) (Oral)   Wt 62.7 kg (138 lb 4.8 oz)   LMP  (LMP Unknown)   SpO2 96%   BMI 25.30 kg/m      GENERAL APPEARANCE: healthy, alert and no distress     EYES: EOMI, PERRL     HENT: ear canals and TM's normal and nose and mouth without ulcers or lesions     NECK: no adenopathy, no asymmetry, masses, or scars and thyroid normal to palpation     RESP: lungs clear to auscultation - no rales, rhonchi or wheezes     CV: regular rates and rhythm, normal S1 S2, no S3 or S4 and no murmur, click or rub     ABDOMEN:  soft, nontender, no HSM or masses and bowel sounds normal     MS: extremities normal- no gross deformities noted, no evidence of inflammation in joints, FROM in all extremities.     SKIN: no suspicious lesions or rashes     NEURO: Normal strength and tone, sensory exam grossly normal, mentation intact and speech normal     PSYCH: mentation appears normal. and affect normal/bright     LYMPHATICS: No cervical adenopathy    DIAGNOSTICS:   EKG: appears normal, NSR, normal axis, normal intervals, no acute ST/T changes c/w ischemia, no LVH by voltage criteria, unchanged from previous tracings    Recent Labs   Lab Test 06/10/19  1112 05/21/19  1109 02/28/19  1014  08/16/18  1025 08/02/18  0612   HGB  --   11.4*  --   --  11.7 9.7*   PLT  --  258  --   --   --  208   NA  --  138 136   < >  --   --    POTASSIUM  --  4.4 4.7   < >  --   --    CR  --  1.21* 1.03   < >  --   --    A1C 6.2*  --   --   --   --   --     < > = values in this interval not displayed.        IMPRESSION:   Reason for surgery/procedure: surgical closure of BCC lesion.   Diagnosis/reason for consult: preoperative evaluation     The proposed surgical procedure is considered INTERMEDIATE risk.    REVISED CARDIAC RISK INDEX  The patient has the following serious cardiovascular risks for perioperative complications such as (MI, PE, VFib and 3  AV Block):  No serious cardiac risks  INTERPRETATION: 0 risks: Class I (very low risk - 0.4% complication rate)    The patient has the following additional risks for perioperative complications:  No identified additional risks      ICD-10-CM    1. Preop general physical exam Z01.818 EKG 12-lead complete w/read - Clinics   2. BCC (basal cell carcinoma), face C44.310    3. Paroxysmal atrial fibrillation (H) I48.0    4. Osteopenia of multiple sites M85.89    5. Essential hypertension with goal blood pressure less than 140/90 I10    6. Candidal esophagitis (H) B37.81    7. Coronary artery disease involving native coronary artery of native heart without angina pectoris I25.10        RECOMMENDATIONS:     (Z01.818) Preop general physical exam  (primary encounter diagnosis)  Comment:   Plan: EKG 12-lead complete w/read - Clinics, CBC with        platelets, Basic metabolic panel  (Ca, Cl, CO2,        Creat, Gluc, K, Na, BUN)        Advised to stop all aspirin products and nonsteroidals (like ibuprofen or naproxen) for 10 days prior to procedure.  Advised follow surgical center's instructions re: arrival time and when to stop eating.     (C44.310) BCC (basal cell carcinoma), face  Comment:   Plan: Surgical and post-op care per primary surgical service.      (I48.0) Paroxysmal atrial fibrillation (H)  Comment:   Plan: No  further anticoagulation, as last cardiolgy evaluation showed no A fib. electrocardiogram wihtout Afib today either.  Continue rate control with beta blocker.     (M85.89) Osteopenia of multiple sites  Comment:   Plan: Calcium and vitamin D     (I10) Essential hypertension with goal blood pressure less than 140/90  Comment:   Plan: High today.  Add calcium channel blocker and follow up in 2 days for blood pressure recheck.     (B37.81) Candidal esophagitis (H)  Comment: not active.   Plan:     (I25.10) Coronary artery disease involving native coronary artery of native heart without angina pectoris  Comment:   Plan: secondary risk factor modification.        --Patient is to take all scheduled medications on the day of surgery EXCEPT for modifications listed below.    APPROVAL GIVEN to proceed with proposed procedure, without further diagnostic evaluation       Signed Electronically by: Layo Sevilla MD    Copy of this evaluation report is provided to requesting physician.    [place] Preop Guidelines    Revised Cardiac Risk Index   No

## 2022-05-11 NOTE — PROGRESS NOTE ADULT - SUBJECTIVE AND OBJECTIVE BOX
Patient is a 41y old  Male who presents with a chief complaint of Abdominal pain (09 May 2022 22:49)      SUBJECTIVE/OBJECTIVE:    Without complaints. Patient resting comfortably.     MEDICATIONS  (STANDING):  ALBUTerol    90 MICROgram(s) HFA Inhaler 2 Puff(s) Inhalation every 6 hours  dextrose 5%. 1000 milliLiter(s) (50 mL/Hr) IV Continuous <Continuous>  dextrose 50% Injectable 25 Gram(s) IV Push once  dextrose 50% Injectable 12.5 Gram(s) IV Push once  dextrose 50% Injectable 25 Gram(s) IV Push once  enoxaparin Injectable 40 milliGRAM(s) SubCutaneous every 24 hours  glucagon  Injectable 1 milliGRAM(s) IntraMuscular once  insulin lispro (ADMELOG) corrective regimen sliding scale   SubCutaneous three times a day before meals  insulin lispro (ADMELOG) Pump 1 Each SubCutaneous Continuous Pump  ondansetron Injectable 4 milliGRAM(s) IV Push every 8 hours  pantoprazole  Injectable 40 milliGRAM(s) IV Push daily  sodium chloride 0.9%. 1000 milliLiter(s) (80 mL/Hr) IV Continuous <Continuous>  sucralfate 1 Gram(s) Oral every 6 hours    MEDICATIONS  (PRN):  acetaminophen     Tablet .. 650 milliGRAM(s) Oral every 6 hours PRN Temp greater or equal to 38C (100.4F), Mild Pain (1 - 3)  ALPRAZolam 1 milliGRAM(s) Oral three times a day PRN Anxiety  aluminum hydroxide/magnesium hydroxide/simethicone Suspension 30 milliLiter(s) Oral every 4 hours PRN Dyspepsia  dextrose Oral Gel 15 Gram(s) Oral once PRN Blood Glucose LESS THAN 70 milliGRAM(s)/deciliter  melatonin 3 milliGRAM(s) Oral at bedtime PRN Insomnia  morphine  - Injectable 2 milliGRAM(s) IV Push every 4 hours PRN Moderate Pain (4 - 6)      Allergies    No Known Allergies    Intolerances          Vital Signs Last 24 Hrs  T(C): 36.9 (10 May 2022 12:24), Max: 36.9 (10 May 2022 12:24)  T(F): 98.4 (10 May 2022 12:24), Max: 98.4 (10 May 2022 12:24)  HR: 64 (10 May 2022 12:24) (64 - 72)  BP: 152/80 (10 May 2022 12:24) (113/54 - 152/80)  BP(mean): --  RR: 18 (10 May 2022 12:24) (18 - 18)  SpO2: 95% (10 May 2022 12:24) (95% - 97%)    PHYSICAL EXAM:  GENERAL: NAD, well-groomed, well-developed  HEAD:  Atraumatic, Normocephalic  EYES: EOMI, PERRLA, conjunctiva and sclera clear  ENMT: No tonsillar erythema, exudates, or enlargement; Moist mucous membranes, No lesions  NECK: Supple, No JVD, Normal thyroid  NERVOUS SYSTEM:  Alert & Oriented X3; Motor Strength 5/5 B/L upper and lower extremities; DTRs 2+ intact and symmetric  CHEST/LUNG: Clear bilaterally; No rales, rhonchi, wheezing, or rubs  HEART: Regular rate and rhythm; No murmurs, rubs, or gallops  ABDOMEN: Soft, Nontender, Nondistended; Bowel sounds present  EXTREMITIES:  2+ Peripheral Pulses, No clubbing, cyanosis, or edema  LYMPH: No lymphadenopathy noted  SKIN: No rashes or lesions    LABS:                        12.2   6.22  )-----------( 314      ( 10 May 2022 07:33 )             35.5     05-10    141  |  108  |  9   ----------------------------<  133<H>  3.8   |  25  |  0.88    Ca    8.6      10 May 2022 07:33    TPro  6.6  /  Alb  3.2<L>  /  TBili  0.5  /  DBili  x   /  AST  21  /  ALT  30  /  AlkPhos  94  05-09        CAPILLARY BLOOD GLUCOSE      POCT Blood Glucose.: 135 mg/dL (10 May 2022 11:08)  POCT Blood Glucose.: 125 mg/dL (10 May 2022 08:00)  POCT Blood Glucose.: 221 mg/dL (09 May 2022 21:26)  POCT Blood Glucose.: 243 mg/dL (09 May 2022 16:29)          RADIOLOGY & ADDITIONAL TESTS:        Consultant(s) Notes Reviewed:  [ ] YES  [ ] NO  
Patient is a 41y old  Male who presents with a chief complaint of GASTROPARESIS     (10 May 2022 15:36)      Interval History: on insulin pump and Lispro sliding scale coverage with meals in addition   fluctuations in blood glucose earlier   severe Gastroparesis     MEDICATIONS  (STANDING):  ALBUTerol    90 MICROgram(s) HFA Inhaler 2 Puff(s) Inhalation every 6 hours  dextrose 5%. 1000 milliLiter(s) (50 mL/Hr) IV Continuous <Continuous>  dextrose 50% Injectable 25 Gram(s) IV Push once  dextrose 50% Injectable 12.5 Gram(s) IV Push once  dextrose 50% Injectable 25 Gram(s) IV Push once  enoxaparin Injectable 40 milliGRAM(s) SubCutaneous every 24 hours  glucagon  Injectable 1 milliGRAM(s) IntraMuscular once  insulin lispro (ADMELOG) corrective regimen sliding scale   SubCutaneous three times a day before meals  insulin lispro (ADMELOG) Pump 1 Each SubCutaneous Continuous Pump  ondansetron Injectable 4 milliGRAM(s) IV Push every 8 hours  pantoprazole  Injectable 40 milliGRAM(s) IV Push daily  sucralfate 1 Gram(s) Oral every 6 hours    MEDICATIONS  (PRN):  acetaminophen     Tablet .. 650 milliGRAM(s) Oral every 6 hours PRN Temp greater or equal to 38C (100.4F), Mild Pain (1 - 3)  ALPRAZolam 1 milliGRAM(s) Oral three times a day PRN Anxiety  aluminum hydroxide/magnesium hydroxide/simethicone Suspension 30 milliLiter(s) Oral every 4 hours PRN Dyspepsia  dextrose Oral Gel 15 Gram(s) Oral once PRN Blood Glucose LESS THAN 70 milliGRAM(s)/deciliter  melatonin 3 milliGRAM(s) Oral at bedtime PRN Insomnia  morphine  - Injectable 2 milliGRAM(s) IV Push every 4 hours PRN Moderate Pain (4 - 6)      Allergies    Drug Allergies Not Recorded  Mayonnaise (Vomiting)    Intolerances    lactose (Diarrhea)      REVIEW OF SYSTEMS:  CONSTITUTIONAL: no changes      Vital Signs Last 24 Hrs  T(C): 36.4 (10 May 2022 17:25), Max: 36.9 (10 May 2022 12:24)  T(F): 97.5 (10 May 2022 17:25), Max: 98.4 (10 May 2022 12:24)  HR: 65 (10 May 2022 17:25) (64 - 67)  BP: 127/77 (10 May 2022 17:25) (127/77 - 152/80)  BP(mean): --  RR: 17 (10 May 2022 17:25) (17 - 18)  SpO2: 97% (10 May 2022 17:25) (95% - 97%)    PHYSICAL EXAM:  GENERAL:  deferred , as per the progress notes of Primary Team   HEAD: Atraumatic, Normocephalic      LABS:        CAPILLARY BLOOD GLUCOSE      POCT Blood Glucose.: 140 mg/dL (10 May 2022 16:40)  POCT Blood Glucose.: 135 mg/dL (10 May 2022 11:08)  POCT Blood Glucose.: 125 mg/dL (10 May 2022 08:00)  POCT Blood Glucose.: 221 mg/dL (09 May 2022 21:26)    Lipid panel:           Thyroid:  Diabetes Tests:  Parathyroid Panel:  Adrenals:  RADIOLOGY & ADDITIONAL TESTS:    Imaging Personally Reviewed:  [ ] YES  [ ] NO    Consultant(s) Notes Reviewed:  [ ] YES  [ ] NO    Care Discussed with Consultants/Other Providers [ ] YES  [ ] NO
Patient is a 41y old  Male who presents with a chief complaint of Abdominal pain (11 May 2022 12:29)      Interval History: finger sticks are stable and in 110-150 range   on insulin pump without any changes , on home settings     MEDICATIONS  (STANDING):  ALBUTerol    90 MICROgram(s) HFA Inhaler 2 Puff(s) Inhalation every 6 hours  dextrose 5%. 1000 milliLiter(s) (50 mL/Hr) IV Continuous <Continuous>  dextrose 50% Injectable 25 Gram(s) IV Push once  dextrose 50% Injectable 12.5 Gram(s) IV Push once  dextrose 50% Injectable 25 Gram(s) IV Push once  enoxaparin Injectable 40 milliGRAM(s) SubCutaneous every 24 hours  glucagon  Injectable 1 milliGRAM(s) IntraMuscular once  insulin lispro (ADMELOG) corrective regimen sliding scale   SubCutaneous three times a day before meals  insulin lispro (ADMELOG) Pump 1 Each SubCutaneous Continuous Pump  ondansetron Injectable 4 milliGRAM(s) IV Push every 8 hours  pantoprazole  Injectable 40 milliGRAM(s) IV Push daily  sucralfate 1 Gram(s) Oral every 6 hours    MEDICATIONS  (PRN):  acetaminophen     Tablet .. 650 milliGRAM(s) Oral every 6 hours PRN Temp greater or equal to 38C (100.4F), Mild Pain (1 - 3)  aluminum hydroxide/magnesium hydroxide/simethicone Suspension 30 milliLiter(s) Oral every 4 hours PRN Dyspepsia  dextrose Oral Gel 15 Gram(s) Oral once PRN Blood Glucose LESS THAN 70 milliGRAM(s)/deciliter  melatonin 3 milliGRAM(s) Oral at bedtime PRN Insomnia  morphine  - Injectable 2 milliGRAM(s) IV Push every 4 hours PRN Moderate Pain (4 - 6)      Allergies    Drug Allergies Not Recorded  Mayonnaise (Vomiting)    Intolerances    lactose (Diarrhea)      REVIEW OF SYSTEMS:  CONSTITUTIONAL: no changes  EYES: No eye pain, visual disturbances, or discharge  ENMT:  No difficulty hearing, No sinus or throat pain  NECK: No pain or stiffness  RESPIRATORY: No cough, wheezing, chills or hemoptysis; No shortness of breath  CARDIOVASCULAR: No chest pain, palpitations or leg swelling  GASTROINTESTINAL: No abdominal or epigastric pain. No nausea, vomiting, or hematemesis; No diarrhea or constipation. No melena or hematochezia.  GENITOURINARY: No dysuria, frequency, hematuria, or incontinence  NEUROLOGICAL: No headaches, memory loss, loss of strength, numbness, or tremors  SKIN: No itching, burning, rashes, or lesions   ENDOCRINE: No heat or cold intolerance; No hair loss  MUSCULOSKELETAL: No joint pain or swelling; No muscle, back, or extremity pain  PSYCHIATRIC: No depression, anxiety, mood swings, or difficulty sleeping  HEME/LYMPH: No easy bruising, or bleeding gums  ALLERY AND IMMUNOLOGIC: No hives or eczema    Vital Signs Last 24 Hrs  T(C): 36.7 (11 May 2022 11:22), Max: 37.1 (11 May 2022 00:09)  T(F): 98.1 (11 May 2022 11:22), Max: 98.8 (11 May 2022 00:09)  HR: 62 (11 May 2022 11:22) (62 - 65)  BP: 128/82 (11 May 2022 11:22) (121/71 - 160/82)  BP(mean): --  RR: 17 (11 May 2022 11:22) (17 - 18)  SpO2: 97% (11 May 2022 11:22) (96% - 97%)    PHYSICAL EXAM:  GENERAL:   HEAD: Atraumatic, Normocephalic  EYES: PERRLA, conjunctiva and sclera clear  ENMT: No  exudates,; Moist mucous membranes,, No lesions  NECK: Supple, No JVD, Normal thyroid  NERVOUS SYSTEM:  Alert & Oriented,   CHEST/LUNG: Clear to auscultation bilaterally; No rales, rhonchi, wheezing, or rubs  HEART: Regular rate and rhythm; No murmurs, rubs, or gallops  ABDOMEN: Soft, Nontender, Nondistended; Bowel sounds present  EXTREMITIES:  2+ Peripheral Pulses, no edema  SKIN: No rashes or lesions    LABS:        CAPILLARY BLOOD GLUCOSE      POCT Blood Glucose.: 145 mg/dL (11 May 2022 11:17)  POCT Blood Glucose.: 174 mg/dL (11 May 2022 08:32)  POCT Blood Glucose.: 131 mg/dL (10 May 2022 21:05)    Lipid panel:           Thyroid:  Diabetes Tests:  Parathyroid Panel:  Adrenals:  RADIOLOGY & ADDITIONAL TESTS:    Imaging Personally Reviewed:  [ ] YES  [ ] NO    Consultant(s) Notes Reviewed:  [ ] YES  [ ] NO    Care Discussed with Consultants/Other Providers [ ] YES  [ ] NO
Patient is a 41y old  Male who presents with a chief complaint of Abdominal pain (08 May 2022 17:03)      SUBJECTIVE/OBJECTIVE:    Less abdominal pain  No n/v      MEDICATIONS  (STANDING):  ALBUTerol    90 MICROgram(s) HFA Inhaler 2 Puff(s) Inhalation every 6 hours  dextrose 5%. 1000 milliLiter(s) (50 mL/Hr) IV Continuous <Continuous>  dextrose 50% Injectable 25 Gram(s) IV Push once  dextrose 50% Injectable 12.5 Gram(s) IV Push once  dextrose 50% Injectable 25 Gram(s) IV Push once  enoxaparin Injectable 40 milliGRAM(s) SubCutaneous every 24 hours  glucagon  Injectable 1 milliGRAM(s) IntraMuscular once  insulin lispro (ADMELOG) corrective regimen sliding scale   SubCutaneous three times a day before meals  insulin lispro (ADMELOG) Pump 1 Each SubCutaneous Continuous Pump  ondansetron Injectable 4 milliGRAM(s) IV Push every 8 hours  pantoprazole  Injectable 40 milliGRAM(s) IV Push daily  sodium chloride 0.9%. 1000 milliLiter(s) (80 mL/Hr) IV Continuous <Continuous>  sucralfate 1 Gram(s) Oral every 6 hours    MEDICATIONS  (PRN):  acetaminophen     Tablet .. 650 milliGRAM(s) Oral every 6 hours PRN Temp greater or equal to 38C (100.4F), Mild Pain (1 - 3)  ALPRAZolam 1 milliGRAM(s) Oral three times a day PRN Anxiety  aluminum hydroxide/magnesium hydroxide/simethicone Suspension 30 milliLiter(s) Oral every 4 hours PRN Dyspepsia  dextrose Oral Gel 15 Gram(s) Oral once PRN Blood Glucose LESS THAN 70 milliGRAM(s)/deciliter  melatonin 3 milliGRAM(s) Oral at bedtime PRN Insomnia  morphine  - Injectable 2 milliGRAM(s) IV Push every 4 hours PRN Moderate Pain (4 - 6)      Allergies    No Known Allergies    Intolerances          Vital Signs Last 24 Hrs  T(C): 37.1 (09 May 2022 11:16), Max: 37.1 (09 May 2022 11:16)  T(F): 98.8 (09 May 2022 11:16), Max: 98.8 (09 May 2022 11:16)  HR: 76 (09 May 2022 11:16) (65 - 82)  BP: 135/84 (09 May 2022 11:16) (121/71 - 159/90)  BP(mean): --  RR: 17 (09 May 2022 11:16) (17 - 20)  SpO2: 95% (09 May 2022 11:16) (95% - 98%)    PHYSICAL EXAM:  GENERAL: NAD, well-groomed, well-developed  HEAD:  Atraumatic, Normocephalic  EYES: EOMI, PERRLA, conjunctiva and sclera clear  ENMT: No tonsillar erythema, exudates, or enlargement; Moist mucous membranes, No lesions  NECK: Supple, No JVD, Normal thyroid  NERVOUS SYSTEM:  Alert & Oriented X3; Motor Strength 5/5 B/L upper and lower extremities; DTRs 2+ intact and symmetric  CHEST/LUNG: Clear bilaterally; No rales, rhonchi, wheezing, or rubs  HEART: Regular rate and rhythm; No murmurs, rubs, or gallops  ABDOMEN: Soft, Nontender, Nondistended; Bowel sounds present  EXTREMITIES:  2+ Peripheral Pulses, No clubbing, cyanosis, or edema  LYMPH: No lymphadenopathy noted  SKIN: No rashes or lesions    LABS:                        13.0   8.72  )-----------( 370      ( 09 May 2022 06:25 )             39.1     05-09    137  |  106  |  21  ----------------------------<  157<H>  3.9   |  20<L>  |  0.95    Ca    8.6      09 May 2022 06:25  Phos  3.5     05-08  Mg     2.3     05-08    TPro  6.6  /  Alb  3.2<L>  /  TBili  0.5  /  DBili  x   /  AST  21  /  ALT  30  /  AlkPhos  94  05-09        CAPILLARY BLOOD GLUCOSE      POCT Blood Glucose.: 198 mg/dL (09 May 2022 11:04)  POCT Blood Glucose.: 146 mg/dL (09 May 2022 07:42)  POCT Blood Glucose.: 82 mg/dL (08 May 2022 21:18)  POCT Blood Glucose.: 82 mg/dL (08 May 2022 20:01)          RADIOLOGY & ADDITIONAL TESTS:        Consultant(s) Notes Reviewed:  [ ] YES  [ ] NO

## 2022-05-11 NOTE — DISCHARGE NOTE PROVIDER - NSDCCPGOAL_GEN_ALL_CORE_FT
To get better and follow your care plan as instructed.  Please follow up with your primary care physician regarding your hospitalization in 1 week  Endocrine in 1 week

## 2022-05-11 NOTE — PROGRESS NOTE ADULT - PROBLEM SELECTOR PLAN 1
Continue with the current  regimen while inpatient   well controlled finger sticks and Gastroparesis  can be discharged on current dose/ regimen / Patient can resume  home regimen upon discharge
Clear liquid diet  IV Zofran  PPI, Carafate  Pain Management  FS   Monitoring
Clear liquid diet  IV Zofran  PPI, Carafate  Pain Management  FS   Monitoring
Continue with the current  regimen while inpatient   continue with insulin pump with same setting   short frequent meals and low glycemic index carbohydrates will help stabilize blood glucose and eliminate fluctuations   aggressive treatment of gastroparesis

## 2022-05-17 DIAGNOSIS — E10.65 TYPE 1 DIABETES MELLITUS WITH HYPERGLYCEMIA: ICD-10-CM

## 2022-05-17 DIAGNOSIS — K31.84 GASTROPARESIS: ICD-10-CM

## 2022-05-17 DIAGNOSIS — F41.9 ANXIETY DISORDER, UNSPECIFIED: ICD-10-CM

## 2022-05-17 DIAGNOSIS — J45.909 UNSPECIFIED ASTHMA, UNCOMPLICATED: ICD-10-CM

## 2022-05-17 DIAGNOSIS — E73.9 LACTOSE INTOLERANCE, UNSPECIFIED: ICD-10-CM

## 2022-05-17 DIAGNOSIS — E10.43 TYPE 1 DIABETES MELLITUS WITH DIABETIC AUTONOMIC (POLY)NEUROPATHY: ICD-10-CM

## 2022-05-17 DIAGNOSIS — Z96.41 PRESENCE OF INSULIN PUMP (EXTERNAL) (INTERNAL): ICD-10-CM

## 2022-05-17 DIAGNOSIS — Z91.018 ALLERGY TO OTHER FOODS: ICD-10-CM

## 2022-05-17 NOTE — PROGRESS NOTE ADULT - PROBLEM SELECTOR PROBLEM 1
Diabetic gastroparesis [FreeTextEntry3] : Large joint injection was performed on the left knee. The indication for this procedure was pain, inflammation, and x-ray evidence of Osteoarthritis on this or prior visit. The site was prepped with betadine, ethyl chloride sprayed topically, and sterile technique used. An injection of Lidocaine 3cc of 1% , Bupivacaine (Marcaine) 5cc of 0.25% , Methylprednisolone (Depomedrol) cc of 80 mg was used. Patient was advised to call if redness, pain, or fever occur, apply ice for 15 minutes out of every hour for the next 12-24 hours as tolerated and patient was advised to rest the joint(s) for days.\par Patient has tried OTC's including aspirin, Ibuprofen, Aleve, etc or prescription NSAIDS, and/or exercises at home and/or physical therapy without satisfactory response and patient had decreased mobility in the joint. Ultrasound guidance was indicated for this patient due to better visualize joint space. All ultrasound images have been permanently captured and stored accordingly in our picture.\par

## 2022-05-20 ENCOUNTER — EMERGENCY (EMERGENCY)
Facility: HOSPITAL | Age: 42
LOS: 0 days | Discharge: HOME | End: 2022-05-21
Attending: STUDENT IN AN ORGANIZED HEALTH CARE EDUCATION/TRAINING PROGRAM
Payer: MEDICAID

## 2022-05-20 VITALS
TEMPERATURE: 98 F | OXYGEN SATURATION: 99 % | HEIGHT: 69 IN | HEART RATE: 79 BPM | SYSTOLIC BLOOD PRESSURE: 128 MMHG | DIASTOLIC BLOOD PRESSURE: 88 MMHG | RESPIRATION RATE: 16 BRPM | WEIGHT: 160.06 LBS

## 2022-05-20 DIAGNOSIS — R11.2 NAUSEA WITH VOMITING, UNSPECIFIED: ICD-10-CM

## 2022-05-20 DIAGNOSIS — Z90.49 ACQUIRED ABSENCE OF OTHER SPECIFIED PARTS OF DIGESTIVE TRACT: Chronic | ICD-10-CM

## 2022-05-20 DIAGNOSIS — K31.84 GASTROPARESIS: ICD-10-CM

## 2022-05-20 DIAGNOSIS — Z91.018 ALLERGY TO OTHER FOODS: ICD-10-CM

## 2022-05-20 DIAGNOSIS — R10.9 UNSPECIFIED ABDOMINAL PAIN: ICD-10-CM

## 2022-05-20 DIAGNOSIS — Z87.19 PERSONAL HISTORY OF OTHER DISEASES OF THE DIGESTIVE SYSTEM: ICD-10-CM

## 2022-05-20 DIAGNOSIS — Z79.4 LONG TERM (CURRENT) USE OF INSULIN: ICD-10-CM

## 2022-05-20 DIAGNOSIS — Z96.41 PRESENCE OF INSULIN PUMP (EXTERNAL) (INTERNAL): ICD-10-CM

## 2022-05-20 DIAGNOSIS — Z91.011 ALLERGY TO MILK PRODUCTS: ICD-10-CM

## 2022-05-20 DIAGNOSIS — E10.43 TYPE 1 DIABETES MELLITUS WITH DIABETIC AUTONOMIC (POLY)NEUROPATHY: ICD-10-CM

## 2022-05-20 DIAGNOSIS — Z90.49 ACQUIRED ABSENCE OF OTHER SPECIFIED PARTS OF DIGESTIVE TRACT: ICD-10-CM

## 2022-05-20 PROCEDURE — 99285 EMERGENCY DEPT VISIT HI MDM: CPT

## 2022-05-21 VITALS
SYSTOLIC BLOOD PRESSURE: 124 MMHG | TEMPERATURE: 98 F | HEART RATE: 78 BPM | RESPIRATION RATE: 16 BRPM | OXYGEN SATURATION: 97 % | DIASTOLIC BLOOD PRESSURE: 79 MMHG

## 2022-05-21 LAB
ACETONE SERPL-MCNC: NEGATIVE — SIGNIFICANT CHANGE UP
ALBUMIN SERPL ELPH-MCNC: 4.3 G/DL — SIGNIFICANT CHANGE UP (ref 3.3–5)
ALP SERPL-CCNC: 110 U/L — SIGNIFICANT CHANGE UP (ref 40–120)
ALT FLD-CCNC: 28 U/L — SIGNIFICANT CHANGE UP (ref 12–78)
ANION GAP SERPL CALC-SCNC: 7 MMOL/L — SIGNIFICANT CHANGE UP (ref 5–17)
APPEARANCE UR: CLEAR — SIGNIFICANT CHANGE UP
AST SERPL-CCNC: 20 U/L — SIGNIFICANT CHANGE UP (ref 15–37)
BACTERIA # UR AUTO: ABNORMAL
BASOPHILS # BLD AUTO: 0.02 K/UL — SIGNIFICANT CHANGE UP (ref 0–0.2)
BASOPHILS NFR BLD AUTO: 0.2 % — SIGNIFICANT CHANGE UP (ref 0–2)
BILIRUB SERPL-MCNC: 0.6 MG/DL — SIGNIFICANT CHANGE UP (ref 0.2–1.2)
BILIRUB UR-MCNC: NEGATIVE — SIGNIFICANT CHANGE UP
BUN SERPL-MCNC: 19 MG/DL — SIGNIFICANT CHANGE UP (ref 7–23)
CALCIUM SERPL-MCNC: 10.4 MG/DL — HIGH (ref 8.5–10.1)
CHLORIDE SERPL-SCNC: 99 MMOL/L — SIGNIFICANT CHANGE UP (ref 96–108)
CO2 SERPL-SCNC: 32 MMOL/L — HIGH (ref 22–31)
COLOR SPEC: YELLOW — SIGNIFICANT CHANGE UP
CREAT SERPL-MCNC: 1.26 MG/DL — SIGNIFICANT CHANGE UP (ref 0.5–1.3)
DIFF PNL FLD: NEGATIVE — SIGNIFICANT CHANGE UP
EGFR: 73 ML/MIN/1.73M2 — SIGNIFICANT CHANGE UP
EOSINOPHIL # BLD AUTO: 0.06 K/UL — SIGNIFICANT CHANGE UP (ref 0–0.5)
EOSINOPHIL NFR BLD AUTO: 0.7 % — SIGNIFICANT CHANGE UP (ref 0–6)
GLUCOSE BLDC GLUCOMTR-MCNC: 255 MG/DL — HIGH (ref 70–99)
GLUCOSE SERPL-MCNC: 293 MG/DL — HIGH (ref 70–99)
GLUCOSE UR QL: 1000 MG/DL
HCT VFR BLD CALC: 44.7 % — SIGNIFICANT CHANGE UP (ref 39–50)
HGB BLD-MCNC: 15.4 G/DL — SIGNIFICANT CHANGE UP (ref 13–17)
IMM GRANULOCYTES NFR BLD AUTO: 0.1 % — SIGNIFICANT CHANGE UP (ref 0–1.5)
KETONES UR-MCNC: ABNORMAL
LACTATE SERPL-SCNC: 1.7 MMOL/L — SIGNIFICANT CHANGE UP (ref 0.7–2)
LEUKOCYTE ESTERASE UR-ACNC: NEGATIVE — SIGNIFICANT CHANGE UP
LIDOCAIN IGE QN: 38 U/L — LOW (ref 73–393)
LYMPHOCYTES # BLD AUTO: 1.4 K/UL — SIGNIFICANT CHANGE UP (ref 1–3.3)
LYMPHOCYTES # BLD AUTO: 16 % — SIGNIFICANT CHANGE UP (ref 13–44)
MCHC RBC-ENTMCNC: 28.5 PG — SIGNIFICANT CHANGE UP (ref 27–34)
MCHC RBC-ENTMCNC: 34.5 G/DL — SIGNIFICANT CHANGE UP (ref 32–36)
MCV RBC AUTO: 82.8 FL — SIGNIFICANT CHANGE UP (ref 80–100)
MONOCYTES # BLD AUTO: 0.23 K/UL — SIGNIFICANT CHANGE UP (ref 0–0.9)
MONOCYTES NFR BLD AUTO: 2.6 % — SIGNIFICANT CHANGE UP (ref 2–14)
NEUTROPHILS # BLD AUTO: 7.02 K/UL — SIGNIFICANT CHANGE UP (ref 1.8–7.4)
NEUTROPHILS NFR BLD AUTO: 80.4 % — HIGH (ref 43–77)
NITRITE UR-MCNC: NEGATIVE — SIGNIFICANT CHANGE UP
NRBC # BLD: 0 /100 WBCS — SIGNIFICANT CHANGE UP (ref 0–0)
PH UR: 8 — SIGNIFICANT CHANGE UP (ref 5–8)
PLATELET # BLD AUTO: 343 K/UL — SIGNIFICANT CHANGE UP (ref 150–400)
POTASSIUM SERPL-MCNC: 4.3 MMOL/L — SIGNIFICANT CHANGE UP (ref 3.5–5.3)
POTASSIUM SERPL-SCNC: 4.3 MMOL/L — SIGNIFICANT CHANGE UP (ref 3.5–5.3)
PROT SERPL-MCNC: 8.4 GM/DL — HIGH (ref 6–8.3)
PROT UR-MCNC: 30 MG/DL
RBC # BLD: 5.4 M/UL — SIGNIFICANT CHANGE UP (ref 4.2–5.8)
RBC # FLD: 13.5 % — SIGNIFICANT CHANGE UP (ref 10.3–14.5)
RBC CASTS # UR COMP ASSIST: SIGNIFICANT CHANGE UP /HPF (ref 0–4)
SODIUM SERPL-SCNC: 138 MMOL/L — SIGNIFICANT CHANGE UP (ref 135–145)
SP GR SPEC: 1.02 — SIGNIFICANT CHANGE UP (ref 1.01–1.02)
TROPONIN I, HIGH SENSITIVITY RESULT: 4.8 NG/L — SIGNIFICANT CHANGE UP
UROBILINOGEN FLD QL: NEGATIVE MG/DL — SIGNIFICANT CHANGE UP
WBC # BLD: 8.74 K/UL — SIGNIFICANT CHANGE UP (ref 3.8–10.5)
WBC # FLD AUTO: 8.74 K/UL — SIGNIFICANT CHANGE UP (ref 3.8–10.5)
WBC UR QL: SIGNIFICANT CHANGE UP

## 2022-05-21 PROCEDURE — 71045 X-RAY EXAM CHEST 1 VIEW: CPT | Mod: 26

## 2022-05-21 RX ORDER — LIDOCAINE 4 G/100G
10 CREAM TOPICAL ONCE
Refills: 0 | Status: COMPLETED | OUTPATIENT
Start: 2022-05-21 | End: 2022-05-21

## 2022-05-21 RX ORDER — ONDANSETRON 8 MG/1
4 TABLET, FILM COATED ORAL ONCE
Refills: 0 | Status: COMPLETED | OUTPATIENT
Start: 2022-05-21 | End: 2022-05-21

## 2022-05-21 RX ORDER — SODIUM CHLORIDE 9 MG/ML
1000 INJECTION, SOLUTION INTRAVENOUS ONCE
Refills: 0 | Status: COMPLETED | OUTPATIENT
Start: 2022-05-21 | End: 2022-05-21

## 2022-05-21 RX ORDER — SODIUM CHLORIDE 9 MG/ML
1000 INJECTION INTRAMUSCULAR; INTRAVENOUS; SUBCUTANEOUS ONCE
Refills: 0 | Status: COMPLETED | OUTPATIENT
Start: 2022-05-21 | End: 2022-05-21

## 2022-05-21 RX ORDER — ACETAMINOPHEN 500 MG
1000 TABLET ORAL ONCE
Refills: 0 | Status: COMPLETED | OUTPATIENT
Start: 2022-05-21 | End: 2022-05-21

## 2022-05-21 RX ORDER — FAMOTIDINE 10 MG/ML
20 INJECTION INTRAVENOUS ONCE
Refills: 0 | Status: COMPLETED | OUTPATIENT
Start: 2022-05-21 | End: 2022-05-21

## 2022-05-21 RX ORDER — MORPHINE SULFATE 50 MG/1
4 CAPSULE, EXTENDED RELEASE ORAL ONCE
Refills: 0 | Status: DISCONTINUED | OUTPATIENT
Start: 2022-05-21 | End: 2022-05-21

## 2022-05-21 RX ADMIN — Medication 1000 MILLIGRAM(S): at 03:53

## 2022-05-21 RX ADMIN — LIDOCAINE 10 MILLILITER(S): 4 CREAM TOPICAL at 00:30

## 2022-05-21 RX ADMIN — Medication 400 MILLIGRAM(S): at 02:58

## 2022-05-21 RX ADMIN — SODIUM CHLORIDE 1000 MILLILITER(S): 9 INJECTION, SOLUTION INTRAVENOUS at 00:57

## 2022-05-21 RX ADMIN — MORPHINE SULFATE 4 MILLIGRAM(S): 50 CAPSULE, EXTENDED RELEASE ORAL at 00:58

## 2022-05-21 RX ADMIN — SODIUM CHLORIDE 1000 MILLILITER(S): 9 INJECTION INTRAMUSCULAR; INTRAVENOUS; SUBCUTANEOUS at 03:50

## 2022-05-21 RX ADMIN — FAMOTIDINE 20 MILLIGRAM(S): 10 INJECTION INTRAVENOUS at 00:30

## 2022-05-21 RX ADMIN — ONDANSETRON 4 MILLIGRAM(S): 8 TABLET, FILM COATED ORAL at 00:30

## 2022-05-21 RX ADMIN — Medication 30 MILLILITER(S): at 00:30

## 2022-05-21 RX ADMIN — SODIUM CHLORIDE 1000 MILLILITER(S): 9 INJECTION INTRAMUSCULAR; INTRAVENOUS; SUBCUTANEOUS at 02:08

## 2022-05-21 NOTE — ED ADULT NURSE NOTE - CAS EDN DISCHARGE ASSESSMENT
gradual onset Alert and oriented to person, place and time/Awake/No adverse reaction to first time med in ED

## 2022-05-21 NOTE — ED PROVIDER NOTE - PHYSICAL EXAMINATION
VITALS: reviewed  GEN: distress 2/2 pain, A & O x 4  HEAD/EYES: NCAT, EOMI, anicteric sclerae  ENT: mucus membranes moist, oropharynx WNL, trachea midline  RESP: lungs CTA with equal breath sounds bilaterally, chest wall nontender and atraumatic  CV: heart with reg rhythm S1, S2, distal pulses intact and symmetric bilaterally  ABDOMEN: soft, nondistended, generalized abd pain without guarding/rebound, no palpable masses  : no CVAT  MSK: extremities atraumatic and nontender, no edema, no asymmetry.  SKIN: warm, dry, no rash, no bruising, no cyanosis. color appropriate for ethnicity  NEURO: alert, mentating appropriately, no facial asymmetry.   PSYCH: Affect appropriate

## 2022-05-21 NOTE — ED PROVIDER NOTE - CLINICAL SUMMARY MEDICAL DECISION MAKING FREE TEXT BOX
40 yo M presenting with abdominal pain and nausea/vomiting, abd exam benign, sx consistent with gastroparesis in past, meds, labs, reassess,

## 2022-05-21 NOTE — ED PROVIDER NOTE - NS ED ROS FT
CONST: no fevers, no chills, no trauma  EYES: no pain, no visual disturbances  ENT: no sore throat, no epistaxis, no rhinorrhea, no hearing changes  CV: no chest pain, no palpitations, no orthopnea, no extremity pain or swelling  RESP: no shortness of breath, no cough, no sputum, no pleurisy, no wheezing  ABD: + abdominal pain, + nausea/ vomiting, no diarrhea, no black or bloody stool  : no dysuria, no hematuria, no frequency, no urgency  MSK: no back pain, no neck pain, no extremity pain  NEURO: no headache, no sensory disturbances, no focal weakness, no dizziness  HEME: no easy bleeding or bruising  SKIN: no diaphoresis, no rash

## 2022-05-21 NOTE — ED PROVIDER NOTE - PATIENT PORTAL LINK FT
Emergency Department Sign Out Note        Sign out and transfer of care from Dr Gume hunt  See Separate Emergency Department note  The patient, Flakito Roberts, was evaluated by the previous provider for psychosis and has multiple personalities  Workup Completed:  yes    ED Course / Workup Pending (followup):  201 placement as per crisis  Geodon and ativan IM given during the night for patient yelling and screaming and throwing things and a danger to herself and others  Signed out to Dr Justice Paz this AM                           Procedures  MDM    Disposition  Final diagnoses:   Psychosis [facility])   [facility]     Time reflects when diagnosis was documented in both MDM as applicable and the Disposition within this note     Time User Action Codes Description Comment    1/19/2020  6:56 AM Jacky Short Add [F29] Psychosis ([facility] )     1/19/2020  9:14 AM Copiah Short Add [F15 10] Methamphetamine abuse [facility])       ED Disposition     None      MD Documentation      Most Recent Value   Sending MD Dr Marifer Booker    None       Patient's Medications   Discharge Prescriptions    No medications on file     No discharge procedures on file         ED Provider  Electronically Signed by     Allan Rowley MD  01/20/20 1338       Allan Rowley MD  01/20/20 1141
Emergency Department Sign Out Note        Sign out and transfer of care from Dr Vandana oJnes  See Separate Emergency Department note  The patient, Adriana Godwin, was evaluated by the previous provider for psychotic behavior and suicidal ideation  Workup Completed:  Yes except still pending BAT and UDS  ED Course / Workup Pending (followup): Disposition pending crisis evaluation  Labs reviewed by me  Xanax ordered for anxiety  Signed out to Dr Jose Alberto Culver this AM that patient is pending psych consult and to check acetaminophen and salicylate levels  Procedures  MDM    Disposition  Final diagnoses:   Psychosis ([facility] )   Methamphetamine abuse ([facility] )     Time reflects when diagnosis was documented in both MDM as applicable and the Disposition within this note     Time User Action Codes Description Comment    1/19/2020  6:56 AM Lester King Add [F29] Psychosis ([facility] [facility] )     1/19/2020  9:14 AM Lester Anderson [F15 10] Methamphetamine abuse [facility])       ED Disposition     None      Follow-up Information    None       Patient's Medications   Discharge Prescriptions    No medications on file     No discharge procedures on file         ED Provider  Electronically Signed by     Janice Verma MD  01/19/20 1565       Janice Verma MD  01/19/20 6962       Janice Verma MD  01/19/20 [id number]       Janice Verma MD  01/19/20 [id number]
You can access the FollowMyHealth Patient Portal offered by [facility] by registering at the following website: [email or web address]. By joining [employer]’s FollowMyHealth portal, you will also be able to view your health information using other applications (apps) compatible with our system.

## 2022-05-21 NOTE — ED PROVIDER NOTE - OBJECTIVE STATEMENT
42 yo M hx DM1 with insulin pump, gastroparesis, presenting with n/v and abdominal pain that started tonight. Denies associated fevers/chills, dysuria, hematuria, diarrhea/constipation.

## 2022-07-04 ENCOUNTER — EMERGENCY (EMERGENCY)
Facility: HOSPITAL | Age: 42
LOS: 0 days | Discharge: HOME | End: 2022-07-04
Attending: STUDENT IN AN ORGANIZED HEALTH CARE EDUCATION/TRAINING PROGRAM

## 2022-07-04 VITALS
OXYGEN SATURATION: 99 % | RESPIRATION RATE: 16 BRPM | SYSTOLIC BLOOD PRESSURE: 141 MMHG | HEIGHT: 69 IN | DIASTOLIC BLOOD PRESSURE: 93 MMHG | TEMPERATURE: 98 F | HEART RATE: 69 BPM | WEIGHT: 169.98 LBS

## 2022-07-04 VITALS
RESPIRATION RATE: 18 BRPM | OXYGEN SATURATION: 98 % | DIASTOLIC BLOOD PRESSURE: 99 MMHG | TEMPERATURE: 99 F | SYSTOLIC BLOOD PRESSURE: 176 MMHG | HEART RATE: 66 BPM

## 2022-07-04 DIAGNOSIS — R11.10 VOMITING, UNSPECIFIED: ICD-10-CM

## 2022-07-04 DIAGNOSIS — Z90.49 ACQUIRED ABSENCE OF OTHER SPECIFIED PARTS OF DIGESTIVE TRACT: Chronic | ICD-10-CM

## 2022-07-04 DIAGNOSIS — Z20.822 CONTACT WITH AND (SUSPECTED) EXPOSURE TO COVID-19: ICD-10-CM

## 2022-07-04 DIAGNOSIS — R10.84 GENERALIZED ABDOMINAL PAIN: ICD-10-CM

## 2022-07-04 DIAGNOSIS — E11.43 TYPE 2 DIABETES MELLITUS WITH DIABETIC AUTONOMIC (POLY)NEUROPATHY: ICD-10-CM

## 2022-07-04 DIAGNOSIS — K31.84 GASTROPARESIS: ICD-10-CM

## 2022-07-04 DIAGNOSIS — Z91.018 ALLERGY TO OTHER FOODS: ICD-10-CM

## 2022-07-04 DIAGNOSIS — Z87.19 PERSONAL HISTORY OF OTHER DISEASES OF THE DIGESTIVE SYSTEM: ICD-10-CM

## 2022-07-04 DIAGNOSIS — Z91.011 ALLERGY TO MILK PRODUCTS: ICD-10-CM

## 2022-07-04 DIAGNOSIS — G89.29 OTHER CHRONIC PAIN: ICD-10-CM

## 2022-07-04 DIAGNOSIS — R10.9 UNSPECIFIED ABDOMINAL PAIN: ICD-10-CM

## 2022-07-04 DIAGNOSIS — Z79.4 LONG TERM (CURRENT) USE OF INSULIN: ICD-10-CM

## 2022-07-04 LAB
ACETONE SERPL-MCNC: NEGATIVE — SIGNIFICANT CHANGE UP
ALBUMIN SERPL ELPH-MCNC: 3.9 G/DL — SIGNIFICANT CHANGE UP (ref 3.3–5)
ALP SERPL-CCNC: 129 U/L — HIGH (ref 40–120)
ALT FLD-CCNC: 60 U/L — SIGNIFICANT CHANGE UP (ref 12–78)
ANION GAP SERPL CALC-SCNC: 9 MMOL/L — SIGNIFICANT CHANGE UP (ref 5–17)
AST SERPL-CCNC: 63 U/L — HIGH (ref 15–37)
BASE EXCESS BLDV CALC-SCNC: 7.4 MMOL/L — HIGH (ref -2–3)
BASOPHILS # BLD AUTO: 0.05 K/UL — SIGNIFICANT CHANGE UP (ref 0–0.2)
BASOPHILS NFR BLD AUTO: 0.6 % — SIGNIFICANT CHANGE UP (ref 0–2)
BILIRUB SERPL-MCNC: 0.4 MG/DL — SIGNIFICANT CHANGE UP (ref 0.2–1.2)
BLOOD GAS COMMENTS, VENOUS: SIGNIFICANT CHANGE UP
BUN SERPL-MCNC: 16 MG/DL — SIGNIFICANT CHANGE UP (ref 7–23)
CALCIUM SERPL-MCNC: 10 MG/DL — SIGNIFICANT CHANGE UP (ref 8.5–10.1)
CHLORIDE SERPL-SCNC: 100 MMOL/L — SIGNIFICANT CHANGE UP (ref 96–108)
CO2 BLDV-SCNC: 32 MMOL/L — HIGH (ref 22–26)
CO2 SERPL-SCNC: 30 MMOL/L — SIGNIFICANT CHANGE UP (ref 22–31)
CREAT SERPL-MCNC: 1.27 MG/DL — SIGNIFICANT CHANGE UP (ref 0.5–1.3)
EGFR: 73 ML/MIN/1.73M2 — SIGNIFICANT CHANGE UP
EOSINOPHIL # BLD AUTO: 0.01 K/UL — SIGNIFICANT CHANGE UP (ref 0–0.5)
EOSINOPHIL NFR BLD AUTO: 0.1 % — SIGNIFICANT CHANGE UP (ref 0–6)
FLUAV AG NPH QL: SIGNIFICANT CHANGE UP
FLUBV AG NPH QL: SIGNIFICANT CHANGE UP
GAS PNL BLDV: SIGNIFICANT CHANGE UP
GLUCOSE BLDC GLUCOMTR-MCNC: 244 MG/DL — HIGH (ref 70–99)
GLUCOSE BLDC GLUCOMTR-MCNC: 271 MG/DL — HIGH (ref 70–99)
GLUCOSE BLDC GLUCOMTR-MCNC: 308 MG/DL — HIGH (ref 70–99)
GLUCOSE SERPL-MCNC: 302 MG/DL — HIGH (ref 70–99)
HCO3 BLDV-SCNC: 31 MMOL/L — HIGH (ref 22–28)
HCT VFR BLD CALC: 43.6 % — SIGNIFICANT CHANGE UP (ref 39–50)
HGB BLD-MCNC: 15.1 G/DL — SIGNIFICANT CHANGE UP (ref 13–17)
IMM GRANULOCYTES NFR BLD AUTO: 0.1 % — SIGNIFICANT CHANGE UP (ref 0–1.5)
LACTATE SERPL-SCNC: 2.3 MMOL/L — HIGH (ref 0.7–2)
LYMPHOCYTES # BLD AUTO: 1.24 K/UL — SIGNIFICANT CHANGE UP (ref 1–3.3)
LYMPHOCYTES # BLD AUTO: 13.8 % — SIGNIFICANT CHANGE UP (ref 13–44)
MAGNESIUM SERPL-MCNC: 1.9 MG/DL — SIGNIFICANT CHANGE UP (ref 1.6–2.6)
MCHC RBC-ENTMCNC: 28.7 PG — SIGNIFICANT CHANGE UP (ref 27–34)
MCHC RBC-ENTMCNC: 34.6 G/DL — SIGNIFICANT CHANGE UP (ref 32–36)
MCV RBC AUTO: 82.7 FL — SIGNIFICANT CHANGE UP (ref 80–100)
MONOCYTES # BLD AUTO: 0.24 K/UL — SIGNIFICANT CHANGE UP (ref 0–0.9)
MONOCYTES NFR BLD AUTO: 2.7 % — SIGNIFICANT CHANGE UP (ref 2–14)
NEUTROPHILS # BLD AUTO: 7.45 K/UL — HIGH (ref 1.8–7.4)
NEUTROPHILS NFR BLD AUTO: 82.7 % — HIGH (ref 43–77)
NRBC # BLD: 0 /100 WBCS — SIGNIFICANT CHANGE UP (ref 0–0)
PCO2 BLDV: 40 MMHG — LOW (ref 42–55)
PH BLDV: 7.5 — HIGH (ref 7.32–7.43)
PHOSPHATE SERPL-MCNC: 2.5 MG/DL — SIGNIFICANT CHANGE UP (ref 2.5–4.5)
PLATELET # BLD AUTO: 390 K/UL — SIGNIFICANT CHANGE UP (ref 150–400)
PO2 BLDV: 47 MMHG — HIGH (ref 25–45)
POTASSIUM SERPL-MCNC: 4.1 MMOL/L — SIGNIFICANT CHANGE UP (ref 3.5–5.3)
POTASSIUM SERPL-SCNC: 4.1 MMOL/L — SIGNIFICANT CHANGE UP (ref 3.5–5.3)
PROT SERPL-MCNC: 8.7 GM/DL — HIGH (ref 6–8.3)
RBC # BLD: 5.27 M/UL — SIGNIFICANT CHANGE UP (ref 4.2–5.8)
RBC # FLD: 13.7 % — SIGNIFICANT CHANGE UP (ref 10.3–14.5)
SAO2 % BLDV: 84.4 % — LOW (ref 94–98)
SARS-COV-2 RNA SPEC QL NAA+PROBE: SIGNIFICANT CHANGE UP
SODIUM SERPL-SCNC: 139 MMOL/L — SIGNIFICANT CHANGE UP (ref 135–145)
WBC # BLD: 9 K/UL — SIGNIFICANT CHANGE UP (ref 3.8–10.5)
WBC # FLD AUTO: 9 K/UL — SIGNIFICANT CHANGE UP (ref 3.8–10.5)

## 2022-07-04 PROCEDURE — 71045 X-RAY EXAM CHEST 1 VIEW: CPT | Mod: 26

## 2022-07-04 PROCEDURE — 99285 EMERGENCY DEPT VISIT HI MDM: CPT

## 2022-07-04 PROCEDURE — 93010 ELECTROCARDIOGRAM REPORT: CPT

## 2022-07-04 RX ORDER — ONDANSETRON 8 MG/1
4 TABLET, FILM COATED ORAL ONCE
Refills: 0 | Status: COMPLETED | OUTPATIENT
Start: 2022-07-04 | End: 2022-07-04

## 2022-07-04 RX ORDER — SODIUM CHLORIDE 9 MG/ML
2000 INJECTION INTRAMUSCULAR; INTRAVENOUS; SUBCUTANEOUS ONCE
Refills: 0 | Status: COMPLETED | OUTPATIENT
Start: 2022-07-04 | End: 2022-07-04

## 2022-07-04 RX ORDER — KETOROLAC TROMETHAMINE 30 MG/ML
15 SYRINGE (ML) INJECTION ONCE
Refills: 0 | Status: DISCONTINUED | OUTPATIENT
Start: 2022-07-04 | End: 2022-07-04

## 2022-07-04 RX ORDER — MORPHINE SULFATE 50 MG/1
8 CAPSULE, EXTENDED RELEASE ORAL ONCE
Refills: 0 | Status: DISCONTINUED | OUTPATIENT
Start: 2022-07-04 | End: 2022-07-04

## 2022-07-04 RX ORDER — DIPHENHYDRAMINE HCL 50 MG
25 CAPSULE ORAL ONCE
Refills: 0 | Status: COMPLETED | OUTPATIENT
Start: 2022-07-04 | End: 2022-07-04

## 2022-07-04 RX ORDER — METOCLOPRAMIDE HCL 10 MG
10 TABLET ORAL ONCE
Refills: 0 | Status: COMPLETED | OUTPATIENT
Start: 2022-07-04 | End: 2022-07-04

## 2022-07-04 RX ORDER — FAMOTIDINE 10 MG/ML
20 INJECTION INTRAVENOUS ONCE
Refills: 0 | Status: COMPLETED | OUTPATIENT
Start: 2022-07-04 | End: 2022-07-04

## 2022-07-04 RX ADMIN — Medication 15 MILLIGRAM(S): at 17:39

## 2022-07-04 RX ADMIN — FAMOTIDINE 20 MILLIGRAM(S): 10 INJECTION INTRAVENOUS at 17:40

## 2022-07-04 RX ADMIN — ONDANSETRON 4 MILLIGRAM(S): 8 TABLET, FILM COATED ORAL at 16:56

## 2022-07-04 RX ADMIN — Medication 25 MILLIGRAM(S): at 15:59

## 2022-07-04 RX ADMIN — Medication 10 MILLIGRAM(S): at 16:56

## 2022-07-04 RX ADMIN — MORPHINE SULFATE 8 MILLIGRAM(S): 50 CAPSULE, EXTENDED RELEASE ORAL at 15:59

## 2022-07-04 RX ADMIN — ONDANSETRON 4 MILLIGRAM(S): 8 TABLET, FILM COATED ORAL at 17:42

## 2022-07-04 RX ADMIN — SODIUM CHLORIDE 2000 MILLILITER(S): 9 INJECTION INTRAMUSCULAR; INTRAVENOUS; SUBCUTANEOUS at 15:58

## 2022-07-04 NOTE — ED PROVIDER NOTE - CROS ED ROS STATEMENT
Addended by: ALEIDA DAVE on: 10/9/2020 10:21 AM     Modules accepted: Orders    
all other ROS negative except as per HPI

## 2022-07-04 NOTE — ED PROVIDER NOTE - PHYSICAL EXAMINATION
General: Awake, alert and oriented, uncomfortable appearing  Skin: Skin in warm, dry and intact without rashes or lesions. Appropriate color for ethnicity  HENMT: head normocephalic and atraumatic; bilateral external ears without swelling. no nasal discharge. moist oral mucosa. supple neck, trachea midline  EYES: Conjunctiva clear. nonicteric sclera. EOM intact, Eyelids are normal in appearance without swelling or lesions.  Cardiac: well perfused  Respiratory: breathing comfortably on room air. no audible wheezing or stridor  Abdominal: nondistended, soft, mild diffuse ttp  MSK: Neck and back are without deformity, visible external skin changes, or signs of trauma. Curvature of the cervical, thoracic, and lumbar spine are within normal limits. no external signs of trauma. no apparent deficits in ROM of any extremity  Neurological: The patient is awake, alert and oriented to person, place, and time with normal speech. CN 2-12 grossly intact. no apparent deficits. Memory is normal and thought process is intact.   Psychiatric: Appropriate mood and affect. Good judgement and insight. No visual or auditory hallucinations.

## 2022-07-04 NOTE — ED PROVIDER NOTE - OBJECTIVE STATEMENT
41m hx dm, gastritis, gastroparesis, cannabinoid hyperemesis syndrome. abdominal pain and vomiting since this AM. regular BMs.

## 2022-07-04 NOTE — ED PROVIDER NOTE - CLINICAL SUMMARY MEDICAL DECISION MAKING FREE TEXT BOX
labs not indictive of dka. repeat abdominal exam benign, patinet with chronic abdominal pain. now feeling better and toelrating po.

## 2022-07-04 NOTE — ED PROVIDER NOTE - PATIENT PORTAL LINK FT
You can access the FollowMyHealth Patient Portal offered by [facility] by registering at the following website: [email or web address]. By joining [employer]’s FollowMyHealth portal, you will also be able to view your health information using other applications (apps) compatible with our system.

## 2022-07-04 NOTE — ED ADULT NURSE NOTE - OBJECTIVE STATEMENT
vomiting with abdominal pains this morning. H/O gastritis and DM. denies fever . patient denies burning with urination. blood in stool. pain when urinating at this time

## 2022-07-14 ENCOUNTER — EMERGENCY (EMERGENCY)
Facility: HOSPITAL | Age: 42
LOS: 0 days | Discharge: HOME | End: 2022-07-14
Attending: EMERGENCY MEDICINE

## 2022-07-14 VITALS
DIASTOLIC BLOOD PRESSURE: 82 MMHG | HEART RATE: 82 BPM | OXYGEN SATURATION: 100 % | SYSTOLIC BLOOD PRESSURE: 136 MMHG | TEMPERATURE: 98 F | RESPIRATION RATE: 16 BRPM

## 2022-07-14 VITALS
DIASTOLIC BLOOD PRESSURE: 87 MMHG | WEIGHT: 169.98 LBS | SYSTOLIC BLOOD PRESSURE: 125 MMHG | OXYGEN SATURATION: 99 % | RESPIRATION RATE: 18 BRPM | HEIGHT: 69 IN | TEMPERATURE: 98 F | HEART RATE: 82 BPM

## 2022-07-14 DIAGNOSIS — E11.65 TYPE 2 DIABETES MELLITUS WITH HYPERGLYCEMIA: ICD-10-CM

## 2022-07-14 DIAGNOSIS — Z90.49 ACQUIRED ABSENCE OF OTHER SPECIFIED PARTS OF DIGESTIVE TRACT: ICD-10-CM

## 2022-07-14 DIAGNOSIS — R11.0 NAUSEA: ICD-10-CM

## 2022-07-14 DIAGNOSIS — Z90.49 ACQUIRED ABSENCE OF OTHER SPECIFIED PARTS OF DIGESTIVE TRACT: Chronic | ICD-10-CM

## 2022-07-14 DIAGNOSIS — R10.12 LEFT UPPER QUADRANT PAIN: ICD-10-CM

## 2022-07-14 DIAGNOSIS — F41.9 ANXIETY DISORDER, UNSPECIFIED: ICD-10-CM

## 2022-07-14 DIAGNOSIS — Z91.011 ALLERGY TO MILK PRODUCTS: ICD-10-CM

## 2022-07-14 DIAGNOSIS — K31.84 GASTROPARESIS: ICD-10-CM

## 2022-07-14 DIAGNOSIS — J45.909 UNSPECIFIED ASTHMA, UNCOMPLICATED: ICD-10-CM

## 2022-07-14 DIAGNOSIS — Z79.4 LONG TERM (CURRENT) USE OF INSULIN: ICD-10-CM

## 2022-07-14 DIAGNOSIS — E10.43 TYPE 1 DIABETES MELLITUS WITH DIABETIC AUTONOMIC (POLY)NEUROPATHY: ICD-10-CM

## 2022-07-14 DIAGNOSIS — Z91.018 ALLERGY TO OTHER FOODS: ICD-10-CM

## 2022-07-14 LAB
ACETONE SERPL-MCNC: NEGATIVE — SIGNIFICANT CHANGE UP
ALBUMIN SERPL ELPH-MCNC: 3.6 G/DL — SIGNIFICANT CHANGE UP (ref 3.3–5)
ALP SERPL-CCNC: 101 U/L — SIGNIFICANT CHANGE UP (ref 40–120)
ALT FLD-CCNC: 59 U/L — SIGNIFICANT CHANGE UP (ref 12–78)
ANION GAP SERPL CALC-SCNC: 9 MMOL/L — SIGNIFICANT CHANGE UP (ref 5–17)
APPEARANCE UR: CLEAR — SIGNIFICANT CHANGE UP
AST SERPL-CCNC: 43 U/L — HIGH (ref 15–37)
BASE EXCESS BLDV CALC-SCNC: 5.2 MMOL/L — HIGH (ref -2–3)
BASOPHILS # BLD AUTO: 0.09 K/UL — SIGNIFICANT CHANGE UP (ref 0–0.2)
BASOPHILS NFR BLD AUTO: 1 % — SIGNIFICANT CHANGE UP (ref 0–2)
BILIRUB SERPL-MCNC: 0.3 MG/DL — SIGNIFICANT CHANGE UP (ref 0.2–1.2)
BILIRUB UR-MCNC: NEGATIVE — SIGNIFICANT CHANGE UP
BLOOD GAS COMMENTS, VENOUS: SIGNIFICANT CHANGE UP
BUN SERPL-MCNC: 16 MG/DL — SIGNIFICANT CHANGE UP (ref 7–23)
CALCIUM SERPL-MCNC: 9 MG/DL — SIGNIFICANT CHANGE UP (ref 8.5–10.1)
CHLORIDE SERPL-SCNC: 103 MMOL/L — SIGNIFICANT CHANGE UP (ref 96–108)
CO2 BLDV-SCNC: 31 MMOL/L — HIGH (ref 22–26)
CO2 SERPL-SCNC: 27 MMOL/L — SIGNIFICANT CHANGE UP (ref 22–31)
COLOR SPEC: YELLOW — SIGNIFICANT CHANGE UP
CREAT SERPL-MCNC: 1.13 MG/DL — SIGNIFICANT CHANGE UP (ref 0.5–1.3)
DIFF PNL FLD: NEGATIVE — SIGNIFICANT CHANGE UP
EGFR: 84 ML/MIN/1.73M2 — SIGNIFICANT CHANGE UP
EOSINOPHIL # BLD AUTO: 0.26 K/UL — SIGNIFICANT CHANGE UP (ref 0–0.5)
EOSINOPHIL NFR BLD AUTO: 3 % — SIGNIFICANT CHANGE UP (ref 0–6)
GAS PNL BLDV: SIGNIFICANT CHANGE UP
GLUCOSE BLDC GLUCOMTR-MCNC: 326 MG/DL — HIGH (ref 70–99)
GLUCOSE SERPL-MCNC: 328 MG/DL — HIGH (ref 70–99)
GLUCOSE UR QL: 1000 MG/DL
HCO3 BLDV-SCNC: 30 MMOL/L — HIGH (ref 22–28)
HCT VFR BLD CALC: 39.4 % — SIGNIFICANT CHANGE UP (ref 39–50)
HGB BLD-MCNC: 13.8 G/DL — SIGNIFICANT CHANGE UP (ref 13–17)
HOROWITZ INDEX BLDV+IHG-RTO: 21 — SIGNIFICANT CHANGE UP
IMM GRANULOCYTES NFR BLD AUTO: 0.1 % — SIGNIFICANT CHANGE UP (ref 0–1.5)
KETONES UR-MCNC: ABNORMAL
LEUKOCYTE ESTERASE UR-ACNC: NEGATIVE — SIGNIFICANT CHANGE UP
LIDOCAIN IGE QN: 179 U/L — SIGNIFICANT CHANGE UP (ref 73–393)
LYMPHOCYTES # BLD AUTO: 2.53 K/UL — SIGNIFICANT CHANGE UP (ref 1–3.3)
LYMPHOCYTES # BLD AUTO: 29.5 % — SIGNIFICANT CHANGE UP (ref 13–44)
MCHC RBC-ENTMCNC: 28.6 PG — SIGNIFICANT CHANGE UP (ref 27–34)
MCHC RBC-ENTMCNC: 35 G/DL — SIGNIFICANT CHANGE UP (ref 32–36)
MCV RBC AUTO: 81.7 FL — SIGNIFICANT CHANGE UP (ref 80–100)
MONOCYTES # BLD AUTO: 0.57 K/UL — SIGNIFICANT CHANGE UP (ref 0–0.9)
MONOCYTES NFR BLD AUTO: 6.6 % — SIGNIFICANT CHANGE UP (ref 2–14)
NEUTROPHILS # BLD AUTO: 5.13 K/UL — SIGNIFICANT CHANGE UP (ref 1.8–7.4)
NEUTROPHILS NFR BLD AUTO: 59.8 % — SIGNIFICANT CHANGE UP (ref 43–77)
NITRITE UR-MCNC: NEGATIVE — SIGNIFICANT CHANGE UP
NRBC # BLD: 1 /100 WBCS — HIGH (ref 0–0)
PCO2 BLDV: 43 MMHG — SIGNIFICANT CHANGE UP (ref 42–55)
PH BLDV: 7.45 — HIGH (ref 7.32–7.43)
PH UR: 6 — SIGNIFICANT CHANGE UP (ref 5–8)
PLATELET # BLD AUTO: 457 K/UL — HIGH (ref 150–400)
PO2 BLDV: 41 MMHG — SIGNIFICANT CHANGE UP (ref 25–45)
POTASSIUM SERPL-MCNC: 4.2 MMOL/L — SIGNIFICANT CHANGE UP (ref 3.5–5.3)
POTASSIUM SERPL-SCNC: 4.2 MMOL/L — SIGNIFICANT CHANGE UP (ref 3.5–5.3)
PROT SERPL-MCNC: 7.6 GM/DL — SIGNIFICANT CHANGE UP (ref 6–8.3)
PROT UR-MCNC: NEGATIVE MG/DL — SIGNIFICANT CHANGE UP
RBC # BLD: 4.82 M/UL — SIGNIFICANT CHANGE UP (ref 4.2–5.8)
RBC # FLD: 13.2 % — SIGNIFICANT CHANGE UP (ref 10.3–14.5)
SAO2 % BLDV: 66.2 % — LOW (ref 94–98)
SODIUM SERPL-SCNC: 139 MMOL/L — SIGNIFICANT CHANGE UP (ref 135–145)
SP GR SPEC: 1.01 — SIGNIFICANT CHANGE UP (ref 1.01–1.02)
UROBILINOGEN FLD QL: NEGATIVE MG/DL — SIGNIFICANT CHANGE UP
WBC # BLD: 8.59 K/UL — SIGNIFICANT CHANGE UP (ref 3.8–10.5)
WBC # FLD AUTO: 8.59 K/UL — SIGNIFICANT CHANGE UP (ref 3.8–10.5)

## 2022-07-14 PROCEDURE — 99285 EMERGENCY DEPT VISIT HI MDM: CPT

## 2022-07-14 PROCEDURE — 93010 ELECTROCARDIOGRAM REPORT: CPT

## 2022-07-14 RX ORDER — MORPHINE SULFATE 50 MG/1
4 CAPSULE, EXTENDED RELEASE ORAL ONCE
Refills: 0 | Status: DISCONTINUED | OUTPATIENT
Start: 2022-07-14 | End: 2022-07-14

## 2022-07-14 RX ORDER — SUCRALFATE 1 G
1 TABLET ORAL ONCE
Refills: 0 | Status: COMPLETED | OUTPATIENT
Start: 2022-07-14 | End: 2022-07-14

## 2022-07-14 RX ORDER — SODIUM CHLORIDE 9 MG/ML
1000 INJECTION INTRAMUSCULAR; INTRAVENOUS; SUBCUTANEOUS ONCE
Refills: 0 | Status: COMPLETED | OUTPATIENT
Start: 2022-07-14 | End: 2022-07-14

## 2022-07-14 RX ORDER — MORPHINE SULFATE 50 MG/1
2 CAPSULE, EXTENDED RELEASE ORAL ONCE
Refills: 0 | Status: DISCONTINUED | OUTPATIENT
Start: 2022-07-14 | End: 2022-07-14

## 2022-07-14 RX ORDER — METOCLOPRAMIDE HCL 10 MG
10 TABLET ORAL ONCE
Refills: 0 | Status: COMPLETED | OUTPATIENT
Start: 2022-07-14 | End: 2022-07-14

## 2022-07-14 RX ADMIN — MORPHINE SULFATE 4 MILLIGRAM(S): 50 CAPSULE, EXTENDED RELEASE ORAL at 10:21

## 2022-07-14 RX ADMIN — SODIUM CHLORIDE 1000 MILLILITER(S): 9 INJECTION INTRAMUSCULAR; INTRAVENOUS; SUBCUTANEOUS at 10:21

## 2022-07-14 RX ADMIN — Medication 1 GRAM(S): at 06:54

## 2022-07-14 RX ADMIN — MORPHINE SULFATE 2 MILLIGRAM(S): 50 CAPSULE, EXTENDED RELEASE ORAL at 05:18

## 2022-07-14 RX ADMIN — SODIUM CHLORIDE 1000 MILLILITER(S): 9 INJECTION INTRAMUSCULAR; INTRAVENOUS; SUBCUTANEOUS at 05:17

## 2022-07-14 RX ADMIN — Medication 10 MILLIGRAM(S): at 05:17

## 2022-07-14 NOTE — ED PROVIDER NOTE - NSFOLLOWUPINSTRUCTIONS_ED_ALL_ED_FT
You were seen in the ED for gastroparesis symptoms.    Please follow up with your primary care doctor.    Drink fluids and remain hydrated.    Please return for any new or worsening symptoms including severe vomiting, abdominal pain, fever, or other concerning symptoms.

## 2022-07-14 NOTE — ED ADULT TRIAGE NOTE - CHIEF COMPLAINT QUOTE
BIBEMS c/o left upper abd pain associated with nausea started last night. FS w/   hx diabetes, gastritis BIBEMS c/o left upper abd pain associated with nausea started last night. FS w/ . pt presents with insulin pump to right quadrant   hx diabetes, gastritis

## 2022-07-14 NOTE — ED ADULT NURSE NOTE - OBJECTIVE STATEMENT
Pt received in bed 31D, 42 y/o M, A&Ox3, c/o abdominal pain, N, high blood sugar X last night. PMHx DM and gastritis. NKDA. Denies chest pain, sob, V, D, fever, cough, chills. Appears in NAD, will continue to monitor.

## 2022-07-14 NOTE — ED PROVIDER NOTE - PHYSICAL EXAMINATION
Vitals: I have reviewed the patients vital signs  General: nontoxic appearing  HEENT: Atraumatic, normocephalic, airway patent  Eyes: EOMI, tracking appropriately  Neck: no tracheal deviation  Chest/Lungs: no trauma, symmetric chest rise, speaking in complete sentences,  no resp distress  Heart: skin and extremities well perfused, regular rate and rhythm  Abd: soft TTP LUQ no rebound or guarding  Neuro: A+Ox3, ambulating without difficulty, appears non focal  MSK: strength at baseline in all extremities, no muscle wasting or atrophy  Skin: no cyanosis, no jaundice

## 2022-07-14 NOTE — ED ADULT NURSE NOTE - CHIEF COMPLAINT QUOTE
BIBEMS c/o left upper abd pain associated with nausea started last night. FS w/ . pt presents with insulin pump to right quadrant   hx diabetes, gastritis

## 2022-07-14 NOTE — ED PROVIDER NOTE - OBJECTIVE STATEMENT
42 yo M PMH DM1 gastroporesis and cannibis hyperemesis presenting with one day of severe constant LUQ abd pain consistent with previous exacerbations.  It is associated with nausea without emesis.  States sugars have been high in the 500s all day despite not taking anything PO.  No fevers.

## 2022-07-14 NOTE — ED PROVIDER NOTE - PROGRESS NOTE DETAILS
Patient signed out to me by Dr. Michael, feeling improved, tolerating PO in the ED. Labs WNL. Will discharge home. Scout Deras, DO

## 2022-07-14 NOTE — ED PROVIDER NOTE - CLINICAL SUMMARY MEDICAL DECISION MAKING FREE TEXT BOX
pt with known history of gastroparesis and cannabis hyperemesis with pain and nausea.  Need to consider exacerbation of the gastroparesis.  Without emesis less like an hyperemesis episode.  Will need labs to look for DKA in the setting of high sugars despite no PO.  Will treat with Reglan as more useful in gastroparesis vs haldol in Cannibis.  Will hydrate as well.  Will need to reassess after labs and ability to tolerate PO.  *The above represents an initial assessment/impression. Please refer to progress notes for potential changes in patient clinical course*

## 2022-07-14 NOTE — ED ADULT NURSE NOTE - NSIMPLEMENTINTERV_GEN_ALL_ED
Implemented All Universal Safety Interventions:  [place] to call system. Call bell, personal items and telephone within reach. Instruct patient to call for assistance. Room bathroom lighting operational. Non-slip footwear when patient is off stretcher. Physically safe environment: no spills, clutter or unnecessary equipment. Stretcher in lowest position, wheels locked, appropriate side rails in place.

## 2022-07-14 NOTE — ED PROVIDER NOTE - PATIENT PORTAL LINK FT
You can access the FollowMyHealth Patient Portal offered by [facility] by registering at the following website: [email or web address]. By joining [employer]’s FollowMyHealth portal, you will also be able to view your health information using other applications (apps) compatible with our system. You can access the FollowMyHealth Patient Portal offered by [facility] by registering at the following website: [email or web address]. By joining [employer]’s FollowMyHealth portal, you will also be able to view your health information using other applications (apps) compatible with our system.

## 2022-07-21 ENCOUNTER — EMERGENCY (EMERGENCY)
Facility: HOSPITAL | Age: 42
LOS: 1 days | Discharge: HOME | End: 2022-07-21
Attending: STUDENT IN AN ORGANIZED HEALTH CARE EDUCATION/TRAINING PROGRAM

## 2022-07-21 VITALS
SYSTOLIC BLOOD PRESSURE: 136 MMHG | TEMPERATURE: 98 F | RESPIRATION RATE: 19 BRPM | OXYGEN SATURATION: 94 % | WEIGHT: 169.98 LBS | DIASTOLIC BLOOD PRESSURE: 96 MMHG | HEART RATE: 91 BPM | HEIGHT: 69 IN

## 2022-07-21 VITALS
TEMPERATURE: 98 F | DIASTOLIC BLOOD PRESSURE: 74 MMHG | RESPIRATION RATE: 19 BRPM | HEART RATE: 72 BPM | OXYGEN SATURATION: 98 % | SYSTOLIC BLOOD PRESSURE: 121 MMHG

## 2022-07-21 DIAGNOSIS — Z90.49 ACQUIRED ABSENCE OF OTHER SPECIFIED PARTS OF DIGESTIVE TRACT: Chronic | ICD-10-CM

## 2022-07-21 DIAGNOSIS — R10.84 GENERALIZED ABDOMINAL PAIN: ICD-10-CM

## 2022-07-21 DIAGNOSIS — Z91.011 ALLERGY TO MILK PRODUCTS: ICD-10-CM

## 2022-07-21 DIAGNOSIS — R11.2 NAUSEA WITH VOMITING, UNSPECIFIED: ICD-10-CM

## 2022-07-21 DIAGNOSIS — Z91.018 ALLERGY TO OTHER FOODS: ICD-10-CM

## 2022-07-21 DIAGNOSIS — K31.84 GASTROPARESIS: ICD-10-CM

## 2022-07-21 DIAGNOSIS — E11.43 TYPE 2 DIABETES MELLITUS WITH DIABETIC AUTONOMIC (POLY)NEUROPATHY: ICD-10-CM

## 2022-07-21 DIAGNOSIS — Z79.4 LONG TERM (CURRENT) USE OF INSULIN: ICD-10-CM

## 2022-07-21 LAB
ALBUMIN SERPL ELPH-MCNC: 3.8 G/DL — SIGNIFICANT CHANGE UP (ref 3.3–5)
ALP SERPL-CCNC: 117 U/L — SIGNIFICANT CHANGE UP (ref 40–120)
ALT FLD-CCNC: 41 U/L — SIGNIFICANT CHANGE UP (ref 12–78)
ANION GAP SERPL CALC-SCNC: 11 MMOL/L — SIGNIFICANT CHANGE UP (ref 5–17)
AST SERPL-CCNC: 31 U/L — SIGNIFICANT CHANGE UP (ref 15–37)
BASOPHILS # BLD AUTO: 0.03 K/UL — SIGNIFICANT CHANGE UP (ref 0–0.2)
BASOPHILS NFR BLD AUTO: 0.3 % — SIGNIFICANT CHANGE UP (ref 0–2)
BILIRUB SERPL-MCNC: 0.4 MG/DL — SIGNIFICANT CHANGE UP (ref 0.2–1.2)
BUN SERPL-MCNC: 16 MG/DL — SIGNIFICANT CHANGE UP (ref 7–23)
CALCIUM SERPL-MCNC: 9.9 MG/DL — SIGNIFICANT CHANGE UP (ref 8.5–10.1)
CHLORIDE SERPL-SCNC: 100 MMOL/L — SIGNIFICANT CHANGE UP (ref 96–108)
CO2 SERPL-SCNC: 27 MMOL/L — SIGNIFICANT CHANGE UP (ref 22–31)
CREAT SERPL-MCNC: 1.01 MG/DL — SIGNIFICANT CHANGE UP (ref 0.5–1.3)
EGFR: 96 ML/MIN/1.73M2 — SIGNIFICANT CHANGE UP
EOSINOPHIL # BLD AUTO: 0.01 K/UL — SIGNIFICANT CHANGE UP (ref 0–0.5)
EOSINOPHIL NFR BLD AUTO: 0.1 % — SIGNIFICANT CHANGE UP (ref 0–6)
GLUCOSE SERPL-MCNC: 261 MG/DL — HIGH (ref 70–99)
HCT VFR BLD CALC: 42.6 % — SIGNIFICANT CHANGE UP (ref 39–50)
HGB BLD-MCNC: 14.9 G/DL — SIGNIFICANT CHANGE UP (ref 13–17)
IMM GRANULOCYTES NFR BLD AUTO: 0.3 % — SIGNIFICANT CHANGE UP (ref 0–1.5)
LIDOCAIN IGE QN: 32 U/L — LOW (ref 73–393)
LYMPHOCYTES # BLD AUTO: 1.16 K/UL — SIGNIFICANT CHANGE UP (ref 1–3.3)
LYMPHOCYTES # BLD AUTO: 11.5 % — LOW (ref 13–44)
MCHC RBC-ENTMCNC: 28.6 PG — SIGNIFICANT CHANGE UP (ref 27–34)
MCHC RBC-ENTMCNC: 35 G/DL — SIGNIFICANT CHANGE UP (ref 32–36)
MCV RBC AUTO: 81.8 FL — SIGNIFICANT CHANGE UP (ref 80–100)
MONOCYTES # BLD AUTO: 0.12 K/UL — SIGNIFICANT CHANGE UP (ref 0–0.9)
MONOCYTES NFR BLD AUTO: 1.2 % — LOW (ref 2–14)
NEUTROPHILS # BLD AUTO: 8.72 K/UL — HIGH (ref 1.8–7.4)
NEUTROPHILS NFR BLD AUTO: 86.6 % — HIGH (ref 43–77)
NRBC # BLD: 0 /100 WBCS — SIGNIFICANT CHANGE UP (ref 0–0)
PLATELET # BLD AUTO: 445 K/UL — HIGH (ref 150–400)
POTASSIUM SERPL-MCNC: 4.4 MMOL/L — SIGNIFICANT CHANGE UP (ref 3.5–5.3)
POTASSIUM SERPL-SCNC: 4.4 MMOL/L — SIGNIFICANT CHANGE UP (ref 3.5–5.3)
PROT SERPL-MCNC: 8 GM/DL — SIGNIFICANT CHANGE UP (ref 6–8.3)
RBC # BLD: 5.21 M/UL — SIGNIFICANT CHANGE UP (ref 4.2–5.8)
RBC # FLD: 13.6 % — SIGNIFICANT CHANGE UP (ref 10.3–14.5)
SODIUM SERPL-SCNC: 138 MMOL/L — SIGNIFICANT CHANGE UP (ref 135–145)
WBC # BLD: 10.07 K/UL — SIGNIFICANT CHANGE UP (ref 3.8–10.5)
WBC # FLD AUTO: 10.07 K/UL — SIGNIFICANT CHANGE UP (ref 3.8–10.5)

## 2022-07-21 PROCEDURE — 99284 EMERGENCY DEPT VISIT MOD MDM: CPT

## 2022-07-21 RX ORDER — SODIUM CHLORIDE 9 MG/ML
2000 INJECTION INTRAMUSCULAR; INTRAVENOUS; SUBCUTANEOUS ONCE
Refills: 0 | Status: COMPLETED | OUTPATIENT
Start: 2022-07-21 | End: 2022-07-21

## 2022-07-21 RX ORDER — FAMOTIDINE 10 MG/ML
20 INJECTION INTRAVENOUS ONCE
Refills: 0 | Status: COMPLETED | OUTPATIENT
Start: 2022-07-21 | End: 2022-07-21

## 2022-07-21 RX ORDER — HALOPERIDOL DECANOATE 100 MG/ML
5 INJECTION INTRAMUSCULAR ONCE
Refills: 0 | Status: COMPLETED | OUTPATIENT
Start: 2022-07-21 | End: 2022-07-21

## 2022-07-21 RX ADMIN — HALOPERIDOL DECANOATE 5 MILLIGRAM(S): 100 INJECTION INTRAMUSCULAR at 16:56

## 2022-07-21 RX ADMIN — FAMOTIDINE 20 MILLIGRAM(S): 10 INJECTION INTRAVENOUS at 16:56

## 2022-07-21 RX ADMIN — SODIUM CHLORIDE 2000 MILLILITER(S): 9 INJECTION INTRAMUSCULAR; INTRAVENOUS; SUBCUTANEOUS at 17:56

## 2022-07-21 RX ADMIN — SODIUM CHLORIDE 2000 MILLILITER(S): 9 INJECTION INTRAMUSCULAR; INTRAVENOUS; SUBCUTANEOUS at 16:56

## 2022-07-21 NOTE — ED PROVIDER NOTE - OBJECTIVE STATEMENT
41-year-old male with past medical history of diabetes with gastroparesis and cannabis hyperemesis syndrome presenting with colicky abdominal pain diffuse with associated nausea and vomiting similar to previous episodes.  Patient admits to cannabis use denies any changes in characteristic of pain.  No fever or chills

## 2022-07-21 NOTE — ED ADULT NURSE NOTE - NS ED NURSE DC PT EDUCATION RESOURCES
Swathi Grant is a 68 year old female here for  Chief Complaint   Patient presents with   • Blood Disorder     Bilateral pulmonary embolism - 1 year MD fuv     Denies latex allergy or sensitivity.    Medication verified and med list updated.  PCP and Pharmacy verified.    Social History     Tobacco Use   Smoking Status Passive Smoke Exposure - Never Smoker   Smokeless Tobacco Never Used     Advance Directives Filed: Yes    Height: Yes, shoes off.  Ht Readings from Last 1 Encounters:   12/07/20 5' 6\" (1.676 m)     Weight: scale is broken    REVIEW OF SYSTEMS  GENERAL:  Patient denies headache, fevers, chills, night sweats, excessive fatigue, change in appetite, weight loss, dizziness  ALLERGIC/IMMUNOLOGIC: Verified allergies: Yes  EYES:  Patient denies significant visual difficulties, double vision, blurred vision  ENT/MOUTH: Patient denies problems with hearing, sore throat, sinus drainage, mouth sores  ENDOCRINE:  Patient denies diabetes, thyroid disease, hormone replacement, hot flashes  HEMATOLOGIC/LYMPHATIC: Patient denies easy bruising, bleeding, tender lymph nodes, swollen lymph nodes  BREASTS: Patient denies abnormal masses of breast, nipple discharge, pain  RESPIRATORY:  Patient denies lung pain with breathing, coughing up blood, but complains of: cough and shortness of breath - ongoing  CARDIOVASCULAR:  Patient denies anginal chest pain, palpitations, shortness of breath when lying flat, peripheral edema  GASTROINTESTINAL: Patient denies abdominal pain , nausea, vomiting, diarrhea, GI bleeding, constipation, change in bowel habits, heartburn, sensation of feeling full, difficulty swallowing  : Patient denies abnormal genital masses, blood in the urine, frequency, urgency, burning with urination, hesitancy, vaginal bleeding, discharge, but complains of: incontinence - ongoing  MUSCULOSKELETAL:  Patient denies joint pain, bone pain, joint swelling, redness, decreased range of motion  SKIN:  Patient  denies chronic rashes, inflammation, ulcerations, skin changes, itching  NEUROLOGIC:  Patient denies loss of balance, areas of focal weakness, abnormal gait, sensory problems, numbness, tingling  PSYCHIATRIC: Patient denies insomnia, anxiety, but complains of: depression - secondary to family health and COVID   Yes

## 2022-07-21 NOTE — ED PROVIDER NOTE - CLINICAL SUMMARY MEDICAL DECISION MAKING FREE TEXT BOX
Patient with typical gastroparesis symptoms versus cannabis hyperemesis syndrome, will obtain screening abdominal labs lipase, IV fluid bolus pain control and reassess

## 2022-07-21 NOTE — ED PROVIDER NOTE - PHYSICAL EXAMINATION
VITAL SIGNS: I have reviewed nursing notes and confirm.  CONSTITUTIONAL: well-appearing, non-toxic  SKIN: Warm dry, normal skin turgor  HEAD: NCAT  EYES: EOMI, PERRLA, no scleral icterus  ENT: Moist mucous membranes, normal pharynx   NECK: Supple; non tender. Full ROM.   CARD: RRR, no murmurs, rubs or gallops  RESP: clear to ausculation b/l.  No rales, rhonchi, or wheezing.  ABD: soft, + BS, non-tender, non-distended, no rebound or guarding. No CVA tenderness  EXT: Full ROM, no bony tenderness, no pedal edema, no calf tenderness  NEURO: normal motor. normal sensory. Normal gait.  PSYCH: Cooperative, appropriate.

## 2022-07-21 NOTE — ED ADULT NURSE NOTE - OBJECTIVE STATEMENT
Pt bibems c/o sudden onset 10/10 abdominal pain, nausea, non-bloody vomiting since this morning. Pt states this episode is similar to previous episodes of cannabis hyperemesis syndrome s/s in the past. Pt denies cp, sob, dizziness, palpitations, diarrhea, constipation, ha, back pain, flank pain, numbness/tingling, weakness.

## 2022-07-21 NOTE — ED PROVIDER NOTE - PATIENT PORTAL LINK FT
You can access the FollowMyHealth Patient Portal offered by [facility] by registering at the following website: [email or web address]. By joining [employer]’s FollowMyHealth portal, you will also be able to view your health information using other applications (apps) compatible with our system.

## 2022-07-25 ENCOUNTER — EMERGENCY (EMERGENCY)
Facility: HOSPITAL | Age: 42
LOS: 0 days | Discharge: HOME | End: 2022-07-25
Attending: STUDENT IN AN ORGANIZED HEALTH CARE EDUCATION/TRAINING PROGRAM

## 2022-07-25 VITALS
HEART RATE: 78 BPM | DIASTOLIC BLOOD PRESSURE: 83 MMHG | TEMPERATURE: 98 F | RESPIRATION RATE: 15 BRPM | OXYGEN SATURATION: 98 % | SYSTOLIC BLOOD PRESSURE: 144 MMHG

## 2022-07-25 VITALS
SYSTOLIC BLOOD PRESSURE: 126 MMHG | WEIGHT: 169.98 LBS | RESPIRATION RATE: 19 BRPM | OXYGEN SATURATION: 96 % | HEIGHT: 69 IN | DIASTOLIC BLOOD PRESSURE: 87 MMHG | HEART RATE: 87 BPM | TEMPERATURE: 98 F

## 2022-07-25 DIAGNOSIS — Z20.822 CONTACT WITH AND (SUSPECTED) EXPOSURE TO COVID-19: ICD-10-CM

## 2022-07-25 DIAGNOSIS — R10.13 EPIGASTRIC PAIN: ICD-10-CM

## 2022-07-25 DIAGNOSIS — R11.10 VOMITING, UNSPECIFIED: ICD-10-CM

## 2022-07-25 DIAGNOSIS — K31.84 GASTROPARESIS: ICD-10-CM

## 2022-07-25 DIAGNOSIS — E10.65 TYPE 1 DIABETES MELLITUS WITH HYPERGLYCEMIA: ICD-10-CM

## 2022-07-25 DIAGNOSIS — E10.43 TYPE 1 DIABETES MELLITUS WITH DIABETIC AUTONOMIC (POLY)NEUROPATHY: ICD-10-CM

## 2022-07-25 DIAGNOSIS — E73.9 LACTOSE INTOLERANCE, UNSPECIFIED: ICD-10-CM

## 2022-07-25 DIAGNOSIS — R11.2 NAUSEA WITH VOMITING, UNSPECIFIED: ICD-10-CM

## 2022-07-25 DIAGNOSIS — Z90.49 ACQUIRED ABSENCE OF OTHER SPECIFIED PARTS OF DIGESTIVE TRACT: Chronic | ICD-10-CM

## 2022-07-25 DIAGNOSIS — Z91.018 ALLERGY TO OTHER FOODS: ICD-10-CM

## 2022-07-25 DIAGNOSIS — Z79.4 LONG TERM (CURRENT) USE OF INSULIN: ICD-10-CM

## 2022-07-25 LAB
ALBUMIN SERPL ELPH-MCNC: 3.7 G/DL — SIGNIFICANT CHANGE UP (ref 3.3–5)
ALP SERPL-CCNC: 86 U/L — SIGNIFICANT CHANGE UP (ref 40–120)
ALT FLD-CCNC: 32 U/L — SIGNIFICANT CHANGE UP (ref 12–78)
ANION GAP SERPL CALC-SCNC: 6 MMOL/L — SIGNIFICANT CHANGE UP (ref 5–17)
AST SERPL-CCNC: 35 U/L — SIGNIFICANT CHANGE UP (ref 15–37)
BASOPHILS # BLD AUTO: 0.04 K/UL — SIGNIFICANT CHANGE UP (ref 0–0.2)
BASOPHILS NFR BLD AUTO: 0.4 % — SIGNIFICANT CHANGE UP (ref 0–2)
BILIRUB SERPL-MCNC: 0.5 MG/DL — SIGNIFICANT CHANGE UP (ref 0.2–1.2)
BUN SERPL-MCNC: 21 MG/DL — SIGNIFICANT CHANGE UP (ref 7–23)
CALCIUM SERPL-MCNC: 9.6 MG/DL — SIGNIFICANT CHANGE UP (ref 8.5–10.1)
CHLORIDE SERPL-SCNC: 104 MMOL/L — SIGNIFICANT CHANGE UP (ref 96–108)
CO2 SERPL-SCNC: 28 MMOL/L — SIGNIFICANT CHANGE UP (ref 22–31)
CREAT SERPL-MCNC: 0.94 MG/DL — SIGNIFICANT CHANGE UP (ref 0.5–1.3)
EGFR: 104 ML/MIN/1.73M2 — SIGNIFICANT CHANGE UP
EOSINOPHIL # BLD AUTO: 0.22 K/UL — SIGNIFICANT CHANGE UP (ref 0–0.5)
EOSINOPHIL NFR BLD AUTO: 2.4 % — SIGNIFICANT CHANGE UP (ref 0–6)
FLUAV AG NPH QL: SIGNIFICANT CHANGE UP
FLUBV AG NPH QL: SIGNIFICANT CHANGE UP
GLUCOSE SERPL-MCNC: 278 MG/DL — HIGH (ref 70–99)
HCT VFR BLD CALC: 39.6 % — SIGNIFICANT CHANGE UP (ref 39–50)
HGB BLD-MCNC: 13.9 G/DL — SIGNIFICANT CHANGE UP (ref 13–17)
IMM GRANULOCYTES NFR BLD AUTO: 0.2 % — SIGNIFICANT CHANGE UP (ref 0–1.5)
LIDOCAIN IGE QN: 216 U/L — SIGNIFICANT CHANGE UP (ref 73–393)
LYMPHOCYTES # BLD AUTO: 2.62 K/UL — SIGNIFICANT CHANGE UP (ref 1–3.3)
LYMPHOCYTES # BLD AUTO: 28.5 % — SIGNIFICANT CHANGE UP (ref 13–44)
MCHC RBC-ENTMCNC: 28.8 PG — SIGNIFICANT CHANGE UP (ref 27–34)
MCHC RBC-ENTMCNC: 35.1 G/DL — SIGNIFICANT CHANGE UP (ref 32–36)
MCV RBC AUTO: 82.2 FL — SIGNIFICANT CHANGE UP (ref 80–100)
MONOCYTES # BLD AUTO: 0.51 K/UL — SIGNIFICANT CHANGE UP (ref 0–0.9)
MONOCYTES NFR BLD AUTO: 5.5 % — SIGNIFICANT CHANGE UP (ref 2–14)
NEUTROPHILS # BLD AUTO: 5.78 K/UL — SIGNIFICANT CHANGE UP (ref 1.8–7.4)
NEUTROPHILS NFR BLD AUTO: 63 % — SIGNIFICANT CHANGE UP (ref 43–77)
NRBC # BLD: 0 /100 WBCS — SIGNIFICANT CHANGE UP (ref 0–0)
PLATELET # BLD AUTO: 373 K/UL — SIGNIFICANT CHANGE UP (ref 150–400)
POTASSIUM SERPL-MCNC: 4.5 MMOL/L — SIGNIFICANT CHANGE UP (ref 3.5–5.3)
POTASSIUM SERPL-SCNC: 4.5 MMOL/L — SIGNIFICANT CHANGE UP (ref 3.5–5.3)
PROT SERPL-MCNC: 7.3 GM/DL — SIGNIFICANT CHANGE UP (ref 6–8.3)
RBC # BLD: 4.82 M/UL — SIGNIFICANT CHANGE UP (ref 4.2–5.8)
RBC # FLD: 13.4 % — SIGNIFICANT CHANGE UP (ref 10.3–14.5)
SARS-COV-2 RNA SPEC QL NAA+PROBE: SIGNIFICANT CHANGE UP
SODIUM SERPL-SCNC: 138 MMOL/L — SIGNIFICANT CHANGE UP (ref 135–145)
WBC # BLD: 9.19 K/UL — SIGNIFICANT CHANGE UP (ref 3.8–10.5)
WBC # FLD AUTO: 9.19 K/UL — SIGNIFICANT CHANGE UP (ref 3.8–10.5)

## 2022-07-25 PROCEDURE — 71045 X-RAY EXAM CHEST 1 VIEW: CPT | Mod: 26

## 2022-07-25 PROCEDURE — 99285 EMERGENCY DEPT VISIT HI MDM: CPT

## 2022-07-25 RX ORDER — ONDANSETRON 8 MG/1
8 TABLET, FILM COATED ORAL ONCE
Refills: 0 | Status: COMPLETED | OUTPATIENT
Start: 2022-07-25 | End: 2022-07-25

## 2022-07-25 RX ORDER — FAMOTIDINE 10 MG/ML
20 INJECTION INTRAVENOUS ONCE
Refills: 0 | Status: COMPLETED | OUTPATIENT
Start: 2022-07-25 | End: 2022-07-25

## 2022-07-25 RX ORDER — MORPHINE SULFATE 50 MG/1
8 CAPSULE, EXTENDED RELEASE ORAL ONCE
Refills: 0 | Status: DISCONTINUED | OUTPATIENT
Start: 2022-07-25 | End: 2022-07-25

## 2022-07-25 RX ORDER — SODIUM CHLORIDE 9 MG/ML
2000 INJECTION INTRAMUSCULAR; INTRAVENOUS; SUBCUTANEOUS ONCE
Refills: 0 | Status: COMPLETED | OUTPATIENT
Start: 2022-07-25 | End: 2022-07-25

## 2022-07-25 RX ADMIN — MORPHINE SULFATE 8 MILLIGRAM(S): 50 CAPSULE, EXTENDED RELEASE ORAL at 19:04

## 2022-07-25 RX ADMIN — FAMOTIDINE 20 MILLIGRAM(S): 10 INJECTION INTRAVENOUS at 19:04

## 2022-07-25 RX ADMIN — SODIUM CHLORIDE 2000 MILLILITER(S): 9 INJECTION INTRAMUSCULAR; INTRAVENOUS; SUBCUTANEOUS at 19:04

## 2022-07-25 RX ADMIN — SODIUM CHLORIDE 2000 MILLILITER(S): 9 INJECTION INTRAMUSCULAR; INTRAVENOUS; SUBCUTANEOUS at 21:42

## 2022-07-25 RX ADMIN — MORPHINE SULFATE 8 MILLIGRAM(S): 50 CAPSULE, EXTENDED RELEASE ORAL at 20:43

## 2022-07-25 RX ADMIN — Medication 30 MILLILITER(S): at 19:05

## 2022-07-25 RX ADMIN — ONDANSETRON 8 MILLIGRAM(S): 8 TABLET, FILM COATED ORAL at 19:04

## 2022-07-25 NOTE — ED PROVIDER NOTE - OBJECTIVE STATEMENT
41-year-old male with a past medical history of type 1 diabetes, gastroparesis presenting to the emergency department for tenderness and upper abdominal pain since 7:00 this morning.  Patient reports that he woke up and the pain has been constant.  Reports he has had 2 episodes of vomiting, describes it as normal, denies any coffee-ground emesis or blood in the vomit.  Last bowel movement was this morning, he describes it as normal.  Denies any hematochezia or melena.  Patient reports that he was in the hospital last week for abdominal pain, has been diagnosed with gastritis in the past.  Denies any sick contacts, fever, syncope, chest pain, difficulty breathing, weakness, or dizziness.  The pain is unrelated to any of his meals.  Last meal was last night.

## 2022-07-25 NOTE — ED ADULT NURSE NOTE - ISOLATION TYPE:
RN called patient to convey results.  Pt verbalized understanding with no questions or concerns.  
None

## 2022-07-25 NOTE — ED PROVIDER NOTE - PROGRESS NOTE DETAILS
PT reports feeling better with meds.  States has GI follow-up but has trouble getting appointment, but will call his GI doc to try again to get appointment soon.  Results discussed with patient including elevated glucose.  Return precautions provided and patient agreeable.

## 2022-07-25 NOTE — ED ADULT NURSE REASSESSMENT NOTE - NS ED NURSE REASSESS COMMENT FT1
Pt AAOx4. Pt sitting comfortably in bed on phone with no complaints at this time. Respirations equal and unlabored. No acute distress noted at this time.

## 2022-07-25 NOTE — ED PROVIDER NOTE - CLINICAL SUMMARY MEDICAL DECISION MAKING FREE TEXT BOX
41-year-old male with history of gastritis gastroparesis and cannabis use presenting to the ED with typical upper abdominal pain with associated nausea vomiting times since this morning, nonbloody nonbilious emesis without associated diarrhea.  Denies any fever chills chest pain or shortness of breath.  Plan to obtain chest x-ray, screening abdominal labs lipase IV fluid bolus pain control antiemetics and reassess

## 2022-07-25 NOTE — ED ADULT NURSE REASSESSMENT NOTE - NS ED NURSE REASSESS COMMENT FT1
Pt AAOx4. Steady gait, ambulatory. IV removed. D/C per MD orders. D/C instructions provided and verbalizes understanding of medication regimen and follow up care. Educational material provided. Respirations equal and unlabored with no acute distress noted at this time. Pt denies any pain or complaints at this time.

## 2022-07-25 NOTE — ED PROVIDER NOTE - PATIENT PORTAL LINK FT
You can access the FollowMyHealth Patient Portal offered by [facility] by registering at the following website: [email or web address]. By joining [employer]’s FollowMyHealth portal, you will also be able to view your health information using other applications (apps) compatible with our system.

## 2022-07-25 NOTE — ED PROVIDER NOTE - PHYSICAL EXAMINATION
VITAL SIGNS: I have reviewed nursing notes and confirm.  CONSTITUTIONAL: well-appearing, non-toxic  SKIN: Warm dry, normal skin turgor  HEAD: NCAT  EYES: EOMI, PERRLA, no scleral icterus  ENT: Moist mucous membranes, normal pharynx   NECK: Supple; non tender. Full ROM.   CARD: RRR, no murmurs, rubs or gallops  RESP: clear to ausculation b/l.  No rales, rhonchi, or wheezing.  ABD: soft, + BS, epigastric ttp non-distended, no rebound or guarding. No CVA tenderness  EXT: Full ROM, no bony tenderness, no pedal edema, no calf tenderness  NEURO: normal motor. normal  Normal gait.  PSYCH: Cooperative, appropriate.

## 2022-07-25 NOTE — ED ADULT NURSE NOTE - OBJECTIVE STATEMENT
pt presents to ed a&ox3 breathing spont/unlabored BIBA as per patient c/o abdominal pain, N/V since 6am NBNB emesis no diarrhea no fever or chills   Hx: Gastritis, T1DM

## 2022-07-27 ENCOUNTER — APPOINTMENT (OUTPATIENT)
Dept: AFTER HOURS CARE | Facility: EMERGENCY ROOM | Age: 42
End: 2022-07-27

## 2022-07-28 ENCOUNTER — EMERGENCY (EMERGENCY)
Facility: HOSPITAL | Age: 42
LOS: 1 days | Discharge: HOME | End: 2022-07-28
Attending: EMERGENCY MEDICINE

## 2022-07-28 VITALS
TEMPERATURE: 98 F | SYSTOLIC BLOOD PRESSURE: 150 MMHG | RESPIRATION RATE: 15 BRPM | HEART RATE: 69 BPM | DIASTOLIC BLOOD PRESSURE: 91 MMHG | OXYGEN SATURATION: 100 %

## 2022-07-28 VITALS
TEMPERATURE: 99 F | WEIGHT: 169.98 LBS | HEIGHT: 69 IN | DIASTOLIC BLOOD PRESSURE: 78 MMHG | HEART RATE: 68 BPM | RESPIRATION RATE: 17 BRPM | OXYGEN SATURATION: 100 % | SYSTOLIC BLOOD PRESSURE: 143 MMHG

## 2022-07-28 DIAGNOSIS — Z87.19 PERSONAL HISTORY OF OTHER DISEASES OF THE DIGESTIVE SYSTEM: ICD-10-CM

## 2022-07-28 DIAGNOSIS — Z91.018 ALLERGY TO OTHER FOODS: ICD-10-CM

## 2022-07-28 DIAGNOSIS — Z90.49 ACQUIRED ABSENCE OF OTHER SPECIFIED PARTS OF DIGESTIVE TRACT: Chronic | ICD-10-CM

## 2022-07-28 DIAGNOSIS — R11.2 NAUSEA WITH VOMITING, UNSPECIFIED: ICD-10-CM

## 2022-07-28 DIAGNOSIS — J45.909 UNSPECIFIED ASTHMA, UNCOMPLICATED: ICD-10-CM

## 2022-07-28 DIAGNOSIS — Z91.011 ALLERGY TO MILK PRODUCTS: ICD-10-CM

## 2022-07-28 DIAGNOSIS — E10.9 TYPE 1 DIABETES MELLITUS WITHOUT COMPLICATIONS: ICD-10-CM

## 2022-07-28 DIAGNOSIS — Z79.4 LONG TERM (CURRENT) USE OF INSULIN: ICD-10-CM

## 2022-07-28 DIAGNOSIS — F41.9 ANXIETY DISORDER, UNSPECIFIED: ICD-10-CM

## 2022-07-28 DIAGNOSIS — Z90.49 ACQUIRED ABSENCE OF OTHER SPECIFIED PARTS OF DIGESTIVE TRACT: ICD-10-CM

## 2022-07-28 DIAGNOSIS — R10.11 RIGHT UPPER QUADRANT PAIN: ICD-10-CM

## 2022-07-28 LAB
ALBUMIN SERPL ELPH-MCNC: 4 G/DL — SIGNIFICANT CHANGE UP (ref 3.3–5)
ALP SERPL-CCNC: 99 U/L — SIGNIFICANT CHANGE UP (ref 40–120)
ALT FLD-CCNC: 41 U/L — SIGNIFICANT CHANGE UP (ref 12–78)
ANION GAP SERPL CALC-SCNC: 10 MMOL/L — SIGNIFICANT CHANGE UP (ref 5–17)
APPEARANCE UR: CLEAR — SIGNIFICANT CHANGE UP
AST SERPL-CCNC: 34 U/L — SIGNIFICANT CHANGE UP (ref 15–37)
BASOPHILS # BLD AUTO: 0.05 K/UL — SIGNIFICANT CHANGE UP (ref 0–0.2)
BASOPHILS NFR BLD AUTO: 0.5 % — SIGNIFICANT CHANGE UP (ref 0–2)
BILIRUB SERPL-MCNC: 0.4 MG/DL — SIGNIFICANT CHANGE UP (ref 0.2–1.2)
BILIRUB UR-MCNC: NEGATIVE — SIGNIFICANT CHANGE UP
BUN SERPL-MCNC: 15 MG/DL — SIGNIFICANT CHANGE UP (ref 7–23)
CALCIUM SERPL-MCNC: 9.6 MG/DL — SIGNIFICANT CHANGE UP (ref 8.5–10.1)
CHLORIDE SERPL-SCNC: 101 MMOL/L — SIGNIFICANT CHANGE UP (ref 96–108)
CO2 SERPL-SCNC: 29 MMOL/L — SIGNIFICANT CHANGE UP (ref 22–31)
COLOR SPEC: YELLOW — SIGNIFICANT CHANGE UP
CREAT SERPL-MCNC: 1.15 MG/DL — SIGNIFICANT CHANGE UP (ref 0.5–1.3)
DIFF PNL FLD: NEGATIVE — SIGNIFICANT CHANGE UP
EGFR: 82 ML/MIN/1.73M2 — SIGNIFICANT CHANGE UP
EOSINOPHIL # BLD AUTO: 0.02 K/UL — SIGNIFICANT CHANGE UP (ref 0–0.5)
EOSINOPHIL NFR BLD AUTO: 0.2 % — SIGNIFICANT CHANGE UP (ref 0–6)
GLUCOSE BLDC GLUCOMTR-MCNC: 328 MG/DL — HIGH (ref 70–99)
GLUCOSE SERPL-MCNC: 364 MG/DL — HIGH (ref 70–99)
GLUCOSE UR QL: 1000 MG/DL
HCT VFR BLD CALC: 37.9 % — LOW (ref 39–50)
HGB BLD-MCNC: 13.4 G/DL — SIGNIFICANT CHANGE UP (ref 13–17)
IMM GRANULOCYTES NFR BLD AUTO: 0.3 % — SIGNIFICANT CHANGE UP (ref 0–1.5)
KETONES UR-MCNC: ABNORMAL
LEUKOCYTE ESTERASE UR-ACNC: NEGATIVE — SIGNIFICANT CHANGE UP
LYMPHOCYTES # BLD AUTO: 1.48 K/UL — SIGNIFICANT CHANGE UP (ref 1–3.3)
LYMPHOCYTES # BLD AUTO: 14.2 % — SIGNIFICANT CHANGE UP (ref 13–44)
MCHC RBC-ENTMCNC: 28.6 PG — SIGNIFICANT CHANGE UP (ref 27–34)
MCHC RBC-ENTMCNC: 35.4 G/DL — SIGNIFICANT CHANGE UP (ref 32–36)
MCV RBC AUTO: 80.8 FL — SIGNIFICANT CHANGE UP (ref 80–100)
MONOCYTES # BLD AUTO: 0.38 K/UL — SIGNIFICANT CHANGE UP (ref 0–0.9)
MONOCYTES NFR BLD AUTO: 3.7 % — SIGNIFICANT CHANGE UP (ref 2–14)
NEUTROPHILS # BLD AUTO: 8.44 K/UL — HIGH (ref 1.8–7.4)
NEUTROPHILS NFR BLD AUTO: 81.1 % — HIGH (ref 43–77)
NITRITE UR-MCNC: NEGATIVE — SIGNIFICANT CHANGE UP
NRBC # BLD: 0 /100 WBCS — SIGNIFICANT CHANGE UP (ref 0–0)
PH UR: 8 — SIGNIFICANT CHANGE UP (ref 5–8)
PLATELET # BLD AUTO: 377 K/UL — SIGNIFICANT CHANGE UP (ref 150–400)
POTASSIUM SERPL-MCNC: 4 MMOL/L — SIGNIFICANT CHANGE UP (ref 3.5–5.3)
POTASSIUM SERPL-SCNC: 4 MMOL/L — SIGNIFICANT CHANGE UP (ref 3.5–5.3)
PROT SERPL-MCNC: 7.7 GM/DL — SIGNIFICANT CHANGE UP (ref 6–8.3)
PROT UR-MCNC: NEGATIVE MG/DL — SIGNIFICANT CHANGE UP
RBC # BLD: 4.69 M/UL — SIGNIFICANT CHANGE UP (ref 4.2–5.8)
RBC # FLD: 13.4 % — SIGNIFICANT CHANGE UP (ref 10.3–14.5)
SODIUM SERPL-SCNC: 140 MMOL/L — SIGNIFICANT CHANGE UP (ref 135–145)
SP GR SPEC: 1.01 — SIGNIFICANT CHANGE UP (ref 1.01–1.02)
UROBILINOGEN FLD QL: NEGATIVE MG/DL — SIGNIFICANT CHANGE UP
WBC # BLD: 10.4 K/UL — SIGNIFICANT CHANGE UP (ref 3.8–10.5)
WBC # FLD AUTO: 10.4 K/UL — SIGNIFICANT CHANGE UP (ref 3.8–10.5)

## 2022-07-28 PROCEDURE — 99285 EMERGENCY DEPT VISIT HI MDM: CPT

## 2022-07-28 PROCEDURE — 74176 CT ABD & PELVIS W/O CONTRAST: CPT | Mod: 26,MA

## 2022-07-28 RX ORDER — ONDANSETRON 8 MG/1
4 TABLET, FILM COATED ORAL ONCE
Refills: 0 | Status: COMPLETED | OUTPATIENT
Start: 2022-07-28 | End: 2022-07-28

## 2022-07-28 RX ORDER — HYDROMORPHONE HYDROCHLORIDE 2 MG/ML
1 INJECTION INTRAMUSCULAR; INTRAVENOUS; SUBCUTANEOUS ONCE
Refills: 0 | Status: DISCONTINUED | OUTPATIENT
Start: 2022-07-28 | End: 2022-07-28

## 2022-07-28 RX ORDER — ACETAMINOPHEN 500 MG
2 TABLET ORAL
Qty: 40 | Refills: 0
Start: 2022-07-28 | End: 2022-08-01

## 2022-07-28 RX ORDER — SODIUM CHLORIDE 9 MG/ML
1000 INJECTION INTRAMUSCULAR; INTRAVENOUS; SUBCUTANEOUS ONCE
Refills: 0 | Status: COMPLETED | OUTPATIENT
Start: 2022-07-28 | End: 2022-07-28

## 2022-07-28 RX ORDER — MORPHINE SULFATE 50 MG/1
4 CAPSULE, EXTENDED RELEASE ORAL ONCE
Refills: 0 | Status: DISCONTINUED | OUTPATIENT
Start: 2022-07-28 | End: 2022-07-28

## 2022-07-28 RX ORDER — ACETAMINOPHEN 500 MG
1000 TABLET ORAL ONCE
Refills: 0 | Status: COMPLETED | OUTPATIENT
Start: 2022-07-28 | End: 2022-07-28

## 2022-07-28 RX ORDER — METOCLOPRAMIDE HCL 10 MG
10 TABLET ORAL ONCE
Refills: 0 | Status: COMPLETED | OUTPATIENT
Start: 2022-07-28 | End: 2022-07-28

## 2022-07-28 RX ORDER — KETOROLAC TROMETHAMINE 30 MG/ML
15 SYRINGE (ML) INJECTION ONCE
Refills: 0 | Status: DISCONTINUED | OUTPATIENT
Start: 2022-07-28 | End: 2022-07-28

## 2022-07-28 RX ORDER — OXYCODONE HYDROCHLORIDE 5 MG/1
1 TABLET ORAL
Qty: 8 | Refills: 0
Start: 2022-07-28 | End: 2022-07-29

## 2022-07-28 RX ADMIN — ONDANSETRON 4 MILLIGRAM(S): 8 TABLET, FILM COATED ORAL at 03:14

## 2022-07-28 RX ADMIN — Medication 15 MILLIGRAM(S): at 03:14

## 2022-07-28 RX ADMIN — Medication 400 MILLIGRAM(S): at 05:08

## 2022-07-28 RX ADMIN — Medication 10 MILLIGRAM(S): at 04:27

## 2022-07-28 RX ADMIN — SODIUM CHLORIDE 1000 MILLILITER(S): 9 INJECTION INTRAMUSCULAR; INTRAVENOUS; SUBCUTANEOUS at 04:12

## 2022-07-28 RX ADMIN — MORPHINE SULFATE 4 MILLIGRAM(S): 50 CAPSULE, EXTENDED RELEASE ORAL at 04:12

## 2022-07-28 RX ADMIN — SODIUM CHLORIDE 1000 MILLILITER(S): 9 INJECTION INTRAMUSCULAR; INTRAVENOUS; SUBCUTANEOUS at 03:13

## 2022-07-28 RX ADMIN — MORPHINE SULFATE 4 MILLIGRAM(S): 50 CAPSULE, EXTENDED RELEASE ORAL at 03:14

## 2022-07-28 RX ADMIN — HYDROMORPHONE HYDROCHLORIDE 1 MILLIGRAM(S): 2 INJECTION INTRAMUSCULAR; INTRAVENOUS; SUBCUTANEOUS at 06:24

## 2022-07-28 NOTE — ED ADULT NURSE NOTE - ED STAT RN HANDOFF DETAILS
Report endorsed to JERRY Daniels. Safety checks completed this shift. Safety rounds completed hourly.  IV sites checked Q2+remains WDL. Medications administered as ordered with no signs/symptoms of adverse reactions. Fall & skin precautions in place. Pt alert and oriented, no acute distress noted. Respirations even and unlabored. Any issues endorsed to JERRY Daniels for follow up.

## 2022-07-28 NOTE — ED PROVIDER NOTE - PATIENT PORTAL LINK FT
You can access the FollowMyHealth Patient Portal offered by [facility] by registering at the following website: [email or web address]. By joining [employer]’s FollowMyHealth portal, you will also be able to view your health information using other applications (apps) compatible with our system.

## 2022-07-28 NOTE — ED ADULT NURSE NOTE - ALCOHOL PRE SCREEN (AUDIT - C)
Addended by: RADHA SERVIN on: 9/17/2020 08:21 AM     Modules accepted: Orders     Statement Selected

## 2022-07-28 NOTE — ED ADULT NURSE NOTE - OBJECTIVE STATEMENT
assisting primary RN Benji, received pt in bed 32D, 42yo M, Aox4, hx of gastritis, DM (on insulin pump), asthma, and anxiety, presents to ED for Right flank pain x3 hrs before arriving a/w nausea and vomitting.  Pt states this is not first time, usually experiences pain on left side.  Denies any chest pain, sob, urinary symptoms, fever.

## 2022-07-28 NOTE — ED PROVIDER NOTE - OBJECTIVE STATEMENT
Pertinent PMH/PSH/FHx/SHx and Review of Systems contained within:  Patient presents to the ED for right flank pain.  Patient reports right flank pain sudden onset x4 hours with radiating pain to abdomen and nausea, patient vomiting and in distress in ER.  No chest pain or sob.  Denies prior h/o kidney stones. Pertinent PMH/PSH/FHx/SHx and Review of Systems contained within:  Patient presents to the ED for right flank pain.  Patient reports right flank pain sudden onset x4 hours with radiating pain to abdomen and nausea, patient vomiting and in distress in ER.  No chest pain or sob.  Denies prior h/o kidney stones, but reports episodes of pain last week.

## 2022-07-28 NOTE — ED PROVIDER NOTE - CLINICAL SUMMARY MEDICAL DECISION MAKING FREE TEXT BOX
Patient with right flank pain, symptoms sound like renal colic.  VSS.  Lab values reviewed, there are no values which require acute intervention, glucose elevated, patient has pump and given IVF.  UA negative for infection or blood, no stone or hydro visualized, however sx very typical for renal calculus, question radiolucent stone not seen on CT.  Pain now resolved, patient with low pain tolerance, has had multiple admissions for pain control.  No chest pain.  Feels ok to be discharged, has zofran at home, will dc with pain medication, advised to return if his symptoms worsen. Discussed results and outcome of today's visit with the patient/family, copy of results given with discharge.  Patient advised to arrange ontiveros follow up with their PMD and/or any provided referral(s) within the next few days and are cautioned to return to the Emergency Department for any worsening symptoms.  Patient advised that their doctor may call  to follow up on the specific results of the tests performed today in the emergency department.   Patient appears well on discharge.

## 2022-07-28 NOTE — ED PROVIDER NOTE - INTERNATIONAL TRAVEL
Internal medicine history and physical   INTERNAL MEDICINE   [facility]       Patient Identification:  Name: Jihan Teresa  Age: 83 y.o.  Sex: female  :  1933  MRN: [id number]                   Primary Care Physician: Gustavo Jackman Jr., MD                                   Chief Complaint:  Progressive weakness for the last 2 weeks episode of confusion since Wednesday.    History of Present Illness:   Source of information is patient's daughter patient herself is pleasantly confused.  Patient is a 83-year-old female with underlying past medical history listed below was in her usual state of her health until a month ago when she developed cough congestion with everybody else in the house having similar symptoms.  According to the daughter most of the family members have recovered but she still has some dry cough and congestion even though she is also improved.  In that background about 2 weeks ago she started becoming progressively weak and not as active.  Wednesday family members noticed that she was confused and disoriented.  They know that when this happens she usually have urinary tract infection but Thursday she was back to herself fully oriented and looked good.  Friday she started complaining of feeling poorly and weak.  She was not making as much urine and was becoming more confused.  This resulted in her being brought to the emergency room for further evaluation.  There was no documented fever or chills but there was definite decrease in appetite.  In the emergency room after receiving fluids and IV antibiotic therapy patient is feeling somewhat better and has better color according to daughter.  According to the daughter prior to this episode of urinary tract infection her last episode of infection was 6 months ago and before that she was hospitalized for pneumonia in 2016.      Past Medical History:  Past Medical History:   Diagnosis Date   • Anemia    • Aortic valve  No stenosis    • Asthma    • Atelectasis    • CHF (congestive heart failure)    • Congenital heart disease    • Diabetes mellitus    • Esophageal reflux    • Essential hypertension    • HLD (hyperlipidemia)    • Hypotension    • LVH (left ventricular hypertrophy)    • Pleural effusion    • Pulmonary hypertension      Past Surgical History:  Past Surgical History:   Procedure Laterality Date   • AORTIC VALVE REPAIR/REPLACEMENT     • CATARACT EXTRACTION     • KNEE ARTHROPLASTY        Home Meds:    (Not in a hospital admission)  Current Meds:     Current Facility-Administered Medications:   •  cyanocobalamin injection 1,000 mcg, 1,000 mcg, Intramuscular, Q28 Days, Gustavo Jackman Jr., MD, 1,000 mcg at 02/10/17 1600  •  cyanocobalamin injection 1,000 mcg, 1,000 mcg, Intramuscular, Q28 Days, Gustavo Jackman Jr., MD, 1,000 mcg at 05/03/17 1534  •  sodium chloride 0.9 % flush 10 mL, 10 mL, Intravenous, PRN, Micah Wellington MD  •  Insert peripheral IV, , , Once **AND** sodium chloride 0.9 % flush 10 mL, 10 mL, Intravenous, PRN, Micah Wellington MD    Current Outpatient Prescriptions:   •  albuterol (PROVENTIL HFA;VENTOLIN HFA) 108 (90 BASE) MCG/ACT inhaler, Inhale 2 puffs Every 6 (Six) Hours., Disp: , Rfl:   •  ascorbic acid (VITAMIN C) 250 MG chewable tablet chewable tablet, Chew 250 mg., Disp: , Rfl:   •  aspirin 325 MG tablet, Take 325 mg by mouth Daily., Disp: , Rfl:   •  atorvastatin (LIPITOR) 80 MG tablet, TAKE 1 TABLET BY MOUTH AT BEDTIME, Disp: 90 tablet, Rfl: 1  •  Calcium Citrate-Vitamin D 250-200 MG-UNIT tablet, Take  by mouth., Disp: , Rfl:   •  cyanocobalamin 1000 MCG/ML injection, Inject 1,000 mcg into the shoulder, thigh, or buttocks Every 30 (Thirty) Days., Disp: , Rfl:   •  furosemide (LASIX) 40 MG tablet, Take 1 tablet by mouth 2 (Two) Times a Day., Disp: 180 tablet, Rfl: 1  •  isosorbide mononitrate (IMDUR) 60 MG 24 hr tablet, TAKE 1 TABLET EVERY DAY, Disp: 90 tablet, Rfl: 3  •  levothyroxine  "(SYNTHROID, LEVOTHROID) 150 MCG tablet, Take 75 mcg by mouth See Admin Instructions. Monday, Tuesday, Thursday, Friday, Saturday, Sunday, Disp: , Rfl:   •  lisinopril (PRINIVIL,ZESTRIL) 20 MG tablet, Take 2 tablets by mouth Daily., Disp: 120 tablet, Rfl: 5  •  metFORMIN (GLUCOPHAGE) 500 MG tablet, TAKE 1 TABLET BY MOUTH TWICE A DAY WITH MEALS, Disp: 60 tablet, Rfl: 5  •  metoprolol tartrate (LOPRESSOR) 50 MG tablet, TAKE 1 TABLET BY MOUTH 2 (TWO) TIMES DAILY, Disp: 180 tablet, Rfl: 2  •  Multiple Vitamin (MULTIVITAMIN) tablet, Take 1 tablet by mouth Daily., Disp: , Rfl:   •  Omeprazole Magnesium 20.6 (20 BASE) MG capsule delayed-release, Take 1 capsule by mouth Daily., Disp: , Rfl:   •  potassium chloride (MICRO-K) 10 MEQ CR capsule, Take 2 capsules by mouth 2 (Two) Times a Day., Disp: 360 capsule, Rfl: 3  •  warfarin (COUMADIN) 3 MG tablet, TUES-SUN TAKE 3 MG. DO NOT TAKE ON MONDAY, Disp: 90 tablet, Rfl: 2  •  budesonide-formoterol (SYMBICORT) 160-4.5 MCG/ACT inhaler, Inhale 2 puffs 2 (two) times a day as needed., Disp: , Rfl:   •  Syringe/Needle, Disp, (B-D SYRINGE/NEEDLE 3CC/23GX1\") 23G X 1\" 3 ML misc, Use every week for 4 weeks then monthly for B12 injections., Disp: , Rfl:   •  warfarin (COUMADIN) 3 MG tablet, Take 3 mg by mouth See Admin Instructions. Daily Thursday, Sunday, Disp: , Rfl:   Allergies:  Allergies   Allergen Reactions   • Promethazine Other (See Comments)     Pt becomes \"out of it\" when on this medication  Pt becomes \"out of it\" when on this medication     Social History:   Social History   Substance Use Topics   • Smoking status: Never Smoker   • Smokeless tobacco: Never Used   • Alcohol use Yes      Comment: ocasional      Family History:  Family History   Problem Relation Age of Onset   • Dementia Sister    • Stroke Brother           Review of Systems  See history of present illness and past medical history. See above, patient is unable to give much account of her symptoms.  Vitals:   BP " "172/91 (BP Location: Left arm, Patient Position: Lying)  Pulse 62  Temp 97.8 °F (36.6 °C)  Resp 18  Ht 60\" (152.4 cm)  Wt 156 lb (70.8 kg)  SpO2 96%  BMI 30.47 kg/m2  I/O: No intake or output data in the 24 hours ending 06/10/17 0252  Exam:  General Appearance:    Week, cooperative, no distress, appears stated age,But pleasantly confused and disoriented    Head:    Normocephalic, without obvious abnormality, atraumatic   Eyes:    PERRL, conjunctiva/corneas clear, EOM's intact, both eyes   Ears:    Normal external ear canals, both ears   Nose:   Nares normal, septum midline, mucosa normal, no drainage    or sinus tenderness   Throat:   Lips, tongue, gums normal; oral mucosa pink and moist   Neck:   Supple, symmetrical, trachea midline, no adenopathy;     thyroid:  no enlargement/tenderness/nodules; no carotid    bruit or JVD   Back:     Symmetric, no curvature, ROM normal, no CVA tenderness   Lungs:     Clear to auscultation bilaterally, respirations unlabored   Chest Wall:    No tenderness or deformity    Heart:    Regular rate and rhythm, S1 and S2 normal, no murmur, rub   or gallop   Abdomen:     Soft, non-tender, bowel sounds active all four quadrants,     no masses, no hepatomegaly, no splenomegaly   Extremities:   Extremities normal, atraumatic, no cyanosis or edema   Pulses:   Pulses palpable in all extremities; symmetric all extremities   Skin:   Skin color normal, Skin is warm and dry,  no rashes or palpable lesions   Neurologic:   CNII-XII intact, motor strength grossly intact, sensation grossly intact to light touch, no focal deficits noted       Data Review:      I reviewed the patient's new clinical results.    Results from last 7 days  Lab Units 06/09/17  2309   WBC 10*3/mm3 10.44   HEMOGLOBIN g/dL 13.0   PLATELETS 10*3/mm3 179       Results from last 7 days  Lab Units 06/09/17  2309   SODIUM mmol/L 131*   POTASSIUM mmol/L 3.8   CHLORIDE mmol/L 91*   TOTAL CO2 mmol/L 26.8   BUN mg/dL 14 "   CREATININE mg/dL 1.01*   CALCIUM mg/dL 9.4   GLUCOSE mg/dL 140*     Urinalysis With / Culture If Indicated [[id number]] (Abnormal) Collected: 06/10/17 0122        Lab Status: Final result Specimen: Urine from Urine, Catheter Updated: 06/10/17 0136        Color, UA Yellow        Appearance, UA Cloudy (A)        pH, UA 5.5        Specific [place], UA 1.015        Glucose, UA Negative        Ketones, UA Negative        Bilirubin, UA Negative        Blood, UA Negative        Protein, UA Negative        Leuk Esterase, UA Moderate (2+) (A)        Nitrite, UA Positive (A)        Urobilinogen, UA 0.2 E.U./dL       Urinalysis, Microscopic Only [[id number]] (Abnormal) Collected: 06/10/17 0122       Lab Status: Final result Specimen: Urine from Urine, Catheter Updated: 06/10/17 0136        RBC, UA 3-5 (A) /HPF         WBC, UA Too Numerous to Count (A) /HPF         Bacteria, UA 4+ (A) /HPF         Squamous Epithelial Cells, UA 0-2 /HPF         Hyaline Casts, UA 3-6 /LPF         Methodology Automated Microscopy      Order Status: Completed Collected: 06/10/17 0153         Updated: 06/10/17 0153       Narrative:         CHEST PA AND LATERAL.     HISTORY: Cough.     COMPARISON: 11/28/2016.     FINDINGS:  Cardiomegaly is unchanged. Pulmonary congestion has improved.     There is no consolidation or effusion. Chronic lung changes are present.             Impression:            Assessment:  Principal Problem:    UTI (urinary tract infection), bacterial  Active Problems:    Type 2 diabetes mellitus    Aortic valve replaced, equine valve    A-fib    Hypothyroidism (acquired)    History of recurrent UTIs    Essential hypertension  Generalized weakness  Toxic metabolic encephalopathy    Plan:  Admit the patient, hold her Lasix for at least 24 hours while given her IV fluids.  Continue Rocephin.  Check Accu-Cheks with sliding scale coverage.  Given her anticoagulation with Coumadin with close monitoring of her INR.  Further management as  her condition evolves.  Because the fact that she is on anticoagulation and has intermittent mental status changes we'll check CT scan of the head without contrast to make sure there is no evidence of element of subdural hematoma.    Avis Dias MD   6/10/2017  2:52 AM  Much of this encounter note is an electronic transcription/translation of spoken language to printed text. The electronic translation of spoken language may permit erroneous, or at times, nonsensical words or phrases to be inadvertently transcribed; Although I have reviewed the note for such errors, some may still exist

## 2022-07-28 NOTE — ED PROVIDER NOTE - PHYSICAL EXAMINATION
Gen: Alert, distressed, vomiting  Head: NC, AT, EOMI, normal lids/conjunctiva  ENT: normal hearing, patent oropharynx without erythema/exudate, uvula midline  Neck: +supple, +Trachea midline  Pulm: Bilateral BS, normal resp effort, no wheeze/stridor/retractions  CV: RRR, no M/R/G, +dist pulses  Abd: soft, NT/ND, Negative [place] signs, +BS, no palpable masses  Mskel: no edema/erythema/cyanosis  Skin: no rash, warm/dry  Neuro: AAOx3, no apparent sensory/motor deficits, coordination intact

## 2022-07-29 LAB
CULTURE RESULTS: SIGNIFICANT CHANGE UP
SPECIMEN SOURCE: SIGNIFICANT CHANGE UP

## 2022-08-10 ENCOUNTER — EMERGENCY (EMERGENCY)
Facility: HOSPITAL | Age: 42
LOS: 1 days | Discharge: HOME | End: 2022-08-10
Attending: EMERGENCY MEDICINE

## 2022-08-10 DIAGNOSIS — Z90.49 ACQUIRED ABSENCE OF OTHER SPECIFIED PARTS OF DIGESTIVE TRACT: Chronic | ICD-10-CM

## 2022-08-10 DIAGNOSIS — Z90.49 ACQUIRED ABSENCE OF OTHER SPECIFIED PARTS OF DIGESTIVE TRACT: ICD-10-CM

## 2022-08-10 DIAGNOSIS — K29.70 GASTRITIS, UNSPECIFIED, WITHOUT BLEEDING: ICD-10-CM

## 2022-08-10 DIAGNOSIS — R11.2 NAUSEA WITH VOMITING, UNSPECIFIED: ICD-10-CM

## 2022-08-10 DIAGNOSIS — Z79.4 LONG TERM (CURRENT) USE OF INSULIN: ICD-10-CM

## 2022-08-10 DIAGNOSIS — R19.7 DIARRHEA, UNSPECIFIED: ICD-10-CM

## 2022-08-10 DIAGNOSIS — Z91.011 ALLERGY TO MILK PRODUCTS: ICD-10-CM

## 2022-08-10 DIAGNOSIS — Z96.41 PRESENCE OF INSULIN PUMP (EXTERNAL) (INTERNAL): ICD-10-CM

## 2022-08-10 DIAGNOSIS — J45.909 UNSPECIFIED ASTHMA, UNCOMPLICATED: ICD-10-CM

## 2022-08-10 DIAGNOSIS — E11.9 TYPE 2 DIABETES MELLITUS WITHOUT COMPLICATIONS: ICD-10-CM

## 2022-08-10 DIAGNOSIS — F41.9 ANXIETY DISORDER, UNSPECIFIED: ICD-10-CM

## 2022-08-10 DIAGNOSIS — R10.11 RIGHT UPPER QUADRANT PAIN: ICD-10-CM

## 2022-08-10 DIAGNOSIS — Z91.018 ALLERGY TO OTHER FOODS: ICD-10-CM

## 2022-08-10 DIAGNOSIS — Z20.822 CONTACT WITH AND (SUSPECTED) EXPOSURE TO COVID-19: ICD-10-CM

## 2022-08-11 VITALS
TEMPERATURE: 98 F | HEART RATE: 73 BPM | DIASTOLIC BLOOD PRESSURE: 88 MMHG | SYSTOLIC BLOOD PRESSURE: 132 MMHG | RESPIRATION RATE: 18 BRPM | OXYGEN SATURATION: 98 %

## 2022-08-11 VITALS
TEMPERATURE: 98 F | RESPIRATION RATE: 16 BRPM | HEIGHT: 71 IN | HEART RATE: 90 BPM | OXYGEN SATURATION: 97 % | WEIGHT: 169.98 LBS | SYSTOLIC BLOOD PRESSURE: 137 MMHG | DIASTOLIC BLOOD PRESSURE: 89 MMHG

## 2022-08-11 LAB
ALBUMIN SERPL ELPH-MCNC: 4.4 G/DL — SIGNIFICANT CHANGE UP (ref 3.3–5)
ALP SERPL-CCNC: 110 U/L — SIGNIFICANT CHANGE UP (ref 40–120)
ALT FLD-CCNC: 37 U/L — SIGNIFICANT CHANGE UP (ref 12–78)
ANION GAP SERPL CALC-SCNC: 10 MMOL/L — SIGNIFICANT CHANGE UP (ref 5–17)
APPEARANCE UR: CLEAR — SIGNIFICANT CHANGE UP
AST SERPL-CCNC: 28 U/L — SIGNIFICANT CHANGE UP (ref 15–37)
BACTERIA # UR AUTO: ABNORMAL
BASOPHILS # BLD AUTO: 0.06 K/UL — SIGNIFICANT CHANGE UP (ref 0–0.2)
BASOPHILS NFR BLD AUTO: 0.6 % — SIGNIFICANT CHANGE UP (ref 0–2)
BILIRUB SERPL-MCNC: 0.7 MG/DL — SIGNIFICANT CHANGE UP (ref 0.2–1.2)
BILIRUB UR-MCNC: NEGATIVE — SIGNIFICANT CHANGE UP
BLD GP AB SCN SERPL QL: SIGNIFICANT CHANGE UP
BUN SERPL-MCNC: 17 MG/DL — SIGNIFICANT CHANGE UP (ref 7–23)
CALCIUM SERPL-MCNC: 10 MG/DL — SIGNIFICANT CHANGE UP (ref 8.5–10.1)
CHLORIDE SERPL-SCNC: 99 MMOL/L — SIGNIFICANT CHANGE UP (ref 96–108)
CO2 SERPL-SCNC: 29 MMOL/L — SIGNIFICANT CHANGE UP (ref 22–31)
COLOR SPEC: YELLOW — SIGNIFICANT CHANGE UP
CREAT SERPL-MCNC: 1.09 MG/DL — SIGNIFICANT CHANGE UP (ref 0.5–1.3)
CRP SERPL-MCNC: <3 MG/L — SIGNIFICANT CHANGE UP
DIFF PNL FLD: NEGATIVE — SIGNIFICANT CHANGE UP
EGFR: 87 ML/MIN/1.73M2 — SIGNIFICANT CHANGE UP
EOSINOPHIL # BLD AUTO: 0.14 K/UL — SIGNIFICANT CHANGE UP (ref 0–0.5)
EOSINOPHIL NFR BLD AUTO: 1.4 % — SIGNIFICANT CHANGE UP (ref 0–6)
ERYTHROCYTE [SEDIMENTATION RATE] IN BLOOD: 7 MM/HR — SIGNIFICANT CHANGE UP (ref 0–15)
GLUCOSE SERPL-MCNC: 162 MG/DL — HIGH (ref 70–99)
GLUCOSE UR QL: 50 MG/DL
HCT VFR BLD CALC: 44.4 % — SIGNIFICANT CHANGE UP (ref 39–50)
HGB BLD-MCNC: 15.4 G/DL — SIGNIFICANT CHANGE UP (ref 13–17)
IMM GRANULOCYTES NFR BLD AUTO: 0.3 % — SIGNIFICANT CHANGE UP (ref 0–1.5)
KETONES UR-MCNC: ABNORMAL
LACTATE SERPL-SCNC: 1.6 MMOL/L — SIGNIFICANT CHANGE UP (ref 0.7–2)
LEUKOCYTE ESTERASE UR-ACNC: NEGATIVE — SIGNIFICANT CHANGE UP
LIDOCAIN IGE QN: 34 U/L — LOW (ref 73–393)
LYMPHOCYTES # BLD AUTO: 3.1 K/UL — SIGNIFICANT CHANGE UP (ref 1–3.3)
LYMPHOCYTES # BLD AUTO: 31.5 % — SIGNIFICANT CHANGE UP (ref 13–44)
MCHC RBC-ENTMCNC: 28.6 PG — SIGNIFICANT CHANGE UP (ref 27–34)
MCHC RBC-ENTMCNC: 34.7 G/DL — SIGNIFICANT CHANGE UP (ref 32–36)
MCV RBC AUTO: 82.4 FL — SIGNIFICANT CHANGE UP (ref 80–100)
MONOCYTES # BLD AUTO: 0.66 K/UL — SIGNIFICANT CHANGE UP (ref 0–0.9)
MONOCYTES NFR BLD AUTO: 6.7 % — SIGNIFICANT CHANGE UP (ref 2–14)
NEUTROPHILS # BLD AUTO: 5.84 K/UL — SIGNIFICANT CHANGE UP (ref 1.8–7.4)
NEUTROPHILS NFR BLD AUTO: 59.5 % — SIGNIFICANT CHANGE UP (ref 43–77)
NITRITE UR-MCNC: NEGATIVE — SIGNIFICANT CHANGE UP
NRBC # BLD: 0 /100 WBCS — SIGNIFICANT CHANGE UP (ref 0–0)
PH UR: 6 — SIGNIFICANT CHANGE UP (ref 5–8)
PLATELET # BLD AUTO: 401 K/UL — HIGH (ref 150–400)
POTASSIUM SERPL-MCNC: 3.7 MMOL/L — SIGNIFICANT CHANGE UP (ref 3.5–5.3)
POTASSIUM SERPL-SCNC: 3.7 MMOL/L — SIGNIFICANT CHANGE UP (ref 3.5–5.3)
PROCALCITONIN SERPL-MCNC: 0.05 NG/ML — SIGNIFICANT CHANGE UP (ref 0.02–0.1)
PROT SERPL-MCNC: 8.4 GM/DL — HIGH (ref 6–8.3)
PROT UR-MCNC: 15 MG/DL
RAPID RVP RESULT: SIGNIFICANT CHANGE UP
RBC # BLD: 5.39 M/UL — SIGNIFICANT CHANGE UP (ref 4.2–5.8)
RBC # FLD: 13.3 % — SIGNIFICANT CHANGE UP (ref 10.3–14.5)
RBC CASTS # UR COMP ASSIST: SIGNIFICANT CHANGE UP /HPF (ref 0–4)
SARS-COV-2 RNA SPEC QL NAA+PROBE: SIGNIFICANT CHANGE UP
SODIUM SERPL-SCNC: 138 MMOL/L — SIGNIFICANT CHANGE UP (ref 135–145)
SP GR SPEC: 1.01 — SIGNIFICANT CHANGE UP (ref 1.01–1.02)
UROBILINOGEN FLD QL: NEGATIVE MG/DL — SIGNIFICANT CHANGE UP
WBC # BLD: 9.83 K/UL — SIGNIFICANT CHANGE UP (ref 3.8–10.5)
WBC # FLD AUTO: 9.83 K/UL — SIGNIFICANT CHANGE UP (ref 3.8–10.5)
WBC UR QL: SIGNIFICANT CHANGE UP

## 2022-08-11 PROCEDURE — 99285 EMERGENCY DEPT VISIT HI MDM: CPT

## 2022-08-11 PROCEDURE — 74177 CT ABD & PELVIS W/CONTRAST: CPT | Mod: 26,MA

## 2022-08-11 RX ORDER — FAMOTIDINE 10 MG/ML
1 INJECTION INTRAVENOUS
Qty: 14 | Refills: 0
Start: 2022-08-11 | End: 2022-08-17

## 2022-08-11 RX ORDER — ACETAMINOPHEN 500 MG
975 TABLET ORAL ONCE
Refills: 0 | Status: COMPLETED | OUTPATIENT
Start: 2022-08-11 | End: 2022-08-11

## 2022-08-11 RX ORDER — SODIUM CHLORIDE 9 MG/ML
1000 INJECTION INTRAMUSCULAR; INTRAVENOUS; SUBCUTANEOUS ONCE
Refills: 0 | Status: COMPLETED | OUTPATIENT
Start: 2022-08-11 | End: 2022-08-11

## 2022-08-11 RX ORDER — FAMOTIDINE 10 MG/ML
1 INJECTION INTRAVENOUS
Qty: 14 | Refills: 0
Start: 2022-08-11 | End: 2022-12-07

## 2022-08-11 RX ORDER — METOCLOPRAMIDE HCL 10 MG
10 TABLET ORAL ONCE
Refills: 0 | Status: COMPLETED | OUTPATIENT
Start: 2022-08-11 | End: 2022-08-11

## 2022-08-11 RX ORDER — ACETAMINOPHEN 500 MG
2 TABLET ORAL
Qty: 40 | Refills: 0
Start: 2022-08-11 | End: 2022-08-15

## 2022-08-11 RX ORDER — METOCLOPRAMIDE HCL 10 MG
1 TABLET ORAL
Qty: 20 | Refills: 0
Start: 2022-08-11 | End: 2022-08-15

## 2022-08-11 RX ORDER — FAMOTIDINE 10 MG/ML
20 INJECTION INTRAVENOUS ONCE
Refills: 0 | Status: COMPLETED | OUTPATIENT
Start: 2022-08-11 | End: 2022-08-11

## 2022-08-11 RX ORDER — IOHEXOL 300 MG/ML
30 INJECTION, SOLUTION INTRAVENOUS ONCE
Refills: 0 | Status: COMPLETED | OUTPATIENT
Start: 2022-08-11 | End: 2022-08-11

## 2022-08-11 RX ORDER — MORPHINE SULFATE 50 MG/1
4 CAPSULE, EXTENDED RELEASE ORAL ONCE
Refills: 0 | Status: DISCONTINUED | OUTPATIENT
Start: 2022-08-11 | End: 2022-08-11

## 2022-08-11 RX ADMIN — FAMOTIDINE 20 MILLIGRAM(S): 10 INJECTION INTRAVENOUS at 03:55

## 2022-08-11 RX ADMIN — IOHEXOL 30 MILLILITER(S): 300 INJECTION, SOLUTION INTRAVENOUS at 04:18

## 2022-08-11 RX ADMIN — SODIUM CHLORIDE 1000 MILLILITER(S): 9 INJECTION INTRAMUSCULAR; INTRAVENOUS; SUBCUTANEOUS at 03:54

## 2022-08-11 RX ADMIN — Medication 975 MILLIGRAM(S): at 03:55

## 2022-08-11 RX ADMIN — Medication 10 MILLIGRAM(S): at 03:55

## 2022-08-11 RX ADMIN — MORPHINE SULFATE 4 MILLIGRAM(S): 50 CAPSULE, EXTENDED RELEASE ORAL at 03:55

## 2022-08-11 NOTE — ED PROVIDER NOTE - CARE PROVIDERS DIRECT ADDRESSES
[email or web address]

## 2022-08-11 NOTE — ED PROVIDER NOTE - PATIENT PORTAL LINK FT
You can access the FollowMyHealth Patient Portal offered by [facility] by registering at the following website: [email or web address]. By joining "[employer]’s FollowMyHealth portal, you will also be able to view your health information using other applications (apps) compatible with our system.

## 2022-08-11 NOTE — ED PROVIDER NOTE - OBJECTIVE STATEMENT
Ana,  As we discussed,  HLA is positive  You need to see rheumatology  42 yo M with R flank pain radiating to back, n/v/d since yesterday.  Pt. had similar pain last week, was sent home with concern for ?kidney stone, but imaging was unremarkable at the time.  Pt. comes back to ER with pain worse than first onset (pt. admits to initial improvement in pain after d/c home).  No other complaints or associated symptoms.   ROS: negative for fever, cough, headache, chest pain, shortness of breath, rash, paresthesia, and weakness--all other systems reviewed are negative.   PMH: asthma, anxiety, IDDM; Meds: albuterol prn, insulin pump, alprazolam; SH: Denies smoking/drinking/drug use; SxHx: prior cholecystectomy

## 2022-08-11 NOTE — ED PROVIDER NOTE - CARE PLAN
1 Principal Discharge DX:	Right flank pain   Principal Discharge DX:	Right flank pain  Secondary Diagnosis:	Gastritis

## 2022-08-11 NOTE — ED PROVIDER NOTE - CARE PROVIDER_API CALL
Octavio Kumar)  Medicine  [address], [address]  [place] [address]  Phone: [phone]  Fax: [phone]  Follow Up Time: Urgent

## 2022-08-11 NOTE — ED PROVIDER NOTE - PHYSICAL EXAMINATION
Vitals: WNL  Gen: AAOx3, NAD, sitting uncomfortably in stretcher, non-toxic   Head: ncat, perrla, eomi b/l  Neck: supple, no lymphadenopathy, no midline deviation  Heart: rrr, no m/r/g  Lungs: CTA b/l, no rales/ronchi/wheezes  Abd: soft, + tender in RLQ, + CVA on R, non-distended, no rebound or guarding  Ext: no clubbing/cyanosis/edema  Neuro: sensation and muscle strength intact b/l, antalgic gait due to pain

## 2022-08-11 NOTE — ED PROVIDER NOTE - PROGRESS NOTE DETAILS
Results reported to patient--grossly benign, labs unremarkable, CT consistent with gastritis   Pt. reports feeling better after meds  pt. agrees to f/u with primary care outpt., referred to GI for urgent f/u   pt. understands to return to ED if symptoms worsen; will d/c with meds for symptoms

## 2022-08-11 NOTE — ED PROVIDER NOTE - CLINICAL SUMMARY MEDICAL DECISION MAKING FREE TEXT BOX
40 yo M with R flank pain, conerning for renal stone, appy, doubt obstruction/ischemia  -basic labs, crp, esr, blood cx, ua, cx, lactate, lipase, procal, rvp, type and screen, CT ab/pel, npo, hydration bolus, tylenol/pepcid/reglan for symptoms, morphine for pain  -f/u results, reeval

## 2022-08-11 NOTE — ED ADULT NURSE NOTE - NS ED NURSE RECORD ANOTHER VITAL SIGN
Kilograms Preamble Statement (Weight Entered In Details Tab): Reported Weight in kilograms: Retinoid Dermatitis Normal Treatment: I recommended more frequent application of Cetaphil or CeraVe to the areas of dermatitis. Xerosis Aggressive Treatment: I recommended application of Cetaphil or CeraVe numerous times a day going to bed to all dry areas. I also prescribed a topical steroid for twice daily use. Any Headache: No Dosing Month 1 (Required For Cumulative Dosing): 40mg Daily Cheilitis Aggressive Treatment: I recommended application of Vaseline or Aquaphor numerous times a day (as often as every hour) and before going to bed. I also prescribed a topical steroid for twice daily use. Detail Level: Zone Counseling Text: I reviewed the side effect in detail. Patient should get monthly blood tests, not donate blood, not drive at night if vision affected, and not share medication. Female Completion Statement: After discussing her treatment course we decided to discontinue isotretinoin therapy at this time. I explained that she would need to continue her birth control methods for at least one month after the last dosage. She should also get a pregnancy test one month after the last dose. She shouldn't donate blood for one month after the last dose. She should call with any new symptoms of depression. Weight Units: pounds Male Completion Statement: After discussing his treatment course we decided to discontinue isotretinoin therapy at this time. He shouldn't donate blood for one month after the last dose. He should call with any new symptoms of depression. Female Pregnancy Counseling Text: Female patients should also be on two forms of birth control while taking this medication and for one month after their last dose. Myalgia Monitoring: I explained this is common when taking isotretinoin. If this worsens they will contact us. Nosebleeds Normal Treatment: I explained this is common when taking isotretinoin. I recommended saline mist in each nostril multiple times a day. If this worsens they will contact us. Headache Monitoring: I recommended monitoring the headaches for now. There is no evidence of increased intracranial pressure. They were instructed to call if the headaches are worsening. Is Xerosis Present?: Yes - Normal Treatment Months Of Therapy Completed: 1 Myalgia Treatment: I explained this is common when taking isotretinoin. If this worsens they will contact us. They may try OTC ibuprofen. Retinoid Dermatitis Aggressive Treatment: I recommended more frequent application of Cetaphil or CeraVe to the areas of dermatitis. I also prescribed a topical steroid for twice daily use until the dermatitis resolves. Cheilitis Normal Treatment: I recommended application of Vaseline or Aquaphor numerous times a day (as often as every hour) and before going to bed. Hypercholesterolemia Monitoring: I explained this is common when taking isotretinoin. We will monitor closely. Are Labs Available For Review?: Yes Patient Weight (Optional But Required For Cumulative Dose-Numbers And Decimals Only): 145 Xerosis Aggressive Treatment: I recommended application of Cetaphil or CeraVe numerous times a day and before going to bed to all dry areas. I also prescribed a topical steroid for twice daily use. Pounds Preamble Statement (Weight Entered In Details Tab): Reported Weight in pounds: Xerosis Normal Treatment: I recommended application of Cetaphil or CeraVe numerous times a day going to bed to all dry areas. Xerosis Normal Treatment: I recommended application of Cetaphil or CeraVe numerous times a day and before going to bed to all dry areas. What Is The Patient's Gender: Female Yes

## 2022-08-11 NOTE — ED ADULT TRIAGE NOTE - CHIEF COMPLAINT QUOTE
pt presents to the ED c/o right flank pain, urinary urgency, n/v/d started yesterday. denies: fevers, body aches, chills  pt states was seen in the ED last week and states d/c w/ "opaque stone". hx asthma, dm

## 2022-08-12 LAB
CULTURE RESULTS: SIGNIFICANT CHANGE UP
SPECIMEN SOURCE: SIGNIFICANT CHANGE UP

## 2022-08-16 LAB
CULTURE RESULTS: SIGNIFICANT CHANGE UP
CULTURE RESULTS: SIGNIFICANT CHANGE UP
SPECIMEN SOURCE: SIGNIFICANT CHANGE UP
SPECIMEN SOURCE: SIGNIFICANT CHANGE UP

## 2022-08-19 ENCOUNTER — EMERGENCY (EMERGENCY)
Facility: HOSPITAL | Age: 42
LOS: 0 days | Discharge: HOME | End: 2022-08-20
Attending: EMERGENCY MEDICINE

## 2022-08-19 VITALS
OXYGEN SATURATION: 98 % | SYSTOLIC BLOOD PRESSURE: 152 MMHG | HEART RATE: 62 BPM | WEIGHT: 169.98 LBS | HEIGHT: 71 IN | RESPIRATION RATE: 16 BRPM | DIASTOLIC BLOOD PRESSURE: 110 MMHG | TEMPERATURE: 98 F

## 2022-08-19 DIAGNOSIS — J45.909 UNSPECIFIED ASTHMA, UNCOMPLICATED: ICD-10-CM

## 2022-08-19 DIAGNOSIS — F41.9 ANXIETY DISORDER, UNSPECIFIED: ICD-10-CM

## 2022-08-19 DIAGNOSIS — R11.2 NAUSEA WITH VOMITING, UNSPECIFIED: ICD-10-CM

## 2022-08-19 DIAGNOSIS — K29.70 GASTRITIS, UNSPECIFIED, WITHOUT BLEEDING: ICD-10-CM

## 2022-08-19 DIAGNOSIS — Z90.49 ACQUIRED ABSENCE OF OTHER SPECIFIED PARTS OF DIGESTIVE TRACT: Chronic | ICD-10-CM

## 2022-08-19 DIAGNOSIS — Z20.822 CONTACT WITH AND (SUSPECTED) EXPOSURE TO COVID-19: ICD-10-CM

## 2022-08-19 DIAGNOSIS — Z90.49 ACQUIRED ABSENCE OF OTHER SPECIFIED PARTS OF DIGESTIVE TRACT: ICD-10-CM

## 2022-08-19 DIAGNOSIS — Z87.19 PERSONAL HISTORY OF OTHER DISEASES OF THE DIGESTIVE SYSTEM: ICD-10-CM

## 2022-08-19 DIAGNOSIS — Z91.011 ALLERGY TO MILK PRODUCTS: ICD-10-CM

## 2022-08-19 DIAGNOSIS — R10.12 LEFT UPPER QUADRANT PAIN: ICD-10-CM

## 2022-08-19 DIAGNOSIS — E10.9 TYPE 1 DIABETES MELLITUS WITHOUT COMPLICATIONS: ICD-10-CM

## 2022-08-19 DIAGNOSIS — Z91.018 ALLERGY TO OTHER FOODS: ICD-10-CM

## 2022-08-19 LAB
BASOPHILS # BLD AUTO: 0.08 K/UL — SIGNIFICANT CHANGE UP (ref 0–0.2)
BASOPHILS NFR BLD AUTO: 0.7 % — SIGNIFICANT CHANGE UP (ref 0–2)
EOSINOPHIL # BLD AUTO: 0.2 K/UL — SIGNIFICANT CHANGE UP (ref 0–0.5)
EOSINOPHIL NFR BLD AUTO: 1.8 % — SIGNIFICANT CHANGE UP (ref 0–6)
HCT VFR BLD CALC: 40.8 % — SIGNIFICANT CHANGE UP (ref 39–50)
HGB BLD-MCNC: 13.9 G/DL — SIGNIFICANT CHANGE UP (ref 13–17)
IMM GRANULOCYTES NFR BLD AUTO: 0.3 % — SIGNIFICANT CHANGE UP (ref 0–1.5)
LACTATE SERPL-SCNC: 2.1 MMOL/L — HIGH (ref 0.7–2)
LYMPHOCYTES # BLD AUTO: 1.79 K/UL — SIGNIFICANT CHANGE UP (ref 1–3.3)
LYMPHOCYTES # BLD AUTO: 15.8 % — SIGNIFICANT CHANGE UP (ref 13–44)
MCHC RBC-ENTMCNC: 28.6 PG — SIGNIFICANT CHANGE UP (ref 27–34)
MCHC RBC-ENTMCNC: 34.1 G/DL — SIGNIFICANT CHANGE UP (ref 32–36)
MCV RBC AUTO: 84 FL — SIGNIFICANT CHANGE UP (ref 80–100)
MONOCYTES # BLD AUTO: 0.37 K/UL — SIGNIFICANT CHANGE UP (ref 0–0.9)
MONOCYTES NFR BLD AUTO: 3.3 % — SIGNIFICANT CHANGE UP (ref 2–14)
NEUTROPHILS # BLD AUTO: 8.83 K/UL — HIGH (ref 1.8–7.4)
NEUTROPHILS NFR BLD AUTO: 78.1 % — HIGH (ref 43–77)
NRBC # BLD: 0 /100 WBCS — SIGNIFICANT CHANGE UP (ref 0–0)
PLATELET # BLD AUTO: 374 K/UL — SIGNIFICANT CHANGE UP (ref 150–400)
RBC # BLD: 4.86 M/UL — SIGNIFICANT CHANGE UP (ref 4.2–5.8)
RBC # FLD: 14 % — SIGNIFICANT CHANGE UP (ref 10.3–14.5)
WBC # BLD: 11.3 K/UL — HIGH (ref 3.8–10.5)
WBC # FLD AUTO: 11.3 K/UL — HIGH (ref 3.8–10.5)

## 2022-08-19 PROCEDURE — 93010 ELECTROCARDIOGRAM REPORT: CPT

## 2022-08-19 PROCEDURE — 99285 EMERGENCY DEPT VISIT HI MDM: CPT

## 2022-08-19 PROCEDURE — 71045 X-RAY EXAM CHEST 1 VIEW: CPT | Mod: 26

## 2022-08-19 RX ORDER — HALOPERIDOL DECANOATE 100 MG/ML
2.5 INJECTION INTRAMUSCULAR ONCE
Refills: 0 | Status: COMPLETED | OUTPATIENT
Start: 2022-08-19 | End: 2022-08-19

## 2022-08-19 RX ORDER — FAMOTIDINE 10 MG/ML
20 INJECTION INTRAVENOUS ONCE
Refills: 0 | Status: COMPLETED | OUTPATIENT
Start: 2022-08-19 | End: 2022-08-19

## 2022-08-19 RX ORDER — SODIUM CHLORIDE 9 MG/ML
1000 INJECTION INTRAMUSCULAR; INTRAVENOUS; SUBCUTANEOUS ONCE
Refills: 0 | Status: COMPLETED | OUTPATIENT
Start: 2022-08-19 | End: 2022-08-19

## 2022-08-19 RX ORDER — RADIOPAQUE PVC MARKERS/BARIUM 24MARKERS
450 CAPSULE ORAL ONCE
Refills: 0 | Status: COMPLETED | OUTPATIENT
Start: 2022-08-19 | End: 2022-08-19

## 2022-08-19 RX ORDER — ONDANSETRON 8 MG/1
4 TABLET, FILM COATED ORAL ONCE
Refills: 0 | Status: COMPLETED | OUTPATIENT
Start: 2022-08-19 | End: 2022-08-19

## 2022-08-19 RX ORDER — ACETAMINOPHEN 500 MG
650 TABLET ORAL ONCE
Refills: 0 | Status: COMPLETED | OUTPATIENT
Start: 2022-08-19 | End: 2022-08-19

## 2022-08-19 RX ADMIN — FAMOTIDINE 20 MILLIGRAM(S): 10 INJECTION INTRAVENOUS at 22:40

## 2022-08-19 RX ADMIN — ONDANSETRON 4 MILLIGRAM(S): 8 TABLET, FILM COATED ORAL at 22:40

## 2022-08-19 RX ADMIN — SODIUM CHLORIDE 1000 MILLILITER(S): 9 INJECTION INTRAMUSCULAR; INTRAVENOUS; SUBCUTANEOUS at 23:50

## 2022-08-19 RX ADMIN — SODIUM CHLORIDE 1000 MILLILITER(S): 9 INJECTION INTRAMUSCULAR; INTRAVENOUS; SUBCUTANEOUS at 22:39

## 2022-08-19 RX ADMIN — HALOPERIDOL DECANOATE 2.5 MILLIGRAM(S): 100 INJECTION INTRAMUSCULAR at 22:40

## 2022-08-19 NOTE — ED PROVIDER NOTE - PROVIDER TOKENS
PROVIDER:[TOKEN:[[id number]],FOLLOWUP:[Urgent]] PROVIDER:[TOKEN:[[id number]],FOLLOWUP:[Urgent]],PROVIDER:[TOKEN:[[id number]]],PROVIDER:[TOKEN:[[id number]]]

## 2022-08-19 NOTE — ED PROVIDER NOTE - PHYSICAL EXAMINATION
Vitals: HTN at 152/110, otherwise WNL  Gen: AAOx3, sitting uncomfortably in stretcher, in distress, persistently moaning  Head: ncat, perrla, eomi b/l  Neck: supple, no lymphadenopathy, no midline deviation  Heart: rrr, no m/r/g  Lungs: CTA b/l, no rales/ronchi/wheezes  Abd: soft, tender in epigastrium, non-distended, no rebound or guarding  Ext: no clubbing/cyanosis/edema  Neuro: sensation and muscle strength intact b/l, no focal weakness or sensory loss

## 2022-08-19 NOTE — ED ADULT NURSE NOTE - ED STAT RN HANDOFF DETAILS
Report given to RN Sugey. Pt resting on stretcher, NAD noted, respirations equal and unlabored. Safety measures maintained.

## 2022-08-19 NOTE — ED PROVIDER NOTE - NSFOLLOWUPINSTRUCTIONS_ED_ALL_ED_FT
Follow up with gastroenterology   Rest, drink plenty of fluids  Advance activity as tolerated  Continue all previously prescribed medications as directed  Follow up with your PMD - bring copies of your results  Return to the ER for chest pain, difficulty breathing, severe abdominal pain, persistent vomiting, or other new or concerning symptoms

## 2022-08-19 NOTE — ED PROVIDER NOTE - PROGRESS NOTE DETAILS
Ravinder: Patient reassessed, still with pain.  CT concerning for gastritis.  Will reassess after meds. Ravinder: Reassessed, pain improved.  Tolerating po.  Feels well to go home, has GI but has been unable to get an appointment.  Will give GI follow up.  Has antiemetics and taking ppi, otc meds at home.  Will dc.

## 2022-08-19 NOTE — ED PROVIDER NOTE - CARE PROVIDER_API CALL
[place] [address]  Phone: [phone]  Fax: [phone]  Follow Up Time: Urgent   Isiah Little  MEDICINE  [address] [place] [address]  Phone: [phone]  Fax: [phone]  Follow Up Time: Troy Watts)  Internal Medicine  [address], [address]  [place] [address]  Phone: [phone]  Fax: [phone]  Follow Up Time:     Daniella Mullen)  Gastroenterology; Internal Medicine  [address] [place] [address]  Phone: [phone]  Fax: [phone]  Follow Up Time:

## 2022-08-19 NOTE — ED PROVIDER NOTE - PATIENT PORTAL LINK FT
You can access the FollowMyHealth Patient Portal offered by [facility] by registering at the following website: [email or web address]. By joining [employer]’s FollowMyHealth portal, you will also be able to view your health information using other applications (apps) compatible with our system.

## 2022-08-19 NOTE — ED ADULT NURSE NOTE - OBJECTIVE STATEMENT
Pt AOx4 and tearful, screaming on stretcher from pain. According to EMS, pt began having abdominal pain about 1 hour and 30 minutes ago. Pt has hx of Gastritis, Asthma, and DM. On ambulance EMS states they gave Zofran 4mg and about 100cc NS through left AC #18 IV. Pt reporting N/V while in ED and gagging. Emesis bag provided and placed on cardiac monitor. Pt denies SOB, fever/chills, dizziness, headache, blurred vision, chest pain, or dysuria. Pt denies any drug or weed use.

## 2022-08-19 NOTE — ED PROVIDER NOTE - CLINICAL SUMMARY MEDICAL DECISION MAKING FREE TEXT BOX
40 yo M with epigastric abd pain/tenderness, vomiting, concerning for pancreatitis, obstruction, less likely ischemia, doubt acs, covid  -basic labs, ua, cx, lactate, lipase, rvp, trop, CT ab/pel, XR chest, ekg, iv, hydration bolus, Tylenol for pain, Pepcid, Zofran, haldol  -f/u results, reeval

## 2022-08-19 NOTE — ED PROVIDER NOTE - OBJECTIVE STATEMENT
42 yo M with L upper abd pain, n/v, feels like pt.'s chronic gastritis, but "severe."  ROS: negative for fever, cough, headache, chest pain, shortness of breath, diarrhea, rash, paresthesia, and weakness--all other systems reviewed are negative.   PMH: asthma, anxiety, IDDM, gastritis; Meds: See EMR for list; SH: Denies smoking/drinking/drug use

## 2022-08-20 VITALS
DIASTOLIC BLOOD PRESSURE: 81 MMHG | OXYGEN SATURATION: 97 % | RESPIRATION RATE: 14 BRPM | SYSTOLIC BLOOD PRESSURE: 131 MMHG | HEART RATE: 65 BPM

## 2022-08-20 LAB
ALBUMIN SERPL ELPH-MCNC: 3.4 G/DL — SIGNIFICANT CHANGE UP (ref 3.3–5)
ALP SERPL-CCNC: 92 U/L — SIGNIFICANT CHANGE UP (ref 40–120)
ALT FLD-CCNC: 65 U/L — SIGNIFICANT CHANGE UP (ref 12–78)
ANION GAP SERPL CALC-SCNC: 4 MMOL/L — LOW (ref 5–17)
APPEARANCE UR: CLEAR — SIGNIFICANT CHANGE UP
AST SERPL-CCNC: 100 U/L — HIGH (ref 15–37)
BILIRUB SERPL-MCNC: 0.3 MG/DL — SIGNIFICANT CHANGE UP (ref 0.2–1.2)
BILIRUB UR-MCNC: NEGATIVE — SIGNIFICANT CHANGE UP
BUN SERPL-MCNC: 9 MG/DL — SIGNIFICANT CHANGE UP (ref 7–23)
CALCIUM SERPL-MCNC: 8.1 MG/DL — LOW (ref 8.5–10.1)
CHLORIDE SERPL-SCNC: 107 MMOL/L — SIGNIFICANT CHANGE UP (ref 96–108)
CO2 SERPL-SCNC: 29 MMOL/L — SIGNIFICANT CHANGE UP (ref 22–31)
COLOR SPEC: YELLOW — SIGNIFICANT CHANGE UP
CREAT SERPL-MCNC: 0.9 MG/DL — SIGNIFICANT CHANGE UP (ref 0.5–1.3)
DIFF PNL FLD: NEGATIVE — SIGNIFICANT CHANGE UP
EGFR: 110 ML/MIN/1.73M2 — SIGNIFICANT CHANGE UP
GLUCOSE SERPL-MCNC: 246 MG/DL — HIGH (ref 70–99)
GLUCOSE UR QL: 1000 MG/DL
KETONES UR-MCNC: NEGATIVE — SIGNIFICANT CHANGE UP
LACTATE SERPL-SCNC: 1.7 MMOL/L — SIGNIFICANT CHANGE UP (ref 0.7–2)
LEUKOCYTE ESTERASE UR-ACNC: NEGATIVE — SIGNIFICANT CHANGE UP
LIDOCAIN IGE QN: 37 U/L — LOW (ref 73–393)
NITRITE UR-MCNC: NEGATIVE — SIGNIFICANT CHANGE UP
PH UR: 8 — SIGNIFICANT CHANGE UP (ref 5–8)
POTASSIUM SERPL-MCNC: 3.7 MMOL/L — SIGNIFICANT CHANGE UP (ref 3.5–5.3)
POTASSIUM SERPL-SCNC: 3.7 MMOL/L — SIGNIFICANT CHANGE UP (ref 3.5–5.3)
PROT SERPL-MCNC: 6.8 GM/DL — SIGNIFICANT CHANGE UP (ref 6–8.3)
PROT UR-MCNC: NEGATIVE MG/DL — SIGNIFICANT CHANGE UP
RAPID RVP RESULT: SIGNIFICANT CHANGE UP
SARS-COV-2 RNA SPEC QL NAA+PROBE: SIGNIFICANT CHANGE UP
SODIUM SERPL-SCNC: 140 MMOL/L — SIGNIFICANT CHANGE UP (ref 135–145)
SP GR SPEC: 1.01 — SIGNIFICANT CHANGE UP (ref 1.01–1.02)
TROPONIN I, HIGH SENSITIVITY RESULT: 8.1 NG/L — SIGNIFICANT CHANGE UP
TROPONIN I, HIGH SENSITIVITY RESULT: 8.2 NG/L — SIGNIFICANT CHANGE UP
UROBILINOGEN FLD QL: NEGATIVE MG/DL — SIGNIFICANT CHANGE UP

## 2022-08-20 PROCEDURE — 74177 CT ABD & PELVIS W/CONTRAST: CPT | Mod: 26,MA

## 2022-08-20 RX ORDER — SUCRALFATE 1 G
1 TABLET ORAL ONCE
Refills: 0 | Status: COMPLETED | OUTPATIENT
Start: 2022-08-20 | End: 2022-08-20

## 2022-08-20 RX ORDER — METOCLOPRAMIDE HCL 10 MG
10 TABLET ORAL ONCE
Refills: 0 | Status: COMPLETED | OUTPATIENT
Start: 2022-08-20 | End: 2022-08-20

## 2022-08-20 RX ORDER — MORPHINE SULFATE 50 MG/1
4 CAPSULE, EXTENDED RELEASE ORAL ONCE
Refills: 0 | Status: DISCONTINUED | OUTPATIENT
Start: 2022-08-20 | End: 2022-08-20

## 2022-08-20 RX ADMIN — Medication 10 MILLIGRAM(S): at 09:00

## 2022-08-20 RX ADMIN — Medication 30 MILLILITER(S): at 10:27

## 2022-08-20 RX ADMIN — Medication 1 GRAM(S): at 08:59

## 2022-08-20 RX ADMIN — MORPHINE SULFATE 4 MILLIGRAM(S): 50 CAPSULE, EXTENDED RELEASE ORAL at 11:21

## 2022-08-20 RX ADMIN — MORPHINE SULFATE 4 MILLIGRAM(S): 50 CAPSULE, EXTENDED RELEASE ORAL at 10:29

## 2022-08-20 NOTE — ED ADULT NURSE REASSESSMENT NOTE - NS ED NURSE REASSESS COMMENT FT1
pt is awake and alert. Pt is in no apparent distress at this time. VS stable. Will continue to monitor.

## 2022-08-21 LAB
CULTURE RESULTS: SIGNIFICANT CHANGE UP
SPECIMEN SOURCE: SIGNIFICANT CHANGE UP

## 2022-09-28 NOTE — ED ADULT NURSE NOTE - PRIMARY CARE PROVIDER
Cavitary lesion noted in left lower lobe demonstrated on CT chest. Possible etiology of infectious emboli from tricuspid vegetations.   IR consulted and not amenable to drainage.       Printed and sent to patient    Jessica Perkins MA    none at this time

## 2022-10-01 NOTE — PATIENT PROFILE ADULT. - SPIRITUAL CULTURAL, RELIGIOUS PRACTICES/VALUES, PROFILE
Department of Anesthesiology  Preprocedure Note       Name:  Stevie Last   Age:  79 y.o.  :  1955                                          MRN:  [id number]         Date:  10/1/2022      Surgeon: Naa Schmidt):  Marky Cottrell MD    Procedure: Procedure(s):  COLONOSCOPY DIAGNOSTIC    Medications prior to admission:   Prior to Admission medications    Medication Sig Start Date End Date Taking? Authorizing Provider   b complex-C-folic acid (NEPHROCAPS) 1 MG capsule Take 1 mg by mouth daily 21  Yes Historical Provider, MD   atorvastatin (LIPITOR) 40 MG tablet TAKE ONE TABLET BY MOUTH DAILY 22   Historical Provider, MD   DOXYCYCLINE PO Take by mouth 2 times daily  Patient not taking: Reported on 2022    Historical Provider, MD   magnesium oxide (MAG-OX) 400 (241.3 Mg) MG TABS tablet TAKE ONE TABLET BY MOUTH DAILY 21   Historical Provider, MD   pantoprazole (PROTONIX) 40 MG tablet Take 40 mg by mouth daily    Historical Provider, MD   tamsulosin (FLOMAX) 0.4 MG capsule Take 0.8 mg by mouth daily    Historical Provider, MD   doxazosin (CARDURA) 2 MG tablet Take 1 tablet by mouth daily  Patient not taking: Reported on 2022   Laurie Brooks MD   atorvastatin (LIPITOR) 20 MG tablet Take 1 tablet by mouth nightly  Patient taking differently: Take 40 mg by mouth nightly 21   Hang Howard MD   montelukast (SINGULAIR) 10 MG tablet Take 10 mg by mouth nightly  Patient not taking: No sig reported    Historical Provider, MD   HYDROcodone-acetaminophen (NORCO) 5-325 MG per tablet Take 1 tablet by mouth every 6 hours as needed for Pain.     Historical Provider, MD   azelastine (ASTELIN) 0.1 % nasal spray 1 spray by Nasal route 2 times daily Use in each nostril as directed    Historical Provider, MD   ipratropium-albuterol (DUONEB) 0.5-2.5 (3) MG/3ML SOLN nebulizer solution Inhale 1 vial into the lungs every 4 hours    Historical Provider, MD   Cholecalciferol (VITAMIN D3) 2000 UNITS CAPS Take 1 capsule by mouth daily    Historical Provider, MD   carvedilol (COREG) 25 MG tablet Take 25 mg by mouth 2 times daily  Patient not taking: No sig reported    Historical Provider, MD   albuterol (PROVENTIL HFA;VENTOLIN HFA) 108 (90 BASE) MCG/ACT inhaler Inhale 2 puffs into the lungs every 6 hours as needed for Wheezing    Historical Provider, MD   ALPRAZolam (XANAX) 0.25 MG tablet Take 0.25 mg by mouth 2 times daily as needed for Sleep. Historical Provider, MD   amLODIPine (NORVASC) 10 MG tablet Take 10 mg by mouth daily  Patient not taking: No sig reported    Historical Provider, MD   citalopram (CELEXA) 20 MG tablet Take 1 tablet by mouth daily. 2/20/15   Cuong Ortega MD   folic acid (FOLVITE) 1 MG tablet Take 1 tablet by mouth daily.  2/20/15   Cuong Ortega MD       Current medications:    Current Facility-Administered Medications   Medication Dose Route Frequency Provider Last Rate Last Admin    guaiFENesin [facility]) extended release tablet 600 mg  600 mg Oral BID Alaina Meyers MD   600 mg at 09/30/22 2245    ipratropium-albuterol (DUONEB) nebulizer solution 3 mL  1 vial Inhalation TID Alaina Meyers MD   3 mL at 09/30/22 2128    ipratropium (ATROVENT HFA) 17 MCG/ACT inhaler 2 puff  2 puff Inhalation TID PRN Alaina Meyers MD        albuterol sulfate HFA (PROVENTIL;VENTOLIN;PROAIR) 108 (90 Base) MCG/ACT inhaler 2 puff  2 puff Inhalation TID PRN Alaina Meyers MD        atorvastatin (LIPITOR) tablet 40 mg  40 mg Oral Daily Alaina Meyers MD   40 mg at 09/30/22 1040    azelastine (ASTELIN) 0.1 % nasal spray 1 spray  1 spray Each Nostril BID Alaina Meyers MD   1 spray at 82/19/00 2160    folic acid (FOLVITE) tablet 1 mg  1 mg Oral Daily Alaina Meyers MD   1 mg at 09/30/22 1040    tamsulosin (FLOMAX) capsule 0.8 mg  0.8 mg Oral Daily Alaina Meyers MD   0.8 mg at 09/30/22 1040    pantoprazole (PROTONIX) tablet 40 mg  40 mg Oral Daily Alaina Meyers MD   40 mg at 09/30/22 104    HYDROcodone-acetaminophen (NORCO) 5-325 MG per tablet 1 tablet  1 tablet Oral Q6H PRN tEhan Rosario MD   1 tablet at 09/30/22 1756    citalopram (CELEXA) tablet 20 mg  20 mg Oral Daily Ethan Rosario MD   20 mg at 09/30/22 1040    Vitamin D (CHOLECALCIFEROL) tablet 2,000 Units  2,000 Units Oral Daily Ethan Rosario MD   2,000 Units at 09/30/22 1043    cloNIDine (CATAPRES) tablet 0.1 mg  0.1 mg Oral BID Karl Mckinley MD   0.1 mg at 09/30/22 2245    ALPRAZolam (XANAX) tablet 0.25 mg  0.25 mg Oral BID PRN Ethan Rosario MD   0.25 mg at 09/30/22 2245    0.9 % sodium chloride infusion   IntraVENous PRN Radha Blum DO        sodium chloride flush 0.9 % injection 5-40 mL  5-40 mL IntraVENous 2 times per day María Camejo MD   10 mL at 09/30/22 2130    sodium chloride flush 0.9 % injection 5-40 mL  5-40 mL IntraVENous PRN María Camejo MD        0.9 % sodium chloride infusion   IntraVENous PRN María Camejo MD        ondansetron (ZOFRAN-ODT) disintegrating tablet 4 mg  4 mg Oral Q8H PRN María Camejo MD        Or    ondansetron (ZOFRAN) injection 4 mg  4 mg IntraVENous Q6H PRN María Camejo MD   4 mg at 09/28/22 1748    acetaminophen (TYLENOL) tablet 650 mg  650 mg Oral Q6H PRN María Camejo MD           Allergies: Allergies   Allergen Reactions    Penicillins Swelling    Lorazepam      Other reaction(s):  Other - comment required  hallucination       Problem List:    Patient Active Problem List   Diagnosis Code    Choledocholithiasis K80.50    Chronic kidney disease (CKD), stage III (moderate) (HCC) N18.30    Abnormality of lung on CXR R91.8    S/P BKA (below knee amputation) unilateral ([facility] Z89.519    Cholangitis K83.09    Acute calculous cholecystitis K80.00    Abdominal adhesions K66.0    Atrophic kidney N26.1    COPD (chronic obstructive pulmonary disease) ([facility] J44.9    HTN (hypertension) I10    PAD (peripheral artery disease) (HCC) I73.9    Proteinuria R80.9    Chronic kidney disease (CKD) stage G3b/A3, moderately decreased glomerular filtration rate (GFR) between 30-44 mL/min/1.73 square meter and albuminuria creatinine ratio greater than 300 mg/g ([facility]) I31.64    Metabolic acidosis A09.28    ASCVD (arteriosclerotic cardiovascular disease) I25.10    Acute on chronic respiratory failure with hypoxia ([facility]) J96.21    Acute GI bleeding K92.2       Past Medical History:        Diagnosis Date    Acid reflux     ARF (acute renal failure) ([facility]     with admission 12/18/2015- consult done with Dr JAISON Eastman Last Atrophy of left kidney     Back pain     \"Lower Back\"    Chronic bronchitis ([facility]     Chronic kidney disease     COPD (chronic obstructive pulmonary disease) ([facility]     NO PULMONOLOGIST AT THIS TIME    Emphysema/COPD ([facility]     NO PULMONOLOGIST AT THIS TIME    H. pylori infection Dx 1's    Hemodialysis patient ([facility]     Hiatal hernia     History of blood transfusion 1987    NO REACTION TO BLOOD TRANSFUSION RECEIVED    History of respiratory failure     following surgery 12/18/2015- pt developed resp failure- placed on ventilator-unable to wean of vent- had trachesotomy tube placed 1/3/2015- off vent 1/7/2015( discharged 1/13/2015)\"per wife went to [place] after discharg and had to be sent to [facility] after had bleeding around the trach- they said the trach was to big- put back on ventilator for 24 hrs- then there for about a week then sent to [facility] 2/15    [place] (hard of hearing)     Bilateral Ears    Hx of blood clots     HX OTHER MEDICAL     Primary Care Physician Is Dr. Deuce Cueva Right Renal Artery    HX OTHER MEDICAL     \"Dr. Gavin Bentley One Of My Kidneys Is Dead\"   Lyndsey Hammond     \"See Dr. Mel Rodney For Blood Being Too Thick\"(per old chart pt dx with polycythemia in 1990's and has had several phlebotomies in the past(pc)( per wife Dr Tiffanie Barnett now says he does not really have polycythemia just from the COPD\"   Lyndsey Hammond 6    \"has drainage , clear drainage, since he was in ARU in 2015, under left shoulder, arm pit area\"    Hyperlipidemia     Hypertension     Lung nodule \"MRI, PET SCAN\"    \"Right Lung\"    Pneumonia \"Last Episode Early \"    \"At Least Twice\"( with admission 2015)    Shortness of breath on exertion     Teeth missing     Upper And Lower    Wears glasses        Past Surgical History:        Procedure Laterality Date    CHOLECYSTECTOMY, LAPAROSCOPIC  2017    cholangiogram     COLONOSCOPY      \"Couple Polyps Removed\"    ENDOSCOPY, COLON, DIAGNOSTIC  \"Once\"    GASTROSTOMY TUBE PLACEMENT Left 1/3/15    to gravity drain( removed g tube in Feb or  per wife)    [facility] Left [address]    Farming Accident    OTHER SURGICAL HISTORY  [facility]    \"Emergency Surgery For Ruptured Ulcer\"   [address]    TRACHEOSTOMY N/A 1/3/15    per old chart pt had trachesotomy tube, bronch , egd and g- tube placement done 2015    UPPER GASTROINTESTINAL ENDOSCOPY N/A 2022    EGD DIAGNOSTIC ONLY performed by Dinah Damian MD at [facility]      per old chart pt had left axillary to bifemoral bypass graft done 2014(pc)    WISDOM TOOTH EXTRACTION      All [place] Teeth Extracted In Past       Social History:    Social History     Tobacco Use    Smoking status: Former     Packs/day: 2.00     Years: 43.00     Pack years: 86.00     Types: Cigarettes     Start date: 1971     Quit date: 12/15/2014     Years since quittin.8    Smokeless tobacco: Never   Substance Use Topics    Alcohol use:  Yes     Alcohol/week: 0.0 standard drinks     Comment: \"Occ\"\"average one time per week                                Counseling given: Not Answered      Vital Signs (Current):   Vitals:    22 1416 22 1424 22 1949 22 2129   BP: 121/69  (!) 151/72    Pulse: 79  89 87   Resp: 17  19 14   Temp:   36.8 °C (98.2 °F) TempSrc:   Oral    SpO2: 97% 98%  100%   Weight:       Height:                                                  BP Readings from Last 3 Encounters:   09/30/22 (!) 151/72   09/20/22 137/67   09/14/22 (!) 150/84       NPO Status:                                                                      24 hrs. Solids                    6 hrs. clears           BMI:   Wt Readings from Last 3 Encounters:   09/30/22 242 lb 4.8 oz (109.9 kg)   09/14/22 246 lb 14.6 oz (112 kg)   06/06/22 245 lb (111.1 kg)     Body mass index is 36.84 kg/m². CBC:   Lab Results   Component Value Date/Time    WBC 9.4 09/30/2022 04:27 AM    RBC 2.69 09/30/2022 04:27 AM    HGB 8.3 09/30/2022 04:27 AM    HCT 26.1 09/30/2022 04:27 AM    MCV 97.0 09/30/2022 04:27 AM    RDW 17.2 09/30/2022 04:27 AM     09/30/2022 04:27 AM       CMP:   Lab Results   Component Value Date/Time     09/30/2022 04:27 AM    K 3.6 09/30/2022 04:27 AM     09/30/2022 04:27 AM    CO2 25 09/30/2022 04:27 AM    BUN 37 09/30/2022 04:27 AM    CREATININE 6.5 09/30/2022 04:27 AM    GFRAA 10 09/30/2022 04:27 AM    LABGLOM 9 09/30/2022 04:27 AM    GLUCOSE 95 09/30/2022 04:27 AM    PROT 5.1 09/30/2022 04:27 AM    CALCIUM 8.8 09/30/2022 04:27 AM    BILITOT 0.3 09/30/2022 04:27 AM    ALKPHOS 57 09/30/2022 04:27 AM    AST 19 09/30/2022 04:27 AM    ALT 19 09/30/2022 04:27 AM       POC Tests: No results for input(s): POCGLU, POCNA, POCK, POCCL, POCBUN, POCHEMO, POCHCT in the last 72 hours.     Coags:   Lab Results   Component Value Date/Time    PROTIME 10.3 09/30/2022 04:27 AM    INR 0.80 09/30/2022 04:27 AM    APTT 42.0 09/30/2022 04:27 AM       HCG (If Applicable): No results found for: PREGTESTUR, PREGSERUM, HCG, HCGQUANT     ABGs:   Lab Results   Component Value Date/Time    PO2ART 58.6 09/27/2022 12:52 AM    DPC1TEA 48.7 09/27/2022 12:52 AM    JFT8OTW 18.7 09/27/2022 12:52 AM        Type & Screen (If Applicable):  No results found for: LABABO, LABRH    Drug/Infectious Status (If Applicable):  Lab Results   Component Value Date/Time    HEPCAB 0.06 12/19/2014 03:40 AM       COVID-19 Screening (If Applicable):   Lab Results   Component Value Date/Time    COVID19 NOT DETECTED 09/29/2022 10:10 AM           Anesthesia Evaluation  Patient summary reviewed no history of anesthetic complications:   Airway: Mallampati: II  TM distance: >3 FB   Neck ROM: full  Comment: S/p trach  Mouth opening: > = 3 FB   Dental: normal exam         Pulmonary:normal exam    (+) COPD:                            ROS comment: Acute on chronic respiratory failure secondary to acute blood loss due to GI bleed  -S/p extubation 9/26/2022   Cardiovascular:  Exercise tolerance: poor (<4 METS),   (+) hypertension:, hyperlipidemia         Beta Blocker:  Not on Beta Blocker      ROS comment: Echo 5/2021:   Summary   Left ventricular systolic function is normal.   Ejection fraction is visually estimated at 50 %. Moderate left ventricular hypertrophy. Mildly dilated left atrium. Heavily calcified right coronary cusp; Mean PG 8 mmHg. No evidence of any pericardial effusion. Neuro/Psych:   Negative Neuro/Psych ROS              GI/Hepatic/Renal:   (+) hiatal hernia, GERD:, renal disease: ESRD and dialysis, bowel prep, morbid obesity         ROS comment: Acute GI bleed  -Patient presenting with dark tarry stools  -Status post transfusion of 3 units of PRBC. Endo/Other:    (+) blood dyscrasia: anemia:., .                 Abdominal:   (+) obese,           Vascular: negative vascular ROS. Other Findings:           Anesthesia Plan      MAC     ASA 4       Induction: intravenous. Anesthetic plan and risks discussed with patient. JAYDEN Espana - CRNA   10/1/2022      Pre Anesthesia Evaluation complete. Anesthesia plan, risks, benefits, alternatives, and personnel discussed with patient and/or legal guardian.  Patient and/or legal guardian verbalized an understanding and agreed to proceed. Anesthesia plan discussed with care team members and agreed upon.   JAYDEN Collins - JERAMIE  10/1/2022 none

## 2022-10-03 NOTE — PATIENT PROFILE ADULT. - FUNCTIONAL SCREEN CURRENT LEVEL: BATHING, MLM
Date:October 20, 2022      Provider requested that no letter be sent. Do not send.       [facility]      
(0) independent

## 2022-10-07 ENCOUNTER — EMERGENCY (EMERGENCY)
Facility: HOSPITAL | Age: 42
LOS: 0 days | Discharge: HOME | End: 2022-10-08
Attending: STUDENT IN AN ORGANIZED HEALTH CARE EDUCATION/TRAINING PROGRAM

## 2022-10-07 VITALS
HEIGHT: 71 IN | WEIGHT: 169.98 LBS | RESPIRATION RATE: 18 BRPM | SYSTOLIC BLOOD PRESSURE: 133 MMHG | TEMPERATURE: 99 F | HEART RATE: 75 BPM | OXYGEN SATURATION: 100 % | DIASTOLIC BLOOD PRESSURE: 84 MMHG

## 2022-10-07 DIAGNOSIS — E73.9 LACTOSE INTOLERANCE, UNSPECIFIED: ICD-10-CM

## 2022-10-07 DIAGNOSIS — Z90.49 ACQUIRED ABSENCE OF OTHER SPECIFIED PARTS OF DIGESTIVE TRACT: Chronic | ICD-10-CM

## 2022-10-07 DIAGNOSIS — R11.2 NAUSEA WITH VOMITING, UNSPECIFIED: ICD-10-CM

## 2022-10-07 DIAGNOSIS — Z91.018 ALLERGY TO OTHER FOODS: ICD-10-CM

## 2022-10-07 DIAGNOSIS — J45.909 UNSPECIFIED ASTHMA, UNCOMPLICATED: ICD-10-CM

## 2022-10-07 DIAGNOSIS — K29.50 UNSPECIFIED CHRONIC GASTRITIS WITHOUT BLEEDING: ICD-10-CM

## 2022-10-07 DIAGNOSIS — Z79.4 LONG TERM (CURRENT) USE OF INSULIN: ICD-10-CM

## 2022-10-07 DIAGNOSIS — Z87.19 PERSONAL HISTORY OF OTHER DISEASES OF THE DIGESTIVE SYSTEM: ICD-10-CM

## 2022-10-07 DIAGNOSIS — E10.9 TYPE 1 DIABETES MELLITUS WITHOUT COMPLICATIONS: ICD-10-CM

## 2022-10-07 DIAGNOSIS — R10.9 UNSPECIFIED ABDOMINAL PAIN: ICD-10-CM

## 2022-10-07 DIAGNOSIS — F41.9 ANXIETY DISORDER, UNSPECIFIED: ICD-10-CM

## 2022-10-07 DIAGNOSIS — Z90.49 ACQUIRED ABSENCE OF OTHER SPECIFIED PARTS OF DIGESTIVE TRACT: ICD-10-CM

## 2022-10-07 LAB
ALBUMIN SERPL ELPH-MCNC: 4.4 G/DL — SIGNIFICANT CHANGE UP (ref 3.3–5)
ALP SERPL-CCNC: 121 U/L — HIGH (ref 40–120)
ALT FLD-CCNC: 57 U/L — SIGNIFICANT CHANGE UP (ref 12–78)
ANION GAP SERPL CALC-SCNC: 11 MMOL/L — SIGNIFICANT CHANGE UP (ref 5–17)
AST SERPL-CCNC: 30 U/L — SIGNIFICANT CHANGE UP (ref 15–37)
BASOPHILS # BLD AUTO: 0.04 K/UL — SIGNIFICANT CHANGE UP (ref 0–0.2)
BASOPHILS NFR BLD AUTO: 0.4 % — SIGNIFICANT CHANGE UP (ref 0–2)
BILIRUB SERPL-MCNC: 0.6 MG/DL — SIGNIFICANT CHANGE UP (ref 0.2–1.2)
BUN SERPL-MCNC: 16 MG/DL — SIGNIFICANT CHANGE UP (ref 7–23)
CALCIUM SERPL-MCNC: 10.4 MG/DL — HIGH (ref 8.5–10.1)
CHLORIDE SERPL-SCNC: 95 MMOL/L — LOW (ref 96–108)
CO2 SERPL-SCNC: 29 MMOL/L — SIGNIFICANT CHANGE UP (ref 22–31)
CREAT SERPL-MCNC: 1.2 MG/DL — SIGNIFICANT CHANGE UP (ref 0.5–1.3)
EGFR: 78 ML/MIN/1.73M2 — SIGNIFICANT CHANGE UP
EOSINOPHIL # BLD AUTO: 0.01 K/UL — SIGNIFICANT CHANGE UP (ref 0–0.5)
EOSINOPHIL NFR BLD AUTO: 0.1 % — SIGNIFICANT CHANGE UP (ref 0–6)
GLUCOSE SERPL-MCNC: 273 MG/DL — HIGH (ref 70–99)
HCT VFR BLD CALC: 48.1 % — SIGNIFICANT CHANGE UP (ref 39–50)
HGB BLD-MCNC: 16.1 G/DL — SIGNIFICANT CHANGE UP (ref 13–17)
IMM GRANULOCYTES NFR BLD AUTO: 0.4 % — SIGNIFICANT CHANGE UP (ref 0–0.9)
LACTATE SERPL-SCNC: 2.7 MMOL/L — HIGH (ref 0.7–2)
LIDOCAIN IGE QN: 30 U/L — LOW (ref 73–393)
LYMPHOCYTES # BLD AUTO: 1.36 K/UL — SIGNIFICANT CHANGE UP (ref 1–3.3)
LYMPHOCYTES # BLD AUTO: 13.2 % — SIGNIFICANT CHANGE UP (ref 13–44)
MCHC RBC-ENTMCNC: 28.1 PG — SIGNIFICANT CHANGE UP (ref 27–34)
MCHC RBC-ENTMCNC: 33.5 G/DL — SIGNIFICANT CHANGE UP (ref 32–36)
MCV RBC AUTO: 83.9 FL — SIGNIFICANT CHANGE UP (ref 80–100)
MONOCYTES # BLD AUTO: 0.16 K/UL — SIGNIFICANT CHANGE UP (ref 0–0.9)
MONOCYTES NFR BLD AUTO: 1.6 % — LOW (ref 2–14)
NEUTROPHILS # BLD AUTO: 8.69 K/UL — HIGH (ref 1.8–7.4)
NEUTROPHILS NFR BLD AUTO: 84.3 % — HIGH (ref 43–77)
NRBC # BLD: 0 /100 WBCS — SIGNIFICANT CHANGE UP (ref 0–0)
PLATELET # BLD AUTO: 436 K/UL — HIGH (ref 150–400)
POTASSIUM SERPL-MCNC: 4.5 MMOL/L — SIGNIFICANT CHANGE UP (ref 3.5–5.3)
POTASSIUM SERPL-SCNC: 4.5 MMOL/L — SIGNIFICANT CHANGE UP (ref 3.5–5.3)
PROT SERPL-MCNC: 9.2 GM/DL — HIGH (ref 6–8.3)
RBC # BLD: 5.73 M/UL — SIGNIFICANT CHANGE UP (ref 4.2–5.8)
RBC # FLD: 13.4 % — SIGNIFICANT CHANGE UP (ref 10.3–14.5)
SODIUM SERPL-SCNC: 135 MMOL/L — SIGNIFICANT CHANGE UP (ref 135–145)
WBC # BLD: 10.3 K/UL — SIGNIFICANT CHANGE UP (ref 3.8–10.5)
WBC # FLD AUTO: 10.3 K/UL — SIGNIFICANT CHANGE UP (ref 3.8–10.5)

## 2022-10-07 PROCEDURE — 99285 EMERGENCY DEPT VISIT HI MDM: CPT

## 2022-10-07 RX ORDER — SODIUM CHLORIDE 9 MG/ML
1000 INJECTION INTRAMUSCULAR; INTRAVENOUS; SUBCUTANEOUS ONCE
Refills: 0 | Status: COMPLETED | OUTPATIENT
Start: 2022-10-07 | End: 2022-10-07

## 2022-10-07 RX ORDER — ONDANSETRON 8 MG/1
4 TABLET, FILM COATED ORAL ONCE
Refills: 0 | Status: COMPLETED | OUTPATIENT
Start: 2022-10-07 | End: 2022-10-07

## 2022-10-07 RX ORDER — FAMOTIDINE 10 MG/ML
20 INJECTION INTRAVENOUS ONCE
Refills: 0 | Status: COMPLETED | OUTPATIENT
Start: 2022-10-07 | End: 2022-10-07

## 2022-10-07 RX ORDER — HYDROMORPHONE HYDROCHLORIDE 2 MG/ML
1 INJECTION INTRAMUSCULAR; INTRAVENOUS; SUBCUTANEOUS ONCE
Refills: 0 | Status: DISCONTINUED | OUTPATIENT
Start: 2022-10-07 | End: 2022-10-07

## 2022-10-07 RX ORDER — HALOPERIDOL DECANOATE 100 MG/ML
5 INJECTION INTRAMUSCULAR ONCE
Refills: 0 | Status: COMPLETED | OUTPATIENT
Start: 2022-10-07 | End: 2022-10-07

## 2022-10-07 RX ADMIN — SODIUM CHLORIDE 1000 MILLILITER(S): 9 INJECTION INTRAMUSCULAR; INTRAVENOUS; SUBCUTANEOUS at 22:09

## 2022-10-07 RX ADMIN — FAMOTIDINE 20 MILLIGRAM(S): 10 INJECTION INTRAVENOUS at 22:03

## 2022-10-07 RX ADMIN — Medication 30 MILLILITER(S): at 22:41

## 2022-10-07 RX ADMIN — Medication 1 MILLIGRAM(S): at 22:03

## 2022-10-07 RX ADMIN — HALOPERIDOL DECANOATE 5 MILLIGRAM(S): 100 INJECTION INTRAMUSCULAR at 22:03

## 2022-10-07 RX ADMIN — ONDANSETRON 4 MILLIGRAM(S): 8 TABLET, FILM COATED ORAL at 22:09

## 2022-10-07 NOTE — ED ADULT TRIAGE NOTE - CHIEF COMPLAINT QUOTE
BIBA,  pt c/o "gastritis" and upper abd pain. (PMH-gastritis).  +n/v  denies diarrhea  pt took Reglan 1 hour ago

## 2022-10-08 VITALS
SYSTOLIC BLOOD PRESSURE: 120 MMHG | RESPIRATION RATE: 18 BRPM | HEART RATE: 86 BPM | OXYGEN SATURATION: 95 % | DIASTOLIC BLOOD PRESSURE: 77 MMHG | TEMPERATURE: 98 F

## 2022-10-08 LAB — LACTATE SERPL-SCNC: 1.7 MMOL/L — SIGNIFICANT CHANGE UP (ref 0.7–2)

## 2022-10-08 RX ADMIN — SODIUM CHLORIDE 1000 MILLILITER(S): 9 INJECTION INTRAMUSCULAR; INTRAVENOUS; SUBCUTANEOUS at 01:30

## 2022-10-08 NOTE — ED PROVIDER NOTE - PHYSICAL EXAMINATION
GENERAL: Awake, alert, moaning, appears uncomfortable  HEENT: NC/AT, moist mucous membranes  LUNGS: CTAB, no wheezes or crackles   CARDIAC: RRR, no m/r/g  ABDOMEN: Soft, +epigastric tenderness, non distended, no rebound, no guarding  BACK: No midline spinal tenderness, no CVA tenderness  EXT: No edema, no calf tenderness, 2+ DP pulses bilaterally, no deformities.  NEURO: A&Ox3. Moving all extremities.  SKIN: Warm and dry. No rash.  PSYCH: Normal affect.

## 2022-10-08 NOTE — ED PROVIDER NOTE - DOMESTIC TRAVEL HIGH RISK QUESTION
[Ambulatory and capable of all self care but unable to carry out any work activities] : Status 2- Ambulatory and capable of all self care but unable to carry out any work activities. Up and about more than 50% of waking hours [Normal] : affect appropriate No [de-identified] : Right single lumen PICC without erythema, tenderness, drainage. No edema [de-identified] : stable gait

## 2022-10-08 NOTE — ED PROVIDER NOTE - PATIENT PORTAL LINK FT
You can access the FollowMyHealth Patient Portal offered by [facility] by registering at the following website: [email or web address]. By joining [facility]’s FollowMyHealth portal, you will also be able to view your health information using other applications (apps) compatible with our system.

## 2022-10-08 NOTE — ED PROVIDER NOTE - NS ED ROS FT
CONST: no fevers, no chills  EYES: no pain, no vision changes  ENT: no sore throat, no ear pain, no change in hearing  CV: no chest pain, no leg swelling  RESP: no shortness of breath, no cough  ABD: +abdominal pain, +nausea, + vomiting, no diarrhea  : no dysuria, no flank pain, no hematuria  MSK: no back pain, no extremity pain  NEURO: no headache or additional neurologic complaints  HEME: no easy bleeding  SKIN:  no rash

## 2022-10-08 NOTE — ED PROVIDER NOTE - CLINICAL SUMMARY MEDICAL DECISION MAKING FREE TEXT BOX
42 y/o M with PMH chronic gastritis presenting to the ED with similar symptoms. Vitals stable. Patient moaning in discomfort. Will treat symptomatically and check labs. Imaging not indicated as sx consistent with previous episodes.

## 2022-10-08 NOTE — ED PROVIDER NOTE - OBJECTIVE STATEMENT
42 y/o M with PMH gastritis, asthma, anxiety, DM, cholecystectomy presenting to the ED c/o abd pain. Patient has been seen in the ED multiple times for chronic gastritis. States this episode began around 1600 associated with abd pain, N/V. No fever or chills. No CP or SOB. Symptoms are consistent with previous episodes.

## 2022-10-23 NOTE — DISCHARGE NOTE PROVIDER - NSDCCPCAREPLAN_GEN_ALL_CORE_FT
No PRINCIPAL DISCHARGE DIAGNOSIS  Diagnosis: Gastroparesis diabeticorum  Assessment and Plan of Treatment: Acute exacerbation resolved.   Continue with Reglan as needed.   Follow up with Gasteroenterology with Dr. Kumar in 1-2 weeks.      SECONDARY DISCHARGE DIAGNOSES  Diagnosis: Type 1 diabetes mellitus with hyperglycemia  Assessment and Plan of Treatment: Continue with home dose Insulin regimen.   Follow up with PCP for monitoring    Diagnosis: Gastroenteritis  Assessment and Plan of Treatment: Continue with Protonix and Sucralfate   Follow up with Gastroenterology    Diagnosis: Mild intermittent asthma without complication  Assessment and Plan of Treatment: Albuterol as needed for wheezing/Shortness of breath    Diagnosis: Anxiety  Assessment and Plan of Treatment: Klonopin as needed.

## 2022-11-01 ENCOUNTER — EMERGENCY (EMERGENCY)
Facility: HOSPITAL | Age: 42
LOS: 1 days | Discharge: LEFT BEFORE DISCHARGE | End: 2022-11-01

## 2022-11-01 VITALS
SYSTOLIC BLOOD PRESSURE: 166 MMHG | RESPIRATION RATE: 18 BRPM | HEART RATE: 89 BPM | HEIGHT: 71 IN | DIASTOLIC BLOOD PRESSURE: 88 MMHG | TEMPERATURE: 99 F | OXYGEN SATURATION: 97 % | WEIGHT: 160.06 LBS

## 2022-11-01 DIAGNOSIS — R10.9 UNSPECIFIED ABDOMINAL PAIN: ICD-10-CM

## 2022-11-01 DIAGNOSIS — R11.10 VOMITING, UNSPECIFIED: ICD-10-CM

## 2022-11-01 DIAGNOSIS — Z90.49 ACQUIRED ABSENCE OF OTHER SPECIFIED PARTS OF DIGESTIVE TRACT: Chronic | ICD-10-CM

## 2022-11-01 DIAGNOSIS — Z53.21 PROCEDURE AND TREATMENT NOT CARRIED OUT DUE TO PATIENT LEAVING PRIOR TO BEING SEEN BY HEALTH CARE PROVIDER: ICD-10-CM

## 2022-11-01 LAB — GLUCOSE BLDC GLUCOMTR-MCNC: 164 MG/DL — HIGH (ref 70–99)

## 2022-11-01 PROCEDURE — L9991: CPT

## 2022-11-01 NOTE — ED ADULT TRIAGE NOTE - CHIEF COMPLAINT QUOTE
Patient BIBA: Patient reports "abdominal pain, I have Gastritis." Patient is IDDM: per EMS history of gastroparesis; FS earlier today was 515: took insulin. Patient crying. + vomiting.

## 2022-11-02 ENCOUNTER — EMERGENCY (EMERGENCY)
Facility: HOSPITAL | Age: 42
LOS: 0 days | Discharge: HOME | End: 2022-11-02
Attending: EMERGENCY MEDICINE

## 2022-11-02 VITALS
WEIGHT: 149.91 LBS | RESPIRATION RATE: 20 BRPM | DIASTOLIC BLOOD PRESSURE: 78 MMHG | TEMPERATURE: 99 F | OXYGEN SATURATION: 100 % | HEIGHT: 71 IN | HEART RATE: 73 BPM | SYSTOLIC BLOOD PRESSURE: 127 MMHG

## 2022-11-02 DIAGNOSIS — Z90.49 ACQUIRED ABSENCE OF OTHER SPECIFIED PARTS OF DIGESTIVE TRACT: Chronic | ICD-10-CM

## 2022-11-02 DIAGNOSIS — Z91.018 ALLERGY TO OTHER FOODS: ICD-10-CM

## 2022-11-02 DIAGNOSIS — Z90.49 ACQUIRED ABSENCE OF OTHER SPECIFIED PARTS OF DIGESTIVE TRACT: ICD-10-CM

## 2022-11-02 DIAGNOSIS — F41.9 ANXIETY DISORDER, UNSPECIFIED: ICD-10-CM

## 2022-11-02 DIAGNOSIS — R10.9 UNSPECIFIED ABDOMINAL PAIN: ICD-10-CM

## 2022-11-02 DIAGNOSIS — J45.909 UNSPECIFIED ASTHMA, UNCOMPLICATED: ICD-10-CM

## 2022-11-02 DIAGNOSIS — E10.9 TYPE 1 DIABETES MELLITUS WITHOUT COMPLICATIONS: ICD-10-CM

## 2022-11-02 DIAGNOSIS — K29.70 GASTRITIS, UNSPECIFIED, WITHOUT BLEEDING: ICD-10-CM

## 2022-11-02 DIAGNOSIS — R11.2 NAUSEA WITH VOMITING, UNSPECIFIED: ICD-10-CM

## 2022-11-02 DIAGNOSIS — E73.9 LACTOSE INTOLERANCE, UNSPECIFIED: ICD-10-CM

## 2022-11-02 LAB
ACETONE SERPL-MCNC: SIGNIFICANT CHANGE UP
ALBUMIN SERPL ELPH-MCNC: 4.3 G/DL — SIGNIFICANT CHANGE UP (ref 3.3–5)
ALP SERPL-CCNC: 108 U/L — SIGNIFICANT CHANGE UP (ref 40–120)
ALT FLD-CCNC: 41 U/L — SIGNIFICANT CHANGE UP (ref 12–78)
ANION GAP SERPL CALC-SCNC: 13 MMOL/L — SIGNIFICANT CHANGE UP (ref 5–17)
APTT BLD: 26.8 SEC — LOW (ref 27.5–35.5)
AST SERPL-CCNC: 28 U/L — SIGNIFICANT CHANGE UP (ref 15–37)
BASOPHILS # BLD AUTO: 0.03 K/UL — SIGNIFICANT CHANGE UP (ref 0–0.2)
BASOPHILS NFR BLD AUTO: 0.3 % — SIGNIFICANT CHANGE UP (ref 0–2)
BILIRUB SERPL-MCNC: 0.9 MG/DL — SIGNIFICANT CHANGE UP (ref 0.2–1.2)
BLD GP AB SCN SERPL QL: SIGNIFICANT CHANGE UP
BUN SERPL-MCNC: 21 MG/DL — SIGNIFICANT CHANGE UP (ref 7–23)
CALCIUM SERPL-MCNC: 10 MG/DL — SIGNIFICANT CHANGE UP (ref 8.5–10.1)
CHLORIDE SERPL-SCNC: 90 MMOL/L — LOW (ref 96–108)
CO2 SERPL-SCNC: 33 MMOL/L — HIGH (ref 22–31)
CREAT SERPL-MCNC: 1.46 MG/DL — HIGH (ref 0.5–1.3)
EGFR: 62 ML/MIN/1.73M2 — SIGNIFICANT CHANGE UP
EOSINOPHIL # BLD AUTO: 0 K/UL — SIGNIFICANT CHANGE UP (ref 0–0.5)
EOSINOPHIL NFR BLD AUTO: 0 % — SIGNIFICANT CHANGE UP (ref 0–6)
GLUCOSE BLDC GLUCOMTR-MCNC: 289 MG/DL — HIGH (ref 70–99)
GLUCOSE SERPL-MCNC: 295 MG/DL — HIGH (ref 70–99)
HCT VFR BLD CALC: 45.3 % — SIGNIFICANT CHANGE UP (ref 39–50)
HGB BLD-MCNC: 15.3 G/DL — SIGNIFICANT CHANGE UP (ref 13–17)
IMM GRANULOCYTES NFR BLD AUTO: 0.2 % — SIGNIFICANT CHANGE UP (ref 0–0.9)
INR BLD: 1.08 RATIO — SIGNIFICANT CHANGE UP (ref 0.88–1.16)
LACTATE SERPL-SCNC: 1.7 MMOL/L — SIGNIFICANT CHANGE UP (ref 0.7–2)
LIDOCAIN IGE QN: 30 U/L — LOW (ref 73–393)
LYMPHOCYTES # BLD AUTO: 1.59 K/UL — SIGNIFICANT CHANGE UP (ref 1–3.3)
LYMPHOCYTES # BLD AUTO: 14.5 % — SIGNIFICANT CHANGE UP (ref 13–44)
MCHC RBC-ENTMCNC: 28.6 PG — SIGNIFICANT CHANGE UP (ref 27–34)
MCHC RBC-ENTMCNC: 33.8 G/DL — SIGNIFICANT CHANGE UP (ref 32–36)
MCV RBC AUTO: 84.7 FL — SIGNIFICANT CHANGE UP (ref 80–100)
MONOCYTES # BLD AUTO: 0.6 K/UL — SIGNIFICANT CHANGE UP (ref 0–0.9)
MONOCYTES NFR BLD AUTO: 5.5 % — SIGNIFICANT CHANGE UP (ref 2–14)
NEUTROPHILS # BLD AUTO: 8.74 K/UL — HIGH (ref 1.8–7.4)
NEUTROPHILS NFR BLD AUTO: 79.5 % — HIGH (ref 43–77)
NRBC # BLD: 0 /100 WBCS — SIGNIFICANT CHANGE UP (ref 0–0)
PLATELET # BLD AUTO: 380 K/UL — SIGNIFICANT CHANGE UP (ref 150–400)
POTASSIUM SERPL-MCNC: 3.9 MMOL/L — SIGNIFICANT CHANGE UP (ref 3.5–5.3)
POTASSIUM SERPL-SCNC: 3.9 MMOL/L — SIGNIFICANT CHANGE UP (ref 3.5–5.3)
PROT SERPL-MCNC: 8.6 GM/DL — HIGH (ref 6–8.3)
PROTHROM AB SERPL-ACNC: 12.9 SEC — SIGNIFICANT CHANGE UP (ref 10.5–13.4)
RBC # BLD: 5.35 M/UL — SIGNIFICANT CHANGE UP (ref 4.2–5.8)
RBC # FLD: 13.8 % — SIGNIFICANT CHANGE UP (ref 10.3–14.5)
SODIUM SERPL-SCNC: 136 MMOL/L — SIGNIFICANT CHANGE UP (ref 135–145)
WBC # BLD: 10.98 K/UL — HIGH (ref 3.8–10.5)
WBC # FLD AUTO: 10.98 K/UL — HIGH (ref 3.8–10.5)

## 2022-11-02 PROCEDURE — 99284 EMERGENCY DEPT VISIT MOD MDM: CPT

## 2022-11-02 PROCEDURE — 74177 CT ABD & PELVIS W/CONTRAST: CPT | Mod: 26,MA

## 2022-11-02 RX ORDER — TRAMADOL HYDROCHLORIDE 50 MG/1
1 TABLET ORAL
Qty: 12 | Refills: 0
Start: 2022-11-02 | End: 2022-11-04

## 2022-11-02 RX ORDER — ONDANSETRON 8 MG/1
1 TABLET, FILM COATED ORAL
Qty: 6 | Refills: 0
Start: 2022-11-02 | End: 2022-11-04

## 2022-11-02 RX ORDER — FAMOTIDINE 10 MG/ML
1 INJECTION INTRAVENOUS
Qty: 14 | Refills: 0
Start: 2022-11-02 | End: 2022-11-08

## 2022-11-02 RX ORDER — MORPHINE SULFATE 50 MG/1
8 CAPSULE, EXTENDED RELEASE ORAL ONCE
Refills: 0 | Status: DISCONTINUED | OUTPATIENT
Start: 2022-11-02 | End: 2022-11-02

## 2022-11-02 RX ORDER — ONDANSETRON 8 MG/1
4 TABLET, FILM COATED ORAL ONCE
Refills: 0 | Status: COMPLETED | OUTPATIENT
Start: 2022-11-02 | End: 2022-11-02

## 2022-11-02 RX ORDER — KETOROLAC TROMETHAMINE 30 MG/ML
30 SYRINGE (ML) INJECTION ONCE
Refills: 0 | Status: DISCONTINUED | OUTPATIENT
Start: 2022-11-02 | End: 2022-11-02

## 2022-11-02 RX ORDER — SODIUM CHLORIDE 9 MG/ML
2000 INJECTION INTRAMUSCULAR; INTRAVENOUS; SUBCUTANEOUS ONCE
Refills: 0 | Status: COMPLETED | OUTPATIENT
Start: 2022-11-02 | End: 2022-11-02

## 2022-11-02 RX ORDER — MORPHINE SULFATE 50 MG/1
2 CAPSULE, EXTENDED RELEASE ORAL ONCE
Refills: 0 | Status: DISCONTINUED | OUTPATIENT
Start: 2022-11-02 | End: 2022-11-02

## 2022-11-02 RX ADMIN — MORPHINE SULFATE 8 MILLIGRAM(S): 50 CAPSULE, EXTENDED RELEASE ORAL at 15:32

## 2022-11-02 RX ADMIN — SODIUM CHLORIDE 2000 MILLILITER(S): 9 INJECTION INTRAMUSCULAR; INTRAVENOUS; SUBCUTANEOUS at 15:01

## 2022-11-02 RX ADMIN — MORPHINE SULFATE 8 MILLIGRAM(S): 50 CAPSULE, EXTENDED RELEASE ORAL at 15:01

## 2022-11-02 RX ADMIN — MORPHINE SULFATE 2 MILLIGRAM(S): 50 CAPSULE, EXTENDED RELEASE ORAL at 19:46

## 2022-11-02 RX ADMIN — Medication 30 MILLIGRAM(S): at 19:45

## 2022-11-02 RX ADMIN — ONDANSETRON 4 MILLIGRAM(S): 8 TABLET, FILM COATED ORAL at 19:45

## 2022-11-02 RX ADMIN — ONDANSETRON 4 MILLIGRAM(S): 8 TABLET, FILM COATED ORAL at 15:01

## 2022-11-02 NOTE — ED PROVIDER NOTE - OBJECTIVE STATEMENT
40 y/o Male w/ PMH of gastritis, DM, asthma presents to the ED for pain in his abdomen. Pt reports to be in pain since yesterday. Pt has PSHx of cholecystectomy. Pt describes the pain to be 8/10. Pt denies N/V, headaches, and SOB.

## 2022-11-02 NOTE — ED PROVIDER NOTE - CLINICAL SUMMARY MEDICAL DECISION MAKING FREE TEXT BOX
Pt lower bowel pain acute tenderness pain diff the usual eval with labs and ct of abdomen and r/o pathology exams.

## 2022-11-02 NOTE — ED PROVIDER NOTE - PATIENT PORTAL LINK FT
You can access the FollowMyHealth Patient Portal offered by [facility] by registering at the following website: [email or web address]. By joining [facility]’s FollowMyHealth portal, you will also be able to view your health information using other applications (apps) compatible with our system.

## 2022-11-02 NOTE — ED PROVIDER NOTE - NSFOLLOWUPINSTRUCTIONS_ED_ALL_ED_FT
Gastritis, Adult    Outline of an adult's lower body with a close-up of the stomach, showing inflammation and an ulcer inside the stomach.    Gastritis is inflammation of the stomach. There are two kinds of gastritis:  •Acute gastritis. This kind develops suddenly.      •Chronic gastritis. This kind is much more common. It develops slowly and lasts for a long time.      Gastritis happens when the lining of the stomach becomes weak or gets damaged. Without treatment, gastritis can lead to stomach bleeding and ulcers.      What are the causes?    This condition may be caused by:  •An infection.      •Drinking too much alcohol.      •Certain medicines. These include steroids, antibiotics, and some over-the-counter medicines, such as aspirin or ibuprofen.      •Having too much acid in the stomach.      •Having a disease of the stomach.      Other causes may include:  •An allergic reaction.      •Some cancer treatments (radiation).      •Smoking cigarettes or the use of products that contain nicotine or tobacco.      In some cases, the cause of this condition is not known.      What increases the risk?    •Having a disease of the intestines.      •Having a disease in which the body's immune system attacks the body (autoimmune disease), such as Crohn's disease.      •Using aspirin or ibuprofen and other NSAIDs to treat other conditions, such as heart disease or chronic pain.      •Stress.        What are the signs or symptoms?    Symptoms of this condition include:  •Pain or a burning sensation in the upper abdomen.      •Nausea.      •Vomiting.      •An uncomfortable feeling of fullness after eating.      •Weight loss.      •Bad breath.      •Blood in your vomit or stool (feces).      In some cases, there are no symptoms.      How is this diagnosed?    This condition may be diagnosed based on your medical history, a physical exam, and tests. Tests may include:  •Your medical history and a description of your symptoms.      •A physical exam.    •Tests. These can include:  •Blood tests.      •Stool tests.      •A test in which a thin, flexible instrument with a light and a camera is passed down the esophagus and into the stomach (upper endoscopy).      •A test in which a tissue sample is removed to look at it under a microscope (biopsy).          How is this treated?    This condition may be treated with medicines. The medicines that are used vary depending on the cause of the gastritis.  •If the condition is caused by a bacterial infection, you may be given antibiotic medicines.      •If the condition is caused by too much acid in the stomach, you may be given medicines called H2 blockers, proton pump inhibitors, or antacids.      Treatment may also involve stopping the use of certain medicines such as aspirin or ibuprofen and other NSAIDs.      Follow these instructions at home:    Medicines     •Take over-the-counter and prescription medicines only as told by your health care provider.      •If you were prescribed an antibiotic medicine, take it as told by your health care provider. Do not stop taking the antibiotic even if you start to feel better.      Alcohol use   • Do not drink alcohol if:  •Your health care provider tells you not to drink.      •You are pregnant, may be pregnant, or are planning to become pregnant.      •If you drink alcohol:•Limit your use to:  •0–1 drink a day for women.      •0–2 drinks a day for men.        •Know how much alcohol is in your drink. In the [place] one drink equals one 12 oz bottle of beer (355 mL), one 5 oz glass of wine (148 mL), or one 1½ oz glass of hard liquor (44 mL).          General instructions   A comparison of three sample cups showing dark yellow, yellow, and pale yellow urine.   •Eat small, frequent meals instead of large meals.      •Avoid foods and drinks that make your symptoms worse.      •Talk with your health care provider about ways to manage stress, such as getting regular exercise or practicing deep breathing, meditation, or yoga.      • Do not use any products that contain nicotine or tobacco. These products include cigarettes, chewing tobacco, and vaping devices, such as e-cigarettes. If you need help quitting, ask your health care provider.      •Drink enough fluid to keep your urine pale yellow.      •Keep all follow-up visits. This is important.        Contact a health care provider if:    •Your symptoms get worse.      •Your abdominal pain gets worse.      •Your symptoms return after treatment.      •You have a fever.        Get help right away if:    •You vomit blood or a substance that looks like coffee grounds.      •You have black or dark red stools.      •You are unable to keep fluids down.      These symptoms may represent a serious problem that is an emergency. Do not wait to see if the symptoms will go away. Get medical help right away. Call your local emergency services (911 in the [place] Do not drive yourself to the hospital.       Summary    •Gastritis is inflammation of the lining of the stomach that can occur suddenly (acute) or develop slowly over time (chronic).      •This condition is diagnosed with a medical history, a physical exam, or tests.      •This condition may be treated with medicines to treat infection or medicines to reduce the amount of acid in your stomach.      •Follow your health care provider's instructions about taking medicines, making changes to your diet, and knowing when to call for help.      This information is not intended to replace advice given to you by your health care provider. Make sure you discuss any questions you have with your health care provider.

## 2022-11-02 NOTE — ED PROVIDER NOTE - NS_EDPROVIDERDISPOUSERTYPE_ED_A_ED
Scribe Attestation (For Scribes USE Only)... Cimzia Pregnancy And Lactation Text: This medication crosses the placenta but can be considered safe in certain situations. Cimzia may be excreted in breast milk. Attending Attestation (For Attendings USE Only).../Scribe Attestation (For Scribes USE Only)...

## 2022-11-02 NOTE — ED ADULT NURSE NOTE - OBJECTIVE STATEMENT
received er bed iso 20 c/o abdominal pain with n/v started yesterday denies diarrhea denies fever/chills hx gastritis and diabetes abdomen soft no distention noted

## 2022-11-29 NOTE — ED PROVIDER NOTE - MDM ORDERS SUBMITTED SELECTION
Labs Olumiant Counseling: I discussed with the patient the risks of Olumiant therapy including but not limited to upper respiratory tract infections, shingles, cold sores, and nausea. Live vaccines should be avoided.  This medication has been linked to serious infections; higher rate of mortality; malignancy and lymphoproliferative disorders; major adverse cardiovascular events; thrombosis; gastrointestinal perforations; neutropenia; lymphopenia; anemia; liver enzyme elevations; and lipid elevations.

## 2022-12-01 ENCOUNTER — EMERGENCY (EMERGENCY)
Facility: HOSPITAL | Age: 42
LOS: 0 days | Discharge: LEFT BEFORE DISCHARGE | End: 2022-12-01
Attending: EMERGENCY MEDICINE

## 2022-12-01 VITALS
RESPIRATION RATE: 20 BRPM | TEMPERATURE: 98 F | OXYGEN SATURATION: 97 % | HEART RATE: 102 BPM | SYSTOLIC BLOOD PRESSURE: 145 MMHG | DIASTOLIC BLOOD PRESSURE: 93 MMHG | HEIGHT: 71 IN | WEIGHT: 169.98 LBS

## 2022-12-01 VITALS
OXYGEN SATURATION: 97 % | RESPIRATION RATE: 14 BRPM | SYSTOLIC BLOOD PRESSURE: 130 MMHG | DIASTOLIC BLOOD PRESSURE: 69 MMHG | HEART RATE: 66 BPM | TEMPERATURE: 98 F

## 2022-12-01 DIAGNOSIS — Z90.49 ACQUIRED ABSENCE OF OTHER SPECIFIED PARTS OF DIGESTIVE TRACT: Chronic | ICD-10-CM

## 2022-12-01 DIAGNOSIS — F41.9 ANXIETY DISORDER, UNSPECIFIED: ICD-10-CM

## 2022-12-01 DIAGNOSIS — J45.909 UNSPECIFIED ASTHMA, UNCOMPLICATED: ICD-10-CM

## 2022-12-01 DIAGNOSIS — Z53.29 PROCEDURE AND TREATMENT NOT CARRIED OUT BECAUSE OF PATIENT'S DECISION FOR OTHER REASONS: ICD-10-CM

## 2022-12-01 DIAGNOSIS — R11.10 VOMITING, UNSPECIFIED: ICD-10-CM

## 2022-12-01 DIAGNOSIS — E10.43 TYPE 1 DIABETES MELLITUS WITH DIABETIC AUTONOMIC (POLY)NEUROPATHY: ICD-10-CM

## 2022-12-01 DIAGNOSIS — Z83.3 FAMILY HISTORY OF DIABETES MELLITUS: ICD-10-CM

## 2022-12-01 DIAGNOSIS — U07.1 COVID-19: ICD-10-CM

## 2022-12-01 DIAGNOSIS — Z87.19 PERSONAL HISTORY OF OTHER DISEASES OF THE DIGESTIVE SYSTEM: ICD-10-CM

## 2022-12-01 DIAGNOSIS — R10.13 EPIGASTRIC PAIN: ICD-10-CM

## 2022-12-01 DIAGNOSIS — Z90.49 ACQUIRED ABSENCE OF OTHER SPECIFIED PARTS OF DIGESTIVE TRACT: ICD-10-CM

## 2022-12-01 DIAGNOSIS — K31.84 GASTROPARESIS: ICD-10-CM

## 2022-12-01 DIAGNOSIS — Z91.018 ALLERGY TO OTHER FOODS: ICD-10-CM

## 2022-12-01 LAB
ACETONE SERPL-MCNC: ABNORMAL
ALBUMIN SERPL ELPH-MCNC: 4.1 G/DL — SIGNIFICANT CHANGE UP (ref 3.3–5)
ALP SERPL-CCNC: 118 U/L — SIGNIFICANT CHANGE UP (ref 40–120)
ALT FLD-CCNC: 49 U/L — SIGNIFICANT CHANGE UP (ref 12–78)
ANION GAP SERPL CALC-SCNC: 11 MMOL/L — SIGNIFICANT CHANGE UP (ref 5–17)
AST SERPL-CCNC: 26 U/L — SIGNIFICANT CHANGE UP (ref 15–37)
BASE EXCESS BLDA CALC-SCNC: 4.6 MMOL/L — HIGH (ref -2–3)
BASOPHILS # BLD AUTO: 0.05 K/UL — SIGNIFICANT CHANGE UP (ref 0–0.2)
BASOPHILS NFR BLD AUTO: 0.6 % — SIGNIFICANT CHANGE UP (ref 0–2)
BILIRUB SERPL-MCNC: 0.5 MG/DL — SIGNIFICANT CHANGE UP (ref 0.2–1.2)
BLOOD GAS COMMENTS ARTERIAL: SIGNIFICANT CHANGE UP
BUN SERPL-MCNC: 21 MG/DL — SIGNIFICANT CHANGE UP (ref 7–23)
CALCIUM SERPL-MCNC: 9.9 MG/DL — SIGNIFICANT CHANGE UP (ref 8.5–10.1)
CHLORIDE SERPL-SCNC: 94 MMOL/L — LOW (ref 96–108)
CO2 BLDA-SCNC: 30 MMOL/L — HIGH (ref 19–24)
CO2 SERPL-SCNC: 29 MMOL/L — SIGNIFICANT CHANGE UP (ref 22–31)
CREAT SERPL-MCNC: 1.45 MG/DL — HIGH (ref 0.5–1.3)
EGFR: 62 ML/MIN/1.73M2 — SIGNIFICANT CHANGE UP
EOSINOPHIL # BLD AUTO: 0.23 K/UL — SIGNIFICANT CHANGE UP (ref 0–0.5)
EOSINOPHIL NFR BLD AUTO: 2.8 % — SIGNIFICANT CHANGE UP (ref 0–6)
FLUAV AG NPH QL: SIGNIFICANT CHANGE UP
FLUBV AG NPH QL: SIGNIFICANT CHANGE UP
GAS PNL BLDA: SIGNIFICANT CHANGE UP
GLUCOSE BLDC GLUCOMTR-MCNC: 295 MG/DL — HIGH (ref 70–99)
GLUCOSE BLDC GLUCOMTR-MCNC: 432 MG/DL — HIGH (ref 70–99)
GLUCOSE SERPL-MCNC: 479 MG/DL — CRITICAL HIGH (ref 70–99)
HCO3 BLDA-SCNC: 28 MMOL/L — SIGNIFICANT CHANGE UP (ref 21–28)
HCT VFR BLD CALC: 44 % — SIGNIFICANT CHANGE UP (ref 39–50)
HGB BLD-MCNC: 15.2 G/DL — SIGNIFICANT CHANGE UP (ref 13–17)
HOROWITZ INDEX BLDA+IHG-RTO: 21 — SIGNIFICANT CHANGE UP
IMM GRANULOCYTES NFR BLD AUTO: 0.4 % — SIGNIFICANT CHANGE UP (ref 0–0.9)
LACTATE SERPL-SCNC: 2.3 MMOL/L — HIGH (ref 0.7–2)
LIDOCAIN IGE QN: 25 U/L — LOW (ref 73–393)
LYMPHOCYTES # BLD AUTO: 1.24 K/UL — SIGNIFICANT CHANGE UP (ref 1–3.3)
LYMPHOCYTES # BLD AUTO: 14.9 % — SIGNIFICANT CHANGE UP (ref 13–44)
MCHC RBC-ENTMCNC: 28.5 PG — SIGNIFICANT CHANGE UP (ref 27–34)
MCHC RBC-ENTMCNC: 34.5 G/DL — SIGNIFICANT CHANGE UP (ref 32–36)
MCV RBC AUTO: 82.4 FL — SIGNIFICANT CHANGE UP (ref 80–100)
MONOCYTES # BLD AUTO: 0.58 K/UL — SIGNIFICANT CHANGE UP (ref 0–0.9)
MONOCYTES NFR BLD AUTO: 7 % — SIGNIFICANT CHANGE UP (ref 2–14)
NEUTROPHILS # BLD AUTO: 6.17 K/UL — SIGNIFICANT CHANGE UP (ref 1.8–7.4)
NEUTROPHILS NFR BLD AUTO: 74.3 % — SIGNIFICANT CHANGE UP (ref 43–77)
NRBC # BLD: 0 /100 WBCS — SIGNIFICANT CHANGE UP (ref 0–0)
PCO2 BLDA: 38 MMHG — SIGNIFICANT CHANGE UP (ref 32–46)
PH BLDA: 7.48 — HIGH (ref 7.35–7.45)
PLATELET # BLD AUTO: 300 K/UL — SIGNIFICANT CHANGE UP (ref 150–400)
PO2 BLDA: 85 MMHG — SIGNIFICANT CHANGE UP (ref 83–108)
POTASSIUM SERPL-MCNC: 4.5 MMOL/L — SIGNIFICANT CHANGE UP (ref 3.5–5.3)
POTASSIUM SERPL-SCNC: 4.5 MMOL/L — SIGNIFICANT CHANGE UP (ref 3.5–5.3)
PROT SERPL-MCNC: 8.2 GM/DL — SIGNIFICANT CHANGE UP (ref 6–8.3)
RBC # BLD: 5.34 M/UL — SIGNIFICANT CHANGE UP (ref 4.2–5.8)
RBC # FLD: 13.7 % — SIGNIFICANT CHANGE UP (ref 10.3–14.5)
SAO2 % BLDA: 97.6 % — SIGNIFICANT CHANGE UP (ref 94–98)
SARS-COV-2 RNA SPEC QL NAA+PROBE: DETECTED
SODIUM SERPL-SCNC: 134 MMOL/L — LOW (ref 135–145)
WBC # BLD: 8.3 K/UL — SIGNIFICANT CHANGE UP (ref 3.8–10.5)
WBC # FLD AUTO: 8.3 K/UL — SIGNIFICANT CHANGE UP (ref 3.8–10.5)

## 2022-12-01 PROCEDURE — 71045 X-RAY EXAM CHEST 1 VIEW: CPT | Mod: 26

## 2022-12-01 PROCEDURE — 99285 EMERGENCY DEPT VISIT HI MDM: CPT

## 2022-12-01 RX ORDER — MORPHINE SULFATE 50 MG/1
8 CAPSULE, EXTENDED RELEASE ORAL ONCE
Refills: 0 | Status: DISCONTINUED | OUTPATIENT
Start: 2022-12-01 | End: 2022-12-01

## 2022-12-01 RX ORDER — INSULIN HUMAN 100 [IU]/ML
7 INJECTION, SOLUTION SUBCUTANEOUS ONCE
Refills: 0 | Status: COMPLETED | OUTPATIENT
Start: 2022-12-01 | End: 2022-12-01

## 2022-12-01 RX ORDER — ONDANSETRON 8 MG/1
1 TABLET, FILM COATED ORAL
Qty: 6 | Refills: 0
Start: 2022-12-01 | End: 2022-12-03

## 2022-12-01 RX ORDER — SODIUM CHLORIDE 9 MG/ML
3000 INJECTION INTRAMUSCULAR; INTRAVENOUS; SUBCUTANEOUS ONCE
Refills: 0 | Status: COMPLETED | OUTPATIENT
Start: 2022-12-01 | End: 2022-12-01

## 2022-12-01 RX ORDER — TRAMADOL HYDROCHLORIDE 50 MG/1
50 TABLET ORAL ONCE
Refills: 0 | Status: DISCONTINUED | OUTPATIENT
Start: 2022-12-01 | End: 2022-12-01

## 2022-12-01 RX ORDER — TRAMADOL HYDROCHLORIDE 50 MG/1
1 TABLET ORAL
Qty: 12 | Refills: 0
Start: 2022-12-01 | End: 2022-12-03

## 2022-12-01 RX ORDER — ONDANSETRON 8 MG/1
8 TABLET, FILM COATED ORAL ONCE
Refills: 0 | Status: COMPLETED | OUTPATIENT
Start: 2022-12-01 | End: 2022-12-01

## 2022-12-01 RX ADMIN — Medication 1 MILLIGRAM(S): at 13:14

## 2022-12-01 RX ADMIN — TRAMADOL HYDROCHLORIDE 50 MILLIGRAM(S): 50 TABLET ORAL at 15:20

## 2022-12-01 RX ADMIN — MORPHINE SULFATE 8 MILLIGRAM(S): 50 CAPSULE, EXTENDED RELEASE ORAL at 13:04

## 2022-12-01 RX ADMIN — INSULIN HUMAN 7 UNIT(S): 100 INJECTION, SOLUTION SUBCUTANEOUS at 15:18

## 2022-12-01 RX ADMIN — SODIUM CHLORIDE 3000 MILLILITER(S): 9 INJECTION INTRAMUSCULAR; INTRAVENOUS; SUBCUTANEOUS at 13:04

## 2022-12-01 RX ADMIN — ONDANSETRON 8 MILLIGRAM(S): 8 TABLET, FILM COATED ORAL at 13:13

## 2022-12-01 NOTE — ED PROVIDER NOTE - PATIENT PORTAL LINK FT
You can access the FollowMyHealth Patient Portal offered by [facility] by registering at the following website: [email or web address]. By joining [employer]’s FollowMyHealth portal, you will also be able to view your health information using other applications (apps) compatible with our system.

## 2022-12-01 NOTE — ED ADULT NURSE NOTE - OBJECTIVE STATEMENT
Received 42 yr old male patient A&Ox4 breathing even and unlabored breaths. Pt c/o abdominal pain with n/v since yesterday. Pt made comfortable to room 6, placed on cardiac monitor, 20g IV placed to right forearm, meds given as per MD orders. PMH: diabetes insulin pump in place- battery is dead.

## 2022-12-01 NOTE — ED PROVIDER NOTE - NSFOLLOWUPINSTRUCTIONS_ED_ALL_ED_FT
Gastroparesis       Gastroparesis is a condition in which food takes longer than normal to empty from the stomach. This condition is also known as delayed gastric emptying. It is usually a long-term (chronic) condition.    There is no cure, but there are treatments and things that you can do at home to help relieve symptoms. Treating the underlying condition that causes gastroparesis can also help relieve symptoms.      What are the causes?    In many cases, the cause of this condition is not known. Possible causes include:  •A hormone (endocrine) disorder, such as hypothyroidism or diabetes.      •A nervous system disease, such as Parkinson's disease or multiple sclerosis.      •Cancer, infection, or surgery that affects the stomach or vagus nerve. The vagus nerve runs from your chest, through your neck, and to the lower part of your brain.      •A connective tissue disorder, such as scleroderma.      •Certain medicines.        What increases the risk?    You are more likely to develop this condition if:•You have certain disorders or diseases. These may include:  •An endocrine disorder.      •An eating disorder.      •Amyloidosis.      •Scleroderma.      •Parkinson's disease.      •Multiple sclerosis.      •Cancer or infection of the stomach or the vagus nerve.        •You have had surgery on your stomach or vagus nerve.      •You take certain medicines.      •You are female.        What are the signs or symptoms?    Symptoms of this condition include:  •Feeling full after eating very little or a loss of appetite.      •Nausea, vomiting, or heartburn.      •Bloating of your abdomen.      •Inconsistent blood sugar (glucose) levels on blood tests.      •Unexplained weight loss.      •Acid from the stomach coming up into the esophagus (gastroesophageal reflux).      •Sudden tightening (spasm) of the stomach, which can be painful.      Symptoms may come and go. Some people may not notice any symptoms.      How is this diagnosed?    This condition is diagnosed with tests, such as:•Tests that check how long it takes food to move through the stomach and intestines. These tests include:  •Upper gastrointestinal (GI) series. For this test, you drink a liquid that shows up well on X-rays, and then X-rays are taken of your intestines.      •Gastric emptying scintigraphy. For this test, you eat food that contains a small amount of radioactive material, and then scans are taken.      •Wireless capsule GI monitoring system. For this test, you swallow a pill (capsule) that records information about how foods and fluid move through your stomach.        •Gastric manometry. For this test, a tube is passed down your throat and into your stomach to measure electrical and muscular activity.      •Endoscopy. For this test, a long, thin tube with a camera and light on the end is passed down your throat and into your stomach to check for problems in your stomach lining.      •Ultrasound. This test uses sound waves to create images of the inside of your body. This can help rule out gallbladder disease or pancreatitis as a cause of your symptoms.        How is this treated?    There is no cure for this condition, but treatment and home care may relieve symptoms. Treatment may include:  •Treating the underlying cause.      •Managing your symptoms by making changes to your diet and exercise habits.      •Taking medicines to control nausea and vomiting and to stimulate stomach muscles.      •Getting food through a feeding tube in the hospital. This may be done in severe cases.      •Having surgery to insert a device called a gastric electrical stimulator into your body. This device helps improve stomach emptying and control nausea and vomiting.        Follow these instructions at home:    •Take over-the-counter and prescription medicines only as told by your health care provider.    •Follow instructions from your health care provider about eating or drinking restrictions. Your health care provider may recommend that you:  •Eat smaller meals more often.      •Eat low-fat foods.      •Eat low-fiber forms of high-fiber foods. For example, eat cooked vegetables instead of raw vegetables.      •Have only liquid foods instead of solid foods. Liquid foods are easier to digest.        •Drink enough fluid to keep your urine pale yellow.      •Exercise as often as told by your health care provider.      •Keep all follow-up visits. This is important.        Contact a health care provider if you:    •Notice that your symptoms do not improve with treatment.      •Have new symptoms.        Get help right away if you:    •Have severe pain in your abdomen that does not improve with treatment.      •Have nausea that is severe or does not go away.      •Vomit every time you drink fluids.        Summary    •Gastroparesis is a long-term (chronic) condition in which food takes longer than normal to empty from the stomach.      •Symptoms include nausea, vomiting, heartburn, bloating of your abdomen, and loss of appetite.      •Eating smaller portions, low-fat foods, and low-fiber forms of high-fiber foods may help you manage your symptoms.      •Get help right away if you have severe pain in your abdomen.      This information is not intended to replace advice given to you by your health care provider. Make sure you discuss any questions you have with your health care provider.        Preventing Diabetic Ketoacidosis      Diabetic ketoacidosis (DKA) is a life-threatening complication of diabetes (diabetes mellitus). It develops when there is not enough of a hormone called insulin in the body. If the body does not have enough insulin, it cannot divide (break down) sugar (glucose) into usable cells, so it breaks down fats instead. This leads to the production of acids (ketones), which can cause the blood to have too much acid in it (acidosis). DKA is a medical emergency that must be treated at a hospital.    You may be more likely to develop DKA if you have type 1 diabetes and you take insulin. You can prevent DKA by working closely with your health care provider to manage your diabetes.      What nutrition changes can be made?     •Follow your meal plan, as directed by your health care provider or diet and nutrition specialist (dietitian).      •Eat healthy meals at about the same time every day. Have healthy snacks between meals.      •Avoid not eating for long periods of time. Do not skip meals, especially if you are ill.      •Avoid regularly eating foods that contain a lot of sugar. Also avoid drinking alcohol. Sugary food and alcohol increase your risk of high blood glucose (hyperglycemia), which increases your risk for DKA.      •Drink enough fluid to keep your urine pale yellow. Dehydration increases your risk for DKA.        What actions can I take to lower my risk?     To lower your risk for DKA, manage your diabetes as directed by your health care provider:  •Take insulin and other diabetes medicines as directed.      •Check your blood glucose every day, as often as directed.      •Make a sick day plan in advance with your health care provider. Follow your sick day plan whenever you cannot eat or drink as usual.    •Check your urine for ketones as often as directed.  •During times when you are sick, check your ketones every 4–6 hours.      •If you develop symptoms of DKA, check your ketones right away.      •If you have ketones in your urine:  •Contact your health care provider right away.      •Do not exercise.        •Know the symptoms of DKA so that you can get treatment as soon as possible.      •Make sure that people at work, home, and school know how to check your blood glucose, in case you are not able to do it yourself.      •Wear a medical alert bracelet or carry a card that says that you have diabetes and lists what medicines you take.        Why are these changes important?    Preventing high blood glucose and dehydration helps prevent DKA. You may need to work with your health care provider to adjust your diabetes management plan to lower your risk of developing DKA. DKA can lead to a serious medical emergency that can be life-threatening.      Where to find support    For more support with preventing DKA:  •Talk with your health care provider.      •Consider joining a support group. The American Diabetes Association has an online support community at: community.diabetes.org        Where to find more information    Learn more about preventing DKA from:  •American Diabetes Association: diabetes.org      •Association of Diabetes Care & Education Specialists: diabeteseducator.org      •American Heart Association: heart.org        Contact a health care provider if:    You develop symptoms of DKA, such as:  •Fatigue.      •Weight loss.      •Excessive thirst or excessive urination.      •Light-headedness or rapid breathing.      •Fruity or sweet-smelling breath.      •Vision changes, confusion, or irritability.      •Pain in the abdomen, nausea, or vomiting.      •Feeling warm in your face (flushed). This may or may not include a reddish color coming to your face.      If you develop any of these symptoms, do not wait to see if the symptoms will go away. Get medical help right away. Call your local emergency services (911 in the [place] Do not drive yourself to the hospital.      Summary    •Preventing high blood glucose and dehydration helps prevent DKA. You may need to work with your health care provider to adjust your diabetes management plan to lower your risk of developing DKA.      •Check your urine for ketones as often as directed. You may need to check more often when your blood glucose level is high and when you are ill.      •DKA is a medical emergency. Make sure you know the symptoms so that you can get treatment right away.      This information is not intended to replace advice given to you by your health care provider. Make sure you discuss any questions you have with your health care provider.

## 2022-12-01 NOTE — ED PROVIDER NOTE - CLINICAL SUMMARY MEDICAL DECISION MAKING FREE TEXT BOX
pt with dka vs gastritis - will evaluate with labs and given fluids/zofran/ativan for anxiety - ABG to assess for acidosis pt with dka vs gastritis - will evaluate with labs and given fluids/zofran/ativan for anxiety - ABG to assess for acidosis - noted large ketones in blood will give fluids/insulin - otherwise offered admission due to large ketones - otherwise pt opting to leave as he needs to go to work. Advised also regarding Covid - will offer paxlovid.

## 2022-12-01 NOTE — ED PROVIDER NOTE - OBJECTIVE STATEMENT
no 42 year old male with PMH of DM I, gastritis, asthma, anxiety and previous cholecystectomy presenting to ED due to persistent epigastric pain x 1 day with persistent vomiting. States running low on insulin, yesterday sugar was 600s and today was in 400s. No fever/chills or diarrhea. Pt has had similar episodes in past.

## 2022-12-09 ENCOUNTER — EMERGENCY (EMERGENCY)
Facility: HOSPITAL | Age: 42
LOS: 0 days | Discharge: HOME | End: 2022-12-10
Attending: STUDENT IN AN ORGANIZED HEALTH CARE EDUCATION/TRAINING PROGRAM

## 2022-12-09 VITALS
OXYGEN SATURATION: 99 % | WEIGHT: 169.98 LBS | TEMPERATURE: 99 F | RESPIRATION RATE: 16 BRPM | SYSTOLIC BLOOD PRESSURE: 147 MMHG | DIASTOLIC BLOOD PRESSURE: 98 MMHG | HEART RATE: 84 BPM | HEIGHT: 71 IN

## 2022-12-09 DIAGNOSIS — R10.13 EPIGASTRIC PAIN: ICD-10-CM

## 2022-12-09 DIAGNOSIS — F41.9 ANXIETY DISORDER, UNSPECIFIED: ICD-10-CM

## 2022-12-09 DIAGNOSIS — Z91.018 ALLERGY TO OTHER FOODS: ICD-10-CM

## 2022-12-09 DIAGNOSIS — E10.9 TYPE 1 DIABETES MELLITUS WITHOUT COMPLICATIONS: ICD-10-CM

## 2022-12-09 DIAGNOSIS — K29.70 GASTRITIS, UNSPECIFIED, WITHOUT BLEEDING: ICD-10-CM

## 2022-12-09 DIAGNOSIS — Z90.49 ACQUIRED ABSENCE OF OTHER SPECIFIED PARTS OF DIGESTIVE TRACT: ICD-10-CM

## 2022-12-09 DIAGNOSIS — Z83.3 FAMILY HISTORY OF DIABETES MELLITUS: ICD-10-CM

## 2022-12-09 DIAGNOSIS — J45.909 UNSPECIFIED ASTHMA, UNCOMPLICATED: ICD-10-CM

## 2022-12-09 DIAGNOSIS — Z90.49 ACQUIRED ABSENCE OF OTHER SPECIFIED PARTS OF DIGESTIVE TRACT: Chronic | ICD-10-CM

## 2022-12-09 DIAGNOSIS — R11.2 NAUSEA WITH VOMITING, UNSPECIFIED: ICD-10-CM

## 2022-12-09 LAB
ACETONE SERPL-MCNC: ABNORMAL
ALBUMIN SERPL ELPH-MCNC: 3.8 G/DL — SIGNIFICANT CHANGE UP (ref 3.3–5)
ALP SERPL-CCNC: 107 U/L — SIGNIFICANT CHANGE UP (ref 40–120)
ALT FLD-CCNC: 57 U/L — SIGNIFICANT CHANGE UP (ref 12–78)
ANION GAP SERPL CALC-SCNC: 6 MMOL/L — SIGNIFICANT CHANGE UP (ref 5–17)
APPEARANCE UR: CLEAR — SIGNIFICANT CHANGE UP
AST SERPL-CCNC: 40 U/L — HIGH (ref 15–37)
BASOPHILS # BLD AUTO: 0.02 K/UL — SIGNIFICANT CHANGE UP (ref 0–0.2)
BASOPHILS NFR BLD AUTO: 0.2 % — SIGNIFICANT CHANGE UP (ref 0–2)
BILIRUB SERPL-MCNC: 0.3 MG/DL — SIGNIFICANT CHANGE UP (ref 0.2–1.2)
BILIRUB UR-MCNC: NEGATIVE — SIGNIFICANT CHANGE UP
BUN SERPL-MCNC: 17 MG/DL — SIGNIFICANT CHANGE UP (ref 7–23)
CALCIUM SERPL-MCNC: 9.3 MG/DL — SIGNIFICANT CHANGE UP (ref 8.5–10.1)
CHLORIDE SERPL-SCNC: 96 MMOL/L — SIGNIFICANT CHANGE UP (ref 96–108)
CO2 SERPL-SCNC: 33 MMOL/L — HIGH (ref 22–31)
COLOR SPEC: YELLOW — SIGNIFICANT CHANGE UP
CREAT SERPL-MCNC: 1.25 MG/DL — SIGNIFICANT CHANGE UP (ref 0.5–1.3)
DIFF PNL FLD: NEGATIVE — SIGNIFICANT CHANGE UP
EGFR: 74 ML/MIN/1.73M2 — SIGNIFICANT CHANGE UP
EOSINOPHIL # BLD AUTO: 0.04 K/UL — SIGNIFICANT CHANGE UP (ref 0–0.5)
EOSINOPHIL NFR BLD AUTO: 0.5 % — SIGNIFICANT CHANGE UP (ref 0–6)
GLUCOSE BLDC GLUCOMTR-MCNC: 380 MG/DL — HIGH (ref 70–99)
GLUCOSE SERPL-MCNC: 470 MG/DL — CRITICAL HIGH (ref 70–99)
GLUCOSE UR QL: 1000 MG/DL
HCT VFR BLD CALC: 40.1 % — SIGNIFICANT CHANGE UP (ref 39–50)
HGB BLD-MCNC: 13.6 G/DL — SIGNIFICANT CHANGE UP (ref 13–17)
IMM GRANULOCYTES NFR BLD AUTO: 0.4 % — SIGNIFICANT CHANGE UP (ref 0–0.9)
KETONES UR-MCNC: ABNORMAL
LACTATE SERPL-SCNC: 2 MMOL/L — SIGNIFICANT CHANGE UP (ref 0.7–2)
LEUKOCYTE ESTERASE UR-ACNC: NEGATIVE — SIGNIFICANT CHANGE UP
LIDOCAIN IGE QN: 67 U/L — LOW (ref 73–393)
LYMPHOCYTES # BLD AUTO: 1.41 K/UL — SIGNIFICANT CHANGE UP (ref 1–3.3)
LYMPHOCYTES # BLD AUTO: 16.5 % — SIGNIFICANT CHANGE UP (ref 13–44)
MCHC RBC-ENTMCNC: 28.2 PG — SIGNIFICANT CHANGE UP (ref 27–34)
MCHC RBC-ENTMCNC: 33.9 G/DL — SIGNIFICANT CHANGE UP (ref 32–36)
MCV RBC AUTO: 83 FL — SIGNIFICANT CHANGE UP (ref 80–100)
MONOCYTES # BLD AUTO: 0.48 K/UL — SIGNIFICANT CHANGE UP (ref 0–0.9)
MONOCYTES NFR BLD AUTO: 5.6 % — SIGNIFICANT CHANGE UP (ref 2–14)
NEUTROPHILS # BLD AUTO: 6.55 K/UL — SIGNIFICANT CHANGE UP (ref 1.8–7.4)
NEUTROPHILS NFR BLD AUTO: 76.8 % — SIGNIFICANT CHANGE UP (ref 43–77)
NITRITE UR-MCNC: NEGATIVE — SIGNIFICANT CHANGE UP
NRBC # BLD: 0 /100 WBCS — SIGNIFICANT CHANGE UP (ref 0–0)
PH UR: 8 — SIGNIFICANT CHANGE UP (ref 5–8)
PLATELET # BLD AUTO: 415 K/UL — HIGH (ref 150–400)
POTASSIUM SERPL-MCNC: 5.1 MMOL/L — SIGNIFICANT CHANGE UP (ref 3.5–5.3)
POTASSIUM SERPL-SCNC: 5.1 MMOL/L — SIGNIFICANT CHANGE UP (ref 3.5–5.3)
PROT SERPL-MCNC: 7.3 GM/DL — SIGNIFICANT CHANGE UP (ref 6–8.3)
PROT UR-MCNC: NEGATIVE MG/DL — SIGNIFICANT CHANGE UP
RBC # BLD: 4.83 M/UL — SIGNIFICANT CHANGE UP (ref 4.2–5.8)
RBC # FLD: 13.2 % — SIGNIFICANT CHANGE UP (ref 10.3–14.5)
SODIUM SERPL-SCNC: 135 MMOL/L — SIGNIFICANT CHANGE UP (ref 135–145)
SP GR SPEC: 1.01 — SIGNIFICANT CHANGE UP (ref 1.01–1.02)
UROBILINOGEN FLD QL: NEGATIVE MG/DL — SIGNIFICANT CHANGE UP
WBC # BLD: 8.53 K/UL — SIGNIFICANT CHANGE UP (ref 3.8–10.5)
WBC # FLD AUTO: 8.53 K/UL — SIGNIFICANT CHANGE UP (ref 3.8–10.5)

## 2022-12-09 PROCEDURE — 93010 ELECTROCARDIOGRAM REPORT: CPT

## 2022-12-09 PROCEDURE — 99285 EMERGENCY DEPT VISIT HI MDM: CPT

## 2022-12-09 RX ORDER — MORPHINE SULFATE 50 MG/1
4 CAPSULE, EXTENDED RELEASE ORAL ONCE
Refills: 0 | Status: DISCONTINUED | OUTPATIENT
Start: 2022-12-09 | End: 2022-12-09

## 2022-12-09 RX ORDER — PANTOPRAZOLE SODIUM 20 MG/1
40 TABLET, DELAYED RELEASE ORAL ONCE
Refills: 0 | Status: COMPLETED | OUTPATIENT
Start: 2022-12-09 | End: 2022-12-09

## 2022-12-09 RX ORDER — SODIUM CHLORIDE 9 MG/ML
1000 INJECTION INTRAMUSCULAR; INTRAVENOUS; SUBCUTANEOUS ONCE
Refills: 0 | Status: COMPLETED | OUTPATIENT
Start: 2022-12-09 | End: 2022-12-09

## 2022-12-09 RX ORDER — ACETAMINOPHEN 500 MG
1000 TABLET ORAL ONCE
Refills: 0 | Status: DISCONTINUED | OUTPATIENT
Start: 2022-12-09 | End: 2022-12-09

## 2022-12-09 RX ORDER — ONDANSETRON 8 MG/1
4 TABLET, FILM COATED ORAL ONCE
Refills: 0 | Status: COMPLETED | OUTPATIENT
Start: 2022-12-09 | End: 2022-12-09

## 2022-12-09 RX ADMIN — ONDANSETRON 4 MILLIGRAM(S): 8 TABLET, FILM COATED ORAL at 21:58

## 2022-12-09 RX ADMIN — MORPHINE SULFATE 4 MILLIGRAM(S): 50 CAPSULE, EXTENDED RELEASE ORAL at 22:51

## 2022-12-09 RX ADMIN — MORPHINE SULFATE 4 MILLIGRAM(S): 50 CAPSULE, EXTENDED RELEASE ORAL at 23:13

## 2022-12-09 RX ADMIN — SODIUM CHLORIDE 1000 MILLILITER(S): 9 INJECTION INTRAMUSCULAR; INTRAVENOUS; SUBCUTANEOUS at 21:57

## 2022-12-09 RX ADMIN — SODIUM CHLORIDE 1000 MILLILITER(S): 9 INJECTION INTRAMUSCULAR; INTRAVENOUS; SUBCUTANEOUS at 23:46

## 2022-12-09 RX ADMIN — PANTOPRAZOLE SODIUM 40 MILLIGRAM(S): 20 TABLET, DELAYED RELEASE ORAL at 21:57

## 2022-12-09 NOTE — ED ADULT NURSE NOTE - OBJECTIVE STATEMENT
Pt AOx4 and ambulatory with steady gait. Pt c/o abdominal pain, N/V, and elevated blood sugar that began today. Pt hx of Gastritis. Pt denies use of pain medication during day. IV noted to left AC #18, pt states it was placed by EMS en route to ED. Pt states no medications were administered by EMS. Pt denies fever/chills, sneezing, coughing, dizziness, headache, blurred vision, chest pain, or dysuria.

## 2022-12-09 NOTE — ED PROVIDER NOTE - PATIENT PORTAL LINK FT
You can access the FollowMyHealth Patient Portal offered by [facility] by registering at the following website: [email or web address]. By joining [employer]’s FollowMyHealth portal, you will also be able to view your health information using other applications (apps) compatible with our system.

## 2022-12-09 NOTE — ED PROVIDER NOTE - CLINICAL SUMMARY MEDICAL DECISION MAKING FREE TEXT BOX
pt presents today with acute exacerbation of his gastritis pain, last ate chicken noodle soup, vomiting all day, BS at home in the 600, pt did given himself insulin which improved his BS to 400's, will order labs to check for dka, hydrate and medicate for pain, ct to rule intra abdominal pathology

## 2022-12-09 NOTE — ED ADULT NURSE NOTE - CAS EDN DISCHARGE ASSESSMENT
INFECTIOUS DISEASE PROGRESS NOTE     Patient encounter during the COVID-19 pandemic crisis    Gunjan Dennis is a 63 year old female patient admitted on 12/24/2021.  Reason for admission: TIA (transient ischemic attack) [G45.9]  Weakness generalized [R53.1]  Altered mental status, unspecified altered mental status type [R41.82]  Pressure injury of skin of sacral region, unspecified injury stage [L89.159]  COVID-19 virus infection [U07.1]    Current Medications  Current Facility-Administered Medications   Medication   • gabapentin (NEURONTIN) capsule 600 mg   • lisinopril (ZESTRIL) tablet 20 mg   • cyanocobalamin injection 1,000 mcg   • folic acid (FOLATE) tablet 1 mg   • pregabalin (LYRICA) capsule 50 mg   • NIFEdipine XL (PROCARDIA XL) ER tablet 60 mg   • loperamide (IMODIUM) capsule 2 mg   • cefepime (MAXIPIME) 1,000 mg in sodium chloride 0.9 % 100 mL IVPB   • ALPRAZolam (XANAX) tablet 0.25 mg   • escitalopram (LEXAPRO) tablet 10 mg   • HYDROcodone-acetaminophen (NORCO)  MG per tablet 1 tablet   • pantoprazole (PROTONIX) EC tablet 40 mg   • rivaroxaban (XARELTO) tablet 20 mg   • melatonin tablet 6 mg   • hydrALAZINE (APRESOLINE) injection 10 mg   • acetaminophen (TYLENOL) tablet 650 mg    Or   • acetaminophen (TYLENOL) suppository 650 mg   • labetalol (NORMODYNE) injection 10 mg   • atorvastatin (LIPITOR) tablet 40 mg   • ondansetron (ZOFRAN ODT) disintegrating tablet 4 mg    Or   • ondansetron (ZOFRAN) injection 4 mg   • prochlorperazine (COMPAZINE) tablet 5 mg    Or   • prochlorperazine (COMPAZINE) injection 5 mg   • polyethylene glycol (MIRALAX) packet 17 g   • Potassium Standard Replacement Protocol   • Magnesium Standard Replacement Protocol   • docusate sodium-sennosides (SENOKOT S) 50-8.6 MG 2 tablet   • aluminum-magnesium hydroxide-simethicone (MAALOX) 200-200-20 MG/5ML suspension 20 mL   • sodium chloride 0.9 % flush bag 25 mL   • dextrose 50 % injection 25 g   • dextrose 50 % injection 12.5 g   •  glucagon (GLUCAGEN) injection 1 mg   • dextrose (GLUTOSE) 40 % gel 15 g   • dextrose (GLUTOSE) 40 % gel 30 g   • insulin lispro (ADMELOG,HumaLOG) - Correction Dose      SUBJECTIVE / OBJECTIVE   Reviewed.  In room air.  No complaints    Physical Exam:   Vitals: Minimum/Maximum (last 24 hours):  Temperature Blood Pressure Heart Rate Resp Rate SpO2   Temp  Av.5 °F (36.9 °C)  Min: 98.1 °F (36.7 °C)  Max: 99.1 °F (37.3 °C) BP  Min: 114/69  Max: 157/76 Pulse  Av.6  Min: 56  Max: 90 Resp  Av.6  Min: 17  Max: 18 SpO2  Av %  Min: 96 %  Max: 98 %   Last Vitals:  Visit Vitals  /73   Pulse 65   Temp 98.8 °F (37.1 °C) (Oral)   Resp 18   Ht 5' 10\" (1.778 m)   Wt 74.8 kg (165 lb)   SpO2 96%   BMI 23.68 kg/m²       General appearance:  In no distress  HEENT:  Normocephalic  Lung:  Air entry is satisfactory and clear to auscultation bilaterally  Heart:  S1 and S2 heard  Abdomen:  Soft, non-distended. No obvious masses or organomegaly. No tenderness  Neurological: Awake, alert and appropriate    Lab Results     Recent Labs     21  0551 21  0755   WBC 5.7 4.7   RBC 4.20 4.06   HGB 11.1* 10.6*   HCT 33.0* 32.1*    280   MCV 78.6 79.1   MCH 26.4 26.1   MCHC 33.6 33.0   NRBCRE 0 0       Recent Labs     21  0551 21  1827 21  0755   SODIUM 138  --  137   POTASSIUM 2.7* 3.4 4.0   CO2 18*  --  22   ANIONGAP 20  --  16   GLUCOSE 146*  --  137*   BUN 11  --  12   CREATININE 0.54  --  0.56   BCRAT 20  --  21   CALCIUM 9.4  --  9.5   BILIRUBIN 0.5  --  0.5   AST 23  --  32   GPT 19  --  16   ALKPT 92  --  77   GLOB 4.0  --  4.3*   AGR 0.7*  --  0.7*     Blood Culture [[id number]] Collected: 21 0551   Order Status: Completed Specimen: Blood Updated: 21 1300    Culture, Blood or Bone Marrow No Growth 1 Day.   Blood Culture [[id number]] Collected: 21 0153   Order Status: Completed Specimen: Blood Updated: 21 1103    Culture, Blood or Bone Marrow No Growth 3  Days.   Blood Culture [[id number]] Collected: 12/27/21 1827   Order Status: Completed Specimen: Blood Updated: 12/28/21 0210    Culture, Blood or Bone Marrow No Growth <24 hours   Urine, Bacterial Culture [[id number]] (Abnormal)  Collected: 12/25/21 0105   Order Status: Completed Specimen: Urine clean catch Updated: 12/27/21 1811    Urine, Bacterial Culture >100,000 CFU/mL Escherichia coli Abnormal      >100,000 CFU/mL Proteus mirabilis Abnormal      Susceptibility     Escherichia coli Proteus mirabilis     IKRAN KIRAN     AMOXICIL/CLAVULANATE 4 ug/mL Susceptible       AMPICILLIN >=32 ug/mL Resistant  Resistant     AMPICILLIN/SULBACTAM 16 ug/mL Intermediate <=2 ug/mL Susceptible     AZTREONAM <=1 ug/mL Susceptible <=1 ug/mL Susceptible     CEFAZOLIN <=4 ug/mL 1  <=4 ug/mL 1      CEFAZOLIN (URINE) <=4 ug/mL Susceptible 2 <=4 ug/mL Susceptible 2     CEFOXITIN <=4 ug/mL Susceptible 8 ug/mL Susceptible     CEFTRIAXONE <=1 ug/mL Susceptible <=1 ug/mL Susceptible     CEFUROXIME - AXETIL 4 ug/mL Susceptible <=1 ug/mL Susceptible     CIPROFLOXACIN <=0.25 ug/mL Susceptible <=0.25 ug/mL Susceptible     GENTAMICIN <=1 ug/mL Susceptible <=1 ug/mL Susceptible     NITROFURANTOIN <=16 ug/mL Susceptible       PIPERACILLIN/TAZOBAC <=4 ug/mL Susceptible <=4 ug/mL Susceptible     TRIMETH / SULFA <=20 ug/mL Susceptible <=20 ug/mL Susceptible                1 A cefazolin KIRAN result of <=4 cannot be used to differentiate between susceptible and intermediate isolates.  Consider alternative therapy based on clinical response and severity of infection. This result should be used when considering treatment for any infection other than an uncomplicated UTI.   2 If susceptible, cefazolin predicts activity for other oral cephalosporins (cefaclor, cefdinir, cefpodoxime, cefprozil, cefuroxime, cephalexin, and loracarbef) when used for uncomplicated UTIs due to E. coli, K. pneumo, and P. mirabilis.       Linear View         Blood Culture  [[id number]] (Abnormal) Collected: 12/25/21 0153   Order Status: Completed Specimen: Blood Updated: 12/27/21 1658    Culture, Blood or Bone Marrow Culture reincubated. No further information available.     Staphylococcus hominis Panic      Comment: When only one blood culture is positive susceptibilities will not be performed.         Bacteria Panic      Comment: This is an appended report. These results have been appended to a previously final verified report.       Gram Stain Gram positive cocci in clusters. Panic      Comment: Detected from anaerobic bottle after 1 Days and 2 hours.         Gram positive bacilli. Panic      Comment: Detected from aerobic bottle after 2 Days and 15 hours.   This is an appended report. These results have been appended to a previously final verified report.      BLOOD CULTURE, RAPID IDENTIFICATION [[id number]] (Abnormal) Collected: 12/25/21 0153   Order Status: Completed Specimen: Blood Updated: 12/26/21 0437    Staphylococcus species Detected Panic       TRANSTHORACIC ECHO (TTE) LIMITED W/ W/O IMAGING AGENT  1. Left ventricle: The cavity size is normal. Wall thickness is mildly     increased. There is concentric hypertrophy. Systolic function is     normal. The ejection fraction was measured by 3D assessment. The     ejection fraction is 69%.  2. Aortic valve: Mild regurgitation.  3. Left atrium: The atrium is dilated.  4. Atrial septum: The septum bows from left to right, consistent with     increased left atrial pressure. Agitated saline contrast study shows no     shunt. There is a septum secundum aneurysm.  12/26/2021 12:27      ASSESSMENT   COVID-19 infection with no evidence of pneumonia or hypoxia  Catheter associated UTI  Staphylococcus hominis pseudobacteremia  Diarrhea - R/o infectious etiologies  COLIN - resolved  DM2  HTN  Functional quadriplegia    PLAN   Reviewed  Follow cultures  Change antibiotics to IV ceftriaxone  Monitor closely for COVID-19 disease  progression  Will reevaluate    Thank you for requesting my participation in the care of this patient    This note was partially generated using voice recognition software.  If you require clarification or   feel that there has been an error in the note please contact me through [employer].  Thank you.      Lorenzo Marroquin MD  12/28/2021   5:47 PM         Alert and oriented to person, place and time

## 2022-12-09 NOTE — ED PROVIDER NOTE - OBJECTIVE STATEMENT
42 year old male with DM, gastritis, asthma and anxiety presents today c/o epigastric abdominal pain, nausea and vomiting since this morning, pt last ate yesterday, had chicken noodle soup, pt denies fevers or chills, or diarrhea, pt describes severe pain in the epigastric area

## 2022-12-10 VITALS
SYSTOLIC BLOOD PRESSURE: 135 MMHG | TEMPERATURE: 98 F | DIASTOLIC BLOOD PRESSURE: 88 MMHG | OXYGEN SATURATION: 99 % | HEART RATE: 72 BPM | RESPIRATION RATE: 16 BRPM

## 2022-12-10 LAB — GLUCOSE BLDC GLUCOMTR-MCNC: 284 MG/DL — HIGH (ref 70–99)

## 2022-12-10 PROCEDURE — 74177 CT ABD & PELVIS W/CONTRAST: CPT | Mod: 26,MA

## 2022-12-10 RX ORDER — SODIUM CHLORIDE 9 MG/ML
1000 INJECTION INTRAMUSCULAR; INTRAVENOUS; SUBCUTANEOUS ONCE
Refills: 0 | Status: COMPLETED | OUTPATIENT
Start: 2022-12-10 | End: 2022-12-10

## 2022-12-10 RX ADMIN — SODIUM CHLORIDE 1000 MILLILITER(S): 9 INJECTION INTRAMUSCULAR; INTRAVENOUS; SUBCUTANEOUS at 01:04

## 2022-12-11 LAB
CULTURE RESULTS: SIGNIFICANT CHANGE UP
SPECIMEN SOURCE: SIGNIFICANT CHANGE UP

## 2022-12-21 NOTE — BEHAVIORAL HEALTH ASSESSMENT NOTE - NS ED BHA MSE SPEECH RATE
Normal Melolabial Interpolation Flap Text: A decision was made to reconstruct the defect utilizing an interpolation axial flap and a staged reconstruction.  A telfa template was made of the defect.  This telfa template was then used to outline the melolabial interpolation flap.  The donor area for the pedicle flap was then injected with anesthesia.  The flap was excised through the skin and subcutaneous tissue down to the layer of the underlying musculature.  The pedicle flap was carefully excised within this deep plane to maintain its blood supply.  The edges of the donor site were undermined.   The donor site was closed in a primary fashion.  The pedicle was then rotated into position and sutured.  Once the tube was sutured into place, adequate blood supply was confirmed with blanching and refill.  The pedicle was then wrapped with xeroform gauze and dressed appropriately with a telfa and gauze bandage to ensure continued blood supply and protect the attached pedicle.

## 2023-01-18 ENCOUNTER — INPATIENT (INPATIENT)
Facility: HOSPITAL | Age: 43
LOS: 1 days | Discharge: HOME | End: 2023-01-20
Attending: INTERNAL MEDICINE | Admitting: INTERNAL MEDICINE
Payer: MEDICAID

## 2023-01-18 VITALS
SYSTOLIC BLOOD PRESSURE: 148 MMHG | HEART RATE: 71 BPM | TEMPERATURE: 98 F | WEIGHT: 169.98 LBS | DIASTOLIC BLOOD PRESSURE: 100 MMHG | RESPIRATION RATE: 17 BRPM | HEIGHT: 71 IN | OXYGEN SATURATION: 97 %

## 2023-01-18 DIAGNOSIS — Z90.49 ACQUIRED ABSENCE OF OTHER SPECIFIED PARTS OF DIGESTIVE TRACT: Chronic | ICD-10-CM

## 2023-01-18 PROCEDURE — 99285 EMERGENCY DEPT VISIT HI MDM: CPT

## 2023-01-19 DIAGNOSIS — R11.2 NAUSEA WITH VOMITING, UNSPECIFIED: ICD-10-CM

## 2023-01-19 DIAGNOSIS — J45.909 UNSPECIFIED ASTHMA, UNCOMPLICATED: ICD-10-CM

## 2023-01-19 DIAGNOSIS — F41.9 ANXIETY DISORDER, UNSPECIFIED: ICD-10-CM

## 2023-01-19 DIAGNOSIS — K29.00 ACUTE GASTRITIS WITHOUT BLEEDING: ICD-10-CM

## 2023-01-19 DIAGNOSIS — E10.43 TYPE 1 DIABETES MELLITUS WITH DIABETIC AUTONOMIC (POLY)NEUROPATHY: ICD-10-CM

## 2023-01-19 LAB
ALBUMIN SERPL ELPH-MCNC: 3.7 G/DL — SIGNIFICANT CHANGE UP (ref 3.3–5)
ALP SERPL-CCNC: 113 U/L — SIGNIFICANT CHANGE UP (ref 40–120)
ALT FLD-CCNC: 69 U/L — SIGNIFICANT CHANGE UP (ref 12–78)
AMYLASE P1 CFR SERPL: 112 U/L — SIGNIFICANT CHANGE UP (ref 25–115)
ANION GAP SERPL CALC-SCNC: 9 MMOL/L — SIGNIFICANT CHANGE UP (ref 5–17)
APPEARANCE UR: CLEAR — SIGNIFICANT CHANGE UP
AST SERPL-CCNC: 54 U/L — HIGH (ref 15–37)
BACTERIA # UR AUTO: ABNORMAL
BASOPHILS # BLD AUTO: 0.06 K/UL — SIGNIFICANT CHANGE UP (ref 0–0.2)
BASOPHILS NFR BLD AUTO: 0.5 % — SIGNIFICANT CHANGE UP (ref 0–2)
BILIRUB SERPL-MCNC: 0.4 MG/DL — SIGNIFICANT CHANGE UP (ref 0.2–1.2)
BILIRUB UR-MCNC: NEGATIVE — SIGNIFICANT CHANGE UP
BUN SERPL-MCNC: 8 MG/DL — SIGNIFICANT CHANGE UP (ref 7–23)
CALCIUM SERPL-MCNC: 9.5 MG/DL — SIGNIFICANT CHANGE UP (ref 8.5–10.1)
CHLORIDE SERPL-SCNC: 97 MMOL/L — SIGNIFICANT CHANGE UP (ref 96–108)
CO2 SERPL-SCNC: 30 MMOL/L — SIGNIFICANT CHANGE UP (ref 22–31)
COLOR SPEC: YELLOW — SIGNIFICANT CHANGE UP
CREAT SERPL-MCNC: 1.09 MG/DL — SIGNIFICANT CHANGE UP (ref 0.5–1.3)
DIFF PNL FLD: NEGATIVE — SIGNIFICANT CHANGE UP
EGFR: 87 ML/MIN/1.73M2 — SIGNIFICANT CHANGE UP
EOSINOPHIL # BLD AUTO: 0.01 K/UL — SIGNIFICANT CHANGE UP (ref 0–0.5)
EOSINOPHIL NFR BLD AUTO: 0.1 % — SIGNIFICANT CHANGE UP (ref 0–6)
EPI CELLS # UR: SIGNIFICANT CHANGE UP
FLUAV AG NPH QL: SIGNIFICANT CHANGE UP
FLUBV AG NPH QL: SIGNIFICANT CHANGE UP
GLUCOSE BLDC GLUCOMTR-MCNC: 287 MG/DL — HIGH (ref 70–99)
GLUCOSE BLDC GLUCOMTR-MCNC: 305 MG/DL — HIGH (ref 70–99)
GLUCOSE SERPL-MCNC: 351 MG/DL — HIGH (ref 70–99)
GLUCOSE UR QL: 1000 MG/DL
HCT VFR BLD CALC: 39 % — SIGNIFICANT CHANGE UP (ref 39–50)
HGB BLD-MCNC: 13.5 G/DL — SIGNIFICANT CHANGE UP (ref 13–17)
IMM GRANULOCYTES NFR BLD AUTO: 0.3 % — SIGNIFICANT CHANGE UP (ref 0–0.9)
KETONES UR-MCNC: ABNORMAL
LACTATE SERPL-SCNC: 1.6 MMOL/L — SIGNIFICANT CHANGE UP (ref 0.7–2)
LEUKOCYTE ESTERASE UR-ACNC: NEGATIVE — SIGNIFICANT CHANGE UP
LIDOCAIN IGE QN: 89 U/L — SIGNIFICANT CHANGE UP (ref 73–393)
LYMPHOCYTES # BLD AUTO: 1.16 K/UL — SIGNIFICANT CHANGE UP (ref 1–3.3)
LYMPHOCYTES # BLD AUTO: 10.1 % — LOW (ref 13–44)
MCHC RBC-ENTMCNC: 28.6 PG — SIGNIFICANT CHANGE UP (ref 27–34)
MCHC RBC-ENTMCNC: 34.6 G/DL — SIGNIFICANT CHANGE UP (ref 32–36)
MCV RBC AUTO: 82.6 FL — SIGNIFICANT CHANGE UP (ref 80–100)
MONOCYTES # BLD AUTO: 0.24 K/UL — SIGNIFICANT CHANGE UP (ref 0–0.9)
MONOCYTES NFR BLD AUTO: 2.1 % — SIGNIFICANT CHANGE UP (ref 2–14)
NEUTROPHILS # BLD AUTO: 10.03 K/UL — HIGH (ref 1.8–7.4)
NEUTROPHILS NFR BLD AUTO: 86.9 % — HIGH (ref 43–77)
NITRITE UR-MCNC: NEGATIVE — SIGNIFICANT CHANGE UP
NRBC # BLD: 0 /100 WBCS — SIGNIFICANT CHANGE UP (ref 0–0)
PCP SPEC-MCNC: SIGNIFICANT CHANGE UP
PH UR: 8 — SIGNIFICANT CHANGE UP (ref 5–8)
PLATELET # BLD AUTO: 421 K/UL — HIGH (ref 150–400)
POTASSIUM SERPL-MCNC: 4.4 MMOL/L — SIGNIFICANT CHANGE UP (ref 3.5–5.3)
POTASSIUM SERPL-SCNC: 4.4 MMOL/L — SIGNIFICANT CHANGE UP (ref 3.5–5.3)
PROT SERPL-MCNC: 7.3 GM/DL — SIGNIFICANT CHANGE UP (ref 6–8.3)
PROT UR-MCNC: 15 MG/DL
RBC # BLD: 4.72 M/UL — SIGNIFICANT CHANGE UP (ref 4.2–5.8)
RBC # FLD: 14.6 % — HIGH (ref 10.3–14.5)
RBC CASTS # UR COMP ASSIST: SIGNIFICANT CHANGE UP /HPF (ref 0–4)
SARS-COV-2 RNA SPEC QL NAA+PROBE: SIGNIFICANT CHANGE UP
SODIUM SERPL-SCNC: 136 MMOL/L — SIGNIFICANT CHANGE UP (ref 135–145)
SP GR SPEC: 1.01 — SIGNIFICANT CHANGE UP (ref 1.01–1.02)
UROBILINOGEN FLD QL: NEGATIVE MG/DL — SIGNIFICANT CHANGE UP
WBC # BLD: 11.54 K/UL — HIGH (ref 3.8–10.5)
WBC # FLD AUTO: 11.54 K/UL — HIGH (ref 3.8–10.5)
WBC UR QL: SIGNIFICANT CHANGE UP

## 2023-01-19 PROCEDURE — 99223 1ST HOSP IP/OBS HIGH 75: CPT

## 2023-01-19 PROCEDURE — 74177 CT ABD & PELVIS W/CONTRAST: CPT | Mod: 26,MA

## 2023-01-19 RX ORDER — SODIUM CHLORIDE 9 MG/ML
1000 INJECTION, SOLUTION INTRAVENOUS
Refills: 0 | Status: DISCONTINUED | OUTPATIENT
Start: 2023-01-19 | End: 2023-01-20

## 2023-01-19 RX ORDER — DEXTROSE 50 % IN WATER 50 %
15 SYRINGE (ML) INTRAVENOUS ONCE
Refills: 0 | Status: DISCONTINUED | OUTPATIENT
Start: 2023-01-19 | End: 2023-01-20

## 2023-01-19 RX ORDER — SUCRALFATE 1 G
1 TABLET ORAL EVERY 6 HOURS
Refills: 0 | Status: DISCONTINUED | OUTPATIENT
Start: 2023-01-19 | End: 2023-01-20

## 2023-01-19 RX ORDER — INSULIN HUMAN 100 [IU]/ML
6 INJECTION, SOLUTION SUBCUTANEOUS ONCE
Refills: 0 | Status: COMPLETED | OUTPATIENT
Start: 2023-01-19 | End: 2023-01-19

## 2023-01-19 RX ORDER — INSULIN LISPRO 100/ML
1 VIAL (ML) SUBCUTANEOUS
Refills: 0 | Status: DISCONTINUED | OUTPATIENT
Start: 2023-01-19 | End: 2023-01-20

## 2023-01-19 RX ORDER — ACETAMINOPHEN 500 MG
650 TABLET ORAL EVERY 6 HOURS
Refills: 0 | Status: DISCONTINUED | OUTPATIENT
Start: 2023-01-19 | End: 2023-01-20

## 2023-01-19 RX ORDER — SODIUM CHLORIDE 9 MG/ML
1000 INJECTION INTRAMUSCULAR; INTRAVENOUS; SUBCUTANEOUS ONCE
Refills: 0 | Status: COMPLETED | OUTPATIENT
Start: 2023-01-19 | End: 2023-01-19

## 2023-01-19 RX ORDER — ONDANSETRON 8 MG/1
4 TABLET, FILM COATED ORAL EVERY 6 HOURS
Refills: 0 | Status: DISCONTINUED | OUTPATIENT
Start: 2023-01-19 | End: 2023-01-20

## 2023-01-19 RX ORDER — METOCLOPRAMIDE HCL 10 MG
10 TABLET ORAL EVERY 8 HOURS
Refills: 0 | Status: DISCONTINUED | OUTPATIENT
Start: 2023-01-19 | End: 2023-01-20

## 2023-01-19 RX ORDER — GLUCAGON INJECTION, SOLUTION 0.5 MG/.1ML
1 INJECTION, SOLUTION SUBCUTANEOUS ONCE
Refills: 0 | Status: DISCONTINUED | OUTPATIENT
Start: 2023-01-19 | End: 2023-01-20

## 2023-01-19 RX ORDER — ALBUTEROL 90 UG/1
2 AEROSOL, METERED ORAL EVERY 6 HOURS
Refills: 0 | Status: DISCONTINUED | OUTPATIENT
Start: 2023-01-19 | End: 2023-01-20

## 2023-01-19 RX ORDER — MORPHINE SULFATE 50 MG/1
4 CAPSULE, EXTENDED RELEASE ORAL ONCE
Refills: 0 | Status: DISCONTINUED | OUTPATIENT
Start: 2023-01-19 | End: 2023-01-19

## 2023-01-19 RX ORDER — DEXTROSE 50 % IN WATER 50 %
25 SYRINGE (ML) INTRAVENOUS ONCE
Refills: 0 | Status: DISCONTINUED | OUTPATIENT
Start: 2023-01-19 | End: 2023-01-20

## 2023-01-19 RX ORDER — MORPHINE SULFATE 50 MG/1
2 CAPSULE, EXTENDED RELEASE ORAL EVERY 4 HOURS
Refills: 0 | Status: DISCONTINUED | OUTPATIENT
Start: 2023-01-19 | End: 2023-01-20

## 2023-01-19 RX ORDER — PANTOPRAZOLE SODIUM 20 MG/1
40 TABLET, DELAYED RELEASE ORAL ONCE
Refills: 0 | Status: COMPLETED | OUTPATIENT
Start: 2023-01-19 | End: 2023-01-19

## 2023-01-19 RX ORDER — ONDANSETRON 8 MG/1
4 TABLET, FILM COATED ORAL ONCE
Refills: 0 | Status: COMPLETED | OUTPATIENT
Start: 2023-01-19 | End: 2023-01-19

## 2023-01-19 RX ORDER — ALPRAZOLAM 0.25 MG
1 TABLET ORAL EVERY 8 HOURS
Refills: 0 | Status: DISCONTINUED | OUTPATIENT
Start: 2023-01-19 | End: 2023-01-20

## 2023-01-19 RX ORDER — FAMOTIDINE 10 MG/ML
20 INJECTION INTRAVENOUS ONCE
Refills: 0 | Status: COMPLETED | OUTPATIENT
Start: 2023-01-19 | End: 2023-01-19

## 2023-01-19 RX ORDER — DEXTROSE 50 % IN WATER 50 %
12.5 SYRINGE (ML) INTRAVENOUS ONCE
Refills: 0 | Status: DISCONTINUED | OUTPATIENT
Start: 2023-01-19 | End: 2023-01-20

## 2023-01-19 RX ORDER — LANOLIN ALCOHOL/MO/W.PET/CERES
3 CREAM (GRAM) TOPICAL AT BEDTIME
Refills: 0 | Status: DISCONTINUED | OUTPATIENT
Start: 2023-01-19 | End: 2023-01-20

## 2023-01-19 RX ORDER — PANTOPRAZOLE SODIUM 20 MG/1
40 TABLET, DELAYED RELEASE ORAL
Refills: 0 | Status: DISCONTINUED | OUTPATIENT
Start: 2023-01-19 | End: 2023-01-20

## 2023-01-19 RX ADMIN — MORPHINE SULFATE 2 MILLIGRAM(S): 50 CAPSULE, EXTENDED RELEASE ORAL at 18:33

## 2023-01-19 RX ADMIN — MORPHINE SULFATE 2 MILLIGRAM(S): 50 CAPSULE, EXTENDED RELEASE ORAL at 09:45

## 2023-01-19 RX ADMIN — SODIUM CHLORIDE 1000 MILLILITER(S): 9 INJECTION INTRAMUSCULAR; INTRAVENOUS; SUBCUTANEOUS at 07:16

## 2023-01-19 RX ADMIN — PANTOPRAZOLE SODIUM 40 MILLIGRAM(S): 20 TABLET, DELAYED RELEASE ORAL at 05:41

## 2023-01-19 RX ADMIN — Medication 10 MILLIGRAM(S): at 14:34

## 2023-01-19 RX ADMIN — MORPHINE SULFATE 4 MILLIGRAM(S): 50 CAPSULE, EXTENDED RELEASE ORAL at 03:26

## 2023-01-19 RX ADMIN — SODIUM CHLORIDE 1000 MILLILITER(S): 9 INJECTION INTRAMUSCULAR; INTRAVENOUS; SUBCUTANEOUS at 05:52

## 2023-01-19 RX ADMIN — FAMOTIDINE 20 MILLIGRAM(S): 10 INJECTION INTRAVENOUS at 05:41

## 2023-01-19 RX ADMIN — MORPHINE SULFATE 4 MILLIGRAM(S): 50 CAPSULE, EXTENDED RELEASE ORAL at 05:52

## 2023-01-19 RX ADMIN — SODIUM CHLORIDE 125 MILLILITER(S): 9 INJECTION, SOLUTION INTRAVENOUS at 14:27

## 2023-01-19 RX ADMIN — MORPHINE SULFATE 2 MILLIGRAM(S): 50 CAPSULE, EXTENDED RELEASE ORAL at 14:26

## 2023-01-19 RX ADMIN — Medication 1 GRAM(S): at 11:05

## 2023-01-19 RX ADMIN — ONDANSETRON 4 MILLIGRAM(S): 8 TABLET, FILM COATED ORAL at 03:10

## 2023-01-19 RX ADMIN — MORPHINE SULFATE 4 MILLIGRAM(S): 50 CAPSULE, EXTENDED RELEASE ORAL at 06:22

## 2023-01-19 RX ADMIN — Medication 3 MILLIGRAM(S): at 19:51

## 2023-01-19 RX ADMIN — SODIUM CHLORIDE 1000 MILLILITER(S): 9 INJECTION INTRAMUSCULAR; INTRAVENOUS; SUBCUTANEOUS at 03:10

## 2023-01-19 RX ADMIN — SODIUM CHLORIDE 1000 MILLILITER(S): 9 INJECTION INTRAMUSCULAR; INTRAVENOUS; SUBCUTANEOUS at 03:51

## 2023-01-19 RX ADMIN — MORPHINE SULFATE 2 MILLIGRAM(S): 50 CAPSULE, EXTENDED RELEASE ORAL at 23:00

## 2023-01-19 RX ADMIN — Medication 30 MILLILITER(S): at 05:41

## 2023-01-19 RX ADMIN — ONDANSETRON 4 MILLIGRAM(S): 8 TABLET, FILM COATED ORAL at 18:35

## 2023-01-19 RX ADMIN — PANTOPRAZOLE SODIUM 40 MILLIGRAM(S): 20 TABLET, DELAYED RELEASE ORAL at 11:05

## 2023-01-19 RX ADMIN — MORPHINE SULFATE 4 MILLIGRAM(S): 50 CAPSULE, EXTENDED RELEASE ORAL at 03:56

## 2023-01-19 RX ADMIN — Medication 1 MILLIGRAM(S): at 19:50

## 2023-01-19 NOTE — H&P ADULT - PROBLEM SELECTOR PLAN 1
intractable nausea, vomiting and epigastric pain  Unable to tolerate oral intake, at risk for dehydration and electrolytes disorder  Acute gastritis and in the setting of gastroparesis  IV LR  Clear liquid and advance as tolerate  Zofran/Reglan prn  Oral PPI, Sucralfate  Maalox prn

## 2023-01-19 NOTE — ED ADULT NURSE REASSESSMENT NOTE - NS ED NURSE REASSESS COMMENT FT1
Pt sleeping comfortably in bed, easy to arouse, AOx4 when awake. No complaints at this time. Respirations equal and unlabored. No acute distress noted at this time.

## 2023-01-19 NOTE — PATIENT PROFILE ADULT - FALL HARM RISK - HARM RISK INTERVENTIONS

## 2023-01-19 NOTE — H&P ADULT - NSHPPHYSICALEXAM_GEN_ALL_CORE
CONSTITUTIONAL: alert and cooperative, mild distress due to pain  EYES: PERRL,  no scleral icterus  ENT: Mucosa moist, tongue normal  NECK: Neck supple, trachea midline, non-tender  CARDIAC: Normal S1 and S2. Regular rate and rhythms. No murmurs. No Pedal edema. Peripheral pulses intact  LUNGS: Equal air entry both lungs. No rales, rhonchi, wheezing. Normal respiratory effort.   ABDOMEN: Epigastric tenderness noted. No guarding or rebound tenderness. No hepatomegaly or splenomegaly. Bowel sound normal  MUSCULOSKELETAL: Normocephalic, atraumatic. No significant deformity or joint abnormality.  NEUROLOGICAL: No gross motor or sensory deficits  SKIN: no lesions or eruptions. Normal turgor  PSYCHIATRIC: A&O x 3, appropriate mood and affect

## 2023-01-19 NOTE — H&P ADULT - HISTORY OF PRESENT ILLNESS
42 years old male with h/o DM1 with gastroparesis, anxiety disorder, asthma present to ED with complain of nausea, vomiting, epigastric pain and unable to tolerate oral intake. Patient reported N/V/epigastric pain for 1 day and unable to tolerate oral intake. Patient reported multiple episode of nonbloody vomiting, associated with severe epigastric burning pain. No fever or chills. No urinary symptoms.  Hemodynamically stable, afebrile, sat well at RA. WBC 11.54, glucose 305, K 4.4, Cr 1.09, lipase 89, lactate 1.6. CT abd/pelvis image reviewed, noted mild diffused gastric wall thickening suspected gastritis.     SH: no toxic habits  FH: Parents-DM

## 2023-01-19 NOTE — ED PROVIDER NOTE - PHYSICAL EXAMINATION
Gen: Alert, distressed, crying  Head: NC, AT, EOMI, normal lids/conjunctiva  ENT: normal hearing, patent oropharynx without erythema/exudate, uvula midline  Neck: +supple, no tenderness/meningismus/JVD, +Trachea midline  Pulm: Bilateral BS, normal resp effort, no wheeze/stridor/retractions  CV: RRR, no M/R/G, +dist pulses  Abd: soft, +right sided abd pain with guarding, ND, Negative [place] signs, +BS, no palpable masses  Mskel: no edema/erythema/cyanosis  Skin: no rash, warm/dry  Neuro: AAOx3, no apparent sensory/motor deficits, coordination intact

## 2023-01-19 NOTE — ED PROVIDER NOTE - CLINICAL SUMMARY MEDICAL DECISION MAKING FREE TEXT BOX
Patient with persistent nausea and vomiting, known gastritis. VSS.  Labs with hyperglycemia, no DKA.  CT neg for acute pathology.  Patient given meds for gastritis, failed PO challenge x2.  Patient is to be admitted to the hospital and the case was discussed with the admitting physician.  Any changes in plan, additional imaging/labs, and further work up will be at the discretion of the admitting physician. Per discussion with admitting physician, all necessary consults for admitted patients to be obtained by morning team unless patient requires emergent intervention or medical advice by specialist overnight.

## 2023-01-19 NOTE — ED PROVIDER NOTE - OBJECTIVE STATEMENT
Pertinent PMH/PSH/FHx/SHx and Review of Systems contained within:  Patient presents to the ED for abdominal pain.  Patient has had multiple visits to ER for abdominal pain, comes in today for 2 days of vomiting and right sided abd pain. Denies fevers, diarrhea, urinary sx, flank pain.  LBM 1 hour ago and wnl.  Patient denies EtOH/tobacco/illicit substance use.    ROS: No fever/chills, No headache/photophobia/eye pain/changes in vision, No ear pain/sore throat/dysphagia, No chest pain/palpitations, no SOB/cough/wheeze/stridor, No D/melena, no dysuria/frequency/discharge, No neck/back pain, no rash, no changes in neurological status/function.

## 2023-01-19 NOTE — H&P ADULT - PROBLEM SELECTOR PLAN 3
On insulin pump basal 0.8 unit/hr, Carb ratio 12g/unit, ISF 46mg/dl per unit  Patient wants to use insulin pump  Endo consulted- Dr Platt

## 2023-01-19 NOTE — ED ADULT NURSE NOTE - OBJECTIVE STATEMENT
Patient aox4. patient with hx of DM and gastritis. Patient with pshx: cholecystectomy. Patient c/o RUQ abdominal pain, nausea and vomiting x 1 day. Patient denies diarrhea or constipation. Denies ETOH or substance abuse. denies marijuana use.

## 2023-01-19 NOTE — ED PROVIDER NOTE - CARE PLAN
Principal Discharge DX:	Intractable nausea and vomiting  Secondary Diagnosis:	Gastritis  Secondary Diagnosis:	Hyperglycemia   1

## 2023-01-19 NOTE — H&P ADULT - PROBLEM SELECTOR PLAN 2
epigastric pain  CT abd/pelvis image reviewed, noted mild diffused gastric wall thickening suspected gastritis  Received IV pantoprazole in ED  Continue pantoprazole 40mg daily, sucralfate qid  Maalox prn  Pain control  Clear liquid and advance as tolerate  Outpatient GI follow up  Check Utox

## 2023-01-19 NOTE — H&P ADULT - ASSESSMENT
42 years old male with h/o DM1 with gastroparesis, anxiety disorder, asthma present to ED with complain of nausea, vomiting, epigastric pain and unable to tolerate oral intake. Patient reported N/V/epigastric pain for 1 day and unable to tolerate oral intake. Patient reported multiple episode of nonbloody vomiting, associated with severe epigastric burning pain. No fever or chills. No urinary symptoms.  Hemodynamically stable, afebrile, sat well at RA. WBC 11.54, glucose 305, K 4.4, Cr 1.09, lipase 89, lactate 1.6. CT abd/pelvis image reviewed, noted mild diffused gastric wall thickening suspected gastritis.     Admitted with intractable nausea/vomiting, acute gastritis

## 2023-01-20 ENCOUNTER — TRANSCRIPTION ENCOUNTER (OUTPATIENT)
Age: 43
End: 2023-01-20

## 2023-01-20 VITALS
HEART RATE: 54 BPM | SYSTOLIC BLOOD PRESSURE: 138 MMHG | DIASTOLIC BLOOD PRESSURE: 77 MMHG | TEMPERATURE: 99 F | RESPIRATION RATE: 18 BRPM | OXYGEN SATURATION: 97 %

## 2023-01-20 LAB
A1C WITH ESTIMATED AVERAGE GLUCOSE RESULT: 9.5 % — HIGH (ref 4–5.6)
ALBUMIN SERPL ELPH-MCNC: 3 G/DL — LOW (ref 3.3–5)
ALP SERPL-CCNC: 94 U/L — SIGNIFICANT CHANGE UP (ref 40–120)
ALT FLD-CCNC: 62 U/L — SIGNIFICANT CHANGE UP (ref 12–78)
ANION GAP SERPL CALC-SCNC: 7 MMOL/L — SIGNIFICANT CHANGE UP (ref 5–17)
AST SERPL-CCNC: 41 U/L — HIGH (ref 15–37)
BILIRUB SERPL-MCNC: 0.6 MG/DL — SIGNIFICANT CHANGE UP (ref 0.2–1.2)
BUN SERPL-MCNC: 16 MG/DL — SIGNIFICANT CHANGE UP (ref 7–23)
CALCIUM SERPL-MCNC: 8.1 MG/DL — LOW (ref 8.5–10.1)
CHLORIDE SERPL-SCNC: 102 MMOL/L — SIGNIFICANT CHANGE UP (ref 96–108)
CO2 SERPL-SCNC: 27 MMOL/L — SIGNIFICANT CHANGE UP (ref 22–31)
CREAT SERPL-MCNC: 1.17 MG/DL — SIGNIFICANT CHANGE UP (ref 0.5–1.3)
EGFR: 80 ML/MIN/1.73M2 — SIGNIFICANT CHANGE UP
ESTIMATED AVERAGE GLUCOSE: 226 MG/DL — HIGH (ref 68–114)
GLUCOSE BLDC GLUCOMTR-MCNC: 224 MG/DL — HIGH (ref 70–99)
GLUCOSE BLDC GLUCOMTR-MCNC: 294 MG/DL — HIGH (ref 70–99)
GLUCOSE SERPL-MCNC: 328 MG/DL — HIGH (ref 70–99)
HCT VFR BLD CALC: 34.6 % — LOW (ref 39–50)
HGB BLD-MCNC: 11.7 G/DL — LOW (ref 13–17)
MAGNESIUM SERPL-MCNC: 1.8 MG/DL — SIGNIFICANT CHANGE UP (ref 1.6–2.6)
MCHC RBC-ENTMCNC: 28.1 PG — SIGNIFICANT CHANGE UP (ref 27–34)
MCHC RBC-ENTMCNC: 33.8 G/DL — SIGNIFICANT CHANGE UP (ref 32–36)
MCV RBC AUTO: 83.2 FL — SIGNIFICANT CHANGE UP (ref 80–100)
NRBC # BLD: 0 /100 WBCS — SIGNIFICANT CHANGE UP (ref 0–0)
PHOSPHATE SERPL-MCNC: 2.4 MG/DL — LOW (ref 2.5–4.5)
PLATELET # BLD AUTO: 353 K/UL — SIGNIFICANT CHANGE UP (ref 150–400)
POTASSIUM SERPL-MCNC: 4.1 MMOL/L — SIGNIFICANT CHANGE UP (ref 3.5–5.3)
POTASSIUM SERPL-SCNC: 4.1 MMOL/L — SIGNIFICANT CHANGE UP (ref 3.5–5.3)
PROT SERPL-MCNC: 6.3 GM/DL — SIGNIFICANT CHANGE UP (ref 6–8.3)
RBC # BLD: 4.16 M/UL — LOW (ref 4.2–5.8)
RBC # FLD: 14.4 % — SIGNIFICANT CHANGE UP (ref 10.3–14.5)
SODIUM SERPL-SCNC: 136 MMOL/L — SIGNIFICANT CHANGE UP (ref 135–145)
WBC # BLD: 9.08 K/UL — SIGNIFICANT CHANGE UP (ref 3.8–10.5)
WBC # FLD AUTO: 9.08 K/UL — SIGNIFICANT CHANGE UP (ref 3.8–10.5)

## 2023-01-20 PROCEDURE — 99239 HOSP IP/OBS DSCHRG MGMT >30: CPT

## 2023-01-20 RX ORDER — SODIUM,POTASSIUM PHOSPHATES 278-250MG
2 POWDER IN PACKET (EA) ORAL
Refills: 0 | Status: DISCONTINUED | OUTPATIENT
Start: 2023-01-20 | End: 2023-01-20

## 2023-01-20 RX ORDER — SIMETHICONE 80 MG/1
80 TABLET, CHEWABLE ORAL EVERY 4 HOURS
Refills: 0 | Status: DISCONTINUED | OUTPATIENT
Start: 2023-01-20 | End: 2023-01-20

## 2023-01-20 RX ORDER — PANTOPRAZOLE SODIUM 20 MG/1
40 TABLET, DELAYED RELEASE ORAL EVERY 12 HOURS
Refills: 0 | Status: DISCONTINUED | OUTPATIENT
Start: 2023-01-20 | End: 2023-01-20

## 2023-01-20 RX ADMIN — MORPHINE SULFATE 2 MILLIGRAM(S): 50 CAPSULE, EXTENDED RELEASE ORAL at 08:04

## 2023-01-20 RX ADMIN — Medication 2 PACKET(S): at 15:25

## 2023-01-20 RX ADMIN — PANTOPRAZOLE SODIUM 40 MILLIGRAM(S): 20 TABLET, DELAYED RELEASE ORAL at 04:10

## 2023-01-20 RX ADMIN — Medication 1 GRAM(S): at 11:59

## 2023-01-20 RX ADMIN — MORPHINE SULFATE 2 MILLIGRAM(S): 50 CAPSULE, EXTENDED RELEASE ORAL at 04:50

## 2023-01-20 RX ADMIN — Medication 10 MILLIGRAM(S): at 04:10

## 2023-01-20 RX ADMIN — Medication 1 GRAM(S): at 06:48

## 2023-01-20 RX ADMIN — MORPHINE SULFATE 2 MILLIGRAM(S): 50 CAPSULE, EXTENDED RELEASE ORAL at 00:04

## 2023-01-20 RX ADMIN — MORPHINE SULFATE 2 MILLIGRAM(S): 50 CAPSULE, EXTENDED RELEASE ORAL at 04:02

## 2023-01-20 NOTE — DISCHARGE NOTE PROVIDER - EXTENDED VTE YES NO FOR MLM ENOXAPARIN
Final Anesthesia Post-op Assessment    Patient: Valerio Chavez  Procedure(s) Performed: COLONOSCOPY WITH POLYPECTOMIES  Anesthesia type: Monitor Anesthesia Care    Vital Last Value   Temperature 36.2 °C (97.2 °F) (12/14/18 1051)   Pulse 86 (12/14/18 1136)   Respiratory Rate 13 (12/14/18 1136)   Non-Invasive   Blood Pressure 104/63 (12/14/18 1136)   Arterial  Blood Pressure     Pulse Oximetry 96 % (12/14/18 1136)     Last 24 I/O:     Intake/Output Summary (Last 24 hours) at 12/14/2018 1138  Last data filed at 12/14/2018 1135  Gross per 24 hour   Intake 400 ml   Output --   Net 400 ml       PATIENT LOCATION: Phase II  POST-OP VITAL SIGNS: stable  LEVEL OF CONSCIOUSNESS: participates in exam, awake, oriented, answers questions appropriately and alert  RESPIRATORY STATUS: unassisted, spontaneous ventilation and room air  CARDIOVASCULAR: blood pressure returned to baseline  HYDRATION: euvolemic    PAIN MANAGEMENT: well controlled  NAUSEA: None  AIRWAY PATENCY: patent  POST-OP ASSESSMENT: no complications, patient tolerated procedure well with no complications and sufficiently recovered from acute administration of anesthesia effects and able to participate in evaluation  COMPLICATIONS: none  HANDOFF:  Handoff to receiving nurse was performed and questions were answered       ,

## 2023-01-20 NOTE — DISCHARGE NOTE PROVIDER - NSDCMRMEDTOKEN_GEN_ALL_CORE_FT
Admelog 100 units/mL injectable solution: 10 unit(s) injectable 3 times a day  ProAir HFA 90 mcg/inh inhalation aerosol: 2 puff(s) inhaled 4 times a day, As Needed -for shortness of breath and/or wheezing   Protonix 40 mg oral delayed release tablet: 1 tab(s) orally once a day   Reglan 10 mg oral tablet: 1 tab(s) orally 4 times a day (before meals and at bedtime) prn nausea   sucralfate 1 g oral tablet: 1 tab(s) orally every 6 hours  Xanax 1 mg oral tablet: 1 tab(s) orally 3 times a day, As Needed

## 2023-01-20 NOTE — DISCHARGE NOTE NURSING/CASE MANAGEMENT/SOCIAL WORK - NSDCPEFALRISK_GEN_ALL_CORE
For information on Fall & Injury Prevention, visit: [email or web address] OR  [email or web address] OR  https://www.cdc.gov/steadi/patient.html

## 2023-01-20 NOTE — CONSULT NOTE ADULT - SUBJECTIVE AND OBJECTIVE BOX
Patient is a 42y old  Male who presents with a chief complaint of intractable nausea and vomiting, acute gastritis (20 Jan 2023 15:20)      Reason For Consult:  uncontrolled type 1 diabetes     HPI:  42 years old male with h/o DM1 with gastroparesis, anxiety disorder, asthma present to ED with complain of nausea, vomiting, epigastric pain and unable to tolerate oral intake. Patient reported N/V/epigastric pain for 1 day and unable to tolerate oral intake. Patient reported multiple episode of nonbloody vomiting, associated with severe epigastric burning pain. No fever or chills. No urinary symptoms.  Hemodynamically stable, afebrile, sat well at RA. WBC 11.54, glucose 305, K 4.4, Cr 1.09, lipase 89, lactate 1.6. CT abd/pelvis image reviewed, noted mild diffused gastric wall thickening suspected gastritis.   Patient wears an insulin pump and has been put on the same during the hospitalization.  Presented with gastroparesis and has fluctuating blood glucose secondary to diet.  Fingersticks are in the low to mid 200s  SH: no toxic habits  FH: Parents-DM (19 Jan 2023 09:41)      PAST MEDICAL & SURGICAL HISTORY:  DM type 1 (diabetes mellitus, type 1)      Asthma      Gastritis      Anxiety      Controlled diabetes mellitus type 1 without complications      Gastritis, presence of bleeding unspecified, unspecified chronicity, unspecified gastritis type      Uncomplicated asthma, unspecified asthma severity, unspecified whether persistent      S/P cholecystectomy      History of cholecystectomy          FAMILY HISTORY:  Family history of diabetes mellitus (Father, Mother)    Family history of diabetes mellitus (DM) (Father, Mother, Grandparent)          Social History:    MEDICATIONS  (STANDING):  dextrose 5%. 1000 milliLiter(s) (50 mL/Hr) IV Continuous <Continuous>  dextrose 5%. 1000 milliLiter(s) (100 mL/Hr) IV Continuous <Continuous>  dextrose 50% Injectable 25 Gram(s) IV Push once  dextrose 50% Injectable 12.5 Gram(s) IV Push once  dextrose 50% Injectable 25 Gram(s) IV Push once  glucagon  Injectable 1 milliGRAM(s) IntraMuscular once  insulin lispro (ADMELOG) Pump 1 Each SubCutaneous Continuous Pump  lactated ringers. 1000 milliLiter(s) (125 mL/Hr) IV Continuous <Continuous>  pantoprazole  Injectable 40 milliGRAM(s) IV Push every 12 hours  potassium phosphate / sodium phosphate Powder (PHOS-NaK) 2 Packet(s) Oral four times a day  sucralfate 1 Gram(s) Oral every 6 hours    MEDICATIONS  (PRN):  acetaminophen     Tablet .. 650 milliGRAM(s) Oral every 6 hours PRN Mild Pain (1 - 3), Moderate Pain (4 - 6)  albuterol    90 MICROgram(s) HFA Inhaler 2 Puff(s) Inhalation every 6 hours PRN Shortness of Breath and/or Wheezing  ALPRAZolam 1 milliGRAM(s) Oral every 8 hours PRN anxiety  aluminum hydroxide/magnesium hydroxide/simethicone Suspension 30 milliLiter(s) Oral every 4 hours PRN Dyspepsia  dextrose Oral Gel 15 Gram(s) Oral once PRN Blood Glucose LESS THAN 70 milliGRAM(s)/deciliter  melatonin 3 milliGRAM(s) Oral at bedtime PRN Insomnia  metoclopramide Injectable 10 milliGRAM(s) IV Push every 8 hours PRN nausea and vomiting  morphine  - Injectable 2 milliGRAM(s) IV Push every 4 hours PRN Severe Pain (7 - 10)  ondansetron Injectable 4 milliGRAM(s) IV Push every 6 hours PRN Nausea and/or Vomiting  simethicone 80 milliGRAM(s) Chew every 4 hours PRN Gas      REVIEW OF SYSTEMS:  CONSTITUTIONAL:  as per HPI       T(C): 37.2 (01-20-23 @ 12:19), Max: 37.2 (01-20-23 @ 05:04)  HR: 54 (01-20-23 @ 12:19) (54 - 59)  BP: 138/77 (01-20-23 @ 12:19) (120/70 - 138/77)  RR: 18 (01-20-23 @ 12:19) (18 - 19)  SpO2: 97% (01-20-23 @ 12:19) (95% - 97%)  Wt(kg): --    PHYSICAL EXAM:  GENERAL: NAD, well-groomed, well-developed  HEAD:  Atraumatic, Normocephalic  EYES: PERRLA, conjunctiva and sclera clear  ENMT: No  exudates,, Moist mucous membranes,, No lesions  NECK: Supple, No JVD,   NERVOUS SYSTEM:  Alert & Oriented   CHEST/LUNG: Clear to percussion bilaterally; No rales, rhonchi, wheezing, or rubs  HEART: Regular rate and rhythm; No murmurs, rubs, or gallops  ABDOMEN: Soft, Nontender, Nondistended; Bowel sounds present  EXTREMITIES:  2+ Peripheral Pulses, No clubbing, cyanosis, or edema  LYMPH: No lymphadenopathy noted  SKIN: No rashes or lesions    CAPILLARY BLOOD GLUCOSE      POCT Blood Glucose.: 224 mg/dL (20 Jan 2023 11:52)  POCT Blood Glucose.: 294 mg/dL (20 Jan 2023 07:55)                            11.7   9.08  )-----------( 353      ( 20 Jan 2023 07:12 )             34.6       CMP:  01-20 @ 07:12  SGPT 62  Albumin 3.0   Alk Phos 94   Anion Gap 7   SGOT 41   Total Bili 0.6   BUN 16   Calcium Total 8.1   CO2 27   Chloride 102   Creatinine 1.17   eGFR if AA --   eGFR if non AA --   Glucose 328   Potassium 4.1   Protein 6.3   Sodium 136      Thyroid Function Tests:      Diabetes Tests:     Parathyroids:     Adrenals:       Radiology:

## 2023-01-20 NOTE — DISCHARGE NOTE NURSING/CASE MANAGEMENT/SOCIAL WORK - NSDCFUADDAPPT_GEN_ALL_CORE_FT
Please see GI doctor for further work up of gastritis and liver steatosis     It is important to see your primary physician as well as any specialty physicians within the next week to perform a comprehensive medical review.  Call their offices for an appointment as soon as you leave the hospital.  You will also need to see them for renewal of your medications.  If have any difficulty following with a physician, contact the [facility] [phone] or via [email or web address].   To obtain your results, you can access the [email or web address] Patient Portal at [email or web address]  Your medical issues appear to be stable at this time, but if your symptoms recur or worsen, contact your physicians and/or return to the hospital if necessary.  If you encounter any issues or questions with your medication, call your physicians before stopping the medication.

## 2023-01-20 NOTE — DISCHARGE NOTE PROVIDER - NSDCCPCAREPLAN_GEN_ALL_CORE_FT
PRINCIPAL DISCHARGE DIAGNOSIS  Diagnosis: Intractable nausea and vomiting  Assessment and Plan of Treatment:       SECONDARY DISCHARGE DIAGNOSES  Diagnosis: Gastritis  Assessment and Plan of Treatment:     Diagnosis: Hyperglycemia  Assessment and Plan of Treatment:

## 2023-01-20 NOTE — DISCHARGE NOTE NURSING/CASE MANAGEMENT/SOCIAL WORK - PATIENT PORTAL LINK FT
You can access the FollowMyHealth Patient Portal offered by [facility] [place] by registering at the following website: [email or web address]. By joining [facility] FollowMyHealth portal, you will also be able to view your health information using other applications (apps) compatible with our system.

## 2023-01-20 NOTE — DISCHARGE NOTE PROVIDER - HOSPITAL COURSE
42 years old male with history of DM1 with gastroparesis, anxiety disorder, asthma present to with complain of nausea, vomiting, epigastric pain and unable to tolerate oral intake. Patient reported N/V/epigastric pain for 1 day and unable to tolerate oral intake. Patient reported multiple episode of nonbloody vomiting, associated with severe epigastric burning pain. He was hemodynamically and had a mild leukocytosis that resolved. CT abd/pelvis  showed mild diffuse gastric wall thickening as seen on prior exams, suspicious for gastritis. Pt also had mild diffuse bladder wall thickening but UA was negative and he had no symptoms of UTI.  Pt was put on clear liquid, Zofran, Reglan PRN, oral PPI, Sucralfate. Pt reported that his symptoms resolved and he refused any further work up as inpt and stated that he needed to be discharged to  his wife. I recommended that he get an outpatient GI work for liver steatosis and gastritis and follow up w/ his endocrinologist.    Discharge time: 43 minutes      Vital Signs Last 24 Hrs  T(C): 37.2 (20 Jan 2023 12:19), Max: 37.2 (19 Jan 2023 18:29)  T(F): 98.9 (20 Jan 2023 12:19), Max: 98.9 (19 Jan 2023 18:29)  HR: 54 (20 Jan 2023 12:19) (54 - 66)  BP: 138/77 (20 Jan 2023 12:19) (120/70 - 147/88)  BP(mean): --  RR: 18 (20 Jan 2023 12:19) (18 - 19)  SpO2: 97% (20 Jan 2023 12:19) (95% - 98%)    Parameters below as of 20 Jan 2023 12:19  Patient On (Oxygen Delivery Method): room air    PHYSICAL EXAM:  GENERAL: NAD, well-groomed, well-developed  HEAD:  Atraumatic, Normocephalic  EYES: EOMI, PERRLA,    NECK: Supple   NERVOUS SYSTEM:  Alert & Oriented X3, Good concentration   CHEST/LUNG: Clear to auscultation bilaterally; No rales, rhonchi, wheezing, or rubs  HEART: Regular rate and rhythm; No murmurs, rubs, or gallops  ABDOMEN: Soft, Nontender, Nondistended; Bowel sounds present  EXTREMITIES: No clubbing, cyanosis, or edema

## 2023-01-20 NOTE — DISCHARGE NOTE PROVIDER - NSDCFUADDAPPT_GEN_ALL_CORE_FT
Please see GI doctor for further work up of gastritis and liver steatosis     It is important to see your primary physician as well as any specialty physicians within the next week to perform a comprehensive medical review.  Call their offices for an appointment as soon as you leave the hospital.  You will also need to see them for renewal of your medications.  If have any difficulty following with a physician, contact the [facility] [phone] or via [email or web address].   To obtain your results, you can access the [email or web address] Patient Portal at [email or web address].  Your medical issues appear to be stable at this time, but if your symptoms recur or worsen, contact your physicians and/or return to the hospital if necessary.  If you encounter any issues or questions with your medication, call your physicians before stopping the medication.

## 2023-01-25 DIAGNOSIS — E10.43 TYPE 1 DIABETES MELLITUS WITH DIABETIC AUTONOMIC (POLY)NEUROPATHY: ICD-10-CM

## 2023-01-25 DIAGNOSIS — Z86.16 PERSONAL HISTORY OF COVID-19: ICD-10-CM

## 2023-01-25 DIAGNOSIS — K29.00 ACUTE GASTRITIS WITHOUT BLEEDING: ICD-10-CM

## 2023-01-25 DIAGNOSIS — K31.84 GASTROPARESIS: ICD-10-CM

## 2023-01-25 DIAGNOSIS — R11.2 NAUSEA WITH VOMITING, UNSPECIFIED: ICD-10-CM

## 2023-01-25 DIAGNOSIS — F41.9 ANXIETY DISORDER, UNSPECIFIED: ICD-10-CM

## 2023-01-25 DIAGNOSIS — J45.909 UNSPECIFIED ASTHMA, UNCOMPLICATED: ICD-10-CM

## 2023-01-25 DIAGNOSIS — Z96.41 PRESENCE OF INSULIN PUMP (EXTERNAL) (INTERNAL): ICD-10-CM

## 2023-01-25 DIAGNOSIS — K76.0 FATTY (CHANGE OF) LIVER, NOT ELSEWHERE CLASSIFIED: ICD-10-CM

## 2023-01-25 DIAGNOSIS — E10.65 TYPE 1 DIABETES MELLITUS WITH HYPERGLYCEMIA: ICD-10-CM

## 2023-01-25 DIAGNOSIS — Z90.49 ACQUIRED ABSENCE OF OTHER SPECIFIED PARTS OF DIGESTIVE TRACT: ICD-10-CM

## 2023-01-25 DIAGNOSIS — Z28.310 UNVACCINATED FOR COVID-19: ICD-10-CM

## 2023-02-24 NOTE — ED PROVIDER NOTE - INTERNATIONAL TRAVEL
[facility]  [address] [facility] [address]  [address] [address]  Dept: [phone]  Dept Fax: [phone]    Lea Keating is a 61 y.o. female who presents today for her medical conditions/complaints of   Chief Complaint   Patient presents with    Skin Problem     Boil on left breast w/ redness          HPI:     /82   Pulse 73   Temp 98.1 °F (36.7 °C)   Resp 16   SpO2 98%       HPI  Pt presented to the clinic today with c/o left breast infection. Has history of cellulitis and abscess to both breasts. This is a new problem. The current episode started 2 days ago. The problem has been worsenng since onset. Associated symptoms include: redness, drainage, pain. Pertinent negatives include: No fever, chills, nausea, vomiting. Pt has tried Clindamycin cream and peroxide wash with no improvement. Past Medical History:   Diagnosis Date    Arthritis     Asthma     Benign familial tremor     CAD (coronary artery disease) 2021    mild per cardiac cath 2021; no stents.  Dr. Jamison Strickland last visit 10/2022    Cancer [facility])     skin    CKD (chronic kidney disease) stage 1, GFR 90 ml/min or greater     Class 2 severe obesity with serious comorbidity and body mass index (BMI) of 39.0 to 39.9 in adult ([address] 2019    Cystinuria ([facility] [address]     Dyspnea on exertion 2019    Flank pain     RIGHT    Gastroesophageal reflux disease without esophagitis     GERD (gastroesophageal reflux disease)     Hypertension     Hyperuricemia     Hypothyroidism     no meds    Kidney stones     Midline low back pain with right-sided sciatica 2016    Proteinuria     Shoulder impingement 2019    left    Snores     no sleep apnea per sleep study    Solitary kidney, acquired 2017    Urinary tract obstruction by kidney stone 2019    Wears glasses         Past Surgical History:   Procedure Laterality Date    CARDIAC CATHETERIZATION 12/08/2021    CYSTO/URETERO/PYELOSCOPY, CALCULUS [place] Right 11/21/2017    HOLMIUM LASER - CYSTOSCOPY, RIGHT URETEROSCOPY, RIGHT STENT EXCHANGE,STONE BASKETING performed by Chandana Almanzar MD at [address] [facility] [place] [address] Right 09/13/2019    HOLMIUM - CYSTO, URETEROSCOPY, LITHOTRIPSY, STENT PLACEMENT performed by Kenji Hidalgo MD at [address] [facility], [place] Right 12/12/2019    HOLMIUM- CYSTO, URETEROSCOPY, LASER LITHO, STENT PLACEMENT AND RIGHT URETERAL BASKET STONE EXTRACTION performed by Chandana Almanzar MD at [place] [address] Right 11/18/2017    CYSTOSCOPY URETERAL STENT INSERTION,RIGHT performed by Christen Nieves MD at [place] [address] Right 08/02/2019    CYSTOSCOPY RT URETERAL STENT INSERTION performed by Kenji Hidalgo MD at [place] [address] Right 08/23/2019    CYSTOSCOPY, RIGHT STENT REMOVAL, INSERTION OCCLUSIVE BALLOON, PT GOING TO INTERVENTIONAL performed by Kenji Hidalgo MD at [place] [address] Right 09/19/2020    CYSTOSCOPY, URETEROSCOPY, HOLMIUM LASER LITHOTRIPSY, STENT PLACEMENT performed by Francesca Billingsley MD at [place] [address] Right 11/1/2022    CYSTOSCOPY INSERTION OCCLUSIVE BALLOON performed by Chandana Almanzar MD at [facility] [address] [facility] Right 12/31/2019    CYSTOSCOPY, STENT REMOVAL performed by Chandana Almanzar MD at [address] [facility] N/A 06/30/2022    DILATATION AND CURETTAGE, HYSTEROSCOPY performed by Kalpana Harris MD at [address] [facility] Bilateral 2008    IR NEPHROSTOMY PERCUTANEOUS RIGHT  11/1/2022    IR NEPHROSTOMY PERCUTANEOUS RIGHT 11/1/2022 Shanna Zhou MD [facility] SPECIAL PROCEDURES    KIDNEY STONE REMOVAL Right 11/1/2022    RIGHT PERCUTANEOUS NEPHROLITHOTOMY, NEPHROSTOMY PLACEMENT performed by Chandana Almanzar MD at [address] [facility] Right 1998, 2003    X2    NEPHROLITHOTOMY Right 08/23/2019    HOLMIUM LASER, NEPHROLITHOTOMY PERCUTANEOUS, LITHOCLAST, C-ARM performed by Susi Best MD at [facility] NEPHROSTOMY Right 11/01/2022    RIGHT PERCUTANEOUS NEPHROLITHOTOMY, NEPHROSTOMY PLACEMENT    IN CYSTO W/INSERT URETERAL STENT Right 07/04/2018    CYSTOSCOPY URETERAL STENT INSERTION, RIGHT performed by Ilana Gibbons MD at [address] W/URETEROSCOPY W/RMVL/MANJ STONES N/A 07/10/2018    CYSTOSCOPY, RIGHT URETEROSCOPY, HOLMIUM LASER LITHO WITH STONE BASKETING, RIGHT STENT EXCHANGE performed by Kortney Jordan MD at [address] [facility] W/URETEROSCOPY W/RMVL/MANJ STONES Right 10/10/2018    HOLMIUM LASER STANDBY, CYSTO, RIGHT URETEROSCOPY, RIGHT STENT PLACEMENT performed by Jeferson Andrade MD at [address]  Left     TOTAL NEPHRECTOMY Left 1988       Family History   Problem Relation Age of Onset    High Blood Pressure Mother     Dementia Mother     Diabetes Father     Cancer Father         tongue    Hypertension Father     Heart Disease Father         stents    Other Father         Mylofibrosis    Hypertension Sister     Heart Disease Maternal Grandmother     Heart Attack Maternal Grandmother     Prostate Cancer Maternal Grandfather     Emphysema Paternal Grandmother     Heart Disease Paternal Grandfather     Stroke Paternal Grandfather        Social History     Tobacco Use    Smoking status: Never    Smokeless tobacco: Never   Substance Use Topics    Alcohol use: Yes     Alcohol/week: 0.0 standard drinks     Comment: occasionally        Prior to Visit Medications    Medication Sig Taking?  Authorizing Provider   cephALEXin (KEFLEX) 500 MG capsule Take 1 capsule by mouth 4 times daily for 7 days Yes MIGDALIA Paniagua CNP   sulfamethoxazole-trimethoprim (BACTRIM DS) 800-160 MG per tablet Take 1 tablet by mouth 2 times daily for 7 days Yes MIGDALIA Paniagua CNP   pantoprazole (PROTONIX) 20 MG tablet Take 1 tablet by mouth every morning (before breakfast) Yes MIGDALIA Zamora CNP meloxicam (MOBIC) 15 MG tablet TAKE 1 TABLET BY MOUTH ONE TIME A DAY Yes MIGDALIA Alvarado CNP   Metoprolol Succinate 25 MG CS24 Take 12.5 mg by mouth Yes Historical Provider, MD   famotidine (PEPCID) 20 MG tablet Take 1 tablet by mouth 2 times daily Yes MIGDALIA Shafer CNP   ondansetron (ZOFRAN) 4 MG tablet Take 1 tablet by mouth daily as needed for Nausea or Vomiting Yes Janet Bowser PA-C   Melatonin 5 MG CAPS Take by mouth Yes Historical Provider, MD   vitamin D (D3-1000) 25 MCG (1000 UT) CAPS  Yes Historical Provider, MD   albuterol sulfate HFA (PROAIR HFA) 108 (90 Base) MCG/ACT inhaler Inhale 2 puffs into the lungs every 6 hours as needed for Wheezing Yes MIGDALIA Alvarado CNP   benzoyl peroxide 5 % external liquid Wash affected areas once daily Yes Rodrigo Wong MD   clindamycin (CLEOCIN T) 1 % lotion Apply to affected areas daily Yes Rodrigo Wong MD   POTASSIUM CITRATE PO Take 5 mLs by mouth three times daily Yes Historical Provider, MD   acetaminophen (TYLENOL) 500 MG tablet Take 1,000 mg by mouth every 4 hours as needed for Pain. Yes Historical Provider, MD   solifenacin (VESICARE) 10 MG tablet Take 10 mg by mouth as needed. Historical Provider, MD       No Known Allergies      Subjective:      Review of Systems   Constitutional:  Negative for chills and fever. Gastrointestinal:  Negative for nausea and vomiting. Skin:  Positive for color change and wound. Neurological:  Negative for weakness and numbness. Objective:     Physical Exam  Vitals and nursing note reviewed. Constitutional:       General: She is not in acute distress. Appearance: Normal appearance. HENT:      Head: Normocephalic and atraumatic. Right Ear: Tympanic membrane and ear canal normal.      Left Ear: Tympanic membrane and ear canal normal.      Nose: Nose normal.      Mouth/Throat:      Lips: Pink. Mouth: Mucous membranes are moist.      Pharynx: Oropharynx is clear. Uvula midline. Eyes:      Extraocular Movements: Extraocular movements intact. Conjunctiva/sclera: Conjunctivae normal.   Cardiovascular:      Rate and Rhythm: Normal rate and regular rhythm. Pulses: Normal pulses. Pulmonary:      Effort: Pulmonary effort is normal.      Breath sounds: Normal breath sounds. Abdominal:      General: Bowel sounds are normal.      Palpations: Abdomen is soft. Musculoskeletal:         General: Normal range of motion. Cervical back: Normal range of motion and neck supple. Skin:     General: Skin is warm and dry. Capillary Refill: Capillary refill takes less than 2 seconds. Findings: Abscess and erythema present. Comments: Left breast- see images uploaded into media file. The area is open and draining bloody drainage. Neurological:      Mental Status: She is alert and oriented to person, place, and time. Coordination: Coordination normal.      Gait: Gait normal.   Psychiatric:         Mood and Affect: Mood normal.         Thought Content: Thought content normal.         MEDICAL DECISION MAKING Assessment/Plan:     Park cA was seen today for skin problem. Diagnoses and all orders for this visit:    Left breast abscess  -     cephALEXin (KEFLEX) 500 MG capsule; Take 1 capsule by mouth 4 times daily for 7 days  -     sulfamethoxazole-trimethoprim (BACTRIM DS) 800-160 MG per tablet; Take 1 tablet by mouth 2 times daily for 7 days  -     Culture, Wound Aerobic Only; Future      Results for orders placed or performed during the hospital encounter of 02/03/23   Creatinine   Result Value Ref Range    Creatinine 0.78 0.50 - 0.90 mg/dL    Est, Glom Filt Rate >60 >60 mL/min/1.73m2       Patient presented with left breast abscess with cellulitis. The wound is open and draining. I do not feel this needs to be opened further at this time. Culture sent. Will start on Keflex and Bactrim as prescribed.     Patient instructed to complete antibiotic prescription fully. Warm compresses TID for 15 minutes at a time. Tylenol/Motrin as needed for the discomfort/fever. Follow up in the next 3 days. Sooner if not improving. Go to the ER if you develop increased redness, streaking of redness, fever, chills, worsening pain or any other emergent concern. The area was cleaned in the office today and antibiotic ointment was applied with DSD. Patient given educational materials - see patientinstructions. Discussed use, benefit, and side effects of prescribed medications. All patient questions answered. Pt verbalized understanding. Instructed to continue current medications, diet and exercise. Patient agreed with treatment plan. Follow up as directed.      Electronically signed by MIGDALIA Schneider CNP on 2/24/2023 at 5:08 PM No

## 2023-03-01 NOTE — ED PROVIDER NOTE - NSTIMEPROVIDERCAREINITIATE_GEN_ER
Problem: Depressed Mood (Adult/Pediatric)  Goal: *STG: Attends activities and groups  Outcome: Progressing Towards Goal  Goal: *STG: Remains safe in hospital  Outcome: Progressing Towards Goal  Goal: *STG: Complies with medication therapy  Outcome: Progressing Towards Goal     Problem: Pain  Goal: *Control of Pain  Outcome: Progressing Towards Goal 11-Jul-2019 11:50

## 2023-03-05 ENCOUNTER — EMERGENCY (EMERGENCY)
Facility: HOSPITAL | Age: 43
LOS: 0 days | Discharge: HOME | End: 2023-03-05
Attending: STUDENT IN AN ORGANIZED HEALTH CARE EDUCATION/TRAINING PROGRAM
Payer: MEDICAID

## 2023-03-05 VITALS
HEIGHT: 71 IN | OXYGEN SATURATION: 98 % | WEIGHT: 169.98 LBS | RESPIRATION RATE: 16 BRPM | DIASTOLIC BLOOD PRESSURE: 96 MMHG | TEMPERATURE: 98 F | SYSTOLIC BLOOD PRESSURE: 151 MMHG | HEART RATE: 63 BPM

## 2023-03-05 VITALS
TEMPERATURE: 99 F | OXYGEN SATURATION: 98 % | HEART RATE: 60 BPM | SYSTOLIC BLOOD PRESSURE: 158 MMHG | RESPIRATION RATE: 15 BRPM | DIASTOLIC BLOOD PRESSURE: 86 MMHG

## 2023-03-05 DIAGNOSIS — E11.9 TYPE 2 DIABETES MELLITUS WITHOUT COMPLICATIONS: ICD-10-CM

## 2023-03-05 DIAGNOSIS — Z90.49 ACQUIRED ABSENCE OF OTHER SPECIFIED PARTS OF DIGESTIVE TRACT: Chronic | ICD-10-CM

## 2023-03-05 DIAGNOSIS — Z79.4 LONG TERM (CURRENT) USE OF INSULIN: ICD-10-CM

## 2023-03-05 DIAGNOSIS — K29.70 GASTRITIS, UNSPECIFIED, WITHOUT BLEEDING: ICD-10-CM

## 2023-03-05 DIAGNOSIS — Z91.018 ALLERGY TO OTHER FOODS: ICD-10-CM

## 2023-03-05 DIAGNOSIS — Z87.19 PERSONAL HISTORY OF OTHER DISEASES OF THE DIGESTIVE SYSTEM: ICD-10-CM

## 2023-03-05 DIAGNOSIS — R11.2 NAUSEA WITH VOMITING, UNSPECIFIED: ICD-10-CM

## 2023-03-05 DIAGNOSIS — R10.13 EPIGASTRIC PAIN: ICD-10-CM

## 2023-03-05 DIAGNOSIS — Z20.822 CONTACT WITH AND (SUSPECTED) EXPOSURE TO COVID-19: ICD-10-CM

## 2023-03-05 DIAGNOSIS — J45.909 UNSPECIFIED ASTHMA, UNCOMPLICATED: ICD-10-CM

## 2023-03-05 DIAGNOSIS — Z91.011 ALLERGY TO MILK PRODUCTS: ICD-10-CM

## 2023-03-05 DIAGNOSIS — R19.7 DIARRHEA, UNSPECIFIED: ICD-10-CM

## 2023-03-05 LAB
ALBUMIN SERPL ELPH-MCNC: 4.2 G/DL — SIGNIFICANT CHANGE UP (ref 3.3–5)
ALP SERPL-CCNC: 99 U/L — SIGNIFICANT CHANGE UP (ref 40–120)
ALT FLD-CCNC: 30 U/L — SIGNIFICANT CHANGE UP (ref 12–78)
AMPHET UR-MCNC: NEGATIVE — SIGNIFICANT CHANGE UP
ANION GAP SERPL CALC-SCNC: 14 MMOL/L — SIGNIFICANT CHANGE UP (ref 5–17)
AST SERPL-CCNC: 31 U/L — SIGNIFICANT CHANGE UP (ref 15–37)
BARBITURATES UR SCN-MCNC: POSITIVE — SIGNIFICANT CHANGE UP
BASOPHILS # BLD AUTO: 0.05 K/UL — SIGNIFICANT CHANGE UP (ref 0–0.2)
BASOPHILS NFR BLD AUTO: 0.4 % — SIGNIFICANT CHANGE UP (ref 0–2)
BENZODIAZ UR-MCNC: POSITIVE — SIGNIFICANT CHANGE UP
BILIRUB SERPL-MCNC: 0.6 MG/DL — SIGNIFICANT CHANGE UP (ref 0.2–1.2)
BUN SERPL-MCNC: 25 MG/DL — HIGH (ref 7–23)
CALCIUM SERPL-MCNC: 9.8 MG/DL — SIGNIFICANT CHANGE UP (ref 8.5–10.1)
CHLORIDE SERPL-SCNC: 99 MMOL/L — SIGNIFICANT CHANGE UP (ref 96–108)
CO2 SERPL-SCNC: 24 MMOL/L — SIGNIFICANT CHANGE UP (ref 22–31)
COCAINE METAB.OTHER UR-MCNC: NEGATIVE — SIGNIFICANT CHANGE UP
CREAT SERPL-MCNC: 1.17 MG/DL — SIGNIFICANT CHANGE UP (ref 0.5–1.3)
EGFR: 80 ML/MIN/1.73M2 — SIGNIFICANT CHANGE UP
EOSINOPHIL # BLD AUTO: 0.1 K/UL — SIGNIFICANT CHANGE UP (ref 0–0.5)
EOSINOPHIL NFR BLD AUTO: 0.7 % — SIGNIFICANT CHANGE UP (ref 0–6)
FLUAV AG NPH QL: SIGNIFICANT CHANGE UP
FLUBV AG NPH QL: SIGNIFICANT CHANGE UP
GLUCOSE BLDC GLUCOMTR-MCNC: 178 MG/DL — HIGH (ref 70–99)
GLUCOSE SERPL-MCNC: 165 MG/DL — HIGH (ref 70–99)
HCT VFR BLD CALC: 44.9 % — SIGNIFICANT CHANGE UP (ref 39–50)
HGB BLD-MCNC: 15.3 G/DL — SIGNIFICANT CHANGE UP (ref 13–17)
IMM GRANULOCYTES NFR BLD AUTO: 0.3 % — SIGNIFICANT CHANGE UP (ref 0–0.9)
LIDOCAIN IGE QN: 40 U/L — LOW (ref 73–393)
LYMPHOCYTES # BLD AUTO: 16 % — SIGNIFICANT CHANGE UP (ref 13–44)
LYMPHOCYTES # BLD AUTO: 2.17 K/UL — SIGNIFICANT CHANGE UP (ref 1–3.3)
MCHC RBC-ENTMCNC: 28.3 PG — SIGNIFICANT CHANGE UP (ref 27–34)
MCHC RBC-ENTMCNC: 34.1 G/DL — SIGNIFICANT CHANGE UP (ref 32–36)
MCV RBC AUTO: 83.1 FL — SIGNIFICANT CHANGE UP (ref 80–100)
METHADONE UR-MCNC: NEGATIVE — SIGNIFICANT CHANGE UP
MONOCYTES # BLD AUTO: 0.6 K/UL — SIGNIFICANT CHANGE UP (ref 0–0.9)
MONOCYTES NFR BLD AUTO: 4.4 % — SIGNIFICANT CHANGE UP (ref 2–14)
NEUTROPHILS # BLD AUTO: 10.57 K/UL — HIGH (ref 1.8–7.4)
NEUTROPHILS NFR BLD AUTO: 78.2 % — HIGH (ref 43–77)
NRBC # BLD: 0 /100 WBCS — SIGNIFICANT CHANGE UP (ref 0–0)
OPIATES UR-MCNC: NEGATIVE — SIGNIFICANT CHANGE UP
PCP SPEC-MCNC: SIGNIFICANT CHANGE UP
PCP UR-MCNC: NEGATIVE — SIGNIFICANT CHANGE UP
PLATELET # BLD AUTO: 415 K/UL — HIGH (ref 150–400)
POTASSIUM SERPL-MCNC: 3.8 MMOL/L — SIGNIFICANT CHANGE UP (ref 3.5–5.3)
POTASSIUM SERPL-SCNC: 3.8 MMOL/L — SIGNIFICANT CHANGE UP (ref 3.5–5.3)
PROT SERPL-MCNC: 7.9 GM/DL — SIGNIFICANT CHANGE UP (ref 6–8.3)
RBC # BLD: 5.4 M/UL — SIGNIFICANT CHANGE UP (ref 4.2–5.8)
RBC # FLD: 13.5 % — SIGNIFICANT CHANGE UP (ref 10.3–14.5)
SARS-COV-2 RNA SPEC QL NAA+PROBE: SIGNIFICANT CHANGE UP
SODIUM SERPL-SCNC: 137 MMOL/L — SIGNIFICANT CHANGE UP (ref 135–145)
THC UR QL: POSITIVE — SIGNIFICANT CHANGE UP
WBC # BLD: 13.53 K/UL — HIGH (ref 3.8–10.5)
WBC # FLD AUTO: 13.53 K/UL — HIGH (ref 3.8–10.5)

## 2023-03-05 PROCEDURE — 99285 EMERGENCY DEPT VISIT HI MDM: CPT

## 2023-03-05 PROCEDURE — 93010 ELECTROCARDIOGRAM REPORT: CPT

## 2023-03-05 RX ORDER — KETOROLAC TROMETHAMINE 30 MG/ML
15 SYRINGE (ML) INJECTION ONCE
Refills: 0 | Status: DISCONTINUED | OUTPATIENT
Start: 2023-03-05 | End: 2023-03-05

## 2023-03-05 RX ORDER — METOCLOPRAMIDE HCL 10 MG
10 TABLET ORAL ONCE
Refills: 0 | Status: COMPLETED | OUTPATIENT
Start: 2023-03-05 | End: 2023-03-05

## 2023-03-05 RX ORDER — SUCRALFATE 1 G
1 TABLET ORAL
Qty: 120 | Refills: 0
Start: 2023-03-05 | End: 2023-04-03

## 2023-03-05 RX ORDER — DIPHENHYDRAMINE HYDROCHLORIDE AND LIDOCAINE HYDROCHLORIDE AND ALUMINUM HYDROXIDE AND MAGNESIUM HYDRO
20 KIT ONCE
Refills: 0 | Status: COMPLETED | OUTPATIENT
Start: 2023-03-05 | End: 2023-03-05

## 2023-03-05 RX ORDER — PANTOPRAZOLE SODIUM 20 MG/1
1 TABLET, DELAYED RELEASE ORAL
Qty: 30 | Refills: 0
Start: 2023-03-05 | End: 2023-04-03

## 2023-03-05 RX ORDER — MORPHINE SULFATE 50 MG/1
4 CAPSULE, EXTENDED RELEASE ORAL ONCE
Refills: 0 | Status: DISCONTINUED | OUTPATIENT
Start: 2023-03-05 | End: 2023-03-05

## 2023-03-05 RX ORDER — ONDANSETRON 8 MG/1
4 TABLET, FILM COATED ORAL ONCE
Refills: 0 | Status: COMPLETED | OUTPATIENT
Start: 2023-03-05 | End: 2023-03-05

## 2023-03-05 RX ORDER — SUCRALFATE 1 G
1 TABLET ORAL ONCE
Refills: 0 | Status: COMPLETED | OUTPATIENT
Start: 2023-03-05 | End: 2023-03-05

## 2023-03-05 RX ORDER — SODIUM CHLORIDE 9 MG/ML
2000 INJECTION INTRAMUSCULAR; INTRAVENOUS; SUBCUTANEOUS ONCE
Refills: 0 | Status: COMPLETED | OUTPATIENT
Start: 2023-03-05 | End: 2023-03-05

## 2023-03-05 RX ORDER — FAMOTIDINE 10 MG/ML
20 INJECTION INTRAVENOUS ONCE
Refills: 0 | Status: COMPLETED | OUTPATIENT
Start: 2023-03-05 | End: 2023-03-05

## 2023-03-05 RX ADMIN — MORPHINE SULFATE 4 MILLIGRAM(S): 50 CAPSULE, EXTENDED RELEASE ORAL at 21:15

## 2023-03-05 RX ADMIN — SODIUM CHLORIDE 2000 MILLILITER(S): 9 INJECTION INTRAMUSCULAR; INTRAVENOUS; SUBCUTANEOUS at 16:05

## 2023-03-05 RX ADMIN — FAMOTIDINE 20 MILLIGRAM(S): 10 INJECTION INTRAVENOUS at 15:12

## 2023-03-05 RX ADMIN — Medication 10 MILLIGRAM(S): at 16:09

## 2023-03-05 RX ADMIN — ONDANSETRON 4 MILLIGRAM(S): 8 TABLET, FILM COATED ORAL at 15:12

## 2023-03-05 RX ADMIN — SODIUM CHLORIDE 2000 MILLILITER(S): 9 INJECTION INTRAMUSCULAR; INTRAVENOUS; SUBCUTANEOUS at 15:12

## 2023-03-05 RX ADMIN — Medication 15 MILLIGRAM(S): at 16:08

## 2023-03-05 RX ADMIN — DIPHENHYDRAMINE HYDROCHLORIDE AND LIDOCAINE HYDROCHLORIDE AND ALUMINUM HYDROXIDE AND MAGNESIUM HYDRO 20 MILLILITER(S): KIT at 16:42

## 2023-03-05 RX ADMIN — Medication 1 GRAM(S): at 19:42

## 2023-03-05 RX ADMIN — MORPHINE SULFATE 4 MILLIGRAM(S): 50 CAPSULE, EXTENDED RELEASE ORAL at 19:42

## 2023-03-05 NOTE — ED PROVIDER NOTE - NSFOLLOWUPINSTRUCTIONS_ED_ALL_ED_FT
PLEASE STOP TAKING YOUR HOME REGLAN/ZOFRAN, AND FOLLOW WITH YOUR PRIMARY CARE DOCTOR AND GI DOCTOR.    Abdominal Pain    Many things can cause abdominal pain. Many times, abdominal pain is not caused by a disease and will improve without treatment. Your health care provider will do a physical exam to determine if there is a dangerous cause of your pain; blood tests and imaging may help determine the cause of your pain. However, in many cases, no cause may be found and you may need further testing as an outpatient. Monitor your abdominal pain for any changes.     SEEK IMMEDIATE MEDICAL CARE IF YOU HAVE ANY OF THE FOLLOWING SYMPTOMS: worsening abdominal pain, uncontrollable vomiting, profuse diarrhea, inability to have bowel movements or pass gas, black or bloody stools, fever accompanying chest pain or back pain, or fainting. These symptoms may represent a serious problem that is an emergency. Do not wait to see if the symptoms will go away. Get medical help right away. Call 911 and do not drive yourself to the hospital.

## 2023-03-05 NOTE — ED PROVIDER NOTE - OBJECTIVE STATEMENT
42-year-old male with PMH diabetes, gastritis, asthma, takes Reglan/Zofran/pepcid as needed for gastritis?, has had admissions for gastritis/intractable vomiting/abdominal pain, presents with nausea, multiple epsidoes of non-bloody nonbilious vomiting, epigastric pain typical of his gastritis x yesterday.   no palliating/provoking factors.   no cp, sob, back pain, urinary sxs, abnormal bowel movements. 42-year-old male with PMH diabetes, gastritis, asthma, takes Reglan/Zofran/pepcid as needed for gastritis?, has had admissions for gastritis/intractable vomiting/abdominal pain, presents with nausea, multiple episodes of non-bloody nonbilious vomiting, epigastric pain typical of his gastritis x yesterday.   no palliating/provoking factors.   no cp, sob, back pain, urinary sxs, abnormal bowel movements.

## 2023-03-05 NOTE — ED PROVIDER NOTE - CARE PROVIDER_API CALL
your pmd in 1-3 days,   Phone: (   )    -  Fax: (   )    -  Follow Up Time:     Troy Castellanos)  Internal Medicine  [address] [facility], [place] [address]  Phone: [phone]  Fax: [phone]  Follow Up Time: 1-3 Days

## 2023-03-05 NOTE — ED PROVIDER NOTE - NS ED ATTENDING STATEMENT MOD
This was a shared visit with the DESI. I reviewed and verified the documentation and independently performed the documented:

## 2023-03-05 NOTE — ED PROVIDER NOTE - PROVIDER TOKENS
[facility] -   Gastroenterology  Consult Note    Patient Name: Georges Alatorre  MRN: [id number]  Admission Date: 8/8/2018  Hospital Length of Stay: 0 days  Code Status: No Order   Attending Provider: Mariya Zafar Do, MD   Consulting Provider: Terry Mckeon PA-C  Primary Care Physician: Yuliana Monge MD  Principal Problem:<principal problem not specified>    Inpatient consult to Gastroenterology  Consult performed by: TERRY MCKEON  Consult ordered by: MARIYA BAAD  Reason for consult: Abdominal pain        Subjective:     HPI:  The patient presented to the ER for abdominal pain, nausea and vomiting. He said it started six months ago but a review of Care Everywhere shows this has been a problem for several years. He has seen outside providers [facility] He has had scopes, GES and imaging in the past. A CT scan done today didn't show any acute GI problems. When I first saw him and asked about his pain, he pointed to his mid back. Then he said the pain also is in his mid abdomen, across the upper abdomen and in his groin. He is on Omeprazole bid without relief. [facility] and [facility] next week. The patient denies heartburn, dysphagia or hematemesis. He denies MJ use or narcotic use. He feels like what he eats and drinks stops in the upper abdomen and won't go down. No outlet obstruction seen on CT scan. Prior gastric emptying study was normal. He has tried Amitriptyline in the past but said it made him jittery. He took a bowel prep last night with good BMs but no improvement in his pain. Symptoms are not consistent with cholecystitis.     Past Medical History:   Diagnosis Date    Alcohol dependence     Chronic pain     DDD (degenerative disc disease)     Depression     Elevated LFTs     HTN (hypertension)     Hyperlipidemia     PTSD (post-traumatic stress disorder)        Past Surgical History:   Procedure Laterality Date     APPENDECTOMY      EAR RECONSTRUCTION Right     HEMORRHOID SURGERY      HERNIA REPAIR Right     LEG SURGERY Left     fx repair       Review of patient's allergies indicates:  No Known Allergies  Family History     None        Social History Main Topics    Smoking status: Former Smoker    Smokeless tobacco: Not on file    Alcohol use Yes      Comment: He was drinking 12 beers/night, but now down to 2 during the week. Mostly weekend use at this tiem.     Drug use: No    Sexual activity: Not on file     Review of Systems   Constitutional: Negative for fatigue and fever.   HENT: Negative for hearing loss.    Eyes: Negative for visual disturbance.   Respiratory: Negative for cough and shortness of breath.    Cardiovascular: Negative for chest pain and palpitations.   Gastrointestinal:        As per HPI.   Genitourinary: Negative for difficulty urinating, dysuria, frequency and hematuria.   Musculoskeletal: Negative for arthralgias and back pain.   Skin: Negative for color change and rash.   Neurological: Negative for dizziness, seizures, syncope, weakness, numbness and headaches.   Hematological: Does not bruise/bleed easily.   Psychiatric/Behavioral: The patient is not nervous/anxious.      Objective:     Vital Signs (Most Recent):  Temp: 98.3 °F (36.8 °C) (08/08/18 1042)  Pulse: 71 (08/08/18 1347)  Resp: 20 (08/08/18 1345)  BP: (!) 165/86 (08/08/18 1347)  SpO2: 99 % (08/08/18 1347) Vital Signs (24h Range):  Temp:  [98.3 °F (36.8 °C)] 98.3 °F (36.8 °C)  Pulse:  [68-89] 71  Resp:  [12-20] 20  SpO2:  [98 %-100 %] 99 %  BP: (141-165)/(82-92) 165/86     Weight: 83.8 kg (184 lb 11.9 oz) (08/08/18 1042)  Body mass index is 27.28 kg/m².      Intake/Output Summary (Last 24 hours) at 08/08/18 1628  Last data filed at 08/08/18 1311   Gross per 24 hour   Intake             1000 ml   Output                0 ml   Net             1000 ml       Lines/Drains/Airways     Peripheral Intravenous Line                 Peripheral  IV - Single Lumen 08/08/18 1120 Right Forearm less than 1 day                Physical Exam   Constitutional: He is oriented to person, place, and time. He appears well-developed and well-nourished.   HENT:   Head: Normocephalic and atraumatic.   Eyes: EOM are normal.   Neck: Normal range of motion. Neck supple.   Cardiovascular: Normal rate, regular rhythm and normal heart sounds.    No murmur heard.  Pulmonary/Chest: Effort normal and breath sounds normal. No respiratory distress. He has no wheezes.   Abdominal: Soft. Bowel sounds are normal. He exhibits no distension and no mass. There is no hepatomegaly. There is no tenderness.   Musculoskeletal: He exhibits no edema.   Neurological: He is alert and oriented to person, place, and time. No cranial nerve deficit. Gait normal.   Skin: Skin is warm and dry. No rash noted.   Psychiatric: He has a normal mood and affect.       Significant Labs:  CBC:   Recent Labs  Lab 08/08/18  1120   WBC 4.44   HGB 12.9*   HCT 37.2*   PLT 86*     CMP:   Recent Labs  Lab 08/08/18  1120      CALCIUM 10.2   ALBUMIN 4.2   PROT 8.4      K 4.2   CO2 22*      BUN 19   CREATININE 1.1   ALKPHOS 67   ALT 56*   *   BILITOT 1.9*     Coagulation: No results for input(s): PT, INR, APTT in the last 48 hours.    Significant Imaging:  Imaging results within the past 24 hours have been reviewed.    Assessment/Plan:     Generalized abdominal pain    The patient's pain is a chronic problem with extensive GI evaluation in the past. Labs, vitals and CT were reviewed without any indication for acute endoscopy. His abdominal exam is also benign. I believe he should keep his currently scheduled scopes next week. He can try a change in PPI from Omeprazole to Protonix. I also recommend a trial of FDgard. He may benefit from SSRI as an outpatient. I offered a clinic appt after his scopes to discuss his results and talk about other treatment options. He declines. This was discussed with  Dr. Nova in the ER.         Calculus of gallbladder without cholecystitis    Symptoms not consistent with gallbladder disease.         Elevated LFTs    Likely secondary to alcohol liver disease. He has thrombocytopenia which is concerning for liver cirrhosis. Follow up with current providers.             Thank you for your consult. I will sign off. Please contact us if you have any additional questions.    Jeffrey Mckeon PA-C  Gastroenterology  [facility]   FREE:[LAST:[your pmd in 1-3 days],PHONE:[(   )    -],FAX:[(   )    -]],PROVIDER:[TOKEN:[[id number]],FOLLOWUP:[1-3 Days]]

## 2023-03-05 NOTE — ED PROVIDER NOTE - PROGRESS NOTE DETAILS
BETTY PEREIRA: pt refusing CT stating he will not get a CT until he receives morphine or Dilaudid.   he relates he "always gets this" for his gastritis and this feels like all other episodes of his gastritis which is always treated with opiates.

## 2023-03-05 NOTE — ED PROVIDER NOTE - ATTENDING APP SHARED VISIT CONTRIBUTION OF CARE
Patient seen with DESI. Patient presents with recurrent epigastric discomfort. He has a recent CTAP demonstrating findings consistent with gastritis. Laboratory workup obtained here is overall reasurring. Patient has resolution in abdominal pain on re-evaluation following initial medications. Repeat CTAP offered to patient given recurrent symptoms today, however, he declines this. His symptoms are most suspicious for recurrent gastritis. He has no evidence of peritonitis on my examination following medications. He was instructed to follow up closely with PCP and GI. It was explained that he may require further outpatient testing. Given Rx for carafate and PPI. Also instructed to hold further antiemetics given QTc prolonged here today. All questions answered. Patient expressed understanding of d/c instructions and return precautions.

## 2023-03-05 NOTE — ED PROVIDER NOTE - PHYSICAL EXAMINATION
PHYSICAL EXAM:    GENERAL: Alert, appears stated age, well appearing, non-toxic  SKIN: Warm, and dry.  HEAD: NC, AT, no step offs   EYE: Normal lids/conjunctiva  ENT: Normal hearing, patent oropharynx   NECK: +supple. No meningismus, or JVD  Pulm: Bilateral BS, normal resp effort, no wheezes, stridor, or retractions  CV: RRR, no M/R/G, 2+and = radial pulses  Abd: soft, +epigastric ttp. no ruq/rlq/llq/luq ttp. non-distended, no hepatosplenomegaly. no CVA tenderness.   Mskel: no erythema, cyanosis, edema. no calf tenderness  Neuro: AAOx3, normal g

## 2023-03-05 NOTE — ED ADULT NURSE REASSESSMENT NOTE - NS ED NURSE REASSESS COMMENT FT1
Report received from JERRY Renee. patient is a&ox3, with IV intact. Patient currently refusing CT and urine collection. BETTY Hamilton at bedside educating on importance. [id number] further instructions. NAD noted at this time

## 2023-03-05 NOTE — ED ADULT TRIAGE NOTE - ESI TRIAGE ACUITY LEVEL, MLM
Problem: [place] (,NICU)  Goal: Signs and Symptoms of Listed Potential Problems Will be Absent, Minimized or Managed ([place])  Outcome: Ongoing (interventions implemented as appropriate)   18   Goal/Outcome Evaluation   Problems Assessed ([place]) all   Problems Present () none       Problem: Patient Care Overview  Goal: Plan of Care Review  Outcome: Ongoing (interventions implemented as appropriate)   18   Coping/Psychosocial   Care Plan Reviewed With mother   Plan of Care Review   Progress improving   OTHER   Outcome Summary VSS, voiding and stooling, breastfeeding on demand         
3

## 2023-03-05 NOTE — ED ADULT NURSE REASSESSMENT NOTE - NS ED NURSE REASSESS COMMENT FT1
Patient refusing CT abd/pelvis. BETTY Good at bedside speaking to patient. VSS. Patient educated on importance of CT scan.

## 2023-03-05 NOTE — ED PROVIDER NOTE - CLINICAL SUMMARY MEDICAL DECISION MAKING FREE TEXT BOX
pt with n/v/epigastric pain with hx gastritis.   feels much better after morphine.   rpt abd exam nontender tolerating po.   slightly prolonged QTC @475, counseled to dc reglan/zofran.   Reviewed all results and necessity for follow up. Counseled on red flags and to return for them.  Patient appears well on discharge.

## 2023-03-05 NOTE — ED PROVIDER NOTE - PATIENT PORTAL LINK FT
You can access the FollowMyHealth Patient Portal offered by [facility] by registering at the following website: [email or web address]. By joining [employer]’s FollowMyHealth portal, you will also be able to view your health information using other applications (apps) compatible with our system.

## 2023-03-05 NOTE — ED PROVIDER NOTE - NS ED ROS FT
Review of Systems    Constitutional: (-) fever   Eyes/ENT: (-) vision changes,   Cardiovascular: (-) chest pain, (-) syncope (-) palpitations  Respiratory: (-) cough, (-) shortness of breath  Gastrointestinal: (+) vomiting, (-) diarrhea (-)black/bloody stools (+) abdominal pain  Genitourinary:  (-) dysuria   Musculoskeletal: (-) neck pain, (-) back pain, (-) leg pain/swelling  Integumentary: (-) rash, (-) edema  Neurological: (-) headache, (-) confusion  Hematologic: (-) easy bruising

## 2023-03-17 ENCOUNTER — EMERGENCY (EMERGENCY)
Facility: HOSPITAL | Age: 43
LOS: 0 days | Discharge: HOME | End: 2023-03-18
Attending: STUDENT IN AN ORGANIZED HEALTH CARE EDUCATION/TRAINING PROGRAM
Payer: MEDICAID

## 2023-03-17 VITALS
OXYGEN SATURATION: 97 % | TEMPERATURE: 99 F | RESPIRATION RATE: 18 BRPM | DIASTOLIC BLOOD PRESSURE: 75 MMHG | SYSTOLIC BLOOD PRESSURE: 124 MMHG | HEART RATE: 82 BPM | WEIGHT: 169.98 LBS | HEIGHT: 71 IN

## 2023-03-17 DIAGNOSIS — F41.9 ANXIETY DISORDER, UNSPECIFIED: ICD-10-CM

## 2023-03-17 DIAGNOSIS — Z90.49 ACQUIRED ABSENCE OF OTHER SPECIFIED PARTS OF DIGESTIVE TRACT: Chronic | ICD-10-CM

## 2023-03-17 DIAGNOSIS — Z87.19 PERSONAL HISTORY OF OTHER DISEASES OF THE DIGESTIVE SYSTEM: ICD-10-CM

## 2023-03-17 DIAGNOSIS — Z79.4 LONG TERM (CURRENT) USE OF INSULIN: ICD-10-CM

## 2023-03-17 DIAGNOSIS — Z91.018 ALLERGY TO OTHER FOODS: ICD-10-CM

## 2023-03-17 DIAGNOSIS — J45.909 UNSPECIFIED ASTHMA, UNCOMPLICATED: ICD-10-CM

## 2023-03-17 DIAGNOSIS — Z90.49 ACQUIRED ABSENCE OF OTHER SPECIFIED PARTS OF DIGESTIVE TRACT: ICD-10-CM

## 2023-03-17 DIAGNOSIS — E10.9 TYPE 1 DIABETES MELLITUS WITHOUT COMPLICATIONS: ICD-10-CM

## 2023-03-17 DIAGNOSIS — R10.84 GENERALIZED ABDOMINAL PAIN: ICD-10-CM

## 2023-03-17 DIAGNOSIS — R11.10 VOMITING, UNSPECIFIED: ICD-10-CM

## 2023-03-17 DIAGNOSIS — K29.70 GASTRITIS, UNSPECIFIED, WITHOUT BLEEDING: ICD-10-CM

## 2023-03-17 DIAGNOSIS — E73.9 LACTOSE INTOLERANCE, UNSPECIFIED: ICD-10-CM

## 2023-03-17 LAB
BASOPHILS # BLD AUTO: 0.04 K/UL — SIGNIFICANT CHANGE UP (ref 0–0.2)
BASOPHILS NFR BLD AUTO: 0.2 % — SIGNIFICANT CHANGE UP (ref 0–2)
EOSINOPHIL # BLD AUTO: 0.01 K/UL — SIGNIFICANT CHANGE UP (ref 0–0.5)
EOSINOPHIL NFR BLD AUTO: 0.1 % — SIGNIFICANT CHANGE UP (ref 0–6)
HCT VFR BLD CALC: 43 % — SIGNIFICANT CHANGE UP (ref 39–50)
HGB BLD-MCNC: 14.6 G/DL — SIGNIFICANT CHANGE UP (ref 13–17)
IMM GRANULOCYTES NFR BLD AUTO: 0.3 % — SIGNIFICANT CHANGE UP (ref 0–0.9)
LACTATE SERPL-SCNC: 1.9 MMOL/L — SIGNIFICANT CHANGE UP (ref 0.7–2)
LYMPHOCYTES # BLD AUTO: 1.96 K/UL — SIGNIFICANT CHANGE UP (ref 1–3.3)
LYMPHOCYTES # BLD AUTO: 11.4 % — LOW (ref 13–44)
MCHC RBC-ENTMCNC: 28.2 PG — SIGNIFICANT CHANGE UP (ref 27–34)
MCHC RBC-ENTMCNC: 34 G/DL — SIGNIFICANT CHANGE UP (ref 32–36)
MCV RBC AUTO: 83 FL — SIGNIFICANT CHANGE UP (ref 80–100)
MONOCYTES # BLD AUTO: 0.93 K/UL — HIGH (ref 0–0.9)
MONOCYTES NFR BLD AUTO: 5.4 % — SIGNIFICANT CHANGE UP (ref 2–14)
NEUTROPHILS # BLD AUTO: 14.15 K/UL — HIGH (ref 1.8–7.4)
NEUTROPHILS NFR BLD AUTO: 82.6 % — HIGH (ref 43–77)
NRBC # BLD: 0 /100 WBCS — SIGNIFICANT CHANGE UP (ref 0–0)
PLATELET # BLD AUTO: 405 K/UL — HIGH (ref 150–400)
RBC # BLD: 5.18 M/UL — SIGNIFICANT CHANGE UP (ref 4.2–5.8)
RBC # FLD: 13.5 % — SIGNIFICANT CHANGE UP (ref 10.3–14.5)
WBC # BLD: 17.14 K/UL — HIGH (ref 3.8–10.5)
WBC # FLD AUTO: 17.14 K/UL — HIGH (ref 3.8–10.5)

## 2023-03-17 PROCEDURE — 99284 EMERGENCY DEPT VISIT MOD MDM: CPT

## 2023-03-17 RX ORDER — SODIUM CHLORIDE 9 MG/ML
1000 INJECTION INTRAMUSCULAR; INTRAVENOUS; SUBCUTANEOUS ONCE
Refills: 0 | Status: COMPLETED | OUTPATIENT
Start: 2023-03-17 | End: 2023-03-17

## 2023-03-17 RX ORDER — FAMOTIDINE 10 MG/ML
20 INJECTION INTRAVENOUS ONCE
Refills: 0 | Status: COMPLETED | OUTPATIENT
Start: 2023-03-17 | End: 2023-03-17

## 2023-03-17 RX ORDER — MORPHINE SULFATE 50 MG/1
4 CAPSULE, EXTENDED RELEASE ORAL ONCE
Refills: 0 | Status: DISCONTINUED | OUTPATIENT
Start: 2023-03-17 | End: 2023-03-17

## 2023-03-17 RX ORDER — ONDANSETRON 8 MG/1
4 TABLET, FILM COATED ORAL ONCE
Refills: 0 | Status: COMPLETED | OUTPATIENT
Start: 2023-03-17 | End: 2023-03-17

## 2023-03-17 RX ADMIN — MORPHINE SULFATE 4 MILLIGRAM(S): 50 CAPSULE, EXTENDED RELEASE ORAL at 23:56

## 2023-03-17 RX ADMIN — SODIUM CHLORIDE 1000 MILLILITER(S): 9 INJECTION INTRAMUSCULAR; INTRAVENOUS; SUBCUTANEOUS at 22:49

## 2023-03-17 RX ADMIN — ONDANSETRON 4 MILLIGRAM(S): 8 TABLET, FILM COATED ORAL at 23:56

## 2023-03-17 RX ADMIN — SODIUM CHLORIDE 1000 MILLILITER(S): 9 INJECTION INTRAMUSCULAR; INTRAVENOUS; SUBCUTANEOUS at 23:55

## 2023-03-17 RX ADMIN — FAMOTIDINE 20 MILLIGRAM(S): 10 INJECTION INTRAVENOUS at 23:56

## 2023-03-17 NOTE — ED PROVIDER NOTE - PATIENT PORTAL LINK FT
You can access the FollowMyHealth Patient Portal offered by [facility] by registering at the following website: [email or web address]. By joining [employer]’s FollowMyHealth portal, you will also be able to view your health information using other applications (apps) compatible with our system.

## 2023-03-17 NOTE — ED ADULT TRIAGE NOTE - CHIEF COMPLAINT QUOTE
BIBEMS for abdominal pain a/w nausea, vomitting today, non bloody.  EMS gave 4mg zofran IM.  hx of DM, gastritis and anxiety

## 2023-03-17 NOTE — ED PROVIDER NOTE - CARE PROVIDERS DIRECT ADDRESSES
[email or web address]

## 2023-03-17 NOTE — ED PROVIDER NOTE - CLINICAL SUMMARY MEDICAL DECISION MAKING FREE TEXT BOX
pt presents today with h/o asthma and gastritis cx/o abdominal pain, nausea and vomiting, on exam pt has diffuse, nonspecific tenderness, pt was hydrated, given zofran for nausea and treated for his pain, ct shows gastritis, no other acute pathology, pt felt better after treatment, discharged with gi follow up

## 2023-03-17 NOTE — ED PROVIDER NOTE - CARE PLAN
Principal Discharge DX:	Vomiting   1 Principal Discharge DX:	Vomiting  Secondary Diagnosis:	Gastritis

## 2023-03-17 NOTE — ED PROVIDER NOTE - CARE PROVIDER_API CALL
Octavio Kumar)  Medicine  [address], [address]  [place] [address]  Phone: [phone]  Fax: [phone]  Follow Up Time:

## 2023-03-17 NOTE — ED ADULT NURSE NOTE - NS ED NURSE RECORD ANOTHER HT AND WT
This patient has been assessed with a concern for Malnutrition and was treated during this hospitalization for the following Nutrition diagnosis/diagnoses:     -  03/03/2021: Moderate protein-calorie malnutrition   Yes

## 2023-03-17 NOTE — ED ADULT TRIAGE NOTE - GLASGOW COMA SCALE: BEST VERBAL RESPONSE, MLM
----- Message from Rocio Campbell sent at 2/19/2018  2:19 PM CST -----  Contact: pt  She's calling in regards to a referral pls call pt back at [phone] (home) or   [phone] (cell)  
S/w pt. Pt wanted to see if you can put in a referral for her to see the allergy specialist. Please Advise  
(V5) oriented

## 2023-03-17 NOTE — ED ADULT NURSE NOTE - OBJECTIVE STATEMENT
AAOx3 pt c/o N/V and non-radiating upper abdomen pain starting today. No bloody vomitus. Denies diarrhea, fever, chills, SOB, chest pain. PMH: T1DM, and Asthma

## 2023-03-18 VITALS
OXYGEN SATURATION: 98 % | RESPIRATION RATE: 18 BRPM | TEMPERATURE: 98 F | HEART RATE: 82 BPM | DIASTOLIC BLOOD PRESSURE: 71 MMHG | SYSTOLIC BLOOD PRESSURE: 114 MMHG

## 2023-03-18 LAB
ALBUMIN SERPL ELPH-MCNC: 3.7 G/DL — SIGNIFICANT CHANGE UP (ref 3.3–5)
ALP SERPL-CCNC: 107 U/L — SIGNIFICANT CHANGE UP (ref 40–120)
ALT FLD-CCNC: 25 U/L — SIGNIFICANT CHANGE UP (ref 12–78)
ANION GAP SERPL CALC-SCNC: 11 MMOL/L — SIGNIFICANT CHANGE UP (ref 5–17)
APPEARANCE UR: CLEAR — SIGNIFICANT CHANGE UP
AST SERPL-CCNC: 12 U/L — LOW (ref 15–37)
BILIRUB SERPL-MCNC: 0.6 MG/DL — SIGNIFICANT CHANGE UP (ref 0.2–1.2)
BILIRUB UR-MCNC: NEGATIVE — SIGNIFICANT CHANGE UP
BUN SERPL-MCNC: 27 MG/DL — HIGH (ref 7–23)
CALCIUM SERPL-MCNC: 9.1 MG/DL — SIGNIFICANT CHANGE UP (ref 8.5–10.1)
CHLORIDE SERPL-SCNC: 95 MMOL/L — LOW (ref 96–108)
CO2 SERPL-SCNC: 28 MMOL/L — SIGNIFICANT CHANGE UP (ref 22–31)
COLOR SPEC: YELLOW — SIGNIFICANT CHANGE UP
CREAT SERPL-MCNC: 1.46 MG/DL — HIGH (ref 0.5–1.3)
DIFF PNL FLD: NEGATIVE — SIGNIFICANT CHANGE UP
EGFR: 61 ML/MIN/1.73M2 — SIGNIFICANT CHANGE UP
EPI CELLS # UR: SIGNIFICANT CHANGE UP
GLUCOSE SERPL-MCNC: 393 MG/DL — HIGH (ref 70–99)
GLUCOSE UR QL: 1000 MG/DL
KETONES UR-MCNC: ABNORMAL
LEUKOCYTE ESTERASE UR-ACNC: NEGATIVE — SIGNIFICANT CHANGE UP
LIDOCAIN IGE QN: 23 U/L — LOW (ref 73–393)
NITRITE UR-MCNC: NEGATIVE — SIGNIFICANT CHANGE UP
PH UR: 5 — SIGNIFICANT CHANGE UP (ref 5–8)
POTASSIUM SERPL-MCNC: 4 MMOL/L — SIGNIFICANT CHANGE UP (ref 3.5–5.3)
POTASSIUM SERPL-SCNC: 4 MMOL/L — SIGNIFICANT CHANGE UP (ref 3.5–5.3)
PROT SERPL-MCNC: 7.2 GM/DL — SIGNIFICANT CHANGE UP (ref 6–8.3)
PROT UR-MCNC: 15 MG/DL
RBC CASTS # UR COMP ASSIST: SIGNIFICANT CHANGE UP /HPF (ref 0–4)
SODIUM SERPL-SCNC: 134 MMOL/L — LOW (ref 135–145)
SP GR SPEC: 1.01 — SIGNIFICANT CHANGE UP (ref 1.01–1.02)
UROBILINOGEN FLD QL: NEGATIVE MG/DL — SIGNIFICANT CHANGE UP
WBC UR QL: SIGNIFICANT CHANGE UP

## 2023-03-18 PROCEDURE — 74177 CT ABD & PELVIS W/CONTRAST: CPT | Mod: 26,MA

## 2023-03-18 RX ORDER — OMEPRAZOLE 10 MG/1
1 CAPSULE, DELAYED RELEASE ORAL
Qty: 30 | Refills: 0
Start: 2023-03-18 | End: 2023-04-16

## 2023-03-18 NOTE — ED ADULT NURSE REASSESSMENT NOTE - NS ED NURSE REASSESS COMMENT FT1
Covering RN, report from Nik EDWARDS. Pt alert and oriented x4, laying supine in stretcher, in no acute distress at this time. Endorsing abdominal pain and nausea at this time. MD aware.

## 2023-03-18 NOTE — ED ADULT NURSE REASSESSMENT NOTE - NS ED NURSE REASSESS COMMENT FT1
Pt to be discharged as per provider. Alert & oriented x4, in no acute distress, VSS. IV catheter removed intact. Discharge instructions provided and pt verbalized understanding. Ambulates with steady gait. Will f/u with gastroenterology in 48-72 hours. Getting picked up from ED.

## 2023-03-19 LAB
CULTURE RESULTS: NO GROWTH — SIGNIFICANT CHANGE UP
SPECIMEN SOURCE: SIGNIFICANT CHANGE UP

## 2023-04-13 NOTE — ED PROVIDER NOTE - DISPOSITION TYPE
Airway  Urgency: elective    Date/Time: 4/13/2023 3:57 PM  Airway not difficult    General Information and Staff    Patient location during procedure: OR  Anesthesiologist: Shahid Naylor MD    Indications and Patient Condition  Indications for airway management: airway protection    Preoxygenated: yes  MILS maintained throughout  Mask difficulty assessment: 1 - vent by mask    Final Airway Details  Final airway type: endotracheal airway      Successful airway: ETT  Cuffed: yes   Successful intubation technique: direct laryngoscopy  Endotracheal tube insertion site: oral  Blade: Otilia  Blade size: 4  ETT size (mm): 7.5  Cormack-Lehane Classification: grade I - full view of glottis  Placement verified by: chest auscultation and capnometry   Measured from: teeth  Number of attempts at approach: 1  Assessment: lips, teeth, and gum same as pre-op and atraumatic intubation           DISCHARGE

## 2023-04-16 ENCOUNTER — EMERGENCY (EMERGENCY)
Facility: HOSPITAL | Age: 43
LOS: 0 days | Discharge: LEFT BEFORE DISCHARGE | End: 2023-04-16
Attending: STUDENT IN AN ORGANIZED HEALTH CARE EDUCATION/TRAINING PROGRAM
Payer: MEDICAID

## 2023-04-16 VITALS
HEIGHT: 71 IN | RESPIRATION RATE: 18 BRPM | DIASTOLIC BLOOD PRESSURE: 111 MMHG | WEIGHT: 169.98 LBS | HEART RATE: 72 BPM | OXYGEN SATURATION: 98 % | TEMPERATURE: 98 F | SYSTOLIC BLOOD PRESSURE: 151 MMHG

## 2023-04-16 VITALS
OXYGEN SATURATION: 98 % | SYSTOLIC BLOOD PRESSURE: 130 MMHG | HEART RATE: 78 BPM | RESPIRATION RATE: 17 BRPM | DIASTOLIC BLOOD PRESSURE: 84 MMHG | TEMPERATURE: 98 F

## 2023-04-16 DIAGNOSIS — K31.84 GASTROPARESIS: ICD-10-CM

## 2023-04-16 DIAGNOSIS — Z90.49 ACQUIRED ABSENCE OF OTHER SPECIFIED PARTS OF DIGESTIVE TRACT: Chronic | ICD-10-CM

## 2023-04-16 DIAGNOSIS — R10.12 LEFT UPPER QUADRANT PAIN: ICD-10-CM

## 2023-04-16 DIAGNOSIS — Z91.018 ALLERGY TO OTHER FOODS: ICD-10-CM

## 2023-04-16 DIAGNOSIS — R11.2 NAUSEA WITH VOMITING, UNSPECIFIED: ICD-10-CM

## 2023-04-16 DIAGNOSIS — F41.9 ANXIETY DISORDER, UNSPECIFIED: ICD-10-CM

## 2023-04-16 DIAGNOSIS — E10.43 TYPE 1 DIABETES MELLITUS WITH DIABETIC AUTONOMIC (POLY)NEUROPATHY: ICD-10-CM

## 2023-04-16 DIAGNOSIS — J45.909 UNSPECIFIED ASTHMA, UNCOMPLICATED: ICD-10-CM

## 2023-04-16 DIAGNOSIS — Z20.822 CONTACT WITH AND (SUSPECTED) EXPOSURE TO COVID-19: ICD-10-CM

## 2023-04-16 DIAGNOSIS — Z90.49 ACQUIRED ABSENCE OF OTHER SPECIFIED PARTS OF DIGESTIVE TRACT: ICD-10-CM

## 2023-04-16 DIAGNOSIS — G89.29 OTHER CHRONIC PAIN: ICD-10-CM

## 2023-04-16 DIAGNOSIS — Z53.29 PROCEDURE AND TREATMENT NOT CARRIED OUT BECAUSE OF PATIENT'S DECISION FOR OTHER REASONS: ICD-10-CM

## 2023-04-16 DIAGNOSIS — Z79.4 LONG TERM (CURRENT) USE OF INSULIN: ICD-10-CM

## 2023-04-16 DIAGNOSIS — Z87.19 PERSONAL HISTORY OF OTHER DISEASES OF THE DIGESTIVE SYSTEM: ICD-10-CM

## 2023-04-16 LAB
ALBUMIN SERPL ELPH-MCNC: 4 G/DL — SIGNIFICANT CHANGE UP (ref 3.3–5)
ALP SERPL-CCNC: 112 U/L — SIGNIFICANT CHANGE UP (ref 40–120)
ALT FLD-CCNC: 25 U/L — SIGNIFICANT CHANGE UP (ref 12–78)
ANION GAP SERPL CALC-SCNC: 5 MMOL/L — SIGNIFICANT CHANGE UP (ref 5–17)
AST SERPL-CCNC: 20 U/L — SIGNIFICANT CHANGE UP (ref 15–37)
BASOPHILS # BLD AUTO: 0.06 K/UL — SIGNIFICANT CHANGE UP (ref 0–0.2)
BASOPHILS NFR BLD AUTO: 0.6 % — SIGNIFICANT CHANGE UP (ref 0–2)
BILIRUB SERPL-MCNC: 0.4 MG/DL — SIGNIFICANT CHANGE UP (ref 0.2–1.2)
BUN SERPL-MCNC: 11 MG/DL — SIGNIFICANT CHANGE UP (ref 7–23)
CALCIUM SERPL-MCNC: 9.8 MG/DL — SIGNIFICANT CHANGE UP (ref 8.5–10.1)
CHLORIDE SERPL-SCNC: 105 MMOL/L — SIGNIFICANT CHANGE UP (ref 96–108)
CO2 SERPL-SCNC: 28 MMOL/L — SIGNIFICANT CHANGE UP (ref 22–31)
CREAT SERPL-MCNC: 0.99 MG/DL — SIGNIFICANT CHANGE UP (ref 0.5–1.3)
EGFR: 98 ML/MIN/1.73M2 — SIGNIFICANT CHANGE UP
EOSINOPHIL # BLD AUTO: 0.15 K/UL — SIGNIFICANT CHANGE UP (ref 0–0.5)
EOSINOPHIL NFR BLD AUTO: 1.4 % — SIGNIFICANT CHANGE UP (ref 0–6)
FLUAV AG NPH QL: SIGNIFICANT CHANGE UP
FLUBV AG NPH QL: SIGNIFICANT CHANGE UP
GLUCOSE SERPL-MCNC: 77 MG/DL — SIGNIFICANT CHANGE UP (ref 70–99)
HCT VFR BLD CALC: 45.1 % — SIGNIFICANT CHANGE UP (ref 39–50)
HGB BLD-MCNC: 15.1 G/DL — SIGNIFICANT CHANGE UP (ref 13–17)
IMM GRANULOCYTES NFR BLD AUTO: 0.2 % — SIGNIFICANT CHANGE UP (ref 0–0.9)
LIDOCAIN IGE QN: 44 U/L — LOW (ref 73–393)
LYMPHOCYTES # BLD AUTO: 1.59 K/UL — SIGNIFICANT CHANGE UP (ref 1–3.3)
LYMPHOCYTES # BLD AUTO: 15 % — SIGNIFICANT CHANGE UP (ref 13–44)
MAGNESIUM SERPL-MCNC: 2.1 MG/DL — SIGNIFICANT CHANGE UP (ref 1.6–2.6)
MCHC RBC-ENTMCNC: 28.2 PG — SIGNIFICANT CHANGE UP (ref 27–34)
MCHC RBC-ENTMCNC: 33.5 G/DL — SIGNIFICANT CHANGE UP (ref 32–36)
MCV RBC AUTO: 84.3 FL — SIGNIFICANT CHANGE UP (ref 80–100)
MONOCYTES # BLD AUTO: 0.38 K/UL — SIGNIFICANT CHANGE UP (ref 0–0.9)
MONOCYTES NFR BLD AUTO: 3.6 % — SIGNIFICANT CHANGE UP (ref 2–14)
NEUTROPHILS # BLD AUTO: 8.41 K/UL — HIGH (ref 1.8–7.4)
NEUTROPHILS NFR BLD AUTO: 79.2 % — HIGH (ref 43–77)
NRBC # BLD: 0 /100 WBCS — SIGNIFICANT CHANGE UP (ref 0–0)
PLATELET # BLD AUTO: 356 K/UL — SIGNIFICANT CHANGE UP (ref 150–400)
POTASSIUM SERPL-MCNC: 3.8 MMOL/L — SIGNIFICANT CHANGE UP (ref 3.5–5.3)
POTASSIUM SERPL-SCNC: 3.8 MMOL/L — SIGNIFICANT CHANGE UP (ref 3.5–5.3)
PROT SERPL-MCNC: 8.1 GM/DL — SIGNIFICANT CHANGE UP (ref 6–8.3)
RBC # BLD: 5.35 M/UL — SIGNIFICANT CHANGE UP (ref 4.2–5.8)
RBC # FLD: 13.5 % — SIGNIFICANT CHANGE UP (ref 10.3–14.5)
SARS-COV-2 RNA SPEC QL NAA+PROBE: SIGNIFICANT CHANGE UP
SODIUM SERPL-SCNC: 138 MMOL/L — SIGNIFICANT CHANGE UP (ref 135–145)
WBC # BLD: 10.61 K/UL — HIGH (ref 3.8–10.5)
WBC # FLD AUTO: 10.61 K/UL — HIGH (ref 3.8–10.5)

## 2023-04-16 PROCEDURE — 93010 ELECTROCARDIOGRAM REPORT: CPT | Mod: 76

## 2023-04-16 PROCEDURE — 99284 EMERGENCY DEPT VISIT MOD MDM: CPT

## 2023-04-16 RX ORDER — PANTOPRAZOLE SODIUM 20 MG/1
40 TABLET, DELAYED RELEASE ORAL ONCE
Refills: 0 | Status: COMPLETED | OUTPATIENT
Start: 2023-04-16 | End: 2023-04-16

## 2023-04-16 RX ORDER — DIPHENHYDRAMINE HCL 50 MG
25 CAPSULE ORAL ONCE
Refills: 0 | Status: COMPLETED | OUTPATIENT
Start: 2023-04-16 | End: 2023-04-16

## 2023-04-16 RX ORDER — METOCLOPRAMIDE HCL 10 MG
10 TABLET ORAL ONCE
Refills: 0 | Status: COMPLETED | OUTPATIENT
Start: 2023-04-16 | End: 2023-04-16

## 2023-04-16 RX ORDER — HALOPERIDOL DECANOATE 100 MG/ML
2.5 INJECTION INTRAMUSCULAR ONCE
Refills: 0 | Status: COMPLETED | OUTPATIENT
Start: 2023-04-16 | End: 2023-04-16

## 2023-04-16 RX ORDER — ACETAMINOPHEN 500 MG
1000 TABLET ORAL ONCE
Refills: 0 | Status: COMPLETED | OUTPATIENT
Start: 2023-04-16 | End: 2023-04-16

## 2023-04-16 RX ORDER — SODIUM CHLORIDE 9 MG/ML
1000 INJECTION INTRAMUSCULAR; INTRAVENOUS; SUBCUTANEOUS ONCE
Refills: 0 | Status: COMPLETED | OUTPATIENT
Start: 2023-04-16 | End: 2023-04-16

## 2023-04-16 RX ADMIN — SODIUM CHLORIDE 1000 MILLILITER(S): 9 INJECTION INTRAMUSCULAR; INTRAVENOUS; SUBCUTANEOUS at 18:33

## 2023-04-16 RX ADMIN — PANTOPRAZOLE SODIUM 40 MILLIGRAM(S): 20 TABLET, DELAYED RELEASE ORAL at 18:33

## 2023-04-16 RX ADMIN — SODIUM CHLORIDE 1000 MILLILITER(S): 9 INJECTION INTRAMUSCULAR; INTRAVENOUS; SUBCUTANEOUS at 19:50

## 2023-04-16 RX ADMIN — Medication 10 MILLIGRAM(S): at 18:33

## 2023-04-16 RX ADMIN — Medication 25 MILLIGRAM(S): at 18:34

## 2023-04-16 NOTE — ED ADULT NURSE REASSESSMENT NOTE - NS ED NURSE REASSESS COMMENT FT1
patient no longer wishes to wait in the ED. patient states he wants to leave now without any papers. IV removed before leaving. MD Kaur and REMY Murillo notified.
Report received from JERRY Clement. patient is a&ox4 with IV intact. patient refusing IV haldol, states "I want to see the doctor, I want something for pain only." Patient denies any nausea at this time. MD made aware. Awaiting for reassessment.

## 2023-04-16 NOTE — ED ADULT NURSE NOTE - OBJECTIVE STATEMENT
A&OX4. Patient C/O LUQ stabbing abdominal pain with N/V at this time. patient denies any urinary symptoms. denies blood in urine or bowel movement . patient states regular bowel movement. denies fevers/ chills at this time. PMH DM1/ Asthma/ Anxiety / depression/gastritis

## 2023-04-16 NOTE — ED PROVIDER NOTE - PROGRESS NOTE DETAILS
Attending Nello: pt signed out to overnight attending to reassess symptoms. labs w/o emergent findings.

## 2023-04-16 NOTE — ED PROVIDER NOTE - PHYSICAL EXAMINATION
Gen: NAD, AOx3, able to make needs known, non-toxic  Head: NCAT  HEENT: EOMI, oral mucosa moist, normal conjunctiva  Lung: CTAB, no respiratory distress, no wheezes/rhonchi/rales B/L, speaking in full sentences  CV: RRR, no murmurs  Abd: non distended, soft, LUQ tenderness, no guarding, no CVA tenderness  MSK: no visible deformities  Neuro: Appears non focal  Skin: Warm, well perfused  Psych: normal affect

## 2023-04-16 NOTE — ED ADULT TRIAGE NOTE - TEMPERATURE IN CELSIUS (DEGREES C)
Requested Prescriptions   Pending Prescriptions Disp Refills     mometasone (NASONEX) 50 MCG/ACT spray 1 Box 9     Sig: Spray 2 sprays into both nostrils daily    There is no refill protocol information for this order      Last Written Prescription Date:  05/15/.2017  Last Fill Quantity: 1,  # refills: 9   Last office visit: 1/22/2018 with prescribing provider:   Future Office Visit:          36.9

## 2023-04-16 NOTE — ED ADULT TRIAGE NOTE - IDEAL BODY WEIGHT(KG)
Xerosis Aggressive Treatment: I recommended application of Cetaphil or CeraVe numerous times a day and before going to bed to all dry areas. I also prescribed a topical steroid for twice daily use. 75

## 2023-04-16 NOTE — ED ADULT NURSE NOTE - HOW OFTEN DO YOU HAVE A DRINK CONTAINING ALCOHOL?
[facility]                [address] [address]                                     PHONE: [phone]                                       FAX: [phone]  [email or web address]      Reason for Consult: shortness of breath     HPI:  Patient is a 80 y/o man with persistent atrial fibrillation, SSS s/p PPM, chronic HFpEF, rheumatic heart disease with severe MS s/p Mechanical MV on chronic systemic anticoagulation, prior CVA with residual left sided weakness, hypertension, JACOB not on CPAP, prior COVID 19 in March of 2020 complicated by transient O2 dependence, and anxiety disorder who presents with dyspnea    PAST MEDICAL & SURGICAL HISTORY:  CVA (cerebral vascular accident)  Afib  Respiratory insufficiency  CHF (congestive heart failure)  Mitral valve disease  HTN (hypertension)  H/O inguinal hernia  Chronic mitral valve disease replaced    PREVIOUS DIAGNOSTIC TESTING:      EKG: AF    ECHO FINDINGS: < from: TTE Echo Complete w/ Contrast w/ Doppler (07.08.20 @ 09:57) >   1. Normal global left ventricular systolic function.   2. Left ventricular ejection fraction, by visual estimation, is 55 to 60%.   3. Normal left ventricular internal cavity size.   4. Moderately increased septal wall thickness. Abnormal septal motion which may be due to conduction delay.   5. Moderately enlarged right ventricle with normal systolic function.   6. The right atrium is dilated.   7. There is a prosthetic valve in the mitral position, mean gradient    4 mmHg at HR 91 BPM, pressure half time 73 ms. Cannot assess for mitral regurgitation due to shadowing from mitral prosthesis.   8. Moderate-severe tricuspid regurgitation.   9. Mild to moderate aortic regurgitation.  10. No aortic stenosis.  11. There is no evidence of pericardial effusion.  12. Recommend clinical correlation with the above findings.    STRESS FINDINGS: PET MPI 7/2020: coronary calcification with normal myocardial perfusion. EF 61%.     MEDICATIONS  (STANDING):  aspirin  chewable 81 milliGRAM(s) Oral daily  digoxin     Tablet 250 MICROGram(s) Oral daily  furosemide    Tablet 20 milliGRAM(s) Oral daily  gabapentin 300 milliGRAM(s) Oral daily  levothyroxine 50 MICROGram(s) Oral daily  lisinopril 5 milliGRAM(s) Oral daily  metoprolol tartrate 25 milliGRAM(s) Oral two times a day    MEDICATIONS  (PRN):    FAMILY HISTORY:  No pertinent family history in first degree relatives    SOCIAL HISTORY:  CIGARETTES: denied   ALCOHOL: denies     ROS: Negative other than as mentioned in HPI.    Vital Signs Last 24 Hrs  T(C): 36.9 (24 Mar 2021 08:11), Max: 37.1 (23 Mar 2021 18:24)  T(F): 98.4 (24 Mar 2021 08:11), Max: 98.8 (23 Mar 2021 18:24)  HR: 60 (24 Mar 2021 08:11) (60 - 86)  BP: 168/73 (24 Mar 2021 08:11) (166/77 - 209/77)  BP(mean): --  RR: 18 (24 Mar 2021 08:11) (16 - 18)  SpO2: 100% (24 Mar 2021 08:11) (95% - 100%)    PHYSICAL EXAM:  General: Appears well developed, well nourished alert and cooperative.  HEENT: Head; normocephalic, atraumatic. sclera anicteric, MMM, no JVD, neck is supple   CARDIOVASCULAR; mechanical click, irregularly irregular   LUNGS; No rales, rhonchi or wheeze. Normal breath sounds bilaterally.  ABDOMEN ; Soft, nontender without mass or organomegaly. bowel sounds normoactive.  EXTREMITIES; No clubbing, cyanosis or edema. Distal pulses wnl. ROM normal.  SKIN; warm and dry with normal turgor.  NEURO; Alert/oriented x 3/normal motor exam.     PSYCH; normal affect.        I&O's Detail    LABS:                        13.9   7.04  )-----------( 190      ( 23 Mar 2021 19:17 )             44.2     03-23    134<L>  |  96<L>  |  22.0<H>  ----------------------------<  98  4.5   |  28.0  |  1.10    Ca    9.4      23 Mar 2021 19:17    TPro  7.8  /  Alb  4.0  /  TBili  0.6  /  DBili  x   /  AST  35  /  ALT  26  /  AlkPhos  101  03-23    CARDIAC MARKERS ( 24 Mar 2021 03:29 )  x     / <0.01 ng/mL / x     / x     / x      CARDIAC MARKERS ( 24 Mar 2021 00:38 )  x     / <0.01 ng/mL / x     / x     / x      CARDIAC MARKERS ( 23 Mar 2021 19:17 )  x     / <0.01 ng/mL / x     / x     / x          PT/INR - ( 24 Mar 2021 05:55 )   PT: 31.8 sec;   INR: 2.88 ratio         PTT - ( 24 Mar 2021 05:55 )  PTT:52.4 sec    I&O's Summary      RADIOLOGY & ADDITIONAL STUDIES: Never

## 2023-04-16 NOTE — ED PROVIDER NOTE - OBJECTIVE STATEMENT
41 y/o M w/ PMH of anxiety, DM, gastroparesis, gastritis, chronic abdominal pain, ?cannibinoid hyperemesis syndrome presenting w/ abdominal pain. Reports sharp L sided upper abdominal pain since 12pm today. Had felt well prior to pain starting. Pain does not radiate. Has been constant since onset. Pain is consistent w/ prior episodes of abd pain. Tried taking his zofran and reglan but he vomited them both up. Has been having nausea and vomiting NBNB w/ the pain, no diarrhea or constipation. Denies alcohol or marijuana use. Denies fevers, chills, headache, dizziness, blurred vision, chest pain, cough, shortness of breath, urinary symptoms, MSK pain, rash.

## 2023-04-16 NOTE — ED PROVIDER NOTE - CLINICAL SUMMARY MEDICAL DECISION MAKING FREE TEXT BOX
43 y/o M w/ PMH as above presenting w/ abd pain, nausea, and vomiting. Pt overall no acute distress on exam. LUQ tenderness on exam. Pt reporting symptoms similar to prior episode of abd pain. Suspect acute on chronic exacerbation of his gastritis or gastroparesis. Will screen for hepato/billiary/pancreatic abnormality (is s/p jesus). Has had multiple ED visits in this past for abd pain. Will treat symptomatically. Will ensure no signs of DKA. Do not suspect cardiac cause of symptoms. Plan for labs, EKG, meds, IVF. Will defer imaging at time of initial eval. If labs concerning and/or pt does not improve, will consider imaging. Will reassess the need for additional interventions as clinically warranted.

## 2023-04-17 ENCOUNTER — EMERGENCY (EMERGENCY)
Facility: HOSPITAL | Age: 43
LOS: 0 days | Discharge: HOME | End: 2023-04-17
Attending: EMERGENCY MEDICINE
Payer: MEDICAID

## 2023-04-17 VITALS
RESPIRATION RATE: 16 BRPM | TEMPERATURE: 99 F | SYSTOLIC BLOOD PRESSURE: 126 MMHG | DIASTOLIC BLOOD PRESSURE: 67 MMHG | HEART RATE: 69 BPM | OXYGEN SATURATION: 97 %

## 2023-04-17 VITALS
RESPIRATION RATE: 18 BRPM | SYSTOLIC BLOOD PRESSURE: 116 MMHG | HEART RATE: 68 BPM | DIASTOLIC BLOOD PRESSURE: 65 MMHG | OXYGEN SATURATION: 96 % | TEMPERATURE: 99 F | WEIGHT: 169.98 LBS | HEIGHT: 71 IN

## 2023-04-17 DIAGNOSIS — Z87.19 PERSONAL HISTORY OF OTHER DISEASES OF THE DIGESTIVE SYSTEM: ICD-10-CM

## 2023-04-17 DIAGNOSIS — J45.909 UNSPECIFIED ASTHMA, UNCOMPLICATED: ICD-10-CM

## 2023-04-17 DIAGNOSIS — Z90.49 ACQUIRED ABSENCE OF OTHER SPECIFIED PARTS OF DIGESTIVE TRACT: Chronic | ICD-10-CM

## 2023-04-17 DIAGNOSIS — E73.9 LACTOSE INTOLERANCE, UNSPECIFIED: ICD-10-CM

## 2023-04-17 DIAGNOSIS — G89.29 OTHER CHRONIC PAIN: ICD-10-CM

## 2023-04-17 DIAGNOSIS — E10.9 TYPE 1 DIABETES MELLITUS WITHOUT COMPLICATIONS: ICD-10-CM

## 2023-04-17 DIAGNOSIS — Z79.84 LONG TERM (CURRENT) USE OF ORAL HYPOGLYCEMIC DRUGS: ICD-10-CM

## 2023-04-17 DIAGNOSIS — F41.9 ANXIETY DISORDER, UNSPECIFIED: ICD-10-CM

## 2023-04-17 DIAGNOSIS — Z90.49 ACQUIRED ABSENCE OF OTHER SPECIFIED PARTS OF DIGESTIVE TRACT: ICD-10-CM

## 2023-04-17 DIAGNOSIS — R10.12 LEFT UPPER QUADRANT PAIN: ICD-10-CM

## 2023-04-17 DIAGNOSIS — Z91.018 ALLERGY TO OTHER FOODS: ICD-10-CM

## 2023-04-17 LAB
ALBUMIN SERPL ELPH-MCNC: 3.8 G/DL — SIGNIFICANT CHANGE UP (ref 3.3–5)
ALP SERPL-CCNC: 107 U/L — SIGNIFICANT CHANGE UP (ref 40–120)
ALT FLD-CCNC: 24 U/L — SIGNIFICANT CHANGE UP (ref 12–78)
ANION GAP SERPL CALC-SCNC: 5 MMOL/L — SIGNIFICANT CHANGE UP (ref 5–17)
AST SERPL-CCNC: 22 U/L — SIGNIFICANT CHANGE UP (ref 15–37)
BASOPHILS # BLD AUTO: 0.03 K/UL — SIGNIFICANT CHANGE UP (ref 0–0.2)
BASOPHILS NFR BLD AUTO: 0.4 % — SIGNIFICANT CHANGE UP (ref 0–2)
BILIRUB SERPL-MCNC: 0.6 MG/DL — SIGNIFICANT CHANGE UP (ref 0.2–1.2)
BUN SERPL-MCNC: 20 MG/DL — SIGNIFICANT CHANGE UP (ref 7–23)
CALCIUM SERPL-MCNC: 9.2 MG/DL — SIGNIFICANT CHANGE UP (ref 8.5–10.1)
CHLORIDE SERPL-SCNC: 105 MMOL/L — SIGNIFICANT CHANGE UP (ref 96–108)
CO2 SERPL-SCNC: 27 MMOL/L — SIGNIFICANT CHANGE UP (ref 22–31)
CREAT SERPL-MCNC: 1.11 MG/DL — SIGNIFICANT CHANGE UP (ref 0.5–1.3)
EGFR: 85 ML/MIN/1.73M2 — SIGNIFICANT CHANGE UP
EOSINOPHIL # BLD AUTO: 0.02 K/UL — SIGNIFICANT CHANGE UP (ref 0–0.5)
EOSINOPHIL NFR BLD AUTO: 0.3 % — SIGNIFICANT CHANGE UP (ref 0–6)
GLUCOSE SERPL-MCNC: 157 MG/DL — HIGH (ref 70–99)
HCT VFR BLD CALC: 39.4 % — SIGNIFICANT CHANGE UP (ref 39–50)
HGB BLD-MCNC: 13.3 G/DL — SIGNIFICANT CHANGE UP (ref 13–17)
IMM GRANULOCYTES NFR BLD AUTO: 0.1 % — SIGNIFICANT CHANGE UP (ref 0–0.9)
LIDOCAIN IGE QN: 32 U/L — LOW (ref 73–393)
LYMPHOCYTES # BLD AUTO: 1.29 K/UL — SIGNIFICANT CHANGE UP (ref 1–3.3)
LYMPHOCYTES # BLD AUTO: 17.2 % — SIGNIFICANT CHANGE UP (ref 13–44)
MAGNESIUM SERPL-MCNC: 2.2 MG/DL — SIGNIFICANT CHANGE UP (ref 1.6–2.6)
MCHC RBC-ENTMCNC: 28.2 PG — SIGNIFICANT CHANGE UP (ref 27–34)
MCHC RBC-ENTMCNC: 33.8 G/DL — SIGNIFICANT CHANGE UP (ref 32–36)
MCV RBC AUTO: 83.7 FL — SIGNIFICANT CHANGE UP (ref 80–100)
MONOCYTES # BLD AUTO: 0.2 K/UL — SIGNIFICANT CHANGE UP (ref 0–0.9)
MONOCYTES NFR BLD AUTO: 2.7 % — SIGNIFICANT CHANGE UP (ref 2–14)
NEUTROPHILS # BLD AUTO: 5.95 K/UL — SIGNIFICANT CHANGE UP (ref 1.8–7.4)
NEUTROPHILS NFR BLD AUTO: 79.3 % — HIGH (ref 43–77)
NRBC # BLD: 0 /100 WBCS — SIGNIFICANT CHANGE UP (ref 0–0)
PLATELET # BLD AUTO: 313 K/UL — SIGNIFICANT CHANGE UP (ref 150–400)
POTASSIUM SERPL-MCNC: 3.7 MMOL/L — SIGNIFICANT CHANGE UP (ref 3.5–5.3)
POTASSIUM SERPL-SCNC: 3.7 MMOL/L — SIGNIFICANT CHANGE UP (ref 3.5–5.3)
PROT SERPL-MCNC: 7.5 GM/DL — SIGNIFICANT CHANGE UP (ref 6–8.3)
RBC # BLD: 4.71 M/UL — SIGNIFICANT CHANGE UP (ref 4.2–5.8)
RBC # FLD: 13.4 % — SIGNIFICANT CHANGE UP (ref 10.3–14.5)
SODIUM SERPL-SCNC: 137 MMOL/L — SIGNIFICANT CHANGE UP (ref 135–145)
WBC # BLD: 7.5 K/UL — SIGNIFICANT CHANGE UP (ref 3.8–10.5)
WBC # FLD AUTO: 7.5 K/UL — SIGNIFICANT CHANGE UP (ref 3.8–10.5)

## 2023-04-17 PROCEDURE — 99284 EMERGENCY DEPT VISIT MOD MDM: CPT

## 2023-04-17 RX ORDER — MORPHINE SULFATE 50 MG/1
6 CAPSULE, EXTENDED RELEASE ORAL ONCE
Refills: 0 | Status: DISCONTINUED | OUTPATIENT
Start: 2023-04-17 | End: 2023-04-17

## 2023-04-17 RX ORDER — MORPHINE SULFATE 50 MG/1
8 CAPSULE, EXTENDED RELEASE ORAL ONCE
Refills: 0 | Status: DISCONTINUED | OUTPATIENT
Start: 2023-04-17 | End: 2023-04-17

## 2023-04-17 RX ORDER — SODIUM CHLORIDE 9 MG/ML
1000 INJECTION INTRAMUSCULAR; INTRAVENOUS; SUBCUTANEOUS ONCE
Refills: 0 | Status: COMPLETED | OUTPATIENT
Start: 2023-04-17 | End: 2023-04-17

## 2023-04-17 RX ORDER — METOCLOPRAMIDE HCL 10 MG
10 TABLET ORAL ONCE
Refills: 0 | Status: COMPLETED | OUTPATIENT
Start: 2023-04-17 | End: 2023-04-17

## 2023-04-17 RX ADMIN — MORPHINE SULFATE 6 MILLIGRAM(S): 50 CAPSULE, EXTENDED RELEASE ORAL at 22:18

## 2023-04-17 RX ADMIN — SODIUM CHLORIDE 1000 MILLILITER(S): 9 INJECTION INTRAMUSCULAR; INTRAVENOUS; SUBCUTANEOUS at 22:21

## 2023-04-17 RX ADMIN — Medication 10 MILLIGRAM(S): at 22:18

## 2023-04-17 NOTE — ED ADULT TRIAGE NOTE - CHIEF COMPLAINT QUOTE
Diffuses abdominal pain and vomiting since 1800 , pt has insulin pump. Diffuses abdominal pain and vomiting since 1800 , pt has insulin pump. hx gastroparesis

## 2023-04-17 NOTE — ED ADULT NURSE NOTE - OBJECTIVE STATEMENT
Pt AAOx4. 42 year old male with past medical history of gastroparesis, diabetes (has insulin pump); presents to ED with complaint of abdominal pain and vomiting since 1800 today. Pt denies hematemesis, denies chest pain, shortness of breath. Respirations equal and unlabored. No acute distress noted at this time.

## 2023-04-17 NOTE — ED PROVIDER NOTE - NSICDXPASTMEDICALHX_GEN_ALL_CORE_FT
PAST MEDICAL HISTORY:  Anxiety     Asthma     Controlled diabetes mellitus type 1 without complications     DM type 1 (diabetes mellitus, type 1)     Gastritis     Gastritis, presence of bleeding unspecified, unspecified chronicity, unspecified gastritis type     Uncomplicated asthma, unspecified asthma severity, unspecified whether persistent      Detail Level: Detailed Detail Level: Simple

## 2023-04-17 NOTE — ED PROVIDER NOTE - OBJECTIVE STATEMENT
43 y/o M w/ PMH of anxiety, DM, gastroparesis, gastritis, chronic abdominal pain, ?cannibinoid hyperemesis syndrome presenting w/ abdominal pain. Reports sharp L sided upper abdominal pain since 6pm today. seen yesterday for same. numerous ed registrations for the same. pain is same, he says

## 2023-04-17 NOTE — ED PROVIDER NOTE - CLINICAL SUMMARY MEDICAL DECISION MAKING FREE TEXT BOX
chronic abd pain. no concerning new features chronic abd pain. no concerning new features. feeling better after treatment. tolerates po. ok for dc home

## 2023-04-17 NOTE — ED PROVIDER NOTE - PATIENT PORTAL LINK FT
You can access the FollowMyHealth Patient Portal offered by [facility] by registering at the following website: [email or web address]. By joining [employer]’s FollowMyHealth portal, you will also be able to view your health information using other applications (apps) compatible with our system.

## 2023-04-20 ENCOUNTER — EMERGENCY (EMERGENCY)
Facility: HOSPITAL | Age: 43
LOS: 0 days | Discharge: HOME | End: 2023-04-21
Attending: STUDENT IN AN ORGANIZED HEALTH CARE EDUCATION/TRAINING PROGRAM
Payer: MEDICAID

## 2023-04-20 VITALS
RESPIRATION RATE: 71 BRPM | OXYGEN SATURATION: 98 % | HEART RATE: 71 BPM | DIASTOLIC BLOOD PRESSURE: 77 MMHG | TEMPERATURE: 99 F | SYSTOLIC BLOOD PRESSURE: 130 MMHG | WEIGHT: 169.98 LBS | HEIGHT: 71 IN

## 2023-04-20 DIAGNOSIS — Z90.49 ACQUIRED ABSENCE OF OTHER SPECIFIED PARTS OF DIGESTIVE TRACT: Chronic | ICD-10-CM

## 2023-04-20 DIAGNOSIS — R11.2 NAUSEA WITH VOMITING, UNSPECIFIED: ICD-10-CM

## 2023-04-20 DIAGNOSIS — R10.12 LEFT UPPER QUADRANT PAIN: ICD-10-CM

## 2023-04-20 DIAGNOSIS — K31.84 GASTROPARESIS: ICD-10-CM

## 2023-04-20 DIAGNOSIS — E11.43 TYPE 2 DIABETES MELLITUS WITH DIABETIC AUTONOMIC (POLY)NEUROPATHY: ICD-10-CM

## 2023-04-20 DIAGNOSIS — K59.00 CONSTIPATION, UNSPECIFIED: ICD-10-CM

## 2023-04-20 DIAGNOSIS — Z87.19 PERSONAL HISTORY OF OTHER DISEASES OF THE DIGESTIVE SYSTEM: ICD-10-CM

## 2023-04-20 DIAGNOSIS — E11.65 TYPE 2 DIABETES MELLITUS WITH HYPERGLYCEMIA: ICD-10-CM

## 2023-04-20 DIAGNOSIS — Z79.84 LONG TERM (CURRENT) USE OF ORAL HYPOGLYCEMIC DRUGS: ICD-10-CM

## 2023-04-20 DIAGNOSIS — E73.9 LACTOSE INTOLERANCE, UNSPECIFIED: ICD-10-CM

## 2023-04-20 DIAGNOSIS — R10.9 UNSPECIFIED ABDOMINAL PAIN: ICD-10-CM

## 2023-04-20 DIAGNOSIS — Z91.018 ALLERGY TO OTHER FOODS: ICD-10-CM

## 2023-04-20 LAB
ALBUMIN SERPL ELPH-MCNC: 3.8 G/DL — SIGNIFICANT CHANGE UP (ref 3.3–5)
ALP SERPL-CCNC: 108 U/L — SIGNIFICANT CHANGE UP (ref 40–120)
ALT FLD-CCNC: 24 U/L — SIGNIFICANT CHANGE UP (ref 12–78)
ANION GAP SERPL CALC-SCNC: 6 MMOL/L — SIGNIFICANT CHANGE UP (ref 5–17)
APPEARANCE UR: CLEAR — SIGNIFICANT CHANGE UP
AST SERPL-CCNC: 16 U/L — SIGNIFICANT CHANGE UP (ref 15–37)
BASOPHILS # BLD AUTO: 0.05 K/UL — SIGNIFICANT CHANGE UP (ref 0–0.2)
BASOPHILS NFR BLD AUTO: 0.5 % — SIGNIFICANT CHANGE UP (ref 0–2)
BILIRUB SERPL-MCNC: 0.5 MG/DL — SIGNIFICANT CHANGE UP (ref 0.2–1.2)
BILIRUB UR-MCNC: NEGATIVE — SIGNIFICANT CHANGE UP
BUN SERPL-MCNC: 12 MG/DL — SIGNIFICANT CHANGE UP (ref 7–23)
CALCIUM SERPL-MCNC: 9.3 MG/DL — SIGNIFICANT CHANGE UP (ref 8.5–10.1)
CHLORIDE SERPL-SCNC: 101 MMOL/L — SIGNIFICANT CHANGE UP (ref 96–108)
CO2 SERPL-SCNC: 27 MMOL/L — SIGNIFICANT CHANGE UP (ref 22–31)
COLOR SPEC: YELLOW — SIGNIFICANT CHANGE UP
CREAT SERPL-MCNC: 1.2 MG/DL — SIGNIFICANT CHANGE UP (ref 0.5–1.3)
DIFF PNL FLD: NEGATIVE — SIGNIFICANT CHANGE UP
EGFR: 77 ML/MIN/1.73M2 — SIGNIFICANT CHANGE UP
EOSINOPHIL # BLD AUTO: 0.25 K/UL — SIGNIFICANT CHANGE UP (ref 0–0.5)
EOSINOPHIL NFR BLD AUTO: 2.5 % — SIGNIFICANT CHANGE UP (ref 0–6)
GLUCOSE SERPL-MCNC: 533 MG/DL — CRITICAL HIGH (ref 70–99)
GLUCOSE UR QL: 1000 MG/DL
HCT VFR BLD CALC: 42.8 % — SIGNIFICANT CHANGE UP (ref 39–50)
HGB BLD-MCNC: 14.5 G/DL — SIGNIFICANT CHANGE UP (ref 13–17)
IMM GRANULOCYTES NFR BLD AUTO: 0.3 % — SIGNIFICANT CHANGE UP (ref 0–0.9)
KETONES UR-MCNC: ABNORMAL
LACTATE SERPL-SCNC: 1.9 MMOL/L — SIGNIFICANT CHANGE UP (ref 0.7–2)
LEUKOCYTE ESTERASE UR-ACNC: NEGATIVE — SIGNIFICANT CHANGE UP
LIDOCAIN IGE QN: 41 U/L — LOW (ref 73–393)
LYMPHOCYTES # BLD AUTO: 1.63 K/UL — SIGNIFICANT CHANGE UP (ref 1–3.3)
LYMPHOCYTES # BLD AUTO: 16.3 % — SIGNIFICANT CHANGE UP (ref 13–44)
MAGNESIUM SERPL-MCNC: 2.3 MG/DL — SIGNIFICANT CHANGE UP (ref 1.6–2.6)
MCHC RBC-ENTMCNC: 28.5 PG — SIGNIFICANT CHANGE UP (ref 27–34)
MCHC RBC-ENTMCNC: 33.9 G/DL — SIGNIFICANT CHANGE UP (ref 32–36)
MCV RBC AUTO: 84.1 FL — SIGNIFICANT CHANGE UP (ref 80–100)
MONOCYTES # BLD AUTO: 0.54 K/UL — SIGNIFICANT CHANGE UP (ref 0–0.9)
MONOCYTES NFR BLD AUTO: 5.4 % — SIGNIFICANT CHANGE UP (ref 2–14)
NEUTROPHILS # BLD AUTO: 7.53 K/UL — HIGH (ref 1.8–7.4)
NEUTROPHILS NFR BLD AUTO: 75 % — SIGNIFICANT CHANGE UP (ref 43–77)
NITRITE UR-MCNC: NEGATIVE — SIGNIFICANT CHANGE UP
NRBC # BLD: 0 /100 WBCS — SIGNIFICANT CHANGE UP (ref 0–0)
PH UR: 6.5 — SIGNIFICANT CHANGE UP (ref 5–8)
PLATELET # BLD AUTO: 346 K/UL — SIGNIFICANT CHANGE UP (ref 150–400)
POTASSIUM SERPL-MCNC: 4.7 MMOL/L — SIGNIFICANT CHANGE UP (ref 3.5–5.3)
POTASSIUM SERPL-SCNC: 4.7 MMOL/L — SIGNIFICANT CHANGE UP (ref 3.5–5.3)
PROT SERPL-MCNC: 7.5 GM/DL — SIGNIFICANT CHANGE UP (ref 6–8.3)
PROT UR-MCNC: NEGATIVE MG/DL — SIGNIFICANT CHANGE UP
RBC # BLD: 5.09 M/UL — SIGNIFICANT CHANGE UP (ref 4.2–5.8)
RBC # FLD: 13.5 % — SIGNIFICANT CHANGE UP (ref 10.3–14.5)
SODIUM SERPL-SCNC: 134 MMOL/L — LOW (ref 135–145)
SP GR SPEC: 1.01 — SIGNIFICANT CHANGE UP (ref 1.01–1.02)
UROBILINOGEN FLD QL: NEGATIVE MG/DL — SIGNIFICANT CHANGE UP
WBC # BLD: 10.03 K/UL — SIGNIFICANT CHANGE UP (ref 3.8–10.5)
WBC # FLD AUTO: 10.03 K/UL — SIGNIFICANT CHANGE UP (ref 3.8–10.5)

## 2023-04-20 PROCEDURE — 93010 ELECTROCARDIOGRAM REPORT: CPT

## 2023-04-20 PROCEDURE — 74177 CT ABD & PELVIS W/CONTRAST: CPT | Mod: 26,MA

## 2023-04-20 PROCEDURE — 99285 EMERGENCY DEPT VISIT HI MDM: CPT

## 2023-04-20 RX ORDER — INSULIN HUMAN 100 [IU]/ML
6 INJECTION, SOLUTION SUBCUTANEOUS ONCE
Refills: 0 | Status: COMPLETED | OUTPATIENT
Start: 2023-04-20 | End: 2023-04-20

## 2023-04-20 RX ORDER — SODIUM CHLORIDE 9 MG/ML
1000 INJECTION INTRAMUSCULAR; INTRAVENOUS; SUBCUTANEOUS ONCE
Refills: 0 | Status: COMPLETED | OUTPATIENT
Start: 2023-04-20 | End: 2023-04-20

## 2023-04-20 RX ORDER — IOHEXOL 300 MG/ML
30 INJECTION, SOLUTION INTRAVENOUS ONCE
Refills: 0 | Status: COMPLETED | OUTPATIENT
Start: 2023-04-20 | End: 2023-04-20

## 2023-04-20 RX ORDER — MORPHINE SULFATE 50 MG/1
4 CAPSULE, EXTENDED RELEASE ORAL ONCE
Refills: 0 | Status: DISCONTINUED | OUTPATIENT
Start: 2023-04-20 | End: 2023-04-20

## 2023-04-20 RX ORDER — KETOROLAC TROMETHAMINE 30 MG/ML
15 SYRINGE (ML) INJECTION ONCE
Refills: 0 | Status: DISCONTINUED | OUTPATIENT
Start: 2023-04-20 | End: 2023-04-20

## 2023-04-20 RX ORDER — INSULIN HUMAN 100 [IU]/ML
4 INJECTION, SOLUTION SUBCUTANEOUS ONCE
Refills: 0 | Status: COMPLETED | OUTPATIENT
Start: 2023-04-20 | End: 2023-04-20

## 2023-04-20 RX ORDER — ONDANSETRON 8 MG/1
4 TABLET, FILM COATED ORAL ONCE
Refills: 0 | Status: COMPLETED | OUTPATIENT
Start: 2023-04-20 | End: 2023-04-20

## 2023-04-20 RX ORDER — METOCLOPRAMIDE HCL 10 MG
10 TABLET ORAL ONCE
Refills: 0 | Status: COMPLETED | OUTPATIENT
Start: 2023-04-20 | End: 2023-04-20

## 2023-04-20 RX ADMIN — MORPHINE SULFATE 4 MILLIGRAM(S): 50 CAPSULE, EXTENDED RELEASE ORAL at 21:43

## 2023-04-20 RX ADMIN — IOHEXOL 30 MILLILITER(S): 300 INJECTION, SOLUTION INTRAVENOUS at 21:43

## 2023-04-20 RX ADMIN — MORPHINE SULFATE 4 MILLIGRAM(S): 50 CAPSULE, EXTENDED RELEASE ORAL at 16:51

## 2023-04-20 RX ADMIN — INSULIN HUMAN 4 UNIT(S): 100 INJECTION, SOLUTION SUBCUTANEOUS at 18:57

## 2023-04-20 RX ADMIN — MORPHINE SULFATE 4 MILLIGRAM(S): 50 CAPSULE, EXTENDED RELEASE ORAL at 16:21

## 2023-04-20 RX ADMIN — MORPHINE SULFATE 4 MILLIGRAM(S): 50 CAPSULE, EXTENDED RELEASE ORAL at 21:40

## 2023-04-20 RX ADMIN — INSULIN HUMAN 4 UNIT(S): 100 INJECTION, SOLUTION SUBCUTANEOUS at 22:11

## 2023-04-20 RX ADMIN — INSULIN HUMAN 6 UNIT(S): 100 INJECTION, SOLUTION SUBCUTANEOUS at 17:56

## 2023-04-20 RX ADMIN — Medication 15 MILLIGRAM(S): at 18:16

## 2023-04-20 RX ADMIN — ONDANSETRON 4 MILLIGRAM(S): 8 TABLET, FILM COATED ORAL at 21:40

## 2023-04-20 RX ADMIN — Medication 10 MILLIGRAM(S): at 18:16

## 2023-04-20 RX ADMIN — SODIUM CHLORIDE 1000 MILLILITER(S): 9 INJECTION INTRAMUSCULAR; INTRAVENOUS; SUBCUTANEOUS at 16:22

## 2023-04-20 RX ADMIN — Medication 15 MILLIGRAM(S): at 18:38

## 2023-04-20 RX ADMIN — SODIUM CHLORIDE 1000 MILLILITER(S): 9 INJECTION INTRAMUSCULAR; INTRAVENOUS; SUBCUTANEOUS at 18:36

## 2023-04-20 NOTE — ED PROVIDER NOTE - PROGRESS NOTE DETAILS
Results reported to patient--grossly benign, labs unremarkable, CT shows constipation, sugar normalized   Pt. reports feeling better after meds  pt. agrees to f/u with primary care outpt., GI and endo referrals given for urgent f/u   pt. understands to return to ED if symptoms worsen; will d/c with colace/senna, otherwise no change to med regimen

## 2023-04-20 NOTE — ED PROVIDER NOTE - CARE PROVIDER_API CALL
[place] [address]  Phone: [phone]  Fax: [phone]  Follow Up Time: Urgent

## 2023-04-20 NOTE — ED PROVIDER NOTE - OBJECTIVE STATEMENT
41 yo M PMH DM, gastroparesis, gastritis, canibonoid hyperemesis sx here for chronic LUQ pain n/v and elevated sugars, stated in traige insulin pump not working? Pt afebrile. No abdominal surgical hx. Denies uri/uti sx. 41 yo M PMH DM, gastroparesis, gastritis, canibonoid hyperemesis sx here for chronic LUQ pain n/v and elevated sugars, stated in traige insulin pump not working? Pt afebrile. No abdominal surgical hx. Denies uri/uti sx. No active vomiting on exam. 41 yo M PMH DM, gastroparesis, gastritis, cannabinoid hyperemesis sx here for chronic LUQ pain n/v and elevated sugars, stated in traige insulin pump not working? Pt afebrile. No abdominal surgical hx. Denies uri/uti sx. No active vomiting on exam.

## 2023-04-20 NOTE — ED PROVIDER NOTE - NSFOLLOWUPCLINICS_GEN_ALL_ED_FT
[facility] End[facility]  Endocrinology  [address] [place] [address]  Phone: [phone]  Fax:   Follow Up Time: Urgent

## 2023-04-20 NOTE — ED ADULT TRIAGE NOTE - CHIEF COMPLAINT QUOTE
patient BIBA c/o of abdominal pain and hyperglycemia  ( 500) started this morning , c/o of mild  chest pain  and difficulty breathing , patient stated " I have insulin pump and it is not working "

## 2023-04-20 NOTE — ED PROVIDER NOTE - PATIENT PORTAL LINK FT
You can access the FollowMyHealth Patient Portal offered by [facility] by registering at the following website: [email or web address]. By joining [employer]’s FollowMyHealth portal, you will also be able to view your health information using other applications (apps) compatible with our system.

## 2023-04-20 NOTE — ED PROVIDER NOTE - CLINICAL SUMMARY MEDICAL DECISION MAKING FREE TEXT BOX
Pt with hx dm, gastroparesis, canabinoid hyperemesis sx, gastritis here for luq abdominal pain (chronic) and n/v with elevated fsg 500s at home. Will r/o dka. Abdominal exam benign. Denies uri/uti sx. Will fluid hydrate, and r/o dka. Pt with hx dm, gastroparesis, canabinoid hyperemesis sx, gastritis here for luq abdominal pain (chronic) and n/v with elevated fsg 500s at home. Will r/o dka. Abdominal exam benign. Denies uri/uti sx. Will fluid hydrate, and r/o dka.    No DKA, bicarb 27 on chem and AG 6. Pt with hx dm, gastroparesis, canabinoid hyperemesis sx, gastritis here for luq abdominal pain (chronic) and n/v with elevated fsg 500s at home. Will r/o dka. Abdominal exam benign. Denies uri/uti sx. Will fluid hydrate, and r/o dka.    No DKA, bicarb 27 on chem and AG 6.    On reassessment pt sleeping in NAD. No n/v in ED. Will continue to reassess fsg. PO challenge for dispo.

## 2023-04-20 NOTE — ED PROVIDER NOTE - MUSCULOSKELETAL NEGATIVE STATEMENT, MLM
Assessment and Plan





A: POD#1 s/p Repeat  section


    Ambulating, eating and voiding without difficulty


    Pain well controlled


    Stable





P: Routine PP/PO orders


    Anticipate discharge home in 24-48 hrs








Subjective





- Subjective


Date of service: 18


Principal diagnosis: POD#1 s/p Repeat c/s


Interval history: 





see H&P and operative note


Patient reports: appetite normal, voiding normally, pain well controlled, flatus

, ambulating normally, no bowel movement


[facility]: doing well, bottle feeding





Objective





- Vital Signs


Latest vital signs: 


 Vital Signs











  Temp Pulse Resp BP BP Pulse Ox


 


 18 05:55  98.8 F  73  18   100/64 


 


 18 01:26  98.1 F  79  18   99/63 


 


 18 21:25  97.1 F L  69  18   108/70 


 


 18 17:01  97.0 F L  84  18  100/62   94


 


 18 14:30  97.7 F  78  16  110/69   97


 


 18 14:09  98.0 F  75  16  103/69   97


 


 18 13:55  97.6 F  77  16  101/66   97


 


 18 13:40   73  16  102/65   98


 


 18 13:25   80  16  101/66  


 


 18 13:20   77  18  102/62   98


 


 18 13:15   83  20  96/65   98


 


 18 13:10  97.6 F  95 H  20  104/67   98


 


 18 10:44   75   126/89  








 Intake and Output











 18





 23:59 07:59 15:59


 


Intake Total 100 480 


 


Output Total 2000 300 


 


Balance -1900 180 


 


Intake:   


 


  Intake, Free Water 100 480 


 


Output:   


 


  Urine 2000 300 


 


    Indwelling Catheter 2000  


 


    Void  300 


 


Other:   


 


  Total, Output Amount 400 300 


 


  # Voids   


 


    Void 1 1 














- Exam


Breasts: Present: normal


Cardiovascular: Present: Regular rate, Normal S1, Normal S2, No murmurs


Lungs: Present: Clear to auscultation, Normal air movement


Abdomen: Present: normal appearance, soft, tenderness (as expected post-op), 

normal bowel sounds.  Absent: distention


Vulva: both: normal


Uterus: Present: firm, fundal height at umbilicus


Extremities: Present: normal


Deep Tendon Reflex Grade: Normal +2


Incision: Present: normal (Pressure dressing removed. LTI, closed with SQ 

sutures and sterti strips. CDI, no drainage.), dry, intact





- Labs


Labs: 


 Abnormal lab results











  18 Range/Units





  10:20 


 


Seg Neutrophils %  78.2 H  (40.0-70.0)  % no back pain, no gout, no musculoskeletal pain, no neck pain, and no weakness.

## 2023-04-20 NOTE — ED ADULT NURSE NOTE - OBJECTIVE STATEMENT
Pt AAOx4. 42 year old male presents to ED with c/o abdominal pain and vomiting since this morning. Patient reports chest pain and abdominal pain rated 10/10. Patient reports vomiting x2 this morning. Hyperglycemic.

## 2023-04-21 VITALS
SYSTOLIC BLOOD PRESSURE: 124 MMHG | RESPIRATION RATE: 15 BRPM | TEMPERATURE: 98 F | DIASTOLIC BLOOD PRESSURE: 72 MMHG | HEART RATE: 69 BPM | OXYGEN SATURATION: 100 %

## 2023-04-21 RX ORDER — SENNA PLUS 8.6 MG/1
2 TABLET ORAL
Qty: 6 | Refills: 0
Start: 2023-04-21 | End: 2023-04-23

## 2023-04-21 RX ORDER — DOCUSATE SODIUM 100 MG
1 CAPSULE ORAL
Qty: 10 | Refills: 0
Start: 2023-04-21 | End: 2023-04-25

## 2023-04-21 NOTE — ED ADULT NURSE REASSESSMENT NOTE - NS ED NURSE REASSESS COMMENT FT1
Discharge instructions provided and verbalizes understanding of medication regimen and follow up care. Educational material provided. No acute distress noted. Respirations even and unlabored. pt ambulatory with steady gait.
Pt received from Day JERRY Cason. Pt resting comfortably inbed. Pt reports no acute distress at this time. Will continue to monitor.

## 2023-05-02 NOTE — CONSULT NOTE ADULT - PROBLEM SELECTOR RECOMMENDATION 2
Called pt with lab results: ALVA for callback.       Echo on 5/26   EGD when stable if needed as inpatient or out patient if tolerating diet

## 2023-05-11 NOTE — ED ADULT NURSE NOTE - NSSEPSISSUSPECTED_ED_A_ED
No
I have personally provided the amount of critical care time documented below concurrently with the resident/fellow.  This time excludes time spent on separate procedures and time spent teaching. I have reviewed the resident’s / fellow’s documentation and I agree with the history, exam, and assessment and plan of care.

## 2023-05-22 ENCOUNTER — EMERGENCY (EMERGENCY)
Facility: HOSPITAL | Age: 43
LOS: 0 days | Discharge: HOME | End: 2023-05-22
Attending: STUDENT IN AN ORGANIZED HEALTH CARE EDUCATION/TRAINING PROGRAM
Payer: MEDICAID

## 2023-05-22 VITALS
SYSTOLIC BLOOD PRESSURE: 139 MMHG | DIASTOLIC BLOOD PRESSURE: 84 MMHG | HEART RATE: 69 BPM | WEIGHT: 154.98 LBS | TEMPERATURE: 98 F | RESPIRATION RATE: 19 BRPM | HEIGHT: 71 IN | OXYGEN SATURATION: 99 %

## 2023-05-22 VITALS
TEMPERATURE: 99 F | HEART RATE: 75 BPM | RESPIRATION RATE: 13 BRPM | SYSTOLIC BLOOD PRESSURE: 108 MMHG | OXYGEN SATURATION: 100 % | DIASTOLIC BLOOD PRESSURE: 59 MMHG

## 2023-05-22 DIAGNOSIS — Z90.49 ACQUIRED ABSENCE OF OTHER SPECIFIED PARTS OF DIGESTIVE TRACT: Chronic | ICD-10-CM

## 2023-05-22 DIAGNOSIS — Z91.018 ALLERGY TO OTHER FOODS: ICD-10-CM

## 2023-05-22 DIAGNOSIS — Z90.49 ACQUIRED ABSENCE OF OTHER SPECIFIED PARTS OF DIGESTIVE TRACT: ICD-10-CM

## 2023-05-22 DIAGNOSIS — K31.84 GASTROPARESIS: ICD-10-CM

## 2023-05-22 DIAGNOSIS — R11.2 NAUSEA WITH VOMITING, UNSPECIFIED: ICD-10-CM

## 2023-05-22 DIAGNOSIS — Z79.4 LONG TERM (CURRENT) USE OF INSULIN: ICD-10-CM

## 2023-05-22 DIAGNOSIS — F41.9 ANXIETY DISORDER, UNSPECIFIED: ICD-10-CM

## 2023-05-22 DIAGNOSIS — E10.43 TYPE 1 DIABETES MELLITUS WITH DIABETIC AUTONOMIC (POLY)NEUROPATHY: ICD-10-CM

## 2023-05-22 DIAGNOSIS — E73.9 LACTOSE INTOLERANCE, UNSPECIFIED: ICD-10-CM

## 2023-05-22 DIAGNOSIS — R10.84 GENERALIZED ABDOMINAL PAIN: ICD-10-CM

## 2023-05-22 DIAGNOSIS — R00.1 BRADYCARDIA, UNSPECIFIED: ICD-10-CM

## 2023-05-22 DIAGNOSIS — J45.909 UNSPECIFIED ASTHMA, UNCOMPLICATED: ICD-10-CM

## 2023-05-22 DIAGNOSIS — G89.29 OTHER CHRONIC PAIN: ICD-10-CM

## 2023-05-22 DIAGNOSIS — Z83.3 FAMILY HISTORY OF DIABETES MELLITUS: ICD-10-CM

## 2023-05-22 LAB
ALBUMIN SERPL ELPH-MCNC: 3.5 G/DL — SIGNIFICANT CHANGE UP (ref 3.3–5)
ALP SERPL-CCNC: 106 U/L — SIGNIFICANT CHANGE UP (ref 40–120)
ALT FLD-CCNC: 35 U/L — SIGNIFICANT CHANGE UP (ref 12–78)
ANION GAP SERPL CALC-SCNC: 7 MMOL/L — SIGNIFICANT CHANGE UP (ref 5–17)
AST SERPL-CCNC: 34 U/L — SIGNIFICANT CHANGE UP (ref 15–37)
BASOPHILS # BLD AUTO: 0.06 K/UL — SIGNIFICANT CHANGE UP (ref 0–0.2)
BASOPHILS NFR BLD AUTO: 0.5 % — SIGNIFICANT CHANGE UP (ref 0–2)
BILIRUB SERPL-MCNC: 0.4 MG/DL — SIGNIFICANT CHANGE UP (ref 0.2–1.2)
BUN SERPL-MCNC: 19 MG/DL — SIGNIFICANT CHANGE UP (ref 7–23)
CALCIUM SERPL-MCNC: 9.2 MG/DL — SIGNIFICANT CHANGE UP (ref 8.5–10.1)
CHLORIDE SERPL-SCNC: 103 MMOL/L — SIGNIFICANT CHANGE UP (ref 96–108)
CO2 SERPL-SCNC: 30 MMOL/L — SIGNIFICANT CHANGE UP (ref 22–31)
CREAT SERPL-MCNC: 0.96 MG/DL — SIGNIFICANT CHANGE UP (ref 0.5–1.3)
EGFR: 101 ML/MIN/1.73M2 — SIGNIFICANT CHANGE UP
EOSINOPHIL # BLD AUTO: 0.14 K/UL — SIGNIFICANT CHANGE UP (ref 0–0.5)
EOSINOPHIL NFR BLD AUTO: 1.2 % — SIGNIFICANT CHANGE UP (ref 0–6)
GLUCOSE SERPL-MCNC: 255 MG/DL — HIGH (ref 70–99)
HCT VFR BLD CALC: 38.1 % — LOW (ref 39–50)
HGB BLD-MCNC: 12.9 G/DL — LOW (ref 13–17)
IMM GRANULOCYTES NFR BLD AUTO: 0.3 % — SIGNIFICANT CHANGE UP (ref 0–0.9)
LIDOCAIN IGE QN: 31 U/L — LOW (ref 73–393)
LYMPHOCYTES # BLD AUTO: 1.55 K/UL — SIGNIFICANT CHANGE UP (ref 1–3.3)
LYMPHOCYTES # BLD AUTO: 13 % — SIGNIFICANT CHANGE UP (ref 13–44)
MCHC RBC-ENTMCNC: 28.2 PG — SIGNIFICANT CHANGE UP (ref 27–34)
MCHC RBC-ENTMCNC: 33.9 G/DL — SIGNIFICANT CHANGE UP (ref 32–36)
MCV RBC AUTO: 83.4 FL — SIGNIFICANT CHANGE UP (ref 80–100)
MONOCYTES # BLD AUTO: 0.34 K/UL — SIGNIFICANT CHANGE UP (ref 0–0.9)
MONOCYTES NFR BLD AUTO: 2.9 % — SIGNIFICANT CHANGE UP (ref 2–14)
NEUTROPHILS # BLD AUTO: 9.77 K/UL — HIGH (ref 1.8–7.4)
NEUTROPHILS NFR BLD AUTO: 82.1 % — HIGH (ref 43–77)
NRBC # BLD: 0 /100 WBCS — SIGNIFICANT CHANGE UP (ref 0–0)
PLATELET # BLD AUTO: 502 K/UL — HIGH (ref 150–400)
POTASSIUM SERPL-MCNC: 3.8 MMOL/L — SIGNIFICANT CHANGE UP (ref 3.5–5.3)
POTASSIUM SERPL-SCNC: 3.8 MMOL/L — SIGNIFICANT CHANGE UP (ref 3.5–5.3)
PROT SERPL-MCNC: 7.9 GM/DL — SIGNIFICANT CHANGE UP (ref 6–8.3)
RBC # BLD: 4.57 M/UL — SIGNIFICANT CHANGE UP (ref 4.2–5.8)
RBC # FLD: 13.3 % — SIGNIFICANT CHANGE UP (ref 10.3–14.5)
SODIUM SERPL-SCNC: 140 MMOL/L — SIGNIFICANT CHANGE UP (ref 135–145)
WBC # BLD: 11.89 K/UL — HIGH (ref 3.8–10.5)
WBC # FLD AUTO: 11.89 K/UL — HIGH (ref 3.8–10.5)

## 2023-05-22 PROCEDURE — 93010 ELECTROCARDIOGRAM REPORT: CPT | Mod: 76

## 2023-05-22 PROCEDURE — 99285 EMERGENCY DEPT VISIT HI MDM: CPT

## 2023-05-22 RX ORDER — OXYCODONE AND ACETAMINOPHEN 5; 325 MG/1; MG/1
1 TABLET ORAL
Qty: 12 | Refills: 0
Start: 2023-05-22 | End: 2023-05-24

## 2023-05-22 RX ORDER — FAMOTIDINE 10 MG/ML
20 INJECTION INTRAVENOUS ONCE
Refills: 0 | Status: COMPLETED | OUTPATIENT
Start: 2023-05-22 | End: 2023-05-22

## 2023-05-22 RX ORDER — HALOPERIDOL DECANOATE 100 MG/ML
5 INJECTION INTRAMUSCULAR ONCE
Refills: 0 | Status: DISCONTINUED | OUTPATIENT
Start: 2023-05-22 | End: 2023-05-22

## 2023-05-22 RX ORDER — KETOROLAC TROMETHAMINE 30 MG/ML
15 SYRINGE (ML) INJECTION ONCE
Refills: 0 | Status: DISCONTINUED | OUTPATIENT
Start: 2023-05-22 | End: 2023-05-22

## 2023-05-22 RX ORDER — HALOPERIDOL DECANOATE 100 MG/ML
5 INJECTION INTRAMUSCULAR ONCE
Refills: 0 | Status: COMPLETED | OUTPATIENT
Start: 2023-05-22 | End: 2023-05-22

## 2023-05-22 RX ORDER — METOCLOPRAMIDE HCL 10 MG
10 TABLET ORAL ONCE
Refills: 0 | Status: COMPLETED | OUTPATIENT
Start: 2023-05-22 | End: 2023-05-22

## 2023-05-22 RX ORDER — SODIUM CHLORIDE 9 MG/ML
1000 INJECTION INTRAMUSCULAR; INTRAVENOUS; SUBCUTANEOUS ONCE
Refills: 0 | Status: COMPLETED | OUTPATIENT
Start: 2023-05-22 | End: 2023-05-22

## 2023-05-22 RX ORDER — MORPHINE SULFATE 50 MG/1
4 CAPSULE, EXTENDED RELEASE ORAL ONCE
Refills: 0 | Status: DISCONTINUED | OUTPATIENT
Start: 2023-05-22 | End: 2023-05-22

## 2023-05-22 RX ADMIN — Medication 30 MILLILITER(S): at 08:15

## 2023-05-22 RX ADMIN — HALOPERIDOL DECANOATE 5 MILLIGRAM(S): 100 INJECTION INTRAMUSCULAR at 09:07

## 2023-05-22 RX ADMIN — MORPHINE SULFATE 4 MILLIGRAM(S): 50 CAPSULE, EXTENDED RELEASE ORAL at 09:05

## 2023-05-22 RX ADMIN — FAMOTIDINE 20 MILLIGRAM(S): 10 INJECTION INTRAVENOUS at 08:15

## 2023-05-22 RX ADMIN — Medication 15 MILLIGRAM(S): at 09:05

## 2023-05-22 RX ADMIN — SODIUM CHLORIDE 1000 MILLILITER(S): 9 INJECTION INTRAMUSCULAR; INTRAVENOUS; SUBCUTANEOUS at 09:22

## 2023-05-22 RX ADMIN — Medication 15 MILLIGRAM(S): at 08:14

## 2023-05-22 RX ADMIN — SODIUM CHLORIDE 1000 MILLILITER(S): 9 INJECTION INTRAMUSCULAR; INTRAVENOUS; SUBCUTANEOUS at 11:47

## 2023-05-22 RX ADMIN — MORPHINE SULFATE 4 MILLIGRAM(S): 50 CAPSULE, EXTENDED RELEASE ORAL at 08:14

## 2023-05-22 RX ADMIN — Medication 10 MILLIGRAM(S): at 08:15

## 2023-05-22 RX ADMIN — SODIUM CHLORIDE 1000 MILLILITER(S): 9 INJECTION INTRAMUSCULAR; INTRAVENOUS; SUBCUTANEOUS at 13:15

## 2023-05-22 RX ADMIN — SODIUM CHLORIDE 1000 MILLILITER(S): 9 INJECTION INTRAMUSCULAR; INTRAVENOUS; SUBCUTANEOUS at 08:22

## 2023-05-22 NOTE — ED PROVIDER NOTE - MUSCULOSKELETAL, MLM
Spine appears normal, range of motion is not limited, no muscle or joint tenderness Spine appears normal, range of motion is not limited, no muscle or joint tenderness. + Right ortho boot in place.

## 2023-05-22 NOTE — ED ADULT TRIAGE NOTE - CCCP TRG CHIEF CMPLNT
on call.
abdominal pain

## 2023-05-22 NOTE — ED ADULT NURSE NOTE - NSFALLRISKINTERV_ED_ALL_ED

## 2023-05-22 NOTE — ED PROVIDER NOTE - OBJECTIVE STATEMENT
42M with PMH DM, Gastritis, Gastroparesis, chronic abdominal pain, ?cannabinoid hyperemesis presenting with diffuse abdominal cramping pain x 4 AM this morning associated with nausea/vomiting. Reporting multiple episodes non-bilious/non-bloody emesis, no new foods/medications. Patient with multiple ED visits for the same symptoms. Denies fever, chills, chest pain, shortness of breath, diarrhea, dysuria, urinary frequency/urgency, extremity weakness/numbness/tingling, lightheadedness, dizziness, or headaches. 42M with PMH DM, Asthma, Anxiety, Gastritis, Gastroparesis, chronic abdominal pain, ?cannabinoid hyperemesis presenting with diffuse abdominal cramping pain x 4 AM this morning associated with nausea/vomiting. Reporting multiple episodes non-bilious/non-bloody emesis, no new foods/medications, denies associated aggravating/alleviating factors. Patient with multiple ED visits for the same symptoms. No known ill contacts, no recent travel. Denies fever, chills, chest pain, shortness of breath, diarrhea, dysuria, urinary frequency/urgency, extremity weakness/numbness/tingling, lightheadedness, dizziness, or headaches. 42M with PMH DM, Asthma, Anxiety, Gastritis, Gastroparesis, chronic abdominal pain, ?cannabinoid hyperemesis presenting with diffuse abdominal cramping pain x 4 AM this morning associated with nausea/vomiting. Reporting multiple episodes non-bilious/non-bloody emesis, no new foods/medications, denies associated aggravating/alleviating factors. Patient with multiple ED visits for the same symptoms. Additionally, pt reporting right ankle pain, known fractures with ortho boot in place, pt with follow up on Wednesday with Ortho, states that he is unable to take pain medications for ankle pain due to vomiting. No known ill contacts, no recent travel. Denies fever, chills, chest pain, shortness of breath, diarrhea, dysuria, urinary frequency/urgency, extremity weakness/numbness/tingling, lightheadedness, dizziness, or headaches. 42M with PMH DM, Asthma, Anxiety, Gastritis, Gastroparesis, chronic abdominal pain, ?cannabinoid hyperemesis presenting with diffuse abdominal cramping pain x 4 AM this morning associated with nausea/vomiting. Reporting multiple episodes non-bilious/non-bloody emesis, no new foods/medications, denies associated aggravating/alleviating factors. Patient with multiple ED visits for the same symptoms. Additionally, pt reporting right ankle pain, known fractures with ortho boot in place, pt with [facility] follow up on this upcoming Wednesday, states that he is unable to take pain medications for ankle pain due to vomiting. No known ill contacts, no recent travel. Denies fever, chills, chest pain, shortness of breath, diarrhea, dysuria, urinary frequency/urgency, extremity weakness/numbness/tingling, lightheadedness, dizziness, or headaches.

## 2023-05-22 NOTE — ED PROVIDER NOTE - CLINICAL SUMMARY MEDICAL DECISION MAKING FREE TEXT BOX
42M with PMH DM, Gastritis, Gastroparesis, chronic abdominal pain, ? cannabinoid hyperemesis presenting with diffuse abdominal cramping pain x 4 AM this morning associated with nausea/vomiting. Patient currently afebrile, hemodynamically stable, spO2 99%. Based on history and physical, differentials include but are not limited to Gastritis/GERD, viral gastroenteritis, cannabinoid hyperemesis. Plan to assess patient for acute pathology as listed above with Labs, EKG. Will administer medications for symptomatic relief, follow-up on results, and reassess. 42M with PMH DM, Gastritis, Gastroparesis, chronic abdominal pain, ? cannabinoid hyperemesis presenting with diffuse abdominal cramping pain x 4 AM this morning associated with nausea/vomiting. Patient currently afebrile, hemodynamically stable, spO2 99%. Based on history and physical, differentials include but are not limited to Gastritis/GERD, viral gastroenteritis, cannabinoid hyperemesis/cyclic vomiting syndrome. Plan to assess patient for acute pathology as listed above with Labs, EKG. Will administer medications for symptomatic relief, follow-up on results, and reassess.

## 2023-05-22 NOTE — ED PROVIDER NOTE - NSICDXPASTSURGICALHX_GEN_ALL_CORE_FT
PAST SURGICAL HISTORY:  History of cholecystectomy     S/P cholecystectomy     
Iggy Cooney(Attending)

## 2023-05-22 NOTE — ED PROVIDER NOTE - NSFOLLOWUPINSTRUCTIONS_ED_ALL_ED_FT
Advance activity as tolerated.  Continue all previously prescribed medications as directed unless otherwise instructed.  Follow up with your primary care physician in 48-72 hours- bring copies of your results.  Return to the ER for worsening or persistent symptoms, and/or ANY NEW OR CONCERNING SYMPTOMS high fever, worsening abdominal severity, blood in vomit/stool/urine, weakness/numbness/tingling of the arms/legs, lightheadedness/dizziness, or headaches. If you have issues obtaining follow up, please call: [phone] to obtain a doctor or specialist who takes your insurance in your area.  You may call [phone] to make an appointment with the internal medicine clinic.    Nausea and Vomiting, Adult    Nausea is feeling sick to your stomach or feeling that you are about to throw up (vomit). Vomiting is when food in your stomach is thrown up and out of the mouth. Throwing up can make you feel weak. It can also make you lose too much water in your body (get dehydrated). If you lose too much water in your body, you may:    Feel tired.   Feel thirsty.   Have a dry mouth.  Have cracked lips.  Go pee (urinate) less often.    Older adults and people with other diseases or a weak body defense system (immune system) are at higher risk for losing too much water in the body. If you feel sick to your stomach and you throw up, it is important to follow instructions from your doctor about how to take care of yourself.    Follow these instructions at home:  Watch your symptoms for any changes. Tell your doctor about them. Follow these instructions to care for yourself at home.        Eating and drinking      Take an ORS (oral rehydration solution). This is a drink that is sold at pharmacies and stores.  Drink clear fluids in small amounts as you are able, such as:    Water.  Ice chips.  Fruit juice that has water added (diluted fruit juice).  Low-calorie sports drinks.  Eat bland, easy-to-digest foods in small amounts as you are able, such as:    Bananas.  Applesauce.  Rice.  Low-fat (lean) meats.  Toast.  Crackers.  Avoid drinking fluids that have a lot of sugar or caffeine in them. This includes energy drinks, sports drinks, and soda.  Avoid alcohol.  Avoid spicy or fatty foods.        General instructions    Take over-the-counter and prescription medicines only as told by your doctor.  Drink enough fluid to keep your pee (urine) pale yellow.  Wash your hands often with soap and water. If you cannot use soap and water, use hand .  Make sure that all people in your home wash their hands well and often.  Rest at home while you get better.  Watch your condition for any changes.  Take slow and deep breaths when you feel sick to your stomach.  Keep all follow-up visits as told by your doctor. This is important.    Contact a doctor if:  Your symptoms get worse.  You have new symptoms.  You have a fever.  You cannot drink fluids without throwing up.  You feel sick to your stomach for more than 2 days.  You feel light-headed or dizzy.  You have a headache.  You have muscle cramps.  You have a rash.  You have pain while peeing.    Get help right away if:  You have pain in your chest, neck, arm, or jaw.  You feel very weak or you pass out (faint).  You throw up again and again.  You have throw up that is bright red or looks like black coffee grounds.  You have bloody or black poop (stools) or poop that looks like tar.  You have a very bad headache, a stiff neck, or both.  You have very bad pain, cramping, or bloating in your belly (abdomen).  You have trouble breathing.  You are breathing very quickly.  Your heart is beating very quickly.  Your skin feels cold and clammy.  You feel confused.  You have signs of losing too much water in your body, such as:    Dark pee, very little pee, or no pee.  Cracked lips.  Dry mouth.  Sunken eyes.  Sleepiness.  Weakness.    These symptoms may be an emergency. Do not wait to see if the symptoms will go away. Get medical help right away. Call your local emergency services (911 in the [place] Do not drive yourself to the hospital.    Summary  Nausea is feeling sick to your stomach or feeling that you are about to throw up (vomit). Vomiting is when food in your stomach is thrown up and out of the mouth.  Follow instructions from your doctor about eating and drinking to keep from losing too much water in your body.  Take over-the-counter and prescription medicines only as told by your doctor.  Contact your doctor if your symptoms get worse or you have new symptoms.  Keep all follow-up visits as told by your doctor. This is important.    ADDITIONAL NOTES AND INSTRUCTIONS    Please follow up with your Primary MD in 24-48 hr.  Seek immediate medical care for any new/worsening signs or symptoms.

## 2023-05-22 NOTE — ED PROVIDER NOTE - ENMT, MLM
Airway patent, Nasal mucosa clear. Mouth with normal mucosa, moist mucous membranes. Throat has no vesicles, no oropharyngeal exudates and uvula is midline.

## 2023-05-22 NOTE — ED PROVIDER NOTE - PATIENT PORTAL LINK FT
You can access the FollowMyHealth Patient Portal offered by [facility] by registering at the following website: [email or web address]. By joining [employer]’s FollowMyHealth portal, you will also be able to view your health information using other applications (apps) compatible with our system.
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
## 2023-05-22 NOTE — ED PROVIDER NOTE - PROGRESS NOTE DETAILS
BETTY Renteria: Workup reviewed. Results endorsed including unexpected incidental findings (copy of reports provided to patient). Shared Decision Making - Reassessment performed. Patient is medically stable for discharge. Strict return precautions given. Patient to follow up with PMD. Patient displays understanding and agreeable with plan, comfortable with discharge plan home. Plan for discharge discussed with  who agrees with disposition and discharge plan. BETTY Renteria: Workup reviewed - labs not actionable at this time. Results endorsed including unexpected incidental findings (copy of reports provided to patient). Shared Decision Making - Reassessment performed, pt reporting improvement of nausea/vomiting with medications, abdomen nontender upon reevaluation, PO challenge passed. Patient is medically stable for discharge. Strict return precautions given. Patient to follow up with PMD. Patient displays understanding and agreeable with plan, comfortable with discharge plan home. Plan for discharge discussed with Dr. Hancock who agrees with disposition and discharge plan. BETTY Renteria: Workup reviewed - labs not actionable at this time. Results endorsed including unexpected incidental findings (copy of reports provided to patient). Shared Decision Making - Reassessment performed, pt reporting improvement of nausea/vomiting with medications, abdomen nontender upon reevaluation, PO challenge passed. Patient continues to deny cannabinoid use. Patient is medically stable for discharge. Strict return precautions given. Patient to follow up with PMD and Gastroenterology. Patient displays understanding and agreeable with plan, comfortable with discharge plan home. Plan for discharge discussed with Dr. Hancock who agrees with disposition and discharge plan.

## 2023-05-22 NOTE — ED ADULT TRIAGE NOTE - CHIEF COMPLAINT QUOTE
patient BIBA c/o of abdominal pain with N/V and diarrhea started today denied blood in stool or urine c/o of R ankle pain ,

## 2023-05-24 ENCOUNTER — EMERGENCY (EMERGENCY)
Facility: HOSPITAL | Age: 43
LOS: 0 days | Discharge: HOME | End: 2023-05-24
Attending: STUDENT IN AN ORGANIZED HEALTH CARE EDUCATION/TRAINING PROGRAM
Payer: MEDICAID

## 2023-05-24 VITALS
RESPIRATION RATE: 18 BRPM | HEART RATE: 65 BPM | TEMPERATURE: 99 F | DIASTOLIC BLOOD PRESSURE: 75 MMHG | OXYGEN SATURATION: 100 % | SYSTOLIC BLOOD PRESSURE: 121 MMHG

## 2023-05-24 VITALS
TEMPERATURE: 99 F | OXYGEN SATURATION: 99 % | HEIGHT: 71 IN | HEART RATE: 54 BPM | WEIGHT: 149.91 LBS | SYSTOLIC BLOOD PRESSURE: 173 MMHG | RESPIRATION RATE: 18 BRPM | DIASTOLIC BLOOD PRESSURE: 107 MMHG

## 2023-05-24 DIAGNOSIS — Z90.49 ACQUIRED ABSENCE OF OTHER SPECIFIED PARTS OF DIGESTIVE TRACT: Chronic | ICD-10-CM

## 2023-05-24 DIAGNOSIS — M25.571 PAIN IN RIGHT ANKLE AND JOINTS OF RIGHT FOOT: ICD-10-CM

## 2023-05-24 DIAGNOSIS — R11.10 VOMITING, UNSPECIFIED: ICD-10-CM

## 2023-05-24 DIAGNOSIS — Z79.4 LONG TERM (CURRENT) USE OF INSULIN: ICD-10-CM

## 2023-05-24 DIAGNOSIS — Z91.018 ALLERGY TO OTHER FOODS: ICD-10-CM

## 2023-05-24 DIAGNOSIS — E73.9 LACTOSE INTOLERANCE, UNSPECIFIED: ICD-10-CM

## 2023-05-24 DIAGNOSIS — R10.9 UNSPECIFIED ABDOMINAL PAIN: ICD-10-CM

## 2023-05-24 DIAGNOSIS — K31.84 GASTROPARESIS: ICD-10-CM

## 2023-05-24 DIAGNOSIS — E11.43 TYPE 2 DIABETES MELLITUS WITH DIABETIC AUTONOMIC (POLY)NEUROPATHY: ICD-10-CM

## 2023-05-24 DIAGNOSIS — A08.4 VIRAL INTESTINAL INFECTION, UNSPECIFIED: ICD-10-CM

## 2023-05-24 LAB
ALBUMIN SERPL ELPH-MCNC: 3.5 G/DL — SIGNIFICANT CHANGE UP (ref 3.3–5)
ALP SERPL-CCNC: 98 U/L — SIGNIFICANT CHANGE UP (ref 40–120)
ALT FLD-CCNC: 27 U/L — SIGNIFICANT CHANGE UP (ref 12–78)
ANION GAP SERPL CALC-SCNC: 8 MMOL/L — SIGNIFICANT CHANGE UP (ref 5–17)
APPEARANCE UR: ABNORMAL
AST SERPL-CCNC: 20 U/L — SIGNIFICANT CHANGE UP (ref 15–37)
BACTERIA # UR AUTO: ABNORMAL
BASOPHILS # BLD AUTO: 0.04 K/UL — SIGNIFICANT CHANGE UP (ref 0–0.2)
BASOPHILS NFR BLD AUTO: 0.6 % — SIGNIFICANT CHANGE UP (ref 0–2)
BILIRUB SERPL-MCNC: 0.4 MG/DL — SIGNIFICANT CHANGE UP (ref 0.2–1.2)
BILIRUB UR-MCNC: NEGATIVE — SIGNIFICANT CHANGE UP
BUN SERPL-MCNC: 15 MG/DL — SIGNIFICANT CHANGE UP (ref 7–23)
CALCIUM SERPL-MCNC: 9.3 MG/DL — SIGNIFICANT CHANGE UP (ref 8.5–10.1)
CHLORIDE SERPL-SCNC: 97 MMOL/L — SIGNIFICANT CHANGE UP (ref 96–108)
CO2 SERPL-SCNC: 34 MMOL/L — HIGH (ref 22–31)
COLOR SPEC: YELLOW — SIGNIFICANT CHANGE UP
COMMENT - URINE: SIGNIFICANT CHANGE UP
CREAT SERPL-MCNC: 0.93 MG/DL — SIGNIFICANT CHANGE UP (ref 0.5–1.3)
DIFF PNL FLD: NEGATIVE — SIGNIFICANT CHANGE UP
EGFR: 105 ML/MIN/1.73M2 — SIGNIFICANT CHANGE UP
EOSINOPHIL # BLD AUTO: 0.02 K/UL — SIGNIFICANT CHANGE UP (ref 0–0.5)
EOSINOPHIL NFR BLD AUTO: 0.3 % — SIGNIFICANT CHANGE UP (ref 0–6)
GLUCOSE SERPL-MCNC: 258 MG/DL — HIGH (ref 70–99)
GLUCOSE UR QL: 250 MG/DL
HCT VFR BLD CALC: 38.7 % — LOW (ref 39–50)
HGB BLD-MCNC: 12.9 G/DL — LOW (ref 13–17)
IMM GRANULOCYTES NFR BLD AUTO: 0.1 % — SIGNIFICANT CHANGE UP (ref 0–0.9)
KETONES UR-MCNC: ABNORMAL
LACTATE SERPL-SCNC: 1.6 MMOL/L — SIGNIFICANT CHANGE UP (ref 0.7–2)
LEUKOCYTE ESTERASE UR-ACNC: NEGATIVE — SIGNIFICANT CHANGE UP
LIDOCAIN IGE QN: 21 U/L — LOW (ref 73–393)
LYMPHOCYTES # BLD AUTO: 1.07 K/UL — SIGNIFICANT CHANGE UP (ref 1–3.3)
LYMPHOCYTES # BLD AUTO: 15.5 % — SIGNIFICANT CHANGE UP (ref 13–44)
MAGNESIUM SERPL-MCNC: 2.1 MG/DL — SIGNIFICANT CHANGE UP (ref 1.6–2.6)
MCHC RBC-ENTMCNC: 28 PG — SIGNIFICANT CHANGE UP (ref 27–34)
MCHC RBC-ENTMCNC: 33.3 G/DL — SIGNIFICANT CHANGE UP (ref 32–36)
MCV RBC AUTO: 84.1 FL — SIGNIFICANT CHANGE UP (ref 80–100)
MONOCYTES # BLD AUTO: 0.26 K/UL — SIGNIFICANT CHANGE UP (ref 0–0.9)
MONOCYTES NFR BLD AUTO: 3.8 % — SIGNIFICANT CHANGE UP (ref 2–14)
NEUTROPHILS # BLD AUTO: 5.5 K/UL — SIGNIFICANT CHANGE UP (ref 1.8–7.4)
NEUTROPHILS NFR BLD AUTO: 79.7 % — HIGH (ref 43–77)
NITRITE UR-MCNC: NEGATIVE — SIGNIFICANT CHANGE UP
NRBC # BLD: 0 /100 WBCS — SIGNIFICANT CHANGE UP (ref 0–0)
PH UR: 9 — HIGH (ref 5–8)
PLATELET # BLD AUTO: 518 K/UL — HIGH (ref 150–400)
POTASSIUM SERPL-MCNC: 3.6 MMOL/L — SIGNIFICANT CHANGE UP (ref 3.5–5.3)
POTASSIUM SERPL-SCNC: 3.6 MMOL/L — SIGNIFICANT CHANGE UP (ref 3.5–5.3)
PROT SERPL-MCNC: 7.6 GM/DL — SIGNIFICANT CHANGE UP (ref 6–8.3)
PROT UR-MCNC: 30 MG/DL
RBC # BLD: 4.6 M/UL — SIGNIFICANT CHANGE UP (ref 4.2–5.8)
RBC # FLD: 13.4 % — SIGNIFICANT CHANGE UP (ref 10.3–14.5)
RBC CASTS # UR COMP ASSIST: NEGATIVE /HPF — SIGNIFICANT CHANGE UP (ref 0–4)
SODIUM SERPL-SCNC: 139 MMOL/L — SIGNIFICANT CHANGE UP (ref 135–145)
SP GR SPEC: 1.01 — SIGNIFICANT CHANGE UP (ref 1.01–1.02)
UROBILINOGEN FLD QL: NEGATIVE MG/DL — SIGNIFICANT CHANGE UP
WBC # BLD: 6.9 K/UL — SIGNIFICANT CHANGE UP (ref 3.8–10.5)
WBC # FLD AUTO: 6.9 K/UL — SIGNIFICANT CHANGE UP (ref 3.8–10.5)
WBC UR QL: SIGNIFICANT CHANGE UP

## 2023-05-24 PROCEDURE — 99285 EMERGENCY DEPT VISIT HI MDM: CPT

## 2023-05-24 PROCEDURE — G1004: CPT

## 2023-05-24 PROCEDURE — 74177 CT ABD & PELVIS W/CONTRAST: CPT | Mod: 26,ME

## 2023-05-24 RX ORDER — ONDANSETRON 8 MG/1
1 TABLET, FILM COATED ORAL
Qty: 15 | Refills: 0
Start: 2023-05-24 | End: 2023-05-28

## 2023-05-24 RX ORDER — SODIUM CHLORIDE 9 MG/ML
1000 INJECTION INTRAMUSCULAR; INTRAVENOUS; SUBCUTANEOUS ONCE
Refills: 0 | Status: COMPLETED | OUTPATIENT
Start: 2023-05-24 | End: 2023-05-24

## 2023-05-24 RX ORDER — METOCLOPRAMIDE HCL 10 MG
10 TABLET ORAL ONCE
Refills: 0 | Status: COMPLETED | OUTPATIENT
Start: 2023-05-24 | End: 2023-05-24

## 2023-05-24 RX ORDER — ACETAMINOPHEN WITH CODEINE 300MG-30MG
1 TABLET ORAL
Qty: 9 | Refills: 0
Start: 2023-05-24 | End: 2023-05-26

## 2023-05-24 RX ORDER — MORPHINE SULFATE 50 MG/1
4 CAPSULE, EXTENDED RELEASE ORAL ONCE
Refills: 0 | Status: DISCONTINUED | OUTPATIENT
Start: 2023-05-24 | End: 2023-05-24

## 2023-05-24 RX ADMIN — SODIUM CHLORIDE 1000 MILLILITER(S): 9 INJECTION INTRAMUSCULAR; INTRAVENOUS; SUBCUTANEOUS at 18:35

## 2023-05-24 RX ADMIN — Medication 10 MILLIGRAM(S): at 14:31

## 2023-05-24 RX ADMIN — MORPHINE SULFATE 4 MILLIGRAM(S): 50 CAPSULE, EXTENDED RELEASE ORAL at 14:29

## 2023-05-24 RX ADMIN — MORPHINE SULFATE 4 MILLIGRAM(S): 50 CAPSULE, EXTENDED RELEASE ORAL at 17:56

## 2023-05-24 RX ADMIN — SODIUM CHLORIDE 1000 MILLILITER(S): 9 INJECTION INTRAMUSCULAR; INTRAVENOUS; SUBCUTANEOUS at 14:34

## 2023-05-24 RX ADMIN — MORPHINE SULFATE 4 MILLIGRAM(S): 50 CAPSULE, EXTENDED RELEASE ORAL at 18:35

## 2023-05-24 NOTE — ED ADULT TRIAGE NOTE - CHIEF COMPLAINT QUOTE
pt c/o vomiting started this am. zofran via right FA 20G saline lock. + LUQ abdominal pain. + right foot pain, ran out of oxycodone. hx: right foot injury, gastritis.

## 2023-05-24 NOTE — ED ADULT NURSE NOTE - NSFALLUNIVINTERV_ED_ALL_ED
Bed/Stretcher in lowest position, wheels locked, appropriate side rails in place/Call bell, personal items and telephone in reach/Instruct patient to call for assistance before getting out of bed/chair/stretcher/Non-slip footwear applied when patient is off stretcher/[place] to call system/Physically safe environment - no spills, clutter or unnecessary equipment/Purposeful proactive rounding/Room/bathroom lighting operational, light cord in reach

## 2023-05-24 NOTE — ED PROVIDER NOTE - NSFOLLOWUPINSTRUCTIONS_ED_ALL_ED_FT
You were seen in the ER for abdominal pain, nausea, vomiting. Your labs showed no signs of DKA and your CT scan showed evidence of a viral stomach flu. Drink plenty of fluids. Zofran has been sent to your pharmacy for nausea. For continued foot fracture management and pain management you must call

## 2023-05-24 NOTE — ED PROVIDER NOTE - CLINICAL SUMMARY MEDICAL DECISION MAKING FREE TEXT BOX
Pt here with hx dm, canabinoid hyperemesis sx, gastroparesis with n/v. 2nd visit in 48 hours. Reports abdominal pain. Not tender on exam. Last img 4/20/23. Pt denying marijuana usage. Will f/u labs, uds, r/o tox vs dka vs acute abdominal pathology. Labs. fluids, reglan, morphine ordered. Dispo pending. Pt here with hx dm, canabinoid hyperemesis sx, gastroparesis with n/v. 2nd visit in 48 hours. Reports abdominal pain. Not tender on exam. Last img 4/20/23. Pt denying marijuana usage. Will f/u labs, uds, r/o tox vs dka vs acute abdominal pathology. Labs. fluids, reglan, morphine ordered. Dispo pending.    Pt tolerated lunch tray without issue. No abdominal pain or further vomiting. Currently sleeping comfortably in stretcher. Pt here with hx dm, canabinoid hyperemesis sx, gastroparesis with n/v. 2nd visit in 48 hours. Reports abdominal pain. Not tender on exam. Last img 4/20/23. Pt denying marijuana usage. Will f/u labs, uds, r/o tox vs dka vs acute abdominal pathology. Labs. fluids, reglan, morphine ordered. Dispo pending.    Pt tolerated lunch tray without issue. No abdominal pain or further vomiting. Currently sleeping comfortably in stretcher.    Pt sitting in stretcher speaking on phone. CT shows enteritis. Abdominal exam remains benign and no further vomiting in ED. Will dc home. Pt asking for pain medications for foot fxs. Informed pt he needs to call orthopedic clinic in the morning and have them send pain medication to his pharmacy. Will send 2 days dose to pharmacy while pt calls his orthopedist.

## 2023-05-24 NOTE — ED PROVIDER NOTE - OBJECTIVE STATEMENT
43 yo M PMH DM with insulin pump, gastroparesis, canabinoid hyperemesis syndrome as per EMR (but pt presently denying marijuana use), gastritis, here with n/v, abdominal cramping with vomiting episodes. Pt seen 2 days ago for same, including R ankle pain that is in uni boot and pt receiving outpt care for at [place] (had visit with ortho today and states has another follow-up in 1 week for known healing fxs). Pt denies urinary sx. Denies alcohol or drug use. Was given zofran by EMS but states still nauseas. Pt on examination is sleeping and when awoken for interview/exam starts wimpering and calling out "mommy" stating he is in pain.  Pt hypertensive in triage. Denies hx htn and states it happens when he is in pain. Will recheck vitals.

## 2023-05-24 NOTE — ED ADULT NURSE NOTE - OBJECTIVE STATEMENT
Pt is A&OX4, ambulatory. Complaining of vomiting since 7am. Denies diarrhea. Complaining of LUQ abdominal pain and right foot pain. Ran out of oxycodone. Pt is moaning in pain. pmh of right foot injury, gastritis, dm, asthma, anxiety.

## 2023-05-24 NOTE — ED PROVIDER NOTE - PATIENT PORTAL LINK FT
You can access the FollowMyHealth Patient Portal offered by [facility] by registering at the following website: [email or web address]. By joining [employer]’s FollowMyHealth portal, you will also be able to view your health information using other applications (apps) compatible with our system.

## 2023-06-01 NOTE — ED ADULT TRIAGE NOTE - BEFAST BALANCE
Colonoscopy Procedure Note    Indications:  Personal h/o colon adenomatous polyps, Chronic diarrhea    Referring Physician: JESUS Mcmillan NP  Anesthesia/Sedation: MAC anesthesia Propofol  Endoscopist:  Dr. López Dong    Procedure in Detail:  Informed consent was obtained for the procedure, including sedation. Risks of perforation, hemorrhage, adverse drug reaction, and aspiration were discussed. The patient was placed in the left lateral decubitus position. Based on the pre-procedure assessment, including review of the patient's medical history, medications, allergies, and review of systems, she had been deemed to be an appropriate candidate for moderate sedation; she was therefore sedated with the medications listed above. The patient was monitored continuously with ECG tracing, pulse oximetry, blood pressure monitoring, and direct observations. A rectal examination was performed. The [id number] was inserted into the rectum and advanced under direct vision to the cecum, which was identified by the ileocecal valve and appendiceal orifice. The quality of the colonic preparation was fair. A careful inspection was made as the colonoscope was withdrawn, including a retroflexed view of the rectum; findings and interventions are described below. Appropriate photodocumentation was obtained. Findings:    Scope advanced to the cecum. Preparation was adequate: required lavaging of mucosa throughout to achieve adequate visualization. There was a small 3 mm polyp in the sigmoid colon s/p cold snare removal (specimen not retrieved). S/P Random Bx of R and L colon to r/o microscopic colitis. Grade 1 internal hemorrhoids. Therapies:  see above    Specimen: Specimens were collected as described above and sent to pathology. Complications: None were encountered during the procedure. EBL: < 10 ml.     Recommendations:   -F/U Path  -Repeat Colonoscopy in 5 years    Signed
No

## 2023-06-01 NOTE — CONSULT NOTE ADULT - CONSULT REASON
Patient's son called and his mom changed her insurance and needs refill on medication Levothyroxine 25 and 50 mgs and Alendronate 70mg. Pharmacy is on file.
n/v abdominal pain, hx gastroparesis

## 2023-06-20 NOTE — PRE-OP CHECKLIST - AS BP NONINV SITE
right upper arm Location Indication Override (Is Already Calculated Based On Selected Body Location): Area H

## 2023-06-23 ENCOUNTER — EMERGENCY (EMERGENCY)
Facility: HOSPITAL | Age: 43
LOS: 0 days | Discharge: HOME | End: 2023-06-23
Attending: STUDENT IN AN ORGANIZED HEALTH CARE EDUCATION/TRAINING PROGRAM
Payer: MEDICAID

## 2023-06-23 VITALS
RESPIRATION RATE: 16 BRPM | HEART RATE: 65 BPM | TEMPERATURE: 98 F | DIASTOLIC BLOOD PRESSURE: 81 MMHG | SYSTOLIC BLOOD PRESSURE: 127 MMHG | OXYGEN SATURATION: 97 %

## 2023-06-23 VITALS
DIASTOLIC BLOOD PRESSURE: 67 MMHG | RESPIRATION RATE: 18 BRPM | HEART RATE: 72 BPM | OXYGEN SATURATION: 96 % | HEIGHT: 71 IN | SYSTOLIC BLOOD PRESSURE: 123 MMHG | WEIGHT: 164.91 LBS | TEMPERATURE: 99 F

## 2023-06-23 DIAGNOSIS — J45.909 UNSPECIFIED ASTHMA, UNCOMPLICATED: ICD-10-CM

## 2023-06-23 DIAGNOSIS — Z90.49 ACQUIRED ABSENCE OF OTHER SPECIFIED PARTS OF DIGESTIVE TRACT: Chronic | ICD-10-CM

## 2023-06-23 DIAGNOSIS — R10.10 UPPER ABDOMINAL PAIN, UNSPECIFIED: ICD-10-CM

## 2023-06-23 DIAGNOSIS — Z87.19 PERSONAL HISTORY OF OTHER DISEASES OF THE DIGESTIVE SYSTEM: ICD-10-CM

## 2023-06-23 DIAGNOSIS — Z91.018 ALLERGY TO OTHER FOODS: ICD-10-CM

## 2023-06-23 DIAGNOSIS — R11.2 NAUSEA WITH VOMITING, UNSPECIFIED: ICD-10-CM

## 2023-06-23 DIAGNOSIS — E10.43 TYPE 1 DIABETES MELLITUS WITH DIABETIC AUTONOMIC (POLY)NEUROPATHY: ICD-10-CM

## 2023-06-23 DIAGNOSIS — K31.84 GASTROPARESIS: ICD-10-CM

## 2023-06-23 DIAGNOSIS — F17.200 NICOTINE DEPENDENCE, UNSPECIFIED, UNCOMPLICATED: ICD-10-CM

## 2023-06-23 DIAGNOSIS — Z90.49 ACQUIRED ABSENCE OF OTHER SPECIFIED PARTS OF DIGESTIVE TRACT: ICD-10-CM

## 2023-06-23 DIAGNOSIS — R10.84 GENERALIZED ABDOMINAL PAIN: ICD-10-CM

## 2023-06-23 DIAGNOSIS — E73.9 LACTOSE INTOLERANCE, UNSPECIFIED: ICD-10-CM

## 2023-06-23 DIAGNOSIS — Z79.4 LONG TERM (CURRENT) USE OF INSULIN: ICD-10-CM

## 2023-06-23 DIAGNOSIS — F41.9 ANXIETY DISORDER, UNSPECIFIED: ICD-10-CM

## 2023-06-23 DIAGNOSIS — R19.7 DIARRHEA, UNSPECIFIED: ICD-10-CM

## 2023-06-23 LAB
ALBUMIN SERPL ELPH-MCNC: 3.8 G/DL — SIGNIFICANT CHANGE UP (ref 3.3–5)
ALP SERPL-CCNC: 124 U/L — HIGH (ref 40–120)
ALT FLD-CCNC: 45 U/L — SIGNIFICANT CHANGE UP (ref 12–78)
ANION GAP SERPL CALC-SCNC: 2 MMOL/L — LOW (ref 5–17)
AST SERPL-CCNC: 25 U/L — SIGNIFICANT CHANGE UP (ref 15–37)
BASOPHILS # BLD AUTO: 0.05 K/UL — SIGNIFICANT CHANGE UP (ref 0–0.2)
BASOPHILS NFR BLD AUTO: 0.6 % — SIGNIFICANT CHANGE UP (ref 0–2)
BILIRUB SERPL-MCNC: 0.4 MG/DL — SIGNIFICANT CHANGE UP (ref 0.2–1.2)
BUN SERPL-MCNC: 24 MG/DL — HIGH (ref 7–23)
CALCIUM SERPL-MCNC: 9.5 MG/DL — SIGNIFICANT CHANGE UP (ref 8.5–10.1)
CHLORIDE SERPL-SCNC: 100 MMOL/L — SIGNIFICANT CHANGE UP (ref 96–108)
CO2 SERPL-SCNC: 32 MMOL/L — HIGH (ref 22–31)
CREAT SERPL-MCNC: 1.04 MG/DL — SIGNIFICANT CHANGE UP (ref 0.5–1.3)
EGFR: 92 ML/MIN/1.73M2 — SIGNIFICANT CHANGE UP
EOSINOPHIL # BLD AUTO: 0.06 K/UL — SIGNIFICANT CHANGE UP (ref 0–0.5)
EOSINOPHIL NFR BLD AUTO: 0.8 % — SIGNIFICANT CHANGE UP (ref 0–6)
GLUCOSE SERPL-MCNC: 174 MG/DL — HIGH (ref 70–99)
HCT VFR BLD CALC: 41.7 % — SIGNIFICANT CHANGE UP (ref 39–50)
HGB BLD-MCNC: 13.9 G/DL — SIGNIFICANT CHANGE UP (ref 13–17)
IMM GRANULOCYTES NFR BLD AUTO: 0.3 % — SIGNIFICANT CHANGE UP (ref 0–0.9)
LIDOCAIN IGE QN: 24 U/L — LOW (ref 73–393)
LYMPHOCYTES # BLD AUTO: 1.75 K/UL — SIGNIFICANT CHANGE UP (ref 1–3.3)
LYMPHOCYTES # BLD AUTO: 22.2 % — SIGNIFICANT CHANGE UP (ref 13–44)
MAGNESIUM SERPL-MCNC: 2.5 MG/DL — SIGNIFICANT CHANGE UP (ref 1.6–2.6)
MCHC RBC-ENTMCNC: 28.1 PG — SIGNIFICANT CHANGE UP (ref 27–34)
MCHC RBC-ENTMCNC: 33.3 G/DL — SIGNIFICANT CHANGE UP (ref 32–36)
MCV RBC AUTO: 84.4 FL — SIGNIFICANT CHANGE UP (ref 80–100)
MONOCYTES # BLD AUTO: 0.44 K/UL — SIGNIFICANT CHANGE UP (ref 0–0.9)
MONOCYTES NFR BLD AUTO: 5.6 % — SIGNIFICANT CHANGE UP (ref 2–14)
NEUTROPHILS # BLD AUTO: 5.56 K/UL — SIGNIFICANT CHANGE UP (ref 1.8–7.4)
NEUTROPHILS NFR BLD AUTO: 70.5 % — SIGNIFICANT CHANGE UP (ref 43–77)
NRBC # BLD: 0 /100 WBCS — SIGNIFICANT CHANGE UP (ref 0–0)
PLATELET # BLD AUTO: 355 K/UL — SIGNIFICANT CHANGE UP (ref 150–400)
POTASSIUM SERPL-MCNC: 4.3 MMOL/L — SIGNIFICANT CHANGE UP (ref 3.5–5.3)
POTASSIUM SERPL-SCNC: 4.3 MMOL/L — SIGNIFICANT CHANGE UP (ref 3.5–5.3)
PROT SERPL-MCNC: 8 GM/DL — SIGNIFICANT CHANGE UP (ref 6–8.3)
RBC # BLD: 4.94 M/UL — SIGNIFICANT CHANGE UP (ref 4.2–5.8)
RBC # FLD: 13.5 % — SIGNIFICANT CHANGE UP (ref 10.3–14.5)
SODIUM SERPL-SCNC: 134 MMOL/L — LOW (ref 135–145)
WBC # BLD: 7.88 K/UL — SIGNIFICANT CHANGE UP (ref 3.8–10.5)
WBC # FLD AUTO: 7.88 K/UL — SIGNIFICANT CHANGE UP (ref 3.8–10.5)

## 2023-06-23 PROCEDURE — 99284 EMERGENCY DEPT VISIT MOD MDM: CPT

## 2023-06-23 RX ORDER — METOCLOPRAMIDE HCL 10 MG
10 TABLET ORAL ONCE
Refills: 0 | Status: COMPLETED | OUTPATIENT
Start: 2023-06-23 | End: 2023-06-23

## 2023-06-23 RX ORDER — DIPHENHYDRAMINE HCL 50 MG
50 CAPSULE ORAL ONCE
Refills: 0 | Status: COMPLETED | OUTPATIENT
Start: 2023-06-23 | End: 2023-06-23

## 2023-06-23 RX ORDER — HALOPERIDOL DECANOATE 100 MG/ML
2.5 INJECTION INTRAMUSCULAR ONCE
Refills: 0 | Status: COMPLETED | OUTPATIENT
Start: 2023-06-23 | End: 2023-06-23

## 2023-06-23 RX ORDER — FAMOTIDINE 10 MG/ML
20 INJECTION INTRAVENOUS ONCE
Refills: 0 | Status: COMPLETED | OUTPATIENT
Start: 2023-06-23 | End: 2023-06-23

## 2023-06-23 RX ORDER — SODIUM CHLORIDE 9 MG/ML
1000 INJECTION INTRAMUSCULAR; INTRAVENOUS; SUBCUTANEOUS ONCE
Refills: 0 | Status: COMPLETED | OUTPATIENT
Start: 2023-06-23 | End: 2023-06-23

## 2023-06-23 RX ORDER — PANTOPRAZOLE SODIUM 20 MG/1
40 TABLET, DELAYED RELEASE ORAL ONCE
Refills: 0 | Status: COMPLETED | OUTPATIENT
Start: 2023-06-23 | End: 2023-06-23

## 2023-06-23 RX ADMIN — FAMOTIDINE 20 MILLIGRAM(S): 10 INJECTION INTRAVENOUS at 17:50

## 2023-06-23 RX ADMIN — SODIUM CHLORIDE 1000 MILLILITER(S): 9 INJECTION INTRAMUSCULAR; INTRAVENOUS; SUBCUTANEOUS at 17:51

## 2023-06-23 RX ADMIN — Medication 1 MILLIGRAM(S): at 18:57

## 2023-06-23 RX ADMIN — Medication 10 MILLIGRAM(S): at 18:56

## 2023-06-23 RX ADMIN — PANTOPRAZOLE SODIUM 40 MILLIGRAM(S): 20 TABLET, DELAYED RELEASE ORAL at 19:16

## 2023-06-23 RX ADMIN — Medication 50 MILLIGRAM(S): at 17:51

## 2023-06-23 NOTE — ED PROVIDER NOTE - PATIENT PORTAL LINK FT
You can access the FollowMyHealth Patient Portal offered by [facility] by registering at the following website: [email or web address]. By joining [employer]’s FollowMyHealth portal, you will also be able to view your health information using other applications (apps) compatible with our system.

## 2023-06-23 NOTE — ED ADULT TRIAGE NOTE - CHIEF COMPLAINT QUOTE
pt complaining of abdominal pain that started today, voiced NVD, pt vomited x5 prior to ED visit. PMx: Gastritis, DMI

## 2023-06-23 NOTE — ED PROVIDER NOTE - PHYSICAL EXAMINATION
General appearance: Nontoxic appearing, conversant, afebrile    Eyes: anicteric sclerae, LUCIE, EOMI   HENT: Atraumatic; oropharynx clear, MMM and no ulcerations, no pharyngeal erythema or exudate   Neck: Trachea midline; Full range of motion, supple   Pulm: CTA bl, normal respiratory effort and no intercostal retractions, normal work of breathing   CV: RRR, No murmurs, rubs, or gallops   Abdomen: Soft, generalized tenderness worse in upper abdomen, non-distended; no guarding or rebound   Extremities: No peripheral edema, no gross deformities, FROM x4   Skin: Dry, normal temperature, turgor and texture; no rash   Psych: Appropriate affect, cooperative

## 2023-06-23 NOTE — ED ADULT TRIAGE NOTE - BIRTH SEX
Male Xeljanz Counseling: I discussed with the patient the risks of Xeljanz therapy including increased risk of infection, liver issues, headache, diarrhea, or cold symptoms. Live vaccines should be avoided. They were instructed to call if they have any problems.

## 2023-06-23 NOTE — ED PROVIDER NOTE - OBJECTIVE STATEMENT
41yo male with pmh dm, gastroparesis, cannabinoid hyperemesis, gastritis, presenting with abdominal pain.  Pain in upper abdomen starting earlier today a/w n/v.  Same pain as previous visits here.  Some loose stools, no constipation.  Pshx jesus.  No fevers, sick contacts.  No urinary symptoms.  Denies etoh, recent marijuana use.

## 2023-06-23 NOTE — ED PROVIDER NOTE - DATE/TIME 1
Quality 226: Preventive Care And Screening: Tobacco Use: Screening And Cessation Intervention: Patient screened for tobacco use and is an ex/non-smoker
Quality 111:Pneumonia Vaccination Status For Older Adults: Pneumococcal Vaccination Previously Received
Quality 130: Documentation Of Current Medications In The Medical Record: Current Medications Documented
Detail Level: Detailed
Quality 110: Preventive Care And Screening: Influenza Immunization: Influenza Immunization Administered during Influenza season
Quality 431: Preventive Care And Screening: Unhealthy Alcohol Use - Screening: Patient not identified as an unhealthy alcohol user when screened for unhealthy alcohol use using a systematic screening method
23-Jun-2023 22:22

## 2023-06-23 NOTE — ED PROVIDER NOTE - NSFOLLOWUPINSTRUCTIONS_ED_ALL_ED_FT
Rest, drink plenty of fluids  Advance activity as tolerated  Continue all previously prescribed medications as directed  Follow up with your PMD - bring copies of your results  Return to the ER for severe pain, worsening symptoms, or other new or concerning symptoms   Take omeprazole 40mg daily

## 2023-06-23 NOTE — ED PROVIDER NOTE - CLINICAL SUMMARY MEDICAL DECISION MAKING FREE TEXT BOX
Generalized abdominal pain worse in upper abdomen similar to prior presentations of this here.  No fevers.  Suspect flair of gastroparesis/ hyperemesis/ gastritis.  Does not seem c/w new intraabdominal pathology.  Will get labs and medicate.  Consider imaging after reassessments.

## 2023-06-23 NOTE — ED PROVIDER NOTE - CARE PROVIDER_API CALL
Daniella Mullen  Gastroenterology  [address] [place] [address]  Phone: [phone]  Fax: [phone]  Follow Up Time:

## 2023-06-26 ENCOUNTER — EMERGENCY (EMERGENCY)
Facility: HOSPITAL | Age: 43
LOS: 0 days | Discharge: HOME | End: 2023-06-26
Attending: EMERGENCY MEDICINE
Payer: MEDICAID

## 2023-06-26 VITALS
DIASTOLIC BLOOD PRESSURE: 105 MMHG | SYSTOLIC BLOOD PRESSURE: 150 MMHG | WEIGHT: 145.06 LBS | RESPIRATION RATE: 18 BRPM | HEART RATE: 61 BPM | HEIGHT: 71 IN | OXYGEN SATURATION: 98 % | TEMPERATURE: 98 F

## 2023-06-26 VITALS
OXYGEN SATURATION: 98 % | RESPIRATION RATE: 16 BRPM | SYSTOLIC BLOOD PRESSURE: 145 MMHG | HEART RATE: 65 BPM | DIASTOLIC BLOOD PRESSURE: 89 MMHG

## 2023-06-26 DIAGNOSIS — R10.31 RIGHT LOWER QUADRANT PAIN: ICD-10-CM

## 2023-06-26 DIAGNOSIS — R00.1 BRADYCARDIA, UNSPECIFIED: ICD-10-CM

## 2023-06-26 DIAGNOSIS — E10.9 TYPE 1 DIABETES MELLITUS WITHOUT COMPLICATIONS: ICD-10-CM

## 2023-06-26 DIAGNOSIS — Z83.3 FAMILY HISTORY OF DIABETES MELLITUS: ICD-10-CM

## 2023-06-26 DIAGNOSIS — Z91.018 ALLERGY TO OTHER FOODS: ICD-10-CM

## 2023-06-26 DIAGNOSIS — Z79.4 LONG TERM (CURRENT) USE OF INSULIN: ICD-10-CM

## 2023-06-26 DIAGNOSIS — Z90.49 ACQUIRED ABSENCE OF OTHER SPECIFIED PARTS OF DIGESTIVE TRACT: ICD-10-CM

## 2023-06-26 DIAGNOSIS — R10.32 LEFT LOWER QUADRANT PAIN: ICD-10-CM

## 2023-06-26 DIAGNOSIS — Z90.49 ACQUIRED ABSENCE OF OTHER SPECIFIED PARTS OF DIGESTIVE TRACT: Chronic | ICD-10-CM

## 2023-06-26 DIAGNOSIS — R11.2 NAUSEA WITH VOMITING, UNSPECIFIED: ICD-10-CM

## 2023-06-26 DIAGNOSIS — E73.9 LACTOSE INTOLERANCE, UNSPECIFIED: ICD-10-CM

## 2023-06-26 DIAGNOSIS — J45.909 UNSPECIFIED ASTHMA, UNCOMPLICATED: ICD-10-CM

## 2023-06-26 DIAGNOSIS — F41.9 ANXIETY DISORDER, UNSPECIFIED: ICD-10-CM

## 2023-06-26 LAB
ALBUMIN SERPL ELPH-MCNC: 3.8 G/DL — SIGNIFICANT CHANGE UP (ref 3.3–5)
ALP SERPL-CCNC: 113 U/L — SIGNIFICANT CHANGE UP (ref 40–120)
ALT FLD-CCNC: 34 U/L — SIGNIFICANT CHANGE UP (ref 12–78)
ANION GAP SERPL CALC-SCNC: 4 MMOL/L — LOW (ref 5–17)
AST SERPL-CCNC: 25 U/L — SIGNIFICANT CHANGE UP (ref 15–37)
BASOPHILS # BLD AUTO: 0.05 K/UL — SIGNIFICANT CHANGE UP (ref 0–0.2)
BASOPHILS NFR BLD AUTO: 0.7 % — SIGNIFICANT CHANGE UP (ref 0–2)
BILIRUB SERPL-MCNC: 0.4 MG/DL — SIGNIFICANT CHANGE UP (ref 0.2–1.2)
BUN SERPL-MCNC: 13 MG/DL — SIGNIFICANT CHANGE UP (ref 7–23)
CALCIUM SERPL-MCNC: 9.8 MG/DL — SIGNIFICANT CHANGE UP (ref 8.5–10.1)
CHLORIDE SERPL-SCNC: 104 MMOL/L — SIGNIFICANT CHANGE UP (ref 96–108)
CO2 SERPL-SCNC: 33 MMOL/L — HIGH (ref 22–31)
CREAT SERPL-MCNC: 0.88 MG/DL — SIGNIFICANT CHANGE UP (ref 0.5–1.3)
EGFR: 110 ML/MIN/1.73M2 — SIGNIFICANT CHANGE UP
EOSINOPHIL # BLD AUTO: 0.09 K/UL — SIGNIFICANT CHANGE UP (ref 0–0.5)
EOSINOPHIL NFR BLD AUTO: 1.2 % — SIGNIFICANT CHANGE UP (ref 0–6)
GLUCOSE SERPL-MCNC: 129 MG/DL — HIGH (ref 70–99)
HCT VFR BLD CALC: 40.9 % — SIGNIFICANT CHANGE UP (ref 39–50)
HGB BLD-MCNC: 13.6 G/DL — SIGNIFICANT CHANGE UP (ref 13–17)
IMM GRANULOCYTES NFR BLD AUTO: 0.3 % — SIGNIFICANT CHANGE UP (ref 0–0.9)
LIDOCAIN IGE QN: 40 U/L — LOW (ref 73–393)
LYMPHOCYTES # BLD AUTO: 1.32 K/UL — SIGNIFICANT CHANGE UP (ref 1–3.3)
LYMPHOCYTES # BLD AUTO: 17.3 % — SIGNIFICANT CHANGE UP (ref 13–44)
MAGNESIUM SERPL-MCNC: 2 MG/DL — SIGNIFICANT CHANGE UP (ref 1.6–2.6)
MCHC RBC-ENTMCNC: 28 PG — SIGNIFICANT CHANGE UP (ref 27–34)
MCHC RBC-ENTMCNC: 33.3 G/DL — SIGNIFICANT CHANGE UP (ref 32–36)
MCV RBC AUTO: 84.3 FL — SIGNIFICANT CHANGE UP (ref 80–100)
MONOCYTES # BLD AUTO: 0.31 K/UL — SIGNIFICANT CHANGE UP (ref 0–0.9)
MONOCYTES NFR BLD AUTO: 4.1 % — SIGNIFICANT CHANGE UP (ref 2–14)
NEUTROPHILS # BLD AUTO: 5.84 K/UL — SIGNIFICANT CHANGE UP (ref 1.8–7.4)
NEUTROPHILS NFR BLD AUTO: 76.4 % — SIGNIFICANT CHANGE UP (ref 43–77)
NRBC # BLD: 0 /100 WBCS — SIGNIFICANT CHANGE UP (ref 0–0)
PLATELET # BLD AUTO: 408 K/UL — HIGH (ref 150–400)
POTASSIUM SERPL-MCNC: 4.1 MMOL/L — SIGNIFICANT CHANGE UP (ref 3.5–5.3)
POTASSIUM SERPL-SCNC: 4.1 MMOL/L — SIGNIFICANT CHANGE UP (ref 3.5–5.3)
PROT SERPL-MCNC: 7.8 GM/DL — SIGNIFICANT CHANGE UP (ref 6–8.3)
RBC # BLD: 4.85 M/UL — SIGNIFICANT CHANGE UP (ref 4.2–5.8)
RBC # FLD: 13.3 % — SIGNIFICANT CHANGE UP (ref 10.3–14.5)
SODIUM SERPL-SCNC: 141 MMOL/L — SIGNIFICANT CHANGE UP (ref 135–145)
WBC # BLD: 7.63 K/UL — SIGNIFICANT CHANGE UP (ref 3.8–10.5)
WBC # FLD AUTO: 7.63 K/UL — SIGNIFICANT CHANGE UP (ref 3.8–10.5)

## 2023-06-26 PROCEDURE — 99285 EMERGENCY DEPT VISIT HI MDM: CPT

## 2023-06-26 PROCEDURE — 93010 ELECTROCARDIOGRAM REPORT: CPT

## 2023-06-26 PROCEDURE — 74177 CT ABD & PELVIS W/CONTRAST: CPT | Mod: 26,MA

## 2023-06-26 RX ORDER — SODIUM CHLORIDE 9 MG/ML
1000 INJECTION, SOLUTION INTRAVENOUS ONCE
Refills: 0 | Status: COMPLETED | OUTPATIENT
Start: 2023-06-26 | End: 2023-06-26

## 2023-06-26 RX ORDER — MORPHINE SULFATE 50 MG/1
4 CAPSULE, EXTENDED RELEASE ORAL ONCE
Refills: 0 | Status: DISCONTINUED | OUTPATIENT
Start: 2023-06-26 | End: 2023-06-26

## 2023-06-26 RX ORDER — ONDANSETRON 8 MG/1
4 TABLET, FILM COATED ORAL ONCE
Refills: 0 | Status: COMPLETED | OUTPATIENT
Start: 2023-06-26 | End: 2023-06-26

## 2023-06-26 RX ADMIN — ONDANSETRON 4 MILLIGRAM(S): 8 TABLET, FILM COATED ORAL at 17:48

## 2023-06-26 RX ADMIN — SODIUM CHLORIDE 1000 MILLILITER(S): 9 INJECTION, SOLUTION INTRAVENOUS at 14:18

## 2023-06-26 RX ADMIN — ONDANSETRON 4 MILLIGRAM(S): 8 TABLET, FILM COATED ORAL at 14:18

## 2023-06-26 RX ADMIN — MORPHINE SULFATE 4 MILLIGRAM(S): 50 CAPSULE, EXTENDED RELEASE ORAL at 17:48

## 2023-06-26 RX ADMIN — MORPHINE SULFATE 4 MILLIGRAM(S): 50 CAPSULE, EXTENDED RELEASE ORAL at 14:41

## 2023-06-26 RX ADMIN — MORPHINE SULFATE 4 MILLIGRAM(S): 50 CAPSULE, EXTENDED RELEASE ORAL at 14:18

## 2023-06-26 NOTE — ED ADULT NURSE NOTE - NSFALLUNIVINTERV_ED_ALL_ED
Bed/Stretcher in lowest position, wheels locked, appropriate side rails in place/Call bell, personal items and telephone in reach/Instruct patient to call for assistance before getting out of bed/chair/stretcher/Non-slip footwear applied when patient is off stretcher/[place] to call system/Physically safe environment - no spills, clutter or unnecessary equipment/Purposeful proactive rounding/Room/bathroom lighting operational, light cord in reach

## 2023-06-26 NOTE — ED PROVIDER NOTE - CLINICAL SUMMARY MEDICAL DECISION MAKING FREE TEXT BOX
hx, exam, ekg, labs, ct of abd/pelvis, normal glucose no anion gap, Pt is advised to follow up with Dr. Horta gastroenterologist and return if symptoms persist or worsen.

## 2023-06-26 NOTE — ED PROVIDER NOTE - PROGRESS NOTE DETAILS
Pt is able to tolerate oral fluid now pt sts he has no more abd pain Now pt sts he has a hx of gastritis with same symptoms.

## 2023-06-26 NOTE — ED ADULT NURSE NOTE - OBJECTIVE STATEMENT
as per patient " left lower abdomen pain x 4 hours ago with nausea and vomiting. Noted brace to right lower leg"

## 2023-06-26 NOTE — ED PROVIDER NOTE - CARE PROVIDER_API CALL
Shar Carr  Gastroenterology  [address], [address]  [place] [address]  Phone: [phone]  Fax: [phone]  Follow Up Time: 1-3 Days

## 2023-06-26 NOTE — ED PROVIDER NOTE - PATIENT PORTAL LINK FT
You can access the FollowMyHealth Patient Portal offered by [facility] by registering at the following website: [email or web address]. By joining [employer]’s FollowMyHealth portal, you will also be able to view your health information using other applications (apps) compatible with our system.

## 2023-06-26 NOTE — ED PROVIDER NOTE - OBJECTIVE STATEMENT
42 years old male here c/o nausea, vomiting with bilateral lower abd pain since 3 to 4 hours ago. Pt sts he has a hx of abd pain and he was 42 years old male here c/o nausea, vomiting with bilateral lower abd pain since 3 to 4 hours ago. Pt sts he has a hx of abd pain and he was here three days ago  for same pain. Pt sts pain in not associated with food intake.

## 2023-06-26 NOTE — ED PROVIDER NOTE - CONSTITUTIONAL, MLM
normal... Well appearing, awake, alert, oriented to person, place, time/situation and in no apparent distress. Speaking in clear full sentences + nonbloody bilious vomitus no nasal flaring no shoulders retractions no diaphoresis

## 2023-06-29 ENCOUNTER — EMERGENCY (EMERGENCY)
Facility: HOSPITAL | Age: 43
LOS: 0 days | Discharge: HOME | End: 2023-06-29
Attending: STUDENT IN AN ORGANIZED HEALTH CARE EDUCATION/TRAINING PROGRAM
Payer: MEDICAID

## 2023-06-29 VITALS
RESPIRATION RATE: 18 BRPM | TEMPERATURE: 98 F | WEIGHT: 154.98 LBS | HEART RATE: 53 BPM | DIASTOLIC BLOOD PRESSURE: 89 MMHG | OXYGEN SATURATION: 98 % | SYSTOLIC BLOOD PRESSURE: 181 MMHG | HEIGHT: 71 IN

## 2023-06-29 DIAGNOSIS — Z91.018 ALLERGY TO OTHER FOODS: ICD-10-CM

## 2023-06-29 DIAGNOSIS — Z90.49 ACQUIRED ABSENCE OF OTHER SPECIFIED PARTS OF DIGESTIVE TRACT: Chronic | ICD-10-CM

## 2023-06-29 DIAGNOSIS — Z79.4 LONG TERM (CURRENT) USE OF INSULIN: ICD-10-CM

## 2023-06-29 DIAGNOSIS — R10.9 UNSPECIFIED ABDOMINAL PAIN: ICD-10-CM

## 2023-06-29 DIAGNOSIS — R11.2 NAUSEA WITH VOMITING, UNSPECIFIED: ICD-10-CM

## 2023-06-29 DIAGNOSIS — K31.84 GASTROPARESIS: ICD-10-CM

## 2023-06-29 DIAGNOSIS — Z87.19 PERSONAL HISTORY OF OTHER DISEASES OF THE DIGESTIVE SYSTEM: ICD-10-CM

## 2023-06-29 DIAGNOSIS — F41.9 ANXIETY DISORDER, UNSPECIFIED: ICD-10-CM

## 2023-06-29 DIAGNOSIS — Z90.49 ACQUIRED ABSENCE OF OTHER SPECIFIED PARTS OF DIGESTIVE TRACT: ICD-10-CM

## 2023-06-29 DIAGNOSIS — E73.9 LACTOSE INTOLERANCE, UNSPECIFIED: ICD-10-CM

## 2023-06-29 DIAGNOSIS — R10.84 GENERALIZED ABDOMINAL PAIN: ICD-10-CM

## 2023-06-29 DIAGNOSIS — J45.909 UNSPECIFIED ASTHMA, UNCOMPLICATED: ICD-10-CM

## 2023-06-29 DIAGNOSIS — E10.43 TYPE 1 DIABETES MELLITUS WITH DIABETIC AUTONOMIC (POLY)NEUROPATHY: ICD-10-CM

## 2023-06-29 LAB
ALBUMIN SERPL ELPH-MCNC: 3.8 G/DL — SIGNIFICANT CHANGE UP (ref 3.3–5)
ALP SERPL-CCNC: 100 U/L — SIGNIFICANT CHANGE UP (ref 40–120)
ALT FLD-CCNC: 27 U/L — SIGNIFICANT CHANGE UP (ref 12–78)
ANION GAP SERPL CALC-SCNC: 3 MMOL/L — LOW (ref 5–17)
AST SERPL-CCNC: 25 U/L — SIGNIFICANT CHANGE UP (ref 15–37)
BASOPHILS # BLD AUTO: 0.05 K/UL — SIGNIFICANT CHANGE UP (ref 0–0.2)
BASOPHILS NFR BLD AUTO: 0.6 % — SIGNIFICANT CHANGE UP (ref 0–2)
BILIRUB SERPL-MCNC: 0.3 MG/DL — SIGNIFICANT CHANGE UP (ref 0.2–1.2)
BUN SERPL-MCNC: 17 MG/DL — SIGNIFICANT CHANGE UP (ref 7–23)
CALCIUM SERPL-MCNC: 9.1 MG/DL — SIGNIFICANT CHANGE UP (ref 8.5–10.1)
CHLORIDE SERPL-SCNC: 105 MMOL/L — SIGNIFICANT CHANGE UP (ref 96–108)
CO2 SERPL-SCNC: 31 MMOL/L — SIGNIFICANT CHANGE UP (ref 22–31)
CREAT SERPL-MCNC: 0.98 MG/DL — SIGNIFICANT CHANGE UP (ref 0.5–1.3)
EGFR: 99 ML/MIN/1.73M2 — SIGNIFICANT CHANGE UP
EOSINOPHIL # BLD AUTO: 0.03 K/UL — SIGNIFICANT CHANGE UP (ref 0–0.5)
EOSINOPHIL NFR BLD AUTO: 0.3 % — SIGNIFICANT CHANGE UP (ref 0–6)
GLUCOSE SERPL-MCNC: 173 MG/DL — HIGH (ref 70–99)
HCT VFR BLD CALC: 39.7 % — SIGNIFICANT CHANGE UP (ref 39–50)
HGB BLD-MCNC: 13.2 G/DL — SIGNIFICANT CHANGE UP (ref 13–17)
IMM GRANULOCYTES NFR BLD AUTO: 0.2 % — SIGNIFICANT CHANGE UP (ref 0–0.9)
LIDOCAIN IGE QN: 38 U/L — LOW (ref 73–393)
LYMPHOCYTES # BLD AUTO: 1.4 K/UL — SIGNIFICANT CHANGE UP (ref 1–3.3)
LYMPHOCYTES # BLD AUTO: 15.5 % — SIGNIFICANT CHANGE UP (ref 13–44)
MCHC RBC-ENTMCNC: 28 PG — SIGNIFICANT CHANGE UP (ref 27–34)
MCHC RBC-ENTMCNC: 33.2 G/DL — SIGNIFICANT CHANGE UP (ref 32–36)
MCV RBC AUTO: 84.3 FL — SIGNIFICANT CHANGE UP (ref 80–100)
MONOCYTES # BLD AUTO: 0.29 K/UL — SIGNIFICANT CHANGE UP (ref 0–0.9)
MONOCYTES NFR BLD AUTO: 3.2 % — SIGNIFICANT CHANGE UP (ref 2–14)
NEUTROPHILS # BLD AUTO: 7.22 K/UL — SIGNIFICANT CHANGE UP (ref 1.8–7.4)
NEUTROPHILS NFR BLD AUTO: 80.2 % — HIGH (ref 43–77)
NRBC # BLD: 0 /100 WBCS — SIGNIFICANT CHANGE UP (ref 0–0)
PLATELET # BLD AUTO: 421 K/UL — HIGH (ref 150–400)
POTASSIUM SERPL-MCNC: 4.5 MMOL/L — SIGNIFICANT CHANGE UP (ref 3.5–5.3)
POTASSIUM SERPL-SCNC: 4.5 MMOL/L — SIGNIFICANT CHANGE UP (ref 3.5–5.3)
PROT SERPL-MCNC: 7.5 GM/DL — SIGNIFICANT CHANGE UP (ref 6–8.3)
RBC # BLD: 4.71 M/UL — SIGNIFICANT CHANGE UP (ref 4.2–5.8)
RBC # FLD: 13.2 % — SIGNIFICANT CHANGE UP (ref 10.3–14.5)
SODIUM SERPL-SCNC: 139 MMOL/L — SIGNIFICANT CHANGE UP (ref 135–145)
WBC # BLD: 9.01 K/UL — SIGNIFICANT CHANGE UP (ref 3.8–10.5)
WBC # FLD AUTO: 9.01 K/UL — SIGNIFICANT CHANGE UP (ref 3.8–10.5)

## 2023-06-29 PROCEDURE — 99284 EMERGENCY DEPT VISIT MOD MDM: CPT

## 2023-06-29 RX ORDER — SODIUM CHLORIDE 9 MG/ML
1000 INJECTION INTRAMUSCULAR; INTRAVENOUS; SUBCUTANEOUS ONCE
Refills: 0 | Status: COMPLETED | OUTPATIENT
Start: 2023-06-29 | End: 2023-06-29

## 2023-06-29 RX ORDER — FAMOTIDINE 10 MG/ML
20 INJECTION INTRAVENOUS ONCE
Refills: 0 | Status: COMPLETED | OUTPATIENT
Start: 2023-06-29 | End: 2023-06-29

## 2023-06-29 RX ORDER — METOCLOPRAMIDE HCL 10 MG
10 TABLET ORAL ONCE
Refills: 0 | Status: COMPLETED | OUTPATIENT
Start: 2023-06-29 | End: 2023-06-29

## 2023-06-29 RX ORDER — MORPHINE SULFATE 50 MG/1
4 CAPSULE, EXTENDED RELEASE ORAL ONCE
Refills: 0 | Status: DISCONTINUED | OUTPATIENT
Start: 2023-06-29 | End: 2023-06-29

## 2023-06-29 RX ORDER — HALOPERIDOL DECANOATE 100 MG/ML
2.5 INJECTION INTRAMUSCULAR ONCE
Refills: 0 | Status: DISCONTINUED | OUTPATIENT
Start: 2023-06-29 | End: 2023-06-29

## 2023-06-29 RX ORDER — ONDANSETRON 8 MG/1
4 TABLET, FILM COATED ORAL ONCE
Refills: 0 | Status: COMPLETED | OUTPATIENT
Start: 2023-06-29 | End: 2023-06-29

## 2023-06-29 RX ORDER — PANTOPRAZOLE SODIUM 20 MG/1
40 TABLET, DELAYED RELEASE ORAL ONCE
Refills: 0 | Status: COMPLETED | OUTPATIENT
Start: 2023-06-29 | End: 2023-06-29

## 2023-06-29 RX ADMIN — SODIUM CHLORIDE 1000 MILLILITER(S): 9 INJECTION INTRAMUSCULAR; INTRAVENOUS; SUBCUTANEOUS at 15:12

## 2023-06-29 RX ADMIN — MORPHINE SULFATE 4 MILLIGRAM(S): 50 CAPSULE, EXTENDED RELEASE ORAL at 17:34

## 2023-06-29 RX ADMIN — MORPHINE SULFATE 4 MILLIGRAM(S): 50 CAPSULE, EXTENDED RELEASE ORAL at 15:09

## 2023-06-29 RX ADMIN — PANTOPRAZOLE SODIUM 40 MILLIGRAM(S): 20 TABLET, DELAYED RELEASE ORAL at 17:34

## 2023-06-29 RX ADMIN — FAMOTIDINE 20 MILLIGRAM(S): 10 INJECTION INTRAVENOUS at 15:08

## 2023-06-29 RX ADMIN — ONDANSETRON 4 MILLIGRAM(S): 8 TABLET, FILM COATED ORAL at 17:34

## 2023-06-29 RX ADMIN — MORPHINE SULFATE 4 MILLIGRAM(S): 50 CAPSULE, EXTENDED RELEASE ORAL at 15:28

## 2023-06-29 RX ADMIN — MORPHINE SULFATE 4 MILLIGRAM(S): 50 CAPSULE, EXTENDED RELEASE ORAL at 18:28

## 2023-06-29 RX ADMIN — Medication 10 MILLIGRAM(S): at 15:11

## 2023-06-29 RX ADMIN — SODIUM CHLORIDE 1000 MILLILITER(S): 9 INJECTION INTRAMUSCULAR; INTRAVENOUS; SUBCUTANEOUS at 16:19

## 2023-06-29 NOTE — ED PROVIDER NOTE - PHYSICAL EXAMINATION
General appearance: Nontoxic appearing, conversant, afebrile    Eyes: anicteric sclerae, LUCIE, EOMI   HENT: Atraumatic; oropharynx clear, MMM and no ulcerations, no pharyngeal erythema or exudate   Neck: Trachea midline; Full range of motion, supple   Pulm: CTA bl, normal respiratory effort and no intercostal retractions, normal work of breathing   CV: RRR, No murmurs, rubs, or gallops   Abdomen: Soft, diffusely tender, non-distended; no guarding or rebound   Extremities: No peripheral edema, no gross deformities, FROM x4   Skin: Dry, normal temperature, turgor and texture; no rash   Psych: Appropriate affect, cooperative

## 2023-06-29 NOTE — ED ADULT TRIAGE NOTE - PRO INTERPRETER NEED 2
Speech Therapy Progress Note    Visit Count: 8  Referred by: India Sheppard MD, next visit (if known): 9/21/2021  Medical Diagnosis (from order):   Diagnosis Information      Diagnosis    765.10, 765.22 (ICD-9-CM) - P07.23 (ICD-10-CM) - Premature infant of 24 weeks gestation    783.3 (ICD-9-CM) - R63.3 (ICD-10-CM) - Feeding problems    783.3 (ICD-9-CM) - R63.3 (ICD-10-CM) - Oral aversion   Premature infant of 24 weeks gestation [P07.23]   Expressive speech delay [F80.1]           Treatment Diagnosis: Other Developmental Disorders of Speech and Language  Dysphagia, Oropharyngeal Phase    Date of onset/injury: Since birth  Precautions: Aspiration  Relevant History/Medications:  MRI    SUBJECTIVE:    Patient arrives with foster mom and brother.  Seen following PT.  She continues to have intermittent vomiting.     Patient is not seeing birth to three due to being in a foster placement and there is an open referral in a different county.     Pain: patient caregiver reports pain is not an issue/concern    OBJECTIVE:      Language Scale 5th Edition   • The PLS is an individually administered test used to identify children who have a language disorder or delay.   • The test is designed for use with children from birth through 6 years, 11 months of age.   • It is composed of two subscales: auditory comprehension (AC) and expressive communication (EC). The AC subscale is used to evaluate how much language a child understands. The EC subscale is used to determine how well a child communicates with others.   • The total language scores combines AC and EC scores to give a picture of a child's overall language abilities.   Raw Score Standard Score Confidence Band Percentile Age Equivalent   Auditory Comprehension (AC) 16 87 81-96 19 1-0   Expressive Communication (EC) 15 77 72-87 6 0-10   Total Language Score 31 81 77-87 10 0-10   • Standard scores between 85 and 115 are within one standard deviation of the mean; scores  within this range are considered to be within normal limits.   • The child’s AC score of 87 is delayed at this time in the area(s) of: receptive language. A child of this age should typically be able to looks at familiar objects and people, respond to specific words (ie: No) and demonstrate emerging ability to follow simple directions with gestures.   • The child's EC score of 77 is delaed at this time in the area(s) of: verbal communication/expressive language. A child of this age should typically be able to beginning using a vriety of consonants in babbling, engage in vocalizing back and forth with communication and have some gesture use the is consistent.    Patient's corrected age is 12 months at day of evaluation which is the age that was used to score assessment.  She continues to present with delayed in both areas based of scores above.     Treatment   PLS administered      Current Home Program (not performed this date except as noted above):  Exercises: Tastes of thin/smooth puree, oral exploration with hard munchables    ASSESSMENT:    Patient presents with delays in both her receptive and expressive language skills.  She demonstrates excellent eye contact and desire to engage with communication partner, however she is lacking in the variety of consonant sounds she uses in her babbling and the consistency at which she produces different CV/VC combos in babbling.  She does not yet use gestures to communicate.  Receptively, her ability to located people and objects in environment is emerging.  She is beginning to follow some routines, but not consistently.    Result of above outlined education: Verbalizes understanding    Goals:   1. Patient will accept bites of bites of puree with no overt s/sx of aspiration in 7/10 opportunities. - In progress, variable from session to session  2. Patient will explore different utensils/cups for 3 minutes per session to decrease sensitivity to different feeding utensils in  preparation for oral intake. - Goal met  3. Patient will accept half tsps of thin liquids in 4/5 opportunities with no overt s/sx of aspiration to increase management of thin liquids. - In progress  4. Patient will imitate gestures (waving/head shaking) in 4/5 opportunities to increase expressive/social language skills - New goal  5. Patient will take turn vocalizing with communication for two turns to increase expressive language skills. - New goal      PLAN:    Suggestions for next session as indicated:Target expressive language skills    1x weekly for 16 weeks ([id number] & [id number])    Therapy procedure time and total treatment time can be found documented on the Time Entry flowsheet   English

## 2023-06-29 NOTE — SBIRT NOTE ADULT - NSSBIRTDRGBRIEFINTDET_GEN_A_CORE
Provided SBIRT services: Full screen positive. Brief Intervention Performed. Screening results were reviewed with the patient and patient was provided information about healthy guidelines and potential negative consequences associated with level of risk. Motivation and readiness to reduce or stop use was discussed and goals and activities to make changes were suggested/offered. Pt reports his last use of marijuana was 3 months ago. No use of drugs since.

## 2023-06-29 NOTE — ED ADULT NURSE NOTE - CCCP TRG CHIEF CMPLNT
NICU Progress Note    This is a term male infant born 2019  7:59 AM at Gestational Age: 39w2d now at age: 4 Wks  Patient Active Problem List    Diagnosis Date Noted   • Normal  (single liveborn) 2019     Priority: Medium   • Drug withdrawal syndrome in  2019     Priority: Low     Interim: Shanika continues on Clonidine and Morphine. Last morphine wean . Scores 6-13 overnight, Majority of scores 6-8, 13 was an exception  fussy at times.     Objective:  Vitals Last Value 24-Hour Range   Temperature 99.1 °F (37.3 °C) (19) Temp  Min: 98.7 °F (37.1 °C)  Max: 99.6 °F (37.6 °C)   Pulse 124 (19) Pulse  Min: 124  Max: 188   Respiratory 51 (19) Resp  Min: 51  Max: 84   Non-Invasive Blood Pressure 99/40 (19) BP  Min: 99/40  Max: 99/40   Mean Arterial Pressure 58 (19) MAP (mmHg)  Min: 58  Max: 58     Vitals Today Admitted   Weight (!) 4540 g (19) Weight: 4000 g(Filed from Delivery Summary) (19)   Height N/A Length: 21.5\" (54.6 cm)(Filed from Delivery Summary) (19)   BMI N/A BMI (Calculated): 13.41 (19)      Abstinence Score:   Last 24 hours: Narcotic Abstinence Screening Score  Av  Min: 6   Min taken time: 19  Max: 13   Max taken time: 05/15/19 2315  Last 48 hours: Narcotic Abstinence Screening Score  Av.9  Min: 6   Min taken time: 19  Max: 13   Max taken time: 05/15/19 2315  Patient Vitals for the past 16 hrs:   Narcotic Abstinence Screening Score   05/15/19 2030 6   05/15/19 2315 13   19 0330 8   19 0830 6     Weight over the past 48 hours:    Patient Vitals for the past 48 hrs:   Weight   19 2210 (!) 4543 g   19 0330 (!) 4540 g   Weight change: -3 g     Last Apnea:       None.     Physical Exam:  Birthweight:  8 lb 13.1 oz (4000 g) with Weight change: -3 g 14% since birth  General: quiet alert in open crib just finished  abdominal pain feeding  Skin:  Pink,  well perfused.   Anterior [place]:  Soft, flat.   Lungs:  Clear to auscultation.   CV:  RRR with no murmur.   Abdomen:  Soft, nontender, non distended.   Neuro: strong suck, equal movements.     Fluids:  Date 05/15/19 0700 - 1959 19 - 19 0659   Shift 7131-3373 1345-5222 1805-2421 24 Hour Total 8374-3146 2963-5959 4518-6798 24 Hour Total   INTAKE   P.O.(mL/kg) 310(68.24) 210(46.22) 265(58.37) 785(172.91) 120(26.43)   120(26.43)   Shift Total(mL/kg) 310(68.24) 210(46.22) 265(58.37) 785(172.91) 120(26.43)   120(26.43)   OUTPUT   Shift Total           Weight (kg) 4.54 4.54 4.54 4.54 4.54 4.54 4.54 4.54     Intake/Output       05/15 0700 -  0659 700 -  0659    P.O. (mL/kg) 785 (172.91) 120 (26.43)    Total Intake(mL/kg) 785 (172.91) 120 (26.43)    Net +785 +120          Urine Occurrence 7 x 1 x    Stool Occurrence 1 x 1 x          Good Start Soothe ad coco well -> 170+ cc/kg/day    Last Void:  1 (19)  x7  Last Stool:  1 (19)  x1    Labs:    No results found for this or any previous visit (from the past 24 hour(s)).    Medications:  Current Facility-Administered Medications   Medication Dose Route Frequency Provider Last Rate Last Dose   • morphine (1:5)  oral solution 0.06 mg  0.06 mg Oral Q3H Christian Jim MD   0.06 mg at 19 0854   • cloNIDine (compounded)  oral suspension 8.7 mcg  2 mcg/kg Oral Q6H Enedelia Miller MD   8.7 mcg at 19 0558   • morphine (1:5)  oral solution 0.04 mg  0.04 mg Oral Q6H PRN Ho Vicente MD   0.04 mg at 19 0952   • glycerin 99.5% 0.375 g  liquid  0.3 mL Rectal Q12H PRN Gutierrez Grigsby MD   0.375 g at 19 1408     Impression:  Term male infant at 39 2/7 weeks, now corrected to 43w 4d -   JAMAR- on morphine and clonidine,   Hyperbilirubinemia- resolved    Plan:  CR/Resp: Monitor BP and respiratory status  FEN:  Continue ad coco   feeds, follow daily  weights and intake volume  ID:  Monitor clinically for signs and symptoms of infection.  Neuro: Attempt to wean morphine today. Weight-adjusted Clonidine dose 5/8. Follow JAMAR scores.    I saw and examined Alessio Melo on 2019 at 9:37 AM and patient requires: NICU Intensive Care: Constant monitoring by health care team and medication adjustments for withdrawal

## 2023-06-29 NOTE — ED ADULT NURSE NOTE - OBJECTIVE STATEMENT
42y male A&Ox3 presenting to ED with abd pain accompanied by N+V. pt reports that his abd started hurting this morning and has gotten progressively worse. pt rates pain level at 10/10. pt actively experiencing bouts of emesis on assessment. pt denies marijuana/alcohol use today. pt denies chest pain, back pain, h/a, recent fever, cough, sob, dizziness, LOC, blurred vision. NKDA.  pmhx gastritis, DM.

## 2023-06-29 NOTE — ED PROVIDER NOTE - CARE PROVIDER_API CALL
Daniella Mullen  Gastroenterology  [address] [place] [address]  Phone: [phone]  Fax: [phone]  Follow Up Time:

## 2023-06-29 NOTE — ED PROVIDER NOTE - NSFOLLOWUPINSTRUCTIONS_ED_ALL_ED_FT
Rest, drink plenty of fluids  Advance activity as tolerated  Continue all previously prescribed medications as directed  Follow up with your PMD - bring copies of your results  Return to the ER for severe pain or other new or concerning symptoms

## 2023-06-29 NOTE — ED PROVIDER NOTE - OBJECTIVE STATEMENT
43yo male with pmh dm, gastroparesis, cannabinoid hyperemesis, gastritis, presenting with abdominal pain.  Pain diffusely in abdomen starting earlier this morning a/w n/v.  Same pain as previous visits here, most recent visit 3 days ago for same issue with negative imaging at that time.  No diarrhea, constipation.  Pshx jesus.  No fevers, sick contacts.  No urinary symptoms.  Denies etoh, recent marijuana use.

## 2023-06-29 NOTE — ED PROVIDER NOTE - CLINICAL SUMMARY MEDICAL DECISION MAKING FREE TEXT BOX
Generalized abdominal pain c/w previous episodes of this when seen here.  Presentation consistent with prior times I have seen patient.  Denies change in pain, states quality is unchanged but more severe.  Doubt acute intraabdominal process a this time.  Seems c/w exacerbation of acute on chronic pain from gastritis/ gastroparesis.  Plan for labs, meds, reassess.  Will hold imaging at this time given negative recent scan.

## 2023-06-29 NOTE — ED ADULT TRIAGE NOTE - CHIEF COMPLAINT QUOTE
Patient BIB with complaint of abdominal pain, NV for the past 2 1/2 hours, pt actively vomiting yellow vomitus.

## 2023-07-22 ENCOUNTER — EMERGENCY (EMERGENCY)
Facility: HOSPITAL | Age: 43
LOS: 0 days | Discharge: HOME | End: 2023-07-22
Attending: EMERGENCY MEDICINE
Payer: COMMERCIAL

## 2023-07-22 VITALS
SYSTOLIC BLOOD PRESSURE: 137 MMHG | DIASTOLIC BLOOD PRESSURE: 78 MMHG | OXYGEN SATURATION: 98 % | HEIGHT: 71 IN | WEIGHT: 154.98 LBS | RESPIRATION RATE: 19 BRPM | HEART RATE: 88 BPM | TEMPERATURE: 98 F

## 2023-07-22 VITALS
SYSTOLIC BLOOD PRESSURE: 146 MMHG | TEMPERATURE: 98 F | OXYGEN SATURATION: 99 % | RESPIRATION RATE: 19 BRPM | DIASTOLIC BLOOD PRESSURE: 88 MMHG | HEART RATE: 65 BPM

## 2023-07-22 DIAGNOSIS — M54.9 DORSALGIA, UNSPECIFIED: ICD-10-CM

## 2023-07-22 DIAGNOSIS — F41.9 ANXIETY DISORDER, UNSPECIFIED: ICD-10-CM

## 2023-07-22 DIAGNOSIS — Z90.49 ACQUIRED ABSENCE OF OTHER SPECIFIED PARTS OF DIGESTIVE TRACT: Chronic | ICD-10-CM

## 2023-07-22 DIAGNOSIS — Z90.49 ACQUIRED ABSENCE OF OTHER SPECIFIED PARTS OF DIGESTIVE TRACT: ICD-10-CM

## 2023-07-22 DIAGNOSIS — M25.551 PAIN IN RIGHT HIP: ICD-10-CM

## 2023-07-22 DIAGNOSIS — E73.9 LACTOSE INTOLERANCE, UNSPECIFIED: ICD-10-CM
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
## 2023-07-22 DIAGNOSIS — S80.811A ABRASION, RIGHT LOWER LEG, INITIAL ENCOUNTER: ICD-10-CM

## 2023-07-22 DIAGNOSIS — Z87.19 PERSONAL HISTORY OF OTHER DISEASES OF THE DIGESTIVE SYSTEM: ICD-10-CM

## 2023-07-22 DIAGNOSIS — Z91.018 ALLERGY TO OTHER FOODS: ICD-10-CM
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
## 2023-07-22 DIAGNOSIS — E10.9 TYPE 1 DIABETES MELLITUS WITHOUT COMPLICATIONS: ICD-10-CM

## 2023-07-22 DIAGNOSIS — M54.50 LOW BACK PAIN, UNSPECIFIED: ICD-10-CM

## 2023-07-22 DIAGNOSIS — J45.909 UNSPECIFIED ASTHMA, UNCOMPLICATED: ICD-10-CM

## 2023-07-22 PROCEDURE — 99284 EMERGENCY DEPT VISIT MOD MDM: CPT

## 2023-07-22 PROCEDURE — 72100 X-RAY EXAM L-S SPINE 2/3 VWS: CPT | Mod: 26

## 2023-07-22 PROCEDURE — 72220 X-RAY EXAM SACRUM TAILBONE: CPT | Mod: 26

## 2023-07-22 PROCEDURE — 73502 X-RAY EXAM HIP UNI 2-3 VIEWS: CPT | Mod: 26,RT

## 2023-07-22 RX ORDER — HYDROMORPHONE HYDROCHLORIDE 2 MG/ML
2 INJECTION INTRAMUSCULAR; INTRAVENOUS; SUBCUTANEOUS ONCE
Refills: 0 | Status: DISCONTINUED | OUTPATIENT
Start: 2023-07-22 | End: 2023-07-22

## 2023-07-22 RX ORDER — BACITRACIN ZINC 500 UNIT/G
1 OINTMENT IN PACKET (EA) TOPICAL ONCE
Refills: 0 | Status: COMPLETED | OUTPATIENT
Start: 2023-07-22 | End: 2023-07-22

## 2023-07-22 RX ADMIN — HYDROMORPHONE HYDROCHLORIDE 2 MILLIGRAM(S): 2 INJECTION INTRAMUSCULAR; INTRAVENOUS; SUBCUTANEOUS at 17:40

## 2023-07-22 RX ADMIN — HYDROMORPHONE HYDROCHLORIDE 2 MILLIGRAM(S): 2 INJECTION INTRAMUSCULAR; INTRAVENOUS; SUBCUTANEOUS at 17:08

## 2023-07-22 RX ADMIN — Medication 1 APPLICATION(S): at 18:01

## 2023-07-22 NOTE — ED PROVIDER NOTE - PATIENT PORTAL LINK FT
You can access the FollowMyHealth Patient Portal offered by [facility] by registering at the following website: [email or web address]. By joining [employer]’s FollowMyHealth portal, you will also be able to view your health information using other applications (apps) compatible with our system.
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
## 2023-07-22 NOTE — ED ADULT NURSE NOTE - NSFALLUNIVINTERV_ED_ALL_ED
Bed/Stretcher in lowest position, wheels locked, appropriate side rails in place/Call bell, personal items and telephone in reach/Instruct patient to call for assistance before getting out of bed/chair/stretcher/Non-slip footwear applied when patient is off stretcher/[place] to call system/Physically safe environment - no spills, clutter or unnecessary equipment/Purposeful proactive rounding/Room/bathroom lighting operational, light cord in reach
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
## 2023-07-22 NOTE — ED PROVIDER NOTE - IV ALTEPLASE DOOR HIDDEN
Medical Necessity Clause: This procedure was medically necessary because the lesions that were treated were: Include Z78.9 (Other Specified Conditions Influencing Health Status) As An Associated Diagnosis?: No Consent: The patient's consent was obtained including but not limited to risks of crusting, scabbing, blistering, scarring, darker or lighter pigmentary change, recurrence, incomplete removal and infection. Post-Care Instructions: I reviewed with the patient in detail post-care instructions. Patient is to wear sunprotection, and avoid picking at any of the treated lesions. Pt may apply Vaseline to crusted or scabbing areas. Medical Necessity Information: It is in your best interest to select a reason for this procedure from the list below. All of these items fulfill various CMS LCD requirements except the new and changing color options. Detail Level: Detailed Anesthesia Volume In Cc: 0.5 Treatment Number (Will Not Render If 0): 1 show

## 2023-07-26 NOTE — PHARMACOTHERAPY INTERVENTION NOTE - COMMENTS
Recommended pantoprazole IV to PO switch since patient tolerating PO.
Recommended pantoprazole frequency adjustment to once daily since patient does not have a  GI bleed.
Wheelchair

## 2023-07-27 ENCOUNTER — EMERGENCY (EMERGENCY)
Facility: HOSPITAL | Age: 43
LOS: 0 days | Discharge: HOME | End: 2023-07-27
Attending: STUDENT IN AN ORGANIZED HEALTH CARE EDUCATION/TRAINING PROGRAM
Payer: MEDICAID

## 2023-07-27 VITALS
RESPIRATION RATE: 18 BRPM | TEMPERATURE: 98 F | OXYGEN SATURATION: 98 % | HEART RATE: 73 BPM | DIASTOLIC BLOOD PRESSURE: 100 MMHG | WEIGHT: 160.06 LBS | SYSTOLIC BLOOD PRESSURE: 156 MMHG | HEIGHT: 71 IN

## 2023-07-27 VITALS
DIASTOLIC BLOOD PRESSURE: 66 MMHG | SYSTOLIC BLOOD PRESSURE: 137 MMHG | TEMPERATURE: 99 F | HEART RATE: 69 BPM | RESPIRATION RATE: 19 BRPM | OXYGEN SATURATION: 97 %

## 2023-07-27 DIAGNOSIS — J45.909 UNSPECIFIED ASTHMA, UNCOMPLICATED: ICD-10-CM

## 2023-07-27 DIAGNOSIS — R10.84 GENERALIZED ABDOMINAL PAIN: ICD-10-CM

## 2023-07-27 DIAGNOSIS — F41.9 ANXIETY DISORDER, UNSPECIFIED: ICD-10-CM

## 2023-07-27 DIAGNOSIS — E73.9 LACTOSE INTOLERANCE, UNSPECIFIED: ICD-10-CM

## 2023-07-27 DIAGNOSIS — Z87.19 PERSONAL HISTORY OF OTHER DISEASES OF THE DIGESTIVE SYSTEM: ICD-10-CM

## 2023-07-27 DIAGNOSIS — Z90.49 ACQUIRED ABSENCE OF OTHER SPECIFIED PARTS OF DIGESTIVE TRACT: ICD-10-CM

## 2023-07-27 DIAGNOSIS — Z90.49 ACQUIRED ABSENCE OF OTHER SPECIFIED PARTS OF DIGESTIVE TRACT: Chronic | ICD-10-CM

## 2023-07-27 DIAGNOSIS — R11.2 NAUSEA WITH VOMITING, UNSPECIFIED: ICD-10-CM

## 2023-07-27 DIAGNOSIS — M54.50 LOW BACK PAIN, UNSPECIFIED: ICD-10-CM

## 2023-07-27 DIAGNOSIS — E10.43 TYPE 1 DIABETES MELLITUS WITH DIABETIC AUTONOMIC (POLY)NEUROPATHY: ICD-10-CM

## 2023-07-27 DIAGNOSIS — Z91.018 ALLERGY TO OTHER FOODS: ICD-10-CM

## 2023-07-27 DIAGNOSIS — K31.84 GASTROPARESIS: ICD-10-CM

## 2023-07-27 LAB
ALBUMIN SERPL ELPH-MCNC: 3.9 G/DL — SIGNIFICANT CHANGE UP (ref 3.3–5)
ALP SERPL-CCNC: 142 U/L — HIGH (ref 40–120)
ALT FLD-CCNC: 48 U/L — SIGNIFICANT CHANGE UP (ref 12–78)
ANION GAP SERPL CALC-SCNC: 8 MMOL/L — SIGNIFICANT CHANGE UP (ref 5–17)
AST SERPL-CCNC: 34 U/L — SIGNIFICANT CHANGE UP (ref 15–37)
BASOPHILS # BLD AUTO: 0.06 K/UL — SIGNIFICANT CHANGE UP (ref 0–0.2)
BASOPHILS NFR BLD AUTO: 0.6 % — SIGNIFICANT CHANGE UP (ref 0–2)
BILIRUB SERPL-MCNC: 0.4 MG/DL — SIGNIFICANT CHANGE UP (ref 0.2–1.2)
BUN SERPL-MCNC: 12 MG/DL — SIGNIFICANT CHANGE UP (ref 7–23)
CALCIUM SERPL-MCNC: 10 MG/DL — SIGNIFICANT CHANGE UP (ref 8.5–10.1)
CHLORIDE SERPL-SCNC: 98 MMOL/L — SIGNIFICANT CHANGE UP (ref 96–108)
CO2 SERPL-SCNC: 31 MMOL/L — SIGNIFICANT CHANGE UP (ref 22–31)
CREAT SERPL-MCNC: 1.11 MG/DL — SIGNIFICANT CHANGE UP (ref 0.5–1.3)
EGFR: 85 ML/MIN/1.73M2 — SIGNIFICANT CHANGE UP
EOSINOPHIL # BLD AUTO: 0.03 K/UL — SIGNIFICANT CHANGE UP (ref 0–0.5)
EOSINOPHIL NFR BLD AUTO: 0.3 % — SIGNIFICANT CHANGE UP (ref 0–6)
GLUCOSE SERPL-MCNC: 112 MG/DL — HIGH (ref 70–99)
HCT VFR BLD CALC: 46.8 % — SIGNIFICANT CHANGE UP (ref 39–50)
HGB BLD-MCNC: 15.5 G/DL — SIGNIFICANT CHANGE UP (ref 13–17)
IMM GRANULOCYTES NFR BLD AUTO: 0.3 % — SIGNIFICANT CHANGE UP (ref 0–0.9)
LIDOCAIN IGE QN: 35 U/L — LOW (ref 73–393)
LYMPHOCYTES # BLD AUTO: 1.17 K/UL — SIGNIFICANT CHANGE UP (ref 1–3.3)
LYMPHOCYTES # BLD AUTO: 12.3 % — LOW (ref 13–44)
MCHC RBC-ENTMCNC: 27.6 PG — SIGNIFICANT CHANGE UP (ref 27–34)
MCHC RBC-ENTMCNC: 33.1 G/DL — SIGNIFICANT CHANGE UP (ref 32–36)
MCV RBC AUTO: 83.4 FL — SIGNIFICANT CHANGE UP (ref 80–100)
MONOCYTES # BLD AUTO: 0.22 K/UL — SIGNIFICANT CHANGE UP (ref 0–0.9)
MONOCYTES NFR BLD AUTO: 2.3 % — SIGNIFICANT CHANGE UP (ref 2–14)
NEUTROPHILS # BLD AUTO: 8 K/UL — HIGH (ref 1.8–7.4)
NEUTROPHILS NFR BLD AUTO: 84.2 % — HIGH (ref 43–77)
NRBC # BLD: 0 /100 WBCS — SIGNIFICANT CHANGE UP (ref 0–0)
PLATELET # BLD AUTO: 458 K/UL — HIGH (ref 150–400)
POTASSIUM SERPL-MCNC: 4.5 MMOL/L — SIGNIFICANT CHANGE UP (ref 3.5–5.3)
POTASSIUM SERPL-SCNC: 4.5 MMOL/L — SIGNIFICANT CHANGE UP (ref 3.5–5.3)
PROT SERPL-MCNC: 8.8 GM/DL — HIGH (ref 6–8.3)
RBC # BLD: 5.61 M/UL — SIGNIFICANT CHANGE UP (ref 4.2–5.8)
RBC # FLD: 13.6 % — SIGNIFICANT CHANGE UP (ref 10.3–14.5)
SODIUM SERPL-SCNC: 137 MMOL/L — SIGNIFICANT CHANGE UP (ref 135–145)
WBC # BLD: 9.51 K/UL — SIGNIFICANT CHANGE UP (ref 3.8–10.5)
WBC # FLD AUTO: 9.51 K/UL — SIGNIFICANT CHANGE UP (ref 3.8–10.5)

## 2023-07-27 PROCEDURE — 99284 EMERGENCY DEPT VISIT MOD MDM: CPT

## 2023-07-27 RX ORDER — MORPHINE SULFATE 50 MG/1
4 CAPSULE, EXTENDED RELEASE ORAL ONCE
Refills: 0 | Status: DISCONTINUED | OUTPATIENT
Start: 2023-07-27 | End: 2023-07-27

## 2023-07-27 RX ORDER — FAMOTIDINE 10 MG/ML
20 INJECTION INTRAVENOUS ONCE
Refills: 0 | Status: COMPLETED | OUTPATIENT
Start: 2023-07-27 | End: 2023-07-27

## 2023-07-27 RX ORDER — SODIUM CHLORIDE 9 MG/ML
1000 INJECTION INTRAMUSCULAR; INTRAVENOUS; SUBCUTANEOUS ONCE
Refills: 0 | Status: COMPLETED | OUTPATIENT
Start: 2023-07-27 | End: 2023-07-27

## 2023-07-27 RX ORDER — ONDANSETRON 8 MG/1
4 TABLET, FILM COATED ORAL ONCE
Refills: 0 | Status: COMPLETED | OUTPATIENT
Start: 2023-07-27 | End: 2023-07-27

## 2023-07-27 RX ADMIN — FAMOTIDINE 20 MILLIGRAM(S): 10 INJECTION INTRAVENOUS at 18:44

## 2023-07-27 RX ADMIN — MORPHINE SULFATE 4 MILLIGRAM(S): 50 CAPSULE, EXTENDED RELEASE ORAL at 18:44

## 2023-07-27 RX ADMIN — SODIUM CHLORIDE 1000 MILLILITER(S): 9 INJECTION INTRAMUSCULAR; INTRAVENOUS; SUBCUTANEOUS at 18:44

## 2023-07-27 RX ADMIN — ONDANSETRON 4 MILLIGRAM(S): 8 TABLET, FILM COATED ORAL at 18:44

## 2023-07-27 NOTE — ED PROVIDER NOTE - NSFOLLOWUPINSTRUCTIONS_ED_ALL_ED_FT
Rest, drink plenty of fluids  Advance activity as tolerated  Continue all previously prescribed medications as directed  Follow up with your PMD - bring copies of your results  Return to the ER for severe pain, worsening symptoms, persistent vomiting, or other new or concerning symptoms

## 2023-07-27 NOTE — ED ADULT TRIAGE NOTE - CHIEF COMPLAINT QUOTE
patient BIBA c/o of abdominal pain while at PT today with N/V denied chest pain no difficulty breathing at this time

## 2023-07-27 NOTE — ED PROVIDER NOTE - PHYSICAL EXAMINATION
General appearance: Nontoxic appearing, conversant, afebrile    Eyes: anicteric sclerae, LUCIE, EOMI   HENT: Atraumatic; oropharynx clear, MMM and no ulcerations, no pharyngeal erythema or exudate   Neck: Trachea midline; Full range of motion, supple   Pulm: CTA bl, normal respiratory effort and no intercostal retractions, normal work of breathing   CV: RRR, No murmurs, rubs, or gallops   Abdomen: Soft, generalized tenderness, non-distended; no guarding or rebound   Extremities: No peripheral edema, no gross deformities, FROM x4   Skin: Dry, normal temperature, turgor and texture; no rash   Psych: Appropriate affect, cooperative
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
## 2023-07-27 NOTE — ED PROVIDER NOTE - CARE PROVIDER_API CALL
Daniella Mullen  Gastroenterology  [address] [place] [address]  Phone: [phone]  Fax: [phone]  Follow Up Time:

## 2023-07-27 NOTE — ED ADULT TRIAGE NOTE - AS TEMP SITE
Spoke with patient.  Aware the physicians in GI, main [place] are not excepting Medicaid at this time.  Offer to give numbers to [facility] and [facility] but patient stated has been in contact with [facility].   oral
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
## 2023-08-07 NOTE — ED PROVIDER NOTE - MUSCULOSKELETAL, MLM
Called Pt to assess for safety and to encourage to join program today. Left a VM promoting a call back. Called Pt emergency contact David (Friend) and left a VM promoting a call back.    Spine appears normal, range of motion is not limited, no muscle or joint tenderness

## 2023-08-15 ENCOUNTER — EMERGENCY (EMERGENCY)
Facility: HOSPITAL | Age: 43
LOS: 0 days | Discharge: HOME | End: 2023-08-15
Attending: STUDENT IN AN ORGANIZED HEALTH CARE EDUCATION/TRAINING PROGRAM
Payer: MEDICAID

## 2023-08-15 VITALS
HEART RATE: 66 BPM | DIASTOLIC BLOOD PRESSURE: 117 MMHG | OXYGEN SATURATION: 98 % | RESPIRATION RATE: 18 BRPM | SYSTOLIC BLOOD PRESSURE: 168 MMHG | WEIGHT: 160.06 LBS | TEMPERATURE: 98 F | HEIGHT: 71 IN

## 2023-08-15 VITALS
TEMPERATURE: 98 F | DIASTOLIC BLOOD PRESSURE: 85 MMHG | OXYGEN SATURATION: 97 % | SYSTOLIC BLOOD PRESSURE: 166 MMHG | RESPIRATION RATE: 18 BRPM | HEART RATE: 77 BPM

## 2023-08-15 DIAGNOSIS — F41.9 ANXIETY DISORDER, UNSPECIFIED: ICD-10-CM

## 2023-08-15 DIAGNOSIS — Z87.19 PERSONAL HISTORY OF OTHER DISEASES OF THE DIGESTIVE SYSTEM: ICD-10-CM

## 2023-08-15 DIAGNOSIS — R11.2 NAUSEA WITH VOMITING, UNSPECIFIED: ICD-10-CM

## 2023-08-15 DIAGNOSIS — R10.9 UNSPECIFIED ABDOMINAL PAIN: ICD-10-CM

## 2023-08-15 DIAGNOSIS — E73.9 LACTOSE INTOLERANCE, UNSPECIFIED: ICD-10-CM

## 2023-08-15 DIAGNOSIS — J45.909 UNSPECIFIED ASTHMA, UNCOMPLICATED: ICD-10-CM

## 2023-08-15 DIAGNOSIS — Z90.49 ACQUIRED ABSENCE OF OTHER SPECIFIED PARTS OF DIGESTIVE TRACT: Chronic | ICD-10-CM

## 2023-08-15 DIAGNOSIS — E11.65 TYPE 2 DIABETES MELLITUS WITH HYPERGLYCEMIA: ICD-10-CM

## 2023-08-15 DIAGNOSIS — Z91.018 ALLERGY TO OTHER FOODS: ICD-10-CM

## 2023-08-15 DIAGNOSIS — Z90.49 ACQUIRED ABSENCE OF OTHER SPECIFIED PARTS OF DIGESTIVE TRACT: ICD-10-CM

## 2023-08-15 DIAGNOSIS — Z96.41 PRESENCE OF INSULIN PUMP (EXTERNAL) (INTERNAL): ICD-10-CM

## 2023-08-15 LAB
ALBUMIN SERPL ELPH-MCNC: 3.7 G/DL — SIGNIFICANT CHANGE UP (ref 3.3–5)
ALP SERPL-CCNC: 110 U/L — SIGNIFICANT CHANGE UP (ref 40–120)
ALT FLD-CCNC: 33 U/L — SIGNIFICANT CHANGE UP (ref 12–78)
ANION GAP SERPL CALC-SCNC: 8 MMOL/L — SIGNIFICANT CHANGE UP (ref 5–17)
APPEARANCE UR: CLEAR — SIGNIFICANT CHANGE UP
AST SERPL-CCNC: 20 U/L — SIGNIFICANT CHANGE UP (ref 15–37)
BACTERIA # UR AUTO: NEGATIVE — SIGNIFICANT CHANGE UP
BASE EXCESS BLDV CALC-SCNC: 6 MMOL/L — HIGH (ref -2–3)
BASOPHILS # BLD AUTO: 0.04 K/UL — SIGNIFICANT CHANGE UP (ref 0–0.2)
BASOPHILS NFR BLD AUTO: 0.4 % — SIGNIFICANT CHANGE UP (ref 0–2)
BILIRUB SERPL-MCNC: 0.7 MG/DL — SIGNIFICANT CHANGE UP (ref 0.2–1.2)
BILIRUB UR-MCNC: NEGATIVE — SIGNIFICANT CHANGE UP
BLOOD GAS COMMENTS, VENOUS: SIGNIFICANT CHANGE UP
BUN SERPL-MCNC: 17 MG/DL — SIGNIFICANT CHANGE UP (ref 7–23)
CALCIUM SERPL-MCNC: 9.1 MG/DL — SIGNIFICANT CHANGE UP (ref 8.5–10.1)
CHLORIDE BLDV-SCNC: 98 MMOL/L — SIGNIFICANT CHANGE UP (ref 98–107)
CHLORIDE SERPL-SCNC: 99 MMOL/L — SIGNIFICANT CHANGE UP (ref 96–108)
CO2 BLDV-SCNC: 33 MMOL/L — HIGH (ref 22–26)
CO2 SERPL-SCNC: 28 MMOL/L — SIGNIFICANT CHANGE UP (ref 22–31)
COLOR SPEC: YELLOW — SIGNIFICANT CHANGE UP
COMMENT - URINE: SIGNIFICANT CHANGE UP
CREAT SERPL-MCNC: 1.09 MG/DL — SIGNIFICANT CHANGE UP (ref 0.5–1.3)
DIFF PNL FLD: ABNORMAL
EGFR: 87 ML/MIN/1.73M2 — SIGNIFICANT CHANGE UP
EOSINOPHIL # BLD AUTO: 0.05 K/UL — SIGNIFICANT CHANGE UP (ref 0–0.5)
EOSINOPHIL NFR BLD AUTO: 0.5 % — SIGNIFICANT CHANGE UP (ref 0–6)
ETHANOL SERPL-MCNC: <10 MG/DL — SIGNIFICANT CHANGE UP (ref 0–10)
GAS PNL BLDV: 130 MMOL/L — LOW (ref 136–145)
GAS PNL BLDV: SIGNIFICANT CHANGE UP
GAS PNL BLDV: SIGNIFICANT CHANGE UP
GLUCOSE BLDV-MCNC: 452 MG/DL — CRITICAL HIGH (ref 65–95)
GLUCOSE SERPL-MCNC: 399 MG/DL — HIGH (ref 70–99)
GLUCOSE UR QL: 1000 MG/DL
HCO3 BLDV-SCNC: 31 MMOL/L — HIGH (ref 22–28)
HCT VFR BLD CALC: 41 % — SIGNIFICANT CHANGE UP (ref 39–50)
HCT VFR BLDA CALC: 44 % — SIGNIFICANT CHANGE UP (ref 37–47)
HGB BLD CALC-MCNC: 14.8 G/DL — SIGNIFICANT CHANGE UP (ref 12.6–17.4)
HGB BLD-MCNC: 14.2 G/DL — SIGNIFICANT CHANGE UP (ref 13–17)
IMM GRANULOCYTES NFR BLD AUTO: 0.3 % — SIGNIFICANT CHANGE UP (ref 0–0.9)
KETONES UR-MCNC: ABNORMAL
LACTATE BLDV-MCNC: 1.5 MMOL/L — HIGH (ref 0.56–1.39)
LACTATE SERPL-SCNC: 1.5 MMOL/L — SIGNIFICANT CHANGE UP (ref 0.7–2)
LEUKOCYTE ESTERASE UR-ACNC: NEGATIVE — SIGNIFICANT CHANGE UP
LIDOCAIN IGE QN: 30 U/L — LOW (ref 73–393)
LYMPHOCYTES # BLD AUTO: 1.19 K/UL — SIGNIFICANT CHANGE UP (ref 1–3.3)
LYMPHOCYTES # BLD AUTO: 11.3 % — LOW (ref 13–44)
MCHC RBC-ENTMCNC: 28.1 PG — SIGNIFICANT CHANGE UP (ref 27–34)
MCHC RBC-ENTMCNC: 34.6 G/DL — SIGNIFICANT CHANGE UP (ref 32–36)
MCV RBC AUTO: 81.2 FL — SIGNIFICANT CHANGE UP (ref 80–100)
MONOCYTES # BLD AUTO: 0.27 K/UL — SIGNIFICANT CHANGE UP (ref 0–0.9)
MONOCYTES NFR BLD AUTO: 2.6 % — SIGNIFICANT CHANGE UP (ref 2–14)
NEUTROPHILS # BLD AUTO: 8.92 K/UL — HIGH (ref 1.8–7.4)
NEUTROPHILS NFR BLD AUTO: 84.9 % — HIGH (ref 43–77)
NITRITE UR-MCNC: NEGATIVE — SIGNIFICANT CHANGE UP
NRBC # BLD: 0 /100 WBCS — SIGNIFICANT CHANGE UP (ref 0–0)
PCO2 BLDV: 46 MMHG — SIGNIFICANT CHANGE UP (ref 42–55)
PH BLDV: 7.44 — HIGH (ref 7.32–7.43)
PH UR: 7 — SIGNIFICANT CHANGE UP (ref 5–8)
PLATELET # BLD AUTO: 299 K/UL — SIGNIFICANT CHANGE UP (ref 150–400)
PO2 BLDV: 47 MMHG — HIGH (ref 25–45)
POTASSIUM BLDV-SCNC: 4.3 MMOL/L — SIGNIFICANT CHANGE UP (ref 3.5–5.1)
POTASSIUM SERPL-MCNC: 4.2 MMOL/L — SIGNIFICANT CHANGE UP (ref 3.5–5.3)
POTASSIUM SERPL-SCNC: 4.2 MMOL/L — SIGNIFICANT CHANGE UP (ref 3.5–5.3)
PROT SERPL-MCNC: 7.6 GM/DL — SIGNIFICANT CHANGE UP (ref 6–8.3)
PROT UR-MCNC: NEGATIVE MG/DL — SIGNIFICANT CHANGE UP
RBC # BLD: 5.05 M/UL — SIGNIFICANT CHANGE UP (ref 4.2–5.8)
RBC # FLD: 13.5 % — SIGNIFICANT CHANGE UP (ref 10.3–14.5)
RBC CASTS # UR COMP ASSIST: ABNORMAL /HPF (ref 0–4)
SAO2 % BLDV: 83.7 % — LOW (ref 94–98)
SODIUM SERPL-SCNC: 135 MMOL/L — SIGNIFICANT CHANGE UP (ref 135–145)
SP GR SPEC: 1 — LOW (ref 1.01–1.02)
UROBILINOGEN FLD QL: NEGATIVE MG/DL — SIGNIFICANT CHANGE UP
WBC # BLD: 10.5 K/UL — SIGNIFICANT CHANGE UP (ref 3.8–10.5)
WBC # FLD AUTO: 10.5 K/UL — SIGNIFICANT CHANGE UP (ref 3.8–10.5)
WBC UR QL: SIGNIFICANT CHANGE UP

## 2023-08-15 PROCEDURE — 99285 EMERGENCY DEPT VISIT HI MDM: CPT | Mod: 25

## 2023-08-15 PROCEDURE — 74177 CT ABD & PELVIS W/CONTRAST: CPT | Mod: 26,MA

## 2023-08-15 PROCEDURE — 71046 X-RAY EXAM CHEST 2 VIEWS: CPT | Mod: 26

## 2023-08-15 RX ORDER — SODIUM CHLORIDE 9 MG/ML
1000 INJECTION INTRAMUSCULAR; INTRAVENOUS; SUBCUTANEOUS ONCE
Refills: 0 | Status: COMPLETED | OUTPATIENT
Start: 2023-08-15 | End: 2023-08-15

## 2023-08-15 RX ORDER — ONDANSETRON 8 MG/1
4 TABLET, FILM COATED ORAL ONCE
Refills: 0 | Status: DISCONTINUED | OUTPATIENT
Start: 2023-08-15 | End: 2023-08-15

## 2023-08-15 RX ORDER — ACETAMINOPHEN 500 MG
1000 TABLET ORAL ONCE
Refills: 0 | Status: COMPLETED | OUTPATIENT
Start: 2023-08-15 | End: 2023-08-15

## 2023-08-15 RX ORDER — INSULIN LISPRO 100/ML
4 VIAL (ML) SUBCUTANEOUS ONCE
Refills: 0 | Status: DISCONTINUED | OUTPATIENT
Start: 2023-08-15 | End: 2023-08-15

## 2023-08-15 RX ORDER — FAMOTIDINE 10 MG/ML
20 INJECTION INTRAVENOUS ONCE
Refills: 0 | Status: COMPLETED | OUTPATIENT
Start: 2023-08-15 | End: 2023-08-15

## 2023-08-15 RX ORDER — METOCLOPRAMIDE HCL 10 MG
10 TABLET ORAL ONCE
Refills: 0 | Status: COMPLETED | OUTPATIENT
Start: 2023-08-15 | End: 2023-08-15

## 2023-08-15 RX ORDER — SUCRALFATE 1 G
1 TABLET ORAL ONCE
Refills: 0 | Status: COMPLETED | OUTPATIENT
Start: 2023-08-15 | End: 2023-08-15

## 2023-08-15 RX ORDER — SUCRALFATE 1 G
1 TABLET ORAL
Qty: 12 | Refills: 0
Start: 2023-08-15 | End: 2023-08-18

## 2023-08-15 RX ORDER — MORPHINE SULFATE 50 MG/1
4 CAPSULE, EXTENDED RELEASE ORAL ONCE
Refills: 0 | Status: DISCONTINUED | OUTPATIENT
Start: 2023-08-15 | End: 2023-08-15

## 2023-08-15 RX ADMIN — MORPHINE SULFATE 4 MILLIGRAM(S): 50 CAPSULE, EXTENDED RELEASE ORAL at 08:38

## 2023-08-15 RX ADMIN — Medication 1 GRAM(S): at 11:08

## 2023-08-15 RX ADMIN — FAMOTIDINE 20 MILLIGRAM(S): 10 INJECTION INTRAVENOUS at 08:38

## 2023-08-15 RX ADMIN — Medication 10 MILLIGRAM(S): at 08:38

## 2023-08-15 RX ADMIN — Medication 400 MILLIGRAM(S): at 10:30

## 2023-08-15 RX ADMIN — SODIUM CHLORIDE 1000 MILLILITER(S): 9 INJECTION INTRAMUSCULAR; INTRAVENOUS; SUBCUTANEOUS at 08:38

## 2023-08-15 NOTE — ED PROVIDER NOTE - PATIENT PORTAL LINK FT
You can access the FollowMyHealth Patient Portal offered by [facility] by registering at the following website: [email or web address]. By joining [facility]’s FollowMyHealth portal, you will also be able to view your health information using other applications (apps) compatible with our system.

## 2023-08-15 NOTE — ED PROVIDER NOTE - OBJECTIVE STATEMENT
42M male PMHX DM, gastroparesis, cannabinoid hyperemesis, gastritis, sp cholecystectomy, who  male with pmh dm, gastroparesis, cannabinoid hyperemesis, gastritis, who presents with abdominal pain intermittent, epigastric radiating to the right upper quadrant lower abdomen, ongoing since yesterday, with associated nausea, vomiting and elevated glucoses  Patient denies any fevers, chills, chest pain, or shortness of breath.  He has not taken anything for pain this morning. Reports he is no longer using marijuana. No radiation to back. Has chronic back pain from prior accident

## 2023-08-15 NOTE — ED ADULT TRIAGE NOTE - CHIEF COMPLAINT QUOTE
pt c/o epigastric abdominal pain, nausea, vomiting and elevated blood sugar since yesterday. history of gastritis and dm. fs 366.

## 2023-08-15 NOTE — ED PROVIDER NOTE - PHYSICAL EXAMINATION
gen: AOx3, NAD  Head: NCAT  ENT: Airway patent, dry mucous membranes, nasal passageways clear   Cardiac: Normal rate, normal rhythm, no murmurs   Respiratory: Lungs CTA B/L  Gastrointestinal: Abdomen soft, mod ttp RUQ and RLQ and epigastrium, nondistended, no rebound, no guarding  MSK: No gross abnormalities, FROM of all four extremities, no edema  HEME: Extremities warm and well perfused   Skin: No rashes, no lesions  Neuro: No gross neurologic deficits, normal speech

## 2023-08-15 NOTE — ED PROVIDER NOTE - NSFOLLOWUPINSTRUCTIONS_ED_ALL_ED_FT
You were seen today for abdominal pain - you were given carafate and nausea medication and fluids    Your sugars improved with use of your insulin pump and IV fluids - we recommend close follow up with your diabetes physician and gastroenterologist. You can continue carafate 1g every 8 hours as needed for gastritis symptoms . Continue reglan every 8 hours as needed for nausea.     Acute Abdominal Pain    WHAT YOU NEED TO KNOW:    The cause of your abdominal pain may not be found. If a cause is found, treatment will depend on what the cause is.     DISCHARGE INSTRUCTIONS:    Return to the emergency department if:     You vomit blood or cannot stop vomiting.      You have blood in your bowel movement or it looks like tar.       You have bleeding from your rectum.       Your abdomen is larger than usual, more painful, and hard.       You have severe pain in your abdomen.       You stop passing gas and having bowel movements.       You feel weak, dizzy, or faint.    Contact your healthcare provider if:     You have a fever.      You have new signs and symptoms.      Your symptoms do not get better with treatment.       You have questions or concerns about your condition or care.    Medicines may be given to decrease pain, treat an infection, and manage your symptoms. Take your medicine as directed. Call your healthcare provider if you think your medicine is not helping or if you have side effects. Tell him if you are allergic to any medicine. Keep a list of the medicines, vitamins, and herbs you take. Include the amounts, and when and why you take them. Bring the list or the pill bottles to follow-up visits. Carry your medicine list with you in case of an emergency.    Manage your symptoms:     Apply heat on your abdomen for 20 to 30 minutes every 2 hours for as many days as directed. Heat helps decrease pain and muscle spasms.       Manage your stress. Stress may cause abdominal pain. Your healthcare provider may recommend relaxation techniques and deep breathing exercises to help decrease your stress. Your healthcare provider may recommend you talk to someone about your stress or anxiety, such as a counselor or a trusted friend. Get plenty of sleep and exercise regularly.       Limit or do not drink alcohol. Alcohol can make your abdominal pain worse. Ask your healthcare provider if it is safe for you to drink alcohol. Also ask how much is safe for you to drink.       Do not smoke. Nicotine and other chemicals in cigarettes can damage your esophagus and stomach. Ask your healthcare provider for information if you currently smoke and need help to quit. E-cigarettes or smokeless tobacco still contain nicotine. Talk to your healthcare provider before you use these products.     Make changes to the food you eat as directed: Do not eat foods that cause abdominal pain or other symptoms. Eat small meals more often.     Eat more high-fiber foods if you are constipated. High-fiber foods include fruits, vegetables, whole-grain foods, and legumes.       Do not eat foods that cause gas if you have bloating. Examples include broccoli, cabbage, and cauliflower. Do not drink soda or carbonated drinks, because these may also cause gas.       Do not eat foods or drinks that contain sorbitol or fructose if you have diarrhea and bloating. Some examples are fruit juices, candy, jelly, and sugar-free gum.       Do not eat high-fat foods, such as fried foods, cheeseburgers, hot dogs, and desserts.      Limit or do not drink caffeine. Caffeine may make symptoms, such as heart burn or nausea, worse.       Drink plenty of liquids to prevent dehydration from diarrhea or vomiting. Ask your healthcare provider how much liquid to drink each day and which liquids are best for you.     Follow up with your healthcare provider as directed: Write down your questions so you remember to ask them during your visits.

## 2023-08-15 NOTE — ED ADULT NURSE NOTE - NSFALLUNIVINTERV_ED_ALL_ED
Bed/Stretcher in lowest position, wheels locked, appropriate side rails in place/Call bell, personal items and telephone in reach/Instruct patient to call for assistance before getting out of bed/chair/stretcher/Non-slip footwear applied when patient is off stretcher/[place] to call system/Physically safe environment - no spills, clutter or unnecessary equipment/Purposeful proactive rounding/Room/bathroom lighting operational, light cord in reach

## 2023-08-15 NOTE — ED PROVIDER NOTE - CLINICAL SUMMARY MEDICAL DECISION MAKING FREE TEXT BOX
42M male PMHX DM, gastroparesis, cannabinoid hyperemesis, gastritis, sp cholecystectomy who presents with abdominal pain intermittent, epigastric radiating to the right upper quadrant lower abdomen, ongoing since yesterday, with associated nausea, vomiting and elevated glucoses   +TTP RUQ and RLQ on evaluation, possible cannabinoid hyperemesis syndrome but pt denies recent use, possible gastroparesis, possible gastritis, due to diffuse pain and RLQ ttp, will eval for appendicitis, trial analgesia, fluids, rule out DKA , insulin if no severe electrolyte derangements, check UA

## 2023-08-15 NOTE — ED PROVIDER NOTE - PROGRESS NOTE DETAILS
Flores DO: glucose elevated on VBG - I went to assess pts insulin pump, pt states he did not adjust it as he did not have his glucose sensor - potassium ok on vbg - pt adjusted sensor and gave self bolus approx 6u -> will recheck FS in 1 hour to ensure pump is working, no acidosis to suggest dka Flores DO: improved, no further vomiting, results reviewed with pt, labs unremarkable, no labs to suggest dka, elevated glucose improved with insulin pump, clinically well appearing - stable for dc home with return precautions and follow up with GI. Pt declined reglan rx, states he has it

## 2023-08-15 NOTE — ED PROVIDER NOTE - NS ED ROS FT
Gen: No fever, normal appetite  Resp: No cough or trouble breathing  Cardiovascular: No chest pain or palpitation  Gastroenteric: see HPI   :  No change in urine output; no dysuria  MS: No joint or muscle pain  Skin: No rashes  Neuro: No headache; no abnormal movements  Remainder negative, except as per the HPI

## 2023-08-15 NOTE — ED PROVIDER NOTE - CARE PROVIDER_API CALL
Jorje Cheek  Gastroenterology  [address] [place] [address], [address]  [place] [address]  Phone: [phone]  Fax: [phone]  Follow Up Time:

## 2023-09-07 NOTE — ED ADULT NURSE NOTE - CAS EDN DISCHARGE ASSESSMENT
[FreeTextEntry1] : 75 Y OLD MALE WITH PMX OF HTN ,IFG ,DYSLIPIDEMIA ,AVR AND OBESITY = LABS AND MEDS ORDERED RTO 3 M  Alert and oriented to person, place and time

## 2023-09-25 NOTE — PATIENT PROFILE ADULT - DOES PATIENT HAVE ADVANCE DIRECTIVE
May catheter removed per MD order. Upon removal, patient's urethra started to bleed. MD notified. Will continue to monitor.    no

## 2023-10-03 ENCOUNTER — INPATIENT (INPATIENT)
Facility: HOSPITAL | Age: 43
LOS: 2 days | Discharge: ANOTHER HOSPITAL | End: 2023-10-06
Attending: INTERNAL MEDICINE | Admitting: INTERNAL MEDICINE
Payer: MEDICAID

## 2023-10-03 VITALS
WEIGHT: 184.97 LBS | SYSTOLIC BLOOD PRESSURE: 84 MMHG | DIASTOLIC BLOOD PRESSURE: 55 MMHG | HEART RATE: 103 BPM | RESPIRATION RATE: 17 BRPM | HEIGHT: 71 IN | OXYGEN SATURATION: 96 %

## 2023-10-03 DIAGNOSIS — Z90.49 ACQUIRED ABSENCE OF OTHER SPECIFIED PARTS OF DIGESTIVE TRACT: Chronic | ICD-10-CM

## 2023-10-03 LAB
A1C WITH ESTIMATED AVERAGE GLUCOSE RESULT: 11.1 % — HIGH (ref 4–5.6)
ACETONE SERPL-MCNC: ABNORMAL
ALBUMIN SERPL ELPH-MCNC: 3 G/DL — LOW (ref 3.3–5)
ALBUMIN SERPL ELPH-MCNC: 3.2 G/DL — LOW (ref 3.3–5)
ALP SERPL-CCNC: 107 U/L — SIGNIFICANT CHANGE UP (ref 40–120)
ALP SERPL-CCNC: 113 U/L — SIGNIFICANT CHANGE UP (ref 40–120)
ALT FLD-CCNC: 43 U/L — SIGNIFICANT CHANGE UP (ref 12–78)
ALT FLD-CCNC: 52 U/L — SIGNIFICANT CHANGE UP (ref 12–78)
AMPHET UR-MCNC: NEGATIVE — SIGNIFICANT CHANGE UP
ANION GAP SERPL CALC-SCNC: 13 MMOL/L — SIGNIFICANT CHANGE UP (ref 5–17)
ANION GAP SERPL CALC-SCNC: 25 MMOL/L — HIGH (ref 5–17)
ANION GAP SERPL CALC-SCNC: 36 MMOL/L — HIGH (ref 5–17)
ANISOCYTOSIS BLD QL: SLIGHT — SIGNIFICANT CHANGE UP
APPEARANCE UR: CLEAR — SIGNIFICANT CHANGE UP
APTT BLD: 26.1 SEC — SIGNIFICANT CHANGE UP (ref 24.5–35.6)
APTT BLD: 47.7 SEC — HIGH (ref 24.5–35.6)
AST SERPL-CCNC: 37 U/L — SIGNIFICANT CHANGE UP (ref 15–37)
AST SERPL-CCNC: 63 U/L — HIGH (ref 15–37)
BARBITURATES UR SCN-MCNC: NEGATIVE — SIGNIFICANT CHANGE UP
BASE EXCESS BLDA CALC-SCNC: -23.3 MMOL/L — LOW (ref -2–3)
BASE EXCESS BLDA CALC-SCNC: SIGNIFICANT CHANGE UP MMOL/L (ref -2–3)
BASE EXCESS BLDV CALC-SCNC: -6.1 MMOL/L — LOW (ref -2–3)
BASOPHILS # BLD AUTO: 0 K/UL — SIGNIFICANT CHANGE UP (ref 0–0.2)
BASOPHILS # BLD AUTO: 0.14 K/UL — SIGNIFICANT CHANGE UP (ref 0–0.2)
BASOPHILS NFR BLD AUTO: 0 % — SIGNIFICANT CHANGE UP (ref 0–2)
BASOPHILS NFR BLD AUTO: 0.5 % — SIGNIFICANT CHANGE UP (ref 0–2)
BENZODIAZ UR-MCNC: POSITIVE — SIGNIFICANT CHANGE UP
BILIRUB SERPL-MCNC: 0.3 MG/DL — SIGNIFICANT CHANGE UP (ref 0.2–1.2)
BILIRUB SERPL-MCNC: 0.5 MG/DL — SIGNIFICANT CHANGE UP (ref 0.2–1.2)
BILIRUB UR-MCNC: NEGATIVE — SIGNIFICANT CHANGE UP
BLD GP AB SCN SERPL QL: SIGNIFICANT CHANGE UP
BLOOD GAS COMMENTS ARTERIAL: SIGNIFICANT CHANGE UP
BLOOD GAS COMMENTS ARTERIAL: SIGNIFICANT CHANGE UP
BLOOD GAS COMMENTS, VENOUS: SIGNIFICANT CHANGE UP
BUN SERPL-MCNC: 66 MG/DL — HIGH (ref 7–23)
BUN SERPL-MCNC: 75 MG/DL — HIGH (ref 7–23)
BUN SERPL-MCNC: 77 MG/DL — HIGH (ref 7–23)
BURR CELLS BLD QL SMEAR: PRESENT — SIGNIFICANT CHANGE UP
CALCIUM SERPL-MCNC: 10 MG/DL — SIGNIFICANT CHANGE UP (ref 8.5–10.1)
CALCIUM SERPL-MCNC: 9.1 MG/DL — SIGNIFICANT CHANGE UP (ref 8.5–10.1)
CALCIUM SERPL-MCNC: 9.1 MG/DL — SIGNIFICANT CHANGE UP (ref 8.5–10.1)
CHLORIDE BLDV-SCNC: 101 MMOL/L — SIGNIFICANT CHANGE UP (ref 98–107)
CHLORIDE SERPL-SCNC: 104 MMOL/L — SIGNIFICANT CHANGE UP (ref 96–108)
CHLORIDE SERPL-SCNC: 79 MMOL/L — LOW (ref 96–108)
CHLORIDE SERPL-SCNC: 93 MMOL/L — LOW (ref 96–108)
CK MB BLD-MCNC: 3.9 % — HIGH (ref 0–3.5)
CK MB BLD-MCNC: 4.5 % — HIGH (ref 0–3.5)
CK MB CFR SERPL CALC: 43.9 NG/ML — HIGH (ref 0.5–3.6)
CK MB CFR SERPL CALC: 76 NG/ML — HIGH (ref 0.5–3.6)
CK SERPL-CCNC: 1974 U/L — HIGH (ref 26–308)
CK SERPL-CCNC: 980 U/L — HIGH (ref 26–308)
CO2 BLDA-SCNC: 6 MMOL/L — LOW (ref 19–24)
CO2 BLDV-SCNC: 22 MMOL/L — SIGNIFICANT CHANGE UP (ref 22–26)
CO2 SERPL-SCNC: 10 MMOL/L — CRITICAL LOW (ref 22–31)
CO2 SERPL-SCNC: 21 MMOL/L — LOW (ref 22–31)
CO2 SERPL-SCNC: 4 MMOL/L — CRITICAL LOW (ref 22–31)
COCAINE METAB.OTHER UR-MCNC: NEGATIVE — SIGNIFICANT CHANGE UP
COLOR SPEC: YELLOW — SIGNIFICANT CHANGE UP
CREAT SERPL-MCNC: 3.37 MG/DL — HIGH (ref 0.5–1.3)
CREAT SERPL-MCNC: 4.08 MG/DL — HIGH (ref 0.5–1.3)
CREAT SERPL-MCNC: 4.34 MG/DL — HIGH (ref 0.5–1.3)
DIFF PNL FLD: ABNORMAL
EGFR: 17 ML/MIN/1.73M2 — LOW
EGFR: 18 ML/MIN/1.73M2 — LOW
EGFR: 22 ML/MIN/1.73M2 — LOW
EOSINOPHIL # BLD AUTO: 0 K/UL — SIGNIFICANT CHANGE UP (ref 0–0.5)
EOSINOPHIL # BLD AUTO: 0.03 K/UL — SIGNIFICANT CHANGE UP (ref 0–0.5)
EOSINOPHIL NFR BLD AUTO: 0 % — SIGNIFICANT CHANGE UP (ref 0–6)
EOSINOPHIL NFR BLD AUTO: 0.1 % — SIGNIFICANT CHANGE UP (ref 0–6)
ESTIMATED AVERAGE GLUCOSE: 272 MG/DL — HIGH (ref 68–114)
GAS PNL BLDA: SIGNIFICANT CHANGE UP
GAS PNL BLDA: SIGNIFICANT CHANGE UP
GAS PNL BLDV: 136 MMOL/L — SIGNIFICANT CHANGE UP (ref 136–145)
GAS PNL BLDV: SIGNIFICANT CHANGE UP
GAS PNL BLDV: SIGNIFICANT CHANGE UP
GLUCOSE BLDC GLUCOMTR-MCNC: 284 MG/DL — HIGH (ref 70–99)
GLUCOSE BLDC GLUCOMTR-MCNC: 336 MG/DL — HIGH (ref 70–99)
GLUCOSE BLDC GLUCOMTR-MCNC: 383 MG/DL — HIGH (ref 70–99)
GLUCOSE BLDC GLUCOMTR-MCNC: 445 MG/DL — HIGH (ref 70–99)
GLUCOSE BLDC GLUCOMTR-MCNC: 482 MG/DL — CRITICAL HIGH (ref 70–99)
GLUCOSE BLDC GLUCOMTR-MCNC: 591 MG/DL — CRITICAL HIGH (ref 70–99)
GLUCOSE BLDC GLUCOMTR-MCNC: >600 MG/DL — CRITICAL HIGH (ref 70–99)
GLUCOSE SERPL-MCNC: 1294 MG/DL — CRITICAL HIGH (ref 70–99)
GLUCOSE SERPL-MCNC: 1683 MG/DL — CRITICAL HIGH (ref 70–99)
GLUCOSE SERPL-MCNC: 682 MG/DL — CRITICAL HIGH (ref 70–99)
GLUCOSE UR QL: 1000 MG/DL
HCO3 BLDA-SCNC: 6 MMOL/L — CRITICAL LOW (ref 21–28)
HCO3 BLDA-SCNC: SIGNIFICANT CHANGE UP MMOL/L (ref 21–28)
HCO3 BLDV-SCNC: 21 MMOL/L — LOW (ref 22–28)
HCT VFR BLD CALC: 32.8 % — LOW (ref 39–50)
HCT VFR BLD CALC: 37.9 % — LOW (ref 39–50)
HCT VFR BLD CALC: 39.7 % — SIGNIFICANT CHANGE UP (ref 39–50)
HCT VFR BLDA CALC: 41 % — SIGNIFICANT CHANGE UP (ref 37–47)
HGB BLD CALC-MCNC: 13.7 G/DL — SIGNIFICANT CHANGE UP (ref 12.6–17.4)
HGB BLD-MCNC: 11.6 G/DL — LOW (ref 13–17)
HGB BLD-MCNC: 11.7 G/DL — LOW (ref 13–17)
HGB BLD-MCNC: 12 G/DL — LOW (ref 13–17)
HOROWITZ INDEX BLDA+IHG-RTO: 21 — SIGNIFICANT CHANGE UP
HOROWITZ INDEX BLDA+IHG-RTO: 50 — SIGNIFICANT CHANGE UP
HOROWITZ INDEX BLDV+IHG-RTO: 28 — SIGNIFICANT CHANGE UP
IMM GRANULOCYTES NFR BLD AUTO: 3.8 % — HIGH (ref 0–0.9)
INR BLD: 0.91 RATIO — SIGNIFICANT CHANGE UP (ref 0.85–1.18)
KETONES UR-MCNC: ABNORMAL
LACTATE BLDV-MCNC: 2.8 MMOL/L — HIGH (ref 0.56–1.39)
LACTATE SERPL-SCNC: 2.5 MMOL/L — HIGH (ref 0.7–2)
LACTATE SERPL-SCNC: 7.2 MMOL/L — CRITICAL HIGH (ref 0.7–2)
LEUKOCYTE ESTERASE UR-ACNC: NEGATIVE — SIGNIFICANT CHANGE UP
LYMPHOCYTES # BLD AUTO: 13 % — SIGNIFICANT CHANGE UP (ref 13–44)
LYMPHOCYTES # BLD AUTO: 14.7 % — SIGNIFICANT CHANGE UP (ref 13–44)
LYMPHOCYTES # BLD AUTO: 2.91 K/UL — SIGNIFICANT CHANGE UP (ref 1–3.3)
LYMPHOCYTES # BLD AUTO: 4.14 K/UL — HIGH (ref 1–3.3)
MACROCYTES BLD QL: SLIGHT — SIGNIFICANT CHANGE UP
MAGNESIUM SERPL-MCNC: 3.6 MG/DL — HIGH (ref 1.6–2.6)
MAGNESIUM SERPL-MCNC: 3.6 MG/DL — HIGH (ref 1.6–2.6)
MANUAL SMEAR VERIFICATION: SIGNIFICANT CHANGE UP
MCHC RBC-ENTMCNC: 28.1 PG — SIGNIFICANT CHANGE UP (ref 27–34)
MCHC RBC-ENTMCNC: 28.2 PG — SIGNIFICANT CHANGE UP (ref 27–34)
MCHC RBC-ENTMCNC: 28.8 PG — SIGNIFICANT CHANGE UP (ref 27–34)
MCHC RBC-ENTMCNC: 29.2 G/DL — LOW (ref 32–36)
MCHC RBC-ENTMCNC: 31.7 G/DL — LOW (ref 32–36)
MCHC RBC-ENTMCNC: 35.7 G/DL — SIGNIFICANT CHANGE UP (ref 32–36)
MCV RBC AUTO: 79 FL — LOW (ref 80–100)
MCV RBC AUTO: 88.8 FL — SIGNIFICANT CHANGE UP (ref 80–100)
MCV RBC AUTO: 98.5 FL — SIGNIFICANT CHANGE UP (ref 80–100)
METAMYELOCYTES # FLD: 2 % — HIGH (ref 0–0)
METHADONE UR-MCNC: NEGATIVE — SIGNIFICANT CHANGE UP
MONOCYTES # BLD AUTO: 0.22 K/UL — SIGNIFICANT CHANGE UP (ref 0–0.9)
MONOCYTES # BLD AUTO: 1.78 K/UL — HIGH (ref 0–0.9)
MONOCYTES NFR BLD AUTO: 1 % — LOW (ref 2–14)
MONOCYTES NFR BLD AUTO: 6.3 % — SIGNIFICANT CHANGE UP (ref 2–14)
MYELOCYTES NFR BLD: 3 % — HIGH (ref 0–0)
NEUTROPHILS # BLD AUTO: 18.14 K/UL — HIGH (ref 1.8–7.4)
NEUTROPHILS # BLD AUTO: 20.91 K/UL — HIGH (ref 1.8–7.4)
NEUTROPHILS NFR BLD AUTO: 74.6 % — SIGNIFICANT CHANGE UP (ref 43–77)
NEUTROPHILS NFR BLD AUTO: 78 % — HIGH (ref 43–77)
NEUTS BAND # BLD: 3 % — SIGNIFICANT CHANGE UP (ref 0–8)
NEUTS VAC BLD QL SMEAR: SLIGHT — SIGNIFICANT CHANGE UP
NITRITE UR-MCNC: NEGATIVE — SIGNIFICANT CHANGE UP
NRBC # BLD: 0 /100 WBCS — SIGNIFICANT CHANGE UP (ref 0–0)
NRBC # BLD: 0 /100 WBCS — SIGNIFICANT CHANGE UP (ref 0–0)
NRBC # BLD: 0 /100 — SIGNIFICANT CHANGE UP (ref 0–0)
NRBC # BLD: SIGNIFICANT CHANGE UP /100 WBCS (ref 0–0)
OPIATES UR-MCNC: NEGATIVE — SIGNIFICANT CHANGE UP
OVALOCYTES BLD QL SMEAR: SLIGHT — SIGNIFICANT CHANGE UP
PCO2 BLDA: 20 MMHG — LOW (ref 32–46)
PCO2 BLDA: <15 MMHG — CRITICAL LOW (ref 32–46)
PCO2 BLDV: 47 MMHG — SIGNIFICANT CHANGE UP (ref 42–55)
PCP SPEC-MCNC: SIGNIFICANT CHANGE UP
PCP UR-MCNC: NEGATIVE — SIGNIFICANT CHANGE UP
PH BLDA: 7 — CRITICAL LOW (ref 7.35–7.45)
PH BLDA: 7.05 — CRITICAL LOW (ref 7.35–7.45)
PH BLDV: 7.26 — LOW (ref 7.32–7.43)
PH UR: 5 — SIGNIFICANT CHANGE UP (ref 5–8)
PHOSPHATE SERPL-MCNC: 2.5 MG/DL — SIGNIFICANT CHANGE UP (ref 2.5–4.5)
PHOSPHATE SERPL-MCNC: 6.1 MG/DL — HIGH (ref 2.5–4.5)
PLAT MORPH BLD: NORMAL — SIGNIFICANT CHANGE UP
PLATELET # BLD AUTO: 256 K/UL — SIGNIFICANT CHANGE UP (ref 150–400)
PLATELET # BLD AUTO: 264 K/UL — SIGNIFICANT CHANGE UP (ref 150–400)
PLATELET # BLD AUTO: 279 K/UL — SIGNIFICANT CHANGE UP (ref 150–400)
PO2 BLDA: 153 MMHG — HIGH (ref 83–108)
PO2 BLDA: 285 MMHG — HIGH (ref 83–108)
PO2 BLDV: 23 MMHG — LOW (ref 25–45)
POIKILOCYTOSIS BLD QL AUTO: SLIGHT — SIGNIFICANT CHANGE UP
POTASSIUM BLDV-SCNC: 4.6 MMOL/L — SIGNIFICANT CHANGE UP (ref 3.5–5.1)
POTASSIUM SERPL-MCNC: 4.6 MMOL/L — SIGNIFICANT CHANGE UP (ref 3.5–5.3)
POTASSIUM SERPL-MCNC: 5.4 MMOL/L — HIGH (ref 3.5–5.3)
POTASSIUM SERPL-MCNC: 7.7 MMOL/L — CRITICAL HIGH (ref 3.5–5.3)
POTASSIUM SERPL-SCNC: 4.6 MMOL/L — SIGNIFICANT CHANGE UP (ref 3.5–5.3)
POTASSIUM SERPL-SCNC: 5.4 MMOL/L — HIGH (ref 3.5–5.3)
POTASSIUM SERPL-SCNC: 7.7 MMOL/L — CRITICAL HIGH (ref 3.5–5.3)
PROCALCITONIN SERPL-MCNC: 1.43 NG/ML — HIGH (ref 0.02–0.1)
PROT SERPL-MCNC: 6.1 GM/DL — SIGNIFICANT CHANGE UP (ref 6–8.3)
PROT SERPL-MCNC: 6.7 GM/DL — SIGNIFICANT CHANGE UP (ref 6–8.3)
PROT UR-MCNC: 15 MG/DL
PROTHROM AB SERPL-ACNC: 10.9 SEC — SIGNIFICANT CHANGE UP (ref 9.5–13)
RAPID RVP RESULT: DETECTED
RBC # BLD: 4.03 M/UL — LOW (ref 4.2–5.8)
RBC # BLD: 4.15 M/UL — LOW (ref 4.2–5.8)
RBC # BLD: 4.27 M/UL — SIGNIFICANT CHANGE UP (ref 4.2–5.8)
RBC # FLD: 13.8 % — SIGNIFICANT CHANGE UP (ref 10.3–14.5)
RBC # FLD: 14.6 % — HIGH (ref 10.3–14.5)
RBC # FLD: 15.1 % — HIGH (ref 10.3–14.5)
RBC BLD AUTO: ABNORMAL
RBC CASTS # UR COMP ASSIST: SIGNIFICANT CHANGE UP /HPF (ref 0–4)
RV+EV RNA SPEC QL NAA+PROBE: DETECTED
SAO2 % BLDA: 99 % — HIGH (ref 94–98)
SAO2 % BLDA: 99.7 % — HIGH (ref 94–98)
SAO2 % BLDV: 33.5 % — LOW (ref 94–98)
SARS-COV-2 RNA SPEC QL NAA+PROBE: SIGNIFICANT CHANGE UP
SMUDGE CELLS # BLD: PRESENT — SIGNIFICANT CHANGE UP
SODIUM SERPL-SCNC: 119 MMOL/L — CRITICAL LOW (ref 135–145)
SODIUM SERPL-SCNC: 128 MMOL/L — LOW (ref 135–145)
SODIUM SERPL-SCNC: 138 MMOL/L — SIGNIFICANT CHANGE UP (ref 135–145)
SP GR SPEC: 1.01 — SIGNIFICANT CHANGE UP (ref 1.01–1.02)
THC UR QL: POSITIVE — SIGNIFICANT CHANGE UP
TROPONIN I, HIGH SENSITIVITY RESULT: 5718.6 NG/L — HIGH
TROPONIN I, HIGH SENSITIVITY RESULT: HIGH NG/L
TSH SERPL-MCNC: 1.3 UU/ML — SIGNIFICANT CHANGE UP (ref 0.36–3.74)
UROBILINOGEN FLD QL: NEGATIVE MG/DL — SIGNIFICANT CHANGE UP
WBC # BLD: 19.94 K/UL — HIGH (ref 3.8–10.5)
WBC # BLD: 22.4 K/UL — HIGH (ref 3.8–10.5)
WBC # BLD: 28.08 K/UL — HIGH (ref 3.8–10.5)
WBC # FLD AUTO: 19.94 K/UL — HIGH (ref 3.8–10.5)
WBC # FLD AUTO: 22.4 K/UL — HIGH (ref 3.8–10.5)
WBC # FLD AUTO: 28.08 K/UL — HIGH (ref 3.8–10.5)
WBC UR QL: SIGNIFICANT CHANGE UP

## 2023-10-03 PROCEDURE — 99291 CRITICAL CARE FIRST HOUR: CPT

## 2023-10-03 PROCEDURE — 93010 ELECTROCARDIOGRAM REPORT: CPT

## 2023-10-03 PROCEDURE — 71045 X-RAY EXAM CHEST 1 VIEW: CPT | Mod: 26

## 2023-10-03 PROCEDURE — 72125 CT NECK SPINE W/O DYE: CPT | Mod: 26

## 2023-10-03 PROCEDURE — 71045 X-RAY EXAM CHEST 1 VIEW: CPT | Mod: 26,77

## 2023-10-03 PROCEDURE — 99222 1ST HOSP IP/OBS MODERATE 55: CPT

## 2023-10-03 PROCEDURE — 70450 CT HEAD/BRAIN W/O DYE: CPT | Mod: 26

## 2023-10-03 PROCEDURE — 70486 CT MAXILLOFACIAL W/O DYE: CPT | Mod: 26

## 2023-10-03 RX ORDER — SODIUM CHLORIDE 9 MG/ML
1000 INJECTION, SOLUTION INTRAVENOUS
Refills: 0 | Status: DISCONTINUED | OUTPATIENT
Start: 2023-10-03 | End: 2023-10-04

## 2023-10-03 RX ORDER — SUCRALFATE 1 G
1 TABLET ORAL EVERY 6 HOURS
Refills: 0 | Status: DISCONTINUED | OUTPATIENT
Start: 2023-10-03 | End: 2023-10-06

## 2023-10-03 RX ORDER — SODIUM CHLORIDE 9 MG/ML
3000 INJECTION, SOLUTION INTRAVENOUS ONCE
Refills: 0 | Status: COMPLETED | OUTPATIENT
Start: 2023-10-03 | End: 2023-10-03

## 2023-10-03 RX ORDER — PANTOPRAZOLE SODIUM 20 MG/1
40 TABLET, DELAYED RELEASE ORAL
Refills: 0 | Status: DISCONTINUED | OUTPATIENT
Start: 2023-10-03 | End: 2023-10-05

## 2023-10-03 RX ORDER — PANTOPRAZOLE SODIUM 20 MG/1
40 TABLET, DELAYED RELEASE ORAL DAILY
Refills: 0 | Status: DISCONTINUED | OUTPATIENT
Start: 2023-10-03 | End: 2023-10-03

## 2023-10-03 RX ORDER — SODIUM CHLORIDE 9 MG/ML
1000 INJECTION INTRAMUSCULAR; INTRAVENOUS; SUBCUTANEOUS ONCE
Refills: 0 | Status: COMPLETED | OUTPATIENT
Start: 2023-10-03 | End: 2023-10-03

## 2023-10-03 RX ORDER — SODIUM CHLORIDE 9 MG/ML
1000 INJECTION, SOLUTION INTRAVENOUS ONCE
Refills: 0 | Status: COMPLETED | OUTPATIENT
Start: 2023-10-03 | End: 2023-10-03

## 2023-10-03 RX ORDER — DEXMEDETOMIDINE HYDROCHLORIDE IN 0.9% SODIUM CHLORIDE 4 UG/ML
0.3 INJECTION INTRAVENOUS
Qty: 200 | Refills: 0 | Status: DISCONTINUED | OUTPATIENT
Start: 2023-10-03 | End: 2023-10-04

## 2023-10-03 RX ORDER — INSULIN GLARGINE 100 [IU]/ML
15 INJECTION, SOLUTION SUBCUTANEOUS ONCE
Refills: 0 | Status: COMPLETED | OUTPATIENT
Start: 2023-10-03 | End: 2023-10-03

## 2023-10-03 RX ORDER — ASPIRIN/CALCIUM CARB/MAGNESIUM 324 MG
300 TABLET ORAL ONCE
Refills: 0 | Status: DISCONTINUED | OUTPATIENT
Start: 2023-10-03 | End: 2023-10-03

## 2023-10-03 RX ORDER — ONDANSETRON 8 MG/1
4 TABLET, FILM COATED ORAL ONCE
Refills: 0 | Status: DISCONTINUED | OUTPATIENT
Start: 2023-10-03 | End: 2023-10-05

## 2023-10-03 RX ORDER — CHLORHEXIDINE GLUCONATE 213 G/1000ML
1 SOLUTION TOPICAL
Refills: 0 | Status: DISCONTINUED | OUTPATIENT
Start: 2023-10-03 | End: 2023-10-06

## 2023-10-03 RX ORDER — PROPOFOL 10 MG/ML
5 INJECTION, EMULSION INTRAVENOUS
Qty: 1000 | Refills: 0 | Status: DISCONTINUED | OUTPATIENT
Start: 2023-10-03 | End: 2023-10-03

## 2023-10-03 RX ORDER — INSULIN GLARGINE 100 [IU]/ML
15 INJECTION, SOLUTION SUBCUTANEOUS EVERY MORNING
Refills: 0 | Status: DISCONTINUED | OUTPATIENT
Start: 2023-10-04 | End: 2023-10-04

## 2023-10-03 RX ORDER — ASPIRIN/CALCIUM CARB/MAGNESIUM 324 MG
81 TABLET ORAL DAILY
Refills: 0 | Status: DISCONTINUED | OUTPATIENT
Start: 2023-10-04 | End: 2023-10-06

## 2023-10-03 RX ORDER — INSULIN HUMAN 100 [IU]/ML
10 INJECTION, SOLUTION SUBCUTANEOUS
Qty: 100 | Refills: 0 | Status: DISCONTINUED | OUTPATIENT
Start: 2023-10-03 | End: 2023-10-03

## 2023-10-03 RX ORDER — INSULIN HUMAN 100 [IU]/ML
14 INJECTION, SOLUTION SUBCUTANEOUS ONCE
Refills: 0 | Status: COMPLETED | OUTPATIENT
Start: 2023-10-03 | End: 2023-10-03

## 2023-10-03 RX ORDER — CHLORHEXIDINE GLUCONATE 213 G/1000ML
15 SOLUTION TOPICAL EVERY 12 HOURS
Refills: 0 | Status: DISCONTINUED | OUTPATIENT
Start: 2023-10-03 | End: 2023-10-03

## 2023-10-03 RX ORDER — INSULIN HUMAN 100 [IU]/ML
8 INJECTION, SOLUTION SUBCUTANEOUS
Qty: 100 | Refills: 0 | Status: DISCONTINUED | OUTPATIENT
Start: 2023-10-03 | End: 2023-10-03

## 2023-10-03 RX ORDER — CLOPIDOGREL BISULFATE 75 MG/1
75 TABLET, FILM COATED ORAL DAILY
Refills: 0 | Status: DISCONTINUED | OUTPATIENT
Start: 2023-10-04 | End: 2023-10-06

## 2023-10-03 RX ORDER — INSULIN HUMAN 100 [IU]/ML
2 INJECTION, SOLUTION SUBCUTANEOUS
Qty: 100 | Refills: 0 | Status: DISCONTINUED | OUTPATIENT
Start: 2023-10-03 | End: 2023-10-04

## 2023-10-03 RX ORDER — HEPARIN SODIUM 5000 [USP'U]/ML
INJECTION INTRAVENOUS; SUBCUTANEOUS
Qty: 25000 | Refills: 0 | Status: DISCONTINUED | OUTPATIENT
Start: 2023-10-03 | End: 2023-10-05

## 2023-10-03 RX ORDER — ASPIRIN/CALCIUM CARB/MAGNESIUM 324 MG
324 TABLET ORAL ONCE
Refills: 0 | Status: COMPLETED | OUTPATIENT
Start: 2023-10-03 | End: 2023-10-03

## 2023-10-03 RX ORDER — MIDAZOLAM HYDROCHLORIDE 1 MG/ML
4 INJECTION, SOLUTION INTRAMUSCULAR; INTRAVENOUS ONCE
Refills: 0 | Status: DISCONTINUED | OUTPATIENT
Start: 2023-10-03 | End: 2023-10-03

## 2023-10-03 RX ORDER — PROPOFOL 10 MG/ML
200 INJECTION, EMULSION INTRAVENOUS ONCE
Refills: 0 | Status: COMPLETED | OUTPATIENT
Start: 2023-10-03 | End: 2023-10-03

## 2023-10-03 RX ORDER — CLOPIDOGREL BISULFATE 75 MG/1
300 TABLET, FILM COATED ORAL ONCE
Refills: 0 | Status: COMPLETED | OUTPATIENT
Start: 2023-10-03 | End: 2023-10-03

## 2023-10-03 RX ORDER — METOCLOPRAMIDE HCL 10 MG
10 TABLET ORAL EVERY 6 HOURS
Refills: 0 | Status: DISCONTINUED | OUTPATIENT
Start: 2023-10-03 | End: 2023-10-06

## 2023-10-03 RX ADMIN — SODIUM CHLORIDE 1000 MILLILITER(S): 9 INJECTION INTRAMUSCULAR; INTRAVENOUS; SUBCUTANEOUS at 08:48

## 2023-10-03 RX ADMIN — SODIUM CHLORIDE 1000 MILLILITER(S): 9 INJECTION INTRAMUSCULAR; INTRAVENOUS; SUBCUTANEOUS at 10:00

## 2023-10-03 RX ADMIN — SODIUM CHLORIDE 1000 MILLILITER(S): 9 INJECTION, SOLUTION INTRAVENOUS at 08:49

## 2023-10-03 RX ADMIN — DEXMEDETOMIDINE HYDROCHLORIDE IN 0.9% SODIUM CHLORIDE 6.29 MICROGRAM(S)/KG/HR: 4 INJECTION INTRAVENOUS at 18:21

## 2023-10-03 RX ADMIN — PROPOFOL 2.52 MICROGRAM(S)/KG/MIN: 10 INJECTION, EMULSION INTRAVENOUS at 09:50

## 2023-10-03 RX ADMIN — INSULIN HUMAN 14 UNIT(S): 100 INJECTION, SOLUTION SUBCUTANEOUS at 08:56

## 2023-10-03 RX ADMIN — PANTOPRAZOLE SODIUM 40 MILLIGRAM(S): 20 TABLET, DELAYED RELEASE ORAL at 17:48

## 2023-10-03 RX ADMIN — HEPARIN SODIUM 1000 UNIT(S)/HR: 5000 INJECTION INTRAVENOUS; SUBCUTANEOUS at 15:59

## 2023-10-03 RX ADMIN — SODIUM CHLORIDE 1000 MILLILITER(S): 9 INJECTION, SOLUTION INTRAVENOUS at 13:39

## 2023-10-03 RX ADMIN — SODIUM CHLORIDE 1000 MILLILITER(S): 9 INJECTION, SOLUTION INTRAVENOUS at 10:55

## 2023-10-03 RX ADMIN — Medication 1 GRAM(S): at 17:48

## 2023-10-03 RX ADMIN — SODIUM CHLORIDE 1000 MILLILITER(S): 9 INJECTION, SOLUTION INTRAVENOUS at 10:00

## 2023-10-03 RX ADMIN — INSULIN HUMAN 8 UNIT(S)/HR: 100 INJECTION, SOLUTION SUBCUTANEOUS at 10:45

## 2023-10-03 RX ADMIN — PROPOFOL 2.52 MICROGRAM(S)/KG/MIN: 10 INJECTION, EMULSION INTRAVENOUS at 11:56

## 2023-10-03 RX ADMIN — SODIUM CHLORIDE 125 MILLILITER(S): 9 INJECTION, SOLUTION INTRAVENOUS at 13:55

## 2023-10-03 RX ADMIN — CHLORHEXIDINE GLUCONATE 1 APPLICATION(S): 213 SOLUTION TOPICAL at 12:06

## 2023-10-03 RX ADMIN — MIDAZOLAM HYDROCHLORIDE 4 MILLIGRAM(S): 1 INJECTION, SOLUTION INTRAMUSCULAR; INTRAVENOUS at 11:09

## 2023-10-03 RX ADMIN — HEPARIN SODIUM 1150 UNIT(S)/HR: 5000 INJECTION INTRAVENOUS; SUBCUTANEOUS at 23:15

## 2023-10-03 RX ADMIN — INSULIN GLARGINE 15 UNIT(S): 100 INJECTION, SOLUTION SUBCUTANEOUS at 14:24

## 2023-10-03 RX ADMIN — Medication 324 MILLIGRAM(S): at 15:58

## 2023-10-03 RX ADMIN — CLOPIDOGREL BISULFATE 300 MILLIGRAM(S): 75 TABLET, FILM COATED ORAL at 15:58

## 2023-10-03 NOTE — H&P ADULT - HISTORY OF PRESENT ILLNESS
42 yr old M with DM1 (on insulin pump), asthma, gastritis, anxiety, gastroparesis, cannabinoid hyperemesis presented to the ER unresponsives. Per wife, patient was found unresponsive on floor, ran out of insulin pump per wife a day prior. Intubated in ER for airway protection.     In ER patient was found to have a blood glucose of 1683, potassium 7.7, severe metabolic abnormalities with pH of 7.0 and co2 <15. Lactate of 7.2 with MAYRA of Cr 4.34. Troponins >5k. Admitted to ICU for further care.

## 2023-10-03 NOTE — ED ADULT TRIAGE NOTE - CHIEF COMPLAINT QUOTE
Patient BIBA alerted mental status and lethargic after running out of insulin. BGL HI x 2   hx DM type 1

## 2023-10-03 NOTE — H&P ADULT - CRITICAL CARE ATTENDING COMMENT
Agree with above  42M PMH DM1 (diagnosed @14, on insulin pump) found down in DKA d/t inability to obtain insulin from pharmacy (was apparently told he "didn't have a refill"). In ED, intubated for somnolence.  Brought up to ICU, and was given fluids and insulin (Lantus + insulin gtt). Mental status improved, and so was extubated.  - c/w Lantus 15U daily  - insulin gtt until AG closes, then D/C  - While on insulin gtt: LR for now, transition to D5LR once FS <250  - ?NSTEMI (high troponinemia), less likely d/t ACS, but cannot rule out. Therefore, will give Plavix load and start heparin gtt). EKG abnormalities on initial EKG, but patient was significantly hyperkalemic at the time.    I have personally provided 45 minutes of critical care time.

## 2023-10-03 NOTE — ED ADULT NURSE NOTE - NSFALLRISKINTERV_ED_ALL_ED
Assistance OOB with selected safe patient handling equipment if applicable/Assistance with ambulation/Communicate fall risk and risk factors to all staff, patient, and family/Encourage patient to sit up slowly, dangle for a short time, stand at bedside before walking/Monitor gait and stability/Monitor for mental status changes and reorient to person, place, and time, as needed/Provide visual cue: yellow wristband, yellow gown, etc/Reinforce activity limits and safety measures with patient and family/Review medications for side effects contributing to fall risk/Toileting schedule using arm’s reach rule for commode and bathroom/Use of alarms - bed, stretcher, chair and/or video monitoring/Call bell, personal items and telephone in reach/Instruct patient to call for assistance before getting out of bed/chair/stretcher/Non-slip footwear applied when patient is off stretcher/[place] to call system/Physically safe environment - no spills, clutter or unnecessary equipment/Purposeful Proactive Rounding/Room/bathroom lighting operational, light cord in reach

## 2023-10-03 NOTE — H&P ADULT - NSHPLABSRESULTS_GEN_ALL_CORE
MEDICATIONS  (STANDING):  aspirin  chewable 324 milliGRAM(s) Oral once  chlorhexidine 0.12% Liquid 15 milliLiter(s) Oral Mucosa every 12 hours  chlorhexidine 2% Cloths 1 Application(s) Topical <User Schedule>  clopidogrel Tablet 300 milliGRAM(s) Oral once  heparin  Infusion.  Unit(s)/Hr (10 mL/Hr) IV Continuous <Continuous>  insulin regular Infusion 10 Unit(s)/Hr (10 mL/Hr) IV Continuous <Continuous>  lactated ringers. 1000 milliLiter(s) (125 mL/Hr) IV Continuous <Continuous>  pantoprazole  Injectable 40 milliGRAM(s) IV Push two times a day  propofol Infusion 5 MICROgram(s)/kG/Min (2.52 mL/Hr) IV Continuous <Continuous>  sucralfate 1 Gram(s) Oral every 6 hours    ICU Vital Signs Last 24 Hrs  T(C): 33.7 (03 Oct 2023 11:53), Max: 34.1 (03 Oct 2023 10:10)  T(F): 92.6 (03 Oct 2023 11:53), Max: 93.4 (03 Oct 2023 10:10)  HR: 75 (03 Oct 2023 13:30) (75 - 103)  BP: 114/74 (03 Oct 2023 13:30) (84/55 - 148/84)  BP(mean): 87 (03 Oct 2023 13:30) (84 - 106)  ABP: --  ABP(mean): --  RR: 13 (03 Oct 2023 13:30) (13 - 21)  SpO2: 99% (03 Oct 2023 13:30) (96% - 100%)    O2 Parameters below as of 03 Oct 2023 11:53  Patient On (Oxygen Delivery Method): ventilator        LABS:                          12.0   28.08 )-----------( 264      ( 03 Oct 2023 12:36 )             37.9     10-03    128<L>  |  93<L>  |  75<H>  ----------------------------<  1294<HH>  5.4<H>   |  10<LL>  |  4.08<H>    Ca    9.1      03 Oct 2023 12:36  Phos  6.1     10-03  Mg     3.6     10-03    TPro  6.7  /  Alb  3.2<L>  /  TBili  0.5  /  DBili  x   /  AST  63<H>  /  ALT  52  /  AlkPhos  113  10-03    LIVER FUNCTIONS - ( 03 Oct 2023 12:36 )  Alb: 3.2 g/dL / Pro: 6.7 gm/dL / ALK PHOS: 113 U/L / ALT: 52 U/L / AST: 63 U/L / GGT: x         thy  PT/INR - ( 03 Oct 2023 09:00 )   PT: 10.9 sec;   INR: 0.91 ratio         PTT - ( 03 Oct 2023 09:00 )  PTT:26.1 sec  Urinalysis Basic - ( 03 Oct 2023 13:00 )    Color: Yellow / Appearance: Clear / S.010 / pH: x  Gluc: x / Ketone: Moderate  / Bili: Negative / Urobili: Negative mg/dL   Blood: x / Protein: 15 mg/dL / Nitrite: Negative   Leuk Esterase: Negative / RBC: 0-2 /HPF / WBC 0-2   Sq Epi: x / Non Sq Epi: x / Bacteria: x      < from: CT Head No Cont (10.03.23 @ 11:32) >    IMPRESSION:    NONCONTRAST CERVICAL SPINE CT: No acute fractures or dislocations.    NONCONTRAST MAXILLOFACIAL CT: No acute facial fractures.    NONCONTRAST HEAD CT: No acute intracranial hemorrhage, mass effect, or   shift of the midline structures.    --- End of Report ---    < end of copied text >

## 2023-10-03 NOTE — ED PROVIDER NOTE - OBJECTIVE STATEMENT
42 year old male with h/o DM, anxiety, gastritis and asthma presents today biba from home found altered at home on the floor, as per his wife Rachel ([phone]) states that pt has been attempting to get his insulin refill, she believes his last medication was yesterday but unsure, states that they have been low in money, pt also has had a small cold, was recently tested two days ago and negative for covid, she states that he has had nausea and vomiting x 2 so initially thought his symptoms was his gastritis, also had runny nose and poor appetite, due to not having his medication pt also decides not to eat as much +subjective fever 42 year old male with h/o DM, anxiety, gastritis and asthma presents today biba from home found altered at home on the floor, as per his wife Rachel ([phone]) states that pt has been attempting to get his insulin refill, she believes his last medication was yesterday but unsure, states that they have been low in money, pt also has had a small cold, was recently tested two days ago and negative for covid, she states that he has had nausea and vomiting x 2 so initially thought his symptoms was his gastritis, also had runny nose and poor appetite, due to not having his medication pt also decides not to eat as much +subjective fever, his wife woke up this morning and found him on the floor at 6am, she did see him at 2am c/o feeling dry and getting water to drink, pt brought in and appears lethargic with very dry oral mucosa, tachypneic and minimally responsive

## 2023-10-03 NOTE — H&P ADULT - PATIENT'S SEXUAL ORIENTATION
Heterosexual
I will SWITCH the dose or number of times a day I take the medications listed below when I get home from the hospital:  None

## 2023-10-03 NOTE — ED PROVIDER NOTE - CLINICAL SUMMARY MEDICAL DECISION MAKING FREE TEXT BOX
pt w h/o dm, gastritis and anxiety presents today found on the floor by his wife who reports pt has been sick recently with a cold, covid test days ago was negative pt w h/o dm, gastritis and anxiety presents today found on the floor by his wife who reports pt has been taken his insulin since yesterday as far as she knows, pt was attempting to get a refill, decided not to eat, also has not been feeling well, has had a cold, tested negative for covid two days ago, pt c/o feeling dry at 2am and had some water, pt found at 6am on the floor altered, on exam pt is altered, lethargic, and tachypneic, has very dry/cracked lip and oral mucosa, accucheck readings high x2, pt hypotensvie and tachycardic, pt intubated for airway protection, DKA workup, ivf fluids, iv insulin, cts and xray, icu consulted, for admission

## 2023-10-03 NOTE — ED PROVIDER NOTE - CONSTITUTIONAL APPEARANCE HYGIENE, MLM
lethargic appearing/ILL APPEARING lethargic appearing, opens eyes w verbal stimulation, however not verbally responsive/ILL APPEARING

## 2023-10-03 NOTE — ED ADULT NURSE NOTE - OBJECTIVE STATEMENT
Received 42 year old male found lethargic and drowsy due to hyperglycemia. No family at the beside at this time. MD Morrow intubated pt, sedation and paralytic given prior, 22 at the lip and 8. Pt sister called and reports pt has been unable to take his insulin for the last two days and is sensitive to low/high sugar levels. PMH: DM

## 2023-10-03 NOTE — CONSULT NOTE ADULT - SUBJECTIVE AND OBJECTIVE BOX
CHIEF COMPLAINT:  Patient is a 42y old  Male who presents with a chief complaint of Unresponsiveness (03 Oct 2023 14:23)      HPI:  42-year-old with type 1 diabetes containing glycemic control with insulin pump, asthma, gastritis, anxiety and gastroparesis admitted with unresponsiveness and blood glucose of 1683 with potassium of 7.7.  MAYRA noted with creatinine of 4.3 and troponin greater than 5000.  Intubated for respiratory protection and being treated for DKA at present.    ALLERGIES:  Allergies    Mayonnaise (Vomiting)  No Known Drug Allergies    Intolerances    lactose (Diarrhea)    Home Medications:  Xanax 1 mg oral tablet: 1 tab(s) orally 3 times a day, As Needed (05 Mar 2023 14:43)    PAST MEDICAL & SURGICAL HISTORY:  DM type 1 (diabetes mellitus, type 1)      Asthma      Gastritis      Anxiety      Controlled diabetes mellitus type 1 without complications      Gastritis, presence of bleeding unspecified, unspecified chronicity, unspecified gastritis type      Uncomplicated asthma, unspecified asthma severity, unspecified whether persistent      S/P cholecystectomy      History of cholecystectomy            FAMILY HISTORY:  Family history of diabetes mellitus (Father, Mother)    Family history of diabetes mellitus (DM) (Father, Mother, Grandparent)        SOCIAL HISTORY:    REVIEW OF SYSTEMS:  General:  No wt loss, fevers, chills, night sweats  Eyes:  Good vision, no reported pain  ENT:  No sore throat, pain, runny nose, dysphagia  CV:  No pain, palpitations, hypo/hypertension  Resp:  No dyspnea, cough, tachypnea, wheezing  GI:  No pain, nausea, vomiting, diarrhea, constipation  :  No pain, bleeding, incontinence, nocturia  Muscle:  No pain, weakness  Breast:  No pain, abscess, mass, discharge  Neuro:  No weakness, tingling, memory problems  Psych:  No fatigue, insomnia, mood problems, depression  Endocrine:  No polyuria, polydipsia, cold/heat intolerance  Heme:  No petechiae, ecchymosis, easy bruisability  Skin:  No rash, edema    PHYSICAL EXAM:  Vital Signs:  Vital Signs Last 24 Hrs  T(C): 33.7 (03 Oct 2023 11:53), Max: 34.1 (03 Oct 2023 10:10)  T(F): 92.6 (03 Oct 2023 11:53), Max: 93.4 (03 Oct 2023 10:10)  HR: 85 (03 Oct 2023 15:00) (75 - 103)  BP: 128/80 (03 Oct 2023 15:00) (84/55 - 148/84)  BP(mean): 94 (03 Oct 2023 15:00) (84 - 106)  RR: 24 (03 Oct 2023 15:00) (13 - 24)  SpO2: 97% (03 Oct 2023 15:00) (96% - 100%)    Parameters below as of 03 Oct 2023 15:00  Patient On (Oxygen Delivery Method): nasal cannula  O2 Flow (L/min): 2    I&O's Summary    03 Oct 2023 07:01  -  03 Oct 2023 16:07  --------------------------------------------------------  IN: 1335 mL / OUT: 1250 mL / NET: 85 mL      I&O's Detail    03 Oct 2023 07:01  -  03 Oct 2023 16:07  --------------------------------------------------------  IN:    Insulin: 40 mL    Lactated Ringers: 250 mL    Lactated Ringers Bolus: 1000 mL    Propofol: 45 mL  Total IN: 1335 mL    OUT:    Indwelling Catheter - Urethral (mL): 1250 mL  Total OUT: 1250 mL    Total NET: 85 mL      Tele:     Constitutional: well developed, normal appearance, well groomed, well nourished, no deformities and no acute distress.   Eyes: the conjunctiva exhibited no abnormalities and the eyelids demonstrated no xanthelasmas.   HEENT: normal oral mucosa, no oral pallor and no oral cyanosis.   Neck: normal jugular venous A waves present, normal jugular venous V waves present and no jugular venous sharma A waves.   Pulmonary: no respiratory distress, normal respiratory rhythm and effort, no accessory muscle use and lungs were clear to auscultation bilaterally.   Cardiovascular: heart rate and rhythm were normal, normal S1 and S2 and no murmur, gallop, rub, heave or thrill are present.   Abdomen: soft, non-tender, no hepato-splenomegaly and no abdominal mass palpated.   Musculoskeletal: the gait could not be assessed..   Extremities: no clubbing of the fingernails, no localized cyanosis, no petechial hemorrhages and no ischemic changes.   Skin: normal skin color and pigmentation, no rash, no venous stasis, no skin lesions, no skin ulcer and no xanthoma was observed.   Psychiatric: oriented to person, place, and time, the affect was normal, the mood was normal and not feeling anxious.      LABORATORY:                          12.0   28.08 )-----------( 264      ( 03 Oct 2023 12:36 )             37.9     10-03    128<L>  |  93<L>  |  75<H>  ----------------------------<  1294<HH>  5.4<H>   |  10<LL>  |  4.08<H>    Ca    9.1      03 Oct 2023 12:36  Phos  6.1     10-03  Mg     3.6     10-03    TPro  6.7  /  Alb  3.2<L>  /  TBili  0.5  /  DBili  x   /  AST  63<H>  /  ALT  52  /  AlkPhos  113  10-03    ABG - ( 03 Oct 2023 10:46 )  pH, Arterial: 7.05  pH, Blood: x     /  pCO2: 20    /  pO2: 285   / HCO3: 6     / Base Excess: -23.3 /  SaO2: 99.7              CARDIAC MARKERS ( 03 Oct 2023 12:36 )  x     / x     / 980 U/L / x     / 43.9 ng/mL  CARDIAC MARKERS ( 03 Oct 2023 09:00 )  3241.2 ng/mL / x     / 421 U/L / x     / 21.6 ng/mL      CAPILLARY BLOOD GLUCOSE      POCT Blood Glucose.: >600 mg/dL (03 Oct 2023 15:37)  POCT Blood Glucose.: >600 mg/dL (03 Oct 2023 14:10)  POCT Blood Glucose.: >600 mg/dL (03 Oct 2023 13:05)  POCT Blood Glucose.: >600 mg/dL (03 Oct 2023 11:57)  POCT Blood Glucose.: >600 mg/dL (03 Oct 2023 11:08)  POCT Blood Glucose.: >600 mg/dL (03 Oct 2023 11:07)  POCT Blood Glucose.: >600 mg/dL (03 Oct 2023 09:40)  POCT Blood Glucose.: >600 mg/dL (03 Oct 2023 09:39)  POCT Blood Glucose.: >600 mg/dL (03 Oct 2023 08:43)  POCT Blood Glucose.: >600 mg/dL (03 Oct 2023 08:42)    LIVER FUNCTIONS - ( 03 Oct 2023 12:36 )  Alb: 3.2 g/dL / Pro: 6.7 gm/dL / ALK PHOS: 113 U/L / ALT: 52 U/L / AST: 63 U/L / GGT: x           PT/INR - ( 03 Oct 2023 09:00 )   PT: 10.9 sec;   INR: 0.91 ratio         PTT - ( 03 Oct 2023 09:00 )  PTT:26.1 sec  Urinalysis Basic - ( 03 Oct 2023 13:00 )    Color: Yellow / Appearance: Clear / S.010 / pH: x  Gluc: x / Ketone: Moderate  / Bili: Negative / Urobili: Negative mg/dL   Blood: x / Protein: 15 mg/dL / Nitrite: Negative   Leuk Esterase: Negative / RBC: 0-2 /HPF / WBC 0-2   Sq Epi: x / Non Sq Epi: x / Bacteria: x      IMAGING:  < from: Xray Chest 1 View- PORTABLE-Urgent (Xray Chest 1 View- PORTABLE-Urgent .) (10.03.23 @ 10:07) >  IMPRESSION: Cardiac paddle since previous exam. Endotracheal tube tip   above the hemalatha since previous exam. No focal infiltrate or congestion.   Heart is within normal limits in its transthoracic diameter. Regional   osseous structures appropriate for age. No pneumothorax.    < end of copied text >    < from: CT Head No Cont (10.03.23 @ 11:32) >  NONCONTRAST CERVICAL SPINE CT: No acute fractures or dislocations.    NONCONTRAST MAXILLOFACIAL CT: No acute facial fractures.    NONCONTRAST HEAD CT: No acute intracranial hemorrhage, mass effect, or   shift of the midline structures.    < end of copied text >      ASSESSMENT:  42-year-old with type 1 diabetes containing glycemic control with insulin pump, asthma, gastritis, anxiety and gastroparesis admitted with unresponsiveness and blood glucose of 1683 with potassium of 7.7.  MAYRA noted with creatinine of 4.3 and troponin greater than 5000.  Intubated for respiratory protection and being treated for DKA at present.    PLAN:     Agree with intensive care unit management of insulin infusion, IV fluid support along with PPI therapy for DKA management.  Close glycemic control per ICU team.  Heparin infusion reasonable at this time given elevated troponin however unclear if this represents subclinical CAD versus ischemic demand. Ordered an echocardiogram to assess LV function and valvular status.  Correct electrolytes.  Eventual ischemic cardiac work-up when acute issues resolved.    Yoseph eRyes MD, FACC, LUZMA, HAINC, FACP  Director, Heart Failure Services  [facility]  , Department of Cardiology  [facility]     CHIEF COMPLAINT:  Patient is a 42y old  Male who presents with a chief complaint of Unresponsiveness (03 Oct 2023 14:23)      HPI:  42-year-old with type 1 diabetes containing glycemic control with insulin pump, asthma, gastritis, anxiety and gastroparesis admitted with unresponsiveness and blood glucose of 1683 with potassium of 7.7.  MAYRA noted with creatinine of 4.3 and troponin greater than 5000.  Intubated for respiratory protection and being treated for DKA at present.    ALLERGIES:  Allergies    Mayonnaise (Vomiting)  No Known Drug Allergies    Intolerances    lactose (Diarrhea)    Home Medications:  Xanax 1 mg oral tablet: 1 tab(s) orally 3 times a day, As Needed (05 Mar 2023 14:43)    PAST MEDICAL & SURGICAL HISTORY:  DM type 1 (diabetes mellitus, type 1)      Asthma      Gastritis      Anxiety      Controlled diabetes mellitus type 1 without complications      Gastritis, presence of bleeding unspecified, unspecified chronicity, unspecified gastritis type      Uncomplicated asthma, unspecified asthma severity, unspecified whether persistent      S/P cholecystectomy      History of cholecystectomy            FAMILY HISTORY:  Family history of diabetes mellitus (Father, Mother)    Family history of diabetes mellitus (DM) (Father, Mother, Grandparent)        SOCIAL HISTORY:  n/a    REVIEW OF SYSTEMS:  Unable    PHYSICAL EXAM:  Vital Signs:  Vital Signs Last 24 Hrs  T(C): 33.7 (03 Oct 2023 11:53), Max: 34.1 (03 Oct 2023 10:10)  T(F): 92.6 (03 Oct 2023 11:53), Max: 93.4 (03 Oct 2023 10:10)  HR: 85 (03 Oct 2023 15:00) (75 - 103)  BP: 128/80 (03 Oct 2023 15:00) (84/55 - 148/84)  BP(mean): 94 (03 Oct 2023 15:00) (84 - 106)  RR: 24 (03 Oct 2023 15:00) (13 - 24)  SpO2: 97% (03 Oct 2023 15:00) (96% - 100%)    Parameters below as of 03 Oct 2023 15:00  Patient On (Oxygen Delivery Method): nasal cannula  O2 Flow (L/min): 2    I&O's Summary    03 Oct 2023 07:01  -  03 Oct 2023 16:07  --------------------------------------------------------  IN: 1335 mL / OUT: 1250 mL / NET: 85 mL      I&O's Detail    03 Oct 2023 07:01  -  03 Oct 2023 16:07  --------------------------------------------------------  IN:    Insulin: 40 mL    Lactated Ringers: 250 mL    Lactated Ringers Bolus: 1000 mL    Propofol: 45 mL  Total IN: 1335 mL    OUT:    Indwelling Catheter - Urethral (mL): 1250 mL  Total OUT: 1250 mL    Total NET: 85 mL      Tele: ST    Constitutional: well developed, normal appearance, well groomed, well nourished, no deformities and no acute distress.   Eyes: the conjunctiva exhibited no abnormalities and the eyelids demonstrated no xanthelasmas.   HEENT: normal oral mucosa, no oral pallor and no oral cyanosis.   Neck: normal jugular venous A waves present, normal jugular venous V waves present and no jugular venous sharma A waves.   Pulmonary: no respiratory distress, normal respiratory rhythm and effort, no accessory muscle use and lungs were clear to auscultation bilaterally.   Cardiovascular: heart rate and rhythm were normal, normal S1 and S2.  Abdomen: soft, non-tender.  Musculoskeletal: the gait could not be assessed.  Extremities: no clubbing of the fingernails, no localized cyanosis, no petechial hemorrhages and no ischemic changes.   Skin: normal skin color and pigmentation, no rash, no venous stasis, no skin lesions, no skin ulcer and no xanthoma was observed.   Psychiatric: oriented to person, place, and time, the affect was normal, the mood was normal and not feeling anxious.      LABORATORY:                          12.0   28.08 )-----------( 264      ( 03 Oct 2023 12:36 )             37.9     10    128<L>  |  93<L>  |  75<H>  ----------------------------<  1294<HH>  5.4<H>   |  10<LL>  |  4.08<H>    Ca    9.1      03 Oct 2023 12:36  Phos  6.1     10-03  Mg     3.6     10-03    TPro  6.7  /  Alb  3.2<L>  /  TBili  0.5  /  DBili  x   /  AST  63<H>  /  ALT  52  /  AlkPhos  113  10-03    ABG - ( 03 Oct 2023 10:46 )  pH, Arterial: 7.05  pH, Blood: x     /  pCO2: 20    /  pO2: 285   / HCO3: 6     / Base Excess: -23.3 /  SaO2: 99.7              CARDIAC MARKERS ( 03 Oct 2023 12:36 )  x     / x     / 980 U/L / x     / 43.9 ng/mL  CARDIAC MARKERS ( 03 Oct 2023 09:00 )  3241.2 ng/mL / x     / 421 U/L / x     / 21.6 ng/mL      CAPILLARY BLOOD GLUCOSE      POCT Blood Glucose.: >600 mg/dL (03 Oct 2023 15:37)  POCT Blood Glucose.: >600 mg/dL (03 Oct 2023 14:10)  POCT Blood Glucose.: >600 mg/dL (03 Oct 2023 13:05)  POCT Blood Glucose.: >600 mg/dL (03 Oct 2023 11:57)  POCT Blood Glucose.: >600 mg/dL (03 Oct 2023 11:08)  POCT Blood Glucose.: >600 mg/dL (03 Oct 2023 11:07)  POCT Blood Glucose.: >600 mg/dL (03 Oct 2023 09:40)  POCT Blood Glucose.: >600 mg/dL (03 Oct 2023 09:39)  POCT Blood Glucose.: >600 mg/dL (03 Oct 2023 08:43)  POCT Blood Glucose.: >600 mg/dL (03 Oct 2023 08:42)    LIVER FUNCTIONS - ( 03 Oct 2023 12:36 )  Alb: 3.2 g/dL / Pro: 6.7 gm/dL / ALK PHOS: 113 U/L / ALT: 52 U/L / AST: 63 U/L / GGT: x           PT/INR - ( 03 Oct 2023 09:00 )   PT: 10.9 sec;   INR: 0.91 ratio         PTT - ( 03 Oct 2023 09:00 )  PTT:26.1 sec  Urinalysis Basic - ( 03 Oct 2023 13:00 )    Color: Yellow / Appearance: Clear / S.010 / pH: x  Gluc: x / Ketone: Moderate  / Bili: Negative / Urobili: Negative mg/dL   Blood: x / Protein: 15 mg/dL / Nitrite: Negative   Leuk Esterase: Negative / RBC: 0-2 /HPF / WBC 0-2   Sq Epi: x / Non Sq Epi: x / Bacteria: x    Troponin I, High Sensitivity Result: 5718.6      IMAGING:  ECG: Widened QRS and Twave prolongation.  Improved on repeat ecg.    < from: Xray Chest 1 View- PORTABLE-Urgent (Xray Chest 1 View- PORTABLE-Urgent .) (10.03.23 @ 10:07) >  IMPRESSION: Cardiac paddle since previous exam. Endotracheal tube tip   above the hemalatha since previous exam. No focal infiltrate or congestion.   Heart is within normal limits in its transthoracic diameter. Regional   osseous structures appropriate for age. No pneumothorax.    < end of copied text >    < from: CT Head No Cont (10.03.23 @ 11:32) >  NONCONTRAST CERVICAL SPINE CT: No acute fractures or dislocations.    NONCONTRAST MAXILLOFACIAL CT: No acute facial fractures.    NONCONTRAST HEAD CT: No acute intracranial hemorrhage, mass effect, or   shift of the midline structures.    < end of copied text >      ASSESSMENT:  42-year-old with type 1 diabetes containing glycemic control with insulin pump, asthma, gastritis, anxiety and gastroparesis admitted with unresponsiveness and blood glucose of 1683 with potassium of 7.7.  MAYRA noted with creatinine of 4.3 and troponin greater than 5000.  Intubated for respiratory protection and being treated for DKA at present.    PLAN:     Agree with intensive care unit management of insulin infusion, IV fluid support along with PPI therapy for DKA management.  Close glycemic control per ICU team.  Heparin infusion reasonable at this time given elevated troponin however unclear if this represents subclinical CAD versus ischemic demand. Ordered an echocardiogram to assess LV function and valvular status.  Correct electrolytes.  Eventual ischemic cardiac work-up when acute issues resolved.    Yoseph Reyes MD, FACC, FASE, FASNC, FACP  Director, Heart Failure Services  [facility]  , Department of Cardiology  [facility]

## 2023-10-03 NOTE — H&P ADULT - ASSESSMENT
Assessment: 42 yr old M with DM1 (on insulin pump), asthma, gastritis, anxiety, gastroparesis, cannabinoid hyperemesis presented to the ER unresponsives. Per wife, patient was found unresponsive on floor, ran out of insulin pump per wife a day prior. Intubated in ER for airway protection.     In ER patient was found to have a blood glucose of 1683, potassium 7.7, severe metabolic abnormalities with pH of 7.0 and co2 <15. Lactate of 7.2 with MAYRA of Cr 4.34. Troponin >5k. Admitted to ICU for further care.     Dx: DKA, Severe Metabolic Acidosis, Hyperkalemia, Metabolic encephalopathy, NSTEMI     Plan:   -Neuro: Lethargic, responsive to pain and voice but not following commands secondary of severe metabolic  encephalopathy from DKA; continue to monitor mental status. Was intubated in ER for airway protection. Was found down, CT head/neck negative. Prop for sedation, wean as tolerates. Had agitation requiring PRN modalities. f/u u tox.      -Resp: Intubated in ER for airway protection, upon arrival to unit breathing well with high TV on SBT 5/5/30% will attempt to extubate given DKA and need for body to self-compensate. hx of asthma. f/u with CXR.     -CV: Hemodynamically stable, maintain MAP>65.     -GI: NPO. Hx of gastritis. NGT placed with some dark gastric contents can place to LCWS for time being. Started on PPI BID and Carafate standing.     -Renal: MAYRA likely secondary to dehydration in the setting of DKA; continue hydration as needed, avoid nephrotoxic agents, trend BUN/Cr. MIVF     -Endo: Pt with initial serum glucose >1600 with AG 19, s/p 10u regular insulin bolus in ED, insulin gtt initiated. Monitor FS q1h and titrate insulin gtt as needed. Monitor serial BMPs, replete electrolytes. S/p 4 liters IVF resuscitation in ED, continue maintenance IV hydration as needed.     -ID:     -Tox:     -Heme:     -Dispo: Admit to ICU  Assessment: 42 yr old M with DM1 (on insulin pump), asthma, gastritis, anxiety, gastroparesis, cannabinoid hyperemesis presented to the ER unresponsives. Per wife, patient was found unresponsive on floor, ran out of insulin pump per wife a day prior. Intubated in ER for airway protection.     In ER patient was found to have a blood glucose of 1683, potassium 7.7, severe metabolic abnormalities with pH of 7.0 and co2 <15. Lactate of 7.2 with MAYRA of Cr 4.34. Troponin >5k. Admitted to ICU for further care.     Dx: DKA, Severe Metabolic Acidosis, Hyperkalemia, Metabolic encephalopathy, NSTEMI     Plan:   -Neuro: Lethargic, responsive to pain and voice but not following commands secondary of severe metabolic  encephalopathy from DKA; continue to monitor mental status changes. Was intubated in ER for airway protection. Was found down, CT head/neck negative. Prop for sedation, wean as tolerates for extubation. Had agitation requiring PRN modalities. U tox + for benzo (recieved in ER) & THC but has hx of cannabinoid hyperemesis from chronic use.     -Resp: Intubated in ER for airway protection, upon arrival to unit breathing well with high TV on SBT 5/5/30% will attempt to extubate given DKA and need for body to self-compensate. hx of asthma. f/u with CXR. PRN duonebs    -CV: Hemodynamically stable, maintain MAP>65. Troponins > 5k with some mild EKG changes, improved after insulin infusion. Will continue to trend till peaks.Treated as NSTEMI, serial EKGs. Cardiology consulted.     -GI: NPO. Hx of gastritis. NGT placed with some dark gastric contents. Started on PPI BID and Carafate standing. Will monitor closely for high risk of bleeding.     -Renal: MAYRA likely secondary to dehydration in the setting of DKA; continue hydration as needed, avoid nephrotoxic agents, trend BUN/Cr. MIVF     -Endo: Pt with initial serum glucose >1600 with AG 36, C02 4 patient s/p 14u regular insulin bolus in ED & lantus STAT 15units , insulin gtt initiated. Monitor FS q1h and titrate insulin gtt as needed. Monitor serial BMPs & VBGs, replete electrolytes. S/p 7 liters IVF resuscitation in ED, continue maintenance IV hydration as needed. Insulin pump DM1, needs endo consult once able.     -ID: Hypothermic in ER with leukocytosis 28K, UA negative f/u procal, blood/urine cultures. Continue to monitor fever curve, low threshold for empiric abx.     -Tox: Utox + for benzos (recieved in ER) and THC. has hx of cannabinoid hyperemesis on PRN antiemetics     -Heme: Heparin gtt, aspirin & plavix load for NSTEMI tx w/ troponins > 5k     -Dispo: Admit to ICU. Wife updated on care.     *plan of care discussed with ICU attending MD Rock.

## 2023-10-03 NOTE — PROVIDER CONTACT NOTE (CRITICAL VALUE NOTIFICATION) - TEST AND RESULT REPORTED:
BG 1683/Potassium 7.7/CO2 4/lactate 7.7/sodium 119
TROPONIN = 5718.6
CO2 = 10, GLUCOSE = 1294, LACTATE =2.5

## 2023-10-03 NOTE — H&P ADULT - NSHPPHYSICALEXAM_GEN_ALL_CORE
General: No acute distress.  Intubated and Sedated.  NEURO: arouses to touch and voice, does not follow commands   HEENT: Pupils equal and symmetrically reactive to light.. Dry mucous membranes   PULM: Clear to auscultation bilaterally.  CVS: Regular rate and rhythm, no murmurs, rubs, or gallops.  ABD: Soft, nondistended, no masses.  EXT: No edema.  SKIN: Warm and well perfused, no rashes.

## 2023-10-04 LAB
ANION GAP SERPL CALC-SCNC: 2 MMOL/L — LOW (ref 5–17)
APTT BLD: 60.6 SEC — HIGH (ref 24.5–35.6)
APTT BLD: 73.5 SEC — HIGH (ref 24.5–35.6)
APTT BLD: 75.3 SEC — HIGH (ref 24.5–35.6)
BLD GP AB SCN SERPL QL: SIGNIFICANT CHANGE UP
BUN SERPL-MCNC: 49 MG/DL — HIGH (ref 7–23)
CALCIUM SERPL-MCNC: 7.9 MG/DL — LOW (ref 8.5–10.1)
CHLORIDE SERPL-SCNC: 118 MMOL/L — HIGH (ref 96–108)
CO2 SERPL-SCNC: 26 MMOL/L — SIGNIFICANT CHANGE UP (ref 22–31)
CREAT SERPL-MCNC: 1.79 MG/DL — HIGH (ref 0.5–1.3)
CULTURE RESULTS: NO GROWTH — SIGNIFICANT CHANGE UP
EGFR: 48 ML/MIN/1.73M2 — LOW
GLUCOSE BLDC GLUCOMTR-MCNC: 124 MG/DL — HIGH (ref 70–99)
GLUCOSE BLDC GLUCOMTR-MCNC: 126 MG/DL — HIGH (ref 70–99)
GLUCOSE BLDC GLUCOMTR-MCNC: 129 MG/DL — HIGH (ref 70–99)
GLUCOSE BLDC GLUCOMTR-MCNC: 153 MG/DL — HIGH (ref 70–99)
GLUCOSE BLDC GLUCOMTR-MCNC: 159 MG/DL — HIGH (ref 70–99)
GLUCOSE BLDC GLUCOMTR-MCNC: 163 MG/DL — HIGH (ref 70–99)
GLUCOSE BLDC GLUCOMTR-MCNC: 168 MG/DL — HIGH (ref 70–99)
GLUCOSE BLDC GLUCOMTR-MCNC: 204 MG/DL — HIGH (ref 70–99)
GLUCOSE BLDC GLUCOMTR-MCNC: 208 MG/DL — HIGH (ref 70–99)
GLUCOSE BLDC GLUCOMTR-MCNC: 222 MG/DL — HIGH (ref 70–99)
GLUCOSE SERPL-MCNC: 226 MG/DL — HIGH (ref 70–99)
HCT VFR BLD CALC: 30.8 % — LOW (ref 39–50)
HGB BLD-MCNC: 11.1 G/DL — LOW (ref 13–17)
MAGNESIUM SERPL-MCNC: 2.2 MG/DL — SIGNIFICANT CHANGE UP (ref 1.6–2.6)
MCHC RBC-ENTMCNC: 28.3 PG — SIGNIFICANT CHANGE UP (ref 27–34)
MCHC RBC-ENTMCNC: 36 G/DL — SIGNIFICANT CHANGE UP (ref 32–36)
MCV RBC AUTO: 78.6 FL — LOW (ref 80–100)
NRBC # BLD: 0 /100 WBCS — SIGNIFICANT CHANGE UP (ref 0–0)
PHOSPHATE SERPL-MCNC: 2.3 MG/DL — LOW (ref 2.5–4.5)
PLATELET # BLD AUTO: 201 K/UL — SIGNIFICANT CHANGE UP (ref 150–400)
POTASSIUM SERPL-MCNC: 4.4 MMOL/L — SIGNIFICANT CHANGE UP (ref 3.5–5.3)
POTASSIUM SERPL-SCNC: 4.4 MMOL/L — SIGNIFICANT CHANGE UP (ref 3.5–5.3)
RBC # BLD: 3.92 M/UL — LOW (ref 4.2–5.8)
RBC # FLD: 13.8 % — SIGNIFICANT CHANGE UP (ref 10.3–14.5)
SODIUM SERPL-SCNC: 146 MMOL/L — HIGH (ref 135–145)
SPECIMEN SOURCE: SIGNIFICANT CHANGE UP
TROPONIN I, HIGH SENSITIVITY RESULT: 7850.5 NG/L — HIGH
WBC # BLD: 14.57 K/UL — HIGH (ref 3.8–10.5)
WBC # FLD AUTO: 14.57 K/UL — HIGH (ref 3.8–10.5)

## 2023-10-04 PROCEDURE — 93970 EXTREMITY STUDY: CPT | Mod: 26

## 2023-10-04 PROCEDURE — 93306 TTE W/DOPPLER COMPLETE: CPT | Mod: 26

## 2023-10-04 PROCEDURE — 99291 CRITICAL CARE FIRST HOUR: CPT

## 2023-10-04 RX ORDER — INSULIN LISPRO 100/ML
3 VIAL (ML) SUBCUTANEOUS
Refills: 0 | Status: DISCONTINUED | OUTPATIENT
Start: 2023-10-04 | End: 2023-10-06

## 2023-10-04 RX ORDER — GLUCAGON INJECTION, SOLUTION 0.5 MG/.1ML
1 INJECTION, SOLUTION SUBCUTANEOUS ONCE
Refills: 0 | Status: DISCONTINUED | OUTPATIENT
Start: 2023-10-04 | End: 2023-10-06

## 2023-10-04 RX ORDER — SODIUM CHLORIDE 9 MG/ML
1000 INJECTION, SOLUTION INTRAVENOUS
Refills: 0 | Status: DISCONTINUED | OUTPATIENT
Start: 2023-10-04 | End: 2023-10-04

## 2023-10-04 RX ORDER — CLONAZEPAM 1 MG
1 TABLET ORAL EVERY 8 HOURS
Refills: 0 | Status: DISCONTINUED | OUTPATIENT
Start: 2023-10-04 | End: 2023-10-06

## 2023-10-04 RX ORDER — DEXTROSE 50 % IN WATER 50 %
12.5 SYRINGE (ML) INTRAVENOUS ONCE
Refills: 0 | Status: DISCONTINUED | OUTPATIENT
Start: 2023-10-04 | End: 2023-10-06

## 2023-10-04 RX ORDER — DEXTROSE 50 % IN WATER 50 %
25 SYRINGE (ML) INTRAVENOUS ONCE
Refills: 0 | Status: DISCONTINUED | OUTPATIENT
Start: 2023-10-04 | End: 2023-10-06

## 2023-10-04 RX ORDER — SODIUM CHLORIDE 9 MG/ML
1000 INJECTION, SOLUTION INTRAVENOUS
Refills: 0 | Status: DISCONTINUED | OUTPATIENT
Start: 2023-10-04 | End: 2023-10-06

## 2023-10-04 RX ORDER — INSULIN GLARGINE 100 [IU]/ML
20 INJECTION, SOLUTION SUBCUTANEOUS ONCE
Refills: 0 | Status: COMPLETED | OUTPATIENT
Start: 2023-10-04 | End: 2023-10-04

## 2023-10-04 RX ORDER — INSULIN LISPRO 100/ML
VIAL (ML) SUBCUTANEOUS
Refills: 0 | Status: DISCONTINUED | OUTPATIENT
Start: 2023-10-04 | End: 2023-10-06

## 2023-10-04 RX ORDER — INSULIN GLARGINE 100 [IU]/ML
20 INJECTION, SOLUTION SUBCUTANEOUS AT BEDTIME
Refills: 0 | Status: DISCONTINUED | OUTPATIENT
Start: 2023-10-04 | End: 2023-10-06

## 2023-10-04 RX ORDER — DEXTROSE 50 % IN WATER 50 %
15 SYRINGE (ML) INTRAVENOUS ONCE
Refills: 0 | Status: DISCONTINUED | OUTPATIENT
Start: 2023-10-04 | End: 2023-10-06

## 2023-10-04 RX ADMIN — Medication 3 UNIT(S): at 11:30

## 2023-10-04 RX ADMIN — Medication 2: at 21:25

## 2023-10-04 RX ADMIN — HEPARIN SODIUM 1050 UNIT(S)/HR: 5000 INJECTION INTRAVENOUS; SUBCUTANEOUS at 06:11

## 2023-10-04 RX ADMIN — DEXMEDETOMIDINE HYDROCHLORIDE IN 0.9% SODIUM CHLORIDE 6.29 MICROGRAM(S)/KG/HR: 4 INJECTION INTRAVENOUS at 00:14

## 2023-10-04 RX ADMIN — HEPARIN SODIUM 1050 UNIT(S)/HR: 5000 INJECTION INTRAVENOUS; SUBCUTANEOUS at 13:57

## 2023-10-04 RX ADMIN — HEPARIN SODIUM 1050 UNIT(S)/HR: 5000 INJECTION INTRAVENOUS; SUBCUTANEOUS at 14:32

## 2023-10-04 RX ADMIN — INSULIN GLARGINE 20 UNIT(S): 100 INJECTION, SOLUTION SUBCUTANEOUS at 21:26

## 2023-10-04 RX ADMIN — Medication 3 UNIT(S): at 08:56

## 2023-10-04 RX ADMIN — Medication 3 UNIT(S): at 17:26

## 2023-10-04 RX ADMIN — PANTOPRAZOLE SODIUM 40 MILLIGRAM(S): 20 TABLET, DELAYED RELEASE ORAL at 05:10

## 2023-10-04 RX ADMIN — CLOPIDOGREL BISULFATE 75 MILLIGRAM(S): 75 TABLET, FILM COATED ORAL at 11:28

## 2023-10-04 RX ADMIN — Medication 1 MILLIGRAM(S): at 16:48

## 2023-10-04 RX ADMIN — INSULIN HUMAN 2 UNIT(S)/HR: 100 INJECTION, SOLUTION SUBCUTANEOUS at 00:14

## 2023-10-04 RX ADMIN — HEPARIN SODIUM 1050 UNIT(S)/HR: 5000 INJECTION INTRAVENOUS; SUBCUTANEOUS at 19:10

## 2023-10-04 RX ADMIN — Medication 81 MILLIGRAM(S): at 11:27

## 2023-10-04 RX ADMIN — SODIUM CHLORIDE 125 MILLILITER(S): 9 INJECTION, SOLUTION INTRAVENOUS at 00:13

## 2023-10-04 RX ADMIN — INSULIN GLARGINE 20 UNIT(S): 100 INJECTION, SOLUTION SUBCUTANEOUS at 04:46

## 2023-10-04 RX ADMIN — Medication 1 GRAM(S): at 00:20

## 2023-10-04 RX ADMIN — Medication 1 GRAM(S): at 05:10

## 2023-10-04 RX ADMIN — Medication 1 GRAM(S): at 13:59

## 2023-10-04 RX ADMIN — HEPARIN SODIUM 950 UNIT(S)/HR: 5000 INJECTION INTRAVENOUS; SUBCUTANEOUS at 21:06

## 2023-10-04 RX ADMIN — HEPARIN SODIUM 1050 UNIT(S)/HR: 5000 INJECTION INTRAVENOUS; SUBCUTANEOUS at 07:14

## 2023-10-04 RX ADMIN — Medication 1 GRAM(S): at 21:05

## 2023-10-04 RX ADMIN — Medication 1 MILLIGRAM(S): at 21:05

## 2023-10-04 NOTE — PROGRESS NOTE ADULT - ASSESSMENT
42 yr old M with DM1 (on insulin pump), asthma, gastritis, anxiety, gastroparesis, cannabinoid hyperemesis presented to the ER unresponsives. Per wife, patient was found unresponsive on floor, ran out of insulin pump per wife a day prior. Intubated in ER for airway protection. In ER patient was found to have severe metabolic derangment EKG changes and AMS intubated.     -Neuro: Diabetic coma resolved no issues  -Resp: extubated sevral days ago on RA no issues    -CV: Hemodynamically stable, NSTEMI with large RV pocus with no wall motion abnormality, will await official consider CTA for small PE? though that wouldnt expalin trops but possible RV changes?  - follow cards will need ischemic work up at some point given risks and high trops    GI: Diet as tolerated, reglan prn for nausea  Renal: MAYRA likely secondary to dehydration in the setting of DKA; still hyper natremic but given plump IVC will let patient self correct with po water.     Endo: DKA resolved on basal bolus with low premeal due to poor po  - Endo eval and follow up  -ID: gastritis unlikley infectious    -Heme: Heparin gtt, aspirin & plavix load for NSTEMI tx w/ troponins  - check for DVT with pocus unable to r/o right sided     -Dispo: downgrade to telemetry

## 2023-10-04 NOTE — PROGRESS NOTE ADULT - SUBJECTIVE AND OBJECTIVE BOX
CHIEF COMPLAINT:Patient is a 42y old  Male who presents with a chief complaint of Unresponsiveness (03 Oct 2023 16:06)        Interval Events:    REVIEW OF SYSTEMS: Feels generally unwell and has some residual Stomach pain though better than before.   no fever, diarrhea and no vomiting today. Poor appetite.   [ ] All other systems negative  [ ] Unable to assess ROS because ________    OBJECTIVE:  ICU Vital Signs Last 24 Hrs  T(C): 37.1 (04 Oct 2023 15:00), Max: 38.2 (04 Oct 2023 02:00)  T(F): 98.8 (04 Oct 2023 15:00), Max: 100.8 (04 Oct 2023 02:00)  HR: 77 (04 Oct 2023 20:00) (67 - 92)  BP: 120/86 (04 Oct 2023 20:00) (77/59 - 127/87)  BP(mean): 96 (04 Oct 2023 20:00) (66 - 101)  ABP: --  ABP(mean): --  RR: 20 (04 Oct 2023 20:00) (10 - 26)  SpO2: 98% (04 Oct 2023 20:00) (93% - 100%)    O2 Parameters below as of 04 Oct 2023 07:04  Patient On (Oxygen Delivery Method): room air              10-03 @ 07:01  -  10-04 @ 07:00  --------------------------------------------------------  IN: 3359 mL / OUT: 4300 mL / NET: -941 mL    10-04 @ 07:01  -  10-04 @ 22:50  --------------------------------------------------------  IN: 1063.5 mL / OUT: 1125 mL / NET: -61.5 mL      CAPILLARY BLOOD GLUCOSE      POCT Blood Glucose.: 208 mg/dL (04 Oct 2023 21:14)      PHYSICAL EXAM:  General: No acute distress.  comfortable in bed  NEURO: Awake and alert no deficits  HEENT: Pupils equal and symmetrically reactive to light.. Dry mucous membranes   PULM: Clear to auscultation bilaterally.  CVS: Regular rate and rhythm, no murmurs, rubs, or gallops. Pocus with large RV but good RV and LV function IVC 2cm with no variability  ABD: Soft, nondistended, no masses.  EXT: No edema.  SKIN: Warm and well perfused, no rashes.    LINES:    HOSPITAL MEDICATIONS:  Standing Meds:  aspirin  chewable 81 milliGRAM(s) Oral daily  chlorhexidine 2% Cloths 1 Application(s) Topical <User Schedule>  clonazePAM  Tablet 1 milliGRAM(s) Oral every 8 hours  clopidogrel Tablet 75 milliGRAM(s) Oral daily  dextrose 5%. 1000 milliLiter(s) IV Continuous <Continuous>  dextrose 5%. 1000 milliLiter(s) IV Continuous <Continuous>  dextrose 50% Injectable 25 Gram(s) IV Push once  dextrose 50% Injectable 25 Gram(s) IV Push once  dextrose 50% Injectable 12.5 Gram(s) IV Push once  glucagon  Injectable 1 milliGRAM(s) IntraMuscular once  heparin  Infusion.  Unit(s)/Hr IV Continuous <Continuous>  insulin glargine Injectable (LANTUS) 20 Unit(s) SubCutaneous at bedtime  insulin lispro (ADMELOG) corrective regimen sliding scale   SubCutaneous Before meals and at bedtime  insulin lispro Injectable (ADMELOG) 3 Unit(s) SubCutaneous three times a day before meals  pantoprazole  Injectable 40 milliGRAM(s) IV Push two times a day  sucralfate 1 Gram(s) Oral every 6 hours      PRN Meds:  dextrose Oral Gel 15 Gram(s) Oral once PRN  metoclopramide Injectable 10 milliGRAM(s) IV Push every 6 hours PRN  ondansetron Injectable 4 milliGRAM(s) IV Push once PRN  oxycodone    5 mG/acetaminophen 325 mG 1 Tablet(s) Oral every 6 hours PRN      LABS:                        11.1   14.57 )-----------( 201      ( 04 Oct 2023 02:45 )             30.8     Hgb Trend: 11.1<--, 11.7<--, 12.0<--, 11.6<--  10-04    146<H>  |  118<H>  |  49<H>  ----------------------------<  226<H>  4.4   |  26  |  1.79<H>    Ca    7.9<L>      04 Oct 2023 02:45  Phos  2.3     10-04  Mg     2.2     10-04    TPro  6.7  /  Alb  3.2<L>  /  TBili  0.5  /  DBili  x   /  AST  63<H>  /  ALT  52  /  AlkPhos  113  10-03    Creatinine Trend: 1.79<--, 3.37<--, 4.08<--, 4.34<--  PT/INR - ( 03 Oct 2023 09:00 )   PT: 10.9 sec;   INR: 0.91 ratio         PTT - ( 04 Oct 2023 19:46 )  PTT:73.5 sec  Urinalysis Basic - ( 04 Oct 2023 02:45 )    Color: x / Appearance: x / SG: x / pH: x  Gluc: 226 mg/dL / Ketone: x  / Bili: x / Urobili: x   Blood: x / Protein: x / Nitrite: x   Leuk Esterase: x / RBC: x / WBC x   Sq Epi: x / Non Sq Epi: x / Bacteria: x      Arterial Blood Gas:  10-03 @ 10:46  7.05/20/285/6/99.7/-23.3  ABG lactate: --  Arterial Blood Gas:  10-03 @ 09:04  7.00/<15/153/INCALCULABLE/99.0/INCALCULABLE  ABG lactate: --    Venous Blood Gas:  10-03 @ 18:00  7.26/47/23/21/33.5  VBG Lactate: 2.80      MICROBIOLOGY:     Culture - Urine (collected 03 Oct 2023 13:00)  Source: Catheterized Catheterized  Final Report (04 Oct 2023 16:06):    No growth    Culture - Blood (collected 03 Oct 2023 09:05)  Source: .Blood Blood  Preliminary Report (04 Oct 2023 15:08):    No growth at 24 hours    Culture - Blood (collected 03 Oct 2023 09:00)  Source: .Blood Blood  Preliminary Report (04 Oct 2023 15:08):    No growth at 24 hours      RADIOLOGY:  [ ] Reviewed and interpreted by me    EKG:

## 2023-10-04 NOTE — CHART NOTE - NSCHARTNOTEFT_GEN_A_CORE
ICU/CCU Transfer Note    Transfer from: ICU/ CCU  Transfer to: Telemetry   Accepting physician:   Case discussed with:       MICU COURSE:  42 yr old M with DM1 (on insulin pump), asthma, gastritis, anxiety, gastroparesis, cannabinoid hyperemesis presented to the ER unresponsives. Per wife, patient was found unresponsive on floor, ran out of insulin pump per wife a day prior. Intubated in ER for airway protection.     In ER patient was found to have a blood glucose of 1683, potassium 7.7, severe metabolic abnormalities with pH of 7.0 and co2 <15. Lactate of 7.2 with MAYRA of Cr 4.34. Troponins >5k. Admitted to ICU for further care.     Patient transitioned off insulin ggt.   Trending troponin, now down trending , continue heparin ggt - for total 48 hours ( stop 10/5)  Pocus RV enlarged --> lower extremity dopplers pending r/o DVT       ASSESSMENT & PLAN:   ===================      ##Neuro:metoclopramide Injectable 10 PRN  ondansetron Injectable 4 PRN    ##PULM:  ##CV:  ##GI:pantoprazole  Injectable 40  sucralfate 1    ##:  ##RENAL:   ##ENDO:dextrose 50% Injectable 25  dextrose 50% Injectable 25  dextrose 50% Injectable 12.5  glucagon  Injectable 1  insulin glargine Injectable (LANTUS) 20  insulin lispro (ADMELOG) corrective regimen sliding scale   insulin lispro Injectable (ADMELOG) 3    ##ID:   ##MSC:     CONSULTS: aspirin  chewable 81 milliGRAM(s) Oral daily  chlorhexidine 2% Cloths 1 Application(s) Topical <User Schedule>  clopidogrel Tablet 75 milliGRAM(s) Oral daily  dextrose 5%. 1000 milliLiter(s) IV Continuous <Continuous>  dextrose 5%. 1000 milliLiter(s) IV Continuous <Continuous>  dextrose 50% Injectable 25 Gram(s) IV Push once  dextrose 50% Injectable 25 Gram(s) IV Push once  dextrose 50% Injectable 12.5 Gram(s) IV Push once  dextrose Oral Gel 15 Gram(s) Oral once PRN  glucagon  Injectable 1 milliGRAM(s) IntraMuscular once  heparin  Infusion.  Unit(s)/Hr IV Continuous <Continuous>  insulin glargine Injectable (LANTUS) 20 Unit(s) SubCutaneous at bedtime  insulin lispro (ADMELOG) corrective regimen sliding scale   SubCutaneous Before meals and at bedtime  insulin lispro Injectable (ADMELOG) 3 Unit(s) SubCutaneous three times a day before meals  metoclopramide Injectable 10 milliGRAM(s) IV Push every 6 hours PRN  ondansetron Injectable 4 milliGRAM(s) IV Push once PRN  pantoprazole  Injectable 40 milliGRAM(s) IV Push two times a day  sucralfate 1 Gram(s) Oral every 6 hours        For Follow-Up:      VITALS  ========  Vital Signs Last 24 Hrs  T(C): 37.1 (04 Oct 2023 07:30), Max: 38.2 (04 Oct 2023 02:00)  T(F): 98.8 (04 Oct 2023 07:30), Max: 100.8 (04 Oct 2023 02:00)  HR: 81 (04 Oct 2023 13:00) (72 - 92)  BP: 106/79 (04 Oct 2023 13:00) (77/59 - 134/89)  BP(mean): 85 (04 Oct 2023 13:00) (66 - 101)  RR: 16 (04 Oct 2023 13:00) (11 - 26)  SpO2: 98% (04 Oct 2023 13:00) (81% - 100%)    Parameters below as of 04 Oct 2023 07:04  Patient On (Oxygen Delivery Method): room air      I&O's Summary    03 Oct 2023 07:01  -  04 Oct 2023 07:00  --------------------------------------------------------  IN: 3359 mL / OUT: 4300 mL / NET: -941 mL          LABS                                            11.1                  Neurophils% (auto):   x      (10-04 @ 02:45):    14.57)-----------(201          Lymphocytes% (auto):  x                                             30.8                   Eosinphils% (auto):   x        Manual%: Neutrophils x    ; Lymphocytes x    ; Eosinophils x    ; Bands%: x    ; Blasts x                                    146    |  118    |  49                  Calcium: 7.9   / iCa: x      (10-04 @ 02:45)    ----------------------------<  226       Magnesium: 2.2                              4.4     |  26     |  1.79             Phosphorous: 2.3        ( 10-04 @ 12:08 )   PT: x    ;   INR: x      aPTT: 60.6 sec ICU/CCU Transfer Note    Transfer from: ICU/ CCU  Transfer to: Telemetry   Accepting physician: Dr Wolf   Case discussed with: Raphael      MICU COURSE:  42 yr old M with DM1 (on insulin pump), asthma, gastritis, anxiety, gastroparesis, cannabinoid hyperemesis presented to the ER unresponsives. Per wife, patient was found unresponsive on floor, ran out of insulin pump per wife a day prior. Intubated in ER for airway protection.     In ER patient was found to have a blood glucose of 1683, potassium 7.7, severe metabolic abnormalities with pH of 7.0 and co2 <15. Lactate of 7.2 with MAYRA of Cr 4.34. Troponin >5k. Admitted to ICU for further care.     Patient transitioned off insulin ggt, now on sliding scale, lantus   NSTEMI troponin Peaked- continue heparin ggt  - for total 48 hours ( stop 10/5)  Cardiology consulted - patient will require ischemic work-up   Pocus RV dilated -> no prior imaging , pending TTE   Patient not hypoxic low suspicion for PE,  lower extremity dopplers pending r/o DVT       ASSESSMENT & PLAN:   ===================  ##Neuro:metoclopramide Injectable 10 PRN  ondansetron Injectable 4 PRN    ##PULM:  ##CV:  ##GI:pantoprazole  Injectable 40  sucralfate 1    ##:  ##RENAL:   ##ENDO:dextrose 50% Injectable 25  dextrose 50% Injectable 25  dextrose 50% Injectable 12.5  glucagon  Injectable 1  insulin glargine Injectable (LANTUS) 20  insulin lispro (ADMELOG) corrective regimen sliding scale   insulin lispro Injectable (ADMELOG) 3    ##ID:   ##MSC:     CONSULTS: aspirin  chewable 81 milliGRAM(s) Oral daily  chlorhexidine 2% Cloths 1 Application(s) Topical <User Schedule>  clopidogrel Tablet 75 milliGRAM(s) Oral daily  dextrose 5%. 1000 milliLiter(s) IV Continuous <Continuous>  dextrose 5%. 1000 milliLiter(s) IV Continuous <Continuous>  dextrose 50% Injectable 25 Gram(s) IV Push once  dextrose 50% Injectable 25 Gram(s) IV Push once  dextrose 50% Injectable 12.5 Gram(s) IV Push once  dextrose Oral Gel 15 Gram(s) Oral once PRN  glucagon  Injectable 1 milliGRAM(s) IntraMuscular once  heparin  Infusion.  Unit(s)/Hr IV Continuous <Continuous>  insulin glargine Injectable (LANTUS) 20 Unit(s) SubCutaneous at bedtime  insulin lispro (ADMELOG) corrective regimen sliding scale   SubCutaneous Before meals and at bedtime  insulin lispro Injectable (ADMELOG) 3 Unit(s) SubCutaneous three times a day before meals  metoclopramide Injectable 10 milliGRAM(s) IV Push every 6 hours PRN  ondansetron Injectable 4 milliGRAM(s) IV Push once PRN  pantoprazole  Injectable 40 milliGRAM(s) IV Push two times a day  sucralfate 1 Gram(s) Oral every 6 hours        For Follow-Up:      VITALS  ========  Vital Signs Last 24 Hrs  T(C): 37.1 (04 Oct 2023 07:30), Max: 38.2 (04 Oct 2023 02:00)  T(F): 98.8 (04 Oct 2023 07:30), Max: 100.8 (04 Oct 2023 02:00)  HR: 81 (04 Oct 2023 13:00) (72 - 92)  BP: 106/79 (04 Oct 2023 13:00) (77/59 - 134/89)  BP(mean): 85 (04 Oct 2023 13:00) (66 - 101)  RR: 16 (04 Oct 2023 13:00) (11 - 26)  SpO2: 98% (04 Oct 2023 13:00) (81% - 100%)    Parameters below as of 04 Oct 2023 07:04  Patient On (Oxygen Delivery Method): room air      I&O's Summary    03 Oct 2023 07:01  -  04 Oct 2023 07:00  --------------------------------------------------------  IN: 3359 mL / OUT: 4300 mL / NET: -941 mL          LABS                                            11.1                  Neurophils% (auto):   x      (10-04 @ 02:45):    14.57)-----------(201          Lymphocytes% (auto):  x                                             30.8                   Eosinphils% (auto):   x        Manual%: Neutrophils x    ; Lymphocytes x    ; Eosinophils x    ; Bands%: x    ; Blasts x                                    146    |  118    |  49                  Calcium: 7.9   / iCa: x      (10-04 @ 02:45)    ----------------------------<  226       Magnesium: 2.2                              4.4     |  26     |  1.79             Phosphorous: 2.3        ( 10-04 @ 12:08 )   PT: x    ;   INR: x      aPTT: 60.6 sec ICU/CCU Transfer Note    Transfer from: ICU/ CCU  Transfer to: Telemetry   Accepting physician: Dr Wolf   Case discussed with: Raphael      MICU COURSE:  42 yr old M with DM1 (on insulin pump), asthma, gastritis, anxiety, gastroparesis, cannabinoid hyperemesis presented to the ER unresponsives. Per wife, patient was found unresponsive on floor, ran out of insulin pump per wife a day prior. Intubated in ER for airway protection.     In ER patient was found to have a blood glucose of 1683, potassium 7.7, severe metabolic abnormalities with pH of 7.0 and co2 <15. Lactate of 7.2 with MAYRA of Cr 4.34. Troponin >5k. Admitted to ICU for further care.     Patient transitioned off insulin ggt, now on sliding scale, lantus   NSTEMI troponin Peaked- continue heparin ggt  - for total 48 hours ( stop 10/5)  Cardiology consulted - patient will require ischemic work-up   Pocus RV dilated -> no prior imaging , pending TTE   Patient not hypoxic low suspicion for PE,  lower extremity dopplers pending r/o DVT       ASSESSMENT & PLAN:   ===================  ##Neuro:  -awake, alert     ##PULM:  -CTA B/l   - on room air     ##CV:  -troponin now downtrending   - echo pending     ##GI:  - advance diet as tolerated   - continue pantoprazole  Injectable 40  - continue sucralfate 1Q6     ##:  -redd d/c     ##RENAL:     ##ENDO:  insulin glargine Injectable (LANTUS) 20  insulin lispro (ADMELOG) corrective regimen sliding scale   insulin lispro Injectable (ADMELOG) 3    ##ID:   - monitor off ABX     ##MSC:   - physical therapy consult       For Follow-Up:      VITALS  ========  Vital Signs Last 24 Hrs  T(C): 37.1 (04 Oct 2023 07:30), Max: 38.2 (04 Oct 2023 02:00)  T(F): 98.8 (04 Oct 2023 07:30), Max: 100.8 (04 Oct 2023 02:00)  HR: 81 (04 Oct 2023 13:00) (72 - 92)  BP: 106/79 (04 Oct 2023 13:00) (77/59 - 134/89)  BP(mean): 85 (04 Oct 2023 13:00) (66 - 101)  RR: 16 (04 Oct 2023 13:00) (11 - 26)  SpO2: 98% (04 Oct 2023 13:00) (81% - 100%)    Parameters below as of 04 Oct 2023 07:04  Patient On (Oxygen Delivery Method): room air      I&O's Summary    03 Oct 2023 07:01  -  04 Oct 2023 07:00  --------------------------------------------------------  IN: 3359 mL / OUT: 4300 mL / NET: -941 mL          LABS                                            11.1                  Neurophils% (auto):   x      (10-04 @ 02:45):    14.57)-----------(201          Lymphocytes% (auto):  x                                             30.8                   Eosinphils% (auto):   x        Manual%: Neutrophils x    ; Lymphocytes x    ; Eosinophils x    ; Bands%: x    ; Blasts x                                    146    |  118    |  49                  Calcium: 7.9   / iCa: x      (10-04 @ 02:45)    ----------------------------<  226       Magnesium: 2.2                              4.4     |  26     |  1.79             Phosphorous: 2.3        ( 10-04 @ 12:08 )   PT: x    ;   INR: x      aPTT: 60.6 sec

## 2023-10-05 LAB
ALBUMIN SERPL ELPH-MCNC: 2.5 G/DL — LOW (ref 3.3–5)
ALP SERPL-CCNC: 76 U/L — SIGNIFICANT CHANGE UP (ref 40–120)
ALT FLD-CCNC: 53 U/L — SIGNIFICANT CHANGE UP (ref 12–78)
ANION GAP SERPL CALC-SCNC: 4 MMOL/L — LOW (ref 5–17)
ANION GAP SERPL CALC-SCNC: 8 MMOL/L — SIGNIFICANT CHANGE UP (ref 5–17)
APTT BLD: 57.5 SEC — HIGH (ref 24.5–35.6)
APTT BLD: 66 SEC — HIGH (ref 24.5–35.6)
AST SERPL-CCNC: 98 U/L — HIGH (ref 15–37)
BILIRUB SERPL-MCNC: 0.4 MG/DL — SIGNIFICANT CHANGE UP (ref 0.2–1.2)
BUN SERPL-MCNC: 22 MG/DL — SIGNIFICANT CHANGE UP (ref 7–23)
BUN SERPL-MCNC: 24 MG/DL — HIGH (ref 7–23)
CALCIUM SERPL-MCNC: 7.5 MG/DL — LOW (ref 8.5–10.1)
CALCIUM SERPL-MCNC: 8.8 MG/DL — SIGNIFICANT CHANGE UP (ref 8.5–10.1)
CHLORIDE SERPL-SCNC: 110 MMOL/L — HIGH (ref 96–108)
CHLORIDE SERPL-SCNC: 114 MMOL/L — HIGH (ref 96–108)
CO2 SERPL-SCNC: 26 MMOL/L — SIGNIFICANT CHANGE UP (ref 22–31)
CO2 SERPL-SCNC: 27 MMOL/L — SIGNIFICANT CHANGE UP (ref 22–31)
CREAT SERPL-MCNC: 1.04 MG/DL — SIGNIFICANT CHANGE UP (ref 0.5–1.3)
CREAT SERPL-MCNC: 1.07 MG/DL — SIGNIFICANT CHANGE UP (ref 0.5–1.3)
EGFR: 89 ML/MIN/1.73M2 — SIGNIFICANT CHANGE UP
EGFR: 92 ML/MIN/1.73M2 — SIGNIFICANT CHANGE UP
GLUCOSE BLDC GLUCOMTR-MCNC: 138 MG/DL — HIGH (ref 70–99)
GLUCOSE BLDC GLUCOMTR-MCNC: 142 MG/DL — HIGH (ref 70–99)
GLUCOSE BLDC GLUCOMTR-MCNC: 172 MG/DL — HIGH (ref 70–99)
GLUCOSE BLDC GLUCOMTR-MCNC: 241 MG/DL — HIGH (ref 70–99)
GLUCOSE SERPL-MCNC: 111 MG/DL — HIGH (ref 70–99)
GLUCOSE SERPL-MCNC: 129 MG/DL — HIGH (ref 70–99)
HCT VFR BLD CALC: 30.5 % — LOW (ref 39–50)
HGB BLD-MCNC: 10.9 G/DL — LOW (ref 13–17)
MAGNESIUM SERPL-MCNC: 2.1 MG/DL — SIGNIFICANT CHANGE UP (ref 1.6–2.6)
MAGNESIUM SERPL-MCNC: 2.3 MG/DL — SIGNIFICANT CHANGE UP (ref 1.6–2.6)
MCHC RBC-ENTMCNC: 28.2 PG — SIGNIFICANT CHANGE UP (ref 27–34)
MCHC RBC-ENTMCNC: 35.7 G/DL — SIGNIFICANT CHANGE UP (ref 32–36)
MCV RBC AUTO: 78.8 FL — LOW (ref 80–100)
NRBC # BLD: 0 /100 WBCS — SIGNIFICANT CHANGE UP (ref 0–0)
PHOSPHATE SERPL-MCNC: 1.4 MG/DL — LOW (ref 2.5–4.5)
PHOSPHATE SERPL-MCNC: 3.2 MG/DL — SIGNIFICANT CHANGE UP (ref 2.5–4.5)
PLATELET # BLD AUTO: 176 K/UL — SIGNIFICANT CHANGE UP (ref 150–400)
POTASSIUM SERPL-MCNC: 3.3 MMOL/L — LOW (ref 3.5–5.3)
POTASSIUM SERPL-MCNC: 3.9 MMOL/L — SIGNIFICANT CHANGE UP (ref 3.5–5.3)
POTASSIUM SERPL-SCNC: 3.3 MMOL/L — LOW (ref 3.5–5.3)
POTASSIUM SERPL-SCNC: 3.9 MMOL/L — SIGNIFICANT CHANGE UP (ref 3.5–5.3)
PROT SERPL-MCNC: 5.2 GM/DL — LOW (ref 6–8.3)
RBC # BLD: 3.87 M/UL — LOW (ref 4.2–5.8)
RBC # FLD: 13.9 % — SIGNIFICANT CHANGE UP (ref 10.3–14.5)
SODIUM SERPL-SCNC: 144 MMOL/L — SIGNIFICANT CHANGE UP (ref 135–145)
SODIUM SERPL-SCNC: 145 MMOL/L — SIGNIFICANT CHANGE UP (ref 135–145)
TROPONIN I, HIGH SENSITIVITY RESULT: 2479.6 NG/L — HIGH
TROPONIN I, HIGH SENSITIVITY RESULT: 3304.6 NG/L — HIGH
WBC # BLD: 9.45 K/UL — SIGNIFICANT CHANGE UP (ref 3.8–10.5)
WBC # FLD AUTO: 9.45 K/UL — SIGNIFICANT CHANGE UP (ref 3.8–10.5)

## 2023-10-05 PROCEDURE — 93010 ELECTROCARDIOGRAM REPORT: CPT

## 2023-10-05 PROCEDURE — 99233 SBSQ HOSP IP/OBS HIGH 50: CPT

## 2023-10-05 PROCEDURE — 99232 SBSQ HOSP IP/OBS MODERATE 35: CPT

## 2023-10-05 RX ORDER — PANTOPRAZOLE SODIUM 20 MG/1
40 TABLET, DELAYED RELEASE ORAL
Refills: 0 | Status: DISCONTINUED | OUTPATIENT
Start: 2023-10-05 | End: 2023-10-06

## 2023-10-05 RX ORDER — ALBUTEROL 90 UG/1
2 AEROSOL, METERED ORAL EVERY 6 HOURS
Refills: 0 | Status: DISCONTINUED | OUTPATIENT
Start: 2023-10-05 | End: 2023-10-05

## 2023-10-05 RX ORDER — MORPHINE SULFATE 50 MG/1
2 CAPSULE, EXTENDED RELEASE ORAL ONCE
Refills: 0 | Status: DISCONTINUED | OUTPATIENT
Start: 2023-10-05 | End: 2023-10-05

## 2023-10-05 RX ORDER — POTASSIUM CHLORIDE 20 MEQ
40 PACKET (EA) ORAL ONCE
Refills: 0 | Status: COMPLETED | OUTPATIENT
Start: 2023-10-05 | End: 2023-10-05

## 2023-10-05 RX ORDER — ENOXAPARIN SODIUM 100 MG/ML
40 INJECTION SUBCUTANEOUS EVERY 24 HOURS
Refills: 0 | Status: DISCONTINUED | OUTPATIENT
Start: 2023-10-05 | End: 2023-10-06

## 2023-10-05 RX ORDER — POTASSIUM PHOSPHATE, MONOBASIC POTASSIUM PHOSPHATE, DIBASIC 236; 224 MG/ML; MG/ML
30 INJECTION, SOLUTION INTRAVENOUS ONCE
Refills: 0 | Status: COMPLETED | OUTPATIENT
Start: 2023-10-05 | End: 2023-10-05

## 2023-10-05 RX ADMIN — CHLORHEXIDINE GLUCONATE 1 APPLICATION(S): 213 SOLUTION TOPICAL at 03:20

## 2023-10-05 RX ADMIN — Medication 1 GRAM(S): at 04:05

## 2023-10-05 RX ADMIN — Medication 1 MILLIGRAM(S): at 14:11

## 2023-10-05 RX ADMIN — Medication 1 MILLIGRAM(S): at 05:24

## 2023-10-05 RX ADMIN — Medication 40 MILLIEQUIVALENT(S): at 05:24

## 2023-10-05 RX ADMIN — HEPARIN SODIUM 950 UNIT(S)/HR: 5000 INJECTION INTRAVENOUS; SUBCUTANEOUS at 09:33

## 2023-10-05 RX ADMIN — PANTOPRAZOLE SODIUM 40 MILLIGRAM(S): 20 TABLET, DELAYED RELEASE ORAL at 17:23

## 2023-10-05 RX ADMIN — POTASSIUM PHOSPHATE, MONOBASIC POTASSIUM PHOSPHATE, DIBASIC 83.33 MILLIMOLE(S): 236; 224 INJECTION, SOLUTION INTRAVENOUS at 05:51

## 2023-10-05 RX ADMIN — MORPHINE SULFATE 2 MILLIGRAM(S): 50 CAPSULE, EXTENDED RELEASE ORAL at 11:44

## 2023-10-05 RX ADMIN — ENOXAPARIN SODIUM 40 MILLIGRAM(S): 100 INJECTION SUBCUTANEOUS at 18:15

## 2023-10-05 RX ADMIN — PANTOPRAZOLE SODIUM 40 MILLIGRAM(S): 20 TABLET, DELAYED RELEASE ORAL at 05:24

## 2023-10-05 RX ADMIN — HEPARIN SODIUM 950 UNIT(S)/HR: 5000 INJECTION INTRAVENOUS; SUBCUTANEOUS at 04:03

## 2023-10-05 RX ADMIN — Medication 1: at 17:17

## 2023-10-05 RX ADMIN — Medication 1 MILLIGRAM(S): at 22:08

## 2023-10-05 RX ADMIN — Medication 3 UNIT(S): at 17:17

## 2023-10-05 RX ADMIN — CLOPIDOGREL BISULFATE 75 MILLIGRAM(S): 75 TABLET, FILM COATED ORAL at 12:17

## 2023-10-05 RX ADMIN — Medication 81 MILLIGRAM(S): at 12:17

## 2023-10-05 RX ADMIN — ALBUTEROL 2 PUFF(S): 90 AEROSOL, METERED ORAL at 14:11

## 2023-10-05 RX ADMIN — INSULIN GLARGINE 20 UNIT(S): 100 INJECTION, SOLUTION SUBCUTANEOUS at 22:10

## 2023-10-05 RX ADMIN — Medication 1 GRAM(S): at 14:12

## 2023-10-05 RX ADMIN — Medication 2: at 22:08

## 2023-10-05 RX ADMIN — HEPARIN SODIUM 950 UNIT(S)/HR: 5000 INJECTION INTRAVENOUS; SUBCUTANEOUS at 07:11

## 2023-10-05 RX ADMIN — Medication 1 GRAM(S): at 07:54

## 2023-10-05 RX ADMIN — MORPHINE SULFATE 2 MILLIGRAM(S): 50 CAPSULE, EXTENDED RELEASE ORAL at 12:13

## 2023-10-05 RX ADMIN — Medication 3 UNIT(S): at 07:53

## 2023-10-05 RX ADMIN — HEPARIN SODIUM 950 UNIT(S)/HR: 5000 INJECTION INTRAVENOUS; SUBCUTANEOUS at 11:18

## 2023-10-05 RX ADMIN — Medication 3 UNIT(S): at 12:17

## 2023-10-05 NOTE — DIETITIAN INITIAL EVALUATION ADULT - OTHER INFO
Per pt., he ran out of insulin, and  CGM was held up at the pharmacy.  Pt was on insulin pump.  Pt was self monitoring glucose c glucose meter with high readings @ home.   Pt is unsure of A1C% PTA.  Pt educated on sick day with diabetes, emergency plan/supply.

## 2023-10-05 NOTE — DIETITIAN INITIAL EVALUATION ADULT - NS FNS DIET ORDER
Diet, NPO after Midnight:      NPO Start Date: 05-Oct-2023,   NPO Start Time: 23:59  Except Medications (10-05-23 @ 13:23)

## 2023-10-05 NOTE — DIETITIAN INITIAL EVALUATION ADULT - PERTINENT MEDS FT
MEDICATIONS  (STANDING):  aspirin  chewable 81 milliGRAM(s) Oral daily  chlorhexidine 2% Cloths 1 Application(s) Topical <User Schedule>  clonazePAM  Tablet 1 milliGRAM(s) Oral every 8 hours  clopidogrel Tablet 75 milliGRAM(s) Oral daily  dextrose 5%. 1000 milliLiter(s) (50 mL/Hr) IV Continuous <Continuous>  dextrose 5%. 1000 milliLiter(s) (100 mL/Hr) IV Continuous <Continuous>  dextrose 50% Injectable 25 Gram(s) IV Push once  dextrose 50% Injectable 25 Gram(s) IV Push once  dextrose 50% Injectable 12.5 Gram(s) IV Push once  enoxaparin Injectable 40 milliGRAM(s) SubCutaneous every 24 hours  glucagon  Injectable 1 milliGRAM(s) IntraMuscular once  heparin  Infusion.  Unit(s)/Hr (10 mL/Hr) IV Continuous <Continuous>  insulin glargine Injectable (LANTUS) 20 Unit(s) SubCutaneous at bedtime  insulin lispro (ADMELOG) corrective regimen sliding scale   SubCutaneous Before meals and at bedtime  insulin lispro Injectable (ADMELOG) 3 Unit(s) SubCutaneous three times a day before meals  pantoprazole  Injectable 40 milliGRAM(s) IV Push two times a day  sucralfate 1 Gram(s) Oral every 6 hours    MEDICATIONS  (PRN):  albuterol    90 MICROgram(s) HFA Inhaler 2 Puff(s) Inhalation every 6 hours PRN Shortness of Breath and/or Wheezing  dextrose Oral Gel 15 Gram(s) Oral once PRN Blood Glucose LESS THAN 70 milliGRAM(s)/deciliter  metoclopramide Injectable 10 milliGRAM(s) IV Push every 6 hours PRN nausea  ondansetron Injectable 4 milliGRAM(s) IV Push once PRN Nausea and/or Vomiting  oxycodone    5 mG/acetaminophen 325 mG 1 Tablet(s) Oral every 6 hours PRN Moderate Pain (4 - 6)

## 2023-10-05 NOTE — PROGRESS NOTE ADULT - SUBJECTIVE AND OBJECTIVE BOX
INTERVAL HPI/OVERNIGHT EVENTS:  Pt seen and examined at bedside.     Allergies/Intolerance: lactose (Diarrhea)  Mayonnaise (Vomiting)  No Known Drug Allergies      MEDICATIONS  (STANDING):  aspirin  chewable 81 milliGRAM(s) Oral daily  chlorhexidine 2% Cloths 1 Application(s) Topical <User Schedule>  clonazePAM  Tablet 1 milliGRAM(s) Oral every 8 hours  clopidogrel Tablet 75 milliGRAM(s) Oral daily  dextrose 5%. 1000 milliLiter(s) (50 mL/Hr) IV Continuous <Continuous>  dextrose 5%. 1000 milliLiter(s) (100 mL/Hr) IV Continuous <Continuous>  dextrose 50% Injectable 25 Gram(s) IV Push once  dextrose 50% Injectable 25 Gram(s) IV Push once  dextrose 50% Injectable 12.5 Gram(s) IV Push once  enoxaparin Injectable 40 milliGRAM(s) SubCutaneous every 24 hours  glucagon  Injectable 1 milliGRAM(s) IntraMuscular once  insulin glargine Injectable (LANTUS) 20 Unit(s) SubCutaneous at bedtime  insulin lispro (ADMELOG) corrective regimen sliding scale   SubCutaneous Before meals and at bedtime  insulin lispro Injectable (ADMELOG) 3 Unit(s) SubCutaneous three times a day before meals  pantoprazole  Injectable 40 milliGRAM(s) IV Push two times a day  sucralfate 1 Gram(s) Oral every 6 hours    MEDICATIONS  (PRN):  albuterol    90 MICROgram(s) HFA Inhaler 2 Puff(s) Inhalation every 6 hours PRN Shortness of Breath and/or Wheezing  dextrose Oral Gel 15 Gram(s) Oral once PRN Blood Glucose LESS THAN 70 milliGRAM(s)/deciliter  metoclopramide Injectable 10 milliGRAM(s) IV Push every 6 hours PRN nausea  ondansetron Injectable 4 milliGRAM(s) IV Push once PRN Nausea and/or Vomiting  oxycodone    5 mG/acetaminophen 325 mG 1 Tablet(s) Oral every 6 hours PRN Moderate Pain (4 - 6)        ROS: all systems reviewed and wnl      PHYSICAL EXAMINATION:  Vital Signs Last 24 Hrs  T(C): 36.7 (05 Oct 2023 16:14), Max: 37 (05 Oct 2023 04:00)  T(F): 98.1 (05 Oct 2023 16:14), Max: 98.6 (05 Oct 2023 04:00)  HR: 84 (05 Oct 2023 16:14) (75 - 86)  BP: 128/88 (05 Oct 2023 16:14) (109/85 - 129/107)  BP(mean): 116 (05 Oct 2023 08:00) (95 - 116)  RR: 18 (05 Oct 2023 16:14) (14 - 27)  SpO2: 98% (05 Oct 2023 16:14) (92% - 99%)    Parameters below as of 05 Oct 2023 08:00  Patient On (Oxygen Delivery Method): room air      CAPILLARY BLOOD GLUCOSE      POCT Blood Glucose.: 172 mg/dL (05 Oct 2023 17:07)  POCT Blood Glucose.: 138 mg/dL (05 Oct 2023 12:09)  POCT Blood Glucose.: 142 mg/dL (05 Oct 2023 07:50)  POCT Blood Glucose.: 208 mg/dL (04 Oct 2023 21:14)      10-04 @ 07:01  -  10-05 @ 07:00  --------------------------------------------------------  IN: 1899.5 mL / OUT: 2625 mL / NET: -725.5 mL    10-05 @ 07:01  -  10-05 @ 17:22  --------------------------------------------------------  IN: 0 mL / OUT: 450 mL / NET: -450 mL        GENERAL: stable, no CP or SOB, no fevers.   NECK: supple, No JVD  CHEST/LUNG: clear to auscultation bilaterally; no rales, rhonchi, or wheezing b/l  HEART: normal S1, S2  ABDOMEN: BS+, soft, ND, NT   EXTREMITIES:  pulses palpable; no clubbing, cyanosis, or edema b/l LEs    LABS:                        10.9   9.45  )-----------( 176      ( 05 Oct 2023 02:53 )             30.5     10-05    144  |  110<H>  |  22  ----------------------------<  111<H>  3.9   |  26  |  1.07    Ca    8.8      05 Oct 2023 12:20  Phos  3.2     10-05  Mg     2.3     10-05    TPro  5.2<L>  /  Alb  2.5<L>  /  TBili  0.4  /  DBili  x   /  AST  98<H>  /  ALT  53  /  AlkPhos  76  10-05    PTT - ( 05 Oct 2023 10:30 )  PTT:57.5 sec  Urinalysis Basic - ( 05 Oct 2023 12:20 )    Color: x / Appearance: x / SG: x / pH: x  Gluc: 111 mg/dL / Ketone: x  / Bili: x / Urobili: x   Blood: x / Protein: x / Nitrite: x   Leuk Esterase: x / RBC: x / WBC x   Sq Epi: x / Non Sq Epi: x / Bacteria: x             INTERVAL HPI/OVERNIGHT EVENTS:  Pt seen and examined at bedside.     Allergies/Intolerance: lactose (Diarrhea)  Mayonnaise (Vomiting)  No Known Drug Allergies      MEDICATIONS  (STANDING):  aspirin  chewable 81 milliGRAM(s) Oral daily  chlorhexidine 2% Cloths 1 Application(s) Topical <User Schedule>  clonazePAM  Tablet 1 milliGRAM(s) Oral every 8 hours  clopidogrel Tablet 75 milliGRAM(s) Oral daily  dextrose 5%. 1000 milliLiter(s) (50 mL/Hr) IV Continuous <Continuous>  dextrose 5%. 1000 milliLiter(s) (100 mL/Hr) IV Continuous <Continuous>  dextrose 50% Injectable 25 Gram(s) IV Push once  dextrose 50% Injectable 25 Gram(s) IV Push once  dextrose 50% Injectable 12.5 Gram(s) IV Push once  enoxaparin Injectable 40 milliGRAM(s) SubCutaneous every 24 hours  glucagon  Injectable 1 milliGRAM(s) IntraMuscular once  insulin glargine Injectable (LANTUS) 20 Unit(s) SubCutaneous at bedtime  insulin lispro (ADMELOG) corrective regimen sliding scale   SubCutaneous Before meals and at bedtime  insulin lispro Injectable (ADMELOG) 3 Unit(s) SubCutaneous three times a day before meals  pantoprazole  Injectable 40 milliGRAM(s) IV Push two times a day  sucralfate 1 Gram(s) Oral every 6 hours    MEDICATIONS  (PRN):  albuterol    90 MICROgram(s) HFA Inhaler 2 Puff(s) Inhalation every 6 hours PRN Shortness of Breath and/or Wheezing  dextrose Oral Gel 15 Gram(s) Oral once PRN Blood Glucose LESS THAN 70 milliGRAM(s)/deciliter  metoclopramide Injectable 10 milliGRAM(s) IV Push every 6 hours PRN nausea  ondansetron Injectable 4 milliGRAM(s) IV Push once PRN Nausea and/or Vomiting  oxycodone    5 mG/acetaminophen 325 mG 1 Tablet(s) Oral every 6 hours PRN Moderate Pain (4 - 6)        ROS: all systems reviewed and wnl      PHYSICAL EXAMINATION:  Vital Signs Last 24 Hrs  T(C): 36.7 (05 Oct 2023 16:14), Max: 37 (05 Oct 2023 04:00)  T(F): 98.1 (05 Oct 2023 16:14), Max: 98.6 (05 Oct 2023 04:00)  HR: 84 (05 Oct 2023 16:14) (75 - 86)  BP: 128/88 (05 Oct 2023 16:14) (109/85 - 129/107)  BP(mean): 116 (05 Oct 2023 08:00) (95 - 116)  RR: 18 (05 Oct 2023 16:14) (14 - 27)  SpO2: 98% (05 Oct 2023 16:14) (92% - 99%)    Parameters below as of 05 Oct 2023 08:00  Patient On (Oxygen Delivery Method): room air      CAPILLARY BLOOD GLUCOSE      POCT Blood Glucose.: 172 mg/dL (05 Oct 2023 17:07)  POCT Blood Glucose.: 138 mg/dL (05 Oct 2023 12:09)  POCT Blood Glucose.: 142 mg/dL (05 Oct 2023 07:50)  POCT Blood Glucose.: 208 mg/dL (04 Oct 2023 21:14)      10-04 @ 07:01  -  10-05 @ 07:00  --------------------------------------------------------  IN: 1899.5 mL / OUT: 2625 mL / NET: -725.5 mL    10-05 @ 07:01  -  10-05 @ 17:22  --------------------------------------------------------  IN: 0 mL / OUT: 450 mL / NET: -450 mL        GENERAL: stable, no CP or SOB, no fevers. Wife at bedside.   NECK: supple, No JVD  CHEST/LUNG: clear to auscultation bilaterally; no rales, rhonchi, or wheezing b/l  HEART: normal S1, S2  ABDOMEN: BS+, soft, ND, NT   EXTREMITIES:  pulses palpable; no clubbing, cyanosis, or edema b/l LEs    LABS:                        10.9   9.45  )-----------( 176      ( 05 Oct 2023 02:53 )             30.5     10-05    144  |  110<H>  |  22  ----------------------------<  111<H>  3.9   |  26  |  1.07    Ca    8.8      05 Oct 2023 12:20  Phos  3.2     10-05  Mg     2.3     10-05    TPro  5.2<L>  /  Alb  2.5<L>  /  TBili  0.4  /  DBili  x   /  AST  98<H>  /  ALT  53  /  AlkPhos  76  10-05    PTT - ( 05 Oct 2023 10:30 )  PTT:57.5 sec  Urinalysis Basic - ( 05 Oct 2023 12:20 )    Color: x / Appearance: x / SG: x / pH: x  Gluc: 111 mg/dL / Ketone: x  / Bili: x / Urobili: x   Blood: x / Protein: x / Nitrite: x   Leuk Esterase: x / RBC: x / WBC x   Sq Epi: x / Non Sq Epi: x / Bacteria: x

## 2023-10-05 NOTE — PROGRESS NOTE ADULT - ASSESSMENT
42 yr old M with DM1 (on insulin pump), asthma, gastritis, anxiety, gastroparesis, cannabinoid hyperemesis presented to the ER unresponsives. Per wife, patient was found unresponsive on floor, ran out of insulin pump per wife a day prior. Intubated in ER for airway protection. In ER patient was found to have severe metabolic derangment EKG changes and AMS intubated.       CV: Hemodynamically stable, NSTEMI with large RV pocus with no wall motion abnormality, will await official consider CTA for small PE? though that wouldnt expalin trops but possible RV changes?  Stress test in AM per cards.     GI: Diet as tolerated, reglan prn for nausea  Renal: MAYRA likely secondary to dehydration in the setting of DKA.      Endo: DKA resolved on basal bolus with low premeal due to poor po  - Endo eval and follow up..    Heme: Heparin gtt, aspirin & plavix load for NSTEMI tx w/ troponins, stress test in AM.   Leg dopplers negative for DVT.     42 yr old M with DM1 (on insulin pump), asthma, gastritis, anxiety, gastroparesis, cannabinoid hyperemesis presented to the ER unresponsives. Per wife, patient was found unresponsive on floor, ran out of insulin pump per wife a day prior. Intubated in ER for airway protection. In ER patient was found to have severe metabolic derangment EKG changes and AMS intubated.       CV: Hemodynamically stable, NSTEMI with large RV pocus with no wall motion abnormality, will await official consider CTA for small PE? though that wouldnt expalin trops but possible RV changes?    Stress test in AM per cards.     GI: Diet as tolerated, reglan prn for nausea  Renal: MAYRA likely secondary to dehydration in the setting of DKA.  Stopped IVF.      Endo: DKA resolved on basal bolus with low premeal due to poor po  - Endo eval and follow up..  Glucose well controlled 172,138 on Lantus and Admelog.     Heme: Heparin gtt, aspirin & plavix load for NSTEMI tx w/ troponins, stress test in AM.     Leg dopplers negative for DVT.     Discharge home in AM if stress test negative.

## 2023-10-05 NOTE — DIETITIAN INITIAL EVALUATION ADULT - PERTINENT LABORATORY DATA
10-05    144  |  110<H>  |  22  ----------------------------<  111<H>  3.9   |  26  |  1.07    Ca    8.8      05 Oct 2023 12:20  Phos  3.2     10-05  Mg     2.3     10-05    TPro  5.2<L>  /  Alb  2.5<L>  /  TBili  0.4  /  DBili  x   /  AST  98<H>  /  ALT  53  /  AlkPhos  76  10-05  POCT Blood Glucose.: 138 mg/dL (10-05-23 @ 12:09)  A1C with Estimated Average Glucose Result: 11.1 %<H> (10-03-23 @ 12:36)  A1C with Estimated Average Glucose Result: 9.5 %<H> (01-20-23 @ 07:12)

## 2023-10-05 NOTE — PROGRESS NOTE ADULT - ASSESSMENT
42 yr old M with DM1 (on insulin pump), asthma, gastritis, anxiety, gastroparesis, cannabinoid hyperemesis presented to the ER unresponsives. Per wife, patient was found unresponsive on floor, ran out of insulin pump per wife a day prior. Intubated in ER for airway protection.     In ER patient was found to have a blood glucose of 1683, potassium 7.7, severe metabolic abnormalities with pH of 7.0 and co2 <15. Lactate of 7.2 with MAYRA of Cr 4.34. Troponin >5k. Admitted to ICU for further care.     1) DKA/ intubated now extubated 10/3/23  2) Elevated troponin  3) Asthma    - discussed w/ patient regarding cardiac cath, became very emotional, does not want cardiac cath at the present  - possibly agreeable to NM stress test, considering inpatient vs outpatient  - stop heparin drip in AM  - continue to monitor troponin levels, trending down 5,718-> 10,073 ->7,850, 3,304 42 yr old M with DM1 (on insulin pump), asthma, gastritis, anxiety, gastroparesis, cannabinoid hyperemesis presented to the ER unresponsives. Per wife, patient was found unresponsive on floor, ran out of insulin pump per wife a day prior. Intubated in ER for airway protection.     In ER patient was found to have a blood glucose of 1683, potassium 7.7, severe metabolic abnormalities with pH of 7.0 and co2 <15. Lactate of 7.2 with MAYRA of Cr 4.34. Troponin >5k. Admitted to ICU for further care.     1) DKA/ intubated now extubated 10/3/23  2) Elevated troponin  3) Asthma    - no active chest pain or SOB, grossly euvolemic  - discussed w/ patient regarding cardiac cath, became very emotional, does not want cardiac cath at the present  - possibly agreeable to NM stress test, considering inpatient vs outpatient  - stop heparin drip in AM  - continue to monitor troponin levels, trending down 5,718-> 10,073 ->7,850 --> 3,304 42 yr old M with DM1 (on insulin pump), asthma, gastritis, anxiety, gastroparesis, cannabinoid hyperemesis presented to the ER unresponsives. Per wife, patient was found unresponsive on floor, ran out of insulin pump per wife a day prior. Intubated in ER for airway protection.     In ER patient was found to have a blood glucose of 1683, potassium 7.7, severe metabolic abnormalities with pH of 7.0 and co2 <15. Lactate of 7.2 with MAYRA of Cr 4.34. Troponin >5k. Admitted to ICU for further care.     TTE10/4/23 [id number]%, Grade I diastolic dysfunction Trace mitral valve regurgitation.    1) DKA/ intubated now extubated 10/3/23  2) Elevated troponin  3) Asthma    - Echo reviewed LVEF 50-55%, no WMA, no significant valve isues  - no active chest pain or SOB, grossly euvolemic  - discussed w/ patient regarding cardiac cath, became very emotional, does not want cardiac cath at the present  - possibly agreeable to NM stress test, considering inpatient vs outpatient  - stop heparin drip in AM  - continue to monitor troponin levels, trending down 5,718-> 10,073 ->7,850 --> 3,304

## 2023-10-05 NOTE — PROGRESS NOTE ADULT - SUBJECTIVE AND OBJECTIVE BOX
Patient is a 42y old  Male who presents with a chief complaint of Unresponsiveness (04 Oct 2023 22:50)      INTERVAL HX:  Patient seen and examined @ bedside.  No respiratory distress, chest pain or SOB.    PAST MEDICAL & SURGICAL HISTORY:  DM type 1 (diabetes mellitus, type 1)      Asthma      Gastritis      Anxiety      Controlled diabetes mellitus type 1 without complications      Gastritis, presence of bleeding unspecified, unspecified chronicity, unspecified gastritis type      Uncomplicated asthma, unspecified asthma severity, unspecified whether persistent      S/P cholecystectomy      History of cholecystectomy        	  MEDICATIONS:  MEDICATIONS  (STANDING):  aspirin  chewable 81 milliGRAM(s) Oral daily  chlorhexidine 2% Cloths 1 Application(s) Topical <User Schedule>  clonazePAM  Tablet 1 milliGRAM(s) Oral every 8 hours  clopidogrel Tablet 75 milliGRAM(s) Oral daily  dextrose 5%. 1000 milliLiter(s) (50 mL/Hr) IV Continuous <Continuous>  dextrose 5%. 1000 milliLiter(s) (100 mL/Hr) IV Continuous <Continuous>  dextrose 50% Injectable 25 Gram(s) IV Push once  dextrose 50% Injectable 25 Gram(s) IV Push once  dextrose 50% Injectable 12.5 Gram(s) IV Push once  enoxaparin Injectable 40 milliGRAM(s) SubCutaneous every 24 hours  glucagon  Injectable 1 milliGRAM(s) IntraMuscular once  heparin  Infusion.  Unit(s)/Hr (10 mL/Hr) IV Continuous <Continuous>  insulin glargine Injectable (LANTUS) 20 Unit(s) SubCutaneous at bedtime  insulin lispro (ADMELOG) corrective regimen sliding scale   SubCutaneous Before meals and at bedtime  insulin lispro Injectable (ADMELOG) 3 Unit(s) SubCutaneous three times a day before meals  pantoprazole  Injectable 40 milliGRAM(s) IV Push two times a day  sucralfate 1 Gram(s) Oral every 6 hours    MEDICATIONS  (PRN):  albuterol    90 MICROgram(s) HFA Inhaler 2 Puff(s) Inhalation every 6 hours PRN Shortness of Breath and/or Wheezing  dextrose Oral Gel 15 Gram(s) Oral once PRN Blood Glucose LESS THAN 70 milliGRAM(s)/deciliter  metoclopramide Injectable 10 milliGRAM(s) IV Push every 6 hours PRN nausea  ondansetron Injectable 4 milliGRAM(s) IV Push once PRN Nausea and/or Vomiting  oxycodone    5 mG/acetaminophen 325 mG 1 Tablet(s) Oral every 6 hours PRN Moderate Pain (4 - 6)      Vitals:  T(F): 98.3 (10-05-23 @ 08:00), Max: 98.8 (10-04-23 @ 15:00)  HR: 78 (10-05-23 @ 09:03) (67 - 89)  BP: 129/107 (10-05-23 @ 08:00) (109/85 - 129/107)  RR: 26 (10-05-23 @ 09:03) (10 - 27)  SpO2: 94% (10-05-23 @ 09:03) (92% - 100%)  Wt(kg): --    10-04 @ 07:01  -  10-05 @ 07:00  --------------------------------------------------------  IN:    Heparin Infusion: 231.5 mL    Oral Fluid: 1668 mL  Total IN: 1899.5 mL    OUT:    Indwelling Catheter - Urethral (mL): 575 mL    Voided (mL): 2050 mL  Total OUT: 2625 mL    Total NET: -725.5 mL      10-05 @ 07:01  -  10-05 @ 13:08  --------------------------------------------------------  IN:  Total IN: 0 mL    OUT:    Voided (mL): 450 mL  Total OUT: 450 mL    Total NET: -450 mL        Weight (kg): 83.9 (10-03 @ 08:43)  BMI (kg/m2): 25.8 (10-03 @ 08:43)      PHYSICAL EXAM:  General: Awake, responsive  CV: S1 S2 RRR  Lungs: CTABL  GI: Soft, BS +, ND, NT  Extremities: No edema    TELEMETRY: sinus 80s no VT, VF, or pauses  	    LABS:	 	    CARDIAC MARKERS:  Troponin I, Serum: 3241.2 ng/mL (10-03 @ 09:00)          05 Oct 2023 12:20    144    |  110    |  22     ----------------------------<  111    3.9     |  26     |  1.07   05 Oct 2023 02:53    145    |  114    |  24     ----------------------------<  129    3.3     |  27     |  1.04   04 Oct 2023 02:45    146    |  118    |  49     ----------------------------<  226    4.4     |  26     |  1.79     Ca    8.8        05 Oct 2023 12:20  Phos  3.2       05 Oct 2023 12:20  Mg     2.3       05 Oct 2023 12:20    TPro  5.2    /  Alb  2.5    /  TBili  0.4    /  DBili  x      /  AST  98     /  ALT  53     /  AlkPhos  76     05 Oct 2023 02:53                          10.9   9.45  )-----------( 176      ( 05 Oct 2023 02:53 )             30.5 ,                       11.1   14.57 )-----------( 201      ( 04 Oct 2023 02:45 )             30.8 ,                       11.7   19.94 )-----------( 256      ( 03 Oct 2023 21:40 )             32.8 ,                       12.0   28.08 )-----------( 264      ( 03 Oct 2023 12:36 )             37.9 ,                       11.6   22.40 )-----------( 279      ( 03 Oct 2023 09:00 )             39.7     TSH: Thyroid Stimulating Hormone, Serum: 1.300 uU/mL (10-03 @ 12:36)      PT/PTT- ( 05 Oct 2023 10:30 )   PT: x    ;  PTT: 57.5 sec       PT/PTT- ( 05 Oct 2023 02:53 )   PT: x    ;  PTT: 66.0 sec       PT/PTT- ( 04 Oct 2023 19:46 )   PT: x    ;  PTT: 73.5 sec         INR: 0.91 ratio (10-03 @ 09:00)

## 2023-10-05 NOTE — DIETITIAN INITIAL EVALUATION ADULT - PHYSCIAL ASSESSMENT
Pt reports wt. of 164 LBS(74.5 kg) PTA.  As per previous adm RD note, wt. of 76 kg/167.6 LBS noted.  Reported Height: 5'11"

## 2023-10-05 NOTE — DIETITIAN NUTRITION RISK NOTIFICATION - TREATMENT: THE FOLLOWING DIET HAS BEEN RECOMMENDED
Diet, Consistent Carbohydrate w/Evening Snack:   Supplement Feeding Modality:  Oral  Glucerna Shake Cans or Servings Per Day:  1       Frequency:  Two Times a day (10-05-23 @ 15:18) [Pending Verification By Attending]  Diet, NPO after Midnight:      NPO Start Date: 05-Oct-2023,   NPO Start Time: 23:59  Except Medications (10-05-23 @ 13:23) [Active]  Diet, Consistent Carbohydrate w/Evening Snack (10-04-23 @ 06:49) [Active]

## 2023-10-05 NOTE — DIETITIAN INITIAL EVALUATION ADULT - ORAL INTAKE PTA/DIET HISTORY
Pt reports good appetite in general, his wife did the shopping/cooking.  Pt appeared not to be feeling well, stated that he can not eat the food which was sent to him.  Per pt., he does not eat meat, fish, rice, will eat boneless chicken breast, chicken broth, quinoa, green beans(Food and Nutrition Service made aware of food preferences).  Pt family member was @ bedside, stated that pt is a picky eater and there are foods that he can not eat due to stomach issues.  Pt was offered nutritional supplement for diabetes, did not want to try Glucerna Shake, but is willing to try [employer] glucose support/plant based organic supplement.    Pt noted c food allergy to mayonnaise, lactose intolerance.

## 2023-10-05 NOTE — DIETITIAN INITIAL EVALUATION ADULT - ORAL NUTRITION SUPPLEMENTS
[facility] Glucose Support 250 ml 2 x day=600 calories, 32 grams protein in substitution for Glucerna Shake

## 2023-10-06 ENCOUNTER — INPATIENT (INPATIENT)
Facility: HOSPITAL | Age: 43
LOS: 1 days | Discharge: HOME | DRG: 280 | End: 2023-10-08
Attending: INTERNAL MEDICINE | Admitting: STUDENT IN AN ORGANIZED HEALTH CARE EDUCATION/TRAINING PROGRAM
Payer: MEDICAID

## 2023-10-06 VITALS
OXYGEN SATURATION: 98 % | HEART RATE: 88 BPM | HEIGHT: 70.98 IN | WEIGHT: 184.97 LBS | TEMPERATURE: 98 F | RESPIRATION RATE: 17 BRPM

## 2023-10-06 VITALS
RESPIRATION RATE: 18 BRPM | TEMPERATURE: 99 F | HEART RATE: 93 BPM | OXYGEN SATURATION: 98 % | SYSTOLIC BLOOD PRESSURE: 120 MMHG | DIASTOLIC BLOOD PRESSURE: 86 MMHG

## 2023-10-06 DIAGNOSIS — R07.9 CHEST PAIN, UNSPECIFIED: ICD-10-CM

## 2023-10-06 DIAGNOSIS — Z90.49 ACQUIRED ABSENCE OF OTHER SPECIFIED PARTS OF DIGESTIVE TRACT: Chronic | ICD-10-CM

## 2023-10-06 LAB
ANION GAP SERPL CALC-SCNC: 14 MMOL/L — SIGNIFICANT CHANGE UP (ref 5–17)
APTT BLD: 26.6 SEC — SIGNIFICANT CHANGE UP (ref 24.5–35.6)
APTT BLD: 40.7 SEC — HIGH (ref 24.5–35.6)
BUN SERPL-MCNC: 23 MG/DL — SIGNIFICANT CHANGE UP (ref 7–23)
CALCIUM SERPL-MCNC: 9.5 MG/DL — SIGNIFICANT CHANGE UP (ref 8.4–10.5)
CHLORIDE SERPL-SCNC: 102 MMOL/L — SIGNIFICANT CHANGE UP (ref 96–108)
CK MB BLD-MCNC: 0.9 % — SIGNIFICANT CHANGE UP (ref 0–3.5)
CK MB CFR SERPL CALC: 2.8 NG/ML — SIGNIFICANT CHANGE UP (ref 0–6.7)
CK SERPL-CCNC: 319 U/L — HIGH (ref 30–200)
CO2 SERPL-SCNC: 24 MMOL/L — SIGNIFICANT CHANGE UP (ref 22–31)
CREAT SERPL-MCNC: 0.88 MG/DL — SIGNIFICANT CHANGE UP (ref 0.5–1.3)
EGFR: 110 ML/MIN/1.73M2 — SIGNIFICANT CHANGE UP
GLUCOSE BLDC GLUCOMTR-MCNC: 153 MG/DL — HIGH (ref 70–99)
GLUCOSE BLDC GLUCOMTR-MCNC: 228 MG/DL — HIGH (ref 70–99)
GLUCOSE BLDC GLUCOMTR-MCNC: 272 MG/DL — HIGH (ref 70–99)
GLUCOSE BLDC GLUCOMTR-MCNC: 321 MG/DL — HIGH (ref 70–99)
GLUCOSE BLDC GLUCOMTR-MCNC: 353 MG/DL — HIGH (ref 70–99)
GLUCOSE SERPL-MCNC: 253 MG/DL — HIGH (ref 70–99)
HCT VFR BLD CALC: 38.7 % — LOW (ref 39–50)
HGB BLD-MCNC: 13.5 G/DL — SIGNIFICANT CHANGE UP (ref 13–17)
INR BLD: 0.97 RATIO — SIGNIFICANT CHANGE UP (ref 0.85–1.18)
LACTATE SERPL-SCNC: 1.5 MMOL/L — SIGNIFICANT CHANGE UP (ref 0.5–2)
LIDOCAIN IGE QN: 12 U/L — LOW (ref 13–75)
MAGNESIUM SERPL-MCNC: 2.4 MG/DL — SIGNIFICANT CHANGE UP (ref 1.6–2.6)
MCHC RBC-ENTMCNC: 27.8 PG — SIGNIFICANT CHANGE UP (ref 27–34)
MCHC RBC-ENTMCNC: 34.9 GM/DL — SIGNIFICANT CHANGE UP (ref 32–36)
MCV RBC AUTO: 79.8 FL — LOW (ref 80–100)
NRBC # BLD: 0 /100 WBCS — SIGNIFICANT CHANGE UP (ref 0–0)
PHOSPHATE SERPL-MCNC: 1.7 MG/DL — LOW (ref 2.5–4.5)
PLATELET # BLD AUTO: 206 K/UL — SIGNIFICANT CHANGE UP (ref 150–400)
POTASSIUM SERPL-MCNC: 3.9 MMOL/L — SIGNIFICANT CHANGE UP (ref 3.5–5.3)
POTASSIUM SERPL-SCNC: 3.9 MMOL/L — SIGNIFICANT CHANGE UP (ref 3.5–5.3)
PROTHROM AB SERPL-ACNC: 10.2 SEC — SIGNIFICANT CHANGE UP (ref 9.5–13)
RBC # BLD: 4.85 M/UL — SIGNIFICANT CHANGE UP (ref 4.2–5.8)
RBC # FLD: 13.9 % — SIGNIFICANT CHANGE UP (ref 10.3–14.5)
SODIUM SERPL-SCNC: 140 MMOL/L — SIGNIFICANT CHANGE UP (ref 135–145)
TROPONIN I, HIGH SENSITIVITY RESULT: 1627.7 NG/L — HIGH
TROPONIN T, HIGH SENSITIVITY RESULT: 323 NG/L — HIGH (ref 0–51)
WBC # BLD: 7.32 K/UL — SIGNIFICANT CHANGE UP (ref 3.8–10.5)
WBC # FLD AUTO: 7.32 K/UL — SIGNIFICANT CHANGE UP (ref 3.8–10.5)

## 2023-10-06 PROCEDURE — 93458 L HRT ARTERY/VENTRICLE ANGIO: CPT | Mod: 26

## 2023-10-06 PROCEDURE — 93016 CV STRESS TEST SUPVJ ONLY: CPT

## 2023-10-06 PROCEDURE — 93010 ELECTROCARDIOGRAM REPORT: CPT

## 2023-10-06 PROCEDURE — 93018 CV STRESS TEST I&R ONLY: CPT

## 2023-10-06 PROCEDURE — 99239 HOSP IP/OBS DSCHRG MGMT >30: CPT

## 2023-10-06 PROCEDURE — 99232 SBSQ HOSP IP/OBS MODERATE 35: CPT | Mod: 25

## 2023-10-06 RX ORDER — ACETAMINOPHEN 500 MG
2 TABLET ORAL
Refills: 0 | DISCHARGE

## 2023-10-06 RX ORDER — INSULIN LISPRO 100/ML
3 VIAL (ML) SUBCUTANEOUS
Refills: 0 | DISCHARGE

## 2023-10-06 RX ORDER — ACETAMINOPHEN 500 MG
650 TABLET ORAL EVERY 6 HOURS
Refills: 0 | Status: DISCONTINUED | OUTPATIENT
Start: 2023-10-06 | End: 2023-10-07

## 2023-10-06 RX ORDER — INSULIN LISPRO 100/ML
0 VIAL (ML) SUBCUTANEOUS
Refills: 0 | DISCHARGE

## 2023-10-06 RX ORDER — CLONAZEPAM 1 MG
1 TABLET ORAL EVERY 8 HOURS
Refills: 0 | Status: DISCONTINUED | OUTPATIENT
Start: 2023-10-06 | End: 2023-10-08

## 2023-10-06 RX ORDER — TICAGRELOR 90 MG/1
180 TABLET ORAL ONCE
Refills: 0 | Status: COMPLETED | OUTPATIENT
Start: 2023-10-06 | End: 2023-10-06

## 2023-10-06 RX ORDER — ATORVASTATIN CALCIUM 80 MG/1
1 TABLET, FILM COATED ORAL
Refills: 0 | DISCHARGE

## 2023-10-06 RX ORDER — REGADENOSON 0.08 MG/ML
0.4 INJECTION, SOLUTION INTRAVENOUS ONCE
Refills: 0 | Status: COMPLETED | OUTPATIENT
Start: 2023-10-06 | End: 2023-10-06

## 2023-10-06 RX ORDER — QUETIAPINE FUMARATE 200 MG/1
100 TABLET, FILM COATED ORAL AT BEDTIME
Refills: 0 | Status: DISCONTINUED | OUTPATIENT
Start: 2023-10-06 | End: 2023-10-07

## 2023-10-06 RX ORDER — LISINOPRIL 2.5 MG/1
2.5 TABLET ORAL DAILY
Refills: 0 | Status: DISCONTINUED | OUTPATIENT
Start: 2023-10-06 | End: 2023-10-08

## 2023-10-06 RX ORDER — DEXTROSE 50 % IN WATER 50 %
25 SYRINGE (ML) INTRAVENOUS ONCE
Refills: 0 | Status: DISCONTINUED | OUTPATIENT
Start: 2023-10-06 | End: 2023-10-08

## 2023-10-06 RX ORDER — INSULIN GLARGINE 100 [IU]/ML
20 INJECTION, SOLUTION SUBCUTANEOUS AT BEDTIME
Refills: 0 | Status: DISCONTINUED | OUTPATIENT
Start: 2023-10-06 | End: 2023-10-07

## 2023-10-06 RX ORDER — CLONAZEPAM 1 MG
1 TABLET ORAL
Refills: 0 | DISCHARGE

## 2023-10-06 RX ORDER — HEPARIN SODIUM 5000 [USP'U]/ML
5000 INJECTION INTRAVENOUS; SUBCUTANEOUS EVERY 6 HOURS
Refills: 0 | Status: DISCONTINUED | OUTPATIENT
Start: 2023-10-06 | End: 2023-10-06

## 2023-10-06 RX ORDER — PANTOPRAZOLE SODIUM 20 MG/1
1 TABLET, DELAYED RELEASE ORAL
Refills: 0 | DISCHARGE

## 2023-10-06 RX ORDER — ATORVASTATIN CALCIUM 80 MG/1
40 TABLET, FILM COATED ORAL AT BEDTIME
Refills: 0 | Status: DISCONTINUED | OUTPATIENT
Start: 2023-10-06 | End: 2023-10-06

## 2023-10-06 RX ORDER — HEPARIN SODIUM 5000 [USP'U]/ML
5000 INJECTION INTRAVENOUS; SUBCUTANEOUS ONCE
Refills: 0 | Status: DISCONTINUED | OUTPATIENT
Start: 2023-10-06 | End: 2023-10-06

## 2023-10-06 RX ORDER — CLOPIDOGREL BISULFATE 75 MG/1
75 TABLET, FILM COATED ORAL DAILY
Refills: 0 | Status: DISCONTINUED | OUTPATIENT
Start: 2023-10-06 | End: 2023-10-06

## 2023-10-06 RX ORDER — INSULIN GLARGINE 100 [IU]/ML
20 INJECTION, SOLUTION SUBCUTANEOUS
Refills: 0 | DISCHARGE

## 2023-10-06 RX ORDER — TRAZODONE HCL 50 MG
200 TABLET ORAL AT BEDTIME
Refills: 0 | Status: DISCONTINUED | OUTPATIENT
Start: 2023-10-06 | End: 2023-10-08

## 2023-10-06 RX ORDER — GLUCAGON INJECTION, SOLUTION 0.5 MG/.1ML
1 INJECTION, SOLUTION SUBCUTANEOUS ONCE
Refills: 0 | Status: DISCONTINUED | OUTPATIENT
Start: 2023-10-06 | End: 2023-10-08

## 2023-10-06 RX ORDER — SODIUM CHLORIDE 9 MG/ML
1000 INJECTION, SOLUTION INTRAVENOUS
Refills: 0 | Status: DISCONTINUED | OUTPATIENT
Start: 2023-10-06 | End: 2023-10-08

## 2023-10-06 RX ORDER — ACETAMINOPHEN 500 MG
1000 TABLET ORAL EVERY 6 HOURS
Refills: 0 | Status: DISCONTINUED | OUTPATIENT
Start: 2023-10-06 | End: 2023-10-07

## 2023-10-06 RX ORDER — ASPIRIN/CALCIUM CARB/MAGNESIUM 324 MG
81 TABLET ORAL DAILY
Refills: 0 | Status: DISCONTINUED | OUTPATIENT
Start: 2023-10-07 | End: 2023-10-08

## 2023-10-06 RX ORDER — KETOROLAC TROMETHAMINE 30 MG/ML
15 SYRINGE (ML) INJECTION ONCE
Refills: 0 | Status: DISCONTINUED | OUTPATIENT
Start: 2023-10-06 | End: 2023-10-06

## 2023-10-06 RX ORDER — METOPROLOL TARTRATE 50 MG
12.5 TABLET ORAL EVERY 12 HOURS
Refills: 0 | Status: DISCONTINUED | OUTPATIENT
Start: 2023-10-06 | End: 2023-10-07

## 2023-10-06 RX ORDER — DEXTROSE 50 % IN WATER 50 %
12.5 SYRINGE (ML) INTRAVENOUS ONCE
Refills: 0 | Status: DISCONTINUED | OUTPATIENT
Start: 2023-10-06 | End: 2023-10-08

## 2023-10-06 RX ORDER — HEPARIN SODIUM 5000 [USP'U]/ML
10 INJECTION INTRAVENOUS; SUBCUTANEOUS
Refills: 0 | DISCHARGE

## 2023-10-06 RX ORDER — MORPHINE SULFATE 50 MG/1
2 CAPSULE, EXTENDED RELEASE ORAL ONCE
Refills: 0 | Status: DISCONTINUED | OUTPATIENT
Start: 2023-10-06 | End: 2023-10-06

## 2023-10-06 RX ORDER — ATORVASTATIN CALCIUM 80 MG/1
80 TABLET, FILM COATED ORAL AT BEDTIME
Refills: 0 | Status: DISCONTINUED | OUTPATIENT
Start: 2023-10-06 | End: 2023-10-08

## 2023-10-06 RX ORDER — DEXTROSE 50 % IN WATER 50 %
15 SYRINGE (ML) INTRAVENOUS ONCE
Refills: 0 | Status: DISCONTINUED | OUTPATIENT
Start: 2023-10-06 | End: 2023-10-08

## 2023-10-06 RX ORDER — INSULIN LISPRO 100/ML
3 VIAL (ML) SUBCUTANEOUS
Refills: 0 | Status: DISCONTINUED | OUTPATIENT
Start: 2023-10-06 | End: 2023-10-07

## 2023-10-06 RX ORDER — NITROGLYCERIN 6.5 MG
0.4 CAPSULE, EXTENDED RELEASE ORAL
Refills: 0 | Status: DISCONTINUED | OUTPATIENT
Start: 2023-10-06 | End: 2023-10-06

## 2023-10-06 RX ORDER — ALPRAZOLAM 0.25 MG
1 TABLET ORAL
Qty: 0 | Refills: 0 | DISCHARGE

## 2023-10-06 RX ORDER — HEPARIN SODIUM 5000 [USP'U]/ML
INJECTION INTRAVENOUS; SUBCUTANEOUS
Qty: 25000 | Refills: 0 | Status: DISCONTINUED | OUTPATIENT
Start: 2023-10-06 | End: 2023-10-06

## 2023-10-06 RX ORDER — PANTOPRAZOLE SODIUM 20 MG/1
40 TABLET, DELAYED RELEASE ORAL
Refills: 0 | Status: DISCONTINUED | OUTPATIENT
Start: 2023-10-06 | End: 2023-10-07

## 2023-10-06 RX ORDER — INSULIN LISPRO 100/ML
VIAL (ML) SUBCUTANEOUS
Refills: 0 | Status: DISCONTINUED | OUTPATIENT
Start: 2023-10-06 | End: 2023-10-08

## 2023-10-06 RX ORDER — CLOPIDOGREL BISULFATE 75 MG/1
600 TABLET, FILM COATED ORAL ONCE
Refills: 0 | Status: DISCONTINUED | OUTPATIENT
Start: 2023-10-06 | End: 2023-10-06

## 2023-10-06 RX ORDER — ACETAMINOPHEN 500 MG
650 TABLET ORAL EVERY 6 HOURS
Refills: 0 | Status: DISCONTINUED | OUTPATIENT
Start: 2023-10-06 | End: 2023-10-06

## 2023-10-06 RX ORDER — BUPROPION HYDROCHLORIDE 150 MG/1
150 TABLET, EXTENDED RELEASE ORAL DAILY
Refills: 0 | Status: DISCONTINUED | OUTPATIENT
Start: 2023-10-06 | End: 2023-10-08

## 2023-10-06 RX ORDER — INSULIN LISPRO 100/ML
VIAL (ML) SUBCUTANEOUS AT BEDTIME
Refills: 0 | Status: DISCONTINUED | OUTPATIENT
Start: 2023-10-06 | End: 2023-10-08

## 2023-10-06 RX ORDER — POTASSIUM PHOSPHATE, MONOBASIC POTASSIUM PHOSPHATE, DIBASIC 236; 224 MG/ML; MG/ML
15 INJECTION, SOLUTION INTRAVENOUS ONCE
Refills: 0 | Status: COMPLETED | OUTPATIENT
Start: 2023-10-06 | End: 2023-10-06

## 2023-10-06 RX ADMIN — Medication 3 UNIT(S): at 16:09

## 2023-10-06 RX ADMIN — Medication 1 MILLIGRAM(S): at 23:33

## 2023-10-06 RX ADMIN — MORPHINE SULFATE 2 MILLIGRAM(S): 50 CAPSULE, EXTENDED RELEASE ORAL at 20:05

## 2023-10-06 RX ADMIN — Medication 5: at 16:11

## 2023-10-06 RX ADMIN — CLOPIDOGREL BISULFATE 75 MILLIGRAM(S): 75 TABLET, FILM COATED ORAL at 15:09

## 2023-10-06 RX ADMIN — Medication 81 MILLIGRAM(S): at 15:09

## 2023-10-06 RX ADMIN — TICAGRELOR 180 MILLIGRAM(S): 90 TABLET ORAL at 20:08

## 2023-10-06 RX ADMIN — Medication 12.5 MILLIGRAM(S): at 22:47

## 2023-10-06 RX ADMIN — MORPHINE SULFATE 2 MILLIGRAM(S): 50 CAPSULE, EXTENDED RELEASE ORAL at 19:52

## 2023-10-06 RX ADMIN — BUPROPION HYDROCHLORIDE 150 MILLIGRAM(S): 150 TABLET, EXTENDED RELEASE ORAL at 22:47

## 2023-10-06 RX ADMIN — Medication 15 MILLIGRAM(S): at 14:01

## 2023-10-06 RX ADMIN — Medication 650 MILLIGRAM(S): at 09:31

## 2023-10-06 RX ADMIN — POTASSIUM PHOSPHATE, MONOBASIC POTASSIUM PHOSPHATE, DIBASIC 62.5 MILLIMOLE(S): 236; 224 INJECTION, SOLUTION INTRAVENOUS at 22:49

## 2023-10-06 RX ADMIN — Medication 1 MILLIGRAM(S): at 15:19

## 2023-10-06 RX ADMIN — HEPARIN SODIUM 1000 UNIT(S)/HR: 5000 INJECTION INTRAVENOUS; SUBCUTANEOUS at 14:53

## 2023-10-06 RX ADMIN — ATORVASTATIN CALCIUM 80 MILLIGRAM(S): 80 TABLET, FILM COATED ORAL at 22:47

## 2023-10-06 RX ADMIN — Medication 1 MILLIGRAM(S): at 05:35

## 2023-10-06 RX ADMIN — INSULIN GLARGINE 20 UNIT(S): 100 INJECTION, SOLUTION SUBCUTANEOUS at 19:53

## 2023-10-06 RX ADMIN — REGADENOSON 0.4 MILLIGRAM(S): 0.08 INJECTION, SOLUTION INTRAVENOUS at 11:24

## 2023-10-06 RX ADMIN — Medication 200 MILLIGRAM(S): at 23:34

## 2023-10-06 RX ADMIN — Medication 81 MILLIGRAM(S): at 20:08

## 2023-10-06 NOTE — CHART NOTE - NSCHARTNOTEFT_GEN_A_CORE
Hospitalist Medicine NP    CC: Called by RN to evaluate pt with c/o chest pain.    HPI: 42 yr old M with DM1 (on insulin pump), asthma, gastritis, anxiety, gastroparesis, cannabinoid hyperemesis presented to the ER unresponsives. Per wife, patient was found unresponsive on floor, ran out of insulin pump per wife a day prior. Intubated in ER for airway protection.     In ER patient was found to have a blood glucose of 1683, potassium 7.7, severe metabolic abnormalities with pH of 7.0 and co2 <15. Lactate of 7.2 with MAYRA of Cr 4.34. Troponins >5k. Admitted to ICU for further care.  (03 Oct 2023 14:23)    Pt seen and examined at bedside. Pt c/o 6/10 midsternal to left sided chest discomfort associated with shortness of breath.      REVIEW OF SYSTEMS:  Respiratory: Denies cough, wheezing, chills, hemoptysis  Cardiovascular: palpitations, dizziness, leg swelling  Gastrointestinal: Denies nausea, vomiting, hematemesis, diarrhea, constipation, melena, hematochezia  Genitourinary: Denies dysuria, frequency, hematuria, retention, incontinence  Neurological: Denies headaches, memory loss, loss of strength, numbness, tremors    VITALS:  T(C): 36.9 (10-06-23 @ 05:10), Max: 37.4 (10-05-23 @ 23:37)  HR: 80 (10-06-23 @ 05:10) (80 - 88)  BP: 124/88 (10-06-23 @ 05:10) (112/77 - 128/88)  RR: 18 (10-06-23 @ 05:10) (18 - 18)  SpO2: 97% (10-06-23 @ 05:10) (96% - 98%)      PHYSICAL EXAM:  General: NAD, well-groomed, non-toxic appearance  Lungs:  CTA B/L. Normal breath sounds. No wheezes, rales, rhonchi.  Heart: S1 S2 RRR no JVD, + TTP over midsternal to left chest well  Abdomen: Soft, NT/ND. BS x 4 quadrants.  Neurological:  AAOx3, non focal exam   Extremities:  2+ B/L peripheral pulses. No clubbing, cyanosis, or edema.    LABS:  CAPILLARY BLOOD GLUCOSE      POCT Blood Glucose.: 153 mg/dL (06 Oct 2023 07:33)  POCT Blood Glucose.: 241 mg/dL (05 Oct 2023 21:19)  POCT Blood Glucose.: 172 mg/dL (05 Oct 2023 17:07)                          10.9   9.45  )-----------( 176      ( 05 Oct 2023 02:53 )             30.5     10-05    144  |  110<H>  |  22  ----------------------------<  111<H>  3.9   |  26  |  1.07    Ca    8.8      05 Oct 2023 12:20  Phos  3.2     10-05  Mg     2.3     10-05    TPro  5.2<L>  /  Alb  2.5<L>  /  TBili  0.4  /  DBili  x   /  AST  98<H>  /  ALT  53  /  AlkPhos  76  10-05        PTT - ( 06 Oct 2023 08:31 )  PTT:26.6 sec  LIVER FUNCTIONS - ( 05 Oct 2023 02:53 )  Alb: 2.5 g/dL / Pro: 5.2 gm/dL / ALK PHOS: 76 U/L / ALT: 53 U/L / AST: 98 U/L / GGT: x                                     ASSESSMENT: 48 y/o male with asthma, gastritis, anxiety, History of cholecystectomy, DM type I, admitted with DKA, s/p MICU stay, downgraded to telemetry 10/5,   now s/p stress test without EKG changes noted during study, with c/o reproducible chest pain.     PLAN:  - 12 lead EKG  - Toradol 15 mg IVP x 1 now  - cardiology made aware, will follow up  - Continue current treatment  - Follow up labs/tests  - D/w Dr. Sutherland aware and agree with the plan  - Will continue to follow up    Time spent on encounter 30 minutes. Hospitalist Medicine NP    CC: Called by RN to evaluate pt with c/o chest pain.    HPI: 42 yr old M with DM1 (on insulin pump), asthma, gastritis, anxiety, gastroparesis, cannabinoid hyperemesis presented to the ER unresponsives. Per wife, patient was found unresponsive on floor, ran out of insulin pump per wife a day prior. Intubated in ER for airway protection.     In ER patient was found to have a blood glucose of 1683, potassium 7.7, severe metabolic abnormalities with pH of 7.0 and co2 <15. Lactate of 7.2 with MAYRA of Cr 4.34. Troponins >5k. Admitted to ICU for further care.  (03 Oct 2023 14:23)    Pt seen and examined at bedside. Pt c/o 6/10 midsternal to left sided chest discomfort associated with shortness of breath.      REVIEW OF SYSTEMS:  Respiratory: Denies cough, wheezing, chills, hemoptysis  Cardiovascular: palpitations, dizziness, leg swelling  Gastrointestinal: Denies nausea, vomiting, hematemesis, diarrhea, constipation, melena, hematochezia  Genitourinary: Denies dysuria, frequency, hematuria, retention, incontinence  Neurological: Denies headaches, memory loss, loss of strength, numbness, tremors    VITALS:  T(C): 36.9 (10-06-23 @ 05:10), Max: 37.4 (10-05-23 @ 23:37)  HR: 80 (10-06-23 @ 05:10) (80 - 88)  BP: 124/88 (10-06-23 @ 05:10) (112/77 - 128/88)  RR: 18 (10-06-23 @ 05:10) (18 - 18)  SpO2: 97% (10-06-23 @ 05:10) (96% - 98%)      PHYSICAL EXAM:  General: NAD, well-groomed, non-toxic appearance  Lungs:  CTA B/L. Normal breath sounds. No wheezes, rales, rhonchi.  Heart: S1 S2 RRR no JVD, + TTP over midsternal to left chest well  Abdomen: Soft, NT/ND. BS x 4 quadrants.  Neurological:  AAOx3, non focal exam   Extremities:  2+ B/L peripheral pulses. No clubbing, cyanosis, or edema.    LABS:  CAPILLARY BLOOD GLUCOSE      POCT Blood Glucose.: 153 mg/dL (06 Oct 2023 07:33)  POCT Blood Glucose.: 241 mg/dL (05 Oct 2023 21:19)  POCT Blood Glucose.: 172 mg/dL (05 Oct 2023 17:07)                          10.9   9.45  )-----------( 176      ( 05 Oct 2023 02:53 )             30.5     10-05    144  |  110<H>  |  22  ----------------------------<  111<H>  3.9   |  26  |  1.07    Ca    8.8      05 Oct 2023 12:20  Phos  3.2     10-05  Mg     2.3     10-05    TPro  5.2<L>  /  Alb  2.5<L>  /  TBili  0.4  /  DBili  x   /  AST  98<H>  /  ALT  53  /  AlkPhos  76  10-05        PTT - ( 06 Oct 2023 08:31 )  PTT:26.6 sec  LIVER FUNCTIONS - ( 05 Oct 2023 02:53 )  Alb: 2.5 g/dL / Pro: 5.2 gm/dL / ALK PHOS: 76 U/L / ALT: 53 U/L / AST: 98 U/L / GGT: x                                     ASSESSMENT: 48 y/o male with asthma, gastritis, anxiety, History of cholecystectomy, DM type I, admitted with DKA, s/p MICU stay, downgraded to telemetry 10/5,   now s/p stress test without EKG changes noted during study, with c/o reproducible chest pain.     PLAN:  - 12 lead EKG with ST depressions in V3, V4, and elevations in V1 and V2  - Toradol 15 mg IVP x 1 now  - cardiology made aware, Dr. Reyes  -Heparin gtt, SL nitro, transfer center for possible cath  - D/w Dr. Sutherland aware and agree with the plan  - Will continue to follow up    Time spent on encounter 45 minutes. Hospitalist Medicine NP    CC: Called by RN to evaluate pt with c/o chest pain.    HPI: 42 yr old M with DM1 (on insulin pump), asthma, gastritis, anxiety, gastroparesis, cannabinoid hyperemesis presented to the ER unresponsives. Per wife, patient was found unresponsive on floor, ran out of insulin pump per wife a day prior. Intubated in ER for airway protection.     In ER patient was found to have a blood glucose of 1683, potassium 7.7, severe metabolic abnormalities with pH of 7.0 and co2 <15. Lactate of 7.2 with MAYRA of Cr 4.34. Troponins >5k. Admitted to ICU for further care.  (03 Oct 2023 14:23)    Pt seen and examined at bedside. Pt c/o 6/10 midsternal to left sided chest discomfort associated with shortness of breath.      REVIEW OF SYSTEMS:  Respiratory: Denies cough, wheezing, chills, hemoptysis  Cardiovascular: palpitations, dizziness, leg swelling  Gastrointestinal: Denies nausea, vomiting, hematemesis, diarrhea, constipation, melena, hematochezia  Genitourinary: Denies dysuria, frequency, hematuria, retention, incontinence  Neurological: Denies headaches, memory loss, loss of strength, numbness, tremors    VITALS:  T(C): 36.9 (10-06-23 @ 05:10), Max: 37.4 (10-05-23 @ 23:37)  HR: 80 (10-06-23 @ 05:10) (80 - 88)  BP: 124/88 (10-06-23 @ 05:10) (112/77 - 128/88)  RR: 18 (10-06-23 @ 05:10) (18 - 18)  SpO2: 97% (10-06-23 @ 05:10) (96% - 98%)      PHYSICAL EXAM:  General: NAD, well-groomed, non-toxic appearance  Lungs:  CTA B/L. Normal breath sounds. No wheezes, rales, rhonchi.  Heart: S1 S2 RRR no JVD, + TTP over midsternal to left chest well  Abdomen: Soft, NT/ND. BS x 4 quadrants.  Neurological:  AAOx3, non focal exam   Extremities:  2+ B/L peripheral pulses. No clubbing, cyanosis, or edema.    LABS:  CAPILLARY BLOOD GLUCOSE      POCT Blood Glucose.: 153 mg/dL (06 Oct 2023 07:33)  POCT Blood Glucose.: 241 mg/dL (05 Oct 2023 21:19)  POCT Blood Glucose.: 172 mg/dL (05 Oct 2023 17:07)                          10.9   9.45  )-----------( 176      ( 05 Oct 2023 02:53 )             30.5     10-05    144  |  110<H>  |  22  ----------------------------<  111<H>  3.9   |  26  |  1.07    Ca    8.8      05 Oct 2023 12:20  Phos  3.2     10-05  Mg     2.3     10-05    TPro  5.2<L>  /  Alb  2.5<L>  /  TBili  0.4  /  DBili  x   /  AST  98<H>  /  ALT  53  /  AlkPhos  76  10-05        PTT - ( 06 Oct 2023 08:31 )  PTT:26.6 sec  LIVER FUNCTIONS - ( 05 Oct 2023 02:53 )  Alb: 2.5 g/dL / Pro: 5.2 gm/dL / ALK PHOS: 76 U/L / ALT: 53 U/L / AST: 98 U/L / GGT: x                                     ASSESSMENT: 46 y/o male with asthma, gastritis, anxiety, History of cholecystectomy, DM type I, admitted with DKA, s/p MICU stay, downgraded to telemetry 10/5,   now s/p stress test without EKG changes noted during study, with c/o reproducible chest pain.     PLAN:  - 12 lead EKG with t-wave inversions V3, V4  - Toradol 15 mg IVP x 1 now  - cardiology made aware, Dr. Reyes  -Heparin gtt, SL nitro, transfer center for possible cath  - D/w Dr. Sutherland aware and agree with the plan  - Will continue to follow up    Time spent on encounter 45 minutes.

## 2023-10-06 NOTE — PROGRESS NOTE ADULT - ASSESSMENT
42 yr old M with DM1 (on insulin pump), asthma, gastritis, anxiety, gastroparesis, cannabinoid hyperemesis presented to the ER unresponsives. Per wife, patient was found unresponsive on floor, ran out of insulin pump per wife a day prior. Intubated in ER for airway protection. In ER patient was found to have severe metabolic derangment EKG changes and AMS intubated.       CV: Hemodynamically stable, NSTEMI with large RV pocus with no wall motion abnormality, will await official consider CTA for small PE? though that wouldnt expalin trops but possible RV changes?    Stress test in AM per cards.     GI: Diet as tolerated, reglan prn for nausea  Renal: MAYRA likely secondary to dehydration in the setting of DKA.  Stopped IVF.      Endo: DKA resolved on basal bolus with low premeal due to poor po  - Endo eval and follow up..  Glucose well controlled 172,138 on Lantus and Admelog.     Heme: Heparin gtt, aspirin & plavix load for NSTEMI tx w/ troponins, stress test in AM.     Leg dopplers negative for DVT.     Discharge home in AM if stress test negative.     42 yr old M with DM1 (on insulin pump), asthma, gastritis, anxiety, gastroparesis, cannabinoid hyperemesis presented to the ER unresponsives. Per wife, patient was found unresponsive on floor, ran out of insulin pump per wife a day prior. Intubated in ER for airway protection. In ER patient was found to have severe metabolic derangment EKG changes and AMS intubated.       CV: Hemodynamically stable, NSTEMI with large RV pocus with no wall motion abnormality, will await official consider CTA for small PE? though that wouldnt expalin trops but possible RV changes?    Stress test today induced CP. Post stress test, TWI laterally, new. IV Heparin started.     Transfer center called for cardiac cath.      DKA resolved on basal bolus with low premeal due to poor po  Glucose well controlled 172,138 on Lantus and Admelog.     Heme: Heparin gtt, aspirin & plavix load for NSTEMI tx w/ troponins, transfer center aware.     Stable for transfer to [facility] for cath.

## 2023-10-06 NOTE — PROGRESS NOTE ADULT - SUBJECTIVE AND OBJECTIVE BOX
NP Progress Note     Follow Up: Unresponsiveness     HPI:  42 yr old M with DM1 (on insulin pump), asthma, gastritis, anxiety, gastroparesis, cannabinoid hyperemesis presented to the ER unresponsives. Per wife, patient was found unresponsive on floor, ran out of insulin pump per wife a day prior. Intubated in ER for airway protection.     In ER patient was found to have a blood glucose of 1683, potassium 7.7, severe metabolic abnormalities with pH of 7.0 and co2 <15. Lactate of 7.2 with MAYRA of Cr 4.34. Troponins >5k. Admitted to ICU for further care.  (03 Oct 2023 14:23)    Subjective/Observations: Pt. seen and examined and evaluated. Pt. resting comfortably in bed in NAD, with no respiratory distress, no chest pain, dyspnea, palpitations, PND, or orthopnea.    REVIEW OF SYSTEMS: All other review of systems is negative unless indicated above    PAST MEDICAL & SURGICAL HISTORY:  DM type 1 (diabetes mellitus, type 1)  Asthma  Gastritis  Anxiety  Controlled diabetes mellitus type 1 without complications  Gastritis, presence of bleeding unspecified, unspecified chronicity, unspecified gastritis type  Uncomplicated asthma, unspecified asthma severity, unspecified whether persistent    MEDICATIONS  (STANDING):  aspirin  chewable 81 milliGRAM(s) Oral daily  chlorhexidine 2% Cloths 1 Application(s) Topical <User Schedule>  clonazePAM  Tablet 1 milliGRAM(s) Oral every 8 hours  clopidogrel Tablet 75 milliGRAM(s) Oral daily  dextrose 5%. 1000 milliLiter(s) (50 mL/Hr) IV Continuous <Continuous>  dextrose 5%. 1000 milliLiter(s) (100 mL/Hr) IV Continuous <Continuous>  dextrose 50% Injectable 25 Gram(s) IV Push once  dextrose 50% Injectable 25 Gram(s) IV Push once  dextrose 50% Injectable 12.5 Gram(s) IV Push once  glucagon  Injectable 1 milliGRAM(s) IntraMuscular once  insulin glargine Injectable (LANTUS) 20 Unit(s) SubCutaneous at bedtime  insulin lispro (ADMELOG) corrective regimen sliding scale   SubCutaneous Before meals and at bedtime  insulin lispro Injectable (ADMELOG) 3 Unit(s) SubCutaneous three times a day before meals  pantoprazole    Tablet 40 milliGRAM(s) Oral before breakfast    MEDICATIONS  (PRN):  acetaminophen     Tablet .. 650 milliGRAM(s) Oral every 6 hours PRN Moderate Pain (4 - 6)  dextrose Oral Gel 15 Gram(s) Oral once PRN Blood Glucose LESS THAN 70 milliGRAM(s)/deciliter      Allergies: Mayonnaise (Vomiting)  No Known Drug Allergies    Vital Signs Last 24 Hrs  T(C): 36.9 (06 Oct 2023 05:10), Max: 37.4 (05 Oct 2023 23:37)  T(F): 98.5 (06 Oct 2023 05:10), Max: 99.3 (05 Oct 2023 23:37)  HR: 80 (06 Oct 2023 05:10) (80 - 88)  BP: 124/88 (06 Oct 2023 05:10) (112/77 - 128/88)  BP(mean): 88 (05 Oct 2023 22:44) (88 - 88)  RR: 18 (06 Oct 2023 05:10) (18 - 18)  SpO2: 97% (06 Oct 2023 05:10) (96% - 98%)    I&O's Summary    05 Oct 2023 07:01  -  06 Oct 2023 07:00  --------------------------------------------------------  IN: 0 mL / OUT: 450 mL / NET: -450 mL    06 Oct 2023 07:01  -  06 Oct 2023 13:28  --------------------------------------------------------  IN: 0 mL / OUT: 0 mL / NET: 0 mL        LABS: All Labs Reviewed:                        10.9   9.45  )-----------( 176      ( 05 Oct 2023 02:53 )             30.5             05 Oct 2023 12:20    144    |  110    |  22     ----------------------------<  111    3.9     |  26     |  1.07   05 Oct 2023 02:53    145    |  114    |  24     ----------------------------<  129    3.3     |  27     |  1.04   Ca    8.8        05 Oct 2023 12:20  Ca    7.5        05 Oct 2023 02:53  Phos  3.2       05 Oct 2023 12:20  Phos  1.4       05 Oct 2023 02:53  Mg     2.3       05 Oct 2023 12:20  Mg     2.1       05 Oct 2023 02:53      TPro  5.2    /  Alb  2.5    /  TBili  0.4    /  DBili  x      /  AST  98     /  ALT  53     /  AlkPhos  76     05 Oct 2023 02:53  PTT - ( 06 Oct 2023 08:31 )  PTT:26.6 sec      Echo:  < from: TTE Echo Complete w/o Contrast w/ Doppler (10.04.23 @ 18:56) >  Summary:   1. Left ventricular ejection fraction, by visual estimation, is 50 to   55%.   2. Technically fair study.   3. Normal global left ventricular systolic function.   4. Mildly increased LV wall thickness.   5. Normal left ventricular internal cavity size.   6. Spectral Doppler shows impaired relaxation pattern of left   ventricular myocardial filling (Grade I diastolic dysfunction).   7.Normal right ventricular size and function.   8. Normal left atrial size.   9. Normal right atrial size.  10. There is no evidence of pericardial effusion.  11. Structurally normal mitral valve, with normal leaflet excursion.  12. Trace mitral valve regurgitation.  13. Increased relative wall thickness with normal mass index consistent   with left ventricular concentric remodeling.    Interpretation of Telemetry: Overnight on telemetry SR [id number] BPM T-wave inversion noted       Physical Exam:  Appearance: [ ] Normal  [ ] abnormal [X ] NAD   Eyes: [ ] PERRL [ ] EOMI  HEENT: [ ] Normal [ ] Abnormal oral mucosa [ ]NC/AT  Cardiovascular: [X ] S1 [X ] S2 [ ] RRR [ ] m/r/g [ ]edema [ ] JVP  Procedural Access Site: N/A  Respiratory: [X ] Clear to auscultation bilaterally  Gastrointestinal: [X ] Soft [ ] tenderness[ ] distension [ ] BS  Musculoskeletal: [ ] clubbing [ ] joint deformity   Neurologic: [ ] Non-focal  Lymphatic: [ ] lymphadenopathy  Psychiatry: [X ] AAOx3  [ ] confused [ ] disoriented [ ] Mood & affect appropriate  Skin: [ ]  rashes [ ] ecchymoses [ ] cyanosis

## 2023-10-06 NOTE — H&P CARDIOLOGY - TIME BILLING
Discussed with the patient the indication for cardiac catheterization and the reason for transfer to [facility]. After going through the indications, steps of the procedure and the risks associated the patient was not amenable to going forward with cardiac catheterization.  He will be admitted to telemetry and medically managed. His EKG is unchanged compared to prior and his VSS.

## 2023-10-06 NOTE — H&P CARDIOLOGY - HISTORY OF PRESENT ILLNESS
42 yr old M with DM1 (on insulin pump), asthma, gastritis, anxiety, gastroparesis, cannabinoid hyperemesis presented to the ER unresponsives. Per wife, patient was found unresponsive on floor, ran out of insulin pump per wife a day prior. Intubated in ER for airway protection.     In ER patient was found to have a blood glucose of 1683, potassium 7.7, severe metabolic abnormalities with pH of 7.0 and co2 <15. Lactate of 7.2 with MAYRA of Cr 4.34. Troponins >5k. Admitted to ICU for further care.   < from: NM Stress test, exercise, 12 lead ECG (10.06.23 @ 12:40) >  IMPRESSION: Abnormal SPECT Myocardial Perfusion Imaging.    Normal left ventricular function with an ejection fraction of 68%   (Normal: 50% or greater).    No regional wall motion abnormalities.    There was a moderate zone of severely reduced, fixed perfusion defect   involving the distal septal and apical septal wall consistent with old   myocardial infarction.    No clear evidence of reversible perfusion defects.    Yoseph Ochoa MD, FACC, FASNC    --- End of Report ---            YOSEPH OCHOA MD; Attending Cardiologist  This document has been electronically signed. Oct  6 2023  1:44PM    < end of copied text >  < from: TTE Echo Complete w/o Contrast w/ Doppler (10.04.23 @ 18:56) >      < end of copied text >  < from: TTE Echo Complete w/o Contrast w/ Doppler (10.04.23 @ 18:56) >  Summary:   1. Left ventricular ejection fraction, by visual estimation, is 50 to   55%.   2. Technically fair study.   3. Normal global left ventricular systolic function.   4. Mildly increased LV wall thickness.   5. Normal left ventricular internal cavity size.   6. Spectral Doppler shows impaired relaxation pattern of left   ventricular myocardial filling (Grade I diastolic dysfunction).   7.Normal right ventricular size and function.   8. Normal left atrial size.   9. Normal right atrial size.  10. There is no evidence of pericardial effusion.  11. Structurally normal mitral valve, with normal leaflet excursion.  12. Trace mitral valve regurgitation.  13. Increased relative wall thickness with normal mass index consistent   with left ventricular concentric remodeling.      [id number] Yoseph Ochoa MD FACC, FASE, FACP    < end of copied text >   42 yr old M with DM1 (on insulin pump), asthma, gastritis, anxiety, gastroparesis, cannabinoid hyperemesis presented to the ER 10/3/23 unresponsives. Per wife, patient was found unresponsive on floor, ran out of insulin pump per wife a day prior.  In ER patient was found to have a blood glucose of 1683, potassium 7.7, severe metabolic abnormalities with pH of 7.0 and co2 <15. Lactate of 7.2 with MAYRA of Cr 4.34. Troponins 3304.6->2479.6->1627.7 trended down , intubated and extubate 10/3/23, treated in  MICU, downgraded to  tele 10/5. 10/6 c/o chest pain: 12 lead EKG with ST depressions in V3, V4, and elevations in V1 and V2, treated,Heparin gtt, SL nitro and now transferred here to [facility] for left heart cath.     10/6/23 Stress Test (PRIOR TO CHEST PAIN)  FINDINGS: The technical quality of the resting and post-stress perfusion   images was good.  Review of resting and post-stress myocardial   scintigrams revealed abnormal left ventricular perfusion. There was a   moderate zone of severely reduced, fixed perfusion defect involving the   distal septal and apical septal wall consistent with old myocardial   infarction.  No clear evidence of reversible perfusion defects.  Gated wall motion study revealed normal left ventricular contractility.   There were no regional wall motion abnormalities or regions of decreased   wall thickening. There was no paradoxical septal motion.  Calculated left ventricular ejection fraction post-stress was 68%, based   on a left ventricular end diastolic volume of 79 mL and an end systolic   volume of 25 mL. Stroke volume was 54 mL.  IMPRESSION: Abnormal SPECT Myocardial Perfusion Imaging.  Normal left ventricular function with an ejection fraction of 68%   (Normal: 50% or greater).  No regional wall motion abnormalities.  There was a moderate zone of severely reduced, fixed perfusion defect   involving the distal septal and apical septal wall consistent with old   myocardial infarction.  No clear evidence of reversible perfusion defects.        10/4/23 TTE   Summary:   1. Left ventricular ejection fraction, by visual estimation, is 50 to   55%.   2. Technically fair study.   3. Normal global left ventricular systolic function.   4. Mildly increased LV wall thickness.   5. Normal left ventricular internal cavity size.   6. Spectral Doppler shows impaired relaxation pattern of left   ventricular myocardial filling (Grade I diastolic dysfunction).   7.Normal right ventricular size and function.   8. Normal left atrial size.   9. Normal right atrial size.  10. There is no evidence of pericardial effusion.  11. Structurally normal mitral valve, with normal leaflet excursion.  12. Trace mitral valve regurgitation.  13. Increased relative wall thickness with normal mass index consistent   with left ventricular concentric remodeling.         42 yr old M with DM1 (on insulin pump), asthma, gastritis, anxiety, gastroparesis, cannabinoid hyperemesis presented to the ER 10/3/23 unresponsives. Per wife, patient was found unresponsive on floor, ran out of insulin pump per wife a day prior.  In ER patient was found to have a blood glucose of 1683, potassium 7.7, severe metabolic abnormalities with pH of 7.0 and co2 <15. Lactate of 7.2 with MAYRA of Cr 4.34. Troponins 3304.6->2479.6->1627.7 trended down , intubated and extubate 10/3/23, treated in  MICU, downgraded to  tele 10/5. 10/6 c/o chest pain: 12 lead EKG with ST depressions in V3, V4, and elevations in V1 and V2, treated Heparin gtt, SL nitro and now transferred here to [facility] for left heart cath.     10/6/23 Stress Test (PRIOR TO CHEST PAIN)  FINDINGS: The technical quality of the resting and post-stress perfusion   images was good.  Review of resting and post-stress myocardial   scintigrams revealed abnormal left ventricular perfusion. There was a   moderate zone of severely reduced, fixed perfusion defect involving the   distal septal and apical septal wall consistent with old myocardial   infarction.  No clear evidence of reversible perfusion defects.  Gated wall motion study revealed normal left ventricular contractility.   There were no regional wall motion abnormalities or regions of decreased   wall thickening. There was no paradoxical septal motion.  Calculated left ventricular ejection fraction post-stress was 68%, based   on a left ventricular end diastolic volume of 79 mL and an end systolic   volume of 25 mL. Stroke volume was 54 mL.    IMPRESSION: Abnormal SPECT Myocardial Perfusion Imaging.  Normal left ventricular function with an ejection fraction of 68%   (Normal: 50% or greater).  No regional wall motion abnormalities.  There was a moderate zone of severely reduced, fixed perfusion defect   involving the distal septal and apical septal wall consistent with old   myocardial infarction.  No clear evidence of reversible perfusion defects.        10/4/23 TTE   Summary:   1. Left ventricular ejection fraction, by visual estimation, is 50 to   55%.   2. Technically fair study.   3. Normal global left ventricular systolic function.   4. Mildly increased LV wall thickness.   5. Normal left ventricular internal cavity size.   6. Spectral Doppler shows impaired relaxation pattern of left   ventricular myocardial filling (Grade I diastolic dysfunction).   7.Normal right ventricular size and function.   8. Normal left atrial size.   9. Normal right atrial size.  10. There is no evidence of pericardial effusion.  11. Structurally normal mitral valve, with normal leaflet excursion.  12. Trace mitral valve regurgitation.  13. Increased relative wall thickness with normal mass index consistent   with left ventricular concentric remodeling.            HOME MEDS:  Clonazempam 1 mg po tid  Trazadone 100mg 2 tabs po qhs  Vit D 50,000 iu weekly  Wellbutrin  mg po daily  Pepcid 40 mg po qd  Seroquel 100 po qhs  Atorvastatn 10 mg po qhs  Tizanidine 4 mg po tid

## 2023-10-06 NOTE — PROGRESS NOTE ADULT - SUBJECTIVE AND OBJECTIVE BOX
INTERVAL HPI/OVERNIGHT EVENTS:  Pt seen and examined at bedside.     Allergies/Intolerance: lactose (Diarrhea)  Mayonnaise (Vomiting)  No Known Drug Allergies      MEDICATIONS  (STANDING):  aspirin  chewable 81 milliGRAM(s) Oral daily  chlorhexidine 2% Cloths 1 Application(s) Topical <User Schedule>  clonazePAM  Tablet 1 milliGRAM(s) Oral every 8 hours  clopidogrel Tablet 75 milliGRAM(s) Oral daily  dextrose 5%. 1000 milliLiter(s) (50 mL/Hr) IV Continuous <Continuous>  dextrose 5%. 1000 milliLiter(s) (100 mL/Hr) IV Continuous <Continuous>  dextrose 50% Injectable 25 Gram(s) IV Push once  dextrose 50% Injectable 25 Gram(s) IV Push once  dextrose 50% Injectable 12.5 Gram(s) IV Push once  enoxaparin Injectable 40 milliGRAM(s) SubCutaneous every 24 hours  glucagon  Injectable 1 milliGRAM(s) IntraMuscular once  insulin glargine Injectable (LANTUS) 20 Unit(s) SubCutaneous at bedtime  insulin lispro (ADMELOG) corrective regimen sliding scale   SubCutaneous Before meals and at bedtime  insulin lispro Injectable (ADMELOG) 3 Unit(s) SubCutaneous three times a day before meals  pantoprazole    Tablet 40 milliGRAM(s) Oral before breakfast  sucralfate 1 Gram(s) Oral every 6 hours    MEDICATIONS  (PRN):  acetaminophen     Tablet .. 650 milliGRAM(s) Oral every 6 hours PRN Moderate Pain (4 - 6)  dextrose Oral Gel 15 Gram(s) Oral once PRN Blood Glucose LESS THAN 70 milliGRAM(s)/deciliter  metoclopramide Injectable 10 milliGRAM(s) IV Push every 6 hours PRN nausea        ROS: all systems reviewed and wnl      PHYSICAL EXAMINATION:  Vital Signs Last 24 Hrs  T(C): 36.9 (06 Oct 2023 05:10), Max: 37.4 (05 Oct 2023 23:37)  T(F): 98.5 (06 Oct 2023 05:10), Max: 99.3 (05 Oct 2023 23:37)  HR: 80 (06 Oct 2023 05:10) (80 - 88)  BP: 124/88 (06 Oct 2023 05:10) (112/77 - 128/88)  BP(mean): 88 (05 Oct 2023 22:44) (88 - 88)  RR: 18 (06 Oct 2023 05:10) (18 - 18)  SpO2: 97% (06 Oct 2023 05:10) (96% - 98%)      CAPILLARY BLOOD GLUCOSE      POCT Blood Glucose.: 153 mg/dL (06 Oct 2023 07:33)  POCT Blood Glucose.: 241 mg/dL (05 Oct 2023 21:19)  POCT Blood Glucose.: 172 mg/dL (05 Oct 2023 17:07)  POCT Blood Glucose.: 138 mg/dL (05 Oct 2023 12:09)      10-05 @ 07:01  -  10-06 @ 07:00  --------------------------------------------------------  IN: 0 mL / OUT: 450 mL / NET: -450 mL        GENERAL: stable on RA, alert, comfortable, tolerates all meals.   NECK: supple, No JVD  CHEST/LUNG: clear to auscultation bilaterally; no rales, rhonchi, or wheezing b/l  HEART: normal S1, S2  ABDOMEN: BS+, soft, ND, NT   EXTREMITIES:  pulses palpable; no clubbing, cyanosis, or edema b/l LEs      LABS:                        10.9   9.45  )-----------( 176      ( 05 Oct 2023 02:53 )             30.5     10-05    144  |  110<H>  |  22  ----------------------------<  111<H>  3.9   |  26  |  1.07    Ca    8.8      05 Oct 2023 12:20  Phos  3.2     10-05  Mg     2.3     10-05    TPro  5.2<L>  /  Alb  2.5<L>  /  TBili  0.4  /  DBili  x   /  AST  98<H>  /  ALT  53  /  AlkPhos  76  10-05    PTT - ( 05 Oct 2023 10:30 )  PTT:57.5 sec  Urinalysis Basic - ( 05 Oct 2023 12:20 )    Color: x / Appearance: x / SG: x / pH: x  Gluc: 111 mg/dL / Ketone: x  / Bili: x / Urobili: x   Blood: x / Protein: x / Nitrite: x   Leuk Esterase: x / RBC: x / WBC x   Sq Epi: x / Non Sq Epi: x / Bacteria: x

## 2023-10-06 NOTE — PATIENT PROFILE ADULT - FALL HARM RISK - HARM RISK INTERVENTIONS

## 2023-10-06 NOTE — PROGRESS NOTE ADULT - ASSESSMENT
42 yr old M with DM1 (on insulin pump), asthma, gastritis, anxiety, gastroparesis, cannabinoid hyperemesis presented to the ER unresponsives. Per wife, patient was found unresponsive on floor, ran out of insulin pump per wife a day prior. Intubated in ER for airway protection.     In ER patient was found to have a blood glucose of 1683, potassium 7.7, severe metabolic abnormalities with pH of 7.0 and co2 <15. Lactate of 7.2 with MAYRA of Cr 4.34. Troponin >5k. Admitted to ICU for further care.     TTE10/4/23 AOUS27-59%, Grade I diastolic dysfunction Trace mitral valve regurgitation.    1) DKA/ intubated now extubated 10/3/23  2) Elevated troponin 3304.6->2479.6->1627.7 trending down   3) Asthma    - Echo reviewed LVEF 50-55%, no WMA, no significant valve isues  - S/P nuclear stress test today   - Discussed w/ patient regarding cardiac cath, became very emotional, does not want cardiac cath at the present

## 2023-10-07 DIAGNOSIS — E10.65 TYPE 1 DIABETES MELLITUS WITH HYPERGLYCEMIA: ICD-10-CM

## 2023-10-07 DIAGNOSIS — I10 ESSENTIAL (PRIMARY) HYPERTENSION: ICD-10-CM

## 2023-10-07 DIAGNOSIS — E78.5 HYPERLIPIDEMIA, UNSPECIFIED: ICD-10-CM

## 2023-10-07 LAB
ALBUMIN SERPL ELPH-MCNC: 3.6 G/DL — SIGNIFICANT CHANGE UP (ref 3.3–5)
ALP SERPL-CCNC: 119 U/L — SIGNIFICANT CHANGE UP (ref 40–120)
ALT FLD-CCNC: 50 U/L — HIGH (ref 10–45)
ANION GAP SERPL CALC-SCNC: 14 MMOL/L — SIGNIFICANT CHANGE UP (ref 5–17)
AST SERPL-CCNC: 57 U/L — HIGH (ref 10–40)
BILIRUB SERPL-MCNC: 0.7 MG/DL — SIGNIFICANT CHANGE UP (ref 0.2–1.2)
BUN SERPL-MCNC: 19 MG/DL — SIGNIFICANT CHANGE UP (ref 7–23)
CALCIUM SERPL-MCNC: 8.9 MG/DL — SIGNIFICANT CHANGE UP (ref 8.4–10.5)
CHLORIDE SERPL-SCNC: 104 MMOL/L — SIGNIFICANT CHANGE UP (ref 96–108)
CK MB BLD-MCNC: 0.9 % — SIGNIFICANT CHANGE UP (ref 0–3.5)
CK MB CFR SERPL CALC: 2.2 NG/ML — SIGNIFICANT CHANGE UP (ref 0–6.7)
CK SERPL-CCNC: 235 U/L — HIGH (ref 30–200)
CO2 SERPL-SCNC: 23 MMOL/L — SIGNIFICANT CHANGE UP (ref 22–31)
CREAT SERPL-MCNC: 0.84 MG/DL — SIGNIFICANT CHANGE UP (ref 0.5–1.3)
EGFR: 112 ML/MIN/1.73M2 — SIGNIFICANT CHANGE UP
GLUCOSE BLDC GLUCOMTR-MCNC: 227 MG/DL — HIGH (ref 70–99)
GLUCOSE BLDC GLUCOMTR-MCNC: 231 MG/DL — HIGH (ref 70–99)
GLUCOSE BLDC GLUCOMTR-MCNC: 262 MG/DL — HIGH (ref 70–99)
GLUCOSE BLDC GLUCOMTR-MCNC: 265 MG/DL — HIGH (ref 70–99)
GLUCOSE BLDC GLUCOMTR-MCNC: 341 MG/DL — HIGH (ref 70–99)
GLUCOSE SERPL-MCNC: 203 MG/DL — HIGH (ref 70–99)
HCT VFR BLD CALC: 37.8 % — LOW (ref 39–50)
HGB BLD-MCNC: 13 G/DL — SIGNIFICANT CHANGE UP (ref 13–17)
MAGNESIUM SERPL-MCNC: 2.2 MG/DL — SIGNIFICANT CHANGE UP (ref 1.6–2.6)
MCHC RBC-ENTMCNC: 27.8 PG — SIGNIFICANT CHANGE UP (ref 27–34)
MCHC RBC-ENTMCNC: 34.4 GM/DL — SIGNIFICANT CHANGE UP (ref 32–36)
MCV RBC AUTO: 80.9 FL — SIGNIFICANT CHANGE UP (ref 80–100)
NRBC # BLD: 0 /100 WBCS — SIGNIFICANT CHANGE UP (ref 0–0)
PHOSPHATE SERPL-MCNC: 2.9 MG/DL — SIGNIFICANT CHANGE UP (ref 2.5–4.5)
PLATELET # BLD AUTO: 237 K/UL — SIGNIFICANT CHANGE UP (ref 150–400)
POTASSIUM SERPL-MCNC: 3.9 MMOL/L — SIGNIFICANT CHANGE UP (ref 3.5–5.3)
POTASSIUM SERPL-SCNC: 3.9 MMOL/L — SIGNIFICANT CHANGE UP (ref 3.5–5.3)
PROT SERPL-MCNC: 6.5 G/DL — SIGNIFICANT CHANGE UP (ref 6–8.3)
RBC # BLD: 4.67 M/UL — SIGNIFICANT CHANGE UP (ref 4.2–5.8)
RBC # FLD: 13.8 % — SIGNIFICANT CHANGE UP (ref 10.3–14.5)
SODIUM SERPL-SCNC: 141 MMOL/L — SIGNIFICANT CHANGE UP (ref 135–145)
TROPONIN T, HIGH SENSITIVITY RESULT: 348 NG/L — HIGH (ref 0–51)
WBC # BLD: 10.54 K/UL — HIGH (ref 3.8–10.5)
WBC # FLD AUTO: 10.54 K/UL — HIGH (ref 3.8–10.5)

## 2023-10-07 PROCEDURE — 93306 TTE W/DOPPLER COMPLETE: CPT | Mod: 26

## 2023-10-07 PROCEDURE — 99223 1ST HOSP IP/OBS HIGH 75: CPT | Mod: GC

## 2023-10-07 PROCEDURE — 99232 SBSQ HOSP IP/OBS MODERATE 35: CPT

## 2023-10-07 PROCEDURE — 93010 ELECTROCARDIOGRAM REPORT: CPT

## 2023-10-07 PROCEDURE — 74018 RADEX ABDOMEN 1 VIEW: CPT | Mod: 26

## 2023-10-07 RX ORDER — INSULIN GLARGINE 100 [IU]/ML
24 INJECTION, SOLUTION SUBCUTANEOUS AT BEDTIME
Refills: 0 | Status: DISCONTINUED | OUTPATIENT
Start: 2023-10-07 | End: 2023-10-08

## 2023-10-07 RX ORDER — COLCHICINE 0.6 MG
0.6 TABLET ORAL
Refills: 0 | Status: DISCONTINUED | OUTPATIENT
Start: 2023-10-08 | End: 2023-10-08

## 2023-10-07 RX ORDER — HEPARIN SODIUM 5000 [USP'U]/ML
5000 INJECTION INTRAVENOUS; SUBCUTANEOUS EVERY 6 HOURS
Refills: 0 | Status: DISCONTINUED | OUTPATIENT
Start: 2023-10-07 | End: 2023-10-07

## 2023-10-07 RX ORDER — ACETAMINOPHEN 500 MG
1000 TABLET ORAL ONCE
Refills: 0 | Status: COMPLETED | OUTPATIENT
Start: 2023-10-07 | End: 2023-10-07

## 2023-10-07 RX ORDER — SODIUM CHLORIDE 9 MG/ML
250 INJECTION INTRAMUSCULAR; INTRAVENOUS; SUBCUTANEOUS ONCE
Refills: 0 | Status: COMPLETED | OUTPATIENT
Start: 2023-10-07 | End: 2023-10-07

## 2023-10-07 RX ORDER — SODIUM CHLORIDE 9 MG/ML
1000 INJECTION INTRAMUSCULAR; INTRAVENOUS; SUBCUTANEOUS
Refills: 0 | Status: DISCONTINUED | OUTPATIENT
Start: 2023-10-07 | End: 2023-10-08

## 2023-10-07 RX ORDER — ACETAMINOPHEN 500 MG
650 TABLET ORAL EVERY 6 HOURS
Refills: 0 | Status: DISCONTINUED | OUTPATIENT
Start: 2023-10-07 | End: 2023-10-08

## 2023-10-07 RX ORDER — RANOLAZINE 500 MG/1
500 TABLET, FILM COATED, EXTENDED RELEASE ORAL
Refills: 0 | Status: DISCONTINUED | OUTPATIENT
Start: 2023-10-07 | End: 2023-10-07

## 2023-10-07 RX ORDER — COLCHICINE 0.6 MG
1.2 TABLET ORAL
Refills: 0 | Status: DISCONTINUED | OUTPATIENT
Start: 2023-10-07 | End: 2023-10-08

## 2023-10-07 RX ORDER — INSULIN LISPRO 100/ML
6 VIAL (ML) SUBCUTANEOUS
Refills: 0 | Status: DISCONTINUED | OUTPATIENT
Start: 2023-10-07 | End: 2023-10-08

## 2023-10-07 RX ORDER — MORPHINE SULFATE 50 MG/1
1 CAPSULE, EXTENDED RELEASE ORAL ONCE
Refills: 0 | Status: DISCONTINUED | OUTPATIENT
Start: 2023-10-07 | End: 2023-10-07

## 2023-10-07 RX ORDER — HEPARIN SODIUM 5000 [USP'U]/ML
INJECTION INTRAVENOUS; SUBCUTANEOUS
Qty: 25000 | Refills: 0 | Status: DISCONTINUED | OUTPATIENT
Start: 2023-10-07 | End: 2023-10-07

## 2023-10-07 RX ORDER — METOPROLOL TARTRATE 50 MG
25 TABLET ORAL
Refills: 0 | Status: DISCONTINUED | OUTPATIENT
Start: 2023-10-07 | End: 2023-10-08

## 2023-10-07 RX ORDER — FAMOTIDINE 10 MG/ML
20 INJECTION INTRAVENOUS DAILY
Refills: 0 | Status: DISCONTINUED | OUTPATIENT
Start: 2023-10-07 | End: 2023-10-08

## 2023-10-07 RX ADMIN — MORPHINE SULFATE 1 MILLIGRAM(S): 50 CAPSULE, EXTENDED RELEASE ORAL at 02:50

## 2023-10-07 RX ADMIN — RANOLAZINE 500 MILLIGRAM(S): 500 TABLET, FILM COATED, EXTENDED RELEASE ORAL at 08:06

## 2023-10-07 RX ADMIN — Medication 1 MILLIGRAM(S): at 14:13

## 2023-10-07 RX ADMIN — Medication 2: at 22:07

## 2023-10-07 RX ADMIN — SODIUM CHLORIDE 100 MILLILITER(S): 9 INJECTION INTRAMUSCULAR; INTRAVENOUS; SUBCUTANEOUS at 18:23

## 2023-10-07 RX ADMIN — Medication 3: at 11:39

## 2023-10-07 RX ADMIN — BUPROPION HYDROCHLORIDE 150 MILLIGRAM(S): 150 TABLET, EXTENDED RELEASE ORAL at 22:09

## 2023-10-07 RX ADMIN — Medication 1.2 MILLIGRAM(S): at 18:28

## 2023-10-07 RX ADMIN — Medication 3: at 07:44

## 2023-10-07 RX ADMIN — Medication 3 UNIT(S): at 07:44

## 2023-10-07 RX ADMIN — FAMOTIDINE 20 MILLIGRAM(S): 10 INJECTION INTRAVENOUS at 11:06

## 2023-10-07 RX ADMIN — ATORVASTATIN CALCIUM 80 MILLIGRAM(S): 80 TABLET, FILM COATED ORAL at 22:09

## 2023-10-07 RX ADMIN — Medication 25 MILLIGRAM(S): at 22:09

## 2023-10-07 RX ADMIN — Medication 25 MILLIGRAM(S): at 14:13

## 2023-10-07 RX ADMIN — MORPHINE SULFATE 1 MILLIGRAM(S): 50 CAPSULE, EXTENDED RELEASE ORAL at 02:20

## 2023-10-07 RX ADMIN — Medication 1 MILLIGRAM(S): at 22:10

## 2023-10-07 RX ADMIN — LISINOPRIL 2.5 MILLIGRAM(S): 2.5 TABLET ORAL at 21:30

## 2023-10-07 RX ADMIN — Medication 3 UNIT(S): at 11:39

## 2023-10-07 RX ADMIN — FAMOTIDINE 20 MILLIGRAM(S): 10 INJECTION INTRAVENOUS at 02:19

## 2023-10-07 RX ADMIN — Medication 200 MILLIGRAM(S): at 22:09

## 2023-10-07 RX ADMIN — SODIUM CHLORIDE 500 MILLILITER(S): 9 INJECTION INTRAMUSCULAR; INTRAVENOUS; SUBCUTANEOUS at 14:25

## 2023-10-07 RX ADMIN — Medication 400 MILLIGRAM(S): at 18:47

## 2023-10-07 RX ADMIN — Medication 1.2 MILLIGRAM(S): at 11:06

## 2023-10-07 RX ADMIN — Medication 3 UNIT(S): at 17:27

## 2023-10-07 RX ADMIN — Medication 2: at 17:28

## 2023-10-07 RX ADMIN — INSULIN GLARGINE 24 UNIT(S): 100 INJECTION, SOLUTION SUBCUTANEOUS at 22:08

## 2023-10-07 NOTE — CONSULT NOTE ADULT - ASSESSMENT
42F Hx of T1DM c/b gastropersis on insulin pump presents as a transfer from [place] to [facility] for cardiac cath. Pt presented to [facility] for unresponsiveness requiring intubation on 10/3 (extubated later that day), found to be in in severe DKA at [facility] (FS > 600, Bicarb 4, AG 36, Acetone +, pH 7.00) I/s/o running out of insulin pump? -s/p insulin drip – transitioned to basal bolus on 10/4/23. Endocrine consulted for T1DM management.     #T1DM (A1c 11.1  %)  Home meds: Medtronic 70G insulin pump with guardian  - recommend Lantus 24 units daily  - recommend Admelog 6 units premeal TID (HOLD IF NPO)  - recommend low insulin correctional scale premeal   - recommend low insulin correctional scale at bedtime  - Nutrition consult  - Discharge planning: Patient does not want to be discharged on medtronic pump, instead he prefers basal-bolus regimen until he can get the omnipod installed. Please provide patient Rx for dexcom G6 sensor (change every 10 days),  (x1), and trannsmitter (every 3 months). Pt will need perscription for basal insulin pen (ie. Basaglar, Lantus, Tresiba, Toujeo, Levemir) and bolus insulin pen (ie. humalog/novolog/admelog) depending on insurance coverage; please send test scripts to see which is covered. Please send prescriptions for diabetes supplies (glucometer, test strips, lancets, alcohol swabs, insulin pen needles). Routine outpatient opthalmology & podiatry evaluations recommended.  - Patient should follow up with Endocrinologist Dr. Sayda Lerner    #HLD  - patient on atorvastatin 80mg  - goal LDL in diabetes mellitus is <70    #HTN  - goal BP in diabetes mellitus is <130/80    Deep Rojas MD  Endocrine Fellow  Can be reached via [employer] teams.    For follow up questions, discharge recommendations, or new consults, please email [email or web address] ([facility]) or [email or web address] ([facility]) or call answering service at [phone] (weekdays); [phone] (nights/weekends).  For emergiencies please page fellow on call.     42F Hx of T1DM c/b gastropersis on insulin pump presents as a transfer from [place] to [facility] for cardiac cath. Pt presented to [place] for unresponsiveness requiring intubation on 10/3 (extubated later that day), found to be in in severe DKA at [facility] (FS > 600, Bicarb 4, AG 36, Acetone +, pH 7.00) I/s/o running out of insulin pump? -s/p insulin drip – transitioned to basal bolus on 10/4/23. Endocrine consulted for T1DM management (high risk patient with severely uncontrolled diabetes with DKA and A1c of 11.1% at high risk of CAD and CVA with high level decision-making).     #T1DM (A1c 11.1  %)  Home meds: Medtronic 70G insulin pump with guardian  - recommend Lantus 24 units daily  - recommend Admelog 6 units premeal TID (HOLD IF NPO)  - recommend low insulin correctional scale premeal   - recommend low insulin correctional scale at bedtime  - Nutrition consult  - Discharge planning: Patient does not want to be discharged on medtronic pump, instead he prefers basal-bolus regimen until he can get the omnipod installed. Please provide patient Rx for dexcom G6 sensor (change every 10 days),  (x1), and trannsmitter (every 3 months). Pt will need perscription for basal insulin pen (ie. Basaglar, Lantus, Tresiba, Toujeo, Levemir) and bolus insulin pen (ie. humalog/novolog/admelog) depending on insurance coverage; please send test scripts to see which is covered. Please send prescriptions for diabetes supplies (glucometer, test strips, lancets, alcohol swabs, insulin pen needles). Routine outpatient opthalmology & podiatry evaluations recommended.  - Patient should follow up with Endocrinologist Dr. Sayda Lerner    #HLD  - patient on atorvastatin 80mg  - goal LDL in diabetes mellitus is <70    #HTN  - goal BP in diabetes mellitus is <130/80    Deep Rojas MD  Endocrine Fellow  Can be reached via [employer] teams.    For follow up questions, discharge recommendations, or new consults, please email [email or web address] ([facility]) or [email or web address] ([facility]) or call answering service at [phone] (weekdays); [phone] (nights/weekends).  For emergiencies please page fellow on call.

## 2023-10-07 NOTE — H&P ADULT - SOCIAL HISTORY: SUBSTANCE USE
Physical Therapy  Visit Type: initial evaluation  Precautions:  Medical precautions:  fall risk; standard precautions.  The patient is a 89 year old male admitted on 1/5/2023.  Pt admitted with diagnosis of Hyponatremia (E87.1);Closed head injury, initial encounter (S09.90XA) presents after fall when legs gave out with increased swelling in bilateral lower extremities.    Prior to admission pt lives in a house, with patient.  Uses walker with 2 steps to enter home with grab bars. Assistance Level: Modified [place] (mod I).  Patient ambulates modified independent  and does require assist for ADLs. Patient has service for laundry but is able to complete meal preparation at home.   Lines:       Lines in chart and on patient reviewed, precautions maintained throughout session.  Hearing: hard of hearing and wears hearing aids right  Vision:     Current vision: wears glasses all the time  Safety Measures: bed alarm and chair alarm    SUBJECTIVE  Patient agreed to participate in therapy this date.  \"That bed really got me. I wasn't able to sleep at all last night. It really did a number on my legs. It affected my diaphragm as well. I couldn't breathe well which made it hard to sleep\"  Patient / Family Goal: return to previous functional status, maximize function and return home     OBJECTIVE     Oriented to person, place, time and situation     Arousal alertness: appropriate responses to stimuli  Patient activity tolerance: 1 to 1 activity to rest  Range of Motion (measured in degrees unless otherwise noted, active unless indicated)  WFL: RLE, LLE  Strength (out of 5 unless otherwise indicated)   Hip:  Hip Flexion: Left: 3 Right: 3   - Abduction:      • Left: 2      • Right: 2  Knee:   - Flexion:      • Left: 2      • Right: 2   - Extension:      • Left: 3      • Right: 3  Ankle:    - Dorsiflexion:      • Left: 3      • Right: 3   - Plantar Flexion:      • Left: 2      • Right: 2    Transfers:    Assistive devices:  gait belt and 2-wheeled walker    Sit to stand: moderate assist    Stand to sit: moderate assist  Training completed:    Tasks: sit to stand and stand to sit    Education details: body mechanics and patient safety  Gait/Ambulation:     Assistance: minimal assist   Assistive device: 2-wheeled walker and gait belt    Distance (ft): 100    Type: excessive flexed posture  Training Completed:    Tasks: gait training on level surfaces    Education details: body mechanics and patient safety      Interventions     Skilled input: Verbal instruction/cues, tactile instruction/cues and posture correction  Verbal Consent: Writer verbally educated and received verbal consent for hand placement, positioning of patient, and techniques to be performed today from patient for clothing adjustments for techniques, therapist position for techniques and hand placement and palpation for techniques as described above and how they are pertinent to the patient's plan of care.       ASSESSMENT   Impairments: strength, endurance and aerobic capacity  Functional Limitations: all functional mobility  RN Natalya cleared patient for physical therapy. Patient received standing up in bathroom with PCT. Patient assisted to ambulate in hallway and demonstrates flexed posture but steady gait. Patient states he does not want to get into bed as he struggled with that position last night. Patient assisted to bedside chair following ambulation. Patient reports sit to stand is challenging for him and he requires moderate assist at this time with sit to stand. Per PCT, patient required increased assistance with bed mobility as well. Patient lives home alone and has fallen twice this week when attempting to transfer to standing. Recommending daily physical therapy at this time for safety. RN notified.          Discharge Recommendations  Recommendation for Discharge: PT IL: Patient is appropriate for daily Physical Therapy, Patient requires 24 HOUR assistance to  perform mobility and/or ADLs safely  PT/OT Mobility Equipment for Discharge: To be determined  PT Identified Barriers to Discharge: Patient needs moderate assistance for sit to stand due to weakness    Progress: improving as expected and progressing toward goals    • Skilled therapy is required to address these limitations in attempt to maximize the patient's independence.     • Predicted patient presentation: Low (stable) - Patient comorbidities and complexities, as defined above, will have little effect on progress for prescribed plan of care.    Education:   - Present and ready to learn: patient  Education provided during session:  - Safety with transfers and gait. Falls prevention education.  - Results of above outlined education: Verbalizes understanding, Demonstrates understanding and Needs reinforcement    Patient at End of Session:   Location: in chair  Safety measures: alarm system in place/re-engaged and call light within reach  Handoff to: nurse    PLAN   Suggestions for next session as indicated: PT Frequency: 6-7 x per week  Frequency Comments: *trauma 1/6 last 1/8 (H1) SC    Interventions: functional transfer training, gait training, patient/family training and safety education  Agreement to plan and goals: patient agrees with goals and treatment plan        GOALS  Review Date: 1/8/2023  Long Term Goals: (to be met by time of discharge from hospital)  Sit to supine: Patient will complete sit to supine modified independent.  Supine to sit: Patient will complete supine to sit modified independent.  Sit to stand: Patient will complete sit to stand transfer with gait belt and 2-wheeled walker, modified independent.   Status: progressing/ongoing  Ambulation (even): Patient will ambulate on even surface for 150 feet with gait belt and 2-wheeled walker, modified independent.   Status: progressing/ongoing  3-4 steps: Patient will ambulate 3-4 steps with gait belt using 2 rails, supervision.     Documented in  the chart in the following areas: Prior Level of Function. Pain. Assessment.      Therapy procedure time and total treatment time can be found documented on the Time Entry flowsheet   Daily THC

## 2023-10-07 NOTE — H&P ADULT - NSHPPHYSICALEXAM_GEN_ALL_CORE
PHYSICAL EXAMINATION:  Vital Signs Last 24 Hrs  T(C): 36.8 (07 Oct 2023 07:13), Max: 37 (06 Oct 2023 13:22)  T(F): 98.3 (07 Oct 2023 07:13), Max: 98.6 (06 Oct 2023 13:22)  HR: 101 (07 Oct 2023 07:13) (77 - 107)  BP: 125/84 (07 Oct 2023 07:13) (104/80 - 148/135)  BP(mean): 94 (07 Oct 2023 07:13) (84 - 139)  RR: 18 (07 Oct 2023 07:13) (14 - 18)  SpO2: 98% (07 Oct 2023 07:13) (93% - 100%)    Parameters below as of 07 Oct 2023 07:13  Patient On (Oxygen Delivery Method): room air      CAPILLARY BLOOD GLUCOSE      POCT Blood Glucose.: 265 mg/dL (07 Oct 2023 07:01)  POCT Blood Glucose.: 231 mg/dL (07 Oct 2023 02:04)  POCT Blood Glucose.: 228 mg/dL (06 Oct 2023 19:45)  POCT Blood Glucose.: 272 mg/dL (06 Oct 2023 17:38)  POCT Blood Glucose.: 353 mg/dL (06 Oct 2023 16:06)  POCT Blood Glucose.: 321 mg/dL (06 Oct 2023 14:04)      GENERAL: NAD, stable, comfortable in bed, had cath yesterday.    ENMT: mucous membranes moist  NECK: supple, No JVD  CHEST/LUNG: clear to auscultation bilaterally; no rales, rhonchi, or wheezing b/l  HEART: normal S1, S2  ABDOMEN: BS+, soft, ND, NT   EXTREMITIES:  pulses palpable; no clubbing, cyanosis, or edema b/l LEs  NEURO: awake, alert, interactive; moves all extremities

## 2023-10-07 NOTE — CONSULT NOTE ADULT - PROBLEM SELECTOR PROBLEM 1
[address] [facility] INTAKE INITIAL ASSESSMENT Patient is a 80-year-old male, presenting to the ED by [facility] for strangling his dog after he became upset. Patient reports being off his medicine and was told if he refuses his medications he will be kicked out of his home. Patient states he became angry and took it out on the dog. Patient endorse suicidal ideations with thoughts to harm self by overdosing on his medication. Patient disclose he as been trying to apply for disability and was denied. Patient states he is looking for a job. Patient is oriented 4, pleasant, and calm. Patient mood is incongruent with reporting. Patient thought process is goal directed and organized. D19 ([facility]) was contacted and assessed patient @ 11:53. D19 ([facility]) determined patient met criteria for voluntary status. Dr. Daisy Box was consulted and accepted patient into [address] [place] [address] for Major Depression Disorder with SI pending medical clearance. Poorly controlled type 1 diabetes mellitus

## 2023-10-07 NOTE — CONSULT NOTE ADULT - ATTENDING COMMENTS
Agree with assessment and plan as above by Dr. Rojas. Reviewed all pertinent labs, glucose values, and imaging studies. Modifications made as indicated above. Pt. with severely uncontrolled Type 1 DM w/ DKA now resolved. On medtronic pump as outpatient. Agree with basal bolus regimen as above while inpatient. Can transition to Omipod 5 as outpatient with DEXCOM CGM.     Saurabh Pacheco D.O  [phone]

## 2023-10-07 NOTE — PROGRESS NOTE ADULT - SUBJECTIVE AND OBJECTIVE BOX
INTERVAL HPI/OVERNIGHT EVENTS:  Pt seen and examined at bedside.     Allergies/Intolerance: lactose (Diarrhea)  No Known Drug Allergies  Mayonnaise (Vomiting)      MEDICATIONS  (STANDING):  aspirin enteric coated 81 milliGRAM(s) Oral daily  atorvastatin 80 milliGRAM(s) Oral at bedtime  buPROPion XL (24-Hour) . 150 milliGRAM(s) Oral daily  clonazePAM  Tablet 1 milliGRAM(s) Oral every 8 hours  dextrose 5%. 1000 milliLiter(s) (50 mL/Hr) IV Continuous <Continuous>  dextrose 5%. 1000 milliLiter(s) (100 mL/Hr) IV Continuous <Continuous>  dextrose 50% Injectable 25 Gram(s) IV Push once  dextrose 50% Injectable 25 Gram(s) IV Push once  dextrose 50% Injectable 12.5 Gram(s) IV Push once  famotidine    Tablet 20 milliGRAM(s) Oral daily  glucagon  Injectable 1 milliGRAM(s) IntraMuscular once  insulin glargine Injectable (LANTUS) 20 Unit(s) SubCutaneous at bedtime  insulin lispro (ADMELOG) corrective regimen sliding scale   SubCutaneous at bedtime  insulin lispro (ADMELOG) corrective regimen sliding scale   SubCutaneous three times a day before meals  insulin lispro Injectable (ADMELOG) 3 Unit(s) SubCutaneous three times a day before meals  lisinopril 2.5 milliGRAM(s) Oral daily  metoprolol tartrate 12.5 milliGRAM(s) Oral every 12 hours  pantoprazole    Tablet 40 milliGRAM(s) Oral before breakfast  ranolazine 500 milliGRAM(s) Oral two times a day  traZODone 200 milliGRAM(s) Oral at bedtime    MEDICATIONS  (PRN):  acetaminophen     Tablet .. 650 milliGRAM(s) Oral every 6 hours PRN Mild Pain (1 - 3)  acetaminophen     Tablet .. 1000 milliGRAM(s) Oral every 6 hours PRN Moderate Pain (4 - 6)  dextrose Oral Gel 15 Gram(s) Oral once PRN Blood Glucose LESS THAN 70 milliGRAM(s)/deciliter        ROS: all systems reviewed and wnl      PHYSICAL EXAMINATION:  Vital Signs Last 24 Hrs  T(C): 36.8 (07 Oct 2023 07:13), Max: 37 (06 Oct 2023 13:22)  T(F): 98.3 (07 Oct 2023 07:13), Max: 98.6 (06 Oct 2023 13:22)  HR: 101 (07 Oct 2023 07:13) (77 - 107)  BP: 125/84 (07 Oct 2023 07:13) (104/80 - 148/135)  BP(mean): 94 (07 Oct 2023 07:13) (84 - 139)  RR: 18 (07 Oct 2023 07:13) (14 - 18)  SpO2: 98% (07 Oct 2023 07:13) (93% - 100%)    Parameters below as of 07 Oct 2023 07:13  Patient On (Oxygen Delivery Method): room air      CAPILLARY BLOOD GLUCOSE      POCT Blood Glucose.: 265 mg/dL (07 Oct 2023 07:01)  POCT Blood Glucose.: 231 mg/dL (07 Oct 2023 02:04)  POCT Blood Glucose.: 228 mg/dL (06 Oct 2023 19:45)  POCT Blood Glucose.: 272 mg/dL (06 Oct 2023 17:38)  POCT Blood Glucose.: 353 mg/dL (06 Oct 2023 16:06)  POCT Blood Glucose.: 321 mg/dL (06 Oct 2023 14:04)      10-06 @ 07:01  -  10-07 @ 07:00  --------------------------------------------------------  IN: 0 mL / OUT: 700 mL / NET: -700 mL        GENERAL: stable, in bed, comfortable, no SOB or fevers.   NECK: supple, No JVD  CHEST/LUNG: clear to auscultation bilaterally; no rales, rhonchi, or wheezing b/l  HEART: normal S1, S2  ABDOMEN: BS+, soft, ND, NT   EXTREMITIES:  pulses palpable; no clubbing, cyanosis, or edema b/l LEs    LABS:                        13.0   10.54 )-----------( 237      ( 07 Oct 2023 05:05 )             37.8     10-07    141  |  104  |  19  ----------------------------<  203<H>  3.9   |  23  |  0.84    Ca    8.9      07 Oct 2023 05:05  Phos  2.9     10-07  Mg     2.2     10-07    TPro  6.5  /  Alb  3.6  /  TBili  0.7  /  DBili  x   /  AST  57<H>  /  ALT  50<H>  /  AlkPhos  119  10-07    PT/INR - ( 06 Oct 2023 19:52 )   PT: 10.2 sec;   INR: 0.97 ratio         PTT - ( 06 Oct 2023 19:52 )  PTT:40.7 sec  Urinalysis Basic - ( 07 Oct 2023 05:05 )    Color: x / Appearance: x / SG: x / pH: x  Gluc: 203 mg/dL / Ketone: x  / Bili: x / Urobili: x   Blood: x / Protein: x / Nitrite: x   Leuk Esterase: x / RBC: x / WBC x   Sq Epi: x / Non Sq Epi: x / Bacteria: x

## 2023-10-07 NOTE — H&P ADULT - NSHPLABSRESULTS_GEN_ALL_CORE
LABS:                        13.0   10.54 )-----------( 237      ( 07 Oct 2023 05:05 )             37.8     10-07    141  |  104  |  19  ----------------------------<  203<H>  3.9   |  23  |  0.84    Ca    8.9      07 Oct 2023 05:05  Phos  2.9     10-07  Mg     2.2     10-07    TPro  6.5  /  Alb  3.6  /  TBili  0.7  /  DBili  x   /  AST  57<H>  /  ALT  50<H>  /  AlkPhos  119  10-07    PT/INR - ( 06 Oct 2023 19:52 )   PT: 10.2 sec;   INR: 0.97 ratio         PTT - ( 06 Oct 2023 19:52 )  PTT:40.7 sec  Urinalysis Basic - ( 07 Oct 2023 05:05 )    Color: x / Appearance: x / SG: x / pH: x  Gluc: 203 mg/dL / Ketone: x  / Bili: x / Urobili: x   Blood: x / Protein: x / Nitrite: x   Leuk Esterase: x / RBC: x / WBC x   Sq Epi: x / Non Sq Epi: x / Bacteria: x          RADIOLOGY & ADDITIONAL TESTS:

## 2023-10-07 NOTE — CHART NOTE - NSCHARTNOTEFT_GEN_A_CORE
Discussed with Dr. Gibbs  HR 80s-100s, BP 100s-120s/80s, 98% Ra  Troponin flat in the 300's.   Cath was performed with LAD disease and collaterals 10/6  TTE today with hyperdynamic EF, with small pericardial effusion  No plans for further intervention at this time. Plan for medical management: increase beta blockade (metop 25mg bid uptitration as tolerated), continue ranexa. Trial SLN PRN. Colchicine loading today, maintenance dose tomorrow  Continue to monitor another day Discussed with Dr. Gibbs  HR 80s-100s, BP 100s-120s/80s, 98% Ra  Troponin flat in the 300's.   Cath was performed (cath lab activation ~ 9pm) with LAD disease and collaterals 10/6  TTE today with hyperdynamic EF, with small pericardial effusion  EKG reviewed:  original with Clary's-esque biphasic TW and TWI in the precordial V2-V6 leads, deep, and today it is with significant interval improvement (still biphasic T's)  No plans for further intervention at this time. Plan for medical management: increase beta blockade (metop 25mg bid uptitration as tolerated), continue ranexa. Trial SLN PRN. Colchicine loading today, maintenance dose tomorrow  Avoid NSAIDs.   Continue to monitor another day Discussed with Dr. Gibbs  HR 80s-100s, BP 100s-120s/80s, 98% Ra  Troponin flat in the 300's.   Cath was performed (cath lab activation ~ 9pm) with LAD disease and collaterals 10/6  TTE today with hyperdynamic EF, with small pericardial effusion  EKG reviewed:  original with Wellen's-esque biphasic TW and TWI in the precordial V2-V6 leads, deep, and today it is with significant interval improvement (still biphasic T's)  No plans for further intervention at this time. Plan for medical management: increase beta blockade (metop 25mg bid uptitration as tolerated), continue ranexa. Trial SLN PRN. Colchicine loading today, maintenance dose tomorrow  Avoid NSAIDs.   Continue to monitor another day    Attending addendum    Mr. Christie was evaluated in the [facility]. Collateral history obtained from the patient's wife at bedside.    He reports sharp intermittent episodes of chest pain that decrease in intensity and become dull. He has had a cough for 1 week preceding his hospital admission.    Physical exam:  RRR, no MRG  Mild rhonchi bilaterally with decreased air movement  Mild abdominal tenderness  No JENNIFER    MEDICATIONS  (STANDING):  aspirin enteric coated 81 milliGRAM(s) Oral daily  atorvastatin 80 milliGRAM(s) Oral at bedtime  buPROPion XL (24-Hour) . 150 milliGRAM(s) Oral daily  clonazePAM  Tablet 1 milliGRAM(s) Oral every 8 hours  colchicine 1.2 milliGRAM(s) Oral two times a day  dextrose 5%. 1000 milliLiter(s) (50 mL/Hr) IV Continuous <Continuous>  dextrose 5%. 1000 milliLiter(s) (100 mL/Hr) IV Continuous <Continuous>  dextrose 50% Injectable 25 Gram(s) IV Push once  dextrose 50% Injectable 12.5 Gram(s) IV Push once  dextrose 50% Injectable 25 Gram(s) IV Push once  famotidine    Tablet 20 milliGRAM(s) Oral daily  glucagon  Injectable 1 milliGRAM(s) IntraMuscular once  insulin glargine Injectable (LANTUS) 20 Unit(s) SubCutaneous at bedtime  insulin lispro (ADMELOG) corrective regimen sliding scale   SubCutaneous at bedtime  insulin lispro (ADMELOG) corrective regimen sliding scale   SubCutaneous three times a day before meals  insulin lispro Injectable (ADMELOG) 3 Unit(s) SubCutaneous three times a day before meals  lisinopril 2.5 milliGRAM(s) Oral daily  metoprolol tartrate 25 milliGRAM(s) Oral two times a day  ranolazine 500 milliGRAM(s) Oral two times a day  traZODone 200 milliGRAM(s) Oral at bedtime    TTE shows small hyperdynamic LV with a small pericardial effusion.    Recommend  IVF 1L  CXR  D/c ranolazine given QTc  Continue cardiac regimen as above    A total of 35 minutes was spent on this patient encounter.

## 2023-10-07 NOTE — CONSULT NOTE ADULT - SUBJECTIVE AND OBJECTIVE BOX
Deep Rojas MD   |   PGY-4  Endocrinology Fellow  Available on [employer] Teams    HPI:  42 yr old M with DM1 (on insulin pump), asthma, gastritis, anxiety, gastroparesis, cannabinoid hyperemesis presented to the ER 10/3/23 unresponsives. Per wife, patient was found unresponsive on floor, ran out of insulin pump per wife a day prior.  In ER patient was found to have a blood glucose of 1683, potassium 7.7, severe metabolic abnormalities with pH of 7.0 and co2 <15. Lactate of 7.2 with MAYRA of Cr 4.34. Troponins 3304.6->2479.6->1627.7 trended down , intubated and extubate 10/3/23, treated in  MICU, downgraded to  tele 10/5. 10/6 c/o chest pain: 12 lead EKG with ST depressions in V3, V4, and elevations in V1 and V2, treated Heparin gtt, SL nitro and now transferred here to [facility] for left heart cath.     Endocrine History:  42F Hx of T1DM c/b gastropersis on insulin pump presents as a transfer from [place] to [facility] for cardiac cath. Pt presented to [place] for unresponsiveness requiring intubation on 10/3 (extubated later that day), found to be in in severe DKA at [facility] (FS > 600, Bicarb 4, AG 36, Acetone +, pH 7.00) I/s/o running out of insulin pump? -s/p insulin drip – transitioned to basal bolus on 10/4/23. Endocrine consulted for T1DM management.     Patient states the day prior to admission he ran out of [employer] for his insulin pump and [facility] did not have his perscription and [facility] was closed-  his plan was to go the next day to get insulin for his pump but patient became altered and went to ED. Patient currently has the medtronic G70 pump with the guardian CGM but has been wanting to change to the omnipod for wireless tubing which he has at home, however, he has had trouble getting the dexcom covered by insurance. Pt manages pump himself, glucose levels 120s-150s, recently have been higher in 200s. Reports hypogylcemia 1x/week, usually when he is so busy that he forgets to eat – gets lightheaded and dizzy that resolves with juice. Patient does not want to be on insulin pump at the hospital. Pt does not know his insulin pump settings. Reports this is his 3rd danyel e with DKA. Denies microvascular complications. CAD+ for macrovascular complications. Family Hx: mother and father have T2DM. Pt on atorvastatin 40mg daily at home     PAST MEDICAL & SURGICAL HISTORY:  DM type 1 (diabetes mellitus, type 1)      Asthma      Gastritis      Anxiety      Controlled diabetes mellitus type 1 without complications      Gastritis, presence of bleeding unspecified, unspecified chronicity, unspecified gastritis type      Uncomplicated asthma, unspecified asthma severity, unspecified whether persistent      S/P cholecystectomy      History of cholecystectomy          FAMILY HISTORY:  Family history of diabetes mellitus (Father, Mother)    Family history of diabetes mellitus (DM) (Father, Mother, Grandparent)        Home Medications:  Admelog 100 units/mL injectable solution: 3 international unit(s) injectable 3 times a day (before meals) (06 Oct 2023 18:26)  Admelog 100 units/mL injectable solution: injectable SLIDING SCALE PER BLOOD GLUCOSE (06 Oct 2023 18:34)  aspirin 81 mg oral delayed release capsule: 1 tab(s) orally once a day (06 Oct 2023 19:10)  atorvastatin 40 mg oral tablet: 1 tab(s) orally once a day (at bedtime) (06 Oct 2023 18:34)  clonazePAM 1 mg oral tablet: 1 tab(s) orally every 8 hours (06 Oct 2023 18:46)  heparin 25,000 units/mL injectable solution: 10 international unit(s) by continuous intravenous infusion (06 Oct 2023 18:27)  insulin glargine 100 units/mL subcutaneous solution: 20 unit(s) subcutaneous once a day (at bedtime) (06 Oct 2023 18:26)  pantoprazole 40 mg oral delayed release tablet: 1 tab(s) orally once a day (06 Oct 2023 18:26)  Plavix 75 mg oral tablet: 1 tab(s) orally once a day (06 Oct 2023 19:10)  Tylenol 325 mg oral capsule: 2 cap(s) orally every 6 hours as needed for  moderate pain (06 Oct 2023 18:26)      MEDICATIONS  (STANDING):  aspirin enteric coated 81 milliGRAM(s) Oral daily  atorvastatin 80 milliGRAM(s) Oral at bedtime  buPROPion XL (24-Hour) . 150 milliGRAM(s) Oral daily  clonazePAM  Tablet 1 milliGRAM(s) Oral every 8 hours  colchicine 1.2 milliGRAM(s) Oral two times a day  dextrose 5%. 1000 milliLiter(s) (100 mL/Hr) IV Continuous <Continuous>  dextrose 5%. 1000 milliLiter(s) (50 mL/Hr) IV Continuous <Continuous>  dextrose 50% Injectable 25 Gram(s) IV Push once  dextrose 50% Injectable 12.5 Gram(s) IV Push once  dextrose 50% Injectable 25 Gram(s) IV Push once  famotidine    Tablet 20 milliGRAM(s) Oral daily  glucagon  Injectable 1 milliGRAM(s) IntraMuscular once  insulin glargine Injectable (LANTUS) 24 Unit(s) SubCutaneous at bedtime  insulin lispro (ADMELOG) corrective regimen sliding scale   SubCutaneous at bedtime  insulin lispro (ADMELOG) corrective regimen sliding scale   SubCutaneous three times a day before meals  insulin lispro Injectable (ADMELOG) 6 Unit(s) SubCutaneous three times a day before meals  lisinopril 2.5 milliGRAM(s) Oral daily  metoprolol tartrate 25 milliGRAM(s) Oral two times a day  sodium chloride 0.9%. 1000 milliLiter(s) (100 mL/Hr) IV Continuous <Continuous>  traZODone 200 milliGRAM(s) Oral at bedtime    MEDICATIONS  (PRN):  acetaminophen     Tablet .. 650 milliGRAM(s) Oral every 6 hours PRN Mild Pain (1 - 3)  dextrose Oral Gel 15 Gram(s) Oral once PRN Blood Glucose LESS THAN 70 milliGRAM(s)/deciliter      Allergies    No Known Drug Allergies  Mayonnaise (Vomiting)    Intolerances    lactose (Diarrhea)    Review of Systems:  Constitutional: No fever  Eyes: No blurry vision  Neuro: No tremors  HEENT: No pain  Cardiovascular: No chest pain, palpitations  Respiratory: No SOB, no cough  GI: No nausea, vomiting, abdominal pain  : No dysuria  Skin: no rash  Psych: no depression  Endocrine: no polyuria, polydipsia  Hem/lymph: no swelling  Osteoporosis: no fractures    ===================PHYSICAL EXAM=======================  VITALS: T(C): 37.2 (10-07-23 @ 15:59)  T(F): 99 (10-07-23 @ 15:59), Max: 99 (10-07-23 @ 15:59)  HR: 74 (10-07-23 @ 15:59) (74 - 107)  BP: 90/66 (10-07-23 @ 15:59) (90/66 - 148/135)  RR:  (17 - 19)  SpO2:  (93% - 99%)  Wt(kg): --  GENERAL: NAD  EYES: No proptosis, no lid lag, anicteric  THYROID: Normal size, no palpable nodules  RESPIRATORY: Clear to auscultation bilaterally  CARDIOVASCULAR: Regular rate and rhythm  GI: Soft, nontender, non distended  EXT: b/l feet without wounds; 2+ pulses  PSYCH: Alert and oriented x 3, reactive mood  ======================================================  POCT Blood Glucose.: 227 mg/dL (10-07-23 @ 16:34)  POCT Blood Glucose.: 262 mg/dL (10-07-23 @ 10:41)  POCT Blood Glucose.: 265 mg/dL (10-07-23 @ 07:01)  POCT Blood Glucose.: 231 mg/dL (10-07-23 @ 02:04)  POCT Blood Glucose.: 228 mg/dL (10-06-23 @ 19:45)  POCT Blood Glucose.: 272 mg/dL (10-06-23 @ 17:38)  POCT Blood Glucose.: 353 mg/dL (10-06-23 @ 16:06)  POCT Blood Glucose.: 321 mg/dL (10-06-23 @ 14:04)  POCT Blood Glucose.: 153 mg/dL (10-06-23 @ 07:33)  POCT Blood Glucose.: 241 mg/dL (10-05-23 @ 21:19)  POCT Blood Glucose.: 172 mg/dL (10-05-23 @ 17:07)  POCT Blood Glucose.: 138 mg/dL (10-05-23 @ 12:09)  POCT Blood Glucose.: 142 mg/dL (10-05-23 @ 07:50)  POCT Blood Glucose.: 208 mg/dL (10-04-23 @ 21:14)                            13.0   10.54 )-----------( 237      ( 07 Oct 2023 05:05 )             37.8       10-07    141  |  104  |  19  ----------------------------<  203<H>  3.9   |  23  |  0.84    eGFR: 112    Ca    8.9      10-07  Mg     2.2     10-07  Phos  2.9     10-07    TPro  6.5  /  Alb  3.6  /  TBili  0.7  /  DBili  x   /  AST  57<H>  /  ALT  50<H>  /  AlkPhos  119  10-07      Thyroid Function Tests:  10-03 @ 12:36 TSH 1.300 FreeT4 -- T3 -- Anti TPO -- Anti Thyroglobulin Ab -- TSI --      A1C with Estimated Average Glucose Result: 11.1 % (10-03-23 @ 12:36)  A1C with Estimated Average Glucose Result: 9.5 % (01-20-23 @ 07:12)          Radiology:

## 2023-10-07 NOTE — PROGRESS NOTE ADULT - ASSESSMENT
42 yr old M with DM1 (on insulin pump), asthma, gastritis, anxiety, gastroparesis, cannabinoid hyperemesis presented to the ER unresponsives. Per wife, patient was found unresponsive on floor, ran out of insulin pump per wife a day prior. Intubated in ER for airway protection. In ER patient was found to have severe metabolic derangment EKG changes and AMS intubated.       CV: Hemodynamically stable, NSTEMI with large trop elevation. CTA negative for PE.     Had trop elevation at [facility] then CP after stress test with TWI laterlly.       TWI laterally, new. IV Heparin started at [facility].       DKA resolved on basal bolus with low premeal due to poor compliance with insulin pump, was on empty.     Glucose well controlled 172,138 on Lantus and Admelog.     Aspirin, statin, beta-blockers and ACE in place. Await cardio decision re additonal eval.     Had PTCA yesterday.   42 yr old M with DM1 (on insulin pump), asthma, gastritis, anxiety, gastroparesis, cannabinoid hyperemesis presented to the ER unresponsives. Per wife, patient was found unresponsive on floor, ran out of insulin pump per wife a day prior. Intubated in ER for airway protection. In ER patient was found to have severe metabolic derangment EKG changes and AMS intubated.       CV: Hemodynamically stable, NSTEMI with large trop elevation at [facility]. CTA negative for PE.     Had trop elevation at [facility] then CP after stress test with TWI laterlly.       IV Heparin started at [facility].       DKA resolved on basal bolus with low premeal due to poor compliance with insulin pump, pump was on empty.     Glucose well controlled 172,138 on Lantus and Admelog.     Aspirin, statin, beta-blockers and ACE in place.     Await cardio decision re additonal eval.  TTE shows normal LV function, cath pending.     Had PTCA yesterday.

## 2023-10-07 NOTE — H&P ADULT - PATIENT'S SEXUAL ORIENTATION
Quality 111:Pneumonia Vaccination Status For Older Adults: Pneumococcal Vaccination Previously Received Quality 226: Preventive Care And Screening: Tobacco Use: Screening And Cessation Intervention: Patient screened for tobacco use and is an ex/non-smoker Detail Level: Detailed Heterosexual
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
## 2023-10-08 ENCOUNTER — NON-APPOINTMENT (OUTPATIENT)
Age: 43
End: 2023-10-08

## 2023-10-08 ENCOUNTER — TRANSCRIPTION ENCOUNTER (OUTPATIENT)
Age: 43
End: 2023-10-08

## 2023-10-08 VITALS
OXYGEN SATURATION: 96 % | RESPIRATION RATE: 18 BRPM | HEART RATE: 66 BPM | SYSTOLIC BLOOD PRESSURE: 110 MMHG | DIASTOLIC BLOOD PRESSURE: 73 MMHG | TEMPERATURE: 98 F

## 2023-10-08 LAB
ALBUMIN SERPL ELPH-MCNC: 3.3 G/DL — SIGNIFICANT CHANGE UP (ref 3.3–5)
ALP SERPL-CCNC: 117 U/L — SIGNIFICANT CHANGE UP (ref 40–120)
ALT FLD-CCNC: 36 U/L — SIGNIFICANT CHANGE UP (ref 10–45)
ANION GAP SERPL CALC-SCNC: 16 MMOL/L — SIGNIFICANT CHANGE UP (ref 5–17)
AST SERPL-CCNC: 28 U/L — SIGNIFICANT CHANGE UP (ref 10–40)
BILIRUB SERPL-MCNC: 0.4 MG/DL — SIGNIFICANT CHANGE UP (ref 0.2–1.2)
BUN SERPL-MCNC: 17 MG/DL — SIGNIFICANT CHANGE UP (ref 7–23)
CALCIUM SERPL-MCNC: 9.1 MG/DL — SIGNIFICANT CHANGE UP (ref 8.4–10.5)
CHLORIDE SERPL-SCNC: 104 MMOL/L — SIGNIFICANT CHANGE UP (ref 96–108)
CK MB CFR SERPL CALC: 1.6 NG/ML — SIGNIFICANT CHANGE UP (ref 0–6.7)
CO2 SERPL-SCNC: 16 MMOL/L — LOW (ref 22–31)
CREAT SERPL-MCNC: 0.91 MG/DL — SIGNIFICANT CHANGE UP (ref 0.5–1.3)
CULTURE RESULTS: SIGNIFICANT CHANGE UP
CULTURE RESULTS: SIGNIFICANT CHANGE UP
EGFR: 108 ML/MIN/1.73M2 — SIGNIFICANT CHANGE UP
GLUCOSE BLDC GLUCOMTR-MCNC: 111 MG/DL — HIGH (ref 70–99)
GLUCOSE BLDC GLUCOMTR-MCNC: 309 MG/DL — HIGH (ref 70–99)
GLUCOSE SERPL-MCNC: 200 MG/DL — HIGH (ref 70–99)
MAGNESIUM SERPL-MCNC: 2.2 MG/DL — SIGNIFICANT CHANGE UP (ref 1.6–2.6)
PHOSPHATE SERPL-MCNC: 2.4 MG/DL — LOW (ref 2.5–4.5)
POTASSIUM SERPL-MCNC: 4.9 MMOL/L — SIGNIFICANT CHANGE UP (ref 3.5–5.3)
POTASSIUM SERPL-SCNC: 4.9 MMOL/L — SIGNIFICANT CHANGE UP (ref 3.5–5.3)
PROT SERPL-MCNC: 6.1 G/DL — SIGNIFICANT CHANGE UP (ref 6–8.3)
SODIUM SERPL-SCNC: 136 MMOL/L — SIGNIFICANT CHANGE UP (ref 135–145)
SPECIMEN SOURCE: SIGNIFICANT CHANGE UP
SPECIMEN SOURCE: SIGNIFICANT CHANGE UP
TROPONIN T, HIGH SENSITIVITY RESULT: 214 NG/L — HIGH (ref 0–51)

## 2023-10-08 PROCEDURE — 85610 PROTHROMBIN TIME: CPT

## 2023-10-08 PROCEDURE — 85730 THROMBOPLASTIN TIME PARTIAL: CPT

## 2023-10-08 PROCEDURE — 85027 COMPLETE CBC AUTOMATED: CPT

## 2023-10-08 PROCEDURE — 86901 BLOOD TYPING SEROLOGIC RH(D): CPT

## 2023-10-08 PROCEDURE — C1887: CPT

## 2023-10-08 PROCEDURE — 80048 BASIC METABOLIC PNL TOTAL CA: CPT

## 2023-10-08 PROCEDURE — 99232 SBSQ HOSP IP/OBS MODERATE 35: CPT | Mod: GC

## 2023-10-08 PROCEDURE — 84484 ASSAY OF TROPONIN QUANT: CPT

## 2023-10-08 PROCEDURE — 82962 GLUCOSE BLOOD TEST: CPT

## 2023-10-08 PROCEDURE — 86900 BLOOD TYPING SEROLOGIC ABO: CPT

## 2023-10-08 PROCEDURE — 82553 CREATINE MB FRACTION: CPT

## 2023-10-08 PROCEDURE — 84100 ASSAY OF PHOSPHORUS: CPT

## 2023-10-08 PROCEDURE — 93306 TTE W/DOPPLER COMPLETE: CPT

## 2023-10-08 PROCEDURE — 93005 ELECTROCARDIOGRAM TRACING: CPT

## 2023-10-08 PROCEDURE — 80053 COMPREHEN METABOLIC PANEL: CPT

## 2023-10-08 PROCEDURE — 93458 L HRT ARTERY/VENTRICLE ANGIO: CPT

## 2023-10-08 PROCEDURE — 86850 RBC ANTIBODY SCREEN: CPT

## 2023-10-08 PROCEDURE — C1769: CPT

## 2023-10-08 PROCEDURE — 83605 ASSAY OF LACTIC ACID: CPT

## 2023-10-08 PROCEDURE — 82550 ASSAY OF CK (CPK): CPT

## 2023-10-08 PROCEDURE — C1894: CPT

## 2023-10-08 PROCEDURE — 83735 ASSAY OF MAGNESIUM: CPT

## 2023-10-08 PROCEDURE — 74018 RADEX ABDOMEN 1 VIEW: CPT

## 2023-10-08 RX ORDER — BUPROPION HYDROCHLORIDE 150 MG/1
1 TABLET, EXTENDED RELEASE ORAL
Qty: 0 | Refills: 0 | DISCHARGE
Start: 2023-10-08

## 2023-10-08 RX ORDER — CLOPIDOGREL BISULFATE 75 MG/1
1 TABLET, FILM COATED ORAL
Refills: 0 | DISCHARGE

## 2023-10-08 RX ORDER — ASPIRIN/CALCIUM CARB/MAGNESIUM 324 MG
1 TABLET ORAL
Qty: 30 | Refills: 0
Start: 2023-10-08 | End: 2023-11-06

## 2023-10-08 RX ORDER — COLCHICINE 0.6 MG
1 TABLET ORAL
Qty: 60 | Refills: 0
Start: 2023-10-08 | End: 2023-11-06

## 2023-10-08 RX ORDER — INSULIN LISPRO 100/ML
0 VIAL (ML) SUBCUTANEOUS
Refills: 0 | DISCHARGE

## 2023-10-08 RX ORDER — ATORVASTATIN CALCIUM 80 MG/1
1 TABLET, FILM COATED ORAL
Refills: 0 | DISCHARGE

## 2023-10-08 RX ORDER — ATORVASTATIN CALCIUM 80 MG/1
1 TABLET, FILM COATED ORAL
Qty: 30 | Refills: 0
Start: 2023-10-08 | End: 2023-11-06

## 2023-10-08 RX ORDER — LISINOPRIL 2.5 MG/1
1 TABLET ORAL
Qty: 30 | Refills: 0
Start: 2023-10-08 | End: 2023-11-06

## 2023-10-08 RX ORDER — INSULIN GLARGINE 100 [IU]/ML
20 INJECTION, SOLUTION SUBCUTANEOUS
Refills: 0 | DISCHARGE

## 2023-10-08 RX ORDER — METOPROLOL TARTRATE 50 MG
1 TABLET ORAL
Qty: 60 | Refills: 0
Start: 2023-10-08 | End: 2023-11-06

## 2023-10-08 RX ORDER — ASPIRIN/CALCIUM CARB/MAGNESIUM 324 MG
1 TABLET ORAL
Refills: 0 | DISCHARGE

## 2023-10-08 RX ORDER — HEPARIN SODIUM 5000 [USP'U]/ML
10 INJECTION INTRAVENOUS; SUBCUTANEOUS
Refills: 0 | DISCHARGE

## 2023-10-08 RX ORDER — INSULIN LISPRO 100/ML
3 VIAL (ML) SUBCUTANEOUS
Refills: 0 | DISCHARGE

## 2023-10-08 RX ORDER — INSULIN LISPRO 100/ML
8 VIAL (ML) SUBCUTANEOUS
Refills: 0 | Status: DISCONTINUED | OUTPATIENT
Start: 2023-10-08 | End: 2023-10-08

## 2023-10-08 RX ORDER — INSULIN LISPRO 100/ML
8 VIAL (ML) SUBCUTANEOUS
Qty: 1 | Refills: 0
Start: 2023-10-08 | End: 2024-04-05

## 2023-10-08 RX ORDER — ISOPROPYL ALCOHOL, BENZOCAINE .7; .06 ML/ML; ML/ML
0 SWAB TOPICAL
Qty: 100 | Refills: 1
Start: 2023-10-08

## 2023-10-08 RX ORDER — TRAZODONE HCL 50 MG
2 TABLET ORAL
Qty: 0 | Refills: 0 | DISCHARGE
Start: 2023-10-08

## 2023-10-08 RX ORDER — INSULIN LISPRO 100/ML
8 VIAL (ML) SUBCUTANEOUS
Qty: 1 | Refills: 0
Start: 2023-10-08 | End: 2023-11-06

## 2023-10-08 RX ORDER — INSULIN GLARGINE 100 [IU]/ML
30 INJECTION, SOLUTION SUBCUTANEOUS AT BEDTIME
Refills: 0 | Status: DISCONTINUED | OUTPATIENT
Start: 2023-10-08 | End: 2023-10-08

## 2023-10-08 RX ORDER — INSULIN DETEMIR 100/ML (3)
30 INSULIN PEN (ML) SUBCUTANEOUS
Qty: 1 | Refills: 0
Start: 2023-10-08 | End: 2023-11-06

## 2023-10-08 RX ORDER — PANTOPRAZOLE SODIUM 20 MG/1
40 TABLET, DELAYED RELEASE ORAL
Refills: 0 | Status: DISCONTINUED | OUTPATIENT
Start: 2023-10-08 | End: 2023-10-08

## 2023-10-08 RX ORDER — MORPHINE SULFATE 50 MG/1
2 CAPSULE, EXTENDED RELEASE ORAL EVERY 4 HOURS
Refills: 0 | Status: DISCONTINUED | OUTPATIENT
Start: 2023-10-08 | End: 2023-10-08

## 2023-10-08 RX ADMIN — MORPHINE SULFATE 2 MILLIGRAM(S): 50 CAPSULE, EXTENDED RELEASE ORAL at 09:45

## 2023-10-08 RX ADMIN — Medication 25 MILLIGRAM(S): at 06:40

## 2023-10-08 RX ADMIN — Medication 0.6 MILLIGRAM(S): at 06:40

## 2023-10-08 RX ADMIN — Medication 81 MILLIGRAM(S): at 11:01

## 2023-10-08 RX ADMIN — PANTOPRAZOLE SODIUM 40 MILLIGRAM(S): 20 TABLET, DELAYED RELEASE ORAL at 11:01

## 2023-10-08 RX ADMIN — Medication 1 MILLIGRAM(S): at 06:39

## 2023-10-08 RX ADMIN — Medication 4: at 08:46

## 2023-10-08 NOTE — PROGRESS NOTE ADULT - SUBJECTIVE AND OBJECTIVE BOX
Patient seen and examined at bedside.    Overnight Events:   - JEFF  - On tele NSR 70s  -  Reports chest pain has slightly improved does not worsen with inspiration or with walking     REVIEW OF SYSTEMS:  General: no fatigue/malaise, weight loss/gain.  Skin: no rashes.  Ophthalmologic: no blurred vision, no loss of vision. 	  ENT: no sore throat, rhinorrhea, sinus congestion.  Respiratory: no SOB, cough or wheeze.  CV: No chest pain. No palpitations.   Gastrointestinal:  no N/V/D, no melena/hematemesis/hematochezia.  Genitourinary: no dysuria/hesitancy or hematuria.  Musculoskeletal: no myalgias or arthralgias.  Neurological: no changes in vision or hearing, no lightheadedness/dizziness, no syncope/near syncope	  Psychiatric: no unusual stress/anxiety.   Hematology/Lymphatics: no unusual bleeding, bruising and no lymphadenopathy.  Endocrine: no unusual thirst.           Current Meds:  acetaminophen     Tablet .. 650 milliGRAM(s) Oral every 6 hours PRN  aspirin enteric coated 81 milliGRAM(s) Oral daily  atorvastatin 80 milliGRAM(s) Oral at bedtime  buPROPion XL (24-Hour) . 150 milliGRAM(s) Oral daily  clonazePAM  Tablet 1 milliGRAM(s) Oral every 8 hours  colchicine 0.6 milliGRAM(s) Oral two times a day  dextrose 5%. 1000 milliLiter(s) IV Continuous <Continuous>  dextrose 5%. 1000 milliLiter(s) IV Continuous <Continuous>  dextrose 50% Injectable 25 Gram(s) IV Push once  dextrose 50% Injectable 12.5 Gram(s) IV Push once  dextrose 50% Injectable 25 Gram(s) IV Push once  dextrose Oral Gel 15 Gram(s) Oral once PRN  famotidine    Tablet 20 milliGRAM(s) Oral daily  glucagon  Injectable 1 milliGRAM(s) IntraMuscular once  insulin glargine Injectable (LANTUS) 24 Unit(s) SubCutaneous at bedtime  insulin lispro (ADMELOG) corrective regimen sliding scale   SubCutaneous at bedtime  insulin lispro (ADMELOG) corrective regimen sliding scale   SubCutaneous three times a day before meals  insulin lispro Injectable (ADMELOG) 6 Unit(s) SubCutaneous three times a day before meals  lisinopril 2.5 milliGRAM(s) Oral daily  metoprolol tartrate 25 milliGRAM(s) Oral two times a day  sodium chloride 0.9%. 1000 milliLiter(s) IV Continuous <Continuous>  traZODone 200 milliGRAM(s) Oral at bedtime      Vitals:  T(F): 98 (10-08), Max: 99 (10-07)  HR: 66 (10-08) (66 - 102)  BP: 110/73 (10-08) (90/66 - 110/73)  RR: 18 (10-08)  SpO2: 96% (10-08)  I&O's Summary      Physical Exam:  Appearance: No acute distress; well appearing  Eyes: PERRL, EOMI, pink conjunctiva  HEENT: Normal oral mucosa  Cardiovascular: RRR, S1, S2, no murmurs, rubs, or gallops; no edema; no JVD +chest wall tenderness  Respiratory: Clear to auscultation bilaterally  Gastrointestinal: soft, non-tender, non-distended with normal bowel sounds  Musculoskeletal: No clubbing; no joint deformity   Neurologic: Non-focal  Lymphatic: No lymphadenopathy  Psychiatry: AAOx3, mood & affect appropriate  Skin: No rashes, ecchymoses, or cyanosis                          13.0   10.54 )-----------( 237      ( 07 Oct 2023 05:05 )             37.8     10    141  |  104  |  19  ----------------------------<  203<H>  3.9   |  23  |  0.84    Ca    8.9      07 Oct 2023 05:05  Phos  2.9     10-  Mg     2.2     10-07    TPro  6.5  /  Alb  3.6  /  TBili  0.7  /  DBili  x   /  AST  57<H>  /  ALT  50<H>  /  AlkPhos  119  10-07    PT/INR - ( 06 Oct 2023 19:52 )   PT: 10.2 sec;   INR: 0.97 ratio         PTT - ( 06 Oct 2023 19:52 )  PTT:40.7 sec  CARDIAC MARKERS ( 07 Oct 2023 05:05 )  348 ng/L / x     / x     / 235 U/L / x     / 2.2 ng/mL  CARDIAC MARKERS ( 06 Oct 2023 19:52 )  323 ng/L / x     / x     / 319 U/L / x     / 2.8 ng/mL  CARDIAC MARKERS ( 03 Oct 2023 18:40 )  x     / x     / x     / 1974 U/L / x     / 76.0 ng/mL  CARDIAC MARKERS ( 03 Oct 2023 12:36 )  x     / x     / x     / 980 U/L / x     / 43.9 ng/mL  CARDIAC MARKERS ( 03 Oct 2023 09:00 )  x     / 3241.2 ng/mL / x     / 421 U/L / x     / 21.6 ng/mL              TTE  TRANSTHORACIC ECHOCARDIOGRAM REPORT  ________________________________________________________________________________                                      _______       Pt. Name:       BETO DORADO Study Date:    10/7/2023  MRN:     [id number]                  YOB: 1980  Accession #:    [id number]                  Age:           42 years  Account#:       [id number]               Gender:        M  Heart Rate:     92 bpm                     Height:        70.00 in (177.80 cm)  Rhythm:                                    Weight:        185.00 lb (83.92 kg)  Blood Pressure: 125/84 mmHg                BSA/BMI:       2.02 m² / 26.54 kg/m²  ________________________________________________________________________________________  Referring Physician:    [id number] Alayna Gions  Interpreting Physician: Stephen Mora M.D.  Primary Sonographer:    David Davila RDCS    CPT:               ECHO TTE WO CON COMP W DOPP - 75473.m  Indication(s):     ST elevation (STEMI) myocardial infarction of unspecified                     site - I21.3; Acute pericarditis, unspecified - I30.9  Procedure:         Transthoracic echocardiogram with 2-D, M-mode and complete                     spectral and color flow Doppler.  Ordering Location: [facility]  Study Information: Image quality for this study is good.    _______________________________________________________________________________________     CONCLUSIONS:      1. Left ventricular cavity is small. Left ventricular wall thickness is normal. Left ventricular systolic function is hyperdynamic with an ejection fraction of 74 % by Sapp's method of disks.   2. Left ventricular global longitudinal strain is 13.9 % is abnormal (> -16%). Images were acquired on a [employer] ultrasound system and processed on the ultrasound machine with a heart rate of 92 bpm and a blood pressure of 125/84 mmHg. Regional strain pattern is consistent with coronary artery disease/ischemia.   3. Normal left ventricular diastolic function.   4. Normal right ventricular cavity size, wall thickness and systolic function.   5. Normal atria.   6. No significant valvular disease.   7. Small pericardial effusion noted adjacent to the anterior right ventricle with no evidence of hemodynamic compromise: respiratory variation across the mitral valve E wave is not greater than 30%, greater than 60% respiratory variation across the tricuspid valve E wave, no diastolic collapse of right atrium, exceding 1/3 of the cardiac cycle and no early diastolic inversion of the right ventricle.   8. No prior echocardiogram is available for comparison.    ________________________________________________________________________________________      Cath      Study Date:     10/06/2023   Name:           BETO DORADO   :            1980   (42 years)   Gender:         male   MR#:            [id number]   [facility]#:           [id number]   Patient Class:  Inpatient     Cath Lab Report    Diagnostic Cardiologist:       Kathy Gibbs MD   Fellow:                        Haylie Fernandez MD   Referring Physician:           Alayna A Mac-Landers, MD     Procedures Performed   Procedures:               1.    Arterial Access - Right Radial     2.   Diagnostic Coronary Angiography   3.    Left Heart Cath     Indications:                Myocardial infarction without ST elevation  (NSTEMI)  PCI Status:                urgent     Diagnostic Conclusions:     Mid LAD is a  that is well collateralized from Conus branch and  left to left collaterals.  Patient had no chest pain at the time of procedure.      Patient seen and examined at bedside.    Overnight Events:   - JEFF  - On tele NSR 70s  -  Reports resolving chest pain does not worsen with inspiration or with walking     REVIEW OF SYSTEMS:  General: no fatigue/malaise, weight loss/gain.  Skin: no rashes.  Ophthalmologic: no blurred vision, no loss of vision. 	  ENT: no sore throat, rhinorrhea, sinus congestion.  Respiratory: no SOB, cough or wheeze.  CV: No chest pain. No palpitations.   Gastrointestinal:  no N/V/D, no melena/hematemesis/hematochezia.  Genitourinary: no dysuria/hesitancy or hematuria.  Musculoskeletal: no myalgias or arthralgias.  Neurological: no changes in vision or hearing, no lightheadedness/dizziness, no syncope/near syncope	  Psychiatric: no unusual stress/anxiety.   Hematology/Lymphatics: no unusual bleeding, bruising and no lymphadenopathy.  Endocrine: no unusual thirst.           Current Meds:  acetaminophen     Tablet .. 650 milliGRAM(s) Oral every 6 hours PRN  aspirin enteric coated 81 milliGRAM(s) Oral daily  atorvastatin 80 milliGRAM(s) Oral at bedtime  buPROPion XL (24-Hour) . 150 milliGRAM(s) Oral daily  clonazePAM  Tablet 1 milliGRAM(s) Oral every 8 hours  colchicine 0.6 milliGRAM(s) Oral two times a day  dextrose 5%. 1000 milliLiter(s) IV Continuous <Continuous>  dextrose 5%. 1000 milliLiter(s) IV Continuous <Continuous>  dextrose 50% Injectable 25 Gram(s) IV Push once  dextrose 50% Injectable 12.5 Gram(s) IV Push once  dextrose 50% Injectable 25 Gram(s) IV Push once  dextrose Oral Gel 15 Gram(s) Oral once PRN  famotidine    Tablet 20 milliGRAM(s) Oral daily  glucagon  Injectable 1 milliGRAM(s) IntraMuscular once  insulin glargine Injectable (LANTUS) 24 Unit(s) SubCutaneous at bedtime  insulin lispro (ADMELOG) corrective regimen sliding scale   SubCutaneous at bedtime  insulin lispro (ADMELOG) corrective regimen sliding scale   SubCutaneous three times a day before meals  insulin lispro Injectable (ADMELOG) 6 Unit(s) SubCutaneous three times a day before meals  lisinopril 2.5 milliGRAM(s) Oral daily  metoprolol tartrate 25 milliGRAM(s) Oral two times a day  sodium chloride 0.9%. 1000 milliLiter(s) IV Continuous <Continuous>  traZODone 200 milliGRAM(s) Oral at bedtime      Vitals:  T(F): 98 (10-08), Max: 99 (10-07)  HR: 66 (10-08) (66 - 102)  BP: 110/73 (10-08) (90/66 - 110/73)  RR: 18 (10-08)  SpO2: 96% (10-08)  I&O's Summary      Physical Exam:  Appearance: No acute distress; well appearing  Eyes: PERRL, EOMI, pink conjunctiva  HEENT: Normal oral mucosa  Cardiovascular: RRR, S1, S2, no murmurs, rubs, or gallops; no edema; no JVD +chest wall tenderness  Respiratory: Clear to auscultation bilaterally  Gastrointestinal: soft, non-tender, non-distended with normal bowel sounds  Musculoskeletal: No clubbing; no joint deformity   Neurologic: Non-focal  Lymphatic: No lymphadenopathy  Psychiatry: AAOx3, mood & affect appropriate  Skin: No rashes, ecchymoses, or cyanosis                          13.0   10.54 )-----------( 237      ( 07 Oct 2023 05:05 )             37.8     10-    141  |  104  |  19  ----------------------------<  203<H>  3.9   |  23  |  0.84    Ca    8.9      07 Oct 2023 05:05  Phos  2.9     10-  Mg     2.2     10-07    TPro  6.5  /  Alb  3.6  /  TBili  0.7  /  DBili  x   /  AST  57<H>  /  ALT  50<H>  /  AlkPhos  119  10-07    PT/INR - ( 06 Oct 2023 19:52 )   PT: 10.2 sec;   INR: 0.97 ratio         PTT - ( 06 Oct 2023 19:52 )  PTT:40.7 sec  CARDIAC MARKERS ( 07 Oct 2023 05:05 )  348 ng/L / x     / x     / 235 U/L / x     / 2.2 ng/mL  CARDIAC MARKERS ( 06 Oct 2023 19:52 )  323 ng/L / x     / x     / 319 U/L / x     / 2.8 ng/mL  CARDIAC MARKERS ( 03 Oct 2023 18:40 )  x     / x     / x     / 1974 U/L / x     / 76.0 ng/mL  CARDIAC MARKERS ( 03 Oct 2023 12:36 )  x     / x     / x     / 980 U/L / x     / 43.9 ng/mL  CARDIAC MARKERS ( 03 Oct 2023 09:00 )  x     / 3241.2 ng/mL / x     / 421 U/L / x     / 21.6 ng/mL              TTE  TRANSTHORACIC ECHOCARDIOGRAM REPORT  ________________________________________________________________________________                                      _______       Pt. Name:       BETO DORADO Study Date:    10/7/2023  MRN:     [id number]                  YOB: 1980  Accession #:    [id number]                  Age:           42 years  Account#:       [id number]               Gender:        M  Heart Rate:     92 bpm                     Height:        70.00 in (177.80 cm)  Rhythm:                                    Weight:        185.00 lb (83.92 kg)  Blood Pressure: 125/84 mmHg                BSA/BMI:       2.02 m² / 26.54 kg/m²  ________________________________________________________________________________________  Referring Physician:    [id number] Alayna Goins  Interpreting Physician: Stephen Mora M.D.  Primary Sonographer:    David Davila RDCS    CPT:               ECHO TTE WO CON COMP W DOPP - 61333.m  Indication(s):     ST elevation (STEMI) myocardial infarction of unspecified                     site - I21.3; Acute pericarditis, unspecified - I30.9  Procedure:         Transthoracic echocardiogram with 2-D, M-mode and complete                     spectral and color flow Doppler.  Ordering Location: [facility]  Study Information: Image quality for this study is good.    _______________________________________________________________________________________     CONCLUSIONS:      1. Left ventricular cavity is small. Left ventricular wall thickness is normal. Left ventricular systolic function is hyperdynamic with an ejection fraction of 74 % by Sapp's method of disks.   2. Left ventricular global longitudinal strain is 13.9 % is abnormal (> -16%). Images were acquired on a [employer] [facility] ultrasound system and processed on the ultrasound machine with a heart rate of 92 bpm and a blood pressure of 125/84 mmHg. Regional strain pattern is consistent with coronary artery disease/ischemia.   3. Normal left ventricular diastolic function.   4. Normal right ventricular cavity size, wall thickness and systolic function.   5. Normal atria.   6. No significant valvular disease.   7. Small pericardial effusion noted adjacent to the anterior right ventricle with no evidence of hemodynamic compromise: respiratory variation across the mitral valve E wave is not greater than 30%, greater than 60% respiratory variation across the tricuspid valve E wave, no diastolic collapse of right atrium, exceding 1/3 of the cardiac cycle and no early diastolic inversion of the right ventricle.   8. No prior echocardiogram is available for comparison.    ________________________________________________________________________________________      Cath      Study Date:     10/06/2023   Name:           BETO DORADO   :            1980   (42 years)   Gender:         male   MR#:            [id number]   [facility]#:           [id number]   Patient Class:  Inpatient     Cath Lab Report    Diagnostic Cardiologist:       Kathy Gibbs MD   Fellow:                        Haylie Fernandez MD   Referring Physician:           Alayna Goins MD     Procedures Performed   Procedures:               1.    Arterial Access - Right Radial     2.   Diagnostic Coronary Angiography   3.    Left Heart Cath     Indications:                Myocardial infarction without ST elevation  (NSTEMI)  PCI Status:                urgent     Diagnostic Conclusions:     Mid LAD is a  that is well collateralized from Conus branch and  left to left collaterals.  Patient had no chest pain at the time of procedure.

## 2023-10-08 NOTE — DISCHARGE NOTE PROVIDER - NSDCFUADDAPPT_GEN_ALL_CORE_FT
APPTS ARE READY TO BE MADE: [ ] YES    Best Family or Patient Contact (if needed):    Additional Information about above appointments (if needed):    1:   2:   3:     Other comments or requests:  APPTS ARE READY TO BE MADE: [ ] YES    Best Family or Patient Contact (if needed):    Additional Information about above appointments (if needed):    1: pcp  2: endocrinology  3: cardiology    Other comments or requests:

## 2023-10-08 NOTE — PROGRESS NOTE ADULT - SUBJECTIVE AND OBJECTIVE BOX
INTERVAL HPI/OVERNIGHT EVENTS:  Pt seen and examined at bedside.     Allergies/Intolerance: lactose (Diarrhea)  No Known Drug Allergies  Mayonnaise (Vomiting)      MEDICATIONS  (STANDING):  aspirin enteric coated 81 milliGRAM(s) Oral daily  atorvastatin 80 milliGRAM(s) Oral at bedtime  buPROPion XL (24-Hour) . 150 milliGRAM(s) Oral daily  clonazePAM  Tablet 1 milliGRAM(s) Oral every 8 hours  colchicine 0.6 milliGRAM(s) Oral two times a day  dextrose 5%. 1000 milliLiter(s) (50 mL/Hr) IV Continuous <Continuous>  dextrose 5%. 1000 milliLiter(s) (100 mL/Hr) IV Continuous <Continuous>  dextrose 50% Injectable 25 Gram(s) IV Push once  dextrose 50% Injectable 12.5 Gram(s) IV Push once  dextrose 50% Injectable 25 Gram(s) IV Push once  famotidine    Tablet 20 milliGRAM(s) Oral daily  glucagon  Injectable 1 milliGRAM(s) IntraMuscular once  insulin glargine Injectable (LANTUS) 30 Unit(s) SubCutaneous at bedtime  insulin lispro (ADMELOG) corrective regimen sliding scale   SubCutaneous at bedtime  insulin lispro (ADMELOG) corrective regimen sliding scale   SubCutaneous three times a day before meals  insulin lispro Injectable (ADMELOG) 8 Unit(s) SubCutaneous three times a day before meals  lisinopril 2.5 milliGRAM(s) Oral daily  metoprolol tartrate 25 milliGRAM(s) Oral two times a day  traZODone 200 milliGRAM(s) Oral at bedtime    MEDICATIONS  (PRN):  acetaminophen     Tablet .. 650 milliGRAM(s) Oral every 6 hours PRN Mild Pain (1 - 3)  dextrose Oral Gel 15 Gram(s) Oral once PRN Blood Glucose LESS THAN 70 milliGRAM(s)/deciliter        ROS: all systems reviewed and wnl      PHYSICAL EXAMINATION:  Vital Signs Last 24 Hrs  T(C): 36.7 (08 Oct 2023 06:45), Max: 37.2 (07 Oct 2023 13:38)  T(F): 98 (08 Oct 2023 06:45), Max: 99 (07 Oct 2023 15:59)  HR: 66 (08 Oct 2023 06:45) (66 - 102)  BP: 110/73 (08 Oct 2023 06:45) (90/66 - 110/73)  BP(mean): 85 (07 Oct 2023 13:38) (75 - 87)  RR: 18 (08 Oct 2023 06:45) (18 - 19)  SpO2: 96% (08 Oct 2023 06:45) (95% - 98%)    Parameters below as of 08 Oct 2023 06:45  Patient On (Oxygen Delivery Method): room air      CAPILLARY BLOOD GLUCOSE      POCT Blood Glucose.: 309 mg/dL (08 Oct 2023 08:20)  POCT Blood Glucose.: 341 mg/dL (07 Oct 2023 21:26)  POCT Blood Glucose.: 227 mg/dL (07 Oct 2023 16:34)  POCT Blood Glucose.: 262 mg/dL (07 Oct 2023 10:41)        GENERAL: stable, in bed, comfortable, no Fevers or SOB  NECK: supple, No JVD  CHEST/LUNG: clear to auscultation bilaterally; no rales, rhonchi, or wheezing b/l  HEART: normal S1, S2  ABDOMEN: BS+, soft, ND, NT   EXTREMITIES:  pulses palpable; no clubbing, cyanosis, or edema b/l LEs      LABS:                        13.0   10.54 )-----------( 237      ( 07 Oct 2023 05:05 )             37.8     10-07    141  |  104  |  19  ----------------------------<  203<H>  3.9   |  23  |  0.84    Ca    8.9      07 Oct 2023 05:05  Phos  2.9     10-07  Mg     2.2     10-07    TPro  6.5  /  Alb  3.6  /  TBili  0.7  /  DBili  x   /  AST  57<H>  /  ALT  50<H>  /  AlkPhos  119  10-07    PT/INR - ( 06 Oct 2023 19:52 )   PT: 10.2 sec;   INR: 0.97 ratio         PTT - ( 06 Oct 2023 19:52 )  PTT:40.7 sec  Urinalysis Basic - ( 07 Oct 2023 05:05 )    Color: x / Appearance: x / SG: x / pH: x  Gluc: 203 mg/dL / Ketone: x  / Bili: x / Urobili: x   Blood: x / Protein: x / Nitrite: x   Leuk Esterase: x / RBC: x / WBC x   Sq Epi: x / Non Sq Epi: x / Bacteria: x

## 2023-10-08 NOTE — DISCHARGE NOTE PROVIDER - CARE PROVIDERS DIRECT ADDRESSES
,[email or web address],[email or web address]

## 2023-10-08 NOTE — DISCHARGE NOTE PROVIDER - HOSPITAL COURSE
HPI:42 yr old M with DM1 (on insulin pump), asthma, gastritis, anxiety, gastroparesis, cannabinoid hyperemesis presented to the ER 10/3/23 unresponsives. Per wife, patient was found unresponsive on floor, ran out of insulin pump per wife a day prior.  In ER patient was found to have a blood glucose of 1683, potassium 7.7, severe metabolic abnormalities with pH of 7.0 and co2 <15. Lactate of 7.2 with MAYRA of Cr 4.34. Troponins 3304.6->2479.6->1627.7 trended down , intubated and extubate 10/3/23, treated in  MICU, downgraded to  tele 10/5. 10/6 c/o chest pain: 12 lead EKG with ST depressions in V3, V4, and elevations in V1 and V2, treated Heparin gtt, SL nitro and now transferred here to [facility] for left heart cath.      Hospital Course: 42 yr old M with DM1 (on insulin pump), asthma, gastritis, anxiety, gastroparesis, cannabinoid hyperemesis presented to the ER 10/3/23 unresponsives. Per wife, patient was found unresponsive on floor, ran out of insulin pump per wife a day prior.  In ER patient was found to have a blood glucose of 1683, potassium 7.7, severe metabolic abnormalities with pH of 7.0 and co2 <15. Lactate of 7.2 with MAYRA of Cr 4.34. Troponins 3304.6->2479.6->1627.7 trended down , intubated and extubate 10/3/23, treated in  MICU, downgraded to  tele 10/5. 10/6 c/o chest pain: 12 lead EKG with ST depressions in V3, V4, and elevations in V1 and V2, treated Heparin gtt, SL nitro and now transferred for [facility] showed mLAD  with no plan for further intervention. Now being treated for pericarditis.  1. CAD - mLAD   2. Pericarditis  RECOMMENDATIONS:  - Continue ASA 81mg daily  - s/p colchicine load yesterday continue 0.6mg Q12  - Continue Lopressor 25mg Q12  - Continue Atorvastatin 80mg daily  - Continue Lisinopril 2.5mg daily  Endocrine consulted for T1DM management (high risk patient with severely uncontrolled diabetes with DKA and A1c of 11.1% at high risk of CAD and CVA with high level decision-making). Recommended #T1DM (A1c 11.1  %)Home meds: Medtronic 70G insulin pump with guardian  recommend Lantus and premeals on discharge   Discharge planning: Patient does not want to be discharged on medtronic pump, instead he prefers basal-bolus regimen until he can get the omnipod installed. Patient prescription sent to [facility] pharmacy in [place] ( [facility] pharmacy)  Rx for dexcom G6 sensor (change every 10 days),  (x1), and trannsmitter (every 3 months). Pt will need prescription for basal insulin pen (ie. Basaglar, Lantus, Tresiba, Toujeo, Levemir) and bolus insulin pen (ie. humalog/novolog/admelog) opthalmology & podiatry evaluations recommended.  - Patient should follow up with Endocrinologist Dr. Sayda Lerner.          Important Medication Changes and Reason: colchicine for pericarditis    Active or Pending Issues Requiring Follow-up:    Advanced Directives:   [ ] Full code  [ ] DNR  [ ] Hospice    Discharge Diagnoses:         HPI:42 yr old M with DM1 (on insulin pump), asthma, gastritis, anxiety, gastroparesis, cannabinoid hyperemesis presented to the ER 10/3/23 unresponsives. Per wife, patient was found unresponsive on floor, ran out of insulin pump per wife a day prior.  In ER patient was found to have a blood glucose of 1683, potassium 7.7, severe metabolic abnormalities with pH of 7.0 and co2 <15. Lactate of 7.2 with MAYRA of Cr 4.34. Troponins 3304.6->2479.6->1627.7 trended down , intubated and extubate 10/3/23, treated in  MICU, downgraded to  tele 10/5. 10/6 c/o chest pain: 12 lead EKG with ST depressions in V3, V4, and elevations in V1 and V2, treated Heparin gtt, SL nitro and now transferred here to [facility] for left heart cath.      Hospital Course: 42 yr old M with DM1 (on insulin pump), asthma, gastritis, anxiety, gastroparesis, cannabinoid hyperemesis presented to the ER 10/3/23 unresponsives. Per wife, patient was found unresponsive on floor, ran out of insulin pump per wife a day prior.  In ER patient was found to have a blood glucose of 1683, potassium 7.7, severe metabolic abnormalities with pH of 7.0 and co2 <15. Lactate of 7.2 with MAYRA of Cr 4.34. Troponins 3304.6->2479.6->1627.7 trended down , intubated and extubate 10/3/23, treated in  MICU, downgraded to  tele 10/5. 10/6 c/o chest pain: 12 lead EKG with ST depressions in V3, V4, and elevations in V1 and V2, treated Heparin gtt, SL nitro and now transferred for [facility] showed mLAD  with no plan for further intervention. Now being treated for pericarditis.  1. CAD - mLAD   2. Pericarditis  RECOMMENDATIONS:  - Continue ASA 81mg daily  - s/p colchicine load yesterday continue 0.6mg Q12  - Continue Lopressor 25mg Q12  - Continue Atorvastatin 80mg daily  - Continue Lisinopril 2.5mg daily  Endocrine consulted for T1DM management (high risk patient with severely uncontrolled diabetes with DKA and A1c of 11.1% at high risk of CAD and CVA with high level decision-making). Recommended #T1DM (A1c 11.1  %)Home meds: Medtronic 70G insulin pump with guardian  recommend Lantus and premeals on discharge   Discharge planning: Patient does not want to be discharged on medtronic pump, instead he prefers basal-bolus regimen until he can get the omnipod installed. Patient prescription sent to [facility] pharmacy in [place] ( [facility] pharmacy)  Rx for dexcom G6 sensor (change every 10 days),  (x1), and trannsmitter (every 3 months). Pt will need prescription for basal insulin pen (ie. Basaglar, Lantus, Tresiba, Toujeo, Levemir) and bolus insulin pen (ie. humalog/novolog/admelog) opthalmology & podiatry evaluations recommended.  - Patient should follow up with Endocrinologist Dr. Sayda Lerner.          Important Medication Changes and Reason: colchicine for pericarditis    Active or Pending Issues Requiring Follow-up: endocrinology Dr Sayda lerner    Advanced Directives:   [x ] Full code  [ ] DNR  [ ] Hospice    Discharge Diagnoses:  CAD to LAD and   Pericarditis  Diabetes type 1 severely uncontrolled         42 yr old Male PMH DM1 (on insulin pump), asthma, gastritis, anxiety, gastroparesis, cannabinoid hyperemesis presented to the ER 10/3/23 unresponsives. Per wife, patient was found unresponsive on floor, ran out of insulin pump per wife a day prior.  In ER patient was found to have a blood glucose of 1683, potassium 7.7, severe metabolic abnormalities with pH of 7.0 and co2 <15. Lactate of 7.2 with MAYRA of Cr 4.34. Troponins 3304.6->2479.6->1627.7 trended down , intubated and extubate 10/3/23, treated in  MICU, downgraded to  tele 10/5. 10/6 c/o chest pain: 12 lead EKG with ST depressions in V3, V4, and elevations in V1 and V2, treated Heparin gtt, SL nitro and now transferred here to [facility] for left heart cath.      Hospital Course: 42 yr old M with DM1 (on insulin pump), asthma, gastritis, anxiety, gastroparesis, cannabinoid hyperemesis presented to the ER 10/3/23 unresponsives. Per wife, patient was found unresponsive on floor, ran out of insulin pump per wife a day prior.  In ER patient was found to have a blood glucose of 1683, potassium 7.7, severe metabolic abnormalities with pH of 7.0 and co2 <15. Lactate of 7.2 with MAYRA of Cr 4.34. Troponins 3304.6->2479.6->1627.7 trended down , intubated and extubate 10/3/23, treated in  MICU, downgraded to  tele 10/5. 10/6 c/o chest pain: 12 lead EKG with ST depressions in V3, V4, and elevations in V1 and V2, treated Heparin gtt, SL nitro and now transferred for [facility] showed mLAD  with no plan for further intervention. Now being treated for pericarditis and NSTEMI.     1. CAD - mLAD   2. Pericarditis    RECOMMENDATIONS:  - Continue ASA 81mg daily  - s/p colchicine load yesterday continue 0.6mg Q12  - Continue Lopressor 25mg Q12  - Continue Atorvastatin 80mg daily  - Continue Lisinopril 2.5mg daily    Endocrine consulted for T1DM management (high risk patient with severely uncontrolled diabetes with DKA and A1c of 11.1% at high risk of CAD and CVA with high level decision-making). Recommended #T1DM (A1c 11.1  %)Home meds: Hold pump for now, recommend Lantus and Admelog premeals on discharge. Patient does not want to be discharged on medtronic pump, instead he prefers basal-bolus regimen until he can get the omnipod installed. Patient prescription sent to [facility] pharmacy in [place] ( [facility] pharmacy)  Rx for dexcom G6 sensor (change every 10 days),  (x1), and trannsmitter (every 3 months). Pt will need prescription for basal insulin pen (ie. Basaglar, Lantus, Tresiba, Toujeo, Levemir) and bolus insulin pen (ie. humalog/novolog/admelog) opthalmology & podiatry evaluations recommended.     Patient should follow up with Endocrinologist Dr. Sayda Lerner.

## 2023-10-08 NOTE — DISCHARGE NOTE NURSING/CASE MANAGEMENT/SOCIAL WORK - PATIENT PORTAL LINK FT
You can access the FollowMyHealth Patient Portal offered by [facility] by registering at the following website: [email or web address]. By joining [employer]’s FollowMyHealth portal, you will also be able to view your health information using other applications (apps) compatible with our system.

## 2023-10-08 NOTE — DISCHARGE NOTE PROVIDER - NSDCCPCAREPLAN_GEN_ALL_CORE_FT
PRINCIPAL DISCHARGE DIAGNOSIS  Diagnosis: CAD (coronary artery disease)  Assessment and Plan of Treatment: please continue aspirin and atorvastatin 80 mgs . Coronary artery disease is a condition where the arteries the supply the heart muscle get clogges with fatty deposits & puts you at risk for a heart attack  Call your doctor if you have any new pain, pressure, or discomfort in the center of your chest, pain, tingling or discomfort in arms, back, neck, jaw, or stomach, shortness of breath, nausea, vomiting, burping or heartburn, sweating, cold and clammy skin, racing or abnormal heartbeat for more than 10 minutes or if they keep coming & going.  Call 911 and do not tr to get to hospital by care  You can help yourself with lefestyle changes (quitting smoking if you smoke), eat lots of fruits & vegetables & low fat dairy products, not a lot of meat & fatty foods, walk or some form of physical activity most days of the week, lose weight if you are overweight  Take your cardiac medication as prescribed to lower cholesterol, to lower blood pressure, aspirin to prevent blood clots, and diabetes control  Make sure to keep appointments with doctor for cardiac follow up care        SECONDARY DISCHARGE DIAGNOSES  Diagnosis: Anxiety  Assessment and Plan of Treatment: c/w klonopin and wellbutrin and follow up with pcp    Diagnosis: HTN (hypertension)  Assessment and Plan of Treatment: c/w metoprolol and monitor blood pressure closely    Diagnosis: HLD (hyperlipidemia)  Assessment and Plan of Treatment: c/w lipitor and monitor your lipid panel with your PCP. Hyperlipidemia is a high level of lipids (fats) in your blood. These lipids include cholesterol or triglycerides. Lipids are made by your body. They also come from the foods you eat. Your body needs lipids to work properly, but high levels increase your risk for heart disease, heart attack, and stroke.  To help lower your cholesterol, please continue to take your _____ as prescribed. Exercise lowers your cholesterol levels and helps you maintain a healthy weight. Get 40 minutes or more of moderate exercise 3 to 4 days each week. Examples of moderate exercises include walking briskly, swimming, or riding a bike. .Do not smoke. Nicotine and other chemicals in cigarettes and cigars can increase your risk for a heart attack and stroke. Eat heart-healthy foods and decrease the total amount of fat you eat. Choose lean meats, fat-free or 1% fat milk, and low-fat dairy products, such as yogurt and cheese. Limit or do not eat red meat. Red meats are high in fat and cholesterol    Diagnosis: Poorly controlled type 1 diabetes mellitus  Assessment and Plan of Treatment: D/C Planning:  At home, Patient should check FSBG premeals and bedtime. Pt should call their doctor when FSBG <70 or above >400 and or consistently above 200s as changes in the regimen will have to be made.  -pt should call PMD for DM related questions or concerns until pt is seen by CDE or endocrine  -Recommend annual podiatry and ophthalmology follow up.     -------------------------------------------------------------------------------------------------------------------------------------  -please follow up with Dr Sayda lerner for ongoing care. - Discharge planning: Patient does not want to be discharged on medtronic pump, instead he prefers basal-bolus regimen until he can get the omnipod installed. Please provide patient Rx for dexcom G6 sensor (change every 10 days),  (x1), and trannsmitter (every 3 months). Pt will need perscription for basal insulin pen (ie. Basaglar, Lantus, Tresiba, Toujeo, Levemir) and bolus insulin pen (ie. humalog/novolog/admelog) depending on insurance coverage; please send test scripts to see which is covered. Please send prescriptions for diabetes supplies (glucometer, test strips, lancets, alcohol swabs, insulin pen needles). Routine outpatient opthalmology & podiatry evaluations recommended.  - Patient should follow up with Endocrinologist Dr. Sayda Lerner

## 2023-10-08 NOTE — DISCHARGE NOTE PROVIDER - PROVIDER TOKENS
PROVIDER:[TOKEN:[[id number]],FOLLOWUP:[2 weeks]],PROVIDER:[TOKEN:[[id number]],FOLLOWUP:[1-3 days]] PROVIDER:[TOKEN:[[id number]],FOLLOWUP:[2 weeks]],PROVIDER:[TOKEN:[[id number]],FOLLOWUP:[1-3 days]],PROVIDER:[TOKEN:[[id number]],FOLLOWUP:[1-3 days]]

## 2023-10-08 NOTE — DISCHARGE NOTE PROVIDER - NSDCMRMEDTOKEN_GEN_ALL_CORE_FT
alcohol swabs: Apply topically to affected area 4 times a day  aspirin 81 mg oral delayed release tablet: 1 tab(s) orally once a day  atorvastatin 80 mg oral tablet: 1 tab(s) orally once a day (at bedtime)  buPROPion 150 mg/24 hours (XL) oral tablet, extended release: 1 tab(s) orally once a day  clonazePAM 1 mg oral tablet: 1 tab(s) orally every 8 hours  colchicine 0.6 mg oral tablet: 1 tab(s) orally 2 times a day  dexcom G6  transmitter: change every 10 days ;   x1 and transmitter every 3 months  glucometer (per patient&#x27;s insurance): Test blood sugars four times a day. Dispense #1 glucometer.  HumaLOG KwikPen 100 units/mL injectable solution: 8 unit(s) injectable 3 times a day (before meals)  Insulin Pen Needles, 4mm: 1 application subcutaneously 4 times a day. ** Use with insulin pen **  lancets: 1 application subcutaneously 4 times a day  Levemir FlexTouch 100 units/mL subcutaneous solution: 30 unit(s) subcutaneous once a day (at bedtime)  lisinopril 2.5 mg oral tablet: 1 tab(s) orally once a day  metoprolol tartrate 25 mg oral tablet: 1 tab(s) orally 2 times a day  pantoprazole 40 mg oral delayed release tablet: 1 tab(s) orally once a day  test strips (per patient&#x27;s insurance): 1 application subcutaneously 4 times a day. ** Compatible with patient&#x27;s glucometer **  traZODone 100 mg oral tablet: 2 tab(s) orally once a day (at bedtime)  Tylenol 325 mg oral capsule: 2 cap(s) orally every 6 hours as needed for  moderate pain

## 2023-10-08 NOTE — PROGRESS NOTE ADULT - ASSESSMENT
42 yr old M with DM1 (on insulin pump), asthma, gastritis, anxiety, gastroparesis, cannabinoid hyperemesis presented to the ER unresponsives. Per wife, patient was found unresponsive on floor, ran out of insulin pump per wife a day prior. Intubated in ER for airway protection. In ER patient was found to have severe metabolic derangment EKG changes and AMS intubated.       CV: Hemodynamically stable, NSTEMI with large trop elevation at [facility]. CTA negative for PE.     Had trop elevation at [facility] then CP after stress test with [facility] laterlly.       IV Heparin started at [facility], now stopped.       DKA resolved on basal bolus with low premeal due to poor compliance with insulin pump, pump was on empty.     Glucose elevated today, increase Lantus to 30/ Admelog 8 units tid.      Aspirin, statin, beta-blockers and ACE in place.     Discharge home today if ok with Cardio.

## 2023-10-08 NOTE — DISCHARGE NOTE NURSING/CASE MANAGEMENT/SOCIAL WORK - NSDCFUADDAPPT_GEN_ALL_CORE_FT
APPTS ARE READY TO BE MADE: [ ] YES    Best Family or Patient Contact (if needed):    Additional Information about above appointments (if needed):    1: pcp  2: endocrinology  3: cardiology    Other comments or requests:

## 2023-10-08 NOTE — PROGRESS NOTE ADULT - ATTENDING COMMENTS
Mr. Christie reports that his chest pain has resolved at the time of my evaluation.    Continue ASA, statin, beta blocker, and ACE as above.    Metoprolol may be titrated for angina.    A total of 35 minutes was spent on this patient encounter.

## 2023-10-08 NOTE — DISCHARGE NOTE PROVIDER - CARE PROVIDER_API CALL
Brenda James  Cardiology  [address] [place] [address] Drive, [address] [place] [address]  Phone: [phone]  Fax: [phone]  Follow Up Time: 2 weeks    Mejia Connelly  Internal Medicine  Phone: [phone]  Fax: [phone]  Follow Up Time: 1-3 days   Brenda James  Cardiology  [address], [address] [place] [address]  Phone: [phone]  Fax: [phone]  Follow Up Time: 2 weeks    Mejia Connelly  Internal Medicine  Phone: [phone]  Fax: [phone]  Follow Up Time: 1-3 days    Sayda Roberts  Endocrinology/Metab/Diabetes  [address] [place] [address]  Phone: [phone]  Fax: [phone]  Follow Up Time: 1-3 days

## 2023-10-08 NOTE — DISCHARGE NOTE NURSING/CASE MANAGEMENT/SOCIAL WORK - NSDCPEFALRISK_GEN_ALL_CORE
For information on Fall & Injury Prevention, visit: [email or web address] OR  [email or web address] OR  https://www.cdc.gov/steadi/patient.html

## 2023-10-08 NOTE — PROGRESS NOTE ADULT - ASSESSMENT
42 yr old M with DM1 (on insulin pump), asthma, gastritis, anxiety, gastroparesis, cannabinoid hyperemesis presented to the ER 10/3/23 unresponsives. Per wife, patient was found unresponsive on floor, ran out of insulin pump per wife a day prior.  In ER patient was found to have a blood glucose of 1683, potassium 7.7, severe metabolic abnormalities with pH of 7.0 and co2 <15. Lactate of 7.2 with MAYRA of Cr 4.34. Troponins 3304.6->2479.6->1627.7 trended down , intubated and extubate 10/3/23, treated in  MICU, downgraded to  tele 10/5. 10/6 c/o chest pain: 12 lead EKG with ST depressions in V3, V4, and elevations in V1 and V2, treated Heparin gtt, SL nitro and now transferred for [facility] showed mLAD  with no plan for further intervention. Now being treated for pericarditis.    1. CAD - mLAD   2. Pericarditis      RECOMMENDATIONS:  - Continue ASA 81mg daily  - s/p colchicine load yesterday continue 0.6mg Q12  - Continue Lopressor 25mg Q12  - Continue Atorvastatin 80mg daily  - Continue Lisinopril 2.5mg daily    Note not final until signed by attending       Scot Winchester MD  Cardiology Fellow PGY-6      For all New Consults  [email or web address]   Login: sherice

## 2023-10-19 DIAGNOSIS — E10.11 TYPE 1 DIABETES MELLITUS WITH KETOACIDOSIS WITH COMA: ICD-10-CM

## 2023-10-19 DIAGNOSIS — E11.65 TYPE 2 DIABETES MELLITUS WITH HYPERGLYCEMIA: ICD-10-CM

## 2023-10-19 DIAGNOSIS — E87.5 HYPERKALEMIA: ICD-10-CM

## 2023-10-19 DIAGNOSIS — K31.84 GASTROPARESIS: ICD-10-CM

## 2023-10-19 DIAGNOSIS — G93.41 METABOLIC ENCEPHALOPATHY: ICD-10-CM

## 2023-10-19 DIAGNOSIS — T38.3X6A UNDERDOSING OF INSULIN AND ORAL HYPOGLYCEMIC [ANTIDIABETIC] DRUGS, INITIAL ENCOUNTER: ICD-10-CM

## 2023-10-19 DIAGNOSIS — J45.909 UNSPECIFIED ASTHMA, UNCOMPLICATED: ICD-10-CM

## 2023-10-19 DIAGNOSIS — E87.0 HYPEROSMOLALITY AND HYPERNATREMIA: ICD-10-CM

## 2023-10-19 DIAGNOSIS — N17.9 ACUTE KIDNEY FAILURE, UNSPECIFIED: ICD-10-CM

## 2023-10-19 DIAGNOSIS — Z91.148 PATIENT'S OTHER NONCOMPLIANCE WITH MEDICATION REGIMEN FOR OTHER REASON: ICD-10-CM

## 2023-10-19 DIAGNOSIS — I21.4 NON-ST ELEVATION (NSTEMI) MYOCARDIAL INFARCTION: ICD-10-CM

## 2023-10-19 DIAGNOSIS — R45.1 RESTLESSNESS AND AGITATION: ICD-10-CM

## 2023-10-19 DIAGNOSIS — K29.70 GASTRITIS, UNSPECIFIED, WITHOUT BLEEDING: ICD-10-CM

## 2023-10-19 DIAGNOSIS — Z96.41 PRESENCE OF INSULIN PUMP (EXTERNAL) (INTERNAL): ICD-10-CM

## 2023-10-19 DIAGNOSIS — E10.43 TYPE 1 DIABETES MELLITUS WITH DIABETIC AUTONOMIC (POLY)NEUROPATHY: ICD-10-CM

## 2023-10-19 DIAGNOSIS — F41.9 ANXIETY DISORDER, UNSPECIFIED: ICD-10-CM

## 2023-10-19 DIAGNOSIS — E86.0 DEHYDRATION: ICD-10-CM

## 2023-11-06 NOTE — ED ADULT NURSE NOTE - DRUG PRE-SCREENING (DAST -1)
Med: ALENDRONATE    LOV (related): 12/1/22 - PCX      Due for F/U around: 12/2023    Next Appt: 12/5/2023    
Statement Selected

## 2023-11-20 NOTE — ED PROVIDER NOTE - NPI NUMBER (FOR SYSADMIN USE ONLY) :
Patient scheduled for follow up genetic counseling on 12/19/2023.    Maritza Guerrero [facility]    [[id number]]

## 2023-11-21 NOTE — ED PROVIDER NOTE - NS ED MD DISPO DISCHARGE
Home
pt is scpd, c/o urinating blood x 3/4 hrs and frequency. lmp 1 week ago. Pt A&ox4, ambulatory, no s/s of distress. - pmhx, - allergies

## 2023-11-29 NOTE — ED PROVIDER NOTE - MEDICAL DECISION MAKING DETAILS
Please get labs done as ordered   Please check blood sugars 1-2 times a day before meals and at bedtime  Recommend consistent carb diet, continue regular exercise  You are being referred for diabetes education  Follow-up in 4 to 6 weeks      Please call your insurance and inquire which of the following would be covered  - GLP-1  Agonist-Victoza, Bydureon, Byetta, ozempic, trulicity, catarina, tirzepaptide   - SGLT 2 inhibitors -jardiance, farxiga, Invokana, steglarto  - DDP4 inhibitor: Januvia, linagliptin, saxagliptin, alogliptin patient pw abd pain, nausea and vomiting. rule out dka patient pw abd pain, nausea and vomiting. rule out dka. patient is ketotic but acidotic. will continue with fluids and anti emetics and admit given patient is unable to tolerate po

## 2024-01-08 NOTE — ED ADULT NURSE NOTE - BREATHING, MLM
ED Navigator phoned patient for follow-up. Patient had a recent ED visit. Patient was unavailable. Message left on voicemail. Follow-up scheduled.  Odilia Hopkins     
Spontaneous, unlabored and symmetrical

## 2024-01-29 NOTE — ED ADULT TRIAGE NOTE - BSA (M2)
[place] [facility]  CONSULTATION      Patient:  Donald Mayne Date of Service:  1/29/2024   YOB: 1949 Admission Date:  1/29/2024   MRN:  [id number] Attending:  Handy Byrne MD   PCP:  Monisha Falcon MD   Hospital Day:  Hospital Day: 1     REQUESTING PHYSICIAN:  Handy Byrne MD  REASON FOR CONSULT:  CHB  PRIMARY CARDIOLOGIST:  Dr Byrne  Primary EP: None > Dr Erwin    CHIEF COMPLAINT:  No chief complaint on file.      HISTORY OF PRESENT ILLNESS:   Donald Mayne is a 74 year old male with history of HTN, severe AS s/p TAVR 1/29/24,  obstructive sleep apnea on cpap, CVA,  who was admitted to [facility] for TAVR on 1/29/24. He underwet successful TAVR however developed CHB and TVP was inserted at rate of 70 BPM. EP was consulted.  Patient currently denies CP, shortness of breath, lightheadedness, presyncope. Denies history of arrhythmias, syncope, presyncope, lightheadedness with exertion, palpitations. He was never told of any CHB in the past.     CURRENT AND PREVIOUS CARDIAC STUDIES:     EKG:   Date:  1/29/24 1/29/24      ECHO:   Date:  1/29/24  Final Impressions    * TTE performed to facilitate TAVR.    * PRE-PROCEDURE:.    * Severe calcific aortic stenosis.    * Normal LV systolic function.    * No pericardial effusion.    * POST-PROCEDURE:.    * Successful placement of TAVR, mean gradient 4 mmHg.    * No transvalvular regurgitation.  Trivial paravalvular regurgitation.    * No pericardial effusion.    12/7/23  Final Impressions    * Normal left ventricular systolic function with ejection fraction of 68 %.    * No left ventricular regional wall motion abnormalities.    * Grade I left ventricular diastolic dysfunction.    * Normal right ventricular size and systolic function.    * Mildly elevated right ventricular systolic pressure 38 mmHg.    * Moderate calcific aortic valve stenosis with peak velocity of 4.5 m/s,  mean gradient of 31 mmHg, and aortic valve area of 1.1 cm2.    * Normal ascending  aorta, 3.3 cm.    * No pericardial effusion.    * Compared to prior study, the aortic valve stenosis has progressed.    cCTA/TAVR 12/19/23  FINDINGS:  Aortic Valve: The aortic valve appears tricuspid. There is moderate  calcification of the valve. Aortic valve calcium score is 1748     MEASUREMENTS:  Aortic annulus perimeter: 72.3 mm        Aortic annulus area: 393.9 sq mm      LVOT average diameter at -4 mm: 22.2 mm     Sinuses of Valsalva:The left coronary sinus measures 28.8 mm. The right  coronary sinus measures 26.8 mm. The noncoronary sinus measures 29.3 mm.  ST Junction diameter: 27.6 mm  Ascending aorta diameter: 31.8 mm  Left coronary SOV height: 17.0 mm     Right coronary SOV height: 18.3 mm        OTHER CARDIAC FINDINGS:  There is LVH most severe in the basal septum where it measures 1.6 cm  Proximal coronary arteries:  Left main: No severe stenosis   LAD: No severe stenosis  LCx: No severe stenosis   RCA: No severe stenosis      PRIOR CARDIAC CATH AND INTERVENTIONS:   Date:  1/29/24  TAVR  Impression:  Donald Mayne is a 74 year old male who presented for transcatheter aortic valve replacement for severe symptomatic aortic stenosis. We successfully placed a 29 mm Evolut FX valve. Pre implantation balloon aortic valvuloplasty was not performed. There was minimal residual gradient and no perivalvular leak. Patient developed complete heart block after the valve deployment. TVP set at 70 ppm.    Baseline hemodynamics:         Peak to peak gradient:  42mmHg, LVEDP:  17mmHg      REVIEW OF SYSTEMS:   Negative    PAST HISTORY:     PAST MEDICAL HISTORY:   Past Medical History:   Diagnosis Date    Ambulates with cane     occassionally    Aortic stenosis 2021    non-rheumatic; severe 2024    Cataracts, bilateral 2019    Chicken pox     Chronic back pain     COVID-19 06/2022    COVID-19 01/08/2024    tested +    CVA (cerebral vascular accident) (CMD) 06/2016    Hemorrhagic    Essential tremor 2015    GERD  (gastroesophageal reflux disease)     Hereditary antithrombin III deficiency (CMD) 10/04/2014    Heterozygous factor V Leiden mutation (CMD) 10/04/2014    Hip pain 2019    Hyperlipidemia     Intracranial bleed (CMD) 06/2016    L parietal    Knee pain, chronic     s/p injections    Morbid obesity with BMI of 40.0-44.9, adult (CMD)     Osteoarthritis of right knee 04/2018    Personal history of colonic polyps     Plantar fasciitis 07/2012    Seasonal allergies     Sleep apnea 2008    CPAP    TIA (transient ischemic attack) 08/2014    L sided weakness    Wears hearing aid in both ears     Wears prescription eyeglasses      Patient Active Problem List    Diagnosis Date Noted    S/P TAVR (transcatheter aortic valve replacement) 01/29/2024     Priority: Low    Dyslipidemia 12/07/2023     Priority: Low     Highest LDL = 98 (2014)  FLP 06/14/23: Chol 134, LDL 67, TG 77      Hypertension 12/07/2023     Priority: Low    Atherosclerosis of native coronary artery of native heart without angina pectoris 06/01/2023     Priority: Low     10/2022 NM stress: Impression: 1. Normal myocardial perfusion exam. No evidence of ischemia or infarction. 2. Left ventricular ejection fraction of 65%.     Agatston Coronary Calcium Score 05/01/23  LMA: 0  LAD: 74  LCX: 0  RCA: 12  Total: 86  Percentile: 35%    Extra-coronary Calcification: Mild thoracic aortic calcification. Severe  aortic valve calcification with score of 2514.      Moderate aortic stenosis 06/01/2023     Priority: Low     AV Ca Score 05/01/23: 2514    Echo 05/17/23  Normal left ventricular systolic function with ejection fraction of 66 %.  Moderately increased left ventricular wall thickness.  Grade I left ventricular diastolic dysfunction.  Normal right ventricular systolic function.  Normal right ventricular systolic pressure 28 mmHg.  Mild aortic valve calcification.  Mild aortic valve stenosis with peak velocity of 3.7 m/s, mean gradient of 26 mmHg, and aortic valve area  of 1.3 cm2.  Aortic valve dimensionless index, (VTIs): 0.38.  No aortic valve regurgitation.  Mild tricuspid valve regurgitation.         HOME MEDICATIONS:  No current facility-administered medications on file prior to encounter.     Current Outpatient Medications on File Prior to Encounter   Medication Sig Dispense Refill    Multiple Vitamin (MULTIVITAMIN ADULT PO) Take 1 tablet by mouth daily.      B Complex Vitamins (B COMPLEX VITAMIN PO) Take 1 tablet by mouth daily.      Aspirin 81 MG Cap Take 1 capsule by mouth daily. (Patient taking differently: Take 1 capsule by mouth nightly.) 90 capsule 3    isosorbide mononitrate (IMDUR) 30 MG 24 hr tablet Take 30 mg by mouth daily.      metoPROLOL succinate (TOPROL-XL) 25 MG 24 hr tablet Take 25 mg by mouth daily.      nitroGLYCERIN (NITROSTAT) 0.4 MG sublingual tablet DISSOLVE 1 TABLET UNDER THE TONGUE EVERY 5 MINUTES AS NEEDED FOR CHEST PAIN      rosuvastatin (CRESTOR) 5 MG tablet Take 5 mg by mouth daily.         ALLERGIES  ALLERGIES:   Allergen Reactions    Food   (Food Or Med) Other (See Comments)     SEAFOOD    Indomethacin Other (See Comments)     drowsiness        Ondansetron RASH    Unknown Other (See Comments)     \"some kind of a steroid that puts me to sleep\"       FAMILY HISTORY:  family history includes Alcohol Abuse in his father; Cancer in his father, half-sister, and paternal uncle; Multiple Sclerosis in his daughter. He was adopted.    SOCIAL HISTORY:   reports that he has never smoked. He has never used smokeless tobacco. He reports that he does not drink alcohol and does not use drugs.      PHYSICAL EXAM:   Blood pressure (!) 149/85, pulse 87, temperature 97.9 °F (36.6 °C), temperature source Temporal, resp. rate 16, height 6' (1.829 m), weight 132.3 kg (291 lb 10.7 oz), SpO2 98 %.    Intake/Output Summary (Last 24 hours) at 1/29/2024 1423  Last data filed at 1/29/2024 1415  Gross per 24 hour   Intake --   Output 400 ml   Net -400 ml     PHYSICAL  EXAM:  General:  Awake, alert and oriented and in no acute distress.  Eyes:  No xanthelasma, EOMI (extraocular movements intact), anicteric sclerae.  ENT/Mouth:  No ear discharge.  Mucous membranes moist.  Neck:  Spontaneous full range of motion, no thyromegaly, trachea midline.  Cardiovascular system:  RRR  Respiratory:  CTAB  Abdomen:  Soft, nontender.  No hepatosplenomegaly.   Extremities:  No peripheral edema, no digital cyanosis, no clubbing.   Psychiatric:  Alert.  Oriented to time, place, and person.  Mood and affect appropriate.  Lymphatic:  No cervical or supraclavicular lymphadenopathy.  Skin:  Warm and dry, no rashes visualized.  Neurological:  CN (cranial nerves) II-XII grossly intact.  Bilateral sensory and motor function normal.  Musculoskeletal:  No joint pain and no visual signs of joint inflammation.     CURRENT MEDICATIONS:   Scheduled:  Current Facility-Administered Medications   Medication Dose Route Frequency Provider Last Rate Last Admin    sodium chloride 0.9 % injection 2 mL  2 mL Intracatheter 2 times per day Hailey Owusu MD        sodium chloride 0.9 % injection 2 mL  2 mL Intracatheter 2 times per day Yari Bermeo APNP        aspirin tablet 325 mg  325 mg Oral On Call to Procedure Yari Bermeo APNP   325 mg at 01/29/24 0841    sodium chloride 0.9 % injection 10 mL  10 mL Injection 2 times per day Carly Bunn MD           Infusions:  Current Facility-Administered Medications   Medication Dose Route Frequency Provider Last Rate Last Admin    lactated ringers infusion   Intravenous Continuous Hailey Owusu MD        sodium chloride 0.9% infusion   Intravenous Continuous Hailey Owusu MD        lactated ringers infusion   Intravenous Continuous Hailey Owusu MD           PRN:  Current Facility-Administered Medications   Medication Dose Route Frequency Provider Last Rate Last Admin    lidocaine 1 % injection 5-10 mg  5-10 mg Subcutaneous PRN Hailey Owusu  MD MALENA        metoPROLOL tartrate (LOPRESSOR) tablet 25 mg  25 mg Oral Once PRN Hailey Owusu MD        dextrose (GLUTOSE) 40 % gel 30 g  30 g Oral Once PRN Hailey Owusu MD        dextrose 50 % injection 25 g  25 g Intravenous PRN Hailey Owusu MD        insulin regular (human) (HumuLIN R, NovoLIN R) sliding scale injection   Subcutaneous Once PRN Hailey Owusu MD        sodium chloride 0.9 % flush bag 25 mL  25 mL Intravenous PRN Hailey Owusu MD        sodium chloride 0.9 % flush bag 25 mL  25 mL Intravenous PRN Yari Bermeo APNP        lidocaine HCl (PF) (XYLOCAINE) 1 % injection    PRN Handy Byrne MD   10 mL at 01/29/24 0931    bupivacaine PF w/EPINEPHrine 0.25% -[id number] injection    PRN Handy Byrne MD   15 mL at 01/29/24 0955    iohexol (OMNIPAQUE 350 INJECT) contrast solution    PRN Handy Byrne MD   30 mL at 01/29/24 1015    fentaNYL (SUBLIMAZE) injection 25 mcg  25 mcg Intravenous Q5 Min PRN Hailey Owusu MD        HYDROmorphone (DILAUDID) injection 0.2 mg  0.2 mg Intravenous Q5 Min PRN Hailey Owusu MD        fentaNYL (SUBLIMAZE) injection 50 mcg  50 mcg Intravenous Q5 Min PRN Hailey Owusu MD        HYDROmorphone (DILAUDID) injection 0.4 mg  0.4 mg Intravenous Q5 Min PRN Hailey Owusu MD        hydrALAZINE (APRESOLINE) injection 5 mg  5 mg Intravenous Q10 Min PRN Hailey Owusu MD        dexAMETHasone (DECADRON) injection 4 mg  4 mg Intravenous Once PRN Hailey Owusu MD        sodium chloride (NORMAL SALINE) 0.9 % bolus 500 mL  500 mL Intravenous PRN Carly Bunn MD        dextrose 50 % injection 25 g  25 g Intravenous PRN Carly Bunn MD        dextrose 50 % injection 12.5 g  12.5 g Intravenous PRN Carly Bunn MD        glucagon (GLUCAGEN) injection 1 mg  1 mg Intramuscular PRN Carly Bunn MD        dextrose (GLUTOSE) 40 % gel 15 g  15 g Oral PRN Carly Bunn MD        dextrose  (GLUTOSE) 40 % gel 30 g  30 g Oral PRN Carly Bunn MD        sodium chloride 0.9 % flush bag 25 mL  25 mL Intravenous PRN Carly Bunn MD        sodium chloride 0.9 % injection 10 mL  10 mL Injection PRN Carly Bunn MD        sodium chloride 0.9 % injection 20 mL  20 mL Injection PRN Carly Bunn MD         LABS:     Cardiac No results found    Invalid input(s): \"POCTR\"   CBC Recent Labs   Lab 01/29/24  1048 01/29/24  0824   WBC 5.3 5.3   HGB 13.3 13.8   HCT 40.6 42.4    158      BMP Recent Labs   Lab 01/29/24  1048 01/29/24  0824   SODIUM  --  141   POTASSIUM 3.5 3.4   CHLORIDE  --  110   CO2  --  28   BUN  --  17   CREATININE  --  0.77   GLUCOSE  --  102*      LFTs Recent Labs   Lab 01/29/24 0824   AST 21   GPT 32   ALKPT 56   BILIRUBIN 0.7   ALBUMIN 3.6      Lipid Panel No results found   HbA1c Last Lab A1C:  Hemoglobin A1C (%)   Date Value   08/30/2014 5.5       Last Point of Care A1C:  No results found for: \"[id number]\"   Coagulation No results found       ASSESSMENT, PROBLEM LIST, AND PLAN:   Donald Mayne is a 74 year old male with history of HTN, severe AS s/p TAVR 1/29/24,  obstructive sleep apnea on cpap, CVA,  who was admitted to [facility] for TAVR on 1/29/24. He underwet successful TAVR however developed CHB and TVP was inserted at rate of 70 BPM. EP was consulted..     # Severe AS s/p TAVR: 29 mm Evolut FX valve 1/29/24. On metoprolol 25 mg XL PTA  # CHB s/p TVP 1/29/24: 100% paced at rate of 70 BPM  # GABE: on CPAP  #CVA    Recommendations:  - Hold Toprol XL, keep back up rate at 70. Last dose Toprol was yesterday. Will likely need permanent pacemaker this admission    Patient seen with Dr. Erwin, who formulated the plan.    Maurilio Sims DO   Cardiology Fellow PGY V  1/29/2024 2:23 PM  Pager [phone]    Attestation:  I saw and examined the patient. I discussed the case with the fellow and agree with the findings and plan as documented in the fellow's note.      Jason Erwin M.D.    Clinical Cardiac Electrophysiology  Pager: [phone]     1.93

## 2024-02-06 NOTE — ED PROVIDER NOTE - SECONDARY DIAGNOSIS.
on the discharge service for the patient. I have reviewed and made amendments to the documentation where necessary.
Hyperglycemia

## 2024-02-28 NOTE — CONSULT NOTE ADULT - CONSULT REQUESTED DATE/TIME
03-Apr-2019 15:41 [TextEntry] : Vani presents today to establish GYN care. She reports having some sleep disturbances (unsure if it is hormonal or related to stressful situation in [place]).

## 2024-03-02 ENCOUNTER — EMERGENCY (EMERGENCY)
Facility: HOSPITAL | Age: 44
LOS: 0 days | Discharge: HOME | End: 2024-03-02
Attending: STUDENT IN AN ORGANIZED HEALTH CARE EDUCATION/TRAINING PROGRAM
Payer: MEDICAID

## 2024-03-02 VITALS
HEART RATE: 70 BPM | RESPIRATION RATE: 18 BRPM | SYSTOLIC BLOOD PRESSURE: 125 MMHG | OXYGEN SATURATION: 97 % | TEMPERATURE: 99 F | DIASTOLIC BLOOD PRESSURE: 78 MMHG

## 2024-03-02 VITALS
OXYGEN SATURATION: 99 % | TEMPERATURE: 99 F | HEART RATE: 77 BPM | SYSTOLIC BLOOD PRESSURE: 110 MMHG | WEIGHT: 160.06 LBS | DIASTOLIC BLOOD PRESSURE: 82 MMHG | RESPIRATION RATE: 15 BRPM | HEIGHT: 71 IN

## 2024-03-02 DIAGNOSIS — Z90.49 ACQUIRED ABSENCE OF OTHER SPECIFIED PARTS OF DIGESTIVE TRACT: Chronic | ICD-10-CM

## 2024-03-02 DIAGNOSIS — Z91.018 ALLERGY TO OTHER FOODS: ICD-10-CM

## 2024-03-02 DIAGNOSIS — R19.7 DIARRHEA, UNSPECIFIED: ICD-10-CM

## 2024-03-02 DIAGNOSIS — R10.9 UNSPECIFIED ABDOMINAL PAIN: ICD-10-CM

## 2024-03-02 DIAGNOSIS — K52.9 NONINFECTIVE GASTROENTERITIS AND COLITIS, UNSPECIFIED: ICD-10-CM

## 2024-03-02 DIAGNOSIS — E11.9 TYPE 2 DIABETES MELLITUS WITHOUT COMPLICATIONS: ICD-10-CM

## 2024-03-02 DIAGNOSIS — R11.10 VOMITING, UNSPECIFIED: ICD-10-CM

## 2024-03-02 DIAGNOSIS — I10 ESSENTIAL (PRIMARY) HYPERTENSION: ICD-10-CM

## 2024-03-02 DIAGNOSIS — Z79.4 LONG TERM (CURRENT) USE OF INSULIN: ICD-10-CM

## 2024-03-02 DIAGNOSIS — E73.9 LACTOSE INTOLERANCE, UNSPECIFIED: ICD-10-CM

## 2024-03-02 DIAGNOSIS — I25.10 ATHEROSCLEROTIC HEART DISEASE OF NATIVE CORONARY ARTERY WITHOUT ANGINA PECTORIS: ICD-10-CM

## 2024-03-02 LAB
ALBUMIN SERPL ELPH-MCNC: 3.7 G/DL — SIGNIFICANT CHANGE UP (ref 3.3–5)
ALP SERPL-CCNC: 81 U/L — SIGNIFICANT CHANGE UP (ref 40–120)
ALT FLD-CCNC: 25 U/L — SIGNIFICANT CHANGE UP (ref 12–78)
ANION GAP SERPL CALC-SCNC: 13 MMOL/L — SIGNIFICANT CHANGE UP (ref 5–17)
AST SERPL-CCNC: 20 U/L — SIGNIFICANT CHANGE UP (ref 15–37)
BASOPHILS # BLD AUTO: 0.03 K/UL — SIGNIFICANT CHANGE UP (ref 0–0.2)
BASOPHILS NFR BLD AUTO: 0.3 % — SIGNIFICANT CHANGE UP (ref 0–2)
BILIRUB SERPL-MCNC: 0.7 MG/DL — SIGNIFICANT CHANGE UP (ref 0.2–1.2)
BUN SERPL-MCNC: 24 MG/DL — HIGH (ref 7–23)
CALCIUM SERPL-MCNC: 9.1 MG/DL — SIGNIFICANT CHANGE UP (ref 8.5–10.1)
CHLORIDE SERPL-SCNC: 103 MMOL/L — SIGNIFICANT CHANGE UP (ref 96–108)
CO2 SERPL-SCNC: 22 MMOL/L — SIGNIFICANT CHANGE UP (ref 22–31)
CREAT SERPL-MCNC: 0.89 MG/DL — SIGNIFICANT CHANGE UP (ref 0.5–1.3)
EGFR: 109 ML/MIN/1.73M2 — SIGNIFICANT CHANGE UP
EOSINOPHIL # BLD AUTO: 0.02 K/UL — SIGNIFICANT CHANGE UP (ref 0–0.5)
EOSINOPHIL NFR BLD AUTO: 0.2 % — SIGNIFICANT CHANGE UP (ref 0–6)
GLUCOSE SERPL-MCNC: 172 MG/DL — HIGH (ref 70–99)
HCT VFR BLD CALC: 44.1 % — SIGNIFICANT CHANGE UP (ref 39–50)
HGB BLD-MCNC: 15.2 G/DL — SIGNIFICANT CHANGE UP (ref 13–17)
IMM GRANULOCYTES NFR BLD AUTO: 0.1 % — SIGNIFICANT CHANGE UP (ref 0–0.9)
LIDOCAIN IGE QN: 17 U/L — SIGNIFICANT CHANGE UP (ref 13–75)
LYMPHOCYTES # BLD AUTO: 3.08 K/UL — SIGNIFICANT CHANGE UP (ref 1–3.3)
LYMPHOCYTES # BLD AUTO: 34.5 % — SIGNIFICANT CHANGE UP (ref 13–44)
MCHC RBC-ENTMCNC: 27.4 PG — SIGNIFICANT CHANGE UP (ref 27–34)
MCHC RBC-ENTMCNC: 34.5 G/DL — SIGNIFICANT CHANGE UP (ref 32–36)
MCV RBC AUTO: 79.6 FL — LOW (ref 80–100)
MONOCYTES # BLD AUTO: 0.68 K/UL — SIGNIFICANT CHANGE UP (ref 0–0.9)
MONOCYTES NFR BLD AUTO: 7.6 % — SIGNIFICANT CHANGE UP (ref 2–14)
NEUTROPHILS # BLD AUTO: 5.11 K/UL — SIGNIFICANT CHANGE UP (ref 1.8–7.4)
NEUTROPHILS NFR BLD AUTO: 57.3 % — SIGNIFICANT CHANGE UP (ref 43–77)
NRBC # BLD: 0 /100 WBCS — SIGNIFICANT CHANGE UP (ref 0–0)
PLATELET # BLD AUTO: 389 K/UL — SIGNIFICANT CHANGE UP (ref 150–400)
POTASSIUM SERPL-MCNC: 3.7 MMOL/L — SIGNIFICANT CHANGE UP (ref 3.5–5.3)
POTASSIUM SERPL-SCNC: 3.7 MMOL/L — SIGNIFICANT CHANGE UP (ref 3.5–5.3)
PROT SERPL-MCNC: 7.6 GM/DL — SIGNIFICANT CHANGE UP (ref 6–8.3)
RBC # BLD: 5.54 M/UL — SIGNIFICANT CHANGE UP (ref 4.2–5.8)
RBC # FLD: 13.2 % — SIGNIFICANT CHANGE UP (ref 10.3–14.5)
SODIUM SERPL-SCNC: 138 MMOL/L — SIGNIFICANT CHANGE UP (ref 135–145)
TROPONIN I, HIGH SENSITIVITY RESULT: 6.1 NG/L — SIGNIFICANT CHANGE UP
WBC # BLD: 8.93 K/UL — SIGNIFICANT CHANGE UP (ref 3.8–10.5)
WBC # FLD AUTO: 8.93 K/UL — SIGNIFICANT CHANGE UP (ref 3.8–10.5)

## 2024-03-02 PROCEDURE — 74177 CT ABD & PELVIS W/CONTRAST: CPT | Mod: 26,MC

## 2024-03-02 PROCEDURE — 99285 EMERGENCY DEPT VISIT HI MDM: CPT | Mod: 25

## 2024-03-02 PROCEDURE — 93010 ELECTROCARDIOGRAM REPORT: CPT

## 2024-03-02 RX ORDER — ACETAMINOPHEN 500 MG
2 TABLET ORAL
Refills: 0 | DISCHARGE

## 2024-03-02 RX ORDER — MORPHINE SULFATE 50 MG/1
4 CAPSULE, EXTENDED RELEASE ORAL ONCE
Refills: 0 | Status: DISCONTINUED | OUTPATIENT
Start: 2024-03-02 | End: 2024-03-02

## 2024-03-02 RX ORDER — SODIUM CHLORIDE 9 MG/ML
1000 INJECTION, SOLUTION INTRAVENOUS ONCE
Refills: 0 | Status: COMPLETED | OUTPATIENT
Start: 2024-03-02 | End: 2024-03-02

## 2024-03-02 RX ORDER — PANTOPRAZOLE SODIUM 20 MG/1
1 TABLET, DELAYED RELEASE ORAL
Refills: 0 | DISCHARGE

## 2024-03-02 RX ADMIN — MORPHINE SULFATE 4 MILLIGRAM(S): 50 CAPSULE, EXTENDED RELEASE ORAL at 05:38

## 2024-03-02 RX ADMIN — MORPHINE SULFATE 4 MILLIGRAM(S): 50 CAPSULE, EXTENDED RELEASE ORAL at 06:15

## 2024-03-02 RX ADMIN — MORPHINE SULFATE 4 MILLIGRAM(S): 50 CAPSULE, EXTENDED RELEASE ORAL at 01:26

## 2024-03-02 RX ADMIN — MORPHINE SULFATE 4 MILLIGRAM(S): 50 CAPSULE, EXTENDED RELEASE ORAL at 02:01

## 2024-03-02 RX ADMIN — SODIUM CHLORIDE 1000 MILLILITER(S): 9 INJECTION, SOLUTION INTRAVENOUS at 06:00

## 2024-03-02 RX ADMIN — SODIUM CHLORIDE 1000 MILLILITER(S): 9 INJECTION, SOLUTION INTRAVENOUS at 04:59

## 2024-03-02 NOTE — ED PROVIDER NOTE - IV ALTEPLASE INCLUSION HIDDEN
September 2, 2019                          This is to certify that father of Shayne Jamil is excused from work on 2019.    RESTRICTIONS: none.        SIGNATURE:___________________________________________,   2019      Cynthia Fagan PA-C           [address] [place] [address]  
show

## 2024-03-02 NOTE — ED PROVIDER NOTE - OBJECTIVE STATEMENT
43 M pmh HTN, DM, CAD presenting to the ED for abdominal pain. Pain began this evening a/w vomiting and diarrhea. Pt states that he has had pain in the past a few months prior. No prior surgeries  no recent travel, no urinary symptoms

## 2024-03-02 NOTE — ED PROVIDER NOTE - PR
Discussed with patient.  Her symptoms may be related to a lumbar radiculopathy that is intermittent.  However a sensory peripheral neuropathy needs to be considered and ruled out.  Will get an EMG/NCS lower extremities.  In addition she is advised to have her physician fax copies of the blood test done about 3 months ago.  Patient will follow-up a couple of weeks after the EMG is completed.  
124

## 2024-03-02 NOTE — ED PROVIDER NOTE - NS ED ROS FT
General: Denies fever, chills  HEENT: Denies sensory changes, sore throat  Neck: Denies neck pain, neck stiffness  Resp: Denies coughing, SOB  Cardiovascular: Denies CP, palpitations, LE edema  GI: + abd pain, vomiting, diarrhea  : Denies dysuria, hematuria, frequency, incontinence  MSK: Denies back pain  Neuro: Denies HA, dizziness, numbness, weakness  Skin: Denies rashes.

## 2024-03-02 NOTE — ED ADULT NURSE NOTE - OBJECTIVE STATEMENT
pt aox3 c/o "gastritis" and "I cannot breathe" pt states pmh asthma and 4 heart attacks.  pt states the pain feels like pressure on his chest.  belly soft non distended bowel sounds x4 tenderness pressent Left lower abdomen.

## 2024-03-02 NOTE — ED PROVIDER NOTE - CHIEF COMPLAINT
The patient is a 43y Male complaining of abdominal pain. Transposition Flap Text: The defect edges were debeveled with a #15 scalpel blade.  Given the location of the defect and the proximity to free margins a transposition flap was deemed most appropriate.  Using a sterile surgical marker, an appropriate transposition flap was drawn incorporating the defect.    The area thus outlined was incised deep to adipose tissue with a #15 scalpel blade.  The skin margins were undermined to an appropriate distance in all directions utilizing iris scissors.

## 2024-03-02 NOTE — ED PROVIDER NOTE - MUSCULOSKELETAL, MLM
UO=134 bpm, JLHT=318/85 mmhg, MgQ0=437 %, Resp=18 B/min, EtCO2=28 mmHg, Pain=0, Jl=10, Anaya=2 Spine appears normal, range of motion is not limited, no muscle or joint tenderness

## 2024-03-02 NOTE — ED PROVIDER NOTE - PATIENT PORTAL LINK FT
You can access the FollowMyHealth Patient Portal offered by [facility] by registering at the following website: [email or web address]. By joining [employer]’s FollowMyHealth portal, you will also be able to view your health information using other applications (apps) compatible with our system.

## 2024-03-02 NOTE — ED PROVIDER NOTE - PHYSICAL EXAMINATION
General: Well appearing male in no acute distress  HEENT: Normocephalic, atraumatic. Moist mucous membranes. Oropharynx clear. No lymphadenopathy.  Eyes: No scleral icterus. EOMI. KAMI.  Neck:. Soft and supple. Full ROM without pain. No midline tenderness  Cardiac: Regular rate and regular rhythm. No murmurs, rubs, gallops. Peripheral pulses 2+ and symmetric. No LE edema.  Resp: Lungs CTAB. Speaking in full sentences. No wheezes, rales or rhonchi.  Abd: diffuse abd tenderness  Back: Spine midline and non-tender. No CVA tenderness.    Skin: No rashes, abrasions, or lacerations.  Neuro: AO x 3. Moves all extremities symmetrically. Motor strength and sensation grossly intact.

## 2024-03-02 NOTE — ED PROVIDER NOTE - CLINICAL SUMMARY MEDICAL DECISION MAKING FREE TEXT BOX
male presenting to the ED for diffuse abd pain     concern for intraabdominal infection, r/o atypical ACS  pain control, IV fluids  labs wnl  CT confirming enterocolitis  PO trial

## 2024-03-07 ENCOUNTER — EMERGENCY (EMERGENCY)
Facility: HOSPITAL | Age: 44
LOS: 0 days | Discharge: HOME | End: 2024-03-07
Attending: STUDENT IN AN ORGANIZED HEALTH CARE EDUCATION/TRAINING PROGRAM
Payer: MEDICAID

## 2024-03-07 VITALS
HEIGHT: 71 IN | DIASTOLIC BLOOD PRESSURE: 86 MMHG | RESPIRATION RATE: 18 BRPM | TEMPERATURE: 99 F | SYSTOLIC BLOOD PRESSURE: 137 MMHG | HEART RATE: 69 BPM | OXYGEN SATURATION: 98 %

## 2024-03-07 VITALS
OXYGEN SATURATION: 99 % | SYSTOLIC BLOOD PRESSURE: 123 MMHG | HEART RATE: 77 BPM | TEMPERATURE: 98 F | DIASTOLIC BLOOD PRESSURE: 74 MMHG | RESPIRATION RATE: 18 BRPM

## 2024-03-07 DIAGNOSIS — K52.9 NONINFECTIVE GASTROENTERITIS AND COLITIS, UNSPECIFIED: ICD-10-CM

## 2024-03-07 DIAGNOSIS — Z90.49 ACQUIRED ABSENCE OF OTHER SPECIFIED PARTS OF DIGESTIVE TRACT: Chronic | ICD-10-CM

## 2024-03-07 DIAGNOSIS — I10 ESSENTIAL (PRIMARY) HYPERTENSION: ICD-10-CM

## 2024-03-07 DIAGNOSIS — R10.13 EPIGASTRIC PAIN: ICD-10-CM

## 2024-03-07 DIAGNOSIS — I25.10 ATHEROSCLEROTIC HEART DISEASE OF NATIVE CORONARY ARTERY WITHOUT ANGINA PECTORIS: ICD-10-CM

## 2024-03-07 DIAGNOSIS — K31.84 GASTROPARESIS: ICD-10-CM

## 2024-03-07 DIAGNOSIS — E11.43 TYPE 2 DIABETES MELLITUS WITH DIABETIC AUTONOMIC (POLY)NEUROPATHY: ICD-10-CM

## 2024-03-07 DIAGNOSIS — E73.9 LACTOSE INTOLERANCE, UNSPECIFIED: ICD-10-CM

## 2024-03-07 DIAGNOSIS — Z91.018 ALLERGY TO OTHER FOODS: ICD-10-CM

## 2024-03-07 DIAGNOSIS — R11.2 NAUSEA WITH VOMITING, UNSPECIFIED: ICD-10-CM

## 2024-03-07 LAB
ALBUMIN SERPL ELPH-MCNC: 3.6 G/DL — SIGNIFICANT CHANGE UP (ref 3.3–5)
ALP SERPL-CCNC: 83 U/L — SIGNIFICANT CHANGE UP (ref 40–120)
ALT FLD-CCNC: 27 U/L — SIGNIFICANT CHANGE UP (ref 12–78)
ANION GAP SERPL CALC-SCNC: 7 MMOL/L — SIGNIFICANT CHANGE UP (ref 5–17)
APPEARANCE UR: CLEAR — SIGNIFICANT CHANGE UP
AST SERPL-CCNC: 23 U/L — SIGNIFICANT CHANGE UP (ref 15–37)
BASOPHILS # BLD AUTO: 0.03 K/UL — SIGNIFICANT CHANGE UP (ref 0–0.2)
BASOPHILS NFR BLD AUTO: 0.3 % — SIGNIFICANT CHANGE UP (ref 0–2)
BILIRUB SERPL-MCNC: 0.4 MG/DL — SIGNIFICANT CHANGE UP (ref 0.2–1.2)
BILIRUB UR-MCNC: NEGATIVE — SIGNIFICANT CHANGE UP
BUN SERPL-MCNC: 14 MG/DL — SIGNIFICANT CHANGE UP (ref 7–23)
CALCIUM SERPL-MCNC: 8.9 MG/DL — SIGNIFICANT CHANGE UP (ref 8.5–10.1)
CHLORIDE SERPL-SCNC: 103 MMOL/L — SIGNIFICANT CHANGE UP (ref 96–108)
CO2 SERPL-SCNC: 28 MMOL/L — SIGNIFICANT CHANGE UP (ref 22–31)
COLOR SPEC: YELLOW — SIGNIFICANT CHANGE UP
CREAT SERPL-MCNC: 1.15 MG/DL — SIGNIFICANT CHANGE UP (ref 0.5–1.3)
DIFF PNL FLD: NEGATIVE — SIGNIFICANT CHANGE UP
EGFR: 81 ML/MIN/1.73M2 — SIGNIFICANT CHANGE UP
EOSINOPHIL # BLD AUTO: 0.07 K/UL — SIGNIFICANT CHANGE UP (ref 0–0.5)
EOSINOPHIL NFR BLD AUTO: 0.7 % — SIGNIFICANT CHANGE UP (ref 0–6)
GLUCOSE SERPL-MCNC: 455 MG/DL — CRITICAL HIGH (ref 70–99)
GLUCOSE UR QL: >=1000 MG/DL
HCT VFR BLD CALC: 41.7 % — SIGNIFICANT CHANGE UP (ref 39–50)
HGB BLD-MCNC: 14.1 G/DL — SIGNIFICANT CHANGE UP (ref 13–17)
IMM GRANULOCYTES NFR BLD AUTO: 0.2 % — SIGNIFICANT CHANGE UP (ref 0–0.9)
KETONES UR-MCNC: 40 MG/DL
LACTATE SERPL-SCNC: 1.4 MMOL/L — SIGNIFICANT CHANGE UP (ref 0.7–2)
LEUKOCYTE ESTERASE UR-ACNC: NEGATIVE — SIGNIFICANT CHANGE UP
LIDOCAIN IGE QN: 11 U/L — LOW (ref 13–75)
LYMPHOCYTES # BLD AUTO: 0.83 K/UL — LOW (ref 1–3.3)
LYMPHOCYTES # BLD AUTO: 8.7 % — LOW (ref 13–44)
MCHC RBC-ENTMCNC: 27.7 PG — SIGNIFICANT CHANGE UP (ref 27–34)
MCHC RBC-ENTMCNC: 33.8 G/DL — SIGNIFICANT CHANGE UP (ref 32–36)
MCV RBC AUTO: 81.9 FL — SIGNIFICANT CHANGE UP (ref 80–100)
MONOCYTES # BLD AUTO: 0.13 K/UL — SIGNIFICANT CHANGE UP (ref 0–0.9)
MONOCYTES NFR BLD AUTO: 1.4 % — LOW (ref 2–14)
NEUTROPHILS # BLD AUTO: 8.47 K/UL — HIGH (ref 1.8–7.4)
NEUTROPHILS NFR BLD AUTO: 88.7 % — HIGH (ref 43–77)
NITRITE UR-MCNC: NEGATIVE — SIGNIFICANT CHANGE UP
NRBC # BLD: 0 /100 WBCS — SIGNIFICANT CHANGE UP (ref 0–0)
PH UR: 6.5 — SIGNIFICANT CHANGE UP (ref 5–8)
PLATELET # BLD AUTO: 322 K/UL — SIGNIFICANT CHANGE UP (ref 150–400)
POTASSIUM SERPL-MCNC: 5.5 MMOL/L — HIGH (ref 3.5–5.3)
POTASSIUM SERPL-SCNC: 5.5 MMOL/L — HIGH (ref 3.5–5.3)
PROT SERPL-MCNC: 6.8 GM/DL — SIGNIFICANT CHANGE UP (ref 6–8.3)
PROT UR-MCNC: NEGATIVE MG/DL — SIGNIFICANT CHANGE UP
RBC # BLD: 5.09 M/UL — SIGNIFICANT CHANGE UP (ref 4.2–5.8)
RBC # FLD: 13.7 % — SIGNIFICANT CHANGE UP (ref 10.3–14.5)
SODIUM SERPL-SCNC: 138 MMOL/L — SIGNIFICANT CHANGE UP (ref 135–145)
SP GR SPEC: >1.03 — HIGH (ref 1–1.03)
TROPONIN I, HIGH SENSITIVITY RESULT: 6.8 NG/L — SIGNIFICANT CHANGE UP
UROBILINOGEN FLD QL: 0.2 MG/DL — SIGNIFICANT CHANGE UP (ref 0.2–1)
WBC # BLD: 9.55 K/UL — SIGNIFICANT CHANGE UP (ref 3.8–10.5)
WBC # FLD AUTO: 9.55 K/UL — SIGNIFICANT CHANGE UP (ref 3.8–10.5)

## 2024-03-07 PROCEDURE — 99285 EMERGENCY DEPT VISIT HI MDM: CPT

## 2024-03-07 PROCEDURE — 93010 ELECTROCARDIOGRAM REPORT: CPT

## 2024-03-07 RX ORDER — INSULIN LISPRO 100/ML
4 VIAL (ML) SUBCUTANEOUS ONCE
Refills: 0 | Status: COMPLETED | OUTPATIENT
Start: 2024-03-07 | End: 2024-03-07

## 2024-03-07 RX ORDER — MORPHINE SULFATE 50 MG/1
4 CAPSULE, EXTENDED RELEASE ORAL ONCE
Refills: 0 | Status: DISCONTINUED | OUTPATIENT
Start: 2024-03-07 | End: 2024-03-07

## 2024-03-07 RX ORDER — METOCLOPRAMIDE HCL 10 MG
1 TABLET ORAL
Qty: 21 | Refills: 0
Start: 2024-03-07 | End: 2024-03-13

## 2024-03-07 RX ORDER — MORPHINE SULFATE 50 MG/1
2 CAPSULE, EXTENDED RELEASE ORAL ONCE
Refills: 0 | Status: DISCONTINUED | OUTPATIENT
Start: 2024-03-07 | End: 2024-03-07

## 2024-03-07 RX ORDER — METOCLOPRAMIDE HCL 10 MG
10 TABLET ORAL ONCE
Refills: 0 | Status: COMPLETED | OUTPATIENT
Start: 2024-03-07 | End: 2024-03-07

## 2024-03-07 RX ORDER — KETOROLAC TROMETHAMINE 30 MG/ML
15 SYRINGE (ML) INJECTION ONCE
Refills: 0 | Status: DISCONTINUED | OUTPATIENT
Start: 2024-03-07 | End: 2024-03-07

## 2024-03-07 RX ORDER — HALOPERIDOL DECANOATE 100 MG/ML
2.5 INJECTION INTRAMUSCULAR ONCE
Refills: 0 | Status: COMPLETED | OUTPATIENT
Start: 2024-03-07 | End: 2024-03-07

## 2024-03-07 RX ORDER — SODIUM CHLORIDE 9 MG/ML
1000 INJECTION INTRAMUSCULAR; INTRAVENOUS; SUBCUTANEOUS ONCE
Refills: 0 | Status: COMPLETED | OUTPATIENT
Start: 2024-03-07 | End: 2024-03-07

## 2024-03-07 RX ORDER — FAMOTIDINE 10 MG/ML
20 INJECTION INTRAVENOUS ONCE
Refills: 0 | Status: COMPLETED | OUTPATIENT
Start: 2024-03-07 | End: 2024-03-07

## 2024-03-07 RX ADMIN — Medication 10 MILLIGRAM(S): at 12:52

## 2024-03-07 RX ADMIN — MORPHINE SULFATE 4 MILLIGRAM(S): 50 CAPSULE, EXTENDED RELEASE ORAL at 15:33

## 2024-03-07 RX ADMIN — MORPHINE SULFATE 2 MILLIGRAM(S): 50 CAPSULE, EXTENDED RELEASE ORAL at 18:46

## 2024-03-07 RX ADMIN — FAMOTIDINE 20 MILLIGRAM(S): 10 INJECTION INTRAVENOUS at 12:52

## 2024-03-07 RX ADMIN — MORPHINE SULFATE 2 MILLIGRAM(S): 50 CAPSULE, EXTENDED RELEASE ORAL at 19:10

## 2024-03-07 RX ADMIN — SODIUM CHLORIDE 1000 MILLILITER(S): 9 INJECTION INTRAMUSCULAR; INTRAVENOUS; SUBCUTANEOUS at 14:00

## 2024-03-07 RX ADMIN — Medication 4 UNIT(S): at 15:28

## 2024-03-07 RX ADMIN — Medication 15 MILLIGRAM(S): at 12:52

## 2024-03-07 RX ADMIN — MORPHINE SULFATE 4 MILLIGRAM(S): 50 CAPSULE, EXTENDED RELEASE ORAL at 19:10

## 2024-03-07 RX ADMIN — Medication 15 MILLIGRAM(S): at 14:00

## 2024-03-07 RX ADMIN — SODIUM CHLORIDE 1000 MILLILITER(S): 9 INJECTION INTRAMUSCULAR; INTRAVENOUS; SUBCUTANEOUS at 12:52

## 2024-03-07 NOTE — ED ADULT NURSE NOTE - HISTORY OF COVID-19 VACCINATION
Vaccine status unknown Differential Diagnosis thigh hematomas versus or with m. tear, less likely abscess

## 2024-03-07 NOTE — ED ADULT NURSE NOTE - OBJECTIVE STATEMENT
Pt BIBA from home, AOx4, responsive, ambulatory at baseline. Pt c/o severe bilateral upper abdominal pain, n/v/d since this morning. denies cp/sob/difficulty breathing, fever/chills.  nkda. hx of DM asthma gastritis ; pt arrived with IV 18g to left Arm with NS  running administered by EMS.

## 2024-03-07 NOTE — ED ADULT TRIAGE NOTE - CHIEF COMPLAINT QUOTE
BIBEMS from home c/o having abdominal pain N/V/D x this am pt hx of DM asthma gastritis  pt arrived with IV 18g to left Arm with NS  running

## 2024-03-07 NOTE — ED PROVIDER NOTE - ATTENDING CONTRIBUTION TO CARE
42yo male with pmh dm, htn, cad, gastroparesis, gastritis, cyclic vomiting, presenting with abdominal pain, n/v.  Here frequently with similar issue and was here a few days ago for same.  Pain returned after dc.  Mainly epigastrium.  Smoked marijuana since last discharge.  PShx gb.  Last visit ct showing enteritis.  Lower concern acute intraabdominal pathology at this time.  Likely recurrent episode, will get labs, medicate, reassess.

## 2024-03-07 NOTE — ED PROVIDER NOTE - CLINICAL SUMMARY MEDICAL DECISION MAKING FREE TEXT BOX
Nhan PGY3: 42yo M with PMH DM hx of DKA, HTN, CAD, gastroparesis/cyclic vomiting presents to ED for eval of abd pain, NV continuing from pior 3/2 episode with CT at that time showing enterocolitis. Plan for labs, IVF, Mg/phos, GI meds for gastritis and other GI issues including cannabis hyperemesis. If sxs not improving, would consider adv imaging repeat but likely continued sxs.

## 2024-03-07 NOTE — ED PROVIDER NOTE - DISPOSITION TYPE
[address] [facility]     Physical Therapy  Cancellation/No-show Note  Patient Name:  Flor Villa  :  1969   Date:  2022  Cancelled visits to date: 2  No-shows to date: 0    Patient status for today's appointment patient:  [x]  Cancelled 2022,   []  Rescheduled appointment  []  No-show     Reason given by patient:  []  Patient ill  []  Conflicting appointment  []  No transportation    []  Conflict with work  []  No reason given  [x]  Other:    still in boot    : Pt on a oral steroid for now and must remain in her boot per PCP. Comments:      Phone call information:   []  Phone call made today to patient at _ time at number provided:      []  Patient answered, conversation as follows:    []  Patient did not answer, message left as follows:  [x]  Phone call not made today  []  Phone call not needed - pt contacted us to cancel and provided reason for cancellation.      Electronically signed by:  Anne Asher PT
DISCHARGE

## 2024-03-07 NOTE — ED PROVIDER NOTE - OBJECTIVE STATEMENT
42yo M with PMH DM hx of DKA, HTN, CAD, gastroparesis/cyclic vomiting presents to ED for eval of abd pain, NV. Was seen 3/2 with similar sxs and dx with enterocolitis. Was tx with morphine and ultimately dc. Still having pain, assoc with vomiting and diarrhea. No fever, CP, SOB. Pain localizes to epigastrium. Not tolerating PO. Last used marijuana before prior visit, hx of GB removal.

## 2024-03-07 NOTE — ED PROVIDER NOTE - NSFOLLOWUPINSTRUCTIONS_ED_ALL_ED_FT
Followup with gastroenterologist and your primary care doctor.     Please return to the emergency department immediately should you feel worse in any way or have any of the following symptoms:    •	especially increased or different pain  •	 fevers  •	persistent vomiting  •	shaking chills     Please follow up with the Doctor listed within the time frame specified. Thank you for coming to the emergency department. We hope you are feeling improved and continue to get better. Have a nice day.

## 2024-03-07 NOTE — ED PROVIDER NOTE - PHYSICAL EXAMINATION
CONSTITUTIONAL: uncomfortable appearing, non-toxic   SKIN: Warm dry  HEAD: NCAT  EYES: NL inspection  ENT: dry oral mucosa  NECK: Supple  CARD: RRR, no murmurs appreciated  RESP: CTAB  ABD: diffuse abd ttp, no significant guarding or distension, +epigastric ttp   EXT: no LE edema  NEURO: Grossly non-focal   PSYCH: Cooperative, irritable

## 2024-03-07 NOTE — ED PROVIDER NOTE - CARE PROVIDER_API CALL
Troy Castellanos  Gastroenterology  [address] [facility] [address], [address]  [place] [address]  Phone: [phone]  Fax: [phone]  Follow Up Time: 7-10 Days

## 2024-03-07 NOTE — ED PROVIDER NOTE - PATIENT PORTAL LINK FT
You can access the FollowMyHealth Patient Portal offered by [facility] by registering at the following website: [email or web address]. By joining [employer]’s FollowMyHealth portal, you will also be able to view your health information using other applications (apps) compatible with our system.

## 2024-03-07 NOTE — ED PROVIDER NOTE - PROGRESS NOTE DETAILS
tolerated Po intake, pain controlled. he is requesting a refill of his insulin prescription. will send refill. Conversation had with patient regarding results of testing. Patient agrees with plan for discharge at this time. Patient agrees to comply with follow up with PCP. Return to ED precautions and discharge instructions given to patient.

## 2024-03-11 NOTE — PRE-OP CHECKLIST - PATIENT SENT TO
Take entire course of antibiotics as directed.  Follow-up with ophthalmology for repeat evaluation.  Return to the ER immediately if you have worsening symptoms, acute visual changes, double vision, or per your best judgment.   operating room endoscopy

## 2024-03-13 NOTE — ED ADULT TRIAGE NOTE - HEIGHT IN CM
177.8 Communicate Risk of Fall with Harm to all staff/Monitor gait and stability/Move patient closer to nurses' station/Reinforce activity limits and safety measures with patient and family/Tailored Fall Risk Interventions/Visual Cue: Yellow wristband and red socks/Bed in lowest position, wheels locked, appropriate side rails in place/Call bell, personal items and telephone in reach/Instruct patient to call for assistance before getting out of bed or chair/Non-slip footwear when patient is out of bed/[place] to call system/Physically safe environment - no spills, clutter or unnecessary equipment/Purposeful Proactive Rounding/Room/bathroom lighting operational, light cord in reach

## 2024-03-20 NOTE — ED ADULT TRIAGE NOTE - NS ED TRIAGE AVPU SCALE
Alert-The patient is alert, awake and responds to voice. The patient is oriented to time, place, and person. The triage nurse is able to obtain subjective information.
achiness/FEVER

## 2024-04-06 NOTE — ED ADULT NURSE NOTE - NS_SISCREENINGSR_GEN_ALL_ED
Handoff report received from day shift nurse. Pt care assumed. Pt is currently resting in bed awake and NAD noted. POC discussed with Pt and Pt verbalizes no questions or needs at this time. Pt is AAOx4, on 2L oxymask for comfort, on Tele monitoring with rate and rhythm verified, and VSS. Call light and belongings within reach, bed in lowest and locked position, fall precautions in place and Pt educated on use of call light. Hourly rounding in place.    Negative

## 2024-04-29 ENCOUNTER — EMERGENCY (EMERGENCY)
Facility: HOSPITAL | Age: 44
LOS: 0 days | Discharge: HOME | End: 2024-04-29
Attending: STUDENT IN AN ORGANIZED HEALTH CARE EDUCATION/TRAINING PROGRAM
Payer: MEDICAID

## 2024-04-29 VITALS
WEIGHT: 160.06 LBS | TEMPERATURE: 99 F | RESPIRATION RATE: 19 BRPM | HEIGHT: 71 IN | DIASTOLIC BLOOD PRESSURE: 90 MMHG | HEART RATE: 77 BPM | SYSTOLIC BLOOD PRESSURE: 135 MMHG | OXYGEN SATURATION: 100 %

## 2024-04-29 VITALS
RESPIRATION RATE: 17 BRPM | HEART RATE: 71 BPM | DIASTOLIC BLOOD PRESSURE: 85 MMHG | SYSTOLIC BLOOD PRESSURE: 125 MMHG | OXYGEN SATURATION: 98 % | TEMPERATURE: 99 F

## 2024-04-29 DIAGNOSIS — I10 ESSENTIAL (PRIMARY) HYPERTENSION: ICD-10-CM

## 2024-04-29 DIAGNOSIS — I25.10 ATHEROSCLEROTIC HEART DISEASE OF NATIVE CORONARY ARTERY WITHOUT ANGINA PECTORIS: ICD-10-CM

## 2024-04-29 DIAGNOSIS — R10.9 UNSPECIFIED ABDOMINAL PAIN: ICD-10-CM

## 2024-04-29 DIAGNOSIS — Z90.49 ACQUIRED ABSENCE OF OTHER SPECIFIED PARTS OF DIGESTIVE TRACT: Chronic | ICD-10-CM

## 2024-04-29 DIAGNOSIS — Z91.018 ALLERGY TO OTHER FOODS: ICD-10-CM

## 2024-04-29 DIAGNOSIS — R11.15 CYCLICAL VOMITING SYNDROME UNRELATED TO MIGRAINE: ICD-10-CM

## 2024-04-29 DIAGNOSIS — E11.9 TYPE 2 DIABETES MELLITUS WITHOUT COMPLICATIONS: ICD-10-CM

## 2024-04-29 LAB
ALBUMIN SERPL ELPH-MCNC: 3.7 G/DL — SIGNIFICANT CHANGE UP (ref 3.3–5)
ALP SERPL-CCNC: 101 U/L — SIGNIFICANT CHANGE UP (ref 40–120)
ALT FLD-CCNC: 74 U/L — SIGNIFICANT CHANGE UP (ref 12–78)
ANION GAP SERPL CALC-SCNC: 6 MMOL/L — SIGNIFICANT CHANGE UP (ref 5–17)
AST SERPL-CCNC: 74 U/L — HIGH (ref 15–37)
BASOPHILS # BLD AUTO: 0.08 K/UL — SIGNIFICANT CHANGE UP (ref 0–0.2)
BASOPHILS NFR BLD AUTO: 0.6 % — SIGNIFICANT CHANGE UP (ref 0–2)
BILIRUB SERPL-MCNC: 0.6 MG/DL — SIGNIFICANT CHANGE UP (ref 0.2–1.2)
BUN SERPL-MCNC: 12 MG/DL — SIGNIFICANT CHANGE UP (ref 7–23)
CALCIUM SERPL-MCNC: 9.6 MG/DL — SIGNIFICANT CHANGE UP (ref 8.5–10.1)
CHLORIDE SERPL-SCNC: 107 MMOL/L — SIGNIFICANT CHANGE UP (ref 96–108)
CO2 SERPL-SCNC: 26 MMOL/L — SIGNIFICANT CHANGE UP (ref 22–31)
CREAT SERPL-MCNC: 0.81 MG/DL — SIGNIFICANT CHANGE UP (ref 0.5–1.3)
EGFR: 112 ML/MIN/1.73M2 — SIGNIFICANT CHANGE UP
EOSINOPHIL # BLD AUTO: 0.08 K/UL — SIGNIFICANT CHANGE UP (ref 0–0.5)
EOSINOPHIL NFR BLD AUTO: 0.6 % — SIGNIFICANT CHANGE UP (ref 0–6)
GLUCOSE SERPL-MCNC: 117 MG/DL — HIGH (ref 70–99)
HCT VFR BLD CALC: 42.6 % — SIGNIFICANT CHANGE UP (ref 39–50)
HGB BLD-MCNC: 14.8 G/DL — SIGNIFICANT CHANGE UP (ref 13–17)
IMM GRANULOCYTES NFR BLD AUTO: 0.2 % — SIGNIFICANT CHANGE UP (ref 0–0.9)
LIDOCAIN IGE QN: 17 U/L — SIGNIFICANT CHANGE UP (ref 13–75)
LYMPHOCYTES # BLD AUTO: 1.74 K/UL — SIGNIFICANT CHANGE UP (ref 1–3.3)
LYMPHOCYTES # BLD AUTO: 13.6 % — SIGNIFICANT CHANGE UP (ref 13–44)
MCHC RBC-ENTMCNC: 28.7 PG — SIGNIFICANT CHANGE UP (ref 27–34)
MCHC RBC-ENTMCNC: 34.7 G/DL — SIGNIFICANT CHANGE UP (ref 32–36)
MCV RBC AUTO: 82.7 FL — SIGNIFICANT CHANGE UP (ref 80–100)
MONOCYTES # BLD AUTO: 1.22 K/UL — HIGH (ref 0–0.9)
MONOCYTES NFR BLD AUTO: 9.5 % — SIGNIFICANT CHANGE UP (ref 2–14)
NEUTROPHILS # BLD AUTO: 9.69 K/UL — HIGH (ref 1.8–7.4)
NEUTROPHILS NFR BLD AUTO: 75.5 % — SIGNIFICANT CHANGE UP (ref 43–77)
NRBC # BLD: 0 /100 WBCS — SIGNIFICANT CHANGE UP (ref 0–0)
PLATELET # BLD AUTO: 319 K/UL — SIGNIFICANT CHANGE UP (ref 150–400)
POTASSIUM SERPL-MCNC: 4.1 MMOL/L — SIGNIFICANT CHANGE UP (ref 3.5–5.3)
POTASSIUM SERPL-SCNC: 4.1 MMOL/L — SIGNIFICANT CHANGE UP (ref 3.5–5.3)
PROT SERPL-MCNC: 7.6 GM/DL — SIGNIFICANT CHANGE UP (ref 6–8.3)
RBC # BLD: 5.15 M/UL — SIGNIFICANT CHANGE UP (ref 4.2–5.8)
RBC # FLD: 14.6 % — HIGH (ref 10.3–14.5)
SODIUM SERPL-SCNC: 139 MMOL/L — SIGNIFICANT CHANGE UP (ref 135–145)
WBC # BLD: 12.84 K/UL — HIGH (ref 3.8–10.5)
WBC # FLD AUTO: 12.84 K/UL — HIGH (ref 3.8–10.5)

## 2024-04-29 PROCEDURE — 99284 EMERGENCY DEPT VISIT MOD MDM: CPT

## 2024-04-29 RX ORDER — SODIUM CHLORIDE 9 MG/ML
1000 INJECTION INTRAMUSCULAR; INTRAVENOUS; SUBCUTANEOUS ONCE
Refills: 0 | Status: COMPLETED | OUTPATIENT
Start: 2024-04-29 | End: 2024-04-29

## 2024-04-29 RX ORDER — ONDANSETRON 8 MG/1
4 TABLET, FILM COATED ORAL ONCE
Refills: 0 | Status: COMPLETED | OUTPATIENT
Start: 2024-04-29 | End: 2024-04-29

## 2024-04-29 RX ORDER — MORPHINE SULFATE 50 MG/1
4 CAPSULE, EXTENDED RELEASE ORAL ONCE
Refills: 0 | Status: DISCONTINUED | OUTPATIENT
Start: 2024-04-29 | End: 2024-04-29

## 2024-04-29 RX ORDER — HALOPERIDOL DECANOATE 100 MG/ML
5 INJECTION INTRAMUSCULAR ONCE
Refills: 0 | Status: COMPLETED | OUTPATIENT
Start: 2024-04-29 | End: 2024-04-29

## 2024-04-29 RX ADMIN — MORPHINE SULFATE 4 MILLIGRAM(S): 50 CAPSULE, EXTENDED RELEASE ORAL at 15:50

## 2024-04-29 RX ADMIN — SODIUM CHLORIDE 1000 MILLILITER(S): 9 INJECTION INTRAMUSCULAR; INTRAVENOUS; SUBCUTANEOUS at 14:56

## 2024-04-29 RX ADMIN — HALOPERIDOL DECANOATE 5 MILLIGRAM(S): 100 INJECTION INTRAMUSCULAR at 14:56

## 2024-04-29 RX ADMIN — SODIUM CHLORIDE 1000 MILLILITER(S): 9 INJECTION INTRAMUSCULAR; INTRAVENOUS; SUBCUTANEOUS at 15:50

## 2024-04-29 RX ADMIN — ONDANSETRON 4 MILLIGRAM(S): 8 TABLET, FILM COATED ORAL at 14:56

## 2024-04-29 RX ADMIN — MORPHINE SULFATE 4 MILLIGRAM(S): 50 CAPSULE, EXTENDED RELEASE ORAL at 14:56

## 2024-04-29 NOTE — ED PROVIDER NOTE - PROGRESS NOTE DETAILS
Ronald: Pt care signed out to me at change of shift, pending PO challenge. Pt tolerates PO intake in the ED. Stable for d/c home. Given / instructed for close outpatient GI and PCP f/u. Return signs / symptoms d/w pt. He understands / agrees w/ this plan.

## 2024-04-29 NOTE — ED PROVIDER NOTE - CARE PROVIDER_API CALL
Hosea Mcfadden  Gastroenterology  [address] [place] [id number]  Phone: [phone]  Fax: [phone]  Follow Up Time: 7-10 Days

## 2024-04-29 NOTE — ED PROVIDER NOTE - CLINICAL SUMMARY MEDICAL DECISION MAKING FREE TEXT BOX
44 y/o M with PMH DM, HTN, CAD, cyclic vomiting, presenting to the ED w/ abd pain and vomiting.   Vitals stable.  He is well appearing in NAD.  Abdomen is soft w/o focal tenderness.  Plan for labs/symptom control  Defer imaging as sx consistent w/ known hx of cyclical vomiting and no focal abd tenderness

## 2024-04-29 NOTE — ED PROVIDER NOTE - PATIENT PORTAL LINK FT
You can access the FollowMyHealth Patient Portal offered by [facility] by registering at the following website: [email or web address]. By joining [employer]’s FollowMyHealth portal, you will also be able to view your health information using other applications (apps) compatible with our system.

## 2024-04-29 NOTE — ED ADULT NURSE NOTE - DRUG PRE-SCREENING (DAST -1)
[place] [address]  [address] [place], [place] [address]  [phone] Phone  [phone] Fax          Date: 2/16/2024      Dear   Magdalene Campos MD    We would like to inform you that your patient, Yovani Powell was admitted to [facility] on the following date: 4/16/2024. The patient was admitted to the service of Pediatric Gastro with concern for Constipation.    You will be updated with any important changes in your patient's status and at the time of discharge. Thank you for the privilege of caring for your patient. Please do not hesitate to contact us if you desire any additional information.     Attending Physician Name: Rosalino Hansen MD  Attending Physician Phone Number: [phone]    Sincerely,  Briana Hinton  Division [place]     
Statement Selected

## 2024-04-29 NOTE — ED ADULT NURSE NOTE - OBJECTIVE STATEMENT
Pt AOx4 BIBEMS c/o abdominal pain that began this morning. Pt reports N/V and hx of gastritis. States symptoms occur every once in a while. Pt denies SOB, fever/chills, headache/dizziness, chest pain, or dysuria. EMS placed IV to L forearm #20 before arrival.

## 2024-04-29 NOTE — ED ADULT NURSE NOTE - CAS EDN DISCHARGE ASSESSMENT
NAD noted, pt passed PO challenge/Alert and oriented to person, place and time/Patient baseline mental status/Awake

## 2024-04-29 NOTE — ED PROVIDER NOTE - OBJECTIVE STATEMENT
42 y/o M with PMH DM, HTN, CAD, cyclic vomiting, presenting to the ED w/ abd pain and vomiting. States his symptoms began this morning and feel similar to previous episodes. No fever or chills. No diarrhea. Did not try taking any medications prior to arrival.

## 2024-04-30 LAB — GLUCOSE BLDC GLUCOMTR-MCNC: 115 MG/DL — HIGH (ref 70–99)

## 2024-04-30 NOTE — PATIENT PROFILE ADULT - NSPROGENBLOODRESTRICT_GEN_A_NUR
Returned call. Answered questions.  
She needs to discuss completion of patient [facility] wellness paperwork.   
none

## 2024-05-01 NOTE — ED PROVIDER NOTE - NSICDXFAMILYHX_GEN_ALL_CORE_FT
"  Problem: Pain  Goal: My pain/discomfort is manageable  Outcome: Progressing     Problem: Safety  Goal: I will remain free of falls  Outcome: Progressing     Problem: Daily Care  Goal: Daily care needs are met  Outcome: Progressing   The patient's goals for the shift include \"to go home\"    The clinical goals for the shift include maintain safety    Over the shift, the patient did make progress toward the following goals.     " FAMILY HISTORY:  Father  Still living? Unknown  Family history of diabetes mellitus, Age at diagnosis: Age Unknown  Family history of diabetes mellitus (DM), Age at diagnosis: Age Unknown    Mother  Still living? Yes, Estimated age: Age Unknown  Family history of diabetes mellitus, Age at diagnosis: Age Unknown  Family history of diabetes mellitus (DM), Age at diagnosis: Age Unknown    Grandparent  Still living? Unknown  Family history of diabetes mellitus (DM), Age at diagnosis: Age Unknown

## 2024-05-07 ENCOUNTER — EMERGENCY (EMERGENCY)
Facility: HOSPITAL | Age: 44
LOS: 0 days | Discharge: HOME | End: 2024-05-07
Attending: STUDENT IN AN ORGANIZED HEALTH CARE EDUCATION/TRAINING PROGRAM
Payer: MEDICAID

## 2024-05-07 VITALS
DIASTOLIC BLOOD PRESSURE: 70 MMHG | RESPIRATION RATE: 14 BRPM | OXYGEN SATURATION: 100 % | TEMPERATURE: 99 F | SYSTOLIC BLOOD PRESSURE: 106 MMHG | HEART RATE: 82 BPM

## 2024-05-07 VITALS
WEIGHT: 160.06 LBS | HEART RATE: 64 BPM | TEMPERATURE: 98 F | HEIGHT: 71 IN | OXYGEN SATURATION: 100 % | DIASTOLIC BLOOD PRESSURE: 96 MMHG | RESPIRATION RATE: 18 BRPM | SYSTOLIC BLOOD PRESSURE: 162 MMHG

## 2024-05-07 DIAGNOSIS — R11.2 NAUSEA WITH VOMITING, UNSPECIFIED: ICD-10-CM

## 2024-05-07 DIAGNOSIS — M25.561 PAIN IN RIGHT KNEE: ICD-10-CM

## 2024-05-07 DIAGNOSIS — E11.9 TYPE 2 DIABETES MELLITUS WITHOUT COMPLICATIONS: ICD-10-CM

## 2024-05-07 DIAGNOSIS — R00.1 BRADYCARDIA, UNSPECIFIED: ICD-10-CM

## 2024-05-07 DIAGNOSIS — E73.9 LACTOSE INTOLERANCE, UNSPECIFIED: ICD-10-CM

## 2024-05-07 DIAGNOSIS — Z90.49 ACQUIRED ABSENCE OF OTHER SPECIFIED PARTS OF DIGESTIVE TRACT: Chronic | ICD-10-CM

## 2024-05-07 DIAGNOSIS — R19.7 DIARRHEA, UNSPECIFIED: ICD-10-CM

## 2024-05-07 DIAGNOSIS — Z79.4 LONG TERM (CURRENT) USE OF INSULIN: ICD-10-CM

## 2024-05-07 DIAGNOSIS — Z87.19 PERSONAL HISTORY OF OTHER DISEASES OF THE DIGESTIVE SYSTEM: ICD-10-CM

## 2024-05-07 DIAGNOSIS — Z91.018 ALLERGY TO OTHER FOODS: ICD-10-CM

## 2024-05-07 DIAGNOSIS — Z90.49 ACQUIRED ABSENCE OF OTHER SPECIFIED PARTS OF DIGESTIVE TRACT: ICD-10-CM

## 2024-05-07 DIAGNOSIS — R10.84 GENERALIZED ABDOMINAL PAIN: ICD-10-CM

## 2024-05-07 DIAGNOSIS — F41.9 ANXIETY DISORDER, UNSPECIFIED: ICD-10-CM

## 2024-05-07 LAB
ALBUMIN SERPL ELPH-MCNC: 4.3 G/DL — SIGNIFICANT CHANGE UP (ref 3.3–5)
ALP SERPL-CCNC: 101 U/L — SIGNIFICANT CHANGE UP (ref 40–120)
ALT FLD-CCNC: 42 U/L — SIGNIFICANT CHANGE UP (ref 12–78)
ANION GAP SERPL CALC-SCNC: 9 MMOL/L — SIGNIFICANT CHANGE UP (ref 5–17)
AST SERPL-CCNC: 36 U/L — SIGNIFICANT CHANGE UP (ref 15–37)
BASOPHILS # BLD AUTO: 0.04 K/UL — SIGNIFICANT CHANGE UP (ref 0–0.2)
BASOPHILS NFR BLD AUTO: 0.3 % — SIGNIFICANT CHANGE UP (ref 0–2)
BILIRUB SERPL-MCNC: 0.6 MG/DL — SIGNIFICANT CHANGE UP (ref 0.2–1.2)
BUN SERPL-MCNC: 17 MG/DL — SIGNIFICANT CHANGE UP (ref 7–23)
CALCIUM SERPL-MCNC: 9.6 MG/DL — SIGNIFICANT CHANGE UP (ref 8.5–10.1)
CHLORIDE SERPL-SCNC: 104 MMOL/L — SIGNIFICANT CHANGE UP (ref 96–108)
CO2 SERPL-SCNC: 27 MMOL/L — SIGNIFICANT CHANGE UP (ref 22–31)
CREAT SERPL-MCNC: 1.1 MG/DL — SIGNIFICANT CHANGE UP (ref 0.5–1.3)
EGFR: 85 ML/MIN/1.73M2 — SIGNIFICANT CHANGE UP
EOSINOPHIL # BLD AUTO: 0.03 K/UL — SIGNIFICANT CHANGE UP (ref 0–0.5)
EOSINOPHIL NFR BLD AUTO: 0.3 % — SIGNIFICANT CHANGE UP (ref 0–6)
GLUCOSE SERPL-MCNC: 215 MG/DL — HIGH (ref 70–99)
HCT VFR BLD CALC: 45.4 % — SIGNIFICANT CHANGE UP (ref 39–50)
HGB BLD-MCNC: 15.5 G/DL — SIGNIFICANT CHANGE UP (ref 13–17)
IMM GRANULOCYTES NFR BLD AUTO: 0.3 % — SIGNIFICANT CHANGE UP (ref 0–0.9)
LIDOCAIN IGE QN: 22 U/L — SIGNIFICANT CHANGE UP (ref 13–75)
LYMPHOCYTES # BLD AUTO: 1.32 K/UL — SIGNIFICANT CHANGE UP (ref 1–3.3)
LYMPHOCYTES # BLD AUTO: 11.4 % — LOW (ref 13–44)
MAGNESIUM SERPL-MCNC: 2.4 MG/DL — SIGNIFICANT CHANGE UP (ref 1.6–2.6)
MCHC RBC-ENTMCNC: 28.7 PG — SIGNIFICANT CHANGE UP (ref 27–34)
MCHC RBC-ENTMCNC: 34.1 G/DL — SIGNIFICANT CHANGE UP (ref 32–36)
MCV RBC AUTO: 84.1 FL — SIGNIFICANT CHANGE UP (ref 80–100)
MONOCYTES # BLD AUTO: 0.18 K/UL — SIGNIFICANT CHANGE UP (ref 0–0.9)
MONOCYTES NFR BLD AUTO: 1.6 % — LOW (ref 2–14)
NEUTROPHILS # BLD AUTO: 9.96 K/UL — HIGH (ref 1.8–7.4)
NEUTROPHILS NFR BLD AUTO: 86.1 % — HIGH (ref 43–77)
NRBC # BLD: 0 /100 WBCS — SIGNIFICANT CHANGE UP (ref 0–0)
PLATELET # BLD AUTO: 348 K/UL — SIGNIFICANT CHANGE UP (ref 150–400)
POTASSIUM SERPL-MCNC: 3.9 MMOL/L — SIGNIFICANT CHANGE UP (ref 3.5–5.3)
POTASSIUM SERPL-SCNC: 3.9 MMOL/L — SIGNIFICANT CHANGE UP (ref 3.5–5.3)
PROT SERPL-MCNC: 8.6 GM/DL — HIGH (ref 6–8.3)
RBC # BLD: 5.4 M/UL — SIGNIFICANT CHANGE UP (ref 4.2–5.8)
RBC # FLD: 14.4 % — SIGNIFICANT CHANGE UP (ref 10.3–14.5)
SODIUM SERPL-SCNC: 140 MMOL/L — SIGNIFICANT CHANGE UP (ref 135–145)
WBC # BLD: 11.56 K/UL — HIGH (ref 3.8–10.5)
WBC # FLD AUTO: 11.56 K/UL — HIGH (ref 3.8–10.5)

## 2024-05-07 PROCEDURE — 93010 ELECTROCARDIOGRAM REPORT: CPT

## 2024-05-07 PROCEDURE — 99285 EMERGENCY DEPT VISIT HI MDM: CPT

## 2024-05-07 RX ORDER — ACETAMINOPHEN 500 MG
1000 TABLET ORAL ONCE
Refills: 0 | Status: COMPLETED | OUTPATIENT
Start: 2024-05-07 | End: 2024-05-07

## 2024-05-07 RX ORDER — ONDANSETRON 8 MG/1
4 TABLET, FILM COATED ORAL ONCE
Refills: 0 | Status: COMPLETED | OUTPATIENT
Start: 2024-05-07 | End: 2024-05-07

## 2024-05-07 RX ORDER — SODIUM CHLORIDE 9 MG/ML
1000 INJECTION INTRAMUSCULAR; INTRAVENOUS; SUBCUTANEOUS ONCE
Refills: 0 | Status: COMPLETED | OUTPATIENT
Start: 2024-05-07 | End: 2024-05-07

## 2024-05-07 RX ORDER — HALOPERIDOL DECANOATE 100 MG/ML
5 INJECTION INTRAMUSCULAR ONCE
Refills: 0 | Status: COMPLETED | OUTPATIENT
Start: 2024-05-07 | End: 2024-05-07

## 2024-05-07 RX ORDER — PANTOPRAZOLE SODIUM 20 MG/1
40 TABLET, DELAYED RELEASE ORAL ONCE
Refills: 0 | Status: COMPLETED | OUTPATIENT
Start: 2024-05-07 | End: 2024-05-07

## 2024-05-07 RX ADMIN — Medication 400 MILLIGRAM(S): at 09:54

## 2024-05-07 RX ADMIN — Medication 1000 MILLIGRAM(S): at 10:30

## 2024-05-07 RX ADMIN — PANTOPRAZOLE SODIUM 40 MILLIGRAM(S): 20 TABLET, DELAYED RELEASE ORAL at 10:13

## 2024-05-07 RX ADMIN — SODIUM CHLORIDE 1000 MILLILITER(S): 9 INJECTION INTRAMUSCULAR; INTRAVENOUS; SUBCUTANEOUS at 11:05

## 2024-05-07 RX ADMIN — SODIUM CHLORIDE 1000 MILLILITER(S): 9 INJECTION INTRAMUSCULAR; INTRAVENOUS; SUBCUTANEOUS at 09:54

## 2024-05-07 RX ADMIN — HALOPERIDOL DECANOATE 5 MILLIGRAM(S): 100 INJECTION INTRAMUSCULAR at 09:54

## 2024-05-07 NOTE — ED PROVIDER NOTE - NSFOLLOWUPINSTRUCTIONS_ED_ALL_ED_FT
You were evaluated for abdominal pain, nausea, vomiting, and diarrhea. Your labs did not show any emergent findings.    1) Follow up with your doctor this week  2) Return to the ED immediately for new or worsening symptoms  3) Please continue to take any home medications as prescribed  4) Your test results from your ED visit were discussed with you prior to discharge  5) You were provided with a copy of your test results  6) Please stay well hydrated  7) Slowly advance your diet from liquids up to solid foods as your symptoms resolve    Abdominal Pain    Many things can cause abdominal pain. Many times, abdominal pain is not caused by a disease and will improve without treatment. Your health care provider will do a physical exam to determine if there is a dangerous cause of your pain; blood tests and imaging may help determine the cause of your pain. However, in many cases, no cause may be found and you may need further testing as an outpatient. Monitor your abdominal pain for any changes.     SEEK IMMEDIATE MEDICAL CARE IF YOU HAVE ANY OF THE FOLLOWING SYMPTOMS: worsening abdominal pain, uncontrollable vomiting, profuse diarrhea, inability to have bowel movements or pass gas, black or bloody stools, fever accompanying chest pain or back pain, or fainting. These symptoms may represent a serious problem that is an emergency. Do not wait to see if the symptoms will go away. Get medical help right away. Call 911 and do not drive yourself to the hospital.    Nausea / Vomiting    Nausea is the feeling that you have to vomit. As nausea gets worse, it can lead to vomiting. Vomiting puts you at an increased risk for dehydration. Older adults and people with other diseases or a weak immune system are at higher risk for dehydration. Drink clear fluids in small but frequent amounts as tolerated. Eat bland, easy-to-digest foods in small amounts as tolerated.    SEEK IMMEDIATE MEDICAL CARE IF YOU HAVE ANY OF THE FOLLOWING SYMPTOMS: fever, inability to keep sufficient fluids down, black or bloody vomitus, black or bloody stools, lightheadedness/dizziness, chest pain, severe headache, rash, shortness of breath, cold or clammy skin, confusion, pain with urination, or any signs of dehydration.    Diarrhea    Diarrhea is frequent loose or watery bowel movements that has many causes. Diarrhea can make you feel weak and cause you to become dehydrated. Diarrhea typically lasts 2–3 days, but can last longer if it is a sign of something more serious. Drink clear fluids to prevent dehydration. Eat bland, easy-to-digest foods as tolerated.     SEEK IMMEDIATE MEDICAL CARE IF YOU HAVE ANY OF THE FOLLOWING SYMPTOMS: high fevers, lightheadedness/dizziness, chest pain, black or bloody stools, shortness of breath, severe abdominal or back pain, or any signs of dehydration.
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
## 2024-05-07 NOTE — ED PROVIDER NOTE - PROGRESS NOTE DETAILS
Attending Sravan: labs w/ no emergent findings. pt slept comfortably after meds. woke up feeling much more comfortable. stating he still feels unwell, but refused offered zofran. refused to try to PO challenge. stating he will just leave. Attending Sravan: labs w/ no emergent findings. pt slept comfortably after meds. woke up feeling much more comfortable. stating he still feels unwell, but refused offered zofran. refused to try to PO challenge. stating he will just leave. Will DC.

## 2024-05-07 NOTE — ED PROVIDER NOTE - PATIENT PORTAL LINK FT
You can access the FollowMyHealth Patient Portal offered by [facility] by registering at the following website: [email or web address]. By joining [employer]’s FollowMyHealth portal, you will also be able to view your health information using other applications (apps) compatible with our system.
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
## 2024-05-07 NOTE — ED PROVIDER NOTE - PHYSICAL EXAMINATION
Gen: NAD, AOx3, able to make needs known, non-toxic  Head: NCAT  HEENT: EOMI, oral mucosa moist, normal conjunctiva  Lung: no respiratory distress, CTAB, no wheezes/rhonchi/rales B/L, speaking in full sentences  CV: RRR, no murmurs  Abd: non distended, soft, diffuse mild tenderness, no guarding, no CVA tenderness  MSK: no visible deformities  Neuro: Appears non focal  Skin: Warm, well perfused  Psych: normal affect

## 2024-05-07 NOTE — ED ADULT TRIAGE NOTE - CHIEF COMPLAINT QUOTE
Patient BIB EMS for abdominal pain with N/V/D since 4am and R leg pain (chronic). .  PMH: gastritis, DM with insulin pump.
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
## 2024-05-19 ENCOUNTER — EMERGENCY (EMERGENCY)
Facility: HOSPITAL | Age: 44
LOS: 0 days | Discharge: HOME | End: 2024-05-19
Attending: STUDENT IN AN ORGANIZED HEALTH CARE EDUCATION/TRAINING PROGRAM
Payer: MEDICAID

## 2024-05-19 VITALS
RESPIRATION RATE: 19 BRPM | SYSTOLIC BLOOD PRESSURE: 183 MMHG | TEMPERATURE: 98 F | WEIGHT: 153 LBS | OXYGEN SATURATION: 99 % | HEART RATE: 66 BPM | DIASTOLIC BLOOD PRESSURE: 88 MMHG | HEIGHT: 71 IN

## 2024-05-19 VITALS
RESPIRATION RATE: 19 BRPM | DIASTOLIC BLOOD PRESSURE: 79 MMHG | HEART RATE: 81 BPM | SYSTOLIC BLOOD PRESSURE: 126 MMHG | TEMPERATURE: 98 F | OXYGEN SATURATION: 100 %

## 2024-05-19 DIAGNOSIS — I25.10 ATHEROSCLEROTIC HEART DISEASE OF NATIVE CORONARY ARTERY WITHOUT ANGINA PECTORIS: ICD-10-CM

## 2024-05-19 DIAGNOSIS — R11.2 NAUSEA WITH VOMITING, UNSPECIFIED: ICD-10-CM

## 2024-05-19 DIAGNOSIS — J45.909 UNSPECIFIED ASTHMA, UNCOMPLICATED: ICD-10-CM

## 2024-05-19 DIAGNOSIS — E11.43 TYPE 2 DIABETES MELLITUS WITH DIABETIC AUTONOMIC (POLY)NEUROPATHY: ICD-10-CM

## 2024-05-19 DIAGNOSIS — Z20.822 CONTACT WITH AND (SUSPECTED) EXPOSURE TO COVID-19: ICD-10-CM

## 2024-05-19 DIAGNOSIS — K31.84 GASTROPARESIS: ICD-10-CM

## 2024-05-19 DIAGNOSIS — Z91.011 ALLERGY TO MILK PRODUCTS: ICD-10-CM

## 2024-05-19 DIAGNOSIS — Z90.49 ACQUIRED ABSENCE OF OTHER SPECIFIED PARTS OF DIGESTIVE TRACT: ICD-10-CM

## 2024-05-19 DIAGNOSIS — F41.9 ANXIETY DISORDER, UNSPECIFIED: ICD-10-CM

## 2024-05-19 DIAGNOSIS — E11.9 TYPE 2 DIABETES MELLITUS WITHOUT COMPLICATIONS: ICD-10-CM

## 2024-05-19 DIAGNOSIS — Z90.49 ACQUIRED ABSENCE OF OTHER SPECIFIED PARTS OF DIGESTIVE TRACT: Chronic | ICD-10-CM

## 2024-05-19 DIAGNOSIS — I10 ESSENTIAL (PRIMARY) HYPERTENSION: ICD-10-CM

## 2024-05-19 LAB
ALBUMIN SERPL ELPH-MCNC: 4.2 G/DL — SIGNIFICANT CHANGE UP (ref 3.3–5)
ALP SERPL-CCNC: 94 U/L — SIGNIFICANT CHANGE UP (ref 40–120)
ALT FLD-CCNC: 30 U/L — SIGNIFICANT CHANGE UP (ref 12–78)
ANION GAP SERPL CALC-SCNC: 5 MMOL/L — SIGNIFICANT CHANGE UP (ref 5–17)
APPEARANCE UR: CLEAR — SIGNIFICANT CHANGE UP
AST SERPL-CCNC: 26 U/L — SIGNIFICANT CHANGE UP (ref 15–37)
BASOPHILS # BLD AUTO: 0.03 K/UL — SIGNIFICANT CHANGE UP (ref 0–0.2)
BASOPHILS NFR BLD AUTO: 0.3 % — SIGNIFICANT CHANGE UP (ref 0–2)
BILIRUB SERPL-MCNC: 0.4 MG/DL — SIGNIFICANT CHANGE UP (ref 0.2–1.2)
BILIRUB UR-MCNC: NEGATIVE — SIGNIFICANT CHANGE UP
BUN SERPL-MCNC: 17 MG/DL — SIGNIFICANT CHANGE UP (ref 7–23)
CALCIUM SERPL-MCNC: 9.3 MG/DL — SIGNIFICANT CHANGE UP (ref 8.5–10.1)
CHLORIDE SERPL-SCNC: 107 MMOL/L — SIGNIFICANT CHANGE UP (ref 96–108)
CO2 SERPL-SCNC: 28 MMOL/L — SIGNIFICANT CHANGE UP (ref 22–31)
COLOR SPEC: YELLOW — SIGNIFICANT CHANGE UP
CREAT SERPL-MCNC: 1.05 MG/DL — SIGNIFICANT CHANGE UP (ref 0.5–1.3)
DIFF PNL FLD: NEGATIVE — SIGNIFICANT CHANGE UP
EGFR: 90 ML/MIN/1.73M2 — SIGNIFICANT CHANGE UP
EOSINOPHIL # BLD AUTO: 0.05 K/UL — SIGNIFICANT CHANGE UP (ref 0–0.5)
EOSINOPHIL NFR BLD AUTO: 0.5 % — SIGNIFICANT CHANGE UP (ref 0–6)
GLUCOSE SERPL-MCNC: 203 MG/DL — HIGH (ref 70–99)
GLUCOSE UR QL: >=1000 MG/DL
HCT VFR BLD CALC: 44.7 % — SIGNIFICANT CHANGE UP (ref 39–50)
HGB BLD-MCNC: 14.8 G/DL — SIGNIFICANT CHANGE UP (ref 13–17)
IMM GRANULOCYTES NFR BLD AUTO: 0.3 % — SIGNIFICANT CHANGE UP (ref 0–0.9)
KETONES UR-MCNC: 15 MG/DL
LEUKOCYTE ESTERASE UR-ACNC: NEGATIVE — SIGNIFICANT CHANGE UP
LYMPHOCYTES # BLD AUTO: 1.5 K/UL — SIGNIFICANT CHANGE UP (ref 1–3.3)
LYMPHOCYTES # BLD AUTO: 15.5 % — SIGNIFICANT CHANGE UP (ref 13–44)
MCHC RBC-ENTMCNC: 28.5 PG — SIGNIFICANT CHANGE UP (ref 27–34)
MCHC RBC-ENTMCNC: 33.1 G/DL — SIGNIFICANT CHANGE UP (ref 32–36)
MCV RBC AUTO: 86.1 FL — SIGNIFICANT CHANGE UP (ref 80–100)
MONOCYTES # BLD AUTO: 0.39 K/UL — SIGNIFICANT CHANGE UP (ref 0–0.9)
MONOCYTES NFR BLD AUTO: 4 % — SIGNIFICANT CHANGE UP (ref 2–14)
NEUTROPHILS # BLD AUTO: 7.69 K/UL — HIGH (ref 1.8–7.4)
NEUTROPHILS NFR BLD AUTO: 79.4 % — HIGH (ref 43–77)
NITRITE UR-MCNC: NEGATIVE — SIGNIFICANT CHANGE UP
NRBC # BLD: 0 /100 WBCS — SIGNIFICANT CHANGE UP (ref 0–0)
PH UR: 7.5 — SIGNIFICANT CHANGE UP (ref 5–8)
PLATELET # BLD AUTO: 306 K/UL — SIGNIFICANT CHANGE UP (ref 150–400)
POTASSIUM SERPL-MCNC: 4.4 MMOL/L — SIGNIFICANT CHANGE UP (ref 3.5–5.3)
POTASSIUM SERPL-SCNC: 4.4 MMOL/L — SIGNIFICANT CHANGE UP (ref 3.5–5.3)
PROT SERPL-MCNC: 8.3 GM/DL — SIGNIFICANT CHANGE UP (ref 6–8.3)
PROT UR-MCNC: SIGNIFICANT CHANGE UP MG/DL
RBC # BLD: 5.19 M/UL — SIGNIFICANT CHANGE UP (ref 4.2–5.8)
RBC # FLD: 15.2 % — HIGH (ref 10.3–14.5)
SODIUM SERPL-SCNC: 140 MMOL/L — SIGNIFICANT CHANGE UP (ref 135–145)
SP GR SPEC: >1.03 — HIGH (ref 1–1.03)
UROBILINOGEN FLD QL: 0.2 MG/DL — SIGNIFICANT CHANGE UP (ref 0.2–1)
WBC # BLD: 9.69 K/UL — SIGNIFICANT CHANGE UP (ref 3.8–10.5)
WBC # FLD AUTO: 9.69 K/UL — SIGNIFICANT CHANGE UP (ref 3.8–10.5)

## 2024-05-19 PROCEDURE — 74177 CT ABD & PELVIS W/CONTRAST: CPT | Mod: 26,MC

## 2024-05-19 PROCEDURE — 93010 ELECTROCARDIOGRAM REPORT: CPT

## 2024-05-19 PROCEDURE — 99285 EMERGENCY DEPT VISIT HI MDM: CPT

## 2024-05-19 RX ORDER — METOCLOPRAMIDE HCL 10 MG
10 TABLET ORAL ONCE
Refills: 0 | Status: COMPLETED | OUTPATIENT
Start: 2024-05-19 | End: 2024-05-19

## 2024-05-19 RX ORDER — SODIUM CHLORIDE 9 MG/ML
1000 INJECTION INTRAMUSCULAR; INTRAVENOUS; SUBCUTANEOUS ONCE
Refills: 0 | Status: COMPLETED | OUTPATIENT
Start: 2024-05-19 | End: 2024-05-19

## 2024-05-19 RX ORDER — ONDANSETRON 8 MG/1
4 TABLET, FILM COATED ORAL ONCE
Refills: 0 | Status: COMPLETED | OUTPATIENT
Start: 2024-05-19 | End: 2024-05-19

## 2024-05-19 RX ORDER — MORPHINE SULFATE 50 MG/1
4 CAPSULE, EXTENDED RELEASE ORAL ONCE
Refills: 0 | Status: DISCONTINUED | OUTPATIENT
Start: 2024-05-19 | End: 2024-05-19

## 2024-05-19 RX ORDER — HALOPERIDOL DECANOATE 100 MG/ML
5 INJECTION INTRAMUSCULAR ONCE
Refills: 0 | Status: COMPLETED | OUTPATIENT
Start: 2024-05-19 | End: 2024-05-19

## 2024-05-19 RX ORDER — ONDANSETRON 8 MG/1
1 TABLET, FILM COATED ORAL
Qty: 12 | Refills: 0
Start: 2024-05-19 | End: 2024-05-22

## 2024-05-19 RX ORDER — ACETAMINOPHEN 500 MG
1000 TABLET ORAL ONCE
Refills: 0 | Status: COMPLETED | OUTPATIENT
Start: 2024-05-19 | End: 2024-05-19

## 2024-05-19 RX ORDER — ACETAMINOPHEN 500 MG
1 TABLET ORAL
Qty: 21 | Refills: 0
Start: 2024-05-19 | End: 2024-05-25

## 2024-05-19 RX ADMIN — SODIUM CHLORIDE 1000 MILLILITER(S): 9 INJECTION INTRAMUSCULAR; INTRAVENOUS; SUBCUTANEOUS at 11:52

## 2024-05-19 RX ADMIN — MORPHINE SULFATE 4 MILLIGRAM(S): 50 CAPSULE, EXTENDED RELEASE ORAL at 14:07

## 2024-05-19 RX ADMIN — MORPHINE SULFATE 4 MILLIGRAM(S): 50 CAPSULE, EXTENDED RELEASE ORAL at 11:52

## 2024-05-19 RX ADMIN — ONDANSETRON 4 MILLIGRAM(S): 8 TABLET, FILM COATED ORAL at 15:53

## 2024-05-19 RX ADMIN — Medication 1000 MILLIGRAM(S): at 16:05

## 2024-05-19 RX ADMIN — ONDANSETRON 4 MILLIGRAM(S): 8 TABLET, FILM COATED ORAL at 11:52

## 2024-05-19 RX ADMIN — HALOPERIDOL DECANOATE 5 MILLIGRAM(S): 100 INJECTION INTRAMUSCULAR at 16:38

## 2024-05-19 RX ADMIN — Medication 10 MILLIGRAM(S): at 14:07

## 2024-05-19 RX ADMIN — Medication 400 MILLIGRAM(S): at 15:53

## 2024-05-19 RX ADMIN — Medication 1000 MILLIGRAM(S): at 16:23

## 2024-05-19 RX ADMIN — SODIUM CHLORIDE 1000 MILLILITER(S): 9 INJECTION INTRAMUSCULAR; INTRAVENOUS; SUBCUTANEOUS at 15:53

## 2024-05-19 RX ADMIN — SODIUM CHLORIDE 1000 MILLILITER(S): 9 INJECTION INTRAMUSCULAR; INTRAVENOUS; SUBCUTANEOUS at 13:24

## 2024-05-19 NOTE — ED ADULT NURSE REASSESSMENT NOTE - NS ED NURSE REASSESS COMMENT FT1
pt reports that he is "feeling better" at this time. pt reports that his wife will come to pick him up. Hep Lock removed and d/c papers reviewed and signed. pt provided with work/school note. safety maintained.

## 2024-05-19 NOTE — ED PROVIDER NOTE - CLINICAL SUMMARY MEDICAL DECISION MAKING FREE TEXT BOX
44 y/o M hx of cyclic vomiting, DM, HTN, CAD, gastroparesis presents to abdominal pain for abdominal pain. endorsing middle of abdomen, for past 1 day. endorsing nausea , non-bloody/non-bilious emesis multiple episodes today. last marijuana use was 3 weeks ago per patient. denies changes in bowel movements. denies chest pain/sob. denies fever/chills.   tenderness over epigastric region, no rebound or guarding.   ct abd/pelvis - consider colitis. consider gastritis/pancreatitis. meds, check labs, fluids  will reassess. 42 y/o M hx of cyclic vomiting, DM, HTN, CAD, gastroparesis presents to abdominal pain for abdominal pain. endorsing middle of abdomen, for past 1 day. endorsing nausea , non-bloody/non-bilious emesis multiple episodes today. last marijuana use was 3 weeks ago per patient. denies changes in bowel movements. denies chest pain/sob. denies fever/chills.   tenderness over epigastric region, no rebound or guarding.   ct abd/pelvis - consider colitis. consider gastritis/pancreatitis. meds, check labs, fluids  will reassess.  progress note: symptoms improved s/p haldol. patient endorsed states he was "around marjiuana use". able to tolerate Po. symptoms resolved. enteritis seen on ct abd/pelvis. f/u with primary/GI outpatient.   does not require admission.   Conversation had with patient regarding results of testing. Patient agrees with plan for discharge at this time. Patient agrees to comply with follow up with PCP. Return to ED precautions and discharge instructions given to patient.

## 2024-05-19 NOTE — ED ADULT NURSE NOTE - OBJECTIVE STATEMENT
43yM A&Ox3 presenting to ED with abd pain. pt reports the pain is around his umbilical area. pt denies marijuana use and states, "I haven't had any marijuana in over 3 weeks." pt has DM and has pump, BG reading is 202 right now. pt denies recent drug/alcohol use. pt denies chest pain, sob, dizziness, weakness, blurred vision, N+T, increased work of breathing, back pain, h/a, recent fever/cough,  symptoms.

## 2024-05-19 NOTE — ED PROVIDER NOTE - CARE PROVIDER_API CALL
Troy Castellanos  Gastroenterology  [address], [address]  [place] [address]  Phone: [phone]  Fax: [phone]  Follow Up Time: 7-10 Days    [place]  Internal Medicine  [address] [place] [address]  Phone: [phone]  Fax: [phone]  Follow Up Time: 7-10 Days

## 2024-05-19 NOTE — ED PROVIDER NOTE - PROVIDER TOKENS
PROVIDER:[TOKEN:[[id number]],FOLLOWUP:[7-10 Days]],PROVIDER:[TOKEN:[[id number]],FOLLOWUP:[7-10 Days]]

## 2024-05-19 NOTE — ED PROVIDER NOTE - PATIENT PORTAL LINK FT
You can access the FollowMyHealth Patient Portal offered by [facility] by registering at the following website: [email or web address]. By joining [employer]’s FollowMyHealth portal, you will also be able to view your health information using other applications (apps) compatible with our system.

## 2024-05-19 NOTE — ED PROVIDER NOTE - PROGRESS NOTE DETAILS
symptoms improved s/p haldol. patient endorsed states he was "around marjiuana use". able to tolerate Po. symptoms resolved. enteritis seen on ct abd/pelvis. f/u with primary/GI outpatient.

## 2024-05-19 NOTE — ED PROVIDER NOTE - NSFOLLOWUPINSTRUCTIONS_ED_ALL_ED_FT
can take zofran every 8 hours for nausea only as needed  can take acetaminophen every 8 hours for pain only as needed.   followup with primary/gastroenterologist in next 7 days.     Please return to the emergency department immediately should you feel worse in any way or have any of the following symptoms:    •	especially increased or different pain  •	 fevers  •	persistent vomiting  •	shaking chills     Please follow up with the Doctor listed within the time frame specified. Thank you for coming to the emergency department. We hope you are feeling improved and continue to get better. Have a nice day.    Nausea / Vomiting    Nausea is the feeling that you have to vomit. As nausea gets worse, it can lead to vomiting. Vomiting puts you at an increased risk for dehydration. Older adults and people with other diseases or a weak immune system are at higher risk for dehydration. Drink clear fluids in small but frequent amounts as tolerated. Eat bland, easy-to-digest foods in small amounts as tolerated.    SEEK IMMEDIATE MEDICAL CARE IF YOU HAVE ANY OF THE FOLLOWING SYMPTOMS: fever, inability to keep sufficient fluids down, black or bloody vomitus, black or bloody stools, lightheadedness/dizziness, chest pain, severe headache, rash, shortness of breath, cold or clammy skin, confusion, pain with urination, or any signs of dehydration.

## 2024-05-19 NOTE — ED PROVIDER NOTE - PHYSICAL EXAMINATION
General: Well appearing male in no acute distress  HEENT: Normocephalic, atraumatic. Moist mucous membranes. Oropharynx clear. No lymphadenopathy.  Eyes: No scleral icterus. EOMI. KAMI.  Neck:. Soft and supple. Full ROM without pain. No midline tenderness  Cardiac: Regular rate and regular rhythm. No murmurs, rubs, gallops. Peripheral pulses 2+ and symmetric. No LE edema.  Resp: Lungs CTAB. Speaking in full sentences. No wheezes, rales or rhonchi.  Abd: Soft, +tender epigastric region, non-distended. No guarding or rebound. No scars, masses, or lesions.  Back: Spine midline and non-tender. No CVA tenderness.    Skin: No rashes, abrasions, or lacerations.  Neuro: AO x 3. Moves all extremities symmetrically. Motor strength and sensation grossly intact.

## 2024-05-20 LAB
CULTURE RESULTS: SIGNIFICANT CHANGE UP
SPECIMEN SOURCE: SIGNIFICANT CHANGE UP

## 2024-05-28 ENCOUNTER — TRANSCRIPTION ENCOUNTER (OUTPATIENT)
Age: 44
End: 2024-05-28

## 2024-06-02 NOTE — ED ADULT NURSE NOTE - BOWEL SOUNDS LUQ
Patient requests all Lab, Cardiology, and Radiology Results on their Discharge Instructions hyperactive

## 2024-06-08 NOTE — ED PROVIDER NOTE - NEURO NEGATIVE STATEMENT, MLM
Problem: Adult Inpatient Plan of Care  Goal: Plan of Care Review  Outcome: Progressing  Goal: Patient-Specific Goal (Individualized)  Outcome: Progressing  Goal: Absence of Hospital-Acquired Illness or Injury  Outcome: Progressing  Goal: Optimal Comfort and Wellbeing  Outcome: Progressing  Goal: Readiness for Transition of Care  Outcome: Progressing     Problem: Infection  Goal: Absence of Infection Signs and Symptoms  Outcome: Progressing     Problem: Skin Injury Risk Increased  Goal: Skin Health and Integrity  Outcome: Progressing     Problem: Fall Injury Risk  Goal: Absence of Fall and Fall-Related Injury  Outcome: Progressing      no loss of consciousness, no gait abnormality, no headache, no sensory deficits, and no weakness.

## 2024-06-20 ENCOUNTER — EMERGENCY (EMERGENCY)
Facility: HOSPITAL | Age: 44
LOS: 0 days | Discharge: HOME | End: 2024-06-20
Attending: STUDENT IN AN ORGANIZED HEALTH CARE EDUCATION/TRAINING PROGRAM
Payer: MEDICAID

## 2024-06-20 VITALS
RESPIRATION RATE: 15 BRPM | HEIGHT: 71 IN | SYSTOLIC BLOOD PRESSURE: 133 MMHG | WEIGHT: 154.98 LBS | OXYGEN SATURATION: 99 % | TEMPERATURE: 99 F | DIASTOLIC BLOOD PRESSURE: 83 MMHG | HEART RATE: 61 BPM

## 2024-06-20 VITALS
OXYGEN SATURATION: 99 % | DIASTOLIC BLOOD PRESSURE: 82 MMHG | TEMPERATURE: 99 F | HEART RATE: 67 BPM | SYSTOLIC BLOOD PRESSURE: 148 MMHG | RESPIRATION RATE: 17 BRPM

## 2024-06-20 DIAGNOSIS — R11.10 VOMITING, UNSPECIFIED: ICD-10-CM

## 2024-06-20 DIAGNOSIS — D72.829 ELEVATED WHITE BLOOD CELL COUNT, UNSPECIFIED: ICD-10-CM

## 2024-06-20 DIAGNOSIS — Z91.018 ALLERGY TO OTHER FOODS: ICD-10-CM

## 2024-06-20 DIAGNOSIS — K31.84 GASTROPARESIS: ICD-10-CM

## 2024-06-20 DIAGNOSIS — Z90.49 ACQUIRED ABSENCE OF OTHER SPECIFIED PARTS OF DIGESTIVE TRACT: Chronic | ICD-10-CM

## 2024-06-20 DIAGNOSIS — E11.43 TYPE 2 DIABETES MELLITUS WITH DIABETIC AUTONOMIC (POLY)NEUROPATHY: ICD-10-CM

## 2024-06-20 DIAGNOSIS — Z79.4 LONG TERM (CURRENT) USE OF INSULIN: ICD-10-CM

## 2024-06-20 DIAGNOSIS — K52.9 NONINFECTIVE GASTROENTERITIS AND COLITIS, UNSPECIFIED: ICD-10-CM

## 2024-06-20 DIAGNOSIS — R10.9 UNSPECIFIED ABDOMINAL PAIN: ICD-10-CM

## 2024-06-20 LAB
ALBUMIN SERPL ELPH-MCNC: 3.6 G/DL — SIGNIFICANT CHANGE UP (ref 3.3–5)
ALP SERPL-CCNC: 104 U/L — SIGNIFICANT CHANGE UP (ref 40–120)
ALT FLD-CCNC: 27 U/L — SIGNIFICANT CHANGE UP (ref 12–78)
ANION GAP SERPL CALC-SCNC: 10 MMOL/L — SIGNIFICANT CHANGE UP (ref 5–17)
AST SERPL-CCNC: 18 U/L — SIGNIFICANT CHANGE UP (ref 15–37)
BASOPHILS # BLD AUTO: 0.02 K/UL — SIGNIFICANT CHANGE UP (ref 0–0.2)
BASOPHILS NFR BLD AUTO: 0.2 % — SIGNIFICANT CHANGE UP (ref 0–2)
BILIRUB SERPL-MCNC: 0.4 MG/DL — SIGNIFICANT CHANGE UP (ref 0.2–1.2)
BUN SERPL-MCNC: 22 MG/DL — SIGNIFICANT CHANGE UP (ref 7–23)
CALCIUM SERPL-MCNC: 9.5 MG/DL — SIGNIFICANT CHANGE UP (ref 8.5–10.1)
CHLORIDE SERPL-SCNC: 101 MMOL/L — SIGNIFICANT CHANGE UP (ref 96–108)
CO2 SERPL-SCNC: 26 MMOL/L — SIGNIFICANT CHANGE UP (ref 22–31)
CREAT SERPL-MCNC: 1.18 MG/DL — SIGNIFICANT CHANGE UP (ref 0.5–1.3)
EGFR: 79 ML/MIN/1.73M2 — SIGNIFICANT CHANGE UP
EOSINOPHIL # BLD AUTO: 0 K/UL — SIGNIFICANT CHANGE UP (ref 0–0.5)
EOSINOPHIL NFR BLD AUTO: 0 % — SIGNIFICANT CHANGE UP (ref 0–6)
GLUCOSE SERPL-MCNC: 281 MG/DL — HIGH (ref 70–99)
HCT VFR BLD CALC: 44.2 % — SIGNIFICANT CHANGE UP (ref 39–50)
HGB BLD-MCNC: 15.7 G/DL — SIGNIFICANT CHANGE UP (ref 13–17)
IMM GRANULOCYTES NFR BLD AUTO: 0.3 % — SIGNIFICANT CHANGE UP (ref 0–0.9)
LACTATE SERPL-SCNC: 1.3 MMOL/L — SIGNIFICANT CHANGE UP (ref 0.7–2)
LIDOCAIN IGE QN: 10 U/L — LOW (ref 13–75)
LYMPHOCYTES # BLD AUTO: 1.67 K/UL — SIGNIFICANT CHANGE UP (ref 1–3.3)
LYMPHOCYTES # BLD AUTO: 13.3 % — SIGNIFICANT CHANGE UP (ref 13–44)
MCHC RBC-ENTMCNC: 29.1 PG — SIGNIFICANT CHANGE UP (ref 27–34)
MCHC RBC-ENTMCNC: 35.5 G/DL — SIGNIFICANT CHANGE UP (ref 32–36)
MCV RBC AUTO: 82 FL — SIGNIFICANT CHANGE UP (ref 80–100)
MONOCYTES # BLD AUTO: 0.64 K/UL — SIGNIFICANT CHANGE UP (ref 0–0.9)
MONOCYTES NFR BLD AUTO: 5.1 % — SIGNIFICANT CHANGE UP (ref 2–14)
NEUTROPHILS # BLD AUTO: 10.14 K/UL — HIGH (ref 1.8–7.4)
NEUTROPHILS NFR BLD AUTO: 81.1 % — HIGH (ref 43–77)
NRBC # BLD: 0 /100 WBCS — SIGNIFICANT CHANGE UP (ref 0–0)
PLATELET # BLD AUTO: 321 K/UL — SIGNIFICANT CHANGE UP (ref 150–400)
POTASSIUM SERPL-MCNC: 3.5 MMOL/L — SIGNIFICANT CHANGE UP (ref 3.5–5.3)
POTASSIUM SERPL-SCNC: 3.5 MMOL/L — SIGNIFICANT CHANGE UP (ref 3.5–5.3)
PROT SERPL-MCNC: 7.6 GM/DL — SIGNIFICANT CHANGE UP (ref 6–8.3)
RBC # BLD: 5.39 M/UL — SIGNIFICANT CHANGE UP (ref 4.2–5.8)
RBC # FLD: 13.2 % — SIGNIFICANT CHANGE UP (ref 10.3–14.5)
SODIUM SERPL-SCNC: 137 MMOL/L — SIGNIFICANT CHANGE UP (ref 135–145)
WBC # BLD: 12.51 K/UL — HIGH (ref 3.8–10.5)
WBC # FLD AUTO: 12.51 K/UL — HIGH (ref 3.8–10.5)

## 2024-06-20 PROCEDURE — 99285 EMERGENCY DEPT VISIT HI MDM: CPT | Mod: 25

## 2024-06-20 PROCEDURE — 74177 CT ABD & PELVIS W/CONTRAST: CPT | Mod: 26,MC

## 2024-06-20 PROCEDURE — 93010 ELECTROCARDIOGRAM REPORT: CPT

## 2024-06-20 RX ORDER — CLONAZEPAM 1 MG
1 TABLET ORAL
Refills: 0 | DISCHARGE

## 2024-06-20 RX ORDER — FAMOTIDINE 10 MG/ML
20 INJECTION INTRAVENOUS ONCE
Refills: 0 | Status: COMPLETED | OUTPATIENT
Start: 2024-06-20 | End: 2024-06-20

## 2024-06-20 RX ORDER — ACETAMINOPHEN 500 MG
1000 TABLET ORAL ONCE
Refills: 0 | Status: COMPLETED | OUTPATIENT
Start: 2024-06-20 | End: 2024-06-20

## 2024-06-20 RX ORDER — SODIUM CHLORIDE 9 MG/ML
1000 INJECTION INTRAMUSCULAR; INTRAVENOUS; SUBCUTANEOUS ONCE
Refills: 0 | Status: COMPLETED | OUTPATIENT
Start: 2024-06-20 | End: 2024-06-20

## 2024-06-20 RX ORDER — MORPHINE SULFATE 50 MG/1
4 CAPSULE, EXTENDED RELEASE ORAL ONCE
Refills: 0 | Status: DISCONTINUED | OUTPATIENT
Start: 2024-06-20 | End: 2024-06-20

## 2024-06-20 RX ORDER — ONDANSETRON 8 MG/1
4 TABLET, FILM COATED ORAL ONCE
Refills: 0 | Status: COMPLETED | OUTPATIENT
Start: 2024-06-20 | End: 2024-06-20

## 2024-06-20 RX ORDER — METOCLOPRAMIDE HCL 10 MG
1 TABLET ORAL
Qty: 15 | Refills: 0
Start: 2024-06-20 | End: 2024-06-24

## 2024-06-20 RX ADMIN — SODIUM CHLORIDE 1000 MILLILITER(S): 9 INJECTION INTRAMUSCULAR; INTRAVENOUS; SUBCUTANEOUS at 01:30

## 2024-06-20 RX ADMIN — FAMOTIDINE 20 MILLIGRAM(S): 10 INJECTION INTRAVENOUS at 03:05

## 2024-06-20 RX ADMIN — Medication 30 MILLILITER(S): at 03:05

## 2024-06-20 RX ADMIN — MORPHINE SULFATE 4 MILLIGRAM(S): 50 CAPSULE, EXTENDED RELEASE ORAL at 03:45

## 2024-06-20 RX ADMIN — ONDANSETRON 4 MILLIGRAM(S): 8 TABLET, FILM COATED ORAL at 01:32

## 2024-06-20 RX ADMIN — Medication 400 MILLIGRAM(S): at 01:32

## 2024-06-20 NOTE — ED ADULT NURSE NOTE - OBJECTIVE STATEMENT
Pt A&Ox4. Pt complains of right mid abdominal pain N/V since this AM. Pt denies cp, difficulty breathing, SOB, diarrhea, dysuria, fever or chills at this time. PMH asthma, anxiety, DM. NKDA.

## 2024-06-20 NOTE — ED PROVIDER NOTE - OBJECTIVE STATEMENT
43 M pmh DM, gastritis, cyclic vomiting presenting to the ED for abdominal pain and vomiting x 1 day. 12 episodes of NBNB emesis

## 2024-06-20 NOTE — ED PROVIDER NOTE - CLINICAL SUMMARY MEDICAL DECISION MAKING FREE TEXT BOX
male w/ DM, cyclic vomiting presenting w/ epigastric pain    concern for gastritis, intraabdominal infection, r/o ACS, hypeglycemia  EKG NSR  labs significant for leukocytosis  pain control  CT abd/pelvis  PO trial male w/ DM, cyclic vomiting presenting w/ epigastric pain    concern for gastritis, intraabdominal infection, r/o ACS, hypeglycemia  EKG NSR  labs significant for leukocytosis  pain control  CT abd/pelvis revealing gastroenteritis and possible small bowel ileus, no obstruction  PO trial passed and patient passing gas  offered admission for management of ileus but patient on home reglan. placn to continue meds and follow-up as outpatient

## 2024-06-20 NOTE — ED PROVIDER NOTE - PATIENT PORTAL LINK FT
You can access the FollowMyHealth Patient Portal offered by [facility] by registering at the following website: [email or web address]. By joining [employer]’s FollowMyHealth portal, you will also be able to view your health information using other applications (apps) compatible with our system.

## 2024-06-20 NOTE — ED PROVIDER NOTE - CONSIDERATION OF ADMISSION OBSERVATION
considered admission for Consideration of Admission/Observation considered admission for ileus and inability to tolerate PO or pass stool. patient able to take in food and passing gas. offered admission, patient requesting to use his home reglan and states that he will follow-up with primary doctor. return precautions given

## 2024-06-20 NOTE — CONSULT NOTE ADULT - ASSESSMENT
diabetes 1  [de-identified] : At this time, due to lateral meniscal tear of the left knee, I recommend rest, ice, topical anti-inflammatory, physical therapy and reassessment in four weeks to discuss the potential for further intervention.    THIS IS A REQUEST FOR AUTHORIZATION FOR PHYSICAL THERAPY FOR THE LEFT KNEE.  The Workers' Compensation guidelines have been followed.

## 2024-06-20 NOTE — ED ADULT NURSE NOTE - NSFALLUNIVINTERV_ED_ALL_ED
none known
Bed/Stretcher in lowest position, wheels locked, appropriate side rails in place/Call bell, personal items and telephone in reach/Instruct patient to call for assistance before getting out of bed/chair/stretcher/Non-slip footwear applied when patient is off stretcher/[place] to call system/Physically safe environment - no spills, clutter or unnecessary equipment/Purposeful proactive rounding/Room/bathroom lighting operational, light cord in reach

## 2024-06-20 NOTE — ED PROVIDER NOTE - PHYSICAL EXAMINATION
General: Well appearing male in no acute distress  HEENT: Normocephalic, atraumatic. Moist mucous membranes. Oropharynx clear. No lymphadenopathy.  Eyes: No scleral icterus. EOMI. KAMI.  Neck:. Soft and supple. Full ROM without pain. No midline tenderness  Cardiac: Regular rate and regular rhythm. No murmurs, rubs, gallops. Peripheral pulses 2+ and symmetric. No LE edema.  Resp: Lungs CTAB. Speaking in full sentences. No wheezes, rales or rhonchi.  Abd: Soft, non-tender, non-distended. No guarding or rebound.  Back: Spine midline and non-tender. No CVA tenderness.    Skin: No rashes, abrasions, or lacerations.  Neuro: AO x 3. Moves all extremities symmetrically. Motor strength and sensation grossly intact.

## 2024-06-20 NOTE — ED PROVIDER NOTE - NS ED ROS FT
General: Denies fever, chills  HEENT: Denies sensory changes, sore throat  Neck: Denies neck pain, neck stiffness  Resp: Denies coughing, SOB  Cardiovascular: Denies CP, palpitations, LE edema  GI: abd pain, vomiting  : Denies dysuria, hematuria, frequency, incontinence  MSK: Denies back pain  Neuro: Denies HA, dizziness, numbness, weakness  Skin: Denies rashes.

## 2024-06-26 NOTE — ED ADULT NURSE NOTE - NS ED NURSE LEVEL OF CONSCIOUSNESS SPEECH
Elvia from [facility] called to follow-up in the patients order for diabetic shoes. She is asking if this is going to be approved or not to provide an answer for the family. She stated this order has been faxed multiple times.    (See telephone encounter from 06/17/2024)- I am unable to add to the encounter.   Speaking Coherently

## 2024-07-22 NOTE — ED ADULT NURSE NOTE - JUGULAR VENOUS DISTENTION
Physical fitness: Walk of 15 minutes after dinner, 2 time a free weight workout with [employer] online on youtube using the modification  Nutrition: Consult with Helen with focus on portion sizes, discussing fresh dinner meal options and using Mediterranean diet approach  Please go to [email or web address] and complete their registration     absent

## 2024-07-23 NOTE — ED ADULT NURSE NOTE - NS PRO AD NO ADVANCE DIRECTIVE
July 23, 2024     JR Simpson  [address] [place] [address]    Patient: Geneva Knight   YOB: 1961   Date of Visit: 7/23/2024     Dear JR Simpson:       Thank you for referring Geneva Knight to me for evaluation. Below are the relevant portions of my assessment and plan of care.    If you have questions, please do not hesitate to call me. I look forward to following Geneva along with you.         Sincerely,        Hugo Yee DO        CC: No Recipients    Hugo Yee DO  07/23/24 1401  Sign when Signing Visit  07/23/2024      Rosalba Nelson APRN  [address] [place] [address]    Geneva Knight  1961    Chief Complaint   Patient presents with   • NEW PATIENT     Referred from Rosalba Nelson for Celiac Artery Stenosis. Testing done 4/19/24. Patient states that she has been having trouble stomach and digestive issues for past year. This was found after testing w/ PCP. Patient has never been a smoker.        Dear JR Simpson    HPI  I had the pleasure of seeing your patient Geneva Knight in the office today.  Thank you kindly for this consultation.  As you recall, Geneva Knight is a 62 y.o.  female who you are currently following for routine health maintenance.  She has been having come complaints of her stomach being slow to digest. She denies any significant abdominal pain with eating.  She denies any nausea or vomiting.  She denies weight loss or food fear. She is maintained on aspirin and Crestor. She did have noninvasive testing performed today, which I did personally review.        Past Medical History:   Diagnosis Date   • Ankle fracture     Right   • Anxiety    • Cellulitis     Right ankle   • Family history of colon cancer    • GERD (gastroesophageal reflux disease)    • History of adenomatous polyp of colon    • History of colon polyps    • HTN (hypertension)    • Hyperlipemia    • VIOLETA (obstructive sleep apnea)     cpap   •  Type 2 diabetes mellitus    • Vitamin D deficiency        Past Surgical History:   Procedure Laterality Date   • BREAST BIOPSY Right 08/30/2021    Procedure: EXCISIONAL RIGHT BREAST BIOPSY WITH NEEDLE LOCALIZATION - ULTRASOUND GUIDED;  Surgeon: Martine Roy MD;  Location: [facility] OR;  Service: General;  Laterality: Right;   • CHOLECYSTECTOMY  2004   • COLONOSCOPY N/A 12/18/2017    Three 4-7mm polyps in the rectum-path shows one tubular adenomatous polyp and two hyperplastic polyps; Congested mucosa in the rectum, in the recto-sigmoid colon and in the sigmoid colon-biopsied; Repeat 5 years   • COLONOSCOPY  07/25/2013    The entire examined colon is normal on direct and retroflexion views; Repeat 7-10 years   • COLONOSCOPY N/A 03/30/2023    One 4mm hyperplastic polyp in the rectum; Large lipoma in the ascending colon-biopsied; Repeat 5 years   • ENDOSCOPY N/A 04/25/2024    Grade I esophageal varices; Normal stomach; Normal examined duodenum; No specimens collected; No need to repeat endoscopy as she is on metoprolol   • EYE SURGERY Bilateral     To remove loose skin   • FOOT SURGERY Right 04/21/2021   • ORIF ANKLE FRACTURE Right        Family History   Problem Relation Age of Onset   • Breast cancer Mother    • Colon cancer Maternal Grandmother         In her 70's   • Breast cancer Maternal Grandmother    • Colon cancer Maternal Aunt         In her 50's   • Colon polyps Neg Hx    • Esophageal cancer Neg Hx    • Liver cancer Neg Hx    • Stomach cancer Neg Hx    • Liver disease Neg Hx    • Rectal cancer Neg Hx        Social History     Socioeconomic History   • Marital status:    Tobacco Use   • Smoking status: Never   • Smokeless tobacco: Never   Vaping Use   • Vaping status: Never Used   Substance and Sexual Activity   • Alcohol use: Not Currently   • Drug use: Never   • Sexual activity: Not Currently     Partners: Male     Birth control/protection: Other     Comment: menopausal       Allergies   Allergen  Reactions   • Azithromycin Rash   • Namenda [Memantine] Nausea Only and Headache         Current Outpatient Medications:   •  Ascorbic Acid (Vitamin C) 500 MG capsule, Take 1 capsule by mouth Daily., Disp: , Rfl:   •  aspirin 81 MG EC tablet, Take 1 tablet by mouth Daily., Disp: , Rfl:   •  B Complex Vitamins (VITAMIN B COMPLEX PO), Take 1 capsule by mouth Daily., Disp: , Rfl:   •  Coreg 3.125 MG tablet, Take 1 tablet twice a day by oral route for 90 days., Disp: , Rfl:   •  donepezil (ARICEPT) 10 MG tablet, Take 1 tablet by mouth Every Night., Disp: , Rfl:   •  escitalopram (LEXAPRO) 10 MG tablet, Take 1 tablet by mouth Daily., Disp: , Rfl:   •  lactulose (CHRONULAC) 10 GM/15ML solution, Take 30 mL by mouth 2 (Two) Times a Day., Disp: 946 mL, Rfl: 6  •  levocetirizine (XYZAL) 5 MG tablet, Take 1 tablet by mouth As Needed., Disp: , Rfl:   •  metFORMIN (GLUCOPHAGE) 1000 MG tablet, Take 1 tablet by mouth 2 (Two) Times a Day., Disp: , Rfl:   •  multivitamin with minerals tablet tablet, Take 1 tablet by mouth Daily., Disp: , Rfl:   •  rosuvastatin (CRESTOR) 5 MG tablet, Take 1 tablet by mouth Daily., Disp: , Rfl:   •  triamterene-hydrochlorothiazide (DYAZIDE) 37.5-25 MG per capsule, Take 1 capsule by mouth Daily., Disp: , Rfl:   •  Vitamin D, Cholecalciferol, 25 MCG (1000 UT) capsule, Take 1 capsule by mouth Daily., Disp: , Rfl:   •  Omega-3 Fatty Acids (fish oil) 1000 MG capsule capsule, Take 1 capsule by mouth Daily With Breakfast. (Patient not taking: Reported on 7/23/2024), Disp: , Rfl:   •  riFAXIMin (Xifaxan) 550 MG tablet, Take 1 tablet by mouth 2 (Two) Times a Day. (Patient not taking: Reported on 7/23/2024), Disp: 60 tablet, Rfl: 6    Review of Systems   Constitutional: Negative.    HENT: Negative.     Eyes: Negative.    Respiratory: Negative.     Cardiovascular: Negative.    Gastrointestinal: Negative.    Endocrine: Negative.    Genitourinary: Negative.    Musculoskeletal: Negative.    Skin: Negative.   "  Allergic/Immunologic: Negative.    Neurological: Negative.    Hematological: Negative.    Psychiatric/Behavioral: Negative.     All other systems reviewed and are negative.    /72   Pulse 74   Ht 167.6 cm (66\")   Wt 102 kg (225 lb)   SpO2 97%   BMI 36.32 kg/m²     Physical Exam  Vitals and nursing note reviewed.   Constitutional:       Appearance: She is well-developed.   HENT:      Head: Normocephalic and atraumatic.   Eyes:      General: No scleral icterus.     Pupils: Pupils are equal, round, and reactive to light.   Neck:      Thyroid: No thyromegaly.      Vascular: No carotid bruit or JVD.   Cardiovascular:      Rate and Rhythm: Normal rate and regular rhythm.      Pulses:           Carotid pulses are 2+ on the right side and 2+ on the left side.       Femoral pulses are 2+ on the right side and 2+ on the left side.       Popliteal pulses are 2+ on the right side and 2+ on the left side.        Dorsalis pedis pulses are 2+ on the right side and 2+ on the left side.        Posterior tibial pulses are 2+ on the right side and 2+ on the left side.      Heart sounds: Normal heart sounds.   Pulmonary:      Effort: Pulmonary effort is normal.      Breath sounds: Normal breath sounds.   Abdominal:      General: Bowel sounds are normal. There is no distension or abdominal bruit.      Palpations: Abdomen is soft. There is no mass.      Tenderness: There is no abdominal tenderness.   Musculoskeletal:         General: Normal range of motion.      Cervical back: Neck supple.   Lymphadenopathy:      Cervical: No cervical adenopathy.   Skin:     General: Skin is warm and dry.   Neurological:      Mental Status: She is alert and oriented to person, place, and time.      Cranial Nerves: No cranial nerve deficit.      Sensory: No sensory deficit.   Psychiatric:         Mood and Affect: Mood normal.         Behavior: Behavior normal.         Thought Content: Thought content normal.         Judgment: Judgment normal. "       Diagnostic Data:  EXAM/TECHNIQUE: CT abdomen without and with IV contrast     INDICATION: Elevated LFTs, hepatosplenomegaly     COMPARISON: None available.     DLP: 1505.96 mGy.cm. Automated exposure control was also utilized to  decrease patient radiation dose.     FINDINGS:     The lung bases are clear.     The liver is cirrhotic. No suspicious arterial enhancing liver lesion.  There is evidence of portal hypertension including recanalized  periumbilical vein, splenomegaly (15.7 cm), and gastroesophageal varices  development. No ascites.     Prior cholecystectomy. No biliary ductal dilatation.     3 cystic lesions are present in the pancreatic head, the largest of  which is 14 mm. Pancreas is otherwise unremarkable. The adrenal glands  appear normal.     No solid renal mass. No urolithiasis or hydronephrosis.     Visualized portion of the colon is unremarkable without evidence of  focal wall thickening or adjacent fat stranding. Normal appendix. No  abnormal small bowel distention or evidence of active small bowel  inflammation in the abdomen.     The abdominal aorta is nonaneurysmal. There is severe stenosis of the  celiac artery related to diaphragm compression. The SMA is widely  patent. The TRAVIS is widely patent. Both renal arteries are widely patent.  No enlarged abdominal lymph nodes.     No acute abdominal wall soft tissue abnormality. No acute osseous  finding.     IMPRESSION:     1.  Liver is cirrhotic. There is sequela of portal hypertension  including splenomegaly and gastroesophageal varices. No ascites. No  suspicious arterial enhancing liver lesion.     2.  3 cystic lesions in the pancreatic head are present, the largest of  which measures 14 mm. 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  Recommend a follow-up CT or MRI in 1 year based on ACR white paper  criteria.     3.  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     This  report was signed and finalized on 4/19/2024 8:50 AM by Dr. Jose Cruz Shearer MD.       Patient Active Problem List   Diagnosis   • Nausea   • Memory deficit   • Adenomatous colon polyp   • Elevated LFTs   • Thrombocytopenia   • Steatosis of liver   • Hepatosplenomegaly   • Other cirrhosis of liver   • IPMN (intraductal papillary mucinous neoplasm)   • Secondary esophageal varices without bleeding        Diagnosis Plan   1. Median arcuate ligament syndrome        2. Bilateral carotid artery stenosis  US Carotid Bilateral          Plan: After thoroughly evaluating Geneva Knight, I believe the best course of action is to remain conservative from vascular surgery standpoint.  Overall, it does not sound like she is symptomatic with regards to median arcuate ligament syndrome.  I do see compression noted with poststenotic dilatation.  I did review her testing and this was found incidentally when working up elevated LFTs.  She is following with gastroenterology as well.  She has had no weight loss, nausea, vomiting or significant postprandial pain.  We will see her back in 1 year and at that time order a screening carotid duplex.  The patient is to continue taking their medications as previously discussed.   This was all discussed in full with complete understanding.  Thank you for allowing me to participate in the care of your patient.  Please do not hesitate to call with any questions or concerns.  We will keep you aware of any further encounters with Geneva Knight.        Sincerely yours,         DO Celi Soto Jeffrey L, MD     No

## 2024-08-20 NOTE — ED PROVIDER NOTE - NS ED MD DISPO DISCHARGE
Patient called requesting refill on     amLODIPine (NORVASC) 5 MG tablet 135 tablet 1       olmesartan (BENICAR) 20 MG tablet 180 tablet 0     U[place] [facility] [place] [facility] [phone] ar   Home

## 2024-08-21 ENCOUNTER — EMERGENCY (EMERGENCY)
Facility: HOSPITAL | Age: 44
LOS: 0 days | Discharge: HOME | End: 2024-08-21
Attending: STUDENT IN AN ORGANIZED HEALTH CARE EDUCATION/TRAINING PROGRAM
Payer: MEDICAID

## 2024-08-21 VITALS
RESPIRATION RATE: 18 BRPM | SYSTOLIC BLOOD PRESSURE: 110 MMHG | OXYGEN SATURATION: 98 % | TEMPERATURE: 99 F | DIASTOLIC BLOOD PRESSURE: 72 MMHG | HEART RATE: 87 BPM

## 2024-08-21 VITALS
DIASTOLIC BLOOD PRESSURE: 92 MMHG | HEART RATE: 64 BPM | RESPIRATION RATE: 20 BRPM | TEMPERATURE: 98 F | OXYGEN SATURATION: 99 % | SYSTOLIC BLOOD PRESSURE: 129 MMHG

## 2024-08-21 DIAGNOSIS — I25.10 ATHEROSCLEROTIC HEART DISEASE OF NATIVE CORONARY ARTERY WITHOUT ANGINA PECTORIS: ICD-10-CM

## 2024-08-21 DIAGNOSIS — Z90.49 ACQUIRED ABSENCE OF OTHER SPECIFIED PARTS OF DIGESTIVE TRACT: Chronic | ICD-10-CM

## 2024-08-21 DIAGNOSIS — E10.43 TYPE 1 DIABETES MELLITUS WITH DIABETIC AUTONOMIC (POLY)NEUROPATHY: ICD-10-CM

## 2024-08-21 DIAGNOSIS — E73.9 LACTOSE INTOLERANCE, UNSPECIFIED: ICD-10-CM

## 2024-08-21 DIAGNOSIS — Z87.19 PERSONAL HISTORY OF OTHER DISEASES OF THE DIGESTIVE SYSTEM: ICD-10-CM

## 2024-08-21 DIAGNOSIS — I10 ESSENTIAL (PRIMARY) HYPERTENSION: ICD-10-CM

## 2024-08-21 DIAGNOSIS — Z91.018 ALLERGY TO OTHER FOODS: ICD-10-CM

## 2024-08-21 DIAGNOSIS — R10.9 UNSPECIFIED ABDOMINAL PAIN: ICD-10-CM

## 2024-08-21 DIAGNOSIS — R11.10 VOMITING, UNSPECIFIED: ICD-10-CM

## 2024-08-21 DIAGNOSIS — R11.2 NAUSEA WITH VOMITING, UNSPECIFIED: ICD-10-CM

## 2024-08-21 DIAGNOSIS — E78.5 HYPERLIPIDEMIA, UNSPECIFIED: ICD-10-CM

## 2024-08-21 DIAGNOSIS — K31.84 GASTROPARESIS: ICD-10-CM

## 2024-08-21 LAB
ALBUMIN SERPL ELPH-MCNC: 4.2 G/DL — SIGNIFICANT CHANGE UP (ref 3.3–5)
ALP SERPL-CCNC: 100 U/L — SIGNIFICANT CHANGE UP (ref 40–120)
ALT FLD-CCNC: 34 U/L — SIGNIFICANT CHANGE UP (ref 12–78)
ANION GAP SERPL CALC-SCNC: 5 MMOL/L — SIGNIFICANT CHANGE UP (ref 5–17)
AST SERPL-CCNC: 35 U/L — SIGNIFICANT CHANGE UP (ref 15–37)
BASOPHILS # BLD AUTO: 0.04 K/UL — SIGNIFICANT CHANGE UP (ref 0–0.2)
BASOPHILS NFR BLD AUTO: 0.4 % — SIGNIFICANT CHANGE UP (ref 0–2)
BILIRUB SERPL-MCNC: 0.9 MG/DL — SIGNIFICANT CHANGE UP (ref 0.2–1.2)
BUN SERPL-MCNC: 17 MG/DL — SIGNIFICANT CHANGE UP (ref 7–23)
CALCIUM SERPL-MCNC: 9.5 MG/DL — SIGNIFICANT CHANGE UP (ref 8.5–10.1)
CHLORIDE SERPL-SCNC: 106 MMOL/L — SIGNIFICANT CHANGE UP (ref 96–108)
CO2 SERPL-SCNC: 28 MMOL/L — SIGNIFICANT CHANGE UP (ref 22–31)
CREAT SERPL-MCNC: 0.97 MG/DL — SIGNIFICANT CHANGE UP (ref 0.5–1.3)
EGFR: 99 ML/MIN/1.73M2 — SIGNIFICANT CHANGE UP
EOSINOPHIL # BLD AUTO: 0.06 K/UL — SIGNIFICANT CHANGE UP (ref 0–0.5)
EOSINOPHIL NFR BLD AUTO: 0.7 % — SIGNIFICANT CHANGE UP (ref 0–6)
GLUCOSE SERPL-MCNC: 85 MG/DL — SIGNIFICANT CHANGE UP (ref 70–99)
HCT VFR BLD CALC: 45.6 % — SIGNIFICANT CHANGE UP (ref 39–50)
HGB BLD-MCNC: 15.9 G/DL — SIGNIFICANT CHANGE UP (ref 13–17)
IMM GRANULOCYTES NFR BLD AUTO: 0.2 % — SIGNIFICANT CHANGE UP (ref 0–0.9)
LACTATE SERPL-SCNC: 1.1 MMOL/L — SIGNIFICANT CHANGE UP (ref 0.7–2)
LIDOCAIN IGE QN: 15 U/L — SIGNIFICANT CHANGE UP (ref 13–75)
LYMPHOCYTES # BLD AUTO: 1.41 K/UL — SIGNIFICANT CHANGE UP (ref 1–3.3)
LYMPHOCYTES # BLD AUTO: 15.4 % — SIGNIFICANT CHANGE UP (ref 13–44)
MCHC RBC-ENTMCNC: 29.1 PG — SIGNIFICANT CHANGE UP (ref 27–34)
MCHC RBC-ENTMCNC: 34.9 G/DL — SIGNIFICANT CHANGE UP (ref 32–36)
MCV RBC AUTO: 83.5 FL — SIGNIFICANT CHANGE UP (ref 80–100)
MONOCYTES # BLD AUTO: 0.3 K/UL — SIGNIFICANT CHANGE UP (ref 0–0.9)
MONOCYTES NFR BLD AUTO: 3.3 % — SIGNIFICANT CHANGE UP (ref 2–14)
NEUTROPHILS # BLD AUTO: 7.34 K/UL — SIGNIFICANT CHANGE UP (ref 1.8–7.4)
NEUTROPHILS NFR BLD AUTO: 80 % — HIGH (ref 43–77)
NRBC # BLD: 0 /100 WBCS — SIGNIFICANT CHANGE UP (ref 0–0)
PLATELET # BLD AUTO: 341 K/UL — SIGNIFICANT CHANGE UP (ref 150–400)
POTASSIUM SERPL-MCNC: 4.1 MMOL/L — SIGNIFICANT CHANGE UP (ref 3.5–5.3)
POTASSIUM SERPL-SCNC: 4.1 MMOL/L — SIGNIFICANT CHANGE UP (ref 3.5–5.3)
PROT SERPL-MCNC: 8.2 GM/DL — SIGNIFICANT CHANGE UP (ref 6–8.3)
RBC # BLD: 5.46 M/UL — SIGNIFICANT CHANGE UP (ref 4.2–5.8)
RBC # FLD: 13.2 % — SIGNIFICANT CHANGE UP (ref 10.3–14.5)
SODIUM SERPL-SCNC: 139 MMOL/L — SIGNIFICANT CHANGE UP (ref 135–145)
WBC # BLD: 9.17 K/UL — SIGNIFICANT CHANGE UP (ref 3.8–10.5)
WBC # FLD AUTO: 9.17 K/UL — SIGNIFICANT CHANGE UP (ref 3.8–10.5)

## 2024-08-21 PROCEDURE — 74177 CT ABD & PELVIS W/CONTRAST: CPT | Mod: 26,MC

## 2024-08-21 PROCEDURE — 93010 ELECTROCARDIOGRAM REPORT: CPT

## 2024-08-21 PROCEDURE — 99285 EMERGENCY DEPT VISIT HI MDM: CPT

## 2024-08-21 RX ORDER — ONDANSETRON HCL/PF 4 MG/2 ML
4 VIAL (ML) INJECTION ONCE
Refills: 0 | Status: COMPLETED | OUTPATIENT
Start: 2024-08-21 | End: 2024-08-21

## 2024-08-21 RX ORDER — KETOROLAC TROMETHAMINE 10 MG
15 TABLET ORAL ONCE
Refills: 0 | Status: DISCONTINUED | OUTPATIENT
Start: 2024-08-21 | End: 2024-08-21

## 2024-08-21 RX ORDER — METOCLOPRAMIDE HCL 5 MG/ML
10 VIAL (ML) INJECTION ONCE
Refills: 0 | Status: COMPLETED | OUTPATIENT
Start: 2024-08-21 | End: 2024-08-21

## 2024-08-21 RX ORDER — ACETAMINOPHEN 500 MG
1000 TABLET ORAL ONCE
Refills: 0 | Status: COMPLETED | OUTPATIENT
Start: 2024-08-21 | End: 2024-08-21

## 2024-08-21 RX ORDER — FAMOTIDINE 40 MG/1
20 TABLET, FILM COATED ORAL ONCE
Refills: 0 | Status: COMPLETED | OUTPATIENT
Start: 2024-08-21 | End: 2024-08-21

## 2024-08-21 RX ORDER — BACTERIOSTATIC SODIUM CHLORIDE 0.9 %
1000 VIAL (ML) INJECTION ONCE
Refills: 0 | Status: COMPLETED | OUTPATIENT
Start: 2024-08-21 | End: 2024-08-21

## 2024-08-21 RX ADMIN — Medication 15 MILLIGRAM(S): at 16:40

## 2024-08-21 RX ADMIN — Medication 1000 MILLIGRAM(S): at 16:40

## 2024-08-21 RX ADMIN — Medication 15 MILLIGRAM(S): at 15:51

## 2024-08-21 RX ADMIN — Medication 4 MILLIGRAM(S): at 12:28

## 2024-08-21 RX ADMIN — Medication 1000 MILLILITER(S): at 13:53

## 2024-08-21 RX ADMIN — Medication 400 MILLIGRAM(S): at 15:51

## 2024-08-21 RX ADMIN — Medication 4 MILLIGRAM(S): at 13:30

## 2024-08-21 RX ADMIN — Medication 10 MILLIGRAM(S): at 13:53

## 2024-08-21 RX ADMIN — FAMOTIDINE 20 MILLIGRAM(S): 40 TABLET, FILM COATED ORAL at 12:28

## 2024-08-21 RX ADMIN — Medication 1000 MILLILITER(S): at 12:28

## 2024-08-21 NOTE — ED ADULT NURSE NOTE - SUICIDE SCREENING QUESTION 2
Received patient A/O x 4, VSS with complaints of a headache. PRN Fioricet administered. Maintaining O2 saturation on 2L nasal cannula. Baseline is room air. Weaning as able. Neb treatments per respiratory. PO antibiotics for PNA. IV steroids.  Patient is up system  Outcome: Progressing     Problem: RESPIRATORY - ADULT  Goal: Achieves optimal ventilation and oxygenation  Description  INTERVENTIONS:  - Assess for changes in respiratory status  - Assess for changes in mentation and behavior  - Position to facili No

## 2024-08-21 NOTE — ED PROVIDER NOTE - NSFOLLOWUPINSTRUCTIONS_ED_ALL_ED_FT
You were seen today for abdominal pain/ vomiting which improved with IV zofran and reglan and pain medication    There was no bowel obstruction, and blood work looked within normal  please follow up with your primary care physician and return for any worsening symptoms  Follow up with a gastroenterologist     Acute Abdominal Pain    WHAT YOU NEED TO KNOW:    The cause of your abdominal pain may not be found. If a cause is found, treatment will depend on what the cause is.     DISCHARGE INSTRUCTIONS:    Return to the emergency department if:     You vomit blood or cannot stop vomiting.      You have blood in your bowel movement or it looks like tar.       You have bleeding from your rectum.       Your abdomen is larger than usual, more painful, and hard.       You have severe pain in your abdomen.       You stop passing gas and having bowel movements.       You feel weak, dizzy, or faint.    Contact your healthcare provider if:     You have a fever.      You have new signs and symptoms.      Your symptoms do not get better with treatment.       You have questions or concerns about your condition or care.    Medicines may be given to decrease pain, treat an infection, and manage your symptoms. Take your medicine as directed. Call your healthcare provider if you think your medicine is not helping or if you have side effects. Tell him if you are allergic to any medicine. Keep a list of the medicines, vitamins, and herbs you take. Include the amounts, and when and why you take them. Bring the list or the pill bottles to follow-up visits. Carry your medicine list with you in case of an emergency.    Manage your symptoms:     Apply heat on your abdomen for 20 to 30 minutes every 2 hours for as many days as directed. Heat helps decrease pain and muscle spasms.       Manage your stress. Stress may cause abdominal pain. Your healthcare provider may recommend relaxation techniques and deep breathing exercises to help decrease your stress. Your healthcare provider may recommend you talk to someone about your stress or anxiety, such as a counselor or a trusted friend. Get plenty of sleep and exercise regularly.       Limit or do not drink alcohol. Alcohol can make your abdominal pain worse. Ask your healthcare provider if it is safe for you to drink alcohol. Also ask how much is safe for you to drink.       Do not smoke. Nicotine and other chemicals in cigarettes can damage your esophagus and stomach. Ask your healthcare provider for information if you currently smoke and need help to quit. E-cigarettes or smokeless tobacco still contain nicotine. Talk to your healthcare provider before you use these products.     Make changes to the food you eat as directed: Do not eat foods that cause abdominal pain or other symptoms. Eat small meals more often.     Eat more high-fiber foods if you are constipated. High-fiber foods include fruits, vegetables, whole-grain foods, and legumes.       Do not eat foods that cause gas if you have bloating. Examples include broccoli, cabbage, and cauliflower. Do not drink soda or carbonated drinks, because these may also cause gas.       Do not eat foods or drinks that contain sorbitol or fructose if you have diarrhea and bloating. Some examples are fruit juices, candy, jelly, and sugar-free gum.       Do not eat high-fat foods, such as fried foods, cheeseburgers, hot dogs, and desserts.      Limit or do not drink caffeine. Caffeine may make symptoms, such as heart burn or nausea, worse.       Drink plenty of liquids to prevent dehydration from diarrhea or vomiting. Ask your healthcare provider how much liquid to drink each day and which liquids are best for you.     Follow up with your healthcare provider as directed: Write down your questions so you remember to ask them during your visits.

## 2024-08-21 NOTE — ED PROVIDER NOTE - PATIENT PORTAL LINK FT
You can access the FollowMyHealth Patient Portal offered by [facility] by registering at the following website: [email or web address]. By joining [employer]’s FollowMyHealth portal, you will also be able to view your health information using other applications (apps) compatible with our system.

## 2024-08-21 NOTE — ED ADULT NURSE NOTE - HOW OFTEN DO YOU HAVE A DRINK CONTAINING ALCOHOL?
[General Appearance - Well Developed] : well developed [General Appearance - Well Nourished] : well nourished [Normal Appearance] : normal appearance [Well Groomed] : well groomed [General Appearance - In No Acute Distress] : no acute distress [Edema] : no peripheral edema [] : no respiratory distress [Respiration, Rhythm And Depth] : normal respiratory rhythm and effort [Exaggerated Use Of Accessory Muscles For Inspiration] : no accessory muscle use [Oriented To Time, Place, And Person] : oriented to person, place, and time [Affect] : the affect was normal [Mood] : the mood was normal [Not Anxious] : not anxious [Normal Station and Gait] : the gait and station were normal for the patient's age [No Focal Deficits] : no focal deficits [No Palpable Adenopathy] : no palpable adenopathy Yes Never

## 2024-08-21 NOTE — ED ADULT TRIAGE NOTE - CHIEF COMPLAINT QUOTE
BIBEMS from home c/o sudden on set of abdominal pain  to epigastric area sharp burning pain and  vomiting pt states he took Zofran but pain not getting better pt AAO x 3 hx of  T1DM asthma and cholecystomy

## 2024-08-21 NOTE — ED PROVIDER NOTE - CLINICAL SUMMARY MEDICAL DECISION MAKING FREE TEXT BOX
43M w/ PMH DM2/DKA, HTN, HLD, CAD, Gastroparesis, Gastritis, Cyclic Vomiting Syndrome who presents to ED w/ abdominal pain x this AM associated w/ nausea/vomiting. Pt reporting approximately 10 episodes of NBNB emesis since this AM, pt took 1 dose of Zofran and Reglan but states there was no relief of symptoms. Pt frequently presents to [facility] ED for similar symptoms - pt last seen 2 months ago (6/20/24), diagnosed w/ Gastroparesis, CT at that time revealed gastroenteritis/small bowel ileus, pt DC'd w/ at home Zofran/Reglan. Pt has never followed-up w/ outpatient GI. Denies fever, chills, chest pain, shortness of breath, diarrhea, dysuria, urinary frequency/urgency, extremity weakness/numbness/tingling, lightheadedness, dizziness, or headaches. 43M w/ PMH DM2/DKA, HTN, HLD, CAD, Gastroparesis, Gastritis, Cyclic Vomiting Syndrome who presents to ED w/ abdominal pain x this AM associated w/ nausea/vomiting. Pt reporting approximately 10 episodes of NBNB emesis since this AM, pt took 1 dose of Zofran and Reglan but states there was no relief of symptoms. Pt frequently presents to [facility] ED for similar symptoms - pt last seen 2 months ago (6/20/24), diagnosed w/ Gastroparesis, CT at that time revealed gastroenteritis/small bowel ileus, pt DC'd w/ at home Zofran/Reglan. Pt has never followed-up w/ outpatient GI. Denies fever, chills, chest pain, shortness of breath, diarrhea, dysuria, urinary frequency/urgency, extremity weakness/numbness/tingling, lightheadedness, dizziness, or headaches.    Patient currently afebrile, hemodynamically stable, spO2 100%. On PE - + Appears uncomfortable due to nausea/vomiting, heart w/ RRR, chest symmetrical, non-labored breathing, lungs clear bilaterally, abdomen soft/non-distended/diffuse abdominal ttp, no guarding or rebound. Based on history and physical, differentials include but are not limited to GERD/Gastritis, gastroparesis, cyclic vomiting / cannabinoid hyperemesis, ACS unlikely but will obtain EKG. Plan to assess patient for acute pathology as listed above with EKG, Labs, CT Abdomen/pelvis. Will administer medications for symptomatic relief, follow-up on results, and reassess. Disposition pending workup/re-evaluation.

## 2024-08-21 NOTE — ED PROVIDER NOTE - ABDOMINAL EXAM
+ Diffuse abdominal ttp, no guarding or rebound./soft/tender.../nondistended/no organomegaly/no pulsating masses

## 2024-08-21 NOTE — ED PROVIDER NOTE - CONSTITUTIONAL, MLM
+ Appears uncomfortable due to nausea/vomiting, Awake, alert, oriented to person, place, time/situation. normal...

## 2024-08-21 NOTE — ED PROVIDER NOTE - ATTENDING APP SHARED VISIT CONTRIBUTION OF CARE
43M pmhx cyclic vomiting, T1DM, HTN, CAD, gastroparesis who presents for evaluation of diffuse abd pain and vomiting duration x 1 day without associated fever. Pt reports feels similar to prior ED presentations but much worse. Denies recent marijuana use. Pt states took zofran and reglan several hours ago without relief of symptoms.   On exam pt appears uncomfortable, unable to lay still, aox3, nad, abd soft but diffuse ttp in all quadrants, nondistended, no rebound/guarding.   plan - possible gastroparesis but given severe pain, will have to proceed with ct a/p rule out obstruction or colitis or signs of ischemia, check lactate, check cbc/ cmp, analgesia, IV fluids, re-eval.

## 2024-08-22 LAB — GLUCOSE BLDC GLUCOMTR-MCNC: 85 MG/DL — SIGNIFICANT CHANGE UP (ref 70–99)

## 2024-08-22 NOTE — ED ADULT NURSE NOTE - CCCP TRG CHIEF CMPLNT
Outside CT disc of cervical spine loaded into pt's chart and returned to pt in exam room.   
abdominal pain

## 2024-08-23 ENCOUNTER — EMERGENCY (EMERGENCY)
Facility: HOSPITAL | Age: 44
LOS: 0 days | Discharge: HOME | End: 2024-08-23
Attending: STUDENT IN AN ORGANIZED HEALTH CARE EDUCATION/TRAINING PROGRAM
Payer: MEDICAID

## 2024-08-23 VITALS
OXYGEN SATURATION: 99 % | SYSTOLIC BLOOD PRESSURE: 160 MMHG | DIASTOLIC BLOOD PRESSURE: 91 MMHG | TEMPERATURE: 98 F | RESPIRATION RATE: 18 BRPM | HEART RATE: 85 BPM

## 2024-08-23 VITALS
OXYGEN SATURATION: 99 % | TEMPERATURE: 99 F | RESPIRATION RATE: 18 BRPM | HEART RATE: 68 BPM | SYSTOLIC BLOOD PRESSURE: 127 MMHG | DIASTOLIC BLOOD PRESSURE: 77 MMHG

## 2024-08-23 DIAGNOSIS — Z90.49 ACQUIRED ABSENCE OF OTHER SPECIFIED PARTS OF DIGESTIVE TRACT: Chronic | ICD-10-CM

## 2024-08-23 DIAGNOSIS — R06.02 SHORTNESS OF BREATH: ICD-10-CM

## 2024-08-23 DIAGNOSIS — R11.2 NAUSEA WITH VOMITING, UNSPECIFIED: ICD-10-CM

## 2024-08-23 DIAGNOSIS — Z79.4 LONG TERM (CURRENT) USE OF INSULIN: ICD-10-CM

## 2024-08-23 DIAGNOSIS — Z91.018 ALLERGY TO OTHER FOODS: ICD-10-CM

## 2024-08-23 DIAGNOSIS — D72.829 ELEVATED WHITE BLOOD CELL COUNT, UNSPECIFIED: ICD-10-CM

## 2024-08-23 DIAGNOSIS — R10.9 UNSPECIFIED ABDOMINAL PAIN: ICD-10-CM

## 2024-08-23 DIAGNOSIS — E73.9 LACTOSE INTOLERANCE, UNSPECIFIED: ICD-10-CM

## 2024-08-23 DIAGNOSIS — E10.43 TYPE 1 DIABETES MELLITUS WITH DIABETIC AUTONOMIC (POLY)NEUROPATHY: ICD-10-CM

## 2024-08-23 DIAGNOSIS — F41.9 ANXIETY DISORDER, UNSPECIFIED: ICD-10-CM

## 2024-08-23 DIAGNOSIS — J45.909 UNSPECIFIED ASTHMA, UNCOMPLICATED: ICD-10-CM

## 2024-08-23 DIAGNOSIS — K31.84 GASTROPARESIS: ICD-10-CM

## 2024-08-23 LAB
ALBUMIN SERPL ELPH-MCNC: 4.1 G/DL — SIGNIFICANT CHANGE UP (ref 3.3–5)
ALP SERPL-CCNC: 88 U/L — SIGNIFICANT CHANGE UP (ref 40–120)
ALT FLD-CCNC: 37 U/L — SIGNIFICANT CHANGE UP (ref 12–78)
ANION GAP SERPL CALC-SCNC: 7 MMOL/L — SIGNIFICANT CHANGE UP (ref 5–17)
AST SERPL-CCNC: 36 U/L — SIGNIFICANT CHANGE UP (ref 15–37)
BASOPHILS # BLD AUTO: 0.07 K/UL — SIGNIFICANT CHANGE UP (ref 0–0.2)
BASOPHILS NFR BLD AUTO: 0.7 % — SIGNIFICANT CHANGE UP (ref 0–2)
BILIRUB SERPL-MCNC: 0.7 MG/DL — SIGNIFICANT CHANGE UP (ref 0.2–1.2)
BUN SERPL-MCNC: 14 MG/DL — SIGNIFICANT CHANGE UP (ref 7–23)
CALCIUM SERPL-MCNC: 9.4 MG/DL — SIGNIFICANT CHANGE UP (ref 8.5–10.1)
CHLORIDE SERPL-SCNC: 103 MMOL/L — SIGNIFICANT CHANGE UP (ref 96–108)
CO2 SERPL-SCNC: 27 MMOL/L — SIGNIFICANT CHANGE UP (ref 22–31)
CREAT SERPL-MCNC: 1.09 MG/DL — SIGNIFICANT CHANGE UP (ref 0.5–1.3)
EGFR: 86 ML/MIN/1.73M2 — SIGNIFICANT CHANGE UP
EOSINOPHIL # BLD AUTO: 0.06 K/UL — SIGNIFICANT CHANGE UP (ref 0–0.5)
EOSINOPHIL NFR BLD AUTO: 0.6 % — SIGNIFICANT CHANGE UP (ref 0–6)
GLUCOSE SERPL-MCNC: 221 MG/DL — HIGH (ref 70–99)
HCT VFR BLD CALC: 42.5 % — SIGNIFICANT CHANGE UP (ref 39–50)
HGB BLD-MCNC: 15.3 G/DL — SIGNIFICANT CHANGE UP (ref 13–17)
IMM GRANULOCYTES NFR BLD AUTO: 0.3 % — SIGNIFICANT CHANGE UP (ref 0–0.9)
LIDOCAIN IGE QN: 62 U/L — SIGNIFICANT CHANGE UP (ref 13–75)
LYMPHOCYTES # BLD AUTO: 1.57 K/UL — SIGNIFICANT CHANGE UP (ref 1–3.3)
LYMPHOCYTES # BLD AUTO: 14.8 % — SIGNIFICANT CHANGE UP (ref 13–44)
MAGNESIUM SERPL-MCNC: 2.1 MG/DL — SIGNIFICANT CHANGE UP (ref 1.6–2.6)
MCHC RBC-ENTMCNC: 29.3 PG — SIGNIFICANT CHANGE UP (ref 27–34)
MCHC RBC-ENTMCNC: 36 G/DL — SIGNIFICANT CHANGE UP (ref 32–36)
MCV RBC AUTO: 81.4 FL — SIGNIFICANT CHANGE UP (ref 80–100)
MONOCYTES # BLD AUTO: 0.37 K/UL — SIGNIFICANT CHANGE UP (ref 0–0.9)
MONOCYTES NFR BLD AUTO: 3.5 % — SIGNIFICANT CHANGE UP (ref 2–14)
NEUTROPHILS # BLD AUTO: 8.52 K/UL — HIGH (ref 1.8–7.4)
NEUTROPHILS NFR BLD AUTO: 80.1 % — HIGH (ref 43–77)
NRBC # BLD: 0 /100 WBCS — SIGNIFICANT CHANGE UP (ref 0–0)
PLATELET # BLD AUTO: 347 K/UL — SIGNIFICANT CHANGE UP (ref 150–400)
POTASSIUM SERPL-MCNC: 3.6 MMOL/L — SIGNIFICANT CHANGE UP (ref 3.5–5.3)
POTASSIUM SERPL-SCNC: 3.6 MMOL/L — SIGNIFICANT CHANGE UP (ref 3.5–5.3)
PROT SERPL-MCNC: 7.7 GM/DL — SIGNIFICANT CHANGE UP (ref 6–8.3)
RBC # BLD: 5.22 M/UL — SIGNIFICANT CHANGE UP (ref 4.2–5.8)
RBC # FLD: 13.1 % — SIGNIFICANT CHANGE UP (ref 10.3–14.5)
SODIUM SERPL-SCNC: 137 MMOL/L — SIGNIFICANT CHANGE UP (ref 135–145)
WBC # BLD: 10.62 K/UL — HIGH (ref 3.8–10.5)
WBC # FLD AUTO: 10.62 K/UL — HIGH (ref 3.8–10.5)

## 2024-08-23 PROCEDURE — 99284 EMERGENCY DEPT VISIT MOD MDM: CPT

## 2024-08-23 RX ORDER — ACETAMINOPHEN 500 MG
1000 TABLET ORAL ONCE
Refills: 0 | Status: COMPLETED | OUTPATIENT
Start: 2024-08-23 | End: 2024-08-23

## 2024-08-23 RX ORDER — FAMOTIDINE 40 MG/1
20 TABLET, FILM COATED ORAL ONCE
Refills: 0 | Status: COMPLETED | OUTPATIENT
Start: 2024-08-23 | End: 2024-08-23

## 2024-08-23 RX ORDER — DIPHENHYDRAMINE HCL 25 MG
25 CAPSULE ORAL ONCE
Refills: 0 | Status: COMPLETED | OUTPATIENT
Start: 2024-08-23 | End: 2024-08-23

## 2024-08-23 RX ORDER — PRAMIPEXOLE DIHYDROCHLORIDE 0.5 MG/1
5 TABLET ORAL ONCE
Refills: 0 | Status: COMPLETED | OUTPATIENT
Start: 2024-08-23 | End: 2024-08-23

## 2024-08-23 RX ORDER — BACTERIOSTATIC SODIUM CHLORIDE 0.9 %
1000 VIAL (ML) INJECTION ONCE
Refills: 0 | Status: COMPLETED | OUTPATIENT
Start: 2024-08-23 | End: 2024-08-23

## 2024-08-23 RX ADMIN — Medication 1000 MILLILITER(S): at 11:06

## 2024-08-23 RX ADMIN — Medication 400 MILLIGRAM(S): at 11:10

## 2024-08-23 RX ADMIN — Medication 25 MILLIGRAM(S): at 11:05

## 2024-08-23 RX ADMIN — FAMOTIDINE 20 MILLIGRAM(S): 40 TABLET, FILM COATED ORAL at 11:05

## 2024-08-23 RX ADMIN — Medication 1000 MILLIGRAM(S): at 11:30

## 2024-08-23 RX ADMIN — PRAMIPEXOLE DIHYDROCHLORIDE 5 MILLIGRAM(S): 0.5 TABLET ORAL at 11:06

## 2024-08-23 NOTE — ED PROVIDER NOTE - NSICDXPASTMEDICALHX_GEN_ALL_CORE_FT
Hematology/Oncology Progress Note        Subjective  Reviewed events ; course reviewed.      Medications  Current Facility-Administered Medications   Medication Dose Route Frequency Provider Last Rate Last Admin   • lactated ringers infusion   Intravenous Continuous Isra Rojas DO   Completed at 02/20/23 1458   • hydroCORTisone (ANUSOL-HC) suppository 25 mg  25 mg Rectal Daily Denisse Sethi CNP   25 mg at 02/23/23 0936   • WARFARIN - PHARMACIST MONITORED Misc   Does not apply See Admin Instructions Matti Bangura MD       • sodium chloride 0.9 % flush bag 25 mL  25 mL Intravenous PRN Toñito Sebastian MD       • sodium chloride (PF) 0.9 % injection 2 mL  2 mL Intracatheter 2 times per day Toñito Sebastian MD   2 mL at 02/23/23 0935   • sodium chloride 0.9 % flush bag 25 mL  25 mL Intravenous PRN Toñito Sebastian MD       • Potassium Standard Replacement Protocol (Levels 3.5 and lower)   Does not apply See Admin Instructions Toñito Sebastian MD       • Potassium Replacement (Levels 3.6 - 4)   Does not apply See Admin Instructions Toñito Sebastian MD       • Magnesium Standard Replacement Protocol   Does not apply See Admin Instructions Toñito Sebastian MD       • hydrALAZINE (APRESOLINE) injection 10 mg  10 mg Intravenous Q6H PRN Toñito Sebastian MD       • acetaminophen (TYLENOL) tablet 650 mg  650 mg Oral Q4H PRN Toñito Sebastian MD       • ondansetron (ZOFRAN) injection 4 mg  4 mg Intravenous Q4H PRN Toñito Sebastian MD       • calcium carbonate (TUMS) chewable tablet 500 mg  500 mg Oral Q4H PRN Toñito Sebastian MD       • docusate sodium-sennosides (SENOKOT S) 50-8.6 MG 1 tablet  1 tablet Oral Nightly PRN Toñito Sebastian MD             Vitals  Vitals with min/max:    Vital Last Value 24 Hour Range   Temperature 97.5 °F (36.4 °C) (02/23/23 0605) Temp  Min: 97.5 °F (36.4 °C)  Max:  97.7 °F (36.5 °C)   Pulse 64 (02/23/23 0605) Pulse  Min: 64  Max: 84   Respiratory 18 (02/23/23 0605) Resp  Min: 16  Max: 18   Non-Invasive  Blood Pressure 112/80 (02/23/23 0605) BP  Min: 107/76  Max: 112/80   Pulse Oximetry 95 % (02/23/23 0605) SpO2  Min: 95 %  Max: 96 %   Arterial   Blood Pressure   No data recorded        Physical Exam      General:  Well developed well nourished in no distress  Skin:  Warm and dry   Oropharynx:  Clear  Lungs:  Clear  Heart:  Regular  Abdomen:  Soft and nontender.  Nondistended  Extremities:  Without edema    Labs   Recent Labs   Lab 02/23/23  0550 02/22/23  0616 02/21/23  0425 02/20/23  0623 02/19/23  0926 02/19/23  0723 02/18/23  0638   WBC 12.1* 11.0 10.4 10.0 12.7* 11.5* 12.0*   HGB 15.9 15.1 15.5 17.0 16.6 16.9 17.1*   HCT 50.6 48.2 48.3 53.3* 52.8* 53.2* 53.1*   MCV 87.2 84.3 84.6 84.7 84.1 85.5 84.7    348 342 361 364 379 380   Absolute Neutrophils 8.1* 7.0 6.5  --   --  7.3 8.1*       Recent Labs   Lab 02/23/23  0550 02/22/23  0616 02/21/23  0425   SODIUM 137 141 139   CHLORIDE 106 105 106   CO2 19* 26 23   BUN 16 15 15   CREATININE 0.85 0.94 0.96   CALCIUM 8.9 8.7 9.1   ALBUMIN 3.2* 3.3* 3.2*   BILIRUBIN 0.3 0.4 0.4   ALKPT 87 89 94   GPT 90* 90* 95*   AST 39* 34 36   GLUCOSE 106* 109* 130*        No results found for: LDH    Lab Results   Component Value Date    PT 29.8 (H) 02/23/2023    PTT 42 (H) 02/23/2023       Imaging    Colonoscopy   Final Result      US LIVER   Final Result      1.   Cholelithiasis which fills the gallbladder lumen. No evidence of   cholecystitis.   2.   Findings consistent with moderate to severe fibrofatty changes in the   liver. Evaluation for intrahepatic masses is limited. If clinically   indicated, CT or MR could be obtained for further evaluation.      Electronically Signed by: MICKY GOVEA MD    Signed on: 2/14/2023 10:24 PM          US VASC LOWER EXTREMITY VENOUS DUPLEX BILATERAL   Final Result      No deep venous thrombosis.       Electronically Signed by: MICKY GOVEA MD    Signed on: 2/14/2023 10:22 PM          CTA CHEST PULMONARY EMBOLISM   Final Result   The study is degraded due to moderate breathing motion and   streaky artifact.  There is suggestion of partial feeding defect in the   left upper lung posterior lateral segmental/subsegmental pulmonary artery   branches and questionably in the bilateral lower lung posterior medial   segmental/subsegmental branches.  There is no filling defect in main   pulmonary artery and major branches.  The heart size is normal.  Is no   pericardial effusion.  The thoracic aorta has normal caliber.       The lungs are mildly hyper aerated with moderate breathing motion.  No   acute consolidations are seen.  There is a 1.6 cm spiculated mass lesion in   the superior segment of the left lower lung.  There is questionable a 1 x   1.2 cm subpleural nodule in the left lower lung superior segment laterally.    There is no pneumothorax, pleural effusion, or lymphadenopathy in the   mediastinum.      There is diffuse fatty infiltration of the liver without mass or abnormal   enhancement.  The rest of the upper abdomen is unremarkable.  There is mild   osteopenia and spondylosis of spine.        IMPRESSION:   1.  Suspected small partially occlusive PE in the right upper lung   posterior lateral segmental/subsegmental branches and questionably in the   bilateral lower lung medial subsegmental branches although aeration is   limited due to streaky artifact.  There is no PE in the main pulmonary   artery and major branches.   2.  A 1.6 cm spiculated the mass lesion in the superior segment of the left   lower lung.  A questionable 1 x 1.2 cm subpleural nodular lesion in the   superior segment of the left lower lung laterally.   3.  Diffuse fatty infiltration of liver without mass or abnormal   enhancement.   4.  Please see detailed discussion.      Electronically Signed by: AUSTYN GUARDADO MD    Signed on:  2/13/2023 11:29 AM                 Impression  1. Pulmonary emboli while on Eliquis.  He is being converted to Coumadin with heparin bridge  2.  Lower GI bleed.  Patient was found to have hemorrhoids on colonoscopy 2/20/2023  3.  Metastatic non-small cell lung cancer.  Patient being treated by Dr. Hodges at [facility]    Plan  INR is 3.3 today  Will dc heparin  Ok to discharge today  He will follow up with his oncologist tomorrow per his daughter as they have an appointment.        Hematology/Oncology    2/23/2023     PAST MEDICAL HISTORY:  Anxiety     Asthma     Controlled diabetes mellitus type 1 without complications     DM type 1 (diabetes mellitus, type 1)     Gastritis     Gastritis, presence of bleeding unspecified, unspecified chronicity, unspecified gastritis type     Uncomplicated asthma, unspecified asthma severity, unspecified whether persistent

## 2024-08-23 NOTE — ED ADULT TRIAGE NOTE - CHIEF COMPLAINT QUOTE
pt c/o severe diffuse abdominal  pain for a few days. pt was seen 2 days ago for similar symptoms. history of dm. fs 145 from EMS. noted with 20 gauge iv in left forearm.

## 2024-08-23 NOTE — ED PROVIDER NOTE - CLINICAL SUMMARY MEDICAL DECISION MAKING FREE TEXT BOX
43M PMH IDDM, asthma, gastroparesis, gastritis, cyclic vomiting syndrome, anxiety BIBEMS d/t diffuse severe anterior abd pain a/w nausea and > 10 episodes NBNB emesis onset 0730 this AM. Afebrile, VSS. Pt appears uncomfortable / in pain, w/ active emesis in ED. Benign abd exam. Plan for CBC, CMP, lipase, mag. Give IVF, Pepcid, Tylenol, Benadryl, Haldol. Re-eval. Clinical presentation most c/w CVS v gastritis, r/o e-lyte abnormality. 43M PMH IDDM, asthma, gastroparesis, gastritis, cyclic vomiting syndrome, anxiety BIBEMS d/t diffuse severe anterior abd pain a/w nausea and > 10 episodes NBNB emesis onset 0730 this AM. Afebrile, VSS. Pt appears uncomfortable / in pain, w/ active emesis in ED. Benign abd exam. Plan for CBC, CMP, lipase, mag. Give IVF, Pepcid, Tylenol, Benadryl, Haldol. Re-eval. Clinical presentation most c/w CVS v gastritis, r/o e-lyte abnormality.  W/u w/o significant abnormalities, + mild leukocytosis 10.6 (no bandemia), AG WNL. On re-eval, 43M PMH IDDM, asthma, gastroparesis, gastritis, cyclic vomiting syndrome, anxiety BIBEMS d/t diffuse severe anterior abd pain a/w nausea and > 10 episodes NBNB emesis onset 0730 this AM. Afebrile, VSS. Pt appears uncomfortable / in pain, w/ active emesis in ED. Benign abd exam. Plan for CBC, CMP, lipase, mag. Give IVF, Pepcid, Tylenol, Benadryl, Haldol. Re-eval. Clinical presentation most c/w CVS v gastritis, r/o e-lyte abnormality.  W/u w/o significant abnormalities, + mild leukocytosis 10.6 (no bandemia), AG WNL. On re-eval, tolerates PO intake. 43M PMH IDDM, asthma, gastroparesis, gastritis, cyclic vomiting syndrome, anxiety BIBEMS d/t diffuse severe anterior abd pain a/w nausea and > 10 episodes NBNB emesis onset 0730 this AM. Afebrile, VSS. Pt appears uncomfortable / in pain, w/ active emesis in ED. Benign abd exam. Plan for CBC, CMP, lipase, mag. Give IVF, Pepcid, Tylenol, Benadryl, Haldol. Re-eval. Clinical presentation most c/w CVS v gastritis, r/o DKA, e-lyte abnormality.  W/u w/o significant abnormalities: + mild leukocytosis 10.6 (no bandemia), AG WNL. On re-eval, resting comfortably. Pt tolerates PO intake in the ED. Stable for d/c home. Given / instructed for close outpatient GI and PCP f/u. Return signs / symptoms d/w pt. He understands / agrees w/ this plan.

## 2024-08-23 NOTE — ED PROVIDER NOTE - NSFOLLOWUPCLINICS_GEN_ALL_ED_FT
[facility]  Gastroenterology  [id number] [place] [address]  Phone: [phone]  Fax:   Follow Up Time: 7-10 Days

## 2024-08-23 NOTE — ED PROVIDER NOTE - OBJECTIVE STATEMENT
43M PMH IDDM, asthma, gastroparesis, gastritis, cyclic vomiting syndrome, anxiety BIBEMS d/t diffuse severe anterior abd pain a/w nausea and > 10 episodes NBNB emesis onset 0730 this AM. Pt endorses SOB. Pt seen in ED 2 days ago w/ similar CC (labs, CT imaging reviewed). Pt reports given IV Zofran en route w/ EMS w/o relief. Denies fever / chills, h/a, dizziness, CP, cough, flank pain, diarrhea / constipation, UTI sx, recent travel, known sick contacts, new / spoiled foods.     PMH as above, PSH jesus, NKDA, Meds as listed.

## 2024-08-23 NOTE — ED ADULT NURSE NOTE - NSFALLUNIVINTERV_ED_ALL_ED
Bed/Stretcher in lowest position, wheels locked, appropriate side rails in place/Call bell, personal items and telephone in reach/Instruct patient to call for assistance before getting out of bed/chair/stretcher/Non-slip footwear applied when patient is off stretcher/[place] to call system/Physically safe environment - no spills, clutter or unnecessary equipment/Purposeful proactive rounding/Room/bathroom lighting operational, light cord in reach

## 2024-08-23 NOTE — ED PROVIDER NOTE - PHYSICAL EXAMINATION
GEN: Awake, alert, interactive, pt appears uncomfortable / in pain, w/ active emesis in ED.   HEAD AND NECK: NC/AT. Airway patent. Neck supple.   EYES:  Clear b/l. EOMI. PERRL.   ENT: Moist mucus membranes.   CARDIAC: Regular rate, regular rhythm. No evident pedal edema.    RESP/CHEST: Normal respiratory effort with no use of accessory muscles or retractions. Clear throughout on auscultation. No wheezing.   ABD: Soft, non-distended, non-tender. No rebound, no guarding.   BACK: No midline spinal TTP. No CVAT.   EXTREMITIES: Moving all extremities with no apparent deformities.   SKIN: Warm, dry, intact normal color. No rash.   NEURO: AOx3, CN II-XII grossly intact, no focal deficits.   PSYCH: Appropriate mood and affect.

## 2024-08-23 NOTE — ED ADULT NURSE NOTE - CHPI ED NUR SYMPTOMS NEG
no back pain/no chills/no decreased eating/drinking/no dizziness/no fever/no headache/no loss of consciousness

## 2024-08-23 NOTE — ED ADULT NURSE NOTE - OBJECTIVE STATEMENT
Patient is 43 years old male, alert & oriented x 4. Complaining of abdominal pain with N&V. He was here in the ED 2 days ago for the same reason. PMHX: IDDM, Asthma, Gastritis, Anxiety. No chest pain. No recent travel. No cough or fever. Patient is anxious and screaming. He was given Zofran by EMS.

## 2024-08-23 NOTE — ED PROVIDER NOTE - PATIENT PORTAL LINK FT
You can access the FollowMyHealth Patient Portal offered by [facility] by registering at the following website: [email or web address]. By joining [facility]’s FollowMyHealth portal, you will also be able to view your health information using other applications (apps) compatible with our system.

## 2024-08-30 NOTE — ED ADULT NURSE NOTE - NS ED NURSE IV DC DT
[facility] EMERGENCY DEPARTMENT ENCOUNTER    Basic Information  Name: Remberto Brantley Age: 89 year old Sex: male   MRN: [id number] Encounter: 8/30/2024, 6:22 AM  PCP: Jason Al CNP    Chief Complaint  Chief Complaint   Patient presents with    Diarrhea    Weakness       History of Present Illness    My shift started at approximately 6 in the morning.  I saw this patient at approximately 620 in the morning.    Is a 89-year-old male who comes to the emergency department with diarrhea.  Apparently started approximately 1 in the morning.  Patient has a history of dementia and history is somewhat limited in fact he denies diarrhea.  He is a dialysis patient.  He has dialysis Monday Wednesday Friday.  He was last dialyzed on Wednesday fully and completely.  It is unclear how many episodes of diarrhea he has had.  He denies any fever.  Denies any cough.    Health Status  Allergies:  ALLERGIES:  No Known Allergies    Current Medications:  No current facility-administered medications on file prior to encounter.     Current Outpatient Medications on File Prior to Encounter   Medication Sig Dispense Refill    acetaminophen (TYLENOL) 325 MG tablet Take 650 mg by mouth every 4 hours as needed. Indications: MILD TO MODERATE PAIN      docusate sodium (COLACE) 100 MG capsule Take 100 mg by mouth in the morning and 100 mg in the evening.      aspirin 81 MG chewable tablet Chew 1 tablet by mouth daily. Do not start before March 23, 2023. 30 tablet 0    Methoxy PEG-Epoetin Beta (Mircera) 75 MCG/0.3ML Solution Prefilled Syringe Inject 75 mcg as directed every 14 days. During dialysis every other Friday      iron sucrose (VENOFER) 20 MG/ML injection Inject 100 mg into the vein 3 days a week. During dialysis on Monday, Wednesday, and Friday      Heparin Sodium, Porcine, (heparin, porcine,) 5000 UNIT/ML injectable solution Inject 500 Units into the vein 3 days a week. During dialysis on Monday, Wednesday, and Friday. 500 unit  bolus + 500 units/hr      Multiple Vitamins-Minerals (PRESERVISION AREDS PO) Take 1 tablet by mouth daily.      metoPROLOL succinate (TOPROL-XL) 50 MG 24 hr tablet Take 50 mg by mouth daily.      calcium acetate gelcap (PHOSLO) 667 MG capsule Take by mouth as directed. Take 1 capsule by mouth every morning, take 1 capsule by mouth every afternoon, and take 2 capsules by mouth every evening      simvastatin (ZOCOR) 40 MG tablet Take 40 mg by mouth every evening.          Past Medical History    Past Medical History:   Diagnosis Date    Cataract, left     Dialysis patient (CMD)     M-W-F    Essential (primary) hypertension     High cholesterol     Renal failure     Vascular dialysis catheter in place (CMD)     left hand dialysis catheter       Past Surgical History:   Procedure Laterality Date    Cataract extraction w/  intraocular lens implant Left 02/2021    Dialysis catheter insertion  2018    left hand    Skin biopsy      Cancer excised face and mouth       Family History   Family history unknown: Yes       Social History     Tobacco Use    Smoking status: Former    Smokeless tobacco: Never   Substance Use Topics    Alcohol use: Yes     Comment: 1 vodka a week    Drug use: Never       Review of Systems  All systems otherwise negative, ROS reviewed as documented in chart.    Physical Exam     General:   No distress. Alert.  Elderly and frail  Skin:  Warm. Dry. Intact.  Head:  Normocephalic. Atraumatic.  Eye:  PERRL. Normal conjunctiva.  ENMT:  Oral mucosa dry.  Cardiovascular: Regular rhythm but a tachycardic rate. No murmur. Normal peripheral perfusion. No edema.  Respiratory:  Lungs CTA. Respirations are non-labored. BS equal.  Dialysis catheter right chest  Gastrointestinal:  Soft. Nontender. Non distended. Normal BS.  Back: Normal alignment without discreet bony tenderness.  Musculoskeletal:  Normal ROM. No deformities.  Negative straight leg raise bilaterally  Neurological:  A/O x 4. No focal neurological  deficit observed. Normal sensory. Normal motor.  Psychiatric: Cooperative. Appropriate mood and affect.      Medical Decision Making  Rationale:  Multiple differential diagnoses were considered. The patient / caregivers were apprised of diagnostic / treatment options including alternate modes of care, in addition to the risks and benefits, for this medical condition. Based on this discussion the patient / caregiver agrees with this chosen diagnostic and treatment plan.    Orders:  Medications   sodium chloride 0.9% infusion (has no administration in time range)   aspirin chewable 81 mg (has no administration in time range)   calcium acetate gelcap (PHOSLO) capsule 667 mg (has no administration in time range)   heparin (porcine) injection 500 Units (has no administration in time range)   sodium citrate anticoagulant 4 % flush 3 mL (has no administration in time range)   alteplase (CATHFLO ACTIVASE) injection 2 mg (has no administration in time range)   sodium chloride (NORMAL SALINE) 0.9 % bolus 100-200 mL (has no administration in time range)   cefTRIAXone (ROCEPHIN) syringe 2,000 mg (has no administration in time range)   acetaminophen (TYLENOL) tablet 650 mg (650 mg Oral Given 8/30/24 0640)   sodium chloride (NORMAL SALINE) 0.9 % bolus 250 mL (0 mLs Intravenous Completed 8/30/24 0919)   sodium chloride (NORMAL SALINE) 0.9 % bolus 250 mL (0 mLs Intravenous Completed 8/30/24 0919)   cefTRIAXone (ROCEPHIN) syringe 2,000 mg (2,000 mg Intravenous Given 8/30/24 1035)   vancomycin 1,500 mg in sodium chloride 0.9% 250 mL IVPB (1,500 mg Intravenous New Bag 8/30/24 1045)       EKG:   Time: 0634  EKG Interpretation  Sinus tachycardia with a rate of 105.  There is a bifascicular block.  No ST segment elevation.  No hyperacute T waves.    EKG interpreted by ED physician      Laboratory Results:  Results for orders placed or performed during the hospital encounter of 08/30/24   Comprehensive Metabolic Panel    Specimen: Blood,  Venous   Result Value Ref Range    Fasting Status      Sodium 136 135 - 145 mmol/L    Potassium 4.0 3.4 - 5.1 mmol/L    Chloride 101 97 - 110 mmol/L    Carbon Dioxide 23 21 - 32 mmol/L    Anion Gap 16 7 - 19 mmol/L    Glucose 117 (H) 70 - 99 mg/dL    BUN 65 (H) 6 - 20 mg/dL    Creatinine 6.26 (H) 0.67 - 1.17 mg/dL    Glomerular Filtration Rate 8 (L) >=60    BUN/Cr 10 7 - 25    Calcium 9.5 8.4 - 10.2 mg/dL    Bilirubin, Total 1.2 (H) 0.2 - 1.0 mg/dL    GOT/AST 96 (H) <=37 Units/L    GPT/ALT 65 (H) <64 Units/L    Alkaline Phosphatase 373 (H) 45 - 117 Units/L    Albumin 2.6 (L) 3.6 - 5.1 g/dL    Protein, Total 7.0 6.4 - 8.2 g/dL    Globulin 4.4 (H) 2.0 - 4.0 g/dL    A/G Ratio 0.6 (L) 1.0 - 2.4   COVID/Flu/RSV panel    Specimen: Nasal, Mid-turbinate; Swab   Result Value Ref Range    Rapid SARS-COV-2 by PCR Not Detected Not Detected / Detected / Presumptive Positive / Inhibitors present    Influenza A by PCR Not Detected Not Detected    Influenza B by PCR Not Detected Not Detected    RSV BY PCR Not Detected Not Detected    Isolation Guidelines      Procedural Comment     CBC with Automated Differential (performable only)    Specimen: Blood, Venous   Result Value Ref Range    WBC 7.8 4.2 - 11.0 K/mcL    RBC 3.58 (L) 4.50 - 5.90 mil/mcL    HGB 10.7 (L) 13.0 - 17.0 g/dL    HCT 33.1 (L) 39.0 - 51.0 %    MCV 92.5 78.0 - 100.0 fl    MCH 29.9 26.0 - 34.0 pg    MCHC 32.3 32.0 - 36.5 g/dL    RDW-CV 15.8 (H) 11.0 - 15.0 %    RDW-SD 52.4 (H) 39.0 - 50.0 fL     140 - 450 K/mcL    NRBC 0 <=0 /100 WBC    Neutrophil, Percent 97 %    Lymphocytes, Percent 2 %    Mono, Percent 1 %    Eosinophils, Percent 0 %    Basophils, Percent 0 %    Immature Granulocytes 0 %    Absolute Neutrophils 7.5 1.8 - 7.7 K/mcL    Absolute Lymphocytes 0.1 (L) 1.0 - 4.0 K/mcL    Absolute Monocytes 0.1 (L) 0.3 - 0.9 K/mcL    Absolute Eosinophils  0.0 0.0 - 0.5 K/mcL    Absolute Basophils 0.0 0.0 - 0.3 K/mcL    Absolute Immature Granulocytes 0.0 0.0 - 0.2  K/mcL   Light Blue Top    Specimen: Blood, Venous   Result Value Ref Range    Extra Tube Hold for Add Ons    Gold Top    Specimen: Blood, Venous   Result Value Ref Range    Extra Tube Hold for Add Ons    Manual Differential    Specimen: Blood, Venous   Result Value Ref Range    Macrocytosis Few     Ovalocytes Few     Large Platelets Present     Polychromasia Few    Lactic Acid Venous With Reflex    Specimen: Blood, Venous   Result Value Ref Range    Lactate, Venous 2.5 (HH) 0.0 - 2.0 mmol/L   TROPONIN I, HIGH SENSITIVITY    Specimen: Blood, Venous   Result Value Ref Range    Troponin I, High Sensitivity 43 <77 ng/L   Lactic Acid, Venous    Specimen: Blood, Venous   Result Value Ref Range    Lactate, Venous 2.5 (HH) 0.0 - 2.0 mmol/L   Blood Culture    Specimen: Blood   Result Value Ref Range    Culture, Blood or Bone Marrow No Growth <24 hours    Blood Culture    Specimen: Blood   Result Value Ref Range    Culture, Blood or Bone Marrow No Growth <24 hours        Radiology Results:  US LIVER GALLBLADDER PANCREAS (RUQ)   Final Result      1.   Gallbladder wall thickening without evidence of cholelithiasis.   Acalculous cholecystitis remains a diagnostic consideration and consider   further evaluation with nuclear medicine HIDA scan as clinically warranted.   Findings can also be seen with congestive heart failure, hypoalbuminemia,   or acute hepatitis. Clinical correlation is recommended   2.   Benign cyst in the right hepatic lobe. Otherwise normal sonographic   evaluation of the liver.   3.   No biliary ductal dilatation.   4.   Limited evaluation of the pancreas due to overlying bowel gas.         Electronically Signed by: FRANCIE CANDELARIA M.D.    Signed on: 8/30/2024 12:22 PM    Workstation ID: [id number]      CT ABDOMEN PELVIS WO CONTRAST   Final Result   1. Suggestion of gallbladder wall thickening/edema. Query cholecystitis.   Correlation with clinical and laboratory findings advised.   2. Areas of mild wall  thickening of the colon suggesting unspecified   colitis. No pneumoperitoneum or abscess findings seen.   3. Additional findings as above.            Electronically Signed by: LUCIA BORGES M.D.    Signed on: 8/30/2024 10:32 AM    Workstation ID: [id number]      XR CHEST AP OR PA   Final Result   1. Bilateral airspace haziness and interstitial opacities which may be   acute on chronic changes.         Electronically Signed by: LUCIA BORGES M.D.    Signed on: 8/30/2024 7:53 AM    Workstation ID: [id number]          MDM:     Patient presents with low-grade fever.    IV fluids given.      0900  Patient with possible infection.    Has catheter in right chest.    0900   Possible infection secondary to diarrhea.    Will go ahead and treat with IV Vanco and Rocephin.    With elevated transaminases CT scan of the abdomen pelvis out contrast was obtained.    Possible infection was identified at 9 in the morning    SEPSIS PATHWAY EXCLUSION STATEMENT:  This patient is meeting criteria for SIRS/Sepsis.  Based on my evaluation of this patient, aggressive IVF resuscitation with a full 30 cc/kg of IV crystalloid fluids would be detrimental to the patient based on their chronic disease.  A total of 500 ml of IVF have been given instead and we will continue to closely monitor the patient.     ED Course  Vitals:    08/30/24 0922 08/30/24 0952 08/30/24 1300 08/30/24 1317   BP: 101/62 129/66  116/63   Pulse: 89 94  85   Resp: (!) 26 (!) 21     Temp:    97.5 °F (36.4 °C)   TempSrc:    Oral   SpO2: 94% 95%  92%   Weight:   65.8 kg (145 lb 1 oz)    Height:   5' 8\" (1.727 m)             Nephrology consultation was obtained.      Impression and Plan  Diagnosis:  1. Enteritis    2. End stage renal disease  (CMD)        Condition:  STABLE    Disposition:  Admit 8/30/2024  9:57 AM  Telemetry Bed?: Yes  Admitting Physician: ZENY LOPEZ [[id number]]  Is this a telephone or verbal order?: This is a telephone order from the admitting  physician        Counseled:  Patient, Regarding diagnosis, Regarding diagnostic results, Regarding treatment, and Patient understood              Kenneth Diaz MD  08/30/24 1400       Kenneth Diaz MD  08/30/24 8297       Kenneth Diaz MD  08/30/24 3361     23-Jun-2023 22:35

## 2024-09-05 ENCOUNTER — EMERGENCY (EMERGENCY)
Facility: HOSPITAL | Age: 44
LOS: 0 days | Discharge: HOME | End: 2024-09-05
Attending: STUDENT IN AN ORGANIZED HEALTH CARE EDUCATION/TRAINING PROGRAM
Payer: MEDICAID

## 2024-09-05 VITALS
DIASTOLIC BLOOD PRESSURE: 86 MMHG | TEMPERATURE: 98 F | SYSTOLIC BLOOD PRESSURE: 135 MMHG | HEART RATE: 83 BPM | OXYGEN SATURATION: 99 % | RESPIRATION RATE: 18 BRPM

## 2024-09-05 VITALS
RESPIRATION RATE: 20 BRPM | HEART RATE: 70 BPM | TEMPERATURE: 98 F | SYSTOLIC BLOOD PRESSURE: 150 MMHG | OXYGEN SATURATION: 99 % | HEIGHT: 71 IN | DIASTOLIC BLOOD PRESSURE: 98 MMHG | WEIGHT: 164.91 LBS

## 2024-09-05 DIAGNOSIS — Z90.49 ACQUIRED ABSENCE OF OTHER SPECIFIED PARTS OF DIGESTIVE TRACT: Chronic | ICD-10-CM

## 2024-09-05 LAB
ALBUMIN SERPL ELPH-MCNC: 4.4 G/DL — SIGNIFICANT CHANGE UP (ref 3.3–5)
ALP SERPL-CCNC: 122 U/L — HIGH (ref 40–120)
ALT FLD-CCNC: 65 U/L — SIGNIFICANT CHANGE UP (ref 12–78)
ANION GAP SERPL CALC-SCNC: 10 MMOL/L — SIGNIFICANT CHANGE UP (ref 5–17)
AST SERPL-CCNC: 34 U/L — SIGNIFICANT CHANGE UP (ref 15–37)
BASOPHILS # BLD AUTO: 0.05 K/UL — SIGNIFICANT CHANGE UP (ref 0–0.2)
BASOPHILS NFR BLD AUTO: 0.4 % — SIGNIFICANT CHANGE UP (ref 0–2)
BILIRUB SERPL-MCNC: 0.5 MG/DL — SIGNIFICANT CHANGE UP (ref 0.2–1.2)
BUN SERPL-MCNC: 16 MG/DL — SIGNIFICANT CHANGE UP (ref 7–23)
CALCIUM SERPL-MCNC: 12 MG/DL — HIGH (ref 8.5–10.1)
CHLORIDE SERPL-SCNC: 96 MMOL/L — SIGNIFICANT CHANGE UP (ref 96–108)
CO2 SERPL-SCNC: 31 MMOL/L — SIGNIFICANT CHANGE UP (ref 22–31)
CREAT SERPL-MCNC: 1.27 MG/DL — SIGNIFICANT CHANGE UP (ref 0.5–1.3)
EGFR: 72 ML/MIN/1.73M2 — SIGNIFICANT CHANGE UP
EOSINOPHIL # BLD AUTO: 0.01 K/UL — SIGNIFICANT CHANGE UP (ref 0–0.5)
EOSINOPHIL NFR BLD AUTO: 0.1 % — SIGNIFICANT CHANGE UP (ref 0–6)
GAS PNL BLDV: SIGNIFICANT CHANGE UP
GLUCOSE SERPL-MCNC: 277 MG/DL — HIGH (ref 70–99)
HCT VFR BLD CALC: 50.3 % — HIGH (ref 39–50)
HGB BLD-MCNC: 17.4 G/DL — HIGH (ref 13–17)
IMM GRANULOCYTES NFR BLD AUTO: 0.3 % — SIGNIFICANT CHANGE UP (ref 0–0.9)
LYMPHOCYTES # BLD AUTO: 1.15 K/UL — SIGNIFICANT CHANGE UP (ref 1–3.3)
LYMPHOCYTES # BLD AUTO: 9.9 % — LOW (ref 13–44)
MCHC RBC-ENTMCNC: 29.1 PG — SIGNIFICANT CHANGE UP (ref 27–34)
MCHC RBC-ENTMCNC: 34.6 G/DL — SIGNIFICANT CHANGE UP (ref 32–36)
MCV RBC AUTO: 84.1 FL — SIGNIFICANT CHANGE UP (ref 80–100)
MONOCYTES # BLD AUTO: 0.32 K/UL — SIGNIFICANT CHANGE UP (ref 0–0.9)
MONOCYTES NFR BLD AUTO: 2.8 % — SIGNIFICANT CHANGE UP (ref 2–14)
NEUTROPHILS # BLD AUTO: 10.02 K/UL — HIGH (ref 1.8–7.4)
NEUTROPHILS NFR BLD AUTO: 86.5 % — HIGH (ref 43–77)
NRBC # BLD: 0 /100 WBCS — SIGNIFICANT CHANGE UP (ref 0–0)
PLATELET # BLD AUTO: 373 K/UL — SIGNIFICANT CHANGE UP (ref 150–400)
POTASSIUM SERPL-MCNC: 4.5 MMOL/L — SIGNIFICANT CHANGE UP (ref 3.5–5.3)
POTASSIUM SERPL-SCNC: 4.5 MMOL/L — SIGNIFICANT CHANGE UP (ref 3.5–5.3)
PROT SERPL-MCNC: 9.1 GM/DL — HIGH (ref 6–8.3)
RBC # BLD: 5.98 M/UL — HIGH (ref 4.2–5.8)
RBC # FLD: 13.4 % — SIGNIFICANT CHANGE UP (ref 10.3–14.5)
SODIUM SERPL-SCNC: 137 MMOL/L — SIGNIFICANT CHANGE UP (ref 135–145)
WBC # BLD: 11.59 K/UL — HIGH (ref 3.8–10.5)
WBC # FLD AUTO: 11.59 K/UL — HIGH (ref 3.8–10.5)

## 2024-09-05 PROCEDURE — 99285 EMERGENCY DEPT VISIT HI MDM: CPT

## 2024-09-05 PROCEDURE — 72158 MRI LUMBAR SPINE W/O & W/DYE: CPT | Mod: 26,MC

## 2024-09-05 RX ORDER — HYDROMORPHONE HYDROCHLORIDE 2 MG/1
1 TABLET ORAL ONCE
Refills: 0 | Status: DISCONTINUED | OUTPATIENT
Start: 2024-09-05 | End: 2024-09-05

## 2024-09-05 RX ORDER — SODIUM CHLORIDE 9 MG/ML
1000 INJECTION INTRAMUSCULAR; INTRAVENOUS; SUBCUTANEOUS ONCE
Refills: 0 | Status: COMPLETED | OUTPATIENT
Start: 2024-09-05 | End: 2024-09-05

## 2024-09-05 RX ORDER — METHYLPREDNISOLONE 4 MG
1 TABLET ORAL
Qty: 21 | Refills: 0
Start: 2024-09-05

## 2024-09-05 RX ORDER — ACETAMINOPHEN 325 MG/1
975 TABLET ORAL ONCE
Refills: 0 | Status: COMPLETED | OUTPATIENT
Start: 2024-09-05 | End: 2024-09-05

## 2024-09-05 RX ORDER — ONDANSETRON 2 MG/ML
4 INJECTION, SOLUTION INTRAMUSCULAR; INTRAVENOUS ONCE
Refills: 0 | Status: COMPLETED | OUTPATIENT
Start: 2024-09-05 | End: 2024-09-05

## 2024-09-05 RX ORDER — KETOROLAC TROMETHAMINE 30 MG/ML
15 INJECTION, SOLUTION INTRAMUSCULAR ONCE
Refills: 0 | Status: DISCONTINUED | OUTPATIENT
Start: 2024-09-05 | End: 2024-09-05

## 2024-09-05 RX ORDER — DEXAMETHASONE 0.75 MG
6 TABLET ORAL ONCE
Refills: 0 | Status: COMPLETED | OUTPATIENT
Start: 2024-09-05 | End: 2024-09-05

## 2024-09-05 RX ADMIN — SODIUM CHLORIDE 1000 MILLILITER(S): 9 INJECTION INTRAMUSCULAR; INTRAVENOUS; SUBCUTANEOUS at 14:44

## 2024-09-05 RX ADMIN — HYDROMORPHONE HYDROCHLORIDE 1 MILLIGRAM(S): 2 TABLET ORAL at 14:39

## 2024-09-05 RX ADMIN — KETOROLAC TROMETHAMINE 15 MILLIGRAM(S): 30 INJECTION, SOLUTION INTRAMUSCULAR at 14:39

## 2024-09-05 RX ADMIN — ONDANSETRON 4 MILLIGRAM(S): 2 INJECTION, SOLUTION INTRAMUSCULAR; INTRAVENOUS at 14:38

## 2024-09-05 NOTE — ED PROVIDER NOTE - PATIENT PORTAL LINK FT
You can access the FollowMyHealth Patient Portal offered by [facility] by registering at the following website: [email or web address]. By joining [facility]’s FollowMyHealth portal, you will also be able to view your health information using other applications (apps) compatible with our system.

## 2024-09-05 NOTE — ED PROVIDER NOTE - NSFOLLOWUPINSTRUCTIONS_ED_ALL_ED_FT
You were seen in the ER today for back pain.    Your MRI showed spinal stenosis and lumbar disc herniation. The results of all of the testing that was performed today are included in this paperwork.    I sent a prescription for steroids to your pharmacy. Take according to the schedule listed below:    Day 1: 8 mg PO before breakfast, 4 mg after lunch, 4 mg after dinner, and 8 mg at bedtime  Day 2: 4 mg PO before breakfast, 4 mg after lunch, 4 mg after dinner, and 8 mg at bedtime  Day 3: 4 mg PO before breakfast, 4 mg after lunch, 4 mg after dinner, and 4 mg at bedtime  Day 4: 4 mg PO before breakfast, 4 mg after lunch, and 4 mg at bedtime  Day 5: 4 mg PO before breakfast, and 4 mg at bedtime  Day 6: 4 mg PO before breakfast .     You should also take acetaminophen and anti-inflammatories such as naproxen or ibuprofen to help with your pain.    It is extremely important for you to follow up with your outpatient provider for further examination and discussion regarding treatment and imaging, if necessary. If you develop any of the following symptoms, return to the ED immediately for re-evaluation:    •Significant trauma or fall, especially if you are over age 65 or have osteoporosis  •Sudden, acute onset of urinary retention or incontinence  •Fecal incontinence  •Loss of sensation (anesthesia) restricted to the area of the buttocks, perineum and inner surfaces of the thighs  •Weakness in the lower limbs

## 2024-09-05 NOTE — ED ADULT NURSE NOTE - BEFAST BALANCE
Detail Level: Detailed Quality 110: Preventive Care And Screening: Influenza Immunization: Influenza Immunization Administered during Influenza season Quality 111:Pneumonia Vaccination Status For Older Adults: Pneumococcal vaccine (PPSV23) administered on or after patient’s 60th birthday and before the end of the measurement period No

## 2024-09-05 NOTE — ED ADULT NURSE REASSESSMENT NOTE - NS ED NURSE REASSESS COMMENT FT1
Pt refused IV dexamethasone and PO tylenol stating "That does not work for me" Pt educated about the mechanism of action for both IV dexamethasone and tylenol, pt still refused. MD Castellon made aware. Safety maintained, assessment ongoing.

## 2024-09-05 NOTE — ED PROVIDER NOTE - ATTENDING CONTRIBUTION TO CARE
44 y/o M with PMH DM1, gastritis, asthma, cyclical vomiting syndrome here with acute on chronic low back pain. Patient is s/p lumbar discectomy within last week, was following up with surgical team today and c/o worsening pain radiating down LLE. Referred to ED from office for MRI imaging. Patient reports he was using Percocet for pain control however has not taken any in at least 4-5 days. Also c/o nausea/vomiting. No diarrhea, fevers, chest pain or shortness of breath.  In the ED, vitals stable.  Patient appears uncomfortable.  Will treat pain.  Plan for emergent MRI in setting of recent surgery to r/o acute complication.
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
## 2024-09-05 NOTE — ED PROVIDER NOTE - OBJECTIVE STATEMENT
43Y M PMH DM1, gastritis, asthma, cyclical vomiting syndrome here with acute on chronic low back pain. Patient is s/p lumbar discectomy within last week, was following up with surgical team today and c/o worsening pain radiating down LLE. Referred to ED from office for MRI imaging. Patient reports he was using Percocet for pain control however has not taken any in at least 4-5 days. Also c/o nausea/vomiting. No diarrhea, fevers, chest pain or shortness of breath.

## 2024-09-05 NOTE — ED PROVIDER NOTE - CLINICAL SUMMARY MEDICAL DECISION MAKING FREE TEXT BOX
43Y M PMH DM1, gastritis, asthma, cyclical vomiting, s/p recent lumbar discectomy sent in by outpatient surgical team for emergent MRI scan to rule out cord compression. Patient actively vomiting on arrival. Appears uncomfortable writhing around on stretcher on initial evaluation.

## 2024-09-05 NOTE — ED ADULT NURSE NOTE - NSFALLHARMRISKINTERV_ED_ALL_ED
Assistance OOB with selected safe patient handling equipment if applicable/Assistance with ambulation/Communicate risk of Fall with Harm to all staff, patient, and family/Monitor gait and stability/Provide visual cue: red socks, yellow wristband, yellow gown, etc/Reinforce activity limits and safety measures with patient and family/Bed in lowest position, wheels locked, appropriate side rails in place/Call bell, personal items and telephone in reach/Instruct patient to call for assistance before getting out of bed/chair/stretcher/Non-slip footwear applied when patient is off stretcher/[place] to call system/Physically safe environment - no spills, clutter or unnecessary equipment/Purposeful Proactive Rounding/Room/bathroom lighting operational, light cord in reach

## 2024-09-05 NOTE — ED ADULT NURSE NOTE - OBJECTIVE STATEMENT
Pt BIB EMS from home c/o lower back pain, nausea and vomiting that began on 08/31/2024. Pt had back surgery done on 08/28/2024, pt was seen at his surgeon office this morning and was referred to ED FOR MRI and pain control. Pt endorses being prescribed Percocet and flexril but his prescription has ran out. Hx of DM, gastritis and asthma.

## 2024-09-16 NOTE — ED PROVIDER NOTE - RESPIRATORY, MLM
"Chief Complaint   Patient presents with    RECHECK     Return EP for PAF       Initial BP (!) 149/62 (BP Location: Left arm, Patient Position: Chair, Cuff Size: Adult Small)   Pulse 52   Wt 48.5 kg (107 lb)   SpO2 93%   BMI 21.61 kg/m   Estimated body mass index is 21.61 kg/m  as calculated from the following:    Height as of 8/23/24: 1.499 m (4' 11\").    Weight as of this encounter: 48.5 kg (107 lb)..  BP completed using cuff size: small JEF Wheeler    "
Chest symmetrical, non-labored breathing, breath sounds clear and equal bilaterally.

## 2024-10-03 ENCOUNTER — EMERGENCY (EMERGENCY)
Facility: HOSPITAL | Age: 44
LOS: 0 days | Discharge: HOME | End: 2024-10-03
Attending: EMERGENCY MEDICINE
Payer: MEDICAID

## 2024-10-03 VITALS
DIASTOLIC BLOOD PRESSURE: 84 MMHG | HEART RATE: 96 BPM | TEMPERATURE: 98 F | OXYGEN SATURATION: 99 % | SYSTOLIC BLOOD PRESSURE: 131 MMHG | RESPIRATION RATE: 17 BRPM

## 2024-10-03 VITALS — HEIGHT: 71 IN | WEIGHT: 149.91 LBS | HEART RATE: 102 BPM | RESPIRATION RATE: 20 BRPM | OXYGEN SATURATION: 99 %

## 2024-10-03 DIAGNOSIS — Z87.19 PERSONAL HISTORY OF OTHER DISEASES OF THE DIGESTIVE SYSTEM: ICD-10-CM

## 2024-10-03 DIAGNOSIS — Z90.49 ACQUIRED ABSENCE OF OTHER SPECIFIED PARTS OF DIGESTIVE TRACT: Chronic | ICD-10-CM

## 2024-10-03 DIAGNOSIS — E10.9 TYPE 1 DIABETES MELLITUS WITHOUT COMPLICATIONS: ICD-10-CM

## 2024-10-03 DIAGNOSIS — G89.29 OTHER CHRONIC PAIN: ICD-10-CM

## 2024-10-03 DIAGNOSIS — M54.50 LOW BACK PAIN, UNSPECIFIED: ICD-10-CM

## 2024-10-03 DIAGNOSIS — Z79.4 LONG TERM (CURRENT) USE OF INSULIN: ICD-10-CM
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
## 2024-10-03 DIAGNOSIS — M54.16 RADICULOPATHY, LUMBAR REGION: ICD-10-CM

## 2024-10-03 LAB
ACETONE SERPL-MCNC: NEGATIVE — SIGNIFICANT CHANGE UP
ALBUMIN SERPL ELPH-MCNC: 4.1 G/DL — SIGNIFICANT CHANGE UP (ref 3.3–5)
ALP SERPL-CCNC: 111 U/L — SIGNIFICANT CHANGE UP (ref 40–120)
ALT FLD-CCNC: 48 U/L — SIGNIFICANT CHANGE UP (ref 12–78)
ANION GAP SERPL CALC-SCNC: 5 MMOL/L — SIGNIFICANT CHANGE UP (ref 5–17)
AST SERPL-CCNC: 34 U/L — SIGNIFICANT CHANGE UP (ref 15–37)
BASE EXCESS BLDV CALC-SCNC: 11.1 MMOL/L — HIGH (ref -2–3)
BASOPHILS # BLD AUTO: 0.04 K/UL — SIGNIFICANT CHANGE UP (ref 0–0.2)
BASOPHILS NFR BLD AUTO: 0.4 % — SIGNIFICANT CHANGE UP (ref 0–2)
BILIRUB SERPL-MCNC: 0.4 MG/DL — SIGNIFICANT CHANGE UP (ref 0.2–1.2)
BLOOD GAS COMMENTS, VENOUS: SIGNIFICANT CHANGE UP
BUN SERPL-MCNC: 16 MG/DL — SIGNIFICANT CHANGE UP (ref 7–23)
CALCIUM SERPL-MCNC: 10 MG/DL — SIGNIFICANT CHANGE UP (ref 8.5–10.1)
CHLORIDE BLDV-SCNC: 97 MMOL/L — LOW (ref 98–107)
CHLORIDE SERPL-SCNC: 104 MMOL/L — SIGNIFICANT CHANGE UP (ref 96–108)
CO2 BLDV-SCNC: 40 MMOL/L — HIGH (ref 22–26)
CO2 SERPL-SCNC: 32 MMOL/L — HIGH (ref 22–31)
CREAT SERPL-MCNC: 1.09 MG/DL — SIGNIFICANT CHANGE UP (ref 0.5–1.3)
EGFR: 86 ML/MIN/1.73M2 — SIGNIFICANT CHANGE UP
EOSINOPHIL # BLD AUTO: 0.03 K/UL — SIGNIFICANT CHANGE UP (ref 0–0.5)
EOSINOPHIL NFR BLD AUTO: 0.3 % — SIGNIFICANT CHANGE UP (ref 0–6)
GAS PNL BLDV: 135 MMOL/L — LOW (ref 136–145)
GAS PNL BLDV: SIGNIFICANT CHANGE UP
GAS PNL BLDV: SIGNIFICANT CHANGE UP
GLUCOSE BLDV-MCNC: 242 MG/DL — HIGH (ref 65–95)
GLUCOSE SERPL-MCNC: 223 MG/DL — HIGH (ref 70–99)
HCO3 BLDV-SCNC: 38 MMOL/L — HIGH (ref 22–28)
HCT VFR BLD CALC: 46.1 % — SIGNIFICANT CHANGE UP (ref 39–50)
HCT VFR BLDA CALC: 50 % — HIGH (ref 37–47)
HGB BLD CALC-MCNC: 16.7 G/DL — SIGNIFICANT CHANGE UP (ref 12.6–17.4)
HGB BLD-MCNC: 16.1 G/DL — SIGNIFICANT CHANGE UP (ref 13–17)
HOROWITZ INDEX BLDV+IHG-RTO: SIGNIFICANT CHANGE UP
IMM GRANULOCYTES NFR BLD AUTO: 0.4 % — SIGNIFICANT CHANGE UP (ref 0–0.9)
LACTATE BLDV-MCNC: 1.4 MMOL/L — HIGH (ref 0.56–1.39)
LACTATE SERPL-SCNC: 1.4 MMOL/L — SIGNIFICANT CHANGE UP (ref 0.7–2)
LIDOCAIN IGE QN: 11 U/L — LOW (ref 13–75)
LYMPHOCYTES # BLD AUTO: 1.1 K/UL — SIGNIFICANT CHANGE UP (ref 1–3.3)
LYMPHOCYTES # BLD AUTO: 9.9 % — LOW (ref 13–44)
MCHC RBC-ENTMCNC: 28.9 PG — SIGNIFICANT CHANGE UP (ref 27–34)
MCHC RBC-ENTMCNC: 34.9 G/DL — SIGNIFICANT CHANGE UP (ref 32–36)
MCV RBC AUTO: 82.6 FL — SIGNIFICANT CHANGE UP (ref 80–100)
MONOCYTES # BLD AUTO: 0.24 K/UL — SIGNIFICANT CHANGE UP (ref 0–0.9)
MONOCYTES NFR BLD AUTO: 2.2 % — SIGNIFICANT CHANGE UP (ref 2–14)
NEUTROPHILS # BLD AUTO: 9.67 K/UL — HIGH (ref 1.8–7.4)
NEUTROPHILS NFR BLD AUTO: 86.8 % — HIGH (ref 43–77)
NRBC # BLD: 0 /100 WBCS — SIGNIFICANT CHANGE UP (ref 0–0)
PCO2 BLDV: 56 MMHG — HIGH (ref 42–55)
PH BLDV: 7.44 — HIGH (ref 7.32–7.43)
PLATELET # BLD AUTO: 373 K/UL — SIGNIFICANT CHANGE UP (ref 150–400)
PO2 BLDV: 31 MMHG — SIGNIFICANT CHANGE UP (ref 25–45)
POTASSIUM BLDV-SCNC: 4.2 MMOL/L — SIGNIFICANT CHANGE UP (ref 3.5–5.1)
POTASSIUM SERPL-MCNC: 4.2 MMOL/L — SIGNIFICANT CHANGE UP (ref 3.5–5.3)
POTASSIUM SERPL-SCNC: 4.2 MMOL/L — SIGNIFICANT CHANGE UP (ref 3.5–5.3)
PROT SERPL-MCNC: 8.4 GM/DL — HIGH (ref 6–8.3)
RBC # BLD: 5.58 M/UL — SIGNIFICANT CHANGE UP (ref 4.2–5.8)
RBC # FLD: 13.2 % — SIGNIFICANT CHANGE UP (ref 10.3–14.5)
SAO2 % BLDV: 54.3 % — LOW (ref 94–98)
SODIUM SERPL-SCNC: 141 MMOL/L — SIGNIFICANT CHANGE UP (ref 135–145)
WBC # BLD: 11.12 K/UL — HIGH (ref 3.8–10.5)
WBC # FLD AUTO: 11.12 K/UL — HIGH (ref 3.8–10.5)

## 2024-10-03 PROCEDURE — 74177 CT ABD & PELVIS W/CONTRAST: CPT | Mod: 26,MC

## 2024-10-03 PROCEDURE — 99285 EMERGENCY DEPT VISIT HI MDM: CPT

## 2024-10-03 RX ORDER — METOCLOPRAMIDE HCL 5 MG
10 TABLET ORAL ONCE
Refills: 0 | Status: COMPLETED | OUTPATIENT
Start: 2024-10-03 | End: 2024-10-03

## 2024-10-03 RX ORDER — ONDANSETRON 2 MG/ML
4 INJECTION, SOLUTION INTRAMUSCULAR; INTRAVENOUS ONCE
Refills: 0 | Status: COMPLETED | OUTPATIENT
Start: 2024-10-03 | End: 2024-10-03

## 2024-10-03 RX ORDER — OXYCODONE AND ACETAMINOPHEN 7.5; 325 MG/1; MG/1
1 TABLET ORAL
Qty: 12 | Refills: 0
Start: 2024-10-03 | End: 2024-10-05

## 2024-10-03 RX ORDER — METHOCARBAMOL 750 MG/1
2 TABLET, FILM COATED ORAL
Qty: 30 | Refills: 0
Start: 2024-10-03 | End: 2024-10-07

## 2024-10-03 RX ORDER — METHOCARBAMOL 750 MG/1
1500 TABLET, FILM COATED ORAL ONCE
Refills: 0 | Status: COMPLETED | OUTPATIENT
Start: 2024-10-03 | End: 2024-10-03

## 2024-10-03 RX ORDER — KETOROLAC TROMETHAMINE 30 MG/ML
15 INJECTION, SOLUTION INTRAMUSCULAR ONCE
Refills: 0 | Status: DISCONTINUED | OUTPATIENT
Start: 2024-10-03 | End: 2024-10-03

## 2024-10-03 RX ORDER — SODIUM CHLORIDE 9 MG/ML
1000 INJECTION INTRAMUSCULAR; INTRAVENOUS; SUBCUTANEOUS ONCE
Refills: 0 | Status: COMPLETED | OUTPATIENT
Start: 2024-10-03 | End: 2024-10-03

## 2024-10-03 RX ORDER — ONDANSETRON 2 MG/ML
1 INJECTION, SOLUTION INTRAMUSCULAR; INTRAVENOUS
Qty: 6 | Refills: 0
Start: 2024-10-03 | End: 2024-10-05

## 2024-10-03 RX ORDER — IBUPROFEN 600 MG
1 TABLET ORAL
Qty: 20 | Refills: 0
Start: 2024-10-03 | End: 2024-10-07

## 2024-10-03 RX ADMIN — SODIUM CHLORIDE 1000 MILLILITER(S): 9 INJECTION INTRAMUSCULAR; INTRAVENOUS; SUBCUTANEOUS at 14:15

## 2024-10-03 RX ADMIN — KETOROLAC TROMETHAMINE 15 MILLIGRAM(S): 30 INJECTION, SOLUTION INTRAMUSCULAR at 16:30

## 2024-10-03 RX ADMIN — METHOCARBAMOL 1500 MILLIGRAM(S): 750 TABLET, FILM COATED ORAL at 16:30

## 2024-10-03 RX ADMIN — Medication 4 MILLIGRAM(S): at 13:05

## 2024-10-03 RX ADMIN — Medication 4 MILLIGRAM(S): at 14:51

## 2024-10-03 RX ADMIN — KETOROLAC TROMETHAMINE 15 MILLIGRAM(S): 30 INJECTION, SOLUTION INTRAMUSCULAR at 13:04

## 2024-10-03 RX ADMIN — Medication 4 MILLIGRAM(S): at 13:38

## 2024-10-03 RX ADMIN — KETOROLAC TROMETHAMINE 15 MILLIGRAM(S): 30 INJECTION, SOLUTION INTRAMUSCULAR at 13:38

## 2024-10-03 RX ADMIN — SODIUM CHLORIDE 1000 MILLILITER(S): 9 INJECTION INTRAMUSCULAR; INTRAVENOUS; SUBCUTANEOUS at 13:06

## 2024-10-03 RX ADMIN — ONDANSETRON 4 MILLIGRAM(S): 2 INJECTION, SOLUTION INTRAMUSCULAR; INTRAVENOUS at 13:05

## 2024-10-03 RX ADMIN — Medication 10 MILLIGRAM(S): at 13:35

## 2024-10-03 RX ADMIN — Medication 4 MILLIGRAM(S): at 13:36

## 2024-10-03 NOTE — ED ADULT NURSE NOTE - NSFALLUNIVINTERV_ED_ALL_ED
Bed/Stretcher in lowest position, wheels locked, appropriate side rails in place/Call bell, personal items and telephone in reach/Instruct patient to call for assistance before getting out of bed/chair/stretcher/Non-slip footwear applied when patient is off stretcher/[place] to call system/Physically safe environment - no spills, clutter or unnecessary equipment/Purposeful proactive rounding/Room/bathroom lighting operational, light cord in reach

## 2024-10-03 NOTE — ED POST DISCHARGE NOTE - DETAILS
Pt called and made aware and requesting percocet to be sent to [place] [facility]. Percocet was resent

## 2024-10-03 NOTE — ED ADULT TRIAGE NOTE - PRO INTERPRETER NEED 2
Contacted patient to proceed with scheduling procedure. Patient states that he will contract the office back to schedule procedure.    English
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
## 2024-10-03 NOTE — ED PROVIDER NOTE - NSFOLLOWUPINSTRUCTIONS_ED_ALL_ED_FT
Lumbar Radiculopathy    WHAT YOU NEED TO KNOW:    What is lumbar radiculopathy? Lumbar radiculopathy is a painful condition that happens when a nerve in your lumbar spine (lower back) is pinched or irritated.  Vertebral Column    What causes lumbar radiculopathy? You may get a pinched nerve in your lumbar spine if you have disc damage. Discs are natural, spongy cushions between your vertebrae (back bones) that allow your spine to move. Your discs may move out of place and pinch the nerve in your spine. Any of the following can increase your risk for a pinched nerve and lumbar radiculopathy:    Diabetes, a spinal infection, or a growth in your spine    Extra body weight    Being male or an older adult    Cigarette use  What are the signs and symptoms of lumbar radiculopathy? You may have any of the following:    Pain that moves from your lower back to your buttocks, groin, and the back of your leg    Pain felt below your knee    Pain that worsens when you cough, sneeze, stand, or sit    Numbness, weakness, or tingling in your back or legs  How is lumbar radiculopathy diagnosed? Your healthcare provider will examine you and ask about your family history of back and leg pain. Your provider may also test you for weakness, numbness, or tingling in your back, buttocks, and legs. You may be asked to lie on your back and lift your leg to locate your pain. You may also need any of the following:    MRI or CT scan pictures may show problems or changes in your back bones, nerves, and discs. Contrast liquid may be used to help problems show up better in the pictures. Tell the healthcare provider if you have ever had an allergic reaction to contrast liquid. Do not enter the MRI room with anything metal. Metal can cause serious injury. Tell the healthcare provider if you have any metal in or on your body.    X-ray pictures show signs of infection or other problems with your spine.    An electromyography (EMG) test measures the electrical activity of your muscles at rest and with movement.  How is lumbar radiculopathy treated? Ask your healthcare provider for more information about these and other treatments for lumbar radiculopathy:    Medicines:  NSAIDs, such as ibuprofen, help decrease swelling, pain, and fever. This medicine is available with or without a doctor's order. NSAIDs can cause stomach bleeding or kidney problems in certain people. If you take blood thinner medicine, always ask your healthcare provider if NSAIDs are safe for you. Always read the medicine label and follow directions.    Muscle relaxers help decrease pain and muscle spasms.    Prescription pain medicine may be given. Ask your healthcare provider how to take this medicine safely. Some prescription pain medicines contain acetaminophen. Do not take other medicines that contain acetaminophen without talking to your healthcare provider. Too much acetaminophen may cause liver damage. Prescription pain medicine may cause constipation. Ask your healthcare provider how to prevent or treat constipation.    Steroid pills may help reduce swelling and pain.    Steroid injections into your lumbar spine may help decrease your nerve pain and swelling. You may need more than 1 injection if your symptoms do not improve after the first treatment.    Physical therapy may be used to improve your posture (the way you stand and sit), flexibility, and the strength in your lower back. Your physical therapist may also teach you how to remain safely active and prevent more injury.    Transcutaneous electrical nerve stimulation (TENS) stimulates nerves and may decrease your pain. Wires are attached to pads. The pads are attached to your skin. The wires send a mild current through your nerves.    Surgery may relieve your pinched nerve if your condition has not improved within 4 to 6 weeks. You may also need surgery if you have lumbar radiculopathy more than 1 time.  What can I do to manage or prevent lumbar radiculopathy?    Apply ice or heat. Ice and heat can help relieve pain or swelling. Put ice in a plastic bag covered with a towel on your low back. Apply ice for 15 to 20 minutes at a time, or as directed. Cover heated items with a towel to avoid burns. You can also use a heating pad on a low setting. Apply heat for 20 minutes at a time. Your provider may tell you to alternate ice and heat.    Stay active. Your healthcare provider may tell you to take walks to ease yourself back into your daily routine. Avoid long periods of bed rest. Bed rest could worsen your symptoms. Do not move in ways that increase your pain. Ask your healthcare provider for more information about the best ways to stay active.  [employer] [facility] for Exercise      Do not lift anything heavy. Heavy objects put a strain on your back and may make your symptoms worse.    Maintain a healthy weight. Extra body weight may strain your back. Ask your provider what a healthy weight is for you. Your provider can help you create a safe weight loss plan, if needed.    Do not smoke. Nicotine and other chemicals in cigarettes and cigars increase your risk for lumbar radiculopathy. Ask your provider for information if you currently smoke and need help to quit. E-cigarettes and smokeless tobacco still contain nicotine. Talk to your healthcare provider before you use these products.  When should I seek immediate care?    You have a fever greater than 100.4°F (38°C) for longer than 2 days.    You have new, severe back or leg pain, or your pain spreads to both legs.    You have any new signs of numbness or weakness, especially in your lower back, legs, arms, or genital area.    You have new trouble controlling your urine and bowel movements.    You do not feel like your bladder empties when you urinate.  When should I call my doctor?    Your pain does not improve within 1 to 3 weeks of treatment.    Your pain and weakness keep you from your normal activities at work, home, or school.    You lose more than 10 pounds in 6 months without trying.    You become depressed or sad because of the pain.    You have questions or concerns about your condition or care.  CARE AGREEMENT:    You have the right to help plan your care. Learn about your health condition and how it may be treated. Discuss treatment options with your healthcare providers to decide what care you want to receive. You always have the right to refuse treatment.

## 2024-10-03 NOTE — ED POST DISCHARGE NOTE - RESULT SUMMARY
Pt called states he couldn't get his medications from his [facility] pharmacy. Called [facility] pharmacy and spoke with staff states pt needs prior authorization from insurance for percocet and didn't  prescription.
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
## 2024-10-03 NOTE — ED PROVIDER NOTE - PATIENT PORTAL LINK FT
You can access the FollowMyHealth Patient Portal offered by [facility] by registering at the following website: [email or web address]. By joining [employer]’s FollowMyHealth portal, you will also be able to view your health information using other applications (apps) compatible with our system.

## 2024-10-03 NOTE — ED ADULT NURSE NOTE - CADM POA CENTRAL LINE
Patient was seen today via virtual visit for ENT symptoms.     Patient's Influenza antigen labs resulted as positive. Please review lab results and advise.    Patient is requesting medication to be sent as soon as possible, and states he is expecting an answer within an hour as he is leaving out of town today.    Amanda Nicolas RN  [facility]   No

## 2024-10-03 NOTE — ED ADULT TRIAGE NOTE - CHIEF COMPLAINT QUOTE
Patient BIb EMS from home with complaint o f abdominal pain that radiates to lower back, reports nausea and vomiting, denies any diarrhea. PMx: Gastritis  Unable to obtain BP due to constant movement from patient.

## 2024-10-03 NOTE — ED PROVIDER NOTE - RESPIRATORY, MLM
[FreeTextEntry1] : 60-year-old M with a PMH of CAD S/P 4-vessel CABG (12/2011), mild-moderate LV dysfunction, HTN, dyslipidemia and diabetes following up in cardiology clinic for optimization of his CAD and heart failure regimen. \par No SOB or chest pain. No LE edema or palp. No orthopnea. No F/C. Reports diet is improved and has lost a few pounds.  Breath sounds clear and equal bilaterally.

## 2024-10-03 NOTE — ED PROVIDER NOTE - CPE EDP EYES NORM
normal...
09708 Exp Problem Focused - Low Complex
No adenopathy or splenomegaly. No cervical or inguinal lymphadenopathy.
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
## 2024-10-04 NOTE — ED PROVIDER NOTE - PSYCHIATRIC, MLM
Previously Declined (within the last year)
Alert and oriented to person, place, time/situation. normal mood and affect. no apparent risk to self or others.

## 2024-10-09 NOTE — ED ADULT TRIAGE NOTE - IDEAL BODY WEIGHT(KG)
75 Quality 47: Advance Care Plan: Advance Care Planning discussed and documented; advance care plan or surrogate decision maker documented in the medical record. Detail Level: Detailed Quality 226: Preventive Care And Screening: Tobacco Use: Screening And Cessation Intervention: Patient screened for tobacco use and is an ex/non-smoker Quality 431: Preventive Care And Screening: Unhealthy Alcohol Use - Screening: Patient not identified as an unhealthy alcohol user when screened for unhealthy alcohol use using a systematic screening method

## 2024-10-10 ENCOUNTER — EMERGENCY (EMERGENCY)
Facility: HOSPITAL | Age: 44
LOS: 0 days | Discharge: HOME | End: 2024-10-10
Attending: EMERGENCY MEDICINE
Payer: MEDICAID

## 2024-10-10 VITALS
RESPIRATION RATE: 22 BRPM | WEIGHT: 149.91 LBS | SYSTOLIC BLOOD PRESSURE: 156 MMHG | DIASTOLIC BLOOD PRESSURE: 83 MMHG | HEIGHT: 71 IN | TEMPERATURE: 99 F | OXYGEN SATURATION: 100 % | HEART RATE: 66 BPM

## 2024-10-10 VITALS
TEMPERATURE: 99 F | OXYGEN SATURATION: 99 % | HEART RATE: 69 BPM | RESPIRATION RATE: 15 BRPM | SYSTOLIC BLOOD PRESSURE: 150 MMHG | DIASTOLIC BLOOD PRESSURE: 86 MMHG

## 2024-10-10 DIAGNOSIS — J45.909 UNSPECIFIED ASTHMA, UNCOMPLICATED: ICD-10-CM

## 2024-10-10 DIAGNOSIS — G89.29 OTHER CHRONIC PAIN: ICD-10-CM

## 2024-10-10 DIAGNOSIS — Z98.890 OTHER SPECIFIED POSTPROCEDURAL STATES: ICD-10-CM

## 2024-10-10 DIAGNOSIS — Z91.018 ALLERGY TO OTHER FOODS: ICD-10-CM

## 2024-10-10 DIAGNOSIS — E10.9 TYPE 1 DIABETES MELLITUS WITHOUT COMPLICATIONS: ICD-10-CM

## 2024-10-10 DIAGNOSIS — Z90.49 ACQUIRED ABSENCE OF OTHER SPECIFIED PARTS OF DIGESTIVE TRACT: Chronic | ICD-10-CM

## 2024-10-10 DIAGNOSIS — M54.9 DORSALGIA, UNSPECIFIED: ICD-10-CM

## 2024-10-10 DIAGNOSIS — R11.2 NAUSEA WITH VOMITING, UNSPECIFIED: ICD-10-CM

## 2024-10-10 LAB
ACETONE SERPL-MCNC: NEGATIVE — SIGNIFICANT CHANGE UP
ALBUMIN SERPL ELPH-MCNC: 4 G/DL — SIGNIFICANT CHANGE UP (ref 3.3–5)
ALP SERPL-CCNC: 112 U/L — SIGNIFICANT CHANGE UP (ref 40–120)
ALT FLD-CCNC: 54 U/L — SIGNIFICANT CHANGE UP (ref 12–78)
ANION GAP SERPL CALC-SCNC: 9 MMOL/L — SIGNIFICANT CHANGE UP (ref 5–17)
AST SERPL-CCNC: 35 U/L — SIGNIFICANT CHANGE UP (ref 15–37)
BASE EXCESS BLDV CALC-SCNC: 8.4 MMOL/L — HIGH (ref -2–3)
BASOPHILS # BLD AUTO: 0.05 K/UL — SIGNIFICANT CHANGE UP (ref 0–0.2)
BASOPHILS NFR BLD AUTO: 0.5 % — SIGNIFICANT CHANGE UP (ref 0–2)
BILIRUB SERPL-MCNC: 0.4 MG/DL — SIGNIFICANT CHANGE UP (ref 0.2–1.2)
BLOOD GAS COMMENTS, VENOUS: SIGNIFICANT CHANGE UP
BUN SERPL-MCNC: 12 MG/DL — SIGNIFICANT CHANGE UP (ref 7–23)
CALCIUM SERPL-MCNC: 10 MG/DL — SIGNIFICANT CHANGE UP (ref 8.5–10.1)
CHLORIDE BLDV-SCNC: 100 MMOL/L — SIGNIFICANT CHANGE UP (ref 98–107)
CHLORIDE SERPL-SCNC: 100 MMOL/L — SIGNIFICANT CHANGE UP (ref 96–108)
CO2 BLDV-SCNC: 35 MMOL/L — HIGH (ref 22–26)
CO2 SERPL-SCNC: 29 MMOL/L — SIGNIFICANT CHANGE UP (ref 22–31)
CREAT SERPL-MCNC: 1.22 MG/DL — SIGNIFICANT CHANGE UP (ref 0.5–1.3)
EGFR: 75 ML/MIN/1.73M2 — SIGNIFICANT CHANGE UP
EOSINOPHIL # BLD AUTO: 0.12 K/UL — SIGNIFICANT CHANGE UP (ref 0–0.5)
EOSINOPHIL NFR BLD AUTO: 1.2 % — SIGNIFICANT CHANGE UP (ref 0–6)
GAS PNL BLDV: 136 MMOL/L — SIGNIFICANT CHANGE UP (ref 136–145)
GAS PNL BLDV: SIGNIFICANT CHANGE UP
GAS PNL BLDV: SIGNIFICANT CHANGE UP
GLUCOSE BLDV-MCNC: 298 MG/DL — HIGH (ref 65–95)
GLUCOSE SERPL-MCNC: 263 MG/DL — HIGH (ref 70–99)
HCO3 BLDV-SCNC: 34 MMOL/L — HIGH (ref 22–28)
HCT VFR BLD CALC: 43.9 % — SIGNIFICANT CHANGE UP (ref 39–50)
HCT VFR BLDA CALC: 47 % — SIGNIFICANT CHANGE UP (ref 37–47)
HGB BLD CALC-MCNC: 15.5 G/DL — SIGNIFICANT CHANGE UP (ref 12.6–17.4)
HGB BLD-MCNC: 15.2 G/DL — SIGNIFICANT CHANGE UP (ref 13–17)
HOROWITZ INDEX BLDV+IHG-RTO: 21 — SIGNIFICANT CHANGE UP
IMM GRANULOCYTES NFR BLD AUTO: 0.2 % — SIGNIFICANT CHANGE UP (ref 0–0.9)
LACTATE BLDV-MCNC: 3.4 MMOL/L — HIGH (ref 0.56–1.39)
LACTATE SERPL-SCNC: 3.1 MMOL/L — HIGH (ref 0.7–2)
LIDOCAIN IGE QN: 13 U/L — SIGNIFICANT CHANGE UP (ref 13–75)
LYMPHOCYTES # BLD AUTO: 1.01 K/UL — SIGNIFICANT CHANGE UP (ref 1–3.3)
LYMPHOCYTES # BLD AUTO: 10.4 % — LOW (ref 13–44)
MCHC RBC-ENTMCNC: 29 PG — SIGNIFICANT CHANGE UP (ref 27–34)
MCHC RBC-ENTMCNC: 34.6 G/DL — SIGNIFICANT CHANGE UP (ref 32–36)
MCV RBC AUTO: 83.6 FL — SIGNIFICANT CHANGE UP (ref 80–100)
MONOCYTES # BLD AUTO: 0.31 K/UL — SIGNIFICANT CHANGE UP (ref 0–0.9)
MONOCYTES NFR BLD AUTO: 3.2 % — SIGNIFICANT CHANGE UP (ref 2–14)
NEUTROPHILS # BLD AUTO: 8.17 K/UL — HIGH (ref 1.8–7.4)
NEUTROPHILS NFR BLD AUTO: 84.5 % — HIGH (ref 43–77)
NRBC # BLD: 0 /100 WBCS — SIGNIFICANT CHANGE UP (ref 0–0)
PCO2 BLDV: 46 MMHG — SIGNIFICANT CHANGE UP (ref 42–55)
PH BLDV: 7.47 — HIGH (ref 7.32–7.43)
PLATELET # BLD AUTO: 345 K/UL — SIGNIFICANT CHANGE UP (ref 150–400)
PO2 BLDV: 41 MMHG — SIGNIFICANT CHANGE UP (ref 25–45)
POTASSIUM BLDV-SCNC: 4.2 MMOL/L — SIGNIFICANT CHANGE UP (ref 3.5–5.1)
POTASSIUM SERPL-MCNC: 3.9 MMOL/L — SIGNIFICANT CHANGE UP (ref 3.5–5.3)
POTASSIUM SERPL-SCNC: 3.9 MMOL/L — SIGNIFICANT CHANGE UP (ref 3.5–5.3)
PROT SERPL-MCNC: 8 GM/DL — SIGNIFICANT CHANGE UP (ref 6–8.3)
RBC # BLD: 5.25 M/UL — SIGNIFICANT CHANGE UP (ref 4.2–5.8)
RBC # FLD: 13.6 % — SIGNIFICANT CHANGE UP (ref 10.3–14.5)
SAO2 % BLDV: 69.7 % — LOW (ref 94–98)
SODIUM SERPL-SCNC: 138 MMOL/L — SIGNIFICANT CHANGE UP (ref 135–145)
WBC # BLD: 9.68 K/UL — SIGNIFICANT CHANGE UP (ref 3.8–10.5)
WBC # FLD AUTO: 9.68 K/UL — SIGNIFICANT CHANGE UP (ref 3.8–10.5)

## 2024-10-10 PROCEDURE — 93010 ELECTROCARDIOGRAM REPORT: CPT

## 2024-10-10 PROCEDURE — 99285 EMERGENCY DEPT VISIT HI MDM: CPT

## 2024-10-10 RX ORDER — SODIUM CHLORIDE 0.9 % (FLUSH) 0.9 %
1000 SYRINGE (ML) INJECTION ONCE
Refills: 0 | Status: COMPLETED | OUTPATIENT
Start: 2024-10-10 | End: 2024-10-10

## 2024-10-10 RX ORDER — OXYCODONE AND ACETAMINOPHEN 5; 325 MG/1; MG/1
1 TABLET ORAL
Qty: 12 | Refills: 0
Start: 2024-10-10 | End: 2024-10-12

## 2024-10-10 RX ORDER — ONDANSETRON HCL/PF 4 MG/2 ML
4 VIAL (ML) INJECTION ONCE
Refills: 0 | Status: COMPLETED | OUTPATIENT
Start: 2024-10-10 | End: 2024-10-10

## 2024-10-10 RX ORDER — MORPHINE SULFATE 30 MG/1
8 TABLET, FILM COATED, EXTENDED RELEASE ORAL ONCE
Refills: 0 | Status: DISCONTINUED | OUTPATIENT
Start: 2024-10-10 | End: 2024-10-10

## 2024-10-10 RX ORDER — KETOROLAC TROMETHAMINE 10 MG/1
30 TABLET, FILM COATED ORAL ONCE
Refills: 0 | Status: DISCONTINUED | OUTPATIENT
Start: 2024-10-10 | End: 2024-10-10

## 2024-10-10 RX ORDER — LIDOCAINE 50 MG/G
1 CREAM TOPICAL
Qty: 10 | Refills: 0
Start: 2024-10-10 | End: 2024-10-19

## 2024-10-10 RX ADMIN — KETOROLAC TROMETHAMINE 30 MILLIGRAM(S): 10 TABLET, FILM COATED ORAL at 09:33

## 2024-10-10 RX ADMIN — MORPHINE SULFATE 8 MILLIGRAM(S): 30 TABLET, FILM COATED, EXTENDED RELEASE ORAL at 09:33

## 2024-10-10 RX ADMIN — MORPHINE SULFATE 8 MILLIGRAM(S): 30 TABLET, FILM COATED, EXTENDED RELEASE ORAL at 08:50

## 2024-10-10 RX ADMIN — Medication 4 MILLIGRAM(S): at 08:50

## 2024-10-10 RX ADMIN — Medication 1000 MILLILITER(S): at 10:00

## 2024-10-10 RX ADMIN — KETOROLAC TROMETHAMINE 30 MILLIGRAM(S): 10 TABLET, FILM COATED ORAL at 08:50

## 2024-10-10 RX ADMIN — Medication 1500 MILLIGRAM(S): at 09:05

## 2024-10-10 NOTE — ED PROVIDER NOTE - CLINICAL SUMMARY MEDICAL DECISION MAKING FREE TEXT BOX
pt with back pain improved, vomiting as well , now tolerating PO intake - will dc with percocet and robaxin as well as lidocaine patch - will continue physical therapy and own doctor follow up in coming day.
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
## 2024-10-10 NOTE — ED PROVIDER NOTE - CCCP TRG CHIEF CMPLNT
back pain general
Normal vision: sees adequately in most situations; can see medication labels, newsprint

## 2024-10-10 NOTE — ED PROVIDER NOTE - OBJECTIVE STATEMENT
43 year old male with DM I on insulin, asthma, gastritis, asthma otherwise presenting to ED due to back pain and nausea/vomiting. Recent discectomy 4 weeks ago - otherwise without fever/chills and otherwise has had 2 or 3 other visits for the same in past 3 week period. Pt denies any abd pain - states mainly had back pain prior to nausea then vomiting developing this AM.

## 2024-10-10 NOTE — ED PROVIDER NOTE - PATIENT PORTAL LINK FT
You can access the FollowMyHealth Patient Portal offered by [facility] by registering at the following website: [email or web address]. By joining [employer]s FollowMyHealth portal, you will also be able to view your health information using other applications (apps) compatible with our system.

## 2024-10-10 NOTE — ED ADULT NURSE NOTE - HOW PATIENT ADDRESSED, PROFILE
-- Labs DUE in 2 weeks: CMP, CBC with diff, Magnesium level, Tacrolimus level      -- Labs (7AM) / Echocardiogram (8AM) / Clinic visit (9AM) on 5/14/24     -- Up-to-date with routine health maintenance. Follow up as instructed  Per Pulmonology:  Repeat Full PFTs with Pleth and 6MWT as outpatient, follow-up in the PH clinic for Tyvaso.  will call.  Consider treatment with antifibrotic as an outpatient with Nintedanib.  Follow-up with pulmonary ILD clinic every 3 months with 6-minute walk test    -- In a week can take home antigen test, if negative repeat next day and if then negative would not be considered infectious at that point.      Please call at [phone] if any of the signs and symptoms are noted:  -- Shortness of breath, leg swelling, weight gain >3lbs in 1 day or 5 lbs in 1 week, fevers, chills, weakness, nausea, vomiting, diarrhea, abdominal discomfort/pain or urinary symptoms.     If you have signs and symptoms of infection such as fever, chills, nausea, vomiting, diarrhea or swelling/redness/drainage/pain of the wound site, please call infectious disease office/clinic for evaluation and management   
Tomasz

## 2024-10-10 NOTE — ED ADULT NURSE NOTE - NSFALLRISKINTERV_ED_ALL_ED

## 2024-10-10 NOTE — ED ADULT NURSE NOTE - OBJECTIVE STATEMENT
Patient is a 43 year old male, alert & oriented BIBA for severe back pain. PMHX: DM1, Asthma, Gastritis, Discectomy 4 weeks ago. (+) N&V. Denies chest pain. (+) Diarrhea x 1.

## 2024-10-15 LAB — GLUCOSE BLDC GLUCOMTR-MCNC: 233 MG/DL — HIGH (ref 70–99)

## 2024-11-01 ENCOUNTER — EMERGENCY (EMERGENCY)
Facility: HOSPITAL | Age: 44
LOS: 0 days | Discharge: HOME | End: 2024-11-01
Attending: STUDENT IN AN ORGANIZED HEALTH CARE EDUCATION/TRAINING PROGRAM
Payer: MEDICAID

## 2024-11-01 VITALS
TEMPERATURE: 98 F | OXYGEN SATURATION: 100 % | RESPIRATION RATE: 18 BRPM | HEART RATE: 75 BPM | SYSTOLIC BLOOD PRESSURE: 131 MMHG | DIASTOLIC BLOOD PRESSURE: 91 MMHG

## 2024-11-01 VITALS
RESPIRATION RATE: 18 BRPM | OXYGEN SATURATION: 99 % | WEIGHT: 160.06 LBS | HEART RATE: 96 BPM | SYSTOLIC BLOOD PRESSURE: 123 MMHG | DIASTOLIC BLOOD PRESSURE: 88 MMHG | TEMPERATURE: 98 F | HEIGHT: 71 IN

## 2024-11-01 DIAGNOSIS — R11.2 NAUSEA WITH VOMITING, UNSPECIFIED: ICD-10-CM

## 2024-11-01 DIAGNOSIS — E73.9 LACTOSE INTOLERANCE, UNSPECIFIED: ICD-10-CM

## 2024-11-01 DIAGNOSIS — R20.2 PARESTHESIA OF SKIN: ICD-10-CM

## 2024-11-01 DIAGNOSIS — Z98.890 OTHER SPECIFIED POSTPROCEDURAL STATES: ICD-10-CM

## 2024-11-01 DIAGNOSIS — Z90.49 ACQUIRED ABSENCE OF OTHER SPECIFIED PARTS OF DIGESTIVE TRACT: Chronic | ICD-10-CM

## 2024-11-01 DIAGNOSIS — M54.50 LOW BACK PAIN, UNSPECIFIED: ICD-10-CM

## 2024-11-01 DIAGNOSIS — M51.27 OTHER INTERVERTEBRAL DISC DISPLACEMENT, LUMBOSACRAL REGION: ICD-10-CM

## 2024-11-01 DIAGNOSIS — E11.9 TYPE 2 DIABETES MELLITUS WITHOUT COMPLICATIONS: ICD-10-CM

## 2024-11-01 DIAGNOSIS — J45.909 UNSPECIFIED ASTHMA, UNCOMPLICATED: ICD-10-CM

## 2024-11-01 DIAGNOSIS — Z87.19 PERSONAL HISTORY OF OTHER DISEASES OF THE DIGESTIVE SYSTEM: ICD-10-CM

## 2024-11-01 PROCEDURE — 72131 CT LUMBAR SPINE W/O DYE: CPT | Mod: 26,MC

## 2024-11-01 PROCEDURE — 99284 EMERGENCY DEPT VISIT MOD MDM: CPT

## 2024-11-01 RX ORDER — OXYCODONE HYDROCHLORIDE 30 MG/1
10 TABLET ORAL ONCE
Refills: 0 | Status: DISCONTINUED | OUTPATIENT
Start: 2024-11-01 | End: 2024-11-01

## 2024-11-01 RX ORDER — METHOCARBAMOL 500 MG/1
1 TABLET ORAL
Qty: 15 | Refills: 0
Start: 2024-11-01 | End: 2024-11-05

## 2024-11-01 RX ORDER — OXYCODONE AND ACETAMINOPHEN 7.5; 325 MG/1; MG/1
1 TABLET ORAL
Qty: 9 | Refills: 0
Start: 2024-11-01 | End: 2024-11-03

## 2024-11-01 RX ORDER — LIDOCAINE HYDROCHLORIDE 40 MG/ML
1 SOLUTION TOPICAL ONCE
Refills: 0 | Status: COMPLETED | OUTPATIENT
Start: 2024-11-01 | End: 2024-11-01

## 2024-11-01 RX ORDER — METHOCARBAMOL 500 MG/1
750 TABLET ORAL ONCE
Refills: 0 | Status: COMPLETED | OUTPATIENT
Start: 2024-11-01 | End: 2024-11-01

## 2024-11-01 RX ADMIN — OXYCODONE HYDROCHLORIDE 10 MILLIGRAM(S): 30 TABLET ORAL at 13:21

## 2024-11-01 RX ADMIN — METHOCARBAMOL 750 MILLIGRAM(S): 500 TABLET ORAL at 13:21

## 2024-11-01 RX ADMIN — LIDOCAINE HYDROCHLORIDE 1 PATCH: 40 SOLUTION TOPICAL at 13:21
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
## 2024-11-01 NOTE — ED PROVIDER NOTE - PROVIDER TOKENS
PROVIDER:[TOKEN:[[id number]],FOLLOWUP:[7-10 Days]],PROVIDER:[TOKEN:[[id number]],FOLLOWUP:[7-10 Days],ESTABLISHEDPATIENT:[T]]

## 2024-11-01 NOTE — ED ADULT TRIAGE NOTE - CHIEF COMPLAINT QUOTE
BIBA for severe lower back pain since this morning. recently had back surgery on oct 28. denies discharge for the incision or fever. history of dm, anxiety. fs 256.

## 2024-11-01 NOTE — ED PROVIDER NOTE - PRO INTERPRETER NEED 2
[FreeTextEntry1] : \par PMH as noted below:\par Coronary artery disease status post CABG, which includes LIMA to LAD. 4/2010 [facility].  Nuclear stress test from 10/17 with no significant perfusion defect. \par \par Essential hypertension:  Well controlled. \par \par History of having gastric ulcer with a GI bleed in December.  s/p transfusions. GI evaluation for PUD, followed with Dr. Ghosh in the past.\par \par Dyslipidemia. Remaining on statin.\par \par Moderate mitral regurgitation. Remaining stable.\par \par Mild pulmonary hypertension.  No CHF\par \par History of depression. improved.\par \par History of syncopal episode.  Implantable loop recorder. No significant findings.\par \par Bilateral moderate carotid stenosis.  Per past note; No significant change when evaluated in April 2015.  Remaining without neurological events \par  English

## 2024-11-01 NOTE — ED PROVIDER NOTE - PATIENT PORTAL LINK FT
You can access the FollowMyHealth Patient Portal offered by [facility] by registering at the following website: [email or web address]. By joining [employer]’s FollowMyHealth portal, you will also be able to view your health information using other applications (apps) compatible with our system.
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
## 2024-11-01 NOTE — ED PROVIDER NOTE - CARE PROVIDER_API CALL
Dixon Leigh Gibbs  Orthopaedic Surgery  [address], [address]  [place] [address]  Phone: [phone]  Fax: [phone]  Follow Up Time: 7-10 Days    LUCY HARRISON  [address] [place] [id number]  Phone: [phone]  Fax: [phone]  Established Patient  Follow Up Time: 7-10 Days

## 2024-11-01 NOTE — ED PROVIDER NOTE - PHYSICAL EXAMINATION
GEN: Awake, alert, interactive, NAD.  HEAD AND NECK: NC/AT. Airway patent. Neck supple.   EYES:  Clear b/l. EOMI. PERRL.   ENT: Moist mucus membranes.   CARDIAC: Regular rate, regular rhythm. No evident pedal edema.    RESP/CHEST: Normal respiratory effort with no use of accessory muscles or retractions. Clear throughout on auscultation.  ABD: Soft, non-distended, non-tender. No rebound, no guarding.   BACK: No midline spinal TTP. No CVAT.   EXTREMITIES: Moving all extremities with no apparent deformities.   SKIN: Warm, dry, intact normal color. No rash.   NEURO: AOx3, CN II-XII grossly intact, no focal deficits.   PSYCH: Appropriate mood and affect. GEN: Awake, alert, interactive, pt appears uncomfortable / in pain.   HEAD AND NECK: NC/AT. Airway patent. Neck supple.   EYES:  Clear b/l. EOMI. PERRL.   ENT: Moist mucus membranes.   CARDIAC: Regular rate, regular rhythm. No evident pedal edema.    RESP/CHEST: Normal respiratory effort with no use of accessory muscles or retractions. Clear throughout on auscultation.  ABD: Soft, non-distended, non-tender. No rebound, no guarding.   BACK: No midline spinal TTP. No CVAT.   EXTREMITIES: Moving all extremities with no apparent deformities.   SKIN: Warm, dry, intact normal color. No rash.   NEURO: AOx3, CN II-XII grossly intact, no focal deficits.   PSYCH: Appropriate mood and affect.

## 2024-11-01 NOTE — ED PROVIDER NOTE - CLINICAL SUMMARY MEDICAL DECISION MAKING FREE TEXT BOX
43M PMH DM, asthma, anxiety, gastritis pw severe lower back pain. 43M PMH DM, asthma, anxiety, gastritis, PSH significant for discectomy 9/28/24 pw severe lower back pain a/w radiation into RLE onset this AM. Afebrile, VSS. 43M PMH DM, asthma, anxiety, gastritis, PSH significant for discectomy 9/28/24 pw severe lower back pain a/w radiation into RLE onset this AM. Afebrile, VSS. Pt appears uncomfortable / in pain. No red flag signs / symptoms concerning for cauda equina. Plan for pain control, CT L spine. Re-eval. 43M PMH DM, asthma, anxiety, gastritis, PSH significant for discectomy 9/28/24 pw severe lower back pain a/w radiation into RLE onset this AM. Afebrile, VSS. Pt appears uncomfortable / in pain. No red flag signs / symptoms concerning for cauda equina. Plan for pain control, CT L spine. Re-eval.  Last back imaging in EMR 9/5 prior to surgery. 43M PMH DM, asthma, anxiety, gastritis, PSH significant for discectomy 9/28/24 pw severe lower back pain a/w radiation into LLE onset this AM. Afebrile, VSS. Pt appears uncomfortable / in pain. No red flag signs / symptoms concerning for cauda equina or underlying infectious etiology. Plan for pain control, CT L spine. Re-eval. Last back imaging in EMR 9/5 prior to surgery.  CT w/ + small L paracentral / foraminal disc protrusion L5-S1. On re-eval, pt w/ improvement in symptoms s/p ED medications. Resting comfortably, ambulatory in ED. Stable for d/c home. Given script short-course Percocet PRN severe / breakthrough pain, Robaxin. Given / instructed for close outpatient Spinal f/u, pt states will contact his surgeon on Monday. Return signs / symptoms d/w pt. He understands / agrees w/ this plan.

## 2024-11-07 NOTE — PATIENT PROFILE ADULT - COMPLETE THE FOLLOWING IF THE PATIENT REFUSES THE INFLUENZA VACCINE:
MRI thoracic Spine results are visible in care everywhere. No need to copy to scan. Message routed to provider.    Risks/benefits discussed with patient/surrogate

## 2024-11-13 ENCOUNTER — EMERGENCY (EMERGENCY)
Facility: HOSPITAL | Age: 44
LOS: 0 days | Discharge: HOME | End: 2024-11-13
Attending: STUDENT IN AN ORGANIZED HEALTH CARE EDUCATION/TRAINING PROGRAM
Payer: MEDICAID

## 2024-11-13 VITALS
RESPIRATION RATE: 17 BRPM | SYSTOLIC BLOOD PRESSURE: 151 MMHG | HEART RATE: 72 BPM | OXYGEN SATURATION: 97 % | TEMPERATURE: 98 F | DIASTOLIC BLOOD PRESSURE: 94 MMHG

## 2024-11-13 VITALS
TEMPERATURE: 98 F | RESPIRATION RATE: 19 BRPM | OXYGEN SATURATION: 98 % | DIASTOLIC BLOOD PRESSURE: 103 MMHG | HEIGHT: 71 IN | HEART RATE: 93 BPM | WEIGHT: 154.98 LBS | SYSTOLIC BLOOD PRESSURE: 148 MMHG

## 2024-11-13 DIAGNOSIS — J45.909 UNSPECIFIED ASTHMA, UNCOMPLICATED: ICD-10-CM

## 2024-11-13 DIAGNOSIS — Z87.19 PERSONAL HISTORY OF OTHER DISEASES OF THE DIGESTIVE SYSTEM: ICD-10-CM
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
## 2024-11-13 DIAGNOSIS — Z90.49 ACQUIRED ABSENCE OF OTHER SPECIFIED PARTS OF DIGESTIVE TRACT: Chronic | ICD-10-CM

## 2024-11-13 DIAGNOSIS — Z98.890 OTHER SPECIFIED POSTPROCEDURAL STATES: ICD-10-CM

## 2024-11-13 DIAGNOSIS — G89.29 OTHER CHRONIC PAIN: ICD-10-CM

## 2024-11-13 DIAGNOSIS — M54.6 PAIN IN THORACIC SPINE: ICD-10-CM

## 2024-11-13 DIAGNOSIS — K56.609 UNSPECIFIED INTESTINAL OBSTRUCTION, UNSPECIFIED AS TO PARTIAL VERSUS COMPLETE OBSTRUCTION: ICD-10-CM

## 2024-11-13 DIAGNOSIS — M54.50 LOW BACK PAIN, UNSPECIFIED: ICD-10-CM

## 2024-11-13 DIAGNOSIS — Z91.018 ALLERGY TO OTHER FOODS: ICD-10-CM

## 2024-11-13 DIAGNOSIS — I10 ESSENTIAL (PRIMARY) HYPERTENSION: ICD-10-CM

## 2024-11-13 DIAGNOSIS — S24.109A UNSPECIFIED INJURY AT UNSPECIFIED LEVEL OF THORACIC SPINAL CORD, INITIAL ENCOUNTER: ICD-10-CM

## 2024-11-13 DIAGNOSIS — E11.9 TYPE 2 DIABETES MELLITUS WITHOUT COMPLICATIONS: ICD-10-CM

## 2024-11-13 DIAGNOSIS — S39.92XA UNSPECIFIED INJURY OF LOWER BACK, INITIAL ENCOUNTER: ICD-10-CM
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
## 2024-11-13 LAB
ALBUMIN SERPL ELPH-MCNC: 3.8 G/DL — SIGNIFICANT CHANGE UP (ref 3.3–5)
ALP SERPL-CCNC: 102 U/L — SIGNIFICANT CHANGE UP (ref 40–120)
ALT FLD-CCNC: 59 U/L — SIGNIFICANT CHANGE UP (ref 12–78)
ANION GAP SERPL CALC-SCNC: 4 MMOL/L — LOW (ref 5–17)
APTT BLD: 29.7 SEC — SIGNIFICANT CHANGE UP (ref 24.5–35.6)
AST SERPL-CCNC: 31 U/L — SIGNIFICANT CHANGE UP (ref 15–37)
BASOPHILS # BLD AUTO: 0.12 K/UL — SIGNIFICANT CHANGE UP (ref 0–0.2)
BASOPHILS NFR BLD AUTO: 1.2 % — SIGNIFICANT CHANGE UP (ref 0–2)
BILIRUB SERPL-MCNC: 0.3 MG/DL — SIGNIFICANT CHANGE UP (ref 0.2–1.2)
BUN SERPL-MCNC: 13 MG/DL — SIGNIFICANT CHANGE UP (ref 7–23)
CALCIUM SERPL-MCNC: 9.2 MG/DL — SIGNIFICANT CHANGE UP (ref 8.5–10.1)
CHLORIDE SERPL-SCNC: 106 MMOL/L — SIGNIFICANT CHANGE UP (ref 96–108)
CO2 SERPL-SCNC: 32 MMOL/L — HIGH (ref 22–31)
CREAT SERPL-MCNC: 0.91 MG/DL — SIGNIFICANT CHANGE UP (ref 0.5–1.3)
EGFR: 107 ML/MIN/1.73M2 — SIGNIFICANT CHANGE UP
EOSINOPHIL # BLD AUTO: 0.34 K/UL — SIGNIFICANT CHANGE UP (ref 0–0.5)
EOSINOPHIL NFR BLD AUTO: 3.5 % — SIGNIFICANT CHANGE UP (ref 0–6)
GLUCOSE SERPL-MCNC: 49 MG/DL — CRITICAL LOW (ref 70–99)
HCT VFR BLD CALC: 40.8 % — SIGNIFICANT CHANGE UP (ref 39–50)
HGB BLD-MCNC: 13.9 G/DL — SIGNIFICANT CHANGE UP (ref 13–17)
IMM GRANULOCYTES NFR BLD AUTO: 0.2 % — SIGNIFICANT CHANGE UP (ref 0–0.9)
INR BLD: 0.83 RATIO — LOW (ref 0.85–1.16)
LYMPHOCYTES # BLD AUTO: 3.71 K/UL — HIGH (ref 1–3.3)
LYMPHOCYTES # BLD AUTO: 38.6 % — SIGNIFICANT CHANGE UP (ref 13–44)
MCHC RBC-ENTMCNC: 28.8 PG — SIGNIFICANT CHANGE UP (ref 27–34)
MCHC RBC-ENTMCNC: 34.1 G/DL — SIGNIFICANT CHANGE UP (ref 32–36)
MCV RBC AUTO: 84.5 FL — SIGNIFICANT CHANGE UP (ref 80–100)
MONOCYTES # BLD AUTO: 0.89 K/UL — SIGNIFICANT CHANGE UP (ref 0–0.9)
MONOCYTES NFR BLD AUTO: 9.3 % — SIGNIFICANT CHANGE UP (ref 2–14)
NEUTROPHILS # BLD AUTO: 4.53 K/UL — SIGNIFICANT CHANGE UP (ref 1.8–7.4)
NEUTROPHILS NFR BLD AUTO: 47.2 % — SIGNIFICANT CHANGE UP (ref 43–77)
NRBC # BLD: 0 /100 WBCS — SIGNIFICANT CHANGE UP (ref 0–0)
PLATELET # BLD AUTO: 383 K/UL — SIGNIFICANT CHANGE UP (ref 150–400)
POTASSIUM SERPL-MCNC: 4 MMOL/L — SIGNIFICANT CHANGE UP (ref 3.5–5.3)
POTASSIUM SERPL-SCNC: 4 MMOL/L — SIGNIFICANT CHANGE UP (ref 3.5–5.3)
PROT SERPL-MCNC: 7.4 GM/DL — SIGNIFICANT CHANGE UP (ref 6–8.3)
PROTHROM AB SERPL-ACNC: 9.6 SEC — LOW (ref 9.9–13.4)
RBC # BLD: 4.83 M/UL — SIGNIFICANT CHANGE UP (ref 4.2–5.8)
RBC # FLD: 13.2 % — SIGNIFICANT CHANGE UP (ref 10.3–14.5)
SODIUM SERPL-SCNC: 142 MMOL/L — SIGNIFICANT CHANGE UP (ref 135–145)
WBC # BLD: 9.61 K/UL — SIGNIFICANT CHANGE UP (ref 3.8–10.5)
WBC # FLD AUTO: 9.61 K/UL — SIGNIFICANT CHANGE UP (ref 3.8–10.5)

## 2024-11-13 PROCEDURE — 72128 CT CHEST SPINE W/O DYE: CPT | Mod: 26,MC

## 2024-11-13 PROCEDURE — 72131 CT LUMBAR SPINE W/O DYE: CPT | Mod: 26,MC

## 2024-11-13 PROCEDURE — 99285 EMERGENCY DEPT VISIT HI MDM: CPT

## 2024-11-13 PROCEDURE — 99283 EMERGENCY DEPT VISIT LOW MDM: CPT

## 2024-11-13 RX ORDER — METHOCARBAMOL 500 MG/1
1500 TABLET ORAL ONCE
Refills: 0 | Status: COMPLETED | OUTPATIENT
Start: 2024-11-13 | End: 2024-11-13

## 2024-11-13 RX ORDER — OXYCODONE HYDROCHLORIDE 30 MG/1
1 TABLET ORAL
Qty: 12 | Refills: 0
Start: 2024-11-13 | End: 2024-11-15

## 2024-11-13 RX ORDER — MORPHINE SULFATE 30 MG/1
4 TABLET, EXTENDED RELEASE ORAL ONCE
Refills: 0 | Status: DISCONTINUED | OUTPATIENT
Start: 2024-11-13 | End: 2024-11-13

## 2024-11-13 RX ORDER — KETOROLAC TROMETHAMINE 30 MG/ML
30 INJECTION INTRAMUSCULAR; INTRAVENOUS ONCE
Refills: 0 | Status: DISCONTINUED | OUTPATIENT
Start: 2024-11-13 | End: 2024-11-13

## 2024-11-13 RX ORDER — LIDOCAINE HYDROCHLORIDE 40 MG/ML
1 SOLUTION TOPICAL ONCE
Refills: 0 | Status: COMPLETED | OUTPATIENT
Start: 2024-11-13 | End: 2024-11-13

## 2024-11-13 RX ADMIN — KETOROLAC TROMETHAMINE 30 MILLIGRAM(S): 30 INJECTION INTRAMUSCULAR; INTRAVENOUS at 19:21

## 2024-11-13 RX ADMIN — MORPHINE SULFATE 4 MILLIGRAM(S): 30 TABLET, EXTENDED RELEASE ORAL at 20:07

## 2024-11-13 RX ADMIN — MORPHINE SULFATE 4 MILLIGRAM(S): 30 TABLET, EXTENDED RELEASE ORAL at 17:14

## 2024-11-13 RX ADMIN — LIDOCAINE HYDROCHLORIDE 1 PATCH: 40 SOLUTION TOPICAL at 17:14

## 2024-11-13 RX ADMIN — METHOCARBAMOL 1500 MILLIGRAM(S): 500 TABLET ORAL at 18:29

## 2024-11-13 RX ADMIN — MORPHINE SULFATE 4 MILLIGRAM(S): 30 TABLET, EXTENDED RELEASE ORAL at 17:50

## 2024-11-13 NOTE — ED PROVIDER NOTE - CARE PROVIDER_API CALL
Dixon Leigh  Orthopaedic Surgery  [address] [facility], [address] [address]  Phone: [phone]  Fax: [phone]  Follow Up Time: 4-6 Days

## 2024-11-13 NOTE — ED PROVIDER NOTE - PATIENT PORTAL LINK FT
You can access the FollowMyHealth Patient Portal offered by [facility] by registering at the following website: [email or web address]. By joining [employer]’s FollowMyHealth portal, you will also be able to view your health information using other applications (apps) compatible with our system.

## 2024-11-13 NOTE — ED PROVIDER NOTE - CLINICAL SUMMARY MEDICAL DECISION MAKING FREE TEXT BOX
44 y/o male with dm, htn, asthma gastritis here with back pain s/p injury today. No neurological deficits.   Concern for fracture will obtain ct lumbar and thoracic, basic labs, and pain meds. 44 y/o male with dm, htn, asthma gastritis here with back pain s/p injury today. No neurological deficits.   Concern for fracture will obtain ct lumbar and thoracic, basic labs, and pain meds.    labs reviewed and notable for glucose 49.Will give food and recheck.   repeat fs 80.   Ct thoracic and lumbar No acute fracture.    Distended fluid-filled loops of small bowel may reflect ileus versus   small bowel obstruction. Correlation with patient's history.    Pt denies any abdominal pain. Reports having normal bm and passing gas. Surgery consulted. Pt was seen by surgery and does not think it's sbo or ileus.   Pt reassessed and reports feeling a little better after pain meds.   Pt offered admission for pain control but states he wants to be discharged home and has PT tomorrow. Pt ambulatory in the ED with cane. Rx oxycodone sent to pharmacy. Strict return precautions given.

## 2024-11-13 NOTE — CONSULT NOTE ADULT - SUBJECTIVE AND OBJECTIVE BOX
HPI:  42 y/o  male with dm, htn, asthma, anxiety, history of diskectomy 8/28/2024, previous h/o cholecystectomy c/o back pain with incidental finding of ileus vs SBO on CT scan. Patient admits to normal bowel function; states last BM was this AM and has been passing flatus throughout day. Admits to normal appetite. Denies fever, chills, N/V/D, CP, SOB, h/o SBOs in past.    PAST MEDICAL & SURGICAL HISTORY:  DM type 1 (diabetes mellitus, type 1)      Asthma      Gastritis      Anxiety      Controlled diabetes mellitus type 1 without complications      Gastritis, presence of bleeding unspecified, unspecified chronicity, unspecified gastritis type      Uncomplicated asthma, unspecified asthma severity, unspecified whether persistent      S/P cholecystectomy      History of cholecystectomy          Review of Systems:  contained within HPI    MEDICATIONS  (STANDING):    MEDICATIONS  (PRN):      Allergies    No Known Drug Allergies  Mayonnaise (Vomiting)    Intolerances    lactose (Diarrhea)      SOCIAL HISTORY          Smoking: Yes [ ]  No [X]   ______pk yrs          ETOH  Yes [ ]  No [X]  Social [ ]          DRUGS:  Yes [ ]  No [X]  if so what______________    FAMILY HISTORY:  Family history of diabetes mellitus (Father, Mother)    Family history of diabetes mellitus (DM) (Father, Mother, Grandparent)    Vital Signs Last 24 Hrs  T(C): 36.5 (13 Nov 2024 14:24), Max: 36.5 (13 Nov 2024 14:24)  T(F): 97.7 (13 Nov 2024 14:24), Max: 97.7 (13 Nov 2024 14:24)  HR: 93 (13 Nov 2024 14:24) (93 - 93)  BP: 148/103 (13 Nov 2024 14:24) (148/103 - 148/103)  RR: 19 (13 Nov 2024 14:24) (19 - 19)  SpO2: 98% (13 Nov 2024 14:24) (98% - 98%)    Parameters below as of 13 Nov 2024 14:24  Patient On (Oxygen Delivery Method): room air        Physical Exam:  General:  Appears stated age, well-groomed, well-nourished, no distress  Eyes: EOMI  HENT: NCAT. WNL, no JVD  Chest: clear breath sounds  Cardiovascular: Regular rate & rhythm  Abdomen: soft, NT/ND    Extremities: non edematous   Skin: No rash  Musculoskeletal: normal strength  Neuro/Psych: AAO x3      LABS:                        13.9   9.61  )-----------( 383      ( 13 Nov 2024 14:17 )             40.8     11-13    142  |  106  |  13  ----------------------------<  49[LL]  4.0   |  32[H]  |  0.91    Ca    9.2      13 Nov 2024 14:17    TPro  7.4  /  Alb  3.8  /  TBili  0.3  /  DBili  x   /  AST  31  /  ALT  59  /  AlkPhos  102  11-13    PT/INR - ( 13 Nov 2024 14:17 )   PT: 9.6 sec;   INR: 0.83 ratio         PTT - ( 13 Nov 2024 14:17 )  PTT:29.7 sec  Urinalysis Basic - ( 13 Nov 2024 14:17 )    Color: x / Appearance: x / SG: x / pH: x  Gluc: 49 mg/dL / Ketone: x  / Bili: x / Urobili: x   Blood: x / Protein: x / Nitrite: x   Leuk Esterase: x / RBC: x / WBC x   Sq Epi: x / Non Sq Epi: x / Bacteria: x      RADIOLOGY & ADDITIONAL STUDIES:  < from: CT Lumbar Spine No Cont (11.13.24 @ 17:31) >  ACC: [id number] EXAM:  CT THORACIC SPINE   ORDERED BY: RUPINDER OSPINA     ACC: [id number] EXAM:  CT LUMBAR SPINE   ORDERED BY: RUPINDER OSPINA     PROCEDURE DATE:  11/13/2024          INTERPRETATION:  HISTORY: Fall down stairs. Back pain.    COMPARISON:MRI lumbar spine 9/5/2024    TECHNIQUE: Axial noncontrast CT images of the thoracic and lumbar spine   were obtained. Coronal and sagittal reconstructions were performed. Bone   and soft tissue windows were evaluated.    FINDINGS:    Thoracic kyphosis and lumbar lordosis are maintained. No prevertebral   soft tissue swelling. Vertebral body heights and alignment are   maintained. Sacroiliac joints are patent bilaterally.    Fluid-filled distended loops of small bowel are partially imaged.  Cholecystectomy clips noted.    IMPRESSION:    No acute fracture.    Distended fluid-filled loops of small bowel may reflect ileus versus   small bowel obstruction. Correlation with patient's history.    A/P  42 y/o  male with dm, htn, asthma, anxiety, history of diskectomy 8/28/2024, previous h/o cholecystectomy c/o back pain with incidental finding of ileus vs SBO on CT scan.   Not clinically obstructed.   No further surgical intervention   Will d/w surgical attending

## 2024-11-13 NOTE — ED PROVIDER NOTE - CHIEF COMPLAINT
The patient is a 43y Male complaining of pain, back. The patient is a 43y  Male complaining of pain, back.

## 2024-11-13 NOTE — ED ADULT NURSE NOTE - NS ED NURSE IV DC DT
Althea presents for preoperative examination with Dr. Chrisitan for right oophrectomy. Reviewed patient history, vitals, medications. Xarelto held as of 5/5. No anesthesia concerns. Plan to resume xarelto 1-2 post surgery. No prior ECG. Cleared for surgery on 5/10/24.  
  Stable. She has held xarelto for 5 days prior to surgery, ok to resume 1-2 days post surgery.  
 Monitored by specialist- no acute findings meriting change in the plan. No acute signs of bleeding or clotting  
13-Nov-2024 21:05

## 2024-11-13 NOTE — ED PROVIDER NOTE - OBJECTIVE STATEMENT
44 y/o  male with dm, htn, asthma, anxiety, history diskectomy 8/28/2024 here with back pain s/p injury today. Pt states he missed a step and fell down 6 steps onto his back and buttock. Denies head trauma, LOC. Pt states was helped up by wife but reports back pain and unable to move due to pain. Denies numbness, tingling, urinary/bowel incontinence/retention, weakness in the legs. Pt denies chest pain, dyspnea, abdominal pain. Pt took oxycodone at home with no relief.

## 2024-11-13 NOTE — ED ADULT TRIAGE NOTE - CHIEF COMPLAINT QUOTE
Pt s/p fall down stairs ( 6). Pt  c/o lower back pain, radiating down to R buttock, pt stated worsen with walking. pt also stated he recently got Discectomy 8/28/24

## 2024-11-13 NOTE — ED ADULT NURSE NOTE - OBJECTIVE STATEMENT
as per patient " Pt s/p fall down stairs ( 6). Pt  c/o lower back pain, radiating down to R buttock, pt stated worsen with walking. Denies head injury" 43yM A&Ox4 presenting to ED s/p fall down stairs ( 6). Pt  c/o lower back pain, radiating down to R buttock, pt stated worsen with walking. Denies head injury"

## 2024-11-13 NOTE — ED PROVIDER NOTE - ATTENDING APP SHARED VISIT CONTRIBUTION OF CARE
43-year-old male pmhx of chronic back pain with recent discectomy comes to ED w/ lower back pain status post fall. Patient slipped down a couple stairs landed on their back.  Since then has had exacerbated back pain. Their pain/symptom is moderate to severe, constant, non mediating with rest. Otherwise ROS negative.    General: Slightly uncomfortable in room  HEENT: NCAT, PERRL   Cardiac: RRR, no murmurs, 2+ peripheral pulses   Chest: CTA   Abdomen: soft, non-distended, bowel sounds present, no ttp, no rebound or guarding   MSK: Tenderness to the paraspinal/midline L1-L4.  Extremities: no peripheral edema, calf tenderness, or leg size discrepancies   Skin: no rashes   Neuro: AAOx4, 5+motor, sensory grossly intact   Psych: mood and affect appropriate    Impression: 43-year-old male pmhx of chronic back pain with recent discectomy comes to ED w/ lower back pain status post fall. Their symptoms and exam findings are concerning for contusion, sequela from discectomy.  Plan for pain control and will eval spine via CT.    Ordered labs, imaging, medications for diagnosis, management, and treatment.

## 2024-11-13 NOTE — ED ADULT NURSE REASSESSMENT NOTE - NS ED NURSE REASSESS COMMENT FT1
POCT GLU at 17:51 ; 64 Strip Lot [id number]  POCT GLU AT 19:19 ; 88 Strip Lot: [id number]  POCT GLU AT 8:45 ;84  Strip Lot: [id number] POCT GLU at 17:51 ; 64 Strip Lot [id number]  POCT GLU AT 19:19 ; 88 Strip Lot: [id number]  POCT GLU AT 20:45 ;84  Strip Lot: [id number]

## 2024-11-13 NOTE — ED ADULT NURSE NOTE - NS ED NURSE LEVEL OF CONSCIOUSNESS ORIENTATION
Symptoms: WI F 33 Pt is weak, tired and nauseous, light headed   =======================  Onset: x1 week       Location / description: Weakness, tired, nauseous and lightheaded. All symptoms are constant x1 week. States heart was beating very fast this morning when sitting up but has since resolved. Dry mouth currently.     Precipitating Factors: PMH: 11/17/2023 D&C    Pain Scale (1-10), 10 highest: 0/10 Denies current pain    What improves/worsens symptoms: Denies anything that improves symptoms. Increased activity such as walking around worsens symptoms.       Symptom specific medications: Tylenol    LMP : Approximately 09/09/2023, was pregnant and has D&C 11/17/2023  Are you pregnant or breast feeding: Denies both    Recent visits (last 3-4 weeks) for same reason or recent surgery:  Denies both.    PLAN:    Provider's office  See in provider's office within 4 hours - if no appointment, go to Urgent Care    No available appointments with PCP or alternate providers until 06/10/2024. Recommended urgent care follow up.    Patient/Caller agrees to follow recommendations.  Reason for Disposition   Lightheadedness (dizziness) present now, after 2 hours of rest and fluids    Protocols used: Dizziness-A-OH     Oriented - self; Oriented - place; Oriented - time

## 2024-11-13 NOTE — ED PROVIDER NOTE - NSFOLLOWUPINSTRUCTIONS_ED_ALL_ED_FT
Rest, drink plenty of fluids.  Advance activity as tolerated.  Continue all previously prescribed medications as directed.  Follow up with your primary care physician in 48-72 hours- bring copies of your results.  Return to the ER for worsening or persistent symptoms, and/or ANY NEW OR CONCERNING SYMPTOMS. If you have issues obtaining follow up, please call: [phone] to obtain a doctor or specialist who takes your insurance in your area.  You may call [phone] to make an appointment with the internal medicine clinic.

## 2024-11-13 NOTE — ED PROVIDER NOTE - PHYSICAL EXAMINATION
Negative GEN: Awake, alert, interactive, NAD. Crying in pain  HEAD AND NECK: NC/AT. Airway patent. Neck supple.   EYES:  Clear b/l. EOMI. PERRL.   ENT: Moist mucus membranes.   CARDIAC: Regular rate, regular rhythm. No evident pedal edema.    RESP/CHEST: Normal respiratory effort with no use of accessory muscles or retractions. Clear throughout on auscultation.  ABD: soft, non-distended, non-tender. No rebound, no guarding.   BACK: (+) diffuse thoracic and lumbar tenderness to palpation. No CVAT.   EXTREMITIES: Moving all extremities with no apparent deformities.   SKIN: Warm, dry, intact normal color. No rash.   NEURO: AOx3, CN II-XII grossly intact, no focal deficits.   PSYCH: Appropriate mood and affect.

## 2024-11-13 NOTE — ED ADULT NURSE NOTE - NSFALLRISKINTERV_ED_ALL_ED

## 2024-11-19 ENCOUNTER — EMERGENCY (EMERGENCY)
Facility: HOSPITAL | Age: 44
LOS: 0 days | Discharge: HOME | End: 2024-11-19
Attending: STUDENT IN AN ORGANIZED HEALTH CARE EDUCATION/TRAINING PROGRAM
Payer: MEDICAID

## 2024-11-19 VITALS
HEIGHT: 71 IN | OXYGEN SATURATION: 98 % | SYSTOLIC BLOOD PRESSURE: 155 MMHG | RESPIRATION RATE: 19 BRPM | HEART RATE: 88 BPM | TEMPERATURE: 98 F | WEIGHT: 154.98 LBS | DIASTOLIC BLOOD PRESSURE: 103 MMHG

## 2024-11-19 VITALS
HEART RATE: 89 BPM | SYSTOLIC BLOOD PRESSURE: 104 MMHG | RESPIRATION RATE: 20 BRPM | DIASTOLIC BLOOD PRESSURE: 84 MMHG | OXYGEN SATURATION: 96 % | TEMPERATURE: 99 F

## 2024-11-19 DIAGNOSIS — E10.9 TYPE 1 DIABETES MELLITUS WITHOUT COMPLICATIONS: ICD-10-CM

## 2024-11-19 DIAGNOSIS — J45.909 UNSPECIFIED ASTHMA, UNCOMPLICATED: ICD-10-CM

## 2024-11-19 DIAGNOSIS — Z98.52 VASECTOMY STATUS: ICD-10-CM

## 2024-11-19 DIAGNOSIS — G89.29 OTHER CHRONIC PAIN: ICD-10-CM

## 2024-11-19 DIAGNOSIS — Z90.49 ACQUIRED ABSENCE OF OTHER SPECIFIED PARTS OF DIGESTIVE TRACT: Chronic | ICD-10-CM

## 2024-11-19 DIAGNOSIS — N99.89 OTHER POSTPROCEDURAL COMPLICATIONS AND DISORDERS OF GENITOURINARY SYSTEM: ICD-10-CM

## 2024-11-19 DIAGNOSIS — R11.0 NAUSEA: ICD-10-CM

## 2024-11-19 DIAGNOSIS — F32.A DEPRESSION, UNSPECIFIED: ICD-10-CM

## 2024-11-19 DIAGNOSIS — F41.9 ANXIETY DISORDER, UNSPECIFIED: ICD-10-CM

## 2024-11-19 PROCEDURE — 99284 EMERGENCY DEPT VISIT MOD MDM: CPT

## 2024-11-19 RX ORDER — KETOROLAC TROMETHAMINE 30 MG/ML
15 INJECTION INTRAMUSCULAR; INTRAVENOUS ONCE
Refills: 0 | Status: DISCONTINUED | OUTPATIENT
Start: 2024-11-19 | End: 2024-11-19

## 2024-11-19 RX ORDER — ACETAMINOPHEN 500 MG
975 TABLET ORAL ONCE
Refills: 0 | Status: DISCONTINUED | OUTPATIENT
Start: 2024-11-19 | End: 2024-11-19

## 2024-11-19 RX ORDER — DIAZEPAM 10 MG/1
2 FILM BUCCAL ONCE
Refills: 0 | Status: DISCONTINUED | OUTPATIENT
Start: 2024-11-19 | End: 2024-11-19

## 2024-11-19 RX ORDER — CYCLOBENZAPRINE HYDROCHLORIDE 30 MG/1
10 CAPSULE, EXTENDED RELEASE ORAL ONCE
Refills: 0 | Status: COMPLETED | OUTPATIENT
Start: 2024-11-19 | End: 2024-11-19

## 2024-11-19 RX ORDER — LIDOCAINE HYDROCHLORIDE 40 MG/ML
1 SOLUTION TOPICAL ONCE
Refills: 0 | Status: COMPLETED | OUTPATIENT
Start: 2024-11-19 | End: 2024-11-19

## 2024-11-19 RX ADMIN — CYCLOBENZAPRINE HYDROCHLORIDE 10 MILLIGRAM(S): 30 CAPSULE, EXTENDED RELEASE ORAL at 13:39

## 2024-11-19 RX ADMIN — KETOROLAC TROMETHAMINE 15 MILLIGRAM(S): 30 INJECTION INTRAMUSCULAR; INTRAVENOUS at 13:39

## 2024-11-19 RX ADMIN — DIAZEPAM 2 MILLIGRAM(S): 10 FILM BUCCAL at 13:39

## 2024-11-19 RX ADMIN — LIDOCAINE HYDROCHLORIDE 1 PATCH: 40 SOLUTION TOPICAL at 13:40

## 2024-11-19 NOTE — ED PROVIDER NOTE - CLINICAL SUMMARY MEDICAL DECISION MAKING FREE TEXT BOX
Gillian GREEN: exam vss non toxic PE as above ddx c/f likely flare of chornic back pain does not appear c/w acute infectious or traumatic etiology, concern for possible seeking behavior given chart review, istop #[id number] also reviewed. will give non opiate pain meds, reassess. Gillian GREEN: exam vss non toxic PE as above ddx c/f likely flare of chornic back pain does not appear c/w acute infectious or traumatic etiology, concern for possible seeking behavior given chart review, istop #[id number] also reviewed. will give non opiate pain meds, reassess.    BETTY Good:  43-year-old male with PMH diabetes on insulin, asthma, anxiety, depression presents with diffuse constant throbbing nonradiating back pain x days-- has been to our ed and had CT thoracic/lumbar spine as well as mri lumbar spine with L5-S1 disc protrusion mild central canal, left greater than right lateral recess, moderate left and mild right neural foramen stenosis with facet arthrosis, impinging upon the left S1 nerve roots- evaluated and cleared by ortho spine.   he has unchanged and persistent back pain  No new injury, sweats, chills, nausea, vomiting, abdominal pain, urinary symptoms, leg weakness.  denies saddle anesthesia, bowel/bladder dysfunction, paraesthesias,  hx IVDA, recent injections, weakness, hx back surgeries, unexplained wt loss.   PHYSICAL EXAM:  GENERAL: Alert, appears stated age, well appearing, non-toxic  SKIN: Warm, and dry. MMM.   HEAD: NC, AT  EYE: Normal lids/conjunctiva  ENT: Normal hearing, patent oropharynx  NECK: +supple. No meningismus, or JVD  Pulm: Bilateral BS, normal resp effort, no wheezes, stridor, or retractions  CV: RRR, no M/R/G, 2+and = radial pulses  Abd: soft, non-tender, non-distended, no rebound/guarding. no cva tenderness  Mskel: no erythema, cyanosis, edema. no calf tenderness. no midline spinal ttp. 2+ dp pulses.   Neuro: AAOx3, no sensory/motor deficits. 5/5 strength throughout.    feels somewhat improved with medications.   Reviewed all results and necessity for follow up. Counseled on red flags and to return for them.  Patient appears well on discharge.

## 2024-11-19 NOTE — ED PROVIDER NOTE - PROGRESS NOTE DETAILS
Gillian GREEN: Pt assessed at beside. Pt resting comfortably, pain controlled, pt questions answered. Vital signs stable. pt noted to be sleeping/resting comfortably in stretcher, pt reports he has a lot of stressors at home/work 2/2 back issues - reports wife can come get him, reports he has pain management ant ortho he can follow up with.  Strict return precautions were discussed. Pt expressed understanding.

## 2024-11-19 NOTE — ED PROVIDER NOTE - NSFOLLOWUPINSTRUCTIONS_ED_ALL_ED_FT
Thank you for visiting our Emergency Department, it has been a pleasure taking part in your healthcare.  Please read and follow all of your discharge instructions. These instructions contain important information regarding your Emergency Department visit and future medical care.     Your discharge diagnosis is: back pain  Please take all discharge medications as indicated below:  Please continue all medications as rx'd by your PMD.  Please follow up with your Primary Care Doctor (PMD) within x48 hours.  Bring and show your PMD all documents and results you were given during your ED visit.  If you do not have a primary care doctor please call [phone] to establish primary care.  A copy of resulted labs, imaging, and findings have been provided to you.   You can also access all of your results through the [facility].  If you have questions about your results, please call the Emergency Department.  During your visit and at time of discharge, you had a detailed discussion with your provider regarding your diagnosis, care management and discharge planning.  Topics that were discussed included but were not limited to: return precautions, follow up visits with existing or new providers, new prescriptions and/or medication changes, wound and/or splint/cast care, incidental laboratory/radiology findings, or other care   aspects specific to your diagnosis and treatment. You have been given the opportunity to have your questions answered. At this time you have been deemed stable and fit for discharge.  Return precautions to the Emergency Department include but are not limited to: unrelenting nausea, vomiting, fever, chills, chest pain, shortness of breath, dizziness, chest or abdominal pain, worsening back pain, syncope, blood in urine or stool, headache that doesn't resolve, numbness or tingling, loss of sensation, loss of motor function, or any other concerning symptoms.    Please bring all ED Documents you were given during your stay to your PMD.   They contain important information for you and your PMD, including incidental lab/radiology findings that your PMD should be aware of.

## 2024-11-19 NOTE — ED ADULT NURSE NOTE - NSFALLRISKINTERV_ED_ALL_ED

## 2024-11-19 NOTE — ED PROVIDER NOTE - PHYSICAL EXAMINATION
Gillian GREEN:  VITALS: Initial triage and subsequent vitals have been reviewed by me.  GEN APPEARANCE: Alert, cooperative. Non-toxic appearing. Well appearing. uncomfortable appearing, crying. anxious appearing.   HEAD: Atraumatic, normocephalic   EYES: PERRLa, EOMI, vision grossly intact.   EARS: Gross hearing intact.   NOSE: No nasal discharge, no external evidence of epistaxis.   NECK: Supple  CV: RRR, S1S2, no c/r/m/g. No cyanosis. Extremities warm, well perfused. Cap refill <2 seconds. No bruits.   LUNGS: CTAB. No wheezing. No rales. No rhonchi. No diminished breath sounds.   ABDOMEN: Soft, NTND. No guarding or rebound. No masses.   MSK/EXT: Spine appears normal, no spine point tenderness. No CVA ttp. Normal muscular development. Pelvis stable. No obvious joint or bony deformity, no peripheral edema.   NEURO: Alert, follows commands. Weight bearing normal. Speech normal. Sensation and motor normal x4 extremities. no saddle anesthesia   SKIN: Normal color for race, warm, dry and intact. No evidence of rash.  PSYCH: Normal mood and affect.

## 2024-11-19 NOTE — ED ADULT NURSE NOTE - OBJECTIVE STATEMENT
43yM A&Ox4 presenting to ED with lower back pain. pt recently fell down a flight of stairs, was seen in ED previously for same complaint. Pt  c/o lower back pain, radiating down to R buttock, pt stated worsen with walking. hx of discectomy 08/24.

## 2024-11-19 NOTE — ED PROVIDER NOTE - OBJECTIVE STATEMENT
Gillian GREEN: 43-year-old male, history of chronic back pain type 1 diabetes gastritis, recent vasectomy, presents with a chief complaint of diffuse back pain with radiation down lower extremities, feels c/w prior episodes of back pain, having some nausea but no vomiting no chest pain no trouble breathing he can move everything and feel everything, no bowel or bladder retention or incontinence. Of note patient has been seen multiple times in the for back pain related issues, has had recent imaging that has been nonactionable, on chart review patient has been described multiple opiate pain medicines in the past including most recently a 3-day supply on 11/14, prior to that was given a 7-day supply on 11/ 6, prior to that was given a 3-day supply on 11/ 1, prior to that was given a 5-day supply on 10/14.

## 2024-11-19 NOTE — ED PROVIDER NOTE - PATIENT PORTAL LINK FT
You can access the FollowMyHealth Patient Portal offered by [facility] by registering at the following website: [email or web address]. By joining [employer]’s FollowMyHealth portal, you will also be able to view your health information using other applications (apps) compatible with our system.

## 2024-11-19 NOTE — ED PROVIDER NOTE - ATTENDING APP SHARED VISIT CONTRIBUTION OF CARE
I have personally performed a face to face medical and diagnostic evaluation of the patient. I have discussed with and reviewed the DESI's note and agree with the History, ROS, Physical Exam and MDM unless otherwise indicated. A brief summary of my personal evaluation and impression can be found below. I actively participated in the comanagement of this patient with the DESI. I have personally reviewed all orders, study/imaging results, medication orders. I discussed indications for consultant evaluation and consultant recommendations with the DESI when applicable, and have discussed the disposition plan with the DESI.    Gillian GREEN: Please see the HPI, ROS, PE and MDM as authored by me.

## 2024-11-22 NOTE — ED PROVIDER NOTE - PSH
MA Rooming Questions  Patient: Sirena Zuniga  MRN: [id number]    Date: 11/22/2024        1. Do you have any new issues?   no         2. Do you need any refills on medications?    no    3. Have you had any imaging done since your last visit?   yes - lung surgery    4. Have you been hospitalized or seen in the emergency room since your last visit here?   yes - [facility] for Lung surgery    5. Did the patient have a depression screening completed today? Yes    PHQ-9 Total Score: 0 (11/22/2024  1:39 PM)       PHQ-9 Given to (if applicable):               PHQ-9 Score (if applicable):                     [] Positive     [x]  Negative              Does question #9 need addressed (if applicable)                     [] Yes    []  No               No Fritz CMA     History of cholecystectomy    S/P cholecystectomy

## 2024-11-23 ENCOUNTER — EMERGENCY (EMERGENCY)
Facility: HOSPITAL | Age: 44
LOS: 0 days | Discharge: HOME | End: 2024-11-23
Attending: STUDENT IN AN ORGANIZED HEALTH CARE EDUCATION/TRAINING PROGRAM
Payer: MEDICAID

## 2024-11-23 VITALS
WEIGHT: 151.9 LBS | OXYGEN SATURATION: 99 % | SYSTOLIC BLOOD PRESSURE: 155 MMHG | RESPIRATION RATE: 19 BRPM | HEIGHT: 71 IN | TEMPERATURE: 99 F | DIASTOLIC BLOOD PRESSURE: 120 MMHG | HEART RATE: 100 BPM

## 2024-11-23 VITALS
OXYGEN SATURATION: 99 % | DIASTOLIC BLOOD PRESSURE: 94 MMHG | RESPIRATION RATE: 19 BRPM | TEMPERATURE: 99 F | HEART RATE: 89 BPM | SYSTOLIC BLOOD PRESSURE: 119 MMHG

## 2024-11-23 DIAGNOSIS — Z91.018 ALLERGY TO OTHER FOODS: ICD-10-CM

## 2024-11-23 DIAGNOSIS — R42 DIZZINESS AND GIDDINESS: ICD-10-CM

## 2024-11-23 DIAGNOSIS — M54.50 LOW BACK PAIN, UNSPECIFIED: ICD-10-CM

## 2024-11-23 DIAGNOSIS — Z90.49 ACQUIRED ABSENCE OF OTHER SPECIFIED PARTS OF DIGESTIVE TRACT: Chronic | ICD-10-CM

## 2024-11-23 DIAGNOSIS — R11.0 NAUSEA: ICD-10-CM

## 2024-11-23 DIAGNOSIS — G89.29 OTHER CHRONIC PAIN: ICD-10-CM

## 2024-11-23 DIAGNOSIS — E11.9 TYPE 2 DIABETES MELLITUS WITHOUT COMPLICATIONS: ICD-10-CM

## 2024-11-23 DIAGNOSIS — Z87.19 PERSONAL HISTORY OF OTHER DISEASES OF THE DIGESTIVE SYSTEM: ICD-10-CM

## 2024-11-23 LAB
ALBUMIN SERPL ELPH-MCNC: 4 G/DL — SIGNIFICANT CHANGE UP (ref 3.3–5)
ALP SERPL-CCNC: 101 U/L — SIGNIFICANT CHANGE UP (ref 40–120)
ALT FLD-CCNC: 39 U/L — SIGNIFICANT CHANGE UP (ref 12–78)
ANION GAP SERPL CALC-SCNC: 10 MMOL/L — SIGNIFICANT CHANGE UP (ref 5–17)
AST SERPL-CCNC: 20 U/L — SIGNIFICANT CHANGE UP (ref 15–37)
BASOPHILS # BLD AUTO: 0.03 K/UL — SIGNIFICANT CHANGE UP (ref 0–0.2)
BASOPHILS NFR BLD AUTO: 0.3 % — SIGNIFICANT CHANGE UP (ref 0–2)
BILIRUB SERPL-MCNC: 0.5 MG/DL — SIGNIFICANT CHANGE UP (ref 0.2–1.2)
BUN SERPL-MCNC: 21 MG/DL — SIGNIFICANT CHANGE UP (ref 7–23)
CALCIUM SERPL-MCNC: 9.3 MG/DL — SIGNIFICANT CHANGE UP (ref 8.5–10.1)
CHLORIDE SERPL-SCNC: 96 MMOL/L — SIGNIFICANT CHANGE UP (ref 96–108)
CO2 SERPL-SCNC: 30 MMOL/L — SIGNIFICANT CHANGE UP (ref 22–31)
CREAT SERPL-MCNC: 1.18 MG/DL — SIGNIFICANT CHANGE UP (ref 0.5–1.3)
EGFR: 79 ML/MIN/1.73M2 — SIGNIFICANT CHANGE UP
EGFR: 79 ML/MIN/1.73M2 — SIGNIFICANT CHANGE UP
EOSINOPHIL # BLD AUTO: 0.01 K/UL — SIGNIFICANT CHANGE UP (ref 0–0.5)
EOSINOPHIL NFR BLD AUTO: 0.1 % — SIGNIFICANT CHANGE UP (ref 0–6)
GLUCOSE SERPL-MCNC: 226 MG/DL — HIGH (ref 70–99)
HCT VFR BLD CALC: 43.2 % — SIGNIFICANT CHANGE UP (ref 39–50)
HGB BLD-MCNC: 15.5 G/DL — SIGNIFICANT CHANGE UP (ref 13–17)
IMM GRANULOCYTES NFR BLD AUTO: 0.1 % — SIGNIFICANT CHANGE UP (ref 0–0.9)
LYMPHOCYTES # BLD AUTO: 1.96 K/UL — SIGNIFICANT CHANGE UP (ref 1–3.3)
LYMPHOCYTES # BLD AUTO: 18.4 % — SIGNIFICANT CHANGE UP (ref 13–44)
MAGNESIUM SERPL-MCNC: 2.2 MG/DL — SIGNIFICANT CHANGE UP (ref 1.6–2.6)
MCHC RBC-ENTMCNC: 29.2 PG — SIGNIFICANT CHANGE UP (ref 27–34)
MCHC RBC-ENTMCNC: 35.9 G/DL — SIGNIFICANT CHANGE UP (ref 32–36)
MCV RBC AUTO: 81.4 FL — SIGNIFICANT CHANGE UP (ref 80–100)
MONOCYTES # BLD AUTO: 0.4 K/UL — SIGNIFICANT CHANGE UP (ref 0–0.9)
MONOCYTES NFR BLD AUTO: 3.8 % — SIGNIFICANT CHANGE UP (ref 2–14)
NEUTROPHILS # BLD AUTO: 8.25 K/UL — HIGH (ref 1.8–7.4)
NEUTROPHILS NFR BLD AUTO: 77.3 % — HIGH (ref 43–77)
NRBC # BLD: 0 /100 WBCS — SIGNIFICANT CHANGE UP (ref 0–0)
NRBC BLD-RTO: 0 /100 WBCS — SIGNIFICANT CHANGE UP (ref 0–0)
PLATELET # BLD AUTO: 410 K/UL — HIGH (ref 150–400)
POTASSIUM SERPL-MCNC: 3.8 MMOL/L — SIGNIFICANT CHANGE UP (ref 3.5–5.3)
POTASSIUM SERPL-SCNC: 3.8 MMOL/L — SIGNIFICANT CHANGE UP (ref 3.5–5.3)
PROT SERPL-MCNC: 7.7 GM/DL — SIGNIFICANT CHANGE UP (ref 6–8.3)
RBC # BLD: 5.31 M/UL — SIGNIFICANT CHANGE UP (ref 4.2–5.8)
RBC # FLD: 12.6 % — SIGNIFICANT CHANGE UP (ref 10.3–14.5)
SODIUM SERPL-SCNC: 136 MMOL/L — SIGNIFICANT CHANGE UP (ref 135–145)
WBC # BLD: 10.66 K/UL — HIGH (ref 3.8–10.5)
WBC # FLD AUTO: 10.66 K/UL — HIGH (ref 3.8–10.5)

## 2024-11-23 PROCEDURE — 99284 EMERGENCY DEPT VISIT MOD MDM: CPT

## 2024-11-23 RX ORDER — DIAZEPAM 5 MG/1
5 TABLET ORAL ONCE
Refills: 0 | Status: DISCONTINUED | OUTPATIENT
Start: 2024-11-23 | End: 2024-11-23

## 2024-11-23 RX ORDER — KETOROLAC TROMETHAMINE 30 MG/ML
30 INJECTION, SOLUTION INTRAMUSCULAR; INTRAVENOUS ONCE
Refills: 0 | Status: DISCONTINUED | OUTPATIENT
Start: 2024-11-23 | End: 2024-11-23

## 2024-11-23 RX ORDER — ONDANSETRON HCL/PF 4 MG/2 ML
1 VIAL (ML) INJECTION
Qty: 1 | Refills: 0
Start: 2024-11-23

## 2024-11-23 RX ORDER — LIDOCAINE HYDROCHLORIDE 20 MG/ML
1 JELLY TOPICAL ONCE
Refills: 0 | Status: COMPLETED | OUTPATIENT
Start: 2024-11-23 | End: 2024-11-23

## 2024-11-23 RX ORDER — ONDANSETRON HCL/PF 4 MG/2 ML
4 VIAL (ML) INJECTION ONCE
Refills: 0 | Status: COMPLETED | OUTPATIENT
Start: 2024-11-23 | End: 2024-11-23

## 2024-11-23 RX ORDER — CYCLOBENZAPRINE HYDROCHLORIDE 15 MG/1
10 CAPSULE, EXTENDED RELEASE ORAL ONCE
Refills: 0 | Status: COMPLETED | OUTPATIENT
Start: 2024-11-23 | End: 2024-11-23

## 2024-11-23 RX ORDER — MECLIZINE HCL 12.5 MG
1 TABLET ORAL
Qty: 15 | Refills: 0
Start: 2024-11-23 | End: 2024-11-27

## 2024-11-23 RX ADMIN — DIAZEPAM 5 MILLIGRAM(S): 5 TABLET ORAL at 16:54

## 2024-11-23 RX ADMIN — Medication 4 MILLIGRAM(S): at 16:53

## 2024-11-23 RX ADMIN — LIDOCAINE HYDROCHLORIDE 1 PATCH: 20 JELLY TOPICAL at 14:25

## 2024-11-23 RX ADMIN — Medication 1000 MILLILITER(S): at 17:53

## 2024-11-23 RX ADMIN — Medication 1000 MILLILITER(S): at 16:53

## 2024-11-23 RX ADMIN — KETOROLAC TROMETHAMINE 30 MILLIGRAM(S): 30 INJECTION, SOLUTION INTRAMUSCULAR; INTRAVENOUS at 16:53

## 2024-11-23 RX ADMIN — KETOROLAC TROMETHAMINE 30 MILLIGRAM(S): 30 INJECTION, SOLUTION INTRAMUSCULAR; INTRAVENOUS at 17:53

## 2024-12-04 NOTE — DIETITIAN INITIAL EVALUATION ADULT. - PROBLEM SELECTOR PROBLEM 3
Detail Level: Generalized Detail Level: Zone Detail Level: Detailed Gastritis, presence of bleeding unspecified, unspecified chronicity, unspecified gastritis type

## 2024-12-24 NOTE — ED PROVIDER NOTE - CONDITION AT DISCHARGE:
Care Management Discharge Note    Discharge Date: 12/24/2024       Discharge Disposition: Home, Home Care    Discharge Services: Home Care    Discharge DME: None    Discharge Transportation: family or friend will provide    Private pay costs discussed: Not applicable    Does the patient's insurance plan have a 3 day qualifying hospital stay waiver?  Yes     Which insurance plan 3 day waiver is available? [employer]    Will the waiver be used for post-acute placement? No    PAS Confirmation Code: NA  Patient/family educated on Medicare website which has current facility and service quality ratings: yes    Education Provided on the Discharge Plan: Yes  Persons Notified of Discharge Plans: Pt  Patient/Family in Agreement with the Plan: yes    Handoff Referral Completed: No, handoff not indicated or clinically appropriate    Additional Information:   SW met with Pt at bedside to discuss PT/OT recommendation for TCU. Pt declines TCU stating that she had poor experiences at TCU in the past. Pt is agreeable to PT/OT home care; she recently had home care through [facility]. Pt has been accepted for PT/OT home care with [facility].    Pt to discharge home with PT/OT home care - [facility]. Family to transport.     PATTIE BardalesW      Improved

## 2024-12-26 NOTE — BEHAVIORAL HEALTH ASSESSMENT NOTE - NS ED BHA MED ROS CONSTITUTIONAL SYMPTOMS
Anesthesia Post-op Note    Patient: July Rodriguez  Procedure(s) Performed: COMPLETE ENDENTULATION, FULL MOUTH TOOTH EXTRACTION (Mouth)  Anesthesia type: General    Vitals Value Taken Time   Temp 97.1 12/26/24 1550   Pulse 100 12/26/24 1548   Resp 13 12/26/24 1546   SpO2 98 % 12/26/24 1548   /78 12/26/24 1545   Vitals shown include unfiled device data.      Patient Location: PACU Phase 1  Post-op Vital Signs:stable  Level of Consciousness: participates in exam, awake and return to baseline  Respiratory Status: spontaneous ventilation and room air  Cardiovascular stable  Hydration: euvolemic  Pain Management: well controlled  Handoff: Handoff to receiving nurse was performed and questions were answered  Vomiting: none  Nausea: None  Airway Patency:patent  Post-op Assessment: awake, alert, appropriately conversant, or baseline, no complications, patient tolerated procedure well, no evidence of recall, dentition, mouth, tongue, and oropharynx unchanged , moving all extremities and No Corneal Abrasion      There were no known notable events for this encounter.                       Yes

## 2025-01-10 NOTE — ED ADULT NURSE NOTE - DRUG PRE-SCREENING (DAST -1)
-Take Miralax daily along with Colace (stool softener) to help with constipation   Statement Selected

## 2025-01-21 ENCOUNTER — EMERGENCY (EMERGENCY)
Facility: HOSPITAL | Age: 45
LOS: 0 days | Discharge: HOME | End: 2025-01-22
Attending: STUDENT IN AN ORGANIZED HEALTH CARE EDUCATION/TRAINING PROGRAM
Payer: MEDICAID

## 2025-01-21 VITALS
HEART RATE: 71 BPM | RESPIRATION RATE: 20 BRPM | TEMPERATURE: 98 F | OXYGEN SATURATION: 97 % | WEIGHT: 199.96 LBS | SYSTOLIC BLOOD PRESSURE: 170 MMHG | DIASTOLIC BLOOD PRESSURE: 101 MMHG | HEIGHT: 71 IN

## 2025-01-21 DIAGNOSIS — M54.6 PAIN IN THORACIC SPINE: ICD-10-CM

## 2025-01-21 DIAGNOSIS — Z87.19 PERSONAL HISTORY OF OTHER DISEASES OF THE DIGESTIVE SYSTEM: ICD-10-CM

## 2025-01-21 DIAGNOSIS — R10.9 UNSPECIFIED ABDOMINAL PAIN: ICD-10-CM

## 2025-01-21 DIAGNOSIS — G89.29 OTHER CHRONIC PAIN: ICD-10-CM

## 2025-01-21 DIAGNOSIS — K59.00 CONSTIPATION, UNSPECIFIED: ICD-10-CM

## 2025-01-21 DIAGNOSIS — R11.2 NAUSEA WITH VOMITING, UNSPECIFIED: ICD-10-CM

## 2025-01-21 DIAGNOSIS — Z90.49 ACQUIRED ABSENCE OF OTHER SPECIFIED PARTS OF DIGESTIVE TRACT: Chronic | ICD-10-CM

## 2025-01-21 DIAGNOSIS — Z91.011 ALLERGY TO MILK PRODUCTS: ICD-10-CM

## 2025-01-21 DIAGNOSIS — M54.50 LOW BACK PAIN, UNSPECIFIED: ICD-10-CM

## 2025-01-21 DIAGNOSIS — E11.65 TYPE 2 DIABETES MELLITUS WITH HYPERGLYCEMIA: ICD-10-CM

## 2025-01-21 DIAGNOSIS — Z91.018 ALLERGY TO OTHER FOODS: ICD-10-CM

## 2025-01-21 DIAGNOSIS — R07.89 OTHER CHEST PAIN: ICD-10-CM

## 2025-01-21 LAB
ALBUMIN SERPL ELPH-MCNC: 4.2 G/DL — SIGNIFICANT CHANGE UP (ref 3.3–5)
ALP SERPL-CCNC: 115 U/L — SIGNIFICANT CHANGE UP (ref 40–120)
ALT FLD-CCNC: 36 U/L — SIGNIFICANT CHANGE UP (ref 12–78)
ANION GAP SERPL CALC-SCNC: 8 MMOL/L — SIGNIFICANT CHANGE UP (ref 5–17)
AST SERPL-CCNC: 27 U/L — SIGNIFICANT CHANGE UP (ref 15–37)
BASOPHILS # BLD AUTO: 0.04 K/UL — SIGNIFICANT CHANGE UP (ref 0–0.2)
BASOPHILS NFR BLD AUTO: 0.4 % — SIGNIFICANT CHANGE UP (ref 0–2)
BILIRUB SERPL-MCNC: 0.5 MG/DL — SIGNIFICANT CHANGE UP (ref 0.2–1.2)
BUN SERPL-MCNC: 14 MG/DL — SIGNIFICANT CHANGE UP (ref 7–23)
CALCIUM SERPL-MCNC: 9.7 MG/DL — SIGNIFICANT CHANGE UP (ref 8.5–10.1)
CHLORIDE SERPL-SCNC: 97 MMOL/L — SIGNIFICANT CHANGE UP (ref 96–108)
CO2 SERPL-SCNC: 32 MMOL/L — HIGH (ref 22–31)
CREAT SERPL-MCNC: 1.11 MG/DL — SIGNIFICANT CHANGE UP (ref 0.5–1.3)
EGFR: 84 ML/MIN/1.73M2 — SIGNIFICANT CHANGE UP
EOSINOPHIL # BLD AUTO: 0.01 K/UL — SIGNIFICANT CHANGE UP (ref 0–0.5)
EOSINOPHIL NFR BLD AUTO: 0.1 % — SIGNIFICANT CHANGE UP (ref 0–6)
GLUCOSE SERPL-MCNC: 334 MG/DL — HIGH (ref 70–99)
HCT VFR BLD CALC: 44.5 % — SIGNIFICANT CHANGE UP (ref 39–50)
HGB BLD-MCNC: 15.4 G/DL — SIGNIFICANT CHANGE UP (ref 13–17)
IMM GRANULOCYTES NFR BLD AUTO: 0.3 % — SIGNIFICANT CHANGE UP (ref 0–0.9)
LIDOCAIN IGE QN: 11 U/L — LOW (ref 13–75)
LYMPHOCYTES # BLD AUTO: 0.76 K/UL — LOW (ref 1–3.3)
LYMPHOCYTES # BLD AUTO: 7.7 % — LOW (ref 13–44)
MCHC RBC-ENTMCNC: 29.1 PG — SIGNIFICANT CHANGE UP (ref 27–34)
MCHC RBC-ENTMCNC: 34.6 G/DL — SIGNIFICANT CHANGE UP (ref 32–36)
MCV RBC AUTO: 84 FL — SIGNIFICANT CHANGE UP (ref 80–100)
MONOCYTES # BLD AUTO: 0.24 K/UL — SIGNIFICANT CHANGE UP (ref 0–0.9)
MONOCYTES NFR BLD AUTO: 2.4 % — SIGNIFICANT CHANGE UP (ref 2–14)
NEUTROPHILS # BLD AUTO: 8.8 K/UL — HIGH (ref 1.8–7.4)
NEUTROPHILS NFR BLD AUTO: 89.1 % — HIGH (ref 43–77)
NRBC # BLD: 0 /100 WBCS — SIGNIFICANT CHANGE UP (ref 0–0)
PLATELET # BLD AUTO: 379 K/UL — SIGNIFICANT CHANGE UP (ref 150–400)
POTASSIUM SERPL-MCNC: 4.4 MMOL/L — SIGNIFICANT CHANGE UP (ref 3.5–5.3)
POTASSIUM SERPL-SCNC: 4.4 MMOL/L — SIGNIFICANT CHANGE UP (ref 3.5–5.3)
PROT SERPL-MCNC: 8.3 GM/DL — SIGNIFICANT CHANGE UP (ref 6–8.3)
RBC # BLD: 5.3 M/UL — SIGNIFICANT CHANGE UP (ref 4.2–5.8)
RBC # FLD: 13.2 % — SIGNIFICANT CHANGE UP (ref 10.3–14.5)
SODIUM SERPL-SCNC: 137 MMOL/L — SIGNIFICANT CHANGE UP (ref 135–145)
WBC # BLD: 9.88 K/UL — SIGNIFICANT CHANGE UP (ref 3.8–10.5)
WBC # FLD AUTO: 9.88 K/UL — SIGNIFICANT CHANGE UP (ref 3.8–10.5)

## 2025-01-21 PROCEDURE — 72131 CT LUMBAR SPINE W/O DYE: CPT | Mod: 26

## 2025-01-21 PROCEDURE — 71046 X-RAY EXAM CHEST 2 VIEWS: CPT | Mod: 26

## 2025-01-21 PROCEDURE — 74177 CT ABD & PELVIS W/CONTRAST: CPT | Mod: 26

## 2025-01-21 PROCEDURE — 72128 CT CHEST SPINE W/O DYE: CPT | Mod: 26

## 2025-01-21 PROCEDURE — 99285 EMERGENCY DEPT VISIT HI MDM: CPT

## 2025-01-21 RX ORDER — MORPHINE SULFATE 15 MG
4 TABLET, EXTENDED RELEASE ORAL ONCE
Refills: 0 | Status: DISCONTINUED | OUTPATIENT
Start: 2025-01-21 | End: 2025-01-21

## 2025-01-21 RX ORDER — ALBUTEROL SULFATE 90 UG/1
2 INHALANT RESPIRATORY (INHALATION) ONCE
Refills: 0 | Status: COMPLETED | OUTPATIENT
Start: 2025-01-21 | End: 2025-01-21

## 2025-01-21 RX ORDER — SODIUM CHLORIDE 9 MG/ML
1000 INJECTION, SOLUTION INTRAMUSCULAR; INTRAVENOUS; SUBCUTANEOUS ONCE
Refills: 0 | Status: COMPLETED | OUTPATIENT
Start: 2025-01-21 | End: 2025-01-21

## 2025-01-21 RX ORDER — ONDANSETRON 4 MG/1
4 TABLET ORAL ONCE
Refills: 0 | Status: COMPLETED | OUTPATIENT
Start: 2025-01-21 | End: 2025-01-21

## 2025-01-21 RX ADMIN — Medication 4 MILLIGRAM(S): at 23:38

## 2025-01-21 RX ADMIN — ONDANSETRON 4 MILLIGRAM(S): 4 TABLET ORAL at 21:15

## 2025-01-21 RX ADMIN — Medication 4 MILLIGRAM(S): at 21:16

## 2025-01-21 RX ADMIN — SODIUM CHLORIDE 1000 MILLILITER(S): 9 INJECTION, SOLUTION INTRAMUSCULAR; INTRAVENOUS; SUBCUTANEOUS at 23:38

## 2025-01-21 RX ADMIN — SODIUM CHLORIDE 1000 MILLILITER(S): 9 INJECTION, SOLUTION INTRAMUSCULAR; INTRAVENOUS; SUBCUTANEOUS at 21:15

## 2025-01-21 NOTE — ED PROVIDER NOTE - CARE PLAN
Principal Discharge DX:	Hyperglycemia  Secondary Diagnosis:	Back pain  Secondary Diagnosis:	Abdominal pain   1

## 2025-01-21 NOTE — ED PROVIDER NOTE - NSFOLLOWUPCLINICS_GEN_ALL_ED_FT
[facility]  Orthopedic Surgery  [address] [place] [address]  Phone: [phone]  Fax:     [facility]opedic [facility]  Orthopedic Surgery  [address], 3rd & 4th [place] [address]  Phone: [phone]  Fax:     [facility]  Orthopedic Surgery  [address] [address]  Phone: [phone]  Fax:     [facility]  Orthopedics  [address] [place] [address]  Phone: [phone]  Fax: [phone]    [facility]  Orthopedic Surgery  [address] [place] [address]  Phone: [phone]  Fax:

## 2025-01-21 NOTE — ED ADULT NURSE NOTE - NSFALLUNIVINTERV_ED_ALL_ED
Bed/Stretcher in lowest position, wheels locked, appropriate side rails in place/Call bell, personal items and telephone in reach/Instruct patient to call for assistance before getting out of bed/chair/stretcher/Non-slip footwear applied when patient is off stretcher/[place] to call system/Physically safe environment - no spills, clutter or unnecessary equipment/Purposeful proactive rounding/Room/bathroom lighting operational, light cord in reach

## 2025-01-21 NOTE — ED PROVIDER NOTE - PATIENT PORTAL LINK FT
You can access the FollowMyHealth Patient Portal offered by [facility] by registering at the following website: [email or web address]. By joining [facility]’s FollowMyHealth portal, you will also be able to view your health information using other applications (apps) compatible with our system.
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
## 2025-01-21 NOTE — ED ADULT TRIAGE NOTE - CHIEF COMPLAINT QUOTE
pt from home, c/o lower back, abdominal pain started this morning becoming progressively worse. toradol 15mg and zofran 4mg ivp via left 20G hx: IDDM, lower back pain, gastritis

## 2025-01-21 NOTE — ED PROVIDER NOTE - NSFOLLOWUPINSTRUCTIONS_ED_ALL_ED_FT
You were seen in the Emergency Department for back pain, abdominal pain.  You were found to have elevated sugars.     1) Advance activity as tolerated.   2) Continue all previously prescribed medications as directed.    3) Follow up with your primary care physician in 2-3 days - take copies of your results.    4) Return to the Emergency Department for worsening or persistent symptoms, and/or ANY NEW OR CONCERNING SYMPTOMS.     Hyperglycemia    Hyperglycemia occurs when the glucose (sugar) level in your blood is too high. Symptoms include increased urination, increased thirst, a dry mouth, or changes in appetite. If started on a medication, take exactly as prescribed by your health care professional. Maintain a healthy lifestyle and follow up with your primary care physician.    SEEK IMMEDIATE MEDICAL CARE IF YOU HAVE ANY OF THE FOLLOWING SYMPTOMS: shortness of breath, change in mental status, nausea or vomiting, fruity smell to your breath, or any signs of dehydration.    Back Pain    Back pain is very common in adults. The cause of back pain is rarely dangerous and the pain often gets better over time. The cause of your back pain may not be known and may include strain of muscles or ligaments, degeneration of the spinal disks, or arthritis. Occasionally the pain may radiate down your leg(s). Over-the-counter medicines to reduce pain and inflammation are often the most helpful. Stretching and remaining active frequently helps the healing process.     SEEK IMMEDIATE MEDICAL CARE IF YOU HAVE ANY OF THE FOLLOWING SYMPTOMS: bowel or bladder control problems, unusual weakness or numbness in your arms or legs, nausea or vomiting, abdominal pain, fever, dizziness/lightheadedness.     Abdominal Pain    Many things can cause abdominal pain. Many times, abdominal pain is not caused by a disease and will improve without treatment. Your health care provider will do a physical exam to determine if there is a dangerous cause of your pain; blood tests and imaging may help determine the cause of your pain. However, in many cases, no cause may be found and you may need further testing as an outpatient. Monitor your abdominal pain for any changes.     SEEK IMMEDIATE MEDICAL CARE IF YOU HAVE ANY OF THE FOLLOWING SYMPTOMS: worsening abdominal pain, uncontrollable vomiting, profuse diarrhea, inability to have bowel movements or pass gas, black or bloody stools, fever accompanying chest pain or back pain, or fainting. These symptoms may represent a serious problem that is an emergency. Do not wait to see if the symptoms will go away. Get medical help right away. Call 911 and do not drive yourself to the hospital.

## 2025-01-21 NOTE — ED PROVIDER NOTE - NSFOLLOWUPCLINICSTOKEN_GEN_ALL_ED_FT
[id number]: || ||00\01||False;[id number]: || ||00\01||False;[id number]: || ||00\01||False;[id number]: || ||00\01||False;[id number]: || ||00\01||False;

## 2025-01-21 NOTE — ED PROVIDER NOTE - PHYSICAL EXAMINATION
General: Appears uncomfotable  Mentation: A&O x 3  psych: mood appropriate  HEENT: airway patent, conjunctivae clear bilaterally  Resp: symmetric chest rise, no resp distress, breath sounds CTA bilaterally  Cardio: RRR, no m/r/g  GI: soft/nondistended, left sided abdominal tenderness  Neuro: sensation and motor function grossly intact  Skin: no cyanosis, no jaundice  MSK: normal movement of all extremities  Lymph/Vasc: no CLARENCE edema

## 2025-01-21 NOTE — ED ADULT NURSE NOTE - OBJECTIVE STATEMENT
Pt presenst to ED for perfuse Abd pain localized to LLQ x1 day. Associated with N/V denies D. 10/10 Pain. AAox3 GCS15

## 2025-01-21 NOTE — ED PROVIDER NOTE - CLINICAL SUMMARY MEDICAL DECISION MAKING FREE TEXT BOX
44-year-old male with a past medical history of diabetes, gastritis, chronic back pain presenting with abdominal pain. Patient states this morning he began having a left-sided abdominal pain. Pain is consistent, nonradiating. Associated nausea, vomiting, chest tightness, difficulty breathing. Patient states he also has low back pain that is similar to prior. Denies fevers, diarrhea, dysuria, weakness in the upper or lower extremities, bowel or bladder incontinence. Physical exam reveals uncomfortable appearing male, heart rate regular, clear breath sounds bilaterally, left-sided abdominal tenderness, no lower extreme edema. CBC, CMP, chest x-ray, troponin, lipase, CT abdomen pelvis, chest x-ray, IV fluids, pain control, nausea medication. 44-year-old male with a past medical history of diabetes, gastritis, chronic back pain presenting with abdominal pain. Patient states this morning he began having a left-sided abdominal pain. Pain is consistent, nonradiating. Associated nausea, vomiting, chest tightness, difficulty breathing. Patient states he also has low back pain that is similar to prior. Denies fevers, diarrhea, dysuria, weakness in the upper or lower extremities, bowel or bladder incontinence. Physical exam reveals uncomfortable appearing male, heart rate regular, clear breath sounds bilaterally, left-sided abdominal tenderness, no lower extremity edema. CBC, CMP, chest x-ray, troponin, lipase, CT abdomen pelvis, chest x-ray, IV fluids, pain control, nausea medication.

## 2025-01-22 VITALS
HEART RATE: 96 BPM | RESPIRATION RATE: 18 BRPM | OXYGEN SATURATION: 97 % | DIASTOLIC BLOOD PRESSURE: 84 MMHG | SYSTOLIC BLOOD PRESSURE: 148 MMHG

## 2025-01-22 LAB
ACETONE SERPL-MCNC: NEGATIVE — SIGNIFICANT CHANGE UP
ANION GAP SERPL CALC-SCNC: 9 MMOL/L — SIGNIFICANT CHANGE UP (ref 5–17)
APPEARANCE UR: CLEAR — SIGNIFICANT CHANGE UP
BASE EXCESS BLDV CALC-SCNC: 2 MMOL/L — SIGNIFICANT CHANGE UP (ref -2–3)
BILIRUB UR-MCNC: NEGATIVE — SIGNIFICANT CHANGE UP
BLOOD GAS COMMENTS, VENOUS: SIGNIFICANT CHANGE UP
BUN SERPL-MCNC: 13 MG/DL — SIGNIFICANT CHANGE UP (ref 7–23)
CALCIUM SERPL-MCNC: 6.6 MG/DL — LOW (ref 8.5–10.1)
CHLORIDE BLDV-SCNC: 98 MMOL/L — SIGNIFICANT CHANGE UP (ref 98–107)
CHLORIDE SERPL-SCNC: 111 MMOL/L — HIGH (ref 96–108)
CO2 BLDV-SCNC: 29 MMOL/L — HIGH (ref 22–26)
CO2 SERPL-SCNC: 22 MMOL/L — SIGNIFICANT CHANGE UP (ref 22–31)
COLOR SPEC: YELLOW — SIGNIFICANT CHANGE UP
CREAT SERPL-MCNC: 0.79 MG/DL — SIGNIFICANT CHANGE UP (ref 0.5–1.3)
DIFF PNL FLD: NEGATIVE — SIGNIFICANT CHANGE UP
EGFR: 112 ML/MIN/1.73M2 — SIGNIFICANT CHANGE UP
GAS PNL BLDV: 134 MMOL/L — LOW (ref 136–145)
GAS PNL BLDV: SIGNIFICANT CHANGE UP
GAS PNL BLDV: SIGNIFICANT CHANGE UP
GLUCOSE BLDV-MCNC: 459 MG/DL — CRITICAL HIGH (ref 65–95)
GLUCOSE SERPL-MCNC: 328 MG/DL — HIGH (ref 70–99)
GLUCOSE UR QL: 500 MG/DL
HCO3 BLDV-SCNC: 27 MMOL/L — SIGNIFICANT CHANGE UP (ref 22–28)
HCT VFR BLDA CALC: 47 % — SIGNIFICANT CHANGE UP (ref 37–47)
HGB BLD CALC-MCNC: 15.7 G/DL — SIGNIFICANT CHANGE UP (ref 12.6–17.4)
KETONES UR-MCNC: 40 MG/DL
LACTATE BLDV-MCNC: 2.8 MMOL/L — HIGH (ref 0.56–1.39)
LEUKOCYTE ESTERASE UR-ACNC: NEGATIVE — SIGNIFICANT CHANGE UP
NITRITE UR-MCNC: NEGATIVE — SIGNIFICANT CHANGE UP
PCO2 BLDV: 44 MMHG — SIGNIFICANT CHANGE UP (ref 42–55)
PH BLDV: 7.4 — SIGNIFICANT CHANGE UP (ref 7.32–7.43)
PH UR: 8.5 (ref 5–8)
PO2 BLDV: 39 MMHG — SIGNIFICANT CHANGE UP (ref 25–45)
POTASSIUM BLDV-SCNC: 4.5 MMOL/L — SIGNIFICANT CHANGE UP (ref 3.5–5.1)
POTASSIUM SERPL-MCNC: 3.1 MMOL/L — LOW (ref 3.5–5.3)
POTASSIUM SERPL-SCNC: 3.1 MMOL/L — LOW (ref 3.5–5.3)
PROT UR-MCNC: SIGNIFICANT CHANGE UP MG/DL
SAO2 % BLDV: 72.6 % — LOW (ref 94–98)
SODIUM SERPL-SCNC: 142 MMOL/L — SIGNIFICANT CHANGE UP (ref 135–145)
SP GR SPEC: 1.03 — SIGNIFICANT CHANGE UP (ref 1–1.03)
UROBILINOGEN FLD QL: 0.2 MG/DL — SIGNIFICANT CHANGE UP (ref 0.2–1)

## 2025-01-22 RX ORDER — TRAMADOL HYDROCHLORIDE 50 MG/1
1 TABLET ORAL
Qty: 9 | Refills: 0
Start: 2025-01-22 | End: 2025-01-24

## 2025-01-22 RX ORDER — SODIUM CHLORIDE 9 MG/ML
1000 INJECTION, SOLUTION INTRAMUSCULAR; INTRAVENOUS; SUBCUTANEOUS ONCE
Refills: 0 | Status: COMPLETED | OUTPATIENT
Start: 2025-01-22 | End: 2025-01-22

## 2025-01-22 RX ORDER — IBUPROFEN 200 MG
1 TABLET ORAL
Qty: 20 | Refills: 0
Start: 2025-01-22 | End: 2025-01-26

## 2025-01-22 RX ORDER — METHOCARBAMOL 500 MG
1500 TABLET ORAL ONCE
Refills: 0 | Status: COMPLETED | OUTPATIENT
Start: 2025-01-22 | End: 2025-01-22

## 2025-01-22 RX ORDER — HALOPERIDOL DECANOATE 50 MG/ML
2.5 INJECTION INTRAMUSCULAR ONCE
Refills: 0 | Status: COMPLETED | OUTPATIENT
Start: 2025-01-22 | End: 2025-01-22

## 2025-01-22 RX ORDER — MAGNESIUM CITRATE 1.75 G/29.6ML
296 LIQUID ORAL ONCE
Refills: 0 | Status: COMPLETED | OUTPATIENT
Start: 2025-01-22 | End: 2025-01-22

## 2025-01-22 RX ORDER — LIDOCAINE 50 MG/G
1 OINTMENT TOPICAL
Qty: 1 | Refills: 0
Start: 2025-01-22

## 2025-01-22 RX ORDER — POTASSIUM CHLORIDE 600 MG/1
40 TABLET, FILM COATED, EXTENDED RELEASE ORAL DAILY
Refills: 0 | Status: DISCONTINUED | OUTPATIENT
Start: 2025-01-22 | End: 2025-01-22

## 2025-01-22 RX ORDER — METHOCARBAMOL 500 MG
2 TABLET ORAL
Qty: 40 | Refills: 0
Start: 2025-01-22 | End: 2025-01-26

## 2025-01-22 RX ORDER — INSULIN HUMAN 100 [IU]/ML
6 INJECTION, SOLUTION SUBCUTANEOUS ONCE
Refills: 0 | Status: COMPLETED | OUTPATIENT
Start: 2025-01-22 | End: 2025-01-22

## 2025-01-22 RX ORDER — LIDOCAINE 50 MG/G
1 OINTMENT TOPICAL ONCE
Refills: 0 | Status: COMPLETED | OUTPATIENT
Start: 2025-01-22 | End: 2025-01-22

## 2025-01-22 RX ORDER — INSULIN HUMAN 100 [IU]/ML
8 INJECTION, SOLUTION SUBCUTANEOUS ONCE
Refills: 0 | Status: COMPLETED | OUTPATIENT
Start: 2025-01-22 | End: 2025-01-22

## 2025-01-22 RX ADMIN — SODIUM CHLORIDE 1000 MILLILITER(S): 9 INJECTION, SOLUTION INTRAMUSCULAR; INTRAVENOUS; SUBCUTANEOUS at 01:32

## 2025-01-22 RX ADMIN — LIDOCAINE 1 PATCH: 50 OINTMENT TOPICAL at 00:48

## 2025-01-22 RX ADMIN — INSULIN HUMAN 8 UNIT(S): 100 INJECTION, SOLUTION SUBCUTANEOUS at 07:40

## 2025-01-22 RX ADMIN — Medication 1500 MILLIGRAM(S): at 00:48

## 2025-01-22 RX ADMIN — ALBUTEROL SULFATE 2 PUFF(S): 90 INHALANT RESPIRATORY (INHALATION) at 00:22

## 2025-01-22 RX ADMIN — MAGNESIUM CITRATE 296 MILLILITER(S): 1.75 LIQUID ORAL at 00:48

## 2025-01-22 RX ADMIN — HALOPERIDOL DECANOATE 2.5 MILLIGRAM(S): 50 INJECTION INTRAMUSCULAR at 01:22

## 2025-01-22 RX ADMIN — INSULIN HUMAN 6 UNIT(S): 100 INJECTION, SOLUTION SUBCUTANEOUS at 06:31

## 2025-01-22 RX ADMIN — POTASSIUM CHLORIDE 40 MILLIEQUIVALENT(S): 600 TABLET, FILM COATED, EXTENDED RELEASE ORAL at 04:54

## 2025-01-22 NOTE — ED ADULT NURSE NOTE - CHIEF COMPLAINT
Assessment/Plan:  1. Acute left-sided low back pain without sciatica  XR spine lumbar 2 or 3 views injury    Ambulatory referral to Physical Therapy          Andrea has acute lower back pain consistent with muscular spasm.  He does not have any obvious signs of lumbar radiculopathy or weakness in the lower extremity.  I did recommend oral anti-inflammatories, muscle relaxers and formal physical therapy.  I did excuse him from work over the next 2 days due to the increased pain to allow the medication to begin working and he will set up physical therapy.  He can return to work next week.  Follow-up with me if pain persists over the next 4 to 6 weeks after physical therapy if needed.    Subjective:   Andrea Ramon is a 27 y.o. male who presents to the office for evaluation for acute lower back pain.  He denies any specific injury but felt like the pain increased with a lot of physical activity with working and lifting of boxes.  Shoveling snow also seem to exacerbate his symptoms.  He had increased pain in his left lower back and went to urgent care yesterday.  He was given a muscle relaxer medication which helped slightly.  He states he is hurt his lower back several times in the past.  He denies any numbness or tingling down his leg currently.  He is having trouble going to work due to this discomfort.      Review of Systems   Constitutional:  Negative for chills, fever and unexpected weight change.   HENT:  Negative for hearing loss, nosebleeds and sore throat.    Eyes:  Negative for pain, redness and visual disturbance.   Respiratory:  Negative for cough, shortness of breath and wheezing.    Cardiovascular:  Negative for chest pain, palpitations and leg swelling.   Gastrointestinal:  Negative for abdominal pain, nausea and vomiting.   Endocrine: Negative for polyphagia and polyuria.   Genitourinary:  Negative for dysuria and hematuria.   Musculoskeletal:         See HPI   Skin:  Negative for rash and wound.    Neurological:  Negative for dizziness, numbness and headaches.   Psychiatric/Behavioral:  Negative for decreased concentration and suicidal ideas. The patient is not nervous/anxious.          Past Medical History:   Diagnosis Date    Abscess of eyelid 09/05/2012    Procedure/Onset: 11/03/2008      Migraine        History reviewed. No pertinent surgical history.    Family History   Problem Relation Age of Onset    Diabetes Mother     Diabetes Father        Social History     Occupational History    Not on file   Tobacco Use    Smoking status: Some Days     Types: Cigarettes    Smokeless tobacco: Never    Tobacco comments:     3-4 cigarettes/week   Vaping Use    Vaping status: Former    Start date: 6/7/2024   Substance and Sexual Activity    Alcohol use: Never    Drug use: Yes     Types: Marijuana    Sexual activity: Not on file         Current Outpatient Medications:     butalbital-acetaminophen-caffeine (FIORICET,ESGIC) -40 mg per tablet, Take 1 tablet by mouth every 4 (four) hours as needed for headaches, Disp: , Rfl:     metaxalone (SKELAXIN) 800 mg tablet, Take 1 tablet (800 mg total) by mouth 3 (three) times a day for 5 days, Disp: 15 tablet, Rfl: 0    No Known Allergies    Objective:  There were no vitals filed for this visit.         Back Exam     Tenderness   The patient is experiencing tenderness in the lumbar.    Range of Motion   Extension:  normal   Flexion:  normal     Muscle Strength   Right Quadriceps:  5/5   Left Quadriceps:  5/5   Right Hamstrings:  5/5   Left Hamstrings:  5/5     Tests   Straight leg raise right: negative  Straight leg raise left: negative    Reflexes   Patellar:  normal    Other   Sensation: normal  Gait: normal             Physical Exam  Vitals reviewed.   Constitutional:       Appearance: He is well-developed.   HENT:      Head: Normocephalic and atraumatic.   Eyes:      Conjunctiva/sclera: Conjunctivae normal.      Pupils: Pupils are equal, round, and reactive to light.    Cardiovascular:      Rate and Rhythm: Normal rate.      Pulses: Normal pulses.   Pulmonary:      Effort: Pulmonary effort is normal. No respiratory distress.   Musculoskeletal:      Cervical back: Normal range of motion and neck supple.      Lumbar back: Negative right straight leg raise test and negative left straight leg raise test.      Comments: As noted in HPI   Skin:     General: Skin is warm and dry.   Neurological:      General: No focal deficit present.      Mental Status: He is alert and oriented to person, place, and time.   Psychiatric:         Mood and Affect: Mood normal.         Behavior: Behavior normal.         I have personally reviewed pertinent films in PACS and my interpretation is as follows:  Lumbar x-rays demonstrate no evidence of acute abnormality.      This document was created using speech voice recognition software.   Grammatical errors, random word insertions, pronoun errors, and incomplete sentences are an occasional consequence of this system due to software limitations, ambient noise, and hardware issues.   Any formal questions or concerns about content, text, or information contained within the body of this dictation should be directly addressed to the provider for clarification.   The patient is a 43y Male complaining of abdominal pain.

## 2025-01-22 NOTE — ED ADULT NURSE REASSESSMENT NOTE - NS ED NURSE REASSESS COMMENT FT1
Patient BIBA. When place in WR after triage patient states "I am leaving. I shouldn't have to wait when I came in by ambulance because I am Sick." Patient on Phone calling for ride.
Patient placed in Wheelchair by [facility] officers after refusing to get off floor for ED staff." Patient moaning and crying. Patient first stated he was leaving, pacing around with phone to Ear. Stopped pacing and placed self on floor when "I can't get anyone to pick me up." Patient noted to be using fingers to induce vomiting, patient advised that forcing self to vomit is dangerous." Patient replied "I am sick."
Patient placed self on Floor. Refusing to move to chair. Patient waiting to leave or to be seen. Patient blocking access to sink  and in ambulance pathway refusing to move.
Patient received on stretcher, awake and alert. Insulin, 8units IV administrated. Pain 0/10. Awaiting glucose re-evaluation.

## 2025-04-28 NOTE — BEHAVIORAL HEALTH ASSESSMENT NOTE - NSBHPSYCHHX_PSY_A_CORE
Physical Therapy  Physical Therapy Treatment    Patient Name: Annita Mcleod  MRN: [id number]  Today's Date: 4/28/2025  Time Calculation  Start Time: 1545  Stop Time: 1635  Time Calculation (min): 50 min    Insurance:  Visit number: 4 of 12  Authorization info: Auth needed   Insurance Type: [facility] [employer]   Cert date start: 4/4/25  Cert date end: 6/27/25           Auth# [id number]    General  Reason for Referral: Lumbar radic  Referred By: RACHEL Perez MD    Current Problem  1. Lumbar radiculopathy  Follow Up In Physical Therapy          Precautions  STEADI Fall Risk Score (The score of 4 or more indicates an increased risk of falling): 9  Precautions Comment: none    Pain Assessment: 0-10  0-10 (Numeric) Pain Score: 0 - No pain    Subjective:   Patient arrived full weight bearing without a limp no pain on arrival.  Patient still notes intermittent pain but has improved since returning to aquatic therapy.  Patient notes some soreness when raining.    HEP Performed:  Yes    Objective:   Function: ambulating fwb.  Slightly forward posture.      Treatments:   Pool Depth: 4 foot, Temperature 92°  Forward/retro walk 5'   Side Step 5'   HR/TR 3'      5 foot work white noodle  Bicycle 5'   Alt Hams 4'  Hip ext R/L 2.5' each   Hip abd/add R/L 2.5' each (feels effort into back)   Leg circles cw/ccw 2' each   Hang 4'      4' deep  V-sit 1'   Standing HF stretch R/L 1'  Standing Hams stretch on stair R/L 1'     Charges: AQ x3     Assessment:   Patient tolerated intensity without problems.  Patient had intermittent back pain when moving legs directly underneath body.  Hang eliminated symptoms.      Plan:   Continue decreasing back discomfort increasing ROM, flexibility, mobility, balance, strength, endurance and function as symptoms allow.   Land re-check next session then transition to land therapy.        Constantino Potter, PTA      
yes...

## 2025-04-30 NOTE — ED ADULT TRIAGE NOTE - MEANS OF ARRIVAL
Writer made third outreach attempt to patient to clarify MyChart use.  A voicemail was left with instructions inviting patient to reach out via phone.    No further outreach attempts will be made, but writer is willing to talk to patient if she calls back.    Sophia NEAL  Clinic Manager  [facility]       stretcher

## 2025-05-05 NOTE — ED ADULT NURSE NOTE - NSICDXPASTMEDICALHX_GEN_ALL_CORE_FT
Patient called with echo results. Heart rates are now in the 70s and he reports he is feeling improved, able to go back to work. Rectal bleeding has resolved. His blood pressures have been elevated, recommended that he increase amlodipine to 10 mg daily for now. He will follow up with Nephrologist re: starting on diuretic, patient had elevated RVSP on echo results. Patient verbalized understanding of above.    PAST MEDICAL HISTORY:  Anxiety     Asthma     Controlled diabetes mellitus type 1 without complications     DM type 1 (diabetes mellitus, type 1)     Gastritis     Gastritis, presence of bleeding unspecified, unspecified chronicity, unspecified gastritis type     Uncomplicated asthma, unspecified asthma severity, unspecified whether persistent

## 2025-05-09 NOTE — ED ADULT NURSE NOTE - BEFAST SCREENING
[Vaccines Reviewed] : Immunizations reviewed today. Please see immunization details in the vaccine log within the immunization flowsheet.  [FreeTextEntry1] : Specialists Involved: -Gyn: Dr. Flores ([phone]) -Derm: Dr. Cole ([phone])  EKG obtained in office today demonstrates NSR.  -F/u labs drawn in office today -Further recs pending lab results -Continue annual f/u w/ Gyn  -Cardio consult -RTO yearly for CPE or sooner if needed.   prevnar 20 vaccine administered in office today and tolerated well\ Negative

## 2025-07-04 ENCOUNTER — NON-APPOINTMENT (OUTPATIENT)
Age: 45
End: 2025-07-04

## 2025-08-16 ENCOUNTER — EMERGENCY (EMERGENCY)
Facility: HOSPITAL | Age: 45
LOS: 0 days | Discharge: HOME | End: 2025-08-16
Attending: STUDENT IN AN ORGANIZED HEALTH CARE EDUCATION/TRAINING PROGRAM
Payer: MEDICAID

## 2025-08-16 VITALS
SYSTOLIC BLOOD PRESSURE: 168 MMHG | OXYGEN SATURATION: 98 % | HEIGHT: 69 IN | HEART RATE: 96 BPM | RESPIRATION RATE: 16 BRPM | WEIGHT: 190.04 LBS | DIASTOLIC BLOOD PRESSURE: 86 MMHG | TEMPERATURE: 98 F

## 2025-08-16 VITALS
SYSTOLIC BLOOD PRESSURE: 136 MMHG | TEMPERATURE: 100 F | OXYGEN SATURATION: 98 % | DIASTOLIC BLOOD PRESSURE: 89 MMHG | HEART RATE: 91 BPM

## 2025-08-16 DIAGNOSIS — Z87.891 PERSONAL HISTORY OF NICOTINE DEPENDENCE: ICD-10-CM

## 2025-08-16 DIAGNOSIS — G89.29 OTHER CHRONIC PAIN: ICD-10-CM

## 2025-08-16 DIAGNOSIS — Z90.49 ACQUIRED ABSENCE OF OTHER SPECIFIED PARTS OF DIGESTIVE TRACT: Chronic | ICD-10-CM

## 2025-08-16 DIAGNOSIS — Z91.018 ALLERGY TO OTHER FOODS: ICD-10-CM

## 2025-08-16 DIAGNOSIS — R10.9 UNSPECIFIED ABDOMINAL PAIN: ICD-10-CM

## 2025-08-16 DIAGNOSIS — R11.10 VOMITING, UNSPECIFIED: ICD-10-CM

## 2025-08-16 DIAGNOSIS — R10.30 LOWER ABDOMINAL PAIN, UNSPECIFIED: ICD-10-CM

## 2025-08-16 DIAGNOSIS — E73.9 LACTOSE INTOLERANCE, UNSPECIFIED: ICD-10-CM

## 2025-08-16 LAB
ALBUMIN SERPL ELPH-MCNC: 3.9 G/DL — SIGNIFICANT CHANGE UP (ref 3.3–5)
ALP SERPL-CCNC: 106 U/L — SIGNIFICANT CHANGE UP (ref 40–120)
ALT FLD-CCNC: 26 U/L — SIGNIFICANT CHANGE UP (ref 12–78)
ANION GAP SERPL CALC-SCNC: 5 MMOL/L — SIGNIFICANT CHANGE UP (ref 5–17)
AST SERPL-CCNC: 22 U/L — SIGNIFICANT CHANGE UP (ref 15–37)
BASOPHILS # BLD AUTO: 0.05 K/UL — SIGNIFICANT CHANGE UP (ref 0–0.2)
BASOPHILS NFR BLD AUTO: 0.5 % — SIGNIFICANT CHANGE UP (ref 0–2)
BILIRUB SERPL-MCNC: 0.3 MG/DL — SIGNIFICANT CHANGE UP (ref 0.2–1.2)
BUN SERPL-MCNC: 8 MG/DL — SIGNIFICANT CHANGE UP (ref 7–23)
CALCIUM SERPL-MCNC: 9.7 MG/DL — SIGNIFICANT CHANGE UP (ref 8.5–10.1)
CHLORIDE SERPL-SCNC: 103 MMOL/L — SIGNIFICANT CHANGE UP (ref 96–108)
CO2 SERPL-SCNC: 33 MMOL/L — HIGH (ref 22–31)
CREAT SERPL-MCNC: 1.22 MG/DL — SIGNIFICANT CHANGE UP (ref 0.5–1.3)
EGFR: 75 ML/MIN/1.73M2 — SIGNIFICANT CHANGE UP
EGFR: 75 ML/MIN/1.73M2 — SIGNIFICANT CHANGE UP
EOSINOPHIL # BLD AUTO: 0.1 K/UL — SIGNIFICANT CHANGE UP (ref 0–0.5)
EOSINOPHIL NFR BLD AUTO: 0.9 % — SIGNIFICANT CHANGE UP (ref 0–6)
GLUCOSE SERPL-MCNC: 221 MG/DL — HIGH (ref 70–99)
HCT VFR BLD CALC: 45.5 % — SIGNIFICANT CHANGE UP (ref 39–50)
HGB BLD-MCNC: 15.5 G/DL — SIGNIFICANT CHANGE UP (ref 13–17)
IMM GRANULOCYTES NFR BLD AUTO: 0.3 % — SIGNIFICANT CHANGE UP (ref 0–0.9)
LIDOCAIN IGE QN: 10 U/L — LOW (ref 13–75)
LYMPHOCYTES # BLD AUTO: 1.09 K/UL — SIGNIFICANT CHANGE UP (ref 1–3.3)
LYMPHOCYTES # BLD AUTO: 10 % — LOW (ref 13–44)
MCHC RBC-ENTMCNC: 28.3 PG — SIGNIFICANT CHANGE UP (ref 27–34)
MCHC RBC-ENTMCNC: 34.1 G/DL — SIGNIFICANT CHANGE UP (ref 32–36)
MCV RBC AUTO: 83 FL — SIGNIFICANT CHANGE UP (ref 80–100)
MONOCYTES # BLD AUTO: 0.23 K/UL — SIGNIFICANT CHANGE UP (ref 0–0.9)
MONOCYTES NFR BLD AUTO: 2.1 % — SIGNIFICANT CHANGE UP (ref 2–14)
NEUTROPHILS # BLD AUTO: 9.35 K/UL — HIGH (ref 1.8–7.4)
NEUTROPHILS NFR BLD AUTO: 86.2 % — HIGH (ref 43–77)
NRBC BLD AUTO-RTO: 0 /100 WBCS — SIGNIFICANT CHANGE UP (ref 0–0)
PLATELET # BLD AUTO: 322 K/UL — SIGNIFICANT CHANGE UP (ref 150–400)
POTASSIUM SERPL-MCNC: 3.7 MMOL/L — SIGNIFICANT CHANGE UP (ref 3.5–5.3)
POTASSIUM SERPL-SCNC: 3.7 MMOL/L — SIGNIFICANT CHANGE UP (ref 3.5–5.3)
PROT SERPL-MCNC: 7.7 GM/DL — SIGNIFICANT CHANGE UP (ref 6–8.3)
RBC # BLD: 5.48 M/UL — SIGNIFICANT CHANGE UP (ref 4.2–5.8)
RBC # FLD: 13.3 % — SIGNIFICANT CHANGE UP (ref 10.3–14.5)
SODIUM SERPL-SCNC: 141 MMOL/L — SIGNIFICANT CHANGE UP (ref 135–145)
WBC # BLD: 10.85 K/UL — HIGH (ref 3.8–10.5)
WBC # FLD AUTO: 10.85 K/UL — HIGH (ref 3.8–10.5)

## 2025-08-16 PROCEDURE — 99285 EMERGENCY DEPT VISIT HI MDM: CPT

## 2025-08-16 RX ORDER — SUCRALFATE 1 G
1 TABLET ORAL ONCE
Refills: 0 | Status: COMPLETED | OUTPATIENT
Start: 2025-08-16 | End: 2025-08-16

## 2025-08-16 RX ORDER — ONDANSETRON HCL/PF 4 MG/2 ML
4 VIAL (ML) INJECTION ONCE
Refills: 0 | Status: COMPLETED | OUTPATIENT
Start: 2025-08-16 | End: 2025-08-16

## 2025-08-16 RX ORDER — DROPERIDOL 2.5 MG/ML
2.5 INJECTION, SOLUTION INTRAMUSCULAR; INTRAVENOUS ONCE
Refills: 0 | Status: COMPLETED | OUTPATIENT
Start: 2025-08-16 | End: 2025-08-16

## 2025-08-16 RX ORDER — KETOROLAC TROMETHAMINE 30 MG/ML
15 INJECTION, SOLUTION INTRAMUSCULAR; INTRAVENOUS ONCE
Refills: 0 | Status: DISCONTINUED | OUTPATIENT
Start: 2025-08-16 | End: 2025-08-16

## 2025-08-16 RX ADMIN — Medication 1 GRAM(S): at 09:40

## 2025-08-16 RX ADMIN — KETOROLAC TROMETHAMINE 15 MILLIGRAM(S): 30 INJECTION, SOLUTION INTRAMUSCULAR; INTRAVENOUS at 09:32

## 2025-08-16 RX ADMIN — DROPERIDOL 2.5 MILLIGRAM(S): 2.5 INJECTION, SOLUTION INTRAMUSCULAR; INTRAVENOUS at 09:31

## 2025-08-16 RX ADMIN — Medication 1000 MILLILITER(S): at 09:30

## 2025-08-16 RX ADMIN — KETOROLAC TROMETHAMINE 15 MILLIGRAM(S): 30 INJECTION, SOLUTION INTRAMUSCULAR; INTRAVENOUS at 10:32

## 2025-08-16 RX ADMIN — Medication 20 MILLIGRAM(S): at 09:32

## 2025-08-16 RX ADMIN — Medication 4 MILLIGRAM(S): at 09:32

## 2025-09-18 ENCOUNTER — TRANSCRIPTION ENCOUNTER (OUTPATIENT)
Age: 45
End: 2025-09-18